# Patient Record
Sex: MALE | Race: WHITE | NOT HISPANIC OR LATINO | Employment: OTHER | URBAN - METROPOLITAN AREA
[De-identification: names, ages, dates, MRNs, and addresses within clinical notes are randomized per-mention and may not be internally consistent; named-entity substitution may affect disease eponyms.]

---

## 2017-01-03 ENCOUNTER — OFFICE VISIT (OUTPATIENT)
Dept: MEDICAL GROUP | Age: 72
End: 2017-01-03
Payer: MEDICARE

## 2017-01-03 VITALS
DIASTOLIC BLOOD PRESSURE: 78 MMHG | HEART RATE: 72 BPM | HEIGHT: 74 IN | SYSTOLIC BLOOD PRESSURE: 130 MMHG | BODY MASS INDEX: 34.52 KG/M2 | WEIGHT: 269 LBS | OXYGEN SATURATION: 98 % | RESPIRATION RATE: 16 BRPM | TEMPERATURE: 98.2 F

## 2017-01-03 DIAGNOSIS — R18.8 CIRRHOSIS OF LIVER WITH ASCITES, UNSPECIFIED HEPATIC CIRRHOSIS TYPE (HCC): ICD-10-CM

## 2017-01-03 DIAGNOSIS — I86.4 GASTRIC VARICES: ICD-10-CM

## 2017-01-03 DIAGNOSIS — E78.2 MIXED HYPERLIPIDEMIA: ICD-10-CM

## 2017-01-03 DIAGNOSIS — I10 ESSENTIAL HYPERTENSION: ICD-10-CM

## 2017-01-03 DIAGNOSIS — K74.60 CIRRHOSIS OF LIVER WITH ASCITES, UNSPECIFIED HEPATIC CIRRHOSIS TYPE (HCC): ICD-10-CM

## 2017-01-03 DIAGNOSIS — R17 ELEVATED BILIRUBIN: ICD-10-CM

## 2017-01-03 DIAGNOSIS — D50.0 IRON DEFICIENCY ANEMIA DUE TO CHRONIC BLOOD LOSS: ICD-10-CM

## 2017-01-03 PROCEDURE — 99215 OFFICE O/P EST HI 40 MIN: CPT | Performed by: INTERNAL MEDICINE

## 2017-01-03 RX ORDER — FOLIC ACID 1 MG/1
1 TABLET ORAL DAILY
Qty: 90 TAB | Refills: 4 | Status: ON HOLD | OUTPATIENT
Start: 2017-01-03 | End: 2017-09-07

## 2017-01-03 RX ORDER — LOSARTAN POTASSIUM 25 MG/1
25 TABLET ORAL DAILY
Qty: 30 TAB | Refills: 6 | Status: SHIPPED | OUTPATIENT
Start: 2017-01-03 | End: 2017-01-03 | Stop reason: SDUPTHER

## 2017-01-03 RX ORDER — LOSARTAN POTASSIUM 25 MG/1
25 TABLET ORAL DAILY
Qty: 30 TAB | Refills: 6 | Status: SHIPPED | OUTPATIENT
Start: 2017-01-03 | End: 2017-06-26

## 2017-01-03 ASSESSMENT — ENCOUNTER SYMPTOMS
NEUROLOGICAL NEGATIVE: 1
GASTROINTESTINAL NEGATIVE: 1
CONSTITUTIONAL NEGATIVE: 1
EYES NEGATIVE: 1
MUSCULOSKELETAL NEGATIVE: 1
RESPIRATORY NEGATIVE: 1
PSYCHIATRIC NEGATIVE: 1
CARDIOVASCULAR NEGATIVE: 1

## 2017-01-03 NOTE — MR AVS SNAPSHOT
"        Torey Lima   1/3/2017 11:20 AM   Office Visit   MRN: [id number]    Department:  [address] [place] [address]   Dept Phone:  [phone]    Description:  Male : 1945   Provider:  Fabián Urena M.D.           Reason for Visit     Cough f/v visit from 15 for dry cough      Allergies as of 1/3/2017     Allergen Noted Reactions    Nkda [No Known Drug Allergy] 08/10/2010         You were diagnosed with     Essential hypertension   [8827976]       Mixed hyperlipidemia   [272.2.ICD-9-CM]       Iron deficiency anemia due to chronic blood loss   [100415]       Cirrhosis of liver with ascites, unspecified hepatic cirrhosis type (HCC)   [7076250]       Elevated bilirubin   [111322]         Vital Signs     Blood Pressure Pulse Temperature Respirations Height Weight    130/78 mmHg 72 36.8 °C (98.2 °F) 16 1.88 m (6' 2.02\") 122.018 kg (269 lb)    Body Mass Index Oxygen Saturation Smoking Status             34.52 kg/m2 98% Former Smoker         Basic Information     Date Of Birth Sex Race Ethnicity Preferred Language    1945 Male White Non- English      Your appointments     2017  8:00 AM   PREVIOUS PATIENT with Andi Pearl M.D.   [facility] (--)    [address] [place] [address] [address]  [facility] [id number] [phone]            2017  8:00 AM   Established Patient with Fabián Urena M.D.   [address] [address] [phone]           You will be receiving a confirmation call a few days before your appointment from our automated call confirmation system.              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 D69.59   4/25/2012 - Present    ASCVD (arteriosclerotic cardiovascular disease)subtotalled small distal LCx and 40% LAD med rx by cath 12/13/12 I25.10 High  12/14/2012 - Present    BMI 34.0-34.9,adult Z68.34 High  2/15/2013 - Present    Mixed hyperlipidemia E78.2   6/20/2013 - Present    Essential hypertension I10   6/20/2013 - Present    Acute blood loss anemia D62 High  4/3/2016 - Present    GIB (gastrointestinal bleeding)- recurrent from varices K92.2 Medium  4/3/2016 - Present    Esophageal varices- BANDED 4/2016- repeat egd tbd 10//29/16- gic I85.00 High  4/4/2016 - Present    Gastric varices- s/p BRTO procedure at [facility] I86.4 Medium  4/4/2016 - Present    Portal vein thrombosis- on CT [facility] 2/2015 I81   4/14/2016 - Present    Cirrhosis of liver with ascites (HCC)- ? DUE TO OLD HEP B, CHEMO RX,  OR THERMO RAD OF MAGNANT LESION K74.60   4/14/2016 - Present    Iron deficiency anemia due to chronic blood loss D50.0   10/6/2016 - Present    Microcytic hypochromic anemia D50.9   10/24/2016 - Present    History of colon cancer Z85.038   10/24/2016 - Present    History of esophageal varices Z87.19   10/24/2016 - Present    SVT (supraventricular tachycardia) (HCC) I47.1   10/27/2016 - Present    Dry cough- ? due to lisinopril- d/c'd 11/2016 R05   11/18/2016 - Present    Elevated bilirubin R17   1/3/2017 - Present      Health Maintenance        Date Due Completion Dates    COLONOSCOPY 8/19/2017 8/19/2014, 7/15/2011    IMM DTaP/Tdap/Td Vaccine (2 - Td) 4/25/2022 4/25/2012            Current Immunizations     13-VALENT PCV PREVNAR 10/9/2015  8:10 AM    Influenza TIV (IM) 10/26/2011, 9/18/2010    Influenza Vaccine Adult HD 10/10/2016, 10/9/2015  8:00 AM, 11/3/2012    Influenza Vaccine Quad Inj (Pf) 10/20/2014    Influenza Vaccine Quad Inj (Preserved) 10/10/2013    Pneumococcal polysaccharide vaccine (PPSV-23) 10/26/2011    SHINGLES VACCINE 11/3/2012    Tdap  Vaccine 4/25/2012      Below and/or attached are the medications your provider expects you to take. Review all of your home medications and newly ordered medications with your provider and/or pharmacist. Follow medication instructions as directed by your provider and/or pharmacist. Please keep your medication list with you and share with your provider. Update the information when medications are discontinued, doses are changed, or new medications (including over-the-counter products) are added; and carry medication information at all times in the event of emergency situations     Allergies:  NKDA - (reactions not documented)               Medications  Valid as of: January 03, 2017 - 12:11 PM    Generic Name Brand Name Tablet Size Instructions for use    Albuterol Sulfate (Aero Soln) albuterol 108 (90 BASE) MCG/ACT Inhale 2 Puffs by mouth every 6 hours as needed for Shortness of Breath.        Amoxicillin-Pot Clavulanate (Tab) AUGMENTIN 875-125 MG Take 1 Tab by mouth 2 times a day.        Aspirin (Tablet Delayed Response) ECOTRIN 81 MG Take 81 mg by mouth every day.        Atorvastatin Calcium (Tab) LIPITOR 40 MG Take 1 Tab by mouth every day. TAKE 1 TABLET BY MOUTH DAILY        Carvedilol (Tab) COREG 3.125 MG Take 3.125 mg by mouth every day. Indications: take 1/2 tab twice a day        Cholecalciferol (Tab) Vitamin D-3 5000 UNITS Take 5,000 Units by mouth every day.        Diclofenac Sodium (Gel) Diclofenac Sodium 1 %         Folic Acid (Tab) FOLVITE 1 MG Take 1 Tab by mouth every day.        Hydrocortisone (Cream) hydrocortisone 1 % Apply 1 Each to affected area(s) as needed. Indications: Psoriasis        Losartan Potassium (Tab) COZAAR 25 MG Take 1 Tab by mouth every day.        Nitroglycerin (SL Tab) NITROSTAT 0.4 MG Place 0.4 mg under tongue every 5 minutes as needed for Chest Pain.        Pantoprazole Sodium (Tablet Delayed Response) PROTONIX 40 MG Take 40 mg by mouth 2 times a day.        Ursodiol (Cap)  ACTIGALL 300 MG Take 300 mg by mouth 3 times a day.        .                 Medicines prescribed today were sent to:     [facility] [place], [place] - [address]    [address] [place] [address]    Phone: [phone] Fax: [phone]    Open 24 Hours?: No      Medication refill instructions:       If your prescription bottle indicates you have medication refills left, it is not necessary to call your provider’s office. Please contact your pharmacy and they will refill your medication.    If your prescription bottle indicates you do not have any refills left, you may request refills at any time through one of the following ways: The online [email or web address] system (except Urgent Care), by calling your provider’s office, or by asking your pharmacy to contact your provider’s office with a refill request. Medication refills are processed only during regular business hours and may not be available until the next business day. Your provider may request additional information or to have a follow-up visit with you prior to refilling your medication.   *Please Note: Medication refills are assigned a new Rx number when refilled electronically. Your pharmacy may indicate that no refills were authorized even though a new prescription for the same medication is available at the pharmacy. Please request the medicine by name with the pharmacy before contacting your provider for a refill.           [email or web address] Access Code: Activation code not generated  Current [email or web address] Status: Active

## 2017-01-04 NOTE — PROGRESS NOTES
Subjective:      Torey Lima is a 71 y.o. male who presents with Cough  The patient is here for followup of chronic medical problems listed below. The patient is compliant with medications and having no side effects from them. Denies chest pain, abdominal pain, dyspnea, myalgias, or cough.   Patient Active Problem List    Diagnosis Date Noted   • Esophageal varices- BANDED 4/2016- repeat egd tbd 10//29/16- gic 04/04/2016     Priority: High   • Acute blood loss anemia 04/03/2016     Priority: High   • BMI 34.0-34.9,adult 02/15/2013     Priority: High   • ASCVD (arteriosclerotic cardiovascular disease)subtotalled small distal LCx and 40% LAD med rx by cath 12/13/12 12/14/2012     Priority: High   • Gastric varices- s/p BRTO procedure at [facility] 04/04/2016     Priority: Medium   • GIB (gastrointestinal bleeding)- recurrent from varices 04/03/2016     Priority: Medium   • Elevated bilirubin 01/03/2017   • Dry cough- ? due to lisinopril- d/c'd 11/2016 11/18/2016   • SVT (supraventricular tachycardia) (HCC) 10/27/2016   • Microcytic hypochromic anemia 10/24/2016   • History of colon cancer 10/24/2016   • History of esophageal varices 10/24/2016   • Iron deficiency anemia due to chronic blood loss 10/06/2016   • Portal vein thrombosis- on CT [facility] 2/2015 04/14/2016   • Cirrhosis of liver with ascites (HCC)- ? DUE TO OLD HEP B, CHEMO RX,  OR THERMO RAD OF MAGNANT LESION 04/14/2016   • Mixed hyperlipidemia 06/20/2013   • Essential hypertension 06/20/2013   • Thrombocytopenia due to drugs 04/25/2012   • Colon cancer-2010 left hemicolectomy; no colostomy 12/06/2010   • Liver lesion- ablation of malignant met from colon ca 2011- f/u [facility] q 6 mos 12/06/2010   • Peripheral neuropathy, secondary to drugs or chemicals 12/06/2010     Allergies   Allergen Reactions   • Nkda [No Known Drug Allergy]      Outpatient Prescriptions Prior to Visit   Medication Sig Dispense Refill   • carvedilol (COREG) 3.125 MG Tab Take 3.125 mg by  "mouth every day. Indications: take 1/2 tab twice a day     • pantoprazole (PROTONIX) 40 MG Tablet Delayed Response Take 40 mg by mouth 2 times a day.     • ursodiol (ACTIGALL) 300 MG Cap Take 300 mg by mouth 3 times a day.     • aspirin EC (ECOTRIN) 81 MG Tablet Delayed Response Take 81 mg by mouth every day.     • atorvastatin (LIPITOR) 40 MG Tab Take 1 Tab by mouth every day. TAKE 1 TABLET BY MOUTH DAILY 90 Tab 4   • Cholecalciferol (VITAMIN D-3) 5000 UNITS Tab Take 5,000 Units by mouth every day. 90 Tab 3   • Diclofenac Sodium 1 % Gel      • benzonatate (TESSALON) 100 MG Cap Take 1 Cap by mouth 3 times a day as needed for Cough. (Patient not taking: Reported on 1/3/2017) 60 Cap 0   • albuterol 108 (90 BASE) MCG/ACT Aero Soln inhalation aerosol Inhale 2 Puffs by mouth every 6 hours as needed for Shortness of Breath. (Patient not taking: Reported on 1/3/2017) 8.5 g 3   • amoxicillin-clavulanate (AUGMENTIN) 875-125 MG Tab Take 1 Tab by mouth 2 times a day. (Patient not taking: Reported on 1/3/2017) 20 Tab 0   • nitroglycerin (NITROSTAT) 0.4 MG SL Tab Place 0.4 mg under tongue every 5 minutes as needed for Chest Pain.     • folic acid (FOLVITE) 1 MG Tab Take 1 mg by mouth every day.     • hydrocortisone 1 % Cream Apply 1 Each to affected area(s) as needed. Indications: Psoriasis       No facility-administered medications prior to visit.     \         HPI    Review of Systems   Constitutional: Negative.    HENT: Negative.    Eyes: Negative.    Respiratory: Negative.    Cardiovascular: Negative.    Gastrointestinal: Negative.    Genitourinary: Negative.    Musculoskeletal: Negative.    Skin: Negative.    Neurological: Negative.    Endo/Heme/Allergies: Negative.    Psychiatric/Behavioral: Negative.           Objective:     /78 mmHg  Pulse 72  Temp(Src) 36.8 °C (98.2 °F)  Resp 16  Ht 1.88 m (6' 2.02\")  Wt 122.018 kg (269 lb)  BMI 34.52 kg/m2  SpO2 98%     Physical Exam   Constitutional: He is oriented to " person, place, and time. He appears well-developed and well-nourished. No distress.   HENT:   Head: Normocephalic and atraumatic.   Right Ear: External ear normal.   Left Ear: External ear normal.   Mouth/Throat: Oropharynx is clear and moist. No oropharyngeal exudate.   Eyes: Conjunctivae and EOM are normal. Pupils are equal, round, and reactive to light. Right eye exhibits no discharge.   Neck: Normal range of motion. Neck supple. No JVD present. No tracheal deviation present. No thyromegaly present.   Cardiovascular: Normal rate, regular rhythm, normal heart sounds and intact distal pulses.  Exam reveals no gallop.    Pulmonary/Chest: Effort normal and breath sounds normal. No respiratory distress. He has no wheezes. He has no rales. He exhibits no tenderness.   Abdominal: Soft. Bowel sounds are normal. He exhibits no distension and no mass. There is no tenderness. There is no rebound and no guarding. No hernia.   Genitourinary: Guaiac negative stool. No penile tenderness.   Musculoskeletal: He exhibits no edema or tenderness.   Lymphadenopathy:     He has no cervical adenopathy.   Neurological: He is alert and oriented to person, place, and time. He has normal reflexes. He displays normal reflexes. No cranial nerve deficit. He exhibits normal muscle tone. Coordination normal.   Skin: Skin is warm and dry. No rash noted. He is not diaphoretic. No erythema. No pallor.   Psychiatric: He has a normal mood and affect. His behavior is normal. Judgment and thought content normal.   Nursing note and vitals reviewed.    Hospital Outpatient Visit on 12/27/2016   Component Date Value   • WBC 12/27/2016 4.8    • RBC 12/27/2016 3.85*   • Hemoglobin 12/27/2016 9.8*   • Hematocrit 12/27/2016 31.3*   • MCV 12/27/2016 81.3*   • MCH 12/27/2016 25.5*   • MCHC 12/27/2016 31.3*   • RDW 12/27/2016 46.3    • Platelet Count 12/27/2016 85*   • MPV 12/27/2016 9.6    • Neutrophils-Polys 12/27/2016 62.70    • Lymphocytes 12/27/2016 15.90*    • Monocytes 12/27/2016 12.80    • Eosinophils 12/27/2016 7.80*   • Basophils 12/27/2016 0.60    • Immature Granulocytes 12/27/2016 0.20    • Nucleated RBC 12/27/2016 0.00    • Neutrophils (Absolute) 12/27/2016 2.99    • Lymphs (Absolute) 12/27/2016 0.76*   • Monos (Absolute) 12/27/2016 0.61    • Eos (Absolute) 12/27/2016 0.37    • Baso (Absolute) 12/27/2016 0.03    • Immature Granulocytes (a* 12/27/2016 0.01    • NRBC (Absolute) 12/27/2016 0.00    • Sodium 12/27/2016 137    • Potassium 12/27/2016 3.6    • Chloride 12/27/2016 103    • Co2 12/27/2016 24    • Anion Gap 12/27/2016 10.0    • Glucose 12/27/2016 88    • Bun 12/27/2016 13    • Creatinine 12/27/2016 0.96    • Calcium 12/27/2016 8.3*   • AST(SGOT) 12/27/2016 25    • ALT(SGPT) 12/27/2016 20    • Alkaline Phosphatase 12/27/2016 90    • Total Bilirubin 12/27/2016 2.2*   • Albumin 12/27/2016 3.2    • Total Protein 12/27/2016 5.6*   • Globulin 12/27/2016 2.4    • A-G Ratio 12/27/2016 1.3    • GFR If  12/27/2016 >60    • GFR If Non  Ameri* 12/27/2016 >60    Hospital Outpatient Visit on 12/15/2016   Component Date Value   • Fluid Type 12/15/2016 Ascites    • Albumin 12/15/2016 <1.0    • Total Protein Fluid 12/15/2016 <3.0    • Gram Stain Result 12/15/2016                      Value:Few WBCs.  No organisms seen.     • Significant Indicator 12/15/2016 NEG    • Source 12/15/2016 BF    • Site 12/15/2016 Ascites Fluid    • Culture Result Bdf 12/15/2016 No growth at 72 hours.    • Fluid Type 12/15/2016 Ascites    • Color-Body Fluid 12/15/2016 Yellow    • Character-Body Fluid 12/15/2016 Clear    • Total RBC Count 12/15/2016 <2000    • Total WBC 12/15/2016 1    • Polys 12/15/2016 5    • Lymphs 12/15/2016 24    • Mononuclear Cells - Fluid 12/15/2016 70    • Mesothelial Cells - CSF 12/15/2016 1    • Comments 12/15/2016 see below    • Cytology Reg 12/15/2016 See Path Report    • Significant Indicator 12/15/2016 .    • Source 12/15/2016 BF    • Site  12/15/2016 Ascites Fluid    • Gram Stain Result 12/15/2016                      Value:Few WBCs.  No organisms seen.     Hospital Outpatient Visit on 12/09/2016   Component Date Value   • WBC 12/09/2016 4.4*   • RBC 12/09/2016 3.85*   • Hemoglobin 12/09/2016 10.2*   • Hematocrit 12/09/2016 33.1*   • MCV 12/09/2016 86.0    • MCH 12/09/2016 26.5*   • MCHC 12/09/2016 30.8*   • RDW 12/09/2016 53.4*   • Platelet Count 12/09/2016 114*   • MPV 12/09/2016 11.2    • Neutrophils-Polys 12/09/2016 64.90    • Lymphocytes 12/09/2016 14.90*   • Monocytes 12/09/2016 11.30    • Eosinophils 12/09/2016 8.00*   • Basophils 12/09/2016 0.70    • Immature Granulocytes 12/09/2016 0.20    • Nucleated RBC 12/09/2016 0.00    • Neutrophils (Absolute) 12/09/2016 2.82    • Lymphs (Absolute) 12/09/2016 0.65*   • Monos (Absolute) 12/09/2016 0.49    • Eos (Absolute) 12/09/2016 0.35    • Baso (Absolute) 12/09/2016 0.03    • Immature Granulocytes (a* 12/09/2016 0.01    • NRBC (Absolute) 12/09/2016 0.00    • Sodium 12/09/2016 139    • Potassium 12/09/2016 3.9    • Chloride 12/09/2016 107    • Co2 12/09/2016 27    • Anion Gap 12/09/2016 5.0    • Glucose 12/09/2016 96    • Bun 12/09/2016 15    • Creatinine 12/09/2016 0.78    • Calcium 12/09/2016 8.8    • AST(SGOT) 12/09/2016 24    • ALT(SGPT) 12/09/2016 14    • Alkaline Phosphatase 12/09/2016 172*   • Total Bilirubin 12/09/2016 2.8*   • Albumin 12/09/2016 3.6    • Total Protein 12/09/2016 6.0    • Globulin 12/09/2016 2.4    • A-G Ratio 12/09/2016 1.5    • PT 12/09/2016 15.5*   • INR 12/09/2016 1.19*   • GFR If  12/09/2016 >60    • GFR If Non  Ameri* 12/09/2016 >60       Lab Results   Component Value Date/Time    GLYCOHEMOGLOBIN 5.2 10/25/2016 12:06 AM    GLYCOHEMOGLOBIN 5.3 07/08/2016 07:02 AM     Lab Results   Component Value Date/Time    SODIUM 137 12/27/2016 05:08 PM    POTASSIUM 3.6 12/27/2016 05:08 PM    CHLORIDE 103 12/27/2016 05:08 PM    CO2 24 12/27/2016 05:08 PM    GLUCOSE  88 12/27/2016 05:08 PM    BUN 13 12/27/2016 05:08 PM    CREATININE 0.96 12/27/2016 05:08 PM    BUN-CREATININE RATIO 15 01/23/2010 12:00 AM    GLOM FILT RATE, EST >59 01/23/2010 12:00 AM    ALKALINE PHOSPHATASE 90 12/27/2016 05:08 PM    AST(SGOT) 25 12/27/2016 05:08 PM    ALT(SGPT) 20 12/27/2016 05:08 PM    TOTAL BILIRUBIN 2.2* 12/27/2016 05:08 PM     Lab Results   Component Value Date/Time    INR 1.19* 12/09/2016 10:00 AM    INR 1.33* 10/28/2016 12:08 PM    INR 1.35* 10/27/2016 10:18 AM     Lab Results   Component Value Date/Time    CHOLESTEROL,TOT 92* 10/08/2016 07:06 AM    LDL 32 10/08/2016 07:06 AM    HDL 48 10/08/2016 07:06 AM    TRIGLYCERIDES 61 10/08/2016 07:06 AM       Lab Results   Component Value Date/Time    TESTOSTERONE,TOTAL 333 04/02/2015 06:47 AM     No results found for: TSH  Lab Results   Component Value Date/Time    FREE T-4 0.85 04/02/2015 06:47 AM    FREE T-4 0.85 04/12/2014 07:26 AM     No results found for: URICACID  No components found for: VITB12  Lab Results   Component Value Date/Time    25-HYDROXY   VITAMIN D 25 38 04/02/2015 06:47 AM    25-HYDROXY   VITAMIN D 25 57 10/09/2014 07:07 AM                  Assessment/Plan:     1. Essential hypertension      Under good control. Continue same regimen.  - losartan (COZAAR) 25 MG Tab; Take 1 Tab by mouth every day.  Dispense: 30 Tab; Refill: 6    2. Mixed hyperlipidemia     Under good control. Continue same regimen.    3. Iron deficiency anemia due to chronic blood loss       Under good control. Continue same regimen.  4. Cirrhosis of liver with ascites, unspecified hepatic cirrhosis type (HCC)- s/p recent  BRTO procedure at [facility]   Gastric varices- s/p BRTO procedure at [facility]  - folic acid (FOLVITE) 1 MG Tab; Take 1 Tab by mouth every day.  Dispense: 90 Tab; Refill: 4    5. Elevated bilirubin          Under good control. Continue same regimen.    40 minute face-to-face encounter took place today.  More than half of this time was spent in the  coordination of care of the above problems, as well as counseling.

## 2017-02-01 ENCOUNTER — OFFICE VISIT (OUTPATIENT)
Dept: CARDIOLOGY | Facility: MEDICAL CENTER | Age: 72
End: 2017-02-01
Payer: MEDICARE

## 2017-02-01 VITALS
BODY MASS INDEX: 32.83 KG/M2 | HEART RATE: 70 BPM | WEIGHT: 264 LBS | OXYGEN SATURATION: 95 % | HEIGHT: 75 IN | SYSTOLIC BLOOD PRESSURE: 120 MMHG | DIASTOLIC BLOOD PRESSURE: 70 MMHG

## 2017-02-01 DIAGNOSIS — R40.0 DAYTIME SOMNOLENCE: ICD-10-CM

## 2017-02-01 DIAGNOSIS — I10 ESSENTIAL HYPERTENSION: ICD-10-CM

## 2017-02-01 DIAGNOSIS — E78.2 MIXED HYPERLIPIDEMIA: ICD-10-CM

## 2017-02-01 DIAGNOSIS — I25.10 ASCVD (ARTERIOSCLEROTIC CARDIOVASCULAR DISEASE): ICD-10-CM

## 2017-02-01 PROCEDURE — G8482 FLU IMMUNIZE ORDER/ADMIN: HCPCS | Performed by: INTERNAL MEDICINE

## 2017-02-01 PROCEDURE — G8432 DEP SCR NOT DOC, RNG: HCPCS | Performed by: INTERNAL MEDICINE

## 2017-02-01 PROCEDURE — 1101F PT FALLS ASSESS-DOCD LE1/YR: CPT | Performed by: INTERNAL MEDICINE

## 2017-02-01 PROCEDURE — 3017F COLORECTAL CA SCREEN DOC REV: CPT | Performed by: INTERNAL MEDICINE

## 2017-02-01 PROCEDURE — G8598 ASA/ANTIPLAT THER USED: HCPCS | Performed by: INTERNAL MEDICINE

## 2017-02-01 PROCEDURE — G8419 CALC BMI OUT NRM PARAM NOF/U: HCPCS | Performed by: INTERNAL MEDICINE

## 2017-02-01 PROCEDURE — 4040F PNEUMOC VAC/ADMIN/RCVD: CPT | Performed by: INTERNAL MEDICINE

## 2017-02-01 PROCEDURE — 1036F TOBACCO NON-USER: CPT | Performed by: INTERNAL MEDICINE

## 2017-02-01 PROCEDURE — 99214 OFFICE O/P EST MOD 30 MIN: CPT | Performed by: INTERNAL MEDICINE

## 2017-02-01 RX ORDER — MULTIVIT-MIN/FA/LYCOPEN/LUTEIN .4-300-25
1 TABLET ORAL DAILY
Status: ON HOLD | COMMUNITY
End: 2017-09-07

## 2017-02-01 NOTE — Clinical Note
[facility]   [address] [place] [address] 400  [place] [address]  Phone: [phone]  Fax: [phone]              Torey Lima  1945    Encounter Date: 2/1/2017    Andi Pearl M.D.          PROGRESS NOTE:  Subjective:   Torey Lima is a 71 y.o. male who presents today for follow-up of his coronary disease, hypertension and dyslipidemia. He also has cirrhosis of the liver. In October, he was undergoing a interventional radiology procedure when he developed atrial fibrillation. Apparently, a wire in a vertically when into the right ventricle. In any event, he underwent emergency cardioversion at that time. He was subsequently referred to [facility] for another procedure to improve his portal hypertension.    After discharge, he developed a cough. His lisinopril was changed to losartan and his cough resolved.    He has had no chest discomfort, dyspnea, edema, palpitations or lightheadedness.    He is having significant difficulty with fatigue. He does have some daytime somnolence but no nocturnal dyspnea.    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/6/2010   • Thrombocytopenia due to drugs 4/25/2012   • Impaired fasting glucose 8/2/2012   • Vitamin d deficiency 8/2/2012   • BMI 33.0-33.9,adult 2/15/2013   • HLD (hyperlipidemia) 6/20/2013   • HTN (hypertension) 6/20/2013   • Hypertension    • Liver cirrhosis (CMS-HCC)      Past Surgical History   Procedure Laterality Date   • Low anterior resection robotic  8/10/2010     Performed by RUBY RUIZ at SURGERY [facility]   • Cath placement  9/20/2010     Performed by RUBY RUIZ at SURGERY [facility]   • Hip arthroplasty mis total        rt   • Other       Cholecystectomy   • Other (comments)       liver surgery     • Cath removal  7/20/2011     Performed by RUBY RUIZ at SURGERY [facility] ORS   • Full thickness block resection  4/11/2012     Performed by LANI BOWMAN at SURGERY [facility] ORS   • Gastroscopy with banding  4/3/2016     Procedure: GASTROSCOPY WITH BANDING;  Surgeon: Cayetano Stovall M.D.;  Location: ENDOSCOPY [facility] ORS;  Service:    • Gastroscopy with banding  10/25/2016     Procedure: GASTROSCOPY WITH BANDING;  Surgeon: Pierre Waggoner M.D.;  Location: ENDOSCOPY [facility] ORS;  Service:      Family History   Problem Relation Age of Onset   • Heart Disease Mother    • Cancer Mother      History   Smoking status   • Former Smoker -- 0.50 packs/day for 1 years   • Types: Cigarettes, Cigars   • Quit date: 01/01/1964   Smokeless tobacco   • Never Used     Comment: 1 cigar per week.     Allergies   Allergen Reactions   • Nkda [No Known Drug Allergy]      Outpatient Encounter Prescriptions as of 2/1/2017   Medication Sig Dispense Refill   • Multiple Vitamins-Minerals (CENTRUM SILVER ADULT 50+) Tab Take  by mouth.     • Polyethyl Glycol-Propyl Glycol (SYSTANE OP) by Ophthalmic route.     • folic acid (FOLVITE) 1 MG Tab Take 1 Tab by mouth every day. 90 Tab 4   • losartan (COZAAR) 25 MG Tab Take 1 Tab by mouth every day. 30 Tab 6   • carvedilol (COREG) 3.125 MG Tab Take 3.125 mg by mouth every day. Indications: take 1/2 tab twice a day     • pantoprazole (PROTONIX) 40 MG Tablet Delayed Response Take 40 mg by mouth 2 times a day.     • ursodiol (ACTIGALL) 300 MG Cap Take 300 mg by mouth 3 times a day.     • aspirin EC (ECOTRIN) 81 MG Tablet Delayed Response Take 81 mg by mouth every day.     • nitroglycerin (NITROSTAT) 0.4 MG SL Tab Place 0.4 mg under tongue every 5 minutes as needed for Chest Pain.     • atorvastatin (LIPITOR) 40 MG Tab Take 1 Tab by mouth every day. TAKE 1 TABLET BY MOUTH DAILY 90 Tab 4  "  • hydrocortisone 1 % Cream Apply 1 Each to affected area(s) as needed. Indications: Psoriasis     • Cholecalciferol (VITAMIN D-3) 5000 UNITS Tab Take 5,000 Units by mouth every day. 90 Tab 3   • Diclofenac Sodium 1 % Gel        No facility-administered encounter medications on file as of 2/1/2017.     ROS     Objective:   /70 mmHg  Pulse 70  Ht 1.905 m (6' 3\")  Wt 119.75 kg (264 lb)  BMI 33.00 kg/m2  SpO2 95%    Physical Exam   Neck: No JVD present.   Cardiovascular: Normal rate and regular rhythm.  Exam reveals no gallop.    No murmur heard.  Pulmonary/Chest: Effort normal. He has no rales.   Abdominal: Soft. There is no tenderness.   Musculoskeletal: He exhibits edema (1-2+ pretibial).     Lab Results   Component Value Date/Time    CHOLESTEROL,TOT 92* 10/08/2016 07:06 AM    LDL 32 10/08/2016 07:06 AM    HDL 48 10/08/2016 07:06 AM    TRIGLYCERIDES 61 10/08/2016 07:06 AM       Lab Results   Component Value Date/Time    SODIUM 137 12/27/2016 05:08 PM    POTASSIUM 3.6 12/27/2016 05:08 PM    CHLORIDE 103 12/27/2016 05:08 PM    CO2 24 12/27/2016 05:08 PM    GLUCOSE 88 12/27/2016 05:08 PM    BUN 13 12/27/2016 05:08 PM    CREATININE 0.96 12/27/2016 05:08 PM    BUN-CREATININE RATIO 15 01/23/2010 12:00 AM    GLOM FILT RATE, EST >59 01/23/2010 12:00 AM     Lab Results   Component Value Date/Time    ALKALINE PHOSPHATASE 90 12/27/2016 05:08 PM    AST(SGOT) 25 12/27/2016 05:08 PM    ALT(SGPT) 20 12/27/2016 05:08 PM    TOTAL BILIRUBIN 2.2* 12/27/2016 05:08 PM      Lab Results   Component Value Date/Time    B NATRIURETIC PEPTIDE 20 12/11/2012 07:45 PM          Assessment:     1. ASCVD (arteriosclerotic cardiovascular disease)subtotalled small distal LCx and 40% LAD med rx by cath 12/13/12     2. Mixed hyperlipidemia     3. Essential hypertension         Medical Decision Making:  Today's Assessment / Status / Plan:     Mr. Lima is clinically stable. His lipid status and his blood pressure are under excellent control. " He does have difficulty with some daytime somnolence. We will obtain an overnight pulse oximetry. He will follow-up in a couple of months. If he is stable at that time, we can probably see him on a as needed basis.      Fabián Urena M.D.  [address] [place] [address]   [address]  [place] [address]  VIA In Basket

## 2017-02-01 NOTE — PROGRESS NOTES
Subjective:   Torey Lima is a 71 y.o. male who presents today for follow-up of his coronary disease, hypertension and dyslipidemia. He also has cirrhosis of the liver. In October, he was undergoing a interventional radiology procedure when he developed atrial fibrillation. Apparently, a wire in a vertically when into the right ventricle. In any event, he underwent emergency cardioversion at that time. He was subsequently referred to [email or web address] for another procedure to improve his portal hypertension.    After discharge, he developed a cough. His lisinopril was changed to losartan and his cough resolved.    He has had no chest discomfort, dyspnea, edema, palpitations or lightheadedness.    He is having significant difficulty with fatigue. He does have some daytime somnolence but no nocturnal dyspnea.    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/6/2010   • Thrombocytopenia due to drugs 4/25/2012   • Impaired fasting glucose 8/2/2012   • Vitamin d deficiency 8/2/2012   • BMI 33.0-33.9,adult 2/15/2013   • HLD (hyperlipidemia) 6/20/2013   • HTN (hypertension) 6/20/2013   • Hypertension    • Liver cirrhosis (CMS-HCC)      Past Surgical History   Procedure Laterality Date   • Low anterior resection robotic  8/10/2010     Performed by RUBY RUIZ at SURGERY [facility] ORS   • Cath placement  9/20/2010     Performed by RUBY RUIZ at SURGERY [facility] ORS   • Hip arthroplasty mis total       rt   • Other       Cholecystectomy   • Other (comments)       liver surgery     • Cath removal  7/20/2011     Performed by RUBY RUIZ at SURGERY [facility] ORS   • Full thickness block resection  4/11/2012     Performed by LANI BOWMAN at [facility]ERY  [facility]   • Gastroscopy with banding  4/3/2016     Procedure: GASTROSCOPY WITH BANDING;  Surgeon: Cayetano Stovall M.D.;  Location: ENDOSCOPY [facility];  Service:    • Gastroscopy with banding  10/25/2016     Procedure: GASTROSCOPY WITH BANDING;  Surgeon: Pierre Waggoner M.D.;  Location: ENDOSCOPY [facility];  Service:      Family History   Problem Relation Age of Onset   • Heart Disease Mother    • Cancer Mother      History   Smoking status   • Former Smoker -- 0.50 packs/day for 1 years   • Types: Cigarettes, Cigars   • Quit date: 01/01/1964   Smokeless tobacco   • Never Used     Comment: 1 cigar per week.     Allergies   Allergen Reactions   • Nkda [No Known Drug Allergy]      Outpatient Encounter Prescriptions as of 2/1/2017   Medication Sig Dispense Refill   • Multiple Vitamins-Minerals (CENTRUM SILVER ADULT 50+) Tab Take  by mouth.     • Polyethyl Glycol-Propyl Glycol (SYSTANE OP) by Ophthalmic route.     • folic acid (FOLVITE) 1 MG Tab Take 1 Tab by mouth every day. 90 Tab 4   • losartan (COZAAR) 25 MG Tab Take 1 Tab by mouth every day. 30 Tab 6   • carvedilol (COREG) 3.125 MG Tab Take 3.125 mg by mouth every day. Indications: take 1/2 tab twice a day     • pantoprazole (PROTONIX) 40 MG Tablet Delayed Response Take 40 mg by mouth 2 times a day.     • ursodiol (ACTIGALL) 300 MG Cap Take 300 mg by mouth 3 times a day.     • aspirin EC (ECOTRIN) 81 MG Tablet Delayed Response Take 81 mg by mouth every day.     • nitroglycerin (NITROSTAT) 0.4 MG SL Tab Place 0.4 mg under tongue every 5 minutes as needed for Chest Pain.     • atorvastatin (LIPITOR) 40 MG Tab Take 1 Tab by mouth every day. TAKE 1 TABLET BY MOUTH DAILY 90 Tab 4   • hydrocortisone 1 % Cream Apply 1 Each to affected area(s) as needed. Indications: Psoriasis     • Cholecalciferol (VITAMIN D-3) 5000 UNITS Tab Take 5,000 Units by mouth every day. 90 Tab 3   • Diclofenac Sodium 1 % Gel        No facility-administered  "encounter medications on file as of 2/1/2017.     ROS     Objective:   /70 mmHg  Pulse 70  Ht 1.905 m (6' 3\")  Wt 119.75 kg (264 lb)  BMI 33.00 kg/m2  SpO2 95%    Physical Exam   Neck: No JVD present.   Cardiovascular: Normal rate and regular rhythm.  Exam reveals no gallop.    No murmur heard.  Pulmonary/Chest: Effort normal. He has no rales.   Abdominal: Soft. There is no tenderness.   Musculoskeletal: He exhibits edema (1-2+ pretibial).     Lab Results   Component Value Date/Time    CHOLESTEROL,TOT 92* 10/08/2016 07:06 AM    LDL 32 10/08/2016 07:06 AM    HDL 48 10/08/2016 07:06 AM    TRIGLYCERIDES 61 10/08/2016 07:06 AM       Lab Results   Component Value Date/Time    SODIUM 137 12/27/2016 05:08 PM    POTASSIUM 3.6 12/27/2016 05:08 PM    CHLORIDE 103 12/27/2016 05:08 PM    CO2 24 12/27/2016 05:08 PM    GLUCOSE 88 12/27/2016 05:08 PM    BUN 13 12/27/2016 05:08 PM    CREATININE 0.96 12/27/2016 05:08 PM    BUN-CREATININE RATIO 15 01/23/2010 12:00 AM    GLOM FILT RATE, EST >59 01/23/2010 12:00 AM     Lab Results   Component Value Date/Time    ALKALINE PHOSPHATASE 90 12/27/2016 05:08 PM    AST(SGOT) 25 12/27/2016 05:08 PM    ALT(SGPT) 20 12/27/2016 05:08 PM    TOTAL BILIRUBIN 2.2* 12/27/2016 05:08 PM      Lab Results   Component Value Date/Time    B NATRIURETIC PEPTIDE 20 12/11/2012 07:45 PM          Assessment:     1. ASCVD (arteriosclerotic cardiovascular disease)subtotalled small distal LCx and 40% LAD med rx by cath 12/13/12     2. Mixed hyperlipidemia     3. Essential hypertension         Medical Decision Making:  Today's Assessment / Status / Plan:     Mr. Lima is clinically stable. His lipid status and his blood pressure are under excellent control. He does have difficulty with some daytime somnolence. We will obtain an overnight pulse oximetry. He will follow-up in a couple of months. If he is stable at that time, we can probably see him on a as needed basis.  "

## 2017-02-01 NOTE — MR AVS SNAPSHOT
"        Torey Lima   2017 8:00 AM   Office Visit   MRN: [id number]    Department:  [facility]   Dept Phone:  [phone]    Description:  Male : 1945   Provider:  Andi Pearl M.D.           Allergies as of 2017     Allergen Noted Reactions    Nkda [No Known Drug Allergy] 08/10/2010         You were diagnosed with     ASCVD (arteriosclerotic cardiovascular disease)   [855163]       Mixed hyperlipidemia   [272.2.ICD-9-CM]       Essential hypertension   [1866812]       Daytime somnolence   [048521]         Vital Signs     Blood Pressure Pulse Height Weight Body Mass Index Oxygen Saturation    120/70 mmHg 70 1.905 m (6' 3\") 119.75 kg (264 lb) 33.00 kg/m2 95%    Smoking Status                   Former Smoker           Basic Information     Date Of Birth Sex Race Ethnicity Preferred Language    1945 Male White Non- English      Your appointments     Feb 10, 2017 10:30 AM   New Total Body Iron with RN 4   Infusion Services ([facility])    [address] [place] [address] [address]  [place] [address]   [phone]            Mar 30, 2017  8:15 AM   FOLLOW UP with Andi Pearl M.D.   [facility] (--)    [address] [place] [address] [address]  [place] [address]   [phone]            2017  7:15 AM   Adult Draw/Collection with [facility]   [facility] (--)    [address]  [place] [address]   [phone]            2017  8:00 AM   Established Patient with Fabián Urena M.D.   [facility])    [address]  [place] [id number] [phone]           You will be receiving a confirmation call a few days before your appointment from our automated call confirmation system.              Problem List              ICD-10-CM Priority Class Noted - Resolved    Colon cancer- left hemicolectomy; no colostomy C18.9   2010 - Present    Liver lesion- ablation of malignant met from colon ca - f/u [facility] q 6 mos " K76.9   12/6/2010 - Present    Peripheral neuropathy, secondary to drugs or chemicals USQ9996   12/6/2010 - Present    Thrombocytopenia due to drugs D69.59   4/25/2012 - Present    ASCVD (arteriosclerotic cardiovascular disease)subtotalled small distal LCx and 40% LAD med rx by cath 12/13/12 I25.10 High  12/14/2012 - Present    BMI 34.0-34.9,adult Z68.34 High  2/15/2013 - Present    Mixed hyperlipidemia E78.2   6/20/2013 - Present    Essential hypertension I10   6/20/2013 - Present    Acute blood loss anemia D62 High  4/3/2016 - Present    GIB (gastrointestinal bleeding)- recurrent from varices K92.2 Medium  4/3/2016 - Present    Esophageal varices- BANDED 4/2016- repeat egd tbd 10//29/16- gic I85.00 High  4/4/2016 - Present    Gastric varices- s/p BRTO procedure at [facility] I86.4 Medium  4/4/2016 - Present    Portal vein thrombosis- on CT [facility] 2/2015 I81   4/14/2016 - Present    Cirrhosis of liver with ascites (HCC)- ? DUE TO OLD HEP B, CHEMO RX,  OR THERMO RAD OF MAGNANT LESION K74.60   4/14/2016 - Present    Iron deficiency anemia due to chronic blood loss D50.0   10/6/2016 - Present    Microcytic hypochromic anemia D50.9   10/24/2016 - Present    History of colon cancer Z85.038   10/24/2016 - Present    History of esophageal varices Z87.19   10/24/2016 - Present    SVT (supraventricular tachycardia) (CMS-HCC) I47.1   10/27/2016 - Present    Dry cough- ? due to lisinopril- d/c'd 11/2016 R05   11/18/2016 - Present    Elevated bilirubin R17   1/3/2017 - Present      Health Maintenance        Date Due Completion Dates    COLONOSCOPY 8/19/2017 8/19/2014, 7/15/2011    IMM DTaP/Tdap/Td Vaccine (2 - Td) 4/25/2022 4/25/2012            Current Immunizations     13-VALENT PCV PREVNAR 10/9/2015  8:10 AM    Influenza TIV (IM) 10/26/2011, 9/18/2010    Influenza Vaccine Adult HD 10/10/2016, 10/9/2015  8:00 AM, 11/3/2012    Influenza Vaccine Quad Inj (Pf) 10/20/2014    Influenza Vaccine Quad Inj (Preserved) 10/10/2013     Pneumococcal polysaccharide vaccine (PPSV-23) 10/26/2011    SHINGLES VACCINE 11/3/2012    Tdap Vaccine 4/25/2012      Below and/or attached are the medications your provider expects you to take. Review all of your home medications and newly ordered medications with your provider and/or pharmacist. Follow medication instructions as directed by your provider and/or pharmacist. Please keep your medication list with you and share with your provider. Update the information when medications are discontinued, doses are changed, or new medications (including over-the-counter products) are added; and carry medication information at all times in the event of emergency situations     Allergies:  NKDA - (reactions not documented)               Medications  Valid as of: February 01, 2017 -  8:38 AM    Generic Name Brand Name Tablet Size Instructions for use    Aspirin (Tablet Delayed Response) ECOTRIN 81 MG Take 81 mg by mouth every day.        Atorvastatin Calcium (Tab) LIPITOR 40 MG Take 1 Tab by mouth every day. TAKE 1 TABLET BY MOUTH DAILY        Carvedilol (Tab) COREG 3.125 MG Take 3.125 mg by mouth every day. Indications: take 1/2 tab twice a day        Cholecalciferol (Tab) Vitamin D-3 5000 UNITS Take 5,000 Units by mouth every day.        Diclofenac Sodium (Gel) Diclofenac Sodium 1 %         Folic Acid (Tab) FOLVITE 1 MG Take 1 Tab by mouth every day.        Hydrocortisone (Cream) hydrocortisone 1 % Apply 1 Each to affected area(s) as needed. Indications: Psoriasis        Losartan Potassium (Tab) COZAAR 25 MG Take 1 Tab by mouth every day.        Multiple Vitamins-Minerals (Tab) CENTRUM SILVER ADULT 50+  Take  by mouth.        Nitroglycerin (SL Tab) NITROSTAT 0.4 MG Place 0.4 mg under tongue every 5 minutes as needed for Chest Pain.        Pantoprazole Sodium (Tablet Delayed Response) PROTONIX 40 MG Take 40 mg by mouth 2 times a day.        Polyethyl Glycol-Propyl Glycol   by Ophthalmic route.        Ursodiol (Cap)  ACTIGALL 300 MG Take 300 mg by mouth 3 times a day.        .                 Medicines prescribed today were sent to:     [facility], [place] - [address]    [address] [place] [address]    Phone: [phone] Fax: [phone]    Open 24 Hours?: No      Medication refill instructions:       If your prescription bottle indicates you have medication refills left, it is not necessary to call your provider’s office. Please contact your pharmacy and they will refill your medication.    If your prescription bottle indicates you do not have any refills left, you may request refills at any time through one of the following ways: The online [employer] system (except Urgent Care), by calling your provider’s office, or by asking your pharmacy to contact your provider’s office with a refill request. Medication refills are processed only during regular business hours and may not be available until the next business day. Your provider may request additional information or to have a follow-up visit with you prior to refilling your medication.   *Please Note: Medication refills are assigned a new Rx number when refilled electronically. Your pharmacy may indicate that no refills were authorized even though a new prescription for the same medication is available at the pharmacy. Please request the medicine by name with the pharmacy before contacting your provider for a refill.           [employer]reet Access Code: Activation code not generated  Current [employer] Status: Active

## 2017-02-02 ENCOUNTER — TELEPHONE (OUTPATIENT)
Dept: CARDIOLOGY | Facility: MEDICAL CENTER | Age: 72
End: 2017-02-02

## 2017-02-09 ENCOUNTER — TELEPHONE (OUTPATIENT)
Dept: CARDIOLOGY | Facility: MEDICAL CENTER | Age: 72
End: 2017-02-09

## 2017-02-09 DIAGNOSIS — R40.0 DAYTIME SOMNOLENCE: ICD-10-CM

## 2017-02-09 DIAGNOSIS — G47.34 NOCTURNAL HYPOXEMIA: ICD-10-CM

## 2017-02-09 DIAGNOSIS — R53.83 OTHER FATIGUE: ICD-10-CM

## 2017-02-09 NOTE — TELEPHONE ENCOUNTER
OPO from 2/5/2017 reviewed by Dr. Pearl. Per Dr. Pearl, patient needs to be referred to pulmonology.    Called patient. Advised him of abnormal OPO result and Dr. Pearl's recommendation. Patient verbalized understanding and agrees with plan.    Referral to pulmonology initiated.    DANDY PALMOO

## 2017-02-10 ENCOUNTER — OUTPATIENT INFUSION SERVICES (OUTPATIENT)
Dept: ONCOLOGY | Facility: MEDICAL CENTER | Age: 72
End: 2017-02-10
Attending: INTERNAL MEDICINE
Payer: MEDICARE

## 2017-02-10 VITALS
BODY MASS INDEX: 34.18 KG/M2 | DIASTOLIC BLOOD PRESSURE: 57 MMHG | SYSTOLIC BLOOD PRESSURE: 112 MMHG | WEIGHT: 266.32 LBS | HEART RATE: 57 BPM | RESPIRATION RATE: 18 BRPM | TEMPERATURE: 98.8 F | HEIGHT: 74 IN | OXYGEN SATURATION: 97 %

## 2017-02-10 DIAGNOSIS — I86.4 GASTRIC VARICES: ICD-10-CM

## 2017-02-10 DIAGNOSIS — D62 ACUTE BLOOD LOSS ANEMIA: ICD-10-CM

## 2017-02-10 LAB
BASOPHILS # BLD AUTO: 0.5 % (ref 0–1.8)
BASOPHILS # BLD: 0.02 K/UL (ref 0–0.12)
EOSINOPHIL # BLD AUTO: 0.21 K/UL (ref 0–0.51)
EOSINOPHIL NFR BLD: 5.2 % (ref 0–6.9)
ERYTHROCYTE [DISTWIDTH] IN BLOOD BY AUTOMATED COUNT: 44.4 FL (ref 35.9–50)
FERRITIN SERPL-MCNC: 16.4 NG/ML (ref 22–322)
HCT VFR BLD AUTO: 31.1 % (ref 42–52)
HGB BLD-MCNC: 9.6 G/DL (ref 14–18)
HGB RETIC QN AUTO: 24.6 PG/CELL (ref 29–35)
IMM GRANULOCYTES # BLD AUTO: 0.01 K/UL (ref 0–0.11)
IMM GRANULOCYTES NFR BLD AUTO: 0.2 % (ref 0–0.9)
IMM RETICS NFR: 27.8 % (ref 9.3–17.4)
IRON SATN MFR SERPL: 8 % (ref 15–55)
IRON SERPL-MCNC: 39 UG/DL (ref 50–180)
LYMPHOCYTES # BLD AUTO: 0.68 K/UL (ref 1–4.8)
LYMPHOCYTES NFR BLD: 16.8 % (ref 22–41)
MCH RBC QN AUTO: 23.3 PG (ref 27–33)
MCHC RBC AUTO-ENTMCNC: 30.9 G/DL (ref 33.7–35.3)
MCV RBC AUTO: 75.5 FL (ref 81.4–97.8)
MONOCYTES # BLD AUTO: 0.51 K/UL (ref 0–0.85)
MONOCYTES NFR BLD AUTO: 12.6 % (ref 0–13.4)
NEUTROPHILS # BLD AUTO: 2.61 K/UL (ref 1.82–7.42)
NEUTROPHILS NFR BLD: 64.7 % (ref 44–72)
NRBC # BLD AUTO: 0 K/UL
NRBC BLD AUTO-RTO: 0 /100 WBC
PLATELET # BLD AUTO: 77 K/UL (ref 164–446)
PMV BLD AUTO: 10.1 FL (ref 9–12.9)
RBC # BLD AUTO: 4.12 M/UL (ref 4.7–6.1)
RETICS # AUTO: 0.06 M/UL (ref 0.04–0.06)
RETICS/RBC NFR: 1.5 % (ref 0.8–2.1)
TIBC SERPL-MCNC: 504 UG/DL (ref 250–450)
WBC # BLD AUTO: 4 K/UL (ref 4.8–10.8)

## 2017-02-10 PROCEDURE — 82728 ASSAY OF FERRITIN: CPT

## 2017-02-10 PROCEDURE — A9270 NON-COVERED ITEM OR SERVICE: HCPCS | Performed by: INTERNAL MEDICINE

## 2017-02-10 PROCEDURE — 700105 HCHG RX REV CODE 258: Performed by: INTERNAL MEDICINE

## 2017-02-10 PROCEDURE — 700111 HCHG RX REV CODE 636 W/ 250 OVERRIDE (IP): Performed by: INTERNAL MEDICINE

## 2017-02-10 PROCEDURE — 83550 IRON BINDING TEST: CPT

## 2017-02-10 PROCEDURE — 96366 THER/PROPH/DIAG IV INF ADDON: CPT

## 2017-02-10 PROCEDURE — 700102 HCHG RX REV CODE 250 W/ 637 OVERRIDE(OP): Performed by: INTERNAL MEDICINE

## 2017-02-10 PROCEDURE — 36415 COLL VENOUS BLD VENIPUNCTURE: CPT

## 2017-02-10 PROCEDURE — 85025 COMPLETE CBC W/AUTO DIFF WBC: CPT

## 2017-02-10 PROCEDURE — 83540 ASSAY OF IRON: CPT

## 2017-02-10 PROCEDURE — 96367 TX/PROPH/DG ADDL SEQ IV INF: CPT

## 2017-02-10 PROCEDURE — 85046 RETICYTE/HGB CONCENTRATE: CPT

## 2017-02-10 PROCEDURE — 306780 HCHG STAT FOR TRANSFUSION PER CASE

## 2017-02-10 PROCEDURE — 96365 THER/PROPH/DIAG IV INF INIT: CPT

## 2017-02-10 RX ORDER — BENZONATATE 100 MG/1
CAPSULE ORAL
COMMUNITY
Start: 2016-11-18 | End: 2016-12-01

## 2017-02-10 RX ORDER — BENZOCAINE/MENTHOL 6 MG-10 MG
LOZENGE MUCOUS MEMBRANE
COMMUNITY
Start: 2017-02-10 | End: 2017-04-01

## 2017-02-10 RX ORDER — AMOXICILLIN AND CLAVULANATE POTASSIUM 875; 125 MG/1; MG/1
TABLET, FILM COATED ORAL
COMMUNITY
Start: 2016-11-18 | End: 2016-12-01

## 2017-02-10 RX ORDER — ALBUTEROL SULFATE 90 UG/1
AEROSOL, METERED RESPIRATORY (INHALATION)
COMMUNITY
Start: 2016-11-18 | End: 2017-03-01

## 2017-02-10 RX ORDER — DIPHENHYDRAMINE HCL 25 MG
25 TABLET ORAL ONCE
Status: COMPLETED | OUTPATIENT
Start: 2017-02-10 | End: 2017-02-10

## 2017-02-10 RX ORDER — ACETAMINOPHEN 325 MG/1
650 TABLET ORAL ONCE
Status: COMPLETED | OUTPATIENT
Start: 2017-02-10 | End: 2017-02-10

## 2017-02-10 RX ORDER — LANOLIN ALCOHOL/MO/W.PET/CERES
CREAM (GRAM) TOPICAL
COMMUNITY
Start: 2016-01-01 | End: 2016-12-01

## 2017-02-10 RX ADMIN — IRON DEXTRAN 2050 MG: 50 INJECTION INTRAMUSCULAR; INTRAVENOUS at 13:16

## 2017-02-10 RX ADMIN — DIPHENHYDRAMINE HCL 25 MG: 25 TABLET ORAL at 11:05

## 2017-02-10 RX ADMIN — ACETAMINOPHEN 650 MG: 325 TABLET, FILM COATED ORAL at 11:05

## 2017-02-10 RX ADMIN — IRON DEXTRAN 25 MG: 50 INJECTION INTRAMUSCULAR; INTRAVENOUS at 11:24

## 2017-02-10 ASSESSMENT — PAIN SCALES - GENERAL: PAINLEVEL: NO PAIN

## 2017-02-10 NOTE — PROGRESS NOTES
IV Iron Per Pharmacy Note    Patient Lean Body Weight:  82 kg  Reason for Iron Replacement: Iron Deficiency Anemia    Lab Results   Component Value Date/Time    WBC 4.0* 02/10/2017 10:43 AM    RBC 4.12* 02/10/2017 10:43 AM    HEMOGLOBIN 9.6* 02/10/2017 10:43 AM    HEMATOCRIT 31.1* 02/10/2017 10:43 AM    MCV 75.5* 02/10/2017 10:43 AM    MCH 23.3* 02/10/2017 10:43 AM    MCHC 30.9* 02/10/2017 10:43 AM    MPV 10.1 02/10/2017 10:43 AM        2/10/2017 10:43   Reticulocyte Count 1.5   Iron  39   TIBC 504   % Sat 8     Ferritin  16.4     Assessment/Plan:  1. IV Iron Indicated.   2. Give Iron Dextran 25 mg IV test dose following diphenhydramine/acetaminophen premeds over 30 minutes per protocol.  3. If no reaction (Anaphylaxis, Hypotension/Hypertension, N/V/D, Chest pain/Back Pain, Urticaria/Pruritis) in the next hour, proceed to full dose.     4. Full dose: Iron Dextran 2050 mg IV over 4 hours. Continue to monitor for delayed ADR including: Arthralgia/myalgia, Headache/backache, chills/dizziness/malaise, moderate to high fever and n/v.      Adria Tabor, PharmD

## 2017-02-11 NOTE — PROGRESS NOTES
Pt arrived ambulatory with wife for his first Iron Dextran infusion. Pt reported having fatigue since April, declined having any acute symptoms at this time. PIV started, labs drawn per protocol; CBC and iron studies. Lab results reviewed by RN pharmacist. Pt given premeds and iron test dose. Pt tolerated well and was then observed for one hour after infusion. No signs or symptoms of adverse reaction observed or expressed. Complete iron dose then infused per MD order. Pt tolerated well and without incident. Pt discharged home in good  condition with family. Pt declined needing to make future appointments at this time.

## 2017-02-13 ENCOUNTER — TELEPHONE (OUTPATIENT)
Dept: CARDIOLOGY | Facility: MEDICAL CENTER | Age: 72
End: 2017-02-13

## 2017-02-13 NOTE — TELEPHONE ENCOUNTER
REFERRAL TO PULMONOLOGY         Lissy Galdamez       Sent: Theva February 09, 2017 12:41 PM       Message        Patient has been sent to Central Scheduling for Pulmonology         If patient is not contacted in a timely manner, please contact [phone]              Thank You      [facility].    DANDY PALOMO

## 2017-03-23 ENCOUNTER — TELEPHONE (OUTPATIENT)
Dept: MEDICAL GROUP | Age: 72
End: 2017-03-23

## 2017-03-23 DIAGNOSIS — Z01.818 PREOP TESTING: ICD-10-CM

## 2017-03-23 DIAGNOSIS — E78.2 MIXED HYPERLIPIDEMIA: ICD-10-CM

## 2017-03-23 NOTE — TELEPHONE ENCOUNTER
----- Message from Natasha Coronel sent at 3/23/2017  2:42 PM PDT -----  Regarding: MRN [id number]  Hi,       We have scheduled Torey Lima for an MRI abdomen w/ and w/out contrast w/ IV Sedation.  is scheduled for 04/05/2017 at [address] at 8:45 am check in time of 8:00 am. He needs to have an EKG date within 6 months of scheduled exam, also a CMP/CBC within 28 days of his appointment. Please inform the patient.     Thank you.

## 2017-03-23 NOTE — TELEPHONE ENCOUNTER
[place] ONLY      Phone Number Called: [phone] (home)    Message: Pt notified that blood work and EKG needs to be done and is ordered.    Left Message for patient to call back: no

## 2017-03-25 ENCOUNTER — HOSPITAL ENCOUNTER (OUTPATIENT)
Dept: LAB | Facility: MEDICAL CENTER | Age: 72
End: 2017-03-25
Attending: INTERNAL MEDICINE
Payer: MEDICARE

## 2017-03-25 DIAGNOSIS — E78.2 MIXED HYPERLIPIDEMIA: ICD-10-CM

## 2017-03-25 DIAGNOSIS — Z01.818 PREOP TESTING: ICD-10-CM

## 2017-03-25 LAB
ALBUMIN SERPL BCP-MCNC: 3.7 G/DL (ref 3.2–4.9)
ALBUMIN/GLOB SERPL: 1.8 G/DL
ALP SERPL-CCNC: 106 U/L (ref 30–99)
ALT SERPL-CCNC: 17 U/L (ref 2–50)
ANION GAP SERPL CALC-SCNC: 5 MMOL/L (ref 0–11.9)
ANISOCYTOSIS BLD QL SMEAR: ABNORMAL
AST SERPL-CCNC: 18 U/L (ref 12–45)
BASOPHILS # BLD AUTO: 0.02 K/UL (ref 0–0.12)
BASOPHILS NFR BLD AUTO: 0.5 % (ref 0–1.8)
BILIRUB SERPL-MCNC: 0.8 MG/DL (ref 0.1–1.5)
BUN SERPL-MCNC: 26 MG/DL (ref 8–22)
CALCIUM SERPL-MCNC: 8.9 MG/DL (ref 8.5–10.5)
CHLORIDE SERPL-SCNC: 109 MMOL/L (ref 96–112)
CHOLEST SERPL-MCNC: 95 MG/DL (ref 100–199)
CO2 SERPL-SCNC: 26 MMOL/L (ref 20–33)
COMMENT 1642: NORMAL
CREAT SERPL-MCNC: 0.85 MG/DL (ref 0.5–1.4)
EOSINOPHIL # BLD: 0.28 K/UL (ref 0–0.51)
EOSINOPHIL NFR BLD AUTO: 7 % (ref 0–6.9)
ERYTHROCYTE [DISTWIDTH] IN BLOOD BY AUTOMATED COUNT: 69 FL (ref 35.9–50)
GLOBULIN SER CALC-MCNC: 2.1 G/DL (ref 1.9–3.5)
GLUCOSE SERPL-MCNC: 89 MG/DL (ref 65–99)
HCT VFR BLD AUTO: 40.5 % (ref 42–52)
HDLC SERPL-MCNC: 49 MG/DL
HGB BLD-MCNC: 12.1 G/DL (ref 14–18)
IMM GRANULOCYTES # BLD AUTO: 0 K/UL (ref 0–0.11)
IMM GRANULOCYTES NFR BLD AUTO: 0 % (ref 0–0.9)
LDLC SERPL CALC-MCNC: 36 MG/DL
LG PLATELETS BLD QL SMEAR: NORMAL
LYMPHOCYTES # BLD: 0.57 K/UL (ref 1–4.8)
LYMPHOCYTES NFR BLD AUTO: 14.2 % (ref 22–41)
MCH RBC QN AUTO: 24.8 PG (ref 27–33)
MCHC RBC AUTO-ENTMCNC: 29.9 G/DL (ref 33.7–35.3)
MCV RBC AUTO: 83.2 FL (ref 81.4–97.8)
MICROCYTES BLD QL SMEAR: ABNORMAL
MONOCYTES # BLD: 0.43 K/UL (ref 0–0.85)
MONOCYTES NFR BLD AUTO: 10.7 % (ref 0–13.4)
MORPHOLOGY BLD-IMP: NORMAL
NEUTROPHILS # BLD: 2.72 K/UL (ref 1.82–7.42)
NEUTROPHILS NFR BLD AUTO: 67.6 % (ref 44–72)
NRBC # BLD AUTO: 0 K/UL
NRBC BLD-RTO: 0 /100 WBC
OVALOCYTES BLD QL SMEAR: NORMAL
PLATELET # BLD AUTO: 67 K/UL (ref 164–446)
PLATELET BLD QL SMEAR: NORMAL
PMV BLD AUTO: 11.5 FL (ref 9–12.9)
POIKILOCYTOSIS BLD QL SMEAR: NORMAL
POTASSIUM SERPL-SCNC: 4.2 MMOL/L (ref 3.6–5.5)
PROT SERPL-MCNC: 5.8 G/DL (ref 6–8.2)
RBC # BLD AUTO: 4.87 M/UL (ref 4.7–6.1)
RBC BLD AUTO: PRESENT
SODIUM SERPL-SCNC: 140 MMOL/L (ref 135–145)
TRIGL SERPL-MCNC: 49 MG/DL (ref 0–149)
WBC # BLD AUTO: 4 K/UL (ref 4.8–10.8)

## 2017-03-25 PROCEDURE — 36415 COLL VENOUS BLD VENIPUNCTURE: CPT

## 2017-03-25 PROCEDURE — 80061 LIPID PANEL: CPT

## 2017-03-25 PROCEDURE — 80053 COMPREHEN METABOLIC PANEL: CPT

## 2017-03-25 PROCEDURE — 85025 COMPLETE CBC W/AUTO DIFF WBC: CPT

## 2017-03-27 ENCOUNTER — OFFICE VISIT (OUTPATIENT)
Dept: MEDICAL GROUP | Age: 72
End: 2017-03-27
Payer: MEDICARE

## 2017-03-27 VITALS
DIASTOLIC BLOOD PRESSURE: 76 MMHG | HEIGHT: 75 IN | HEART RATE: 66 BPM | RESPIRATION RATE: 18 BRPM | OXYGEN SATURATION: 97 % | WEIGHT: 262.8 LBS | BODY MASS INDEX: 32.67 KG/M2 | TEMPERATURE: 98.2 F | SYSTOLIC BLOOD PRESSURE: 126 MMHG

## 2017-03-27 DIAGNOSIS — Z01.818 PREOP TESTING: ICD-10-CM

## 2017-03-27 PROCEDURE — 93000 ELECTROCARDIOGRAM COMPLETE: CPT | Performed by: INTERNAL MEDICINE

## 2017-03-27 PROCEDURE — 99212 OFFICE O/P EST SF 10 MIN: CPT | Mod: 25 | Performed by: INTERNAL MEDICINE

## 2017-03-27 RX ORDER — LOSARTAN POTASSIUM 25 MG/1
TABLET ORAL
COMMUNITY
Start: 2017-03-26 | End: 2017-04-01

## 2017-03-27 ASSESSMENT — PAIN SCALES - GENERAL: PAINLEVEL: NO PAIN

## 2017-03-27 NOTE — PROGRESS NOTES
Patient came in for preop EKG prior to his MRI with anesthesia which requires an EKG because of the anesthesia. His EKG was reviewed by me and was totally normal. I did see the patient briefly and examined him and questioned him and he is doing well with no complaints and his heart exam along exam were normal.

## 2017-03-27 NOTE — MR AVS SNAPSHOT
"        Torey Lima   3/27/2017 9:40 AM   Office Visit   MRN: [id number]    Department:  [address] [place] [address]   Dept Phone:  [phone]    Description:  Male : 1945   Provider:  Fabián Urena M.D.           Reason for Visit     Follow-Up EKG      Allergies as of 3/27/2017     Allergen Noted Reactions    Nkda [No Known Drug Allergy] 08/10/2010         You were diagnosed with     Preop testing   [553857]         Vital Signs     Blood Pressure Pulse Temperature Respirations Height Weight    126/76 mmHg 66 36.8 °C (98.2 °F) 18 1.905 m (6' 3\") 119.205 kg (262 lb 12.8 oz)    Body Mass Index Oxygen Saturation Smoking Status             32.85 kg/m2 97% Former Smoker         Basic Information     Date Of Birth Sex Race Ethnicity Preferred Language    1945 Male White Non- English      Your appointments     Mar 30, 2017  8:15 AM   FOLLOW UP with Andi Pearl M.D.   [facility] (--)    [address] [place] [address] [address]  [place] [address]   [phone]            2017  6:30 AM   Adult Draw/Collection with [facility]   [facility] (--)    [address] [place] [address]   [phone]            2017  8:45 AM   MR LOPEZ WITH/WO with [address]   [facility] - MRI - [address]    [address]  [place] [address] [phone]            2017 10:20 AM   New Patient with C Rotation   [facility] (--)    [address]  [place] [address] [phone]           Please bring enclosed paperwork completed along with your insurance card and photo ID.            2017  8:00 AM   Established Patient with Fabián Urena M.D.   [address])    [address] [place] [id number] [phone]           You will be receiving a confirmation call a few days before your appointment from our automated call confirmation system.              "   Problem List              ICD-10-CM Priority Class Noted - Resolved    Colon cancer-2010 left hemicolectomy; no colostomy C18.9   12/6/2010 - Present    Liver lesion- ablation of malignant met from colon ca 2011- f/u [facility] q 6 mos K76.9   12/6/2010 - Present    Peripheral neuropathy, secondary to drugs or chemicals [id number]   12/6/2010 - Present    Thrombocytopenia due to drugs D69.59   4/25/2012 - Present    ASCVD (arteriosclerotic cardiovascular disease)subtotalled small distal LCx and 40% LAD med rx by cath 12/13/12 I25.10 High  12/14/2012 - Present    BMI 34.0-34.9,adult Z68.34 High  2/15/2013 - Present    Mixed hyperlipidemia E78.2   6/20/2013 - Present    Essential hypertension I10   6/20/2013 - Present    Acute blood loss anemia D62 High  4/3/2016 - Present    GIB (gastrointestinal bleeding)- recurrent from varices K92.2 Medium  4/3/2016 - Present    Esophageal varices- BANDED 4/2016- repeat egd tbd 10//29/16- gic I85.00 High  4/4/2016 - Present    Gastric varices- s/p BRTO procedure at [facility] I86.4 Medium  4/4/2016 - Present    Portal vein thrombosis- on CT [facility] 2/2015 I81   4/14/2016 - Present    Cirrhosis of liver with ascites (HCC)- ? DUE TO OLD HEP B, CHEMO RX,  OR THERMO RAD OF MAGNANT LESION K74.60   4/14/2016 - Present    Iron deficiency anemia due to chronic blood loss D50.0   10/6/2016 - Present    Microcytic hypochromic anemia D50.9   10/24/2016 - Present    History of colon cancer Z85.038   10/24/2016 - Present    History of esophageal varices Z87.19   10/24/2016 - Present    SVT (supraventricular tachycardia) (CMS-HCC) I47.1   10/27/2016 - Present    Dry cough- ? due to lisinopril- d/c'd 11/2016 R05   11/18/2016 - Present    Elevated bilirubin R17   1/3/2017 - Present      Health Maintenance        Date Due Completion Dates    COLONOSCOPY 8/19/2017 8/19/2014, 7/15/2011    IMM DTaP/Tdap/Td Vaccine (2 - Td) 4/25/2022 4/25/2012            Current Immunizations     13-VALENT PCV PREVNAR 10/9/2015  8:10  AM    Influenza TIV (IM) 10/26/2011, 9/18/2010    Influenza Vaccine Adult HD 10/10/2016, 10/9/2015  8:00 AM, 11/3/2012    Influenza Vaccine Quad Inj (Pf) 10/20/2014    Influenza Vaccine Quad Inj (Preserved) 10/10/2013    Pneumococcal polysaccharide vaccine (PPSV-23) 10/26/2011    SHINGLES VACCINE 11/3/2012    Tdap Vaccine 4/25/2012      Below and/or attached are the medications your provider expects you to take. Review all of your home medications and newly ordered medications with your provider and/or pharmacist. Follow medication instructions as directed by your provider and/or pharmacist. Please keep your medication list with you and share with your provider. Update the information when medications are discontinued, doses are changed, or new medications (including over-the-counter products) are added; and carry medication information at all times in the event of emergency situations     Allergies:  NKDA - (reactions not documented)               Medications  Valid as of: March 27, 2017 - 10:50 AM    Generic Name Brand Name Tablet Size Instructions for use    Albuterol Sulfate (Aero Soln) VENTOLIN  (90 BASE) MCG/ACT         Amoxicillin-Pot Clavulanate (Tab) AUGMENTIN 875-125 MG         Aspirin (Tablet Delayed Response) ECOTRIN 81 MG Take 81 mg by mouth every day.        Atorvastatin Calcium (Tab) LIPITOR 40 MG Take 1 Tab by mouth every day. TAKE 1 TABLET BY MOUTH DAILY        Benzonatate (Cap) TESSALON 100 MG         Carvedilol (Tab) COREG 3.125 MG Take 3.125 mg by mouth every day. Indications: take 1/2 tab twice a day        Cholecalciferol (Tab) Vitamin D-3 5000 UNITS Take 5,000 Units by mouth every day.        Diclofenac Sodium (Gel) Diclofenac Sodium 1 %         Folic Acid (Tab) FOLVITE 1 MG Take 1 Tab by mouth every day.        Glucosamine-Chondroitin (Tab) glucosamine-chondroitin 500-400 MG Take  by mouth.        Hydrocortisone (Cream) hydrocortisone 1 % Apply 1 Each to affected area(s) as needed.  Indications: Psoriasis        Hydrocortisone (Cream) hydrocortisone 1 % Apply  to affected area(s).        Losartan Potassium (Tab) COZAAR 25 MG Take 1 Tab by mouth every day.        Losartan Potassium (Tab) COZAAR 25 MG         Multiple Vitamins-Minerals (Tab) CENTRUM SILVER ADULT 50+  Take  by mouth.        Nitroglycerin (SL Tab) NITROSTAT 0.4 MG Place 0.4 mg under tongue every 5 minutes as needed for Chest Pain.        Pantoprazole Sodium (Tablet Delayed Response) PROTONIX 40 MG Take 40 mg by mouth 2 times a day.        Polyethyl Glycol-Propyl Glycol   by Ophthalmic route.        Ursodiol (Cap) ACTIGALL 300 MG Take 300 mg by mouth 3 times a day.        .                 Medicines prescribed today were sent to:     [facility] [address] [place] [address], [place] - [address]    [address] [place] [address]    Phone: [phone] Fax: [phone]    Open 24 Hours?: No      Medication refill instructions:       If your prescription bottle indicates you have medication refills left, it is not necessary to call your provider’s office. Please contact your pharmacy and they will refill your medication.    If your prescription bottle indicates you do not have any refills left, you may request refills at any time through one of the following ways: The online [employer] system (except Urgent Care), by calling your provider’s office, or by asking your pharmacy to contact your provider’s office with a refill request. Medication refills are processed only during regular business hours and may not be available until the next business day. Your provider may request additional information or to have a follow-up visit with you prior to refilling your medication.   *Please Note: Medication refills are assigned a new Rx number when refilled electronically. Your pharmacy may indicate that no refills were authorized even though a new prescription for the same medication is available at the pharmacy. Please request the medicine by  name with the pharmacy before contacting your provider for a refill.           MyChart Access Code: Activation code not generated  Current MyChart Status: Active

## 2017-03-30 ENCOUNTER — OFFICE VISIT (OUTPATIENT)
Dept: CARDIOLOGY | Facility: MEDICAL CENTER | Age: 72
End: 2017-03-30
Payer: MEDICARE

## 2017-03-30 VITALS
HEART RATE: 74 BPM | BODY MASS INDEX: 32.7 KG/M2 | WEIGHT: 263 LBS | HEIGHT: 75 IN | DIASTOLIC BLOOD PRESSURE: 74 MMHG | SYSTOLIC BLOOD PRESSURE: 120 MMHG | OXYGEN SATURATION: 94 %

## 2017-03-30 DIAGNOSIS — E78.2 MIXED HYPERLIPIDEMIA: ICD-10-CM

## 2017-03-30 DIAGNOSIS — K74.60 CIRRHOSIS OF LIVER WITH ASCITES, UNSPECIFIED HEPATIC CIRRHOSIS TYPE (HCC): ICD-10-CM

## 2017-03-30 DIAGNOSIS — I10 ESSENTIAL HYPERTENSION: ICD-10-CM

## 2017-03-30 DIAGNOSIS — I25.10 ASCVD (ARTERIOSCLEROTIC CARDIOVASCULAR DISEASE): ICD-10-CM

## 2017-03-30 DIAGNOSIS — R18.8 CIRRHOSIS OF LIVER WITH ASCITES, UNSPECIFIED HEPATIC CIRRHOSIS TYPE (HCC): ICD-10-CM

## 2017-03-30 PROCEDURE — G8419 CALC BMI OUT NRM PARAM NOF/U: HCPCS | Performed by: INTERNAL MEDICINE

## 2017-03-30 PROCEDURE — 4040F PNEUMOC VAC/ADMIN/RCVD: CPT | Performed by: INTERNAL MEDICINE

## 2017-03-30 PROCEDURE — G8482 FLU IMMUNIZE ORDER/ADMIN: HCPCS | Performed by: INTERNAL MEDICINE

## 2017-03-30 PROCEDURE — 1101F PT FALLS ASSESS-DOCD LE1/YR: CPT | Performed by: INTERNAL MEDICINE

## 2017-03-30 PROCEDURE — 99214 OFFICE O/P EST MOD 30 MIN: CPT | Performed by: INTERNAL MEDICINE

## 2017-03-30 PROCEDURE — G8598 ASA/ANTIPLAT THER USED: HCPCS | Performed by: INTERNAL MEDICINE

## 2017-03-30 PROCEDURE — 1036F TOBACCO NON-USER: CPT | Performed by: INTERNAL MEDICINE

## 2017-03-30 PROCEDURE — G8432 DEP SCR NOT DOC, RNG: HCPCS | Performed by: INTERNAL MEDICINE

## 2017-03-30 NOTE — Clinical Note
[facility]   [address] [place] [address] [address]  [place] [address]  Phone: [phone]  Fax: [phone]              Torey Lima  1945    Encounter Date: 3/30/2017    Andi Pearl M.D.          PROGRESS NOTE:  Subjective:   Torey Lima is a 71 y.o. male who presents today for follow-up of his coronary disease, hypertension and dyslipidemia. He also has cirrhosis of the liver. In October, he was undergoing a interventional radiology procedure when he developed atrial fibrillation. Apparently, a wire in a vertically when into the right ventricle. In any event, he underwent emergency cardioversion at that time. He was subsequently referred to [facility] for another procedure to improve his portal hypertension.    He did have an overnight pulse oximetry and has significant nocturnal hypoxemia. He has an appointment with pulmonary for a sleep study next month.    He has had no chest discomfort, dyspnea, edema, palpitations or lightheadedness.        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/6/2010   • Thrombocytopenia due to drugs 4/25/2012   • Impaired fasting glucose 8/2/2012   • Vitamin d deficiency 8/2/2012   • BMI 33.0-33.9,adult 2/15/2013   • HLD (hyperlipidemia) 6/20/2013   • HTN (hypertension) 6/20/2013   • Hypertension    • Liver cirrhosis (CMS-HCC)      Past Surgical History   Procedure Laterality Date   • Low anterior resection robotic  8/10/2010     Performed by RUBY RUIZ at SURGERY [facility] ORS   • Cath placement  9/20/2010     Performed by RUBY RUIZ at SURGERY [facility] ORS   • Hip arthroplasty mis total       rt   • Other       Cholecystectomy   • Other (comments)         liver surgery     • Cath removal  7/20/2011     Performed by RUBY RUIZ at SURGERY [facility] ORS   • Full thickness block resection  4/11/2012     Performed by LANI BOWMAN at SURGERY SURGICAL [facility] ORS   • Gastroscopy with banding  4/3/2016     Procedure: GASTROSCOPY WITH BANDING;  Surgeon: Cayetano Stovall M.D.;  Location: ENDOSCOPY [facility] ORS;  Service:    • Gastroscopy with banding  10/25/2016     Procedure: GASTROSCOPY WITH BANDING;  Surgeon: Pierre Waggoner M.D.;  Location: ENDOSCOPY [facility] ORS;  Service:      Family History   Problem Relation Age of Onset   • Heart Disease Mother    • Cancer Mother      History   Smoking status   • Former Smoker -- 0.50 packs/day for 1 years   • Types: Cigarettes, Cigars   • Quit date: 01/01/1964   Smokeless tobacco   • Never Used     Comment: 1 cigar per week.     Allergies   Allergen Reactions   • Nkda [No Known Drug Allergy]      Outpatient Encounter Prescriptions as of 3/30/2017   Medication Sig Dispense Refill   • Multiple Vitamins-Minerals (CENTRUM SILVER ADULT 50+) Tab Take  by mouth.     • Polyethyl Glycol-Propyl Glycol (SYSTANE OP) by Ophthalmic route.     • losartan (COZAAR) 25 MG Tab Take 1 Tab by mouth every day. 30 Tab 6   • carvedilol (COREG) 3.125 MG Tab Take 3.125 mg by mouth every day. Indications: take 1/2 tab twice a day     • aspirin EC (ECOTRIN) 81 MG Tablet Delayed Response Take 81 mg by mouth every day.     • atorvastatin (LIPITOR) 40 MG Tab Take 1 Tab by mouth every day. TAKE 1 TABLET BY MOUTH DAILY 90 Tab 4   • Cholecalciferol (VITAMIN D-3) 5000 UNITS Tab Take 5,000 Units by mouth every day. 90 Tab 3   • losartan (COZAAR) 25 MG Tab      • glucosamine-chondroitin (GLUCOSAMINE-CHONDROITIN DS) 500-400 MG tablet Take  by mouth.     • hydrocortisone 1 % Cream Apply  to affected area(s).     • VENTOLIN  (90 BASE) MCG/ACT Aero Soln inhalation aerosol      • amoxicillin-clavulanate (AUGMENTIN) 875-125 MG Tab      •  "benzonatate (TESSALON) 100 MG Cap      • folic acid (FOLVITE) 1 MG Tab Take 1 Tab by mouth every day. (Patient not taking: Reported on 3/27/2017) 90 Tab 4   • Diclofenac Sodium 1 % Gel      • pantoprazole (PROTONIX) 40 MG Tablet Delayed Response Take 40 mg by mouth 2 times a day.     • ursodiol (ACTIGALL) 300 MG Cap Take 300 mg by mouth 3 times a day.     • nitroglycerin (NITROSTAT) 0.4 MG SL Tab Place 0.4 mg under tongue every 5 minutes as needed for Chest Pain.     • hydrocortisone 1 % Cream Apply 1 Each to affected area(s) as needed. Indications: Psoriasis       No facility-administered encounter medications on file as of 3/30/2017.     ROS       Objective:   /74 mmHg  Pulse 74  Ht 1.905 m (6' 3\")  Wt 119.296 kg (263 lb)  BMI 32.87 kg/m2  SpO2 94%    Physical Exam   Neck: No JVD present.   Cardiovascular: Normal rate and regular rhythm.  Exam reveals no gallop.    No murmur heard.  Pulmonary/Chest: Effort normal. He has no rales.   Abdominal: Soft. There is no tenderness.   Musculoskeletal: He exhibits no edema.     Lab Results   Component Value Date/Time    CHOLESTEROL,TOT 95* 03/25/2017 07:23 AM    LDL 36 03/25/2017 07:23 AM    HDL 49 03/25/2017 07:23 AM    TRIGLYCERIDES 49 03/25/2017 07:23 AM       Lab Results   Component Value Date/Time    SODIUM 140 03/25/2017 07:23 AM    POTASSIUM 4.2 03/25/2017 07:23 AM    CHLORIDE 109 03/25/2017 07:23 AM    CO2 26 03/25/2017 07:23 AM    GLUCOSE 89 03/25/2017 07:23 AM    BUN 26* 03/25/2017 07:23 AM    CREATININE 0.85 03/25/2017 07:23 AM    BUN-CREATININE RATIO 15 01/23/2010 12:00 AM    GLOM FILT RATE, EST >59 01/23/2010 12:00 AM     Lab Results   Component Value Date/Time    ALKALINE PHOSPHATASE 106* 03/25/2017 07:23 AM    AST(SGOT) 18 03/25/2017 07:23 AM    ALT(SGPT) 17 03/25/2017 07:23 AM    TOTAL BILIRUBIN 0.8 03/25/2017 07:23 AM      Lab Results   Component Value Date/Time    B NATRIURETIC PEPTIDE 20 12/11/2012 07:45 PM          Assessment:     1. ASCVD " (arteriosclerotic cardiovascular disease)subtotalled small distal LCx and 40% LAD med rx by cath 12/13/12     2. Mixed hyperlipidemia     3. Essential hypertension     4. Cirrhosis of liver with ascites, unspecified hepatic cirrhosis type (CMS-HCC)         Medical Decision Making:  Today's Assessment / Status / Plan:     Mr. Lima is clinically stable. His lipid status and his blood pressure are under excellent control. He does have nocturnal hypoxemia and is going to be evaluated by pulmonary. From a cardiovascular standpoint is quite stable. He will follow-up on an as-needed basis.      Fabián Urena M.D.  [address] [facility]   [address]  [place] [address]  VIA In Basket

## 2017-03-30 NOTE — MR AVS SNAPSHOT
"        Torey Queendy   3/30/2017 8:15 AM   Office Visit   MRN: [id number]    Department:  [facility]   Dept Phone:  [phone]    Description:  Male : 1945   Provider:  Andi Pearl M.D.           Reason for Visit     Follow-Up           Allergies as of 3/30/2017     Allergen Noted Reactions    Nkda [No Known Drug Allergy] 08/10/2010         You were diagnosed with     ASCVD (arteriosclerotic cardiovascular disease)   [713606]       Mixed hyperlipidemia   [272.2.ICD-9-CM]       Essential hypertension   [8351868]       Cirrhosis of liver with ascites, unspecified hepatic cirrhosis type (CMS-HCC)   [3469076]         Vital Signs     Blood Pressure Pulse Height Weight Body Mass Index Oxygen Saturation    120/74 mmHg 74 1.905 m (6' 3\") 119.296 kg (263 lb) 32.87 kg/m2 94%    Smoking Status                   Former Smoker           Basic Information     Date Of Birth Sex Race Ethnicity Preferred Language    1945 Male White Non- English      Your appointments     2017  8:45 AM   MR LOPEZ WITH/WO with [address]   [facility] - MRI - [address]    [address]  [place] [address] [phone]            2017 10:20 AM   New Patient with C Shana   [facility] (--)    [address] [facility]  [place] [address] [phone]           Please bring enclosed paperwork completed along with your insurance card and photo ID.            2017  8:00 AM   Established Patient with Fabián Urena M.D.   [facility] [address] [place] [address] ([facility])    [address] [place] [address]  [place] [id number] [phone]           You will be receiving a confirmation call a few days before your appointment from our automated call confirmation system.              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 D69.59   4/25/2012 - Present    ASCVD (arteriosclerotic cardiovascular disease)subtotalled small distal LCx and 40% LAD med rx by cath 12/13/12 I25.10 High  12/14/2012 - Present    BMI 34.0-34.9,adult Z68.34 High  2/15/2013 - Present    Mixed hyperlipidemia E78.2   6/20/2013 - Present    Essential hypertension I10   6/20/2013 - Present    Acute blood loss anemia D62 High  4/3/2016 - Present    GIB (gastrointestinal bleeding)- recurrent from varices K92.2 Medium  4/3/2016 - Present    Esophageal varices- BANDED 4/2016- repeat egd tbd 10//29/16- gic I85.00 High  4/4/2016 - Present    Gastric varices- s/p BRTO procedure at [facility] I86.4 Medium  4/4/2016 - Present    Portal vein thrombosis- on CT [facility] 2/2015 I81   4/14/2016 - Present    Cirrhosis of liver with ascites (HCC)- ? DUE TO OLD HEP B, CHEMO RX,  OR THERMO RAD OF MAGNANT LESION K74.60   4/14/2016 - Present    Iron deficiency anemia due to chronic blood loss D50.0   10/6/2016 - Present    Microcytic hypochromic anemia D50.9   10/24/2016 - Present    History of colon cancer Z85.038   10/24/2016 - Present    History of esophageal varices Z87.19   10/24/2016 - Present    SVT (supraventricular tachycardia) (CMS-HCC) I47.1   10/27/2016 - Present    Dry cough- ? due to lisinopril- d/c'd 11/2016 R05   11/18/2016 - Present    Elevated bilirubin R17   1/3/2017 - Present      Health Maintenance        Date Due Completion Dates    COLONOSCOPY 8/19/2017 8/19/2014, 7/15/2011    IMM DTaP/Tdap/Td Vaccine (2 - Td) 4/25/2022 4/25/2012            Current Immunizations     13-VALENT PCV PREVNAR 10/9/2015  8:10 AM    Influenza TIV (IM) 10/26/2011, 9/18/2010    Influenza Vaccine Adult HD 10/10/2016, 10/9/2015  8:00 AM, 11/3/2012    Influenza Vaccine Quad Inj (Pf) 10/20/2014     Influenza Vaccine Quad Inj (Preserved) 10/10/2013    Pneumococcal polysaccharide vaccine (PPSV-23) 10/26/2011    SHINGLES VACCINE 11/3/2012    Tdap Vaccine 4/25/2012      Below and/or attached are the medications your provider expects you to take. Review all of your home medications and newly ordered medications with your provider and/or pharmacist. Follow medication instructions as directed by your provider and/or pharmacist. Please keep your medication list with you and share with your provider. Update the information when medications are discontinued, doses are changed, or new medications (including over-the-counter products) are added; and carry medication information at all times in the event of emergency situations     Allergies:  NKDA - (reactions not documented)               Medications  Valid as of: March 30, 2017 -  8:45 AM    Generic Name Brand Name Tablet Size Instructions for use    Albuterol Sulfate (Aero Soln) VENTOLIN  (90 BASE) MCG/ACT         Amoxicillin-Pot Clavulanate (Tab) AUGMENTIN 875-125 MG         Aspirin (Tablet Delayed Response) ECOTRIN 81 MG Take 81 mg by mouth every day.        Atorvastatin Calcium (Tab) LIPITOR 40 MG Take 1 Tab by mouth every day. TAKE 1 TABLET BY MOUTH DAILY        Benzonatate (Cap) TESSALON 100 MG         Carvedilol (Tab) COREG 3.125 MG Take 3.125 mg by mouth every day. Indications: take 1/2 tab twice a day        Cholecalciferol (Tab) Vitamin D-3 5000 UNITS Take 5,000 Units by mouth every day.        Diclofenac Sodium (Gel) Diclofenac Sodium 1 %         Folic Acid (Tab) FOLVITE 1 MG Take 1 Tab by mouth every day.        Glucosamine-Chondroitin (Tab) glucosamine-chondroitin 500-400 MG Take  by mouth.        Hydrocortisone (Cream) hydrocortisone 1 % Apply 1 Each to affected area(s) as needed. Indications: Psoriasis        Hydrocortisone (Cream) hydrocortisone 1 % Apply  to affected area(s).        Losartan Potassium (Tab) COZAAR 25 MG Take 1 Tab by mouth every  day.        Losartan Potassium (Tab) COZAAR 25 MG         Multiple Vitamins-Minerals (Tab) CENTRUM SILVER ADULT 50+  Take  by mouth.        Nitroglycerin (SL Tab) NITROSTAT 0.4 MG Place 0.4 mg under tongue every 5 minutes as needed for Chest Pain.        Pantoprazole Sodium (Tablet Delayed Response) PROTONIX 40 MG Take 40 mg by mouth 2 times a day.        Polyethyl Glycol-Propyl Glycol   by Ophthalmic route.        Ursodiol (Cap) ACTIGALL 300 MG Take 300 mg by mouth 3 times a day.        .                 Medicines prescribed today were sent to:     [facility] [address] [place] [address], [place] - [address]    [address] [place] [address]    Phone: [phone] Fax: [phone]    Open 24 Hours?: No      Medication refill instructions:       If your prescription bottle indicates you have medication refills left, it is not necessary to call your provider’s office. Please contact your pharmacy and they will refill your medication.    If your prescription bottle indicates you do not have any refills left, you may request refills at any time through one of the following ways: The online [employer] system (except Urgent Care), by calling your provider’s office, or by asking your pharmacy to contact your provider’s office with a refill request. Medication refills are processed only during regular business hours and may not be available until the next business day. Your provider may request additional information or to have a follow-up visit with you prior to refilling your medication.   *Please Note: Medication refills are assigned a new Rx number when refilled electronically. Your pharmacy may indicate that no refills were authorized even though a new prescription for the same medication is available at the pharmacy. Please request the medicine by name with the pharmacy before contacting your provider for a refill.           Ps[employer] Access Code: Activation code not generated  Current Ps[employer] Status: Active

## 2017-03-30 NOTE — PROGRESS NOTES
Subjective:   Torey Lima is a 71 y.o. male who presents today for follow-up of his coronary disease, hypertension and dyslipidemia. He also has cirrhosis of the liver. In October, he was undergoing a interventional radiology procedure when he developed atrial fibrillation. Apparently, a wire in a vertically when into the right ventricle. In any event, he underwent emergency cardioversion at that time. He was subsequently referred to [facility] for another procedure to improve his portal hypertension.    He did have an overnight pulse oximetry and has significant nocturnal hypoxemia. He has an appointment with pulmonary for a sleep study next month.    He has had no chest discomfort, dyspnea, edema, palpitations or lightheadedness.        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 ORS   • Cath placement  9/20/2010     Performed by RUBY RUIZ at SURGERY [facility] ORS   • Hip arthroplasty mis total       rt   • Other       Cholecystectomy   • Other (comments)       liver surgery     • Cath removal  7/20/2011     Performed by RUBY RUIZ at SURGERY [facility] ORS   • Full thickness block resection  4/11/2012     Performed by LANI BOWMAN at [facility]   • Gastroscopy with banding  4/3/2016      Procedure: GASTROSCOPY WITH BANDING;  Surgeon: Cayetano Stovall M.D.;  Location: ENDOSCOPY [facility];  Service:    • Gastroscopy with banding  10/25/2016     Procedure: GASTROSCOPY WITH BANDING;  Surgeon: Pierre Waggoner M.D.;  Location: ENDOSCOPY [facility];  Service:      Family History   Problem Relation Age of Onset   • Heart Disease Mother    • Cancer Mother      History   Smoking status   • Former Smoker -- 0.50 packs/day for 1 years   • Types: Cigarettes, Cigars   • Quit date: 01/01/1964   Smokeless tobacco   • Never Used     Comment: 1 cigar per week.     Allergies   Allergen Reactions   • Nkda [No Known Drug Allergy]      Outpatient Encounter Prescriptions as of 3/30/2017   Medication Sig Dispense Refill   • Multiple Vitamins-Minerals (CENTRUM SILVER ADULT 50+) Tab Take  by mouth.     • Polyethyl Glycol-Propyl Glycol (SYSTANE OP) by Ophthalmic route.     • losartan (COZAAR) 25 MG Tab Take 1 Tab by mouth every day. 30 Tab 6   • carvedilol (COREG) 3.125 MG Tab Take 3.125 mg by mouth every day. Indications: take 1/2 tab twice a day     • aspirin EC (ECOTRIN) 81 MG Tablet Delayed Response Take 81 mg by mouth every day.     • atorvastatin (LIPITOR) 40 MG Tab Take 1 Tab by mouth every day. TAKE 1 TABLET BY MOUTH DAILY 90 Tab 4   • Cholecalciferol (VITAMIN D-3) 5000 UNITS Tab Take 5,000 Units by mouth every day. 90 Tab 3   • losartan (COZAAR) 25 MG Tab      • glucosamine-chondroitin (GLUCOSAMINE-CHONDROITIN DS) 500-400 MG tablet Take  by mouth.     • hydrocortisone 1 % Cream Apply  to affected area(s).     • VENTOLIN  (90 BASE) MCG/ACT Aero Soln inhalation aerosol      • amoxicillin-clavulanate (AUGMENTIN) 875-125 MG Tab      • benzonatate (TESSALON) 100 MG Cap      • folic acid (FOLVITE) 1 MG Tab Take 1 Tab by mouth every day. (Patient not taking: Reported on 3/27/2017) 90 Tab 4   • Diclofenac Sodium 1 % Gel      • pantoprazole (PROTONIX) 40 MG Tablet Delayed Response Take 40 mg by  "mouth 2 times a day.     • ursodiol (ACTIGALL) 300 MG Cap Take 300 mg by mouth 3 times a day.     • nitroglycerin (NITROSTAT) 0.4 MG SL Tab Place 0.4 mg under tongue every 5 minutes as needed for Chest Pain.     • hydrocortisone 1 % Cream Apply 1 Each to affected area(s) as needed. Indications: Psoriasis       No facility-administered encounter medications on file as of 3/30/2017.     ROS       Objective:   /74 mmHg  Pulse 74  Ht 1.905 m (6' 3\")  Wt 119.296 kg (263 lb)  BMI 32.87 kg/m2  SpO2 94%    Physical Exam   Neck: No JVD present.   Cardiovascular: Normal rate and regular rhythm.  Exam reveals no gallop.    No murmur heard.  Pulmonary/Chest: Effort normal. He has no rales.   Abdominal: Soft. There is no tenderness.   Musculoskeletal: He exhibits no edema.     Lab Results   Component Value Date/Time    CHOLESTEROL,TOT 95* 03/25/2017 07:23 AM    LDL 36 03/25/2017 07:23 AM    HDL 49 03/25/2017 07:23 AM    TRIGLYCERIDES 49 03/25/2017 07:23 AM       Lab Results   Component Value Date/Time    SODIUM 140 03/25/2017 07:23 AM    POTASSIUM 4.2 03/25/2017 07:23 AM    CHLORIDE 109 03/25/2017 07:23 AM    CO2 26 03/25/2017 07:23 AM    GLUCOSE 89 03/25/2017 07:23 AM    BUN 26* 03/25/2017 07:23 AM    CREATININE 0.85 03/25/2017 07:23 AM    BUN-CREATININE RATIO 15 01/23/2010 12:00 AM    GLOM FILT RATE, EST >59 01/23/2010 12:00 AM     Lab Results   Component Value Date/Time    ALKALINE PHOSPHATASE 106* 03/25/2017 07:23 AM    AST(SGOT) 18 03/25/2017 07:23 AM    ALT(SGPT) 17 03/25/2017 07:23 AM    TOTAL BILIRUBIN 0.8 03/25/2017 07:23 AM      Lab Results   Component Value Date/Time    B NATRIURETIC PEPTIDE 20 12/11/2012 07:45 PM          Assessment:     1. ASCVD (arteriosclerotic cardiovascular disease)subtotalled small distal LCx and 40% LAD med rx by cath 12/13/12     2. Mixed hyperlipidemia     3. Essential hypertension     4. Cirrhosis of liver with ascites, unspecified hepatic cirrhosis type (CMS-HCC)         Medical " Decision Making:  Today's Assessment / Status / Plan:     Mr. Lima is clinically stable. His lipid status and his blood pressure are under excellent control. He does have nocturnal hypoxemia and is going to be evaluated by pulmonary. From a cardiovascular standpoint is quite stable. He will follow-up on an as-needed basis.

## 2017-04-05 ENCOUNTER — HOSPITAL ENCOUNTER (OUTPATIENT)
Dept: RADIOLOGY | Facility: MEDICAL CENTER | Age: 72
End: 2017-04-05
Attending: INTERNAL MEDICINE
Payer: MEDICARE

## 2017-04-05 VITALS
SYSTOLIC BLOOD PRESSURE: 119 MMHG | BODY MASS INDEX: 32.33 KG/M2 | RESPIRATION RATE: 16 BRPM | OXYGEN SATURATION: 96 % | TEMPERATURE: 97.1 F | HEART RATE: 56 BPM | DIASTOLIC BLOOD PRESSURE: 64 MMHG | WEIGHT: 260 LBS | HEIGHT: 75 IN

## 2017-04-05 DIAGNOSIS — Z85.038 PERSONAL HISTORY OF COLON CANCER: ICD-10-CM

## 2017-04-05 DIAGNOSIS — I86.4 GASTRIC VARICES: ICD-10-CM

## 2017-04-05 DIAGNOSIS — I85.00 ESOPHAGEAL VARICES WITHOUT MENTION OF BLEEDING: ICD-10-CM

## 2017-04-05 DIAGNOSIS — K76.6 PORTAL HYPERTENSION (HCC): ICD-10-CM

## 2017-04-05 PROCEDURE — 74183 MRI ABD W/O CNTR FLWD CNTR: CPT

## 2017-04-05 PROCEDURE — 01922 ANES N-INVAS IMG/RADJ THER: CPT

## 2017-04-05 PROCEDURE — 700111 HCHG RX REV CODE 636 W/ 250 OVERRIDE (IP)

## 2017-04-05 PROCEDURE — A9579 GAD-BASE MR CONTRAST NOS,1ML: HCPCS | Performed by: INTERNAL MEDICINE

## 2017-04-05 PROCEDURE — 700117 HCHG RX CONTRAST REV CODE 255: Performed by: INTERNAL MEDICINE

## 2017-04-05 PROCEDURE — 700101 HCHG RX REV CODE 250

## 2017-04-05 RX ADMIN — GADODIAMIDE 20 ML: 287 INJECTION INTRAVENOUS at 10:54

## 2017-04-05 NOTE — IP AVS SNAPSHOT
" <p align=\"LEFT\"><IMG SRC=\"[email or web address]\" alt=\"Image\" WIDTH=\"50%\" HEIGHT=\"200\" BORDER=\"\"></p>      `           Torey Lima   MRN: [id number]    Department:  [facility] MRI [address]   Date of Visit:              `  Discharge Instructions       MRI ADULT DISCHARGE INSTRUCTIONS    You have been medicated today for your scan. Please follow the instructions below to ensure your safe recovery. If you have any questions or problems, feel free to call us at [phone] or [phone].     1.   Have someone stay with you to assist you as needed.    2.   Do not drive or operate any mechanical devices.    3.   Do not perform any activity that requires concentration. Make no major decisions over the next 24 hours.     4.   Be careful changing positions from laying down to sitting or standing, as you may become dizzy.     5.   Do not drink alcohol for 48 hours.    6.   There are no restrictions for eating your normal meals. Drink fluids.    7.   You may continue your usual medications for pain, tranquilizers, muscle relaxants or sedatives when awake.     8.   Tomorrow, you may continue your normal daily activities.     9.   Pressure dressing on 10 - 15 minutes. If swelling or bleeding occurs when removed, continue placing direct pressure on injection site for another 5 minutes, or until bleeding stops.     I have been informed of and understand the above discharge instructions.      `       Diet / Nutrition:    Follow any diet instructions given to you by your doctor or the dietician, including how much salt (sodium) you are allowed each day.    If you are overweight, talk to your doctor about a weight reduction plan.    Activity:    Remain physically active following your doctor's instructions about exercise and activity.    Rest often.     Any time you become even a little tired or short of breath, SIT DOWN and rest.    Worsening Symptoms:    Report any of the following signs and symptoms to the " doctor's office immediately:    *Pain of jaw, arm, or neck  *Chest pain not relieved by medication                               *Dizziness or loss of consciousness  *Difficulty breathing even when at rest   *More tired than usual                                       *Bleeding drainage or swelling of surgical site  *Swelling of feet, ankles, legs or stomach                 *Fever (>100ºF)  *Pink or blood tinged sputum  *Weight gain (3lbs/day or 5lbs /week)           *Shock from internal defibrillator (if applicable)  *Palpitations or irregular heartbeats                *Cool and/or numb extremities    Stroke Awareness    Common Risk Factors for Stroke include:    Age  Atrial Fibrillation  Carotid Artery Stenosis  Diabetes Mellitus  Excessive alcohol consumption  High blood pressure  Overweight   Physical inactivity  Smoking    Warning signs and symptoms of a stroke include:    *Sudden numbness or weakness of the face, arm or leg (especially on one side of the body).  *Sudden confusion, trouble speaking or understanding.  *Sudden trouble seeing in one or both eyes.  *Sudden trouble walking, dizziness, loss of balance or coordination.Sudden severe headache with no known cause.    It is very important to get treatment quickly when a stroke occurs. If you experience any of the above warning signs, call 911 immediately.                    `     Quit Smoking / Tobacco Use:    I understand the use of any tobacco products increases my chance of suffering from future heart disease or stroke and could cause other illnesses which may shorten my life. Quitting the use of tobacco products is the single most important thing I can do to improve my health. For further information on smoking / tobacco cessation call a Toll Free Quit Line at 1-271.127.7255 (*[employer] [place]) or 1-935.140.4091 (American [employer] Association) or you can access the web based program at www.lungusa.org.    [place] Tobacco Users Help Line:  (182)  876-1320       Toll Free: 9-638-637-2639  Quit Tobacco Program [facility] (380)933-8818    Crisis Hotline:    [place] Crisis Hotline:  0-522-XCYPMYP or 1-847.165.2412    [place] Crisis Hotline:    1-919.261.2036 or 000-011-9629    Discharge Survey:   Thank you for choosing [facility]. We hope we did everything we could to make your hospital stay a pleasant one. You may be receiving a phone survey and we would appreciate your time and participation in answering the questions. Your input is very valuable to us in our efforts to improve our service to our patients and their families.        My signature on this form indicates that:    1. I have reviewed and understand the above information.  2. My questions regarding this information have been answered to my satisfaction.  3. I have formulated a plan with my discharge nurse to obtain my prescribed medications for home.                   `           Patient or Caregiver Signature:  ____________________________________________________________    Date:  ____________________________________________________________       `

## 2017-04-05 NOTE — DISCHARGE INSTRUCTIONS
MRI ADULT DISCHARGE INSTRUCTIONS    You have been medicated today for your scan. Please follow the instructions below to ensure your safe recovery. If you have any questions or problems, feel free to call us at [phone] or [phone].     1.   Have someone stay with you to assist you as needed.    2.   Do not drive or operate any mechanical devices.    3.   Do not perform any activity that requires concentration. Make no major decisions over the next 24 hours.     4.   Be careful changing positions from laying down to sitting or standing, as you may become dizzy.     5.   Do not drink alcohol for 48 hours.    6.   There are no restrictions for eating your normal meals. Drink fluids.    7.   You may continue your usual medications for pain, tranquilizers, muscle relaxants or sedatives when awake.     8.   Tomorrow, you may continue your normal daily activities.     9.   Pressure dressing on 10 - 15 minutes. If swelling or bleeding occurs when removed, continue placing direct pressure on injection site for another 5 minutes, or until bleeding stops.     I have been informed of and understand the above discharge instructions.

## 2017-04-07 ENCOUNTER — TELEPHONE (OUTPATIENT)
Dept: MEDICAL GROUP | Age: 72
End: 2017-04-07

## 2017-04-07 NOTE — TELEPHONE ENCOUNTER
Patient called and was given the results of his MRI of the liver which shows a new liver lesion consistent with metastasis. The patient says he has not been contacted yet by his ordering GI doctor here in [place] or his hepatologist at [facility]. I told the patient I would release results for him to review, so he can prepare any questions he might have for his physicians.

## 2017-04-11 ENCOUNTER — SLEEP CENTER VISIT (OUTPATIENT)
Dept: SLEEP MEDICINE | Facility: MEDICAL CENTER | Age: 72
End: 2017-04-11
Payer: MEDICARE

## 2017-04-11 VITALS
HEIGHT: 75 IN | WEIGHT: 262 LBS | OXYGEN SATURATION: 96 % | BODY MASS INDEX: 32.58 KG/M2 | DIASTOLIC BLOOD PRESSURE: 76 MMHG | RESPIRATION RATE: 18 BRPM | SYSTOLIC BLOOD PRESSURE: 114 MMHG | HEART RATE: 60 BPM | TEMPERATURE: 99 F

## 2017-04-11 DIAGNOSIS — G47.34 NOCTURNAL HYPOXEMIA: ICD-10-CM

## 2017-04-11 DIAGNOSIS — G47.30 SLEEP APNEA, UNSPECIFIED TYPE: ICD-10-CM

## 2017-04-11 DIAGNOSIS — R06.83 SNORING: ICD-10-CM

## 2017-04-11 PROCEDURE — 99204 OFFICE O/P NEW MOD 45 MIN: CPT | Performed by: INTERNAL MEDICINE

## 2017-04-11 RX ORDER — AMOXICILLIN 500 MG/1
CAPSULE ORAL PRN
COMMUNITY
Start: 2017-04-04 | End: 2017-05-20

## 2017-04-11 RX ORDER — ZOLPIDEM TARTRATE 5 MG/1
TABLET ORAL
Qty: 3 TAB | Refills: 0 | Status: SHIPPED | OUTPATIENT
Start: 2017-04-11 | End: 2017-05-20

## 2017-04-11 NOTE — PROGRESS NOTES
CC:  Snoring, excessive daytime somnolence and documented nocturnal hypoxemia, suspected sleep apnea hypopnea syndrome.    HPI:   Mr. Lima is a 71-year-old man referred by Dr. Andi Pearl and Dr. Fabián Urena to assist in the evaluation and management suspected sleep-disordered breathing.    He has a complicated medical history with colon carcinoma identified in 2010. He underwent a partial colectomy but was then found to have metastatic disease to the liver. He underwent chemotherapy through the [facility] and responded well to that treatment. He was left with residual portal hypertension and partial portal vein thrombosis which was attributed to his chemotherapy and did not require anticoagulation. He underwent a TIPS procedure in October 2016, complicated by transient atrial fibrillation. He was evaluated by Dr. Pearl of the cardiology service for that problem and his history of systemic arterial hypertension. In the course of his evaluation, symptoms suggesting sleep-disordered breathing were identified.    He has a regular sleep schedule with bedtime between 10 and 11 PM. He falls asleep quickly and does not typically awaken at night. He awakens spontaneously between 6 and 7:15 in the morning without fatigue, headaches or morning grogginess. His wife indicates that he does snore lightly but she's not noticed cyclical breathing or overt apnea. Both the patient and his wife feel that his snoring was much worse before he lost at least 25 pounds in the course of his cancer treatment several years ago. He denies much daytime somnolence. He does not nap or fall asleep inappropriately. He did have problems with fatigue after his chemotherapy treatment but felt much better after parenteral iron infusion. He drinks coffee moderately and does not use substances to induce sleep or maintaining wakefulness. He does not have symptoms suggesting narcolepsy, parasomnia or restless leg  syndrome. He has not previously been evaluated for sleep issues.    Continuous nocturnal oximetry on February 5, 2017 demonstrated cyclical drops in saturation to a minimum of 74% on room air. He spent 120 minutes of the night with a saturation below 89%.        Patient Active Problem List    Diagnosis Date Noted   • Esophageal varices- BANDED 4/2016- repeat egd tbd 10//29/16- gic 04/04/2016     Priority: High   • Acute blood loss anemia 04/03/2016     Priority: High   • BMI 34.0-34.9,adult 02/15/2013     Priority: High   • ASCVD (arteriosclerotic cardiovascular disease)subtotalled small distal LCx and 40% LAD med rx by cath 12/13/12 12/14/2012     Priority: High   • Gastric varices- s/p BRTO procedure at [facility] 04/04/2016     Priority: Medium   • GIB (gastrointestinal bleeding)- recurrent from varices 04/03/2016     Priority: Medium   • Elevated bilirubin 01/03/2017   • Dry cough- ? due to lisinopril- d/c'd 11/2016 11/18/2016   • SVT (supraventricular tachycardia) (CMS-HCC) 10/27/2016   • Microcytic hypochromic anemia 10/24/2016   • History of colon cancer 10/24/2016   • History of esophageal varices 10/24/2016   • Iron deficiency anemia due to chronic blood loss 10/06/2016   • Portal vein thrombosis- on CT [facility] 2/2015 04/14/2016   • Cirrhosis of liver with ascites (HCC)- ? DUE TO OLD HEP B, CHEMO RX,  OR THERMO RAD OF MAGNANT LESION 04/14/2016   • Mixed hyperlipidemia 06/20/2013   • Essential hypertension 06/20/2013   • Thrombocytopenia due to drugs 04/25/2012   • Colon cancer-2010 left hemicolectomy; no colostomy 12/06/2010   • Liver lesion- ablation of malignant met from colon ca 2011- f/u [facility] q 6 mos 12/06/2010   • Peripheral neuropathy, secondary to drugs or chemicals 12/06/2010       Past Medical History   Diagnosis Date   • Cancer (CMS-HCC) 10/10-6/11     colon, liver cance   • ASCVD (arteriosclerotic cardiovascular disease) 12/14/2012   • Colon cancer (CMS-HCC) 12/6/2010   • Elevated CEA 12/6/2010   • Liver  lesion 12/6/2010   • Peripheral neuropathy, secondary to drugs or chemicals 12/6/2010   • Thrombocytopenia due to drugs 4/25/2012   • Impaired fasting glucose 8/2/2012   • Vitamin d deficiency 8/2/2012   • BMI 33.0-33.9,adult 2/15/2013   • HLD (hyperlipidemia) 6/20/2013   • HTN (hypertension) 6/20/2013   • Hypertension    • Liver cirrhosis (CMS-HCC)    • GI bleed    • Lao measles    • Chickenpox        Past Surgical History   Procedure Laterality Date   • Low anterior resection robotic  8/10/2010     Performed by RUBY RUIZ at SURGERY [facility] ORS   • Cath placement  9/20/2010     Performed by RUBY RUIZ at SURGERY [facility] ORS   • Hip arthroplasty mis total       rt   • Other       Cholecystectomy   • Other (comments)       liver surgery     • Cath removal  7/20/2011     Performed by RUBY RUIZ at SURGERY [facility] ORS   • Full thickness block resection  4/11/2012     Performed by LANI BOWMAN at [facility]   • Gastroscopy with banding  4/3/2016     Procedure: GASTROSCOPY WITH BANDING;  Surgeon: Cayetano Stovall M.D.;  Location: ENDOSCOPY [facility] ORS;  Service:    • Gastroscopy with banding  10/25/2016     Procedure: GASTROSCOPY WITH BANDING;  Surgeon: Pierre Waggoner M.D.;  Location: ENDOSCOPY [facility];  Service:    • Colon resection     • Hip replacement, total         Family History   Problem Relation Age of Onset   • Heart Disease Mother    • Cancer Mother    • Sleep Apnea Neg Hx        Social History     Social History   • Marital Status:      Spouse Name: N/A   • Number of Children: N/A   • Years of Education: N/A     Occupational History   • Not on file.     Social History Main Topics   • Smoking status: Former Smoker -- 0.50 packs/day for 1 years     Types: Cigarettes, Cigars     Quit date: 01/01/1964   • Smokeless tobacco: Never Used      Comment: 1 cigar per week.   • Alcohol Use: No      Comment: minimal, Pt stop drinking since  "the beginning of April 2016   • Drug Use: No   • Sexual Activity:     Partners: Female     Other Topics Concern   • Not on file     Social History Narrative    ** Merged History Encounter **            Current Outpatient Prescriptions   Medication Sig Dispense Refill   • amoxicillin (AMOXIL) 500 MG Cap as needed.     • zolpidem (AMBIEN) 5 MG Tab Take 1-2 tablets by mouth every evening as needed for insomnia. Bring to sleep study. 3 Tab 0   • Multiple Vitamins-Minerals (CENTRUM SILVER ADULT 50+) Tab Take  by mouth.     • Polyethyl Glycol-Propyl Glycol (SYSTANE OP) by Ophthalmic route.     • folic acid (FOLVITE) 1 MG Tab Take 1 Tab by mouth every day. 90 Tab 4   • losartan (COZAAR) 25 MG Tab Take 1 Tab by mouth every day. 30 Tab 6   • carvedilol (COREG) 3.125 MG Tab Take 3.125 mg by mouth every day. Indications: take 1/2 tab twice a day     • ursodiol (ACTIGALL) 300 MG Cap Take 300 mg by mouth 3 times a day.     • aspirin EC (ECOTRIN) 81 MG Tablet Delayed Response Take 81 mg by mouth every day.     • nitroglycerin (NITROSTAT) 0.4 MG SL Tab Place 0.4 mg under tongue every 5 minutes as needed for Chest Pain.     • atorvastatin (LIPITOR) 40 MG Tab Take 1 Tab by mouth every day. TAKE 1 TABLET BY MOUTH DAILY 90 Tab 4   • Cholecalciferol (VITAMIN D-3) 5000 UNITS Tab Take 5,000 Units by mouth every day. 90 Tab 3   • hydrocortisone 1 % Cream Apply 1 Each to affected area(s) as needed. Indications: Psoriasis       No current facility-administered medications for this visit.    \"CURRENT RX\"      Allergies: Nkda      ROS  Positive for the sleep, cardiac, hepatic/GI and hematologic issues reviewed above. He wears corrective lenses but has no diplopia. He's had no unusual cough or dyspnea and no chest pain or palpitations. All other aspects of the CPT review of systems process is negative.      Physical Exam:   /76 mmHg  Pulse 60  Temp(Src) 37.2 °C (99 °F)  Resp 18  Ht 1.905 m (6' 3\")  Wt 118.842 kg (262 lb)  BMI 32.75 " kg/m2  SpO2 96%   Head and neck examination demonstrates no mucosal lesion, purulent drainage or evident polyps. The pharynx is crowded but benign with a Mallampati III presentation. The neck is supple without thyromegaly. On chest examination there are symmetrical bilateral breath sounds without rales, wheezing or consolidation. On cardiac examination, the apical impulse and heart sounds are normal and the rhythm is regular. There is no murmur, gallop or rub and no jugular venous distention. The abdomen is soft with active bowel sounds and no palpable hepatosplenomegaly, mass, guarding or rebound. The extremities show no clubbing, cyanosis or edema and no signs of deep venous thrombosis. There is no warmth, redness, tenderness or palpable venous cord in the calves. The skin is clear, warm and dry. There is no unusual peripheral lymphadenopathy. Peripheral pulses are palpable in all 4 extremities. On neurologic examination, cranial nerve function is intact, motor tone is symmetrical, and the patient is alert, oriented and responsive.       Problems:  1. Sleep apnea, unspecified type  He presents with snoring and documented cyclical nocturnal hypoxemia. He has a crowded posterior pharyngeal airway, neck circumference was 17 inches, and body mass index of 32.8.  The clinical probability of sleep apnea hypopnea syndrome is significant. Accurate diagnosis and effective treatment are required not only to improve levels of daytime alertness but also to reduce the cardiac and neurologic risks associated with untreated sleep apnea. We have discussed diagnostic options including in-laboratory, attended polysomnography and home sleep testing. We have also discussed treatment options including airway pressurization, reconstructive otolaryngologic surgery, dental appliances and weight management.    2. Snoring      3. Nocturnal hypoxemia      Plan:   Polysomnography, possible split night study.    Return visit here afterwards  to discuss test results and treatment options.    We appreciate the opportunity to assist in his care.

## 2017-04-11 NOTE — MR AVS SNAPSHOT
"        Torey Lima   2017 10:20 AM   Sleep Center Visit   MRN: [id number]    Department:  [facility]   Dept Phone:  [phone]    Description:  Male : 1945   Provider:  Fabián BAILEY M.D.           Reason for Visit     New Patient Evaluate JAXON      Allergies as of 2017     Allergen Noted Reactions    Nkda [No Known Drug Allergy] 08/10/2010         You were diagnosed with     Sleep apnea, unspecified type   [8922557]       Snoring   [074363]       Nocturnal hypoxemia   [607950]         Vital Signs     Blood Pressure Pulse Temperature Respirations Height Weight    114/76 mmHg 60 37.2 °C (99 °F) 18 1.905 m (6' 3\") 118.842 kg (262 lb)    Body Mass Index Oxygen Saturation Smoking Status             32.75 kg/m2 96% Former Smoker         Basic Information     Date Of Birth Sex Race Ethnicity Preferred Language    1945 Male White Non- English      Your appointments     2017  8:00 AM   Established Patient with Fabián Urena M.D.   [address] [id number] [phone]           You will be receiving a confirmation call a few days before your appointment from our automated call confirmation system.              Problem List              ICD-10-CM Priority Class Noted - Resolved    Colon cancer- left hemicolectomy; no colostomy C18.9   2010 - Present    Liver lesion- ablation of malignant met from colon ca - f/u [facility] q 6 mos K76.9   2010 - Present    Peripheral neuropathy, secondary to drugs or chemicals DCX9676   2010 - Present    Thrombocytopenia due to drugs D69.59   2012 - Present    ASCVD (arteriosclerotic cardiovascular disease)subtotalled small distal LCx and 40% LAD med rx by cath 12 I25.10 High  2012 - Present    BMI 34.0-34.9,adult Z68.34 High  2/15/2013 - Present    Mixed hyperlipidemia E78.2   2013 - Present    Essential hypertension I10   " 6/20/2013 - Present    Acute blood loss anemia D62 High  4/3/2016 - Present    GIB (gastrointestinal bleeding)- recurrent from varices K92.2 Medium  4/3/2016 - Present    Esophageal varices- BANDED 4/2016- repeat egd tbd 10//29/16- gic I85.00 High  4/4/2016 - Present    Gastric varices- s/p BRTO procedure at [facility] I86.4 Medium  4/4/2016 - Present    Portal vein thrombosis- on CT [facility] 2/2015 I81   4/14/2016 - Present    Cirrhosis of liver with ascites (HCC)- ? DUE TO OLD HEP B, CHEMO RX,  OR THERMO RAD OF MAGNANT LESION K74.60   4/14/2016 - Present    Iron deficiency anemia due to chronic blood loss D50.0   10/6/2016 - Present    Microcytic hypochromic anemia D50.9   10/24/2016 - Present    History of colon cancer Z85.038   10/24/2016 - Present    History of esophageal varices Z87.19   10/24/2016 - Present    SVT (supraventricular tachycardia) (CMS-HCC) I47.1   10/27/2016 - Present    Dry cough- ? due to lisinopril- d/c'd 11/2016 R05   11/18/2016 - Present    Elevated bilirubin R17   1/3/2017 - Present      Health Maintenance        Date Due Completion Dates    COLONOSCOPY 8/19/2017 8/19/2014, 7/15/2011    IMM DTaP/Tdap/Td Vaccine (2 - Td) 4/25/2022 4/25/2012            Current Immunizations     13-VALENT PCV PREVNAR 10/9/2015  8:10 AM    Influenza TIV (IM) 10/26/2011, 9/18/2010    Influenza Vaccine Adult HD 10/10/2016, 10/9/2015  8:00 AM, 11/3/2012    Influenza Vaccine Quad Inj (Pf) 10/20/2014    Influenza Vaccine Quad Inj (Preserved) 10/10/2013    Pneumococcal polysaccharide vaccine (PPSV-23) 10/26/2011    SHINGLES VACCINE 11/3/2012    Tdap Vaccine 4/25/2012      Below and/or attached are the medications your provider expects you to take. Review all of your home medications and newly ordered medications with your provider and/or pharmacist. Follow medication instructions as directed by your provider and/or pharmacist. Please keep your medication list with you and share with your provider. Update the information when  medications are discontinued, doses are changed, or new medications (including over-the-counter products) are added; and carry medication information at all times in the event of emergency situations     Allergies:  NKDA - (reactions not documented)               Medications  Valid as of: April 11, 2017 - 11:25 AM    Generic Name Brand Name Tablet Size Instructions for use    Amoxicillin (Cap) AMOXIL 500 MG as needed.        Aspirin (Tablet Delayed Response) ECOTRIN 81 MG Take 81 mg by mouth every day.        Atorvastatin Calcium (Tab) LIPITOR 40 MG Take 1 Tab by mouth every day. TAKE 1 TABLET BY MOUTH DAILY        Carvedilol (Tab) COREG 3.125 MG Take 3.125 mg by mouth every day. Indications: take 1/2 tab twice a day        Cholecalciferol (Tab) Vitamin D-3 5000 UNITS Take 5,000 Units by mouth every day.        Folic Acid (Tab) FOLVITE 1 MG Take 1 Tab by mouth every day.        Hydrocortisone (Cream) hydrocortisone 1 % Apply 1 Each to affected area(s) as needed. Indications: Psoriasis        Losartan Potassium (Tab) COZAAR 25 MG Take 1 Tab by mouth every day.        Multiple Vitamins-Minerals (Tab) CENTRUM SILVER ADULT 50+  Take  by mouth.        Nitroglycerin (SL Tab) NITROSTAT 0.4 MG Place 0.4 mg under tongue every 5 minutes as needed for Chest Pain.        Polyethyl Glycol-Propyl Glycol   by Ophthalmic route.        Ursodiol (Cap) ACTIGALL 300 MG Take 300 mg by mouth 3 times a day.        Zolpidem Tartrate (Tab) AMBIEN 5 MG Take 1-2 tablets by mouth every evening as needed for insomnia. Bring to sleep study.        .                 Medicines prescribed today were sent to:     [facility] [place], [place] - [address]    [address] [facility] [address]    Phone: [phone] Fax: [phone]    Open 24 Hours?: No      Medication refill instructions:       If your prescription bottle indicates you have medication refills left, it is not necessary to call your provider’s office. Please  contact your pharmacy and they will refill your medication.    If your prescription bottle indicates you do not have any refills left, you may request refills at any time through one of the following ways: The online [employer] system (except Urgent Care), by calling your provider’s office, or by asking your pharmacy to contact your provider’s office with a refill request. Medication refills are processed only during regular business hours and may not be available until the next business day. Your provider may request additional information or to have a follow-up visit with you prior to refilling your medication.   *Please Note: Medication refills are assigned a new Rx number when refilled electronically. Your pharmacy may indicate that no refills were authorized even though a new prescription for the same medication is available at the pharmacy. Please request the medicine by name with the pharmacy before contacting your provider for a refill.        Your To Do List     Future Labs/Procedures Complete By Expires    POLYSOMNOGRAPHY, 4 OR MORE  As directed 4/11/2018         [employer] Access Code: Activation code not generated  Current [employer] Status: Active

## 2017-04-12 ENCOUNTER — OFFICE VISIT (OUTPATIENT)
Dept: MEDICAL GROUP | Age: 72
End: 2017-04-12
Payer: MEDICARE

## 2017-04-12 VITALS
SYSTOLIC BLOOD PRESSURE: 115 MMHG | OXYGEN SATURATION: 97 % | WEIGHT: 261 LBS | DIASTOLIC BLOOD PRESSURE: 72 MMHG | HEART RATE: 79 BPM | HEIGHT: 75 IN | TEMPERATURE: 97.7 F | BODY MASS INDEX: 32.45 KG/M2

## 2017-04-12 DIAGNOSIS — E66.9 OBESITY (BMI 30-39.9): ICD-10-CM

## 2017-04-12 DIAGNOSIS — C18.9 MALIGNANT NEOPLASM OF COLON, UNSPECIFIED PART OF COLON (HCC): ICD-10-CM

## 2017-04-12 DIAGNOSIS — E78.2 MIXED HYPERLIPIDEMIA: ICD-10-CM

## 2017-04-12 DIAGNOSIS — I10 ESSENTIAL HYPERTENSION: ICD-10-CM

## 2017-04-12 DIAGNOSIS — K74.60 CIRRHOSIS OF LIVER WITH ASCITES, UNSPECIFIED HEPATIC CIRRHOSIS TYPE (HCC): ICD-10-CM

## 2017-04-12 DIAGNOSIS — I25.10 ASCVD (ARTERIOSCLEROTIC CARDIOVASCULAR DISEASE): ICD-10-CM

## 2017-04-12 DIAGNOSIS — R18.8 CIRRHOSIS OF LIVER WITH ASCITES, UNSPECIFIED HEPATIC CIRRHOSIS TYPE (HCC): ICD-10-CM

## 2017-04-12 DIAGNOSIS — D50.0 IRON DEFICIENCY ANEMIA DUE TO CHRONIC BLOOD LOSS: ICD-10-CM

## 2017-04-12 PROCEDURE — G8432 DEP SCR NOT DOC, RNG: HCPCS | Performed by: INTERNAL MEDICINE

## 2017-04-12 PROCEDURE — 4040F PNEUMOC VAC/ADMIN/RCVD: CPT | Performed by: INTERNAL MEDICINE

## 2017-04-12 PROCEDURE — 1101F PT FALLS ASSESS-DOCD LE1/YR: CPT | Performed by: INTERNAL MEDICINE

## 2017-04-12 PROCEDURE — 1036F TOBACCO NON-USER: CPT | Performed by: INTERNAL MEDICINE

## 2017-04-12 PROCEDURE — 99214 OFFICE O/P EST MOD 30 MIN: CPT | Performed by: INTERNAL MEDICINE

## 2017-04-12 PROCEDURE — G8419 CALC BMI OUT NRM PARAM NOF/U: HCPCS | Performed by: INTERNAL MEDICINE

## 2017-04-12 PROCEDURE — G8598 ASA/ANTIPLAT THER USED: HCPCS | Performed by: INTERNAL MEDICINE

## 2017-04-12 ASSESSMENT — ENCOUNTER SYMPTOMS
GASTROINTESTINAL NEGATIVE: 1
CARDIOVASCULAR NEGATIVE: 1
PSYCHIATRIC NEGATIVE: 1
CONSTITUTIONAL NEGATIVE: 1
MUSCULOSKELETAL NEGATIVE: 1
EYES NEGATIVE: 1
RESPIRATORY NEGATIVE: 1
NEUROLOGICAL NEGATIVE: 1

## 2017-04-12 NOTE — MR AVS SNAPSHOT
"        Torey Lima   2017 8:00 AM   Office Visit   MRN: [id number]    Department:  [address] [place] [address]   Dept Phone:  [phone]    Description:  Male : 1945   Provider:  Fabián Urena M.D.           Reason for Visit     Results Lab Review      Allergies as of 2017     Allergen Noted Reactions    Nkda [No Known Drug Allergy] 08/10/2010         You were diagnosed with     Malignant neoplasm of colon, unspecified part of colon (CMS-HCC)   [6165021]       Cirrhosis of liver with ascites, unspecified hepatic cirrhosis type (CMS-HCC)   [1459860]       Mixed hyperlipidemia   [272.2.ICD-9-CM]       Essential hypertension   [3768519]       Obesity (BMI 30-39.9)   [321793]       ASCVD (arteriosclerotic cardiovascular disease)   [685534]       Iron deficiency anemia due to chronic blood loss   [334700]         Vital Signs     Blood Pressure Pulse Temperature Height Weight Body Mass Index    115/72 mmHg 79 36.5 °C (97.7 °F) 1.892 m (6' 2.5\") 118.389 kg (261 lb) 33.07 kg/m2    Oxygen Saturation Smoking Status                97% Former Smoker          Basic Information     Date Of Birth Sex Race Ethnicity Preferred Language    1945 Male White Non- English      Your appointments     May 26, 2017  7:05 PM   Sleep Study Diagnostic with SLEEP TECH   [facility] (--)    [address]  [facility] [address]  [place] [address]   [phone]            2017 12:00 PM   Follow UP with [facility] (--)    [address]  [facility] [address]  [place] [address]   [phone]              Problem List              ICD-10-CM Priority Class Noted - Resolved    Colon cancer- left hemicolectomy; no colostomy C18.9   2010 - Present    Liver lesion- ablation of malignant met from colon ca - f/u [facility] q 6 mos K76.9   2010 - Present    Peripheral neuropathy, secondary to drugs or chemicals NJJ4253   2010 - Present   " Thrombocytopenia due to drugs D69.59   4/25/2012 - Present    ASCVD (arteriosclerotic cardiovascular disease)subtotalled small distal LCx and 40% LAD med rx by cath 12/13/12 I25.10 High  12/14/2012 - Present    BMI 34.0-34.9,adult Z68.34 High  2/15/2013 - Present    Mixed hyperlipidemia E78.2   6/20/2013 - Present    Essential hypertension I10   6/20/2013 - Present    GIB (gastrointestinal bleeding)- recurrent from varices K92.2 Medium  4/3/2016 - Present    Esophageal varices- BANDED 4/2016- repeat egd tbd 10//29/16- gic I85.00 High  4/4/2016 - Present    Gastric varices- s/p BRTO procedure at [facility] I86.4 Medium  4/4/2016 - Present    Portal vein thrombosis- on CT [facility] 2/2015 I81   4/14/2016 - Present    Cirrhosis of liver with ascites (HCC)- ? DUE TO OLD HEP B, CHEMO RX,  OR THERMO RAD OF MAGNANT LESION K74.60   4/14/2016 - Present    Iron deficiency anemia due to chronic blood loss D50.0   10/6/2016 - Present    Microcytic hypochromic anemia D50.9   10/24/2016 - Present    History of colon cancer Z85.038   10/24/2016 - Present    History of esophageal varices Z87.19   10/24/2016 - Present    SVT (supraventricular tachycardia) (CMS-HCC) I47.1   10/27/2016 - Present    Dry cough- ? due to lisinopril- d/c'd 11/2016 R05   11/18/2016 - Present    Elevated bilirubin R17   1/3/2017 - Present    Obesity (BMI 30-39.9) E66.9   4/12/2017 - Present      Health Maintenance        Date Due Completion Dates    COLONOSCOPY 8/19/2017 8/19/2014, 7/15/2011    IMM DTaP/Tdap/Td Vaccine (2 - Td) 4/25/2022 4/25/2012            Current Immunizations     13-VALENT PCV PREVNAR 10/9/2015  8:10 AM    Influenza TIV (IM) 10/26/2011, 9/18/2010    Influenza Vaccine Adult HD 10/10/2016, 10/9/2015  8:00 AM, 11/3/2012    Influenza Vaccine Quad Inj (Pf) 10/20/2014    Influenza Vaccine Quad Inj (Preserved) 10/10/2013    Pneumococcal polysaccharide vaccine (PPSV-23) 10/26/2011    SHINGLES VACCINE 11/3/2012    Tdap Vaccine 4/25/2012      Below and/or  attached are the medications your provider expects you to take. Review all of your home medications and newly ordered medications with your provider and/or pharmacist. Follow medication instructions as directed by your provider and/or pharmacist. Please keep your medication list with you and share with your provider. Update the information when medications are discontinued, doses are changed, or new medications (including over-the-counter products) are added; and carry medication information at all times in the event of emergency situations     Allergies:  NKDA - (reactions not documented)               Medications  Valid as of: April 12, 2017 -  8:53 AM    Generic Name Brand Name Tablet Size Instructions for use    Amoxicillin (Cap) AMOXIL 500 MG as needed.        Aspirin (Tablet Delayed Response) ECOTRIN 81 MG Take 81 mg by mouth every day.        Atorvastatin Calcium (Tab) LIPITOR 40 MG Take 1 Tab by mouth every day. TAKE 1 TABLET BY MOUTH DAILY        Carvedilol (Tab) COREG 3.125 MG Take 3.125 mg by mouth every day. Indications: take 1/2 tab twice a day        Cholecalciferol (Tab) Vitamin D-3 5000 UNITS Take 5,000 Units by mouth every day.        Folic Acid (Tab) FOLVITE 1 MG Take 1 Tab by mouth every day.        Hydrocortisone (Cream) hydrocortisone 1 % Apply 1 Each to affected area(s) as needed. Indications: Psoriasis        Losartan Potassium (Tab) COZAAR 25 MG Take 1 Tab by mouth every day.        Multiple Vitamins-Minerals (Tab) CENTRUM SILVER ADULT 50+  Take  by mouth.        Nitroglycerin (SL Tab) NITROSTAT 0.4 MG Place 0.4 mg under tongue every 5 minutes as needed for Chest Pain.        Polyethyl Glycol-Propyl Glycol   by Ophthalmic route.        Ursodiol (Cap) ACTIGALL 300 MG Take 300 mg by mouth 3 times a day.        Zolpidem Tartrate (Tab) AMBIEN 5 MG Take 1-2 tablets by mouth every evening as needed for insomnia. Bring to sleep study.        .                 Medicines prescribed today were sent to:      [facility] [address] [facility], [place] - [address]    [address] [place] [address]    Phone: [phone] Fax: [phone]    Open 24 Hours?: No      Medication refill instructions:       If your prescription bottle indicates you have medication refills left, it is not necessary to call your provider’s office. Please contact your pharmacy and they will refill your medication.    If your prescription bottle indicates you do not have any refills left, you may request refills at any time through one of the following ways: The online [email or web address] system (except Urgent Care), by calling your provider’s office, or by asking your pharmacy to contact your provider’s office with a refill request. Medication refills are processed only during regular business hours and may not be available until the next business day. Your provider may request additional information or to have a follow-up visit with you prior to refilling your medication.   *Please Note: Medication refills are assigned a new Rx number when refilled electronically. Your pharmacy may indicate that no refills were authorized even though a new prescription for the same medication is available at the pharmacy. Please request the medicine by name with the pharmacy before contacting your provider for a refill.        Your To Do List     Future Labs/Procedures Complete By Expires    CEA  As directed 4/12/2018         [email or web address] Access Code: Activation code not generated  Current [email or web address] Status: Active

## 2017-04-12 NOTE — PROGRESS NOTES
Subjective:      Torey Lima is a 71 y.o. male who presents with Results  The patient is here for followup of chronic medical problems listed below. The patient is compliant with medications and having no side effects from them. Denies chest pain, abdominal pain, dyspnea, myalgias, or cough.   Patient Active Problem List    Diagnosis Date Noted   • Esophageal varices- BANDED 4/2016- repeat egd tbd 10//29/16- gic 04/04/2016     Priority: High   • BMI 34.0-34.9,adult 02/15/2013     Priority: High   • ASCVD (arteriosclerotic cardiovascular disease)subtotalled small distal LCx and 40% LAD med rx by cath 12/13/12 12/14/2012     Priority: High   • Gastric varices- s/p BRTO procedure at [facility] 04/04/2016     Priority: Medium   • GIB (gastrointestinal bleeding)- recurrent from varices 04/03/2016     Priority: Medium   • Obesity (BMI 30-39.9) 04/12/2017   • Elevated bilirubin 01/03/2017   • Dry cough- ? due to lisinopril- d/c'd 11/2016 11/18/2016   • SVT (supraventricular tachycardia) (CMS-HCC) 10/27/2016   • Microcytic hypochromic anemia 10/24/2016   • History of colon cancer 10/24/2016   • History of esophageal varices 10/24/2016   • Iron deficiency anemia due to chronic blood loss 10/06/2016   • Portal vein thrombosis- on [facility] 2/2015 04/14/2016   • Cirrhosis of liver with ascites (HCC)- ? DUE TO OLD HEP B, CHEMO RX,  OR THERMO RAD OF MAGNANT LESION 04/14/2016   • Mixed hyperlipidemia 06/20/2013   • Essential hypertension 06/20/2013   • Thrombocytopenia due to drugs 04/25/2012   • Colon cancer-2010 left hemicolectomy; no colostomy 12/06/2010   • Liver lesion- ablation of malignant met from colon ca 2011- f/u [facility] q 6 mos 12/06/2010   • Peripheral neuropathy, secondary to drugs or chemicals 12/06/2010     Allergies   Allergen Reactions   • Nkda [No Known Drug Allergy]      Outpatient Prescriptions Prior to Visit   Medication Sig Dispense Refill   • Multiple Vitamins-Minerals (CENTRUM SILVER ADULT 50+) Tab Take  by  "mouth.     • folic acid (FOLVITE) 1 MG Tab Take 1 Tab by mouth every day. 90 Tab 4   • losartan (COZAAR) 25 MG Tab Take 1 Tab by mouth every day. 30 Tab 6   • carvedilol (COREG) 3.125 MG Tab Take 3.125 mg by mouth every day. Indications: take 1/2 tab twice a day     • aspirin EC (ECOTRIN) 81 MG Tablet Delayed Response Take 81 mg by mouth every day.     • Cholecalciferol (VITAMIN D-3) 5000 UNITS Tab Take 5,000 Units by mouth every day. 90 Tab 3   • amoxicillin (AMOXIL) 500 MG Cap as needed.     • zolpidem (AMBIEN) 5 MG Tab Take 1-2 tablets by mouth every evening as needed for insomnia. Bring to sleep study. 3 Tab 0   • Polyethyl Glycol-Propyl Glycol (SYSTANE OP) by Ophthalmic route.     • ursodiol (ACTIGALL) 300 MG Cap Take 300 mg by mouth 3 times a day.     • nitroglycerin (NITROSTAT) 0.4 MG SL Tab Place 0.4 mg under tongue every 5 minutes as needed for Chest Pain.     • atorvastatin (LIPITOR) 40 MG Tab Take 1 Tab by mouth every day. TAKE 1 TABLET BY MOUTH DAILY 90 Tab 4   • hydrocortisone 1 % Cream Apply 1 Each to affected area(s) as needed. Indications: Psoriasis       No facility-administered medications prior to visit.               HPI    Review of Systems   Constitutional: Negative.    HENT: Negative.    Eyes: Negative.    Respiratory: Negative.    Cardiovascular: Negative.    Gastrointestinal: Negative.    Genitourinary: Negative.    Musculoskeletal: Negative.    Skin: Negative.    Neurological: Negative.    Endo/Heme/Allergies: Negative.    Psychiatric/Behavioral: Negative.           Objective:     /72 mmHg  Pulse 79  Temp(Src) 36.5 °C (97.7 °F)  Ht 1.892 m (6' 2.5\")  Wt 118.389 kg (261 lb)  BMI 33.07 kg/m2  SpO2 97%     Physical Exam   Constitutional: He is oriented to person, place, and time. He appears well-developed and well-nourished. No distress.   HENT:   Head: Normocephalic and atraumatic.   Right Ear: External ear normal.   Left Ear: External ear normal.   Mouth/Throat: Oropharynx is " clear and moist. No oropharyngeal exudate.   Eyes: Conjunctivae and EOM are normal. Pupils are equal, round, and reactive to light. Right eye exhibits no discharge.   Neck: Normal range of motion. Neck supple. No JVD present. No tracheal deviation present. No thyromegaly present.   Cardiovascular: Normal rate, regular rhythm, normal heart sounds and intact distal pulses.  Exam reveals no gallop.    Pulmonary/Chest: Effort normal and breath sounds normal. No respiratory distress. He has no wheezes. He has no rales. He exhibits no tenderness.   Abdominal: Soft. Bowel sounds are normal. He exhibits no distension and no mass. There is no tenderness. There is no rebound and no guarding. No hernia.   Genitourinary: Guaiac negative stool. No penile tenderness.   Musculoskeletal: He exhibits no edema or tenderness.   Lymphadenopathy:     He has no cervical adenopathy.   Neurological: He is alert and oriented to person, place, and time. He has normal reflexes. He displays normal reflexes. No cranial nerve deficit. He exhibits normal muscle tone. Coordination normal.   Skin: Skin is warm and dry. No rash noted. He is not diaphoretic. No erythema. No pallor.   Psychiatric: He has a normal mood and affect. His behavior is normal. Judgment and thought content normal.   Nursing note and vitals reviewed.    Hospital Outpatient Visit on 03/25/2017   Component Date Value   • WBC 03/25/2017 4.0*   • RBC 03/25/2017 4.87    • Hemoglobin 03/25/2017 12.1*   • Hematocrit 03/25/2017 40.5*   • MCV 03/25/2017 83.2    • MCH 03/25/2017 24.8*   • MCHC 03/25/2017 29.9*   • RDW 03/25/2017 69.0*   • Platelet Count 03/25/2017 67*   • MPV 03/25/2017 11.5    • Neutrophils-Polys 03/25/2017 67.60    • Lymphocytes 03/25/2017 14.20*   • Monocytes 03/25/2017 10.70    • Eosinophils 03/25/2017 7.00*   • Basophils 03/25/2017 0.50    • Immature Granulocytes 03/25/2017 0.00    • Nucleated RBC 03/25/2017 0.00    • Neutrophils (Absolute) 03/25/2017 2.72    •  Lymphs (Absolute) 03/25/2017 0.57*   • Monos (Absolute) 03/25/2017 0.43    • Eos (Absolute) 03/25/2017 0.28    • Baso (Absolute) 03/25/2017 0.02    • Immature Granulocytes (a* 03/25/2017 0.00    • NRBC (Absolute) 03/25/2017 0.00    • Anisocytosis 03/25/2017 2+    • Microcytosis 03/25/2017 2+    • Cholesterol,Tot 03/25/2017 95*   • Triglycerides 03/25/2017 49    • HDL 03/25/2017 49    • LDL 03/25/2017 36    • Sodium 03/25/2017 140    • Potassium 03/25/2017 4.2    • Chloride 03/25/2017 109    • Co2 03/25/2017 26    • Anion Gap 03/25/2017 5.0    • Glucose 03/25/2017 89    • Bun 03/25/2017 26*   • Creatinine 03/25/2017 0.85    • Calcium 03/25/2017 8.9    • AST(SGOT) 03/25/2017 18    • ALT(SGPT) 03/25/2017 17    • Alkaline Phosphatase 03/25/2017 106*   • Total Bilirubin 03/25/2017 0.8    • Albumin 03/25/2017 3.7    • Total Protein 03/25/2017 5.8*   • Globulin 03/25/2017 2.1    • A-G Ratio 03/25/2017 1.8    • GFR If  03/25/2017 >60    • GFR If Non  Ameri* 03/25/2017 >60    • Peripheral Smear Review 03/25/2017 see below    • Plt Estimation 03/25/2017 Decreased    • RBC Morphology 03/25/2017 Present    • Large Platelets 03/25/2017 1+    • Poikilocytosis 03/25/2017 2+    • Ovalocytes 03/25/2017 2+    • Comments-Diff 03/25/2017 see below       Lab Results   Component Value Date/Time    GLYCOHEMOGLOBIN 5.2 10/25/2016 12:06 AM    GLYCOHEMOGLOBIN 5.3 07/08/2016 07:02 AM     Lab Results   Component Value Date/Time    SODIUM 140 03/25/2017 07:23 AM    POTASSIUM 4.2 03/25/2017 07:23 AM    CHLORIDE 109 03/25/2017 07:23 AM    CO2 26 03/25/2017 07:23 AM    GLUCOSE 89 03/25/2017 07:23 AM    BUN 26* 03/25/2017 07:23 AM    CREATININE 0.85 03/25/2017 07:23 AM    BUN-CREATININE RATIO 15 01/23/2010 12:00 AM    GLOM FILT RATE, EST >59 01/23/2010 12:00 AM    ALKALINE PHOSPHATASE 106* 03/25/2017 07:23 AM    AST(SGOT) 18 03/25/2017 07:23 AM    ALT(SGPT) 17 03/25/2017 07:23 AM    TOTAL BILIRUBIN 0.8 03/25/2017 07:23 AM      Lab Results   Component Value Date/Time    INR 1.19* 12/09/2016 10:00 AM    INR 1.33* 10/28/2016 12:08 PM    INR 1.35* 10/27/2016 10:18 AM     Lab Results   Component Value Date/Time    CHOLESTEROL,TOT 95* 03/25/2017 07:23 AM    LDL 36 03/25/2017 07:23 AM    HDL 49 03/25/2017 07:23 AM    TRIGLYCERIDES 49 03/25/2017 07:23 AM       Lab Results   Component Value Date/Time    TESTOSTERONE,TOTAL 333 04/02/2015 06:47 AM     No results found for: TSH  Lab Results   Component Value Date/Time    FREE T-4 0.85 04/02/2015 06:47 AM    FREE T-4 0.85 04/12/2014 07:26 AM     No results found for: URICACID  No components found for: VITB12  Lab Results   Component Value Date/Time    25-HYDROXY   VITAMIN D 25 38 04/02/2015 06:47 AM    25-HYDROXY   VITAMIN D 25 57 10/09/2014 07:07 AM                    Assessment/Plan:     1. Malignant neoplasm of colon, unspecified part of colon (CMS-HCC) follow up with [facility] and local GI      - CEA; Future    2. Cirrhosis of liver with ascites, unspecified hepatic cirrhosis type (CMS-HCC)-follow-up at [facility] for abnormal MRI of the liver recently found. Possible new metastasis    3. Mixed hyperlipidemia  Under good control. Continue same regimen.       4. Essential hypertension   Under good control. Continue same regimen.    5. Obesity (BMI 30-39.9)   diet/exercise/lose 15 lbs.; patient counseled         6. ASCVD (arteriosclerotic cardiovascular disease)subtotalled small distal LCx and 40% LAD med rx by cath 12/13/12Under good control. Continue same regimen.       7. Iron deficiency anemia due to chronic blood lossUnder good control. Continue same regimen.     30 minute face-to-face encounter took place today.  More than half of this time was spent in the coordination of care of the above problems, as well as counseling.

## 2017-04-13 ENCOUNTER — HOSPITAL ENCOUNTER (OUTPATIENT)
Dept: LAB | Facility: MEDICAL CENTER | Age: 72
End: 2017-04-13
Attending: INTERNAL MEDICINE
Payer: MEDICARE

## 2017-04-13 DIAGNOSIS — C18.9 MALIGNANT NEOPLASM OF COLON, UNSPECIFIED PART OF COLON (HCC): ICD-10-CM

## 2017-04-13 LAB — CEA SERPL-MCNC: 1.3 NG/ML (ref 0–3)

## 2017-04-13 PROCEDURE — 82378 CARCINOEMBRYONIC ANTIGEN: CPT

## 2017-04-13 PROCEDURE — 36415 COLL VENOUS BLD VENIPUNCTURE: CPT

## 2017-04-17 ENCOUNTER — TELEPHONE (OUTPATIENT)
Dept: MEDICAL GROUP | Age: 72
End: 2017-04-17

## 2017-04-17 NOTE — TELEPHONE ENCOUNTER
----- Message from Your Healthcare Team sent at 4/14/2017  5:01 PM PDT -----  Regarding: Test Result Question  Contact: [phone]  Dr. Urena: Just received telephone call from Dr. Culp, [facility].  He said there is nothing in the MRI to worry about.  Specifically, the lesion, is unchanged from CT scans going back to 2014.  At his request I had the CEA test done yesterday and this morning sent copy of result: 1.3.  He said this further confirmed the cancer has not returned.  Thank you for your assistance in the matter and have a good Easter; I can assure you I will.    Torey

## 2017-04-21 ENCOUNTER — TELEPHONE (OUTPATIENT)
Dept: MEDICAL GROUP | Age: 72
End: 2017-04-21

## 2017-04-21 NOTE — TELEPHONE ENCOUNTER
----- Message from Fabián Urena M.D. sent at 4/14/2017  8:15 AM PDT -----  Call pt with normal results.

## 2017-05-10 ENCOUNTER — HOSPITAL ENCOUNTER (OUTPATIENT)
Dept: RADIOLOGY | Facility: MEDICAL CENTER | Age: 72
End: 2017-05-10
Attending: SPECIALIST
Payer: MEDICARE

## 2017-05-10 DIAGNOSIS — M54.2 CERVICALGIA: ICD-10-CM

## 2017-05-10 DIAGNOSIS — M54.6 PAIN IN THORACIC SPINE: ICD-10-CM

## 2017-05-10 PROCEDURE — 72070 X-RAY EXAM THORAC SPINE 2VWS: CPT

## 2017-05-10 PROCEDURE — 72040 X-RAY EXAM NECK SPINE 2-3 VW: CPT

## 2017-05-18 ENCOUNTER — HOSPITAL ENCOUNTER (OUTPATIENT)
Dept: LAB | Facility: MEDICAL CENTER | Age: 72
End: 2017-05-18
Attending: SPECIALIST
Payer: MEDICARE

## 2017-05-18 ENCOUNTER — HOSPITAL ENCOUNTER (OUTPATIENT)
Dept: CARDIOLOGY | Facility: MEDICAL CENTER | Age: 72
End: 2017-05-18
Attending: SPECIALIST
Payer: MEDICARE

## 2017-05-18 LAB
ALBUMIN SERPL BCP-MCNC: 3.7 G/DL (ref 3.2–4.9)
ALBUMIN/GLOB SERPL: 1.6 G/DL
ALP SERPL-CCNC: 99 U/L (ref 30–99)
ALT SERPL-CCNC: 41 U/L (ref 2–50)
ANION GAP SERPL CALC-SCNC: 2 MMOL/L (ref 0–11.9)
AST SERPL-CCNC: 57 U/L (ref 12–45)
BASOPHILS # BLD AUTO: 0.4 % (ref 0–1.8)
BASOPHILS # BLD: 0.02 K/UL (ref 0–0.12)
BILIRUB SERPL-MCNC: 1.6 MG/DL (ref 0.1–1.5)
BUN SERPL-MCNC: 22 MG/DL (ref 8–22)
CALCIUM SERPL-MCNC: 9 MG/DL (ref 8.5–10.5)
CHLORIDE SERPL-SCNC: 104 MMOL/L (ref 96–112)
CO2 SERPL-SCNC: 30 MMOL/L (ref 20–33)
CREAT SERPL-MCNC: 0.83 MG/DL (ref 0.5–1.4)
EKG IMPRESSION: NORMAL
EOSINOPHIL # BLD AUTO: 0.29 K/UL (ref 0–0.51)
EOSINOPHIL NFR BLD: 5.6 % (ref 0–6.9)
ERYTHROCYTE [DISTWIDTH] IN BLOOD BY AUTOMATED COUNT: 52.6 FL (ref 35.9–50)
GFR SERPL CREATININE-BSD FRML MDRD: >60 ML/MIN/1.73 M 2
GLOBULIN SER CALC-MCNC: 2.3 G/DL (ref 1.9–3.5)
GLUCOSE SERPL-MCNC: 96 MG/DL (ref 65–99)
HCT VFR BLD AUTO: 41.7 % (ref 42–52)
HGB BLD-MCNC: 13.5 G/DL (ref 14–18)
IMM GRANULOCYTES # BLD AUTO: 0.01 K/UL (ref 0–0.11)
IMM GRANULOCYTES NFR BLD AUTO: 0.2 % (ref 0–0.9)
LYMPHOCYTES # BLD AUTO: 0.59 K/UL (ref 1–4.8)
LYMPHOCYTES NFR BLD: 11.5 % (ref 22–41)
MCH RBC QN AUTO: 27.6 PG (ref 27–33)
MCHC RBC AUTO-ENTMCNC: 32.4 G/DL (ref 33.7–35.3)
MCV RBC AUTO: 85.1 FL (ref 81.4–97.8)
MONOCYTES # BLD AUTO: 0.64 K/UL (ref 0–0.85)
MONOCYTES NFR BLD AUTO: 12.5 % (ref 0–13.4)
NEUTROPHILS # BLD AUTO: 3.59 K/UL (ref 1.82–7.42)
NEUTROPHILS NFR BLD: 69.8 % (ref 44–72)
NRBC # BLD AUTO: 0 K/UL
NRBC BLD AUTO-RTO: 0 /100 WBC
PLATELET # BLD AUTO: 61 K/UL (ref 164–446)
PMV BLD AUTO: 11.5 FL (ref 9–12.9)
POTASSIUM SERPL-SCNC: 4.4 MMOL/L (ref 3.6–5.5)
PROT SERPL-MCNC: 6 G/DL (ref 6–8.2)
RBC # BLD AUTO: 4.9 M/UL (ref 4.7–6.1)
SODIUM SERPL-SCNC: 136 MMOL/L (ref 135–145)
WBC # BLD AUTO: 5.1 K/UL (ref 4.8–10.8)

## 2017-05-18 PROCEDURE — 93005 ELECTROCARDIOGRAM TRACING: CPT | Performed by: SPECIALIST

## 2017-05-18 PROCEDURE — 36415 COLL VENOUS BLD VENIPUNCTURE: CPT

## 2017-05-18 PROCEDURE — 80053 COMPREHEN METABOLIC PANEL: CPT

## 2017-05-18 PROCEDURE — 93010 ELECTROCARDIOGRAM REPORT: CPT | Performed by: INTERNAL MEDICINE

## 2017-05-18 PROCEDURE — 85025 COMPLETE CBC W/AUTO DIFF WBC: CPT

## 2017-05-20 ENCOUNTER — HOSPITAL ENCOUNTER (INPATIENT)
Facility: MEDICAL CENTER | Age: 72
LOS: 10 days | DRG: 040 | End: 2017-06-03
Attending: EMERGENCY MEDICINE | Admitting: INTERNAL MEDICINE
Payer: MEDICARE

## 2017-05-20 ENCOUNTER — RESOLUTE PROFESSIONAL BILLING HOSPITAL PROF FEE (OUTPATIENT)
Dept: HOSPITALIST | Facility: MEDICAL CENTER | Age: 72
End: 2017-05-20
Payer: MEDICARE

## 2017-05-20 DIAGNOSIS — R53.1 GENERALIZED WEAKNESS: ICD-10-CM

## 2017-05-20 DIAGNOSIS — K74.60 CIRRHOSIS OF LIVER WITH ASCITES, UNSPECIFIED HEPATIC CIRRHOSIS TYPE (HCC): ICD-10-CM

## 2017-05-20 DIAGNOSIS — Z85.038 H/O COLON CANCER, STAGE IV: ICD-10-CM

## 2017-05-20 DIAGNOSIS — I85.00 IDIOPATHIC ESOPHAGEAL VARICES WITHOUT BLEEDING (HCC): ICD-10-CM

## 2017-05-20 DIAGNOSIS — I86.4 GASTRIC VARICES: ICD-10-CM

## 2017-05-20 DIAGNOSIS — D50.0 IRON DEFICIENCY ANEMIA DUE TO CHRONIC BLOOD LOSS: ICD-10-CM

## 2017-05-20 DIAGNOSIS — G61.0 AIDP (ACUTE INFLAMMATORY DEMYELINATING POLYNEUROPATHY) (HCC): ICD-10-CM

## 2017-05-20 DIAGNOSIS — R18.8 CIRRHOSIS OF LIVER WITH ASCITES, UNSPECIFIED HEPATIC CIRRHOSIS TYPE (HCC): ICD-10-CM

## 2017-05-20 DIAGNOSIS — I81 PORTAL VEIN THROMBOSIS: ICD-10-CM

## 2017-05-20 DIAGNOSIS — R53.1 WEAKNESS: ICD-10-CM

## 2017-05-20 DIAGNOSIS — Z87.19 HISTORY OF ESOPHAGEAL VARICES: ICD-10-CM

## 2017-05-20 LAB
ALBUMIN SERPL BCP-MCNC: 3.5 G/DL (ref 3.2–4.9)
ALBUMIN/GLOB SERPL: 1.5 G/DL
ALP SERPL-CCNC: 119 U/L (ref 30–99)
ALT SERPL-CCNC: 38 U/L (ref 2–50)
ANION GAP SERPL CALC-SCNC: 9 MMOL/L (ref 0–11.9)
APPEARANCE UR: CLEAR
AST SERPL-CCNC: 39 U/L (ref 12–45)
BASOPHILS # BLD AUTO: 0.4 % (ref 0–1.8)
BASOPHILS # BLD: 0.02 K/UL (ref 0–0.12)
BILIRUB SERPL-MCNC: 1.7 MG/DL (ref 0.1–1.5)
BILIRUB UR QL STRIP.AUTO: NEGATIVE
BUN SERPL-MCNC: 21 MG/DL (ref 8–22)
CALCIUM SERPL-MCNC: 8.8 MG/DL (ref 8.5–10.5)
CHLORIDE SERPL-SCNC: 105 MMOL/L (ref 96–112)
CO2 SERPL-SCNC: 24 MMOL/L (ref 20–33)
COLOR UR: YELLOW
CREAT SERPL-MCNC: 0.77 MG/DL (ref 0.5–1.4)
CRP SERPL HS-MCNC: 1.26 MG/DL (ref 0–0.75)
CULTURE IF INDICATED INDCX: NO UA CULTURE
EOSINOPHIL # BLD AUTO: 0.24 K/UL (ref 0–0.51)
EOSINOPHIL NFR BLD: 4.6 % (ref 0–6.9)
ERYTHROCYTE [DISTWIDTH] IN BLOOD BY AUTOMATED COUNT: 51.2 FL (ref 35.9–50)
GFR SERPL CREATININE-BSD FRML MDRD: >60 ML/MIN/1.73 M 2
GLOBULIN SER CALC-MCNC: 2.3 G/DL (ref 1.9–3.5)
GLUCOSE SERPL-MCNC: 108 MG/DL (ref 65–99)
GLUCOSE UR STRIP.AUTO-MCNC: NEGATIVE MG/DL
HCT VFR BLD AUTO: 38.1 % (ref 42–52)
HGB BLD-MCNC: 12.5 G/DL (ref 14–18)
IMM GRANULOCYTES # BLD AUTO: 0.01 K/UL (ref 0–0.11)
IMM GRANULOCYTES NFR BLD AUTO: 0.2 % (ref 0–0.9)
KETONES UR STRIP.AUTO-MCNC: NEGATIVE MG/DL
LEUKOCYTE ESTERASE UR QL STRIP.AUTO: NEGATIVE
LYMPHOCYTES # BLD AUTO: 0.67 K/UL (ref 1–4.8)
LYMPHOCYTES NFR BLD: 13 % (ref 22–41)
MCH RBC QN AUTO: 27.1 PG (ref 27–33)
MCHC RBC AUTO-ENTMCNC: 32.8 G/DL (ref 33.7–35.3)
MCV RBC AUTO: 82.6 FL (ref 81.4–97.8)
MICRO URNS: NORMAL
MONOCYTES # BLD AUTO: 0.62 K/UL (ref 0–0.85)
MONOCYTES NFR BLD AUTO: 12 % (ref 0–13.4)
NEUTROPHILS # BLD AUTO: 3.61 K/UL (ref 1.82–7.42)
NEUTROPHILS NFR BLD: 69.8 % (ref 44–72)
NITRITE UR QL STRIP.AUTO: NEGATIVE
NRBC # BLD AUTO: 0 K/UL
NRBC BLD AUTO-RTO: 0 /100 WBC
PH UR STRIP.AUTO: 6.5 [PH]
PLATELET # BLD AUTO: 58 K/UL (ref 164–446)
PMV BLD AUTO: 10.1 FL (ref 9–12.9)
POTASSIUM SERPL-SCNC: 3.8 MMOL/L (ref 3.6–5.5)
PROT SERPL-MCNC: 5.8 G/DL (ref 6–8.2)
PROT UR QL STRIP: NEGATIVE MG/DL
RBC # BLD AUTO: 4.61 M/UL (ref 4.7–6.1)
RBC UR QL AUTO: NEGATIVE
SODIUM SERPL-SCNC: 138 MMOL/L (ref 135–145)
SP GR UR STRIP.AUTO: 1.02
WBC # BLD AUTO: 5.2 K/UL (ref 4.8–10.8)

## 2017-05-20 PROCEDURE — 86140 C-REACTIVE PROTEIN: CPT

## 2017-05-20 PROCEDURE — 82270 OCCULT BLOOD FECES: CPT

## 2017-05-20 PROCEDURE — 85025 COMPLETE CBC W/AUTO DIFF WBC: CPT

## 2017-05-20 PROCEDURE — 81003 URINALYSIS AUTO W/O SCOPE: CPT

## 2017-05-20 PROCEDURE — 99285 EMERGENCY DEPT VISIT HI MDM: CPT

## 2017-05-20 PROCEDURE — G0378 HOSPITAL OBSERVATION PER HR: HCPCS

## 2017-05-20 PROCEDURE — 96374 THER/PROPH/DIAG INJ IV PUSH: CPT

## 2017-05-20 PROCEDURE — 700111 HCHG RX REV CODE 636 W/ 250 OVERRIDE (IP): Performed by: EMERGENCY MEDICINE

## 2017-05-20 PROCEDURE — 700105 HCHG RX REV CODE 258: Performed by: EMERGENCY MEDICINE

## 2017-05-20 PROCEDURE — 99220 PR INITIAL OBSERVATION CARE,LEVL III: CPT | Performed by: INTERNAL MEDICINE

## 2017-05-20 PROCEDURE — 96361 HYDRATE IV INFUSION ADD-ON: CPT

## 2017-05-20 PROCEDURE — 84443 ASSAY THYROID STIM HORMONE: CPT

## 2017-05-20 PROCEDURE — 80053 COMPREHEN METABOLIC PANEL: CPT

## 2017-05-20 RX ORDER — MORPHINE SULFATE 4 MG/ML
4 INJECTION, SOLUTION INTRAMUSCULAR; INTRAVENOUS ONCE
Status: COMPLETED | OUTPATIENT
Start: 2017-05-20 | End: 2017-05-20

## 2017-05-20 RX ORDER — URSODIOL 300 MG/1
300 CAPSULE ORAL 3 TIMES DAILY
Status: ON HOLD | COMMUNITY
End: 2017-09-07

## 2017-05-20 RX ORDER — GABAPENTIN 100 MG/1
100-300 CAPSULE ORAL
Status: ON HOLD | COMMUNITY
End: 2017-06-03

## 2017-05-20 RX ORDER — BACLOFEN 10 MG/1
10 TABLET ORAL
Status: ON HOLD | COMMUNITY
End: 2017-06-03

## 2017-05-20 RX ORDER — SODIUM CHLORIDE 9 MG/ML
500 INJECTION, SOLUTION INTRAVENOUS ONCE
Status: COMPLETED | OUTPATIENT
Start: 2017-05-20 | End: 2017-05-20

## 2017-05-20 RX ORDER — CARVEDILOL 3.12 MG/1
3.12 TABLET ORAL 2 TIMES DAILY WITH MEALS
Status: ON HOLD | COMMUNITY
End: 2017-09-07

## 2017-05-20 RX ORDER — TRAMADOL HYDROCHLORIDE 50 MG/1
50-100 TABLET ORAL 2 TIMES DAILY PRN
COMMUNITY
End: 2017-06-26

## 2017-05-20 RX ADMIN — SODIUM CHLORIDE 500 ML: 9 INJECTION, SOLUTION INTRAVENOUS at 20:42

## 2017-05-20 RX ADMIN — MORPHINE SULFATE 4 MG: 4 INJECTION INTRAVENOUS at 22:54

## 2017-05-20 ASSESSMENT — PAIN SCALES - GENERAL: PAINLEVEL_OUTOF10: 0

## 2017-05-20 NOTE — IP AVS SNAPSHOT
" Home Care Instructions                                                                                                                  Name:Torey Lima  Medical Record Number:[id number]  CSN: [id number]    YOB: 1945   Age: 71 y.o.  Sex: male  HT:1.905 m (6' 3\") WT: 118.2 kg (260 lb 9.3 oz)          Admit Date: 5/20/2017     Discharge Date:   Today's Date: 6/3/2017  Attending Doctor:  Bandar Magallanes M.D.                  Allergies:  Nkda            Discharge Instructions       Discharge Instructions    Discharged to other by medical transportation with escort. Discharged via wheelchair, hospital escort: Yes.  Special equipment needed: Not Applicable    Be sure to schedule a follow-up appointment with your primary care doctor or any specialists as instructed.     Discharge Plan:   Pneumococcal Vaccine Given - only chart on this line when given:  (refusing at this time )  Influenza Vaccine Indication: Not indicated: Previously immunized this influenza season and > 8 years of age    I understand that a diet low in cholesterol, fat, and sodium is recommended for good health. Unless I have been given specific instructions below for another diet, I accept this instruction as my diet prescription.   Other diet: NA    Special Instructions: None    · Is patient discharged on Warfarin / Coumadin?   No     · Is patient Post Blood Transfusion?  No    Depression / Suicide Risk    As you are discharged from this [facility] facility, it is important to learn how to keep safe from harming yourself.    Recognize the warning signs:  · Abrupt changes in personality, positive or negative- including increase in energy   · Giving away possessions  · Change in eating patterns- significant weight changes-  positive or negative  · Change in sleeping patterns- unable to sleep or sleeping all the time   · Unwillingness or inability to communicate  · Depression  · Unusual sadness, discouragement and loneliness  · Talk of " wanting to die  · Neglect of personal appearance   · Rebelliousness- reckless behavior  · Withdrawal from people/activities they love  · Confusion- inability to concentrate     If you or a loved one observes any of these behaviors or has concerns about self-harm, here's what you can do:  · Talk about it- your feelings and reasons for harming yourself  · Remove any means that you might use to hurt yourself (examples: pills, rope, extension cords, firearm)  · Get professional help from the community (Mental Health, Substance Abuse, psychological counseling)  · Do not be alone:Call your Safe Contact- someone whom you trust who will be there for you.  · Call your local CRISIS HOTLINE 739-6669 or 814-950-0598  · Call your local Children's Mobile Crisis Response Team [place] (084) 193-3367 or www.[employer]  · Call the toll free National Suicide Prevention Hotlines   · National Suicide Prevention Lifeline 085-781-UXEY (4240)  · [place] Hope Line Network 800-SUICIDE (353-3188)        Your appointments     Jul 14, 2017  9:40 AM   Established Patient with Fabián Urena M.D.   [facility] [address] [place] [address] ([facility])    [address] [place] [id number] [phone]           You will be receiving a confirmation call a few days before your appointment from our automated call confirmation system.                 Discharge Medication Instructions:    Below are the medications your physician expects you to take upon discharge:    Review all your home medications and newly ordered medications with your doctor and/or pharmacist. Follow medication instructions as directed by your doctor and/or pharmacist.    Please keep your medication list with you and share with your physician.               Medication List      START taking these medications        Instructions    Morning Afternoon Evening Bedtime    cyclobenzaprine 10 MG Tabs   Last time this was given:  10 mg on 6/2/2017  2:06 AM   Commonly known as:   FLEXERIL        Take 1 Tab by mouth every 8 hours as needed for Muscle Spasms.   Dose:  10 mg                        oxycodone immediate release 10 MG immediate release tablet   Last time this was given:  10 mg on 6/3/2017  5:30 AM   Commonly known as:  ROXICODONE        Take 1 Tab by mouth every four hours as needed for Moderate Pain ((4-6)).   Dose:  10 mg                        senna-docusate 8.6-50 MG Tabs   Last time this was given:  1 Tab on 6/2/2017  7:49 PM   Commonly known as:  PERICOLACE or SENOKOT S        Take 1 Tab by mouth 1 time daily as needed for Constipation.   Dose:  1 Tab                          CONTINUE taking these medications        Instructions    Morning Afternoon Evening Bedtime    atorvastatin 40 MG Tabs   Last time this was given:  40 mg on 6/2/2017  7:49 PM   Commonly known as:  LIPITOR        Take 1 Tab by mouth every day. TAKE 1 TABLET BY MOUTH DAILY   Dose:  40 mg                        carvedilol 3.125 MG Tabs   Last time this was given:  3.125 mg on 6/3/2017  8:00 AM   Commonly known as:  COREG        Take 3.125 mg by mouth 2 times a day, with meals.   Dose:  3.125 mg                        CENTRUM SILVER ADULT 50+ Tabs        Take 1 Tab by mouth every day.   Dose:  1 Tab                        folic acid 1 MG Tabs   Last time this was given:  1 mg on 6/3/2017  8:00 AM   Commonly known as:  FOLVITE        Take 1 Tab by mouth every day.   Dose:  1 mg                        losartan 25 MG Tabs   Last time this was given:  25 mg on 6/3/2017  8:00 AM   Commonly known as:  COZAAR        Doctor's comments:  Replaces lisinopril due to cough   Take 1 Tab by mouth every day.   Dose:  25 mg                        tramadol 50 MG Tabs   Last time this was given:  50 mg on 6/2/2017  2:06 AM   Commonly known as:  ULTRAM        Take  mg by mouth 2 times a day as needed for Mild Pain.   Dose:   mg                        ursodiol 300 MG Caps   Commonly known as:  ACTIGALL        Take  300 mg by mouth 3 times a day.   Dose:  300 mg                        vitamin D3 5000 UNITS Caps        Take 1 Cap by mouth every day.   Dose:  1 Cap                             Where to Get Your Medications      These medications were sent to [facility] [place] - [address]  [address] [place] [address]     Phone:  [phone]    - cyclobenzaprine 10 MG Tabs  - senna-docusate 8.6-50 MG Tabs      Information about where to get these medications is not yet available     ! Ask your nurse or doctor about these medications    - oxycodone immediate release 10 MG immediate release tablet            Orders for after discharge     REFERRAL TO PHYSIATRY (PMR)    Complete by:  As directed        REFERRAL TO SKILLED NURSING FACILITY    Complete by:  As directed        REFERRAL TO SKILLED NURSING FACILITY    Complete by:  As directed              Instructions           Diet / Nutrition:    Follow any diet instructions given to you by your doctor or the dietician, including how much salt (sodium) you are allowed each day.    If you are overweight, talk to your doctor about a weight reduction plan.    Activity:    Remain physically active following your doctor's instructions about exercise and activity.    Rest often.     Any time you become even a little tired or short of breath, SIT DOWN and rest.    Worsening Symptoms:    Report any of the following signs and symptoms to the doctor's office immediately:    *Pain of jaw, arm, or neck  *Chest pain not relieved by medication                               *Dizziness or loss of consciousness  *Difficulty breathing even when at rest   *More tired than usual                                       *Bleeding drainage or swelling of surgical site  *Swelling of feet, ankles, legs or stomach                 *Fever (>100ºF)  *Pink or blood tinged sputum  *Weight gain (3lbs/day or 5lbs /week)           *Shock from internal defibrillator (if  applicable)  *Palpitations or irregular heartbeats                *Cool and/or numb extremities    Stroke Awareness    Common Risk Factors for Stroke include:    Age  Atrial Fibrillation  Carotid Artery Stenosis  Diabetes Mellitus  Excessive alcohol consumption  High blood pressure  Overweight   Physical inactivity  Smoking    Warning signs and symptoms of a stroke include:    *Sudden numbness or weakness of the face, arm or leg (especially on one side of the body).  *Sudden confusion, trouble speaking or understanding.  *Sudden trouble seeing in one or both eyes.  *Sudden trouble walking, dizziness, loss of balance or coordination.Sudden severe headache with no known cause.    It is very important to get treatment quickly when a stroke occurs. If you experience any of the above warning signs, call 911 immediately.                   Disclaimer         Quit Smoking / Tobacco Use:    I understand the use of any tobacco products increases my chance of suffering from future heart disease or stroke and could cause other illnesses which may shorten my life. Quitting the use of tobacco products is the single most important thing I can do to improve my health. For further information on smoking / tobacco cessation call a Toll Free Quit Line at 1-438.814.3019 (*National Cancer [place]) or 1-547.427.4185 (American Lung Association) or you can access the web based program at www.lungusa.org.    [place] Tobacco Users Help Line:  (176) 525-4313       Toll Free: 1-821.344.3271  Quit Tobacco Program [facility] Management Services (114)290-3412    Crisis Hotline:    [place] Crisis Hotline:  9-343-YZJVERL or 1-772.400.3058    [place] Crisis Hotline:    1-985.594.3748 or 740-076-9937    Discharge Survey:   Thank you for choosing [facility]. We hope we did everything we could to make your hospital stay a pleasant one. You may be receiving a phone survey and we would appreciate your time and participation in answering the  questions. Your input is very valuable to us in our efforts to improve our service to our patients and their families.        My signature on this form indicates that:    1. I have reviewed and understand the above information.  2. My questions regarding this information have been answered to my satisfaction.  3. I have formulated a plan with my discharge nurse to obtain my prescribed medications for home.                  Disclaimer         __________________________________                     __________       ________                       Patient Signature                                                 Date                    Time

## 2017-05-20 NOTE — IP AVS SNAPSHOT
6/3/2017    Torey Lima  [address] [address] [facility] [place] [address]    Dear Torey:    [facility] wants to ensure your discharge home is safe and you or your loved ones have had all of your questions answered regarding your care after you leave the hospital.    Below is a list of resources and contact information should you have any questions regarding your hospital stay, follow-up instructions, or active medical symptoms.    Questions or Concerns Regarding… Contact   Medical Questions Related to Your Discharge  (7 days a week, 8am-5pm) Contact a Nurse Care Coordinator   [phone]   Medical Questions Not Related to Your Discharge  (24 hours a day / 7 days a week)  Contact the Nurse Health Line   [phone]    Medications or Discharge Instructions Refer to your discharge packet   or contact your [facility] Primary Care Provider   [phone]   Follow-up Appointment(s) Schedule your appointment via [email or web address]   or contact Scheduling [phone]   Billing Review your statement via [email or web address]  or contact Billing [phone]   Medical Records Review your records via [email or web address]   or contact Medical Records [phone]     You may receive a telephone call within two days of discharge. This call is to make certain you understand your discharge instructions and have the opportunity to have any questions answered. You can also easily access your medical information, test results and upcoming appointments via the [email or web address] free online health management tool. You can learn more and sign up at [email or web address]. For assistance setting up your [email or web address] account, please call [phone].    Once again, we want to ensure your discharge home is safe and that you have a clear understanding of any next steps in your care. If you have any questions or concerns, please do not hesitate to contact us, we are here for you. Thank you for choosing [facility] for your healthcare needs.    Sincerely,    Your [facility] Healthcare Team

## 2017-05-20 NOTE — IP AVS SNAPSHOT
" <p align=\"LEFT\"><IMG SRC=\"//[email or web address]$/[email or web address]\" alt=\"Image\" WIDTH=\"50%\" HEIGHT=\"200\" BORDER=\"\"></p>                   Name:Torey Lima  Medical Record Number:[id number]  CSN: [id number]    YOB: 1945   Age: 71 y.o.  Sex: male  HT:1.905 m (6' 3\") WT: 118.2 kg (260 lb 9.3 oz)          Admit Date: 5/20/2017     Discharge Date:   Today's Date: 6/3/2017  Attending Doctor:  Bandar Magallanes M.D.                  Allergies:  Nkda          Your appointments     Jul 14, 2017  9:40 AM   Established Patient with Fabián Urena M.D.   [address] [address] [phone]           You will be receiving a confirmation call a few days before your appointment from our automated call confirmation system.                 Medication List      Take these Medications        Instructions    atorvastatin 40 MG Tabs   Commonly known as:  LIPITOR    Take 1 Tab by mouth every day. TAKE 1 TABLET BY MOUTH DAILY   Dose:  40 mg       carvedilol 3.125 MG Tabs   Commonly known as:  COREG    Take 3.125 mg by mouth 2 times a day, with meals.   Dose:  3.125 mg       CENTRUM SILVER ADULT 50+ Tabs    Take 1 Tab by mouth every day.   Dose:  1 Tab       cyclobenzaprine 10 MG Tabs   Commonly known as:  FLEXERIL    Take 1 Tab by mouth every 8 hours as needed for Muscle Spasms.   Dose:  10 mg       folic acid 1 MG Tabs   Commonly known as:  FOLVITE    Take 1 Tab by mouth every day.   Dose:  1 mg       losartan 25 MG Tabs   Commonly known as:  COZAAR    Doctor's comments:  Replaces lisinopril due to cough   Take 1 Tab by mouth every day.   Dose:  25 mg       oxycodone immediate release 10 MG immediate release tablet   Commonly known as:  ROXICODONE    Take 1 Tab by mouth every four hours as needed for Moderate Pain ((4-6)).   Dose:  10 mg       senna-docusate 8.6-50 MG Tabs   Commonly known as:  PERICOLACE or SENOKOT S    Take 1 Tab by mouth 1 time daily as needed " for Constipation.   Dose:  1 Tab       tramadol 50 MG Tabs   Commonly known as:  ULTRAM    Take  mg by mouth 2 times a day as needed for Mild Pain.   Dose:   mg       ursodiol 300 MG Caps   Commonly known as:  ACTIGALL    Take 300 mg by mouth 3 times a day.   Dose:  300 mg       vitamin D3 5000 UNITS Caps    Take 1 Cap by mouth every day.   Dose:  1 Cap

## 2017-05-20 NOTE — IP AVS SNAPSHOT
Tirendo Access Code: Activation code not generated  Current Tirendo Status: Active    [employer]  A secure, online tool to manage your health information     [employer]’s Tirendo® is a secure, online tool that connects you to your personalized health information from the privacy of your home -- day or night - making it very easy for you to manage your healthcare. Once the activation process is completed, you can even access your medical information using the Tirendo cj, which is available for free in the Apple Cj store or Google Play store.     Tirendo provides the following levels of access (as shown below):   My Chart Features   [facility] Primary Care Doctor [facility]  Specialists [facility]  Urgent  Care Non-[facility]  Primary Care  Doctor   Email your healthcare team securely and privately 24/7 X X X X   Manage appointments: schedule your next appointment; view details of past/upcoming appointments X      Request prescription refills. X      View recent personal medical records, including lab and immunizations X X X X   View health record, including health history, allergies, medications X X X X   Read reports about your outpatient visits, procedures, consult and ER notes X X X X   See your discharge summary, which is a recap of your hospital and/or ER visit that includes your diagnosis, lab results, and care plan. X X       How to register for Tirendo:  1. Go to  [email or web address].  2. Click on the Sign Up Now box, which takes you to the New Member Sign Up page. You will need to provide the following information:  a. Enter your Tirendo Access Code exactly as it appears at the top of this page. (You will not need to use this code after you’ve completed the sign-up process. If you do not sign up before the expiration date, you must request a new code.)   b. Enter your date of birth.   c. Enter your home email address.   d. Click Submit, and follow the next screen’s instructions.  3. Create a Tirendo ID. This will  be your Hakia login ID and cannot be changed, so think of one that is secure and easy to remember.  4. Create a Hakia password. You can change your password at any time.  5. Enter your Password Reset Question and Answer. This can be used at a later time if you forget your password.   6. Enter your e-mail address. This allows you to receive e-mail notifications when new information is available in Hakia.  7. Click Sign Up. You can now view your health information.    For assistance activating your Hakia account, call [phone]

## 2017-05-21 PROBLEM — R41.89 UNRESPONSIVE STATE: Status: ACTIVE | Noted: 2017-05-21

## 2017-05-21 PROBLEM — R19.5 OCCULT BLOOD POSITIVE STOOL: Status: ACTIVE | Noted: 2017-05-21

## 2017-05-21 LAB — TSH SERPL DL<=0.005 MIU/L-ACNC: 2.64 UIU/ML (ref 0.3–3.7)

## 2017-05-21 PROCEDURE — A9270 NON-COVERED ITEM OR SERVICE: HCPCS | Performed by: INTERNAL MEDICINE

## 2017-05-21 PROCEDURE — 99226 PR SUBSEQUENT OBSERVATION CARE,LEVEL III: CPT | Performed by: HOSPITALIST

## 2017-05-21 PROCEDURE — 700102 HCHG RX REV CODE 250 W/ 637 OVERRIDE(OP): Performed by: NURSE PRACTITIONER

## 2017-05-21 PROCEDURE — 700105 HCHG RX REV CODE 258: Performed by: INTERNAL MEDICINE

## 2017-05-21 PROCEDURE — A9270 NON-COVERED ITEM OR SERVICE: HCPCS | Performed by: NURSE PRACTITIONER

## 2017-05-21 PROCEDURE — G0378 HOSPITAL OBSERVATION PER HR: HCPCS

## 2017-05-21 PROCEDURE — 700102 HCHG RX REV CODE 250 W/ 637 OVERRIDE(OP): Performed by: INTERNAL MEDICINE

## 2017-05-21 RX ORDER — AMOXICILLIN 250 MG
2 CAPSULE ORAL 2 TIMES DAILY
Status: DISCONTINUED | OUTPATIENT
Start: 2017-05-21 | End: 2017-05-25

## 2017-05-21 RX ORDER — POLYETHYLENE GLYCOL 3350 17 G/17G
1 POWDER, FOR SOLUTION ORAL
Status: DISCONTINUED | OUTPATIENT
Start: 2017-05-21 | End: 2017-05-25

## 2017-05-21 RX ORDER — TRAMADOL HYDROCHLORIDE 50 MG/1
50 TABLET ORAL EVERY 12 HOURS PRN
Status: DISCONTINUED | OUTPATIENT
Start: 2017-05-21 | End: 2017-06-03 | Stop reason: HOSPADM

## 2017-05-21 RX ORDER — ONDANSETRON 2 MG/ML
4 INJECTION INTRAMUSCULAR; INTRAVENOUS EVERY 4 HOURS PRN
Status: DISCONTINUED | OUTPATIENT
Start: 2017-05-21 | End: 2017-06-03 | Stop reason: HOSPADM

## 2017-05-21 RX ORDER — LABETALOL HYDROCHLORIDE 5 MG/ML
10 INJECTION, SOLUTION INTRAVENOUS EVERY 4 HOURS PRN
Status: DISCONTINUED | OUTPATIENT
Start: 2017-05-21 | End: 2017-06-03 | Stop reason: HOSPADM

## 2017-05-21 RX ORDER — LOSARTAN POTASSIUM 50 MG/1
25 TABLET ORAL DAILY
Status: DISCONTINUED | OUTPATIENT
Start: 2017-05-21 | End: 2017-06-03 | Stop reason: HOSPADM

## 2017-05-21 RX ORDER — CARVEDILOL 6.25 MG/1
3.12 TABLET ORAL 2 TIMES DAILY WITH MEALS
Status: DISCONTINUED | OUTPATIENT
Start: 2017-05-21 | End: 2017-06-03 | Stop reason: HOSPADM

## 2017-05-21 RX ORDER — FOLIC ACID 1 MG/1
1 TABLET ORAL DAILY
Status: DISCONTINUED | OUTPATIENT
Start: 2017-05-21 | End: 2017-06-03 | Stop reason: HOSPADM

## 2017-05-21 RX ORDER — BISACODYL 10 MG
10 SUPPOSITORY, RECTAL RECTAL
Status: DISCONTINUED | OUTPATIENT
Start: 2017-05-21 | End: 2017-05-25

## 2017-05-21 RX ORDER — URSODIOL 300 MG/1
300 CAPSULE ORAL 3 TIMES DAILY
Status: DISCONTINUED | OUTPATIENT
Start: 2017-05-21 | End: 2017-05-21

## 2017-05-21 RX ORDER — ONDANSETRON 4 MG/1
4 TABLET, ORALLY DISINTEGRATING ORAL EVERY 4 HOURS PRN
Status: DISCONTINUED | OUTPATIENT
Start: 2017-05-21 | End: 2017-06-03 | Stop reason: HOSPADM

## 2017-05-21 RX ORDER — OXYCODONE HYDROCHLORIDE 10 MG/1
5 TABLET ORAL EVERY 4 HOURS PRN
Status: DISCONTINUED | OUTPATIENT
Start: 2017-05-21 | End: 2017-06-03 | Stop reason: HOSPADM

## 2017-05-21 RX ORDER — ATORVASTATIN CALCIUM 40 MG/1
40 TABLET, FILM COATED ORAL DAILY
Status: DISCONTINUED | OUTPATIENT
Start: 2017-05-21 | End: 2017-06-03 | Stop reason: HOSPADM

## 2017-05-21 RX ORDER — SODIUM CHLORIDE 9 MG/ML
INJECTION, SOLUTION INTRAVENOUS CONTINUOUS
Status: DISCONTINUED | OUTPATIENT
Start: 2017-05-21 | End: 2017-05-23

## 2017-05-21 RX ADMIN — OXYCODONE HYDROCHLORIDE 5 MG: 5 TABLET ORAL at 21:49

## 2017-05-21 RX ADMIN — STANDARDIZED SENNA CONCENTRATE AND DOCUSATE SODIUM 2 TABLET: 8.6; 5 TABLET, FILM COATED ORAL at 01:55

## 2017-05-21 RX ADMIN — CARVEDILOL 3.12 MG: 6.25 TABLET, FILM COATED ORAL at 17:54

## 2017-05-21 RX ADMIN — LOSARTAN POTASSIUM 25 MG: 50 TABLET, FILM COATED ORAL at 08:44

## 2017-05-21 RX ADMIN — FOLIC ACID 1 MG: 1 TABLET ORAL at 08:44

## 2017-05-21 RX ADMIN — OXYCODONE HYDROCHLORIDE 5 MG: 5 TABLET ORAL at 08:44

## 2017-05-21 RX ADMIN — OXYCODONE HYDROCHLORIDE 5 MG: 5 TABLET ORAL at 17:54

## 2017-05-21 RX ADMIN — ASPIRIN 81 MG: 81 TABLET ORAL at 08:44

## 2017-05-21 RX ADMIN — SODIUM CHLORIDE: 9 INJECTION, SOLUTION INTRAVENOUS at 01:56

## 2017-05-21 RX ADMIN — ATORVASTATIN CALCIUM 40 MG: 40 TABLET, FILM COATED ORAL at 20:34

## 2017-05-21 RX ADMIN — OXYCODONE HYDROCHLORIDE 5 MG: 5 TABLET ORAL at 04:02

## 2017-05-21 RX ADMIN — OXYCODONE HYDROCHLORIDE 5 MG: 5 TABLET ORAL at 12:44

## 2017-05-21 RX ADMIN — CARVEDILOL 3.12 MG: 6.25 TABLET, FILM COATED ORAL at 08:44

## 2017-05-21 RX ADMIN — TRAMADOL HYDROCHLORIDE 50 MG: 50 TABLET, COATED ORAL at 01:55

## 2017-05-21 ASSESSMENT — ENCOUNTER SYMPTOMS
CHILLS: 0
BACK PAIN: 1
FEVER: 0
SHORTNESS OF BREATH: 0
WEAKNESS: 1
COUGH: 0
VOMITING: 0
NAUSEA: 0
DIARRHEA: 0
CONSTIPATION: 0
WHEEZING: 0

## 2017-05-21 ASSESSMENT — LIFESTYLE VARIABLES
DO YOU DRINK ALCOHOL: NO
ALCOHOL_USE: NO
EVER_SMOKED: YES
EVER_SMOKED: YES

## 2017-05-21 ASSESSMENT — PAIN SCALES - WONG BAKER: WONGBAKER_NUMERICALRESPONSE: HURTS JUST A LITTLE BIT

## 2017-05-21 ASSESSMENT — PAIN SCALES - GENERAL
PAINLEVEL_OUTOF10: 8
PAINLEVEL_OUTOF10: 9
PAINLEVEL_OUTOF10: 9
PAINLEVEL_OUTOF10: 8
PAINLEVEL_OUTOF10: 4
PAINLEVEL_OUTOF10: 4
PAINLEVEL_OUTOF10: 8
PAINLEVEL_OUTOF10: 7

## 2017-05-21 ASSESSMENT — COPD QUESTIONNAIRES
HAVE YOU SMOKED AT LEAST 100 CIGARETTES IN YOUR ENTIRE LIFE: YES
COPD SCREENING SCORE: 6
DO YOU EVER COUGH UP ANY MUCUS OR PHLEGM?: YES, A FEW DAYS A WEEK OR MONTH
DO YOU EVER COUGH UP ANY MUCUS OR PHLEGM?: YES, A FEW DAYS A WEEK OR MONTH
DURING THE PAST 4 WEEKS HOW MUCH DID YOU FEEL SHORT OF BREATH: SOME OF THE TIME
HAVE YOU SMOKED AT LEAST 100 CIGARETTES IN YOUR ENTIRE LIFE: YES
DURING THE PAST 4 WEEKS HOW MUCH DID YOU FEEL SHORT OF BREATH: SOME OF THE TIME
COPD SCREENING SCORE: 6

## 2017-05-21 NOTE — ED NOTES
"Pt JAKE GUIDRY from home, pt presents with weakness that started approx 4-5 days ago, \"started with my hands, I couldn't open a bottle of water, its gotten worse since Friday, I couldn't get off the toilet earlier, and then I had a fall, even with using an old walker, then tonight I tried standing up from the couch and I went to the floor. \" Pt placed on monitor, changed into gown, given call light, and blanket. Assessment as charted, chart up for ERP.   "

## 2017-05-21 NOTE — ED NOTES
Orthostatic vital signs completed, see V/S flowsheet. Complains of significant weakness while standing and required 2 person assist. Denies dizziness/lightheadedness.

## 2017-05-21 NOTE — PROGRESS NOTES
Hospital Medicine Progress Note, Adult, Complex               Author: David LLOYD Mejia Date & Time created: 2017  11:43 AM     Interval History:  CC: generalized weakness  : Holding baclofen and cheri. Also holding ursodiol.Reordered MRI. Discussed w/ pt and wife. FOBT positive.     Review of Systems:  Review of Systems   Constitutional: Negative for fever and chills.   Respiratory: Negative for cough, shortness of breath and wheezing.    Cardiovascular: Negative for chest pain.   Gastrointestinal: Negative for nausea, vomiting, diarrhea and constipation.   Musculoskeletal: Positive for back pain.   Neurological: Positive for weakness.       Physical Exam:  Physical Exam   Constitutional: He appears well-developed.   HENT:   Head: Normocephalic.   Cardiovascular: Normal rate.  Exam reveals no gallop.    Pulmonary/Chest: No respiratory distress. He has no wheezes.   Abdominal: He exhibits no distension. There is no tenderness.   Neurological: He is alert. He exhibits abnormal muscle tone (weak).       Labs:        Invalid input(s): [id number]      Recent Labs      17   SODIUM  138   POTASSIUM  3.8   CHLORIDE  105   CO2  24   BUN  21   CREATININE  0.77   CALCIUM  8.8     Recent Labs      17   ALTSGPT  38   ASTSGOT  39   ALKPHOSPHAT  119*   TBILIRUBIN  1.7*   GLUCOSE  108*     Recent Labs      17   RBC  4.61*   HEMOGLOBIN  12.5*   HEMATOCRIT  38.1*   PLATELETCT  58*     Recent Labs      17   WBC  5.2   NEUTSPOLYS  69.80   LYMPHOCYTES  13.00*   MONOCYTES  12.00   EOSINOPHILS  4.60   BASOPHILS  0.40   ASTSGOT  39   ALTSGPT  38   ALKPHOSPHAT  119*   TBILIRUBIN  1.7*           Hemodynamics:  Temp (24hrs), Av.2 °C (99 °F), Min:37.1 °C (98.8 °F), Max:37.5 °C (99.5 °F)  Temperature: 37.1 °C (98.8 °F)  Pulse  Av.2  Min: 70  Max: 91Heart Rate (Monitored): 73  Blood Pressure : (!) 161/85 mmHg, NIBP: 154/89 mmHg     Respiratory:    Respiration: 18, Pulse  Oximetry: 93 %        RUL Breath Sounds: Clear, RML Breath Sounds: Clear, RLL Breath Sounds: Diminished, JOSE Breath Sounds: Clear, LLL Breath Sounds: Diminished  Fluids:    Intake/Output Summary (Last 24 hours) at 05/21/17 1143  Last data filed at 05/21/17 0145   Gross per 24 hour   Intake    120 ml   Output      0 ml   Net    120 ml     Weight: 116.574 kg (257 lb)  GI/Nutrition:  Orders Placed This Encounter   Procedures   • Diet Order     Standing Status: Standing      Number of Occurrences: 1      Standing Expiration Date:      Order Specific Question:  Diet:     Answer:  Cardiac [6]     Order Specific Question:  Diet:     Answer:  Hepatic [9]     Medical Decision Making, by Problem:  Active Hospital Problems    Diagnosis   • *Generalized weakness [R53.1]  - PT/OT  - possibly from polypharmacy  - Holding baclofen and cheri.   - Also holding ursodiol. (pt worried about side effects)   • Esophageal varices- BANDED 4/2016- repeat egd tbd 10//29/16- gic [I85.00]   • Occult blood positive stool [R19.5]  - seen on testing this visit   • Cirrhosis (CMS-HCC) [K74.60]  - holding ursodiol as above   • H/O colon cancer, stage IV [Z85.038]   • Cirrhosis of liver with ascites (HCC)- ? DUE TO OLD HEP B, CHEMO RX,  OR THERMO RAD OF MAGNANT LESION [K74.60]  - sched for outpatient para   Back pain   - sched of for outpatient MRI 5/23  - ordering for inpt   - needs anesth for roberto    Labs reviewed and Medications reviewed  Vivas catheter: No Vivas        DVT prophylaxis - mechanical: SCDs

## 2017-05-21 NOTE — PROGRESS NOTES
Received patient report from RN. Patient is AAOX4, and LDAs assessed. Oriented patient to unit and unit procedures including hourly rounding and daily POC. Call light within reach all questions and concerns addressed.

## 2017-05-21 NOTE — PROGRESS NOTES
Received bedside report from ER RN. Pt arrived to floor at 0130 via gurney w/ transport. Labs and orders noted. Assessment performed. Patient is alert and oriented x4 with no signs of labored breathing or distress. Patient updated on plan of care; verbalizes understanding. Oriented to floor. Safety precautions in place including patient call light within reach, bed alarm refused, personal possessions nearby, bed in low position and locked, hourly rounding in practice, and non-skid socks in place. Pt medicated per MAR for pain.

## 2017-05-21 NOTE — ED PROVIDER NOTES
ED Provider Note    Scribed for Luiza England M.D. by Darshan Mc. 5/20/2017, 8:04 PM.    Primary care provider: Fabián Urena M.D.  Means of arrival: EMS  History obtained from: Patient  History limited by: None    CHIEF COMPLAINT  Chief Complaint   Patient presents with   • Weakness       HPI  Torey Lima is a 71 y.o. Male with a past history of metastatic colon cancer who presents to the Emergency Department complaining of constant generalized weakness that onset 5/17 and has been gradually increasing in severity. He describes the quality of his weakness as his legs turning to rubber. Patient reports that at onset his weakness is so severe he is unable properly  a bottle of water in order to open it. Since then he was been using a walker because his legs are too weak for unassisted ambulation. By 5/19, he could barely walk with the walker and was having associated shortness of breath. Today, he took two or three steps and then his legs collapsed and his wife had to help him stand up. Later in the evening he had a similar episode and today he fell again and couldn't stand up prompting a visit here. The patient has associated back pain secondary to sciatica, abdominal distention secondary to his ascites, abdominal discomfort, and very yellow urine. He denies any malaise, dizziness, abdominal pain, hematemesis, melena, or lightheadedness.  His sciatica has been acting up recently and he has been taking Tramadol and Medrol for pain management [facility]. His imaging revealed slight degeneration. Patient his schedule to have an lumbar and cervical MRI on 5/23. was prescribed Tramadol and Medrol two weeks ago and he reports that he has been feeling more pain towards the end of his pain medication prescriptions. Patient is taking Actigall to manage cholelithiasis. He no longer has liver cancer or colon cancer. He may have liver damage from his  chemotherapy drugs. Patient formerly smoked a pack of cigarettes a day as well as cigars.    REVIEW OF SYSTEMS  Pertinent positives include generalized weakness, shortness of breath, difficulty ambulating, back pain secondary to sciatica, abdominal distention secondary to his ascites, abdominal discomfort, and very yellow urine. Pertinent negatives include no malaise, dizziness, abdominal pain, hematemesis, melena, or lightheadedness.  All other systems reviewed and negative.  C    PAST MEDICAL HISTORY   has a past medical history of Cancer (CMS-HCC) (10/10-6/11); ASCVD (arteriosclerotic cardiovascular disease) (12/14/2012); Colon cancer (CMS-HCC) (12/6/2010); Elevated CEA (12/6/2010); Liver lesion (12/6/2010); Peripheral neuropathy, secondary to drugs or chemicals (12/6/2010); Thrombocytopenia due to drugs (4/25/2012); Impaired fasting glucose (8/2/2012); Vitamin d deficiency (8/2/2012); BMI 33.0-33.9,adult (2/15/2013); HLD (hyperlipidemia) (6/20/2013); HTN (hypertension) (6/20/2013); Hypertension; Liver cirrhosis (CMS-HCC); GI bleed; [place] measles; and Chickenpox.    SURGICAL HISTORY   has past surgical history that includes low anterior resection robotic (8/10/2010); cath placement (9/20/2010); hip arthroplasty mis total; other; other (comments); cath removal (7/20/2011); full thickness block resection (4/11/2012); gastroscopy with banding (4/3/2016); gastroscopy with banding (10/25/2016); colon resection; and hip replacement, total.    SOCIAL HISTORY  Social History   Substance Use Topics   • Smoking status: Former Smoker -- 0.50 packs/day for 1 years     Types: Cigarettes, Cigars     Quit date: 01/01/1964   • Smokeless tobacco: Never Used      Comment: 1 cigar per week.   • Alcohol Use: No      Comment: minimal, Pt stop drinking since the beginning of April 2016      History   Drug Use No     FAMILY HISTORY  Family History   Problem Relation Age of Onset   • Heart Disease Mother    • Cancer Mother    • Sleep  "Apnea Neg Hx      CURRENT MEDICATIONS  No current facility-administered medications on file prior to encounter.     Current Outpatient Prescriptions on File Prior to Encounter   Medication Sig Dispense Refill   • Multiple Vitamins-Minerals (CENTRUM SILVER ADULT 50+) Tab Take 1 Tab by mouth every day.     • folic acid (FOLVITE) 1 MG Tab Take 1 Tab by mouth every day. 90 Tab 4   • losartan (COZAAR) 25 MG Tab Take 1 Tab by mouth every day. 30 Tab 6   • aspirin EC (ECOTRIN) 81 MG Tablet Delayed Response Take 81 mg by mouth every day.     • atorvastatin (LIPITOR) 40 MG Tab Take 1 Tab by mouth every day. TAKE 1 TABLET BY MOUTH DAILY 90 Tab 4     ALLERGIES  Allergies   Allergen Reactions   • Nkda [No Known Drug Allergy]      PHYSICAL EXAM  VITAL SIGNS: /80 mmHg  Pulse 86  Temp(Src) 37.5 °C (99.5 °F)  Resp 15  Ht 1.905 m (6' 3\")  Wt 116.574 kg (257 lb)  BMI 32.12 kg/m2  SpO2 95%  Constitutional: Alert in no apparent distress. Well-appearing  HENT: No signs of trauma, Bilateral external ears normal, Nose normal.   Eyes: Pupils are equal and reactive, Conjunctiva normal, Non-icteric.   Neck: Normal range of motion, No tenderness, Supple, No stridor.   Cardiovascular: Regular rate and rhythm, no murmurs. Note: patient's heart jump ten beats per minute upon sitting up.  Thorax & Lungs: Normal breath sounds, No respiratory distress, No wheezing, No chest tenderness.   Abdomen: Bowel sounds normal, Soft, Distension with pos fluid wave but non tender, No masses, No peritoneal signs.  Skin: Warm, Dry, No erythema, No rash.   Musculoskeletal:  No major deformities noted. No lower extremity edema.  Neurologic: Alert, moving all extremities without difficulty, no focal deficits.    LABS  Results for orders placed or performed during the hospital encounter of 05/20/17   CBC WITH DIFFERENTIAL   Result Value Ref Range    WBC 5.2 4.8 - 10.8 K/uL    RBC 4.61 (L) 4.70 - 6.10 M/uL    Hemoglobin 12.5 (L) 14.0 - 18.0 g/dL    " Hematocrit 38.1 (L) 42.0 - 52.0 %    MCV 82.6 81.4 - 97.8 fL    MCH 27.1 27.0 - 33.0 pg    MCHC 32.8 (L) 33.7 - 35.3 g/dL    RDW 51.2 (H) 35.9 - 50.0 fL    Platelet Count 58 (L) 164 - 446 K/uL    MPV 10.1 9.0 - 12.9 fL    Neutrophils-Polys 69.80 44.00 - 72.00 %    Lymphocytes 13.00 (L) 22.00 - 41.00 %    Monocytes 12.00 0.00 - 13.40 %    Eosinophils 4.60 0.00 - 6.90 %    Basophils 0.40 0.00 - 1.80 %    Immature Granulocytes 0.20 0.00 - 0.90 %    Nucleated RBC 0.00 /100 WBC    Neutrophils (Absolute) 3.61 1.82 - 7.42 K/uL    Lymphs (Absolute) 0.67 (L) 1.00 - 4.80 K/uL    Monos (Absolute) 0.62 0.00 - 0.85 K/uL    Eos (Absolute) 0.24 0.00 - 0.51 K/uL    Baso (Absolute) 0.02 0.00 - 0.12 K/uL    Immature Granulocytes (abs) 0.01 0.00 - 0.11 K/uL    NRBC (Absolute) 0.00 K/uL   COMP METABOLIC PANEL   Result Value Ref Range    Sodium 138 135 - 145 mmol/L    Potassium 3.8 3.6 - 5.5 mmol/L    Chloride 105 96 - 112 mmol/L    Co2 24 20 - 33 mmol/L    Anion Gap 9.0 0.0 - 11.9    Glucose 108 (H) 65 - 99 mg/dL    Bun 21 8 - 22 mg/dL    Creatinine 0.77 0.50 - 1.40 mg/dL    Calcium 8.8 8.5 - 10.5 mg/dL    AST(SGOT) 39 12 - 45 U/L    ALT(SGPT) 38 2 - 50 U/L    Alkaline Phosphatase 119 (H) 30 - 99 U/L    Total Bilirubin 1.7 (H) 0.1 - 1.5 mg/dL    Albumin 3.5 3.2 - 4.9 g/dL    Total Protein 5.8 (L) 6.0 - 8.2 g/dL    Globulin 2.3 1.9 - 3.5 g/dL    A-G Ratio 1.5 g/dL   URINALYSIS CULTURE, IF INDICATED   Result Value Ref Range    Color Yellow     Character Clear     Specific Gravity 1.018 <1.035    Ph 6.5 5.0-8.0    Glucose Negative Negative mg/dL    Ketones Negative Negative mg/dL    Protein Negative Negative mg/dL    Bilirubin Negative Negative    Nitrite Negative Negative    Leukocyte Esterase Negative Negative    Occult Blood Negative Negative    Micro Urine Req see below     Culture Indicated No UA Culture   ESTIMATED GFR   Result Value Ref Range    GFR If African American >60 >60 mL/min/1.73 m 2    GFR If Non  >60 >60  mL/min/1.73 m 2   CRP QUANTITIVE (NON-CARDIAC)   Result Value Ref Range    Stat C-Reactive Protein 1.26 (H) 0.00 - 0.75 mg/dL   TSH (Thyroid Stimulating Hormone)   Result Value Ref Range    TSH 2.640 0.300 - 3.700 uIU/mL     All labs reviewed by me.    COURSE & MEDICAL DECISION MAKING  Pertinent Labs & Imaging studies reviewed. (See chart for details)    Differential diagnoses include but are not limited to: dehydration vs infection vs anemia    Review of past medical records revealed metastatic colon cancer followed at [facility]. He also has cirrhosis of the liver with ascites possibly due to metastatic disease. He has not been admitted since 10/2016 for melena and has a history of esophageal gastric paresis.    8:04 PM - Patient seen and examined at bedside. Patient will be treated with NS infusion 1L for dehydration. Ordered CBC with differential, CRP quant, CMP, and U/A culture if indicated to evaluate his symptoms.     9:40 PM Nursing informed me that that patient was not able to get out of bed indicating criteria for admittance.    9:49 PM Paged Hospitalist.    11:20 PM I discussed the patient's case and the above findings with Dr. Lockett (Hospitalist) who agrees to admit the patient. He will transfer care of the patient at this time.      Decision Making:  This is a 71 y.o. year old male who presents with generalized weakness. He is still continent and has no saddle anesthesia. His labs are essentially unremarkable. He is not anemic. CMP shows some slightly elevated LFTs however he doesn't history of cirrhosis secondary to his prior cancer treatment. Urinalysis is negative for infection. He is slightly orthostatic with an increase of his heart rate with sitting up. He is unable to stand safely. It is unclear what is causing this generalized weakness. However given that he cannot stand ambulate on his own I do think he is unsafe for discharge. He will be admitted for further workup and investigation of his  generalized weakness.    DISPOSITION:  Patient will be admitted to Dr. Lockett, Hospitalist, in guarded condition.    FINAL IMPRESSION  1. Weakness         This dictation has been created using voice recognition software and/or scribes. The accuracy of the dictation is limited by the abilities of the software and the expertise of the scribes. I expect there may be some errors of grammar and possibly content. I made every attempt to manually correct the errors within my dictation. However, errors related to voice recognition software and/or scribes may still exist and should be interpreted within the appropriate context.     Darshan WATSON (Scribe), am scribing for, and in the presence of, Luiza England M.D..    Electronically signed by: Darshan Mc (Scribe), 5/20/2017    Luiza WATSON M.D. personally performed the services described in this documentation, as scribed by Darshan Mc in my presence, and it is both accurate and complete.    The note accurately reflects work and decisions made by me.  Luiza England  5/21/2017  2:51 AM

## 2017-05-21 NOTE — PROGRESS NOTES
Paged Dr. Dominique at 0320 in regards to pt's uncontrolled back pain and requesting new pain med since Tramadol has not been effective last several days. Awaiting phone call.     Second page called at 0336.     Third page at 0348.    Call received at 0350. Orders for Oxycodone 5mg q4hrs PRN received.

## 2017-05-21 NOTE — H&P
DATE OF ADMISSION:  05/20/2017    PRIMARY CARE PROVIDER:   Fabián Urena MD    ONCOLOGIST:  Mars Anguiano MD    Also, follows up with [facility] oncology.    CHIEF COMPLAINT ON ADMISSION:  Generalized weakness with fall today.    HISTORY OF PRESENT ILLNESS:  The patient is a 71-year-old male with known   history of colon cancer in remission x5 years with follows up with [facility] for   monitoring of his cirrhosis.  The patient also has a known history of   esophageal varices, iron deficiency anemia, obesity, presents with complaints   of increasing low back pain.  Patient was to have an MRI of his cervical as   well as lumbar spine on May 23rd.  Patient was recently started on baclofen as   well as tramadol for his low back pain.  Patient reports difficulty with hand   coordination with increasing weakness, unable to open up a bottle.  Patient   has been on tramadol every 4 hours for pain as well as baclofen.  Patient   reports increasing weakness and a fall today in which patient was on the floor   and unable to move himself.  In the emergency room, patient reports   improvement of pain with morphine.  The patient denies any chest pain,   shortness of breath, abdominal pain, diarrhea or dysuria.  Patient has been   maintaining his usual diet as well as fluid intake.  Patient denies any   melena, any rectal bleeding, any hematemesis.    REVIEW OF SYSTEMS:  As per HPI.  All other systems reviewed negative.    PAST MEDICAL HISTORY:  Includes  1.  History of colon cancer in remission x5 years; acute onset of low back   pain.  2.  History of esophageal varices.  3.  Iron deficiency anemia.  4.  History of liver mets.  5.  History of coronary artery disease, peripheral neuropathy, chronic   thrombocytopenia, vitamin deficiency, hypertension, obesity.    PAST SURGICAL HISTORY:  Low anterior resection robotically, right hip   arthroplasty, history of chemotherapy, port placement and removal, endoscopy   in April 2016 with  esophageal varices noted.    FAMILY HISTORY:  Significant for liver cirrhosis in his father as well as   mother with coronary artery disease and myocardial infarction in her 60s.    SOCIAL HISTORY:  Patient denies any current tobacco, alcohol, or drug use.  He   was a cigar smoker.  He lives in [place] with his wife, is currently using a   walker for ambulation.    CODE STATUS:  Full code.    ALLERGIES:  No known drug allergies.    CURRENT HOME MEDICATIONS:  Include  1.  Aspirin 81 mg daily.  2.  Lipitor 40 mg daily.  3.  Baclofen 10 mg at night.  4.  Coreg 3.125 mg p.o. b.i.d.  5.  Vitamin D daily.  6.  Folic acid 1 mg daily.  7.  Neurontin 100-300 mg at bedtime.  8.  Cozaar 25 mg daily.  9.  Multivitamin 1 tab daily.  10.  Tramadol  mg p.o. q. 4 hours p.r.n. pain.  11.  Actigall 300 mg 3 times a day.    PHYSICAL EXAMINATION:  VITAL SIGNS:  On admission, temperature is 37.5, pulse 73, respiration 18,   satting 94% on room air, blood pressure is 139/80.  GENERAL:  Patient is alert and oriented x4 in no acute distress.  HEENT:  Normocephalic, atraumatic.  Mucous membranes are moist.  Extraocular   muscles intact.  NECK:  No JVD.  No thyromegaly.  CARDIOVASCULAR:  S1, S2, is regular.  Positive systolic ejection murmur, left   sternal border +4/6 radiates to the carotids.  CHEST WALL:  Nontender to palpation.  BACK:  No flank tenderness to palpation.  No pinpoint tenderness to palpation.  ABDOMEN:  Bowel sounds are positive, soft.  Positive distention.  No masses   noted.  EXTREMITIES:  No lower extremity edema.  Distal pulses palpable bilaterally.    No calf tenderness to palpation.  SKIN:  No ecchymosis or purpura.  PSYCHIATRIC:  Does not appear anxious or depressed.  NEUROLOGIC:  Cranial nerves are grossly intact.  Moving all extremities   spontaneously.  Muscle strength was 5/5 bilateral upper extremity and lower   extremity.    LABORATORY DATA:  Labs on admission, white count of 5.2, hemoglobin 12.5, MCV   82%,  platelet count of 58, and segs of 69%.  Sodium 138, potassium 3.8, CO2 of   24, anion gap 9, glucose 108, BUN 21, creatinine 0.77.  LFTs are within   normal limits.  T bili of 1.7, alkaline phosphatase of 119.  Urinalysis is   negative for leukocyte esterase or nitrites.  CRP of 1.26.  Cervical and   thoracic spine x-ray done on May 10th revealed degenerative changes with   spurring.  EKG, sinus rhythm.    ASSESSMENT AND PLAN:  Patient is a 71-year-old male with multiple   comorbidities presents with generalized weakness and low back pain.  1.  Generalized weakness may be secondary to his polypharmacy.  The patient is   on several new sedative prescriptions including baclofen, tramadol, and   Neurontin.  This combination with his underlying cirrhosis may contribute to   his generalized weakness with mild dehydration.  There is no clear etiology   for his current presentation.  At this point, we will hold off on continuing   sedative prescriptions.  We will discontinue baclofen as well as Neurontin.    We will continue tramadol p.r.n.  We have offered nonmedicinal remedies for   his low back pain including PT, OT evaluation.  The patient is scheduled to   have an outpatient MRI on May 23rd to evaluate for his acute onset of low back   pain.  At this time, we will start him on some gentle hydration.  The patient   does not have any clear signs of infection or metastatic disease.  3.  Low back pain, likely from degenerative joint disease.  Patient is to have   an outpatient MRI on May 23rd.  We will continue with PT, OT evaluation as   well as heating pad as needed, tramadol p.r.n.  4.  History of metastatic colon cancer to the liver is being followed as an   outpatient by Dr. Anguiano as well as [facility].  He was seen recently by [facility] x2   months ago with review of scans, which did not appear to be consistent with no   change to confirm onset of new metastatic disease.  5.  Obesity.  The patient is working with his  outpatient physician to work on   a pound of weight loss per week.  6.  Thrombocytopenia secondary to above, stable.  7.  History of esophageal varices, is not showing any signs of acute bleed.    We will continue to monitor CBC in the a.m.  We will check an occult blood.    Patient does follow up with GI, Dr. Stovall with recent endoscopy.  We will   hold off on aggressive NSAID use.  8.  Coronary artery disease.  On a baby aspirin.  We will continue for now.  9.  History of cholelithiasis with midfoot minimally elevated total bilirubin.    We will continue to monitor.  10.  Ascites.  The patient is planned to have an outpatient paracentesis to be   scheduled.  11.  Code status:  Full code.  12.  Gastrointestinal and deep venous thrombosis prophylaxis.  Patient is   tolerating oral diet and will be placed on sequential compression devices.    Patient will be admitted to the medical observation unit.  We anticipate less   than 2-midnight stay.    Total admission time is 55 minutes.       ____________________________________     DESMOND ANDERSON DO    EN / NTS    DD:  05/21/2017 00:18:14  DT:  05/21/2017 06:03:19    D#:  [id number]  Job#:  [id number]

## 2017-05-21 NOTE — ED NOTES
Med rec updated and complete  Allergies reviewed.  Met with pt and family at bedside and dicussed  Current medications.  Pt recently started on    BACLOFEN   TRAMADOL  GABAPENTIN  For his ongoing back pain

## 2017-05-22 ENCOUNTER — APPOINTMENT (OUTPATIENT)
Dept: RADIOLOGY | Facility: MEDICAL CENTER | Age: 72
DRG: 040 | End: 2017-05-22
Attending: HOSPITALIST
Payer: MEDICARE

## 2017-05-22 LAB
ALBUMIN SERPL BCP-MCNC: 3.3 G/DL (ref 3.2–4.9)
ALBUMIN/GLOB SERPL: 1.5 G/DL
ALP SERPL-CCNC: 135 U/L (ref 30–99)
ALT SERPL-CCNC: 54 U/L (ref 2–50)
ANION GAP SERPL CALC-SCNC: 6 MMOL/L (ref 0–11.9)
APTT PPP: 28.1 SEC (ref 24.7–36)
AST SERPL-CCNC: 52 U/L (ref 12–45)
BASOPHILS # BLD AUTO: 0.4 % (ref 0–1.8)
BASOPHILS # BLD: 0.02 K/UL (ref 0–0.12)
BILIRUB SERPL-MCNC: 1.6 MG/DL (ref 0.1–1.5)
BUN SERPL-MCNC: 14 MG/DL (ref 8–22)
CALCIUM SERPL-MCNC: 8.4 MG/DL (ref 8.5–10.5)
CHLORIDE SERPL-SCNC: 104 MMOL/L (ref 96–112)
CHOLEST SERPL-MCNC: 89 MG/DL (ref 100–199)
CO2 SERPL-SCNC: 25 MMOL/L (ref 20–33)
CREAT SERPL-MCNC: 0.71 MG/DL (ref 0.5–1.4)
EOSINOPHIL # BLD AUTO: 0.21 K/UL (ref 0–0.51)
EOSINOPHIL NFR BLD: 4.2 % (ref 0–6.9)
ERYTHROCYTE [DISTWIDTH] IN BLOOD BY AUTOMATED COUNT: 52.3 FL (ref 35.9–50)
GFR SERPL CREATININE-BSD FRML MDRD: >60 ML/MIN/1.73 M 2
GLOBULIN SER CALC-MCNC: 2.2 G/DL (ref 1.9–3.5)
GLUCOSE SERPL-MCNC: 108 MG/DL (ref 65–99)
HCT VFR BLD AUTO: 36.9 % (ref 42–52)
HDLC SERPL-MCNC: 50 MG/DL
HEMOCCULT SP1 STL QL: POSITIVE
HEMOCCULT SP2 STL QL: POSITIVE
HGB BLD-MCNC: 12.1 G/DL (ref 14–18)
IMM GRANULOCYTES # BLD AUTO: 0.02 K/UL (ref 0–0.11)
IMM GRANULOCYTES NFR BLD AUTO: 0.4 % (ref 0–0.9)
INR PPP: 1.29 (ref 0.87–1.13)
LDLC SERPL CALC-MCNC: 28 MG/DL
LYMPHOCYTES # BLD AUTO: 0.55 K/UL (ref 1–4.8)
LYMPHOCYTES NFR BLD: 11 % (ref 22–41)
MCH RBC QN AUTO: 27.4 PG (ref 27–33)
MCHC RBC AUTO-ENTMCNC: 32.8 G/DL (ref 33.7–35.3)
MCV RBC AUTO: 83.5 FL (ref 81.4–97.8)
MONOCYTES # BLD AUTO: 0.68 K/UL (ref 0–0.85)
MONOCYTES NFR BLD AUTO: 13.6 % (ref 0–13.4)
NEUTROPHILS # BLD AUTO: 3.51 K/UL (ref 1.82–7.42)
NEUTROPHILS NFR BLD: 70.4 % (ref 44–72)
NRBC # BLD AUTO: 0 K/UL
NRBC BLD AUTO-RTO: 0 /100 WBC
PLATELET # BLD AUTO: 55 K/UL (ref 164–446)
PMV BLD AUTO: 10.1 FL (ref 9–12.9)
POTASSIUM SERPL-SCNC: 3.8 MMOL/L (ref 3.6–5.5)
PROT SERPL-MCNC: 5.5 G/DL (ref 6–8.2)
PROTHROMBIN TIME: 16.5 SEC (ref 12–14.6)
RBC # BLD AUTO: 4.42 M/UL (ref 4.7–6.1)
SODIUM SERPL-SCNC: 135 MMOL/L (ref 135–145)
TRIGL SERPL-MCNC: 54 MG/DL (ref 0–149)
WBC # BLD AUTO: 5 K/UL (ref 4.8–10.8)

## 2017-05-22 PROCEDURE — G8988 SELF CARE GOAL STATUS: HCPCS | Mod: CJ

## 2017-05-22 PROCEDURE — 80053 COMPREHEN METABOLIC PANEL: CPT

## 2017-05-22 PROCEDURE — G0378 HOSPITAL OBSERVATION PER HR: HCPCS

## 2017-05-22 PROCEDURE — G8978 MOBILITY CURRENT STATUS: HCPCS | Mod: CL

## 2017-05-22 PROCEDURE — 700102 HCHG RX REV CODE 250 W/ 637 OVERRIDE(OP): Performed by: NURSE PRACTITIONER

## 2017-05-22 PROCEDURE — G8979 MOBILITY GOAL STATUS: HCPCS | Mod: CJ

## 2017-05-22 PROCEDURE — 36415 COLL VENOUS BLD VENIPUNCTURE: CPT

## 2017-05-22 PROCEDURE — 85730 THROMBOPLASTIN TIME PARTIAL: CPT

## 2017-05-22 PROCEDURE — 85610 PROTHROMBIN TIME: CPT

## 2017-05-22 PROCEDURE — 97162 PT EVAL MOD COMPLEX 30 MIN: CPT

## 2017-05-22 PROCEDURE — 700102 HCHG RX REV CODE 250 W/ 637 OVERRIDE(OP): Performed by: INTERNAL MEDICINE

## 2017-05-22 PROCEDURE — A9270 NON-COVERED ITEM OR SERVICE: HCPCS | Performed by: NURSE PRACTITIONER

## 2017-05-22 PROCEDURE — 302131 K PAD MOTOR: Performed by: HOSPITALIST

## 2017-05-22 PROCEDURE — 85025 COMPLETE CBC W/AUTO DIFF WBC: CPT

## 2017-05-22 PROCEDURE — 80061 LIPID PANEL: CPT

## 2017-05-22 PROCEDURE — G8987 SELF CARE CURRENT STATUS: HCPCS | Mod: CL

## 2017-05-22 PROCEDURE — A9270 NON-COVERED ITEM OR SERVICE: HCPCS | Performed by: INTERNAL MEDICINE

## 2017-05-22 PROCEDURE — 302155 K THERMIA BLANKET 24X30: Performed by: HOSPITALIST

## 2017-05-22 PROCEDURE — 700105 HCHG RX REV CODE 258: Performed by: INTERNAL MEDICINE

## 2017-05-22 PROCEDURE — 99226 PR SUBSEQUENT OBSERVATION CARE,LEVEL III: CPT | Performed by: HOSPITALIST

## 2017-05-22 PROCEDURE — 97166 OT EVAL MOD COMPLEX 45 MIN: CPT

## 2017-05-22 RX ADMIN — CARVEDILOL 3.12 MG: 6.25 TABLET, FILM COATED ORAL at 08:19

## 2017-05-22 RX ADMIN — OXYCODONE HYDROCHLORIDE 5 MG: 5 TABLET ORAL at 20:47

## 2017-05-22 RX ADMIN — SODIUM CHLORIDE: 9 INJECTION, SOLUTION INTRAVENOUS at 02:59

## 2017-05-22 RX ADMIN — TRAMADOL HYDROCHLORIDE 50 MG: 50 TABLET, COATED ORAL at 20:47

## 2017-05-22 RX ADMIN — OXYCODONE HYDROCHLORIDE 5 MG: 5 TABLET ORAL at 08:19

## 2017-05-22 RX ADMIN — CARVEDILOL 3.12 MG: 6.25 TABLET, FILM COATED ORAL at 17:32

## 2017-05-22 RX ADMIN — ATORVASTATIN CALCIUM 40 MG: 40 TABLET, FILM COATED ORAL at 20:47

## 2017-05-22 RX ADMIN — OXYCODONE HYDROCHLORIDE 5 MG: 5 TABLET ORAL at 01:56

## 2017-05-22 RX ADMIN — TRAMADOL HYDROCHLORIDE 50 MG: 50 TABLET, COATED ORAL at 05:03

## 2017-05-22 RX ADMIN — FOLIC ACID 1 MG: 1 TABLET ORAL at 08:19

## 2017-05-22 RX ADMIN — LOSARTAN POTASSIUM 25 MG: 50 TABLET, FILM COATED ORAL at 08:19

## 2017-05-22 RX ADMIN — ASPIRIN 81 MG: 81 TABLET ORAL at 08:19

## 2017-05-22 ASSESSMENT — PAIN SCALES - GENERAL
PAINLEVEL_OUTOF10: 8
PAINLEVEL_OUTOF10: 4
PAINLEVEL_OUTOF10: 10
PAINLEVEL_OUTOF10: 8
PAINLEVEL_OUTOF10: 7
PAINLEVEL_OUTOF10: 3

## 2017-05-22 ASSESSMENT — COGNITIVE AND FUNCTIONAL STATUS - GENERAL
STANDING UP FROM CHAIR USING ARMS: A LOT
MOVING FROM LYING ON BACK TO SITTING ON SIDE OF FLAT BED: UNABLE
SUGGESTED CMS G CODE MODIFIER MOBILITY: CM
MOVING TO AND FROM BED TO CHAIR: UNABLE
MOBILITY SCORE: 7
TURNING FROM BACK TO SIDE WHILE IN FLAT BAD: UNABLE
CLIMB 3 TO 5 STEPS WITH RAILING: TOTAL
WALKING IN HOSPITAL ROOM: TOTAL

## 2017-05-22 ASSESSMENT — ENCOUNTER SYMPTOMS
BACK PAIN: 1
VOMITING: 0
COUGH: 0
DIARRHEA: 0
NAUSEA: 0
WEAKNESS: 1
CONSTIPATION: 0
SHORTNESS OF BREATH: 0
FEVER: 0
CHILLS: 0

## 2017-05-22 ASSESSMENT — ACTIVITIES OF DAILY LIVING (ADL): TOILETING: INDEPENDENT

## 2017-05-22 ASSESSMENT — PAIN SCALES - WONG BAKER: WONGBAKER_NUMERICALRESPONSE: HURTS JUST A LITTLE BIT

## 2017-05-22 ASSESSMENT — GAIT ASSESSMENTS: GAIT LEVEL OF ASSIST: UNABLE TO PARTICIPATE

## 2017-05-22 NOTE — PROGRESS NOTES
Hospital Medicine Progress Note, Adult, Complex               Author: David LLOYD Mejia Date & Time created: 5/22/2017  1:27 PM     Interval History:  CC: generalized weakness  5/21: Holding baclofen and cheri. Also holding ursodiol.Reordered MRI. Discussed w/ pt and wife. FOBT positive.   5/22: Ordered IR paracentesis. Arranging MRI w/ sedation. Placed SNF referral given pt's weakness and OT recs.     Review of Systems:  Review of Systems   Constitutional: Positive for malaise/fatigue. Negative for fever and chills.   Respiratory: Negative for cough and shortness of breath.    Cardiovascular: Negative for chest pain.   Gastrointestinal: Negative for nausea, vomiting, diarrhea and constipation.   Musculoskeletal: Positive for back pain.   Neurological: Positive for weakness.       Physical Exam:  Physical Exam   Constitutional: He appears well-developed.   HENT:   Head: Normocephalic.   Eyes: Conjunctivae are normal.   Cardiovascular: Normal rate.  Exam reveals no gallop.    Pulmonary/Chest: No respiratory distress. He has no wheezes.   Abdominal: He exhibits no distension. There is no tenderness.   Neurological: He is alert. He exhibits abnormal muscle tone (weak).   Skin: Skin is warm. No erythema.       Labs:        Invalid input(s): [id number]      Recent Labs      05/20/17 2023 05/22/17   0250   SODIUM  138  135   POTASSIUM  3.8  3.8   CHLORIDE  105  104   CO2  24  25   BUN  21  14   CREATININE  0.77  0.71   CALCIUM  8.8  8.4*     Recent Labs      05/20/17 2023 05/22/17   0250   ALTSGPT  38  54*   ASTSGOT  39  52*   ALKPHOSPHAT  119*  135*   TBILIRUBIN  1.7*  1.6*   GLUCOSE  108*  108*     Recent Labs      05/20/17 2023 05/22/17   0249   RBC  4.61*  4.42*   HEMOGLOBIN  12.5*  12.1*   HEMATOCRIT  38.1*  36.9*   PLATELETCT  58*  55*     Recent Labs      05/20/17 2023 05/22/17 0249 05/22/17   0250   WBC  5.2  5.0   --    NEUTSPOLYS  69.80  70.40   --    LYMPHOCYTES  13.00*  11.00*   --    MONOCYTES   12.00  13.60*   --    EOSINOPHILS  4.60  4.20   --    BASOPHILS  0.40  0.40   --    ASTSGOT  39   --   52*   ALTSGPT  38   --   54*   ALKPHOSPHAT  119*   --   135*   TBILIRUBIN  1.7*   --   1.6*           Hemodynamics:  Temp (24hrs), Av.4 °C (99.4 °F), Min:37 °C (98.6 °F), Max:37.8 °C (100 °F)  Temperature: 37 °C (98.6 °F)  Pulse  Av.1  Min: 68  Max: 91   Blood Pressure : 158/79 mmHg     Respiratory:    Respiration: 18, Pulse Oximetry: 97 %        RUL Breath Sounds: Clear, RML Breath Sounds: Clear, RLL Breath Sounds: Diminished, JOSE Breath Sounds: Clear, LLL Breath Sounds: Diminished  Fluids:    Intake/Output Summary (Last 24 hours) at 17 1327  Last data filed at 17 0900   Gross per 24 hour   Intake    360 ml   Output   1000 ml   Net   -640 ml     Weight: 118.2 kg (260 lb 9.3 oz)  GI/Nutrition:  Orders Placed This Encounter   Procedures   • Diet Order     Standing Status: Standing      Number of Occurrences: 1      Standing Expiration Date:      Order Specific Question:  Diet:     Answer:  Cardiac [6]     Order Specific Question:  Diet:     Answer:  Hepatic [9]     Medical Decision Making, by Problem:  Active Hospital Problems    Diagnosis   • *Generalized weakness [R53.1]  - PT/OT  - possibly from polypharmacy  - Holding baclofen and cheri.   - Also holding ursodiol. (pt worried about side effects and the small possibility it is making him weak)  - MRI C and L spine pending, otherwise needs SNF for therapy   • Esophageal varices- BANDED 2016- repeat egd tbd 10//29/16- gic [I85.00]   • Occult blood positive stool [R19.5]  - seen on testing this visit  - hemodynamically stable   - monitoring Hgb   • Cirrhosis (CMS-HCC) [K74.60]  - holding ursodiol as above   • H/O colon cancer, stage IV [Z85.038]   • Cirrhosis of liver with ascites (HCC)- ? DUE TO OLD HEP B, CHEMO RX,  OR THERMO RAD OF MAGNANT LESION [K74.60]  - sched for outpatient para, ordered for inpatient (therapeutic)  - hold ursodiol for  now   Back pain   - sched of for outpatient MRI 5/23  - ordered for inpt   - needs anesth for roberto    Dispo: weak, referred to SNF    Labs reviewed and Medications reviewed  Vivas catheter: No Vivas        DVT prophylaxis - mechanical: SCDs

## 2017-05-22 NOTE — THERAPY
"Occupational Therapy Evaluation completed.   Functional Status:  Pt seen for OT eval due to generalized weakness and GLFs. CGA-Min A for bed mobility. Mod A LB dressing. Hand strength impacting independence in ADLs as pt unable to thread socks or open light packages. Pt able to stand for 15 seconds before needing to rest. Previously independent in ADLs/IADLs.   Plan of Care: Will benefit from Occupational Therapy 3 times per week  Discharge Recommendations:  Equipment: Will Continue to Assess for Equipment Needs. Post-acute therapy Discharge to a transitional care facility for continued skilled therapy services.    See \"Rehab Therapy-Acute\" Patient Summary Report for complete documentation.    "

## 2017-05-22 NOTE — CARE PLAN
Problem: Communication  Goal: The ability to communicate needs accurately and effectively will improve  Intervention: [place] patient and significant other/support system to call light to alert staff of needs  Pt will use call light when in need of help

## 2017-05-22 NOTE — PROGRESS NOTES
Received patient report from RN. Patient is AAOX4, and LDAs assessed. Oriented patient to unit and unit procedures including hourly rounding and daily POC. Call light within reach all questions and concerns addressed. Spouse present at bedside

## 2017-05-22 NOTE — CARE PLAN
Problem: Safety  Goal: Will remain free from injury  Outcome: PROGRESSING AS EXPECTED  Demonstrates use of call light    Problem: Mobility  Goal: Risk for activity intolerance will decrease  Outcome: PROGRESSING AS EXPECTED  OT PT eval in place

## 2017-05-22 NOTE — CARE PLAN
Problem: Safety  Goal: Will remain free from injury  Outcome: PROGRESSING AS EXPECTED  Pt will remain free of falls

## 2017-05-22 NOTE — THERAPY
"Physical Therapy Evaluation completed.   Bed Mobility:  Supine to Sit: Minimal Assist  Transfers: Sit to Stand: Moderate Assist (for initiation)  Gait: Level Of Assist: Unable to Participate ; see below  Plan of Care: Will benefit from Physical Therapy 3 times per week  Discharge Recommendations: Equipment: Will Continue to Assess for Equipment Needs. See below    Pt presents to PT with impaired functional strength, ROM, muscle performance, balance and general locomotion 2' to medical co-morbidities and deconditioning. Currently he is awaiting further workup for etiology of symptoms. He was able to demonstrate bed mobility with min A and sit<>stands with mod A. However, he was unable to maintain standing >~10secs 2' to decreased BLE functional endurance/strength and/or impaired motor control. He will benefit from continued skilled acute PT while here. Currently would recommend continued skilled PT prior to medical d/c to home given current objective findings, age, I PLOF, environmental barriers, and limited ability of social supports to assist with current level needed. Will continue to visit.     See \"Rehab Therapy-Acute\" Patient Summary Report for complete documentation.     "

## 2017-05-22 NOTE — PROGRESS NOTES
Pt is alert and oriented x 4, no s/s of distress, no complaints of pain at this time, skin is intact, lungs are clear and diminished,bowl sounds are intact, ambulates with standby assistance, acuities, no edema,  able to make needs know, Iv is intact,clean and dry. Will continue to monitor

## 2017-05-22 NOTE — DIETARY
Nutrition Services:  Consult received for education.  Poor PO intake noted on admit screen.  Pating stated he is eating well; declined between meal snacks. He also denied any questions for eating at home.  No indication for education noted.  Per report, patient working with physician on weight loss program.  Nutrition Representative will continue to see him for ongoing meal and snack preferences.  RD following per department policy.

## 2017-05-23 ENCOUNTER — APPOINTMENT (OUTPATIENT)
Dept: RADIOLOGY | Facility: MEDICAL CENTER | Age: 72
End: 2017-05-23
Attending: INTERNAL MEDICINE
Payer: MEDICARE

## 2017-05-23 ENCOUNTER — APPOINTMENT (OUTPATIENT)
Dept: RADIOLOGY | Facility: MEDICAL CENTER | Age: 72
End: 2017-05-23
Attending: SPECIALIST
Payer: MEDICARE

## 2017-05-23 ENCOUNTER — APPOINTMENT (OUTPATIENT)
Dept: RADIOLOGY | Facility: MEDICAL CENTER | Age: 72
DRG: 040 | End: 2017-05-23
Attending: HOSPITALIST
Payer: MEDICARE

## 2017-05-23 LAB
ANION GAP SERPL CALC-SCNC: 7 MMOL/L (ref 0–11.9)
BUN SERPL-MCNC: 14 MG/DL (ref 8–22)
CALCIUM SERPL-MCNC: 8.7 MG/DL (ref 8.5–10.5)
CHLORIDE SERPL-SCNC: 108 MMOL/L (ref 96–112)
CO2 SERPL-SCNC: 24 MMOL/L (ref 20–33)
CREAT SERPL-MCNC: 0.66 MG/DL (ref 0.5–1.4)
ERYTHROCYTE [DISTWIDTH] IN BLOOD BY AUTOMATED COUNT: 52.5 FL (ref 35.9–50)
GFR SERPL CREATININE-BSD FRML MDRD: >60 ML/MIN/1.73 M 2
GLUCOSE SERPL-MCNC: 109 MG/DL (ref 65–99)
HCT VFR BLD AUTO: 36.9 % (ref 42–52)
HGB BLD-MCNC: 12.2 G/DL (ref 14–18)
MCH RBC QN AUTO: 27.4 PG (ref 27–33)
MCHC RBC AUTO-ENTMCNC: 33.1 G/DL (ref 33.7–35.3)
MCV RBC AUTO: 82.7 FL (ref 81.4–97.8)
PLATELET # BLD AUTO: 59 K/UL (ref 164–446)
PMV BLD AUTO: 9.9 FL (ref 9–12.9)
POTASSIUM SERPL-SCNC: 3.8 MMOL/L (ref 3.6–5.5)
RBC # BLD AUTO: 4.46 M/UL (ref 4.7–6.1)
SODIUM SERPL-SCNC: 139 MMOL/L (ref 135–145)
WBC # BLD AUTO: 5.1 K/UL (ref 4.8–10.8)

## 2017-05-23 PROCEDURE — 72142 MRI NECK SPINE W/DYE: CPT

## 2017-05-23 PROCEDURE — 700111 HCHG RX REV CODE 636 W/ 250 OVERRIDE (IP)

## 2017-05-23 PROCEDURE — 72149 MRI LUMBAR SPINE W/DYE: CPT

## 2017-05-23 PROCEDURE — A9579 GAD-BASE MR CONTRAST NOS,1ML: HCPCS | Performed by: HOSPITALIST

## 2017-05-23 PROCEDURE — G0378 HOSPITAL OBSERVATION PER HR: HCPCS

## 2017-05-23 PROCEDURE — A9270 NON-COVERED ITEM OR SERVICE: HCPCS | Performed by: NURSE PRACTITIONER

## 2017-05-23 PROCEDURE — A9270 NON-COVERED ITEM OR SERVICE: HCPCS | Performed by: INTERNAL MEDICINE

## 2017-05-23 PROCEDURE — 80048 BASIC METABOLIC PNL TOTAL CA: CPT

## 2017-05-23 PROCEDURE — 700117 HCHG RX CONTRAST REV CODE 255: Performed by: HOSPITALIST

## 2017-05-23 PROCEDURE — 700102 HCHG RX REV CODE 250 W/ 637 OVERRIDE(OP): Performed by: NURSE PRACTITIONER

## 2017-05-23 PROCEDURE — 36415 COLL VENOUS BLD VENIPUNCTURE: CPT

## 2017-05-23 PROCEDURE — 85027 COMPLETE CBC AUTOMATED: CPT

## 2017-05-23 PROCEDURE — 302152 K-PAD 12X17: Performed by: HOSPITALIST

## 2017-05-23 PROCEDURE — 0W9G3ZZ DRAINAGE OF PERITONEAL CAVITY, PERCUTANEOUS APPROACH: ICD-10-PCS | Performed by: RADIOLOGY

## 2017-05-23 PROCEDURE — 700102 HCHG RX REV CODE 250 W/ 637 OVERRIDE(OP): Performed by: INTERNAL MEDICINE

## 2017-05-23 PROCEDURE — 49083 ABD PARACENTESIS W/IMAGING: CPT

## 2017-05-23 PROCEDURE — 99225 PR SUBSEQUENT OBSERVATION CARE,LEVEL II: CPT | Performed by: INTERNAL MEDICINE

## 2017-05-23 PROCEDURE — 700101 HCHG RX REV CODE 250

## 2017-05-23 RX ORDER — OXYCODONE HYDROCHLORIDE 10 MG/1
10 TABLET ORAL EVERY 4 HOURS PRN
Status: DISCONTINUED | OUTPATIENT
Start: 2017-05-23 | End: 2017-06-03 | Stop reason: HOSPADM

## 2017-05-23 RX ADMIN — FOLIC ACID 1 MG: 1 TABLET ORAL at 09:24

## 2017-05-23 RX ADMIN — OXYCODONE HYDROCHLORIDE 10 MG: 10 TABLET ORAL at 00:42

## 2017-05-23 RX ADMIN — ASPIRIN 81 MG: 81 TABLET ORAL at 09:23

## 2017-05-23 RX ADMIN — OXYCODONE HYDROCHLORIDE 10 MG: 10 TABLET ORAL at 12:37

## 2017-05-23 RX ADMIN — OXYCODONE HYDROCHLORIDE 10 MG: 10 TABLET ORAL at 19:59

## 2017-05-23 RX ADMIN — CARVEDILOL 3.12 MG: 6.25 TABLET, FILM COATED ORAL at 09:23

## 2017-05-23 RX ADMIN — ATORVASTATIN CALCIUM 40 MG: 40 TABLET, FILM COATED ORAL at 19:59

## 2017-05-23 RX ADMIN — GADODIAMIDE 20 ML: 287 INJECTION INTRAVENOUS at 17:15

## 2017-05-23 RX ADMIN — LOSARTAN POTASSIUM 25 MG: 50 TABLET, FILM COATED ORAL at 09:23

## 2017-05-23 RX ADMIN — OXYCODONE HYDROCHLORIDE 10 MG: 10 TABLET ORAL at 06:07

## 2017-05-23 ASSESSMENT — PAIN SCALES - GENERAL
PAINLEVEL_OUTOF10: 6
PAINLEVEL_OUTOF10: 6
PAINLEVEL_OUTOF10: 4
PAINLEVEL_OUTOF10: 4
PAINLEVEL_OUTOF10: 6
PAINLEVEL_OUTOF10: 9
PAINLEVEL_OUTOF10: 4
PAINLEVEL_OUTOF10: 6
PAINLEVEL_OUTOF10: 5

## 2017-05-23 ASSESSMENT — ENCOUNTER SYMPTOMS
FEVER: 0
COUGH: 0
HEMOPTYSIS: 0
CHILLS: 0
DIAPHORESIS: 0
SHORTNESS OF BREATH: 0
NAUSEA: 0
BACK PAIN: 1
VOMITING: 0
PALPITATIONS: 0
HEADACHES: 0

## 2017-05-23 NOTE — PROGRESS NOTES
Assumed patient care at 1900. POC discussed w/ day nurse and pt, pt in agreement w/ goals. Pt AOx4. Pt states pain in lower and mid back, given oxy and tramadol as needed. Pt denies nausea. Pt up one assist, bedside commode, sitting at edge of bed. Educated on plan for MRI and paracentesis tomorrow. Abdominal distention noted. Patient educated on use of call light, hourly rounding, and pain scale. Personal possession and call light within reach. Bed alarm refused. Patient calls appropriately.

## 2017-05-23 NOTE — PROGRESS NOTES
Patient was brought down to ultrasound.  Scanned abdomen and found a pocket of fluid.  Called Dr Cobian and he came and performed the paracentesis.  Monitored patients vitals during exam.  Removed 1800 ml of fluid and sent 0 ml to lab.  Patient tolerated procedure well and left in stable condition.

## 2017-05-23 NOTE — CARE PLAN
Problem: Knowledge Deficit  Goal: Knowledge of disease process/condition, treatment plan, diagnostic tests, and medications will improve  Intervention: Explain information regarding disease process/condition, treatment plan, diagnostic tests, and medications and document in education  Patient educated about NPO status.      Problem: Pain Management  Goal: Pain level will decrease to patient’s comfort goal  Intervention: Educate and implement non-pharmacologic comfort measures. Examples: relaxation, distration, play therapy, activity therapy, massage, etc.  Patient educated about non-pharmacologic methods to manage pain.

## 2017-05-23 NOTE — CARE PLAN
Problem: Knowledge Deficit  Goal: Knowledge of disease process/condition, treatment plan, diagnostic tests, and medications will improve  Patient aware of plan for MRI and paracentesis tomorrow. Pt made NPO for sedation w/ MRI, pt aware and cooperative.     Problem: Mobility  Goal: Risk for activity intolerance will decrease  PT/OT working with patient. Patient able to sit at edge of bed and sit up at 90 degrees in bed.

## 2017-05-23 NOTE — PROGRESS NOTES
Received report from night RN, assumed care at 0700. Pt A&OX4, able to specify needs. Pt's wife at bedside. Pt up 2 assist to bedside commode, generalized weakness. Pt with small bandage to LLQ puncture site from paracentesis this morning, dressing CDI. PIV patent, running NS at 75 ml/hr. Pt states all needs are met at this time. POC discussed, communication board updated. Call light in reach, hourly rounding in place.

## 2017-05-23 NOTE — PROGRESS NOTES
Pain not being controlled with oxy 5, tramadol, and heat packs per patient. Hospitalist paged and received order for oxy 10. Continuous heating pad also ordered.

## 2017-05-23 NOTE — PROGRESS NOTES
Hospital Medicine Progress Note, Adult, Complex               Author: Cece Newell Date & Time created: 5/23/2017  12:58 PM     Interval History:  The patient has h/o colon cancer with metastases to the liver and liver cirrhosis and presented with worsening back pain    Today he is scheduled for back MRI at 2:30 pm  He continues to have back pain but is able to stand and transfer from the bed    Review of Systems:  Review of Systems   Constitutional: Negative for fever, chills and diaphoresis.   Respiratory: Negative for cough, hemoptysis and shortness of breath.    Cardiovascular: Negative for chest pain and palpitations.   Gastrointestinal: Negative for nausea and vomiting.   Genitourinary: Negative for dysuria.   Musculoskeletal: Positive for back pain.   Skin: Negative for rash.   Neurological: Negative for headaches.       Physical Exam:  Physical Exam   Constitutional: No distress.   obese   Eyes: No scleral icterus.   Cardiovascular: Normal rate, regular rhythm and intact distal pulses.    Pulmonary/Chest: Effort normal and breath sounds normal.   Abdominal: Soft. Bowel sounds are normal.   Musculoskeletal: He exhibits no edema.   Neurological: He is alert.   Skin: Skin is warm and dry. He is not diaphoretic.   Psychiatric: His behavior is normal.   Nursing note and vitals reviewed.      Labs:        Invalid input(s): [id number]      Recent Labs      05/20/17 2023 05/22/17   0250  05/23/17   0151   SODIUM  138  135  139   POTASSIUM  3.8  3.8  3.8   CHLORIDE  105  104  108   CO2  24  25  24   BUN  21  14  14   CREATININE  0.77  0.71  0.66   CALCIUM  8.8  8.4*  8.7     Recent Labs      05/20/17 2023 05/22/17   0250  05/23/17   0151   ALTSGPT  38  54*   --    ASTSGOT  39  52*   --    ALKPHOSPHAT  119*  135*   --    TBILIRUBIN  1.7*  1.6*   --    GLUCOSE  108*  108*  109*     Recent Labs      05/20/17 2023 05/22/17   0249  05/22/17   1619  05/23/17   0151   RBC  4.61*  4.42*   --   4.46*    HEMOGLOBIN  12.5*  12.1*   --   12.2*   HEMATOCRIT  38.1*  36.9*   --   36.9*   PLATELETCT  58*  55*   --   59*   PROTHROMBTM   --    --   16.5*   --    APTT   --    --   28.1   --    INR   --    --   1.29*   --      Recent Labs      17   0249  17   0250  17   0151   WBC  5.2  5.0   --   5.1   NEUTSPOLYS  69.80  70.40   --    --    LYMPHOCYTES  13.00*  11.00*   --    --    MONOCYTES  12.00  13.60*   --    --    EOSINOPHILS  4.60  4.20   --    --    BASOPHILS  0.40  0.40   --    --    ASTSGOT  39   --   52*   --    ALTSGPT  38   --   54*   --    ALKPHOSPHAT  119*   --   135*   --    TBILIRUBIN  1.7*   --   1.6*   --            Hemodynamics:  Temp (24hrs), Av.1 °C (98.7 °F), Min:36.7 °C (98.1 °F), Max:37.3 °C (99.1 °F)  Temperature: 37.1 °C (98.8 °F)  Pulse  Av.3  Min: 62  Max: 91   Blood Pressure : 142/83 mmHg     Respiratory:    Respiration: 18, Pulse Oximetry: 96 %        RUL Breath Sounds: Clear, RML Breath Sounds: Clear, RLL Breath Sounds: Diminished, JOSE Breath Sounds: Clear, LLL Breath Sounds: Diminished  Fluids:    Intake/Output Summary (Last 24 hours) at 17 1258  Last data filed at 17 0600   Gross per 24 hour   Intake   1140 ml   Output   1310 ml   Net   -170 ml        GI/Nutrition:  Orders Placed This Encounter   Procedures   • DIET NPO     Standing Status: Standing      Number of Occurrences: 1      Standing Expiration Date:      Order Specific Question:  Restrict to:     Answer:  Sips with Medications [3]     Medical Decision Making, by Problem:  Active Hospital Problems    Diagnosis   • *Generalized weakness [R53.1] physical therapy, snf referral ordered, MRI of spine today with sedation   • Esophageal varices- BANDED 2016- repeat egd tbd 10//29/16- gic [I85.00] GI follow up with Dr. Stovall   • Occult blood positive stool [R19.5] hemoglobin stable, no anticoagulation   • Cirrhosis (CMS-HCC) [K74.60] paracentesis done today   • H/O colon  cancer, stage IV [Z85.038] follow up with Dr. Anguiano   • Cirrhosis of liver with ascites (HCC)- ? DUE TO OLD HEP B, CHEMO RX,  OR THERMO RAD OF MAGNANT LESION [K74.60]   Plan discussed with wife at the bedside    Labs reviewed and Medications reviewed  Vivas catheter: No Vivas      DVT Prophylaxis: Contraindicated - High bleeding risk    Ulcer prophylaxis: Not indicated    Assessed for rehab: Patient was assess for and/or received rehabilitation services during this hospitalization

## 2017-05-24 PROBLEM — M48.061 LUMBAR CANAL STENOSIS: Status: ACTIVE | Noted: 2017-05-24

## 2017-05-24 PROCEDURE — 700102 HCHG RX REV CODE 250 W/ 637 OVERRIDE(OP): Performed by: NURSE PRACTITIONER

## 2017-05-24 PROCEDURE — 770006 HCHG ROOM/CARE - MED/SURG/GYN SEMI*

## 2017-05-24 PROCEDURE — A9270 NON-COVERED ITEM OR SERVICE: HCPCS | Performed by: NURSE PRACTITIONER

## 2017-05-24 PROCEDURE — 99233 SBSQ HOSP IP/OBS HIGH 50: CPT | Performed by: INTERNAL MEDICINE

## 2017-05-24 PROCEDURE — G0378 HOSPITAL OBSERVATION PER HR: HCPCS

## 2017-05-24 PROCEDURE — 700102 HCHG RX REV CODE 250 W/ 637 OVERRIDE(OP): Performed by: INTERNAL MEDICINE

## 2017-05-24 PROCEDURE — A9270 NON-COVERED ITEM OR SERVICE: HCPCS | Performed by: INTERNAL MEDICINE

## 2017-05-24 RX ADMIN — OXYCODONE HYDROCHLORIDE 10 MG: 10 TABLET ORAL at 09:35

## 2017-05-24 RX ADMIN — ASPIRIN 81 MG: 81 TABLET ORAL at 08:29

## 2017-05-24 RX ADMIN — CARVEDILOL 3.12 MG: 6.25 TABLET, FILM COATED ORAL at 08:29

## 2017-05-24 RX ADMIN — ATORVASTATIN CALCIUM 40 MG: 40 TABLET, FILM COATED ORAL at 20:35

## 2017-05-24 RX ADMIN — TRAMADOL HYDROCHLORIDE 50 MG: 50 TABLET, COATED ORAL at 22:40

## 2017-05-24 RX ADMIN — FOLIC ACID 1 MG: 1 TABLET ORAL at 08:29

## 2017-05-24 RX ADMIN — OXYCODONE HYDROCHLORIDE 10 MG: 10 TABLET ORAL at 05:16

## 2017-05-24 RX ADMIN — OXYCODONE HYDROCHLORIDE 10 MG: 10 TABLET ORAL at 20:35

## 2017-05-24 RX ADMIN — CARVEDILOL 3.12 MG: 6.25 TABLET, FILM COATED ORAL at 18:41

## 2017-05-24 RX ADMIN — TRAMADOL HYDROCHLORIDE 50 MG: 50 TABLET, COATED ORAL at 05:16

## 2017-05-24 RX ADMIN — OXYCODONE HYDROCHLORIDE 10 MG: 10 TABLET ORAL at 13:47

## 2017-05-24 RX ADMIN — OXYCODONE HYDROCHLORIDE 10 MG: 10 TABLET ORAL at 00:04

## 2017-05-24 RX ADMIN — LOSARTAN POTASSIUM 25 MG: 50 TABLET, FILM COATED ORAL at 08:28

## 2017-05-24 ASSESSMENT — ENCOUNTER SYMPTOMS
BACK PAIN: 1
DIAPHORESIS: 0
SHORTNESS OF BREATH: 0
VOMITING: 0
PALPITATIONS: 0
HEADACHES: 0
FEVER: 0
NAUSEA: 0
COUGH: 0

## 2017-05-24 ASSESSMENT — PAIN SCALES - GENERAL
PAINLEVEL_OUTOF10: 7
PAINLEVEL_OUTOF10: 8
PAINLEVEL_OUTOF10: 7
PAINLEVEL_OUTOF10: 5
PAINLEVEL_OUTOF10: 8
PAINLEVEL_OUTOF10: 9
PAINLEVEL_OUTOF10: 10
PAINLEVEL_OUTOF10: 8

## 2017-05-24 ASSESSMENT — LIFESTYLE VARIABLES: DO YOU DRINK ALCOHOL: NO

## 2017-05-24 NOTE — PROGRESS NOTES
Hospital Medicine Progress Note, Adult, Complex               Author: Cece Newell Date & Time created: 5/24/2017  10:56 AM     Interval History:  The patient has h/o colon cancer with metastases to the liver and liver cirrhosis and presented with worsening back pain    5/23 MRI is done  Today he continues to have back pain and complains of peripheral numbness in finger tips    Review of Systems:  Review of Systems   Constitutional: Negative for fever and diaphoresis.   Respiratory: Negative for cough and shortness of breath.    Cardiovascular: Negative for chest pain and palpitations.   Gastrointestinal: Negative for nausea and vomiting.   Genitourinary: Negative for dysuria.   Musculoskeletal: Positive for back pain.   Skin: Negative for rash.   Neurological: Negative for headaches.        Legs are weak and he can only stand for 5 seconds before sitting due to legs giving out       Physical Exam:  Physical Exam   Constitutional: No distress.   obese   HENT:   Mouth/Throat: Oropharynx is clear and moist.   Neck: Neck supple.   Cardiovascular: Normal rate, regular rhythm and intact distal pulses.    Pulmonary/Chest: Effort normal and breath sounds normal.   Abdominal: Soft. Bowel sounds are normal. He exhibits no distension. There is no tenderness.   Musculoskeletal: He exhibits no edema.   Neurological: He is alert. Coordination normal.   Arms strength 4+/5 bilaterally, patient moving legs equally   Skin: No rash noted. No erythema.   Psychiatric: His behavior is normal.   Nursing note and vitals reviewed.      Labs:        Invalid input(s): [id number]      Recent Labs      05/22/17   0250  05/23/17   0151   SODIUM  135  139   POTASSIUM  3.8  3.8   CHLORIDE  104  108   CO2  25  24   BUN  14  14   CREATININE  0.71  0.66   CALCIUM  8.4*  8.7     Recent Labs      05/22/17   0250  05/23/17   0151   ALTSGPT  54*   --    ASTSGOT  52*   --    ALKPHOSPHAT  135*   --    TBILIRUBIN  1.6*   --    GLUCOSE  108*  109*      Recent Labs      17   0249  17   1619  17   0151   RBC  4.42*   --   4.46*   HEMOGLOBIN  12.1*   --   12.2*   HEMATOCRIT  36.9*   --   36.9*   PLATELETCT  55*   --   59*   PROTHROMBTM   --   16.5*   --    APTT   --   28.1   --    INR   --   1.29*   --      Recent Labs      17   0249  17   0250  17   0151   WBC  5.0   --   5.1   NEUTSPOLYS  70.40   --    --    LYMPHOCYTES  11.00*   --    --    MONOCYTES  13.60*   --    --    EOSINOPHILS  4.20   --    --    BASOPHILS  0.40   --    --    ASTSGOT   --   52*   --    ALTSGPT   --   54*   --    ALKPHOSPHAT   --   135*   --    TBILIRUBIN   --   1.6*   --            Hemodynamics:  Temp (24hrs), Av.7 °C (98 °F), Min:36.2 °C (97.2 °F), Max:37.2 °C (99 °F)  Temperature: 36.6 °C (97.8 °F)  Pulse  Av.2  Min: 62  Max: 91   Blood Pressure : 148/78 mmHg     Respiratory:    Respiration: 16, Pulse Oximetry: 94 %        RUL Breath Sounds: Clear, RML Breath Sounds: Clear, RLL Breath Sounds: Diminished, JOSE Breath Sounds: Clear, LLL Breath Sounds: Diminished  Fluids:    Intake/Output Summary (Last 24 hours) at 17 1056  Last data filed at 17 0800   Gross per 24 hour   Intake    240 ml   Output    300 ml   Net    -60 ml        GI/Nutrition:  Orders Placed This Encounter   Procedures   • DIET ORDER     Standing Status: Standing      Number of Occurrences: 1      Standing Expiration Date:      Order Specific Question:  Diet:     Answer:  2 Gram Sodium [7]     Medical Decision Making, by Problem:  Active Hospital Problems    Diagnosis   • *Generalized weakness [R53.1] physical therapy, snf referral ordered, MRI of spine shows lumbar stenosis, I discussed results and reviewed images with neurosurgery Dr. Escobar who will see the patient, will stop aspirin in preparation for possible surgery   • Esophageal varices- BANDED 2016- repeat egd tbd 10//29/16- gic [I85.00] GI follow up with Dr. Stovall   • Occult blood positive stool  [R19.5] hemoglobin stable, no anticoagulation   • Cirrhosis (CMS-HCC) [K74.60] paracentesis done today   • H/O colon cancer, stage IV [Z85.038] follow up with Dr. Anguiano   • Cirrhosis of liver with ascites (HCC)- ? DUE TO OLD HEP B, CHEMO RX,  OR THERMO RAD OF MAGNANT LESION [K74.60]   Plan discussed with wife at the bedside    The patient will need greater than 2 midnight stay for treatment and therapy for his severe lumbar spine stenosis    Labs reviewed and Medications reviewed  Vivas catheter: No Vivas      DVT Prophylaxis: Contraindicated - High bleeding risk    Ulcer prophylaxis: Not indicated    Assessed for rehab: Patient was assess for and/or received rehabilitation services during this hospitalization

## 2017-05-24 NOTE — PROGRESS NOTES
"Assumed care of patient at 0700. Received report from RN. Patient is AOX 4 . Assessment complete. Labs reviewed.Patient and RN discussed plan of care. Patient questions answered. Patient needs are met at this time. Bed in lowest and locked position. Upper bed rails up.  Call light is within reach. Hourly rounding in place.  /78 mmHg  Pulse 72  Temp(Src) 36.6 °C (97.8 °F)  Resp 16  Ht 1.905 m (6' 3\")  Wt 118.2 kg (260 lb 9.3 oz)  BMI 32.57 kg/m2  SpO2 94%  Patient is one assist to the commode.   "

## 2017-05-24 NOTE — OR NURSING
1800 patient arrived from IR s/p c-spine, l-spine pt lethargic able to awaken on request. Vss,   1830 report given to RN [id number] criteria met patient transported to room with all his personal belongings.

## 2017-05-24 NOTE — PROGRESS NOTES
Assumed patient care at 1900, patient back from MRI. POC discussed w/ day nurse and pt, pt in agreement w/ goals. Wife at bedside. Pt AOx4. Pt states 6/10 pain in back, given oxy 10, continuous heat pad at bedside. Pt denies nausea. Pt up one assist to bedside commode. Voiding without difficulty. PIV patent, SL. Patient educated on use of call light, hourly rounding, and pain scale. Personal possession and call light within reach. Bed alarm refused. Patient calls appropriately.

## 2017-05-24 NOTE — DISCHARGE PLANNING
SW obtained choice from pt for SNF.  Pt chose [facility] and [facility].  SW faxed choice to [facility].

## 2017-05-24 NOTE — PROGRESS NOTES
IR Progress Note:    MRI of the C-spine and L-spine with and without contrast. General anesthesia by Dr. Roberson.  All medication administrations and vital signs monitoring by anesthesia services. Procedure start time: 1635. Procedure stop time 1750. Pt transported to PPU with Dr. Roberson. Report to DEWEY Jordan.

## 2017-05-25 ENCOUNTER — APPOINTMENT (OUTPATIENT)
Dept: RADIOLOGY | Facility: MEDICAL CENTER | Age: 72
DRG: 040 | End: 2017-05-25
Attending: NEUROLOGICAL SURGERY
Payer: MEDICARE

## 2017-05-25 ENCOUNTER — APPOINTMENT (OUTPATIENT)
Dept: RADIOLOGY | Facility: MEDICAL CENTER | Age: 72
DRG: 040 | End: 2017-05-25
Attending: INTERNAL MEDICINE
Payer: MEDICARE

## 2017-05-25 PROBLEM — M48.00 CENTRAL STENOSIS OF SPINAL CANAL: Status: ACTIVE | Noted: 2017-05-25

## 2017-05-25 LAB
ABO GROUP BLD: NORMAL
BARCODED ABORH UBTYP: 5100
BARCODED PRD CODE UBPRD: NORMAL
BARCODED UNIT NUM UBUNT: NORMAL
BASOPHILS # BLD AUTO: 0.5 % (ref 0–1.8)
BASOPHILS # BLD: 0.03 K/UL (ref 0–0.12)
BLD GP AB INVEST PLASRBC-IMP: NORMAL
BLD GP AB INVEST PLASRBC-IMP: NORMAL
BLD GP AB SCN SERPL QL: NORMAL
COMPONENT P 8504P: NORMAL
EOSINOPHIL # BLD AUTO: 0.32 K/UL (ref 0–0.51)
EOSINOPHIL NFR BLD: 5.9 % (ref 0–6.9)
ERYTHROCYTE [DISTWIDTH] IN BLOOD BY AUTOMATED COUNT: 51.4 FL (ref 35.9–50)
HCT VFR BLD AUTO: 40.6 % (ref 42–52)
HGB BLD-MCNC: 13.4 G/DL (ref 14–18)
IMM GRANULOCYTES # BLD AUTO: 0.01 K/UL (ref 0–0.11)
IMM GRANULOCYTES NFR BLD AUTO: 0.2 % (ref 0–0.9)
LYMPHOCYTES # BLD AUTO: 0.76 K/UL (ref 1–4.8)
LYMPHOCYTES NFR BLD: 13.9 % (ref 22–41)
MCH RBC QN AUTO: 27.7 PG (ref 27–33)
MCHC RBC AUTO-ENTMCNC: 33 G/DL (ref 33.7–35.3)
MCV RBC AUTO: 84.1 FL (ref 81.4–97.8)
MONOCYTES # BLD AUTO: 0.7 K/UL (ref 0–0.85)
MONOCYTES NFR BLD AUTO: 12.8 % (ref 0–13.4)
NEUTROPHILS # BLD AUTO: 3.64 K/UL (ref 1.82–7.42)
NEUTROPHILS NFR BLD: 66.7 % (ref 44–72)
NRBC # BLD AUTO: 0 K/UL
NRBC BLD AUTO-RTO: 0 /100 WBC
PLATELET # BLD AUTO: 76 K/UL (ref 164–446)
PMV BLD AUTO: 9.8 FL (ref 9–12.9)
PRODUCT TYPE UPROD: NORMAL
RBC # BLD AUTO: 4.83 M/UL (ref 4.7–6.1)
RH BLD: NORMAL
UNIT STATUS USTAT: NORMAL
WBC # BLD AUTO: 5.5 K/UL (ref 4.8–10.8)

## 2017-05-25 PROCEDURE — A9270 NON-COVERED ITEM OR SERVICE: HCPCS | Performed by: NURSE PRACTITIONER

## 2017-05-25 PROCEDURE — 86900 BLOOD TYPING SEROLOGIC ABO: CPT

## 2017-05-25 PROCEDURE — 502626 HCHG SURGIFLO HEMOSTATIC MATRIX 6ML: Performed by: NEUROLOGICAL SURGERY

## 2017-05-25 PROCEDURE — 501838 HCHG SUTURE GENERAL: Performed by: NEUROLOGICAL SURGERY

## 2017-05-25 PROCEDURE — 500885 HCHG PACK, JACKSON TABLE: Performed by: NEUROLOGICAL SURGERY

## 2017-05-25 PROCEDURE — 99232 SBSQ HOSP IP/OBS MODERATE 35: CPT | Performed by: INTERNAL MEDICINE

## 2017-05-25 PROCEDURE — 86901 BLOOD TYPING SEROLOGIC RH(D): CPT

## 2017-05-25 PROCEDURE — 160036 HCHG PACU - EA ADDL 30 MINS PHASE I: Performed by: NEUROLOGICAL SURGERY

## 2017-05-25 PROCEDURE — A9270 NON-COVERED ITEM OR SERVICE: HCPCS | Performed by: PHYSICIAN ASSISTANT

## 2017-05-25 PROCEDURE — 700102 HCHG RX REV CODE 250 W/ 637 OVERRIDE(OP)

## 2017-05-25 PROCEDURE — A9270 NON-COVERED ITEM OR SERVICE: HCPCS

## 2017-05-25 PROCEDURE — 700101 HCHG RX REV CODE 250

## 2017-05-25 PROCEDURE — 72020 X-RAY EXAM OF SPINE 1 VIEW: CPT

## 2017-05-25 PROCEDURE — 700111 HCHG RX REV CODE 636 W/ 250 OVERRIDE (IP)

## 2017-05-25 PROCEDURE — 160048 HCHG OR STATISTICAL LEVEL 1-5: Performed by: NEUROLOGICAL SURGERY

## 2017-05-25 PROCEDURE — 01NB0ZZ RELEASE LUMBAR NERVE, OPEN APPROACH: ICD-10-PCS | Performed by: NEUROLOGICAL SURGERY

## 2017-05-25 PROCEDURE — 36430 TRANSFUSION BLD/BLD COMPNT: CPT

## 2017-05-25 PROCEDURE — 85025 COMPLETE CBC W/AUTO DIFF WBC: CPT

## 2017-05-25 PROCEDURE — 30233R1 TRANSFUSION OF NONAUTOLOGOUS PLATELETS INTO PERIPHERAL VEIN, PERCUTANEOUS APPROACH: ICD-10-PCS | Performed by: STUDENT IN AN ORGANIZED HEALTH CARE EDUCATION/TRAINING PROGRAM

## 2017-05-25 PROCEDURE — 160041 HCHG SURGERY MINUTES - EA ADDL 1 MIN LEVEL 4: Performed by: NEUROLOGICAL SURGERY

## 2017-05-25 PROCEDURE — 700102 HCHG RX REV CODE 250 W/ 637 OVERRIDE(OP): Performed by: NURSE PRACTITIONER

## 2017-05-25 PROCEDURE — 36415 COLL VENOUS BLD VENIPUNCTURE: CPT

## 2017-05-25 PROCEDURE — P9034 PLATELETS, PHERESIS: HCPCS

## 2017-05-25 PROCEDURE — 97535 SELF CARE MNGMENT TRAINING: CPT

## 2017-05-25 PROCEDURE — 700102 HCHG RX REV CODE 250 W/ 637 OVERRIDE(OP): Performed by: INTERNAL MEDICINE

## 2017-05-25 PROCEDURE — A9270 NON-COVERED ITEM OR SERVICE: HCPCS | Performed by: INTERNAL MEDICINE

## 2017-05-25 PROCEDURE — 86850 RBC ANTIBODY SCREEN: CPT

## 2017-05-25 PROCEDURE — 770001 HCHG ROOM/CARE - MED/SURG/GYN PRIV*

## 2017-05-25 PROCEDURE — 86870 RBC ANTIBODY IDENTIFICATION: CPT

## 2017-05-25 PROCEDURE — 700111 HCHG RX REV CODE 636 W/ 250 OVERRIDE (IP): Performed by: PHYSICIAN ASSISTANT

## 2017-05-25 PROCEDURE — 160029 HCHG SURGERY MINUTES - 1ST 30 MINS LEVEL 4: Performed by: NEUROLOGICAL SURGERY

## 2017-05-25 PROCEDURE — 501411 HCHG SPONGE, BABY LAP W/O RINGS: Performed by: NEUROLOGICAL SURGERY

## 2017-05-25 PROCEDURE — 160035 HCHG PACU - 1ST 60 MINS PHASE I: Performed by: NEUROLOGICAL SURGERY

## 2017-05-25 PROCEDURE — 700102 HCHG RX REV CODE 250 W/ 637 OVERRIDE(OP): Performed by: PHYSICIAN ASSISTANT

## 2017-05-25 PROCEDURE — 500374 HCHG DRAIN, J-P FLAT 7MM FULL: Performed by: NEUROLOGICAL SURGERY

## 2017-05-25 PROCEDURE — 500389 HCHG DRAIN, RESERVOIR SUCT JP 100CC: Performed by: NEUROLOGICAL SURGERY

## 2017-05-25 PROCEDURE — 160009 HCHG ANES TIME/MIN: Performed by: NEUROLOGICAL SURGERY

## 2017-05-25 PROCEDURE — 160002 HCHG RECOVERY MINUTES (STAT): Performed by: NEUROLOGICAL SURGERY

## 2017-05-25 PROCEDURE — 502240 HCHG MISC OR SUPPLY RC 0272: Performed by: NEUROLOGICAL SURGERY

## 2017-05-25 RX ORDER — CALCIUM CARBONATE 500 MG/1
500 TABLET, CHEWABLE ORAL 2 TIMES DAILY
Status: DISCONTINUED | OUTPATIENT
Start: 2017-05-25 | End: 2017-06-03 | Stop reason: HOSPADM

## 2017-05-25 RX ORDER — OXYCODONE HCL 5 MG/5 ML
SOLUTION, ORAL ORAL
Status: DISPENSED
Start: 2017-05-25 | End: 2017-05-26

## 2017-05-25 RX ORDER — ENEMA 19; 7 G/133ML; G/133ML
1 ENEMA RECTAL
Status: DISCONTINUED | OUTPATIENT
Start: 2017-05-25 | End: 2017-06-03 | Stop reason: HOSPADM

## 2017-05-25 RX ORDER — ACETAMINOPHEN 500 MG
1000 TABLET ORAL EVERY 6 HOURS
Status: DISPENSED | OUTPATIENT
Start: 2017-05-25 | End: 2017-05-30

## 2017-05-25 RX ORDER — CEFAZOLIN SODIUM 2 G/100ML
2 INJECTION, SOLUTION INTRAVENOUS EVERY 8 HOURS
Status: COMPLETED | OUTPATIENT
Start: 2017-05-25 | End: 2017-05-26

## 2017-05-25 RX ORDER — BUPIVACAINE HYDROCHLORIDE AND EPINEPHRINE 5; 5 MG/ML; UG/ML
INJECTION, SOLUTION EPIDURAL; INTRACAUDAL; PERINEURAL
Status: DISCONTINUED | OUTPATIENT
Start: 2017-05-25 | End: 2017-05-25 | Stop reason: HOSPADM

## 2017-05-25 RX ORDER — MORPHINE SULFATE 4 MG/ML
4 INJECTION, SOLUTION INTRAMUSCULAR; INTRAVENOUS
Status: DISCONTINUED | OUTPATIENT
Start: 2017-05-25 | End: 2017-06-03 | Stop reason: HOSPADM

## 2017-05-25 RX ORDER — SODIUM CHLORIDE, SODIUM LACTATE, POTASSIUM CHLORIDE, AND CALCIUM CHLORIDE .6; .31; .03; .02 G/100ML; G/100ML; G/100ML; G/100ML
IRRIGANT IRRIGATION
Status: DISCONTINUED | OUTPATIENT
Start: 2017-05-25 | End: 2017-05-25 | Stop reason: HOSPADM

## 2017-05-25 RX ORDER — BUPIVACAINE HYDROCHLORIDE 5 MG/ML
INJECTION, SOLUTION EPIDURAL; INTRACAUDAL
Status: DISCONTINUED | OUTPATIENT
Start: 2017-05-25 | End: 2017-05-25 | Stop reason: HOSPADM

## 2017-05-25 RX ORDER — VANCOMYCIN HYDROCHLORIDE 500 MG/10ML
INJECTION, POWDER, LYOPHILIZED, FOR SOLUTION INTRAVENOUS
Status: DISCONTINUED | OUTPATIENT
Start: 2017-05-25 | End: 2017-05-25 | Stop reason: HOSPADM

## 2017-05-25 RX ORDER — CYCLOBENZAPRINE HCL 10 MG
10 TABLET ORAL EVERY 8 HOURS PRN
Status: DISCONTINUED | OUTPATIENT
Start: 2017-05-25 | End: 2017-06-03 | Stop reason: HOSPADM

## 2017-05-25 RX ADMIN — ACETAMINOPHEN 1000 MG: 500 TABLET ORAL at 22:03

## 2017-05-25 RX ADMIN — STANDARDIZED SENNA CONCENTRATE AND DOCUSATE SODIUM 2 TABLET: 8.6; 5 TABLET, FILM COATED ORAL at 08:15

## 2017-05-25 RX ADMIN — OXYCODONE HYDROCHLORIDE 10 MG: 10 TABLET ORAL at 04:36

## 2017-05-25 RX ADMIN — OXYCODONE HYDROCHLORIDE 10 MG: 10 TABLET ORAL at 00:33

## 2017-05-25 RX ADMIN — OXYCODONE HYDROCHLORIDE 10 MG: 10 TABLET ORAL at 11:42

## 2017-05-25 RX ADMIN — CARVEDILOL 3.12 MG: 6.25 TABLET, FILM COATED ORAL at 08:15

## 2017-05-25 RX ADMIN — ANTACID TABLETS 500 MG: 500 TABLET, CHEWABLE ORAL at 22:03

## 2017-05-25 RX ADMIN — FOLIC ACID 1 MG: 1 TABLET ORAL at 08:15

## 2017-05-25 RX ADMIN — LOSARTAN POTASSIUM 25 MG: 50 TABLET, FILM COATED ORAL at 08:15

## 2017-05-25 RX ADMIN — ATORVASTATIN CALCIUM 40 MG: 40 TABLET, FILM COATED ORAL at 21:00

## 2017-05-25 RX ADMIN — CEFAZOLIN SODIUM 2 G: 2 INJECTION, SOLUTION INTRAVENOUS at 22:46

## 2017-05-25 ASSESSMENT — PAIN SCALES - GENERAL
PAINLEVEL_OUTOF10: 0
PAINLEVEL_OUTOF10: 6
PAINLEVEL_OUTOF10: 0
PAINLEVEL_OUTOF10: 4
PAINLEVEL_OUTOF10: 0
PAINLEVEL_OUTOF10: 7
PAINLEVEL_OUTOF10: ASSUMED PAIN PRESENT
PAINLEVEL_OUTOF10: 0
PAINLEVEL_OUTOF10: 0
PAINLEVEL_OUTOF10: 2
PAINLEVEL_OUTOF10: 8

## 2017-05-25 ASSESSMENT — ENCOUNTER SYMPTOMS
NAUSEA: 0
FOCAL WEAKNESS: 1
VOMITING: 0
COUGH: 0
CHILLS: 0
BACK PAIN: 1
TINGLING: 1
HEADACHES: 0
SHORTNESS OF BREATH: 0
SENSORY CHANGE: 1
FEVER: 0
MYALGIAS: 0

## 2017-05-25 NOTE — PROGRESS NOTES
Progress Note               Author: Travon S Indinaa Date & Time created: 2017  8:03 AM     Interval History:  No acute events overnight.  Reports stable paresthesia to upper and lower extremities with back pain.  Is amenable to pursuing lumbar surgery.      Review of Systems:  Review of Systems   Musculoskeletal: Positive for back pain.   Neurological: Positive for tingling.       Physical Exam:  Physical Exam   Musculoskeletal:   Motor 3/5 to left EHL, 4/5 right EHL, 2+/5 to right hip flexor, remainder is 5/5.        Labs:        Invalid input(s): [id number]      Recent Labs      17   0151   SODIUM  139   POTASSIUM  3.8   CHLORIDE  108   CO2  24   BUN  14   CREATININE  0.66   CALCIUM  8.7     Recent Labs      17   015   GLUCOSE  109*     Recent Labs      17   1619  17   0151   RBC   --   4.46*   HEMOGLOBIN   --   12.2*   HEMATOCRIT   --   36.9*   PLATELETCT   --   59*   PROTHROMBTM  16.5*   --    APTT  28.1   --    INR  1.29*   --      Recent Labs      17   015   WBC  5.1     Hemodynamics:  Temp (24hrs), Av.9 °C (98.5 °F), Min:36.8 °C (98.2 °F), Max:37.2 °C (99 °F)  Temperature: 37.2 °C (99 °F)  Pulse  Av.1  Min: 62  Max: 91   Blood Pressure : 134/74 mmHg     Respiratory:    Respiration: 17, Pulse Oximetry: 92 %        RUL Breath Sounds: Clear, RML Breath Sounds: Clear, RLL Breath Sounds: Diminished, JOSE Breath Sounds: Clear, LLL Breath Sounds: Diminished  Fluids:    Intake/Output Summary (Last 24 hours) at 17 0803  Last data filed at 17 1800   Gross per 24 hour   Intake    576 ml   Output      0 ml   Net    576 ml        GI/Nutrition:  Orders Placed This Encounter   Procedures   • DIET NPO     Standing Status: Standing      Number of Occurrences: 1      Standing Expiration Date:      Order Specific Question:  Restrict to:     Answer:  Sips with Medications [3]     Medical Decision Making, by Problem:  Active Hospital Problems    Diagnosis   •  *Generalized weakness [R53.1]   • Esophageal varices- BANDED 4/2016- repeat egd tbd 10//29/16- gic [I85.00]   • Lumbar canal stenosis [M48.06]   • Occult blood positive stool [R19.5]   • Cirrhosis (CMS-HCC) [K74.60]   • H/O colon cancer, stage IV [Z85.038]   • Cirrhosis of liver with ascites (HCC)- ? DUE TO OLD HEP B, CHEMO RX,  OR THERMO RAD OF MAGNANT LESION [K74.60]       Plan:  NPO now, ok for sips with meds.  Planning for open L4-S1 laminectomies late afternoon/evening.  Needs consent for anesthesia and open L4-S1 laminectomies.  Will get brain MRI after back surgery as he will need general anesthesia for MRI.  Continue medical care.    Radiology images reviewed and Labs reviewed

## 2017-05-25 NOTE — PROGRESS NOTES
Received report from night RN, assumed care at 0700. Pt A&OX4, able to specify needs. Pt's wife at bedside. Pt NPO for surgery this evening. Pt up 2 assist to bedside commode. Pt denies N&V, SOB. Pt states all needs are met at this time. POC discussed, communication board updated. Call light in reach, hourly rounding in place.

## 2017-05-25 NOTE — CONSULTS
DATE OF SERVICE:  05/24/2017    DATE OF SERVICE:  05/24/2017    CONSULTING PHYSICIAN:  Felton Escobar MD    REQUESTING PHYSICIAN:  Cece Nweell MD    REASON FOR CONSULTATION:  Lumbar stenosis, immobility.    HISTORY OF PRESENT ILLNESS:  This is a 71-year-old male with a history of   colon cancer, who follows up at [facility], has been complaining of severe back pain   with lower extremity weakness.  Over the last several days, he has been   non-ambulatory due to leg weakness and back pain.  He is also complaining of   bilateral upper extremity numbness in all 5 digits of both of his hands.  He   states that this has been rapidly progressive over the last 1-2 weeks.    REVIEW OF SYSTEMS:  As stated above, otherwise a 14-point review of systems is   negative.    PAST MEDICAL HISTORY:  1.  Colon cancer.  2.  Esophageal varices.  3.  Iron deficiency anemia.  4.  Liver metastases.  5.  Coronary artery disease.  6.  Thrombocytopenia.    PAST SURGICAL HISTORY:  1.  Right hip arthroplasty.  2.  Chemotherapy port.  3.  Upper GI.    FAMILY HISTORY:  1.  Liver cirrhosis.  2.  Coronary artery disease.    SOCIAL HISTORY:  Denies current alcohol, tobacco or illicit drug use.    ALLERGIES:  No known drug allergies.    HOME MEDICATIONS:  1.  Aspirin.  2.  Lipitor.  3.  Baclofen.  4.  Coreg.  5.  Vitamin D.  6.  Folic acid.  7.  Neurontin.  8.  Cozaar.  9.  Multivitamin.  10.  Tramadol.  11.  Actigall.    PHYSICAL EXAMINATION:  VITAL SIGNS:  Afebrile.  Vital signs stable.  GENERAL:  No acute distress.  HEENT:  Normocephalic, atraumatic.  EXTREMITIES:  There is mild upper extremity hand intrinsic wasting, left   greater than right.  2+ peripheral pulses.  No clubbing, cyanosis or edema.  NEUROLOGIC:  Cranial nerves II-XII intact.  Bilateral lower extremity shows   weakness in the left lower extremity greater than the right.  Left EHL 3/5,   left knee extension 4/5, left plantar flexion 4/5.    LABORATORY DATA:  All labs have been  reviewed and are notable for   thrombocytopenia.  INR of 1.3.    IMAGING:  MRI of the lumbar spine shows severe central stenosis due to   bilateral facet hypertrophy at L4-5.  Cervical spine MRI shows no central or   foraminal stenosis.  There is mild multilevel degenerative disease.    ASSESSMENT:  A 71-year-old male.  1.  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  2.  Immobility.    PLAN:  1.  Patient does need an L4-S1 laminectomy, which I would like to do for him   possibly as early as tomorrow evening, 05/25/2017.  2.  Thrombocytopenia.  I am concerned about this, particularly with his   concomitant daily aspirin.  While I do not want to delay surgery for an   extensive period of time due to his immobility and the comorbidities _____, he   will need a platelet transfusion during surgery to decrease his surgical   risks.  3.  There is nothing in his cervical spine MRI that accounts for his severe   bilateral upper extremity numbness.  While he does require general anesthesia   for MRIs, I believe that we should get an MRI with and without contrast at   this point just to rule out any intracranial disease causing his severe upper   extremity numbness and muscle wasting.  If that MRI is negative, I can always   order him an EMG as an outpatient.  However, due to the need for general   anesthesia, it would be much easier to get the MRI of his brain done while he   is in the hospital.  This can come over the next couple of days, it is not   urgent.       ____________________________________     Felton Escobar MD SA / LEEANN    DD:  05/24/2017 20:36:00  DT:  05/24/2017 22:41:12    D#:  [id number]  Job#:  [id number]

## 2017-05-25 NOTE — PROGRESS NOTES
Hospital Medicine Progress Note, Adult, Complex               Author: Cece Newell Date & Time created: 5/25/2017  7:38 AM     Interval History:  The patient has h/o colon cancer with metastases to the liver and liver cirrhosis and presented with worsening back pain    5/23 MRI is done  5/24 neurosurgery Dr. Escobar consulted for lumbar spine stenosis  Hand numbness and weakness persists today but no finding on MRI of cervical spine to explain this    Review of Systems:  Review of Systems   Constitutional: Negative for fever and chills.   Respiratory: Negative for cough and shortness of breath.    Cardiovascular: Negative for chest pain.   Gastrointestinal: Negative for nausea and vomiting.   Genitourinary: Negative for dysuria, urgency and frequency.   Musculoskeletal: Positive for back pain. Negative for myalgias.   Neurological: Positive for sensory change and focal weakness. Negative for headaches.        Legs are weak and he can only stand for 5 seconds before sitting due to legs giving out       Physical Exam:  Physical Exam   Constitutional: He is oriented to person, place, and time. No distress.   obese   Eyes: Conjunctivae are normal.   Neck: Neck supple.   Cardiovascular: Normal rate, regular rhythm and intact distal pulses.    Pulmonary/Chest: Effort normal. No respiratory distress. He has no wheezes. He has no rales.   Abdominal: Soft. Bowel sounds are normal. He exhibits no distension. There is no tenderness.   Musculoskeletal: He exhibits no edema.   Neurological: He is alert and oriented to person, place, and time. Coordination normal.   Arms strength 4+/5 bilaterally, patient moving legs equally   Skin: Skin is warm and dry. No rash noted. He is not diaphoretic.   Psychiatric: His behavior is normal.   Nursing note and vitals reviewed.      Labs:        Invalid input(s): [id number]      Recent Labs      05/23/17   0151   SODIUM  139   POTASSIUM  3.8   CHLORIDE  108   CO2  24   BUN  14    CREATININE  0.66   CALCIUM  8.7     Recent Labs      17   0151   GLUCOSE  109*     Recent Labs      17   1619  17   0151   RBC   --   4.46*   HEMOGLOBIN   --   12.2*   HEMATOCRIT   --   36.9*   PLATELETCT   --   59*   PROTHROMBTM  16.5*   --    APTT  28.1   --    INR  1.29*   --      Recent Labs      17   0151   WBC  5.1           Hemodynamics:  Temp (24hrs), Av.9 °C (98.5 °F), Min:36.8 °C (98.2 °F), Max:37.2 °C (99 °F)  Temperature: 37.2 °C (99 °F)  Pulse  Av.1  Min: 62  Max: 91   Blood Pressure : 134/74 mmHg     Respiratory:    Respiration: 17, Pulse Oximetry: 92 %        RUL Breath Sounds: Clear, RML Breath Sounds: Clear, RLL Breath Sounds: Diminished, JOSE Breath Sounds: Clear, LLL Breath Sounds: Diminished  Fluids:    Intake/Output Summary (Last 24 hours) at 17 0738  Last data filed at 17 1800   Gross per 24 hour   Intake    816 ml   Output      0 ml   Net    816 ml        GI/Nutrition:  Orders Placed This Encounter   Procedures   • DIET ORDER     Standing Status: Standing      Number of Occurrences: 1      Standing Expiration Date:      Order Specific Question:  Diet:     Answer:  2 Gram Sodium [7]     Medical Decision Making, by Problem:  Active Hospital Problems    Diagnosis   • *Generalized weakness [R53.1] physical therapy to treat, will get MRI of brain after discussion with neurosurgery Dr. Escobar  Lumbar spine stenosis, surgery later today   • Esophageal varices- BANDED 2016- repeat egd tbd 10//29/16- gic [I85.00] GI follow up with Dr. Stovall, guaiac positive, will monitor labs   • Occult blood positive stool [R19.5] hemoglobin stable, no anticoagulation   • Cirrhosis (CMS-HCC) [K74.60] paracentesis done   • H/O colon cancer, stage IV [Z85.038] follow up with Dr. Anguiano   • Cirrhosis of liver with ascites (HCC)- ? DUE TO OLD HEP B, CHEMO RX,  OR THERMO RAD OF MAGNANT LESION [K74.60]   Plan discussed with patient and wife at the bedside    Labs  reviewed and Medications reviewed  Vivas catheter: No Vivas      DVT Prophylaxis: Contraindicated - High bleeding risk    Ulcer prophylaxis: Not indicated    Assessed for rehab: Patient was assess for and/or received rehabilitation services during this hospitalization

## 2017-05-25 NOTE — PROGRESS NOTES
Assumed patient care at 1900. POC discussed w/ day nurse and pt, pt in agreement w/ goals. Wife at bedside. Pt AOx4. Pt states back pain that is relieved by sitting at EOB and tripoding on table, oxy 10 given as needed. Pt denies nausea. Pt up one assist, EOB and commode. Neuro consulted, possible surgery tomorrow on back. No orders yet for NPO status. Patient educated on use of call light, hourly rounding, and pain scale. Personal possession and call light within reach. Bed alarm refused. Patient calls appropriately.

## 2017-05-25 NOTE — DISCHARGE PLANNING
Received call from Janette at [facility], referral has been accepted. [facility] notified.

## 2017-05-25 NOTE — CARE PLAN
Problem: Safety  Goal: Will remain free from falls  Intervention: Implement fall precautions  Pt up 2 to bedside commode. Bed alarm in use, bed in lowest and locked position, non-skid socks in place.      Problem: Knowledge Deficit  Goal: Knowledge of disease process/condition, treatment plan, diagnostic tests, and medications will improve  Intervention: Explain information regarding disease process/condition, treatment plan, diagnostic tests, and medications and document in education  Patient educated about medications and potential side effects.

## 2017-05-25 NOTE — DISCHARGE PLANNING
Care Transition Team Assessment    Information Source  Orientation : Oriented x 4  Information Given By: Patient  Informant's Name: Torey  Who is responsible for making decisions for patient? : Patient         Elopement Risk  Legal Hold: No  Ambulatory or Self Mobile in Wheelchair: No-Not an Elopement Risk  Elopement Risk: Not at Risk for Elopement    Interdisciplinary Discharge Planning  Does Admitting Nurse Feel This Could be a Complex Discharge?: Yes  Primary Care Physician: Dr. Urena  Lives with - Patient's Self Care Capacity: Spouse  Patient or legal guardian wants to designate a caregiver (see row info): No  Support Systems: Friends / Neighbors, Spouse / Significant Other  Housing / Facility: [address]  Name of Care Facility: n/a  Do You Take your Prescribed Medications Regularly: Yes  Able to Return to Previous ADL's: Future Time w/Therapy  Mobility Issues: Yes  Prior Services: None  Patient Expects to be Discharged to:: home  Assistance Needed: No  Durable Medical Equipment: Walker  DME Provider / Phone: n/a    Discharge Preparedness  What is your plan after discharge?: Uncertain - pending medical team collaboration (Patient mentioned inpt therapy. He is unsure on this.)  What are your discharge supports?: Spouse  Prior Functional Level: Ambulatory  Difficulity with ADLs: None, Other (not until his recent back pain)  Difficulity with IADLs: None    Functional Assesment  Prior Functional Level: Ambulatory    Finances  Financial Barriers to Discharge: No  Prescription Coverage: Yes    Vision / Hearing Impairment  Vision Impairment : No  Hearing Impairment : No    Values / Beliefs / Concerns  Values / Beliefs Concerns : No  Special Hospitalization Concerns: n/a    Advance Directive  Advance Directive?: Living Will    Domestic Abuse  Have you ever been the victim of abuse or violence?: No  Physical Abuse or Sexual Abuse: No  Verbal Abuse or Emotional Abuse: No  Possible Abuse Reported to:: Not  Applicable    Psychological Assessment  History of Substance Abuse: None  History of Psychiatric Problems: No  Non-compliant with Treatment: No  Newly Diagnosed Illness: No    Discharge Risks or Barriers  Discharge risks or barriers?: No    Anticipated Discharge Information  Anticipated discharge disposition: Discharge needs currently unknown

## 2017-05-26 ENCOUNTER — APPOINTMENT (OUTPATIENT)
Dept: SLEEP MEDICINE | Facility: MEDICAL CENTER | Age: 72
End: 2017-05-26
Attending: INTERNAL MEDICINE
Payer: MEDICARE

## 2017-05-26 ENCOUNTER — APPOINTMENT (OUTPATIENT)
Dept: RADIOLOGY | Facility: MEDICAL CENTER | Age: 72
DRG: 040 | End: 2017-05-26
Attending: INTERNAL MEDICINE
Payer: MEDICARE

## 2017-05-26 PROCEDURE — A9270 NON-COVERED ITEM OR SERVICE: HCPCS | Performed by: INTERNAL MEDICINE

## 2017-05-26 PROCEDURE — 770001 HCHG ROOM/CARE - MED/SURG/GYN PRIV*

## 2017-05-26 PROCEDURE — G8979 MOBILITY GOAL STATUS: HCPCS | Mod: CJ

## 2017-05-26 PROCEDURE — 700102 HCHG RX REV CODE 250 W/ 637 OVERRIDE(OP): Performed by: NURSE PRACTITIONER

## 2017-05-26 PROCEDURE — G8978 MOBILITY CURRENT STATUS: HCPCS | Mod: CM

## 2017-05-26 PROCEDURE — G8988 SELF CARE GOAL STATUS: HCPCS | Mod: CJ

## 2017-05-26 PROCEDURE — G8987 SELF CARE CURRENT STATUS: HCPCS | Mod: CL

## 2017-05-26 PROCEDURE — 97168 OT RE-EVAL EST PLAN CARE: CPT

## 2017-05-26 PROCEDURE — A9270 NON-COVERED ITEM OR SERVICE: HCPCS | Performed by: NURSE PRACTITIONER

## 2017-05-26 PROCEDURE — 700111 HCHG RX REV CODE 636 W/ 250 OVERRIDE (IP): Performed by: PHYSICIAN ASSISTANT

## 2017-05-26 PROCEDURE — 97164 PT RE-EVAL EST PLAN CARE: CPT

## 2017-05-26 PROCEDURE — 51798 US URINE CAPACITY MEASURE: CPT

## 2017-05-26 PROCEDURE — A9270 NON-COVERED ITEM OR SERVICE: HCPCS | Performed by: PHYSICIAN ASSISTANT

## 2017-05-26 PROCEDURE — 700102 HCHG RX REV CODE 250 W/ 637 OVERRIDE(OP): Performed by: INTERNAL MEDICINE

## 2017-05-26 PROCEDURE — 99233 SBSQ HOSP IP/OBS HIGH 50: CPT | Performed by: INTERNAL MEDICINE

## 2017-05-26 PROCEDURE — 700102 HCHG RX REV CODE 250 W/ 637 OVERRIDE(OP): Performed by: PHYSICIAN ASSISTANT

## 2017-05-26 RX ADMIN — ACETAMINOPHEN 1000 MG: 500 TABLET ORAL at 20:20

## 2017-05-26 RX ADMIN — OXYCODONE HYDROCHLORIDE 10 MG: 10 TABLET ORAL at 23:00

## 2017-05-26 RX ADMIN — CEFAZOLIN SODIUM 2 G: 2 INJECTION, SOLUTION INTRAVENOUS at 05:31

## 2017-05-26 RX ADMIN — ANTACID TABLETS 500 MG: 500 TABLET, CHEWABLE ORAL at 09:46

## 2017-05-26 RX ADMIN — ATORVASTATIN CALCIUM 40 MG: 40 TABLET, FILM COATED ORAL at 20:20

## 2017-05-26 RX ADMIN — CARVEDILOL 3.12 MG: 6.25 TABLET, FILM COATED ORAL at 18:05

## 2017-05-26 RX ADMIN — FOLIC ACID 1 MG: 1 TABLET ORAL at 09:44

## 2017-05-26 RX ADMIN — LOSARTAN POTASSIUM 25 MG: 50 TABLET, FILM COATED ORAL at 09:46

## 2017-05-26 RX ADMIN — ACETAMINOPHEN 1000 MG: 500 TABLET ORAL at 05:33

## 2017-05-26 RX ADMIN — ACETAMINOPHEN 1000 MG: 500 TABLET ORAL at 23:56

## 2017-05-26 RX ADMIN — ANTACID TABLETS 500 MG: 500 TABLET, CHEWABLE ORAL at 20:20

## 2017-05-26 RX ADMIN — TRAMADOL HYDROCHLORIDE 50 MG: 50 TABLET, COATED ORAL at 23:56

## 2017-05-26 RX ADMIN — MAGNESIUM HYDROXIDE 30 ML: 400 SUSPENSION ORAL at 18:13

## 2017-05-26 RX ADMIN — ACETAMINOPHEN 1000 MG: 500 TABLET ORAL at 13:46

## 2017-05-26 RX ADMIN — CARVEDILOL 3.12 MG: 6.25 TABLET, FILM COATED ORAL at 09:45

## 2017-05-26 ASSESSMENT — ENCOUNTER SYMPTOMS
NERVOUS/ANXIOUS: 0
VOMITING: 0
ABDOMINAL PAIN: 0
CLAUDICATION: 0
SPEECH CHANGE: 0
DEPRESSION: 0
FEVER: 0
SENSORY CHANGE: 1
BACK PAIN: 1
HEADACHES: 0
CHILLS: 0
CONSTIPATION: 0
DIZZINESS: 0
SHORTNESS OF BREATH: 0
DIARRHEA: 0
COUGH: 0
PHOTOPHOBIA: 0
MYALGIAS: 0
BLURRED VISION: 0
HEARTBURN: 0
WEAKNESS: 0
INSOMNIA: 0
FOCAL WEAKNESS: 1

## 2017-05-26 ASSESSMENT — COGNITIVE AND FUNCTIONAL STATUS - GENERAL
PERSONAL GROOMING: A LOT
HELP NEEDED FOR BATHING: A LOT
DRESSING REGULAR UPPER BODY CLOTHING: A LOT
DRESSING REGULAR LOWER BODY CLOTHING: A LOT
EATING MEALS: A LITTLE
SUGGESTED CMS G CODE MODIFIER DAILY ACTIVITY: CL
DAILY ACTIVITIY SCORE: 13
TOILETING: A LOT

## 2017-05-26 ASSESSMENT — LIFESTYLE VARIABLES: DO YOU DRINK ALCOHOL: NO

## 2017-05-26 ASSESSMENT — PAIN SCALES - GENERAL
PAINLEVEL_OUTOF10: ASSUMED PAIN PRESENT
PAINLEVEL_OUTOF10: 2
PAINLEVEL_OUTOF10: 5
PAINLEVEL_OUTOF10: 2
PAINLEVEL_OUTOF10: 7
PAINLEVEL_OUTOF10: 3
PAINLEVEL_OUTOF10: 7
PAINLEVEL_OUTOF10: 2

## 2017-05-26 ASSESSMENT — GAIT ASSESSMENTS: GAIT LEVEL OF ASSIST: UNABLE TO PARTICIPATE

## 2017-05-26 NOTE — PROGRESS NOTES
Bed in locked in position. Bed alarm NOT on, patient educated regarding safety risk and continues to refuse. Call light and personal belongings within reach. Hourly rounding in place.

## 2017-05-26 NOTE — DISCHARGE PLANNING
Contacted Luz(LYNDSEY) to verify if pts medically cleared for transfer as both [facility] and [facility] have accepted patient, however, no answer. Voicemail left.

## 2017-05-26 NOTE — DISCHARGE PLANNING
Received notification via fax from [facility], referral has been accepted.[facility] notified.

## 2017-05-26 NOTE — THERAPY
"Occupational Therapy  Re-valuation completed.   Functional Status:  Max A x2 supine>sit EOB, min A w/sitting balance increasing lateral lean to left significantly weaker than previous assessment, unable to bed knees up in bed, poor fine motor control w/self feeding and grooming, unable to hold BUE against gravity, sat EOB w/increasing forward posture attempted sit>stand w/total assist x2, c/o increasing fatigue, max x2 sit>supine BTB   Plan of Care: Will benefit from Occupational Therapy 5 times per week  Discharge Recommendations:  Equipment: Will Continue to Assess for Equipment Needs. Post-acute therapy Discharge to a transitional care facility for continued skilled therapy services.    Pt seen for OT tx s/p laminectomy pt continues to demonstrate progressive weakness and decreasing mobility and ADL's pt was unable to stand this session, reports ongoing sensitivity issues w/BUE, impaired dexterity and fine motor coordination, decreased postural control and fatigue, pt appeared more flat and depressed during session d/t lack of strength, wife is present and supportive pt will need post acute therapy prior to d/c home     See \"Rehab Therapy-Acute\" Patient Summary Report for complete documentation.    "

## 2017-05-26 NOTE — PROGRESS NOTES
Pt's MRI with anesthesia scheduled for 11:45am on Tuesday May 30. Pt will need to be NPO for at least 8 hrs prior to procedure. Deanne Maurice R.N.

## 2017-05-26 NOTE — THERAPY
"Physical Therapy reevaluation completed.   Bed Mobility:  Supine to Sit: Maximal Assist  Transfers: Sit to Stand: Unable to Participate (attempted from edge of raised height bed 2 person)  Gait: Level Of Assist: Unable to Participate with No Equipment Needed       Plan of Care: Will benefit from Physical Therapy 5 times per week  Discharge Recommendations: Equipment: Will Continue to Assess for Equipment Needs. Post-acute therapy Discharge to a transitional care facility for continued skilled therapy services.    See \"Rehab Therapy-Acute\" Patient Summary Report for complete documentation.     "

## 2017-05-26 NOTE — CARE PLAN
Problem: Communication  Goal: The ability to communicate needs accurately and effectively will improve  Patient communicates needs appropriately to nursing staff. Patient calls appropriately.    Problem: Safety  Goal: Will remain free from falls  Bed control on and bed in locked in position. Bed alarm NOT on, pt educated regarding safety risk and continues to refuse. Pt agreed to call prior to getting out of bed. Call light and personal belongings within reach. Hourly rounding in place.

## 2017-05-26 NOTE — OR SURGEON
Immediate Post-Operative Note      PreOp Diagnosis: lumbar stenosis    PostOp Diagnosis: lumbar stenosis    Procedure(s):  LUMBAR LAMINECTOMY LUMBAR  4 TO SACRAL 1 - Wound Class: Clean with Drain    Surgeon(s):  Felton Escobar M.D.    Anesthesiologist/Type of Anesthesia:  Anesthesiologist: Hugo Mcghee M.D./General    Surgical Staff:  Assistant: Matthew Reynoso PA-C  Circulator: Stormy Rivas R.N.  Scrub Person: Rivka Tompkins Jr.  Radiology Technologist: Dixie Interiano    Specimen: none    Estimated Blood Loss: 100cc    Findings: see operative note    Complications: none        5/25/2017 8:13 PM Felton Escobar

## 2017-05-26 NOTE — CARE PLAN
Problem: Bowel/Gastric:  Goal: Normal bowel function is maintained or improved  Outcome: PROGRESSING AS EXPECTED  Pt passing gas, has yet to have a BM    Problem: Mobility  Goal: Risk for activity intolerance will decrease  Outcome: PROGRESSING SLOWER THAN EXPECTED  Pt worked with PT; was unable to ambulate

## 2017-05-26 NOTE — PROGRESS NOTES
Regarding Mri- attemped to schedule anesthesia 5/26 1205pm-pt ate lunch. Rescheduled for 5-30 1145 am/anesthesia. Nurse notified. PPU scheduled for recovery

## 2017-05-26 NOTE — OR NURSING
Thigh high teds applied and sequentials replaced over top.  Pt ready to void now.  Pt still denies pain.

## 2017-05-26 NOTE — DISCHARGE PLANNING
Referral: SNF    Intervention: TC from [facility].  [facility] requested clarification on medical clearance for transfer to SNF.  SW spoke to DEWEY Urena.  Per Ayanna, pt not medically clear as he is only Post Op Day 1 and has an MRI scheduled for Tuesday May 31st for New Onset of Weakness, [facility] informed.    Plan: As Above.  SNF, pending medical clearance.

## 2017-05-26 NOTE — PROGRESS NOTES
Patient arrived floor via hospital bed. Alert and oriented x4. Family at bedside. No c/o pain. Denies nausea/vomitig/SOB. Pt on 2L oxygen via NC, currently sating at 95%. Dressing to incision site C/D/I. Hemovac in place with small sanguinous drainage. Pt voids into urinal. SCDs and tico hose in place. Bed low and locked position. Call light within reach. Hourly rounding in place.

## 2017-05-26 NOTE — PROGRESS NOTES
Progress Note               Author: Jimmy Leyva Date & Time created: 2017  8:31 AM     Interval History:  POD #1 open L4/5 lami  Pt stable  Per wife pt likely has had chronic and progressing foot drop  Also has arm weakness  Not great exam by patient today  Working with PT  To get brain MRI today  Drain working     Review of Systems:  Review of Systems   Musculoskeletal: Positive for back pain.       Physical Exam:  Physical Exam   Neurological:   Pt is generally weak  Left > right sided leg weakness; mainly to AT and EHL  Wound intact        Labs:        Invalid input(s): [id number]      No results for input(s): SODIUM, POTASSIUM, CHLORIDE, CO2, BUN, CREATININE, MAGNESIUM, PHOSPHORUS, CALCIUM in the last 72 hours.  No results for input(s): ALTSGPT, ASTSGOT, ALKPHOSPHAT, TBILIRUBIN, DBILIRUBIN, GAMMAGT, AMYLASE, LIPASE, ALB, PREALBUMIN, GLUCOSE in the last 72 hours.  Recent Labs      17   1613   RBC  4.83   HEMOGLOBIN  13.4*   HEMATOCRIT  40.6*   PLATELETCT  76*     Recent Labs      17   1613   WBC  5.5   NEUTSPOLYS  66.70   LYMPHOCYTES  13.90*   MONOCYTES  12.80   EOSINOPHILS  5.90   BASOPHILS  0.50     Hemodynamics:  Temp (24hrs), Av °C (98.6 °F), Min:36.2 °C (97.2 °F), Max:37.6 °C (99.7 °F)  Temperature: 37 °C (98.6 °F)  Pulse  Av  Min: 60  Max: 91Heart Rate (Monitored): 61  Blood Pressure : 122/68 mmHg, NIBP: 143/70 mmHg     Respiratory:    Respiration: 18, Pulse Oximetry: 95 %        RUL Breath Sounds: Clear, RML Breath Sounds: Clear, RLL Breath Sounds: Diminished, JOSE Breath Sounds: Clear, LLL Breath Sounds: Diminished  Fluids:    Intake/Output Summary (Last 24 hours) at 17 0831  Last data filed at 17 0600   Gross per 24 hour   Intake   2700 ml   Output   2020 ml   Net    680 ml        GI/Nutrition:  Orders Placed This Encounter   Procedures   • DIET ORDER     Standing Status: Standing      Number of Occurrences: 1      Standing Expiration Date:      Order  Specific Question:  Diet:     Answer:  2 Gram Sodium [7]     Medical Decision Making, by Problem:  Active Hospital Problems    Diagnosis   • *Generalized weakness [R53.1]   • Esophageal varices- BANDED 4/2016- repeat egd tbd 10//29/16- gic [I85.00]   • Central stenosis of spinal canal [M48.00]   • Lumbar canal stenosis [M48.06]   • Occult blood positive stool [R19.5]   • Cirrhosis (CMS-HCC) [K74.60]   • H/O colon cancer, stage IV [Z85.038]   • Cirrhosis of liver with ascites (HCC)- ? DUE TO OLD HEP B, CHEMO RX,  OR THERMO RAD OF MAGNANT LESION [K74.60]       Plan:  PT/OT  Brain MRI  Pt may need rehab   Will follow     Labs reviewed, Radiology images reviewed and Medications reviewed

## 2017-05-26 NOTE — PROGRESS NOTES
Paged Dr. Duff regarding on diet order. 2g sodium diet ordered instead of regular diet. No IV fluid ordered at this time.

## 2017-05-26 NOTE — PROGRESS NOTES
MRI on hold per nurse. Pt in surgery/ per nurse - pt needs to wait for additional anesthesia for MRI post surgery. Nurse awaiting physician phone call.

## 2017-05-27 PROBLEM — G61.0 AIDP (ACUTE INFLAMMATORY DEMYELINATING POLYNEUROPATHY) (HCC): Status: ACTIVE | Noted: 2017-05-27

## 2017-05-27 LAB — ERYTHROCYTE [SEDIMENTATION RATE] IN BLOOD BY WESTERGREN METHOD: 9 MM/HOUR (ref 0–20)

## 2017-05-27 PROCEDURE — 82552 ASSAY OF CPK IN BLOOD: CPT

## 2017-05-27 PROCEDURE — 95908 NRV CNDJ TST 3-4 STUDIES: CPT | Mod: 26 | Performed by: SPECIALIST

## 2017-05-27 PROCEDURE — 770001 HCHG ROOM/CARE - MED/SURG/GYN PRIV*

## 2017-05-27 PROCEDURE — A9270 NON-COVERED ITEM OR SERVICE: HCPCS | Performed by: NURSE PRACTITIONER

## 2017-05-27 PROCEDURE — 700102 HCHG RX REV CODE 250 W/ 637 OVERRIDE(OP): Performed by: PHYSICIAN ASSISTANT

## 2017-05-27 PROCEDURE — 700102 HCHG RX REV CODE 250 W/ 637 OVERRIDE(OP): Performed by: INTERNAL MEDICINE

## 2017-05-27 PROCEDURE — 36415 COLL VENOUS BLD VENIPUNCTURE: CPT

## 2017-05-27 PROCEDURE — 700111 HCHG RX REV CODE 636 W/ 250 OVERRIDE (IP): Performed by: SPECIALIST

## 2017-05-27 PROCEDURE — 700102 HCHG RX REV CODE 250 W/ 637 OVERRIDE(OP): Performed by: NURSE PRACTITIONER

## 2017-05-27 PROCEDURE — 95908 NRV CNDJ TST 3-4 STUDIES: CPT

## 2017-05-27 PROCEDURE — 84160 ASSAY OF PROTEIN ANY SOURCE: CPT

## 2017-05-27 PROCEDURE — 84165 PROTEIN E-PHORESIS SERUM: CPT

## 2017-05-27 PROCEDURE — A9270 NON-COVERED ITEM OR SERVICE: HCPCS | Performed by: INTERNAL MEDICINE

## 2017-05-27 PROCEDURE — 82550 ASSAY OF CK (CPK): CPT

## 2017-05-27 PROCEDURE — 85652 RBC SED RATE AUTOMATED: CPT

## 2017-05-27 PROCEDURE — 86235 NUCLEAR ANTIGEN ANTIBODY: CPT | Mod: 91

## 2017-05-27 PROCEDURE — 700111 HCHG RX REV CODE 636 W/ 250 OVERRIDE (IP): Performed by: PHYSICIAN ASSISTANT

## 2017-05-27 PROCEDURE — 30243S1 TRANSFUSION OF NONAUTOLOGOUS GLOBULIN INTO CENTRAL VEIN, PERCUTANEOUS APPROACH: ICD-10-PCS | Performed by: SPECIALIST

## 2017-05-27 PROCEDURE — 99233 SBSQ HOSP IP/OBS HIGH 50: CPT | Performed by: INTERNAL MEDICINE

## 2017-05-27 PROCEDURE — 86038 ANTINUCLEAR ANTIBODIES: CPT

## 2017-05-27 PROCEDURE — A9270 NON-COVERED ITEM OR SERVICE: HCPCS | Performed by: PHYSICIAN ASSISTANT

## 2017-05-27 RX ORDER — IMMUNE GLOBULIN 50 MG/ML
10 SOLUTION INTRAVENOUS DAILY
Status: COMPLETED | OUTPATIENT
Start: 2017-05-27 | End: 2017-05-31

## 2017-05-27 RX ORDER — IMMUNE GLOBULIN 50 MG/ML
5 SOLUTION INTRAVENOUS DAILY
Status: COMPLETED | OUTPATIENT
Start: 2017-05-27 | End: 2017-05-31

## 2017-05-27 RX ADMIN — ACETAMINOPHEN 1000 MG: 500 TABLET ORAL at 23:56

## 2017-05-27 RX ADMIN — ACETAMINOPHEN 1000 MG: 500 TABLET ORAL at 13:15

## 2017-05-27 RX ADMIN — LOSARTAN POTASSIUM 25 MG: 50 TABLET, FILM COATED ORAL at 08:15

## 2017-05-27 RX ADMIN — ATORVASTATIN CALCIUM 40 MG: 40 TABLET, FILM COATED ORAL at 20:07

## 2017-05-27 RX ADMIN — OXYCODONE HYDROCHLORIDE 10 MG: 10 TABLET ORAL at 20:07

## 2017-05-27 RX ADMIN — CARVEDILOL 3.12 MG: 6.25 TABLET, FILM COATED ORAL at 18:02

## 2017-05-27 RX ADMIN — ANTACID TABLETS 500 MG: 500 TABLET, CHEWABLE ORAL at 08:15

## 2017-05-27 RX ADMIN — IMMUNE GLOBULIN 5 G: 50 SOLUTION INTRAVENOUS at 14:39

## 2017-05-27 RX ADMIN — ACETAMINOPHEN 1000 MG: 500 TABLET ORAL at 18:02

## 2017-05-27 RX ADMIN — MAGNESIUM HYDROXIDE 30 ML: 400 SUSPENSION ORAL at 20:10

## 2017-05-27 RX ADMIN — MORPHINE SULFATE 4 MG: 4 INJECTION INTRAVENOUS at 02:11

## 2017-05-27 RX ADMIN — ANTACID TABLETS 500 MG: 500 TABLET, CHEWABLE ORAL at 20:07

## 2017-05-27 RX ADMIN — FOLIC ACID 1 MG: 1 TABLET ORAL at 08:15

## 2017-05-27 RX ADMIN — CARVEDILOL 3.12 MG: 6.25 TABLET, FILM COATED ORAL at 08:15

## 2017-05-27 RX ADMIN — ACETAMINOPHEN 1000 MG: 500 TABLET ORAL at 06:00

## 2017-05-27 RX ADMIN — IMMUNE GLOBULIN 10 G: 50 SOLUTION INTRAVENOUS at 17:39

## 2017-05-27 RX ADMIN — OXYCODONE HYDROCHLORIDE 10 MG: 10 TABLET ORAL at 04:38

## 2017-05-27 RX ADMIN — IMMUNE GLOBULIN 10 G: 50 SOLUTION INTRAVENOUS at 16:14

## 2017-05-27 RX ADMIN — OXYCODONE HYDROCHLORIDE 10 MG: 10 TABLET ORAL at 23:56

## 2017-05-27 RX ADMIN — IMMUNE GLOBULIN 10 G: 50 SOLUTION INTRAVENOUS at 18:25

## 2017-05-27 ASSESSMENT — PAIN SCALES - GENERAL
PAINLEVEL_OUTOF10: 7
PAINLEVEL_OUTOF10: 6
PAINLEVEL_OUTOF10: 6
PAINLEVEL_OUTOF10: 9
PAINLEVEL_OUTOF10: 2
PAINLEVEL_OUTOF10: 3
PAINLEVEL_OUTOF10: 4
PAINLEVEL_OUTOF10: 6

## 2017-05-27 ASSESSMENT — ENCOUNTER SYMPTOMS
SPEECH CHANGE: 0
BLURRED VISION: 0
MYALGIAS: 0
FEVER: 0
CHILLS: 0
COUGH: 0
BACK PAIN: 1
WEAKNESS: 0
HEARTBURN: 0
SHORTNESS OF BREATH: 0
HEADACHES: 0
NERVOUS/ANXIOUS: 0
CONSTIPATION: 0
VOMITING: 0
ABDOMINAL PAIN: 0
DIZZINESS: 0
PHOTOPHOBIA: 0
INSOMNIA: 0
FOCAL WEAKNESS: 1
DIARRHEA: 0
DEPRESSION: 0
SENSORY CHANGE: 1
CLAUDICATION: 0

## 2017-05-27 NOTE — PROGRESS NOTES
Bed control on and bed in locked in position. Bed alarm NOT on, patient educated regarding safety risk and continues to refuse. No skid socks on. Call light and personal belongings within reach. Hourly rounding in place.

## 2017-05-27 NOTE — ASSESSMENT & PLAN NOTE
S/p decompression with Abdunnur 5/25, (NSG signed off)  PT/OT   Still total assist  Not a candidate for acute rehab at this time per Physiatry eval

## 2017-05-27 NOTE — ASSESSMENT & PLAN NOTE
Upper and lower extremity weakness  Neurology suspecting AIDP; improving s/p IVIG   Pain improved by lumbar decompression with Dr Escobar 5/25, continue therapies  C spine without reasoning for UE Weakness, MRI brain 5/30 results noted  Not a candidate for acute rehab per Physiatry eval; may consider SNF

## 2017-05-27 NOTE — ASSESSMENT & PLAN NOTE
Complications of cancer/chemo  Watch for coagulopathy  H/o portal vein thrombosis  Hepatologist is Dr Culp

## 2017-05-27 NOTE — PROGRESS NOTES
Hospital Medicine Interval Note  Date of Service:  5/27/2017    Chief Complaint  71 y.o.-year-old male admitted 5/20/2017 with progressive B upper and lower extremity weakness.  Significant lumbar stenosis post decompression 5/25.    Interval Problem Update  5/26 Patient tolerated surgery well with Dr Escobar, ROBERT drain in place post operative, cause of UE weakness still not readily identifiable and MRI brain pending.  Discussed with patient need for anesthesia for procedure - is necessary and though he is willing to try to do with anxiolytics, his claustrophobia is very strong and will proceed with anesthesia procedure as planned on Tuesday.  He will need continued therapies and eventual SNF to recover once cause of weakness found.    5/27 Patient feeling weaker today and NSG consulted Dr Rosales for neurology consultation.  High suspicion of AIDP/ Gu[place]ne [place].  IVIG ordered for admin soon.  PICC ordered but infusion can start as soon as prepared.  He has a blood type with lesser type C and D according to the hepatology card he carries with him.      Consultants/Specialty  NSG - Luz/Liv    Disposition   SNF when appropriate - after Tuesday's MRI     Review of Systems   Constitutional: Negative for fever and chills.   HENT: Negative for congestion.    Eyes: Negative for blurred vision and photophobia.   Respiratory: Negative for cough and shortness of breath.    Cardiovascular: Negative for chest pain, claudication and leg swelling.   Gastrointestinal: Negative for heartburn, vomiting, abdominal pain, diarrhea and constipation.   Genitourinary: Negative for dysuria and hematuria.   Musculoskeletal: Negative for myalgias and joint pain.   Skin: Negative for itching and rash.   Neurological: Positive for sensory change (leg sensation better post-op) and focal weakness (legs slightly better, arms still feeling weak ). Negative for dizziness, speech change, weakness and headaches.   Psychiatric/Behavioral:  Negative for depression. The patient is not nervous/anxious and does not have insomnia.       Physical Exam Laboratory/Imaging   Filed Vitals:    05/27/17 0400 05/27/17 0721 05/27/17 1440 05/27/17 1500   BP: 122/60 114/62 117/55 117/53   Pulse: 61 67 70    Temp: 37.2 °C (98.9 °F) 37 °C (98.6 °F)  36.9 °C (98.4 °F)   Resp: 16 18     Height:       Weight:       SpO2: 94% 95%       Physical Exam   Constitutional: He is oriented to person, place, and time. He appears well-developed and well-nourished.   HENT:   Head: Normocephalic and atraumatic.   Eyes: Conjunctivae are normal. No scleral icterus.   Neck: Neck supple. No JVD present.   Cardiovascular: Normal rate, regular rhythm, normal heart sounds and intact distal pulses.  Exam reveals no gallop and no friction rub.    No murmur heard.  Pulmonary/Chest: Effort normal and breath sounds normal. No respiratory distress. He exhibits no tenderness.   Abdominal: Soft. Bowel sounds are normal. He exhibits no distension and no mass. There is no tenderness.   Musculoskeletal: He exhibits no edema or tenderness.   Neurological: He is alert and oriented to person, place, and time. No cranial nerve deficit.   Decreased strength BLE, BUE     Skin: Skin is warm and dry. No rash noted. He is not diaphoretic.   Red area lateral right forearm, consistent with pressure injury     Psychiatric: He has a normal mood and affect. His behavior is normal.   Nursing note and vitals reviewed.   Lab Results   Component Value Date/Time    WBC 5.5 05/25/2017 04:13 PM    HEMOGLOBIN 13.4* 05/25/2017 04:13 PM    HEMATOCRIT 40.6* 05/25/2017 04:13 PM    PLATELET COUNT 76* 05/25/2017 04:13 PM     Lab Results   Component Value Date/Time    SODIUM 139 05/23/2017 01:51 AM    POTASSIUM 3.8 05/23/2017 01:51 AM    CHLORIDE 108 05/23/2017 01:51 AM    CO2 24 05/23/2017 01:51 AM    GLUCOSE 109* 05/23/2017 01:51 AM    BUN 14 05/23/2017 01:51 AM    CREATININE 0.66 05/23/2017 01:51 AM    GLOM FILT RATE, EST >59  01/23/2010 12:00 AM      Assessment/Plan    * Generalized weakness (present on admission)  Assessment & Plan  Upper and lower extremity weakness  Neurology suspecting AIDP, IVIG ordered  Pain improved by lumbar decompression with Dr Escobar 5/25, continue therapies  C spine without reasoning for UE Weakness, MRI brain Tuesday 5/30 with anesthesia      Esophageal varices- BANDED 4/2016- repeat egd tbd 10//29/16- gic (present on admission)  Assessment & Plan  No incidence of acute bleeding    Essential hypertension (present on admission)  Assessment & Plan  Coreg, cozaar  Labetalol available if needed      Cirrhosis of liver with ascites (HCC)- ? DUE TO OLD HEP B, CHEMO RX,  OR THERMO RAD OF MAGNANT LESION (present on admission)  Assessment & Plan  Complications of cancer/chemo  Watch for coagulopathy  H/o portal vein thrombosis  Hepatologist is Dr Culp      H/O colon cancer, stage IV (present on admission)  Assessment & Plan  Patient reported to be in remission, had seen Dr Anguiano in the past, has completed chemo 5 years prior  [facility] care team with hepatic team: Ramses Light Yao    Occult blood positive stool (present on admission)  Assessment & Plan  Trend h/h and no AC      Lumbar canal stenosis  Assessment & Plan  S/p decompression with Luz 5/25, ROBERT drain in place  PT/OT       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

## 2017-05-27 NOTE — CONSULTS
DATE OF SERVICE:  05/27/2017    CHIEF COMPLAINT:  Weakness.    HISTORY OF PRESENT ILLNESS:  I am asked by Dr. Pierre Peck to see the patient,   71-year-old gentleman, who was admitted on the 21st.  The patient has a   history of colon cancer and underwent a colectomy in 2010.  He received   chemotherapy after that and developed cirrhosis secondary to that.  He also   had a portal vein thrombosis.  He states that 2 weeks ago, he began to note   weakness in his hands.  He had difficulty opening jars and using a stapler.    This has progressed.  He also has a history of back pain and began to note   more severe low back pain.  On the day of admission, he had difficulty   standing.  He presented to the hospital and was noted on MRI of the lumbar   spine to have severe stenosis at L4-L5.  A cervical MRI revealed mild cervical   spondylitic changes at C5-C6 and C6-C7, but no significant canal or foraminal   narrowing.  As a result, the patient underwent a lumbar decompression on the   night of the 25th.  His pain is better, but he feels his weakness has   progressed.  He still has paresthesias in his hands.  He has no numbness in   his feet.  He has no difficulty swallowing and no diplopia.  He denies any   antecedent infection.  He has never had weakness such as this in the past.    Labs are remarkable for platelets 76,000, they have been as low as 55,000 on   this admission.  His white count is 5500, hematocrit is 40.6.  His bilirubin   is slightly elevated at 1.6, SGOT is 52, and SGPT is 54.    PAST MEDICAL HISTORY:  1.  Colon cancer, status post resection and chemotherapy.  2.  Occult blood positive, he has esophageal varices from cirrhosis.  3.  Status post right hip replacement.  4.  Status post lumbar laminectomy.    MEDICATIONS:  Include, acetaminophen, Lipitor, Cepacol, Tums, Coreg, folvite,   Cozaar, p.r.n. Zofran.    ALLERGIES:  NKDA.    SOCIAL HISTORY:  Does not smoke.    FAMILY HISTORY:   Noncontributory.    REVIEW OF SYSTEMS:  As above.    PHYSICAL EXAMINATION:  GENERAL:  The patient is a cooperative gentleman who is alert.  His speech is   fluent.  Mental status is grossly intact.  HEENT:  Normocephalic.  Pupils equal, round, reactive.  EOMs are full.  Visual   fields are full.  His tongue is midline without fasciculations.  NEUROLOGIC:  He has bilateral upper extremity drift.  He has no dysmetria.  He   has relatively symmetric quadriparesis, interossei are 3.  Finger extensors   are 2,  is 4, wrist extensors are 3-4, triceps is 4, biceps and deltoid   are intact.  Hip flexors are 1-2, quadriceps are 3, tibialis anterior is 4.    He has no numbness to pin.  He is areflexive throughout with no toe signs.    Cranial nerves are unremarkable.    IMPRESSION:  The patient is a 71-year-old gentleman who presented with a two   week history of weakness and paresthesias.  He has quadriparesis, which   appears lower motor neuron and he is areflexive.  He likely has AIDP   clinically.  He will have other laboratory studies.  Consideration will be   given for nerve conduction studies, but I believe he meets criteria for AIDP   and should be a candidate for IVIG.    Thank you for this consultation.       ____________________________________     G MD MARIA E BROWNE / LEEANN    DD:  05/27/2017 12:53:42  DT:  05/27/2017 16:26:42    D#:  [id number]  Job#:  [id number]

## 2017-05-27 NOTE — ASSESSMENT & PLAN NOTE
Patient reported to be in remission, had seen Dr Anguiano in the past, has completed chemo 5 years prior  [facility] care team with hepatic team: Ramses Light, Robbi

## 2017-05-27 NOTE — PROCEDURES
DATE OF SERVICE:  2017    DATE OF STUDY:  2017    ORDERING PHYSICIAN:  Pierre Peck MD    Please refer to waveforms stored in the computer for absolute latencies and   waveforms.    SUMMARY:  Nerve conduction studies of the right upper extremity revealed the   followin.  Right ulnar motor distal latency, amplitude, and conduction velocity are   within normal limits.  2.  Right ulnar F wave exhibits a normal distal latency.    Nerve conduction studies of the right lower extremity revealed the followin.  Right common peroneal motor response is very low amplitude and would not   be triggered by the computer.  2.  Right tibial motor distal latency is prolonged.  Amplitude is within   normal limits and conduction velocity is low normal.    IMPRESSION:  Nerve conduction studies showing a prolonged right tibial motor   distal latency, which could be a reflection of a demyelinating process.  There   is a very low amplitude of right common peroneal motor response, which is   likely axonal, ulnar responses were within normal limits.  Clinical   correlation is suggested.       ____________________________________     G MD MARIA E BROWNE / LEEANN    DD:  2017 14:23:52  DT:  2017 16:29:50    D#:  [id number]  Job#:  [id number]

## 2017-05-27 NOTE — PROGRESS NOTES
Pt currently resting in bed no visible or stated distress. Pt's wife at bedside. Pt rates pain 2/10, given scheduled Tylenol. Pt reports tingling on both fingertips less now. Moves all extremities but weak. Pt voids into urinal. Bed control on and bed in locked in position. Call light within reach. Pt calls appropriately. SCDs and tico hose in place. Hourly rounding in place.

## 2017-05-27 NOTE — PROGRESS NOTES
Hospital Medicine Interval Note  Date of Service:  5/26/2017    Chief Complaint  71 y.o.-year-old male admitted 5/20/2017 with progressive B upper and lower extremity weakness.  Significant lumbar stenosis post decompression 5/25.    Interval Problem Update  Patient tolerated surgery well with Dr Escobar, ROBERT drain in place post operative, cause of UE weakness still not readily identifiable and MRI brain pending.  Discussed with patient need for anesthesia for procedure - is necessary and though he is willing to try to do with anxiolytics, his claustrophobia is very strong and will proceed with anesthesia procedure as planned on Tuesday.  He will need continued therapies and eventual SNF to recover once cause of weakness found.    Consultants/Specialty  NSG - Luz    Disposition   SNF when appropriate - after Tuesday's MRI     Review of Systems   Constitutional: Negative for fever and chills.   HENT: Negative for congestion.    Eyes: Negative for blurred vision and photophobia.   Respiratory: Negative for cough and shortness of breath.    Cardiovascular: Negative for chest pain, claudication and leg swelling.   Gastrointestinal: Negative for heartburn, vomiting, abdominal pain, diarrhea and constipation.   Genitourinary: Negative for dysuria and hematuria.   Musculoskeletal: Negative for myalgias and joint pain.   Skin: Negative for itching and rash.   Neurological: Positive for sensory change (leg sensation better post-op) and focal weakness (legs slightly better, arms still feeling weak ). Negative for dizziness, speech change, weakness and headaches.   Psychiatric/Behavioral: Negative for depression. The patient is not nervous/anxious and does not have insomnia.       Physical Exam Laboratory/Imaging   Filed Vitals:    05/26/17 1200 05/26/17 1410 05/26/17 1600 05/26/17 2000   BP: 129/66  123/67 122/65   Pulse: 68  73 77   Temp: 37.3 °C (99.2 °F)  37.4 °C (99.3 °F) 37.5 °C (99.5 °F)   Resp: 18  18 16   Height:        Weight:       SpO2: 95% 95% 94% 95%     Physical Exam   Constitutional: He is oriented to person, place, and time. He appears well-developed and well-nourished.   HENT:   Head: Normocephalic and atraumatic.   Eyes: Conjunctivae are normal. No scleral icterus.   Neck: Neck supple. No JVD present.   Cardiovascular: Normal rate, regular rhythm, normal heart sounds and intact distal pulses.  Exam reveals no gallop and no friction rub.    No murmur heard.  Pulmonary/Chest: Effort normal and breath sounds normal. No respiratory distress. He exhibits no tenderness.   Abdominal: Soft. Bowel sounds are normal. He exhibits no distension and no mass. There is no tenderness.   Musculoskeletal: He exhibits no edema or tenderness.   Neurological: He is alert and oriented to person, place, and time. No cranial nerve deficit.   Decreased strength BLE, BUE     Skin: Skin is warm and dry. No rash noted. He is not diaphoretic.   Red area lateral right forearm, consistent with pressure injury     Psychiatric: He has a normal mood and affect. His behavior is normal.   Nursing note and vitals reviewed.   Lab Results   Component Value Date/Time    WBC 5.5 05/25/2017 04:13 PM    HEMOGLOBIN 13.4* 05/25/2017 04:13 PM    HEMATOCRIT 40.6* 05/25/2017 04:13 PM    PLATELET COUNT 76* 05/25/2017 04:13 PM     Lab Results   Component Value Date/Time    SODIUM 139 05/23/2017 01:51 AM    POTASSIUM 3.8 05/23/2017 01:51 AM    CHLORIDE 108 05/23/2017 01:51 AM    CO2 24 05/23/2017 01:51 AM    GLUCOSE 109* 05/23/2017 01:51 AM    BUN 14 05/23/2017 01:51 AM    CREATININE 0.66 05/23/2017 01:51 AM    GLOM FILT RATE, EST >59 01/23/2010 12:00 AM      Assessment/Plan    * Generalized weakness (present on admission)  Assessment & Plan  Upper and lower extremity weakness  Pain improved by lumbar decompression with Dr Gray 5/25, continue therapies  C spine without reasoning for UE Weakness, MRI brain Tuesday 5/30 with anesthesia      Esophageal varices-  BANDED 4/2016- repeat egd tbd 10//29/16- gic (present on admission)  Assessment & Plan  No incidence of acute bleeding    Essential hypertension (present on admission)  Assessment & Plan  Coreg, cozaar  Labetalol available if needed      Cirrhosis of liver with ascites (HCC)- ? DUE TO OLD HEP B, CHEMO RX,  OR THERMO RAD OF MAGNANT LESION (present on admission)  Assessment & Plan  Complications of cancer/chemo  Watch for coagulopathy  H/o portal vein thrombosis  Hepatologist is Dr Culp      H/O colon cancer, stage IV (present on admission)  Assessment & Plan  Patient reported to be in remission, had seen Dr Anguiano in the past, has completed chemo 5 years prior  [facility] care team with hepatic team: Ramses Light, Robbi    Occult blood positive stool (present on admission)  Assessment & Plan  Trend h/h and no AC      Lumbar canal stenosis  Assessment & Plan  S/p decompression with Abdunnur 5/25, ROBERT drain in place  PT/OT        Labs reviewed, Medications reviewed and Radiology images reviewed  Vivas catheter: No Vivas      DVT Prophylaxis: Contraindicated - High bleeding risk  DVT prophylaxis - mechanical: SCDs      Assessed for rehab: Patient was assess for and/or received rehabilitation services during this hospitalization

## 2017-05-27 NOTE — CARE PLAN
Problem: Mobility  Goal: Risk for activity intolerance will decrease  Outcome: PROGRESSING SLOWER THAN EXPECTED  Patient able to turn in bed and sit at EOB. Unable to stand. Working with therapy. Will continue to promote activity and safety.     Problem: Pain Management  Goal: Pain level will decrease to patient’s comfort goal  Outcome: PROGRESSING AS EXPECTED  Patient states that pain is minimal in the day but increases at night. He denies pain at the moment. Will continue to monitor and promote pain management.

## 2017-05-28 LAB
ALBUMIN SERPL BCP-MCNC: 2.9 G/DL (ref 3.2–4.9)
ALBUMIN/GLOB SERPL: 1.1 G/DL
ALP SERPL-CCNC: 102 U/L (ref 30–99)
ALT SERPL-CCNC: 22 U/L (ref 2–50)
ANION GAP SERPL CALC-SCNC: 3 MMOL/L (ref 0–11.9)
AST SERPL-CCNC: 21 U/L (ref 12–45)
BASOPHILS # BLD AUTO: 0.7 % (ref 0–1.8)
BASOPHILS # BLD: 0.03 K/UL (ref 0–0.12)
BILIRUB SERPL-MCNC: 1 MG/DL (ref 0.1–1.5)
BUN SERPL-MCNC: 21 MG/DL (ref 8–22)
CALCIUM SERPL-MCNC: 8.2 MG/DL (ref 8.5–10.5)
CHLORIDE SERPL-SCNC: 104 MMOL/L (ref 96–112)
CO2 SERPL-SCNC: 28 MMOL/L (ref 20–33)
CREAT SERPL-MCNC: 0.68 MG/DL (ref 0.5–1.4)
EOSINOPHIL # BLD AUTO: 0.27 K/UL (ref 0–0.51)
EOSINOPHIL NFR BLD: 5.9 % (ref 0–6.9)
ERYTHROCYTE [DISTWIDTH] IN BLOOD BY AUTOMATED COUNT: 51.3 FL (ref 35.9–50)
GFR SERPL CREATININE-BSD FRML MDRD: >60 ML/MIN/1.73 M 2
GLOBULIN SER CALC-MCNC: 2.6 G/DL (ref 1.9–3.5)
GLUCOSE SERPL-MCNC: 110 MG/DL (ref 65–99)
HCT VFR BLD AUTO: 32.9 % (ref 42–52)
HGB BLD-MCNC: 10.7 G/DL (ref 14–18)
IMM GRANULOCYTES # BLD AUTO: 0.01 K/UL (ref 0–0.11)
IMM GRANULOCYTES NFR BLD AUTO: 0.2 % (ref 0–0.9)
LYMPHOCYTES # BLD AUTO: 0.64 K/UL (ref 1–4.8)
LYMPHOCYTES NFR BLD: 14.1 % (ref 22–41)
MCH RBC QN AUTO: 27.4 PG (ref 27–33)
MCHC RBC AUTO-ENTMCNC: 32.5 G/DL (ref 33.7–35.3)
MCV RBC AUTO: 84.4 FL (ref 81.4–97.8)
MONOCYTES # BLD AUTO: 0.64 K/UL (ref 0–0.85)
MONOCYTES NFR BLD AUTO: 14.1 % (ref 0–13.4)
NEUTROPHILS # BLD AUTO: 2.95 K/UL (ref 1.82–7.42)
NEUTROPHILS NFR BLD: 65 % (ref 44–72)
NRBC # BLD AUTO: 0 K/UL
NRBC BLD AUTO-RTO: 0 /100 WBC
PLATELET # BLD AUTO: 57 K/UL (ref 164–446)
PMV BLD AUTO: 9.5 FL (ref 9–12.9)
POTASSIUM SERPL-SCNC: 3.9 MMOL/L (ref 3.6–5.5)
PROT SERPL-MCNC: 5.5 G/DL (ref 6–8.2)
RBC # BLD AUTO: 3.9 M/UL (ref 4.7–6.1)
SODIUM SERPL-SCNC: 135 MMOL/L (ref 135–145)
WBC # BLD AUTO: 4.5 K/UL (ref 4.8–10.8)

## 2017-05-28 PROCEDURE — 700102 HCHG RX REV CODE 250 W/ 637 OVERRIDE(OP): Performed by: NURSE PRACTITIONER

## 2017-05-28 PROCEDURE — A9270 NON-COVERED ITEM OR SERVICE: HCPCS | Performed by: PHYSICIAN ASSISTANT

## 2017-05-28 PROCEDURE — 770001 HCHG ROOM/CARE - MED/SURG/GYN PRIV*

## 2017-05-28 PROCEDURE — A9270 NON-COVERED ITEM OR SERVICE: HCPCS | Performed by: NURSE PRACTITIONER

## 2017-05-28 PROCEDURE — 99233 SBSQ HOSP IP/OBS HIGH 50: CPT | Performed by: INTERNAL MEDICINE

## 2017-05-28 PROCEDURE — 97530 THERAPEUTIC ACTIVITIES: CPT

## 2017-05-28 PROCEDURE — 80053 COMPREHEN METABOLIC PANEL: CPT

## 2017-05-28 PROCEDURE — A9270 NON-COVERED ITEM OR SERVICE: HCPCS | Performed by: INTERNAL MEDICINE

## 2017-05-28 PROCEDURE — 700102 HCHG RX REV CODE 250 W/ 637 OVERRIDE(OP): Performed by: PHYSICIAN ASSISTANT

## 2017-05-28 PROCEDURE — 700102 HCHG RX REV CODE 250 W/ 637 OVERRIDE(OP): Performed by: INTERNAL MEDICINE

## 2017-05-28 PROCEDURE — 85025 COMPLETE CBC W/AUTO DIFF WBC: CPT

## 2017-05-28 PROCEDURE — 700111 HCHG RX REV CODE 636 W/ 250 OVERRIDE (IP): Performed by: SPECIALIST

## 2017-05-28 PROCEDURE — 36415 COLL VENOUS BLD VENIPUNCTURE: CPT

## 2017-05-28 RX ADMIN — IMMUNE GLOBULIN 5 G: 50 SOLUTION INTRAVENOUS at 10:26

## 2017-05-28 RX ADMIN — FOLIC ACID 1 MG: 1 TABLET ORAL at 08:03

## 2017-05-28 RX ADMIN — OXYCODONE HYDROCHLORIDE 10 MG: 10 TABLET ORAL at 23:41

## 2017-05-28 RX ADMIN — ACETAMINOPHEN 1000 MG: 500 TABLET ORAL at 23:41

## 2017-05-28 RX ADMIN — CARVEDILOL 3.12 MG: 6.25 TABLET, FILM COATED ORAL at 08:03

## 2017-05-28 RX ADMIN — IMMUNE GLOBULIN 10 G: 50 SOLUTION INTRAVENOUS at 14:21

## 2017-05-28 RX ADMIN — CARVEDILOL 3.12 MG: 6.25 TABLET, FILM COATED ORAL at 17:39

## 2017-05-28 RX ADMIN — LOSARTAN POTASSIUM 25 MG: 50 TABLET, FILM COATED ORAL at 08:03

## 2017-05-28 RX ADMIN — IMMUNE GLOBULIN 10 G: 50 SOLUTION INTRAVENOUS at 11:49

## 2017-05-28 RX ADMIN — ANTACID TABLETS 500 MG: 500 TABLET, CHEWABLE ORAL at 19:52

## 2017-05-28 RX ADMIN — ATORVASTATIN CALCIUM 40 MG: 40 TABLET, FILM COATED ORAL at 19:52

## 2017-05-28 RX ADMIN — IMMUNE GLOBULIN 10 G: 50 SOLUTION INTRAVENOUS at 12:48

## 2017-05-28 RX ADMIN — ANTACID TABLETS 500 MG: 500 TABLET, CHEWABLE ORAL at 08:03

## 2017-05-28 RX ADMIN — OXYCODONE HYDROCHLORIDE 10 MG: 10 TABLET ORAL at 05:26

## 2017-05-28 RX ADMIN — OXYCODONE HYDROCHLORIDE 10 MG: 10 TABLET ORAL at 19:52

## 2017-05-28 RX ADMIN — ACETAMINOPHEN 1000 MG: 500 TABLET ORAL at 05:26

## 2017-05-28 RX ADMIN — ACETAMINOPHEN 1000 MG: 500 TABLET ORAL at 17:39

## 2017-05-28 RX ADMIN — ACETAMINOPHEN 1000 MG: 500 TABLET ORAL at 12:48

## 2017-05-28 ASSESSMENT — ENCOUNTER SYMPTOMS
SPEECH CHANGE: 0
VOMITING: 0
COUGH: 0
NERVOUS/ANXIOUS: 0
INSOMNIA: 0
DIARRHEA: 0
FEVER: 0
ABDOMINAL PAIN: 0
WEAKNESS: 0
SHORTNESS OF BREATH: 0
DEPRESSION: 0
BACK PAIN: 1
DIZZINESS: 0
HEADACHES: 0
CLAUDICATION: 0
CONSTIPATION: 0
CHILLS: 0
BLURRED VISION: 0
MYALGIAS: 0
HEARTBURN: 0
FOCAL WEAKNESS: 1
PHOTOPHOBIA: 0
SENSORY CHANGE: 1

## 2017-05-28 ASSESSMENT — PAIN SCALES - GENERAL
PAINLEVEL_OUTOF10: 3
PAINLEVEL_OUTOF10: 4
PAINLEVEL_OUTOF10: 3
PAINLEVEL_OUTOF10: 0
PAINLEVEL_OUTOF10: 4
PAINLEVEL_OUTOF10: 7
PAINLEVEL_OUTOF10: 6
PAINLEVEL_OUTOF10: 4

## 2017-05-28 ASSESSMENT — GAIT ASSESSMENTS: GAIT LEVEL OF ASSIST: UNABLE TO PARTICIPATE

## 2017-05-28 NOTE — PROGRESS NOTES
Progress Note               Author: Yamilex Toro Date & Time created: 2017  10:13 AM     Interval History:  POD #3 open L4/5 lami  Pt seen and examined by Dr. Rosales yesterday and has probable diagnosis of AIDP  Responding well to IVIG with improved strength UE/LE  Notes significant improvement in leg pain as compared to pre-op  Drain-15/8 hours    Review of Systems:  Review of Systems   Musculoskeletal: Positive for back pain.   Neurological: Positive for focal weakness.       Physical Exam:  Physical Exam   Neurological:   Pt is generally weak but dramatically improved compared to yesterday  Left > right sided leg weakness mainly proximally in IP/Q  AT/EHL now 4+/5  Dressing intact   drain in place      Labs:        Invalid input(s): [id number]      Recent Labs      17   0148   SODIUM  135   POTASSIUM  3.9   CHLORIDE  104   CO2  28   BUN  21   CREATININE  0.68   CALCIUM  8.2*     Recent Labs      17   0148   ALTSGPT  22   ASTSGOT  21   ALKPHOSPHAT  102*   TBILIRUBIN  1.0   GLUCOSE  110*     Recent Labs      17   1613  17   0148   RBC  4.83  3.90*   HEMOGLOBIN  13.4*  10.7*   HEMATOCRIT  40.6*  32.9*   PLATELETCT  76*  57*     Recent Labs      17   1613  17   0148   WBC  5.5  4.5*   NEUTSPOLYS  66.70  65.00   LYMPHOCYTES  13.90*  14.10*   MONOCYTES  12.80  14.10*   EOSINOPHILS  5.90  5.90   BASOPHILS  0.50  0.70   ASTSGOT   --   21   ALTSGPT   --   22   ALKPHOSPHAT   --   102*   TBILIRUBIN   --   1.0     Hemodynamics:  Temp (24hrs), Av °C (98.6 °F), Min:36.9 °C (98.4 °F), Max:37.2 °C (98.9 °F)  Temperature: 37 °C (98.6 °F)  Pulse  Av.1  Min: 60  Max: 91   Blood Pressure : 132/71 mmHg     Respiratory:    Respiration: 18, Pulse Oximetry: 95 %        RUL Breath Sounds: Clear, RML Breath Sounds: Clear, RLL Breath Sounds: Diminished, JOSE Breath Sounds: Clear, LLL Breath Sounds: Diminished  Fluids:    Intake/Output Summary (Last 24 hours) at 17 0831  Last  data filed at 05/26/17 0600   Gross per 24 hour   Intake   2700 ml   Output   2020 ml   Net    680 ml        GI/Nutrition:  Orders Placed This Encounter   Procedures   • DIET ORDER     Standing Status: Standing      Number of Occurrences: 1      Standing Expiration Date:      Order Specific Question:  Diet:     Answer:  2 Gram Sodium [7]     Medical Decision Making, by Problem:  Active Hospital Problems    Diagnosis   • *Generalized weakness [R53.1]   • Esophageal varices- BANDED 4/2016- repeat egd tbd 10//29/16- gic [I85.00]   • Central stenosis of spinal canal [M48.00]   • Lumbar canal stenosis [M48.06]   • Occult blood positive stool [R19.5]   • Cirrhosis (CMS-HCC) [K74.60]   • H/O colon cancer, stage IV [Z85.038]   • Cirrhosis of liver with ascites (HCC)- ? DUE TO OLD HEP B, CHEMO RX,  OR THERMO RAD OF MAGNANT LESION [K74.60]       Plan:  PT/OT  Brain MRI Tuesday  Appreciate Dr. Stephens assistance  Dc drain   Will likely need rehab    Labs reviewed, Radiology images reviewed and Medications reviewed

## 2017-05-28 NOTE — PROGRESS NOTES
Hospital Medicine Interval Note  Date of Service:  5/28/2017    Chief Complaint  71 y.o.-year-old male admitted 5/20/2017 with progressive B upper and lower extremity weakness.  Significant lumbar stenosis post decompression 5/25.    Interval Problem Update  5/26 Patient tolerated surgery well with Dr Escobar, ROBERT drain in place post operative, cause of UE weakness still not readily identifiable and MRI brain pending.  Discussed with patient need for anesthesia for procedure - is necessary and though he is willing to try to do with anxiolytics, his claustrophobia is very strong and will proceed with anesthesia procedure as planned on Tuesday.  He will need continued therapies and eventual SNF to recover once cause of weakness found.    5/27 Patient feeling weaker today and NSG consulted Dr Rosales for neurology consultation.  High suspicion of AIDP/ [facility].  IVIG ordered for admin soon.  PICC ordered but infusion can start as soon as prepared.  He has a blood type with lesser type C and D according to the hepatology card he carries with him.      5/28 Patient able to move legs today after fist doses of IVIG, very good sign.  Continue with PT/OT and IVIG.  PICC line pending.    Consultants/Specialty  NSG - Luz/Peck  Neuro - Bobby    Disposition   SNF when appropriate - after Tuesday's MRI     Review of Systems   Constitutional: Negative for fever and chills.   HENT: Negative for congestion.    Eyes: Negative for blurred vision and photophobia.   Respiratory: Negative for cough and shortness of breath.    Cardiovascular: Negative for chest pain, claudication and leg swelling.   Gastrointestinal: Negative for heartburn, vomiting, abdominal pain, diarrhea and constipation.   Genitourinary: Negative for dysuria and hematuria.   Musculoskeletal: Negative for myalgias and joint pain.   Skin: Negative for itching and rash.   Neurological: Positive for sensory change (leg sensation better post-op) and focal  weakness (legs significantly better sensation and movement, unable to support weight ). Negative for dizziness, speech change, weakness and headaches.   Psychiatric/Behavioral: Negative for depression. The patient is not nervous/anxious and does not have insomnia.       Physical Exam Laboratory/Imaging   Filed Vitals:    05/27/17 1500 05/27/17 2000 05/28/17 0400 05/28/17 0700   BP: 117/53 118/58 123/56 132/71   Pulse:  69 62 64   Temp: 36.9 °C (98.4 °F) 37 °C (98.6 °F) 37.2 °C (98.9 °F) 37 °C (98.6 °F)   Resp:  16 16 18   Height:       Weight:       SpO2:  94% 96% 95%     Physical Exam   Constitutional: He is oriented to person, place, and time. He appears well-developed and well-nourished.   HENT:   Head: Normocephalic and atraumatic.   Eyes: Conjunctivae are normal. No scleral icterus.   Neck: Neck supple. No JVD present.   Cardiovascular: Normal rate, regular rhythm, normal heart sounds and intact distal pulses.  Exam reveals no gallop and no friction rub.    No murmur heard.  Pulmonary/Chest: Effort normal and breath sounds normal. No respiratory distress. He exhibits no tenderness.   Abdominal: Soft. Bowel sounds are normal. He exhibits no distension and no mass. There is no tenderness.   Musculoskeletal: He exhibits no edema or tenderness.   Neurological: He is alert and oriented to person, place, and time. No cranial nerve deficit.   Decreased strength BLE, BUE - improved post IVIG     Skin: Skin is warm and dry. No rash noted. He is not diaphoretic.   Red area lateral right forearm, consistent with pressure injury     Psychiatric: He has a normal mood and affect. His behavior is normal.   Nursing note and vitals reviewed.   Lab Results   Component Value Date/Time    WBC 4.5* 05/28/2017 01:48 AM    HEMOGLOBIN 10.7* 05/28/2017 01:48 AM    HEMATOCRIT 32.9* 05/28/2017 01:48 AM    PLATELET COUNT 57* 05/28/2017 01:48 AM     Lab Results   Component Value Date/Time    SODIUM 135 05/28/2017 01:48 AM    POTASSIUM 3.9  05/28/2017 01:48 AM    CHLORIDE 104 05/28/2017 01:48 AM    CO2 28 05/28/2017 01:48 AM    GLUCOSE 110* 05/28/2017 01:48 AM    BUN 21 05/28/2017 01:48 AM    CREATININE 0.68 05/28/2017 01:48 AM    GLOM FILT RATE, EST >59 01/23/2010 12:00 AM      Assessment/Plan    * Generalized weakness (present on admission)  Assessment & Plan  Upper and lower extremity weakness  Neurology suspecting AIDP, IVIG ordered  Pain improved by lumbar decompression with Dr Escobar 5/25, continue therapies  C spine without reasoning for UE Weakness, MRI brain Tuesday 5/30 with anesthesia      Esophageal varices- BANDED 4/2016- repeat egd tbd 10//29/16- gic (present on admission)  Assessment & Plan  No incidence of acute bleeding    Essential hypertension (present on admission)  Assessment & Plan  Coreg, cozaar  Labetalol available if needed      Cirrhosis of liver with ascites (HCC)- ? DUE TO OLD HEP B, CHEMO RX,  OR THERMO RAD OF MAGNANT LESION (present on admission)  Assessment & Plan  Complications of cancer/chemo  Watch for coagulopathy  H/o portal vein thrombosis  Hepatologist is Dr Culp      H/O colon cancer, stage IV (present on admission)  Assessment & Plan  Patient reported to be in remission, had seen Dr Anguiano in the past, has completed chemo 5 years prior  [facility] care team with hepatic team: [facility]    Occult blood positive stool (present on admission)  Assessment & Plan  Trend h/h and no AC      Lumbar canal stenosis  Assessment & Plan  S/p decompression with Luz 5/25, ROBERT drain in place  PT/OT       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  EMG done at bedside 5/27         Labs reviewed, Medications reviewed and Radiology images reviewed  Vivas catheter: No Vivas      DVT Prophylaxis: Contraindicated - High bleeding risk  DVT prophylaxis - mechanical: SCDs      Assessed for rehab: Patient was assess for and/or  received rehabilitation services during this hospitalization

## 2017-05-28 NOTE — PROGRESS NOTES
Neurology Progress Note               Author: BEN Rosales Date & Time created: 2017  8:41 AM     Interval History:  Feels better after first IVIG infusion, can now lift legs off of the bed.  No new sx, tolerated IVIG without difficulty.  Sed rate is 9.    Review of Systems:  ROS    Physical Exam:  Physical Exam   Neurological:   Alert, conversant.  PERRL, facial strength ok.  Interossei still 3/5, can now lift LE's off of bed easily.  Remains areflexive.       Labs:        Invalid input(s): [id number]      Recent Labs      17   0148   SODIUM  135   POTASSIUM  3.9   CHLORIDE  104   CO2  28   BUN  21   CREATININE  0.68   CALCIUM  8.2*     Recent Labs      17   0148   ALTSGPT  22   ASTSGOT  21   ALKPHOSPHAT  102*   TBILIRUBIN  1.0   GLUCOSE  110*     Recent Labs      17   1613  17   0148   RBC  4.83  3.90*   HEMOGLOBIN  13.4*  10.7*   HEMATOCRIT  40.6*  32.9*   PLATELETCT  76*  57*     Recent Labs      17   1613  17   0148   WBC  5.5  4.5*   NEUTSPOLYS  66.70  65.00   LYMPHOCYTES  13.90*  14.10*   MONOCYTES  12.80  14.10*   EOSINOPHILS  5.90  5.90   BASOPHILS  0.50  0.70   ASTSGOT   --   21   ALTSGPT   --   22   ALKPHOSPHAT   --   102*   TBILIRUBIN   --   1.0     Recent Labs      17   0148   SODIUM  135   POTASSIUM  3.9   CHLORIDE  104   CO2  28   GLUCOSE  110*   BUN  21   CREATININE  0.68   CALCIUM  8.2*     Hemodynamics:  Temp (24hrs), Av °C (98.6 °F), Min:36.9 °C (98.4 °F), Max:37.2 °C (98.9 °F)  Temperature: 37 °C (98.6 °F)  Pulse  Av.1  Min: 60  Max: 91   Blood Pressure : 132/71 mmHg     Respiratory:    Respiration: 18, Pulse Oximetry: 95 %        RUL Breath Sounds: Clear, RML Breath Sounds: Clear, RLL Breath Sounds: Diminished, JOSE Breath Sounds: Clear, LLL Breath Sounds: Diminished  Fluids:    Intake/Output Summary (Last 24 hours) at 17 0841  Last data filed at 17 0600   Gross per 24 hour   Intake      0 ml   Output    730 ml   Net   -730  ml        GI/Nutrition:  Orders Placed This Encounter   Procedures   • DIET ORDER     Standing Status: Standing      Number of Occurrences: 1      Standing Expiration Date:      Order Specific Question:  Diet:     Answer:  2 Gram Sodium [7]     Medical Decision Making, by Problem:  Active Hospital Problems    Diagnosis   • *Generalized weakness [R53.1]   • Esophageal varices- BANDED 4/2016- repeat egd tbd 10//29/16- gic [I85.00]   • 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 DUE TO OLD HEP B, CHEMO RX,  OR THERMO RAD OF MAGNANT LESION [K74.60]   • Essential hypertension [I10]       Plan:  Probable AIDP, improved after first dose IVIG.  Continue infusions, therapy.    Core Measures

## 2017-05-28 NOTE — WOUND TEAM
"[facility]  Inpatient Services  Initial Wound and Skin Care Evaluation    Admission Date:   05/20/2017  HPI, PMH, SH: Reviewed  Unit where seen by Wound Team: Neurosurgery.    WOUND CONSULT RELATED TO: Buttocks.    SUBJECTIVE:  \"I can't turn very well. And this bed makes me sweat.\"    Self Report / Pain Level: None at this area.    OBJECTIVE: Agreeable.    WOUND TYPE, LOCATION, CHARACTERISTICS (Pressure ulcers: location, stage, POA or date identified)    Location and type of wound: Bilateral medial buttocks: Moisture Associated Dermatitis (MAD).        Periwound:     Calloused.  Drainage:     None.  Tissue Type and %:    100% red/pink.  Wound Edges:    Attached.  Odor:      None.  Exposed structure(s):  None.  S&S of Infection:   None      Measurements:   05/27/2017  Length:      2 cm x2  Width:      3 cm x2  Depth:    0 cm      INTERVENTIONS BY WOUND TEAM: Patient turned with assistance from Lorelei Hennessy RN. Sacrum and buttocks assessed. Patient states that \"the plastic on the bed makes me sweat.\" Waffle overlay on bed, barrier cream in use.       Interdisciplinary consultation: Lorelei Hennessy RN, patient, patient's wife.     EVALUATION: Waffle overlay helps to manage excessive moisture, and barrier cream leaves a protective layer against moisture.      Factors affecting wound healing: limited mobility, moisture, age.   Goals: MAD will decrease by 1% per week.     NURSING PLAN OF CARE ORDERS (X):    Dressing changes: See Dressing Maintenance orders:   Skin care: See Skin Care orders: X  Rectal tube care: See Rectal Tube Care orders:   Other orders:    RSKIN: CURRENT (X) ORDERED (O)  Q shift Huan:  X  Q shift pressure point assessments:  X  Pressure redistribution mattress  X with waffle overlay.      AJAY      Bariatric AJAY      Bariatric foam        Heel float boots       Heels floated on pillows    X  Barrier wipes      Barrier Cream    X  Barrier paste      Sacral silicone dressing      Silicone O2 " tubing    X  Anchorfast      Trach with Optifoam split foam       Waffle cushion      Rectal tube or BMS      Antifungal tx    Turn q 2 hours   X  Up to chair     Ambulate   PT/OT     Dietician      PO  X   TF   TPN     PVN    NPO   # days   Other       WOUND TEAM PLAN OF CARE (X):   NPWT change 3 x week:        Dressing changes by wound team:       Follow up as needed:  X     Other (explain):    Anticipated discharge plans (X):  SNF:    X      Home Care:           Outpatient Wound Center:            Self Care:            Other:

## 2017-05-28 NOTE — PROGRESS NOTES
Patient alert and oriented. Family at bedside. Pain management discussed with patient and plan agreed upon with patient. BM intervention discussed. Plan of care discussed with patient. Bed locked and in low position. Call light within reach. Patient encouraged to call with any needs/concerns.

## 2017-05-28 NOTE — THERAPY
"Physical Therapy Treatment completed.   Bed Mobility:  Supine to Sit: Moderate Assist  Transfers: Sit to Stand: Unable to Participate  Gait: Level Of Assist: Unable to Participate with Front-Wheel Walker       Plan of Care: Will benefit from Physical Therapy 5 times per week  Discharge Recommendations: Equipment: Will Continue to Assess for Equipment Needs. Post-acute therapy Discharge to a transitional care facility for continued skilled therapy services.     See \"Rehab Therapy-Acute\" Patient Summary Report for complete documentation.       "

## 2017-05-28 NOTE — PROGRESS NOTES
Pt was A&Ox4,stated he has numbness and tingling in all of his fingers, stated that pain is 0/10.    Pt refused bed alarm despite education.    Call light within reach, pt educated on importance of using call light when he needs assistance, Pt verbalized understanding.

## 2017-05-28 NOTE — PROGRESS NOTES
Bed alarm refused after education. Skid socks on. Call light within reach Hourly rounding in place.

## 2017-05-29 ENCOUNTER — APPOINTMENT (OUTPATIENT)
Dept: RADIOLOGY | Facility: MEDICAL CENTER | Age: 72
DRG: 040 | End: 2017-05-29
Attending: INTERNAL MEDICINE
Payer: MEDICARE

## 2017-05-29 LAB
ALBUMIN SERPL-MCNC: 2.97 G/DL (ref 3.75–5.01)
ALPHA1 GLOB SERPL ELPH-MCNC: 0.36 G/DL (ref 0.19–0.46)
ALPHA2 GLOB SERPL ELPH-MCNC: 0.62 G/DL (ref 0.48–1.05)
B-GLOBULIN SERPL ELPH-MCNC: 0.61 G/DL (ref 0.48–1.1)
GAMMA GLOB SERPL ELPH-MCNC: 0.63 G/DL (ref 0.62–1.51)
INTERPRETATION SERPL IFE-IMP: ABNORMAL
MONOCLON BAND OBS SERPL: ABNORMAL
NUCLEAR IGG SER QL IA: NORMAL
PATHOLOGY STUDY: ABNORMAL
PROT SERPL-MCNC: 5.2 G/DL (ref 6–8.3)

## 2017-05-29 PROCEDURE — 99233 SBSQ HOSP IP/OBS HIGH 50: CPT | Performed by: INTERNAL MEDICINE

## 2017-05-29 PROCEDURE — 700102 HCHG RX REV CODE 250 W/ 637 OVERRIDE(OP): Performed by: PHYSICIAN ASSISTANT

## 2017-05-29 PROCEDURE — A9270 NON-COVERED ITEM OR SERVICE: HCPCS | Performed by: PHYSICIAN ASSISTANT

## 2017-05-29 PROCEDURE — 770001 HCHG ROOM/CARE - MED/SURG/GYN PRIV*

## 2017-05-29 PROCEDURE — A9270 NON-COVERED ITEM OR SERVICE: HCPCS | Performed by: NURSE PRACTITIONER

## 2017-05-29 PROCEDURE — 700102 HCHG RX REV CODE 250 W/ 637 OVERRIDE(OP): Performed by: INTERNAL MEDICINE

## 2017-05-29 PROCEDURE — A9270 NON-COVERED ITEM OR SERVICE: HCPCS | Performed by: INTERNAL MEDICINE

## 2017-05-29 PROCEDURE — 36569 INSJ PICC 5 YR+ W/O IMAGING: CPT

## 2017-05-29 PROCEDURE — 700111 HCHG RX REV CODE 636 W/ 250 OVERRIDE (IP): Performed by: SPECIALIST

## 2017-05-29 PROCEDURE — 76937 US GUIDE VASCULAR ACCESS: CPT

## 2017-05-29 PROCEDURE — 700102 HCHG RX REV CODE 250 W/ 637 OVERRIDE(OP): Performed by: NURSE PRACTITIONER

## 2017-05-29 RX ADMIN — ANTACID TABLETS 500 MG: 500 TABLET, CHEWABLE ORAL at 07:55

## 2017-05-29 RX ADMIN — CARVEDILOL 3.12 MG: 6.25 TABLET, FILM COATED ORAL at 07:56

## 2017-05-29 RX ADMIN — LOSARTAN POTASSIUM 25 MG: 50 TABLET, FILM COATED ORAL at 07:56

## 2017-05-29 RX ADMIN — ACETAMINOPHEN 1000 MG: 500 TABLET ORAL at 05:30

## 2017-05-29 RX ADMIN — OXYCODONE HYDROCHLORIDE 10 MG: 10 TABLET ORAL at 19:50

## 2017-05-29 RX ADMIN — IMMUNE GLOBULIN 10 G: 50 SOLUTION INTRAVENOUS at 10:09

## 2017-05-29 RX ADMIN — ANTACID TABLETS 500 MG: 500 TABLET, CHEWABLE ORAL at 19:49

## 2017-05-29 RX ADMIN — IMMUNE GLOBULIN 5 G: 50 SOLUTION INTRAVENOUS at 07:56

## 2017-05-29 RX ADMIN — CARVEDILOL 3.12 MG: 6.25 TABLET, FILM COATED ORAL at 18:21

## 2017-05-29 RX ADMIN — IMMUNE GLOBULIN 10 G: 50 SOLUTION INTRAVENOUS at 09:13

## 2017-05-29 RX ADMIN — ACETAMINOPHEN 1000 MG: 500 TABLET ORAL at 18:21

## 2017-05-29 RX ADMIN — ACETAMINOPHEN 1000 MG: 500 TABLET ORAL at 12:44

## 2017-05-29 RX ADMIN — CYCLOBENZAPRINE 10 MG: 10 TABLET, FILM COATED ORAL at 02:51

## 2017-05-29 RX ADMIN — ATORVASTATIN CALCIUM 40 MG: 40 TABLET, FILM COATED ORAL at 19:49

## 2017-05-29 RX ADMIN — FOLIC ACID 1 MG: 1 TABLET ORAL at 07:55

## 2017-05-29 RX ADMIN — OXYCODONE HYDROCHLORIDE 10 MG: 10 TABLET ORAL at 04:08

## 2017-05-29 RX ADMIN — MAGNESIUM HYDROXIDE 30 ML: 400 SUSPENSION ORAL at 07:56

## 2017-05-29 RX ADMIN — IMMUNE GLOBULIN 10 G: 50 SOLUTION INTRAVENOUS at 11:03

## 2017-05-29 ASSESSMENT — ENCOUNTER SYMPTOMS
PHOTOPHOBIA: 0
WEAKNESS: 0
FEVER: 0
INSOMNIA: 0
FOCAL WEAKNESS: 1
MYALGIAS: 0
BLURRED VISION: 0
BACK PAIN: 1
CLAUDICATION: 0
DIZZINESS: 0
CONSTIPATION: 0
VOMITING: 0
DIARRHEA: 0
NERVOUS/ANXIOUS: 0
HEADACHES: 0
COUGH: 0
SPEECH CHANGE: 0
CHILLS: 0
DEPRESSION: 0
SENSORY CHANGE: 1
SHORTNESS OF BREATH: 0
ABDOMINAL PAIN: 0
HEARTBURN: 0

## 2017-05-29 ASSESSMENT — PAIN SCALES - GENERAL
PAINLEVEL_OUTOF10: 4
PAINLEVEL_OUTOF10: 7
PAINLEVEL_OUTOF10: 4
PAINLEVEL_OUTOF10: 6
PAINLEVEL_OUTOF10: 4
PAINLEVEL_OUTOF10: 4
PAINLEVEL_OUTOF10: 9

## 2017-05-29 NOTE — CARE PLAN
Problem: Pain Management  Goal: Pain level will decrease to patient’s comfort goal  Routine pain assessment performed and appropriate pain management interventions performed.    Problem: Skin Integrity  Goal: Risk for impaired skin integrity will decrease  2 RN skin assessment performed, Q2 turns performed, waffle cushion used, extra pillows placed under pt's elbows and feet.

## 2017-05-29 NOTE — PROGRESS NOTES
Neurology Progress Note               Author: BEN Rosales Date & Time created: 2017  9:06 AM     Interval History:  No new sx overnight.  Tolerates IVIG without difficulty.    Review of Systems:  ROS    Physical Exam:  Physical Exam   Neurological:   Interossei are stronger, still 3/5.  Proximal UE strength is ok.  Hip flexors 3/5, tibialis anterior are intact.  Remains areflexive.       Labs:        Invalid input(s): [id number]      Recent Labs      17   014   SODIUM  135   POTASSIUM  3.9   CHLORIDE  104   CO2  28   BUN  21   CREATININE  0.68   CALCIUM  8.2*     Recent Labs      17   014   ALTSGPT  22   ASTSGOT  21   ALKPHOSPHAT  102*   TBILIRUBIN  1.0   GLUCOSE  110*     Recent Labs      17   RBC  3.90*   HEMOGLOBIN  10.7*   HEMATOCRIT  32.9*   PLATELETCT  57*     Recent Labs      17   WBC  4.5*   NEUTSPOLYS  65.00   LYMPHOCYTES  14.10*   MONOCYTES  14.10*   EOSINOPHILS  5.90   BASOPHILS  0.70   ASTSGOT  21   ALTSGPT  22   ALKPHOSPHAT  102*   TBILIRUBIN  1.0     Recent Labs      17   0148   SODIUM  135   POTASSIUM  3.9   CHLORIDE  104   CO2  28   GLUCOSE  110*   BUN  21   CREATININE  0.68   CALCIUM  8.2*     Hemodynamics:  Temp (24hrs), Av.3 °C (99.1 °F), Min:37.1 °C (98.7 °F), Max:37.6 °C (99.7 °F)  Temperature: 37.2 °C (99 °F)  Pulse  Av.1  Min: 60  Max: 91   Blood Pressure : 129/78 mmHg     Respiratory:    Respiration: 18, Pulse Oximetry: 96 %        RUL Breath Sounds: Clear, RML Breath Sounds: Clear, RLL Breath Sounds: Clear, JOSE Breath Sounds: Clear, LLL Breath Sounds: Diminished  Fluids:    Intake/Output Summary (Last 24 hours) at 17 0906  Last data filed at 17 1500   Gross per 24 hour   Intake    700 ml   Output      0 ml   Net    700 ml        GI/Nutrition:  Orders Placed This Encounter   Procedures   • DIET ORDER     Standing Status: Standing      Number of Occurrences: 1      Standing Expiration Date:      Order Specific  Question:  Diet:     Answer:  2 Gram Sodium [7]     Medical Decision Making, by Problem:  Active Hospital Problems    Diagnosis   • *Generalized weakness [R53.1]   • Esophageal varices- BANDED 4/2016- repeat egd tbd 10//29/16- gic [I85.00]   • 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 DUE TO OLD HEP B, CHEMO RX,  OR THERMO RAD OF MAGNANT LESION [K74.60]   • Essential hypertension [I10]       Plan:  Day 3 of IVIG, appears improved.  Continue therapy.    Core Measures

## 2017-05-29 NOTE — PROGRESS NOTES
Pt was A&Ox4, stated he has numbness and tingling in all of his fingers, stated that pain was 4/10 but pt refused pain medications.    Pt refused bed alarm despite education.    Call light within reach, pt educated on importance of using call light when he needs assistance. Pt verbalized understanding.

## 2017-05-29 NOTE — PROGRESS NOTES
Progress Note               Author: Yamilex Toro Date & Time created: 2017  12:57 PM     Interval History:  POD #4 open L4/5 lami  Pt seen and examined by Dr. Rosales yesterday and has probable diagnosis of AIDP  Responding well to IVIG with improved strength UE/LE  Notes significant improvement in leg pain as compared to pre-op  Drain out    Review of Systems:  Review of Systems   Musculoskeletal: Positive for back pain.   Neurological: Positive for focal weakness.       Physical Exam:  Physical Exam   Neurological:   Pt is generally weak but dramatically improved compared to yesterday  Left > right sided leg weakness mainly proximally in IP/Q  AT/EHL now 4+/5  Dressing intact         Labs:        Invalid input(s): [id number]      Recent Labs      17   SODIUM  135   POTASSIUM  3.9   CHLORIDE  104   CO2  28   BUN  21   CREATININE  0.68   CALCIUM  8.2*     Recent Labs      17   ALTSGPT  22   ASTSGOT  21   ALKPHOSPHAT  102*   TBILIRUBIN  1.0   GLUCOSE  110*     Recent Labs      17   RBC  3.90*   HEMOGLOBIN  10.7*   HEMATOCRIT  32.9*   PLATELETCT  57*     Recent Labs      17   WBC  4.5*   NEUTSPOLYS  65.00   LYMPHOCYTES  14.10*   MONOCYTES  14.10*   EOSINOPHILS  5.90   BASOPHILS  0.70   ASTSGOT  21   ALTSGPT  22   ALKPHOSPHAT  102*   TBILIRUBIN  1.0     Hemodynamics:  Temp (24hrs), Av.3 °C (99.1 °F), Min:37.1 °C (98.7 °F), Max:37.6 °C (99.7 °F)  Temperature: 37.2 °C (99 °F)  Pulse  Av.1  Min: 60  Max: 91   Blood Pressure : 129/78 mmHg     Respiratory:    Respiration: 18, Pulse Oximetry: 96 %        RUL Breath Sounds: Clear, RML Breath Sounds: Clear, RLL Breath Sounds: Clear, JOSE Breath Sounds: Clear, LLL Breath Sounds: Diminished  Fluids:    Intake/Output Summary (Last 24 hours) at 17 0831  Last data filed at 17 0600   Gross per 24 hour   Intake   2700 ml   Output   2020 ml   Net    680 ml        GI/Nutrition:  Orders Placed This  Encounter   Procedures   • DIET ORDER     Standing Status: Standing      Number of Occurrences: 1      Standing Expiration Date:      Order Specific Question:  Diet:     Answer:  2 Gram Sodium [7]     Medical Decision Making, by Problem:  Active Hospital Problems    Diagnosis   • *Generalized weakness [R53.1]   • Esophageal varices- BANDED 4/2016- repeat egd tbd 10//29/16- gic [I85.00]   • Central stenosis of spinal canal [M48.00]   • Lumbar canal stenosis [M48.06]   • Occult blood positive stool [R19.5]   • Cirrhosis (CMS-HCC) [K74.60]   • H/O colon cancer, stage IV [Z85.038]   • Cirrhosis of liver with ascites (HCC)- ? DUE TO OLD HEP B, CHEMO RX,  OR THERMO RAD OF MAGNANT LESION [K74.60]       Plan:  PT/OT  Brain MRI Tuesday  Appreciate Dr. Stephens assistance, continue IVIG per Dr. Rosales  Will likely need inpatient rehab    Labs reviewed, Radiology images reviewed and Medications reviewed

## 2017-05-29 NOTE — PROGRESS NOTES
PICC Insertion Procedure Note    Consents confirmed, vessel patency confirmed with ultrasound. Risks and benefits of procedure explained to patient/family and education regarding central line associated bloodstream infections provided. Questions answered.     PICC placed in right upper arm per MD order with ultrasound guidance. 4 Fr, single lumen PICC placed in basilic vein after one attempt(s). 2 cc's of 1% lidocaine injected intradermally, 21 gauge microintroducer needle and modified Seldinger technique used. 45 cm total catheter length, 45 cm external measurement inserted with good blood return. Each lumen flushed without resistance with 10 mL 0.9% normal saline. PICC line secured with Biopatch and Tegaderm.    PICC tip location in the SVC confirmed by chest xray. Pt tolerated procedure well.  Patient condition relayed to unit RN or ordering physician via this post procedure note in the EMR.     [facility] Power PICC ref # [id number], Lot #  [id number]

## 2017-05-29 NOTE — PROGRESS NOTES
Patient alert and oriented. Tingling to bilateral fingertips. Left side weaker than right. Pain management discussed with patient and plan agreed upon. IV saline locked. Dressing clean dry and intact. Bed alarm refused after education. Call light within reach. Wife at bedside. Bed locked and in low position. Patient encouraged to call with any needs/concerns.

## 2017-05-30 ENCOUNTER — APPOINTMENT (OUTPATIENT)
Dept: RADIOLOGY | Facility: MEDICAL CENTER | Age: 72
DRG: 040 | End: 2017-05-30
Attending: INTERNAL MEDICINE
Payer: MEDICARE

## 2017-05-30 ENCOUNTER — HOSPITAL ENCOUNTER (OUTPATIENT)
Dept: PAIN MANAGEMENT | Facility: REHABILITATION | Age: 72
End: 2017-05-30
Attending: SPECIALIST
Payer: MEDICARE

## 2017-05-30 LAB
ALBUMIN SERPL BCP-MCNC: 2.5 G/DL (ref 3.2–4.9)
ALBUMIN/GLOB SERPL: 0.8 G/DL
ALP SERPL-CCNC: 84 U/L (ref 30–99)
ALT SERPL-CCNC: 18 U/L (ref 2–50)
ANION GAP SERPL CALC-SCNC: 6 MMOL/L (ref 0–11.9)
AST SERPL-CCNC: 21 U/L (ref 12–45)
BASOPHILS # BLD AUTO: 0.6 % (ref 0–1.8)
BASOPHILS # BLD: 0.02 K/UL (ref 0–0.12)
BILIRUB SERPL-MCNC: 1 MG/DL (ref 0.1–1.5)
BUN SERPL-MCNC: 16 MG/DL (ref 8–22)
CALCIUM SERPL-MCNC: 8 MG/DL (ref 8.5–10.5)
CHLORIDE SERPL-SCNC: 107 MMOL/L (ref 96–112)
CK BB CFR SERPL ELPH: 0 % (ref 0–0)
CK MACRO1 CFR SERPL: 0 % (ref 0–0)
CK MACRO2 CFR SERPL: 6 % (ref 0–0)
CK MB CFR SERPL ELPH: 0 % (ref 0–4)
CK MM CFR SERPL ELPH: 94 % (ref 96–100)
CK SERPL-CCNC: 103 U/L (ref 20–200)
CO2 SERPL-SCNC: 22 MMOL/L (ref 20–33)
CREAT SERPL-MCNC: 0.62 MG/DL (ref 0.5–1.4)
ENA SS-B IGG SER IA-ACNC: 0 AU/ML (ref 0–40)
EOSINOPHIL # BLD AUTO: 0.31 K/UL (ref 0–0.51)
EOSINOPHIL NFR BLD: 10.1 % (ref 0–6.9)
ERYTHROCYTE [DISTWIDTH] IN BLOOD BY AUTOMATED COUNT: 50.2 FL (ref 35.9–50)
GFR SERPL CREATININE-BSD FRML MDRD: >60 ML/MIN/1.73 M 2
GLOBULIN SER CALC-MCNC: 3.2 G/DL (ref 1.9–3.5)
GLUCOSE SERPL-MCNC: 101 MG/DL (ref 65–99)
HCT VFR BLD AUTO: 34 % (ref 42–52)
HGB BLD-MCNC: 10.8 G/DL (ref 14–18)
IMM GRANULOCYTES # BLD AUTO: 0 K/UL (ref 0–0.11)
IMM GRANULOCYTES NFR BLD AUTO: 0 % (ref 0–0.9)
LYMPHOCYTES # BLD AUTO: 0.43 K/UL (ref 1–4.8)
LYMPHOCYTES NFR BLD: 14 % (ref 22–41)
MCH RBC QN AUTO: 27.6 PG (ref 27–33)
MCHC RBC AUTO-ENTMCNC: 31.8 G/DL (ref 33.7–35.3)
MCV RBC AUTO: 86.7 FL (ref 81.4–97.8)
MONOCYTES # BLD AUTO: 0.63 K/UL (ref 0–0.85)
MONOCYTES NFR BLD AUTO: 20.5 % (ref 0–13.4)
NEUTROPHILS # BLD AUTO: 1.69 K/UL (ref 1.82–7.42)
NEUTROPHILS NFR BLD: 54.8 % (ref 44–72)
NRBC # BLD AUTO: 0 K/UL
NRBC BLD AUTO-RTO: 0 /100 WBC
PLATELET # BLD AUTO: 63 K/UL (ref 164–446)
PMV BLD AUTO: 10.5 FL (ref 9–12.9)
POTASSIUM SERPL-SCNC: 3.9 MMOL/L (ref 3.6–5.5)
PROT SERPL-MCNC: 5.7 G/DL (ref 6–8.2)
RBC # BLD AUTO: 3.92 M/UL (ref 4.7–6.1)
SODIUM SERPL-SCNC: 135 MMOL/L (ref 135–145)
SSA52 R0ENA AB IGG Q0420: 4 AU/ML (ref 0–40)
SSA60 R0ENA AB IGG Q0419: 5 AU/ML (ref 0–40)
WBC # BLD AUTO: 3.1 K/UL (ref 4.8–10.8)

## 2017-05-30 PROCEDURE — 770001 HCHG ROOM/CARE - MED/SURG/GYN PRIV*

## 2017-05-30 PROCEDURE — 99233 SBSQ HOSP IP/OBS HIGH 50: CPT | Performed by: INTERNAL MEDICINE

## 2017-05-30 PROCEDURE — 700111 HCHG RX REV CODE 636 W/ 250 OVERRIDE (IP)

## 2017-05-30 PROCEDURE — 700102 HCHG RX REV CODE 250 W/ 637 OVERRIDE(OP): Performed by: NURSE PRACTITIONER

## 2017-05-30 PROCEDURE — 700111 HCHG RX REV CODE 636 W/ 250 OVERRIDE (IP): Performed by: SPECIALIST

## 2017-05-30 PROCEDURE — A9270 NON-COVERED ITEM OR SERVICE: HCPCS | Performed by: PHYSICIAN ASSISTANT

## 2017-05-30 PROCEDURE — A9579 GAD-BASE MR CONTRAST NOS,1ML: HCPCS | Performed by: NEUROLOGICAL SURGERY

## 2017-05-30 PROCEDURE — 70553 MRI BRAIN STEM W/O & W/DYE: CPT

## 2017-05-30 PROCEDURE — A9270 NON-COVERED ITEM OR SERVICE: HCPCS | Performed by: INTERNAL MEDICINE

## 2017-05-30 PROCEDURE — 700102 HCHG RX REV CODE 250 W/ 637 OVERRIDE(OP): Performed by: INTERNAL MEDICINE

## 2017-05-30 PROCEDURE — 97112 NEUROMUSCULAR REEDUCATION: CPT

## 2017-05-30 PROCEDURE — 700102 HCHG RX REV CODE 250 W/ 637 OVERRIDE(OP): Performed by: PHYSICIAN ASSISTANT

## 2017-05-30 PROCEDURE — 97530 THERAPEUTIC ACTIVITIES: CPT

## 2017-05-30 PROCEDURE — 700117 HCHG RX CONTRAST REV CODE 255: Performed by: NEUROLOGICAL SURGERY

## 2017-05-30 PROCEDURE — A9270 NON-COVERED ITEM OR SERVICE: HCPCS | Performed by: NURSE PRACTITIONER

## 2017-05-30 PROCEDURE — 80053 COMPREHEN METABOLIC PANEL: CPT

## 2017-05-30 PROCEDURE — 85025 COMPLETE CBC W/AUTO DIFF WBC: CPT

## 2017-05-30 RX ORDER — MIDAZOLAM HYDROCHLORIDE 1 MG/ML
INJECTION INTRAMUSCULAR; INTRAVENOUS
Status: DISPENSED
Start: 2017-05-30 | End: 2017-05-31

## 2017-05-30 RX ORDER — SODIUM CHLORIDE 9 MG/ML
INJECTION, SOLUTION INTRAVENOUS
Status: ACTIVE
Start: 2017-05-30 | End: 2017-05-31

## 2017-05-30 RX ADMIN — IMMUNE GLOBULIN 10 G: 50 SOLUTION INTRAVENOUS at 18:10

## 2017-05-30 RX ADMIN — IMMUNE GLOBULIN 10 G: 50 SOLUTION INTRAVENOUS at 19:09

## 2017-05-30 RX ADMIN — ANTACID TABLETS 500 MG: 500 TABLET, CHEWABLE ORAL at 20:05

## 2017-05-30 RX ADMIN — CARVEDILOL 3.12 MG: 6.25 TABLET, FILM COATED ORAL at 08:51

## 2017-05-30 RX ADMIN — LOSARTAN POTASSIUM 25 MG: 50 TABLET, FILM COATED ORAL at 08:52

## 2017-05-30 RX ADMIN — OXYCODONE HYDROCHLORIDE 10 MG: 10 TABLET ORAL at 02:07

## 2017-05-30 RX ADMIN — ATORVASTATIN CALCIUM 40 MG: 40 TABLET, FILM COATED ORAL at 20:05

## 2017-05-30 RX ADMIN — GADODIAMIDE 20 ML: 287 INJECTION INTRAVENOUS at 13:40

## 2017-05-30 RX ADMIN — ANTACID TABLETS 500 MG: 500 TABLET, CHEWABLE ORAL at 08:51

## 2017-05-30 RX ADMIN — IMMUNE GLOBULIN 5 G: 50 SOLUTION INTRAVENOUS at 16:55

## 2017-05-30 RX ADMIN — ACETAMINOPHEN 1000 MG: 500 TABLET ORAL at 05:12

## 2017-05-30 RX ADMIN — IMMUNE GLOBULIN 10 G: 50 SOLUTION INTRAVENOUS at 20:02

## 2017-05-30 RX ADMIN — CARVEDILOL 3.12 MG: 6.25 TABLET, FILM COATED ORAL at 17:43

## 2017-05-30 RX ADMIN — FOLIC ACID 1 MG: 1 TABLET ORAL at 08:51

## 2017-05-30 RX ADMIN — OXYCODONE HYDROCHLORIDE 10 MG: 10 TABLET ORAL at 20:05

## 2017-05-30 ASSESSMENT — ENCOUNTER SYMPTOMS
HEADACHES: 0
INSOMNIA: 0
DIZZINESS: 0
VOMITING: 0
SPEECH CHANGE: 0
PHOTOPHOBIA: 0
FOCAL WEAKNESS: 1
ABDOMINAL PAIN: 0
FEVER: 0
CONSTIPATION: 0
SENSORY CHANGE: 1
NERVOUS/ANXIOUS: 0
COUGH: 0
DEPRESSION: 0
CLAUDICATION: 0
MYALGIAS: 0
WEAKNESS: 0
SHORTNESS OF BREATH: 0
CHILLS: 0
DIARRHEA: 0
BACK PAIN: 1
BLURRED VISION: 0
HEARTBURN: 0

## 2017-05-30 ASSESSMENT — PAIN SCALES - GENERAL
PAINLEVEL_OUTOF10: 0

## 2017-05-30 ASSESSMENT — GAIT ASSESSMENTS: GAIT LEVEL OF ASSIST: UNABLE TO PARTICIPATE

## 2017-05-30 ASSESSMENT — COGNITIVE AND FUNCTIONAL STATUS - GENERAL
STANDING UP FROM CHAIR USING ARMS: TOTAL
MOVING FROM LYING ON BACK TO SITTING ON SIDE OF FLAT BED: UNABLE
WALKING IN HOSPITAL ROOM: TOTAL
SUGGESTED CMS G CODE MODIFIER MOBILITY: CN
MOVING TO AND FROM BED TO CHAIR: UNABLE
CLIMB 3 TO 5 STEPS WITH RAILING: TOTAL
TURNING FROM BACK TO SIDE WHILE IN FLAT BAD: UNABLE
MOBILITY SCORE: 6

## 2017-05-30 NOTE — THERAPY
Attempted to see pt for OT tx. Pt currently down getting MRI w/ anesthesia. Will try again later as able.

## 2017-05-30 NOTE — PROGRESS NOTES
Hospital Medicine Interval Note  Date of Service:  5/30/2017    Chief Complaint  71 y.o.-year-old male admitted 5/20/2017 with progressive B upper and lower extremity weakness.  Significant lumbar stenosis post decompression 5/25.    Interval Problem Update  5/26 Patient tolerated surgery well with Dr Escobar, ROBERT drain in place post operative, cause of UE weakness still not readily identifiable and MRI brain pending.  Discussed with patient need for anesthesia for procedure - is necessary and though he is willing to try to do with anxiolytics, his claustrophobia is very strong and will proceed with anesthesia procedure as planned on Tuesday.  He will need continued therapies and eventual SNF to recover once cause of weakness found.    5/27 Patient feeling weaker today and NSG consulted Dr Rosales for neurology consultation.  High suspicion of AIDP/ Guianne [place].  IVIG ordered for admin soon.  PICC ordered but infusion can start as soon as prepared.  He has a blood type with lesser type C and D according to the hepatology card he carries with him.      5/28 Patient able to move legs today after fist doses of IVIG, very good sign.  Continue with PT/OT and IVIG.  PICC line pending.    5/29 Patient feeling stronger BLE today, BUE also slightly improved.  Had RN call PICC RN for placement d/t continued need of IVIG and poor infusion d/t high viscosity having difficulty infusion via PIV.  Rehab discussed with patient, will be appropriate as his strength improves, will have my partner place consult later in the week as he will be stronger with continued IVIG.  Difficulty with pain control overnight - has appropriate medications and frequencies verified.  Difficulty with positioning self in bed, will order overhead trapeze.  MRI with sedation tomorrow, NPO at [place].    5/30 MRI still pending.  Patient continues to report improvement of weakness in bilateral upper and lower extremities.  Tolerating PT  treatments.    Consultants/Specialty  CRISPIN Escobar/Liv  Neuro - [facility]    Disposition   Rehab consult when appropriate - after Tuesday's MRI     Review of Systems   Constitutional: Negative for fever and chills.   HENT: Negative for congestion.    Eyes: Negative for blurred vision and photophobia.   Respiratory: Negative for cough and shortness of breath.    Cardiovascular: Negative for chest pain, claudication and leg swelling.   Gastrointestinal: Negative for heartburn, vomiting, abdominal pain, diarrhea and constipation.   Genitourinary: Negative for dysuria and hematuria.   Musculoskeletal: Negative for myalgias and joint pain.   Skin: Negative for itching and rash.   Neurological: Positive for sensory change (leg sensation better post-op) and focal weakness (legs significantly better sensation and strength). Negative for dizziness, speech change, weakness and headaches.   Psychiatric/Behavioral: Negative for depression. The patient is not nervous/anxious and does not have insomnia.       Physical Exam Laboratory/Imaging   Filed Vitals:    05/29/17 1600 05/29/17 1906 05/30/17 0400 05/30/17 0828   BP: 116/63 129/64 126/69 135/71   Pulse: 74 74 69 68   Temp: 37.3 °C (99.1 °F) 37.2 °C (98.9 °F) 37.1 °C (98.8 °F) 36.6 °C (97.8 °F)   Resp: 18 18 18 18   Height:       Weight:       SpO2: 92% 93% 94% 90%     Physical Exam   Constitutional: He is oriented to person, place, and time. He appears well-developed and well-nourished.   HENT:   Head: Normocephalic and atraumatic.   Eyes: Conjunctivae are normal. No scleral icterus.   Neck: Neck supple. No JVD present.   Cardiovascular: Normal rate, regular rhythm, normal heart sounds and intact distal pulses.  Exam reveals no gallop and no friction rub.    No murmur heard.  Pulmonary/Chest: Effort normal and breath sounds normal. No respiratory distress. He exhibits no tenderness.   Abdominal: Soft. Bowel sounds are normal. He exhibits no distension and no mass. There is  no tenderness.   Musculoskeletal: He exhibits no edema or tenderness.   Neurological: He is alert and oriented to person, place, and time. No cranial nerve deficit.   Decreased strength BLE, BUE - improved post IVIG, improved from yesterday     Skin: Skin is warm and dry. No rash noted. He is not diaphoretic.   Red area lateral right forearm nearly resolved       Psychiatric: He has a normal mood and affect. His behavior is normal.   Nursing note and vitals reviewed.   Lab Results   Component Value Date/Time    WBC 3.1* 05/30/2017 03:30 AM    HEMOGLOBIN 10.8* 05/30/2017 03:30 AM    HEMATOCRIT 34.0* 05/30/2017 03:30 AM    PLATELET COUNT 63* 05/30/2017 03:30 AM     Lab Results   Component Value Date/Time    SODIUM 135 05/30/2017 03:30 AM    POTASSIUM 3.9 05/30/2017 03:30 AM    CHLORIDE 107 05/30/2017 03:30 AM    CO2 22 05/30/2017 03:30 AM    GLUCOSE 101* 05/30/2017 03:30 AM    BUN 16 05/30/2017 03:30 AM    CREATININE 0.62 05/30/2017 03:30 AM    GLOM FILT RATE, EST >59 01/23/2010 12:00 AM      Assessment/Plan    * Generalized weakness (present on admission)  Assessment & Plan  Upper and lower extremity weakness  Neurology suspecting AIDP; improving with IVIG   Pain improved by lumbar decompression with Dr Escobar 5/25, continue therapies  C spine without reasoning for UE Weakness, MRI brain Tuesday 5/30 with anesthesia      Esophageal varices- BANDED 4/2016- repeat egd tbd 10//29/16- gic (present on admission)  Assessment & Plan  No incidence of acute bleeding    Essential hypertension (present on admission)  Assessment & Plan  Coreg, cozaar  Labetalol available if needed      Cirrhosis of liver with ascites (HCC)- ? DUE TO OLD HEP B, CHEMO RX,  OR THERMO RAD OF MAGNANT LESION (present on admission)  Assessment & Plan  Complications of cancer/chemo  Watch for coagulopathy  H/o portal vein thrombosis  Hepatologist is Dr Culp      H/O colon cancer, stage IV (present on admission)  Assessment & Plan  Patient reported to  be in remission, had seen Dr Anguiano in the past, has completed chemo 5 years prior  [facility] care team with hepatic team: Ramses Light, Robbi    Occult blood positive stool (present on admission)  Assessment & Plan  - Hgb stable; no evidence of acute bleeding  - monitoring      Lumbar canal stenosis  Assessment & Plan  S/p decompression with Abdunnur 5/25, ROBERT drain in place  PT/OT       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)  Assessment & Plan  Dr Rosales consulted  IVIG - symptoms improving.  EMG done at bedside 5/27         Labs reviewed, Medications reviewed and Radiology images reviewed  Vivas catheter: No Vivas      DVT Prophylaxis: Contraindicated - High bleeding risk  DVT prophylaxis - mechanical: SCDs      Assessed for rehab: Patient was assess for and/or received rehabilitation services during this hospitalization

## 2017-05-30 NOTE — THERAPY
"Physical Therapy Treatment completed.   Bed Mobility:  Supine to Sit: Maximal Assist  Transfers: Sit to Stand: Total Assist (EOB->partial stand to scoot along EOB)      Plan of Care: Will benefit from Physical Therapy 5 times per week  Discharge Recommendations: Equipment: Will Continue to Assess for Equipment Needs. Post-acute therapy Discharge to a transitional care facility for continued skilled therapy services.     See \"Rehab Therapy-Acute\" Patient Summary Report for complete documentation.       "

## 2017-05-30 NOTE — PROGRESS NOTES
Neurology Progress Note               Author: BEN Rosales Date & Time created: 2017  8:56 AM     Interval History:  No new events.  Feels slightly stronger.  Tolerating IVIG.    Review of Systems:  ROS    Physical Exam:  Physical Exam   Neurological:   Strength in interossei are slightly improved.  LE strength unchanged.  Remains areflexive.       Labs:        Invalid input(s): [id number]      Recent Labs      178  17   0330   SODIUM  135  135   POTASSIUM  3.9  3.9   CHLORIDE  104  107   CO2  28  22   BUN  21  16   CREATININE  0.68  0.62   CALCIUM  8.2*  8.0*     Recent Labs      17   0330   ALTSGPT  22  18   ASTSGOT  21  21   ALKPHOSPHAT  102*  84   TBILIRUBIN  1.0  1.0   GLUCOSE  110*  101*     Recent Labs      178  17   0330   RBC  3.90*  3.92*   HEMOGLOBIN  10.7*  10.8*   HEMATOCRIT  32.9*  34.0*   PLATELETCT  57*  63*     Recent Labs      17   0330   WBC  4.5*  3.1*   NEUTSPOLYS  65.00  54.80   LYMPHOCYTES  14.10*  14.00*   MONOCYTES  14.10*  20.50*   EOSINOPHILS  5.90  10.10*   BASOPHILS  0.70  0.60   ASTSGOT  21  21   ALTSGPT  22  18   ALKPHOSPHAT  102*  84   TBILIRUBIN  1.0  1.0     Recent Labs      178  17   0330   SODIUM  135  135   POTASSIUM  3.9  3.9   CHLORIDE  104  107   CO2  28  22   GLUCOSE  110*  101*   BUN  21  16   CREATININE  0.68  0.62   CALCIUM  8.2*  8.0*     Hemodynamics:  Temp (24hrs), Av.1 °C (98.7 °F), Min:36.6 °C (97.8 °F), Max:37.3 °C (99.1 °F)  Temperature: 36.6 °C (97.8 °F)  Pulse  Av  Min: 60  Max: 91   Blood Pressure : 135/71 mmHg     Respiratory:    Respiration: 18, Pulse Oximetry: 90 %        RUL Breath Sounds: Clear, RML Breath Sounds: Clear, RLL Breath Sounds: Clear, JOSE Breath Sounds: Clear, LLL Breath Sounds: Clear  Fluids:    Intake/Output Summary (Last 24 hours) at 17 0856  Last data filed at 17 1300   Gross per 24 hour   Intake    700 ml   Output       0 ml   Net    700 ml        GI/Nutrition:  Orders Placed This Encounter   Procedures   • DIET NPO     Standing Status: Standing      Number of Occurrences: 8      Standing Expiration Date:      Order Specific Question:  Restrict to:     Answer:  Sips with Medications [3]     Medical Decision Making, by Problem:  Active Hospital Problems    Diagnosis   • *Generalized weakness [R53.1]   • Esophageal varices- BANDED 4/2016- repeat egd tbd 10//29/16- gic [I85.00]   • 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 DUE TO OLD HEP B, CHEMO RX,  OR THERMO RAD OF MAGNANT LESION [K74.60]   • Essential hypertension [I10]       Plan:  For day 4 of IVIG.  Continue therapy.  Will likely need rehab.    Core Measures

## 2017-05-30 NOTE — PROGRESS NOTES
IR Progress Note:    MRI of the brain with and without contrast - general anesthesia by Dr. Wright. All vital signs monitoring and medication administration by anesthesia services. Report called to DEWEY Tuttle. Pt transported to PPU with Dr. Wright.

## 2017-05-30 NOTE — PROGRESS NOTES
D OHF MB set up for pt. Pt educated on use.   Please call traction at [phone] if adjustment is needed.

## 2017-05-30 NOTE — PROGRESS NOTES
Hospital Medicine Interval Note  Date of Service:  5/29/2017    Chief Complaint  71 y.o.-year-old male admitted 5/20/2017 with progressive B upper and lower extremity weakness.  Significant lumbar stenosis post decompression 5/25.    Interval Problem Update  5/26 Patient tolerated surgery well with Dr Escobar, ROBERT drain in place post operative, cause of UE weakness still not readily identifiable and MRI brain pending.  Discussed with patient need for anesthesia for procedure - is necessary and though he is willing to try to do with anxiolytics, his claustrophobia is very strong and will proceed with anesthesia procedure as planned on Tuesday.  He will need continued therapies and eventual SNF to recover once cause of weakness found.    5/27 Patient feeling weaker today and NSG consulted Dr Rosales for neurology consultation.  High suspicion of AIDP/ Guianne [place].  IVIG ordered for admin soon.  PICC ordered but infusion can start as soon as prepared.  He has a blood type with lesser type C and D according to the hepatology card he carries with him.      5/28 Patient able to move legs today after fist doses of IVIG, very good sign.  Continue with PT/OT and IVIG.  PICC line pending.    5/29 Patient feeling stronger BLE today, BUE also slightly improved.  Had RN call PICC RN for placement d/t continued need of IVIG and poor infusion d/t high viscosity having difficulty infusion via PIV.  Rehab discussed with patient, will be appropriate as his strength improves, will have my partner place consult later in the week as he will be stronger with continued IVIG.  Difficulty with pain control overnight - has appropriate medications and frequencies verified.  Difficulty with positioning self in bed, will order overhead trapeze.  MRI with sedation tomorrow, NPO at [place].    Consultants/Specialty  CRISPIN - Luz/Liv Rosales    Disposition   Rehab consult when appropriate - after Tuesday's MRI     Review of Systems    Constitutional: Negative for fever and chills.   HENT: Negative for congestion.    Eyes: Negative for blurred vision and photophobia.   Respiratory: Negative for cough and shortness of breath.    Cardiovascular: Negative for chest pain, claudication and leg swelling.   Gastrointestinal: Negative for heartburn, vomiting, abdominal pain, diarrhea and constipation.   Genitourinary: Negative for dysuria and hematuria.   Musculoskeletal: Negative for myalgias and joint pain.   Skin: Negative for itching and rash.   Neurological: Positive for sensory change (leg sensation better post-op) and focal weakness (legs significantly better sensation and strength). Negative for dizziness, speech change, weakness and headaches.   Psychiatric/Behavioral: Negative for depression. The patient is not nervous/anxious and does not have insomnia.       Physical Exam Laboratory/Imaging   Filed Vitals:    05/28/17 1943 05/29/17 0345 05/29/17 0744 05/29/17 1600   BP: 125/66 121/62 129/78 116/63   Pulse: 73 66 77 74   Temp: 37.3 °C (99.1 °F) 37.1 °C (98.7 °F) 37.2 °C (99 °F) 37.3 °C (99.1 °F)   Resp: 18 18 18 18   Height:       Weight:       SpO2: 96% 98% 96% 92%     Physical Exam   Constitutional: He is oriented to person, place, and time. He appears well-developed and well-nourished.   HENT:   Head: Normocephalic and atraumatic.   Eyes: Conjunctivae are normal. No scleral icterus.   Neck: Neck supple. No JVD present.   Cardiovascular: Normal rate, regular rhythm, normal heart sounds and intact distal pulses.  Exam reveals no gallop and no friction rub.    No murmur heard.  Pulmonary/Chest: Effort normal and breath sounds normal. No respiratory distress. He exhibits no tenderness.   Abdominal: Soft. Bowel sounds are normal. He exhibits no distension and no mass. There is no tenderness.   Musculoskeletal: He exhibits no edema or tenderness.   Neurological: He is alert and oriented to person, place, and time. No cranial nerve deficit.    Decreased strength BLE, BUE - improved post IVIG, improved from yesterday     Skin: Skin is warm and dry. No rash noted. He is not diaphoretic.   Red area lateral right forearm nearly resolved       Psychiatric: He has a normal mood and affect. His behavior is normal.   Nursing note and vitals reviewed.   Lab Results   Component Value Date/Time    WBC 4.5* 05/28/2017 01:48 AM    HEMOGLOBIN 10.7* 05/28/2017 01:48 AM    HEMATOCRIT 32.9* 05/28/2017 01:48 AM    PLATELET COUNT 57* 05/28/2017 01:48 AM     Lab Results   Component Value Date/Time    SODIUM 135 05/28/2017 01:48 AM    POTASSIUM 3.9 05/28/2017 01:48 AM    CHLORIDE 104 05/28/2017 01:48 AM    CO2 28 05/28/2017 01:48 AM    GLUCOSE 110* 05/28/2017 01:48 AM    BUN 21 05/28/2017 01:48 AM    CREATININE 0.68 05/28/2017 01:48 AM    GLOM FILT RATE, EST >59 01/23/2010 12:00 AM      Assessment/Plan    * Generalized weakness (present on admission)  Assessment & Plan  Upper and lower extremity weakness  Neurology suspecting AIDP, IVIG ordered  Pain improved by lumbar decompression with Dr Escobar 5/25, continue therapies  C spine without reasoning for UE Weakness, MRI brain Tuesday 5/30 with anesthesia      Esophageal varices- BANDED 4/2016- repeat egd tbd 10//29/16- gic (present on admission)  Assessment & Plan  No incidence of acute bleeding    Essential hypertension (present on admission)  Assessment & Plan  Coreg, cozaar  Labetalol available if needed      Cirrhosis of liver with ascites (HCC)- ? DUE TO OLD HEP B, CHEMO RX,  OR THERMO RAD OF MAGNANT LESION (present on admission)  Assessment & Plan  Complications of cancer/chemo  Watch for coagulopathy  H/o portal vein thrombosis  Hepatologist is Dr Culp      H/O colon cancer, stage IV (present on admission)  Assessment & Plan  Patient reported to be in remission, had seen Dr Anguiano in the past, has completed chemo 5 years prior  [facility] care team with hepatic team: Ramses Light, Robbi    Occult blood positive stool  (present on admission)  Assessment & Plan  Trend h/h and no AC      Lumbar canal stenosis  Assessment & Plan  S/p decompression with Abdunnur 5/25, ROBERT drain in place  PT/OT       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  EMG done at bedside 5/27         Labs reviewed, Medications reviewed and Radiology images reviewed  Vivas catheter: No Vivas      DVT Prophylaxis: Contraindicated - High bleeding risk  DVT prophylaxis - mechanical: SCDs      Assessed for rehab: Patient was assess for and/or received rehabilitation services during this hospitalization

## 2017-05-30 NOTE — PROGRESS NOTES
Discussed set up of trapeze to pt's bed with pt. At this time pt commented frame is in user friendly order.

## 2017-05-30 NOTE — WOUND TEAM
"[facility]  Inpatient Services  Wound and Skin Care Progress Note    HPI, PMH, SH: Reviewed  Unit where seen by Wound Team: S140/00    WOUND TEAM FOLLOW UP: buttocks    SUBJECTIVE:  \"I just need to sit up on side of bed.\"    Self Report / Pain Level: c/o pain from being in bed  OBJECTIVE: on pressure redistribution mattress with waffle overlay  WOUND TYPE, LOCATION, CHARACTERISTICS:  Wound Skin Tear Buttock Left  Periwound Skin: Red  Drainage : Scant, Serosanguinous     Tissue Type and %:    100% red  Wound Edges:    open    Odor:     None   Exposed structure(s):   No   Signs and Symptoms of Infection: none    Measurements : taken 5/30/17  Length (cm): 5  Width (cm): 8  Depth (cm): 0.2      Tracts/undermining:  None       INTERVENTIONS BY WOUND TEAM: pt turned to L side, viewed posterior skin, cleaned tear with NS, dried. Applied calcium alginate with adhesive foam. Pad changed. Positioned R side lying.   Dressing Options: Calcium Alginate, Foam    Interdisciplinary consultation:  RN, pt, CNA    EVALUATION AND PROGRESS OF WOUND(S): pt has partial thickness skin tear to L buttock. Calcium alginate for scant bleeding, foam to absorb. Concern R buttock is blanching,  but raised 4 x 3 x intact.     Factors affecting wound healing:  Cirrhosis, anemia, was smoker, Hx of metastatic colon cancer, decreased mobility, CAD     Goals: decreased wound size 1% each week    NURSING PLAN OF CARE:    Dressing changes: Continue previous Dressing Maintenance orders:        See new Dressing Maintenance orders:  x     Skin care: See Skin Care orders:   x     Rectal tube care: See Rectal Tube Care orders:      Other orders:           WOUND TEAM PLAN OF CARE (X):   NPWT change 3 x week:        Dressing changes:       Follow up as needed: x      Other:              "

## 2017-05-30 NOTE — OR NURSING
1310   REPORT RECEIVED FROM MRI.    1320  PATIENT ARRIVES TO PPU 7,  SLEEPY BUT AROUSABLE,  ANSWERED QUESTIONS APPROPRIATELY.  PER ANESTHESIOLOGIST, PATIENT CAN BE TRANSFERRED BACK TO FLOOR.    1345   REPORT CALLED TO NEURO SURGERY.  PATIENT TRANSPORTED BACK TO S140 VIA GURNEY,  ON 3L NC OXYGEN,  ACCOMPANIED BY TRANSPORT.

## 2017-05-30 NOTE — CARE PLAN
Problem: Pain Management  Goal: Pain level will decrease to patient’s comfort goal  Routine pain assessment performed and appropriate pain management intervention implemented.    Problem: Skin Integrity  Goal: Risk for impaired skin integrity will decrease  2 RN skin assessment performed, Q2 turns performed, Wound care consult ordered, extra pillows used to float heels and place under pt's elbows.

## 2017-05-30 NOTE — OR NURSING
1310   REPORT RECEIVED FROM MRI,  S/P MRI OF BRAIN WITH AND WITHOUT CONTRAST.    1320  PATIENT ARRIVES TO PPU 7,  SLEEPY BUT AROUSABLE,  ANSWERED QUESTIONS APPROPRIATELY.  PER ANESTHESIOLOGIST, PATIENT CAN BE TRANSFERRED BACK TO FLOOR.    1345   REPORT CALLED TO NEURO SURGERY.  PATIENT TRANSPORTED BACK TO S140 VIA GURNEY,  ON 3L NC OXYGEN,  ACCOMPANIED BY TRANSPORT.

## 2017-05-30 NOTE — PROGRESS NOTES
Progress Note               Author: Jimmy Leyva Date & Time created: 2017  8:31 AM     Interval History:  POD #5 open L4/5 lami  Pt seen and examined by Dr. Rosales yesterday and has probable diagnosis of AIDP  Pt improving  Leg pain resolved  Getting stronger   Arms getting better    Review of Systems:  Review of Systems   Musculoskeletal: Positive for back pain.       Physical Exam:  Physical Exam   Constitutional: He is oriented to person, place, and time.   Neurological: He is alert and oriented to person, place, and time.   Left EHL 4+5/; remainder of legs 5/5        Labs:        Invalid input(s): [id number]      Recent Labs      178  17   0330   SODIUM  135  135   POTASSIUM  3.9  3.9   CHLORIDE  104  107   CO2  28  22   BUN  21  16   CREATININE  0.68  0.62   CALCIUM  8.2*  8.0*     Recent Labs      17   0330   ALTSGPT  22  18   ASTSGOT  21  21   ALKPHOSPHAT  102*  84   TBILIRUBIN  1.0  1.0   GLUCOSE  110*  101*     Recent Labs      178  17   0330   RBC  3.90*  3.92*   HEMOGLOBIN  10.7*  10.8*   HEMATOCRIT  32.9*  34.0*   PLATELETCT  57*  63*     Recent Labs      17   0330   WBC  4.5*  3.1*   NEUTSPOLYS  65.00  54.80   LYMPHOCYTES  14.10*  14.00*   MONOCYTES  14.10*  20.50*   EOSINOPHILS  5.90  10.10*   BASOPHILS  0.70  0.60   ASTSGOT  21  21   ALTSGPT  22  18   ALKPHOSPHAT  102*  84   TBILIRUBIN  1.0  1.0     Hemodynamics:  Temp (24hrs), Av.1 °C (98.7 °F), Min:36.6 °C (97.8 °F), Max:37.3 °C (99.1 °F)  Temperature: 36.6 °C (97.8 °F)  Pulse  Av  Min: 60  Max: 91   Blood Pressure : 135/71 mmHg     Respiratory:    Respiration: 18, Pulse Oximetry: 90 %        RUL Breath Sounds: Clear, RML Breath Sounds: Clear, RLL Breath Sounds: Clear, JOSE Breath Sounds: Clear, LLL Breath Sounds: Diminished  Fluids:    Intake/Output Summary (Last 24 hours) at 17 0831  Last data filed at 17 1300   Gross per 24 hour    Intake    700 ml   Output      0 ml   Net    700 ml        GI/Nutrition:  Orders Placed This Encounter   Procedures   • DIET NPO     Standing Status: Standing      Number of Occurrences: 8      Standing Expiration Date:      Order Specific Question:  Restrict to:     Answer:  Sips with Medications [3]     Medical Decision Making, by Problem:  Active Hospital Problems    Diagnosis   • *Generalized weakness [R53.1]   • Esophageal varices- BANDED 4/2016- repeat egd tbd 10//29/16- gic [I85.00]   • 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 DUE TO OLD HEP B, CHEMO RX,  OR THERMO RAD OF MAGNANT LESION [K74.60]   • Essential hypertension [I10]       Plan:  Continue IM / neurology care  Per Dr. Escobar we will sign off  Pt getting brain MRI, will be reviewed  Okay for rehab  Call with questions or concerns  Pt will f/u in office     Labs reviewed, Radiology images reviewed and Medications reviewed

## 2017-05-31 LAB
ALBUMIN SERPL BCP-MCNC: 2.8 G/DL (ref 3.2–4.9)
ALBUMIN/GLOB SERPL: 0.7 G/DL
ALP SERPL-CCNC: 90 U/L (ref 30–99)
ALT SERPL-CCNC: 24 U/L (ref 2–50)
ANION GAP SERPL CALC-SCNC: 5 MMOL/L (ref 0–11.9)
AST SERPL-CCNC: 29 U/L (ref 12–45)
BASOPHILS # BLD AUTO: 0.7 % (ref 0–1.8)
BASOPHILS # BLD: 0.03 K/UL (ref 0–0.12)
BILIRUB SERPL-MCNC: 1.3 MG/DL (ref 0.1–1.5)
BUN SERPL-MCNC: 18 MG/DL (ref 8–22)
CALCIUM SERPL-MCNC: 8.8 MG/DL (ref 8.5–10.5)
CHLORIDE SERPL-SCNC: 102 MMOL/L (ref 96–112)
CO2 SERPL-SCNC: 24 MMOL/L (ref 20–33)
CREAT SERPL-MCNC: 0.65 MG/DL (ref 0.5–1.4)
EOSINOPHIL # BLD AUTO: 0.14 K/UL (ref 0–0.51)
EOSINOPHIL NFR BLD: 3.1 % (ref 0–6.9)
ERYTHROCYTE [DISTWIDTH] IN BLOOD BY AUTOMATED COUNT: 48.3 FL (ref 35.9–50)
GFR SERPL CREATININE-BSD FRML MDRD: >60 ML/MIN/1.73 M 2
GLOBULIN SER CALC-MCNC: 3.9 G/DL (ref 1.9–3.5)
GLUCOSE SERPL-MCNC: 113 MG/DL (ref 65–99)
HCT VFR BLD AUTO: 34.4 % (ref 42–52)
HGB BLD-MCNC: 11.4 G/DL (ref 14–18)
IMM GRANULOCYTES # BLD AUTO: 0.01 K/UL (ref 0–0.11)
IMM GRANULOCYTES NFR BLD AUTO: 0.2 % (ref 0–0.9)
LYMPHOCYTES # BLD AUTO: 0.48 K/UL (ref 1–4.8)
LYMPHOCYTES NFR BLD: 10.5 % (ref 22–41)
MCH RBC QN AUTO: 27.3 PG (ref 27–33)
MCHC RBC AUTO-ENTMCNC: 33.1 G/DL (ref 33.7–35.3)
MCV RBC AUTO: 82.5 FL (ref 81.4–97.8)
MONOCYTES # BLD AUTO: 0.72 K/UL (ref 0–0.85)
MONOCYTES NFR BLD AUTO: 15.8 % (ref 0–13.4)
NEUTROPHILS # BLD AUTO: 3.17 K/UL (ref 1.82–7.42)
NEUTROPHILS NFR BLD: 69.7 % (ref 44–72)
NRBC # BLD AUTO: 0 K/UL
NRBC BLD AUTO-RTO: 0 /100 WBC
PLATELET # BLD AUTO: 76 K/UL (ref 164–446)
PMV BLD AUTO: 9.8 FL (ref 9–12.9)
POTASSIUM SERPL-SCNC: 3.9 MMOL/L (ref 3.6–5.5)
PROT SERPL-MCNC: 6.7 G/DL (ref 6–8.2)
RBC # BLD AUTO: 4.17 M/UL (ref 4.7–6.1)
SODIUM SERPL-SCNC: 131 MMOL/L (ref 135–145)
WBC # BLD AUTO: 4.6 K/UL (ref 4.8–10.8)

## 2017-05-31 PROCEDURE — 770001 HCHG ROOM/CARE - MED/SURG/GYN PRIV*

## 2017-05-31 PROCEDURE — A9270 NON-COVERED ITEM OR SERVICE: HCPCS | Performed by: NURSE PRACTITIONER

## 2017-05-31 PROCEDURE — 700102 HCHG RX REV CODE 250 W/ 637 OVERRIDE(OP): Performed by: INTERNAL MEDICINE

## 2017-05-31 PROCEDURE — 85025 COMPLETE CBC W/AUTO DIFF WBC: CPT

## 2017-05-31 PROCEDURE — A9270 NON-COVERED ITEM OR SERVICE: HCPCS | Performed by: INTERNAL MEDICINE

## 2017-05-31 PROCEDURE — 700102 HCHG RX REV CODE 250 W/ 637 OVERRIDE(OP): Performed by: NURSE PRACTITIONER

## 2017-05-31 PROCEDURE — 700111 HCHG RX REV CODE 636 W/ 250 OVERRIDE (IP): Performed by: SPECIALIST

## 2017-05-31 PROCEDURE — 99233 SBSQ HOSP IP/OBS HIGH 50: CPT | Performed by: INTERNAL MEDICINE

## 2017-05-31 PROCEDURE — A9270 NON-COVERED ITEM OR SERVICE: HCPCS | Performed by: PHYSICIAN ASSISTANT

## 2017-05-31 PROCEDURE — 80053 COMPREHEN METABOLIC PANEL: CPT

## 2017-05-31 PROCEDURE — 700102 HCHG RX REV CODE 250 W/ 637 OVERRIDE(OP): Performed by: PHYSICIAN ASSISTANT

## 2017-05-31 RX ADMIN — ATORVASTATIN CALCIUM 40 MG: 40 TABLET, FILM COATED ORAL at 20:17

## 2017-05-31 RX ADMIN — IMMUNE GLOBULIN 10 G: 50 SOLUTION INTRAVENOUS at 12:14

## 2017-05-31 RX ADMIN — IMMUNE GLOBULIN 10 G: 50 SOLUTION INTRAVENOUS at 10:56

## 2017-05-31 RX ADMIN — LOSARTAN POTASSIUM 25 MG: 50 TABLET, FILM COATED ORAL at 08:38

## 2017-05-31 RX ADMIN — TRAMADOL HYDROCHLORIDE 50 MG: 50 TABLET, COATED ORAL at 08:38

## 2017-05-31 RX ADMIN — IMMUNE GLOBULIN 10 G: 50 SOLUTION INTRAVENOUS at 13:28

## 2017-05-31 RX ADMIN — ANTACID TABLETS 500 MG: 500 TABLET, CHEWABLE ORAL at 20:17

## 2017-05-31 RX ADMIN — IMMUNE GLOBULIN 5 G: 50 SOLUTION INTRAVENOUS at 09:17

## 2017-05-31 RX ADMIN — TRAMADOL HYDROCHLORIDE 50 MG: 50 TABLET, COATED ORAL at 20:17

## 2017-05-31 RX ADMIN — ANTACID TABLETS 500 MG: 500 TABLET, CHEWABLE ORAL at 08:37

## 2017-05-31 RX ADMIN — OXYCODONE HYDROCHLORIDE 10 MG: 10 TABLET ORAL at 23:36

## 2017-05-31 RX ADMIN — CYCLOBENZAPRINE 10 MG: 10 TABLET, FILM COATED ORAL at 17:31

## 2017-05-31 RX ADMIN — CARVEDILOL 3.12 MG: 6.25 TABLET, FILM COATED ORAL at 17:31

## 2017-05-31 RX ADMIN — FOLIC ACID 1 MG: 1 TABLET ORAL at 08:38

## 2017-05-31 RX ADMIN — CARVEDILOL 3.12 MG: 6.25 TABLET, FILM COATED ORAL at 08:38

## 2017-05-31 ASSESSMENT — ENCOUNTER SYMPTOMS
FOCAL WEAKNESS: 1
PHOTOPHOBIA: 0
HEADACHES: 0
SHORTNESS OF BREATH: 0
DIZZINESS: 0
VOMITING: 0
CONSTIPATION: 0
COUGH: 0
DIARRHEA: 0
FEVER: 0
MYALGIAS: 0
ABDOMINAL PAIN: 0
BLURRED VISION: 0
CHILLS: 0
SENSORY CHANGE: 1
INSOMNIA: 0
SPEECH CHANGE: 0
NERVOUS/ANXIOUS: 0
DEPRESSION: 0
HEARTBURN: 0
WEAKNESS: 0
CLAUDICATION: 0

## 2017-05-31 ASSESSMENT — PAIN SCALES - GENERAL
PAINLEVEL_OUTOF10: 4
PAINLEVEL_OUTOF10: 8
PAINLEVEL_OUTOF10: 4
PAINLEVEL_OUTOF10: 4

## 2017-05-31 NOTE — THERAPY
"Attempted to see pt for OT tx. Pt politely declined after not sleeping well previous night and just \"got cleaned up.\" Pt verbalizes motivation to increase strength and endurance for ADLs and ADL transfers. Will try again later as able.   "

## 2017-05-31 NOTE — PROGRESS NOTES
Received report from Tarah PALOMO. Pt is A&Ox4. Denies any n/v. States n/t in bilateral fingers. States back pain at the incision site of 4/10. Medicated appropriately per MAR w/positive results. Pt updated on POC. All needs met at this time. Pt is instructed to call when in need of assistance. Bed in lowest and locked position. Call light within reach. Hourly rounding in place.

## 2017-05-31 NOTE — CARE PLAN
Problem: Communication  Goal: The ability to communicate needs accurately and effectively will improve  Outcome: PROGRESSING AS EXPECTED  Patient is able to communicate needs accurately and uses the call light appropriately.     Problem: Safety  Goal: Will remain free from injury  Outcome: PROGRESSING AS EXPECTED  Patient remains free from injury. Patient uses call light when necessary. Nursing staff assists with ambulation. Patient educated on injury prevention.   Goal: Will remain free from falls  Fall precautions implemented. Treaded socks. Patient remains free from falls.

## 2017-05-31 NOTE — PROGRESS NOTES
Pt educated on the risks and benefits on the use of the bed alarm. Despite education, the pt refuses the use of the bed alarm. Pt is instructed to call when in need of assistance. Bed in lowest and locked position. Call light within reach. Treaded socks and hourly rounding in place.

## 2017-05-31 NOTE — PROGRESS NOTES
Alert, conversant.  Strength again slowly improving, areflexive.  Day 5 of IVIG.  Ok for rehab as of tomorrow.

## 2017-05-31 NOTE — PROGRESS NOTES
Hospital Medicine Interval Note  Date of Service:  5/31/2017    Chief Complaint  71 y.o.-year-old male admitted 5/20/2017 with progressive B upper and lower extremity weakness.  Significant lumbar stenosis post decompression 5/25.    Interval Problem Update  5/26 Patient tolerated surgery well with Dr Escobar, ROBERT drain in place post operative, cause of UE weakness still not readily identifiable and MRI brain pending.  Discussed with patient need for anesthesia for procedure - is necessary and though he is willing to try to do with anxiolytics, his claustrophobia is very strong and will proceed with anesthesia procedure as planned on Tuesday.  He will need continued therapies and eventual SNF to recover once cause of weakness found.    5/27 Patient feeling weaker today and NSG consulted Dr Rosales for neurology consultation.  High suspicion of AIDP/ Guianne [place].  IVIG ordered for admin soon.  PICC ordered but infusion can start as soon as prepared.  He has a blood type with lesser type C and D according to the hepatology card he carries with him.      5/28 Patient able to move legs today after fist doses of IVIG, very good sign.  Continue with PT/OT and IVIG.  PICC line pending.    5/29 Patient feeling stronger BLE today, BUE also slightly improved.  Had RN call PICC RN for placement d/t continued need of IVIG and poor infusion d/t high viscosity having difficulty infusion via PIV.  Rehab discussed with patient, will be appropriate as his strength improves, will have my partner place consult later in the week as he will be stronger with continued IVIG.  Difficulty with pain control overnight - has appropriate medications and frequencies verified.  Difficulty with positioning self in bed, will order overhead trapeze.  MRI with sedation tomorrow, NPO at [place]n.    5/30 MRI still pending.  Patient continues to report improvement of weakness in bilateral upper and lower extremities.  Tolerating PT treatments.    5/31 MRI  results noted.  Patient reports improvement in upper extremity weakness bilaterally.  No other acute issues or complaints overnight.     Consultants/Specialty  CRISPIN - Luz/Liv  Neuro - [facility]    Disposition   Rehab consult when appropriate - after Tuesday's MRI     Review of Systems   Constitutional: Negative for fever and chills.   HENT: Negative for congestion.    Eyes: Negative for blurred vision and photophobia.   Respiratory: Negative for cough and shortness of breath.    Cardiovascular: Negative for chest pain, claudication and leg swelling.   Gastrointestinal: Negative for heartburn, vomiting, abdominal pain, diarrhea and constipation.   Genitourinary: Negative for dysuria and hematuria.   Musculoskeletal: Negative for myalgias and joint pain.   Skin: Negative for itching and rash.   Neurological: Positive for sensory change (leg sensation better post-op) and focal weakness (legs significantly better sensation and strength). Negative for dizziness, speech change, weakness and headaches.   Psychiatric/Behavioral: Negative for depression. The patient is not nervous/anxious and does not have insomnia.       Physical Exam Laboratory/Imaging   Filed Vitals:    05/30/17 1530 05/30/17 1649 05/30/17 1942 05/31/17 0323   BP: 126/71 136/67 122/64 143/75   Pulse: 69 81 82 64   Temp: 37.1 °C (98.8 °F) 37.5 °C (99.5 °F) 37.5 °C (99.5 °F) 37.3 °C (99.2 °F)   Resp: 18 18 18 18   Height:       Weight:       SpO2: 95% 95% 93% 91%     Physical Exam   Constitutional: He is oriented to person, place, and time. He appears well-developed and well-nourished.   HENT:   Head: Normocephalic and atraumatic.   Eyes: Conjunctivae are normal. No scleral icterus.   Neck: Neck supple. No JVD present.   Cardiovascular: Normal rate, regular rhythm, normal heart sounds and intact distal pulses.  Exam reveals no gallop and no friction rub.    No murmur heard.  Pulmonary/Chest: Effort normal and breath sounds normal. No respiratory  distress. He exhibits no tenderness.   Abdominal: Soft. Bowel sounds are normal. He exhibits no distension and no mass. There is no tenderness.   Musculoskeletal: He exhibits no edema or tenderness.   Neurological: He is alert and oriented to person, place, and time. No cranial nerve deficit.   Decreased strength BLE, BUE - improved post IVIG, improved from yesterday     Skin: Skin is warm and dry. No rash noted. He is not diaphoretic.   Red area lateral right forearm nearly resolved       Psychiatric: He has a normal mood and affect. His behavior is normal.   Nursing note and vitals reviewed.   Lab Results   Component Value Date/Time    WBC 4.6* 05/31/2017 05:20 AM    HEMOGLOBIN 11.4* 05/31/2017 05:20 AM    HEMATOCRIT 34.4* 05/31/2017 05:20 AM    PLATELET COUNT 76* 05/31/2017 05:20 AM     Lab Results   Component Value Date/Time    SODIUM 131* 05/31/2017 05:20 AM    POTASSIUM 3.9 05/31/2017 05:20 AM    CHLORIDE 102 05/31/2017 05:20 AM    CO2 24 05/31/2017 05:20 AM    GLUCOSE 113* 05/31/2017 05:20 AM    BUN 18 05/31/2017 05:20 AM    CREATININE 0.65 05/31/2017 05:20 AM    GLOM FILT RATE, EST >59 01/23/2010 12:00 AM      Assessment/Plan    * Generalized weakness (present on admission)  Assessment & Plan  Upper and lower extremity weakness  Neurology suspecting AIDP; improving with IVIG   Pain improved by lumbar decompression with Dr Escobar 5/25, continue therapies  C spine without reasoning for UE Weakness, MRI brain 5/30 results noted      Esophageal varices- BANDED 4/2016- repeat egd tbd 10//29/16- gic (present on admission)  Assessment & Plan  No incidence of acute bleeding    Essential hypertension (present on admission)  Assessment & Plan  Coreg, cozaar  Labetalol available if needed      Cirrhosis of liver with ascites (HCC)- ? DUE TO OLD HEP B, CHEMO RX,  OR THERMO RAD OF MAGNANT LESION (present on admission)  Assessment & Plan  Complications of cancer/chemo  Watch for coagulopathy  H/o portal vein  thrombosis  Hepatologist is Dr Culp      H/O colon cancer, stage IV (present on admission)  Assessment & Plan  Patient reported to be in remission, had seen Dr Anguiano in the past, has completed chemo 5 years prior  [facility] care team with hepatic team: Ramses Light, Robbi    Occult blood positive stool (present on admission)  Assessment & Plan  - Hgb stable; no evidence of acute bleeding  - monitoring      Lumbar canal stenosis  Assessment & Plan  S/p decompression with Abdunnur 5/25, (NSG signed off)  PT/OT   Still total assist    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  EMG done at bedside 5/27         Labs reviewed, Medications reviewed and Radiology images reviewed  Vivas catheter: No Vivas      DVT Prophylaxis: Contraindicated - High bleeding risk  DVT prophylaxis - mechanical: SCDs      Assessed for rehab: Patient was assess for and/or received rehabilitation services during this hospitalization

## 2017-05-31 NOTE — DISCHARGE PLANNING
PMR order received from Dr. Magallanes.  Current documentation does not support IRF level of [place].  This referral will not be forwarded to Physiatry per protocol.  I appreciate the referral.     Ashley Haas P.T.A. Physical Therapy Assistant Signed  Therapy 5/30/2017  3:04 PM      Expand All Collapse All    Physical Therapy Treatment completed.    Bed Mobility:  Supine to Sit: Maximal Assist  Transfers: Sit to Stand: Total Assist (EOB->partial stand to scoot along EOB)      Plan of Care: Will benefit from Physical Therapy 5 times per week  Discharge Recommendations: Equipment: Will Continue to Assess for Equipment Needs. Post-acute therapy Discharge to a transitional care facility for continued skilled therapy services.         Yamilex Crane O.T. Occupational Therapist Signed  Therapy 5/26/2017 12:13 PM      Expand All Collapse All    Occupational Therapy  Re-valuation completed.   Functional Status:  Max A x2 supine>sit EOB, min A w/sitting balance increasing lateral lean to left significantly weaker than previous assessment, unable to bed knees up in bed, poor fine motor control w/self feeding and grooming, unable to hold BUE against gravity, sat EOB w/increasing forward posture attempted sit>stand w/total assist x2, c/o increasing fatigue, max x2 sit>supine BTB   Plan of Care: Will benefit from Occupational Therapy 5 times per week  Discharge Recommendations:  Equipment: Will Continue to Assess for Equipment Needs. Post-acute therapy Discharge to a transitional care facility for continued skilled therapy services.

## 2017-06-01 LAB
ANION GAP SERPL CALC-SCNC: 3 MMOL/L (ref 0–11.9)
BASOPHILS # BLD AUTO: 0.2 % (ref 0–1.8)
BASOPHILS # BLD: 0.01 K/UL (ref 0–0.12)
BUN SERPL-MCNC: 16 MG/DL (ref 8–22)
CALCIUM SERPL-MCNC: 8.1 MG/DL (ref 8.5–10.5)
CHLORIDE SERPL-SCNC: 104 MMOL/L (ref 96–112)
CO2 SERPL-SCNC: 25 MMOL/L (ref 20–33)
CREAT SERPL-MCNC: 0.62 MG/DL (ref 0.5–1.4)
EOSINOPHIL # BLD AUTO: 0.24 K/UL (ref 0–0.51)
EOSINOPHIL NFR BLD: 6 % (ref 0–6.9)
ERYTHROCYTE [DISTWIDTH] IN BLOOD BY AUTOMATED COUNT: 47.3 FL (ref 35.9–50)
GFR SERPL CREATININE-BSD FRML MDRD: >60 ML/MIN/1.73 M 2
GLUCOSE SERPL-MCNC: 106 MG/DL (ref 65–99)
HCT VFR BLD AUTO: 32 % (ref 42–52)
HGB BLD-MCNC: 10.3 G/DL (ref 14–18)
IMM GRANULOCYTES # BLD AUTO: 0.02 K/UL (ref 0–0.11)
IMM GRANULOCYTES NFR BLD AUTO: 0.5 % (ref 0–0.9)
LYMPHOCYTES # BLD AUTO: 0.53 K/UL (ref 1–4.8)
LYMPHOCYTES NFR BLD: 13.2 % (ref 22–41)
MCH RBC QN AUTO: 26.9 PG (ref 27–33)
MCHC RBC AUTO-ENTMCNC: 32.2 G/DL (ref 33.7–35.3)
MCV RBC AUTO: 83.6 FL (ref 81.4–97.8)
MONOCYTES # BLD AUTO: 0.72 K/UL (ref 0–0.85)
MONOCYTES NFR BLD AUTO: 17.9 % (ref 0–13.4)
NEUTROPHILS # BLD AUTO: 2.5 K/UL (ref 1.82–7.42)
NEUTROPHILS NFR BLD: 62.2 % (ref 44–72)
NRBC # BLD AUTO: 0 K/UL
NRBC BLD AUTO-RTO: 0 /100 WBC
PLATELET # BLD AUTO: 53 K/UL (ref 164–446)
PMV BLD AUTO: 9.4 FL (ref 9–12.9)
POTASSIUM SERPL-SCNC: 3.8 MMOL/L (ref 3.6–5.5)
RBC # BLD AUTO: 3.83 M/UL (ref 4.7–6.1)
SODIUM SERPL-SCNC: 132 MMOL/L (ref 135–145)
WBC # BLD AUTO: 4 K/UL (ref 4.8–10.8)

## 2017-06-01 PROCEDURE — 700112 HCHG RX REV CODE 229: Performed by: INTERNAL MEDICINE

## 2017-06-01 PROCEDURE — 80048 BASIC METABOLIC PNL TOTAL CA: CPT

## 2017-06-01 PROCEDURE — A9270 NON-COVERED ITEM OR SERVICE: HCPCS | Performed by: INTERNAL MEDICINE

## 2017-06-01 PROCEDURE — 700102 HCHG RX REV CODE 250 W/ 637 OVERRIDE(OP): Performed by: PHYSICIAN ASSISTANT

## 2017-06-01 PROCEDURE — 85025 COMPLETE CBC W/AUTO DIFF WBC: CPT

## 2017-06-01 PROCEDURE — 97112 NEUROMUSCULAR REEDUCATION: CPT

## 2017-06-01 PROCEDURE — 99233 SBSQ HOSP IP/OBS HIGH 50: CPT | Performed by: INTERNAL MEDICINE

## 2017-06-01 PROCEDURE — A9270 NON-COVERED ITEM OR SERVICE: HCPCS | Performed by: PHYSICIAN ASSISTANT

## 2017-06-01 PROCEDURE — 700102 HCHG RX REV CODE 250 W/ 637 OVERRIDE(OP): Performed by: INTERNAL MEDICINE

## 2017-06-01 PROCEDURE — 97110 THERAPEUTIC EXERCISES: CPT

## 2017-06-01 PROCEDURE — 97530 THERAPEUTIC ACTIVITIES: CPT

## 2017-06-01 PROCEDURE — 770001 HCHG ROOM/CARE - MED/SURG/GYN PRIV*

## 2017-06-01 RX ORDER — AMOXICILLIN 250 MG
1 CAPSULE ORAL DAILY
Status: DISCONTINUED | OUTPATIENT
Start: 2017-06-01 | End: 2017-06-02

## 2017-06-01 RX ORDER — DOCUSATE SODIUM 100 MG/1
100 CAPSULE, LIQUID FILLED ORAL 2 TIMES DAILY
Status: DISCONTINUED | OUTPATIENT
Start: 2017-06-01 | End: 2017-06-02

## 2017-06-01 RX ORDER — BISACODYL 10 MG
10 SUPPOSITORY, RECTAL RECTAL
Status: DISCONTINUED | OUTPATIENT
Start: 2017-06-01 | End: 2017-06-03 | Stop reason: HOSPADM

## 2017-06-01 RX ADMIN — ATORVASTATIN CALCIUM 40 MG: 40 TABLET, FILM COATED ORAL at 21:39

## 2017-06-01 RX ADMIN — LOSARTAN POTASSIUM 25 MG: 50 TABLET, FILM COATED ORAL at 09:12

## 2017-06-01 RX ADMIN — CARVEDILOL 3.12 MG: 6.25 TABLET, FILM COATED ORAL at 09:12

## 2017-06-01 RX ADMIN — ANTACID TABLETS 500 MG: 500 TABLET, CHEWABLE ORAL at 09:12

## 2017-06-01 RX ADMIN — CARVEDILOL 3.12 MG: 6.25 TABLET, FILM COATED ORAL at 17:37

## 2017-06-01 RX ADMIN — DOCUSATE SODIUM 100 MG: 100 CAPSULE ORAL at 09:12

## 2017-06-01 RX ADMIN — DOCUSATE SODIUM 100 MG: 100 CAPSULE ORAL at 21:39

## 2017-06-01 RX ADMIN — FOLIC ACID 1 MG: 1 TABLET ORAL at 09:12

## 2017-06-01 ASSESSMENT — ENCOUNTER SYMPTOMS
FEVER: 0
DEPRESSION: 0
HEARTBURN: 0
PHOTOPHOBIA: 0
SPEECH CHANGE: 0
NERVOUS/ANXIOUS: 0
INSOMNIA: 0
HEADACHES: 0
CLAUDICATION: 0
FOCAL WEAKNESS: 1
DIZZINESS: 0
MYALGIAS: 0
DIARRHEA: 0
SENSORY CHANGE: 1
VOMITING: 0
COUGH: 0
CONSTIPATION: 0
ABDOMINAL PAIN: 0
WEAKNESS: 0
CHILLS: 0
BLURRED VISION: 0
SHORTNESS OF BREATH: 0

## 2017-06-01 ASSESSMENT — PAIN SCALES - GENERAL
PAINLEVEL_OUTOF10: 1
PAINLEVEL_OUTOF10: 4
PAINLEVEL_OUTOF10: 1

## 2017-06-01 ASSESSMENT — GAIT ASSESSMENTS: GAIT LEVEL OF ASSIST: UNABLE TO PARTICIPATE

## 2017-06-01 NOTE — PROGRESS NOTES
Patient alert and oriented. Tingling to left fingers. Wife at bedside. Back pain 4/10, medicated per MAR. Plan of care discussed with patient. Bed alarm refused after education. Call light within reach. Bed locked and in low position. Patient encouraged to call with any needs/concerns.

## 2017-06-01 NOTE — DISCHARGE PLANNING
Referral: Follow Up    Intervention: TC from Lydia, pt is not a candidate for inpatient rehab.  TC to Celestina, pt's wife.  SW left a message for Celestina requesting a call back to confirm her SNF Choice.    Plan: As Above.

## 2017-06-01 NOTE — PROGRESS NOTES
Bed alarm refused after education given. Call light within reach. Bed locked and in low position. Patient encouraged to call with any needs. Hourly rounding in place.

## 2017-06-01 NOTE — CONSULTS
Medical chart review completed. Patient is a 71 y.o. year-old male admitted with back pain s/p Lumbar laminectomy by Dr. Escobar on 5/25/2017.    Patient is currently is requiring maximum assistance for mobility and ADLs.    For this reason he is not an appropriate acute inpatient rehabilitation candidate, as we would not be able to discharge him home in 10-12 days (the usual time allowed for a back patient).    He seems more appropriate for a longer 3-4 week course of rehabilitation at a skilled nursing facility with less intensive of daily therapy.    Thank you for this consult.    Tanisha Cheema M.D.  Physical Medicine and Rehabilitation.

## 2017-06-01 NOTE — DISCHARGE PLANNING
Referral: Follow Up    Intervention: Per MD, pt is medically clear.  TC to Celestina, pt's wife.  SW explained, [facility] and [facility] are able to accept.  According to Celestina, this morning MD stated [facility], Lydia arvizu.    Plan: As Above.  Await Rehab Consult.

## 2017-06-01 NOTE — PROGRESS NOTES
"Received report from Valarie PALOMO. Pt is A&Ox4. Denies any n/v. States n/t in bilateral fingers. States back pain at the incision site of 1/10. Has declined any pain medication throughout the shift stating, \"I'm feeling pretty good.\" Wound dressings on buttocks area and incision site changed. Pt updated on POC. All needs met at this time. Pt is instructed to call when in need of assistance. Bed in lowest and locked position. Call light within reach. Hourly rounding in place.       "

## 2017-06-01 NOTE — PROGRESS NOTES
Hospital Medicine Interval Note  Date of Service:  6/1/2017    Chief Complaint  71 y.o.-year-old male admitted 5/20/2017 with progressive B upper and lower extremity weakness.  Significant lumbar stenosis post decompression 5/25.    Interval Problem Update  5/26 Patient tolerated surgery well with Dr Escobar, ROBERT drain in place post operative, cause of UE weakness still not readily identifiable and MRI brain pending.  Discussed with patient need for anesthesia for procedure - is necessary and though he is willing to try to do with anxiolytics, his claustrophobia is very strong and will proceed with anesthesia procedure as planned on Tuesday.  He will need continued therapies and eventual SNF to recover once cause of weakness found.    5/27 Patient feeling weaker today and NSG consulted Dr Rosales for neurology consultation.  High suspicion of AIDP/ Guianne [place].  IVIG ordered for admin soon.  PICC ordered but infusion can start as soon as prepared.  He has a blood type with lesser type C and D according to the hepatology card he carries with him.      5/28 Patient able to move legs today after fist doses of IVIG, very good sign.  Continue with PT/OT and IVIG.  PICC line pending.    5/29 Patient feeling stronger BLE today, BUE also slightly improved.  Had RN call PICC RN for placement d/t continued need of IVIG and poor infusion d/t high viscosity having difficulty infusion via PIV.  Rehab discussed with patient, will be appropriate as his strength improves, will have my partner place consult later in the week as he will be stronger with continued IVIG.  Difficulty with pain control overnight - has appropriate medications and frequencies verified.  Difficulty with positioning self in bed, will order overhead trapeze.  MRI with sedation tomorrow, NPO at [place].    5/30 MRI still pending.  Patient continues to report improvement of weakness in bilateral upper and lower extremities.  Tolerating PT treatments.    5/31 MRI  results noted.  Patient reports improvement in upper extremity weakness bilaterally.  No other acute issues or complaints overnight.     6/1 No acute issues or complaints.  Had significant BMs overnight with bowel regimen.  Weakness in extremities continuing to improve.    Consultants/Specialty  CRISPIN - Luz/Liv  Neuro - [facility]    Disposition   Rehab consult when appropriate - after Tuesday's MRI     Review of Systems   Constitutional: Negative for fever and chills.   HENT: Negative for congestion.    Eyes: Negative for blurred vision and photophobia.   Respiratory: Negative for cough and shortness of breath.    Cardiovascular: Negative for chest pain, claudication and leg swelling.   Gastrointestinal: Negative for heartburn, vomiting, abdominal pain, diarrhea and constipation.   Genitourinary: Negative for dysuria and hematuria.   Musculoskeletal: Negative for myalgias and joint pain.   Skin: Negative for itching and rash.   Neurological: Positive for sensory change (leg sensation better post-op) and focal weakness (legs significantly better sensation and strength). Negative for dizziness, speech change, weakness and headaches.   Psychiatric/Behavioral: Negative for depression. The patient is not nervous/anxious and does not have insomnia.       Physical Exam Laboratory/Imaging   Filed Vitals:    05/31/17 0804 05/31/17 1600 05/31/17 1900 06/01/17 0401   BP: 140/77 138/77 119/61 137/75   Pulse: 81 72 70 71   Temp: 37.7 °C (99.9 °F) 37.3 °C (99.2 °F) 37.3 °C (99.2 °F) 37.3 °C (99.2 °F)   Resp: 18 18 18 18   Height:       Weight:       SpO2: 93% 95% 95% 96%     Physical Exam   Constitutional: He is oriented to person, place, and time. He appears well-developed and well-nourished.   HENT:   Head: Normocephalic and atraumatic.   Eyes: Conjunctivae are normal. No scleral icterus.   Neck: Neck supple. No JVD present.   Cardiovascular: Normal rate, regular rhythm, normal heart sounds and intact distal pulses.  Exam  reveals no gallop and no friction rub.    No murmur heard.  Pulmonary/Chest: Effort normal and breath sounds normal. No respiratory distress. He exhibits no tenderness.   Abdominal: Soft. Bowel sounds are normal. He exhibits no distension and no mass. There is no tenderness.   Musculoskeletal: He exhibits no edema or tenderness.   Neurological: He is alert and oriented to person, place, and time. No cranial nerve deficit.   Decreased strength BLE, BUE - improved post IVIG     Skin: Skin is warm and dry. No rash noted. He is not diaphoretic.   Red area lateral right forearm resolved       Psychiatric: He has a normal mood and affect. His behavior is normal.   Nursing note and vitals reviewed.   Lab Results   Component Value Date/Time    WBC 4.0* 06/01/2017 03:07 AM    HEMOGLOBIN 10.3* 06/01/2017 03:07 AM    HEMATOCRIT 32.0* 06/01/2017 03:07 AM    PLATELET COUNT 53* 06/01/2017 03:07 AM     Lab Results   Component Value Date/Time    SODIUM 132* 06/01/2017 03:07 AM    POTASSIUM 3.8 06/01/2017 03:07 AM    CHLORIDE 104 06/01/2017 03:07 AM    CO2 25 06/01/2017 03:07 AM    GLUCOSE 106* 06/01/2017 03:07 AM    BUN 16 06/01/2017 03:07 AM    CREATININE 0.62 06/01/2017 03:07 AM    GLOM FILT RATE, EST >59 01/23/2010 12:00 AM      Assessment/Plan    * Generalized weakness (present on admission)  Assessment & Plan  Upper and lower extremity weakness  Neurology suspecting AIDP; improving s/p IVIG   Pain improved by lumbar decompression with Dr Escobar 5/25, continue therapies  C spine without reasoning for UE Weakness, MRI brain 5/30 results noted  Rehab referral sent    Esophageal varices- BANDED 4/2016- repeat egd tbd 10//29/16- gic (present on admission)  Assessment & Plan  No incidence of acute bleeding    Essential hypertension (present on admission)  Assessment & Plan  Coreg, cozaar  Labetalol available if needed      Cirrhosis of liver with ascites (HCC)- ? DUE TO OLD HEP B, CHEMO RX,  OR THERMO RAD OF MAGNANT LESION  (present on admission)  Assessment & Plan  Complications of cancer/chemo  Watch for coagulopathy  H/o portal vein thrombosis  Hepatologist is Dr Culp      H/O colon cancer, stage IV (present on admission)  Assessment & Plan  Patient reported to be in remission, had seen Dr Anguiano in the past, has completed chemo 5 years prior  [facility] care team with hepatic team: Ramses Light, Robbi    Occult blood positive stool (present on admission)  Assessment & Plan  - Hgb stable; no evidence of acute bleeding  - monitoring      Lumbar canal stenosis  Assessment & Plan  S/p decompression with Abdunnur 5/25, (NSG signed off)  PT/OT   Still total assist    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  EMG done at bedside 5/27         Labs reviewed, Medications reviewed and Radiology images reviewed  Vivas catheter: No Vivas      DVT Prophylaxis: Contraindicated - High bleeding risk  DVT prophylaxis - mechanical: SCDs      Assessed for rehab: Patient was assess for and/or received rehabilitation services during this hospitalization

## 2017-06-01 NOTE — THERAPY
"Physical Therapy Treatment completed.   Bed Mobility:  Supine to Sit: Maximal Assist  Transfers: Sit to Stand: Maximal Assist (from raised bed->standing. )  Gait: Level Of Assist: Unable to Participate       Plan of Care: Will benefit from Physical Therapy 5 times per week  Discharge Recommendations: Equipment: Will Continue to Assess for Equipment Needs. Post-acute therapy Discharge to a transitional care facility for continued skilled therapy services.     See \"Rehab Therapy-Acute\" Patient Summary Report for complete documentation.       "

## 2017-06-01 NOTE — PROGRESS NOTES
Pt re-educated on the risks and benefits on the use of the bed alarm. Despite education, the pt continues to refuse the use of the bed alarm. Pt is instructed to call when in need of assistance. Bed in lowest and locked position. Call light within reach. Treaded socks and hourly rounding in place.

## 2017-06-01 NOTE — CARE PLAN
Problem: Communication  Goal: The ability to communicate needs accurately and effectively will improve  Outcome: PROGRESSING AS EXPECTED  Pt & spouse communicate needs effectively.   Call light w/in reach. Hourly rounding in place.     Problem: Bowel/Gastric:  Goal: Normal bowel function is maintained or improved  Outcome: PROGRESSING AS EXPECTED  Pt has had numerous BMs since the administration of an enema on 5/30. Pt has no complaints of constipation.   Will continue to monitor and offer laxatives and stool softeners when appropriate.

## 2017-06-01 NOTE — PROGRESS NOTES
Alert, conversant.  Still improving slowly, still areflexiveReady for d/c.  Would prefer rehab if possible.

## 2017-06-02 ENCOUNTER — PATIENT OUTREACH (OUTPATIENT)
Dept: HEALTH INFORMATION MANAGEMENT | Facility: OTHER | Age: 72
End: 2017-06-02

## 2017-06-02 PROCEDURE — A9270 NON-COVERED ITEM OR SERVICE: HCPCS | Performed by: PHYSICIAN ASSISTANT

## 2017-06-02 PROCEDURE — 700102 HCHG RX REV CODE 250 W/ 637 OVERRIDE(OP): Performed by: INTERNAL MEDICINE

## 2017-06-02 PROCEDURE — A9270 NON-COVERED ITEM OR SERVICE: HCPCS | Performed by: NURSE PRACTITIONER

## 2017-06-02 PROCEDURE — A9270 NON-COVERED ITEM OR SERVICE: HCPCS | Performed by: INTERNAL MEDICINE

## 2017-06-02 PROCEDURE — 99233 SBSQ HOSP IP/OBS HIGH 50: CPT | Performed by: INTERNAL MEDICINE

## 2017-06-02 PROCEDURE — 97112 NEUROMUSCULAR REEDUCATION: CPT

## 2017-06-02 PROCEDURE — 700112 HCHG RX REV CODE 229: Performed by: INTERNAL MEDICINE

## 2017-06-02 PROCEDURE — 700102 HCHG RX REV CODE 250 W/ 637 OVERRIDE(OP): Performed by: NURSE PRACTITIONER

## 2017-06-02 PROCEDURE — 700102 HCHG RX REV CODE 250 W/ 637 OVERRIDE(OP): Performed by: PHYSICIAN ASSISTANT

## 2017-06-02 PROCEDURE — 97530 THERAPEUTIC ACTIVITIES: CPT

## 2017-06-02 PROCEDURE — 770001 HCHG ROOM/CARE - MED/SURG/GYN PRIV*

## 2017-06-02 RX ORDER — AMOXICILLIN 250 MG
1 CAPSULE ORAL
Status: DISCONTINUED | OUTPATIENT
Start: 2017-06-02 | End: 2017-06-03 | Stop reason: HOSPADM

## 2017-06-02 RX ADMIN — CARVEDILOL 3.12 MG: 6.25 TABLET, FILM COATED ORAL at 08:19

## 2017-06-02 RX ADMIN — FOLIC ACID 1 MG: 1 TABLET ORAL at 08:18

## 2017-06-02 RX ADMIN — DOCUSATE SODIUM 100 MG: 100 CAPSULE ORAL at 08:18

## 2017-06-02 RX ADMIN — OXYCODONE HYDROCHLORIDE 10 MG: 10 TABLET ORAL at 05:11

## 2017-06-02 RX ADMIN — TRAMADOL HYDROCHLORIDE 50 MG: 50 TABLET, COATED ORAL at 02:06

## 2017-06-02 RX ADMIN — ANTACID TABLETS 500 MG: 500 TABLET, CHEWABLE ORAL at 08:18

## 2017-06-02 RX ADMIN — STANDARDIZED SENNA CONCENTRATE AND DOCUSATE SODIUM 1 TABLET: 8.6; 5 TABLET, FILM COATED ORAL at 19:49

## 2017-06-02 RX ADMIN — CARVEDILOL 3.12 MG: 6.25 TABLET, FILM COATED ORAL at 17:53

## 2017-06-02 RX ADMIN — LOSARTAN POTASSIUM 25 MG: 50 TABLET, FILM COATED ORAL at 08:18

## 2017-06-02 RX ADMIN — ANTACID TABLETS 500 MG: 500 TABLET, CHEWABLE ORAL at 19:49

## 2017-06-02 RX ADMIN — STANDARDIZED SENNA CONCENTRATE AND DOCUSATE SODIUM 1 TABLET: 8.6; 5 TABLET, FILM COATED ORAL at 08:18

## 2017-06-02 RX ADMIN — ATORVASTATIN CALCIUM 40 MG: 40 TABLET, FILM COATED ORAL at 19:49

## 2017-06-02 RX ADMIN — CYCLOBENZAPRINE 10 MG: 10 TABLET, FILM COATED ORAL at 02:06

## 2017-06-02 ASSESSMENT — ENCOUNTER SYMPTOMS
CHILLS: 0
DEPRESSION: 0
CONSTIPATION: 0
COUGH: 0
FOCAL WEAKNESS: 1
WEAKNESS: 0
FEVER: 0
MYALGIAS: 0
BLURRED VISION: 0
VOMITING: 0
DIARRHEA: 0
HEARTBURN: 0
DIZZINESS: 0
HEADACHES: 0
PHOTOPHOBIA: 0
CLAUDICATION: 0
SENSORY CHANGE: 1
NERVOUS/ANXIOUS: 0
ABDOMINAL PAIN: 0
SHORTNESS OF BREATH: 0
INSOMNIA: 0
SPEECH CHANGE: 0

## 2017-06-02 ASSESSMENT — PAIN SCALES - GENERAL
PAINLEVEL_OUTOF10: 0
PAINLEVEL_OUTOF10: 9
PAINLEVEL_OUTOF10: 7
PAINLEVEL_OUTOF10: 0

## 2017-06-02 ASSESSMENT — GAIT ASSESSMENTS: GAIT LEVEL OF ASSIST: UNABLE TO PARTICIPATE

## 2017-06-02 NOTE — THERAPY
"Physical Therapy Treatment completed.   Bed Mobility:  Supine to Sit: Maximal Assist  Transfers: Sit to Stand: Maximal Assist      Plan of Care: Will benefit from Physical Therapy 5 times per week  Discharge Recommendations: Equipment: Will Continue to Assess for Equipment Needs. Post-acute therapy Discharge to a transitional care facility for continued skilled therapy services.     See \"Rehab Therapy-Acute\" Patient Summary Report for complete documentation.       "

## 2017-06-02 NOTE — CARE PLAN
Problem: Communication  Goal: The ability to communicate needs accurately and effectively will improve  Outcome: PROGRESSING AS EXPECTED  Patient able to communicate needs effectively and uses call light appropriately. RN hourly rounding in place.     Problem: Safety  Goal: Will remain free from injury  Outcome: PROGRESSING AS EXPECTED  Patient remains free from injury. Patient uses call light when necessary. Nursing staff assists with ambulation. Patient educated on injury prevention.   Goal: Will remain free from falls  Outcome: PROGRESSING AS EXPECTED  Fall precautions implemented. Treaded socks in place. Patient refuses bed alarm but calls when wants to mobilize. Patient remains free from falls.

## 2017-06-02 NOTE — PROGRESS NOTES
Pt A&Ox4, drowsy but easily aroused. Declines pain at this time.   LBM 6/1.   PICC in place, flushing.   Dressing in place on buttocks, CDI.   POC discussed, no further needs at this time, call light within reach, wife at bedside and spending the night.

## 2017-06-02 NOTE — CARE PLAN
Problem: Safety  Goal: Will remain free from falls  Outcome: PROGRESSING AS EXPECTED  Bed alarm refused, wife at bedside and spending the night, assists pt as needed, uses call light appropriately.     Problem: Skin Integrity  Goal: Risk for impaired skin integrity will decrease  Outcome: PROGRESSING AS EXPECTED  q2hr turns in place, barrier cream and wipes used prn.

## 2017-06-02 NOTE — PROGRESS NOTES
"Alert, conversant.  Strength is about the same, was able to stand with assistance yesterday.  Remains areflexive.  Denied rehab but for a \"back patient\", the reason he needs rehab is AIDP.  Await disposition.  "

## 2017-06-02 NOTE — DISCHARGE PLANNING
Transitional Care Update:  Met with pt and wife to discuss d/c plan for rehab needs. Pt is currentlty max assist with ADLs and mobility. Explained pt would benefit from a short stay at a SNF where he can potentially transition to acute rehab as recommended by [facility]. Pt is adamant about being able to tolerate 3 hours of therapy and states he is highly motivated.   [facility] notified. Physiatrist to see pt today.

## 2017-06-02 NOTE — PROGRESS NOTES
PMR follow-up    Met with patient and wife regarding rehab disposition determination.    They understand the reason for waiting several more weeks for continued recovery of his AIDP, so that we will be able to discharge him directly home after 2-3 weeks of acute inpatient rehabilitation. The recovery from AIDP is so variable, we want to make sure we bring him to rehab at the appropriate time.    We will follow along the patient's progress at [facility]. When he is able to stand and take a few steps with less assistance, he would be appropriate.    Tanisha Cheema M.D.  Physical Medicine and Rehabilitation

## 2017-06-02 NOTE — PROGRESS NOTES
PMR Chart Review      Patient has been referred for inpatient rehabilitation and was originally refused by our nurse liaison and was reviewed by Dr. Cheema.    The patient's history is quite complicated as he is a 71-year-old male with a known history of colon cancer 5 years of remission who has known history of esophageal varices, iron deficiency anemia, known liver metastases, coronary artery disease peripheral neuropathy chronic thrombocytopenia vitamin deficiency hypertension and obesity. He was admitted to [facility] on May 20. His multiple procedures performed while here twisting an ultrasound-guided paracentesis for ascites secondary to cirrhosis and May 23, 2017 and on May 25, 2017 the patient underwent    1. Bilateral L5 transpedicular approach for decompression of bilateral L5    nerve roots.  2.  Complete L4, L5, and S1 laminectomy.  3.  Foraminotomy bilateral L4, L5, and S1 nerve roots.    For management of severe lumbar stenosis L4-L5    The patient was noted to have a quadriparesis and paresthesias that was considered to be lower motor neuron when seen by Dr. Rosales made a presumptive diagnosis of AIDP. The patient was initially turned our for acute inpatient rehabilitation because the time allotted for inpatient rehabilitation for conditions conditions like AIDP  and Guillain-Barré is often too short to get him home and they are often referred to skilled facilities. My preference/recommendations for patient's like this is a short course of subacute rehabilitation approximately 2 weeks and then bringing to acute rehabilitation at that time with a much better chance of getting the patient home after acute inpatient rehabilitation. I will recheck to Dr. Rosales to discuss this matter with him.      Moreno Adorno M.D.

## 2017-06-02 NOTE — PROGRESS NOTES
Pt. Rounding [facility] looks to be intact pt has no complaints at this time..If you have any questions or if equipment adjustments are needed please call the traction department at ext [phone].

## 2017-06-02 NOTE — DISCHARGE PLANNING
Transitional Care [facility]:    Dr Cheema at bedside with pt and wife. [facility]. Choice form completed and faxed to [facility].

## 2017-06-02 NOTE — DISCHARGE PLANNING
Received choice form from Tia([facility]), however, referral has already been sent to [facility]. Patient has been accepted. Luz(LYNDSEY) notified.

## 2017-06-02 NOTE — PROGRESS NOTES
Hospital Medicine Interval Note  Date of Service:  6/2/2017    Chief Complaint  71 y.o.-year-old male admitted 5/20/2017 with progressive B upper and lower extremity weakness.  Significant lumbar stenosis post decompression 5/25.    Interval Problem Update  5/26 Patient tolerated surgery well with Dr Escobar, ROBERT drain in place post operative, cause of UE weakness still not readily identifiable and MRI brain pending.  Discussed with patient need for anesthesia for procedure - is necessary and though he is willing to try to do with anxiolytics, his claustrophobia is very strong and will proceed with anesthesia procedure as planned on Tuesday.  He will need continued therapies and eventual SNF to recover once cause of weakness found.    5/27 Patient feeling weaker today and NSG consulted Dr Rosales for neurology consultation.  High suspicion of AIDP/ [place].  IVIG ordered for admin soon.  PICC ordered but infusion can start as soon as prepared.  He has a blood type with lesser type C and D according to the hepatology card he carries with him.      5/28 Patient able to move legs today after fist doses of IVIG, very good sign.  Continue with PT/OT and IVIG.  PICC line pending.    5/29 Patient feeling stronger BLE today, BUE also slightly improved.  Had RN call PICC RN for placement d/t continued need of IVIG and poor infusion d/t high viscosity having difficulty infusion via PIV.  Rehab discussed with patient, will be appropriate as his strength improves, will have my partner place consult later in the week as he will be stronger with continued IVIG.  Difficulty with pain control overnight - has appropriate medications and frequencies verified.  Difficulty with positioning self in bed, will order overhead trapeze.  MRI with sedation tomorrow, NPO at mn.    5/30 MRI still pending.  Patient continues to report improvement of weakness in bilateral upper and lower extremities.  Tolerating PT treatments.    5/31 MRI  results noted.  Patient reports improvement in upper extremity weakness bilaterally.  No other acute issues or complaints overnight.     6/1 No acute issues or complaints.  Had significant BMs overnight with bowel regimen.  Weakness in extremities continuing to improve.    6/2 Strength gradually improving.  Pt was able to stand with assistance.      Consultants/Specialty  CRISPIN Escobar/Liv  Neuro - [facility]    Disposition  Rehab vs SNF     Review of Systems   Constitutional: Negative for fever and chills.   HENT: Negative for congestion.    Eyes: Negative for blurred vision and photophobia.   Respiratory: Negative for cough and shortness of breath.    Cardiovascular: Negative for chest pain, claudication and leg swelling.   Gastrointestinal: Negative for heartburn, vomiting, abdominal pain, diarrhea and constipation.   Genitourinary: Negative for dysuria and hematuria.   Musculoskeletal: Negative for myalgias and joint pain.   Skin: Negative for itching and rash.   Neurological: Positive for sensory change (leg sensation better post-op) and focal weakness (legs significantly better sensation and strength). Negative for dizziness, speech change, weakness and headaches.   Psychiatric/Behavioral: Negative for depression. The patient is not nervous/anxious and does not have insomnia.       Physical Exam Laboratory/Imaging   Filed Vitals:    06/01/17 1537 06/01/17 2000 06/02/17 0400 06/02/17 0725   BP: 130/70 123/63 134/74 135/58   Pulse: 75 73 68 74   Temp: 37.8 °C (100.1 °F) 37.3 °C (99.2 °F) 37.2 °C (99 °F) 36.9 °C (98.5 °F)   Resp: 16 16 16 18   Height:       Weight:       SpO2: 95% 95% 96% 95%     Physical Exam   Constitutional: He is oriented to person, place, and time. He appears well-developed and well-nourished.   HENT:   Head: Normocephalic and atraumatic.   Eyes: Conjunctivae are normal. No scleral icterus.   Neck: Neck supple. No JVD present.   Cardiovascular: Normal rate, regular rhythm, normal heart sounds  and intact distal pulses.  Exam reveals no gallop and no friction rub.    No murmur heard.  Pulmonary/Chest: Effort normal and breath sounds normal. No respiratory distress. He exhibits no tenderness.   Abdominal: Soft. Bowel sounds are normal. He exhibits no distension and no mass. There is no tenderness.   Musculoskeletal: He exhibits no edema or tenderness.   Neurological: He is alert and oriented to person, place, and time. No cranial nerve deficit.   Decreased strength BLE, BUE - improved post IVIG     Skin: Skin is warm and dry. No rash noted. He is not diaphoretic.   Red area lateral right forearm resolved       Psychiatric: He has a normal mood and affect. His behavior is normal.   Nursing note and vitals reviewed.   Lab Results   Component Value Date/Time    WBC 4.0* 06/01/2017 03:07 AM    HEMOGLOBIN 10.3* 06/01/2017 03:07 AM    HEMATOCRIT 32.0* 06/01/2017 03:07 AM    PLATELET COUNT 53* 06/01/2017 03:07 AM     Lab Results   Component Value Date/Time    SODIUM 132* 06/01/2017 03:07 AM    POTASSIUM 3.8 06/01/2017 03:07 AM    CHLORIDE 104 06/01/2017 03:07 AM    CO2 25 06/01/2017 03:07 AM    GLUCOSE 106* 06/01/2017 03:07 AM    BUN 16 06/01/2017 03:07 AM    CREATININE 0.62 06/01/2017 03:07 AM    GLOM FILT RATE, EST >59 01/23/2010 12:00 AM      Assessment/Plan    * Generalized weakness (present on admission)  Assessment & Plan  Upper and lower extremity weakness  Neurology suspecting AIDP; improving s/p IVIG   Pain improved by lumbar decompression with Dr Escobar 5/25, continue therapies  C spine without reasoning for UE Weakness, MRI brain 5/30 results noted  Not a candidate for acute rehab per Physiatry eval; may consider SNF    Esophageal varices- BANDED 4/2016- repeat egd tbd 10//29/16- gic (present on admission)  Assessment & Plan  No incidence of acute bleeding    Essential hypertension (present on admission)  Assessment & Plan  Coreg, cozaar  Labetalol available if needed      Cirrhosis of liver with  ascites (HCC)- ? DUE TO OLD HEP B, CHEMO RX,  OR THERMO RAD OF MAGNANT LESION (present on admission)  Assessment & Plan  Complications of cancer/chemo  Watch for coagulopathy  H/o portal vein thrombosis  Hepatologist is Dr Culp      H/O colon cancer, stage IV (present on admission)  Assessment & Plan  Patient reported to be in remission, had seen Dr Anguiano in the past, has completed chemo 5 years prior  [facility] care team with hepatic team: Ramses Light, Robbi    Occult blood positive stool (present on admission)  Assessment & Plan  - Hgb stable; no evidence of acute bleeding  - monitoring      Lumbar canal stenosis  Assessment & Plan  S/p decompression with Abdunnur 5/25, (NSG signed off)  PT/OT   Still total assist  Not a candidate for acute rehab at this time per Physiatry eval    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  EMG done at bedside 5/27         Labs reviewed, Medications reviewed and Radiology images reviewed  Vivas catheter: No Vivas      DVT Prophylaxis: Contraindicated - High bleeding risk  DVT prophylaxis - mechanical: SCDs      Assessed for rehab: Patient was assess for and/or received rehabilitation services during this hospitalization

## 2017-06-02 NOTE — DISCHARGE PLANNING
Referral: Follow Up    Intervention: SW met with pt and Celestina, pt's wife.  SW explained, [facility] has declined referral, SNF is recommended.  SW requested choice for SNF ([facility] or [facility] [place]).       Pt is not willing to authorize transfer to SNF until he speaks directly to a representative from [facility] for an explanation on the denial, Lydia aware.     Per Lydia, Dr. Cheema will meet today at 2:00pm with pt and pt's wife, pt aware.    Plan: As Above.  Possible SNF, pending pt's authorization.

## 2017-06-02 NOTE — PROGRESS NOTES
NIHSS    1a. Level of Consciousness  0 Alert. Keenly Responsive  1 Not Alert but arousable by minor stimulation  2 Not Alert. Requires repeated stimulation  3 Responds only w reflex motor/autonomic effects or totally unresponsive  Score:0  1b. Level of Consciousness: ask month and age  0 Answers both questions correctly  1 Answers one question correctly  2 Answers neither question correctly  Score:0  1c. Level of Consciousness: ask to open and close eyes/make a fist with non-paretic hand  0 Performs both tasks correctly  1 Performs one task correctly  2 Performs neither task correctly  Score:0  2. Best gaze: Horizontal eye movement   0 Normal   1 Partial gaze palsy. Forced deviation or total palsy not present   2 Forced deviation. Not overcome by oculo-cephalic maneuver   Score:0  3. Visual Fields   0 No loss   1 Partial hemianopia   2 Complete hemianopia   3 Bilateral hemianopia. Blindness   Score:0  4. Facial Palsy   0 Normal symmetrical movements   1 Minor paralysis   2 Partial paralysis   3 Complete paralysis   Score:0  5. Motor Arm Right and Left   0 No drift   1 Drift but does not hit bed   2 Some effort against gravity. Hits bed   3 No effort against gravity   4 No movement   UN Amputation/joint fusion/explain   Score R:0   Score L:0  6. Motor Leg: Right and Left   0 No drift   1 Drift but does not hit bed   2 Some effort against gravity. Hits bed   3 No effort against gravity   4 No movement   UN Amputation/joint fusion/explain   Score R :0   Score L :0  7. Limb Ataxia. Finger to nose/ shin-heel   0 Absent   1  Present in one limb   2 Present in two limbs   UN Amputation/joint fusion explain   Score:0  8. Sensory. Pinprick   0 Normal. No sensory loss   1 Mild to moderate sensory loss   2 Severe or total sensory loss   Score:  9. Best language. Show the kitchen/cookie picture   0 No aphasia   1 Mild to moderate aphasia   2 Severe aphasia   3 Mute. Global aphasia   Score:0  10. Dysarthria.  Show the reading  list   0 Normal   1 Mild to moderate dysarthria   2 Severe dysarthria   3 Intubated or other physical barrier   Score:0  11. Extinction and Inattention   0 No abnormality   1 Extinction to simultaneous stimulation   2 Profound Jeffrey-inattention or extinction   Score:0    TOTAL: 0    Presumed Mechanism by TOAST:    ___Large Artery    ___Small Vessel (Lacunar)    ___Cardioembolic    ___ Other (Sickle Cell Disease, Vasculitis, Hypercoagulable State, etc)  x  ___Unknown

## 2017-06-02 NOTE — PROGRESS NOTES
I discussed the rehab determination with Dr. Cheema and agree with the assessment.  Explained to patient and wife who state they agree as well.

## 2017-06-03 VITALS
RESPIRATION RATE: 18 BRPM | DIASTOLIC BLOOD PRESSURE: 63 MMHG | WEIGHT: 260.58 LBS | TEMPERATURE: 98.9 F | HEIGHT: 75 IN | OXYGEN SATURATION: 94 % | HEART RATE: 72 BPM | SYSTOLIC BLOOD PRESSURE: 121 MMHG | BODY MASS INDEX: 32.4 KG/M2

## 2017-06-03 PROBLEM — M48.061 LUMBAR CANAL STENOSIS: Status: RESOLVED | Noted: 2017-05-24 | Resolved: 2017-06-03

## 2017-06-03 PROBLEM — R19.5 OCCULT BLOOD POSITIVE STOOL: Status: RESOLVED | Noted: 2017-05-21 | Resolved: 2017-06-03

## 2017-06-03 PROBLEM — M48.00 CENTRAL STENOSIS OF SPINAL CANAL: Status: RESOLVED | Noted: 2017-05-25 | Resolved: 2017-06-03

## 2017-06-03 PROCEDURE — A9270 NON-COVERED ITEM OR SERVICE: HCPCS | Performed by: INTERNAL MEDICINE

## 2017-06-03 PROCEDURE — 700102 HCHG RX REV CODE 250 W/ 637 OVERRIDE(OP): Performed by: INTERNAL MEDICINE

## 2017-06-03 PROCEDURE — A9270 NON-COVERED ITEM OR SERVICE: HCPCS | Performed by: PHYSICIAN ASSISTANT

## 2017-06-03 PROCEDURE — 700102 HCHG RX REV CODE 250 W/ 637 OVERRIDE(OP): Performed by: NURSE PRACTITIONER

## 2017-06-03 PROCEDURE — 99239 HOSP IP/OBS DSCHRG MGMT >30: CPT | Performed by: INTERNAL MEDICINE

## 2017-06-03 PROCEDURE — 700102 HCHG RX REV CODE 250 W/ 637 OVERRIDE(OP): Performed by: PHYSICIAN ASSISTANT

## 2017-06-03 PROCEDURE — A9270 NON-COVERED ITEM OR SERVICE: HCPCS | Performed by: NURSE PRACTITIONER

## 2017-06-03 RX ORDER — AMOXICILLIN 250 MG
1 CAPSULE ORAL
Qty: 30 TAB | Refills: 0 | Status: SHIPPED | OUTPATIENT
Start: 2017-06-03 | End: 2017-06-26

## 2017-06-03 RX ORDER — CYCLOBENZAPRINE HCL 10 MG
10 TABLET ORAL EVERY 8 HOURS PRN
Qty: 30 TAB | Refills: 0 | Status: SHIPPED | OUTPATIENT
Start: 2017-06-03 | End: 2017-06-26

## 2017-06-03 RX ORDER — OXYCODONE HYDROCHLORIDE 10 MG/1
10 TABLET ORAL EVERY 4 HOURS PRN
Qty: 28 TAB | Refills: 0 | Status: SHIPPED | OUTPATIENT
Start: 2017-06-03 | End: 2017-06-26

## 2017-06-03 RX ADMIN — FOLIC ACID 1 MG: 1 TABLET ORAL at 08:00

## 2017-06-03 RX ADMIN — OXYCODONE HYDROCHLORIDE 10 MG: 10 TABLET ORAL at 05:30

## 2017-06-03 RX ADMIN — LOSARTAN POTASSIUM 25 MG: 50 TABLET, FILM COATED ORAL at 08:00

## 2017-06-03 RX ADMIN — OXYCODONE HYDROCHLORIDE 10 MG: 10 TABLET ORAL at 00:11

## 2017-06-03 RX ADMIN — CARVEDILOL 3.12 MG: 6.25 TABLET, FILM COATED ORAL at 08:00

## 2017-06-03 RX ADMIN — ANTACID TABLETS 500 MG: 500 TABLET, CHEWABLE ORAL at 08:00

## 2017-06-03 ASSESSMENT — PAIN SCALES - GENERAL
PAINLEVEL_OUTOF10: 0
PAINLEVEL_OUTOF10: 6
PAINLEVEL_OUTOF10: 7

## 2017-06-03 NOTE — CARE PLAN
Problem: Communication  Goal: The ability to communicate needs accurately and effectively will improve  Outcome: PROGRESSING AS EXPECTED  Patient able to communicate needs effectively and uses call light appropriately. RN hourly rounding in place.     Problem: Knowledge Deficit  Goal: Knowledge of the prescribed therapeutic regimen will improve  Outcome: PROGRESSING AS EXPECTED  Discussed with patient information regarding plan of care. Patient verbalizes that they understand. Patient to be discharged today.

## 2017-06-03 NOTE — CARE PLAN
Problem: Safety  Goal: Will remain free from falls  Outcome: PROGRESSING AS EXPECTED  Bed alarm refused, family at bedside 24/7, uses call light appropriately, room close to RN station, no skid socks on.     Problem: Pain Management  Goal: Pain level will decrease to patient’s comfort goal  Outcome: PROGRESSING AS EXPECTED  Back pain mainly at night, oxy 10 prn manages well.

## 2017-06-03 NOTE — PROGRESS NOTES
Pt. Rounding [facility] looks to be intact pt has no complaints at this time..If you have any questions or if equipment adjustments are needed please call the traction department at ext [phone].

## 2017-06-03 NOTE — DISCHARGE INSTRUCTIONS
Discharge Instructions    Discharged to other by medical transportation with escort. Discharged via wheelchair, hospital escort: Yes.  Special equipment needed: Not Applicable    Be sure to schedule a follow-up appointment with your primary care doctor or any specialists as instructed.     Discharge Plan:   Pneumococcal Vaccine Given - only chart on this line when given:  (refusing at this time )  Influenza Vaccine Indication: Not indicated: Previously immunized this influenza season and > 8 years of age    I understand that a diet low in cholesterol, fat, and sodium is recommended for good health. Unless I have been given specific instructions below for another diet, I accept this instruction as my diet prescription.   Other diet: NA    Special Instructions: None    · Is patient discharged on Warfarin / Coumadin?   No     · Is patient Post Blood Transfusion?  No    Depression / Suicide Risk    As you are discharged from this [facility] facility, it is important to learn how to keep safe from harming yourself.    Recognize the warning signs:  · Abrupt changes in personality, positive or negative- including increase in energy   · Giving away possessions  · Change in eating patterns- significant weight changes-  positive or negative  · Change in sleeping patterns- unable to sleep or sleeping all the time   · Unwillingness or inability to communicate  · Depression  · Unusual sadness, discouragement and loneliness  · Talk of wanting to die  · Neglect of personal appearance   · Rebelliousness- reckless behavior  · Withdrawal from people/activities they love  · Confusion- inability to concentrate     If you or a loved one observes any of these behaviors or has concerns about self-harm, here's what you can do:  · Talk about it- your feelings and reasons for harming yourself  · Remove any means that you might use to hurt yourself (examples: pills, rope, extension cords, firearm)  · Get professional help from the  community (Mental Health, Substance Abuse, psychological counseling)  · Do not be alone:Call your Safe Contact- someone whom you trust who will be there for you.  · Call your local CRISIS HOTLINE 278-9915 or 151-352-6829  · Call your local Children's Mobile Crisis Response Team [place] (619) 100-8248 or www.Capt'nSocial  · Call the toll free National Suicide Prevention Hotlines   · National Suicide Prevention Lifeline 124-722-BLQL (9953)  · National Hope Line Network 800-SUICIDE (614-8374)

## 2017-06-03 NOTE — DISCHARGE PLANNING
Transport arranged with Marco. Patient will be leaving today @ 1400 via  van going to [facility]. LYNDSEY Moseley notified.

## 2017-06-03 NOTE — PROGRESS NOTES
Pt A&Ox4, declines pain at this time, most back pain occurs in the night, will continue to monitor.   + BS, + flatus, LBM 6/2. Abdomen semi firm and rounded.   N/t in fingertips and tips of toes, toes very minimal.   Strength improved from last night, 4/5 in BUE and 5/5 BLE. Minimal drift noted in BUE.   R single lumen PICC flushing well with drawback.   POC discussed, no further needs at this time, call light within reach, room close to RN station, no skid socks in place.

## 2017-06-03 NOTE — DISCHARGE SUMMARY
DISCHARGE SUMMARY     ADMIT DATE:  5/20/2017         DISCHARGE DATE:  6/3/2017    PATIENT ID:  Name: Torey Lima   YOB: 1945  Age: 71 y.o. male   MRN: [id number]  Address: [address] [place] [address]  [place] [address]  Phone: [phone] (home)    DISCHARGE DIAGNOSIS:  Lumbar Canal Stenosis  Acute Inflammatory Demyelinating Polyneuropathy  Generalized Weakness  Esophageal Varices  Hepatic Cirrhosis  Abdominal Ascites  Hypertension  Hx of Colon Cancer Stg IV    CONSULTANTS:  NeuroSx: Luz  Neurology: Bobby  Physiatry: Anette    CONDITION: Fair    DISPOSITION: SNF for rehab    DIET: Regular    ACTIVITY: As tolerated     HPI/HOSPITAL COURSE:  Please see H&P for details regarding initial hospital presentation.  In summary, 72yo M with PMHx of chemo-induced hepatic cirrhosis, esophageal varices s/p banding, and colon cancer stg IV in remission was admitted for progressive upper an lower extremity weakness.  All medications which could be contributing to weakness including gabapentin and ursodiol were held during hospital course.  Abdominal ultrasound revealed ascites and 1.8L of clear yellow fluid was removed with no complications.  Patient was also complaining of lower back pain.  He had a scheduled outpatient MRI which was performed as an inpatient.  MRI revealed significant lumbar stenosis and neurosurgery was consulted.  Neurosurgery agreed to perform lumbar laminectomy for decompression of stenosis (5/25).  In addition to lumbar stenosis, neurology was also consulted due to acute generalized weakness with paresthesias.  Neurology diagnosed the patient with AIDP and was started on IVIG.  With IVIG, generalized weakness and paresthesias gradually improved.  Patient completed course of IVIG with no complications.  PT evaluated patient and recommended acute rehab.  Rehab referral was placed and Physiatry evaluated the patient.  At this current physical state, it was recommended for patient to be discharged  to SNF to receive rehab there, then be transferred to acute rehab when ready.  Patient and wife were informed of these recommendations and were in agreement with the plan.  Therefore patient will continue rehab at SNF prior to discharge to acute rehab.      Physical exam:  Filed Vitals:    06/02/17 1753 06/02/17 2000 06/03/17 0400 06/03/17 0722   BP: 120/69 112/58 121/66 121/63   Pulse:  74 73 72   Temp:  37.3 °C (99.2 °F) 37.2 °C (98.9 °F) 37.2 °C (98.9 °F)   Resp:  16 16 18   Height:       Weight:       SpO2:  95% 94% 94%     Weight/BMI: Body mass index is 32.57 kg/(m^2).  Pulse Oximetry: 94 %, O2 (LPM): 0, O2 Delivery: None (Room Air)    Constitutional: He is oriented to person, place, and time. He appears well-developed and well-nourished.   HENT:    Head: Normocephalic and atraumatic.   Eyes: Conjunctivae are normal. No scleral icterus.   Neck: Neck supple. No JVD present.   Cardiovascular: Normal rate, regular rhythm, normal heart sounds and intact distal pulses.  Exam reveals no gallop and no friction rub.     No murmur heard.  Pulmonary/Chest: Effort normal and breath sounds normal. No respiratory distress. He exhibits no tenderness.   Abdominal: Soft. Bowel sounds are normal. He exhibits no distension and no mass. There is no tenderness.   Musculoskeletal: He exhibits no edema or tenderness.   Neurological: He is alert and oriented to person, place, and time. No cranial nerve deficit.   Decreased strength BLE, BUE - improved post IVIG  Skin: Skin is warm and dry. No rash noted. He is not diaphoretic.   Red area lateral right forearm resolved    PROCEDURES:  Paracentesis (5/23)  Bilateral L5 Transpedicular approach for decompression of bilateral L5 nerve roots (5/25)  Complete L4/L5/S1 Laminectomy (5/25)  Foraminotomy bilateral L4/L5/S1 nerve roots (5/25)    RADIOLOGY:  MR-BRAIN-WITH & W/O   Final Result      1.  Mild partially empty sella.   2.  Mild cerebral atrophy. Age-appropriate.   3.  Mild  supratentorial white matter disease most consistent with microvascular ischemic change.   4.  No evidence of brain metastasis, acute infarction, or hemorrhage.      IR-PICC LINE PLACEMENT > AGE 5   Final Result                  Ultrasound-guided PICC placement performed by qualified nursing staff as    above.          DX-CHEST-FOR PICC LINE Perform procedure in:: Patient's Room   Final Result      Peripherally inserted catheter has been placed and the tip projects appropriately over the superior vena cava.      DX-PORTABLE FLUORO > 1 HOUR   Final Result         Portable fluoroscopy utilized for 2 seconds.      DX-SPINE-ANY ONE VIEW   Final Result      L5 identified posteriorly      MR-LUMBAR SPINE-WITH   Final Result         1.  Severe central canal stenosis at the L4-5 level secondary to facet arthropathy.      2.  Mild lumbar spondylotic changes at the L4-5 level with minimal lumbar spondylotic changes at the L5-S1 level.      3.  6 mm multiloculated synovial cyst arising from the medial aspect of the right-sided facet joint at the L4-5 level indenting the adjacent thecal sac.      4.  Mild bilateral neural foraminal narrowing at the L4-5 level with disc bulging into the inferior aspects of the neural foramina.      MR-CERVICAL SPINE-WITH   Final Result         1.  Minimal cervical spondylotic change at C5-6 and C6-7 levels.      2.  Mild to moderate multilevel neural foraminal narrowing.      US-PARACENTESIS, ABD WITH IMAGING   Final Result      1. Ultrasound-guided therapeutic paracentesis of the left lower quadrant of the abdominal wall.      2. 1800 mL of fluid withdrawn.          DISCHARGE LABS:    Recent Labs      06/01/17   0307   WBC  4.0*   RBC  3.83*   HEMOGLOBIN  10.3*   HEMATOCRIT  32.0*   MCV  83.6   MCH  26.9*   RDW  47.3   PLATELETCT  53*   MPV  9.4   NEUTSPOLYS  62.20   LYMPHOCYTES  13.20*   MONOCYTES  17.90*   EOSINOPHILS  6.00   BASOPHILS  0.20     Lab Results   Component Value Date    AAPWEFEV10  633 10/25/2016    UEQQFVVT98 243 07/25/2012    XZYSXSRO74 331 10/02/2010    FOLATE >23.8 10/25/2016    FOLATE 13.5 07/25/2012    FOLATE 14.87 10/02/2010    FERRITIN 16.4* 02/10/2017    FERRITIN 21.5* 10/25/2016    FERRITIN 22.1 10/08/2016    IRON 39* 02/10/2017    IRON 45* 10/25/2016    IRON 44* 10/08/2016    TOTIRONBC 504* 02/10/2017    TOTIRONBC 433 10/25/2016    TOTIRONBC 493* 10/08/2016       Estimated GFR/CRCL = Estimated Creatinine Clearance: 149.3 mL/min (by C-G formula based on Cr of 0.62).  Recent Labs      06/01/17   0307   SODIUM  132*   POTASSIUM  3.8   CHLORIDE  104   CO2  25   BUN  16   CREATININE  0.62   CALCIUM  8.1*       No results for input(s): ALTSGPT, ASTSGOT, ALKPHOSPHAT, TBILIRUBIN, DBILIRUBIN, GAMMAGT, LIPASE, ALBUMIN, GLOBULIN, PREALBUMIN, INR, MACROCYTOSIS in the last 72 hours.    [facility](GLUCOSE:3,POCGLUCOSE:3)  )  Lab Results   Component Value Date    HBA1C 5.2 10/25/2016    HBA1C 5.3 07/08/2016    HBA1C 5.1 12/12/2012    FREET4 0.85 04/02/2015    FREET4 0.85 04/12/2014    FREET4 0.80 11/22/2013       DISCHARGE MEDICATIONS:  (X)  Medication Reconciliation Completed   Torey Lima   Home Medication Instructions EMMA:[id number]    Printed on:06/03/17 1006   Medication Information                      atorvastatin (LIPITOR) 40 MG Tab  Take 1 Tab by mouth every day. TAKE 1 TABLET BY MOUTH DAILY             carvedilol (COREG) 3.125 MG Tab  Take 3.125 mg by mouth 2 times a day, with meals.             Cholecalciferol (VITAMIN D3) 5000 UNITS Cap  Take 1 Cap by mouth every day.             cyclobenzaprine (FLEXERIL) 10 MG Tab  Take 1 Tab by mouth every 8 hours as needed for Muscle Spasms.             folic acid (FOLVITE) 1 MG Tab  Take 1 Tab by mouth every day.             losartan (COZAAR) 25 MG Tab  Take 1 Tab by mouth every day.             Multiple Vitamins-Minerals (CENTRUM SILVER ADULT 50+) Tab  Take 1 Tab by mouth every day.             oxycodone immediate release (ROXICODONE) 10 MG  immediate release tablet  Take 1 Tab by mouth every four hours as needed for Moderate Pain ((4-6)).             senna-docusate (PERICOLACE OR SENOKOT S) 8.6-50 MG Tab  Take 1 Tab by mouth 1 time daily as needed for Constipation.             tramadol (ULTRAM) 50 MG Tab  Take  mg by mouth 2 times a day as needed for Mild Pain.             ursodiol (ACTIGALL) 300 MG Cap  Take 300 mg by mouth 3 times a day.                 INSTRUCTIONS:   The patient was instructed to return to the ER in the event of worsening symptoms. I have counseled the patient on the importance of compliance and the patient has agreed to proceed with all medical recommendations and follow up plan indicated above.   The patient understands that all medications come with benefits and risks. Risks may include permanent injury or death and these risks can be minimized with close reassessment and monitoring.        Follow up appointment details :     F/U with PCP in one week  F/U with SNF attending  F/U with Neurology (Dr. Rosales) upon discharge from acute rehab  F/U with Neurosurgery (Dr. Escobar) upon discharge from acute rehab    PENDING STUDIES:  NONE    Primary Care Provider: Fabián Urena MD      Time spent on discharge day patient visit, preparing discharge paperwork and arranging for patient follow up 47 mins.

## 2017-06-03 NOTE — DISCHARGE PLANNING
Pt has been accepted to [facility] and is medically cleared to transfer.  Transport arranged for 1400 today via [facility].  SW met with pt and pt's spouse at bedside.  Pt and spouse agreeable to transfer and signed cobra.  Cobra and transfer packet placed on pt's chart and bedside RN updated.     PLAN: Pt to transfer to [facility] today at 1400 via [facility].

## 2017-06-03 NOTE — PROGRESS NOTES
Discharge instructions given to pt. Pt left in wheelchair with [facility] escort. All belongings taken. Attempted to call report x2, placed on hold indefinitely.

## 2017-06-03 NOTE — PROGRESS NOTES
Patient resting quietly in bed, no S/S of distress, AA&Ox4. Denies SOB, chest pain, dizziness. Call light within reach,  pt calls appropriately and does not get out of bed. Bed in lowest position, bed locked, RN and CNA numbers provided, no further needs at this time. No changes from EPIC. Hourly rounding in place.

## 2017-06-20 NOTE — PROGRESS NOTES
Attempt #:INCOMING    WebIZ Checked & Epic Updated: yes  HealthConnect Verified: no  Verify PCP: yes    Communication Preference Obtained: yes     Review Care Team: yes    Annual Wellness Visit Scheduling  1. Scheduling Status:Not Scheduled. Patient states they are under Doctor's care     PT CURRENTLY IN [facility]   Care Gap Scheduling (Attempt to Schedule EACH Overdue Care Gap!)     There are no preventive care reminders to display for this patient.      [employer] Activation: already active  [employer] Cj: no  Virtual Visits: no  Opt In to Text Messages: no

## 2017-06-26 ENCOUNTER — HOSPITAL ENCOUNTER (INPATIENT)
Facility: MEDICAL CENTER | Age: 72
LOS: 3 days | DRG: 253 | End: 2017-06-29
Attending: EMERGENCY MEDICINE | Admitting: INTERNAL MEDICINE
Payer: MEDICARE

## 2017-06-26 ENCOUNTER — RESOLUTE PROFESSIONAL BILLING HOSPITAL PROF FEE (OUTPATIENT)
Dept: HOSPITALIST | Facility: MEDICAL CENTER | Age: 72
End: 2017-06-26
Payer: MEDICARE

## 2017-06-26 ENCOUNTER — APPOINTMENT (OUTPATIENT)
Dept: RADIOLOGY | Facility: MEDICAL CENTER | Age: 72
DRG: 253 | End: 2017-06-26
Attending: EMERGENCY MEDICINE
Payer: MEDICARE

## 2017-06-26 DIAGNOSIS — I82.4Z2 ACUTE DEEP VEIN THROMBOSIS (DVT) OF DISTAL VEIN OF LEFT LOWER EXTREMITY (HCC): Primary | ICD-10-CM

## 2017-06-26 PROBLEM — I82.402 LEFT LEG DVT (HCC): Status: ACTIVE | Noted: 2017-06-26

## 2017-06-26 LAB
ALBUMIN SERPL BCP-MCNC: 2.5 G/DL (ref 3.2–4.9)
ALBUMIN/GLOB SERPL: 0.9 G/DL
ALP SERPL-CCNC: 81 U/L (ref 30–99)
ALT SERPL-CCNC: 15 U/L (ref 2–50)
ANION GAP SERPL CALC-SCNC: 6 MMOL/L (ref 0–11.9)
APPEARANCE UR: CLEAR
APTT PPP: 31.9 SEC (ref 24.7–36)
AST SERPL-CCNC: 21 U/L (ref 12–45)
BASOPHILS # BLD AUTO: 0.4 % (ref 0–1.8)
BASOPHILS # BLD: 0.04 K/UL (ref 0–0.12)
BILIRUB SERPL-MCNC: 1.5 MG/DL (ref 0.1–1.5)
BILIRUB UR QL STRIP.AUTO: NEGATIVE
BUN SERPL-MCNC: 16 MG/DL (ref 8–22)
CALCIUM SERPL-MCNC: 8.1 MG/DL (ref 8.5–10.5)
CHLORIDE SERPL-SCNC: 98 MMOL/L (ref 96–112)
CO2 SERPL-SCNC: 26 MMOL/L (ref 20–33)
COLOR UR: YELLOW
CREAT SERPL-MCNC: 0.64 MG/DL (ref 0.5–1.4)
CULTURE IF INDICATED INDCX: NO UA CULTURE
EOSINOPHIL # BLD AUTO: 0.64 K/UL (ref 0–0.51)
EOSINOPHIL NFR BLD: 6.4 % (ref 0–6.9)
ERYTHROCYTE [DISTWIDTH] IN BLOOD BY AUTOMATED COUNT: 48 FL (ref 35.9–50)
GFR SERPL CREATININE-BSD FRML MDRD: >60 ML/MIN/1.73 M 2
GLOBULIN SER CALC-MCNC: 2.8 G/DL (ref 1.9–3.5)
GLUCOSE SERPL-MCNC: 108 MG/DL (ref 65–99)
GLUCOSE UR STRIP.AUTO-MCNC: NEGATIVE MG/DL
HCT VFR BLD AUTO: 35.3 % (ref 42–52)
HGB BLD-MCNC: 11.3 G/DL (ref 14–18)
IMM GRANULOCYTES # BLD AUTO: 0.04 K/UL (ref 0–0.11)
IMM GRANULOCYTES NFR BLD AUTO: 0.4 % (ref 0–0.9)
INR PPP: 1.38 (ref 0.87–1.13)
KETONES UR STRIP.AUTO-MCNC: NEGATIVE MG/DL
LACTATE BLD-SCNC: 1.2 MMOL/L (ref 0.5–2)
LEUKOCYTE ESTERASE UR QL STRIP.AUTO: NEGATIVE
LIPASE SERPL-CCNC: 4 U/L (ref 11–82)
LYMPHOCYTES # BLD AUTO: 0.83 K/UL (ref 1–4.8)
LYMPHOCYTES NFR BLD: 8.3 % (ref 22–41)
MCH RBC QN AUTO: 27 PG (ref 27–33)
MCHC RBC AUTO-ENTMCNC: 32 G/DL (ref 33.7–35.3)
MCV RBC AUTO: 84.4 FL (ref 81.4–97.8)
MICRO URNS: NORMAL
MONOCYTES # BLD AUTO: 1.38 K/UL (ref 0–0.85)
MONOCYTES NFR BLD AUTO: 13.8 % (ref 0–13.4)
NEUTROPHILS # BLD AUTO: 7.09 K/UL (ref 1.82–7.42)
NEUTROPHILS NFR BLD: 70.7 % (ref 44–72)
NITRITE UR QL STRIP.AUTO: NEGATIVE
NRBC # BLD AUTO: 0 K/UL
NRBC BLD AUTO-RTO: 0 /100 WBC
PH UR STRIP.AUTO: 6.5 [PH]
PLATELET # BLD AUTO: 108 K/UL (ref 164–446)
PMV BLD AUTO: 10.5 FL (ref 9–12.9)
POTASSIUM SERPL-SCNC: 4.6 MMOL/L (ref 3.6–5.5)
PROT SERPL-MCNC: 5.3 G/DL (ref 6–8.2)
PROT UR QL STRIP: NEGATIVE MG/DL
PROTHROMBIN TIME: 17.4 SEC (ref 12–14.6)
RBC # BLD AUTO: 4.18 M/UL (ref 4.7–6.1)
RBC UR QL AUTO: NEGATIVE
SODIUM SERPL-SCNC: 130 MMOL/L (ref 135–145)
SP GR UR STRIP.AUTO: 1.02
WBC # BLD AUTO: 10 K/UL (ref 4.8–10.8)

## 2017-06-26 PROCEDURE — 83690 ASSAY OF LIPASE: CPT

## 2017-06-26 PROCEDURE — 770006 HCHG ROOM/CARE - MED/SURG/GYN SEMI*

## 2017-06-26 PROCEDURE — 700101 HCHG RX REV CODE 250: Performed by: EMERGENCY MEDICINE

## 2017-06-26 PROCEDURE — 85610 PROTHROMBIN TIME: CPT

## 2017-06-26 PROCEDURE — 71010 DX-CHEST-PORTABLE (1 VIEW): CPT

## 2017-06-26 PROCEDURE — 93971 EXTREMITY STUDY: CPT

## 2017-06-26 PROCEDURE — 85025 COMPLETE CBC W/AUTO DIFF WBC: CPT

## 2017-06-26 PROCEDURE — 93005 ELECTROCARDIOGRAM TRACING: CPT | Performed by: EMERGENCY MEDICINE

## 2017-06-26 PROCEDURE — 96361 HYDRATE IV INFUSION ADD-ON: CPT

## 2017-06-26 PROCEDURE — 99223 1ST HOSP IP/OBS HIGH 75: CPT | Mod: AI | Performed by: INTERNAL MEDICINE

## 2017-06-26 PROCEDURE — 700105 HCHG RX REV CODE 258: Performed by: EMERGENCY MEDICINE

## 2017-06-26 PROCEDURE — 96365 THER/PROPH/DIAG IV INF INIT: CPT

## 2017-06-26 PROCEDURE — 99285 EMERGENCY DEPT VISIT HI MDM: CPT

## 2017-06-26 PROCEDURE — 81003 URINALYSIS AUTO W/O SCOPE: CPT

## 2017-06-26 PROCEDURE — 96375 TX/PRO/DX INJ NEW DRUG ADDON: CPT

## 2017-06-26 PROCEDURE — 84145 PROCALCITONIN (PCT): CPT

## 2017-06-26 PROCEDURE — 36415 COLL VENOUS BLD VENIPUNCTURE: CPT

## 2017-06-26 PROCEDURE — 700111 HCHG RX REV CODE 636 W/ 250 OVERRIDE (IP): Performed by: EMERGENCY MEDICINE

## 2017-06-26 PROCEDURE — 80053 COMPREHEN METABOLIC PANEL: CPT

## 2017-06-26 PROCEDURE — 87077 CULTURE AEROBIC IDENTIFY: CPT

## 2017-06-26 PROCEDURE — 96367 TX/PROPH/DG ADDL SEQ IV INF: CPT

## 2017-06-26 PROCEDURE — 87040 BLOOD CULTURE FOR BACTERIA: CPT

## 2017-06-26 PROCEDURE — 85730 THROMBOPLASTIN TIME PARTIAL: CPT

## 2017-06-26 PROCEDURE — 83605 ASSAY OF LACTIC ACID: CPT

## 2017-06-26 RX ORDER — FOLIC ACID 1 MG/1
1 TABLET ORAL DAILY
Status: DISCONTINUED | OUTPATIENT
Start: 2017-06-27 | End: 2017-06-29 | Stop reason: HOSPADM

## 2017-06-26 RX ORDER — M-VIT,TX,IRON,MINS/CALC/FOLIC 27MG-0.4MG
1 TABLET ORAL DAILY
Status: DISCONTINUED | OUTPATIENT
Start: 2017-06-27 | End: 2017-06-29 | Stop reason: HOSPADM

## 2017-06-26 RX ORDER — ACETAMINOPHEN 325 MG/1
650 TABLET ORAL EVERY 6 HOURS PRN
Status: DISCONTINUED | OUTPATIENT
Start: 2017-06-26 | End: 2017-06-29 | Stop reason: HOSPADM

## 2017-06-26 RX ORDER — MORPHINE SULFATE 4 MG/ML
4 INJECTION, SOLUTION INTRAMUSCULAR; INTRAVENOUS ONCE
Status: COMPLETED | OUTPATIENT
Start: 2017-06-26 | End: 2017-06-26

## 2017-06-26 RX ORDER — SIMETHICONE 80 MG
80 TABLET,CHEWABLE ORAL 4 TIMES DAILY
Status: ON HOLD | COMMUNITY
End: 2017-09-07

## 2017-06-26 RX ORDER — BISACODYL 10 MG
10 SUPPOSITORY, RECTAL RECTAL
Status: DISCONTINUED | OUTPATIENT
Start: 2017-06-26 | End: 2017-06-29 | Stop reason: HOSPADM

## 2017-06-26 RX ORDER — ATORVASTATIN CALCIUM 40 MG/1
40 TABLET, FILM COATED ORAL
Status: DISCONTINUED | OUTPATIENT
Start: 2017-06-26 | End: 2017-06-29 | Stop reason: HOSPADM

## 2017-06-26 RX ORDER — POLYETHYLENE GLYCOL 3350 17 G/17G
1 POWDER, FOR SOLUTION ORAL
Status: DISCONTINUED | OUTPATIENT
Start: 2017-06-26 | End: 2017-06-29 | Stop reason: HOSPADM

## 2017-06-26 RX ORDER — OXYCODONE HYDROCHLORIDE 10 MG/1
10 TABLET ORAL 3 TIMES DAILY
Status: ON HOLD | COMMUNITY
End: 2017-06-29

## 2017-06-26 RX ORDER — MORPHINE SULFATE 4 MG/ML
4 INJECTION, SOLUTION INTRAMUSCULAR; INTRAVENOUS
Status: DISCONTINUED | OUTPATIENT
Start: 2017-06-26 | End: 2017-06-29 | Stop reason: HOSPADM

## 2017-06-26 RX ORDER — URSODIOL 300 MG/1
300 CAPSULE ORAL 3 TIMES DAILY
Status: DISCONTINUED | OUTPATIENT
Start: 2017-06-27 | End: 2017-06-29 | Stop reason: HOSPADM

## 2017-06-26 RX ORDER — AMOXICILLIN 250 MG
2 CAPSULE ORAL 2 TIMES DAILY
Status: DISCONTINUED | OUTPATIENT
Start: 2017-06-27 | End: 2017-06-29 | Stop reason: HOSPADM

## 2017-06-26 RX ORDER — OXYCODONE HYDROCHLORIDE 10 MG/1
10 TABLET ORAL
Status: DISCONTINUED | OUTPATIENT
Start: 2017-06-26 | End: 2017-06-29 | Stop reason: HOSPADM

## 2017-06-26 RX ORDER — OXYCODONE HYDROCHLORIDE 10 MG/1
10 TABLET ORAL EVERY 4 HOURS PRN
Status: ON HOLD | COMMUNITY
End: 2017-09-07

## 2017-06-26 RX ORDER — ONDANSETRON 2 MG/ML
4 INJECTION INTRAMUSCULAR; INTRAVENOUS EVERY 4 HOURS PRN
Status: DISCONTINUED | OUTPATIENT
Start: 2017-06-26 | End: 2017-06-29 | Stop reason: HOSPADM

## 2017-06-26 RX ORDER — LOSARTAN POTASSIUM 25 MG/1
12.5 TABLET ORAL DAILY
Status: DISCONTINUED | OUTPATIENT
Start: 2017-06-27 | End: 2017-06-29 | Stop reason: HOSPADM

## 2017-06-26 RX ORDER — CARVEDILOL 3.12 MG/1
3.12 TABLET ORAL 2 TIMES DAILY WITH MEALS
Status: DISCONTINUED | OUTPATIENT
Start: 2017-06-27 | End: 2017-06-29 | Stop reason: HOSPADM

## 2017-06-26 RX ORDER — OXYCODONE HYDROCHLORIDE 10 MG/1
10 TABLET ORAL 3 TIMES DAILY
Status: DISCONTINUED | OUTPATIENT
Start: 2017-06-27 | End: 2017-06-26

## 2017-06-26 RX ORDER — LABETALOL HYDROCHLORIDE 5 MG/ML
10 INJECTION, SOLUTION INTRAVENOUS EVERY 4 HOURS PRN
Status: DISCONTINUED | OUTPATIENT
Start: 2017-06-26 | End: 2017-06-29 | Stop reason: HOSPADM

## 2017-06-26 RX ORDER — SODIUM CHLORIDE 9 MG/ML
30 INJECTION, SOLUTION INTRAVENOUS ONCE
Status: COMPLETED | OUTPATIENT
Start: 2017-06-26 | End: 2017-06-26

## 2017-06-26 RX ORDER — ONDANSETRON 2 MG/ML
4 INJECTION INTRAMUSCULAR; INTRAVENOUS ONCE
Status: COMPLETED | OUTPATIENT
Start: 2017-06-26 | End: 2017-06-26

## 2017-06-26 RX ORDER — LOSARTAN POTASSIUM 25 MG/1
12.5 TABLET ORAL DAILY
Status: ON HOLD | COMMUNITY
End: 2017-09-07

## 2017-06-26 RX ORDER — SODIUM CHLORIDE 9 MG/ML
1000 INJECTION, SOLUTION INTRAVENOUS ONCE
Status: COMPLETED | OUTPATIENT
Start: 2017-06-26 | End: 2017-06-27

## 2017-06-26 RX ORDER — SIMETHICONE 80 MG
80 TABLET,CHEWABLE ORAL 4 TIMES DAILY
Status: DISCONTINUED | OUTPATIENT
Start: 2017-06-27 | End: 2017-06-29 | Stop reason: HOSPADM

## 2017-06-26 RX ORDER — ONDANSETRON 4 MG/1
4 TABLET, ORALLY DISINTEGRATING ORAL EVERY 4 HOURS PRN
Status: DISCONTINUED | OUTPATIENT
Start: 2017-06-26 | End: 2017-06-29 | Stop reason: HOSPADM

## 2017-06-26 RX ORDER — OXYCODONE HYDROCHLORIDE 5 MG/1
5 TABLET ORAL
Status: DISCONTINUED | OUTPATIENT
Start: 2017-06-26 | End: 2017-06-29 | Stop reason: HOSPADM

## 2017-06-26 RX ORDER — CEFTRIAXONE 2 G/1
2 INJECTION, POWDER, FOR SOLUTION INTRAMUSCULAR; INTRAVENOUS ONCE
Status: COMPLETED | OUTPATIENT
Start: 2017-06-26 | End: 2017-06-26

## 2017-06-26 RX ADMIN — CEFTRIAXONE SODIUM 2 G: 2 INJECTION, POWDER, FOR SOLUTION INTRAMUSCULAR; INTRAVENOUS at 21:37

## 2017-06-26 RX ADMIN — MORPHINE SULFATE 4 MG: 4 INJECTION INTRAVENOUS at 23:11

## 2017-06-26 RX ADMIN — DOXYCYCLINE 100 MG: 100 INJECTION, POWDER, LYOPHILIZED, FOR SOLUTION INTRAVENOUS at 23:11

## 2017-06-26 RX ADMIN — ONDANSETRON 4 MG: 2 INJECTION INTRAMUSCULAR; INTRAVENOUS at 23:11

## 2017-06-26 RX ADMIN — SODIUM CHLORIDE 3267 ML: 9 INJECTION, SOLUTION INTRAVENOUS at 20:30

## 2017-06-26 ASSESSMENT — PAIN SCALES - GENERAL: PAINLEVEL_OUTOF10: 0

## 2017-06-27 ENCOUNTER — APPOINTMENT (OUTPATIENT)
Dept: RADIOLOGY | Facility: MEDICAL CENTER | Age: 72
DRG: 253 | End: 2017-06-27
Attending: INTERNAL MEDICINE
Payer: MEDICARE

## 2017-06-27 LAB
ANION GAP SERPL CALC-SCNC: 5 MMOL/L (ref 0–11.9)
BASOPHILS # BLD AUTO: 0.4 % (ref 0–1.8)
BASOPHILS # BLD: 0.04 K/UL (ref 0–0.12)
BUN SERPL-MCNC: 14 MG/DL (ref 8–22)
CALCIUM SERPL-MCNC: 7.7 MG/DL (ref 8.5–10.5)
CHLORIDE SERPL-SCNC: 102 MMOL/L (ref 96–112)
CO2 SERPL-SCNC: 24 MMOL/L (ref 20–33)
CREAT SERPL-MCNC: 0.55 MG/DL (ref 0.5–1.4)
EKG IMPRESSION: NORMAL
EOSINOPHIL # BLD AUTO: 0.6 K/UL (ref 0–0.51)
EOSINOPHIL NFR BLD: 6.5 % (ref 0–6.9)
ERYTHROCYTE [DISTWIDTH] IN BLOOD BY AUTOMATED COUNT: 48.1 FL (ref 35.9–50)
GFR SERPL CREATININE-BSD FRML MDRD: >60 ML/MIN/1.73 M 2
GLUCOSE SERPL-MCNC: 103 MG/DL (ref 65–99)
HCT VFR BLD AUTO: 32.5 % (ref 42–52)
HGB BLD-MCNC: 10.5 G/DL (ref 14–18)
IMM GRANULOCYTES # BLD AUTO: 0.03 K/UL (ref 0–0.11)
IMM GRANULOCYTES NFR BLD AUTO: 0.3 % (ref 0–0.9)
LYMPHOCYTES # BLD AUTO: 0.66 K/UL (ref 1–4.8)
LYMPHOCYTES NFR BLD: 7.1 % (ref 22–41)
MCH RBC QN AUTO: 27.3 PG (ref 27–33)
MCHC RBC AUTO-ENTMCNC: 32.3 G/DL (ref 33.7–35.3)
MCV RBC AUTO: 84.6 FL (ref 81.4–97.8)
MONOCYTES # BLD AUTO: 1.23 K/UL (ref 0–0.85)
MONOCYTES NFR BLD AUTO: 13.3 % (ref 0–13.4)
NEUTROPHILS # BLD AUTO: 6.7 K/UL (ref 1.82–7.42)
NEUTROPHILS NFR BLD: 72.4 % (ref 44–72)
NRBC # BLD AUTO: 0 K/UL
NRBC BLD AUTO-RTO: 0 /100 WBC
PLATELET # BLD AUTO: 94 K/UL (ref 164–446)
PMV BLD AUTO: 9.9 FL (ref 9–12.9)
POTASSIUM SERPL-SCNC: 4.1 MMOL/L (ref 3.6–5.5)
PROCALCITONIN SERPL-MCNC: 0.1 NG/ML
RBC # BLD AUTO: 3.84 M/UL (ref 4.7–6.1)
SODIUM SERPL-SCNC: 131 MMOL/L (ref 135–145)
WBC # BLD AUTO: 9.3 K/UL (ref 4.8–10.8)

## 2017-06-27 PROCEDURE — 302146: Performed by: INTERNAL MEDICINE

## 2017-06-27 PROCEDURE — 36415 COLL VENOUS BLD VENIPUNCTURE: CPT

## 2017-06-27 PROCEDURE — G8991 OTHER PT/OT GOAL STATUS: HCPCS | Mod: CH

## 2017-06-27 PROCEDURE — 99233 SBSQ HOSP IP/OBS HIGH 50: CPT | Performed by: INTERNAL MEDICINE

## 2017-06-27 PROCEDURE — 37191 INS ENDOVAS VENA CAVA FILTR: CPT

## 2017-06-27 PROCEDURE — 700111 HCHG RX REV CODE 636 W/ 250 OVERRIDE (IP): Performed by: HOSPITALIST

## 2017-06-27 PROCEDURE — 99153 MOD SED SAME PHYS/QHP EA: CPT

## 2017-06-27 PROCEDURE — 700105 HCHG RX REV CODE 258: Performed by: INTERNAL MEDICINE

## 2017-06-27 PROCEDURE — B51G1ZA FLUOROSCOPY OF LEFT PELVIC (ILIAC) VEINS USING LOW OSMOLAR CONTRAST, GUIDANCE: ICD-10-PCS | Performed by: RADIOLOGY

## 2017-06-27 PROCEDURE — 700111 HCHG RX REV CODE 636 W/ 250 OVERRIDE (IP): Performed by: RADIOLOGY

## 2017-06-27 PROCEDURE — 700111 HCHG RX REV CODE 636 W/ 250 OVERRIDE (IP): Performed by: INTERNAL MEDICINE

## 2017-06-27 PROCEDURE — 700105 HCHG RX REV CODE 258: Performed by: EMERGENCY MEDICINE

## 2017-06-27 PROCEDURE — 700102 HCHG RX REV CODE 250 W/ 637 OVERRIDE(OP): Performed by: INTERNAL MEDICINE

## 2017-06-27 PROCEDURE — 06H03DZ INSERTION OF INTRALUMINAL DEVICE INTO INFERIOR VENA CAVA, PERCUTANEOUS APPROACH: ICD-10-PCS | Performed by: RADIOLOGY

## 2017-06-27 PROCEDURE — 97161 PT EVAL LOW COMPLEX 20 MIN: CPT

## 2017-06-27 PROCEDURE — E0191 PROTECTOR HEEL OR ELBOW: HCPCS | Performed by: INTERNAL MEDICINE

## 2017-06-27 PROCEDURE — 94760 N-INVAS EAR/PLS OXIMETRY 1: CPT

## 2017-06-27 PROCEDURE — 75820 VEIN X-RAY ARM/LEG: CPT

## 2017-06-27 PROCEDURE — A6213 FOAM DRG >16<=48 SQ IN W/BDR: HCPCS | Performed by: INTERNAL MEDICINE

## 2017-06-27 PROCEDURE — 770006 HCHG ROOM/CARE - MED/SURG/GYN SEMI*

## 2017-06-27 PROCEDURE — 700117 HCHG RX CONTRAST REV CODE 255: Performed by: RADIOLOGY

## 2017-06-27 PROCEDURE — G8990 OTHER PT/OT CURRENT STATUS: HCPCS | Mod: CH

## 2017-06-27 PROCEDURE — A9270 NON-COVERED ITEM OR SERVICE: HCPCS | Performed by: INTERNAL MEDICINE

## 2017-06-27 PROCEDURE — 700105 HCHG RX REV CODE 258: Performed by: HOSPITALIST

## 2017-06-27 PROCEDURE — 700111 HCHG RX REV CODE 636 W/ 250 OVERRIDE (IP)

## 2017-06-27 PROCEDURE — 700101 HCHG RX REV CODE 250: Performed by: INTERNAL MEDICINE

## 2017-06-27 PROCEDURE — 36010 PLACE CATHETER IN VEIN: CPT

## 2017-06-27 PROCEDURE — 85025 COMPLETE CBC W/AUTO DIFF WBC: CPT

## 2017-06-27 PROCEDURE — 80048 BASIC METABOLIC PNL TOTAL CA: CPT

## 2017-06-27 RX ORDER — SODIUM CHLORIDE 9 MG/ML
500 INJECTION, SOLUTION INTRAVENOUS PRN
Status: DISCONTINUED | OUTPATIENT
Start: 2017-06-27 | End: 2017-06-29 | Stop reason: HOSPADM

## 2017-06-27 RX ORDER — MIDAZOLAM HYDROCHLORIDE 1 MG/ML
.5-2 INJECTION INTRAMUSCULAR; INTRAVENOUS PRN
Status: ACTIVE | OUTPATIENT
Start: 2017-06-27 | End: 2017-06-27

## 2017-06-27 RX ORDER — CEFAZOLIN SODIUM 2 G/100ML
2 INJECTION, SOLUTION INTRAVENOUS ONCE
Status: COMPLETED | OUTPATIENT
Start: 2017-06-27 | End: 2017-06-27

## 2017-06-27 RX ORDER — ONDANSETRON 2 MG/ML
4 INJECTION INTRAMUSCULAR; INTRAVENOUS PRN
Status: ACTIVE | OUTPATIENT
Start: 2017-06-27 | End: 2017-06-27

## 2017-06-27 RX ORDER — MIDAZOLAM HYDROCHLORIDE 1 MG/ML
INJECTION INTRAMUSCULAR; INTRAVENOUS
Status: COMPLETED
Start: 2017-06-27 | End: 2017-06-27

## 2017-06-27 RX ADMIN — FENTANYL CITRATE 25 MCG: 50 INJECTION, SOLUTION INTRAMUSCULAR; INTRAVENOUS at 10:36

## 2017-06-27 RX ADMIN — FENTANYL CITRATE 25 MCG: 50 INJECTION, SOLUTION INTRAMUSCULAR; INTRAVENOUS at 10:31

## 2017-06-27 RX ADMIN — URSODIOL 300 MG: 300 CAPSULE ORAL at 20:11

## 2017-06-27 RX ADMIN — MIDAZOLAM 1 MG: 1 INJECTION INTRAMUSCULAR; INTRAVENOUS at 10:31

## 2017-06-27 RX ADMIN — MIDAZOLAM HYDROCHLORIDE 1 MG: 1 INJECTION, SOLUTION INTRAMUSCULAR; INTRAVENOUS at 10:31

## 2017-06-27 RX ADMIN — IOHEXOL 50 ML: 300 INJECTION, SOLUTION INTRAVENOUS at 11:01

## 2017-06-27 RX ADMIN — DOXYCYCLINE 100 MG: 100 INJECTION, POWDER, LYOPHILIZED, FOR SOLUTION INTRAVENOUS at 21:29

## 2017-06-27 RX ADMIN — CARVEDILOL 3.12 MG: 3.12 TABLET, FILM COATED ORAL at 17:33

## 2017-06-27 RX ADMIN — OXYCODONE HYDROCHLORIDE 5 MG: 5 TABLET ORAL at 00:42

## 2017-06-27 RX ADMIN — OXYCODONE HYDROCHLORIDE 5 MG: 5 TABLET ORAL at 21:29

## 2017-06-27 RX ADMIN — ATORVASTATIN CALCIUM 40 MG: 40 TABLET, FILM COATED ORAL at 20:11

## 2017-06-27 RX ADMIN — SIMETHICONE CHEW TAB 80 MG 80 MG: 80 TABLET ORAL at 13:44

## 2017-06-27 RX ADMIN — SIMETHICONE CHEW TAB 80 MG 80 MG: 80 TABLET ORAL at 17:33

## 2017-06-27 RX ADMIN — URSODIOL 300 MG: 300 CAPSULE ORAL at 01:41

## 2017-06-27 RX ADMIN — ATORVASTATIN CALCIUM 40 MG: 40 TABLET, FILM COATED ORAL at 01:41

## 2017-06-27 RX ADMIN — MIDAZOLAM: 1 INJECTION INTRAMUSCULAR; INTRAVENOUS at 10:30

## 2017-06-27 RX ADMIN — CEFAZOLIN SODIUM 2 G: 2 INJECTION, SOLUTION INTRAVENOUS at 13:44

## 2017-06-27 RX ADMIN — SODIUM CHLORIDE 1000 ML: 9 INJECTION, SOLUTION INTRAVENOUS at 01:37

## 2017-06-27 RX ADMIN — CEFTRIAXONE SODIUM 2 G: 2 INJECTION, POWDER, FOR SOLUTION INTRAMUSCULAR; INTRAVENOUS at 20:11

## 2017-06-27 RX ADMIN — STANDARDIZED SENNA CONCENTRATE AND DOCUSATE SODIUM 2 TABLET: 8.6; 5 TABLET, FILM COATED ORAL at 20:12

## 2017-06-27 RX ADMIN — SIMETHICONE CHEW TAB 80 MG 80 MG: 80 TABLET ORAL at 20:11

## 2017-06-27 RX ADMIN — VANCOMYCIN HYDROCHLORIDE 2900 MG: 100 INJECTION, POWDER, LYOPHILIZED, FOR SOLUTION INTRAVENOUS at 23:16

## 2017-06-27 RX ADMIN — URSODIOL 300 MG: 300 CAPSULE ORAL at 15:02

## 2017-06-27 ASSESSMENT — ENCOUNTER SYMPTOMS
MEMORY LOSS: 0
FOCAL WEAKNESS: 0
PALPITATIONS: 0
MYALGIAS: 1
NERVOUS/ANXIOUS: 0
VOMITING: 0
FLANK PAIN: 0
HEADACHES: 0
FEVER: 0
DIZZINESS: 0
SENSORY CHANGE: 0
NAUSEA: 0
DOUBLE VISION: 0
SHORTNESS OF BREATH: 0
COUGH: 0
WEAKNESS: 1
DEPRESSION: 0
ABDOMINAL PAIN: 0
CHILLS: 0
BLURRED VISION: 0
DIAPHORESIS: 0
BACK PAIN: 1
SPEECH CHANGE: 0

## 2017-06-27 ASSESSMENT — PAIN SCALES - GENERAL
PAINLEVEL_OUTOF10: 2
PAINLEVEL_OUTOF10: 5
PAINLEVEL_OUTOF10: 2
PAINLEVEL_OUTOF10: 3
PAINLEVEL_OUTOF10: 4
PAINLEVEL_OUTOF10: 2
PAINLEVEL_OUTOF10: 0
PAINLEVEL_OUTOF10: 2

## 2017-06-27 ASSESSMENT — LIFESTYLE VARIABLES
EVER_SMOKED: NEVER
ALCOHOL_USE: NO

## 2017-06-27 NOTE — PROGRESS NOTES
IR Procedure Note:    Dr. Browne placed a IVC Filter via right IJ.  The pt tolerated the procedure well, vital signs stable.  Gauze and tegaderm applied to right neck, CDI and soft.  Report given to Nicole PALOMO.  Pt and wife transported back to floor.      [employer]  REF:  [id number]  LOT:  [id number]

## 2017-06-27 NOTE — ED NOTES
Chief Complaint   Patient presents with   • Leg Swelling     Left leg   73yo male bib [facility] from [facility]. 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 Per report staff at [facility] noticed swelling to pt's left leg yesterday, US showed DVT. Pt denies SOB/chest pain. Per report pt was not able to receive anticoagulants at [facility] due to liver damage from chemo. Pt arrives awake, A/O x4. Attached to monitor, chart up for ERP.

## 2017-06-27 NOTE — ED NOTES
Med rec updated and complete.  Allergies reviewed.  Per MARS from transferring facility.  No antibiotics noted on mars.

## 2017-06-27 NOTE — CARE PLAN
Problem: Knowledge Deficit  Goal: Knowledge of disease process/condition, treatment plan, diagnostic tests, and medications will improve  Patient updated on plan of care for the day, patient verbalized understanding. All questions and concerns addressed at this time.    Problem: Skin Integrity  Goal: Risk for impaired skin integrity will decrease  Skin assessed at beginning of shift. Q2turn,  Low airloss bed ordered. patient kept clean and dry

## 2017-06-27 NOTE — PROGRESS NOTES
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 Wounds noted to sacral area and redness to bilat heels; 2 rn skin check completed with jake. [place] to room , call light within reach and visual checks through the night    NPO for procedure in the AM

## 2017-06-27 NOTE — H&P
CHIEF COMPLAINT:  Left leg swelling.    HISTORY OF PRESENT ILLNESS:  This is a 72-year-old male with history of colon   cancer in remission, and developed esophageal varices due to chemo induced   Cirrhosis s/p banding, along with coronary artery disease, peripheral neuropathy,   hypertension, obesity, and possible obstructive sleep apnea, pending sleep   study.  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    He was discharged to [facility] for continuing therapies due to debility   from his surgery and AIDP.    He was doing well until last Saturday when his wife noted that his left leg   was swollen, which was new since the previous day.  He also started to have   pain on the left leg describes as soreness and being uncomfortable.  This   morning, the left leg felt hot and more swollen, and after evaluation by the   SNF physician, he was sent to the ED for further evaluation and management.    Notably, he denied any other symptoms such as chest pain, shortness of breath,   nausea, vomiting, cough or sputum production.  He is still weak on the arms,   but this is getting better with PT and OT at the skilled nursing facility.    EMERGENCY DEPARTMENT COURSE:  The patient was initially evaluated in the   emergency department, was maintaining good hemodynamics, saturating well on   room air, but oxygenation dips to 88% when sleeping.  He was febrile at   38.2 degrees Celsius.  Initial blood workup showed unimpressive CBC with BMP   remarkable for sodium of 130, which is stable from his recent labs.  His   lactate was 1.2 with INR of 1.38.  Chest x-ray (my read) showed a small right   upper lobe airspace process.  A duplex ultrasound of the left lower extremity   showed noncompressibility of the left common femoral vein with DVT within the    pain.  He was deemed not to be a candidate for anticoagulation given his   esophageal varices, and IR was contacted with plan for catheter-directed   thrombolysis and IVC filter placement in the morning.  Due to the x-ray   findings, and fever he was felt to have pneumonia and was given dose of   ceftriaxone and doxycycline.  He was subsequently admitted to the hospitalist   service for further evaluation and management.    REVIEW OF SYSTEMS  A complete review of system was done. All other systems were negative.    PMH/PSH/FMH: I personally reviewed all ancillary histories as noted.    PAST MEDICAL HISTORY:  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/6/2010   • Thrombocytopenia due to drugs 4/25/2012   • Impaired fasting glucose 8/2/2012   • Vitamin d deficiency 8/2/2012   • BMI 33.0-33.9,adult 2/15/2013   • HLD (hyperlipidemia) 6/20/2013   • HTN (hypertension) 6/20/2013   • Hypertension    • Liver cirrhosis (CMS-HCC)    • GI bleed    • Chinese measles    • Chickenpox        PAST SURGICAL HISTORY:  Past Surgical History   Procedure Laterality Date   • Low anterior resection robotic  8/10/2010     Performed by RUBY RUIZ at SURGERY [facility] ORS   • Cath placement  9/20/2010     Performed by RUBY RUIZ at SURGERY [facility] ORS   • Hip arthroplasty mis total       rt   • Other       Cholecystectomy   • Other (comments)       liver surgery     • Cath removal  7/20/2011     Performed by RUBY RUIZ at SURGERY [facility] ORS   • Full thickness block resection  4/11/2012     Performed by LANI BOWMAN at SURGERY [facility]   • Gastroscopy with banding  4/3/2016     Procedure: GASTROSCOPY WITH BANDING;  Surgeon: Cayetano DOAN  MEGAN Stovall;  Location: ENDOSCOPY [facility];  Service:    • Gastroscopy with banding  10/25/2016     Procedure: GASTROSCOPY WITH BANDING;  Surgeon: Pierre Waggoner M.D.;  Location: ENDOSCOPY [facility];  Service:    • Colon resection     • Hip replacement, total     • Lumbar laminectomy diskectomy  5/25/2017     Procedure:  LAMINECTOMY LUMBAR  4 TO SACRAL 1;  Surgeon: Felton Escobar M.D.;  Location: SURGERY [facility];  Service:        PERSONAL/SOCIAL HISTORY:  Social History   Substance Use Topics   • Smoking status: Former Smoker -- 0.50 packs/day for 1 years     Types: Cigarettes, Cigars     Quit date: 01/01/1964   • Smokeless tobacco: Never Used      Comment: 1 cigar per week.   • Alcohol Use: No      Comment: minimal, Pt stop drinking since the beginning of April 2016       FAMILY MEDICAL HISTORY:  Family History   Problem Relation Age of Onset   • Heart Disease Mother    • Cancer Mother    • Sleep Apnea Neg Hx        ALLERGIES:  Nkda    HOME MEDICATIONS:  Medication Sig   Probiotic Product (PROBIOTIC DAILY PO) Take 1 Capsule by mouth 2 Times a Day.   simethicone (MYLICON) 80 MG Chew Tab Take 80 mg by mouth 4 times a day.   losartan (COZAAR) 25 MG Tab Take 12.5 mg by mouth every day.   vitamin D (CHOLECALCIFEROL) 1000 UNIT Tab Take 5,000 Units by mouth every day.   oxycodone immediate release (ROXICODONE) 10 MG immediate release tablet Take 10 mg by mouth 3 times a day. Scheduled  0100  0500  \2100   oxycodone immediate release (ROXICODONE) 10 MG immediate release tablet Take 10 mg by mouth every four hours as needed for Severe Pain.   carvedilol (COREG) 3.125 MG Tab Take 3.125 mg by mouth 2 times a day, with meals.   Cholecalciferol (VITAMIN D3) 5000 UNITS Cap Take 1 Cap by mouth every day.   ursodiol (ACTIGALL) 300 MG Cap Take 300 mg by mouth 3 times a day.   Multiple Vitamins-Minerals (CENTRUM SILVER ADULT 50+) Tab Take 1 Tab by mouth every day.   folic acid (FOLVITE) 1 MG Tab Take  1 Tab by mouth every day.   atorvastatin (LIPITOR) 40 MG Tab Take 1 Tab by mouth every day. TAKE 1 TABLET BY MOUTH DAILY           PHYSICAL EXAMINATION:  VITAL SIGNS:  Blood pressure 106/58, heart rate 79, respiratory rate 16,   oxygen saturation 94% upon 2 L, temperature 38.2 degrees Celsius.  CONSTITUTIONAL:  Obese, not in cardiorespiratory distress.  HENT: Normocephalic, atraumatic, (-) tonsillopharyngal congestion or exudate.  EYES: PERRLA, pink conjuctivae, (-) icteric sclerae  NECK: (-) cervical lymphadenopathy, (-) neck rigidity  CARDIOVASCULAR: Distinct heart sounds, RRR, (-) murmurs, rubs, gallops, (-) LE edema  RESPIRATORY: Equal chest expansion, clear breath sounds, (-) crackles/wheezes/rales/rhonchi  GASTROINTESTINAL: normoactive bowel sounds, soft, (-) tenderness, (-) masses, (-) guarding/rebound  MUSCULOSKELETAL:  Swollen left leg which feels warm without any cellulitic   changes.  No joint swelling.  Moves all 4 extremities.  SKIN:  Stage II pressure sore on the sacrum.  No other open wounds or ulcers.  PSYCHIATRIC: mood, affect, and thought content WNL, behavior age appropriate  NEUROLOGIC: Non-focal, moves all 4 extremities, sensory grossly intact        PERTINENT DIAGNOSTIC RESULTS:  Reviewed, and as mentioned above. Please refer to ED course.      ASSESSMENT:  1.  Left leg deep venous thrombosis, not a candidate for anticoagulation.  2.  Fever, likely from venous thromboembolism.  3.  Questionable pneumonia on chest x-ray, no symptoms.  4.  Stage II sacral decubitus ulcer.  5.  Chronic hyponatremia.  6.  Recent lumbar laminectomy for lumbar stenosis decompression.  7.  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  8.  History of colon cancer in remission.  9.  Esophageal varices due to chemo-induced cirrhosis.  10.  Coronary artery disease.  11.  Hypertension.  12.  Obesity.  13.  Obstructive sleep apnea.    PLAN:  ---  I will admit him to  the medical unit.  I anticipate that he will need at   least 2 midnights hospital stay to provide medically necessary services.  ---  He is not a candidate for anticoagulation due to his esophageal varices.    IR has already been contacted, with plan for IVC filter placement and   catheter-directed thrombolysis in the morning.  I will keep him n.p.o. after   midnight.  I will continue him on p.r.n. pain medications with oxycodone and   IV morphine.  ---  I highly doubt pneumonia as he does not have any symptoms, although his   chest x-ray shows possible infiltrates.  I will continue him on ceftriaxone   and doxycycline for now, and check a procalcitonin and trend this after 48   hours.  If procalcitonin remains low, consider discontinuing the antibiotics,   otherwise he will just need short 3-5 days course.  We will continue to   monitor him for fevers.  I will send for blood cultures x2 sets.  ---  I will continue him on bronchodilators per RT protocol, along with oxygen   support, especially at night, given his obstructive sleep apnea.  He will need   a formal polysomnogram as an outpatient.  ---  Continue PT and OT while in house post-IR procedure.  He will need to   return to the skilled nursing facility on discharge for continued rehabilitation.  ---  I will get wound care to see him for his decubitus pressure sore decubitus   ulcer.  Continue pressure offloading.  ---  Resume home dose, losartan, Coreg, and Lipitor.  Continue p.r.n. IV   labetalol for significant hypertension.    Deep venous thrombosis prophylaxis -- mechanical infarct pharmacologic   contraindicated due to esophageal varices, mechanical contraindicated due to   DVT.  Gastrointestinal prophylaxis -- not indicated.  Code status -- full code.       ____________________________________     Armani Lawrence M.D.    KELSEY / LEEANN    DD:  06/27/2017 00:33:12  DT:  06/27/2017 00:55:22    D#:  [id number]  Job#:  [id number]

## 2017-06-27 NOTE — OR SURGEON
Immediate Post- Operative Note        PostOp Diagnosis: DVT, contraindication to anticoagulation      Procedure(s): venography, IVC filter placement, NO LYSIS    MD paged      Estimated Blood Loss: Less than 5 ml        Complications: None            6/27/2017     10:59 AM     Dony Browne

## 2017-06-27 NOTE — WOUND TEAM
"[facility]  Inpatient Services  Initial Wound & Skin Care Evaluation    Admission Date:    6/26/17       [facility], [facility], SH: Reviewed  Unit where seen by Wound Team:  S [id number]    WOUND CONSULT RELATED TO: sacral pressure injury    SUBJECTIVE:  \"My butt has been sore for a while.\"     Self Report / Pain Level: 7/10 with contact to sacral region/cleansing     OBJECTIVE:  Pt supine in bed, mepilex over sacrum, incontinent of formed stool    WOUND TYPE, LOCATION, CHARACTERISTICS (Pressure ulcers: location, stage, POA or date identified)    Wound Type/Location: SDTI, bilateral buttocks and sacrum, POA    Periwound: red, excoriated      Drainage:  Scant serosanguinous      Tissue Type and %:   40% deep red/purple, 60% red   Wound Edges:  Ragged, some open areas     Odor:  none     Exposed structure(s):  none   S&S of Infection:  none    Measurements: taken 6/27/17   Length:    12.0cm   Width:     10.0cm   Depth:    ua   Tracts/undermining:   None       INTERVENTIONS BY WOUND TEAM:  With assistance from 2 CNA's, Pt turned to side to assess backside.  Mepilex removed as Pt incontinent of formed stool.  CNA cleansed Pt's backside, measurements taken a this time.  Unable to re-apply new dressing as no mepilex on floor currently and Pt going down for procedure.  NRSG instructed to apply mepilex once Pt back on floor.  AJAY bed, heel float boots and dressing maintenance orders placed.        Dressing types: sacral mepilex     Interdisciplinary Collaboration:   RN, CNA, Pt, Pt's spouse     EVALUATION:  Pt presents with SDTI pressure injuries to his sacrum as well as bilateral medial buttocks.  Pt is incontinent of stool but it is formed.  NRSG to continue using mepilex sacral dressing.  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  Heels with mild redness but both blanching at this time.      Factors affecting wound healing:  Decreased mobility, " overweight, incontinent     Goals:  Wounds to decrease in size by 1%/week     NURSING PLAN OF CARE: (X)  Dressing changes: See Dressing Maintenance orders: X  Skin care: See Skin Care orders:   Rectal tube care: See Rectal Tube Care orders:   Other orders:    RSKIN: CURRENT (X) ORDERED (O)  Q shift Huan:  X  Q shift pressure point assessments:  X  Atmosair  X      AJAY  O    Bariatric AJAY      Bariatric foam        Heel float boots  O     Heels floated on pillows      Barrier wipes   X   Barrier Cream      Barrier paste      Sacral silicone dressing X     Silicone O2 tubing   X   Anchorfast      Trach with Optifoam split foam       Waffle cushion      Rectal tube or BMS      Antifungal tx    Turn q 2 hours X  Up to chair  X  Ambulate   PT/OT  X   Dietician   X   PO     TF   TPN     PVN    NPO X  # days   Other       WOUND TEAM PLAN OF CARE (X):   NPWT change 3 x week:        Dressing changes by wound team:       Follow up as needed:   X    Other (explain):    Anticipated discharge plans (X):  SNF: X - Pt was admitted from [facility]          Home Care:           Outpatient Wound Center:            Self Care:            Other:

## 2017-06-27 NOTE — DISCHARGE PLANNING
IHD attempted to meet with patient but he was taken down to surgery just before visit. Will revisit.

## 2017-06-27 NOTE — ED PROVIDER NOTES
CHIEF COMPLAINT  Chief Complaint   Patient presents with   • Leg Swelling     Left leg       HPI  Torey Lima is a 72 y.o. male who presented from [facility] with left lower extremity swelling since 2 days ago. 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 Also with a recent surgery for his lower extremity weakness. It was only after the surgery when they definitively diagnose down break. Since then, he has been asked in her care for rehabilitation. Has been doing well.     Ultrasound was performed at [facility]. Results are not available to us here. Noted a DVT left lower extremity. Sent here for IVC filter placement.  Incidentally, the patient has a history of liver disease and varices. He reports that this is secondary to metastatic cancer to his liver that was treated successfully read he is currently in remission. Primary source was colon cancer. He reports that he is unable to take any blood thinners.    The patient denies any chest pain or shortness of breath. No nausea, vomiting. No dysuria or hematuria. Reports feeling in his usual state of health otherwise. Reports that he has been doing well with physical therapy.      REVIEW OF SYSTEMS  See HPI for further details. All other systems are negative.     PAST MEDICAL HISTORY   has a past medical history of Cancer (CMS-HCC) (10/10-6/11); ASCVD (arteriosclerotic cardiovascular disease) (12/14/2012); Colon cancer (CMS-HCC) (12/6/2010); Elevated CEA (12/6/2010); Liver lesion (12/6/2010); Peripheral neuropathy, secondary to drugs or chemicals (12/6/2010); Thrombocytopenia due to drugs (4/25/2012); Impaired fasting glucose (8/2/2012); Vitamin d deficiency (8/2/2012); BMI 33.0-33.9,adult (2/15/2013); HLD (hyperlipidemia) (6/20/2013); HTN (hypertension) (6/20/2013); Hypertension; Liver cirrhosis (CMS-HCC); GI bleed; [place] measles; and Chickenpox.    SOCIAL HISTORY  Social History     Social History Main Topics   • Smoking status:  "Former Smoker -- 0.50 packs/day for 1 years     Types: Cigarettes, Cigars     Quit date: 01/01/1964   • Smokeless tobacco: Never Used      Comment: 1 cigar per week.   • Alcohol Use: No      Comment: minimal, Pt stop drinking since the beginning of April 2016   • Drug Use: No   • Sexual Activity:     Partners: Female       SURGICAL HISTORY   has past surgical history that includes low anterior resection robotic (8/10/2010); cath placement (9/20/2010); hip arthroplasty mis total; other; other (comments); cath removal (7/20/2011); full thickness block resection (4/11/2012); gastroscopy with banding (4/3/2016); gastroscopy with banding (10/25/2016); colon resection; hip replacement, total; and lumbar laminectomy diskectomy (5/25/2017).    CURRENT MEDICATIONS  Home Medications     Reviewed by Sara Lopez (Pharmacy Tech) on 06/26/17 at 1956  Med List Status: Complete    Medication Last Dose Status    atorvastatin (LIPITOR) 40 MG Tab 6/25/2017 Active    carvedilol (COREG) 3.125 MG Tab 6/26/2017 Active    Cholecalciferol (VITAMIN D3) 5000 UNITS Cap 6/26/2017 Active    folic acid (FOLVITE) 1 MG Tab 6/26/2017 Active    losartan (COZAAR) 25 MG Tab 6/26/2017 Active    Multiple Vitamins-Minerals (CENTRUM SILVER ADULT 50+) Tab 6/26/2017 Active    oxycodone immediate release (ROXICODONE) 10 MG immediate release tablet 6/26/2017 Active    oxycodone immediate release (ROXICODONE) 10 MG immediate release tablet 6/23/2017 Active    Probiotic Product (PROBIOTIC DAILY PO) 6/26/2017 Active    simethicone (MYLICON) 80 MG Chew Tab 6/26/2017 Active    ursodiol (ACTIGALL) 300 MG Cap 6/26/2017 Active    vitamin D (CHOLECALCIFEROL) 1000 UNIT Tab 6/26/2017 Active                ALLERGIES  Allergies   Allergen Reactions   • Nkda [No Known Drug Allergy]        PHYSICAL EXAM  VITAL SIGNS: /53 mmHg  Pulse 79  Temp(Src) 38.2 °C (100.8 °F)  Resp 16  Ht 1.905 m (6' 3\")  Wt 108.863 kg (240 lb)  BMI 30.00 kg/m2  SpO2 95%  Pulse ox " interpretation: I interpret this pulse ox as normal.  Constitutional: Alert in no apparent distress.  HENT: No signs of trauma, Bilateral external ears normal, Nose normal.   Eyes: Pupils are equal and reactive, Conjunctiva normal, Non-icteric.   Neck: Normal range of motion, No tenderness, Supple, No stridor.   Cardiovascular: Regular rate and rhythm, no murmurs.   Thorax & Lungs: Normal breath sounds, No respiratory distress, No wheezing, No chest tenderness.   Abdomen: Bowel sounds normal, Soft, No tenderness, No masses, No pulsatile masses. No peritoneal signs.  Skin: Warm, Dry, No erythema, No rash.   Back: No bony tenderness, No CVA tenderness.   Extremities: Intact distal pulses, left lower extremity swelling and faint erythema. 2+ pitting edema.  Musculoskeletal: Good range of motion in all major joints. No tenderness to palpation or major deformities noted.   Neurologic: Alert, Normal motor function and gait, Normal sensory function, No focal deficits noted.   Psychiatric: Affect normal, Judgment normal, Mood normal.       DIAGNOSTIC STUDIES / PROCEDURES    EKG  6/26/2017 at 7:21 PM  Normal sinus rhythm at 77  P, QRS, QTC within normal limits  Normal axis  No see elevations or depressions  No signs of acute ischemia  No arrhythmia    LABS  Labs Reviewed   CBC WITH DIFFERENTIAL - Abnormal; Notable for the following:     RBC 4.18 (*)     Hemoglobin 11.3 (*)     Hematocrit 35.3 (*)     MCHC 32.0 (*)     Platelet Count 108 (*)     Lymphocytes 8.30 (*)     Monocytes 13.80 (*)     Lymphs (Absolute) 0.83 (*)     Monos (Absolute) 1.38 (*)     Eos (Absolute) 0.64 (*)     All other components within normal limits    Narrative:     Indicate which anticoagulants the patient is on:->NONE   COMP METABOLIC PANEL - Abnormal; Notable for the following:     Sodium 130 (*)     Glucose 108 (*)     Calcium 8.1 (*)     Albumin 2.5 (*)     Total Protein 5.3 (*)     All other components within normal limits    Narrative:      Indicate which anticoagulants the patient is on:->NONE   LIPASE - Abnormal; Notable for the following:     Lipase 4 (*)     All other components within normal limits    Narrative:     Indicate which anticoagulants the patient is on:->NONE   PROTHROMBIN TIME - Abnormal; Notable for the following:     PT 17.4 (*)     INR 1.38 (*)     All other components within normal limits    Narrative:     Indicate which anticoagulants the patient is on:->NONE   APTT    Narrative:     Indicate which anticoagulants the patient is on:->NONE   URINALYSIS,CULTURE IF INDICATED   ESTIMATED GFR    Narrative:     Indicate which anticoagulants the patient is on:->NONE       RADIOLOGY  LE VENOUS DUPLEX   Preliminary Result      DX-CHEST-PORTABLE (1 VIEW)   Final Result      Small right upper lobe airspace process that could be pneumonia          COURSE & MEDICAL DECISION MAKING  Pertinent Labs & Imaging studies reviewed. (See chart for details)  72 y.o. male presenting with left lower extremity swelling over the past few days. Markedly swollen compared to the contralateral side. The patient is bedridden due to a history of Guillain-Addis for which she is at [facility] for rehabilitation. The patient also received recent back surgery prior to his transfer to rehabilitation. Only after the back surgery did not improve his lower extremity weakness was the patient diagnosed with Guillain-Addis.      Has multiple risk factors for DVT. Unfortunately, the patient does not appear to be a candidate for anticoagulation due to history of varices.    Contacted interventional radiology regarding the patient's significant clot burden in the left lower extremity and his contraindications for anticoagulation. The patient will be taken to IR tomorrow for directed thrombolysis and possible IVC filter. No evidence of active pulmonary embolism at this time. Patient is without tachycardia or hypoxia or chest pain or shortness of breath. He did present with a  fever however and x-ray showed small right upper lobe airspace process that could be pneumonia. Patient was started on IV antibiotics for this.    To be admitted to the hospitalist service for further management. Will await IR intervention and to monitor his respiratory symptoms.    FINAL IMPRESSION  Left lower leg DVT    Right upper lobe pneumonia  Contraindication to anticoagulation given varices  History of Guillain [place]    Electronically signed by: Yaya Henry, 6/26/2017 6:42 PM

## 2017-06-27 NOTE — ED NOTES
Pt repositioned onto his right side with pillows at this time.  POC updated at this time.  Continue to monitor.

## 2017-06-28 LAB
ANION GAP SERPL CALC-SCNC: 5 MMOL/L (ref 0–11.9)
BACTERIA BLD CULT: ABNORMAL
BACTERIA BLD CULT: ABNORMAL
BASOPHILS # BLD AUTO: 0.4 % (ref 0–1.8)
BASOPHILS # BLD: 0.04 K/UL (ref 0–0.12)
BUN SERPL-MCNC: 16 MG/DL (ref 8–22)
CALCIUM SERPL-MCNC: 7.9 MG/DL (ref 8.5–10.5)
CHLORIDE SERPL-SCNC: 103 MMOL/L (ref 96–112)
CO2 SERPL-SCNC: 26 MMOL/L (ref 20–33)
CREAT SERPL-MCNC: 0.54 MG/DL (ref 0.5–1.4)
EOSINOPHIL # BLD AUTO: 0.73 K/UL (ref 0–0.51)
EOSINOPHIL NFR BLD: 7.6 % (ref 0–6.9)
ERYTHROCYTE [DISTWIDTH] IN BLOOD BY AUTOMATED COUNT: 47.8 FL (ref 35.9–50)
GFR SERPL CREATININE-BSD FRML MDRD: >60 ML/MIN/1.73 M 2
GLUCOSE SERPL-MCNC: 92 MG/DL (ref 65–99)
HCT VFR BLD AUTO: 32.6 % (ref 42–52)
HGB BLD-MCNC: 10.5 G/DL (ref 14–18)
IMM GRANULOCYTES # BLD AUTO: 0.02 K/UL (ref 0–0.11)
IMM GRANULOCYTES NFR BLD AUTO: 0.2 % (ref 0–0.9)
LYMPHOCYTES # BLD AUTO: 0.63 K/UL (ref 1–4.8)
LYMPHOCYTES NFR BLD: 6.5 % (ref 22–41)
MCH RBC QN AUTO: 27.3 PG (ref 27–33)
MCHC RBC AUTO-ENTMCNC: 32.2 G/DL (ref 33.7–35.3)
MCV RBC AUTO: 84.7 FL (ref 81.4–97.8)
MONOCYTES # BLD AUTO: 1.08 K/UL (ref 0–0.85)
MONOCYTES NFR BLD AUTO: 11.2 % (ref 0–13.4)
NEUTROPHILS # BLD AUTO: 7.14 K/UL (ref 1.82–7.42)
NEUTROPHILS NFR BLD: 74.1 % (ref 44–72)
NRBC # BLD AUTO: 0 K/UL
NRBC BLD AUTO-RTO: 0 /100 WBC
PLATELET # BLD AUTO: 112 K/UL (ref 164–446)
PMV BLD AUTO: 10.2 FL (ref 9–12.9)
POTASSIUM SERPL-SCNC: 3.9 MMOL/L (ref 3.6–5.5)
PROCALCITONIN SERPL-MCNC: 0.12 NG/ML
RBC # BLD AUTO: 3.85 M/UL (ref 4.7–6.1)
SIGNIFICANT IND 70042: ABNORMAL
SITE SITE: ABNORMAL
SODIUM SERPL-SCNC: 134 MMOL/L (ref 135–145)
SOURCE SOURCE: ABNORMAL
VANCOMYCIN SERPL-MCNC: 15.6 UG/ML
WBC # BLD AUTO: 9.6 K/UL (ref 4.8–10.8)

## 2017-06-28 PROCEDURE — 80048 BASIC METABOLIC PNL TOTAL CA: CPT

## 2017-06-28 PROCEDURE — 84145 PROCALCITONIN (PCT): CPT

## 2017-06-28 PROCEDURE — 99233 SBSQ HOSP IP/OBS HIGH 50: CPT | Performed by: INTERNAL MEDICINE

## 2017-06-28 PROCEDURE — 770006 HCHG ROOM/CARE - MED/SURG/GYN SEMI*

## 2017-06-28 PROCEDURE — 97162 PT EVAL MOD COMPLEX 30 MIN: CPT

## 2017-06-28 PROCEDURE — 700111 HCHG RX REV CODE 636 W/ 250 OVERRIDE (IP): Performed by: INTERNAL MEDICINE

## 2017-06-28 PROCEDURE — 700105 HCHG RX REV CODE 258

## 2017-06-28 PROCEDURE — 700101 HCHG RX REV CODE 250: Performed by: INTERNAL MEDICINE

## 2017-06-28 PROCEDURE — A9270 NON-COVERED ITEM OR SERVICE: HCPCS | Performed by: INTERNAL MEDICINE

## 2017-06-28 PROCEDURE — G8978 MOBILITY CURRENT STATUS: HCPCS | Mod: CM

## 2017-06-28 PROCEDURE — 700105 HCHG RX REV CODE 258: Performed by: INTERNAL MEDICINE

## 2017-06-28 PROCEDURE — 80202 ASSAY OF VANCOMYCIN: CPT

## 2017-06-28 PROCEDURE — G8979 MOBILITY GOAL STATUS: HCPCS | Mod: CK

## 2017-06-28 PROCEDURE — 87040 BLOOD CULTURE FOR BACTERIA: CPT

## 2017-06-28 PROCEDURE — 700111 HCHG RX REV CODE 636 W/ 250 OVERRIDE (IP)

## 2017-06-28 PROCEDURE — 36415 COLL VENOUS BLD VENIPUNCTURE: CPT

## 2017-06-28 PROCEDURE — 85025 COMPLETE CBC W/AUTO DIFF WBC: CPT

## 2017-06-28 PROCEDURE — 700102 HCHG RX REV CODE 250 W/ 637 OVERRIDE(OP): Performed by: INTERNAL MEDICINE

## 2017-06-28 RX ADMIN — ATORVASTATIN CALCIUM 40 MG: 40 TABLET, FILM COATED ORAL at 21:09

## 2017-06-28 RX ADMIN — DOXYCYCLINE 100 MG: 100 INJECTION, POWDER, LYOPHILIZED, FOR SOLUTION INTRAVENOUS at 22:09

## 2017-06-28 RX ADMIN — LOSARTAN POTASSIUM 12.5 MG: 25 TABLET, FILM COATED ORAL at 07:53

## 2017-06-28 RX ADMIN — VITAMIN D, TAB 1000IU (100/BT) 5000 UNITS: 25 TAB at 07:54

## 2017-06-28 RX ADMIN — URSODIOL 300 MG: 300 CAPSULE ORAL at 21:08

## 2017-06-28 RX ADMIN — FOLIC ACID 1 MG: 1 TABLET ORAL at 07:53

## 2017-06-28 RX ADMIN — OXYCODONE HYDROCHLORIDE 10 MG: 10 TABLET ORAL at 21:08

## 2017-06-28 RX ADMIN — URSODIOL 300 MG: 300 CAPSULE ORAL at 07:53

## 2017-06-28 RX ADMIN — SIMETHICONE CHEW TAB 80 MG 80 MG: 80 TABLET ORAL at 17:11

## 2017-06-28 RX ADMIN — SIMETHICONE CHEW TAB 80 MG 80 MG: 80 TABLET ORAL at 21:09

## 2017-06-28 RX ADMIN — CARVEDILOL 3.12 MG: 3.12 TABLET, FILM COATED ORAL at 07:53

## 2017-06-28 RX ADMIN — SIMETHICONE CHEW TAB 80 MG 80 MG: 80 TABLET ORAL at 07:53

## 2017-06-28 RX ADMIN — URSODIOL 300 MG: 300 CAPSULE ORAL at 15:29

## 2017-06-28 RX ADMIN — SIMETHICONE CHEW TAB 80 MG 80 MG: 80 TABLET ORAL at 13:00

## 2017-06-28 RX ADMIN — CARVEDILOL 3.12 MG: 3.12 TABLET, FILM COATED ORAL at 17:11

## 2017-06-28 RX ADMIN — MULTIPLE VITAMINS W/ MINERALS TAB 1 TABLET: TAB at 07:54

## 2017-06-28 RX ADMIN — DOXYCYCLINE 100 MG: 100 INJECTION, POWDER, LYOPHILIZED, FOR SOLUTION INTRAVENOUS at 08:10

## 2017-06-28 RX ADMIN — OXYCODONE HYDROCHLORIDE 5 MG: 5 TABLET ORAL at 02:34

## 2017-06-28 RX ADMIN — CEFTRIAXONE SODIUM 2 G: 2 INJECTION, POWDER, FOR SOLUTION INTRAMUSCULAR; INTRAVENOUS at 21:05

## 2017-06-28 RX ADMIN — VANCOMYCIN HYDROCHLORIDE 2000 MG: 100 INJECTION, POWDER, LYOPHILIZED, FOR SOLUTION INTRAVENOUS at 15:30

## 2017-06-28 ASSESSMENT — COGNITIVE AND FUNCTIONAL STATUS - GENERAL
WALKING IN HOSPITAL ROOM: TOTAL
SUGGESTED CMS G CODE MODIFIER MOBILITY: CN
STANDING UP FROM CHAIR USING ARMS: TOTAL
MOVING TO AND FROM BED TO CHAIR: UNABLE
MOVING FROM LYING ON BACK TO SITTING ON SIDE OF FLAT BED: UNABLE
CLIMB 3 TO 5 STEPS WITH RAILING: TOTAL
TURNING FROM BACK TO SIDE WHILE IN FLAT BAD: UNABLE
MOBILITY SCORE: 6

## 2017-06-28 ASSESSMENT — ENCOUNTER SYMPTOMS
DOUBLE VISION: 0
SHORTNESS OF BREATH: 0
WEAKNESS: 1
BACK PAIN: 1
NERVOUS/ANXIOUS: 0
MYALGIAS: 1
FLANK PAIN: 0
DIZZINESS: 0
FOCAL WEAKNESS: 0
ABDOMINAL PAIN: 0
NAUSEA: 0
FEVER: 0
PALPITATIONS: 0

## 2017-06-28 ASSESSMENT — PAIN SCALES - GENERAL
PAINLEVEL_OUTOF10: 3
PAINLEVEL_OUTOF10: 0
PAINLEVEL_OUTOF10: 7
PAINLEVEL_OUTOF10: 7
PAINLEVEL_OUTOF10: 2

## 2017-06-28 ASSESSMENT — GAIT ASSESSMENTS: GAIT LEVEL OF ASSIST: UNABLE TO PARTICIPATE

## 2017-06-28 ASSESSMENT — LIFESTYLE VARIABLES: DO YOU DRINK ALCOHOL: NO

## 2017-06-28 NOTE — CARE PLAN
Problem: Safety  Goal: Will remain free from injury  Outcome: PROGRESSING AS EXPECTED  Pt is q2h turn, on low air loss mattress, 4 rails bed up, assisted as prn    Problem: Infection  Goal: Will remain free from infection  Outcome: PROGRESSING AS EXPECTED  MD notified about Pt's lab which is positive blood culture of gram positive cocci. IV antibiotic given per MAR        Problem: Pain Management  Goal: Pain level will decrease to patient’s comfort goal  Outcome: PROGRESSING AS EXPECTED  Pain assessed, prn pain med given per MAR, extra blanket and pillows provided

## 2017-06-28 NOTE — ASSESSMENT & PLAN NOTE
S/p IVCF, IR Dr. Browne 6/27, ordered  No thrombolytics per IR  - not a candidate for AC d/t h/o recurrent GI bleed, per pt's heme/onc, and GI

## 2017-06-28 NOTE — THERAPY
"Physical Therapy Evaluation completed.   Bed Mobility:  Supine to Sit: Maximal Assist  Transfers: Sit to Stand: Refused  Gait: Level Of Assist: Unable to Participate    Plan of Care: Will benefit from Physical Therapy 3 times per week and Plan to complete next treatment by Friday 6/30  Discharge Recommendations: Equipment: Will Continue to Assess for Equipment Needs. Post-acute therapy Discharge to a transitional care facility for continued skilled therapy services.    See \"Rehab Therapy-Acute\" Patient Summary Report for complete documentation.     "

## 2017-06-28 NOTE — PROGRESS NOTES
"Pharmacy Kinetics 72 y.o. male on vancomycin day # 2 2017    Currently on Vancomycin 2900 mg iv x 1 loading dose given  at 2300    Indication for Treatment: positive culture    Pertinent history per medical record: Admitted on 2017 for DVT and pneumonia.  Pneumonia was treated empirically with ceftriaxone/doxycycline.  Blood cultures were drawn and came back positive x 1 for gram positive cocci.  Vancomycin has been started for this.    Other antibiotics: ceftriaxone 2 gm iv q24h, doxycycline 100 mg iv q12h    Allergies: Nkda     List concerns for renal function: age, BMI > 30, BUN/SCr ratio >20:1, low albumin    Pertinent cultures to date:   17 PBC positive x 1 for coag negative staph - likely contaminant    Recent Labs      17   0456   WBC  10.0  9.3  9.6   NEUTSPOLYS  70.70  72.40*  74.10*     Recent Labs      17   0456   BUN  16  14  16   CREATININE  0.64  0.55  0.54   ALBUMIN  2.5*   --    --      Recent Labs      17   1117   VANCORANDOM  15.6     Intake/Output Summary (Last 24 hours) at 17 1355  Last data filed at 17 1257   Gross per 24 hour   Intake    700 ml   Output    800 ml   Net   -100 ml      Blood pressure 108/57, pulse 74, temperature 37.7 °C (99.8 °F), resp. rate 20, height 1.905 m (6' 3\"), weight 117.2 kg (258 lb 6.1 oz), SpO2 97 %. Temp (24hrs), Av.6 °C (99.6 °F), Min:37.2 °C (98.9 °F), Max:38 °C (100.4 °F)      A/P   1. Vancomycin dose change: start 2000 mg IV q24h  2. Next vancomycin level: ~3 days  3. Goal trough: 12-16 mcg/mL  4. Comments: Vancomycin started last night for gram positive cocci in PBC, shown now to be likely contaminant (coag neg staph). Repeat blood cx ordered today on rounds, continue vancomycin for now. Will CTM for de-escalation.    Noa Cintron, RocíoD         "

## 2017-06-28 NOTE — PROGRESS NOTES
[facility] Hospitalist Progress Note    Date of Service: 2017    Chief Complaint  72 y.o. male admitted 2017 with h/o colon cancer and cirrhosis with h/o recurrent esophageal bleed presents with LLE DVT    Interval Problem Update  Improved LE edema, min thigh discomfort  Neg sob    Consultants/Specialty  MARLON/Hollie    Disposition  To snf/, sw assisting        Review of Systems   Constitutional: Negative for fever and malaise/fatigue.   HENT: Negative for congestion and hearing loss.    Eyes: Negative for double vision.   Respiratory: Negative for shortness of breath.    Cardiovascular: Negative for palpitations and leg swelling.   Gastrointestinal: Negative for nausea and abdominal pain.   Genitourinary: Negative for dysuria and flank pain.   Musculoskeletal: Positive for myalgias and back pain.   Neurological: Positive for weakness. Negative for dizziness and focal weakness.   Psychiatric/Behavioral: The patient is not nervous/anxious.       Physical Exam  Laboratory/Imaging   Hemodynamics  Temp (24hrs), Av.6 °C (99.6 °F), Min:37.2 °C (98.9 °F), Max:38 °C (100.4 °F)   Temperature: 37.7 °C (99.8 °F)  Pulse  Av.7  Min: 73  Max: 88    Blood Pressure : 108/57 mmHg      Respiratory      Respiration: 20, Pulse Oximetry: 97 %        RUL Breath Sounds: Clear, RML Breath Sounds: Clear, RLL Breath Sounds: Clear, JOSE Breath Sounds: Clear, LLL Breath Sounds: Clear    Fluids    Intake/Output Summary (Last 24 hours) at 17 1437  Last data filed at 17 1257   Gross per 24 hour   Intake    700 ml   Output    800 ml   Net   -100 ml       Nutrition  Orders Placed This Encounter   Procedures   • DIET ORDER     Standing Status: Standing      Number of Occurrences: 1      Standing Expiration Date:      Order Specific Question:  Diet:     Answer:  Cardiac [6]     Order Specific Question:  Diet:     Answer:  2 Gram Sodium [7]     Physical Exam   Constitutional: He is oriented to person, place, and time. He  appears well-nourished. No distress.   HENT:   Head: Normocephalic and atraumatic.   Nose: Nose normal.   Eyes: EOM are normal. Pupils are equal, round, and reactive to light.   Neck: Neck supple. No JVD present. No thyromegaly present.   Cardiovascular: Normal rate and intact distal pulses.    No murmur heard.  Pulmonary/Chest: Effort normal and breath sounds normal. No respiratory distress. He has no wheezes.   Abdominal: Soft. Bowel sounds are normal. He exhibits no distension. There is no tenderness.   Musculoskeletal: He exhibits edema and tenderness.   Neurological: He is alert and oriented to person, place, and time. No cranial nerve deficit. Coordination normal.   Skin: Skin is warm and dry.   pallor   Psychiatric: He has a normal mood and affect. His behavior is normal. Judgment and thought content normal.   Nursing note and vitals reviewed.      Recent Labs      06/26/17 1855 06/27/17 0229 06/28/17 0456   WBC  10.0  9.3  9.6   RBC  4.18*  3.84*  3.85*   HEMOGLOBIN  11.3*  10.5*  10.5*   HEMATOCRIT  35.3*  32.5*  32.6*   MCV  84.4  84.6  84.7   MCH  27.0  27.3  27.3   MCHC  32.0*  32.3*  32.2*   RDW  48.0  48.1  47.8   PLATELETCT  108*  94*  112*   MPV  10.5  9.9  10.2     Recent Labs      06/26/17 1855 06/27/17 0229 06/28/17 0456   SODIUM  130*  131*  134*   POTASSIUM  4.6  4.1  3.9   CHLORIDE  98  102  103   CO2  26  24  26   GLUCOSE  108*  103*  92   BUN  16  14  16   CREATININE  0.64  0.55  0.54   CALCIUM  8.1*  7.7*  7.9*     Recent Labs      06/26/17 1855   APTT  31.9   INR  1.38*                  Assessment/Plan     * Left leg DVT (CMS-HCC)  Assessment & Plan  S/p IVCF, IR Dr. Browne 6/27, ordered  No thrombolytics per IR  - not a candidate for AC d/t h/o recurrent GI bleed, per pt's heme/onc, and GI    BMI 34.0-34.9,adult (present on admission)  Assessment & Plan  Advise diet and exercise  Pt/ot  From snf- [facility], to return SW assisting      Esophageal varices- BANDED 4/2016-  repeat egd tbd 10//29/16- gic (present on admission)  Assessment & Plan  H/o, not candidate for AC d/t recurrent bleed  Seen by GI Dr. 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

## 2017-06-28 NOTE — PROGRESS NOTES
"Pharmacy Kinetics 72 y.o. male on vancomycin day # 1 2017    Currently on Vancomycin 2900 mg iv loading dose - not dosed out yet    Indication for Treatment: positive blood culture for gram positive cocci    Pertinent history per medical record: Admitted on 2017 for DVT and pneumonia.  Pneumonia was treated empirically with ceftriaxone/doxycycline.  Blood cultures were drawn and came back positive for gram positive cocci.  Vancomycin has been started for this    Other antibiotics: ceftriaxone 2 gm iv q24h, doxycycline 100 mg iv q12h    Allergies: Nkda     List concerns for renal function : BMI > 30    Pertinent cultures to date:        Recent Labs      175  17   0229   WBC  10.0  9.3   NEUTSPOLYS  70.70  72.40*     Recent Labs      17   BUN  16  14   CREATININE  0.64  0.55   ALBUMIN  2.5*   --      No results for input(s): VANCOTROUGH, VANCOPEAK, VANCORANDOM in the last 72 hours.No intake or output data in the 24 hours ending 17 2144   Blood pressure 124/64, pulse 77, temperature 37.4 °C (99.3 °F), resp. rate 18, height 1.905 m (6' 3\"), weight 117.2 kg (258 lb 6.1 oz), SpO2 94 %. Temp (24hrs), Av.5 °C (99.5 °F), Min:36.9 °C (98.4 °F), Max:38 °C (100.4 °F)      A/P   1. Vancomycin dose change: new start  2. Next vancomycin level: will draw a vancomycin 12 hour random level  3. Goal trough: 12-16  4. Comments: will not dose out yet due to BMI > 30, further vancomycin orders to follow when random level is obtained         "

## 2017-06-28 NOTE — PROGRESS NOTES
Corbin from Lab called with critical result of positive blood culture at 2050. Critical lab result read back to Corbin.   Dr. Coronado notified of critical lab result at 2057.  Critical lab result read back by Dr. Clemens.

## 2017-06-28 NOTE — PROGRESS NOTES
Assumed care of pt at shift change, discussed POC. Pt A&Ox4. Denies pain, numbness or tingling. IV in place. Pt pleasant and cooperative. Bed alarm on, 1-2 assist, treaded socks on, call light within reach, bed in low position.

## 2017-06-28 NOTE — DISCHARGE PLANNING
Transitional Care Navigator:    Received TC from pt's wife, Lorie, regarding d/c plan. Per wife, she is agreeable for pt to return to [facility] and requested pt returns to same private room at [facility].  Choice form completed and faxed to [facility].

## 2017-06-28 NOTE — PROGRESS NOTES
[facility] Hospitalist Progress Note    Date of Service: 2017    Chief Complaint  72 y.o. male admitted 2017 with h/o colon cancer and cirrhosis with h/o recurrent esophageal bleed presents with LLE DVT    Interval Problem Update  S/p IVCF placement, min leg discomfort  Denies sob    Consultants/Specialty  MARLON/Hollie    Disposition  To snf/, sw assisting        Review of Systems   Constitutional: Negative for fever, chills, malaise/fatigue and diaphoresis.   HENT: Negative for congestion and hearing loss.    Eyes: Negative for blurred vision and double vision.   Respiratory: Negative for cough and shortness of breath.    Cardiovascular: Negative for chest pain, palpitations and leg swelling.   Gastrointestinal: Negative for nausea, vomiting and abdominal pain.   Genitourinary: Negative for dysuria and flank pain.   Musculoskeletal: Positive for myalgias and back pain. Negative for joint pain.   Neurological: Positive for weakness. Negative for dizziness, sensory change, speech change, focal weakness and headaches.   Psychiatric/Behavioral: Negative for depression and memory loss. The patient is not nervous/anxious.       Physical Exam  Laboratory/Imaging   Hemodynamics  Temp (24hrs), Av.5 °C (99.5 °F), Min:36.9 °C (98.4 °F), Max:38 °C (100.4 °F)   Temperature: 38 °C (100.4 °F)  Pulse  Av.3  Min: 73  Max: 88 Heart Rate (Monitored): 82  Blood Pressure : 118/64 mmHg, NIBP: 107/56 mmHg      Respiratory      Respiration: 18, Pulse Oximetry: 95 %, O2 Daily Delivery Respiratory : Room Air with O2 Available     Work Of Breathing / Effort: Intercostal Retraction;Mild  RUL Breath Sounds: Clear, RML Breath Sounds: Clear, RLL Breath Sounds: Diminished, JOSE Breath Sounds: Clear, LLL Breath Sounds: Diminished    Fluids  No intake or output data in the 24 hours ending 17 1846    Nutrition  Orders Placed This Encounter   Procedures   • DIET ORDER     Standing Status: Standing      Number of Occurrences: 1       Standing Expiration Date:      Order Specific Question:  Diet:     Answer:  Cardiac [6]     Order Specific Question:  Diet:     Answer:  2 Gram Sodium [7]     Physical Exam   Constitutional: He is oriented to person, place, and time. He appears well-nourished. No distress.   HENT:   Head: Normocephalic and atraumatic.   Nose: Nose normal.   Eyes: EOM are normal. Pupils are equal, round, and reactive to light.   Neck: Neck supple. No JVD present. No thyromegaly present.   Cardiovascular: Normal rate and intact distal pulses.    No murmur heard.  Pulmonary/Chest: Breath sounds normal. No respiratory distress. He has no wheezes.   Abdominal: Soft. Bowel sounds are normal. He exhibits no distension. There is no tenderness.   Musculoskeletal: He exhibits edema and tenderness.   Neurological: He is alert and oriented to person, place, and time. No cranial nerve deficit. Coordination normal.   Skin: Skin is warm and dry. No rash noted. He is not diaphoretic. No erythema.   Psychiatric: He has a normal mood and affect. His behavior is normal. Judgment and thought content normal.   Nursing note and vitals reviewed.      Recent Labs      06/26/17 1855 06/27/17 0229   WBC  10.0  9.3   RBC  4.18*  3.84*   HEMOGLOBIN  11.3*  10.5*   HEMATOCRIT  35.3*  32.5*   MCV  84.4  84.6   MCH  27.0  27.3   MCHC  32.0*  32.3*   RDW  48.0  48.1   PLATELETCT  108*  94*   MPV  10.5  9.9     Recent Labs      06/26/17 1855 06/27/17 0229   SODIUM  130*  131*   POTASSIUM  4.6  4.1   CHLORIDE  98  102   CO2  26  24   GLUCOSE  108*  103*   BUN  16  14   CREATININE  0.64  0.55   CALCIUM  8.1*  7.7*     Recent Labs      06/26/17 1855   APTT  31.9   INR  1.38*                  Assessment/Plan     * Left leg DVT (CMS-HCC)  Assessment & Plan  S/p IVCF, IR Dr. Browne 6/27, ordered  No thrombolytics per IR    BMI 34.0-34.9,adult (present on admission)  Assessment & Plan  Advise diet and exercise  Pt/ot  From [facility] care, to return SW  assisting      Esophageal varices- BANDED 4/2016- repeat egd tbd 10//29/16- gic (present on admission)  Assessment & Plan  H/o, not candidate for AC d/t recurrent bleed  Seen by GI Dr. 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

## 2017-06-28 NOTE — DISCHARGE PLANNING
Care Transition Team Assessment    IHD met with patient and spouse bedside. Patient was sleeping so his wife answered the screening questions. She stated up until about 6 weeks ago, patient was totally independent and full time employed so a lot of these issues have come on recently. He admitted from [facility] and the patient is comfortable returning there. They are not sure of the discharge plan at this time.     Information Source  Orientation : Oriented x 4  Information Given By: Spouse  Informant's Name: Lorelei  Who is responsible for making decisions for patient? : Patient         Elopement Risk  Legal Hold: No  Ambulatory or Self Mobile in Wheelchair: Yes  Disoriented: No  Psychiatric Symptoms: None  History of Wandering: No  Elopement this Admit: No  Vocalizing Wanting to Leave: No  Displays Behaviors, Body Language Wanting to Leave: No-Not at Risk for Elopement  Elopement Risk: Not at Risk for Elopement    Interdisciplinary Discharge Planning  Does Admitting Nurse Feel This Could be a Complex Discharge?: No  Primary Care Physician: Dr. Fabián Urena  Lives with - Patient's Self Care Capacity: Attendant / Paid Care Giver  Support Systems: Spouse / Significant Other, Home Care Staff, Family Member(s)  Housing / Facility: Skilled Nursing Facility  [facility] of Care Facility: [facility]  Do You Take your Prescribed Medications Regularly: Yes  Able to Return to Previous ADL's: Future Time w/Therapy  Mobility Issues: Yes  Prior Services: Continuous (24 Hour) Care Giving Per Service  Patient Expects to be Discharged to:: SNF  Assistance Needed: No  Durable Medical Equipment:  (uses at [facility])    Discharge Preparedness  What is your plan after discharge?: Uncertain - pending medical team collaboration, Skilled nursing facility  What are your discharge supports?: Spouse  Prior Functional Level: Ambulatory, Independent with Activities of Daily Living, Independent with Medication Management    Functional  Assesment  Prior Functional Level: Ambulatory, Independent with Activities of Daily Living, Independent with Medication Management    Finances  Financial Barriers to Discharge: No  Prescription Coverage: Yes ([employer] on [address])    Vision / Hearing Impairment  Vision Impairment : Yes  Right Eye Vision: Wears Glasses  Left Eye Vision: Wears Glasses  Hearing Impairment : No    Values / Beliefs / Concerns  Values / Beliefs Concerns : No         Domestic Abuse  Have you ever been the victim of abuse or violence?: No    Psychological Assessment  History of Substance Abuse: None  History of Psychiatric Problems: No  Non-compliant with Treatment: No  Newly Diagnosed Illness: No    Discharge Risks or Barriers  Discharge risks or barriers?: No  Patient risk factors: Readmission    Anticipated Discharge Information  Anticipated discharge disposition: Discharge needs currently unknown  Discharge Address: [facility] [address] [address]  Discharge Contact Phone Number: [phone]

## 2017-06-29 VITALS
HEIGHT: 75 IN | TEMPERATURE: 98.2 F | SYSTOLIC BLOOD PRESSURE: 102 MMHG | BODY MASS INDEX: 33.03 KG/M2 | DIASTOLIC BLOOD PRESSURE: 54 MMHG | HEART RATE: 84 BPM | WEIGHT: 265.65 LBS | RESPIRATION RATE: 18 BRPM | OXYGEN SATURATION: 96 %

## 2017-06-29 PROCEDURE — 700105 HCHG RX REV CODE 258: Performed by: INTERNAL MEDICINE

## 2017-06-29 PROCEDURE — 99239 HOSP IP/OBS DSCHRG MGMT >30: CPT | Performed by: INTERNAL MEDICINE

## 2017-06-29 PROCEDURE — A9270 NON-COVERED ITEM OR SERVICE: HCPCS | Performed by: INTERNAL MEDICINE

## 2017-06-29 PROCEDURE — 700101 HCHG RX REV CODE 250: Performed by: INTERNAL MEDICINE

## 2017-06-29 PROCEDURE — G8988 SELF CARE GOAL STATUS: HCPCS | Mod: CJ

## 2017-06-29 PROCEDURE — G8987 SELF CARE CURRENT STATUS: HCPCS | Mod: CL

## 2017-06-29 PROCEDURE — 97110 THERAPEUTIC EXERCISES: CPT

## 2017-06-29 PROCEDURE — 700102 HCHG RX REV CODE 250 W/ 637 OVERRIDE(OP): Performed by: INTERNAL MEDICINE

## 2017-06-29 PROCEDURE — 97166 OT EVAL MOD COMPLEX 45 MIN: CPT

## 2017-06-29 RX ORDER — DOXYCYCLINE 100 MG/1
100 TABLET ORAL EVERY 12 HOURS
Status: DISCONTINUED | OUTPATIENT
Start: 2017-06-29 | End: 2017-06-29 | Stop reason: HOSPADM

## 2017-06-29 RX ORDER — AMOXICILLIN 250 MG
2 CAPSULE ORAL 2 TIMES DAILY
Qty: 30 TAB | Refills: 0 | Status: ON HOLD
Start: 2017-06-29 | End: 2017-09-07

## 2017-06-29 RX ADMIN — VITAMIN D, TAB 1000IU (100/BT) 5000 UNITS: 25 TAB at 08:44

## 2017-06-29 RX ADMIN — STANDARDIZED SENNA CONCENTRATE AND DOCUSATE SODIUM 2 TABLET: 8.6; 5 TABLET, FILM COATED ORAL at 08:43

## 2017-06-29 RX ADMIN — URSODIOL 300 MG: 300 CAPSULE ORAL at 08:42

## 2017-06-29 RX ADMIN — MULTIPLE VITAMINS W/ MINERALS TAB 1 TABLET: TAB at 08:46

## 2017-06-29 RX ADMIN — LOSARTAN POTASSIUM 12.5 MG: 25 TABLET, FILM COATED ORAL at 08:42

## 2017-06-29 RX ADMIN — OXYCODONE HYDROCHLORIDE 10 MG: 10 TABLET ORAL at 01:52

## 2017-06-29 RX ADMIN — DOXYCYCLINE 100 MG: 100 INJECTION, POWDER, LYOPHILIZED, FOR SOLUTION INTRAVENOUS at 08:46

## 2017-06-29 RX ADMIN — CARVEDILOL 3.12 MG: 3.12 TABLET, FILM COATED ORAL at 08:45

## 2017-06-29 RX ADMIN — SIMETHICONE CHEW TAB 80 MG 80 MG: 80 TABLET ORAL at 13:24

## 2017-06-29 RX ADMIN — SIMETHICONE CHEW TAB 80 MG 80 MG: 80 TABLET ORAL at 08:45

## 2017-06-29 RX ADMIN — URSODIOL 300 MG: 300 CAPSULE ORAL at 14:50

## 2017-06-29 RX ADMIN — FOLIC ACID 1 MG: 1 TABLET ORAL at 08:45

## 2017-06-29 ASSESSMENT — COGNITIVE AND FUNCTIONAL STATUS - GENERAL
MOVING FROM LYING ON BACK TO SITTING ON SIDE OF FLAT BED: UNABLE
TURNING FROM BACK TO SIDE WHILE IN FLAT BAD: UNABLE
DRESSING REGULAR LOWER BODY CLOTHING: TOTAL
DRESSING REGULAR UPPER BODY CLOTHING: A LOT
WALKING IN HOSPITAL ROOM: TOTAL
MOVING TO AND FROM BED TO CHAIR: UNABLE
PERSONAL GROOMING: A LOT
MOBILITY SCORE: 6
STANDING UP FROM CHAIR USING ARMS: TOTAL
TOILETING: A LOT
SUGGESTED CMS G CODE MODIFIER DAILY ACTIVITY: CL
DAILY ACTIVITIY SCORE: 12
EATING MEALS: A LITTLE
SUGGESTED CMS G CODE MODIFIER MOBILITY: CN
CLIMB 3 TO 5 STEPS WITH RAILING: TOTAL
HELP NEEDED FOR BATHING: A LOT

## 2017-06-29 ASSESSMENT — PAIN SCALES - GENERAL
PAINLEVEL_OUTOF10: 4
PAINLEVEL_OUTOF10: 7

## 2017-06-29 ASSESSMENT — GAIT ASSESSMENTS: GAIT LEVEL OF ASSIST: UNABLE TO PARTICIPATE

## 2017-06-29 ASSESSMENT — LIFESTYLE VARIABLES: DO YOU DRINK ALCOHOL: NO

## 2017-06-29 ASSESSMENT — ACTIVITIES OF DAILY LIVING (ADL): TOILETING: REQUIRES ASSIST

## 2017-06-29 NOTE — PROGRESS NOTES
Pt A&O x4, c/o pain to lower back only wants pain medication at night, given w/ decent relief. CMS positive to abilio LE, movement limited, swelling present.     Q 2 turns, pt's sacral wound documented for weekly pictures, slight drainage present, mepilex applied. 1 med-large incontinent BM, SS held. Skin precautions/interventions in place: on low air loss mattress, heel float boots elevated up w/ two pillows, dietary supplements part of pt's POC.    Attempted to ween pt off of oxygen, as pt does not use 02 at home. When awake pt can be on RA, talking w/ staff saturation drops to around 90%, however, sleeping pt will desaturate to 85% on RA, but returns to 94% on 2L via NC. Could be a good candidate for home/night 02 test.    Call light w/in reach, pt calls appropriately, treaded socks on, appropriate signs at doorway, hourly rounding continued.

## 2017-06-29 NOTE — THERAPY
"Physical Therapy Treatment completed.   Bed Mobility:  Supine to Sit: Maximal Assist (x2)  Transfers: Sit to Stand: Unable to Participate (partial stand x 3 with max x 2; (able to clear hips 2/3x )  Gait: Level Of Assist: Unable to Participate with Front-Wheel Walker       Plan of Care: Will benefit from Physical Therapy 3 times per week. May benefit from 4x/week if not demonstrating excessive fatigue with prior treatments. Consider increasing frequency if extended stay.   Discharge Recommendations: Equipment: Will Continue to Assess for Equipment Needs. Post-acute therapy Discharge to a transitional care facility for continued skilled therapy services.     See \"Rehab Therapy-Acute\" Patient Summary Report for complete documentation.     Pt was able to participate in partial sit<>stand transfers today but required max assist x2 for all bed mobility. He verbalized understanding of all education during treatment today. 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 He is motivated to participate in therapy to continue with prior gains while at [facility].  "

## 2017-06-29 NOTE — DIETARY
"Nutrition note:  Pt with wt loss PTA and pressure ulcer. Per wound RN, pt has SDTI to bilat buttocks/sacrum.  BMI 32.3.  Labs and meds reviewed.  Pt is on a daily MVi with minerals. Pt is on a cardiac, 2 gm Na+ diet. Pt states that he used to weigh 251# about one month ago. Admit wt was 240#. This represents a ~4% loss. Pt states that his appetite decreased \"with everything I have going on\".  Trying to eat at least 50% of meals, but not there yet.  Pt requested Boost supplement; was drinking Ensure at [facility]. Obtained MD order for supplements and added Boost Plus to all meals. Also set up a protein shake at bedtime. RD following.   "

## 2017-06-29 NOTE — DISCHARGE SUMMARY
"CHIEF COMPLAINT ON ADMISSION  Chief Complaint   Patient presents with   • Leg Swelling     Left leg       CODE STATUS  Full Code    HPI & HOSPITAL COURSE  This is a 72 y.o. year old male with h/o colon cancer and cirrhosis and h/o recurrent esophageal bleed presents with LLE DVT.  Pt not candidate for chemical anticoagulation d/t high risk of bleed.  Pt with recent lumbar laminectomy s/p lumbar stenosis decompression and Gullian [place] with debility undergoing rehab at [facility].  IVCF has been placed by IR Dr. Browne, as per . Hollie is not recommending thromobysis at this time.  Pt tolerated procedure well.  To be transferred back to [facility] for further pt/ot.    Therefore, he is discharged in fair and stable condition for further post-acute management.     SPECIFIC OUTPATIENT FOLLOW-UP  pcp in 2 days      DISCHARGE PROBLEM LIST  Principal Problem:    Left leg DVT (CMS-HCC) POA: Unknown  Active Problems:    BMI 34.0-34.9,adult POA: Yes      Overview: [facility]-CONNIE. \"Need to include - controlled with current medications or give       update on current work up/treatment\"            Last bmi 10/2014=35    Esophageal varices- BANDED 4/2016- repeat egd tbd 10//29/16- gic POA: Yes    GIB (gastrointestinal bleeding)- recurrent from varices POA: Yes    Portal vein thrombosis- on CT [facility] 2/2015 POA: Yes    Cirrhosis (CMS-HCC) POA: Yes    Generalized weakness POA: Yes    H/O colon cancer, stage IV POA: Yes    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) POA: Yes  Resolved Problems:    * No resolved hospital problems. *      FOLLOW UP  Future Appointments  Date Time Provider Department Center   7/14/2017 9:40 AM Fabián Urena M.D. [facility]     No follow-up provider specified.    MEDICATIONS ON DISCHARGE   Torey Lima   Home Medication Instructions EMMA:[id number]    Printed on:06/28/17 7181   Medication Information                      atorvastatin (LIPITOR) 40 MG Tab  Take 1 Tab by mouth every " day. TAKE 1 TABLET BY MOUTH DAILY             carvedilol (COREG) 3.125 MG Tab  Take 3.125 mg by mouth 2 times a day, with meals.             Cholecalciferol (VITAMIN D3) 5000 UNITS Cap  Take 1 Cap by mouth every day.             folic acid (FOLVITE) 1 MG Tab  Take 1 Tab by mouth every day.             losartan (COZAAR) 25 MG Tab  Take 12.5 mg by mouth every day.             Multiple Vitamins-Minerals (CENTRUM SILVER ADULT 50+) Tab  Take 1 Tab by mouth every day.             oxycodone immediate release (ROXICODONE) 10 MG immediate release tablet  Take 10 mg by mouth 3 times a day. Scheduled  0100  0500  \2100             oxycodone immediate release (ROXICODONE) 10 MG immediate release tablet  Take 10 mg by mouth every four hours as needed for Severe Pain.             Probiotic Product (PROBIOTIC DAILY PO)  Take 1 Capsule by mouth 2 Times a Day.             simethicone (MYLICON) 80 MG Chew Tab  Take 80 mg by mouth 4 times a day.             ursodiol (ACTIGALL) 300 MG Cap  Take 300 mg by mouth 3 times a day.             vitamin D (CHOLECALCIFEROL) 1000 UNIT Tab  Take 5,000 Units by mouth every day.                 DIET  Orders Placed This Encounter   Procedures   • DIET ORDER     Standing Status: Standing      Number of Occurrences: 1      Standing Expiration Date:      Order Specific Question:  Diet:     Answer:  Cardiac [6]     Order Specific Question:  Diet:     Answer:  2 Gram Sodium [7]       ACTIVITY  As tolerated and directed by skilled nursing.  Class 3 - comfortable at rest but have symptoms with less-than-ordinary effort.    LINES, DRAINS, AND WOUNDS  This is an automated list. Peripheral IVs will be removed prior to discharge.  PIV Group Right Forearm 20g Saline Lock (Active)   Line Secured Transparent 6/28/2017  3:00 PM   Site Condition / Description Assessed;Patent;Clean;Intact;Dry 6/28/2017  3:00 PM   Dressing Type / Description Clean;Dry;Intact;Transparent 6/28/2017  3:00 PM   Dressing Status Initial  Dressing 6/28/2017  3:00 PM   Saline Locked Yes 6/28/2017  3:00 PM   Infiltration Grading [facility] by [facility] 0 6/28/2017  3:00 PM   Phlebitis Scale (Used by [facility]) 0 6/28/2017  3:00 PM   Date Primary Tubing Changed 06/27/17 6/28/2017  3:00 PM   Date Secondary Tubing Changed 06/28/17 6/28/2017  3:00 PM   NEXT Primary Tubing Change  07/01/17 6/28/2017  3:00 PM   NEXT Secondary Tubing Change  06/29/17 6/28/2017  3:00 PM       Surgical Incision  Incision Back (Active)       Pressure Ulcer  Deep Tissue Injury;Present on Admission POA Buttock;Sacrum Right;Left Medial (Active)   Wound Bed Purple;Red 6/28/2017 10:00 AM   Drainage  Scant;Serosanguinous 6/28/2017 10:00 AM   Periwound Skin Red;Excoriated 6/28/2017 10:00 AM   Dressing Options Mepilex 6/28/2017 10:00 AM   Dressing Status / Change Clean;Dry;Intact;Observed 6/28/2017 10:00 AM   Dressing Cleansing/Solutions Normal Saline 6/28/2017 10:00 AM   Periwound Protectant Skin Protectant wipes to Periwound 6/28/2017 10:00 AM   Length (cm) 12 6/27/2017  9:13 AM   Width (cm) 10 6/27/2017  9:13 AM   Weekly Photo (Inpatient Only) 06/26/17 6/27/2017  9:13 AM   Dressing Change Frequency Every 72 hrs 6/28/2017 10:00 AM   Next Dressing Change  06/30/17 6/28/2017 10:00 AM   Time Spent with Patient (mins) 60 6/27/2017  9:13 AM       Wound Rash Buttock  Medial (Active)       Wound Skin Tear Buttock Right (Active)                  MENTAL STATUS ON TRANSFER  Level of Consciousness: Alert  Orientation : Oriented x 4  Speech: Speech Clear    CONSULTATIONS  IR/ Dr. Browne    PROCEDURES  IVCF placement    LABORATORY  Lab Results   Component Value Date/Time    SODIUM 134* 06/28/2017 04:56 AM    POTASSIUM 3.9 06/28/2017 04:56 AM    CHLORIDE 103 06/28/2017 04:56 AM    CO2 26 06/28/2017 04:56 AM    GLUCOSE 92 06/28/2017 04:56 AM    BUN 16 06/28/2017 04:56 AM    CREATININE 0.54 06/28/2017 04:56 AM    GLOM FILT RATE, EST >59 01/23/2010 12:00 AM        Lab Results   Component Value Date/Time     WBC 9.6 06/28/2017 04:56 AM    HEMOGLOBIN 10.5* 06/28/2017 04:56 AM    HEMATOCRIT 32.6* 06/28/2017 04:56 AM    PLATELET COUNT 112* 06/28/2017 04:56 AM        Total time of the discharge process exceeds 45 minutes.    Addendum- decreasing LLE edema and pain.  Encourage activity.  Ok for transfer to [facility]

## 2017-06-29 NOTE — PROGRESS NOTES
Patient discharged to [facility] via [facility].  All belongings transported with wife.   COBRA packet sent with [facility].

## 2017-06-29 NOTE — THERAPY
"Occupational Therapy Evaluation completed.   Functional Status: Pt is max x 2 supine to sit and sit to supine, max x 2 partial stand, max a to don socks, min a seated grooming at EOB. Pt limited by B weakness.    Plan of Care: Will benefit from Occupational Therapy 5 times per week  Discharge Recommendations:  Equipment: Will Continue to Assess for Equipment Needs. Post-acute therapy Discharge to a transitional care facility for continued skilled therapy services.    See \"Rehab Therapy-Acute\" Patient Summary Report for complete documentation.  Pt admitted from SNF where he was receiving rehab.  Pt continues to need daily therapy to increase independence in ADLs due to Guillain-[place].  Pt will need to be mostly independent to return home due to working full time and spouse with limited ability to lift patient.   "

## 2017-06-29 NOTE — DISCHARGE PLANNING
Received [facility] form from Sam(LYNDSEY). Arranged patient's transport to [facility] at 1500 via [facility]. Sam(LYNDSEY) notified of transport time via voicemail. Aimee([facility]) notified via phone.    [facility] Auth# [id number]

## 2017-06-29 NOTE — PROGRESS NOTES
Pt refusing bed alarm. Educated on fall risk in hospital, pt still refuses. Fall precautions in place, call light within reach, hourly rounding in place.

## 2017-06-29 NOTE — CARE PLAN
Problem: Nutritional:  Goal: Achieve adequate nutritional intake  Patient will consume at least 50% of meals/snacks/supplements.   Outcome: PROGRESSING AS EXPECTED

## 2017-06-29 NOTE — PROGRESS NOTES
"Pharmacy Kinetics 72 y.o. male on vancomycin day # 3  2017    Currently on Vancomycin 2000 mg iv q24hr (1530)    Indication for Treatment: bacteremia    Pertinent history per medical record: Admitted on 2017 for DVT and pneumonia.  Pneumonia was treated empirically with ceftriaxone/doxycycline.  Blood cultures were drawn and came back positive x 1 for gram positive cocci.  Vancomycin initiated for this.    Other antibiotics: ceftraixone 2 g IV q24h, doxycycline 100 mg IV q12h    Allergies: Nkda     List concerns for renal function: low albumin, advanced age    Pertinent cultures to date:   17: blood (peripheral) POS - coagulase negative staphylococcus - possible contaminant in 1/2  17: blood (peripheral) NGTD x2    Recent Labs      17   0456   WBC  10.0  9.3  9.6   NEUTSPOLYS  70.70  72.40*  74.10*     Recent Labs      17   0456   BUN  16  14  16   CREATININE  0.64  0.55  0.54   ALBUMIN  2.5*   --    --      Recent Labs      17   1117   VANCORANDOM  15.6     Intake/Output Summary (Last 24 hours) at 17 0924  Last data filed at 17 0700   Gross per 24 hour   Intake    420 ml   Output    400 ml   Net     20 ml      Blood pressure 112/66, pulse 75, temperature 36.8 °C (98.2 °F), resp. rate 18, height 1.905 m (6' 3\"), weight 117.2 kg (258 lb 6.1 oz), SpO2 96 %. Temp (24hrs), Av.1 °C (98.8 °F), Min:36.8 °C (98.2 °F), Max:37.4 °C (99.4 °F)      A/P   1. Vancomycin dose change: not indicated  2. Next vancomycin level:  @ 1500 (not ordered)  3. Goal trough: 16-20 mcg/ml  4. Comments: minimal concern for renal function.  Will continue current dosing and plan for a level tomorrow prior to 4th total dose if therapy still ongoing.  Will discuss cultures on rounds in the morning to determine if vanco can be discontinued.      Celia Lemus, PharmD    ADDENDUM: therapy discontinued by MD.      "

## 2017-06-29 NOTE — PROGRESS NOTES
Pt a/o x4. Complains of lower back pain. Ordered Q2 turns for sacral wound, mepilex was changed today. Heel float boots on. Call light within reach and calls if he needs assistance.

## 2017-06-29 NOTE — DISCHARGE PLANNING
Medical Social Work  Patient is m/c  Faxed transport communication and [employer] form to [facility] for a tentative transport time of 3:00 to [facility]

## 2017-07-01 LAB
BACTERIA BLD CULT: NORMAL
SIGNIFICANT IND 70042: NORMAL
SITE SITE: NORMAL
SOURCE SOURCE: NORMAL

## 2017-07-03 LAB
BACTERIA BLD CULT: NORMAL
BACTERIA BLD CULT: NORMAL
SIGNIFICANT IND 70042: NORMAL
SIGNIFICANT IND 70042: NORMAL
SITE SITE: NORMAL
SITE SITE: NORMAL
SOURCE SOURCE: NORMAL
SOURCE SOURCE: NORMAL

## 2017-07-13 ENCOUNTER — HOSPITAL ENCOUNTER (OUTPATIENT)
Dept: RADIOLOGY | Facility: MEDICAL CENTER | Age: 72
End: 2017-07-13
Attending: INTERNAL MEDICINE
Payer: MEDICARE

## 2017-07-13 DIAGNOSIS — M25.551 RIGHT HIP PAIN: ICD-10-CM

## 2017-07-13 PROCEDURE — 73700 CT LOWER EXTREMITY W/O DYE: CPT | Mod: RT

## 2017-07-31 ENCOUNTER — HOSPITAL ENCOUNTER (OUTPATIENT)
Dept: RADIOLOGY | Facility: MEDICAL CENTER | Age: 72
End: 2017-07-31
Attending: INTERNAL MEDICINE
Payer: MEDICARE

## 2017-07-31 VITALS — HEART RATE: 72 BPM | SYSTOLIC BLOOD PRESSURE: 115 MMHG | OXYGEN SATURATION: 95 % | DIASTOLIC BLOOD PRESSURE: 67 MMHG

## 2017-07-31 DIAGNOSIS — R18.8 OTHER ASCITES: ICD-10-CM

## 2017-07-31 PROCEDURE — 49083 ABD PARACENTESIS W/IMAGING: CPT

## 2017-07-31 NOTE — PROGRESS NOTES
Brought patient back and scanned abdomen.  Found a pocket of fluid and called Dr Price.  She came and performed the paracentesis.  Vitals were monitored during exam.  Removed 3400 ml and sent 0 ml to lab.  Patient tolerated procedure well and left in stable condition.

## 2017-07-31 NOTE — OR SURGEON
Immediate Post- Operative Note        PostOp Diagnosis: ascites      Procedure(s): paracentesis      Estimated Blood Loss: Less than 5 ml        Complications: None            7/31/2017     3:50 PM     Martha Price

## 2017-08-10 ENCOUNTER — HOSPITAL ENCOUNTER (INPATIENT)
Facility: REHABILITATION | Age: 72
LOS: 28 days | DRG: 094 | End: 2017-09-07
Attending: PHYSICAL MEDICINE & REHABILITATION | Admitting: PHYSICAL MEDICINE & REHABILITATION
Payer: MEDICARE

## 2017-08-10 ENCOUNTER — RESOLUTE PROFESSIONAL BILLING HOSPITAL PROF FEE (OUTPATIENT)
Dept: PHYSICAL MEDICINE AND REHAB | Facility: REHABILITATION | Age: 72
End: 2017-08-10
Payer: MEDICARE

## 2017-08-10 PROBLEM — M48.061 LUMBAR STENOSIS: Status: ACTIVE | Noted: 2017-08-10

## 2017-08-10 PROCEDURE — A9270 NON-COVERED ITEM OR SERVICE: HCPCS | Performed by: PHYSICAL MEDICINE & REHABILITATION

## 2017-08-10 PROCEDURE — 770010 HCHG ROOM/CARE - REHAB SEMI PRIVAT*

## 2017-08-10 PROCEDURE — 700102 HCHG RX REV CODE 250 W/ 637 OVERRIDE(OP): Performed by: PHYSICAL MEDICINE & REHABILITATION

## 2017-08-10 PROCEDURE — 99223 1ST HOSP IP/OBS HIGH 75: CPT | Performed by: PHYSICAL MEDICINE & REHABILITATION

## 2017-08-10 PROCEDURE — 94760 N-INVAS EAR/PLS OXIMETRY 1: CPT

## 2017-08-10 RX ORDER — L. ACIDOPHILUS/L.BULGARICUS 100MM CELL
1 GRANULES IN PACKET (EA) ORAL
Status: DISCONTINUED | OUTPATIENT
Start: 2017-08-10 | End: 2017-09-07 | Stop reason: HOSPADM

## 2017-08-10 RX ORDER — HYDRALAZINE HYDROCHLORIDE 25 MG/1
25 TABLET, FILM COATED ORAL EVERY 8 HOURS PRN
Status: DISCONTINUED | OUTPATIENT
Start: 2017-08-10 | End: 2017-09-07 | Stop reason: HOSPADM

## 2017-08-10 RX ORDER — AMOXICILLIN 250 MG
1 CAPSULE ORAL EVERY EVENING
Status: DISCONTINUED | OUTPATIENT
Start: 2017-08-10 | End: 2017-08-14

## 2017-08-10 RX ORDER — NYSTATIN 100000 [USP'U]/G
POWDER TOPICAL 2 TIMES DAILY
Status: DISCONTINUED | OUTPATIENT
Start: 2017-08-10 | End: 2017-08-30

## 2017-08-10 RX ORDER — CARVEDILOL 3.12 MG/1
3.12 TABLET ORAL 2 TIMES DAILY WITH MEALS
Status: DISCONTINUED | OUTPATIENT
Start: 2017-08-10 | End: 2017-08-15

## 2017-08-10 RX ORDER — ONDANSETRON 4 MG/1
4 TABLET, ORALLY DISINTEGRATING ORAL 4 TIMES DAILY PRN
Status: DISCONTINUED | OUTPATIENT
Start: 2017-08-10 | End: 2017-09-07 | Stop reason: HOSPADM

## 2017-08-10 RX ORDER — SPIRONOLACTONE 25 MG/1
25 TABLET ORAL
Status: DISCONTINUED | OUTPATIENT
Start: 2017-08-11 | End: 2017-08-21

## 2017-08-10 RX ORDER — ECHINACEA PURPUREA EXTRACT 125 MG
2 TABLET ORAL PRN
Status: DISCONTINUED | OUTPATIENT
Start: 2017-08-10 | End: 2017-09-07 | Stop reason: HOSPADM

## 2017-08-10 RX ORDER — LACTULOSE 20 G/30ML
30 SOLUTION ORAL
Status: DISCONTINUED | OUTPATIENT
Start: 2017-08-10 | End: 2017-08-14

## 2017-08-10 RX ORDER — URSODIOL 300 MG/1
300 CAPSULE ORAL DAILY
Status: DISCONTINUED | OUTPATIENT
Start: 2017-08-11 | End: 2017-09-07 | Stop reason: HOSPADM

## 2017-08-10 RX ORDER — FOLIC ACID 1 MG/1
1 TABLET ORAL DAILY
Status: DISCONTINUED | OUTPATIENT
Start: 2017-08-11 | End: 2017-09-07 | Stop reason: HOSPADM

## 2017-08-10 RX ORDER — ALUMINA, MAGNESIA, AND SIMETHICONE 2400; 2400; 240 MG/30ML; MG/30ML; MG/30ML
20 SUSPENSION ORAL
Status: DISCONTINUED | OUTPATIENT
Start: 2017-08-10 | End: 2017-09-07 | Stop reason: HOSPADM

## 2017-08-10 RX ORDER — TRAZODONE HYDROCHLORIDE 50 MG/1
50 TABLET ORAL
Status: DISCONTINUED | OUTPATIENT
Start: 2017-08-10 | End: 2017-09-07 | Stop reason: HOSPADM

## 2017-08-10 RX ORDER — OXYCODONE HYDROCHLORIDE 10 MG/1
10 TABLET ORAL EVERY 4 HOURS PRN
Status: DISCONTINUED | OUTPATIENT
Start: 2017-08-10 | End: 2017-09-07 | Stop reason: HOSPADM

## 2017-08-10 RX ORDER — ATORVASTATIN CALCIUM 40 MG/1
40 TABLET, FILM COATED ORAL
Status: DISCONTINUED | OUTPATIENT
Start: 2017-08-10 | End: 2017-09-07 | Stop reason: HOSPADM

## 2017-08-10 RX ORDER — ONDANSETRON 2 MG/ML
4 INJECTION INTRAMUSCULAR; INTRAVENOUS 4 TIMES DAILY PRN
Status: DISCONTINUED | OUTPATIENT
Start: 2017-08-10 | End: 2017-09-07 | Stop reason: HOSPADM

## 2017-08-10 RX ORDER — BISACODYL 10 MG
10 SUPPOSITORY, RECTAL RECTAL
Status: DISCONTINUED | OUTPATIENT
Start: 2017-08-10 | End: 2017-08-14

## 2017-08-10 RX ORDER — DOCUSATE SODIUM 100 MG/1
100 CAPSULE, LIQUID FILLED ORAL DAILY
Status: DISCONTINUED | OUTPATIENT
Start: 2017-08-11 | End: 2017-08-14

## 2017-08-10 RX ADMIN — CARVEDILOL 3.12 MG: 3.12 TABLET, FILM COATED ORAL at 17:22

## 2017-08-10 RX ADMIN — ATORVASTATIN CALCIUM 40 MG: 40 TABLET, FILM COATED ORAL at 20:12

## 2017-08-10 RX ADMIN — DOCUSATE SODIUM AND SENNOSIDES 1 TABLET: 8.6; 5 TABLET, FILM COATED ORAL at 20:12

## 2017-08-10 RX ADMIN — NYSTATIN 1500000 UNITS: 100000 POWDER TOPICAL at 20:12

## 2017-08-10 RX ADMIN — LACTOBACILLUS ACIDOPHILUS / LACTOBACILLUS BULGARICUS 1 PACKET: 100 MILLION CFU STRENGTH GRANULES at 17:27

## 2017-08-10 RX ADMIN — OXYCODONE HYDROCHLORIDE 10 MG: 10 TABLET ORAL at 20:11

## 2017-08-10 ASSESSMENT — COPD QUESTIONNAIRES
DURING THE PAST 4 WEEKS HOW MUCH DID YOU FEEL SHORT OF BREATH: NONE/LITTLE OF THE TIME
DO YOU EVER COUGH UP ANY MUCUS OR PHLEGM?: NO/ONLY WITH OCCASIONAL COLDS OR INFECTIONS
COPD SCREENING SCORE: 4
HAVE YOU SMOKED AT LEAST 100 CIGARETTES IN YOUR ENTIRE LIFE: YES

## 2017-08-10 ASSESSMENT — PATIENT HEALTH QUESTIONNAIRE - PHQ9
1. LITTLE INTEREST OR PLEASURE IN DOING THINGS: NOT AT ALL
SUM OF ALL RESPONSES TO PHQ QUESTIONS 1-9: 0
SUM OF ALL RESPONSES TO PHQ9 QUESTIONS 1 AND 2: 0
2. FEELING DOWN, DEPRESSED, IRRITABLE, OR HOPELESS: NOT AT ALL

## 2017-08-10 ASSESSMENT — LIFESTYLE VARIABLES
EVER_SMOKED: YES
EVER_SMOKED: YES
ALCOHOL_USE: NO

## 2017-08-10 ASSESSMENT — PAIN SCALES - GENERAL
PAINLEVEL_OUTOF10: 9
PAINLEVEL_OUTOF10: 2

## 2017-08-10 NOTE — FLOWSHEET NOTE
08/10/17 1615   Type of Assessment   Assessment Yes   Patient History   Pulmonary Diagnosis no   Surgical Procedures no Lumbar Stenosis   Home O2 No   Home Treatments/Frequency No   COPD Risk Screening   Do you have a history of COPD? No   COPD Population Screener   During the past 4 weeks, how much did you feel short of breath? 0   Do you ever cough up any mucus or phlegm? 0   In the past 12 months, you do less than you used to because of your breathing problems 0   Have you smoked at least 100 cigarettes in your entire life? 2   How old are you? 1   COPD Screening Score 4   Smoking History   Have you Ever Smoked Yes   Have you Smoked in the Last 12 Mos No   Confirm Quit Date 08/10/97   Level Of Consciousness   Level of Consciousness Alert   Respiratory WDL   Respiratory (WDL) X   Chest Exam   Work Of Breathing / Effort Mild   Respiration 18   Pulse 68   Breath Sounds   RUL Breath Sounds Clear   RML Breath Sounds Clear   RLL Breath Sounds Clear   JOSE Breath Sounds Clear   LLL Breath Sounds Clear   Secretions   Cough Non Productive   Oximetry   #Pulse Oximetry (Single Determination) Yes   Oxygen   Home O2 Use Prior To Admission? No   Pulse Oximetry 94 %   O2 (LPM) 0   O2 (FiO2) 21   Protocol Pathways   Protocol Pathways Yes   O2 Protocol   O2 Protocol Indications Room Air SpO2 Less Than 90%   O2 Protocol Goals/Outcome Normoxemia on Oxygen, SpO2 greater than 90%

## 2017-08-10 NOTE — PROGRESS NOTES
Patient admitted to facility at 1425 via w/c; Patient assisted to room and positioned in bed for comfort and safety; call light within reach. Patient assisted with stowing belongings and oriented to room and facility. Educational binder reviewed with patient. Admission assessment performed and documented in computer. Admission paperwork completed; signed copies placed in chart. Will continue to monitor.

## 2017-08-11 PROBLEM — T45.1X5A PERIPHERAL NEUROPATHY DUE TO CHEMOTHERAPY (HCC): Status: ACTIVE | Noted: 2017-08-11

## 2017-08-11 PROBLEM — Z74.09 IMPAIRED MOBILITY AND ADLS: Status: ACTIVE | Noted: 2017-08-11

## 2017-08-11 PROBLEM — Z78.9 IMPAIRED MOBILITY AND ADLS: Status: ACTIVE | Noted: 2017-08-11

## 2017-08-11 PROBLEM — N40.0 BPH (BENIGN PROSTATIC HYPERPLASIA): Status: ACTIVE | Noted: 2017-08-11

## 2017-08-11 PROBLEM — G62.0 PERIPHERAL NEUROPATHY DUE TO CHEMOTHERAPY (HCC): Status: ACTIVE | Noted: 2017-08-11

## 2017-08-11 LAB
25(OH)D3 SERPL-MCNC: 32 NG/ML (ref 30–100)
ALBUMIN SERPL BCP-MCNC: 2.5 G/DL (ref 3.2–4.9)
ALBUMIN/GLOB SERPL: 1.1 G/DL
ALP SERPL-CCNC: 66 U/L (ref 30–99)
ALT SERPL-CCNC: 13 U/L (ref 2–50)
ANION GAP SERPL CALC-SCNC: 4 MMOL/L (ref 0–11.9)
AST SERPL-CCNC: 17 U/L (ref 12–45)
BASOPHILS # BLD AUTO: 1 % (ref 0–1.8)
BASOPHILS # BLD: 0.04 K/UL (ref 0–0.12)
BILIRUB SERPL-MCNC: 1.2 MG/DL (ref 0.1–1.5)
BUN SERPL-MCNC: 12 MG/DL (ref 8–22)
CALCIUM SERPL-MCNC: 8.5 MG/DL (ref 8.5–10.5)
CHLORIDE SERPL-SCNC: 106 MMOL/L (ref 96–112)
CO2 SERPL-SCNC: 27 MMOL/L (ref 20–33)
CREAT SERPL-MCNC: 0.5 MG/DL (ref 0.5–1.4)
EOSINOPHIL # BLD AUTO: 0.21 K/UL (ref 0–0.51)
EOSINOPHIL NFR BLD: 5.5 % (ref 0–6.9)
ERYTHROCYTE [DISTWIDTH] IN BLOOD BY AUTOMATED COUNT: 54.3 FL (ref 35.9–50)
GFR SERPL CREATININE-BSD FRML MDRD: >60 ML/MIN/1.73 M 2
GLOBULIN SER CALC-MCNC: 2.2 G/DL (ref 1.9–3.5)
GLUCOSE SERPL-MCNC: 86 MG/DL (ref 65–99)
HCT VFR BLD AUTO: 32.2 % (ref 42–52)
HGB BLD-MCNC: 10 G/DL (ref 14–18)
IMM GRANULOCYTES # BLD AUTO: 0.01 K/UL (ref 0–0.11)
IMM GRANULOCYTES NFR BLD AUTO: 0.3 % (ref 0–0.9)
LYMPHOCYTES # BLD AUTO: 0.71 K/UL (ref 1–4.8)
LYMPHOCYTES NFR BLD: 18.5 % (ref 22–41)
MCH RBC QN AUTO: 26.8 PG (ref 27–33)
MCHC RBC AUTO-ENTMCNC: 31.1 G/DL (ref 33.7–35.3)
MCV RBC AUTO: 86.3 FL (ref 81.4–97.8)
MONOCYTES # BLD AUTO: 0.52 K/UL (ref 0–0.85)
MONOCYTES NFR BLD AUTO: 13.5 % (ref 0–13.4)
NEUTROPHILS # BLD AUTO: 2.35 K/UL (ref 1.82–7.42)
NEUTROPHILS NFR BLD: 61.2 % (ref 44–72)
NRBC # BLD AUTO: 0.02 K/UL
NRBC BLD AUTO-RTO: 0.5 /100 WBC
PLATELET # BLD AUTO: 73 K/UL (ref 164–446)
PMV BLD AUTO: 10.6 FL (ref 9–12.9)
POTASSIUM SERPL-SCNC: 3.9 MMOL/L (ref 3.6–5.5)
PREALB SERPL-MCNC: 6 MG/DL (ref 18–38)
PROT SERPL-MCNC: 4.7 G/DL (ref 6–8.2)
RBC # BLD AUTO: 3.73 M/UL (ref 4.7–6.1)
SODIUM SERPL-SCNC: 137 MMOL/L (ref 135–145)
WBC # BLD AUTO: 3.8 K/UL (ref 4.8–10.8)

## 2017-08-11 PROCEDURE — A9270 NON-COVERED ITEM OR SERVICE: HCPCS | Performed by: PHYSICAL MEDICINE & REHABILITATION

## 2017-08-11 PROCEDURE — 700102 HCHG RX REV CODE 250 W/ 637 OVERRIDE(OP): Performed by: PHYSICAL MEDICINE & REHABILITATION

## 2017-08-11 PROCEDURE — 84134 ASSAY OF PREALBUMIN: CPT

## 2017-08-11 PROCEDURE — 700112 HCHG RX REV CODE 229: Performed by: PHYSICAL MEDICINE & REHABILITATION

## 2017-08-11 PROCEDURE — 97530 THERAPEUTIC ACTIVITIES: CPT

## 2017-08-11 PROCEDURE — 97166 OT EVAL MOD COMPLEX 45 MIN: CPT

## 2017-08-11 PROCEDURE — 97535 SELF CARE MNGMENT TRAINING: CPT

## 2017-08-11 PROCEDURE — 80053 COMPREHEN METABOLIC PANEL: CPT

## 2017-08-11 PROCEDURE — 770010 HCHG ROOM/CARE - REHAB SEMI PRIVAT*

## 2017-08-11 PROCEDURE — 99232 SBSQ HOSP IP/OBS MODERATE 35: CPT | Performed by: PHYSICAL MEDICINE & REHABILITATION

## 2017-08-11 PROCEDURE — 85025 COMPLETE CBC W/AUTO DIFF WBC: CPT

## 2017-08-11 PROCEDURE — 82306 VITAMIN D 25 HYDROXY: CPT

## 2017-08-11 PROCEDURE — 36415 COLL VENOUS BLD VENIPUNCTURE: CPT

## 2017-08-11 PROCEDURE — 99222 1ST HOSP IP/OBS MODERATE 55: CPT | Performed by: HOSPITALIST

## 2017-08-11 PROCEDURE — 97162 PT EVAL MOD COMPLEX 30 MIN: CPT

## 2017-08-11 RX ORDER — TAMSULOSIN HYDROCHLORIDE 0.4 MG/1
0.4 CAPSULE ORAL
Status: DISCONTINUED | OUTPATIENT
Start: 2017-08-11 | End: 2017-09-07 | Stop reason: HOSPADM

## 2017-08-11 RX ADMIN — NYSTATIN 1500000 UNITS: 100000 POWDER TOPICAL at 08:32

## 2017-08-11 RX ADMIN — OXYCODONE HYDROCHLORIDE 10 MG: 10 TABLET ORAL at 21:07

## 2017-08-11 RX ADMIN — CARVEDILOL 3.12 MG: 3.12 TABLET, FILM COATED ORAL at 17:43

## 2017-08-11 RX ADMIN — URSODIOL 300 MG: 300 CAPSULE ORAL at 08:31

## 2017-08-11 RX ADMIN — LACTOBACILLUS ACIDOPHILUS / LACTOBACILLUS BULGARICUS 1 PACKET: 100 MILLION CFU STRENGTH GRANULES at 13:13

## 2017-08-11 RX ADMIN — NYSTATIN 1500000 UNITS: 100000 POWDER TOPICAL at 21:03

## 2017-08-11 RX ADMIN — VITAMIN D, TAB 1000IU (100/BT) 1000 UNITS: 25 TAB at 08:30

## 2017-08-11 RX ADMIN — DOCUSATE SODIUM AND SENNOSIDES 1 TABLET: 8.6; 5 TABLET, FILM COATED ORAL at 21:02

## 2017-08-11 RX ADMIN — TAMSULOSIN HYDROCHLORIDE 0.4 MG: 0.4 CAPSULE ORAL at 08:30

## 2017-08-11 RX ADMIN — SPIRONOLACTONE 25 MG: 25 TABLET, FILM COATED ORAL at 05:13

## 2017-08-11 RX ADMIN — FOLIC ACID 1 MG: 1 TABLET ORAL at 08:31

## 2017-08-11 RX ADMIN — CARVEDILOL 3.12 MG: 3.12 TABLET, FILM COATED ORAL at 08:32

## 2017-08-11 RX ADMIN — ATORVASTATIN CALCIUM 40 MG: 40 TABLET, FILM COATED ORAL at 21:03

## 2017-08-11 RX ADMIN — LACTOBACILLUS ACIDOPHILUS / LACTOBACILLUS BULGARICUS 1 PACKET: 100 MILLION CFU STRENGTH GRANULES at 17:43

## 2017-08-11 RX ADMIN — DOCUSATE SODIUM 100 MG: 100 CAPSULE, LIQUID FILLED ORAL at 08:30

## 2017-08-11 RX ADMIN — LACTOBACILLUS ACIDOPHILUS / LACTOBACILLUS BULGARICUS 1 PACKET: 100 MILLION CFU STRENGTH GRANULES at 08:32

## 2017-08-11 ASSESSMENT — BRIEF INTERVIEW FOR MENTAL STATUS (BIMS)
ASKED TO RECALL SOCK: YES, NO CUE REQUIRED
WHAT YEAR IS IT: CORRECT
WHAT DAY OF THE WEEK IS IT: CORRECT
INITIAL REPETITION OF BED BLUE SOCK - FIRST ATTEMPT: 3
WHAT MONTH IS IT: ACCURATE WITHIN 5 DAYS
ASKED TO RECALL BED: YES, AFTER CUEING (A PIECE OF FURNITURE")"
ASKED TO RECALL BLUE: YES, NO CUE REQUIRED
BIMS SUMMARY SCORE: 14

## 2017-08-11 ASSESSMENT — PAIN SCALES - GENERAL: PAINLEVEL_OUTOF10: 4

## 2017-08-11 ASSESSMENT — ACTIVITIES OF DAILY LIVING (ADL): TOILETING: INDEPENDENT

## 2017-08-11 NOTE — WOUND TEAM
"[facility]  Inpatient Services  Initial Wound and Skin Care Evaluation    Admission Date: 8/10/17   HPI, PMH, SH: Reviewed  Unit where seen by Wound Team:  06-2    WOUND CONSULT RELATED TO: evaluation of pressure ulcer coccyx    SUBJECTIVE:  \"This bed is uncomfortable.  They are getting a new one for me\"    Self Report / Pain Level: denies related to wound    OBJECTIVE:    Stage 3 pressure ulcer that was previously documented as DTI and now evolved to stage 3    WOUND TYPE, LOCATION, CHARACTERISTICS (Pressure ulcers: location, stage, POA or date identified)    Wound Type/Location:  Stage 3 pressure ulcer coccyx POA   Periwound:  Pink, flaky    Drainage:  Scant serosanguinous     Tissue Type and %:   Red/yellow 70/30%   Wound Edges:  attached    Odor:  none     Exposed structure(s):  none    S&S of Infection:  none    Measurements: (taken 8/11/17   Length:  1.8cm   Width:   1cm   Depth: <0.5cm   Tracts/undermining:  none    INTERVENTIONS BY WOUND TEAM:  Patient lying on side in bed.  Removed old dressing and cleansed wound with NS gauze.  Measurements taken.  Covered wound bed with honey colloid and secured with adhesive foam.  Will consider changing dressing to silver hydrofiber should wound become too wet.    Interdisciplinary consultation: staff RN, patient and wife    EVALUATION:   Evolved pressure ulcer that appears healing     Factors affecting wound healing:  Pressure, moisture  Goals:  Wound to decrease size by 2% weekly    NURSING PLAN OF CARE ORDERS (X):    Dressing changes: See Dressing Maintenance orders:   X  Skin care: See Skin Care orders:   Rectal tube care: See Rectal Tube Care orders:    Other orders:    RSKIN: CURRENT (X) ORDERED (O)  Q shift Huan:  X  Q shift pressure point assessments:  X  Atmosair    X     AJAY       Bariatric AJAY       Bariatric foam         Heel float boots        Heels floated on pillows   X    Barrier wipes       Barrier Cream       Barrier paste    Sacral " silicone dressing      Padded O2 tubing       Anchorfast       Trach with Optifoam split foam        Waffle cushion       Rectal tube or BMS       Antifungal tx    Turn q 2 hours  X  Up to chair      Ambulate    PT/OT    X  Dietician      PO X    TF   TPN     PVN    NPO   # days    Other       WOUND TEAM PLAN OF CARE (X):    NPWT change 3 x week:         Dressing changes by wound team:       Follow up as needed:    X    Other (explain):    Anticipated discharge plans (X):  SNF:           Home Care:           Outpatient Wound Center:            Self Care:            Other:    TBD

## 2017-08-11 NOTE — CARE PLAN
Problem: Other Problem (see comments)  Goal: Other Goal    Monitor/Evaluation: Monitor PO intake, weight, labs, medication adjustments, skin integrity, GI function, vitals, I/Os, and overall hydration status. Adjust nutritional POC pending clinical outcomes.   RD following PRN. PO is adequate. Nutrition interventions in place.   Goal: Maintain adequate oral nutrient/fluid intake to promote nutrition optimization/wound healing.   Outcome: MET Date Met:  08/11/17

## 2017-08-11 NOTE — THERAPY
Physical Therapy Initial Plan of Care Note    1) Assessment: Patient is 72 y.o. male with a diagnosis of Left leg DVT, obesity (BMI=32), Esophageal varices- BANDED 4/2016- repeat egd tbd 10/29/16, Gastrointestinal bleeding- recurrent from varices, Portal vein thrombosis, Cirrhosis, Generalized weakness, Hx of colon cancer- stage IV, AIDP (acute inflammatory demyelinating polyneuropathy), and Stage 3 pressure ulcer on coccyx/sacrum.  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  Additional factors influencing patient status / progress (ie: cognitive factors, co-morbidities, social support, etc): LLE edema, Anxiety, Verbose, Limited standing tolerance, Impaired balance, BLE and generalized weakness, Good cognition, Urinary retention, Fall risk, Turning schedule due to pressure ulcer and skin integrity.  Long term and short term goals have been discussed with patient and spouse and they are in agreement.  2) Plan: Recommend Physical Therapy  minutes per day 5-7 days per week for 3  weeks for the following treatments:  PT Group Therapy, PT Gait Training, PT Therapeutic Exercises, PT Neuro Re-Ed/Balance, PT Therapeutic Activity and PT Manual Therapy.  3) Goals:  Please refer to care plan for goals.

## 2017-08-11 NOTE — CARE PLAN
Problem: Safety  Goal: Will remain free from injury  Outcome: PROGRESSING AS EXPECTED  Reviewed fall and safety precautions with patient at start of shift. Pt uses call light consistently and appropriately and has not attempted self transfer this shift. Able to verbalize needs. Call light and belongings within reach, bed alarm in use, bed in lowest position, treaded socks in use, and hourly rounding in place.        Problem: Pain Management  Goal: Pain level will decrease to patient’s comfort goal  Outcome: PROGRESSING AS EXPECTED  2012 Patient reported 9/10 burning pain in his right and left buttocks. Medicated with PRN oxycodone and applied moisture barrier cream to buttocks. Patient reported that barrier cream helps to soothe the burning sensation.  2200 Patient reported that his pain was tolerable at 2/10.  Will continue to monitor for pain.    Problem: Urinary Elimination:  Goal: Ability to reestablish a normal urinary elimination pattern will improve  Intervention: Assess and monitor for signs and symptoms of urinary retention  CNA reported patient's bladder scan showed 814 mL after patient voided 500 mL. This RN explained the intermittent catheter protocol to patient. He stated to the CNA and this RN that he believes his bladder scan is showing high due to his ascites and would decline catheterization; he stated that his Dr. at [facility] who ordered his paracentesis told him that his bladder scans were high due to the ascites. He stated that he has not had any trouble voiding and has not required catheterization in the past. He denies abdominal pain or feelings of bladder fullness. Charge RN notified.    0645 Patient voided 500 mL of yellow urine  0648 CNA reported bladder scan results of 999 mL  0700 Dr. Adorno notified of patient's bladder scans and patient stating that he does not want to be catheterized because he believes scans are high due to ascites. Orders received for straight cath.   This RN  reviewed risks associated with retaining urine and Dr. Adorno spoke to patient as well.  Patient then agreed to catheter.  0715 Catheter resulted in 500 mL of clear, yellow urine. Patient tolerated procedure well.  Dr. Adorno notified of catheter results. Orders received for 0.4 mg of tamsulosin daily after breakfast.

## 2017-08-11 NOTE — REHAB-DIETARY IDT TEAM NOTE
Dietary  Nutrition       Dietary Problems           Problem: Other Problem (see comments)     Description: Diagnosis: Increased nutrient needs (protein) r/t high demand for protein in the setting of wound healing/ maintaining LBM as evidenced by patient admitted with DTI to coccyx, visible evidence of muscle wasting.           Goal: Other Goal (Resolved)      Monitor/Evaluation: Monitor PO intake, weight, labs, medication adjustments, skin integrity, GI function, vitals, I/Os, and overall hydration status.  Adjust nutritional POC pending clinical outcomes.      RD following PRN.  PO is adequate.  Nutrition interventions in place.     Goal: Maintain adequate oral nutrient/fluid intake to promote nutrition optimization/wound healing.                 Nutrition Care/ Consult For Supplements/ Wound     Assessment:    Admitting Diagnosis: Colon Cancer/ Lumbar Stenosis s/p lower anterior resection    Pertinent PMH: DVT, Hyperlipidemia, HTN, Cirrhosis, Peripheral Neuropathy , Gastroscopy with Banding    (No H&P dictated yet)  Additional Information: Patient seen in room with his wife, Lorie. They are an extremely pleasant couple.    Patient reports good appetite, eating > 50% of his trays.  No self feeding issues.  Evident ascites visible during assessment.  Other than that, no GI issues.  No difficulty chewing/ swallowing food.  Good dentition.     Patient reports no food allergies.  Nutrition rep visited and reviewed food prefs/ how to fill out menus. Patient happy with food.     Patient reports he was consuming Ensure TID at [facility] d/t loss of muscle mass.  Patient has visible depletion of this mass to his upper and lower extremities.     Reports used to weigh 251 lbs back in May.  Weight is 257 upon admission- likely ascites related.     DTI noted to buttock/ sacrum. No wound care consult yet.      Patient eating well.  I discussed with patient I would rather him consume protein sources via actual food versus  consume supplement instead.  Will provide Ensure BID between meals to promote adequate wound healing/ maintenance of muscle mass as patient has been lying in bed for quite some time.  Discussed protein sources with patient and wife.     Note: albumin and PAB are low d/t liver failure as albumin/ PAB are synthesized in the liver.  Thus, if liver is failing, albumin/PAB cannot be synthesized resulting in low value. PAB/Albumin are and continue to be poor indicators of nutritional status.     Appetite: Good   Diet: Regular    Average PO intake x 3 days: % of meals since admission     Labs: WBC 3.8, Platelets 73 (likely s/t liver failure), Albumin 2.5, T.P. 4.7, PAB 6  Medications: Lipitor, Coreg, Bowel Regimen, Folvite, Lactinex, Nystatin, Aldactone, Flomax, Actigall, Vitamin D,    PRN Medications: noted  IVF: n/a     Height: 190.5cm    Weight: 116.801kg    IBW: 89.1kg    BMI: 32.19 (obese  Class 1)     Skin: DTI PU to right sacrum    GI: BM 8/11    Vitals: /64, RA    I/Os: -1815mL since admission- Good UOP     Nutrient Needs:  Kcal: 3233-4359 kcal/day ( 25-27 kcal/kg IBW)         BEE= 2001 kcal x 1.1-1.2 g/kg    Protein: 89-107g/day ( 1-1.2g/kg IBW)- consider checking NH3 level    Fluid: 1800mL - 2000mL/day in the setting of ascites/ fluid retention/ liver failure       Diagnosis: Increased nutrient needs (protein) r/t high demand for protein in the setting of wound healing/ maintaining LBM as evidenced by patient admitted with DTI to coccyx, visible evidence of muscle wasting.     Intervention/ Recommendations/POC:  1. Continue current diet.  Avoid excessive salt intake. Ensure BID between meals.    2. Continue adequate PO/fluid intake.  3. Nutrition rep to see regarding food prefs/ honor within dietary restrictions (if indicated)  4. Consider wound team consult. Vitamins if stage 2 or greater.        Monitor/Evaluation: Monitor PO intake, weight, labs, medication adjustments, skin integrity, GI function,  vitals, I/Os, and overall hydration status.  Adjust nutritional POC pending clinical outcomes.     RD following PRN.  PO is adequate.  Nutrition interventions in place.    Goal: Maintain adequate oral nutrient/fluid intake to promote nutrition optimization/wound healing.   Section completed by:  Clarisa Paulino RD

## 2017-08-11 NOTE — CONSULTS
DATE OF SERVICE:  2017    REQUESTING PHYSICIAN:  Moreno Adorno MD.    CHIEF COMPLAINT / REASON FOR CONSULTATION:  Hypertension and thrombocytopenia.    HISTORY OF PRESENT ILLNESS:  The patient is a 72 y.o. male with a past medical history of stage IV colon cancer with metastatic disease to the liver now with a five-year remission, cirrhosis and portal vein thrombosis with esophageal varices status post ablation history of GI bleed, hypertension, hyperlipidemia, peripheral neuropathy secondary to chemotherapy and lumbar stenosis; now admitted for acute inpatient rehabilitation with severe functional debility secondary to aAIDP were lumbar stenosis On admission the patient and medical record report that the patient had significant lumbar stenosis and was scheduled for surgery in the end of May 2017. Approximately 2 weeks prior to that event the patient starting having issues with tetraparesis having difficulty opening jars as well as having issues with increased difficulty with ambulation. It got to the point where it 1st was holding onto furniture then started using his  mother's front wheeled walker. He was also having issues with falls. The patient and his wife were attributing the difficulties primarily to the lumbar stenosis. He therefore was admitted and underwent successful surgery to address his lumbar stenosis in May 2017, because of his other symptoms neurology was consulted and a diagnosis with the assistance of EMG of AIDP was made. Patient had one 5 day course of IVIG.    Because of the patient's weakness and debility, Rehab was consulted, evaluated the patient, and he was deemed a good Rehab candidate.  The patient was transferred over to the Rehab facility on 8/10/2017.      The patient denies fever, chills, nausea, vomiting, headaches, blurry vision, or chest pain.      REVIEW OF SYSTEMS: All review of systems are negative pre AMA and CMS criteria   except for that stated in the  HPI.    PAST MEDICAL HISTORY:  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/6/2010   • Thrombocytopenia due to drugs 4/25/2012   • Impaired fasting glucose 8/2/2012   • Vitamin d deficiency 8/2/2012   • BMI 33.0-33.9,adult 2/15/2013   • HLD (hyperlipidemia) 6/20/2013   • HTN (hypertension) 6/20/2013   • Hypertension    • Liver cirrhosis (CMS-HCC)    • GI bleed    • Polish measles    • Chickenpox        PAST SURGICAL HISTORY:  Past Surgical History   Procedure Laterality Date   • Low anterior resection robotic  8/10/2010     Performed by RUYB RUIZ at SURGERY [facility] ORS   • Cath placement  9/20/2010     Performed by RUBY RUIZ at SURGERY [facility] ORS   • Hip arthroplasty mis total       rt   • Other       Cholecystectomy   • Other (comments)       liver surgery     • Cath removal  7/20/2011     Performed by RUBY RUIZ at SURGERY [facility] ORS   • Full thickness block resection  4/11/2012     Performed by LANI BOWMAN at SURGERY [facility]S   • Gastroscopy with banding  4/3/2016     Procedure: GASTROSCOPY WITH BANDING;  Surgeon: Cayetano Stovall M.D.;  Location: [facility];  Service:    • Gastroscopy with banding  10/25/2016     Procedure: GASTROSCOPY WITH BANDING;  Surgeon: Pierre Waggoner M.D.;  Location: ENDOSCOPY [facility];  Service:    • Colon resection     • Hip replacement, total     • Lumbar laminectomy diskectomy  5/25/2017     Procedure:  LAMINECTOMY LUMBAR  4 TO SACRAL 1;  Surgeon: Felton Escobar M.D.;  Location: SURGERY [facility];  Service:        Allergies   Allergen Reactions   • Nkda [No Known Drug Allergy]        CURRENT MEDICATIONS:    Current facility-administered  medications:   •  tamsulosin  •  Respiratory Care per Protocol  •  Pharmacy Consult Request  •  nystatin  •  lactobacillus granules  •  spironolactone  •  ursodiol  •  vitamin D  •  folic acid  •  carvedilol  •  atorvastatin  •  docusate sodium **AND** senna-docusate **AND** lactulose **AND** bisacodyl  •  benzocaine-menthol  •  hydrALAZINE  •  mag hydrox-al hydrox-simeth  •  ondansetron **OR** ondansetron  •  trazodone  •  sodium chloride  •  oxycodone immediate-release    Social History     Social History   • Marital Status:      Spouse Name: N/A   • Number of Children: N/A   • Years of Education: N/A     Social History Main Topics   • Smoking status: Former Smoker -- 0.50 packs/day for 1 years     Types: Cigarettes, Cigars     Quit date: 01/01/1964   • Smokeless tobacco: Never Used      Comment: 1 cigar per week.   • Alcohol Use: No      Comment: minimal, Pt stop drinking since the beginning of April 2016   • Drug Use: No   • Sexual Activity:     Partners: Female     Other Topics Concern   • Not on file     Social History Narrative    ** Merged History Encounter **            FAMILY HISTORY:  was reviewed and is not pertinent to this consultation.    PHYSICAL EXAMINATION:  VITAL SIGNS:  Temp is 98.5, blood pressure is 124/64, heart rate is 70, respiratory rate is 18.  GENERAL:  Patient was lying in bed in no distress.  HEENT:  Pupils were equal, round and reactive to light and accomodation.  Oral mucosa was pink and moist.  NECK:  Soft.  Supple.  No JVD.  HEART:  Regular rate and rhythm.  Normal S1 and S2.  No murmurs were appreciated.  LUNGS:  Are clear to auscultation bilaterally.  ABDOMEN:  Soft, non tender, non distendded.  Bowels sound were positive in all four quadrants.  EXTREMITIES:  No clubbing, cyanosis.  There was minimal lower extremity swelling and pedal edema (L>R).  NEUROLOGIC:  Cranial nerves two through twelve were grossly intact.    LABS:  Lab Results   Component Value Date/Time    SODIUM  137 08/11/2017 04:50 AM    POTASSIUM 3.9 08/11/2017 04:50 AM    CHLORIDE 106 08/11/2017 04:50 AM    CO2 27 08/11/2017 04:50 AM    GLUCOSE 86 08/11/2017 04:50 AM    BUN 12 08/11/2017 04:50 AM    CREATININE 0.50 08/11/2017 04:50 AM    BUN-CREATININE RATIO 15 01/23/2010 12:00 AM    GLOM FILT RATE, EST >59 01/23/2010 12:00 AM      Lab Results   Component Value Date/Time    WBC 3.8* 08/11/2017 04:50 AM    RBC 3.73* 08/11/2017 04:50 AM    HEMOGLOBIN 10.0* 08/11/2017 04:50 AM    HEMATOCRIT 32.2* 08/11/2017 04:50 AM    MCV 86.3 08/11/2017 04:50 AM    MCH 26.8* 08/11/2017 04:50 AM    MCHC 31.1* 08/11/2017 04:50 AM    MPV 10.6 08/11/2017 04:50 AM    NEUTROPHILS-POLYS 61.20 08/11/2017 04:50 AM    LYMPHOCYTES 18.50* 08/11/2017 04:50 AM    MONOCYTES 13.50* 08/11/2017 04:50 AM    EOSINOPHILS 5.50 08/11/2017 04:50 AM    BASOPHILS 1.00 08/11/2017 04:50 AM    HYPOCHROMIA 1+ 05/23/2016 06:57 AM    ANISOCYTOSIS 2+ 03/25/2017 07:23 AM      Lab Results   Component Value Date/Time    PT 17.4* 06/26/2017 06:55 PM    INR 1.38* 06/26/2017 06:55 PM        ASSESSMENT:  1.  Hypertension  2.  Thrombocytopenia  3.  Mild lower extremity swelling  3.  For the other assessment, please see the past medical history above.    PLAN:  For the patient's hypertension, his blood pressure is currently controlled.  He is on Coreg 3.125 mg bid.  It was noted that his home meds recently were Coreg 3.125 mg 1/2 tab bid and Losartan 25 mg bid.  The patient's blood pressure is currently controlled and for now I will continue the current hypertensive medications, monitor the blood pressure while in the hospital, and adjust his medications accordingly.    For the patient's lower extremity swelling, this is mild in nature.  The patient is on Aldactone, his home med, and will continue this for now.  If the patient develops more swelling, will consider increasing the does or adding on a low dose Lasix.    For the patient's thrombocytopenia, he has a history and is  likely 2nd to his prior chemo as well as his cirrhosis.  The platelet count is stable and will monitor it while in the hospital.    This case has been discussed with the attending Physiatrist.    Thank you for the consultation.  Will follow the patient with you.

## 2017-08-11 NOTE — DIETARY
Nutrition Care/ Consult For Supplements/ Wound     Assessment:    Admitting Diagnosis: Colon Cancer/ Lumbar Stenosis s/p lower anterior resection   Pertinent PMH: DVT, Hyperlipidemia, HTN, Cirrhosis, Peripheral Neuropathy , Gastroscopy with Banding   (No H&P dictated yet)  Additional Information: Patient seen in room with his wife, Lorie. They are an extremely pleasant couple.    Patient reports good appetite, eating > 50% of his trays.  No self feeding issues.  Evident ascites visible during assessment.  Other than that, no GI issues.  No difficulty chewing/ swallowing food.  Good dentition.     Patient reports no food allergies.  Nutrition rep visited and reviewed food prefs/ how to fill out menus. Patient happy with food.     Patient reports he was consuming Ensure TID at [facility] d/t loss of muscle mass.  Patient has visible depletion of this mass to his upper and lower extremities.     Reports used to weigh 251 lbs back in May.  Weight is 257 upon admission- likely ascites related.     DTI noted to buttock/ sacrum. No wound care consult yet.      Patient eating well.  I discussed with patient I would rather him consume protein sources via actual food versus consume supplement instead.  Will provide Ensure BID between meals to promote adequate wound healing/ maintenance of muscle mass as patient has been lying in bed for quite some time.  Discussed protein sources with patient and wife.     Note: albumin and PAB are low d/t liver failure as albumin/ PAB are synthesized in the liver.  Thus, if liver is failing, albumin/PAB cannot be synthesized resulting in low value. PAB/Albumin are and continue to be poor indicators of nutritional status.     Appetite: Good   Diet: Regular   Average PO intake x 3 days: % of meals since admission     Labs: WBC 3.8, Platelets 73 (likely s/t liver failure), Albumin 2.5, T.P. 4.7, PAB 6  Medications: Lipitor, Coreg, Bowel Regimen, Folvite, Lactinex, Nystatin, Aldactone,  Flomax, Actigall, Vitamin D,   PRN Medications: noted  IVF: n/a     Height: 190.5cm   Weight: 116.801kg   IBW: 89.1kg   BMI: 32.19 (obese  Class 1)     Skin: DTI PU to right sacrum   GI: BM 8/11   Vitals: /64, RA   I/Os: -1815mL since admission- Good UOP     Nutrient Needs:  Kcal: 7364-2936 kcal/day ( 25-27 kcal/kg IBW) BEE= 2001 kcal x 1.1-1.2 g/kg   Protein: 89-107g/day ( 1-1.2g/kg IBW)- consider checking NH3 level   Fluid: 1800mL - 2000mL/day in the setting of ascites/ fluid retention/ liver failure       Diagnosis: Increased nutrient needs (protein) r/t high demand for protein in the setting of wound healing/ maintaining LBM as evidenced by patient admitted with DTI to coccyx, visible evidence of muscle wasting.     Intervention/ Recommendations/POC:  1. Continue current diet.  Avoid excessive salt intake. Ensure BID between meals.   2. Continue adequate PO/fluid intake.  3. Nutrition rep to see regarding food prefs/ honor within dietary restrictions (if indicated)  4. Consider wound team consult. Vitamins if stage 2 or greater.      Monitor/Evaluation: Monitor PO intake, weight, labs, medication adjustments, skin integrity, GI function, vitals, I/Os, and overall hydration status.  Adjust nutritional POC pending clinical outcomes.    RD following PRN.  PO is adequate.  Nutrition interventions in place.   Goal: Maintain adequate oral nutrient/fluid intake to promote nutrition optimization/wound healing.

## 2017-08-11 NOTE — H&P
REHABILITATION HISTORY AND PHYSICAL/POST ADMISSION EVALUATION    8/10/2017  7:24 PM  Torey Lima  [id number]    Admission 8/10/2017  2:24 PM    Reason for admission: Lumbar stenosis AIDP    HPI:  The patient is a 72 y.o. male with a past medical history of stage IV colon cancer with metastatic disease to the liver now with a five-year remission, cirrhosis and portal vein thrombosis with esophageal varices status post ablation history of GI bleed, hypertension, hyperlipidemia, peripheral neuropathy secondary to chemotherapy and lumbar stenosis; now admitted for acute inpatient rehabilitation with severe functional debility secondary to aAIDP were lumbar stenosis On admission the patient and medical record report that the patient had significant lumbar stenosis and was scheduled for surgery in the end of May 2017. Approximately 2 weeks prior to that event the patient starting having issues with tetraparesis having difficulty opening jars as well as having issues with increased difficulty with ambulation. It got to the point where it 1st was holding onto furniture then started using his  mother's front wheeled walker. He was also having issues with falls. The patient and his wife were attributing the difficulties primarily to the lumbar stenosis. He therefore was admitted and underwent successful surgery to address his lumbar stenosis in May 2017, because of his other symptoms neurology was consulted and a diagnosis with the assistance of EMG of AIDP was made. Patient had one 5 day course of IVIG    Patient was evaluated by Rehab Medicine physician and Physical Therapy and Occupational Therapy and determined to be appropriate for acute inpatient rehab and was transferred to [facility] on 8/10/2017      Pre-mobidly, the patient lived in a single level home in town with spouse.  With this acute therapeutic intervention, this patient hopes to improve his functional status, and return to  independent living with the supportive care of spouse and community resources.        REVIEW OF SYSTEMS:     A 12 point review of systems was performed and was negative in detail with the exception of items mentioned elsewhere in this document      PMH:  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/6/2010   • Thrombocytopenia due to drugs 4/25/2012   • Impaired fasting glucose 8/2/2012   • Vitamin d deficiency 8/2/2012   • BMI 33.0-33.9,adult 2/15/2013   • HLD (hyperlipidemia) 6/20/2013   • HTN (hypertension) 6/20/2013   • Hypertension    • Liver cirrhosis (CMS-HCC)    • GI bleed    • [place] measles    • Chickenpox        PSH:  Past Surgical History   Procedure Laterality Date   • Low anterior resection robotic  8/10/2010     Performed by RUBY RUIZ at SURGERY [facility] ORS   • Cath placement  9/20/2010     Performed by RUBY RUIZ at SURGERY [facility] ORS   • Hip arthroplasty mis total       rt   • Other       Cholecystectomy   • Other (comments)       liver surgery     • Cath removal  7/20/2011     Performed by RUBY RUIZ at SURGERY [facility] ORS   • Full thickness block resection  4/11/2012     Performed by LANI BOWMAN at SURGERY [facility]S   • Gastroscopy with banding  4/3/2016     Procedure: GASTROSCOPY WITH BANDING;  Surgeon: Cayetano Stovall M.D.;  Location: ENDOSCOPY [facility];  Service:    • Gastroscopy with banding  10/25/2016     Procedure: GASTROSCOPY WITH BANDING;  Surgeon: Pierre Waggoner M.D.;  Location: ENDOSCOPY [facility];  Service:    • Colon resection     • Hip replacement, total     • Lumbar laminectomy diskectomy  5/25/2017     Procedure:  LAMINECTOMY LUMBAR  4 TO SACRAL 1;   Surgeon: Felton Escobar M.D.;  Location: SURGERY [facility];  Service:        FAMILY HISTORY:  Noncontributory    MEDICATIONS:  Current Facility-Administered Medications   Medication Dose   • tamsulosin (FLOMAX) capsule 0.4 mg  0.4 mg   • Respiratory Care per Protocol     • Pharmacy Consult Request ...Pain Management Review 1 Each  1 Each   • nystatin (MYCOSTATIN) powder     • lactobacillus granules (LACTINEX/FLORANEX) packet 1 Packet  1 Packet   • spironolactone (ALDACTONE) tablet 25 mg  25 mg   • ursodiol (ACTIGALL) capsule 300 mg  300 mg   • vitamin D (cholecalciferol) tablet 1,000 Units  1,000 Units   • folic acid (FOLVITE) tablet 1 mg  1 mg   • carvedilol (COREG) tablet 3.125 mg  3.125 mg   • atorvastatin (LIPITOR) tablet 40 mg  40 mg   • docusate sodium (COLACE) capsule 100 mg  100 mg    And   • senna-docusate (PERICOLACE or SENOKOT S) 8.6-50 MG per tablet 1 Tab  1 Tab    And   • lactulose 20 GM/30ML solution 30 mL  30 mL    And   • bisacodyl (DULCOLAX) suppository 10 mg  10 mg   • benzocaine-menthol (CEPACOL) lozenge 1 Lozenge  1 Lozenge   • hydrALAZINE (APRESOLINE) tablet 25 mg  25 mg   • mag hydrox-al hydrox-simeth (MAALOX PLUS ES or MYLANTA DS) suspension 20 mL  20 mL   • ondansetron (ZOFRAN ODT) dispertab 4 mg  4 mg    Or   • ondansetron (ZOFRAN) syringe/vial injection 4 mg  4 mg   • trazodone (DESYREL) tablet 50 mg  50 mg   • sodium chloride (OCEAN) 0.65 % nasal spray 2 Spray  2 Spray   • oxycodone immediate release (ROXICODONE) tablet 10 mg  10 mg       ALLERGIES:  Nkda    PSYCHOSOCIAL HISTORY:  Living Site:  Home  Living With:  spouse  Caregiver's availability:  wife available  Number of stairs:  2 to enter home  Substance use history: Denies    LEVEL OF FUNCTION PRIOR TO DISABILTY:  Independent    LEVEL OF FUNCTION PRIOR TO ADMISSION to [facility]:  Min to mod assistance for ADLs and mobility    CURRENT LEVEL OF FUNCTION:   Same as level of function prior to admission to  "[facility]    PHYSICAL EXAM:     VITAL SIGNS:   height is 1.905 m (6' 3\") and weight is 116.801 kg (257 lb 8 oz). His temperature is 36.7 °C (98 °F). His blood pressure is 110/62 and his pulse is 68. His respiration is 16 and oxygen saturation is 93%.     GENERAL: No apparent distress  HEENT: Normocephalic/atraumatic, EOMI, PERRL and No nystagmus  CARDIAC: Regular rate and rhythm, normal S1, S2   LUNGS: Clear to auscultation   ABDOMINAL: bowel sounds present, soft, nontender and ascites present with fluid wave noted  EXTREMITIES: {Without clubbing or cyanosis 2+ edema noted in the left lower extremity  SKIN:   Small stage III noted on sacrum  NEURO:    Mental status:  A&Ox4 (person, place, date, situation) answers questions appropriately follows commands  Speech: fluent, no aphasia or dysarthria    CRANIAL NERVES:  2,3: visual acuity grossly intact, PERRL  3,4,6: EOMI bilaterally, no nystagmus or diplopia  5: intact in all branches  7: no facial asymmetry  8: hearing grossly intact  9,10: symmetric palate elevation  11: SCM/Trapezius strength 5/5 bilaterally  12: tongue protrudes midline    Motor:      Shoulder flexors:  Right -  4/5, Left -  3/5  Elbow flexors:  Bilateral -  4/5  Wrist extensors:  Right -  4/5, Left -  4-/5  Wrist flexors:  Bilateral -  5/5  Elbow extensors:  Right -  4/5, Left -  3/5  Finger flexors:  Bilateral -  4/5  Finger abductors:  Bilateral -  5/5  Hip flexors:  Right -  3/5, Left -  2/5  Knee ext:  Right -  4/5, Left -  4-/5  Dorsiflexors:  Bilateral - 4-1/5 on right 3- on left  EHL:  Right -  4/5, Left -  2/5  Plantar flexors:  Bilateral -  4/5  Sensory: intact to light touch through out    DTRs: 2+ in bilateral biceps and patellar tendons  Negative babinski b/l  Negative Sanabria b/l     Tone: no spasticity noted    Proprioception:  Coordination: finger to nose, fine motor intact with fingers to thumb    RADIOLOGY:  No recent films to review    LABS:  Recent Labs      " 08/11/17   0450   SODIUM  137   POTASSIUM  3.9   CHLORIDE  106   CO2  27   GLUCOSE  86   BUN  12   CREATININE  0.50   CALCIUM  8.5     Recent Labs      08/11/17   0450   WBC  3.8*   RBC  3.73*   HEMOGLOBIN  10.0*   HEMATOCRIT  32.2*   MCV  86.3   MCH  26.8*   MCHC  31.1*   RDW  54.3*   PLATELETCT  73*   MPV  10.6             PRIMARY REHAB DIAGNOSIS:    This patient is a 72 y.o. male admitted for acute inpatient rehabilitation with AIDP    IMPAIRMENTS:   ADLs/IADLs  Mobility    SECONDARY DIAGNOSIS/MEDICAL CO-MORBIDITIES AFFECTING FUNCTION:  CAD  History of metastatic colon cancer   Liver metastatic disease  Cirrhosis of the liver  Portal hypertension  Esophageal varices  Ascites  BPH  Hyperlipidemia  Hypertension  Decubitus ulcer sacral  Pain  Debility      RELEVANT CHANGES SINCE PREADMISSION EVALUATION:    Status unchanged    The patient's rehabilitation potential is Very Good  The patient's medical prognosis is good    PLAN:   Discussion and Recommendations:   1. The patient requires an acute inpatient rehabilitation program with a coordinated program of care at an intensity and frequency not available at a lower level of care. This recommendation is substantiated by the patient's medical physicians who recommend that the patient's intervention and assessment of medical issues needs to be done at an acute level of care for patient's safety and maximum outcome.   2. A coordinated program of care will be supplied by an interdisciplinary team of physical therapy, occupational therapy, rehab physician, rehab nursing, and, if needed, speech therapy and rehab psychology. Rehab team presents a patient-specific rehabilitation and education program concentrating on prevention of future problems related to accessibility, mobility, skin, bowel, bladder, sexuality, and psychosocial and medical/surgical problems.   3. Need for Rehabilitation Physician: The rehab physician will be evaluating the patient on a multi-weekly basis  to help coordinate the program of care. The rehab physician communicates between medical physicians, therapists, and nurses to maximize the patient's potential outcome. Specific areas in which the rehab physician will be providing daily assessment include the following:   A. Assessing the patient's heart rate and blood pressure response (vitals monitoring) to activity and making adjustments in medications or conservative measures as needed.   B. The rehab physician will be assessing the frequency at which the program can be increased to allow the patient to reach optimal functional outcome.   C. The rehab physician will also provide assessments in daily skin care, especially in light of patient's impairments in mobility.   D. The rehab physician will provide special expertise in understanding how to work with functional impairment and recommend appropriate interventions, compensatory techniques, and education that will facilitate the patient's outcome.   4. Rehab R.N.   The rehab RN will be working with patient to carry over in room mobility and activities of daily living when the patient is not in 3 hours of skilled therapy. Rehab nursing will be working in conjunction with rehab physician to address all the medical issues above and continue to assess laboratory work and discuss abnormalities with the treating physicians, assess vitals, and response to activity, and discuss and report abnormalities with the rehab physician. Rehab RN will also continue daily skin care, supervise bladder/bowel program, instruct in medication administration, and ensure patient safety.       REHABILITATION ISSUES/ADVERSE POTENTIAL:  1. AIDP Patient demonstrates functional deficits in strength, balance, coordination, and ADL's. Patient is admitted to [facility] for comprehensive rehabilitation therapy as described below.   Rehabilitation nursing monitors bowel and bladder control, educates on medication  administration, co-morbidities and monitors patient safety.      Therapies to treat at intensity and frequency of (may change after completion of evaluation by all therapeutic disciplines):       PT:  Physical therapy to address mobility, transfer, gait training and evaluation for adaptive equipment needs 1.5 hours/day at least 5 days/week for the duration of the ELOS (see below)       OT:  Occupational therapy to address ADLs, self-care, home management training, functional mobility/transfers and assistive device evaluation, and community re-integration 1.5 hours/day at least 5 days/week for the duration of the ELOS (see below).            2.  DVT prophylaxis:  Patient is far enough out of his original presentation  I feel there is not need for pharmacological for anticoagulationEncourage OOB. Monitor daily for signs and symptoms of DVT including but not limited to swelling and pain to prevent the development of DVT that may interfere with therapies.    3.  Pain: No issues with pain currently / Controlled with tylenol and prn oxycodone    5.  Nutrition/Dysphagia: Dietician monitors nutrient intake, recommend supplements prn and provide nutrition education to pt/family to promote optimal nutrition for wound healing/recovery.     6.  Bladder/bowel:  Start bowel and bladder program as described below, to prevent constipation, urinary retention (which may lead to UTI), and urinary incontinence (which will impact upon pt's functional independence).   - TV Q3h while awake with post void bladder scans, I&O cath for PVRs >400  - up to commode after meal     7.  Skin/dermal ulcer prophylaxis: Monitor for new skin conditions with q.2 h. turns as required to prevent the development of skin breakdown.     8.  Cognition/Behavior:  Psychologist Dr. Pinto provides adjustment counseling to illness and psychosocial barriers that may be potential barriers to rehabilitation.     9. Respiratory therapy: RT performs O2 management  prn, breathing retraining, pulmonary hygiene and bronchospasm management prn to optimize participation in therapies.     MEDICAL CO-MORBIDITIES/ADVERSE POTENTIAL AFFECTING FUNCTION:  CAD- cardiac precautions continue metoprolol  Hyperlipidemia-continue a Avastin  History of metastatic colon cancer will monitor  Liver metastatic disease monitor  Cirrhosis of the liver monitor the ascites   Portal hypertension none condition this was the ascites that may result  Esophageal varices were monitor H&H currently evidence of GI bleed   Ascites will monitor overall significant increases  BPH we'll check post void residuals consider medications if needed   Hypertension continue hydralazine Coreg and spironolactone  Decubitus ulcer sacral wound care consult   Pain narcotics as needed for pain control  Debility addressed in therapy     Pt was seen today for> 75 min, and entire time spent in face-to-face contact was >50% in counseling and coordination of care as detailed in A/P above.        GOALS/EXPECTED LEVEL OF FUNCTION BASED ON CURRENT MEDICAL AND FUNCTIONAL STATUS (may change based on patient's medical status and rate of impairment recovery):  Transfers:   Modified Independent  Mobility/Gait:   Modified Independent  ADL's:   Modified Independent    DISPOSITION: Discharge to pre-morbid independent living setting with the supportive care of patient's spouse and community resources.      ELOS: 21-28 days

## 2017-08-11 NOTE — PROGRESS NOTES
Pt is A&Ox4. Full code. CGA/MIn assist, able to move himself from w/c to bed and back. Pt states he is continent of B&B. Last BM was 10/9. Uses urinal.   No 02. Bladder scans. Teds. Reg diet, thin liquids, meds whole.  PU on coccyx. Pitting edema, left leg to waist.  Ascities

## 2017-08-11 NOTE — THERAPY
Occupational Therapy Initial Plan of Care Note    1) Assessment:  The patient is a 72 y.o. male with a past medical history of stage IV colon cancer with metastatic disease to the liver now with a five-year remission, cirrhosis and portal vein thrombosis with esophageal varices status post ablation history of GI bleed, hypertension, hyperlipidemia, peripheral neuropathy secondary to chemotherapy and lumbar stenosis; now admitted for acute inpatient rehabilitation with severe functional debility secondary to aAIDP were lumbar stenosis. 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 Pleasant and cooperative, pt. mod a with ADL transfers <> manual wc; total a toileting secondary to pericare/clothing management; min a with UB dressing/max a LB dressing.   Long term and short term goals have been discussed with patient and they are in agreement.  2) Plan:  Recommend Occupational Therapy  minutes per day 5-7 days per week for 2  weeks for the following treatments:  OT Self Care/ADL, OT Neuro Re-Ed/Balance, OT Therapeutic Activity, OT Evaluation and OT Therapeutic Exercise.  3) Goals:  Please refer to care plan for goals.

## 2017-08-12 LAB
APPEARANCE UR: ABNORMAL
BACTERIA #/AREA URNS HPF: ABNORMAL /HPF
BILIRUB UR QL STRIP.AUTO: NEGATIVE
COLOR UR: YELLOW
EPI CELLS #/AREA URNS HPF: NEGATIVE /HPF
GLUCOSE UR STRIP.AUTO-MCNC: NEGATIVE MG/DL
HYALINE CASTS #/AREA URNS LPF: ABNORMAL /LPF
KETONES UR STRIP.AUTO-MCNC: NEGATIVE MG/DL
LEUKOCYTE ESTERASE UR QL STRIP.AUTO: ABNORMAL
MICRO URNS: ABNORMAL
NITRITE UR QL STRIP.AUTO: POSITIVE
PH UR STRIP.AUTO: 8 [PH]
PROT UR QL STRIP: NEGATIVE MG/DL
RBC # URNS HPF: ABNORMAL /HPF
RBC UR QL AUTO: NEGATIVE
SP GR UR STRIP.AUTO: 1.01
UROBILINOGEN UR STRIP.AUTO-MCNC: 1 MG/DL
WBC #/AREA URNS HPF: ABNORMAL /HPF

## 2017-08-12 PROCEDURE — 87186 SC STD MICRODIL/AGAR DIL: CPT

## 2017-08-12 PROCEDURE — 700102 HCHG RX REV CODE 250 W/ 637 OVERRIDE(OP): Performed by: PHYSICAL MEDICINE & REHABILITATION

## 2017-08-12 PROCEDURE — 87077 CULTURE AEROBIC IDENTIFY: CPT

## 2017-08-12 PROCEDURE — 770010 HCHG ROOM/CARE - REHAB SEMI PRIVAT*

## 2017-08-12 PROCEDURE — 87086 URINE CULTURE/COLONY COUNT: CPT

## 2017-08-12 PROCEDURE — 99232 SBSQ HOSP IP/OBS MODERATE 35: CPT | Performed by: HOSPITALIST

## 2017-08-12 PROCEDURE — 81001 URINALYSIS AUTO W/SCOPE: CPT

## 2017-08-12 PROCEDURE — A9270 NON-COVERED ITEM OR SERVICE: HCPCS | Performed by: PHYSICAL MEDICINE & REHABILITATION

## 2017-08-12 RX ADMIN — URSODIOL 300 MG: 300 CAPSULE ORAL at 08:35

## 2017-08-12 RX ADMIN — NYSTATIN 1500000 UNITS: 100000 POWDER TOPICAL at 21:59

## 2017-08-12 RX ADMIN — CARVEDILOL 3.12 MG: 3.12 TABLET, FILM COATED ORAL at 17:33

## 2017-08-12 RX ADMIN — SPIRONOLACTONE 25 MG: 25 TABLET, FILM COATED ORAL at 05:44

## 2017-08-12 RX ADMIN — CARVEDILOL 3.12 MG: 3.12 TABLET, FILM COATED ORAL at 08:35

## 2017-08-12 RX ADMIN — TAMSULOSIN HYDROCHLORIDE 0.4 MG: 0.4 CAPSULE ORAL at 08:35

## 2017-08-12 RX ADMIN — LACTOBACILLUS ACIDOPHILUS / LACTOBACILLUS BULGARICUS 1 PACKET: 100 MILLION CFU STRENGTH GRANULES at 17:33

## 2017-08-12 RX ADMIN — NYSTATIN 1500000 UNITS: 100000 POWDER TOPICAL at 08:38

## 2017-08-12 RX ADMIN — VITAMIN D, TAB 1000IU (100/BT) 1000 UNITS: 25 TAB at 08:35

## 2017-08-12 RX ADMIN — FOLIC ACID 1 MG: 1 TABLET ORAL at 08:35

## 2017-08-12 RX ADMIN — LACTOBACILLUS ACIDOPHILUS / LACTOBACILLUS BULGARICUS 1 PACKET: 100 MILLION CFU STRENGTH GRANULES at 08:35

## 2017-08-12 RX ADMIN — LACTOBACILLUS ACIDOPHILUS / LACTOBACILLUS BULGARICUS 1 PACKET: 100 MILLION CFU STRENGTH GRANULES at 11:20

## 2017-08-12 RX ADMIN — ATORVASTATIN CALCIUM 40 MG: 40 TABLET, FILM COATED ORAL at 21:58

## 2017-08-12 ASSESSMENT — ENCOUNTER SYMPTOMS
FEVER: 0
HALLUCINATIONS: 0
SHORTNESS OF BREATH: 0
BLURRED VISION: 0
HEADACHES: 0
PALPITATIONS: 0
DIZZINESS: 0
VOMITING: 0
NAUSEA: 0

## 2017-08-12 ASSESSMENT — PAIN SCALES - GENERAL
PAINLEVEL_OUTOF10: 2
PAINLEVEL_OUTOF10: 0

## 2017-08-12 NOTE — CARE PLAN
Problem: Safety  Goal: Will remain free from falls  Outcome: PROGRESSING AS EXPECTED  CLIP.  Patient oriented to call light system and is calling appropriately.  Patient oriented to bathroom location, TV, bed controls, clock.  POC discussed with patient and patient oriented to white board.  Schedule for therapy delivered to patient.  Patient verbalized understanding.  Patients fall risk assessed.  Patient moved into a comfortable position.  Patient medicated for pain as needed and interventions in place.  Patient helped to the restroom.  Patient's personal belongings within reach.  Bed in low position.  Call light in place.  Patient reoriented to the call light.  Patient wearing non-skid footwear.  Bed rails up times three.  Ambulation assistive devices out of reach.  Bed and chair alarms in place.        Problem: Pain Management  Goal: Pain level will decrease to patient’s comfort goal  Outcome: PROGRESSING AS EXPECTED  Patient encouraged to breath deeply through the nose and to exhale out the mouth.  Patient watching television which has intervals of music playing increasing distraction.  Lights in the room switched off and noise in the hallways at a minimum.  Patient was asked questions about life outside the hospital as a way to provide guided imagery.  Pain assessed and medications administered per MAR as needed.

## 2017-08-12 NOTE — CARE PLAN
Problem: Skin Integrity  Goal: Risk for impaired skin integrity will decrease  Intervention: Assess and monitor skin integrity, appearance and/or temperature  Sacral decub has less yellow slough today. Surrounding area remains reddened. Old dressing removed as loose. Area cleansed with wound cleanser and cream applied to surrounding area. Wound bed cleansed with NS and honey alginate applied to bed and site covered with Mepilex. No c/o discomfort with care. Instructions to wife and pt re wound status and healing. Pt was placed on a Francis matress today and pt states is very comfortable. He is being repositioned every 1-2 hours.

## 2017-08-12 NOTE — CARE PLAN
"Problem: Urinary Elimination:  Goal: Ability to reestablish a normal urinary elimination pattern will improve  2057 CNA reported that patient's bladder scan was 957 mL after patient voided 600 mL.  2115 straight catheter resulted in 150 mL of yellow urine with white sediment. Patient stated that having the catheter inserted is very uncomfortable and \"smarts\" and that he was \"done with catheters for tonight.\"    0230 CNA reported bladder scan result of 705 mL after patient voided 500 mL.  0245 patient declined straight catheter stating \"I don't want to be uncooperative, but it is a very unpleasant experience for me, and hardly anything came out with the last one.\"        Problem: Skin Integrity  Goal: Risk for impaired skin integrity will decrease  Intervention: Implement precautions to protect skin integrity in collaboration with the interdisciplinary team  Patient's dressing on coccyx remains in place, clean, dry, and intact so far this shift. Reviewed importance of repositioning through the night to avoid pressure on the wound and promote healing. Patient verbalized understanding and is compliant with repositioning. He requested to be turned every 3 hours instead of every 2 this shift so that he could have a better night's sleep. Pillows in place for positioning and to relieve pressure points. Patient is able to turn himself side to side in bed to assist with repositioning.          "

## 2017-08-12 NOTE — CARE PLAN
Problem: Skin Integrity  Goal: Risk for impaired skin integrity will decrease  Outcome: PROGRESSING SLOWER THAN EXPECTED  Patient education provided about pressure ulcer healing.  Patient instructed to turn every two hours from left side to supine to right side and vise versa.  Patient instructed to offload for two minutes to one side every fifteen minutes while seated in wheelchair.  Patient instructed to eat food high in protein, vitamin c, and zinc.  Patient instructed to clean jessica area with soap and water after each episode of incontinence.  Patient instructed over plan of care for wound care.  Patient instructed to wash hands before and after eating/using the restroom.  Patient verbalized understanding.

## 2017-08-12 NOTE — PROGRESS NOTES
Hospital Medicine Progress Note, Adult, Complex               Author: Yaya Martinez Date & Time created: 8/12/2017  10:32 AM     Interval History:  No significant events or changes since last visit  Patient denies SOB, cough, or diarrhea  Patient slept ok last night    Chief Complaint:  Hypertension  Edema      Review of Systems:  Review of Systems   Constitutional: Negative for fever.   Eyes: Negative for blurred vision.   Respiratory: Negative for shortness of breath.    Cardiovascular: Negative for palpitations.   Gastrointestinal: Negative for nausea and vomiting.   Neurological: Negative for dizziness and headaches.   Psychiatric/Behavioral: Negative for hallucinations.       Physical Exam:  Physical Exam   Constitutional: He is oriented to person, place, and time.   HENT:   Mouth/Throat: Oropharynx is clear and moist.   Eyes: No scleral icterus.   Cardiovascular: Normal rate, regular rhythm, S1 normal and S2 normal.    No murmur heard.  Pulmonary/Chest: Effort normal. No stridor. He has no wheezes. He has no rhonchi. He has no rales.   Abdominal: Soft. Bowel sounds are normal. Hernia confirmed negative in the right inguinal area and confirmed negative in the left inguinal area.   Musculoskeletal: He exhibits edema.   Neurological: He is alert and oriented to person, place, and time. No sensory deficit.   Skin: Skin is warm and dry. No rash noted. He is not diaphoretic. No cyanosis.   Psychiatric: He has a normal mood and affect. His behavior is normal.   Nursing note and vitals reviewed.      Labs:        Invalid input(s): [id number]      Recent Labs      08/11/17   0450   SODIUM  137   POTASSIUM  3.9   CHLORIDE  106   CO2  27   BUN  12   CREATININE  0.50   CALCIUM  8.5     Recent Labs      08/11/17   0450   ALTSGPT  13   ASTSGOT  17   ALKPHOSPHAT  66   TBILIRUBIN  1.2   PREALBUMIN  6.0*   GLUCOSE  86     Recent Labs      08/11/17   0450   RBC  3.73*   HEMOGLOBIN  10.0*   HEMATOCRIT  32.2*   PLATELETCT  73*      Recent Labs      17   0450   WBC  3.8*   NEUTSPOLYS  61.20   LYMPHOCYTES  18.50*   MONOCYTES  13.50*   EOSINOPHILS  5.50   BASOPHILS  1.00   ASTSGOT  17   ALTSGPT  13   ALKPHOSPHAT  66   TBILIRUBIN  1.2           Hemodynamics:  Temp (24hrs), Av.9 °C (98.5 °F), Min:36.8 °C (98.2 °F), Max:37.1 °C (98.7 °F)  Temperature: 37.1 °C (98.7 °F)  Pulse  Av.7  Min: 64  Max: 74   Blood Pressure : 138/78 mmHg     Respiratory:    Respiration: 18, Pulse Oximetry: 93 %     Work Of Breathing / Effort: Mild  RUL Breath Sounds: Clear, RML Breath Sounds: Clear, RLL Breath Sounds: Clear, JOSE Breath Sounds: Clear, LLL Breath Sounds: Clear  Fluids:    Intake/Output Summary (Last 24 hours) at 17 1032  Last data filed at 17 0900   Gross per 24 hour   Intake   1120 ml   Output   1250 ml   Net   -130 ml        GI/Nutrition:  Orders Placed This Encounter   Procedures   • Diet Order     Standing Status: Standing      Number of Occurrences: 1      Standing Expiration Date:      Order Specific Question:  Diet:     Answer:  Regular [1]     Order Specific Question:  Consistency/Fluid modifications:     Answer:  Thin Liquids [3]     Medical Decision Making, by Problem:  Active Hospital Problems    Diagnosis   • *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]   • Left leg DVT (CMS-HCC) [I82.402]   • Esophageal varices- BANDED 2016- repeat egd tbd 10//29/16- gic [I85.00]   • ASCVD (arteriosclerotic cardiovascular disease)subtotalled small distal LCx and 40% LAD med rx by cath 12 [I25.10]   • GIB (gastrointestinal bleeding)- recurrent from varices [K92.2]   • BPH (benign prostatic hyperplasia) [N40.0]   • 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) [G62.0, T45.1X5A]   • Impaired mobility and ADLs [Z74.09]   • Lumbar stenosis [M48.06]   • Cirrhosis (CMS-HCC) [K74.60]   • H/O colon cancer, stage IV [Z85.038]   • Iron deficiency anemia due to chronic blood loss [D50.0]   • Cirrhosis  of liver with ascites (HCC)- ? 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

## 2017-08-12 NOTE — PROGRESS NOTES
Received report from RN, assumed patient care.  Patient resting comfortably in bed.  Discussed plan of care with patient.  Call light within reach, bed in low position.  VSS.  Pain reported 0/10; interventions in place; medication administered as needed per MAR.

## 2017-08-13 PROCEDURE — 700102 HCHG RX REV CODE 250 W/ 637 OVERRIDE(OP): Performed by: PHYSICAL MEDICINE & REHABILITATION

## 2017-08-13 PROCEDURE — 700102 HCHG RX REV CODE 250 W/ 637 OVERRIDE(OP): Performed by: HOSPITALIST

## 2017-08-13 PROCEDURE — 99232 SBSQ HOSP IP/OBS MODERATE 35: CPT | Performed by: HOSPITALIST

## 2017-08-13 PROCEDURE — 97530 THERAPEUTIC ACTIVITIES: CPT

## 2017-08-13 PROCEDURE — 97116 GAIT TRAINING THERAPY: CPT

## 2017-08-13 PROCEDURE — A9270 NON-COVERED ITEM OR SERVICE: HCPCS | Performed by: PHYSICAL MEDICINE & REHABILITATION

## 2017-08-13 PROCEDURE — 97110 THERAPEUTIC EXERCISES: CPT

## 2017-08-13 PROCEDURE — A9270 NON-COVERED ITEM OR SERVICE: HCPCS | Performed by: HOSPITALIST

## 2017-08-13 PROCEDURE — 770010 HCHG ROOM/CARE - REHAB SEMI PRIVAT*

## 2017-08-13 RX ORDER — CIPROFLOXACIN 500 MG/1
500 TABLET, FILM COATED ORAL EVERY 12 HOURS
Status: COMPLETED | OUTPATIENT
Start: 2017-08-13 | End: 2017-08-18

## 2017-08-13 RX ORDER — HYDROCORTISONE ACETATE 25 MG/1
25 SUPPOSITORY RECTAL EVERY 12 HOURS PRN
Status: DISCONTINUED | OUTPATIENT
Start: 2017-08-13 | End: 2017-09-07 | Stop reason: HOSPADM

## 2017-08-13 RX ADMIN — CARVEDILOL 3.12 MG: 3.12 TABLET, FILM COATED ORAL at 17:35

## 2017-08-13 RX ADMIN — LACTOBACILLUS ACIDOPHILUS / LACTOBACILLUS BULGARICUS 1 PACKET: 100 MILLION CFU STRENGTH GRANULES at 17:35

## 2017-08-13 RX ADMIN — FOLIC ACID 1 MG: 1 TABLET ORAL at 08:10

## 2017-08-13 RX ADMIN — TRAZODONE HYDROCHLORIDE 50 MG: 50 TABLET ORAL at 00:31

## 2017-08-13 RX ADMIN — HYDROCORTISONE ACETATE 25 MG: 25 SUPPOSITORY RECTAL at 16:13

## 2017-08-13 RX ADMIN — NYSTATIN 1500000 UNITS: 100000 POWDER TOPICAL at 08:11

## 2017-08-13 RX ADMIN — TAMSULOSIN HYDROCHLORIDE 0.4 MG: 0.4 CAPSULE ORAL at 08:09

## 2017-08-13 RX ADMIN — CARVEDILOL 3.12 MG: 3.12 TABLET, FILM COATED ORAL at 08:10

## 2017-08-13 RX ADMIN — VITAMIN D, TAB 1000IU (100/BT) 1000 UNITS: 25 TAB at 08:09

## 2017-08-13 RX ADMIN — LACTOBACILLUS ACIDOPHILUS / LACTOBACILLUS BULGARICUS 1 PACKET: 100 MILLION CFU STRENGTH GRANULES at 11:46

## 2017-08-13 RX ADMIN — ATORVASTATIN CALCIUM 40 MG: 40 TABLET, FILM COATED ORAL at 20:40

## 2017-08-13 RX ADMIN — URSODIOL 300 MG: 300 CAPSULE ORAL at 08:10

## 2017-08-13 RX ADMIN — SPIRONOLACTONE 25 MG: 25 TABLET, FILM COATED ORAL at 06:51

## 2017-08-13 RX ADMIN — CIPROFLOXACIN HYDROCHLORIDE 500 MG: 500 TABLET, FILM COATED ORAL at 15:33

## 2017-08-13 RX ADMIN — NYSTATIN 1500000 UNITS: 100000 POWDER TOPICAL at 20:41

## 2017-08-13 RX ADMIN — LACTOBACILLUS ACIDOPHILUS / LACTOBACILLUS BULGARICUS 1 PACKET: 100 MILLION CFU STRENGTH GRANULES at 08:10

## 2017-08-13 ASSESSMENT — GAIT ASSESSMENTS
DEVIATION: BRADYKINETIC;DECREASED HEEL STRIKE;DECREASED TOE OFF;DECREASED BASE OF SUPPORT
ASSISTIVE DEVICE: PARALLEL BARS
DISTANCE (FEET): 10
GAIT LEVEL OF ASSIST: CONTACT GUARD ASSIST

## 2017-08-13 NOTE — PROGRESS NOTES
"Rehab Progress Note     Interval Events (Subjective)  Patient seen in his room the dining room today.Notes of The therapist the hospitalist appreciated    Patient is in very good spirits no new problems at this time    Labs reviewed Objective:  VITAL SIGNS: /80 mmHg  Pulse 77  Temp(Src) 36.7 °C (98 °F)  Resp 16  Ht 1.905 m (6' 3\")  Wt 116.801 kg (257 lb 8 oz)  BMI 32.19 kg/m2  SpO2 94%      GENERAL: No apparent distress  HEENT: Normocephalic/atraumatic, EOMI, PERRL and No nystagmus  CARDIAC: Regular rate and rhythm, normal S1, S2   LUNGS: Clear to auscultation   ABDOMINAL: bowel sounds present, soft, nontender and ascites present with fluid wave noted  EXTREMITIES: {Without clubbing or cyanosis 2+ edema noted in the left lower extremity  SKIN:   Small stage III noted on sacrum  NEURO: Unchanged    Recent Results (from the past 72 hour(s))   CBC with Differential    Collection Time: 08/11/17  4:50 AM   Result Value Ref Range    WBC 3.8 (L) 4.8 - 10.8 K/uL    RBC 3.73 (L) 4.70 - 6.10 M/uL    Hemoglobin 10.0 (L) 14.0 - 18.0 g/dL    Hematocrit 32.2 (L) 42.0 - 52.0 %    MCV 86.3 81.4 - 97.8 fL    MCH 26.8 (L) 27.0 - 33.0 pg    MCHC 31.1 (L) 33.7 - 35.3 g/dL    RDW 54.3 (H) 35.9 - 50.0 fL    Platelet Count 73 (L) 164 - 446 K/uL    MPV 10.6 9.0 - 12.9 fL    Neutrophils-Polys 61.20 44.00 - 72.00 %    Lymphocytes 18.50 (L) 22.00 - 41.00 %    Monocytes 13.50 (H) 0.00 - 13.40 %    Eosinophils 5.50 0.00 - 6.90 %    Basophils 1.00 0.00 - 1.80 %    Immature Granulocytes 0.30 0.00 - 0.90 %    Nucleated RBC 0.50 /100 WBC    Neutrophils (Absolute) 2.35 1.82 - 7.42 K/uL    Lymphs (Absolute) 0.71 (L) 1.00 - 4.80 K/uL    Monos (Absolute) 0.52 0.00 - 0.85 K/uL    Eos (Absolute) 0.21 0.00 - 0.51 K/uL    Baso (Absolute) 0.04 0.00 - 0.12 K/uL    Immature Granulocytes (abs) 0.01 0.00 - 0.11 K/uL    NRBC (Absolute) 0.02 K/uL   Comp Metabolic Panel (CMP)    Collection Time: 08/11/17  4:50 AM   Result Value Ref Range    Sodium 137 " 135 - 145 mmol/L    Potassium 3.9 3.6 - 5.5 mmol/L    Chloride 106 96 - 112 mmol/L    Co2 27 20 - 33 mmol/L    Anion Gap 4.0 0.0 - 11.9    Glucose 86 65 - 99 mg/dL    Bun 12 8 - 22 mg/dL    Creatinine 0.50 0.50 - 1.40 mg/dL    Calcium 8.5 8.5 - 10.5 mg/dL    AST(SGOT) 17 12 - 45 U/L    ALT(SGPT) 13 2 - 50 U/L    Alkaline Phosphatase 66 30 - 99 U/L    Total Bilirubin 1.2 0.1 - 1.5 mg/dL    Albumin 2.5 (L) 3.2 - 4.9 g/dL    Total Protein 4.7 (L) 6.0 - 8.2 g/dL    Globulin 2.2 1.9 - 3.5 g/dL    A-G Ratio 1.1 g/dL   Prealbumin    Collection Time: 08/11/17  4:50 AM   Result Value Ref Range    Pre-Albumin 6.0 (L) 18.0 - 38.0 mg/dL   Vitamin D, 25-hydroxy (blood)    Collection Time: 08/11/17  4:50 AM   Result Value Ref Range    25-Hydroxy   Vitamin D 25 32 30 - 100 ng/mL   ESTIMATED GFR    Collection Time: 08/11/17  4:50 AM   Result Value Ref Range    GFR If African American >60 >60 mL/min/1.73 m 2    GFR If Non African American >60 >60 mL/min/1.73 m 2   URINALYSIS    Collection Time: 08/12/17 10:45 AM   Result Value Ref Range    Color Yellow     Character Cloudy (A)     Specific Gravity 1.012 <1.035    Ph 8.0 5.0-8.0    Glucose Negative Negative mg/dL    Ketones Negative Negative mg/dL    Protein Negative Negative mg/dL    Bilirubin Negative Negative    Urobilinogen, Urine 1.0 Negative    Nitrite Positive (A) Negative    Leukocyte Esterase Trace (A) Negative    Occult Blood Negative Negative    Micro Urine Req Microscopic    URINE MICROSCOPIC (W/UA)    Collection Time: 08/12/17 10:45 AM   Result Value Ref Range    WBC 2-5 (A) /hpf    RBC 0-2 (A) /hpf    Bacteria Many (A) None /hpf    Epithelial Cells Negative /hpf    Hyaline Cast 0-2 /lpf       Current Facility-Administered Medications   Medication Frequency   • tamsulosin (FLOMAX) capsule 0.4 mg AFTER BREAKFAST   • Respiratory Care per Protocol Continuous RT   • Pharmacy Consult Request ...Pain Management Review 1 Each PRN   • nystatin (MYCOSTATIN) powder BID   •  lactobacillus granules (LACTINEX/FLORANEX) packet 1 Packet TID WITH MEALS   • spironolactone (ALDACTONE) tablet 25 mg Q DAY   • ursodiol (ACTIGALL) capsule 300 mg DAILY   • vitamin D (cholecalciferol) tablet 1,000 Units DAILY   • folic acid (FOLVITE) tablet 1 mg DAILY   • carvedilol (COREG) tablet 3.125 mg BID WITH MEALS   • atorvastatin (LIPITOR) tablet 40 mg QHS   • docusate sodium (COLACE) capsule 100 mg DAILY    And   • senna-docusate (PERICOLACE or SENOKOT S) 8.6-50 MG per tablet 1 Tab Q EVENING    And   • lactulose 20 GM/30ML solution 30 mL Q24HRS PRN    And   • bisacodyl (DULCOLAX) suppository 10 mg QDAY PRN   • benzocaine-menthol (CEPACOL) lozenge 1 Lozenge Q2HRS PRN   • hydrALAZINE (APRESOLINE) tablet 25 mg Q8HRS PRN   • mag hydrox-al hydrox-simeth (MAALOX PLUS ES or MYLANTA DS) suspension 20 mL Q2HRS PRN   • ondansetron (ZOFRAN ODT) dispertab 4 mg 4X/DAY PRN    Or   • ondansetron (ZOFRAN) syringe/vial injection 4 mg 4X/DAY PRN   • trazodone (DESYREL) tablet 50 mg QHS PRN   • sodium chloride (OCEAN) 0.65 % nasal spray 2 Spray PRN   • oxycodone immediate release (ROXICODONE) tablet 10 mg Q4HRS PRN       Orders Placed This Encounter   Procedures   • Diet Order     Standing Status: Standing      Number of Occurrences: 1      Standing Expiration Date:      Order Specific Question:  Diet:     Answer:  Regular [1]     Order Specific Question:  Consistency/Fluid modifications:     Answer:  Thin Liquids [3]       Assessment:  Active Hospital Problems    Diagnosis   • *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 DUE TO OLD HEP B, CHEMO RX,  OR THERMO RAD OF MAGNANT LESION   • Essential hypertension   • Colon cancer-2010 left hemicolectomy; no colostomy       Medical Decision Making and Plan:  The Therapist completing their initial evaluations  Patient is in good spirits overall  Wound care has seen the patient's wound is recommending a different type of bed for the patient  We'll continue PT and OT    Initial team conference will be next Wednesday, 8/16/2017    Labs reviewed      Cirrhosis/ascites    Stable at this time but will monitor    Hypertension       under control with Aldactone and Corgard    Stage III pressure ulcer, on the Coccyx    Appreciate the input of the wound care nurse honey colloid applied    Vitamin D deficiency    Continue replacement    Lower extremity edema left leg    We will continue to monitor    Total time:  > 25 minutes.  I spent greater than 50% of the time for patient care and coordination on this date, including unit/floor time, and face-to-face time with the patient as per assessment and plan above.    Moreno Adorno M.D.

## 2017-08-13 NOTE — PROGRESS NOTES
Bladder scan preformed and a value of 999 received.  Straight catheter inserted using sterile technique and patient tolerated well.  300 mL cloudy urine returned that was turbid and full of sentiment.  Sediment had clogged the catheter.  Charge nurse notified.

## 2017-08-13 NOTE — CARE PLAN
Problem: Safety  Goal: Will remain free from falls  Intervention: Implement fall precautions  Patient unsteady, assisted with transfers to prevent falls.      Problem: Urinary Elimination:  Goal: Ability to reestablish a normal urinary elimination pattern will improve  Patient's PVR monitored with high results but appears to be inaccurate due to ascitis, patient stated previous PVR's were high with low ICP's which is inconsistent, refused to be catheterized today and stated he doesn't have discomfort, was started on Cipro today as ordered, will continue to monitor.

## 2017-08-13 NOTE — PROGRESS NOTES
Hospital Medicine Progress Note, Adult, Complex               Author: Yaya Martinez Date & Time created: 8/13/2017  8:48 AM     Interval History:  No significant events or changes since last visit  Patient denies SOB, cough, or diarrhea  Patient slept ok last night    Chief Complaint:  Hypertension  Edema      Review of Systems:  Review of Systems   Constitutional: Negative for fever and chills.   Respiratory: Negative for shortness of breath.    Cardiovascular: Negative for chest pain.   Gastrointestinal: Negative for nausea, vomiting, abdominal pain and diarrhea.   Psychiatric/Behavioral: The patient is not nervous/anxious.        Physical Exam:  Physical Exam   Constitutional: He is oriented to person, place, and time. He appears well-nourished.   HENT:   Head: Atraumatic.   Eyes: Conjunctivae are normal. Pupils are equal, round, and reactive to light.   Neck: Normal range of motion. Neck supple. No tracheal deviation present.   Cardiovascular: Normal rate, regular rhythm, S1 normal and S2 normal.    No murmur heard.  Pulmonary/Chest: Effort normal. He has no wheezes. He has no rhonchi. He has no rales.   Abdominal: Soft. He exhibits no distension. There is no tenderness. Hernia confirmed negative in the right inguinal area and confirmed negative in the left inguinal area.   Musculoskeletal: He exhibits edema.   Neurological: He is alert and oriented to person, place, and time. No sensory deficit.   Skin: Skin is warm and dry. No rash noted. No cyanosis.   Psychiatric: He has a normal mood and affect. His behavior is normal.   Nursing note and vitals reviewed.      Labs:        Invalid input(s): [id number]      Recent Labs      08/11/17   0450   SODIUM  137   POTASSIUM  3.9   CHLORIDE  106   CO2  27   BUN  12   CREATININE  0.50   CALCIUM  8.5     Recent Labs      08/11/17   0450   ALTSGPT  13   ASTSGOT  17   ALKPHOSPHAT  66   TBILIRUBIN  1.2   PREALBUMIN  6.0*   GLUCOSE  86     Recent Labs      08/11/17   0450    RBC  3.73*   HEMOGLOBIN  10.0*   HEMATOCRIT  32.2*   PLATELETCT  73*     Recent Labs      17   0450   WBC  3.8*   NEUTSPOLYS  61.20   LYMPHOCYTES  18.50*   MONOCYTES  13.50*   EOSINOPHILS  5.50   BASOPHILS  1.00   ASTSGOT  17   ALTSGPT  13   ALKPHOSPHAT  66   TBILIRUBIN  1.2           Hemodynamics:  Temp (24hrs), Av.7 °C (98.1 °F), Min:36.7 °C (98 °F), Max:36.8 °C (98.3 °F)  Temperature: 36.7 °C (98 °F)  Pulse  Av.9  Min: 64  Max: 77   Blood Pressure : 136/80 mmHg     Respiratory:    Respiration: 16, Pulse Oximetry: 94 %     Work Of Breathing / Effort: Mild  RUL Breath Sounds: Clear, RML Breath Sounds: Clear, RLL Breath Sounds: Clear, JOSE Breath Sounds: Clear, LLL Breath Sounds: Clear  Fluids:    Intake/Output Summary (Last 24 hours) at 17 0848  Last data filed at 17 0530   Gross per 24 hour   Intake    830 ml   Output   3175 ml   Net  -2345 ml        GI/Nutrition:  Orders Placed This Encounter   Procedures   • Diet Order     Standing Status: Standing      Number of Occurrences: 1      Standing Expiration Date:      Order Specific Question:  Diet:     Answer:  Regular [1]     Order Specific Question:  Consistency/Fluid modifications:     Answer:  Thin Liquids [3]     Medical Decision Making, by Problem:  Active Hospital Problems    Diagnosis   • *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]   • Left leg DVT (CMS-HCC) [I82.402]   • Esophageal varices- BANDED 2016- repeat egd tbd 10//29/16- gic [I85.00]   • ASCVD (arteriosclerotic cardiovascular disease)subtotalled small distal LCx and 40% LAD med rx by cath 12 [I25.10]   • GIB (gastrointestinal bleeding)- recurrent from varices [K92.2]   • BPH (benign prostatic hyperplasia) [N40.0]   • 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) [G62.0, T45.1X5A]   • Impaired mobility and ADLs [Z74.09]   • Lumbar stenosis [M48.06]   • Cirrhosis (CMS-HCC) [K74.60]   • H/O colon cancer, stage IV [Z85.038]   • Iron  deficiency anemia due to chronic blood loss [D50.0]   • Cirrhosis of liver with ascites (HCC)- ? DUE TO OLD HEP B, CHEMO RX,  OR THERMO RAD OF MAGNANT LESION [K74.60]   • Essential hypertension [I10]   • Colon cancer-2010 left hemicolectomy; no colostomy [C18.9]     *  S/P Recent Lumbar Surgery    *  Hypertension  BP recently ok  On Coreg: 3.125 mg bid  Note: home meds were Coreg 3.125 mg 1/2 tab bid and Losartan: 25 mg daily  Monitor    *  U/A (+)  Has had some difficulty urinating recently with some burning  Cx --> GNR  F/U C&S   On Cipro empirically unitl C&S come back    *  LE Edema  Mild  On Aldactone: 25 mg daily    *  Thrombocytopenia  Platelets stable (8/11)  2nd to prior chemo and cirrhosis    *  Hx Colon Cancer: stage IV; in 5 year remission  *  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

## 2017-08-13 NOTE — PROGRESS NOTES
Bladder scan performed and a reading of 999 was received.  Straight catheter inserted using sterile technique and patient tolerated well.  800 mL cloudy urine with sediment returned.  UA sample collected.

## 2017-08-13 NOTE — CARE PLAN
Problem: Bowel/Gastric:  Goal: Normal bowel function is maintained or improved  Outcome: PROGRESSING SLOWER THAN EXPECTED  Patient has had multiple incontinent bowel episodes of soft stool this shift. Scheduled bowel medications were held.   0245 patient and CNA reported that patient had two incontinent bowel episodes back to back.   This RN encouraged patient to get up to the toilet to promote more complete emptying of bowels. Patient stated that by the time he pushed his call light for assistance, the stool was already coming out. He also stated that he did not want to get up to the toilet because the toilet seat was too hard and uncomfortable for him to sit on.   0300 Soft commode seat shown to patient and placed over toilet. Patient stated that he thinks that seat will be more comfortable and he will try to get up to the bathroom next time.  Wound dressing to coccyx clean and intact at this time. He denies abdominal pain or discomfort.    Problem: Pain Management  Goal: Pain level will decrease to patient’s comfort goal  Outcome: PROGRESSING AS EXPECTED  Torey has reported that he has had had little pain this shift and has denied the need for pain interventions. Will continue to monitor for pain.     Problem: Urinary Elimination:  Goal: Ability to reestablish a normal urinary elimination pattern will improve  2221 patient voided 350 mL. Post void bladder scan showed 674 mL. Patient denies pain or difficulty with voiding.  2240 intermittent catheter resulted in 100 mL of cloudy, yellow urine. Patient tolerated procedure well but states that having the catheter inserted is uncomfortable.

## 2017-08-13 NOTE — CARE PLAN
Problem: Urinary Elimination:  Goal: Ability to reestablish a normal urinary elimination pattern will improve  Intervention: Assess and monitor for signs and symptoms of urinary retention  C/o burning with urination. Ua result demonstrate positive UA. I notified Dr Martinez and order received for Cipro 500 mg bid x 5 days.

## 2017-08-14 LAB
BACTERIA UR CULT: ABNORMAL
BACTERIA UR CULT: ABNORMAL
C DIFF DNA SPEC QL NAA+PROBE: NEGATIVE
C DIFF TOX GENS STL QL NAA+PROBE: NEGATIVE
SIGNIFICANT IND 70042: ABNORMAL
SITE SITE: ABNORMAL
SOURCE SOURCE: ABNORMAL

## 2017-08-14 PROCEDURE — 700102 HCHG RX REV CODE 250 W/ 637 OVERRIDE(OP): Performed by: PHYSICAL MEDICINE & REHABILITATION

## 2017-08-14 PROCEDURE — 97116 GAIT TRAINING THERAPY: CPT

## 2017-08-14 PROCEDURE — 770010 HCHG ROOM/CARE - REHAB SEMI PRIVAT*

## 2017-08-14 PROCEDURE — 97535 SELF CARE MNGMENT TRAINING: CPT

## 2017-08-14 PROCEDURE — 97530 THERAPEUTIC ACTIVITIES: CPT

## 2017-08-14 PROCEDURE — A9270 NON-COVERED ITEM OR SERVICE: HCPCS | Performed by: PHYSICAL MEDICINE & REHABILITATION

## 2017-08-14 PROCEDURE — 87493 C DIFF AMPLIFIED PROBE: CPT

## 2017-08-14 PROCEDURE — 99232 SBSQ HOSP IP/OBS MODERATE 35: CPT | Performed by: PHYSICAL MEDICINE & REHABILITATION

## 2017-08-14 PROCEDURE — 99232 SBSQ HOSP IP/OBS MODERATE 35: CPT | Performed by: HOSPITALIST

## 2017-08-14 PROCEDURE — A9270 NON-COVERED ITEM OR SERVICE: HCPCS | Performed by: HOSPITALIST

## 2017-08-14 PROCEDURE — 700102 HCHG RX REV CODE 250 W/ 637 OVERRIDE(OP): Performed by: HOSPITALIST

## 2017-08-14 RX ORDER — LACTULOSE 20 G/30ML
30 SOLUTION ORAL
Status: DISCONTINUED | OUTPATIENT
Start: 2017-08-14 | End: 2017-09-07 | Stop reason: HOSPADM

## 2017-08-14 RX ORDER — AMOXICILLIN 250 MG
1 CAPSULE ORAL
Status: DISCONTINUED | OUTPATIENT
Start: 2017-08-14 | End: 2017-09-07 | Stop reason: HOSPADM

## 2017-08-14 RX ORDER — CHOLESTYRAMINE LIGHT 4 G/5.7G
1 POWDER, FOR SUSPENSION ORAL
Status: DISCONTINUED | OUTPATIENT
Start: 2017-08-14 | End: 2017-08-17

## 2017-08-14 RX ORDER — BISACODYL 10 MG
10 SUPPOSITORY, RECTAL RECTAL
Status: DISCONTINUED | OUTPATIENT
Start: 2017-08-14 | End: 2017-09-07 | Stop reason: HOSPADM

## 2017-08-14 RX ORDER — DOCUSATE SODIUM 100 MG/1
100 CAPSULE, LIQUID FILLED ORAL 2 TIMES DAILY PRN
Status: DISCONTINUED | OUTPATIENT
Start: 2017-08-14 | End: 2017-09-07 | Stop reason: HOSPADM

## 2017-08-14 RX ADMIN — CARVEDILOL 3.12 MG: 3.12 TABLET, FILM COATED ORAL at 18:11

## 2017-08-14 RX ADMIN — ATORVASTATIN CALCIUM 40 MG: 40 TABLET, FILM COATED ORAL at 21:27

## 2017-08-14 RX ADMIN — URSODIOL 300 MG: 300 CAPSULE ORAL at 09:01

## 2017-08-14 RX ADMIN — CHOLESTYRAMINE 4 G: 4 POWDER, FOR SUSPENSION ORAL at 18:10

## 2017-08-14 RX ADMIN — LACTOBACILLUS ACIDOPHILUS / LACTOBACILLUS BULGARICUS 1 PACKET: 100 MILLION CFU STRENGTH GRANULES at 09:00

## 2017-08-14 RX ADMIN — CIPROFLOXACIN HYDROCHLORIDE 500 MG: 500 TABLET, FILM COATED ORAL at 08:55

## 2017-08-14 RX ADMIN — NYSTATIN 1500000 UNITS: 100000 POWDER TOPICAL at 21:33

## 2017-08-14 RX ADMIN — VITAMIN D, TAB 1000IU (100/BT) 1000 UNITS: 25 TAB at 09:00

## 2017-08-14 RX ADMIN — TAMSULOSIN HYDROCHLORIDE 0.4 MG: 0.4 CAPSULE ORAL at 09:00

## 2017-08-14 RX ADMIN — CIPROFLOXACIN HYDROCHLORIDE 500 MG: 500 TABLET, FILM COATED ORAL at 21:27

## 2017-08-14 RX ADMIN — LACTOBACILLUS ACIDOPHILUS / LACTOBACILLUS BULGARICUS 1 PACKET: 100 MILLION CFU STRENGTH GRANULES at 11:50

## 2017-08-14 RX ADMIN — NYSTATIN 1500000 UNITS: 100000 POWDER TOPICAL at 09:03

## 2017-08-14 RX ADMIN — HYDROCORTISONE ACETATE 25 MG: 25 SUPPOSITORY RECTAL at 18:11

## 2017-08-14 RX ADMIN — SPIRONOLACTONE 25 MG: 25 TABLET, FILM COATED ORAL at 05:37

## 2017-08-14 RX ADMIN — LACTOBACILLUS ACIDOPHILUS / LACTOBACILLUS BULGARICUS 1 PACKET: 100 MILLION CFU STRENGTH GRANULES at 18:10

## 2017-08-14 RX ADMIN — FOLIC ACID 1 MG: 1 TABLET ORAL at 09:01

## 2017-08-14 RX ADMIN — CARVEDILOL 3.12 MG: 3.12 TABLET, FILM COATED ORAL at 09:01

## 2017-08-14 ASSESSMENT — ENCOUNTER SYMPTOMS
DIARRHEA: 0
VOMITING: 0
FEVER: 0
SHORTNESS OF BREATH: 0
NAUSEA: 0
NERVOUS/ANXIOUS: 0
CHILLS: 0
ABDOMINAL PAIN: 0

## 2017-08-14 ASSESSMENT — GAIT ASSESSMENTS
GAIT LEVEL OF ASSIST: MODERATE ASSIST
DISTANCE (FEET): 8
ASSISTIVE DEVICE: FRONT WHEEL WALKER
DEVIATION: DECREASED BASE OF SUPPORT;BRADYKINETIC;DECREASED HEEL STRIKE;DECREASED TOE OFF;STEP TO

## 2017-08-14 ASSESSMENT — PAIN SCALES - GENERAL
PAINLEVEL_OUTOF10: 0
PAINLEVEL_OUTOF10: 0

## 2017-08-14 NOTE — CARE PLAN
Problem: Other Problem (see comments)  Intervention: Other Intervention  Intervention/ Recommendations/POC:  1. Continue current diet.  Avoid excessive salt intake. Increase supplement to TID.   2. Continue adequate PO/fluid intake.  3. Nutrition rep to see regarding food prefs/ honor within dietary restrictions (if indicated)  4. Add MVI + minerals, 500mg Vitamin C BID x 14 days to optimize wound healing     Goal: Other Goal    Monitor/Evaluation: Monitor PO intake, weight, labs, medication adjustments, skin integrity, GI function, vitals, I/Os, and overall hydration status. Adjust nutritional POC pending clinical outcomes.   RD following PRN. PO is adequate. Nutrition interventions in place.   Goal: Maintain adequate oral nutrient/fluid intake to promote nutrition optimization/wound healing.       Outcome: PROGRESSING AS EXPECTED

## 2017-08-14 NOTE — CARE PLAN
Problem: Safety  Goal: Will remain free from injury  Outcome: PROGRESSING AS EXPECTED  Reviewed fall and safety precautions with patient. Pt uses call light consistently and appropriately and has not attempted self transfer this shift. Able to verbalize needs. Call light and belongings within reach, bed alarm in use, bed in lowest position, treaded socks in use, and hourly rounding in place.        Problem: Pain Management  Goal: Pain level will decrease to patient’s comfort goal  2040 Patient reported a burning sensation at his rectum and buttocks and requested PRN hydrocortisone suppository. Explained to patient that suppository is ordered once every 12 hours PRN and previous administration was at 1613. Patient verbalized understanding. Applied moisture barrier cream to buttocks and perineum. Patient reported adequate relief of burning with barrier cream.    Problem: Urinary Elimination:  Goal: Ability to reestablish a normal urinary elimination pattern will improve  Patient is refusing all bladder scans and intermittent catheterization this shift due to inconsistency between bladder scans and catheter results. He states that he wants to talk to the doctor before continuing with scans and catheterization. Reviewed risks associated with retaining urine and symptoms of retaining including bladder distension and discomfort. Patient verbalized understanding. He stated that he was not having any discomfort except for burning with urination and agreed to report any new symptoms.

## 2017-08-14 NOTE — REHAB-DIETARY IDT TEAM NOTE
Dietary  Nutrition       Dietary Problems           Problem: Other Problem (see comments)     Description: Diagnosis: Increased nutrient needs (protein) r/t high demand for protein in the setting of wound healing/ maintaining LBM as evidenced by patient admitted with DTI to coccyx, visible evidence of muscle wasting.           Goal: Other Goal      Monitor/Evaluation: Monitor PO intake, weight, labs, medication adjustments, skin integrity, GI function, vitals, I/Os, and overall hydration status.  Adjust nutritional POC pending clinical outcomes.      RD following PRN.  PO is adequate.  Nutrition interventions in place.     Goal: Maintain adequate oral nutrient/fluid intake to promote nutrition optimization/wound healing.                  Nutrition Care Follow Up Note:    ASSESSMENT:  [place] today and she noted patient not doing too well d/t bladder retention/ worsening ascites which is making him feel full.  Walked with patient's wife back to patient's room where he was working on some documents.  He tells me he was just unable to eat anymore at breakfast today without feeling like he was going to vomit.      Patient reports feeling discomfort s/t new diagnosis of UTI s/t catheter.  Abx initiated.    Ascites appears to have worsened.    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       It appears as though patient is on 1.5L FR although this is not in the diet order.  Discussed need to avoid foods high in Na+ in excess to avoid further fluid retention.     Patient denies any chewing/swallowing issues.  GI issues noted, patient is having routine BMs.       Appetite: Good    Diet: Regular  (RN charted 1.5L FR) + Ensure BID    Average PO intake x 3 days: 58% of meals since admission + % of supplement = adequate PO intake     Labs:  No recent labs since 8/11aside from +    Pertinent Medications: Lipitor, Coreg, Cipro, Bowel Regimen, Folvite, Lactinex, Nystatin, Aldactone, Flomax,  Actigall, Vitamin D     PRN medications: noted    Weight: 110.9kg- taken via stand up scale (likely more accurate weight than admission weight)- monitor trends  Height: 190.5cm  BMI: 30.56 (obese class 1)    Nutrient Needs:  Kcal: 4288-4807 kcal/day ( 25-27 kcal/kg IBW)         BEE= 2001 kcal x 1.1-1.2 g/kg    Protein: 89-107g/day ( 1-1.2g/kg IBW)- consider checking NH3 level    Fluid: 1800mL - 2000mL/day in the setting of ascites/ fluid retention/ liver failure     Diagnosis: Increased nutrient needs (protein) r/t high demand for protein in the setting of wound healing/ maintaining LBM as evidenced by patient admitted with DTI to [facility], visible evidence of muscle wasting. (ongoing)     Intervention/ Recommendations/POC:  1. Continue current diet.  Avoid excessive salt intake. Increase supplement to TID.    2. Continue adequate PO/fluid intake.  3. Nutrition rep to see regarding food prefs/ honor within dietary restrictions (if indicated)  4. Add MVI + minerals, 500mg Vitamin C BID x 14 days to optimize wound healing        Monitor/Evaluation: Monitor PO intake, weight, labs, medication adjustments, skin integrity, GI function, vitals, I/Os, and overall hydration status.  Adjust nutritional POC pending clinical outcomes.     RD following weekly.  Nutrition interventions in place.    Goal: Maintain adequate oral nutrient/fluid intake to promote nutrition optimization/wound healing.                           Section completed by:  Clarisa Paulino RD

## 2017-08-14 NOTE — CARE PLAN
Problem: Other Problem (see comments)  Goal: Other Goal    Monitor/Evaluation: Monitor PO intake, weight, labs, medication adjustments, skin integrity, GI function, vitals, I/Os, and overall hydration status. Adjust nutritional POC pending clinical outcomes.   RD following PRN. PO is adequate. Nutrition interventions in place.   Goal: Maintain adequate oral nutrient/fluid intake to promote nutrition optimization/wound healing.       Outcome: PROGRESSING AS EXPECTED

## 2017-08-14 NOTE — DISCHARGE PLANNING
CASE MANAGEMENT INITIAL ASSESSMENT    Admit Date:  8/10/2017     I met with patient's wife, Celestina to discuss role of case management / discharge planning / team conference.  Pt is currently in isolation due to possible c-diff. Spouse provided me with information for assessment.     Patient is a  72 y.o. male transferred from [facility] where he was hospitalized there from  6/29/2017 through 8/10/2017.      Accepting MD to [facility] is Dr. Moreno Adorno.      Diagnosis: Colon Cancer  Lumbar stenosis    Co-morbidities:   Patient Active Problem List    Diagnosis Date Noted   • Left leg DVT (CMS-HCC) 06/26/2017     Priority: High   • Esophageal varices- BANDED 4/2016- repeat egd tbd 10//29/16- gic 04/04/2016     Priority: High   • BMI 34.0-34.9,adult 02/15/2013     Priority: High   • ASCVD (arteriosclerotic cardiovascular disease)subtotalled small distal LCx and 40% LAD med rx by cath 12/13/12 12/14/2012     Priority: High   • Gastric varices- s/p BRTO procedure at [facility] 04/04/2016     Priority: Medium   • GIB (gastrointestinal bleeding)- recurrent from varices 04/03/2016     Priority: Medium   • BPH (benign prostatic hyperplasia) 08/11/2017   • Peripheral neuropathy due to chemotherapy (CMS-[facility]) 08/11/2017   • Impaired mobility and ADLs 08/11/2017   • Lumbar stenosis 08/10/2017   • AIDP (acute inflammatory demyelinating polyneuropathy) (CMS-[facility]) 05/27/2017   • Cirrhosis (CMS-[facility]) 05/20/2017   • Generalized weakness 05/20/2017   • H/O colon cancer, stage IV 05/20/2017   • Obesity (BMI 30-39.9) 04/12/2017   • Elevated bilirubin 01/03/2017   • Dry cough- ? due to lisinopril- d/c'd 11/2016 11/18/2016   • SVT (supraventricular tachycardia) 10/27/2016   • Microcytic hypochromic anemia 10/24/2016   • History of colon cancer 10/24/2016   • History of esophageal varices 10/24/2016   • Iron deficiency anemia due to chronic blood loss 10/06/2016   • Portal vein thrombosis- on [facility] 2/2015 04/14/2016   • Cirrhosis of liver with  "ascites (HCC)- ? DUE TO OLD HEP B, CHEMO RX,  OR THERMO RAD OF MAGNANT LESION 04/14/2016   • Mixed hyperlipidemia 06/20/2013   • Essential hypertension 06/20/2013   • Thrombocytopenia due to drugs 04/25/2012   • Colon cancer-2010 left hemicolectomy; no colostomy 12/06/2010   • Liver lesion- ablation of malignant met from colon ca 2011- f/u [facility] q 6 mos 12/06/2010   • Peripheral neuropathy, secondary to drugs or chemicals 12/06/2010     Prior Living Situation:  Housing / Facility: 1 Story House in [place] with 2 steps to enter.  Two bathrooms both w/ tub/shower combos.    Lives with - Patient's Self Care Capacity: Spouse-Celestina Lima    Prior Level of Function:  Prior to May 2017, pt independent with the following:   Medication Management: indep  Finances: Independent  Home Management: Independent   Shopping: Independent   Prior Level Of Mobility: Independent Without Device in Community  Driving / Transportation: Driving Independent    Support Systems:  Primary : Lorelei \"Celestina\" Janie  home and  cell  Other support systems: Samantha Christine Dtr  home and  cell  Advance Directives: No  Power of  (Name & Phone): none    Previous Services Utilized:   Equipment Owned: \"old tub transfer bench w/o back and an old fww from his mother\".   Prior Services: Pt was admitted to Rehab from [facility].    Other Information:  Occupation (Pre-Hospital Vocational): Retired Due To Age  Pharmacy:  ([employer] @ [facility])  Primary Payor Source: [facility]  Primary Care Practitioner : Dr. Fabián Urena   Other MDs: Dr. Lamb Neurosurgeon    Additional Case Management Questions:    Have you ever received case management services for yourself or a family member?  Yes    Do you feel you have an an understanding of of what services  provide?  Yes    Do you have any additional questions regarding case management?    No    Patient / Family Goal:  Patient / " Family Goal: Increase strength/enduance/return home w/ spouse.  She reports she is hoping pt will be able to go to out pt therapy.  She is able to provide transportation.  Their daughter lives in [place]-she has a 7month old baby and unable to assist, yet supportive.       Plan:  1. Continue to follow patient through hospitalization and provide discharge planning in collaboration with patient, family, physicians and ancillary services.     2. Utilize community resources to ensure a safe discharge.

## 2017-08-14 NOTE — CARE PLAN
Problem: Bowel/Gastric:  Goal: Normal bowel function is maintained or improved  Outcome: PROGRESSING SLOWER THAN EXPECTED  Pt having loose, watery stools. Order put in for C diff. Precautions started.

## 2017-08-14 NOTE — PROGRESS NOTES
Pt has had multiple stools and is c/o burning at rectum. Dr Cervantes notified and order received for Hydrocortisone suppository. This was administered with instruction to pt and wife re action and effect with verbalized understanding. Dressing loose at sacrum; cleansed with ns and med honey applied with Mepilex over. Susanna care done.

## 2017-08-14 NOTE — DIETARY
Nutrition Care Follow Up Note:    ASSESSMENT:  [place] today and she noted patient not doing too well d/t bladder retention/ worsening ascites which is making him feel full.  Walked with patient's wife back to patient's room where he was working on some documents.  He tells me he was just unable to eat anymore at breakfast today without feeling like he was going to vomit.      Patient reports feeling discomfort s/t new diagnosis of UTI s/t catheter.  Abx initiated.    Ascites appears to have worsened.    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       It appears as though patient is on 1.5L FR although this is not in the diet order.  Discussed need to avoid foods high in Na+ in excess to avoid further fluid retention.     Patient denies any chewing/swallowing issues.  GI issues noted, patient is having routine BMs.       Appetite: Good   Diet: Regular  (RN charted 1.5L FR) + Ensure BID   Average PO intake x 3 days: 58% of meals since admission + % of supplement = adequate PO intake     Labs:  No recent labs since 8/11aside from +   Pertinent Medications: Lipitor, Coreg, Cipro, Bowel Regimen, Folvite, Lactinex, Nystatin, Aldactone, Flomax, Actigall, Vitamin D    PRN medications: noted    Weight: 110.9kg- taken via stand up scale (likely more accurate weight than admission weight)- monitor trends  Height: 190.5cm  BMI: 30.56 (obese class 1)    Nutrient Needs:  Kcal: 8806-5514 kcal/day ( 25-27 kcal/kg IBW)         BEE= 2001 kcal x 1.1-1.2 g/kg    Protein: 89-107g/day ( 1-1.2g/kg IBW)- consider checking NH3 level    Fluid: 1800mL - 2000mL/day in the setting of ascites/ fluid retention/ liver failure     Diagnosis: Increased nutrient needs (protein) r/t high demand for protein in the setting of wound healing/ maintaining LBM as evidenced by patient admitted with DTI to coccyx, visible evidence of muscle wasting. (ongoing)     Intervention/ Recommendations/POC:  1. Continue current diet.   Avoid excessive salt intake. Increase supplement to TID.     2. Continue adequate PO/fluid intake.  3. Nutrition rep to see regarding food prefs/ honor within dietary restrictions (if indicated)  4. Add MVI + minerals, 500mg Vitamin C BID x 14 days to optimize wound healing        Monitor/Evaluation: Monitor PO intake, weight, labs, medication adjustments, skin integrity, GI function, vitals, I/Os, and overall hydration status.  Adjust nutritional POC pending clinical outcomes.     RD following weekly.  Nutrition interventions in place.    Goal: Maintain adequate oral nutrient/fluid intake to promote nutrition optimization/wound healing.

## 2017-08-15 ENCOUNTER — APPOINTMENT (OUTPATIENT)
Dept: RADIOLOGY | Facility: REHABILITATION | Age: 72
DRG: 094 | End: 2017-08-15
Attending: HOSPITALIST
Payer: MEDICARE

## 2017-08-15 PROCEDURE — 700102 HCHG RX REV CODE 250 W/ 637 OVERRIDE(OP): Performed by: HOSPITALIST

## 2017-08-15 PROCEDURE — 99232 SBSQ HOSP IP/OBS MODERATE 35: CPT | Performed by: PHYSICAL MEDICINE & REHABILITATION

## 2017-08-15 PROCEDURE — A9270 NON-COVERED ITEM OR SERVICE: HCPCS | Performed by: PHYSICAL MEDICINE & REHABILITATION

## 2017-08-15 PROCEDURE — 71020 DX-CHEST-2 VIEWS: CPT

## 2017-08-15 PROCEDURE — 97530 THERAPEUTIC ACTIVITIES: CPT

## 2017-08-15 PROCEDURE — 97116 GAIT TRAINING THERAPY: CPT

## 2017-08-15 PROCEDURE — 770010 HCHG ROOM/CARE - REHAB SEMI PRIVAT*

## 2017-08-15 PROCEDURE — 97112 NEUROMUSCULAR REEDUCATION: CPT

## 2017-08-15 PROCEDURE — 99233 SBSQ HOSP IP/OBS HIGH 50: CPT | Performed by: HOSPITALIST

## 2017-08-15 PROCEDURE — 700102 HCHG RX REV CODE 250 W/ 637 OVERRIDE(OP): Performed by: PHYSICAL MEDICINE & REHABILITATION

## 2017-08-15 PROCEDURE — 97110 THERAPEUTIC EXERCISES: CPT

## 2017-08-15 PROCEDURE — 97535 SELF CARE MNGMENT TRAINING: CPT

## 2017-08-15 PROCEDURE — A9270 NON-COVERED ITEM OR SERVICE: HCPCS | Performed by: HOSPITALIST

## 2017-08-15 RX ORDER — PROPRANOLOL HYDROCHLORIDE 10 MG/1
10 TABLET ORAL TWICE DAILY
Status: DISCONTINUED | OUTPATIENT
Start: 2017-08-15 | End: 2017-08-15

## 2017-08-15 RX ADMIN — TAMSULOSIN HYDROCHLORIDE 0.4 MG: 0.4 CAPSULE ORAL at 09:59

## 2017-08-15 RX ADMIN — LACTOBACILLUS ACIDOPHILUS / LACTOBACILLUS BULGARICUS 1 PACKET: 100 MILLION CFU STRENGTH GRANULES at 09:59

## 2017-08-15 RX ADMIN — SPIRONOLACTONE 25 MG: 25 TABLET, FILM COATED ORAL at 05:53

## 2017-08-15 RX ADMIN — CIPROFLOXACIN HYDROCHLORIDE 500 MG: 500 TABLET, FILM COATED ORAL at 20:00

## 2017-08-15 RX ADMIN — CARVEDILOL 3.12 MG: 3.12 TABLET, FILM COATED ORAL at 09:59

## 2017-08-15 RX ADMIN — NYSTATIN 1500000 UNITS: 100000 POWDER TOPICAL at 10:02

## 2017-08-15 RX ADMIN — URSODIOL 300 MG: 300 CAPSULE ORAL at 10:20

## 2017-08-15 RX ADMIN — VITAMIN D, TAB 1000IU (100/BT) 1000 UNITS: 25 TAB at 10:00

## 2017-08-15 RX ADMIN — LACTOBACILLUS ACIDOPHILUS / LACTOBACILLUS BULGARICUS 1 PACKET: 100 MILLION CFU STRENGTH GRANULES at 17:37

## 2017-08-15 RX ADMIN — ATORVASTATIN CALCIUM 40 MG: 40 TABLET, FILM COATED ORAL at 20:00

## 2017-08-15 RX ADMIN — NYSTATIN 1500000 UNITS: 100000 POWDER TOPICAL at 20:02

## 2017-08-15 RX ADMIN — CIPROFLOXACIN HYDROCHLORIDE 500 MG: 500 TABLET, FILM COATED ORAL at 09:59

## 2017-08-15 RX ADMIN — LACTOBACILLUS ACIDOPHILUS / LACTOBACILLUS BULGARICUS 1 PACKET: 100 MILLION CFU STRENGTH GRANULES at 12:07

## 2017-08-15 RX ADMIN — FOLIC ACID 1 MG: 1 TABLET ORAL at 10:00

## 2017-08-15 ASSESSMENT — ENCOUNTER SYMPTOMS
FEVER: 0
VOMITING: 0
SHORTNESS OF BREATH: 0
CHILLS: 0
NERVOUS/ANXIOUS: 0
ABDOMINAL PAIN: 0
NAUSEA: 0
DIARRHEA: 0

## 2017-08-15 ASSESSMENT — GAIT ASSESSMENTS
DEVIATION: BRADYKINETIC;DECREASED BASE OF SUPPORT;DECREASED HEEL STRIKE;DECREASED TOE OFF
GAIT LEVEL OF ASSIST: MODERATE ASSIST
ASSISTIVE DEVICE: FRONT WHEEL WALKER
DISTANCE (FEET): 5

## 2017-08-15 ASSESSMENT — PAIN SCALES - GENERAL: PAINLEVEL_OUTOF10: 0

## 2017-08-15 NOTE — CARE PLAN
Problem: Infection  Goal: Will remain free from infection  Outcome: PROGRESSING AS EXPECTED  Patient remains free from s/s infection; afebrile. Patient still experiencing loose stools, however C-Diff results came back negative, patient off isolation protocols. Will continue to monitor.    Problem: Skin Integrity  Goal: Risk for impaired skin integrity will decrease  Outcome: PROGRESSING AS EXPECTED  Patient's skin remains intact and free from new or accidental injury this shift. Coccyx dressing changed due to contamination of an incontinence of bowel episode, jessica care done, honey med applied to wound bed and new mepilex applied, no drainage observed. Nystatin powder applied to groin. Will continue to monitor.

## 2017-08-15 NOTE — PROGRESS NOTES
Hospital Medicine Progress Note, Adult, Complex               Author: SCOTT BULLOCK MD Date & Time created: 8/15/2017  9:27 AM     Interval History:  8/15/17--  HISTORY REVIEWED.  REMOTE METASTATIC COLON CANCER DISCOVERED IN 2010 S/P RESECTION BY DR ANA RUIZ AND FINDING OF 10+ NODES  WHICH SPREAD TO THE LIVER.  HAD AN ABLATION AT [facility].  HEAVY DUTY CHEMO PER DR REBOLLAR.   THE LIVER ABLATION HAS UNFORTUNATELY RESULTED IN PORTAL HYPERTENSION.  HE ALSO HAS PRETTY MUCH ALL THE STIGMATA OF PORTAL HYPERTENSION.   THERE REMAINING LIVER PARENCHYMA IS NORMAL SO HE HAS PRESERVED LIVER SYNTHETIC FUNCTION.  AS OF 2010 THERE HAS BEEN NO NEW RECURRANCE THOUGH WE ARE DEALING WITH GASTRIC VARICES,  ESOPHAGEAL VARICIES,  CAPUT MEDUSA,  LEFT LOWER EXTREMITY DVT,  INABILITY TO TOLERATE ANTICOAGULATION WITH AN IVC FILTER IN PLACE,   AND CURRENTLY MODERATE ASCITES.   WE WILL PLAN ON THERAPEUTIC TAP ON Friday.   THE OTHER ISSUE THAT REALLY BROUGHT TO PATIENT TO THE HOSPITAL WAS PROGRESSIVE ASCENDING WEAKNESS.   HE DID HAVE A TIGHT L4-L5 CENTRAL STENOSIS.  THE OTHER LEVELS LOOK FINE.   HE STARTED NOTICING THE WEAKNESS FOR ABOUT A WEEK PRIOR IN MAY AND WENT TO ER.   DR HOUSE SAW THE STRUCTURAL ISSUE,  NAMELY THE ABOVE STENOSIS AND DECOMPRESSED THIS 0N 5/25.  SYMPTOMS DID NOT MALLY.  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   IT IS CLEAR THE PATIENT NEEDED SOME SURGERY ON THE LOWER BACK.   LOOKING AT THE FILMS.  MY THOUGHT IS THE SYMPTOMS THEMSELVES WERE CAUSED BY THE GBS.   HE RECEIVED 5 DAYS OF IMMUNOGLOBULIN AND DID FAIRLY WELL AND HAS BEE TRANSFERRED TO THE REHAB HOSPITAL ON 8/10/17  PLAN:  CONTINUE PT/OT,   PROBLEMS THAT ALSO HAVE OCCURRED INCLUDE PORTAL VEIN THROMBOSIS.   I WILL ARRANGE A THERAPEUTIC THORACENTESIS ON Friday.  RX ENTEROBACTER UTI.  HYPERSPLENISM AND LOW WBC COUNTS AND PLATELETS TO BE EXPECTED.   D/C COREG.  INDERAL BETTER FOR  LOWERING PORTAL PRESSURES.           Review of Systems:  Review of Systems   Constitutional: Negative for fever and chills.   Respiratory: Negative for shortness of breath.    Cardiovascular: Negative for chest pain.   Gastrointestinal: Negative for nausea, vomiting, abdominal pain and diarrhea.   Psychiatric/Behavioral: The patient is not nervous/anxious.        Physical Exam:  Physical Exam   Constitutional: He is oriented to person, place, and time. He appears well-nourished.   HENT:   Head: Atraumatic.   Eyes: Conjunctivae are normal. Pupils are equal, round, and reactive to light.   Neck: Normal range of motion. Neck supple. No tracheal deviation present.   Cardiovascular: Normal rate, regular rhythm, S1 normal and S2 normal.    No murmur heard.  Pulmonary/Chest: Effort normal. He has no wheezes. He has no rhonchi. He has no rales.   Abdominal: Soft. He exhibits no distension. There is no tenderness. Hernia confirmed negative in the right inguinal area and confirmed negative in the left inguinal area.   Musculoskeletal: He exhibits edema.   Neurological: He is alert and oriented to person, place, and time. No sensory deficit.   Skin: Skin is warm and dry. No rash noted. No cyanosis.   Psychiatric: He has a normal mood and affect. His behavior is normal.   Nursing note and vitals reviewed.      Labs:        Invalid input(s): [id number]      No results for input(s): SODIUM, POTASSIUM, CHLORIDE, CO2, BUN, CREATININE, MAGNESIUM, PHOSPHORUS, CALCIUM in the last 72 hours.  No results for input(s): ALTSGPT, ASTSGOT, ALKPHOSPHAT, TBILIRUBIN, DBILIRUBIN, GAMMAGT, AMYLASE, LIPASE, ALB, PREALBUMIN, GLUCOSE in the last 72 hours.  No results for input(s): RBC, HEMOGLOBIN, HEMATOCRIT, PLATELETCT, PROTHROMBTM, APTT, INR, IRON, FERRITIN, TOTIRONBC in the last 72 hours.            Hemodynamics:  Temp (24hrs), Av.1 °C (98.8 °F), Min:36.8 °C (98.2 °F), Max:37.5 °C (99.5 °F)  Temperature: 37 °C (98.6 °F)  Pulse  Av.6   Min: 64  Max: 82   Blood Pressure : 117/69 mmHg     Respiratory:    Respiration: 12, Pulse Oximetry: 94 %     Work Of Breathing / Effort: Mild  RUL Breath Sounds: Clear, RML Breath Sounds: Clear, RLL Breath Sounds: Clear;Diminished, JOSE Breath Sounds: Clear, LLL Breath Sounds: Clear;Diminished  Fluids:    Intake/Output Summary (Last 24 hours) at 08/15/17 0927  Last data filed at 08/15/17 0030   Gross per 24 hour   Intake    800 ml   Output   1050 ml   Net   -250 ml        GI/Nutrition:  Orders Placed This Encounter   Procedures   • Diet Order     Standing Status: Standing      Number of Occurrences: 1      Standing Expiration Date:      Order Specific Question:  Diet:     Answer:  Regular [1]     Order Specific Question:  Consistency/Fluid modifications:     Answer:  Thin Liquids [3]     Medical Decision Making, by Problem:  Active Hospital Problems    Diagnosis   • *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]   • Left leg DVT (CMS-HCC) [I82.402]   • Esophageal varices- BANDED 4/2016- repeat egd tbd 10//29/16- gic [I85.00]   • ASCVD (arteriosclerotic cardiovascular disease)subtotalled small distal LCx and 40% LAD med rx by cath 12/13/12 [I25.10]   • GIB (gastrointestinal bleeding)- recurrent from varices [K92.2]   • BPH (benign prostatic hyperplasia) [N40.0]   • 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 DUE TO OLD HEP B, CHEMO RX,  OR THERMO RAD OF MAGNANT LESION [K74.60]   • Essential hypertension [I10]   • Colon cancer-2010 left hemicolectomy; no colostomy [C18.9]     *  S/P Recent Lumbar Surgery    *  Hypertension  BP recently ok  On Coreg: 3.125 mg bid  Note: home meds were Coreg 3.125 mg 1/2 tab bid and  Losartan: 25 mg daily  Monitor    *  U/A (+)  Has had some difficulty urinating recently with some burning  Cx --> GNR  F/U C&S   On Cipro empirically unitl C&S come back    *  LE Edema  Mild  On Aldactone: 25 mg daily    *  Thrombocytopenia  Platelets stable (8/11)  2nd to prior chemo and PORTAL HYPERTENSION    *  Hx Colon Cancer: stage IV; in 5 year remission  *  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  *  Hyperlipidemia  *  AIDP: s/p IVIG x 5 days      Medications reviewed and Labs reviewed

## 2017-08-15 NOTE — PROGRESS NOTES
"Rehab Progress Note     Interval Events (Subjective)  Patient seen in his room and with physical therapy today along with his wife. He had a tough day with some significant diarrhea prop his results and ciprofloxacin. He had frequent diarrhea and we had to check him for C. diff. I have ordered cholestyramine 4 g daily in an effort to address this the patient has cirrhosis of the liver with ascites and therefore his bladder scans may not be accurate since most of them resulted in a a negative straight cath will discontinue the bladder scans       Objective:  VITAL SIGNS: /66 mmHg  Pulse 74  Temp(Src) 37.5 °C (99.5 °F)  Resp 16  Ht 1.905 m (6' 3\")  Wt 110.9 kg (244 lb 7.8 oz)  BMI 30.56 kg/m2  SpO2 96%      GENERAL: No apparent distress  HEENT: Normocephalic/atraumatic, EOMI, PERRL and No nystagmus  CARDIAC: Regular rate and rhythm, normal S1, S2   LUNGS: Clear to auscultation   ABDOMINAL: bowel sounds present, soft, nontender and ascites present with fluid wave noted  EXTREMITIES: {Without clubbing or cyanosis 2+ edema noted in the left lower extremity  SKIN:   Small stage III noted on sacrum  NEURO: Unchanged    Recent Results (from the past 72 hour(s))   URINALYSIS    Collection Time: 08/12/17 10:45 AM   Result Value Ref Range    Color Yellow     Character Cloudy (A)     Specific Gravity 1.012 <1.035    Ph 8.0 5.0-8.0    Glucose Negative Negative mg/dL    Ketones Negative Negative mg/dL    Protein Negative Negative mg/dL    Bilirubin Negative Negative    Urobilinogen, Urine 1.0 Negative    Nitrite Positive (A) Negative    Leukocyte Esterase Trace (A) Negative    Occult Blood Negative Negative    Micro Urine Req Microscopic    URINE MICROSCOPIC (W/UA)    Collection Time: 08/12/17 10:45 AM   Result Value Ref Range    WBC 2-5 (A) /hpf    RBC 0-2 (A) /hpf    Bacteria Many (A) None /hpf    Epithelial Cells Negative /hpf    Hyaline Cast 0-2 /lpf   URINE CULTURE-EXISTING-LESS THAN 48 HOURS    Collection Time: " 08/12/17 10:45 AM   Result Value Ref Range    Significant Indicator POS (POS)     Source UR     Site URINE, CLEAN CATCH     Urine Culture (A)     Urine Culture Enterobacter aerogenes  >100,000 cfu/mL   (A)        Susceptibility    Enterobacter aerogenes -  (no method available)     Ceftriaxone <=8 Sensitive mcg/mL     Ceftazidime <=1 Sensitive mcg/mL     Cephalothin >16 Resistant mcg/mL     Cefotaxime <=2 Sensitive mcg/mL     Ciprofloxacin <=1 Sensitive mcg/mL     Cefepime <=8 Sensitive mcg/mL     Cefuroxime 8 Sensitive mcg/mL     Ampicillin 16 Intermediate mcg/mL     Cefotetan <=16 Sensitive mcg/mL     Tobramycin <=4 Sensitive mcg/mL     Nitrofurantoin <=32 Sensitive mcg/mL     Gentamicin <=4 Sensitive mcg/mL     Levofloxacin <=2 Sensitive mcg/mL     Pip/Tazobactam <=16 Sensitive mcg/mL     Piperacillin <=16 Sensitive mcg/mL     Trimeth/Sulfa <=2/38 Sensitive mcg/mL     Tigecycline <=2 Sensitive mcg/mL       Current Facility-Administered Medications   Medication Frequency   • docusate sodium (COLACE) capsule 100 mg BID PRN    And   • senna-docusate (PERICOLACE or SENOKOT S) 8.6-50 MG per tablet 1 Tab QDAY PRN    And   • lactulose 20 GM/30ML solution 30 mL Q24HRS PRN    And   • bisacodyl (DULCOLAX) suppository 10 mg QDAY PRN   • cholestyramine (QUESTRAN,PREVALITE) 4 GM powder 4 g QDAY PRN   • ciprofloxacin (CIPRO) tablet 500 mg Q12HRS   • hydrocortisone (ANUSOL-HC) suppository 25 mg Q12HRS PRN   • tamsulosin (FLOMAX) capsule 0.4 mg AFTER BREAKFAST   • Respiratory Care per Protocol Continuous RT   • Pharmacy Consult Request ...Pain Management Review 1 Each PRN   • nystatin (MYCOSTATIN) powder BID   • lactobacillus granules (LACTINEX/FLORANEX) packet 1 Packet TID WITH MEALS   • spironolactone (ALDACTONE) tablet 25 mg Q DAY   • ursodiol (ACTIGALL) capsule 300 mg DAILY   • vitamin D (cholecalciferol) tablet 1,000 Units DAILY   • folic acid (FOLVITE) tablet 1 mg DAILY   • carvedilol (COREG) tablet 3.125 mg BID WITH MEALS    • atorvastatin (LIPITOR) tablet 40 mg QHS   • benzocaine-menthol (CEPACOL) lozenge 1 Lozenge Q2HRS PRN   • hydrALAZINE (APRESOLINE) tablet 25 mg Q8HRS PRN   • mag hydrox-al hydrox-simeth (MAALOX PLUS ES or MYLANTA DS) suspension 20 mL Q2HRS PRN   • ondansetron (ZOFRAN ODT) dispertab 4 mg 4X/DAY PRN    Or   • ondansetron (ZOFRAN) syringe/vial injection 4 mg 4X/DAY PRN   • trazodone (DESYREL) tablet 50 mg QHS PRN   • sodium chloride (OCEAN) 0.65 % nasal spray 2 Spray PRN   • oxycodone immediate release (ROXICODONE) tablet 10 mg Q4HRS PRN       Orders Placed This Encounter   Procedures   • Diet Order     Standing Status: Standing      Number of Occurrences: 1      Standing Expiration Date:      Order Specific Question:  Diet:     Answer:  Regular [1]     Order Specific Question:  Consistency/Fluid modifications:     Answer:  Thin Liquids [3]       Assessment:  Active Hospital Problems    Diagnosis   • *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 DUE TO OLD HEP B, CHEMO RX,  OR THERMO RAD OF MAGNANT LESION   • Essential hypertension   • Colon cancer-2010 left hemicolectomy; no colostomy       Medical Decision Making and Plan:  The Therapist completing their initial evaluations  Patient is in good spirits overall  Wound care has seen the patient's wound is recommending a different type of bed for the patient  We'll continue PT and  OT    Initial team conference will be next Wednesday, 8/16/2017    Labs reviewed      Cirrhosis/ascites    Stable at this time but will monitor    Hypertension       under control with Aldactone and Corgard    Stage III pressure ulcer, on the Coccyx    Appreciate the input of the wound care nurse honey colloid applied    Vitamin D deficiency    Continue replacement    Lower extremity edema left leg    We will continue to monitor      Diarrhea  we are checking for C. diff toxin and also ordered cholestyramine       UTI  currently on ciprofloxacin prophylactically pending final cultures    Continue PT and OT that was held today with the diarrhea  Initial team conference. Wednesday, 8/16/2017    Total time:  > 25 minutes.  I spent greater than 50% of the time for patient care and coordination on this date, including unit/floor time, and face-to-face time with the patient as per assessment and plan above.    Moreno Adorno M.D.

## 2017-08-15 NOTE — PROGRESS NOTES
Called to room by patient's wife, patient was transferring to wheelchair from bedside with x ray technician when they both state that his socks began sliding and he made a slow assisted decent to the floor. Patient was lifted off floor with help from RN's CNA's and x ray technician. Patient states he did not strike his head and does not report injury.

## 2017-08-15 NOTE — CARE PLAN
"Problem: Mobility  Goal: STG-Within one week, patient will ambulate household distance  1) Individualized goal: 30 feet with FWW, gt belt, and Mod A  2) Interventions: PT Group Therapy, PT Gait Training, PT Therapeutic Exercises, PT Neuro Re-Ed/Balance, PT Therapeutic Activity and PT Manual Therapy.   Outcome: NOT MET  5-10 ft with SoloStep harness min/mod A, wc follow (2nd person), unsteady, impaired standing posture    Problem: Mobility Transfers  Goal: STG-Within one week, patient will perform bed mobility  1) Individualized goal: SBA with cueing, elevated HOB, setup, bed rail  2) Interventions: PT Group Therapy, PT Gait Training, PT Therapeutic Exercises, PT Neuro Re-Ed/Balance, PT Therapeutic Activity and PT Manual Therapy.  Outcome: PROGRESSING AS EXPECTED  Inconsistent, SBA after practice with use of leg   Goal: STG-Within one week, patient will sit to stand  1) Individualized goal: with FWW and Mod A  2) Interventions: PT Group Therapy, PT Gait Training, PT Therapeutic Exercises, PT Neuro Re-Ed/Balance, PT Therapeutic Activity and PT Manual Therapy.   Outcome: PROGRESSING AS EXPECTED  MIn A-CGA from elevated chair height (patient is 6'3\")   Goal: STG-Within one week, patient will transfer bed to chair  1) Individualized goal: with FWW and Min A  2) Interventions: PT Group Therapy, PT Gait Training, PT Therapeutic Exercises, PT Neuro Re-Ed/Balance, PT Therapeutic Activity and PT Manual Therapy.  Outcome: PROGRESSING AS EXPECTED  Just introduced, to progress patient from reach pivot, further practice required to demonstrate consistency        "

## 2017-08-15 NOTE — REHAB-CM IDT TEAM NOTE
Case Management   DC Planning  DC destination/dispostion:  Return home with spouse. 2 steps to enter; bath/tub shower combo.      Referrals:  None at this time.    DC Needs: Spouse prefers out pt therapy instead of home health; follow up with PCP Dr. Fabián Urena/Dr. Rosales.  Pharmacy [facility] @ [facility].     Barriers to discharge: Functional status; prolonger hospitalization       Strengths:  Motivated to return home, supportive spouse    Section completed by:  MARI Santos, CCM

## 2017-08-15 NOTE — REHAB-PT IDT TEAM NOTE
Physical Therapy  Mobility  Bed mobility:  CGA-SBA with use of leg    Bed /Chair/Wheelchair Transfer Initial:  4 - Minimal Assistance  Bed /Chair/Wheelchair Transfer Current:  4 - Minimal Assistance   Bed/Chair/Wheelchair Transfer Description:  Adaptive equipment, Increased time, Supervision for safety, Verbal cueing, Set-up of equipment, Initial preparation for task (SPT to/from EOB sitting and manual wc)  Walk Initial:  0 - Not tested,unsafe activity  Walk Current:  1 - Total Assistance   Walk Description:  Walker, Assist device/equipment, Requires 2 people to assist (Amb with FWW indoors 5-10 ft x 3 in SoloStep harness Min-mod A)  Wheelchair Initial:  0 - Not tested,medical condition  Wheelchair Current:  2 - Max Assistance   Wheelchair Description:  Adaptive equipment, Extra time, Supervision for safety (x125 feet with BUE and SBA)  Stairs Initial:  0 - Not tested,medical condition  Stairs Current: 0 - Not tested,unsafe activity   Stairs Description:    Patient/Family Training/Education: Education on safety with transfers, safety with pregait, importance of posture, POC  DME/DC Recommendations:  TBD, manual wc, FWW? Anticipate home health  Strengths:  Making steady progress towards goals, Motivated for self care and independence, Pleasant and cooperative, Supportive family and Willingly participates in therapeutic activities  Barriers:   Decreased endurance, Generalized weakness, Impulsive, Limited mobility, Poor activity tolerance and Poor balance (many comorbidities), 2 stairs to enter home, no railings  # of short term goals set= 4  # of short term goals met= 0 (partially met 3, see below- need to demonstrate consistency)       Physical Therapy Problems           Problem: Mobility     Dates: Start: 08/11/17       Goal: STG-Within one week, patient will ambulate household distance     Dates: Start: 08/11/17      Description: 1) Individualized goal:  30 feet with FWW, gt belt, and Mod A    2)  "Interventions:  PT Group Therapy, PT Gait Training, PT Therapeutic Exercises, PT Neuro Re-Ed/Balance, PT Therapeutic Activity and PT Manual Therapy.    Note: Goal Note filed on 08/15/17 1654 by Lalita Cordon DPT    Goal: STG-Within one week, patient will ambulate household distance  Outcome: NOT MET  5-10 ft with SoloStep harness min/mod A, wc follow (2nd person), unsteady,   impaired standing posture            Problem: Mobility Transfers     Dates: Start: 08/11/17       Goal: STG-Within one week, patient will perform bed mobility     Dates: Start: 08/11/17      Description: 1) Individualized goal:  SBA with cueing, elevated HOB, setup, bed rail    2) Interventions:  PT Group Therapy, PT Gait Training, PT Therapeutic Exercises, PT Neuro Re-Ed/Balance, PT Therapeutic Activity and PT Manual Therapy.        Note: Goal Note filed on 08/15/17 1654 by Lalita Cordon DPT    Goal: STG-Within one week, patient will perform bed mobility  Outcome: PROGRESSING AS EXPECTED  Inconsistent, SBA after practice with use of leg           Goal: STG-Within one week, patient will sit to stand     Dates: Start: 08/11/17      Description: 1) Individualized goal:  with FWW and Mod A    2) Interventions:  PT Group Therapy, PT Gait Training, PT Therapeutic Exercises, PT Neuro Re-Ed/Balance, PT Therapeutic Activity and PT Manual Therapy.    Note: Goal Note filed on 08/15/17 1654 by Lalita Cordon DPT    Goal: STG-Within one week, patient will sit to stand  Outcome: PROGRESSING AS EXPECTED  MIn A-CGA from elevated chair height (patient is 6'3\")           Goal: STG-Within one week, patient will transfer bed to chair     Dates: Start: 08/11/17      Description: 1) Individualized goal:  with FWW and Min A    2) Interventions:  PT Group Therapy, PT Gait Training, PT Therapeutic Exercises, PT Neuro Re-Ed/Balance, PT Therapeutic Activity and PT Manual Therapy.        Note: Goal Note filed on 08/15/17 1654 by Lalita Codron DPT    Goal: " STG-Within one week, patient will transfer bed to chair  Outcome: PROGRESSING AS EXPECTED  Just introduced, to progress patient from reach pivot, further practice   required to demonstrate consistency            Problem: PT-Long Term Goals     Dates: Start: 08/11/17       Goal: LTG-By discharge, patient will ambulate     Dates: Start: 08/11/17      Description: 1) Individualized goal:  200 feet with FWW and SPV    2) Interventions:  PT Group Therapy, PT Gait Training, PT Therapeutic Exercises, PT Neuro Re-Ed/Balance, PT Therapeutic Activity and PT Manual Therapy.        Goal: LTG-By discharge, patient will transfer one surface to another     Dates: Start: 08/11/17      Description: 1) Individualized goal:  with FWW and SPV    2) Interventions:  PT Group Therapy, PT Gait Training, PT Therapeutic Exercises, PT Neuro Re-Ed/Balance, PT Therapeutic Activity and PT Manual Therapy.                Section completed by:  Lalita Cordon PT, DPT

## 2017-08-15 NOTE — REHAB-PHARMACY IDT TEAM NOTE
Pharmacy  Pharmacy  Antibiotics/Antifungals/Antivirals:  Medication:      Active Orders     Ordered     Ordering Provider       Sun Aug 13, 2017  2:49 PM    08/13/17 1449  ciprofloxacin (CIPRO) tablet 500 mg   EVERY 12 HOURS      Yaya Martinez M.D.        Route:         po  Stop Date:  8/18/17  Reason: UTI  Antihypertensives/Cardiac:  Medication:    Orders (72h ago through future)    Start     Ordered    08/15/17 1100  propranolol (INDERAL) tablet 10 mg   TWICE DAILY,   Status:  Discontinued      08/15/17 1032    08/14/17 1441  cholestyramine (QUESTRAN,PREVALITE) 4 GM powder 4 g   1 TIME DAILY PRN      08/14/17 1443    08/11/17 0600  spironolactone (ALDACTONE) tablet 25 mg   Q DAY      08/10/17 1457    08/10/17 2100  atorvastatin (LIPITOR) tablet 40 mg   EVERY BEDTIME      08/10/17 1457    08/10/17 1730  carvedilol (COREG) tablet 3.125 mg   2 TIMES DAILY WITH MEALS,   Status:  Discontinued      08/10/17 1457    08/10/17 1456  hydrALAZINE (APRESOLINE) tablet 25 mg   EVERY 8 HOURS PRN      08/10/17 1457        Patient Vitals for the past 24 hrs:   BP Pulse   08/15/17 1520 114/70 mmHg 88   08/15/17 1450 113/66 mmHg 79   08/15/17 1420 125/84 mmHg 82   08/15/17 1405 118/74 mmHg 69   08/15/17 1350 126/63 mmHg 78   08/15/17 1335 129/64 mmHg 80   08/15/17 1320 114/67 mmHg 74   08/15/17 0700 117/69 mmHg 72   08/14/17 1900 121/63 mmHg 74   08/14/17 1811 115/61 mmHg 74       Anticoagulation:  Medication:  n/a  INR:      Other key medications: Tamsulosin, Trazadone, Ursodiol   A review of the medication list reveals no issues at this time. Patient is currently on an antihypertensive. Recommend home blood pressure monitoring/recording if antihypertensive regimen continues.  Section completed by:  Gertrudis Thompson [facility]

## 2017-08-16 LAB
ALBUMIN SERPL BCP-MCNC: 2.8 G/DL (ref 3.2–4.9)
ALBUMIN/GLOB SERPL: 1.2 G/DL
ALP SERPL-CCNC: 66 U/L (ref 30–99)
ALT SERPL-CCNC: 12 U/L (ref 2–50)
ANION GAP SERPL CALC-SCNC: 7 MMOL/L (ref 0–11.9)
AST SERPL-CCNC: 15 U/L (ref 12–45)
BASOPHILS # BLD AUTO: 0.4 % (ref 0–1.8)
BASOPHILS # BLD: 0.02 K/UL (ref 0–0.12)
BILIRUB SERPL-MCNC: 1.2 MG/DL (ref 0.1–1.5)
BUN SERPL-MCNC: 10 MG/DL (ref 8–22)
CALCIUM SERPL-MCNC: 8.4 MG/DL (ref 8.5–10.5)
CHLORIDE SERPL-SCNC: 106 MMOL/L (ref 96–112)
CO2 SERPL-SCNC: 24 MMOL/L (ref 20–33)
CREAT SERPL-MCNC: 0.61 MG/DL (ref 0.5–1.4)
EOSINOPHIL # BLD AUTO: 0.19 K/UL (ref 0–0.51)
EOSINOPHIL NFR BLD: 4.2 % (ref 0–6.9)
ERYTHROCYTE [DISTWIDTH] IN BLOOD BY AUTOMATED COUNT: 53.9 FL (ref 35.9–50)
GFR SERPL CREATININE-BSD FRML MDRD: >60 ML/MIN/1.73 M 2
GLOBULIN SER CALC-MCNC: 2.4 G/DL (ref 1.9–3.5)
GLUCOSE SERPL-MCNC: 84 MG/DL (ref 65–99)
HCT VFR BLD AUTO: 33.1 % (ref 42–52)
HGB BLD-MCNC: 10.6 G/DL (ref 14–18)
IMM GRANULOCYTES # BLD AUTO: 0.01 K/UL (ref 0–0.11)
IMM GRANULOCYTES NFR BLD AUTO: 0.2 % (ref 0–0.9)
INR PPP: 1.37 (ref 0.87–1.13)
LYMPHOCYTES # BLD AUTO: 0.68 K/UL (ref 1–4.8)
LYMPHOCYTES NFR BLD: 14.9 % (ref 22–41)
MCH RBC QN AUTO: 27.7 PG (ref 27–33)
MCHC RBC AUTO-ENTMCNC: 32 G/DL (ref 33.7–35.3)
MCV RBC AUTO: 86.4 FL (ref 81.4–97.8)
MONOCYTES # BLD AUTO: 0.55 K/UL (ref 0–0.85)
MONOCYTES NFR BLD AUTO: 12.1 % (ref 0–13.4)
NEUTROPHILS # BLD AUTO: 3.11 K/UL (ref 1.82–7.42)
NEUTROPHILS NFR BLD: 68.2 % (ref 44–72)
NRBC # BLD AUTO: 0 K/UL
NRBC BLD AUTO-RTO: 0 /100 WBC
PLATELET # BLD AUTO: 67 K/UL (ref 164–446)
PMV BLD AUTO: 10.5 FL (ref 9–12.9)
POTASSIUM SERPL-SCNC: 3.7 MMOL/L (ref 3.6–5.5)
PROT SERPL-MCNC: 5.2 G/DL (ref 6–8.2)
PROTHROMBIN TIME: 17.3 SEC (ref 12–14.6)
RBC # BLD AUTO: 3.83 M/UL (ref 4.7–6.1)
SODIUM SERPL-SCNC: 137 MMOL/L (ref 135–145)
WBC # BLD AUTO: 4.6 K/UL (ref 4.8–10.8)

## 2017-08-16 PROCEDURE — 85610 PROTHROMBIN TIME: CPT

## 2017-08-16 PROCEDURE — 99233 SBSQ HOSP IP/OBS HIGH 50: CPT | Performed by: HOSPITALIST

## 2017-08-16 PROCEDURE — 99233 SBSQ HOSP IP/OBS HIGH 50: CPT | Performed by: PHYSICAL MEDICINE & REHABILITATION

## 2017-08-16 PROCEDURE — 700102 HCHG RX REV CODE 250 W/ 637 OVERRIDE(OP): Performed by: PHYSICAL MEDICINE & REHABILITATION

## 2017-08-16 PROCEDURE — 700102 HCHG RX REV CODE 250 W/ 637 OVERRIDE(OP): Performed by: HOSPITALIST

## 2017-08-16 PROCEDURE — 97112 NEUROMUSCULAR REEDUCATION: CPT

## 2017-08-16 PROCEDURE — 36415 COLL VENOUS BLD VENIPUNCTURE: CPT

## 2017-08-16 PROCEDURE — 97110 THERAPEUTIC EXERCISES: CPT

## 2017-08-16 PROCEDURE — 97530 THERAPEUTIC ACTIVITIES: CPT

## 2017-08-16 PROCEDURE — 770010 HCHG ROOM/CARE - REHAB SEMI PRIVAT*

## 2017-08-16 PROCEDURE — A9270 NON-COVERED ITEM OR SERVICE: HCPCS | Performed by: HOSPITALIST

## 2017-08-16 PROCEDURE — 85025 COMPLETE CBC W/AUTO DIFF WBC: CPT

## 2017-08-16 PROCEDURE — 97116 GAIT TRAINING THERAPY: CPT

## 2017-08-16 PROCEDURE — A9270 NON-COVERED ITEM OR SERVICE: HCPCS | Performed by: PHYSICAL MEDICINE & REHABILITATION

## 2017-08-16 PROCEDURE — 80053 COMPREHEN METABOLIC PANEL: CPT

## 2017-08-16 RX ORDER — POTASSIUM CHLORIDE 20 MEQ/1
10 TABLET, EXTENDED RELEASE ORAL DAILY
Status: DISCONTINUED | OUTPATIENT
Start: 2017-08-17 | End: 2017-08-29

## 2017-08-16 RX ORDER — TORSEMIDE 10 MG/1
20 TABLET ORAL
Status: DISCONTINUED | OUTPATIENT
Start: 2017-08-17 | End: 2017-08-29

## 2017-08-16 RX ORDER — METOLAZONE 2.5 MG/1
2.5 TABLET ORAL
Status: DISCONTINUED | OUTPATIENT
Start: 2017-08-16 | End: 2017-08-29

## 2017-08-16 RX ORDER — PHYTONADIONE 5 MG/1
5 TABLET ORAL DAILY
Status: COMPLETED | OUTPATIENT
Start: 2017-08-17 | End: 2017-08-21

## 2017-08-16 RX ADMIN — TAMSULOSIN HYDROCHLORIDE 0.4 MG: 0.4 CAPSULE ORAL at 08:31

## 2017-08-16 RX ADMIN — NYSTATIN 1500000 UNITS: 100000 POWDER TOPICAL at 20:48

## 2017-08-16 RX ADMIN — FOLIC ACID 1 MG: 1 TABLET ORAL at 08:31

## 2017-08-16 RX ADMIN — NYSTATIN 1500000 UNITS: 100000 POWDER TOPICAL at 08:31

## 2017-08-16 RX ADMIN — VITAMIN D, TAB 1000IU (100/BT) 1000 UNITS: 25 TAB at 08:31

## 2017-08-16 RX ADMIN — LACTOBACILLUS ACIDOPHILUS / LACTOBACILLUS BULGARICUS 1 PACKET: 100 MILLION CFU STRENGTH GRANULES at 11:22

## 2017-08-16 RX ADMIN — CIPROFLOXACIN HYDROCHLORIDE 500 MG: 500 TABLET, FILM COATED ORAL at 08:31

## 2017-08-16 RX ADMIN — LACTOBACILLUS ACIDOPHILUS / LACTOBACILLUS BULGARICUS 1 PACKET: 100 MILLION CFU STRENGTH GRANULES at 17:12

## 2017-08-16 RX ADMIN — LACTOBACILLUS ACIDOPHILUS / LACTOBACILLUS BULGARICUS 1 PACKET: 100 MILLION CFU STRENGTH GRANULES at 08:31

## 2017-08-16 RX ADMIN — CIPROFLOXACIN HYDROCHLORIDE 500 MG: 500 TABLET, FILM COATED ORAL at 20:46

## 2017-08-16 RX ADMIN — URSODIOL 300 MG: 300 CAPSULE ORAL at 08:31

## 2017-08-16 RX ADMIN — SPIRONOLACTONE 25 MG: 25 TABLET, FILM COATED ORAL at 05:41

## 2017-08-16 RX ADMIN — CHOLESTYRAMINE 4 G: 4 POWDER, FOR SUSPENSION ORAL at 09:54

## 2017-08-16 RX ADMIN — ATORVASTATIN CALCIUM 40 MG: 40 TABLET, FILM COATED ORAL at 20:47

## 2017-08-16 ASSESSMENT — GAIT ASSESSMENTS
DISTANCE (FEET): 15
ASSISTIVE DEVICE: FRONT WHEEL WALKER
DEVIATION: BRADYKINETIC;DECREASED BASE OF SUPPORT;DECREASED HEEL STRIKE

## 2017-08-16 ASSESSMENT — ENCOUNTER SYMPTOMS
ABDOMINAL PAIN: 0
FEVER: 0
DIARRHEA: 0
NERVOUS/ANXIOUS: 0
CHILLS: 0
SHORTNESS OF BREATH: 0
NAUSEA: 0
VOMITING: 0

## 2017-08-16 ASSESSMENT — PAIN SCALES - GENERAL: PAINLEVEL_OUTOF10: 0

## 2017-08-16 NOTE — DISCHARGE PLANNING
Attempted to meet w/ pt and spouse mia today, but not in room.  Will continue to contact them to update on IDT.

## 2017-08-16 NOTE — CARE PLAN
Problem: Safety  Goal: Will remain free from injury  Outcome: PROGRESSING AS EXPECTED  Pt uses call light consistently and appropriately. Waits for assistance does not attempt self transfer this shift. Pt is able to verbalize needs.Turning patient every two hours, repositioned for pt comfort, call light and personal belongings are within place, bed in lowest position, and non skid socks available for when transferring.     Problem: Skin Integrity  Goal: Risk for impaired skin integrity will decrease  Outcome: PROGRESSING AS EXPECTED  Patient's skin remains intact and free from new or accidental injury this shift. Turning pt every 2 hours to prevent existing pressure ulcer getting worse. Continuous monitoring existing pressure ulcer and skin integriy put in place.  Will continue to monitor.

## 2017-08-16 NOTE — CARE PLAN
Problem: Safety  Goal: Will remain free from falls  Outcome: PROGRESSING SLOWER THAN EXPECTED  CLIP.  Patient oriented to call light system and is calling appropriately.  Patient oriented to bathroom location, TV, bed controls, clock.  Patient asked to use call light when ever they need to transfer out of bed/chair or to ambulate and patient verbalized understanding.  POC discussed with patient and patient oriented to white board.  Schedule for therapy delivered to patient.  Patient verbalized understanding.  Patients fall risk assessed.  Patient moved into a comfortable position.  Patient medicated for pain as needed and interventions in place.  Patient offered/helped to the restroom.  Patient's personal belongings within reach.  Bed in low position.  Patient wearing non-skid footwear.  Bed rails up times three.  Bed and chair alarms in place.  Wheels locked on bed and wheelchairs.  Hourly rounding in place.        Problem: Bowel/Gastric:  Goal: Normal bowel function is maintained or improved  Outcome: PROGRESSING AS EXPECTED  Patient had BM first thing after getting into wheelchair.  Patient had no BM accident over night.  Stool soft but formed.

## 2017-08-16 NOTE — REHAB-COLLABORATIVE ONGOING IDT TEAM NOTE
Weekly Interdisciplinary Team Conference Note    Weekly Interdisciplinary Team Conference # 1  Date:  8/16/2017    Clinicians present and reporting at team conference include the following:   MD: Moreno Adorno MD   RN:  Pedrito Colmenares RN   PT:   Lalita Cordon, PT, DPT  OT:  Connor Ferraro OT   ST:  Not Applicable  CM:  Malu Madden, LSW, CCM  REC:  Not Applicable  RT:  Not Applicable  RPh:  Gertrudis Thompson, [facility]  Other:   None  All reporting clinicians have a working knowledge of this patient's plan of care.    Targeted DC Date:  9/16/2017?      Medical    Patient Active Problem List    Diagnosis Date Noted   • Left leg DVT (CMS-[facility]) 06/26/2017     Priority: High   • Esophageal varices- BANDED 4/2016- repeat egd tbd 10//29/16- gic 04/04/2016     Priority: High   • BMI 34.0-34.9,adult 02/15/2013     Priority: High   • ASCVD (arteriosclerotic cardiovascular disease)subtotalled small distal LCx and 40% LAD med rx by cath 12/13/12 12/14/2012     Priority: High   • Gastric varices- s/p BRTO procedure at [facility] 04/04/2016     Priority: Medium   • GIB (gastrointestinal bleeding)- recurrent from varices 04/03/2016     Priority: Medium   • BPH (benign prostatic hyperplasia) 08/11/2017   • Peripheral neuropathy due to chemotherapy (CMS-[facility]) 08/11/2017   • Impaired mobility and ADLs 08/11/2017   • Lumbar stenosis 08/10/2017   • 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 due to lisinopril- d/c'd 11/2016 11/18/2016   • SVT (supraventricular tachycardia) 10/27/2016   • Microcytic hypochromic anemia 10/24/2016   • History of colon cancer 10/24/2016   • History of esophageal varices 10/24/2016   • Iron deficiency anemia due to chronic blood loss 10/06/2016   • Portal vein thrombosis- on CT [facility] 2/2015  04/14/2016   • Cirrhosis of liver with ascites (HCC)- ? DUE TO OLD HEP B, CHEMO RX,  OR THERMO RAD OF MAGNANT LESION 04/14/2016   • Mixed hyperlipidemia 06/20/2013   • Essential hypertension 06/20/2013   • Thrombocytopenia due to drugs 04/25/2012   • Colon cancer-2010 left hemicolectomy; no colostomy 12/06/2010   • Liver lesion- ablation of malignant met from colon ca [facility] 12/06/2010   • Peripheral neuropathy, secondary to drugs or chemicals 12/06/2010     Results     Procedure Component Value Ref Range Date/Time    ESTIMATED GFR [[id number]] Collected:  08/16/17 0523    Order Status:  Completed Lab Status:  Final result Updated:  08/16/17 0759     GFR If African American >60 >60 mL/min/1.73 m 2      GFR If Non African American >60 >60 mL/min/1.73 m 2     COMP METABOLIC PANEL [[id number]]  (Abnormal) Collected:  08/16/17 0523    Order Status:  Completed Lab Status:  Final result Updated:  08/16/17 0759    Specimen Information:  Blood      Sodium 137 135 - 145 mmol/L      Potassium 3.7 3.6 - 5.5 mmol/L      Chloride 106 96 - 112 mmol/L      Co2 24 20 - 33 mmol/L      Anion Gap 7.0 0.0 - 11.9       Glucose 84 65 - 99 mg/dL      Bun 10 8 - 22 mg/dL      Creatinine 0.61 0.50 - 1.40 mg/dL      Calcium 8.4 (L) 8.5 - 10.5 mg/dL      AST(SGOT) 15 12 - 45 U/L      ALT(SGPT) 12 2 - 50 U/L      Alkaline Phosphatase 66 30 - 99 U/L      Total Bilirubin 1.2 0.1 - 1.5 mg/dL      Albumin 2.8 (L) 3.2 - 4.9 g/dL      Total Protein 5.2 (L) 6.0 - 8.2 g/dL      Globulin 2.4 1.9 - 3.5 g/dL      A-G Ratio 1.2 g/dL     PROTHROMBIN TIME [[id number]]  (Abnormal) Collected:  08/16/17 0725    Order Status:  Completed Lab Status:  Final result Updated:  08/16/17 0749    Specimen Information:  Blood      PT 17.3 (H) 12.0 - 14.6 sec      INR 1.37 (H) 0.87 - 1.13       Comment: INR - Non-therapeutic Reference Range: 0.87-1.13  INR - Therapeutic Reference Range: 2.0-4.0         Narrative:      Indicate which anticoagulants the  patient is on:->NONE    CBC WITH DIFFERENTIAL [[id number]]  (Abnormal) Collected:  08/16/17 0523    Order Status:  Completed Lab Status:  Final result Updated:  08/16/17 0744    Specimen Information:  Blood      WBC 4.6 (L) 4.8 - 10.8 K/uL      RBC 3.83 (L) 4.70 - 6.10 M/uL      Hemoglobin 10.6 (L) 14.0 - 18.0 g/dL      Hematocrit 33.1 (L) 42.0 - 52.0 %      MCV 86.4 81.4 - 97.8 fL      MCH 27.7 27.0 - 33.0 pg      MCHC 32.0 (L) 33.7 - 35.3 g/dL      RDW 53.9 (H) 35.9 - 50.0 fL      Platelet Count 67 (L) 164 - 446 K/uL      MPV 10.5 9.0 - 12.9 fL      Neutrophils-Polys 68.20 44.00 - 72.00 %      Lymphocytes 14.90 (L) 22.00 - 41.00 %      Monocytes 12.10 0.00 - 13.40 %      Eosinophils 4.20 0.00 - 6.90 %      Basophils 0.40 0.00 - 1.80 %      Immature Granulocytes 0.20 0.00 - 0.90 %      Nucleated RBC 0.00 /100 WBC      Neutrophils (Absolute) 3.11 1.82 - 7.42 K/uL      Comment: Includes immature neutrophils, if present.        Lymphs (Absolute) 0.68 (L) 1.00 - 4.80 K/uL      Monos (Absolute) 0.55 0.00 - 0.85 K/uL      Eos (Absolute) 0.19 0.00 - 0.51 K/uL      Baso (Absolute) 0.02 0.00 - 0.12 K/uL      Immature Granulocytes (abs) 0.01 0.00 - 0.11 K/uL      NRBC (Absolute) 0.00 K/uL         Nursing  Diet/Nutrition:  Regular and Thin Liquids  Medication Administration:  Whole with Liquid Wash  % consumed at meals in last 24 hours:  Consumed % of meals per documentation.  Breakfast:  100%   Lunch:  100%  Dinner:  95%   Snack schedule:  None  Supplement:  Boost Plus  Appetite:  Good  Fluid Intake/Output in past 24 hours: In: 800 [P.O.:800]  Out: 600   Admit Weight:  Weight: 116.801 kg (257 lb 8 oz)  Weight Last 7 Days   [110.9 kg (244 lb 7.8 oz)-116.801 kg (257 lb 8 oz)] 110.9 kg (244 lb 7.8 oz) (08/13 1445)    Pain Issues:    Location:        Severity:  Mild   Scheduled pain medications:  None     PRN pain medications used in last 24 hours:  None   Non Pharmacologic Interventions:  warm blanket, emotional support,  relaxation, repositioned and rest  Effectiveness of pain management plan:  good=patient states acceptable comfort after interventions    Bowel:    Bowel Assist Initial Score:  3 - Moderate Assistance  Bowel Assist Current Score:  1 - Total Assistance  Bowl Accidents in last 7 days:  2  Last bowel movement: 08/15/17  Stool: Medium, Soft     Usual bowel pattern:  More than once a day  Scheduled bowel medications:  None  PRN bowel medications used in last 24 hours:  None  Nursing Interventions:  Emptying of device, Brief, Set-up  Effectiveness of bowel program:   fair=occasional unpredictable, incontinent emptying of bowel (less than 1 time day)  Bladder:    Bladder Assist Initial Score:  5 - Standby Prompting/Supervision or Set-up  Bladder Assist Current Score:  1 - Total Assistance  Bladder Accidents in last 7 days:  1  Medications affecting bladder:  Flomax    Time void schedule/voiding pattern:  Voiding every 2-4 hours  Interventions:  Medication, Supervision, Verbal cueing, Emptying of device, Urinal, Brief  Effectiveness of bladder training:  Fair=occasional unpredictable, incontinent emptying of bladder (< 1 time/day)    Wound:      Patient Lines/Drains/Airways Status    Active Current Wounds     Name: Placement date: Placement time: Site: Days:    Pressure Ulcer  Stage 3 POA Coccyx Midline 08/10/17     6                   Interventions:  Dressing changes and monitoring   Effectiveness of intervention:  wound is improving     Sleep/wake cycle:   Average hours slept:  Sleeps less than 2 hours without waking due to every 2 hours turn schedule  Sleep medication usage:  None    Patient/Family Training/Education:  General Self Care, Medication Management, Safe Transfers, Safety and Skin Care    Strengths: Alert and oriented, Willingly participates in therapeutic activities, Pleasant and cooperative and Motivated for self care and independence   Barriers:   Generalized weakness, Limited mobility and Pressure ulcer             Nursing Problems           Problem: Bowel/Gastric:     Goal: Normal bowel function is maintained or improved         Goal: Will not experience complications related to bowel motility           Problem: Communication     Goal: The ability to communicate needs accurately and effectively will improve           Problem: Discharge Barriers/Planning     Goal: Patient's continuum of care needs will be met           Problem: Infection     Goal: Will remain free from infection           Problem: Knowledge Deficit     Goal: Knowledge of disease process/condition, treatment plan, diagnostic tests, and medications will improve         Goal: Knowledge of the prescribed therapeutic regimen will improve           Problem: Mobility     Goal: Risk for activity intolerance will decrease           Problem: Pain Management     Goal: Pain level will decrease to patient's comfort goal           Problem: Safety     Goal: Will remain free from injury         Goal: Will remain free from falls           Problem: Skin Integrity     Goal: Risk for impaired skin integrity will decrease           Problem: Urinary Elimination:     Goal: Ability to reestablish a normal urinary elimination pattern will improve           Problem: Venous Thromboembolism (VTW)/Deep Vein Thrombosis (DVT) Prevention:     Goal: Patient will participate in Venous Thrombosis (VTE)/Deep Vein Thrombosis (DVT)Prevention Measures                Long Term Goals:   At discharge patient will be able to function safely at home and in the community with support.    Section completed by:  Sherlyn Hercules R.N.           Mobility  Bed mobility:  CGA-SBA with use of leg    Bed /Chair/Wheelchair Transfer Initial:  4 - Minimal Assistance  Bed /Chair/Wheelchair Transfer Current:  4 - Minimal Assistance   Bed/Chair/Wheelchair Transfer Description:  Adaptive equipment, Increased time, Supervision for safety, Verbal cueing, Set-up of equipment, Initial preparation for task  (SPT to/from EOB sitting and manual wc)  Walk Initial:  0 - Not tested,unsafe activity  Walk Current:  1 - Total Assistance   Walk Description:  Walker, Assist device/equipment, Requires 2 people to assist (Amb with FWW indoors 5-10 ft x 3 in SoloStep harness Min-mod A)  Wheelchair Initial:  0 - Not tested,medical condition  Wheelchair Current:  2 - Max Assistance   Wheelchair Description:  Adaptive equipment, Extra time, Supervision for safety (x125 feet with BUE and SBA)  Stairs Initial:  0 - Not tested,medical condition  Stairs Current: 0 - Not tested,unsafe activity   Stairs Description:    Patient/Family Training/Education: Education on safety with transfers, safety with pregait, importance of posture, POC  DME/DC Recommendations:  TBD, manual wc, FWW? Anticipate home health  Strengths:  Making steady progress towards goals, Motivated for self care and independence, Pleasant and cooperative, Supportive family and Willingly participates in therapeutic activities  Barriers:   Decreased endurance, Generalized weakness, Impulsive, Limited mobility, Poor activity tolerance and Poor balance (many comorbidities), 2 stairs to enter home, no railings  # of short term goals set= 4  # of short term goals met= 0 (partially met 3, see below- need to demonstrate consistency)       Physical Therapy Problems           Problem: Mobility     Dates: Start: 08/11/17       Goal: STG-Within one week, patient will ambulate household distance     Dates: Start: 08/11/17      Description: 1) Individualized goal:  30 feet with FWW, gt belt, and Mod A    2) Interventions:  PT Group Therapy, PT Gait Training, PT Therapeutic Exercises, PT Neuro Re-Ed/Balance, PT Therapeutic Activity and PT Manual Therapy.    Note: Goal Note filed on 08/15/17 8246 by Lalita Cordon DPT    Goal: STG-Within one week, patient will ambulate household distance  Outcome: NOT MET  5-10 ft with SoloStep harness min/mod A, wc follow (2nd person), unsteady,   impaired  "standing posture            Problem: Mobility Transfers     Dates: Start: 08/11/17       Goal: STG-Within one week, patient will perform bed mobility     Dates: Start: 08/11/17      Description: 1) Individualized goal:  SBA with cueing, elevated HOB, setup, bed rail    2) Interventions:  PT Group Therapy, PT Gait Training, PT Therapeutic Exercises, PT Neuro Re-Ed/Balance, PT Therapeutic Activity and PT Manual Therapy.        Note: Goal Note filed on 08/15/17 1654 by Lalita Cordon DPT    Goal: STG-Within one week, patient will perform bed mobility  Outcome: PROGRESSING AS EXPECTED  Inconsistent, SBA after practice with use of leg           Goal: STG-Within one week, patient will sit to stand     Dates: Start: 08/11/17      Description: 1) Individualized goal:  with FWW and Mod A    2) Interventions:  PT Group Therapy, PT Gait Training, PT Therapeutic Exercises, PT Neuro Re-Ed/Balance, PT Therapeutic Activity and PT Manual Therapy.    Note: Goal Note filed on 08/15/17 1654 by Lalita Cordon DPT    Goal: STG-Within one week, patient will sit to stand  Outcome: PROGRESSING AS EXPECTED  MIn A-CGA from elevated chair height (patient is 6'3\")           Goal: STG-Within one week, patient will transfer bed to chair     Dates: Start: 08/11/17      Description: 1) Individualized goal:  with FWW and Min A    2) Interventions:  PT Group Therapy, PT Gait Training, PT Therapeutic Exercises, PT Neuro Re-Ed/Balance, PT Therapeutic Activity and PT Manual Therapy.        Note: Goal Note filed on 08/15/17 1654 by Lalita Cordon DPT    Goal: STG-Within one week, patient will transfer bed to chair  Outcome: PROGRESSING AS EXPECTED  Just introduced, to progress patient from reach pivot, further practice   required to demonstrate consistency            Problem: PT-Long Term Goals     Dates: Start: 08/11/17       Goal: LTG-By discharge, patient will ambulate     Dates: Start: 08/11/17      Description: 1) Individualized goal:  200 " feet with FWW and SPV    2) Interventions:  PT Group Therapy, PT Gait Training, PT Therapeutic Exercises, PT Neuro Re-Ed/Balance, PT Therapeutic Activity and PT Manual Therapy.        Goal: LTG-By discharge, patient will transfer one surface to another     Dates: Start: 08/11/17      Description: 1) Individualized goal:  with FWW and SPV    2) Interventions:  PT Group Therapy, PT Gait Training, PT Therapeutic Exercises, PT Neuro Re-Ed/Balance, PT Therapeutic Activity and PT Manual Therapy.                Section completed by:  Lalita Cordon PT, DPT    Activities of Daily Living  Eating Initial:  5 - Standby Prompting/Supervision or Set-up  Eating Current:  5 - Standby Prompting/Supervision or Set-up   Eating Description:     Grooming Initial:  6 - Modified Independent  Grooming Current:  6 - Modified Independent   Grooming Description:  Increased time (Seated for groom/hygiene routine at sinkside)  Bathing Initial:  3 - Moderate Assistance  Bathing Current:  4 - Minimal Assistance   Bathing Description:  Adaptive equipment, Grab bar, Tub bench, Long handled bath tool, Hand held shower, Increased time, Supervision for safety, Verbal cueing, Set-up of equipment, Initial preparation for task  Upper Body Dressing Initial:  4 - Minimal Assistance  Upper Body Dressing Current:  6 - Modified Independent   Upper Body Dressing Description:  Increased time (Don/doff pull over shirt)  Lower Body Dressing Initial:  2 - Max Assistance  Lower Body Dressing Current:  4 - Minimal Assistance   Lower Body Dressing Description:  4 - Minimal Assistance  Toileting Initial:  1 - Total Assistance  Toileting Current:  1 - Total Assistance   Toileting Description:  Grab bar, Increased time, Supervision for safety, Verbal cueing, Set-up of equipment, Initial preparation for task  Toilet Transfer Initial:  4 - Minimal Assistance  Toilet Transfer Current:  4 - Minimal Assistance   Toilet Transfer Description:  4 - Minimal Assistance  Tub /  Shower Transfer Initial:  4 - Minimal Assistance  Tub / Shower Transfer Current:  5 - Standby Prompting/Supervision or Set-up   Tub / Shower Transfer Description:  Adaptive equipment, Grab bar, Increased time, Supervision for safety, Verbal cueing, Set-up of equipment, Initial preparation for task  IADL:  TBD   Family Training/Education:  TBD   DME/DC Recommendations:  TBD     Strengths:  Able to follow instructions, Alert and oriented, Pleasant and cooperative, Supportive family and Willingly participates in therapeutic activities  Barriers:  Generalized weakness, Limited mobility and Poor balance     # of short term goals set= 3    # of short term goals met= 2          Occupational Therapy Goals           Problem: OT Long Term Goals     Dates: Start: 08/11/17       Goal: LTG-By discharge, patient will complete basic self care tasks     Dates: Start: 08/11/17   Expected End: 08/25/17      Description: 1) Individualized Goal:  By discharge, patient will complete basic self care tasks at a level of Mod. I.     2) Interventions:  OT Self Care/ADL, OT Neuro Re-Ed/Balance, OT Therapeutic Activity and OT Therapeutic Exercise    Co-signature:  Connor Ferraro OTR/KAITLIN             Goal: LTG-By discharge, patient will perform bathroom transfers     Dates: Start: 08/11/17   Expected End: 08/25/17      Description: 1) Individualized Goal:  By discharge, patient will perform bathroom transfers at a level of Mod. I.     2) Interventions:  OT Self Care/ADL, OT Neuro Re-Ed/Balance, OT Therapeutic Activity and OT Therapeutic Exercise    Co-signature:  Connor Ferraro OTR/L             Goal: LTG-By discharge, patient will complete basic home management     Dates: Start: 08/11/17   Expected End: 08/25/17      Description: 1) Individualized Goal:  By discharge, patient will prepare a simple meal at a level of mod. I.     2) Interventions:  OT Self Care/ADL, OT Neuro Re-Ed/Balance, OT Therapeutic Activity and OT Therapeutic Exercise     Co-signature:  ISAIAS Gomez/KAITLIN               Problem: Toileting     Dates: Start: 08/11/17       Goal: STG-Within one week, patient will complete toileting tasks     Dates: Start: 08/11/17   Expected End: 08/18/17      Description: 1) Individualized Goal:  Within one week, patient will complete toileting tasks at a level of Max A.     2) Interventions:  OT Self Care/ADL, OT Neuro Re-Ed/Balance, OT Therapeutic Activity and OT Therapeutic Exercise    Co-signature:  ISAIAS Gomez/KAITLIN         Note: Goal Note filed on 08/16/17 0715 by Connor Ferraro MS, OTR/L    Goal: STG-Within one week, patient will complete toileting tasks  Outcome: NOT MET  Pt at Total assist                Section completed by:  Connor Ferraro MS, OTR/L          Nutrition       Dietary Problems           Problem: Other Problem (see comments)     Description: Diagnosis: Increased nutrient needs (protein) r/t high demand for protein in the setting of wound healing/ maintaining LBM as evidenced by patient admitted with DTI to coccyx, visible evidence of muscle wasting.           Goal: Other Goal      Monitor/Evaluation: Monitor PO intake, weight, labs, medication adjustments, skin integrity, GI function, vitals, I/Os, and overall hydration status.  Adjust nutritional POC pending clinical outcomes.      RD following PRN.  PO is adequate.  Nutrition interventions in place.     Goal: Maintain adequate oral nutrient/fluid intake to promote nutrition optimization/wound healing.                  Nutrition Care Follow Up Note:    ASSESSMENT:  [place] in [place] today and she noted patient not doing too well d/t bladder retention/ worsening ascites which is making him feel full.  Walked with patient's wife back to patient's room where he was working on some documents.  He tells me he was just unable to eat anymore at breakfast today without feeling like he was going to vomit.      Patient reports feeling discomfort s/t new diagnosis of UTI s/t catheter.   Abx initiated.    Ascites appears to have worsened.    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       It appears as though patient is on 1.5L FR although this is not in the diet order.  Discussed need to avoid foods high in Na+ in excess to avoid further fluid retention.     Patient denies any chewing/swallowing issues.  GI issues noted, patient is having routine BMs.       Appetite: Good    Diet: Regular  (RN charted 1.5L FR) + Ensure BID    Average PO intake x 3 days: 58% of meals since admission + % of supplement = adequate PO intake     Labs:  No recent labs since 8/11aside from +UA    Pertinent Medications: Lipitor, Coreg, Cipro, Bowel Regimen, Folvite, Lactinex, Nystatin, Aldactone, Flomax, Actigall, Vitamin D     PRN medications: noted    Weight: 110.9kg- taken via stand up scale (likely more accurate weight than admission weight)- monitor trends  Height: 190.5cm  BMI: 30.56 (obese class 1)    Nutrient Needs:  Kcal: 4601-4432 kcal/day ( 25-27 kcal/kg IBW)         BEE= 2001 kcal x 1.1-1.2 g/kg    Protein: 89-107g/day ( 1-1.2g/kg IBW)- consider checking NH3 level    Fluid: 1800mL - 2000mL/day in the setting of ascites/ fluid retention/ liver failure     Diagnosis: Increased nutrient needs (protein) r/t high demand for protein in the setting of wound healing/ maintaining LBM as evidenced by patient admitted with DTI to coccyx, visible evidence of muscle wasting. (ongoing)     Intervention/ Recommendations/POC:  1. Continue current diet.  Avoid excessive salt intake. Increase supplement to TID.    2. Continue adequate PO/fluid intake.  3. Nutrition rep to see regarding food prefs/ honor within dietary restrictions (if indicated)  4. Add MVI + minerals, 500mg Vitamin C BID x 14 days to optimize wound healing        Monitor/Evaluation: Monitor PO intake, weight, labs, medication adjustments, skin integrity, GI function, vitals, I/Os, and overall hydration status.  Adjust nutritional POC pending  clinical outcomes.     RD following weekly.  Nutrition interventions in place.    Goal: Maintain adequate oral nutrient/fluid intake to promote nutrition optimization/wound healing.                           Section completed by:  Clarisa Paulino RD    REHAB-Pharmacy IDT Team Note by Gertrudis Thompson RPH at 8/15/2017  4:00 PM  Version 1 of 1    Author:  Gertrudis Thompson RPH Service:  (none) Author Type:  Pharmacist    Filed:  8/15/2017  4:07 PM Note Time:  8/15/2017  4:00 PM Status:  Signed    :  Gertrudis Thompson RPH (Pharmacist)           Pharmacy  Pharmacy  Antibiotics/Antifungals/Antivirals:  Medication:      Active Orders     Ordered     Ordering Provider       Sun Aug 13, 2017  2:49 PM    08/13/17 1449  ciprofloxacin (CIPRO) tablet 500 mg   EVERY 12 HOURS      Yaya Martinez M.D.        Route:         po  Stop Date:  8/18/17  Reason: UTI  Antihypertensives/Cardiac:  Medication:    Orders (72h ago through future)    Start     Ordered    08/15/17 1100  propranolol (INDERAL) tablet 10 mg   TWICE DAILY,   Status:  Discontinued      08/15/17 1032    08/14/17 1441  cholestyramine (QUESTRAN,PREVALITE) 4 GM powder 4 g   1 TIME DAILY PRN      08/14/17 1443    08/11/17 0600  spironolactone (ALDACTONE) tablet 25 mg   Q DAY      08/10/17 1457    08/10/17 2100  atorvastatin (LIPITOR) tablet 40 mg   EVERY BEDTIME      08/10/17 1457    08/10/17 1730  carvedilol (COREG) tablet 3.125 mg   2 TIMES DAILY WITH MEALS,   Status:  Discontinued      08/10/17 1457    08/10/17 1456  hydrALAZINE (APRESOLINE) tablet 25 mg   EVERY 8 HOURS PRN      08/10/17 1457        Patient Vitals for the past 24 hrs:   BP Pulse   08/15/17 1520 114/70 mmHg 88   08/15/17 1450 113/66 mmHg 79   08/15/17 1420 125/84 mmHg 82   08/15/17 1405 118/74 mmHg 69   08/15/17 1350 126/63 mmHg 78   08/15/17 1335 129/64 mmHg 80   08/15/17 1320 114/67 mmHg 74   08/15/17 0700 117/69 mmHg 72   08/14/17 1900 121/63 mmHg 74   08/14/17 1811 115/61 mmHg 74        Anticoagulation:  Medication:  n/a  INR:      Other key medications: Tamsulosin, Trazadone, Ursodiol   A review of the medication list reveals no issues at this time. Patient is currently on an antihypertensive. Recommend home blood pressure monitoring/recording if antihypertensive regimen continues.  Section completed by:  Gertrudis Thompson [facility]           DC Planning  DC destination/dispostion:  Return home with spouse. 2 steps to enter; bath/tub shower combo.      Referrals:  None at this time.    DC Needs: Spouse prefers out pt therapy instead of home health; follow up with PCP Dr. Fabián Urena/Dr. Rosales.  Pharmacy [facility] @ [facility].     Barriers to discharge: Functional status; prolonger hospitalization       Strengths:  Motivated to return home, supportive spouse    Section completed by:  ELIZABETH SantosW, [facility]     Summary: Projected length of stay is 9/16/2017.    + diarrhea, negative c-diff.  On fluid restrictions/no added salt diet, bladder scans dc'd, stage 3 on coccyx-arrived w/ it ; pt fells yesterday in xray-no injuries reported, gait 2 person isaura w/ fww, + [place] filter for +dvt lle, bathing min assist, upper body dressing min assist, lower ext min assist, toileting total assist, transfers min assist, sba w/ shower transfers.  Anticipate fww/ wheelchair for dme.        Physician Summary  Moreno Adorno MD participated and led team conference discussion.

## 2017-08-16 NOTE — PROGRESS NOTES
"Rehab Progress Note     Interval Events (Subjective)  Patient seen in his room and  alongwith his wife.   IThe patient's diarrhea is substantially improved. C-Diff toxin titer is negative at this time.  His ascites in increased and her has requested to be tapped but I think their is not immediate need . Notes of Dr. Hopper and the therapists appreciated. Dr. hopper has adding diuretics and effort to lessen the patient's ascites       Objective:  VITAL SIGNS: /74 mmHg  Pulse 71  Temp(Src) 36.8 °C (98.3 °F)  Resp 20  Ht 1.905 m (6' 3\")  Wt 110.9 kg (244 lb 7.8 oz)  BMI 30.56 kg/m2  SpO2 95%      GENERAL: No apparent distress  HEENT: Normocephalic/atraumatic, EOMI, PERRL and No nystagmus  CARDIAC: Regular rate and rhythm, normal S1, S2   LUNGS: Clear to auscultation   ABDOMINAL: bowel sounds present, soft, nontender and ascites present with fluid wave noted. Ascites is increased  EXTREMITIES: Without clubbing or cyanosis 2+ edema noted in the left lower extremity but decreased  SKIN:   Small stage III noted on sacrum  NEURO: Unchanged    Recent Results (from the past 72 hour(s))   CDIFF BY PCR WITH TOXIN    Collection Time: 08/14/17  6:10 PM   Result Value Ref Range    C Diff by PCR Negative Negative    [id number] Presumptive Negative Negative   COMP METABOLIC PANEL    Collection Time: 08/16/17  5:23 AM   Result Value Ref Range    Sodium 137 135 - 145 mmol/L    Potassium 3.7 3.6 - 5.5 mmol/L    Chloride 106 96 - 112 mmol/L    Co2 24 20 - 33 mmol/L    Anion Gap 7.0 0.0 - 11.9    Glucose 84 65 - 99 mg/dL    Bun 10 8 - 22 mg/dL    Creatinine 0.61 0.50 - 1.40 mg/dL    Calcium 8.4 (L) 8.5 - 10.5 mg/dL    AST(SGOT) 15 12 - 45 U/L    ALT(SGPT) 12 2 - 50 U/L    Alkaline Phosphatase 66 30 - 99 U/L    Total Bilirubin 1.2 0.1 - 1.5 mg/dL    Albumin 2.8 (L) 3.2 - 4.9 g/dL    Total Protein 5.2 (L) 6.0 - 8.2 g/dL    Globulin 2.4 1.9 - 3.5 g/dL    A-G Ratio 1.2 g/dL   CBC WITH DIFFERENTIAL    Collection Time: 08/16/17  5:23 " AM   Result Value Ref Range    WBC 4.6 (L) 4.8 - 10.8 K/uL    RBC 3.83 (L) 4.70 - 6.10 M/uL    Hemoglobin 10.6 (L) 14.0 - 18.0 g/dL    Hematocrit 33.1 (L) 42.0 - 52.0 %    MCV 86.4 81.4 - 97.8 fL    MCH 27.7 27.0 - 33.0 pg    MCHC 32.0 (L) 33.7 - 35.3 g/dL    RDW 53.9 (H) 35.9 - 50.0 fL    Platelet Count 67 (L) 164 - 446 K/uL    MPV 10.5 9.0 - 12.9 fL    Neutrophils-Polys 68.20 44.00 - 72.00 %    Lymphocytes 14.90 (L) 22.00 - 41.00 %    Monocytes 12.10 0.00 - 13.40 %    Eosinophils 4.20 0.00 - 6.90 %    Basophils 0.40 0.00 - 1.80 %    Immature Granulocytes 0.20 0.00 - 0.90 %    Nucleated RBC 0.00 /100 WBC    Neutrophils (Absolute) 3.11 1.82 - 7.42 K/uL    Lymphs (Absolute) 0.68 (L) 1.00 - 4.80 K/uL    Monos (Absolute) 0.55 0.00 - 0.85 K/uL    Eos (Absolute) 0.19 0.00 - 0.51 K/uL    Baso (Absolute) 0.02 0.00 - 0.12 K/uL    Immature Granulocytes (abs) 0.01 0.00 - 0.11 K/uL    NRBC (Absolute) 0.00 K/uL   ESTIMATED GFR    Collection Time: 08/16/17  5:23 AM   Result Value Ref Range    GFR If African American >60 >60 mL/min/1.73 m 2    GFR If Non African American >60 >60 mL/min/1.73 m 2   PROTHROMBIN TIME    Collection Time: 08/16/17  7:25 AM   Result Value Ref Range    PT 17.3 (H) 12.0 - 14.6 sec    INR 1.37 (H) 0.87 - 1.13       Current Facility-Administered Medications   Medication Frequency   • docusate sodium (COLACE) capsule 100 mg BID PRN    And   • senna-docusate (PERICOLACE or SENOKOT S) 8.6-50 MG per tablet 1 Tab QDAY PRN    And   • lactulose 20 GM/30ML solution 30 mL Q24HRS PRN    And   • bisacodyl (DULCOLAX) suppository 10 mg QDAY PRN   • cholestyramine (QUESTRAN,PREVALITE) 4 GM powder 4 g QDAY PRN   • ciprofloxacin (CIPRO) tablet 500 mg Q12HRS   • hydrocortisone (ANUSOL-HC) suppository 25 mg Q12HRS PRN   • tamsulosin (FLOMAX) capsule 0.4 mg AFTER BREAKFAST   • Respiratory Care per Protocol Continuous RT   • Pharmacy Consult Request ...Pain Management Review 1 Each PRN   • nystatin (MYCOSTATIN) powder BID   •  lactobacillus granules (LACTINEX/FLORANEX) packet 1 Packet TID WITH MEALS   • spironolactone (ALDACTONE) tablet 25 mg Q DAY   • ursodiol (ACTIGALL) capsule 300 mg DAILY   • vitamin D (cholecalciferol) tablet 1,000 Units DAILY   • folic acid (FOLVITE) tablet 1 mg DAILY   • atorvastatin (LIPITOR) tablet 40 mg QHS   • benzocaine-menthol (CEPACOL) lozenge 1 Lozenge Q2HRS PRN   • hydrALAZINE (APRESOLINE) tablet 25 mg Q8HRS PRN   • mag hydrox-al hydrox-simeth (MAALOX PLUS ES or MYLANTA DS) suspension 20 mL Q2HRS PRN   • ondansetron (ZOFRAN ODT) dispertab 4 mg 4X/DAY PRN    Or   • ondansetron (ZOFRAN) syringe/vial injection 4 mg 4X/DAY PRN   • trazodone (DESYREL) tablet 50 mg QHS PRN   • sodium chloride (OCEAN) 0.65 % nasal spray 2 Spray PRN   • oxycodone immediate release (ROXICODONE) tablet 10 mg Q4HRS PRN       Orders Placed This Encounter   Procedures   • Diet Order     Standing Status: Standing      Number of Occurrences: 1      Standing Expiration Date:      Order Specific Question:  Diet:     Answer:  Regular [1]     Order Specific Question:  Consistency/Fluid modifications:     Answer:  Thin Liquids [3]       Assessment:  Active Hospital Problems    Diagnosis   • *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 DUE TO OLD HEP B, CHEMO RX,  OR THERMO RAD OF MAGNANT LESION   • Essential hypertension   • Colon cancer-2010 left hemicolectomy;  no colostomy       Medical Decision Making and Plan:  Patient is doing well at this time  Dr. Hopper has changed the antihypertensive to inderal which is better for portal hypertension  He is continuing his workup for ascites and out of the diuretic  We'll continue PT and OT        Labs reviewed      Cirrhosis/ascites    Stable at this time but will monitor. Dr. Hopper is arranging thoracentesis for Friday    Hypertension       under control with Aldactone and now Inderal    Stage III pressure ulcer, on the Coccyx    Appreciate the input of the wound care nurse honey colloid applied    Vitamin D deficiency    Continue replacement    Lower extremity edema left leg    We will continue to monitor improved      Diarrhea   C. diff toxin neg  Cholestyramine has improved       UTI  currently on ciprofloxacin prophylactically pending final cultures    Continue PT and OT  A tentative discharge date was suggested to be September 16 2017  We'll re-conference next week 9/23/2017    Team Conference        Nursing  Diet/Nutrition:  Regular and Thin Liquids  Medication Administration:  Whole with Liquid Wash  % consumed at meals in last 24 hours:  Consumed % of meals per documentation.  Breakfast:  100%    Lunch:  100%  Dinner:  95%    Snack schedule:  None  Supplement:  Boost Plus  Appetite:  Good  Fluid Intake/Output in past 24 hours: In: 800 [P.O.:800]  Out: 600   Admit Weight:  Weight: 116.801 kg (257 lb 8 oz)  Weight Last 7 Days   [110.9 kg (244 lb 7.8 oz)-116.801 kg (257 lb 8 oz)] 110.9 kg (244 lb 7.8 oz) (08/13 1445)    Pain Issues:     Location:        Severity:  Mild    Scheduled pain medications:  None     PRN pain medications used in last 24 hours:  None   Non Pharmacologic Interventions:  warm blanket, emotional support, relaxation, repositioned and rest  Effectiveness of pain management plan:  good=patient states acceptable comfort after interventions    Bowel:    Bowel Assist Initial Score:  3 - Moderate  Assistance  Bowel Assist Current Score:  1 - Total Assistance  Bowl Accidents in last 7 days:  2  Last bowel movement: 08/15/17  Stool: Medium, Soft      Usual bowel pattern:  More than once a day  Scheduled bowel medications:  None  PRN bowel medications used in last 24 hours:  None  Nursing Interventions:  Emptying of device, Brief, Set-up  Effectiveness of bowel program:   fair=occasional unpredictable, incontinent emptying of bowel (less than 1 time day)  Bladder:    Bladder Assist Initial Score:  5 - Standby Prompting/Supervision or Set-up  Bladder Assist Current Score:  1 - Total Assistance  Bladder Accidents in last 7 days:  1  Medications affecting bladder:  Flomax    Time void schedule/voiding pattern:  Voiding every 2-4 hours  Interventions:  Medication, Supervision, Verbal cueing, Emptying of device, Urinal, Brief  Effectiveness of bladder training:  Fair=occasional unpredictable, incontinent emptying of bladder (< 1 time/day)    Wound:      Patient Lines/Drains/Airways Status     Active Current Wounds       Name:  Placement date:  Placement time:  Site:  Days:      Pressure Ulcer  Stage 3 POA Coccyx Midline  08/10/17        6                        Interventions:  Dressing changes and monitoring   Effectiveness of intervention:  wound is improving     Sleep/wake cycle:    Average hours slept:  Sleeps less than 2 hours without waking due to every 2 hours turn schedule  Sleep medication usage:  None    Patient/Family Training/Education:  General Self Care, Medication Management, Safe Transfers, Safety and Skin Care    Strengths: Alert and oriented, Willingly participates in therapeutic activities, Pleasant and cooperative and Motivated for self care and independence   Barriers:   Generalized weakness, Limited mobility and Pressure ulcer               Nursing Problems              Problem: Bowel/Gastric:       Goal: Normal bowel function is maintained or improved              Goal: Will not experience  complications related to bowel motility                Problem: Communication       Goal: The ability to communicate needs accurately and effectively will improve                Problem: Discharge Barriers/Planning       Goal: Patient's continuum of care needs will be met                Problem: Infection       Goal: Will remain free from infection                Problem: Knowledge Deficit       Goal: Knowledge of disease process/condition, treatment plan, diagnostic tests, and medications will improve              Goal: Knowledge of the prescribed therapeutic regimen will improve                Problem: Mobility       Goal: Risk for activity intolerance will decrease                Problem: Pain Management       Goal: Pain level will decrease to patient's comfort goal                Problem: Safety       Goal: Will remain free from injury              Goal: Will remain free from falls                Problem: Skin Integrity       Goal: Risk for impaired skin integrity will decrease                Problem: Urinary Elimination:       Goal: Ability to reestablish a normal urinary elimination pattern will improve                Problem: Venous Thromboembolism (VTW)/Deep Vein Thrombosis (DVT) Prevention:       Goal: Patient will participate in Venous Thrombosis (VTE)/Deep Vein Thrombosis (DVT)Prevention Measures                     Long Term Goals:   At discharge patient will be able to function safely at home and in the community with support.    Section completed by:  Sherlyn Hercules R.N.           Mobility  Bed mobility:  CGA-SBA with use of leg    Bed /Chair/Wheelchair Transfer Initial:  4 - Minimal Assistance  Bed /Chair/Wheelchair Transfer Current:  4 - Minimal Assistance              Bed/Chair/Wheelchair Transfer Description:  Adaptive equipment, Increased time, Supervision for safety, Verbal cueing, Set-up of equipment, Initial preparation for task (SPT to/from EOB sitting and manual wc)  Walk Initial:  0  - Not tested,unsafe activity  Walk Current:  1 - Total Assistance              Walk Description:  Walker, Assist device/equipment, Requires 2 people to assist (Amb with FWW indoors 5-10 ft x 3 in SoloStep harness Min-mod A)  Wheelchair Initial:  0 - Not tested,medical condition  Wheelchair Current:  2 - Max Assistance              Wheelchair Description:  Adaptive equipment, Extra time, Supervision for safety (x125 feet with BUE and SBA)  Stairs Initial:  0 - Not tested,medical condition  Stairs Current: 0 - Not tested,unsafe activity              Stairs Description:    Patient/Family Training/Education: Education on safety with transfers, safety with pregait, importance of posture, POC  DME/DC Recommendations:  TBD, manual wc, FWW? Anticipate home health  Strengths:  Making steady progress towards goals, Motivated for self care and independence, Pleasant and cooperative, Supportive family and Willingly participates in therapeutic activities  Barriers:   Decreased endurance, Generalized weakness, Impulsive, Limited mobility, Poor activity tolerance and Poor balance (many comorbidities), 2 stairs to enter home, no railings  # of short term goals set= 4  # of short term goals met= 0 (partially met 3, see below- need to demonstrate consistency)        Physical Therapy Problems              Problem: Mobility       Dates:  Start: 08/11/17         Goal: STG-Within one week, patient will ambulate household distance        Dates:  Start: 08/11/17       Description:  1) Individualized goal:  30 feet with FWW, gt belt, and Mod A      2) Interventions:  PT Group Therapy, PT Gait Training, PT Therapeutic Exercises, PT Neuro Re-Ed/Balance, PT Therapeutic Activity and PT Manual Therapy.     Note:  Goal Note filed on 08/15/17 [id number] by Lalita Cordon DPT      Goal: STG-Within one week, patient will ambulate household distance  Outcome: NOT MET  5-10 ft with SoloStep harness min/mod A, wc follow (2nd person), unsteady,    impaired  "standing posture               Problem: Mobility Transfers       Dates:  Start: 08/11/17         Goal: STG-Within one week, patient will perform bed mobility        Dates:  Start: 08/11/17       Description:  1) Individualized goal:  SBA with cueing, elevated HOB, setup, bed rail      2) Interventions:  PT Group Therapy, PT Gait Training, PT Therapeutic Exercises, PT Neuro Re-Ed/Balance, PT Therapeutic Activity and PT Manual Therapy.           Note:  Goal Note filed on 08/15/17 1654 by Lalita Cordon DPT      Goal: STG-Within one week, patient will perform bed mobility  Outcome: PROGRESSING AS EXPECTED  Inconsistent, SBA after practice with use of leg              Goal: STG-Within one week, patient will sit to stand        Dates:  Start: 08/11/17       Description:  1) Individualized goal:  with FWW and Mod A      2) Interventions:  PT Group Therapy, PT Gait Training, PT Therapeutic Exercises, PT Neuro Re-Ed/Balance, PT Therapeutic Activity and PT Manual Therapy.     Note:  Goal Note filed on 08/15/17 1654 by Lalita Cordon DPT      Goal: STG-Within one week, patient will sit to stand  Outcome: PROGRESSING AS EXPECTED  MIn A-CGA from elevated chair height (patient is 6'3\")               Goal: STG-Within one week, patient will transfer bed to chair        Dates:  Start: 08/11/17       Description:  1) Individualized goal:  with FWW and Min A      2) Interventions:  PT Group Therapy, PT Gait Training, PT Therapeutic Exercises, PT Neuro Re-Ed/Balance, PT Therapeutic Activity and PT Manual Therapy.           Note:  Goal Note filed on 08/15/17 1654 by Lalita Cordon DPT      Goal: STG-Within one week, patient will transfer bed to chair  Outcome: PROGRESSING AS EXPECTED  Just introduced, to progress patient from reach pivot, further practice    required to demonstrate consistency               Problem: PT-Long Term Goals       Dates:  Start: 08/11/17         Goal: LTG-By discharge, patient will ambulate        " Dates:  Start: 08/11/17       Description:  1) Individualized goal:  200 feet with FWW and SPV      2) Interventions:  PT Group Therapy, PT Gait Training, PT Therapeutic Exercises, PT Neuro Re-Ed/Balance, PT Therapeutic Activity and PT Manual Therapy.           Goal: LTG-By discharge, patient will transfer one surface to another        Dates:  Start: 08/11/17       Description:  1) Individualized goal:  with FWW and SPV      2) Interventions:  PT Group Therapy, PT Gait Training, PT Therapeutic Exercises, PT Neuro Re-Ed/Balance, PT Therapeutic Activity and PT Manual Therapy.                   Section completed by:  Lalita Cordon PT, DPT    Activities of Daily Living  Eating Initial:  5 - Standby Prompting/Supervision or Set-up  Eating Current:  5 - Standby Prompting/Supervision or Set-up              Eating Description:     Grooming Initial:  6 - Modified Independent  Grooming Current:  6 - Modified Independent              Grooming Description:  Increased time (Seated for groom/hygiene routine at sinkside)  Bathing Initial:  3 - Moderate Assistance  Bathing Current:  4 - Minimal Assistance              Bathing Description:  Adaptive equipment, Grab bar, Tub bench, Long handled bath tool, Hand held shower, Increased time, Supervision for safety, Verbal cueing, Set-up of equipment, Initial preparation for task  Upper Body Dressing Initial:  4 - Minimal Assistance  Upper Body Dressing Current:  6 - Modified Independent              Upper Body Dressing Description:  Increased time (Don/doff pull over shirt)  Lower Body Dressing Initial:  2 - Max Assistance  Lower Body Dressing Current:  4 - Minimal Assistance              Lower Body Dressing Description:  4 - Minimal Assistance  Toileting Initial:  1 - Total Assistance  Toileting Current:  1 - Total Assistance              Toileting Description:  Grab bar, Increased time, Supervision for safety, Verbal cueing, Set-up of equipment, Initial preparation for task  Toilet  Transfer Initial:  4 - Minimal Assistance  Toilet Transfer Current:  4 - Minimal Assistance              Toilet Transfer Description:  4 - Minimal Assistance  Tub / Shower Transfer Initial:  4 - Minimal Assistance  Tub / Shower Transfer Current:  5 - Standby Prompting/Supervision or Set-up              Tub / Shower Transfer Description:  Adaptive equipment, Grab bar, Increased time, Supervision for safety, Verbal cueing, Set-up of equipment, Initial preparation for task  IADL:  TBD   Family Training/Education:  TBD   DME/DC Recommendations:  TBD     Strengths:  Able to follow instructions, Alert and oriented, Pleasant and cooperative, Supportive family and Willingly participates in therapeutic activities  Barriers:  Generalized weakness, Limited mobility and Poor balance     # of short term goals set= 3    # of short term goals met= 2           Occupational Therapy Goals              Problem: OT Long Term Goals       Dates:  Start: 08/11/17         Goal: LTG-By discharge, patient will complete basic self care tasks        Dates:  Start: 08/11/17   Expected End: 08/25/17       Description:  1) Individualized Goal:  By discharge, patient will complete basic self care tasks at a level of Mod. I.       2) Interventions:  OT Self Care/ADL, OT Neuro Re-Ed/Balance, OT Therapeutic Activity and OT Therapeutic Exercise      Co-signature:  Connor Ferraro OTR/L                  Goal: LTG-By discharge, patient will perform bathroom transfers        Dates:  Start: 08/11/17   Expected End: 08/25/17       Description:  1) Individualized Goal:  By discharge, patient will perform bathroom transfers at a level of Mod. I.       2) Interventions:  OT Self Care/ADL, OT Neuro Re-Ed/Balance, OT Therapeutic Activity and OT Therapeutic Exercise      Co-signature:  Connor Ferraro OTR/L                  Goal: LTG-By discharge, patient will complete basic home management        Dates:  Start: 08/11/17   Expected End: 08/25/17       Description:  1)  Individualized Goal:  By discharge, patient will prepare a simple meal at a level of mod. I.       2) Interventions:  OT Self Care/ADL, OT Neuro Re-Ed/Balance, OT Therapeutic Activity and OT Therapeutic Exercise      Co-signature:  ISAIAS Gomez/KAITLIN                    Problem: Toileting       Dates:  Start: 08/11/17         Goal: STG-Within one week, patient will complete toileting tasks        Dates:  Start: 08/11/17   Expected End: 08/18/17       Description:  1) Individualized Goal:  Within one week, patient will complete toileting tasks at a level of Max A.       2) Interventions:  OT Self Care/ADL, OT Neuro Re-Ed/Balance, OT Therapeutic Activity and OT Therapeutic Exercise      Co-signature:  ISAIAS Gomez/KAITLIN            Note:  Goal Note filed on 08/16/17 0715 by Connor Ferraro MS,FRANR/L      Goal: STG-Within one week, patient will complete toileting tasks  Outcome: NOT MET  Pt at Total assist                   Section completed by:  Connor Ferraro MS, OTR/L          Nutrition        Dietary Problems              Problem: Other Problem (see comments)       Description:  Diagnosis: Increased nutrient needs (protein) r/t high demand for protein in the setting of wound healing/ maintaining LBM as evidenced by patient admitted with DTI to coccyx, visible evidence of muscle wasting.                Goal: Other Goal          Monitor/Evaluation: Monitor PO intake, weight, labs, medication adjustments, skin integrity, GI function, vitals, I/Os, and overall hydration status.  Adjust nutritional POC pending clinical outcomes.        RD following PRN.  PO is adequate.  Nutrition interventions in place.       Goal: Maintain adequate oral nutrient/fluid intake to promote nutrition optimization/wound healing.                         Nutrition Care Follow Up Note:    ASSESSMENT:  [place] in [place] today and she noted patient not doing too well d/t bladder retention/ worsening ascites which is making him feel full.  Walked  with patient's wife back to patient's room where he was working on some documents.  He tells me he was just unable to eat anymore at breakfast today without feeling like he was going to vomit.      Patient reports feeling discomfort s/t new diagnosis of UTI s/t catheter.  Abx initiated.    Ascites appears to have worsened.    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       It appears as though patient is on 1.5L FR although this is not in the diet order.  Discussed need to avoid foods high in Na+ in excess to avoid further fluid retention.     Patient denies any chewing/swallowing issues.  GI issues noted, patient is having routine BMs.       Appetite: Good    Diet: Regular  (RN charted 1.5L FR) + Ensure BID    Average PO intake x 3 days: 58% of meals since admission + % of supplement = adequate PO intake     Labs:  No recent labs since 8/11aside from +UA    Pertinent Medications: Lipitor, Coreg, Cipro, Bowel Regimen, Folvite, Lactinex, Nystatin, Aldactone, Flomax, Actigall, Vitamin D     PRN medications: noted    Weight: 110.9kg- taken via stand up scale (likely more accurate weight than admission weight)- monitor trends  Height: 190.5cm  BMI: 30.56 (obese class 1)    Nutrient Needs:  Kcal: 4367-0583 kcal/day ( 25-27 kcal/kg IBW)         BEE= 2001 kcal x 1.1-1.2 g/kg    Protein: 89-107g/day ( 1-1.2g/kg IBW)- consider checking NH3 level    Fluid: 1800mL - 2000mL/day in the setting of ascites/ fluid retention/ liver failure     Diagnosis: Increased nutrient needs (protein) r/t high demand for protein in the setting of wound healing/ maintaining LBM as evidenced by patient admitted with DTI to coccyx, visible evidence of muscle wasting. (ongoing)     Intervention/ Recommendations/POC:  1. Continue current diet.  Avoid excessive salt intake. Increase supplement to TID.    2. Continue adequate PO/fluid intake.  3. Nutrition rep to see regarding food prefs/ honor within dietary restrictions (if  indicated)  4. Add MVI + minerals, 500mg Vitamin C BID x 14 days to optimize wound healing        Monitor/Evaluation: Monitor PO intake, weight, labs, medication adjustments, skin integrity, GI function, vitals, I/Os, and overall hydration status.  Adjust nutritional POC pending clinical outcomes.     RD following weekly.  Nutrition interventions in place.    Goal: Maintain adequate oral nutrient/fluid intake to promote nutrition optimization/wound healing.                                 Section completed by:  Clarisa Paulino RD    REHAB-Pharmacy IDT Team Note by Gertrudis Thompson RPH at 8/15/2017  4:00 PM   Version 1 of 1      Author:  Gertrudis Thompson RPH  Service:  (none)  Author Type:  Pharmacist      Filed:  8/15/2017  4:07 PM  Note Time:  8/15/2017  4:00 PM  Status:  Signed      :  Gertrudis Thompson RPH (Pharmacist)                Pharmacy  Pharmacy  Antibiotics/Antifungals/Antivirals:  Medication:      Active Orders       Ordered        Ordering Provider          Sun Aug 13, 2017  2:49 PM      08/13/17 1449    ciprofloxacin (CIPRO) tablet 500 mg   EVERY 12 HOURS                Route:         po  Stop Date:  8/18/17  Reason: UTI  Antihypertensives/Cardiac:  Medication:    Orders (72h ago through future)      Start        Ordered      08/15/17 1100    propranolol (INDERAL) tablet 10 mg   TWICE DAILY,   Status:  Discontinued        08/15/17 1032      08/14/17 1441    cholestyramine (QUESTRAN,PREVALITE) 4 GM powder 4 g   1 TIME DAILY PRN        08/14/17 1443      08/11/17 0600    spironolactone (ALDACTONE) tablet 25 mg   Q DAY        08/10/17 1457      08/10/17 2100    atorvastatin (LIPITOR) tablet 40 mg   EVERY BEDTIME        08/10/17 1457      08/10/17 1730    carvedilol (COREG) tablet 3.125 mg   2 TIMES DAILY WITH MEALS,   Status:  Discontinued        08/10/17 1457      08/10/17 1456    hydrALAZINE (APRESOLINE) tablet 25 mg   EVERY 8 HOURS PRN        08/10/17 1457          Patient Vitals for the past  24 hrs:    BP  Pulse    08/15/17 1520  114/70 mmHg  88    08/15/17 1450  113/66 mmHg  79    08/15/17 1420  125/84 mmHg  82    08/15/17 1405  118/74 mmHg  69    08/15/17 1350  126/63 mmHg  78    08/15/17 1335  129/64 mmHg  80    08/15/17 1320  114/67 mmHg  74    08/15/17 0700  117/69 mmHg  72    08/14/17 1900  121/63 mmHg  74    08/14/17 1811  115/61 mmHg  74        Anticoagulation:  Medication:  n/a  INR:      Other key medications: Tamsulosin, Trazadone, Ursodiol   A review of the medication list reveals no issues at this time. Patient is currently on an antihypertensive. Recommend home blood pressure monitoring/recording if antihypertensive regimen continues.  Section completed by:  Gertrudis Thompson, [facility]              DC Planning  DC destination/dispostion:  Return home with spouse. 2 steps to enter; bath/tub shower combo.       Referrals:  None at this time.    DC Needs: Spouse prefers out pt therapy instead of home health; follow up with PCP Dr. Fabián Urena/Dr. Rosales.  Pharmacy [facility].     Barriers to discharge: Functional status; prolonger hospitalization       Strengths:  Motivated to return home, supportive spouse    Section completed by:  MARI Santos, [facility]     Summary: Projected length of stay is 9/16/2017.    + diarrhea, negative c-diff.  On fluid restrictions/no added salt diet, bladder scans dc'd, stage 3 on coccyx-arrived w/ it ; pt fells yesterday in xray-no injuries reported, gait 2 person isaura w/ fww, + [place] filter for +dvt lle, bathing min assist, upper body dressing min assist, lower ext min assist, toileting total assist, transfers min assist, sba w/ shower transfers.  Anticipate fww/ wheelchair for dme.        Physician Summary  I participated and led team conference discussion.            Total time:  > 35 minutes.  I spent greater than 50% of the time for patient care and coordination on this date, including unit/floor time, and face-to-face time with the patient as  per assessment and plan above.    Moreno Adorno M.D.

## 2017-08-16 NOTE — REHAB-NURSING IDT TEAM NOTE
Nursing  Nursing  Diet/Nutrition:  Regular and Thin Liquids  Medication Administration:  Whole with Liquid Wash  % consumed at meals in last 24 hours:  Consumed % of meals per documentation.  Breakfast:  100%   Lunch:  100%  Dinner:  95%   Snack schedule:  None  Supplement:  Boost Plus  Appetite:  Good  Fluid Intake/Output in past 24 hours: In: 800 [P.O.:800]  Out: 600   Admit Weight:  Weight: 116.801 kg (257 lb 8 oz)  Weight Last 7 Days   [110.9 kg (244 lb 7.8 oz)-116.801 kg (257 lb 8 oz)] 110.9 kg (244 lb 7.8 oz) (08/13 1445)    Pain Issues:    Location:        Severity:  Mild   Scheduled pain medications:  None     PRN pain medications used in last 24 hours:  None   Non Pharmacologic Interventions:  warm blanket, emotional support, relaxation, repositioned and rest  Effectiveness of pain management plan:  good=patient states acceptable comfort after interventions    Bowel:    Bowel Assist Initial Score:  3 - Moderate Assistance  Bowel Assist Current Score:  1 - Total Assistance  Bowl Accidents in last 7 days:  2  Last bowel movement: 08/15/17  Stool: Medium, Soft     Usual bowel pattern:  More than once a day  Scheduled bowel medications:  None  PRN bowel medications used in last 24 hours:  None  Nursing Interventions:  Emptying of device, Brief, Set-up  Effectiveness of bowel program:   fair=occasional unpredictable, incontinent emptying of bowel (less than 1 time day)  Bladder:    Bladder Assist Initial Score:  5 - Standby Prompting/Supervision or Set-up  Bladder Assist Current Score:  1 - Total Assistance  Bladder Accidents in last 7 days:  1  Medications affecting bladder:  Flomax    Time void schedule/voiding pattern:  Voiding every 2-4 hours  Interventions:  Medication, Supervision, Verbal cueing, Emptying of device, Urinal, Brief  Effectiveness of bladder training:  Fair=occasional unpredictable, incontinent emptying of bladder (< 1 time/day)    Wound:      Patient Lines/Drains/Airways Status     Active Current Wounds     Name: Placement date: Placement time: Site: Days:    Pressure Ulcer  Stage 3 POA Coccyx Midline 08/10/17     6                   Interventions:  Dressing changes and monitoring   Effectiveness of intervention:  wound is improving     Sleep/wake cycle:   Average hours slept:  Sleeps less than 2 hours without waking due to every 2 hours turn schedule  Sleep medication usage:  None    Patient/Family Training/Education:  General Self Care, Medication Management, Safe Transfers, Safety and Skin Care    Strengths: Alert and oriented, Willingly participates in therapeutic activities, Pleasant and cooperative and Motivated for self care and independence   Barriers:   Generalized weakness, Limited mobility and Pressure ulcer            Nursing Problems           Problem: Bowel/Gastric:     Goal: Normal bowel function is maintained or improved         Goal: Will not experience complications related to bowel motility           Problem: Communication     Goal: The ability to communicate needs accurately and effectively will improve           Problem: Discharge Barriers/Planning     Goal: Patient's continuum of care needs will be met           Problem: Infection     Goal: Will remain free from infection           Problem: Knowledge Deficit     Goal: Knowledge of disease process/condition, treatment plan, diagnostic tests, and medications will improve         Goal: Knowledge of the prescribed therapeutic regimen will improve           Problem: Mobility     Goal: Risk for activity intolerance will decrease           Problem: Pain Management     Goal: Pain level will decrease to patient's comfort goal           Problem: Safety     Goal: Will remain free from injury         Goal: Will remain free from falls           Problem: Skin Integrity     Goal: Risk for impaired skin integrity will decrease           Problem: Urinary Elimination:     Goal: Ability to reestablish a normal urinary elimination pattern  will improve           Problem: Venous Thromboembolism (VTW)/Deep Vein Thrombosis (DVT) Prevention:     Goal: Patient will participate in Venous Thrombosis (VTE)/Deep Vein Thrombosis (DVT)Prevention Measures                Long Term Goals:   At discharge patient will be able to function safely at home and in the community with support.    Section completed by:  Sherlyn Hercules R.N.

## 2017-08-16 NOTE — REHAB-OT IDT TEAM NOTE
Occupational Therapy  Activities of Daily Living  Eating Initial:  5 - Standby Prompting/Supervision or Set-up  Eating Current:  5 - Standby Prompting/Supervision or Set-up   Eating Description:     Grooming Initial:  6 - Modified Independent  Grooming Current:  6 - Modified Independent   Grooming Description:  Increased time (Seated for groom/hygiene routine at sinkside)  Bathing Initial:  3 - Moderate Assistance  Bathing Current:  4 - Minimal Assistance   Bathing Description:  Adaptive equipment, Grab bar, Tub bench, Long handled bath tool, Hand held shower, Increased time, Supervision for safety, Verbal cueing, Set-up of equipment, Initial preparation for task  Upper Body Dressing Initial:  4 - Minimal Assistance  Upper Body Dressing Current:  6 - Modified Independent   Upper Body Dressing Description:  Increased time (Don/doff pull over shirt)  Lower Body Dressing Initial:  2 - Max Assistance  Lower Body Dressing Current:  4 - Minimal Assistance   Lower Body Dressing Description:  4 - Minimal Assistance  Toileting Initial:  1 - Total Assistance  Toileting Current:  1 - Total Assistance   Toileting Description:  Grab bar, Increased time, Supervision for safety, Verbal cueing, Set-up of equipment, Initial preparation for task  Toilet Transfer Initial:  4 - Minimal Assistance  Toilet Transfer Current:  4 - Minimal Assistance   Toilet Transfer Description:  4 - Minimal Assistance  Tub / Shower Transfer Initial:  4 - Minimal Assistance  Tub / Shower Transfer Current:  5 - Standby Prompting/Supervision or Set-up   Tub / Shower Transfer Description:  Adaptive equipment, Grab bar, Increased time, Supervision for safety, Verbal cueing, Set-up of equipment, Initial preparation for task  IADL:  TBD   Family Training/Education:  TBD   DME/DC Recommendations:  TBD     Strengths:  Able to follow instructions, Alert and oriented, Pleasant and cooperative, Supportive family and Willingly participates in therapeutic  activities  Barriers:  Generalized weakness, Limited mobility and Poor balance     # of short term goals set= 3    # of short term goals met= 2          Occupational Therapy Goals           Problem: OT Long Term Goals     Dates: Start: 08/11/17       Goal: LTG-By discharge, patient will complete basic self care tasks     Dates: Start: 08/11/17   Expected End: 08/25/17      Description: 1) Individualized Goal:  By discharge, patient will complete basic self care tasks at a level of Mod. I.     2) Interventions:  OT Self Care/ADL, OT Neuro Re-Ed/Balance, OT Therapeutic Activity and OT Therapeutic Exercise    Co-signature:  Connro Ferraro OTR/KAITLIN             Goal: LTG-By discharge, patient will perform bathroom transfers     Dates: Start: 08/11/17   Expected End: 08/25/17      Description: 1) Individualized Goal:  By discharge, patient will perform bathroom transfers at a level of Mod. I.     2) Interventions:  OT Self Care/ADL, OT Neuro Re-Ed/Balance, OT Therapeutic Activity and OT Therapeutic Exercise    Co-signature:  Connor Ferraro OTR/KAITLIN             Goal: LTG-By discharge, patient will complete basic home management     Dates: Start: 08/11/17   Expected End: 08/25/17      Description: 1) Individualized Goal:  By discharge, patient will prepare a simple meal at a level of mod. I.     2) Interventions:  OT Self Care/ADL, OT Neuro Re-Ed/Balance, OT Therapeutic Activity and OT Therapeutic Exercise    Co-signature:  Connor Ferraro OTR/KAITLIN               Problem: Toileting     Dates: Start: 08/11/17       Goal: STG-Within one week, patient will complete toileting tasks     Dates: Start: 08/11/17   Expected End: 08/18/17      Description: 1) Individualized Goal:  Within one week, patient will complete toileting tasks at a level of Max A.     2) Interventions:  OT Self Care/ADL, OT Neuro Re-Ed/Balance, OT Therapeutic Activity and OT Therapeutic Exercise    Co-signature:  Connor Ferraro OTR/KAITLIN         Note: Goal Note filed on 08/16/17  0715 by Connor Ferraro MS,OTR/L    Goal: STG-Within one week, patient will complete toileting tasks  Outcome: NOT MET  Pt at Total assist                Section completed by:  Connor Ferraro MS,OTR/L

## 2017-08-16 NOTE — CARE PLAN
Problem: Safety  Goal: Will remain free from injury  Outcome: PROGRESSING AS EXPECTED  Pt continues to deny pain despite assisted fall today with x-ray tech. Calls appropriately this shift and waits for assistance before attempting to transfer.     Problem: Bowel/Gastric:  Goal: Normal bowel function is maintained or improved  Outcome: PROGRESSING AS EXPECTED  Pt had no loose stools this shift. C diff negative. Isolation precautions discontinued, room cleaned.

## 2017-08-16 NOTE — CARE PLAN
Problem: Dressing  Goal: STG-Within one week, patient will dress UB  1) Individualized Goal: Within one week, patient will dress UB at a level of Mod I.  2) Interventions: OT Self Care/ADL, OT Neuro Re-Ed/Balance, OT Therapeutic Activity and OT Therapeutic Exercise    Co-signature: ISAIAS Gomez/KAITLIN  Outcome: MET Date Met:  08/16/17  Pt at Mod Indep  Goal: STG-Within one week, patient will dress LB  1) Individualized Goal: Within one week, patient will dress LB at a level of Mod A.   2) Interventions: OT Self Care/ADL, OT Manual Ther Technique, OT Neuro Re-Ed/Balance, OT Therapeutic Activity and OT Therapeutic Exercise    Co-signature: ISAIAS Gomez/KAITLIN  Outcome: MET Date Met:  08/16/17  Pt at Min assist    Problem: Toileting  Goal: STG-Within one week, patient will complete toileting tasks  1) Individualized Goal: Within one week, patient will complete toileting tasks at a level of Max A.   2) Interventions: OT Self Care/ADL, OT Neuro Re-Ed/Balance, OT Therapeutic Activity and OT Therapeutic Exercise    Co-signature: ISAIAS Gomez/KAITLIN  Outcome: NOT MET  Pt at Total assist

## 2017-08-16 NOTE — PROGRESS NOTES
Received shift report and assumed care of patient.  Patient awake, calm and stable, currently positioned in bed for comfort and safety visiting wife; call light within reach.  Denies pain or discomfort at this time.  Will continue to monitor.

## 2017-08-16 NOTE — DISCHARGE PLANNING
CASE MANAGEMENT/ I met with pt and spouse providing an update from team conference, plan of care, and projected dc date of 9/16.  Spouse indicates he wants clarification from therapy re: his goals.  I encouraged him to ask them in the am, as he reports he responds best when he knows the expectations---he reports he wants to be able to dc on a fww.  He confirms if home modifications are needed, he has contractors who are able to complete.  He anticipates he will need a fww/wheelchair @ time of dc.        Plan:  Continue to follow and assist w/ dc planning.

## 2017-08-16 NOTE — PROGRESS NOTES
"Rehab Progress Note     Interval Events (Subjective)  Patient seen in his room and withwith his wife.   I saw the patient along with Dr. Hopper. The patient's diarrhea is substantially improved. C-Diff toxin titer is negative at this time.  His ascites in increased and her has requested to be tapped but I think their is not immediate need . Notes of Dr. Hopper and the therapists appreciated     Objective:  VITAL SIGNS: /71 mmHg  Pulse 77  Temp(Src) 36.5 °C (97.7 °F)  Resp 18  Ht 1.905 m (6' 3\")  Wt 110.9 kg (244 lb 7.8 oz)  BMI 30.56 kg/m2  SpO2 95%      GENERAL: No apparent distress  HEENT: Normocephalic/atraumatic, EOMI, PERRL and No nystagmus  CARDIAC: Regular rate and rhythm, normal S1, S2   LUNGS: Clear to auscultation   ABDOMINAL: bowel sounds present, soft, nontender and ascites present with fluid wave noted. Ascites is increased  EXTREMITIES: Without clubbing or cyanosis 2+ edema noted in the left lower extremity but decreased  SKIN:   Small stage III noted on sacrum  NEURO: Unchanged    Recent Results (from the past 72 hour(s))   CDIFF BY PCR WITH TOXIN    Collection Time: 08/14/17  6:10 PM   Result Value Ref Range    C Diff by PCR Negative Negative    [id number] Presumptive Negative Negative       Current Facility-Administered Medications   Medication Frequency   • docusate sodium (COLACE) capsule 100 mg BID PRN    And   • senna-docusate (PERICOLACE or SENOKOT S) 8.6-50 MG per tablet 1 Tab QDAY PRN    And   • lactulose 20 GM/30ML solution 30 mL Q24HRS PRN    And   • bisacodyl (DULCOLAX) suppository 10 mg QDAY PRN   • cholestyramine (QUESTRAN,PREVALITE) 4 GM powder 4 g QDAY PRN   • ciprofloxacin (CIPRO) tablet 500 mg Q12HRS   • hydrocortisone (ANUSOL-HC) suppository 25 mg Q12HRS PRN   • tamsulosin (FLOMAX) capsule 0.4 mg AFTER BREAKFAST   • Respiratory Care per Protocol Continuous RT   • Pharmacy Consult Request ...Pain Management Review 1 Each PRN   • nystatin (MYCOSTATIN) powder BID   • " lactobacillus granules (LACTINEX/FLORANEX) packet 1 Packet TID WITH MEALS   • spironolactone (ALDACTONE) tablet 25 mg Q DAY   • ursodiol (ACTIGALL) capsule 300 mg DAILY   • vitamin D (cholecalciferol) tablet 1,000 Units DAILY   • folic acid (FOLVITE) tablet 1 mg DAILY   • atorvastatin (LIPITOR) tablet 40 mg QHS   • benzocaine-menthol (CEPACOL) lozenge 1 Lozenge Q2HRS PRN   • hydrALAZINE (APRESOLINE) tablet 25 mg Q8HRS PRN   • mag hydrox-al hydrox-simeth (MAALOX PLUS ES or MYLANTA DS) suspension 20 mL Q2HRS PRN   • ondansetron (ZOFRAN ODT) dispertab 4 mg 4X/DAY PRN    Or   • ondansetron (ZOFRAN) syringe/vial injection 4 mg 4X/DAY PRN   • trazodone (DESYREL) tablet 50 mg QHS PRN   • sodium chloride (OCEAN) 0.65 % nasal spray 2 Spray PRN   • oxycodone immediate release (ROXICODONE) tablet 10 mg Q4HRS PRN       Orders Placed This Encounter   Procedures   • Diet Order     Standing Status: Standing      Number of Occurrences: 1      Standing Expiration Date:      Order Specific Question:  Diet:     Answer:  Regular [1]     Order Specific Question:  Consistency/Fluid modifications:     Answer:  Thin Liquids [3]       Assessment:  Active Hospital Problems    Diagnosis   • *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 DUE TO OLD HEP B, CHEMO RX,  OR THERMO RAD OF MAGNANT LESION   • Essential hypertension   • Colon cancer-2010 left hemicolectomy;  no colostomy       Medical Decision Making and Plan:  Patient is doing well at this time  Dr. Hopper has changed the antihypertensive to inderal which is better for portal hypertension  We'll continue PT and OT    Initial team conference will be next tomorro8/16/2017    Labs reviewed      Cirrhosis/ascites    Stable at this time but will monitor. Dr. Hopper is arranging thoracentesis for Friday    Hypertension       under control with Aldactone and now Inderal    Stage III pressure ulcer, on the Coccyx    Appreciate the input of the wound care nurse honey colloid applied    Vitamin D deficiency    Continue replacement    Lower extremity edema left leg    We will continue to monitor improved      Diarrhea   C. diff toxin neg  Cholestyramine has improved       UTI  currently on ciprofloxacin prophylactically pending final cultures    Continue PT and OT that was held today with the diarrhea  Initial team conference. Tomorrow, 8/16/2017    Total time:  > 25 minutes.  I spent greater than 50% of the time for patient care and coordination on this date, including unit/floor time, and face-to-face time with the patient as per assessment and plan above.    Moreno Adorno M.D.

## 2017-08-17 ENCOUNTER — APPOINTMENT (OUTPATIENT)
Dept: RADIOLOGY | Facility: MEDICAL CENTER | Age: 72
DRG: 094 | End: 2017-08-17
Attending: HOSPITALIST
Payer: MEDICARE

## 2017-08-17 LAB
ALBUMIN SERPL BCP-MCNC: 2.8 G/DL (ref 3.2–4.9)
ALBUMIN/GLOB SERPL: 1.1 G/DL
ALP SERPL-CCNC: 64 U/L (ref 30–99)
ALT SERPL-CCNC: 11 U/L (ref 2–50)
AMMONIA PLAS-SCNC: 24 UMOL/L (ref 11–45)
ANION GAP SERPL CALC-SCNC: 6 MMOL/L (ref 0–11.9)
AST SERPL-CCNC: 17 U/L (ref 12–45)
BASOPHILS # BLD AUTO: 0.5 % (ref 0–1.8)
BASOPHILS # BLD: 0.02 K/UL (ref 0–0.12)
BILIRUB SERPL-MCNC: 1.3 MG/DL (ref 0.1–1.5)
BUN SERPL-MCNC: 9 MG/DL (ref 8–22)
CALCIUM SERPL-MCNC: 8.4 MG/DL (ref 8.5–10.5)
CHLORIDE SERPL-SCNC: 106 MMOL/L (ref 96–112)
CO2 SERPL-SCNC: 24 MMOL/L (ref 20–33)
CREAT SERPL-MCNC: 0.54 MG/DL (ref 0.5–1.4)
EOSINOPHIL # BLD AUTO: 0.23 K/UL (ref 0–0.51)
EOSINOPHIL NFR BLD: 5.3 % (ref 0–6.9)
ERYTHROCYTE [DISTWIDTH] IN BLOOD BY AUTOMATED COUNT: 53.2 FL (ref 35.9–50)
FERRITIN SERPL-MCNC: 194.1 NG/ML (ref 22–322)
GFR SERPL CREATININE-BSD FRML MDRD: >60 ML/MIN/1.73 M 2
GLOBULIN SER CALC-MCNC: 2.5 G/DL (ref 1.9–3.5)
GLUCOSE SERPL-MCNC: 90 MG/DL (ref 65–99)
HCT VFR BLD AUTO: 33.7 % (ref 42–52)
HGB BLD-MCNC: 10.8 G/DL (ref 14–18)
HGB RETIC QN AUTO: 31.3 PG/CELL (ref 29–35)
HIV 1+2 AB+HIV1 P24 AG SERPL QL IA: NON REACTIVE
IMM GRANULOCYTES # BLD AUTO: 0.02 K/UL (ref 0–0.11)
IMM GRANULOCYTES NFR BLD AUTO: 0.5 % (ref 0–0.9)
IMM RETICS NFR: 27.8 % (ref 9.3–17.4)
INR PPP: 1.35 (ref 0.87–1.13)
IRON SATN MFR SERPL: 11 % (ref 15–55)
IRON SERPL-MCNC: 28 UG/DL (ref 50–180)
LYMPHOCYTES # BLD AUTO: 0.64 K/UL (ref 1–4.8)
LYMPHOCYTES NFR BLD: 14.7 % (ref 22–41)
MCH RBC QN AUTO: 27.6 PG (ref 27–33)
MCHC RBC AUTO-ENTMCNC: 32 G/DL (ref 33.7–35.3)
MCV RBC AUTO: 86 FL (ref 81.4–97.8)
MONOCYTES # BLD AUTO: 0.44 K/UL (ref 0–0.85)
MONOCYTES NFR BLD AUTO: 10.1 % (ref 0–13.4)
NEUTROPHILS # BLD AUTO: 2.99 K/UL (ref 1.82–7.42)
NEUTROPHILS NFR BLD: 68.9 % (ref 44–72)
NRBC # BLD AUTO: 0 K/UL
NRBC BLD AUTO-RTO: 0 /100 WBC
PLATELET # BLD AUTO: 66 K/UL (ref 164–446)
PMV BLD AUTO: 9.8 FL (ref 9–12.9)
POTASSIUM SERPL-SCNC: 3.4 MMOL/L (ref 3.6–5.5)
PROT SERPL-MCNC: 5.3 G/DL (ref 6–8.2)
PROTHROMBIN TIME: 17.1 SEC (ref 12–14.6)
RBC # BLD AUTO: 3.92 M/UL (ref 4.7–6.1)
RETICS # AUTO: 0.09 M/UL (ref 0.04–0.06)
RETICS/RBC NFR: 2.2 % (ref 0.8–2.1)
SODIUM SERPL-SCNC: 136 MMOL/L (ref 135–145)
TIBC SERPL-MCNC: 251 UG/DL (ref 250–450)
WBC # BLD AUTO: 4.3 K/UL (ref 4.8–10.8)

## 2017-08-17 PROCEDURE — A9270 NON-COVERED ITEM OR SERVICE: HCPCS | Performed by: HOSPITALIST

## 2017-08-17 PROCEDURE — 99232 SBSQ HOSP IP/OBS MODERATE 35: CPT | Performed by: PHYSICAL MEDICINE & REHABILITATION

## 2017-08-17 PROCEDURE — 83540 ASSAY OF IRON: CPT

## 2017-08-17 PROCEDURE — 700102 HCHG RX REV CODE 250 W/ 637 OVERRIDE(OP): Performed by: PHYSICAL MEDICINE & REHABILITATION

## 2017-08-17 PROCEDURE — 700102 HCHG RX REV CODE 250 W/ 637 OVERRIDE(OP)

## 2017-08-17 PROCEDURE — 83550 IRON BINDING TEST: CPT

## 2017-08-17 PROCEDURE — 82140 ASSAY OF AMMONIA: CPT

## 2017-08-17 PROCEDURE — 700102 HCHG RX REV CODE 250 W/ 637 OVERRIDE(OP): Performed by: HOSPITALIST

## 2017-08-17 PROCEDURE — A9270 NON-COVERED ITEM OR SERVICE: HCPCS | Performed by: PHYSICAL MEDICINE & REHABILITATION

## 2017-08-17 PROCEDURE — 85025 COMPLETE CBC W/AUTO DIFF WBC: CPT

## 2017-08-17 PROCEDURE — 770010 HCHG ROOM/CARE - REHAB SEMI PRIVAT*

## 2017-08-17 PROCEDURE — 97530 THERAPEUTIC ACTIVITIES: CPT

## 2017-08-17 PROCEDURE — 97535 SELF CARE MNGMENT TRAINING: CPT

## 2017-08-17 PROCEDURE — 85046 RETICYTE/HGB CONCENTRATE: CPT

## 2017-08-17 PROCEDURE — 99233 SBSQ HOSP IP/OBS HIGH 50: CPT | Performed by: HOSPITALIST

## 2017-08-17 PROCEDURE — 97116 GAIT TRAINING THERAPY: CPT

## 2017-08-17 PROCEDURE — 76700 US EXAM ABDOM COMPLETE: CPT

## 2017-08-17 PROCEDURE — A9270 NON-COVERED ITEM OR SERVICE: HCPCS

## 2017-08-17 PROCEDURE — 87389 HIV-1 AG W/HIV-1&-2 AB AG IA: CPT

## 2017-08-17 PROCEDURE — 80053 COMPREHEN METABOLIC PANEL: CPT

## 2017-08-17 PROCEDURE — 85610 PROTHROMBIN TIME: CPT

## 2017-08-17 PROCEDURE — 36415 COLL VENOUS BLD VENIPUNCTURE: CPT

## 2017-08-17 PROCEDURE — 82728 ASSAY OF FERRITIN: CPT

## 2017-08-17 PROCEDURE — 97110 THERAPEUTIC EXERCISES: CPT

## 2017-08-17 RX ORDER — METOLAZONE 2.5 MG/1
TABLET ORAL
Status: COMPLETED
Start: 2017-08-17 | End: 2017-08-17

## 2017-08-17 RX ORDER — TORSEMIDE 10 MG/1
TABLET ORAL
Status: ACTIVE
Start: 2017-08-17 | End: 2017-08-17

## 2017-08-17 RX ORDER — CHOLESTYRAMINE LIGHT 4 G/5.7G
1 POWDER, FOR SUSPENSION ORAL
Status: DISCONTINUED | OUTPATIENT
Start: 2017-08-20 | End: 2017-09-07 | Stop reason: HOSPADM

## 2017-08-17 RX ORDER — CHOLESTYRAMINE LIGHT 4 G/5.7G
1 POWDER, FOR SUSPENSION ORAL DAILY
Status: COMPLETED | OUTPATIENT
Start: 2017-08-17 | End: 2017-08-19

## 2017-08-17 RX ADMIN — URSODIOL 300 MG: 300 CAPSULE ORAL at 11:00

## 2017-08-17 RX ADMIN — CIPROFLOXACIN HYDROCHLORIDE 500 MG: 500 TABLET, FILM COATED ORAL at 11:02

## 2017-08-17 RX ADMIN — NYSTATIN 1500000 UNITS: 100000 POWDER TOPICAL at 11:10

## 2017-08-17 RX ADMIN — PHYTONADIONE 5 MG: 5 TABLET ORAL at 11:01

## 2017-08-17 RX ADMIN — POTASSIUM CHLORIDE 10 MEQ: 1500 TABLET, EXTENDED RELEASE ORAL at 11:02

## 2017-08-17 RX ADMIN — CIPROFLOXACIN HYDROCHLORIDE 500 MG: 500 TABLET, FILM COATED ORAL at 21:20

## 2017-08-17 RX ADMIN — LACTOBACILLUS ACIDOPHILUS / LACTOBACILLUS BULGARICUS 1 PACKET: 100 MILLION CFU STRENGTH GRANULES at 17:42

## 2017-08-17 RX ADMIN — NYSTATIN 1500000 UNITS: 100000 POWDER TOPICAL at 21:21

## 2017-08-17 RX ADMIN — ATORVASTATIN CALCIUM 40 MG: 40 TABLET, FILM COATED ORAL at 21:20

## 2017-08-17 RX ADMIN — VITAMIN D, TAB 1000IU (100/BT) 1000 UNITS: 25 TAB at 11:00

## 2017-08-17 RX ADMIN — TAMSULOSIN HYDROCHLORIDE 0.4 MG: 0.4 CAPSULE ORAL at 11:01

## 2017-08-17 RX ADMIN — LACTOBACILLUS ACIDOPHILUS / LACTOBACILLUS BULGARICUS 1 PACKET: 100 MILLION CFU STRENGTH GRANULES at 11:00

## 2017-08-17 RX ADMIN — FOLIC ACID 1 MG: 1 TABLET ORAL at 11:00

## 2017-08-17 RX ADMIN — METOLAZONE 2.5 MG: 2.5 TABLET ORAL at 00:06

## 2017-08-17 RX ADMIN — CHOLESTYRAMINE 4 G: 4 POWDER, FOR SUSPENSION ORAL at 10:58

## 2017-08-17 RX ADMIN — LACTOBACILLUS ACIDOPHILUS / LACTOBACILLUS BULGARICUS 1 PACKET: 100 MILLION CFU STRENGTH GRANULES at 10:59

## 2017-08-17 ASSESSMENT — GAIT ASSESSMENTS
ASSISTIVE DEVICE: FRONT WHEEL WALKER
DEVIATION: DECREASED BASE OF SUPPORT;DECREASED HEEL STRIKE;BRADYKINETIC
DISTANCE (FEET): 25
GAIT LEVEL OF ASSIST: MINIMAL ASSIST

## 2017-08-17 NOTE — PROGRESS NOTES
Hospital Medicine Progress Note, Adult, Complex               Author: SCOTT BULLOCK MD Date & Time created: 8/16/2017  9:33 PM     Interval History:  8/15/17--  HISTORY REVIEWED.  REMOTE METASTATIC COLON CANCER DISCOVERED IN 2010 S/P RESECTION BY DR ANA RUIZ AND FINDING OF 10+ NODES  WHICH SPREAD TO THE LIVER.  HAD AN ABLATION AT [facility].  HEAVY DUTY CHEMO PER DR REBOLLAR.   THE LIVER ABLATION HAS UNFORTUNATELY RESULTED IN PORTAL HYPERTENSION.  HE ALSO HAS PRETTY MUCH ALL THE STIGMATA OF PORTAL HYPERTENSION.   THERE REMAINING LIVER PARENCHYMA IS NORMAL SO HE HAS PRESERVED LIVER SYNTHETIC FUNCTION.  AS OF 2010 THERE HAS BEEN NO NEW RECURRANCE THOUGH WE ARE DEALING WITH GASTRIC VARICES,  ESOPHAGEAL VARICIES,  CAPUT MEDUSA,  LEFT LOWER EXTREMITY DVT,  INABILITY TO TOLERATE ANTICOAGULATION WITH AN IVC FILTER IN PLACE,   AND CURRENTLY MODERATE ASCITES.   WE WILL PLAN ON THERAPEUTIC TAP ON Friday.   THE OTHER ISSUE THAT REALLY BROUGHT TO PATIENT TO THE HOSPITAL WAS PROGRESSIVE ASCENDING WEAKNESS.   HE DID HAVE A TIGHT L4-L5 CENTRAL STENOSIS.  THE OTHER LEVELS LOOK FINE.   HE STARTED NOTICING THE WEAKNESS FOR ABOUT A WEEK PRIOR IN MAY AND WENT TO ER.   DR HOUSE SAW THE STRUCTURAL ISSUE,  NAMELY THE ABOVE STENOSIS AND DECOMPRESSED THIS 0N 5/25.  SYMPTOMS DID NOT MALLY.  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   IT IS CLEAR THE PATIENT NEEDED SOME SURGERY ON THE LOWER BACK.   LOOKING AT THE FILMS.  MY THOUGHT IS THE SYMPTOMS THEMSELVES WERE CAUSED BY THE GBS.   HE RECEIVED 5 DAYS OF IMMUNOGLOBULIN AND DID FAIRLY WELL AND HAS [facility]EE TRANSFERRED [facility] ON 8/10/17  PLAN:  CONTINUE PT/OT,   PROBLEMS THAT ALSO HAVE OCCURRED INCLUDE PORTAL VEIN THROMBOSIS.   I WILL ARRANGE A THERAPEUTIC THORACENTESIS ON Friday.  RX ENTEROBACTER UTI.  HYPERSPLENISM AND LOW WBC COUNTS AND PLATELETS TO BE EXPECTED.   D/C COREG.  INDERAL BETTER FOR  LOWERING PORTAL PRESSURES.      8/16/17--PATIENT AWAKE AND ALERT.   DOING WELL WITH PT/OT.   GOOD EFFORT PER PATIENT.   I DONT THINK THE ASCITES IS SIGNIFICANTLY WORSE.  I AM GOING TO MAKE DAILY ASSESMENTS AND DETERMINE WHEN PARACENTESIS IS NECESSARY.   GBS IS CERTAINLY NO WORSE.   THE PATIENT WAS SPECIFICALLY TOLD TO INFORM ME ABOUT ANY PHYSICAL CHANGES IE: WORSENING WEAKNESS OR ANY CRANIAL NERVE ABNORMALITIES.   CHEST FILM WAS ESSENTIALLY NEGATIVE FOR EFFUSION.  THE DECREASED TACTILE FREMITUS THAT I NOTED WAS A HIGH RIDING DIAPHRAGM.   PROBABLY DUE TO ASCITES.    I NEED DAILY WEIGHTS TO HELP ME WITH MY DECISION WHEN TO TAP ABDOMEN.  NORMALLY PATIENT GETS IT TAPPED ABOUT EVERY 4 MONTHS BY GI.    I  WILL RE--ASSES PORTAL BLOOD FLOW AND SEE IF THIS PORTAL VEIN THROMBOSIS IS RECANULATED OR WORSE.  LAST YEAR THERE WAS SOME RECANULIZATION AND FLOW.   HEPATOPEDAL FLOW IS GOOD, HEPATOFUGAL FLOW BAD.  I HAVE NEVER SEEN A LONG TERM SURVIVOR OF METASTATIC COLON CANCER WITH 4 CM RECTO-SIGMOID TUMOR AND 5 POSITIVE NODES + LIVER MET.   RX FOR UTI WITH ENTEROBACTER ON CULTURE.  UNIMPRESSIVE WBC COUNT OF 2-5 WBC/HIGH POWERED FIELD.   I PROBABLY WOULD HAVE LET THAT GO UNLESS THERE WERE SYMPTOMS.     PLAN:   PATIENT DOING WELL.  DAILY WEIGHTS.  DAILY ASSESMENTS OF ASCITES.  CXR IS CLEAR.  CHECK ABDOMINAL ULTRASOUND TO ASSESS PORTAL BLOOD FLOW AND ASCITES.   I THINK QOD DIURETICS WOULD HELP PREVENT A TAP AND DECREASE THE EDEMA, (KNOWN  RLE DVT PRIMARILY DUE TO MALIGNANCY BEING A PRO-THROMBOTIC STATE).  GIVE 5 DAYS OF PO VITAMIN K GIVEN THE CIPRO KILLING GUT PA WHICH HELPS GENERATE VITAMIN K FOR A HUMAN.  CHECK SOME IRON STUDIES.   OVERALL INCHING FORWARD.   HARD TO SAY WHAT THE COURSE OF GBS WILL BE.  THE BACK SURGERY WAS NECESSARY BUT MY FEELING IS HIS SYMPTOMS WERE MOSTLY DUE TO GBS AND NOT SPINAL STENOSIS.   HEPATITIS STATUS CHECKED AND FOUND TO BE NEGATIVE.   HIV ANTIBODIES  FOR COMPLETENESS.            Review of  Systems:  Review of Systems   Constitutional: Negative for fever and chills.   Respiratory: Negative for shortness of breath.    Cardiovascular: Negative for chest pain.   Gastrointestinal: Negative for nausea, vomiting, abdominal pain and diarrhea.   Psychiatric/Behavioral: The patient is not nervous/anxious.        Physical Exam:  Physical Exam   Constitutional: He is oriented to person, place, and time. He appears well-nourished.   HENT:   Head: Atraumatic.   Eyes: Conjunctivae are normal. Pupils are equal, round, and reactive to light.   Neck: Normal range of motion. Neck supple. No tracheal deviation present.   Cardiovascular: Normal rate, regular rhythm, S1 normal and S2 normal.    No murmur heard.  Pulmonary/Chest: Effort normal. He has no wheezes. He has no rhonchi. He has no rales.   Abdominal: Soft. He exhibits no distension. There is no tenderness. Hernia confirmed negative in the right inguinal area and confirmed negative in the left inguinal area.   Musculoskeletal: He exhibits edema.   Neurological: He is alert and oriented to person, place, and time. No sensory deficit.   Skin: Skin is warm and dry. No rash noted. No cyanosis.   Psychiatric: He has a normal mood and affect. His behavior is normal.   Nursing note and vitals reviewed.      Labs:        Invalid input(s): [id number]      Recent Labs      08/16/17   0523   SODIUM  137   POTASSIUM  3.7   CHLORIDE  106   CO2  24   BUN  10   CREATININE  0.61   CALCIUM  8.4*     Recent Labs      08/16/17   0523   ALTSGPT  12   ASTSGOT  15   ALKPHOSPHAT  66   TBILIRUBIN  1.2   GLUCOSE  84     Recent Labs      08/16/17   0523  08/16/17   0725   RBC  3.83*   --    HEMOGLOBIN  10.6*   --    HEMATOCRIT  33.1*   --    PLATELETCT  67*   --    PROTHROMBTM   --   17.3*   INR   --   1.37*     Recent Labs      08/16/17   0523   WBC  4.6*   NEUTSPOLYS  68.20   LYMPHOCYTES  14.90*   MONOCYTES  12.10   EOSINOPHILS  4.20   BASOPHILS  0.40   ASTSGOT  15   ALTSGPT  12    ALKPHOSPHAT  66   TBILIRUBIN  1.2           Hemodynamics:  Temp (24hrs), Av.8 °C (98.3 °F), Min:36.7 °C (98.1 °F), Max:36.9 °C (98.4 °F)  Temperature: 36.9 °C (98.4 °F)  Pulse  Av.4  Min: 64  Max: 88   Blood Pressure : 104/52 mmHg     Respiratory:    Respiration: 18, Pulse Oximetry: 94 %     Work Of Breathing / Effort: Mild  RUL Breath Sounds: Clear, RML Breath Sounds: Clear, RLL Breath Sounds: Clear, JOSE Breath Sounds: Clear, LLL Breath Sounds: Clear  Fluids:    Intake/Output Summary (Last 24 hours) at 17  Last data filed at 17   Gross per 24 hour   Intake   1464 ml   Output   1050 ml   Net    414 ml        GI/Nutrition:  Orders Placed This Encounter   Procedures   • Diet Order     Standing Status: Standing      Number of Occurrences: 1      Standing Expiration Date:      Order Specific Question:  Diet:     Answer:  Regular [1]     Order Specific Question:  Consistency/Fluid modifications:     Answer:  Thin Liquids [3]     Medical Decision Making, by Problem:  Active Hospital Problems    Diagnosis   • *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]   • Left leg DVT (CMS-HCC) [I82.402]   • Esophageal varices- BANDED 2016- repeat egd tbd 10//29/16- gic [I85.00]   • ASCVD (arteriosclerotic cardiovascular disease)subtotalled small distal LCx and 40% LAD med rx by cath 12 [I25.10]   • GIB (gastrointestinal bleeding)- recurrent from varices [K92.2]   • BPH (benign prostatic hyperplasia) [N40.0]   • 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 DUE TO OLD HEP B, CHEMO RX,  OR THERMO RAD OF MAGNANT LESION [K74.60]   • Essential hypertension [I10]   • Colon cancer-2010 left hemicolectomy;  no colostomy [C18.9]     *  S/P Recent Lumbar Surgery    *  Hypertension  BP recently ok  On Coreg: 3.125 mg bid  Note: home meds were Coreg 3.125 mg 1/2 tab bid and Losartan: 25 mg daily  Monitor    *  U/A (+)  Has had some difficulty urinating recently with some burning  Cx --> GNR  F/U C&S   On Cipro empirically unitl C&S come back    *  LE Edema  Mild  On Aldactone: 25 mg daily    *  Thrombocytopenia  Platelets stable (8/11)  2nd to prior chemo and PORTAL HYPERTENSION    *  Hx Colon Cancer: stage IV; in 5 year remission  *  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  *  Hyperlipidemia  *  AIDP: s/p IVIG x 5 days      Medications reviewed and Labs reviewed

## 2017-08-17 NOTE — CARE PLAN
Problem: Safety  Goal: Will remain free from falls  Intervention: Implement fall precautions  Pt encouraged to use call light for assistance. Pt has been using call light appropriately.      Problem: Bowel/Gastric:  Goal: Normal bowel function is maintained or improved  Intervention: Educate patient and significant other/support system about signs and symptoms of constipation and interventions to implement  Pt had a BM on 08/16. Educated pt with S/S of constipation. Pt verbalize understanding.       Problem: Pain Management  Goal: Pain level will decrease to patient’s comfort goal  Intervention: Educate and implement non-pharmacologic comfort measures. Examples: relaxation, distration, play therapy, activity therapy, massage, etc.  Pt denies any pain through out the night.

## 2017-08-18 PROCEDURE — 700102 HCHG RX REV CODE 250 W/ 637 OVERRIDE(OP): Performed by: HOSPITALIST

## 2017-08-18 PROCEDURE — 97112 NEUROMUSCULAR REEDUCATION: CPT

## 2017-08-18 PROCEDURE — 99232 SBSQ HOSP IP/OBS MODERATE 35: CPT | Performed by: PHYSICAL MEDICINE & REHABILITATION

## 2017-08-18 PROCEDURE — A9270 NON-COVERED ITEM OR SERVICE: HCPCS | Performed by: PHYSICAL MEDICINE & REHABILITATION

## 2017-08-18 PROCEDURE — 99233 SBSQ HOSP IP/OBS HIGH 50: CPT | Performed by: HOSPITALIST

## 2017-08-18 PROCEDURE — A9270 NON-COVERED ITEM OR SERVICE: HCPCS | Performed by: HOSPITALIST

## 2017-08-18 PROCEDURE — 700102 HCHG RX REV CODE 250 W/ 637 OVERRIDE(OP): Performed by: PHYSICAL MEDICINE & REHABILITATION

## 2017-08-18 PROCEDURE — 97530 THERAPEUTIC ACTIVITIES: CPT

## 2017-08-18 PROCEDURE — 97535 SELF CARE MNGMENT TRAINING: CPT

## 2017-08-18 PROCEDURE — 97110 THERAPEUTIC EXERCISES: CPT

## 2017-08-18 PROCEDURE — 97116 GAIT TRAINING THERAPY: CPT

## 2017-08-18 PROCEDURE — 770010 HCHG ROOM/CARE - REHAB SEMI PRIVAT*

## 2017-08-18 RX ORDER — FERROUS SULFATE 325(65) MG
325 TABLET ORAL
Status: DISCONTINUED | OUTPATIENT
Start: 2017-08-19 | End: 2017-09-07 | Stop reason: HOSPADM

## 2017-08-18 RX ORDER — ASCORBIC ACID 500 MG
500 TABLET ORAL DAILY
Status: DISCONTINUED | OUTPATIENT
Start: 2017-08-18 | End: 2017-09-07 | Stop reason: HOSPADM

## 2017-08-18 RX ADMIN — TRAZODONE HYDROCHLORIDE 50 MG: 50 TABLET ORAL at 01:15

## 2017-08-18 RX ADMIN — PHYTONADIONE 5 MG: 5 TABLET ORAL at 08:01

## 2017-08-18 RX ADMIN — LACTOBACILLUS ACIDOPHILUS / LACTOBACILLUS BULGARICUS 1 PACKET: 100 MILLION CFU STRENGTH GRANULES at 11:45

## 2017-08-18 RX ADMIN — VITAMIN D, TAB 1000IU (100/BT) 1000 UNITS: 25 TAB at 08:01

## 2017-08-18 RX ADMIN — NYSTATIN 1500000 UNITS: 100000 POWDER TOPICAL at 20:29

## 2017-08-18 RX ADMIN — FOLIC ACID 1 MG: 1 TABLET ORAL at 08:01

## 2017-08-18 RX ADMIN — URSODIOL 300 MG: 300 CAPSULE ORAL at 08:00

## 2017-08-18 RX ADMIN — TAMSULOSIN HYDROCHLORIDE 0.4 MG: 0.4 CAPSULE ORAL at 08:02

## 2017-08-18 RX ADMIN — ATORVASTATIN CALCIUM 40 MG: 40 TABLET, FILM COATED ORAL at 20:28

## 2017-08-18 RX ADMIN — LACTOBACILLUS ACIDOPHILUS / LACTOBACILLUS BULGARICUS 1 PACKET: 100 MILLION CFU STRENGTH GRANULES at 08:04

## 2017-08-18 RX ADMIN — NYSTATIN 1500000 UNITS: 100000 POWDER TOPICAL at 08:04

## 2017-08-18 RX ADMIN — CIPROFLOXACIN HYDROCHLORIDE 500 MG: 500 TABLET, FILM COATED ORAL at 08:02

## 2017-08-18 RX ADMIN — LACTOBACILLUS ACIDOPHILUS / LACTOBACILLUS BULGARICUS 1 PACKET: 100 MILLION CFU STRENGTH GRANULES at 17:43

## 2017-08-18 RX ADMIN — SPIRONOLACTONE 25 MG: 25 TABLET, FILM COATED ORAL at 05:46

## 2017-08-18 RX ADMIN — OXYCODONE HYDROCHLORIDE AND ACETAMINOPHEN 500 MG: 500 TABLET ORAL at 11:47

## 2017-08-18 RX ADMIN — CHOLESTYRAMINE 4 G: 4 POWDER, FOR SUSPENSION ORAL at 08:00

## 2017-08-18 RX ADMIN — METOLAZONE 2.5 MG: 2.5 TABLET ORAL at 23:10

## 2017-08-18 RX ADMIN — POTASSIUM CHLORIDE 10 MEQ: 1500 TABLET, EXTENDED RELEASE ORAL at 08:00

## 2017-08-18 ASSESSMENT — GAIT ASSESSMENTS
DEVIATION: DECREASED BASE OF SUPPORT;BRADYKINETIC;OTHER (COMMENT)
GAIT LEVEL OF ASSIST: CONTACT GUARD ASSIST
ASSISTIVE DEVICE: FRONT WHEEL WALKER

## 2017-08-18 ASSESSMENT — ENCOUNTER SYMPTOMS
DIARRHEA: 0
NAUSEA: 0
ABDOMINAL PAIN: 0
SHORTNESS OF BREATH: 0
FEVER: 0
VOMITING: 0
CHILLS: 0
NERVOUS/ANXIOUS: 0

## 2017-08-18 ASSESSMENT — PAIN SCALES - GENERAL: PAINLEVEL_OUTOF10: 0

## 2017-08-18 NOTE — CARE PLAN
Problem: Communication  Goal: The ability to communicate needs accurately and effectively will improve  Patient is able to communicate needs with no difficulty. Patient is able to understand with no difficulty. Will continue to monitor.     Problem: Safety  Goal: Will remain free from falls  Pt uses call light when assistance is needed or for transfers.    Problem: Infection  Goal: Will remain free from infection  Pt had soft bowel incontinence this morning. Bandage changed on sacrum and brief changed. No S&S of infection noted.    Problem: Bowel/Gastric:  Goal: Will not experience complications related to bowel motility  Pt had 3 soft bm's this shift. Qusestran changed to daily instead of PRN.

## 2017-08-18 NOTE — PROGRESS NOTES
Hospital Medicine Progress Note, Adult, Complex               Author: SCOTT BULLOCK MD( DATE OF SERVICE 8/17/17) Date & Time created: 8/18/2017  10:11 AM     Interval History:  8/15/17--  HISTORY REVIEWED.  REMOTE METASTATIC COLON CANCER DISCOVERED IN 2010 S/P RESECTION BY DR ANA RUIZ AND FINDING OF 10+ NODES  WHICH SPREAD TO THE LIVER.  HAD AN ABLATION AT [facility].  HEAVY DUTY CHEMO PER DR REBOLLAR.   THE LIVER ABLATION HAS UNFORTUNATELY RESULTED IN PORTAL HYPERTENSION.  HE ALSO HAS PRETTY MUCH ALL THE STIGMATA OF PORTAL HYPERTENSION.   THERE REMAINING LIVER PARENCHYMA IS NORMAL SO HE HAS PRESERVED LIVER SYNTHETIC FUNCTION.  AS OF 2010 THERE HAS BEEN NO NEW RECURRANCE THOUGH WE ARE DEALING WITH GASTRIC VARICES,  ESOPHAGEAL VARICIES,  CAPUT MEDUSA,  LEFT LOWER EXTREMITY DVT,  INABILITY TO TOLERATE ANTICOAGULATION WITH AN IVC FILTER IN PLACE,   AND CURRENTLY MODERATE ASCITES.   WE WILL PLAN ON THERAPEUTIC TAP ON Friday.   THE OTHER ISSUE THAT REALLY BROUGHT TO PATIENT TO THE HOSPITAL WAS PROGRESSIVE ASCENDING WEAKNESS.   HE DID HAVE A TIGHT L4-L5 CENTRAL STENOSIS.  THE OTHER LEVELS LOOK FINE.   HE STARTED NOTICING THE WEAKNESS FOR ABOUT A WEEK PRIOR IN MAY AND WENT TO ER.   DR HOUSE SAW THE STRUCTURAL ISSUE,  NAMELY THE ABOVE STENOSIS AND DECOMPRESSED THIS 0N 5/25.  SYMPTOMS DID NOT MALLY.  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   IT IS CLEAR THE PATIENT NEEDED SOME SURGERY ON THE LOWER BACK.   LOOKING AT THE FILMS.  MY THOUGHT IS THE SYMPTOMS THEMSELVES WERE CAUSED BY THE GBS.   HE RECEIVED 5 DAYS OF IMMUNOGLOBULIN AND DID FAIRLY WELL AND HAS BEE TRANSFERRED TO THE [facility] ON 8/10/17  PLAN:  CONTINUE PT/OT,   PROBLEMS THAT ALSO HAVE OCCURRED INCLUDE PORTAL VEIN THROMBOSIS.   I WILL ARRANGE A THERAPEUTIC THORACENTESIS ON Friday.  RX ENTEROBACTER UTI.  HYPERSPLENISM AND LOW WBC COUNTS AND PLATELETS TO BE EXPECTED.   D/C  COREG.  INDERAL BETTER FOR LOWERING PORTAL PRESSURES.      8/16/17--PATIENT AWAKE AND ALERT.   DOING WELL WITH PT/OT.   GOOD EFFORT PER PATIENT.   I DONT THINK THE ASCITES IS SIGNIFICANTLY WORSE.  I AM GOING TO MAKE DAILY ASSESMENTS AND DETERMINE WHEN PARACENTESIS IS NECESSARY.   GBS IS CERTAINLY NO WORSE.   THE PATIENT WAS SPECIFICALLY TOLD TO INFORM ME ABOUT ANY PHYSICAL CHANGES IE: WORSENING WEAKNESS OR ANY CRANIAL NERVE ABNORMALITIES.   CHEST FILM WAS ESSENTIALLY NEGATIVE FOR EFFUSION.  THE DECREASED TACTILE FREMITUS THAT I NOTED WAS A HIGH RIDING DIAPHRAGM.   PROBABLY DUE TO ASCITES.    I NEED DAILY WEIGHTS TO HELP ME WITH MY DECISION WHEN TO TAP ABDOMEN.  NORMALLY PATIENT GETS IT TAPPED ABOUT EVERY 4 MONTHS BY GI.    I  WILL RE--ASSES PORTAL BLOOD FLOW AND SEE IF THIS PORTAL VEIN THROMBOSIS IS RECANULATED OR WORSE.  LAST YEAR THERE WAS SOME RECANULIZATION AND FLOW.   HEPATOPEDAL FLOW IS GOOD, HEPATOFUGAL FLOW BAD.  I HAVE NEVER SEEN A LONG TERM SURVIVOR OF METASTATIC COLON CANCER WITH 4 CM RECTO-SIGMOID TUMOR AND 5 POSITIVE NODES + LIVER MET.   RX FOR UTI WITH ENTEROBACTER ON CULTURE.  UNIMPRESSIVE WBC COUNT OF 2-5 WBC/HIGH POWERED FIELD.   I PROBABLY WOULD HAVE LET THAT GO UNLESS THERE WERE SYMPTOMS.     PLAN:   PATIENT DOING WELL.  DAILY WEIGHTS.  DAILY ASSESMENTS OF ASCITES.  CXR IS CLEAR.  CHECK ABDOMINAL ULTRASOUND TO ASSESS PORTAL BLOOD FLOW AND ASCITES.   I THINK QOD DIURETICS WOULD HELP PREVENT A TAP AND DECREASE THE EDEMA, (KNOWN  RLE DVT PRIMARILY DUE TO MALIGNANCY BEING A PRO-THROMBOTIC STATE).  GIVE 5 DAYS OF PO VITAMIN K GIVEN THE CIPRO KILLING GUT PA WHICH HELPS GENERATE VITAMIN K FOR A HUMAN.  CHECK SOME IRON STUDIES.   OVERALL INCHING FORWARD.   HARD TO SAY WHAT THE COURSE OF GBS WILL BE.  THE BACK SURGERY WAS NECESSARY BUT MY FEELING IS HIS SYMPTOMS WERE MOSTLY DUE TO GBS AND NOT SPINAL STENOSIS.   HEPATITIS STATUS CHECKED AND FOUND TO BE NEGATIVE.   HIV ANTIBODIES  FOR COMPLETENESS.      8/17/17--PATIENT'S ULTRASOUND DID NOT SHOW ENOUGH ASCITES TO SAFELY TAP.  THAT IS GOOD.  LITTLE CHANGE IN THE MOTOR MOVEMENT NOTED.  BACK FEELS FINE.  INR IS BETTER WITH VITAMIN K.  DIURETICS HOLDING ASCITES AT BAY PERIODICALLY.   WE ARE FORCING DIURESIS WITH DEMEDEX ETC.   NO NEED FOR TAP.    BP OK.  LABS OK.  MODEST IRON DEFICIENCY COMPONENT.   ADD SOME PO IRON AND VITAMIN C IF NOT DONE ALREADY.           Review of Systems:  Review of Systems   Constitutional: Negative for fever and chills.   Respiratory: Negative for shortness of breath.    Cardiovascular: Negative for chest pain.   Gastrointestinal: Negative for nausea, vomiting, abdominal pain and diarrhea.   Psychiatric/Behavioral: The patient is not nervous/anxious.        Physical Exam:  Physical Exam   Constitutional: He is oriented to person, place, and time. He appears well-nourished.   HENT:   Head: Atraumatic.   Eyes: Conjunctivae are normal. Pupils are equal, round, and reactive to light.   Neck: Normal range of motion. Neck supple. No tracheal deviation present.   Cardiovascular: Normal rate, regular rhythm, S1 normal and S2 normal.    No murmur heard.  Pulmonary/Chest: Effort normal. He has no wheezes. He has no rhonchi. He has no rales.   Abdominal: Soft. He exhibits no distension. There is no tenderness. Hernia confirmed negative in the right inguinal area and confirmed negative in the left inguinal area.   Musculoskeletal: He exhibits edema.   Neurological: He is alert and oriented to person, place, and time. No sensory deficit.   Skin: Skin is warm and dry. No rash noted. No cyanosis.   Psychiatric: He has a normal mood and affect. His behavior is normal.   Nursing note and vitals reviewed.      Labs:        Invalid input(s): [id number]      Recent Labs      08/16/17   0523  08/17/17   0608   SODIUM  137  136   POTASSIUM  3.7  3.4*   CHLORIDE  106  106   CO2  24  24   BUN  10  9   CREATININE  0.61  0.54   CALCIUM  8.4*  8.4*     Recent  Labs      17   0523  17   0608   ALTSGPT  12  11   ASTSGOT  15  17   ALKPHOSPHAT  66  64   TBILIRUBIN  1.2  1.3   GLUCOSE  84  90     Recent Labs      17   0523  17   0725  17   0608   RBC  3.83*   --   3.92*   HEMOGLOBIN  10.6*   --   10.8*   HEMATOCRIT  33.1*   --   33.7*   PLATELETCT  67*   --   66*   PROTHROMBTM   --   17.3*  17.1*   INR   --   1.37*  1.35*   IRON   --    --   28*   FERRITIN   --    --   194.1   TOTIRONBC   --    --   251     Recent Labs      17   0523  17   0608   WBC  4.6*  4.3*   NEUTSPOLYS  68.20  68.90   LYMPHOCYTES  14.90*  14.70*   MONOCYTES  12.10  10.10   EOSINOPHILS  4.20  5.30   BASOPHILS  0.40  0.50   ASTSGOT  15  17   ALTSGPT  12  11   ALKPHOSPHAT  66  64   TBILIRUBIN  1.2  1.3           Hemodynamics:  Temp (24hrs), Av.8 °C (98.2 °F), Min:36.7 °C (98 °F), Max:36.9 °C (98.4 °F)  Temperature: 36.9 °C (98.4 °F)  Pulse  Av.5  Min: 64  Max: 99   Blood Pressure : 124/68 mmHg     Respiratory:    Respiration: 18, Pulse Oximetry: 94 %     Work Of Breathing / Effort: Mild  RUL Breath Sounds: Clear, RML Breath Sounds: Clear, RLL Breath Sounds: Clear, JOSE Breath Sounds: Clear, LLL Breath Sounds: Clear  Fluids:    Intake/Output Summary (Last 24 hours) at 17 1011  Last data filed at 17 0900   Gross per 24 hour   Intake    840 ml   Output   2250 ml   Net  -1410 ml     Weight: 107.2 kg (236 lb 5.3 oz)  GI/Nutrition:  Orders Placed This Encounter   Procedures   • Diet Order     Standing Status: Standing      Number of Occurrences: 1      Standing Expiration Date:      Order Specific Question:  Diet:     Answer:  Regular [1]     Order Specific Question:  Consistency/Fluid modifications:     Answer:  Thin Liquids [3]     Medical Decision Making, by Problem:  Active Hospital Problems    Diagnosis   • *AIDP (acute inflammatory demyelinating polyneuropathy) (CMS-[facility]) [G37.8]   • Left leg DVT (CMS-[facility]) [I82.402]   • Esophageal varices- BANDED  4/2016- repeat egd tbd 10//29/16- gic [I85.00]   • ASCVD (arteriosclerotic cardiovascular disease)subtotalled small distal LCx and 40% LAD med rx by cath 12/13/12 [I25.10]   • GIB (gastrointestinal bleeding)- recurrent from varices [K92.2]   • BPH (benign prostatic hyperplasia) [N40.0]   • 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 DUE TO OLD HEP B, CHEMO RX,  OR THERMO RAD OF MAGNANT LESION [K74.60]   • Essential hypertension [I10]   • Colon cancer-2010 left hemicolectomy; no colostomy [C18.9]     *  S/P Recent Lumbar Surgery    *  Hypertension  BP recently ok  On Coreg: 3.125 mg bid  Note: home meds were Coreg 3.125 mg 1/2 tab bid and Losartan: 25 mg daily  Monitor    *  U/A (+)  Has had some difficulty urinating recently with some burning  Cx --> GNR  F/U C&S   On Cipro empirically unitl C&S come back    *  LE Edema  Mild  On Aldactone: 25 mg daily    *  Thrombocytopenia  Platelets stable (8/11)  2nd to prior chemo and PORTAL HYPERTENSION    *  Hx Colon Cancer: stage IV; in 5 year remission  *  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  *  Hyperlipidemia  *  AIDP: s/p IVIG x 5 days      Medications reviewed and Labs reviewed

## 2017-08-18 NOTE — CARE PLAN
Problem: Other Problem (see comments)  Goal: Other Goal  PO intake >50-75% of meals and supplements  Monitor/Evaluation: Monitor PO intake, weight, labs, medication adjustments, skin integrity, GI function, vitals, I/Os, and overall hydration status. Adjust nutritional POC pending clinical outcomes.   RD following PRN. PO is adequate. Nutrition interventions in place.   Goal: Maintain adequate oral nutrient/fluid intake to promote nutrition optimization/wound healing.       Outcome: PROGRESSING AS EXPECTED  Pt intake usually 50-75%.  States he is drinking 3 boost supplements daily.  Vitamin C added on MAR.  RD to monitor PO intake, healing, clinical course.

## 2017-08-18 NOTE — PROGRESS NOTES
Hospital Medicine Progress Note, Adult, Complex               Author: SCOTT BULLOCK MD    Date & Time created: 8/18/2017  3:46 PM     Interval History:  8/15/17--  HISTORY REVIEWED.  REMOTE METASTATIC COLON CANCER DISCOVERED IN 2010 S/P RESECTION BY DR ANA RUIZ AND FINDING OF 10+ NODES  WHICH SPREAD TO THE LIVER.  HAD AN ABLATION AT [facility].  HEAVY DUTY CHEMO PER DR REBOLLAR.   THE LIVER ABLATION HAS UNFORTUNATELY RESULTED IN PORTAL HYPERTENSION.  HE ALSO HAS PRETTY MUCH ALL THE STIGMATA OF PORTAL HYPERTENSION.   THERE REMAINING LIVER PARENCHYMA IS NORMAL SO HE HAS PRESERVED LIVER SYNTHETIC FUNCTION.  AS OF 2010 THERE HAS BEEN NO NEW RECURRANCE THOUGH WE ARE DEALING WITH GASTRIC VARICES,  ESOPHAGEAL VARICIES,  CAPUT MEDUSA,  LEFT LOWER EXTREMITY DVT,  INABILITY TO TOLERATE ANTICOAGULATION WITH AN IVC FILTER IN PLACE,   AND CURRENTLY MODERATE ASCITES.   WE WILL PLAN ON THERAPEUTIC TAP ON Friday.   THE OTHER ISSUE THAT REALLY BROUGHT TO PATIENT TO THE HOSPITAL WAS PROGRESSIVE ASCENDING WEAKNESS.   HE DID HAVE A TIGHT L4-L5 CENTRAL STENOSIS.  THE OTHER LEVELS LOOK FINE.   HE STARTED NOTICING THE WEAKNESS FOR ABOUT A WEEK PRIOR IN MAY AND WENT TO ER.   DR HOUSE SAW THE STRUCTURAL ISSUE,  NAMELY THE ABOVE STENOSIS AND DECOMPRESSED THIS 0N 5/25.  SYMPTOMS DID NOT MALLY.  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   IT IS CLEAR THE PATIENT NEEDED SOME SURGERY ON THE LOWER BACK.   LOOKING AT THE FILMS.  MY THOUGHT IS THE SYMPTOMS THEMSELVES WERE CAUSED BY THE GBS.   HE RECEIVED 5 DAYS OF IMMUNOGLOBULIN AND DID FAIRLY WELL AND [facility]17  PLAN:  CONTINUE PT/OT,   PROBLEMS THAT ALSO HAVE OCCURRED INCLUDE PORTAL VEIN THROMBOSIS.   I WILL ARRANGE A THERAPEUTIC THORACENTESIS ON Friday.  RX ENTEROBACTER UTI.  HYPERSPLENISM AND LOW WBC COUNTS AND PLATELETS TO BE EXPECTED.   D/C COREG.  INDERAL BETTER FOR  LOWERING PORTAL PRESSURES.      8/16/17--PATIENT AWAKE AND ALERT.   DOING WELL WITH PT/OT.   GOOD EFFORT PER PATIENT.   I DONT THINK THE ASCITES IS SIGNIFICANTLY WORSE.  I AM GOING TO MAKE DAILY ASSESMENTS AND DETERMINE WHEN PARACENTESIS IS NECESSARY.   GBS IS CERTAINLY NO WORSE.   THE PATIENT WAS SPECIFICALLY TOLD TO INFORM ME ABOUT ANY PHYSICAL CHANGES IE: WORSENING WEAKNESS OR ANY CRANIAL NERVE ABNORMALITIES.   CHEST FILM WAS ESSENTIALLY NEGATIVE FOR EFFUSION.  THE DECREASED TACTILE FREMITUS THAT I NOTED WAS A HIGH RIDING DIAPHRAGM.   PROBABLY DUE TO ASCITES.    I NEED DAILY WEIGHTS TO HELP ME WITH MY DECISION WHEN TO TAP ABDOMEN.  NORMALLY PATIENT GETS IT TAPPED ABOUT EVERY 4 MONTHS BY GI.    I  WILL RE--ASSES PORTAL BLOOD FLOW AND SEE IF THIS PORTAL VEIN THROMBOSIS IS RECANULATED OR WORSE.  LAST YEAR THERE WAS SOME RECANULIZATION AND FLOW.   HEPATOPEDAL FLOW IS GOOD, HEPATOFUGAL FLOW BAD.  I HAVE NEVER SEEN A LONG TERM SURVIVOR OF METASTATIC COLON CANCER WITH 4 CM RECTO-SIGMOID TUMOR AND 5 POSITIVE NODES + LIVER MET.   RX FOR UTI WITH ENTEROBACTER ON CULTURE.  UNIMPRESSIVE WBC COUNT OF 2-5 WBC/HIGH POWERED FIELD.   I PROBABLY WOULD HAVE LET THAT GO UNLESS THERE WERE SYMPTOMS.     PLAN:   PATIENT DOING WELL.  DAILY WEIGHTS.  DAILY ASSESMENTS OF ASCITES.  CXR IS CLEAR.  CHECK ABDOMINAL ULTRASOUND TO ASSESS PORTAL BLOOD FLOW AND ASCITES.   I THINK QOD DIURETICS WOULD HELP PREVENT A TAP AND DECREASE THE EDEMA, (KNOWN  RLE DVT PRIMARILY DUE TO MALIGNANCY BEING A PRO-THROMBOTIC STATE).  GIVE 5 DAYS OF PO VITAMIN K GIVEN THE CIPRO KILLING GUT PA WHICH HELPS GENERATE VITAMIN K FOR A HUMAN.  CHECK SOME IRON STUDIES.   OVERALL INCHING FORWARD.   HARD TO SAY WHAT THE COURSE OF GBS WILL BE.  THE BACK SURGERY WAS NECESSARY BUT MY FEELING IS HIS SYMPTOMS WERE MOSTLY DUE TO GBS AND NOT SPINAL STENOSIS.   HEPATITIS STATUS CHECKED AND FOUND TO BE NEGATIVE.   HIV ANTIBODIES  FOR COMPLETENESS.     8/17/17--PATIENT'S ULTRASOUND  DID NOT SHOW ENOUGH ASCITES TO SAFELY TAP.  THAT IS GOOD.  LITTLE CHANGE IN THE MOTOR MOVEMENT NOTED.  BACK FEELS FINE.  INR IS BETTER WITH VITAMIN K.  DIURETICS HOLDING ASCITES AT BAY PERIODICALLY.   WE ARE FORCING DIURESIS WITH DEMEDEX ETC.   NO NEED FOR TAP.    BP OK.  LABS OK.  MODEST IRON DEFICIENCY COMPONENT.   ADD SOME PO IRON AND VITAMIN C IF NOT DONE ALREADY.    8/18/17--PATIENT LOOKS THE SAME TO ME.  THE ASCITES IS MINIMAL AND IWILL NOT REQUIIRE ANY INTERVENTION LIKE THORACENTESIS IN THE NEAR FUTURE.  OUR MEDICAL RX IS WORKING JUST FINE.    REALLY NO CHANGE IN PORTAL THROMBOSIS.  THERE IS BLOOD FLOW WHICH IS UNCHANGED.   USUAL COMPLAINTS.  ACHES AND PAINS ETC.    PLAN:  NO TAP OF ABDOMEN REQUIRED.  THE PATIENT MAY BENEFIT FROM SOME IV IRON.   HE IS DEFICIENT.        Review of Systems:  Review of Systems   Constitutional: Negative for fever and chills.   Respiratory: Negative for shortness of breath.    Cardiovascular: Negative for chest pain.   Gastrointestinal: Negative for nausea, vomiting, abdominal pain and diarrhea.   Psychiatric/Behavioral: The patient is not nervous/anxious.        Physical Exam:  Physical Exam   Constitutional: He is oriented to person, place, and time. He appears well-nourished.   HENT:   Head: Atraumatic.   Eyes: Conjunctivae are normal. Pupils are equal, round, and reactive to light.   Neck: Normal range of motion. Neck supple. No tracheal deviation present.   Cardiovascular: Normal rate, regular rhythm, S1 normal and S2 normal.    No murmur heard.  Pulmonary/Chest: Effort normal. He has no wheezes. He has no rhonchi. He has no rales.   Abdominal: Soft. He exhibits no distension. There is no tenderness. Hernia confirmed negative in the right inguinal area and confirmed negative in the left inguinal area.   Musculoskeletal: He exhibits edema.   Neurological: He is alert and oriented to person, place, and time. No sensory deficit.   Skin: Skin is warm and dry. No rash noted. No  cyanosis.   Psychiatric: He has a normal mood and affect. His behavior is normal.   Nursing note and vitals reviewed.      Labs:        Invalid input(s): [id number]      Recent Labs      17   0523  17   0608   SODIUM  137  136   POTASSIUM  3.7  3.4*   CHLORIDE  106  106   CO2  24  24   BUN  10  9   CREATININE  0.61  0.54   CALCIUM  8.4*  8.4*     Recent Labs      17   0523  17   0608   ALTSGPT  12  11   ASTSGOT  15  17   ALKPHOSPHAT  66  64   TBILIRUBIN  1.2  1.3   GLUCOSE  84  90     Recent Labs      17   0523  17   0725  17   0608   RBC  3.83*   --   3.92*   HEMOGLOBIN  10.6*   --   10.8*   HEMATOCRIT  33.1*   --   33.7*   PLATELETCT  67*   --   66*   PROTHROMBTM   --   17.3*  17.1*   INR   --   1.37*  1.35*   IRON   --    --   28*   FERRITIN   --    --   194.1   TOTIRONBC   --    --   251     Recent Labs      17   0523  17   0608   WBC  4.6*  4.3*   NEUTSPOLYS  68.20  68.90   LYMPHOCYTES  14.90*  14.70*   MONOCYTES  12.10  10.10   EOSINOPHILS  4.20  5.30   BASOPHILS  0.40  0.50   ASTSGOT  15  17   ALTSGPT  12  11   ALKPHOSPHAT  66  64   TBILIRUBIN  1.2  1.3           Hemodynamics:  Temp (24hrs), Av.8 °C (98.2 °F), Min:36.7 °C (98 °F), Max:36.9 °C (98.4 °F)  Temperature: 36.7 °C (98.1 °F)  Pulse  Av.6  Min: 64  Max: 99   Blood Pressure : 114/66 mmHg     Respiratory:    Respiration: 18, Pulse Oximetry: 95 %     Work Of Breathing / Effort: Mild  RUL Breath Sounds: Clear, RML Breath Sounds: Clear, RLL Breath Sounds: Clear, JOSE Breath Sounds: Clear, LLL Breath Sounds: Clear  Fluids:    Intake/Output Summary (Last 24 hours) at 17 1546  Last data filed at 17 1458   Gross per 24 hour   Intake    600 ml   Output   2900 ml   Net  -2300 ml     Weight: 107.2 kg (236 lb 5.3 oz)  GI/Nutrition:  Orders Placed This Encounter   Procedures   • Diet Order     Standing Status: Standing      Number of Occurrences: 1      Standing Expiration Date:       Order Specific Question:  Diet:     Answer:  Regular [1]     Order Specific Question:  Consistency/Fluid modifications:     Answer:  Thin Liquids [3]     Medical Decision Making, by Problem:  Active Hospital Problems    Diagnosis   • *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]   • Left leg DVT (CMS-HCC) [I82.402]   • Esophageal varices- BANDED 4/2016- repeat egd tbd 10//29/16- gic [I85.00]   • ASCVD (arteriosclerotic cardiovascular disease)subtotalled small distal LCx and 40% LAD med rx by cath 12/13/12 [I25.10]   • GIB (gastrointestinal bleeding)- recurrent from varices [K92.2]   • BPH (benign prostatic hyperplasia) [N40.0]   • 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 DUE TO OLD HEP B, CHEMO RX,  OR THERMO RAD OF MAGNANT LESION [K74.60]   • Essential hypertension [I10]   • Colon cancer-2010 left hemicolectomy; no colostomy [C18.9]     *  S/P Recent Lumbar Surgery    *  Hypertension  BP recently ok  On Coreg: 3.125 mg bid  Note: home meds were Coreg 3.125 mg 1/2 tab bid and Losartan: 25 mg daily  Monitor    *  U/A (+)  Has had some difficulty urinating recently with some burning  Cx --> GNR  F/U C&S   On Cipro empirically unitl C&S come back    *  LE Edema  Mild  On Aldactone: 25 mg daily    *  Thrombocytopenia  Platelets stable (8/11)  2nd to prior chemo and PORTAL HYPERTENSION    *  Hx Colon Cancer: stage IV; in 5 year remission  *  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  *  Hyperlipidemia  *  AIDP: s/p IVIG x 5 days      Medications reviewed and Labs reviewed

## 2017-08-19 ENCOUNTER — APPOINTMENT (OUTPATIENT)
Dept: RADIOLOGY | Facility: REHABILITATION | Age: 72
DRG: 094 | End: 2017-08-19
Attending: HOSPITALIST
Payer: MEDICARE

## 2017-08-19 PROCEDURE — 770010 HCHG ROOM/CARE - REHAB SEMI PRIVAT*

## 2017-08-19 PROCEDURE — A9270 NON-COVERED ITEM OR SERVICE: HCPCS | Performed by: HOSPITALIST

## 2017-08-19 PROCEDURE — A9270 NON-COVERED ITEM OR SERVICE: HCPCS | Performed by: PHYSICAL MEDICINE & REHABILITATION

## 2017-08-19 PROCEDURE — 700102 HCHG RX REV CODE 250 W/ 637 OVERRIDE(OP): Performed by: PHYSICAL MEDICINE & REHABILITATION

## 2017-08-19 PROCEDURE — 99233 SBSQ HOSP IP/OBS HIGH 50: CPT | Performed by: HOSPITALIST

## 2017-08-19 PROCEDURE — 74000 DX-ABDOMEN-1 VIEW: CPT

## 2017-08-19 PROCEDURE — 700102 HCHG RX REV CODE 250 W/ 637 OVERRIDE(OP): Performed by: HOSPITALIST

## 2017-08-19 RX ORDER — POLYETHYLENE GLYCOL 3350 17 G/17G
1 POWDER, FOR SOLUTION ORAL
Status: DISCONTINUED | OUTPATIENT
Start: 2017-08-19 | End: 2017-09-07 | Stop reason: HOSPADM

## 2017-08-19 RX ADMIN — FERROUS SULFATE TAB 325 MG (65 MG ELEMENTAL FE) 325 MG: 325 (65 FE) TAB at 08:26

## 2017-08-19 RX ADMIN — TAMSULOSIN HYDROCHLORIDE 0.4 MG: 0.4 CAPSULE ORAL at 08:26

## 2017-08-19 RX ADMIN — PHYTONADIONE 5 MG: 5 TABLET ORAL at 08:25

## 2017-08-19 RX ADMIN — ATORVASTATIN CALCIUM 40 MG: 40 TABLET, FILM COATED ORAL at 20:39

## 2017-08-19 RX ADMIN — CHOLESTYRAMINE 4 G: 4 POWDER, FOR SUSPENSION ORAL at 08:24

## 2017-08-19 RX ADMIN — URSODIOL 300 MG: 300 CAPSULE ORAL at 08:28

## 2017-08-19 RX ADMIN — POTASSIUM CHLORIDE 10 MEQ: 1500 TABLET, EXTENDED RELEASE ORAL at 08:25

## 2017-08-19 RX ADMIN — SPIRONOLACTONE 25 MG: 25 TABLET, FILM COATED ORAL at 05:16

## 2017-08-19 RX ADMIN — LACTOBACILLUS ACIDOPHILUS / LACTOBACILLUS BULGARICUS 1 PACKET: 100 MILLION CFU STRENGTH GRANULES at 17:30

## 2017-08-19 RX ADMIN — NYSTATIN 1500000 UNITS: 100000 POWDER TOPICAL at 20:40

## 2017-08-19 RX ADMIN — LACTOBACILLUS ACIDOPHILUS / LACTOBACILLUS BULGARICUS 1 PACKET: 100 MILLION CFU STRENGTH GRANULES at 08:25

## 2017-08-19 RX ADMIN — VITAMIN D, TAB 1000IU (100/BT) 1000 UNITS: 25 TAB at 08:25

## 2017-08-19 RX ADMIN — LACTOBACILLUS ACIDOPHILUS / LACTOBACILLUS BULGARICUS 1 PACKET: 100 MILLION CFU STRENGTH GRANULES at 11:45

## 2017-08-19 RX ADMIN — OXYCODONE HYDROCHLORIDE AND ACETAMINOPHEN 500 MG: 500 TABLET ORAL at 08:26

## 2017-08-19 RX ADMIN — FOLIC ACID 1 MG: 1 TABLET ORAL at 08:25

## 2017-08-19 RX ADMIN — NYSTATIN 1500000 UNITS: 100000 POWDER TOPICAL at 08:27

## 2017-08-19 RX ADMIN — TORSEMIDE 20 MG: 10 TABLET ORAL at 05:16

## 2017-08-19 ASSESSMENT — ENCOUNTER SYMPTOMS
CHILLS: 0
ABDOMINAL PAIN: 0
SHORTNESS OF BREATH: 0
NERVOUS/ANXIOUS: 0
VOMITING: 0
FEVER: 0
NAUSEA: 0
DIARRHEA: 0

## 2017-08-19 ASSESSMENT — PAIN SCALES - GENERAL: PAINLEVEL_OUTOF10: 0

## 2017-08-19 NOTE — CARE PLAN
Problem: Communication  Goal: The ability to communicate needs accurately and effectively will improve  Patient is able to communicate needs with no difficulty. Patient is able to understand with no difficulty. Will continue to monitor.     Problem: Safety  Goal: Will remain free from falls  Pt uses call light when assistance is needed or for transfers.

## 2017-08-19 NOTE — CARE PLAN
Problem: Communication  Goal: The ability to communicate needs accurately and effectively will improve  Patient is able to communicate needs with no difficulty. Patient is able to understand with no difficulty. Will continue to monitor.     Problem: Safety  Goal: Will remain free from falls  Pt uses call light when assistance is needed or for transfers.    Problem: Infection  Goal: Will remain free from infection  Pt had soft bowel incontinence this morning. Bandage changed on sacrum and brief changed. No S&S of infection noted.

## 2017-08-20 LAB
APPEARANCE UR: CLEAR
BACTERIA #/AREA URNS HPF: ABNORMAL /HPF
BASOPHILS # BLD AUTO: 0.7 % (ref 0–1.8)
BASOPHILS # BLD: 0.03 K/UL (ref 0–0.12)
BILIRUB UR QL STRIP.AUTO: NEGATIVE
COLOR UR: YELLOW
EOSINOPHIL # BLD AUTO: 0.27 K/UL (ref 0–0.51)
EOSINOPHIL NFR BLD: 5.9 % (ref 0–6.9)
EPI CELLS #/AREA URNS HPF: NEGATIVE /HPF
ERYTHROCYTE [DISTWIDTH] IN BLOOD BY AUTOMATED COUNT: 52.4 FL (ref 35.9–50)
GLUCOSE UR STRIP.AUTO-MCNC: NEGATIVE MG/DL
HCT VFR BLD AUTO: 32.7 % (ref 42–52)
HGB BLD-MCNC: 10.5 G/DL (ref 14–18)
HYALINE CASTS #/AREA URNS LPF: ABNORMAL /LPF
IMM GRANULOCYTES # BLD AUTO: 0.01 K/UL (ref 0–0.11)
IMM GRANULOCYTES NFR BLD AUTO: 0.2 % (ref 0–0.9)
KETONES UR STRIP.AUTO-MCNC: NEGATIVE MG/DL
LEUKOCYTE ESTERASE UR QL STRIP.AUTO: ABNORMAL
LYMPHOCYTES # BLD AUTO: 0.89 K/UL (ref 1–4.8)
LYMPHOCYTES NFR BLD: 19.6 % (ref 22–41)
MCH RBC QN AUTO: 27 PG (ref 27–33)
MCHC RBC AUTO-ENTMCNC: 32.1 G/DL (ref 33.7–35.3)
MCV RBC AUTO: 84.1 FL (ref 81.4–97.8)
MICRO URNS: ABNORMAL
MONOCYTES # BLD AUTO: 0.56 K/UL (ref 0–0.85)
MONOCYTES NFR BLD AUTO: 12.3 % (ref 0–13.4)
NEUTROPHILS # BLD AUTO: 2.78 K/UL (ref 1.82–7.42)
NEUTROPHILS NFR BLD: 61.3 % (ref 44–72)
NITRITE UR QL STRIP.AUTO: NEGATIVE
NRBC # BLD AUTO: 0 K/UL
NRBC BLD AUTO-RTO: 0 /100 WBC
PH UR STRIP.AUTO: 7 [PH]
PLATELET # BLD AUTO: 74 K/UL (ref 164–446)
PMV BLD AUTO: 10.7 FL (ref 9–12.9)
PROT UR QL STRIP: NEGATIVE MG/DL
RBC # BLD AUTO: 3.89 M/UL (ref 4.7–6.1)
RBC # URNS HPF: ABNORMAL /HPF
RBC UR QL AUTO: NEGATIVE
SP GR UR STRIP.AUTO: 1.01
UROBILINOGEN UR STRIP.AUTO-MCNC: 0.2 MG/DL
WBC # BLD AUTO: 4.5 K/UL (ref 4.8–10.8)
WBC #/AREA URNS HPF: ABNORMAL /HPF

## 2017-08-20 PROCEDURE — 36415 COLL VENOUS BLD VENIPUNCTURE: CPT

## 2017-08-20 PROCEDURE — A9270 NON-COVERED ITEM OR SERVICE: HCPCS | Performed by: PHYSICAL MEDICINE & REHABILITATION

## 2017-08-20 PROCEDURE — 81001 URINALYSIS AUTO W/SCOPE: CPT

## 2017-08-20 PROCEDURE — A9270 NON-COVERED ITEM OR SERVICE: HCPCS | Performed by: HOSPITALIST

## 2017-08-20 PROCEDURE — 770010 HCHG ROOM/CARE - REHAB SEMI PRIVAT*

## 2017-08-20 PROCEDURE — 700102 HCHG RX REV CODE 250 W/ 637 OVERRIDE(OP): Performed by: HOSPITALIST

## 2017-08-20 PROCEDURE — 700102 HCHG RX REV CODE 250 W/ 637 OVERRIDE(OP): Performed by: PHYSICAL MEDICINE & REHABILITATION

## 2017-08-20 PROCEDURE — 85025 COMPLETE CBC W/AUTO DIFF WBC: CPT

## 2017-08-20 PROCEDURE — 99233 SBSQ HOSP IP/OBS HIGH 50: CPT | Performed by: HOSPITALIST

## 2017-08-20 RX ADMIN — URSODIOL 300 MG: 300 CAPSULE ORAL at 08:07

## 2017-08-20 RX ADMIN — LACTOBACILLUS ACIDOPHILUS / LACTOBACILLUS BULGARICUS 1 PACKET: 100 MILLION CFU STRENGTH GRANULES at 08:07

## 2017-08-20 RX ADMIN — POTASSIUM CHLORIDE 10 MEQ: 1500 TABLET, EXTENDED RELEASE ORAL at 08:07

## 2017-08-20 RX ADMIN — POLYETHYLENE GLYCOL 3350 1 PACKET: 17 POWDER, FOR SOLUTION ORAL at 20:47

## 2017-08-20 RX ADMIN — LACTOBACILLUS ACIDOPHILUS / LACTOBACILLUS BULGARICUS 1 PACKET: 100 MILLION CFU STRENGTH GRANULES at 11:30

## 2017-08-20 RX ADMIN — ATORVASTATIN CALCIUM 40 MG: 40 TABLET, FILM COATED ORAL at 20:46

## 2017-08-20 RX ADMIN — NYSTATIN 1500000 UNITS: 100000 POWDER TOPICAL at 09:59

## 2017-08-20 RX ADMIN — LACTOBACILLUS ACIDOPHILUS / LACTOBACILLUS BULGARICUS 1 PACKET: 100 MILLION CFU STRENGTH GRANULES at 16:56

## 2017-08-20 RX ADMIN — VITAMIN D, TAB 1000IU (100/BT) 1000 UNITS: 25 TAB at 08:07

## 2017-08-20 RX ADMIN — MAGNESIUM HYDROXIDE 15 ML: 400 SUSPENSION ORAL at 20:46

## 2017-08-20 RX ADMIN — METOLAZONE 2.5 MG: 2.5 TABLET ORAL at 22:38

## 2017-08-20 RX ADMIN — PHYTONADIONE 5 MG: 5 TABLET ORAL at 08:07

## 2017-08-20 RX ADMIN — SPIRONOLACTONE 25 MG: 25 TABLET, FILM COATED ORAL at 05:53

## 2017-08-20 RX ADMIN — OXYCODONE HYDROCHLORIDE AND ACETAMINOPHEN 500 MG: 500 TABLET ORAL at 08:07

## 2017-08-20 RX ADMIN — TAMSULOSIN HYDROCHLORIDE 0.4 MG: 0.4 CAPSULE ORAL at 08:07

## 2017-08-20 RX ADMIN — FOLIC ACID 1 MG: 1 TABLET ORAL at 08:07

## 2017-08-20 RX ADMIN — FERROUS SULFATE TAB 325 MG (65 MG ELEMENTAL FE) 325 MG: 325 (65 FE) TAB at 08:07

## 2017-08-20 ASSESSMENT — ENCOUNTER SYMPTOMS
ABDOMINAL PAIN: 0
DIARRHEA: 0
VOMITING: 0
NAUSEA: 0
SHORTNESS OF BREATH: 0
CHILLS: 0
NERVOUS/ANXIOUS: 0
FEVER: 0

## 2017-08-20 ASSESSMENT — PAIN SCALES - GENERAL: PAINLEVEL_OUTOF10: 0

## 2017-08-20 NOTE — PROGRESS NOTES
Hospital Medicine Progress Note, Adult, Complex               Author: SCOTT BULLOCK MD    Date & Time created: 8/19/2017  6:37 PM     Interval History:  8/15/17--  HISTORY REVIEWED.  REMOTE METASTATIC COLON CANCER DISCOVERED IN 2010 S/P RESECTION BY DR ANA RUIZ AND FINDING OF 10+ NODES  WHICH SPREAD TO THE LIVER.  HAD AN ABLATION AT [facility].  HEAVY DUTY CHEMO PER DR REBOLLAR.   THE LIVER ABLATION HAS UNFORTUNATELY RESULTED IN PORTAL HYPERTENSION.  HE ALSO HAS PRETTY MUCH ALL THE STIGMATA OF PORTAL HYPERTENSION.   THERE REMAINING LIVER PARENCHYMA IS NORMAL SO HE HAS PRESERVED LIVER SYNTHETIC FUNCTION.  AS OF 2010 THERE HAS BEEN NO NEW RECURRANCE THOUGH WE ARE DEALING WITH GASTRIC VARICES,  ESOPHAGEAL VARICIES,  CAPUT MEDUSA,  LEFT LOWER EXTREMITY DVT,  INABILITY TO TOLERATE ANTICOAGULATION WITH AN IVC FILTER IN PLACE,   AND CURRENTLY MODERATE ASCITES.   WE WILL PLAN ON THERAPEUTIC TAP ON Friday.   THE OTHER ISSUE THAT REALLY BROUGHT TO PATIENT TO THE HOSPITAL WAS PROGRESSIVE ASCENDING WEAKNESS.   HE DID HAVE A TIGHT L4-L5 CENTRAL STENOSIS.  THE OTHER LEVELS LOOK FINE.   HE STARTED NOTICING THE WEAKNESS FOR ABOUT A WEEK PRIOR IN MAY AND WENT TO ER.   DR HOUSE SAW THE STRUCTURAL ISSUE,  NAMELY THE ABOVE STENOSIS AND DECOMPRESSED THIS 0N 5/25.  SYMPTOMS DID NOT MALLY.  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   IT IS CLEAR THE PATIENT NEEDED SOME SURGERY ON THE LOWER BACK.   LOOKING AT THE FILMS.  MY THOUGHT IS THE SYMPTOMS THEMSELVES WERE CAUSED BY THE GBS.   HE RECEIVED 5 DAYS OF IMMUNOGLOBULIN AND DID FAIRLY WELL AND HAS BEE TRANSFERRED TO THE [facility] ON 8/10/17  PLAN:  CONTINUE PT/OT,   PROBLEMS THAT ALSO HAVE OCCURRED INCLUDE PORTAL VEIN THROMBOSIS.   I WILL ARRANGE A THERAPEUTIC THORACENTESIS ON Friday.  RX ENTEROBACTER UTI.  HYPERSPLENISM AND LOW WBC COUNTS AND PLATELETS TO BE EXPECTED.   D/C COREG.  INDERAL BETTER FOR  LOWERING PORTAL PRESSURES.      8/16/17--PATIENT AWAKE AND ALERT.   DOING WELL WITH PT/OT.   GOOD EFFORT PER PATIENT.   I DONT THINK THE ASCITES IS SIGNIFICANTLY WORSE.  I AM GOING TO MAKE DAILY ASSESMENTS AND DETERMINE WHEN PARACENTESIS IS NECESSARY.   GBS IS CERTAINLY NO WORSE.   THE PATIENT WAS SPECIFICALLY TOLD TO INFORM ME ABOUT ANY PHYSICAL CHANGES IE: WORSENING WEAKNESS OR ANY CRANIAL NERVE ABNORMALITIES.   CHEST FILM WAS ESSENTIALLY NEGATIVE FOR EFFUSION.  THE DECREASED TACTILE FREMITUS THAT I NOTED WAS A HIGH RIDING DIAPHRAGM.   PROBABLY DUE TO ASCITES.    I NEED DAILY WEIGHTS TO HELP ME WITH MY DECISION WHEN TO TAP ABDOMEN.  NORMALLY PATIENT GETS IT TAPPED ABOUT EVERY 4 MONTHS BY GI.    I  WILL RE--ASSES PORTAL BLOOD FLOW AND SEE IF THIS PORTAL VEIN THROMBOSIS IS RECANULATED OR WORSE.  LAST YEAR THERE WAS SOME RECANULIZATION AND FLOW.   HEPATOPEDAL FLOW IS GOOD, HEPATOFUGAL FLOW BAD.  I HAVE NEVER SEEN A LONG TERM SURVIVOR OF METASTATIC COLON CANCER WITH 4 CM RECTO-SIGMOID TUMOR AND 5 POSITIVE NODES + LIVER MET.   RX FOR UTI WITH ENTEROBACTER ON CULTURE.  UNIMPRESSIVE WBC COUNT OF 2-5 WBC/HIGH POWERED FIELD.   I PROBABLY WOULD HAVE LET THAT GO UNLESS THERE WERE SYMPTOMS.     PLAN:   PATIENT DOING WELL.  DAILY WEIGHTS.  DAILY ASSESMENTS OF ASCITES.  CXR IS CLEAR.  CHECK ABDOMINAL ULTRASOUND TO ASSESS PORTAL BLOOD FLOW AND ASCITES.   I THINK QOD DIURETICS WOULD HELP PREVENT A TAP AND DECREASE THE EDEMA, (KNOWN  RLE DVT PRIMARILY DUE TO MALIGNANCY BEING A PRO-THROMBOTIC STATE).  GIVE 5 DAYS OF PO VITAMIN K GIVEN THE CIPRO KILLING GUT PA WHICH HELPS GENERATE VITAMIN K FOR A HUMAN.  CHECK SOME IRON STUDIES.   OVERALL INCHING FORWARD.   HARD TO SAY WHAT THE COURSE OF GBS WILL BE.  THE BACK SURGERY WAS NECESSARY BUT MY FEELING IS HIS SYMPTOMS WERE MOSTLY DUE TO GBS AND NOT SPINAL STENOSIS.   HEPATITIS STATUS CHECKED AND FOUND TO BE NEGATIVE.   HIV ANTIBODIES  FOR COMPLETENESS.     8/17/17--PATIENT'S ULTRASOUND  DID NOT SHOW ENOUGH ASCITES TO SAFELY TAP.  THAT IS GOOD.  LITTLE CHANGE IN THE MOTOR MOVEMENT NOTED.  BACK FEELS FINE.  INR IS BETTER WITH VITAMIN K.  DIURETICS HOLDING ASCITES AT BAY PERIODICALLY.   WE ARE FORCING DIURESIS WITH DEMEDEX ETC.   NO NEED FOR TAP.    BP OK.  LABS OK.  MODEST IRON DEFICIENCY COMPONENT.   ADD SOME PO IRON AND VITAMIN C IF NOT DONE ALREADY.    8/18/17--PATIENT LOOKS THE SAME TO ME.  THE ASCITES IS MINIMAL AND IWILL NOT REQUIIRE ANY INTERVENTION LIKE THORACENTESIS IN THE NEAR FUTURE.  OUR MEDICAL RX IS WORKING JUST FINE.    REALLY NO CHANGE IN PORTAL THROMBOSIS.  THERE IS BLOOD FLOW WHICH IS UNCHANGED.   USUAL COMPLAINTS.  ACHES AND PAINS ETC.    PLAN:  NO TAP OF ABDOMEN REQUIRED.  THE PATIENT MAY BENEFIT FROM SOME IV IRON.   HE IS DEFICIENT.     8/19/17--PATIENT AWAKE AND ALERT.  DOING FAIRLY NICELY WITH PT/OT.   HE WAS ABLE TO LIFT BOTH LEGS OFF THE BED.  I DID GET SOME PATELLA REFLEX B/L YESTERDAY.  MUSCLE BULK IS FAIR.  I REVIEWED THE ULTRASOUND.  MINIMAL ASCITES.  MODEST RLL EFFUSION.   THE SPLEEN IS ENLARGED.  HE UNFORTUNATELY WILL ALWAYS HAVE SOME DEGREE OF EARLY SATIETEY DUE TO  A 20 CM SPLEEN.  NORMAL WOULD BE FROM 7-14 CM. THE SPLEEN IS VERY CLOSE TO THE STOMACH AND NOT A RETROPERITONEAL STRUCTURE LIKE THE KIDNEYS.   ALSO ON THE PLAIN FILM WHICH WAS UNDER PENETRATED, THERE LOOKED TO BE A GOOD AMOUNT OF FECAL MATERIAL IN THE DESCENDING COLON, ALSO NEAR THE STOMACH.      PLAN:    I THINK THE PATIENT IS DOING AS WELL AS CAN BE EXPECTED.  THE GBS IS IMPROVING AT A SLOW RATE.   THE SYMPTOMS STARTED AT THE MIDDLE OF MAY.   SO THIS IS A 3 MONTH ORDEAL THUS FAR.  HE COULD BENEFIT FROM SOME MIRALAX.  DOUBT HE WOULD TOLLERATE METAMUCIL.    I WILL TRY  THAT.  MY AGGRESSIVE DIURETIC PROGRAM HAS  ESSENTIALLY MINIMIZED THE ASCITES TO A NEGLIGIBLE AMOUNT AND HE WILL NOT NEED PARACENTESIS.  LOW GRADE TEMP OF 99,6, CHECK UA FOR COMPLETENESS AND FORMAL CXR ON Monday TO RE-ASSES THAT EFFUSION NOTED  ON ABDOMINAL ULTRASOUND.       Review of Systems:  Review of Systems   Constitutional: Negative for fever and chills.   Respiratory: Negative for shortness of breath.    Cardiovascular: Negative for chest pain.   Gastrointestinal: Negative for nausea, vomiting, abdominal pain and diarrhea.   Psychiatric/Behavioral: The patient is not nervous/anxious.        Physical Exam:  Physical Exam   Constitutional: He is oriented to person, place, and time. He appears well-nourished.   HENT:   Head: Atraumatic.   Eyes: Conjunctivae are normal. Pupils are equal, round, and reactive to light.   Neck: Normal range of motion. Neck supple. No tracheal deviation present.   Cardiovascular: Normal rate, regular rhythm, S1 normal and S2 normal.    No murmur heard.  Pulmonary/Chest: Effort normal. He has no wheezes. He has no rhonchi. He has no rales.   Abdominal: Soft. He exhibits no distension and no mass. There is no tenderness. There is no rebound and no guarding. Hernia confirmed negative in the right inguinal area and confirmed negative in the left inguinal area.   Musculoskeletal: He exhibits edema.   Neurological: He is alert and oriented to person, place, and time. No sensory deficit.   Skin: Skin is warm and dry. No rash noted. No cyanosis.   Psychiatric: He has a normal mood and affect. His behavior is normal.   Nursing note and vitals reviewed.      Labs:        Invalid input(s): [id number]      Recent Labs      08/17/17   0608   SODIUM  136   POTASSIUM  3.4*   CHLORIDE  106   CO2  24   BUN  9   CREATININE  0.54   CALCIUM  8.4*     Recent Labs      08/17/17   0608   ALTSGPT  11   ASTSGOT  17   ALKPHOSPHAT  64   TBILIRUBIN  1.3   GLUCOSE  90     Recent Labs      08/17/17   0608   RBC  3.92*   HEMOGLOBIN  10.8*   HEMATOCRIT  33.7*   PLATELETCT  66*   PROTHROMBTM  17.1*   INR  1.35*   IRON  28*   FERRITIN  194.1   TOTIRONBC  251     Recent Labs      08/17/17   0608   WBC  4.3*   NEUTSPOLYS  68.90   LYMPHOCYTES  14.70*    MONOCYTES  10.10   EOSINOPHILS  5.30   BASOPHILS  0.50   ASTSGOT  17   ALTSGPT  11   ALKPHOSPHAT  64   TBILIRUBIN  1.3           Hemodynamics:  Temp (24hrs), Av.1 °C (98.7 °F), Min:36.7 °C (98.1 °F), Max:37.6 °C (99.6 °F)  Temperature: 37.6 °C (99.6 °F)  Pulse  Av.7  Min: 64  Max: 99   Blood Pressure : 112/68 mmHg (manual)     Respiratory:    Respiration: 18, Pulse Oximetry: 92 %     Work Of Breathing / Effort: Mild  RUL Breath Sounds: Clear, RML Breath Sounds: Clear, RLL Breath Sounds: Clear, JOSE Breath Sounds: Clear, LLL Breath Sounds: Clear  Fluids:    Intake/Output Summary (Last 24 hours) at 17 1837  Last data filed at 17 1630   Gross per 24 hour   Intake   1060 ml   Output   5850 ml   Net  -4790 ml     Weight: 105.5 kg (232 lb 9.4 oz)  GI/Nutrition:  Orders Placed This Encounter   Procedures   • Diet Order     Standing Status: Standing      Number of Occurrences: 1      Standing Expiration Date:      Order Specific Question:  Diet:     Answer:  Regular [1]     Order Specific Question:  Consistency/Fluid modifications:     Answer:  Thin Liquids [3]     Medical Decision Making, by Problem:  Active Hospital Problems    Diagnosis   • *AIDP (acute inflammatory demyelinating polyneuropathy) (CMS-[facility]) [G37.8]   • Left leg DVT (CMS-[facility]) [I82.402]   • Esophageal varices- BANDED 2016- repeat egd tbd 10//29/16- gic [I85.00]   • ASCVD (arteriosclerotic cardiovascular disease)subtotalled small distal LCx and 40% LAD med rx by cath 12 [I25.10]   • GIB (gastrointestinal bleeding)- recurrent from varices [K92.2]   • BPH (benign prostatic hyperplasia) [N40.0]   • 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) [G62.0, T45.1X5A]   • Impaired mobility and ADLs [Z74.09]   • Lumbar stenosis [M48.06]   • Cirrhosis (CMS-[facility]) [K74.60]   • H/O colon cancer, stage IV [Z85.038]   • Iron deficiency anemia due to chronic blood loss [D50.0]   • Cirrhosis of liver with ascites (HCC)- ? DUE TO OLD HEP B,  CHEMO RX,  OR THERMO RAD OF MAGNANT LESION [K74.60]   • Essential hypertension [I10]   • Colon cancer-2010 left hemicolectomy; no colostomy [C18.9]     *  S/P Recent Lumbar Surgery    *  Hypertension  BP recently ok  On Coreg: 3.125 mg bid  Note: home meds were Coreg 3.125 mg 1/2 tab bid and Losartan: 25 mg daily  Monitor    *  U/A (+)  Has had some difficulty urinating recently with some burning  Cx --> GNR  F/U C&S   On Cipro empirically unitl C&S come back    *  LE Edema  Mild  On Aldactone: 25 mg daily    *  Thrombocytopenia  Platelets stable (8/11)  2nd to prior chemo and PORTAL HYPERTENSION    *  Hx Colon Cancer: stage IV; in 5 year remission  *  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  *  Hyperlipidemia  *  AIDP: s/p IVIG x 5 days      Medications reviewed and Labs reviewed

## 2017-08-20 NOTE — PROGRESS NOTES
Hospital Medicine Progress Note, Adult, Complex               Author: SCOTT BULLOCK MD    Date & Time created: 8/20/2017  12:47 PM     Interval History:  8/15/17--  HISTORY REVIEWED.  REMOTE METASTATIC COLON CANCER DISCOVERED IN 2010 S/P RESECTION BY DR ANA RUIZ AND FINDING OF 10+ NODES  WHICH SPREAD TO THE LIVER.  HAD AN ABLATION AT [facility].  HEAVY DUTY CHEMO PER DR REBOLLAR.   THE LIVER ABLATION HAS UNFORTUNATELY RESULTED IN PORTAL HYPERTENSION.  HE ALSO HAS PRETTY MUCH ALL THE STIGMATA OF PORTAL HYPERTENSION.   THERE REMAINING LIVER PARENCHYMA IS NORMAL SO HE HAS PRESERVED LIVER SYNTHETIC FUNCTION.  AS OF 2010 THERE HAS BEEN NO NEW RECURRANCE THOUGH WE ARE DEALING WITH GASTRIC VARICES,  ESOPHAGEAL VARICIES,  CAPUT MEDUSA,  LEFT LOWER EXTREMITY DVT,  INABILITY TO TOLERATE ANTICOAGULATION WITH AN IVC FILTER IN PLACE,   AND CURRENTLY MODERATE ASCITES.   WE WILL PLAN ON THERAPEUTIC TAP ON Friday.   THE OTHER ISSUE THAT REALLY BROUGHT TO PATIENT TO THE HOSPITAL WAS PROGRESSIVE ASCENDING WEAKNESS.   HE DID HAVE A TIGHT L4-L5 CENTRAL STENOSIS.  THE OTHER LEVELS LOOK FINE.   HE STARTED NOTICING THE WEAKNESS FOR ABOUT A WEEK PRIOR IN MAY AND WENT TO ER.   DR HOUSE SAW THE STRUCTURAL ISSUE,  NAMELY THE ABOVE STENOSIS AND DECOMPRESSED THIS 0N 5/25.  SYMPTOMS DID NOT MALLY.  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   IT IS CLEAR THE PATIENT NEEDED SOME SURGERY ON THE LOWER BACK.   LOOKING AT THE FILMS.  MY THOUGHT IS THE SYMPTOMS THEMSELVES WERE CAUSED BY THE GBS.   HE RECEIVED 5 DAYS OF IMMUNOGLOBULIN AND DID FAIRLY WELL AND HAS BEE TRANSFERRED [facility] ON 8/10/17  PLAN:  CONTINUE PT/OT,   PROBLEMS THAT ALSO HAVE OCCURRED INCLUDE PORTAL VEIN THROMBOSIS.   I WILL ARRANGE A THERAPEUTIC THORACENTESIS ON Friday.  RX ENTEROBACTER UTI.  HYPERSPLENISM AND LOW WBC COUNTS AND PLATELETS TO BE EXPECTED.   D/C COREG.  INDERAL BETTER FOR  LOWERING PORTAL PRESSURES.      8/16/17--PATIENT AWAKE AND ALERT.   DOING WELL WITH PT/OT.   GOOD EFFORT PER PATIENT.   I DONT THINK THE ASCITES IS SIGNIFICANTLY WORSE.  I AM GOING TO MAKE DAILY ASSESMENTS AND DETERMINE WHEN PARACENTESIS IS NECESSARY.   GBS IS CERTAINLY NO WORSE.   THE PATIENT WAS SPECIFICALLY TOLD TO INFORM ME ABOUT ANY PHYSICAL CHANGES IE: WORSENING WEAKNESS OR ANY CRANIAL NERVE ABNORMALITIES.   CHEST FILM WAS ESSENTIALLY NEGATIVE FOR EFFUSION.  THE DECREASED TACTILE FREMITUS THAT I NOTED WAS A HIGH RIDING DIAPHRAGM.   PROBABLY DUE TO ASCITES.    I NEED DAILY WEIGHTS TO HELP ME WITH MY DECISION WHEN TO TAP ABDOMEN.  NORMALLY PATIENT GETS IT TAPPED ABOUT EVERY 4 MONTHS BY GI.    I  WILL RE--ASSES PORTAL BLOOD FLOW AND SEE IF THIS PORTAL VEIN THROMBOSIS IS RECANULATED OR WORSE.  LAST YEAR THERE WAS SOME RECANULIZATION AND FLOW.   HEPATOPEDAL FLOW IS GOOD, HEPATOFUGAL FLOW BAD.  I HAVE NEVER SEEN A LONG TERM SURVIVOR OF METASTATIC COLON CANCER WITH 4 CM RECTO-SIGMOID TUMOR AND 5 POSITIVE NODES + LIVER MET.   RX FOR UTI WITH ENTEROBACTER ON CULTURE.  UNIMPRESSIVE WBC COUNT OF 2-5 WBC/HIGH POWERED FIELD.   I PROBABLY WOULD HAVE LET THAT GO UNLESS THERE WERE SYMPTOMS.     PLAN:   PATIENT DOING WELL.  DAILY WEIGHTS.  DAILY ASSESMENTS OF ASCITES.  CXR IS CLEAR.  CHECK ABDOMINAL ULTRASOUND TO ASSESS PORTAL BLOOD FLOW AND ASCITES.   I THINK QOD DIURETICS WOULD HELP PREVENT A TAP AND DECREASE THE EDEMA, (KNOWN  RLE DVT PRIMARILY DUE TO MALIGNANCY BEING A PRO-THROMBOTIC STATE).  GIVE 5 DAYS OF PO VITAMIN K GIVEN THE CIPRO KILLING GUT PA WHICH HELPS GENERATE VITAMIN K FOR A HUMAN.  CHECK SOME IRON STUDIES.   OVERALL INCHING FORWARD.   HARD TO SAY WHAT THE COURSE OF GBS WILL BE.  THE BACK SURGERY WAS NECESSARY BUT MY FEELING IS HIS SYMPTOMS WERE MOSTLY DUE TO GBS AND NOT SPINAL STENOSIS.   HEPATITIS STATUS CHECKED AND FOUND TO BE NEGATIVE.   HIV ANTIBODIES  FOR COMPLETENESS.     8/17/17--PATIENT'S ULTRASOUND  DID NOT SHOW ENOUGH ASCITES TO SAFELY TAP.  THAT IS GOOD.  LITTLE CHANGE IN THE MOTOR MOVEMENT NOTED.  BACK FEELS FINE.  INR IS BETTER WITH VITAMIN K.  DIURETICS HOLDING ASCITES AT BAY PERIODICALLY.   WE ARE FORCING DIURESIS WITH DEMEDEX ETC.   NO NEED FOR TAP.    BP OK.  LABS OK.  MODEST IRON DEFICIENCY COMPONENT.   ADD SOME PO IRON AND VITAMIN C IF NOT DONE ALREADY.    8/18/17--PATIENT LOOKS THE SAME TO ME.  THE ASCITES IS MINIMAL AND IWILL NOT REQUIIRE ANY INTERVENTION LIKE THORACENTESIS IN THE NEAR FUTURE.  OUR MEDICAL RX IS WORKING JUST FINE.    REALLY NO CHANGE IN PORTAL THROMBOSIS.  THERE IS BLOOD FLOW WHICH IS UNCHANGED.   USUAL COMPLAINTS.  ACHES AND PAINS ETC.    PLAN:  NO TAP OF ABDOMEN REQUIRED.  THE PATIENT MAY BENEFIT FROM SOME IV IRON.   HE IS DEFICIENT.     8/19/17--PATIENT AWAKE AND ALERT.  DOING FAIRLY NICELY WITH PT/OT.   HE WAS ABLE TO LIFT BOTH LEGS OFF THE BED.  I DID GET SOME PATELLA REFLEX B/L YESTERDAY.  MUSCLE BULK IS FAIR.  I REVIEWED THE ULTRASOUND.  MINIMAL ASCITES.  MODEST RLL EFFUSION.   THE SPLEEN IS ENLARGED.  HE UNFORTUNATELY WILL ALWAYS HAVE SOME DEGREE OF EARLY SATIETEY DUE TO  A 20 CM SPLEEN.  NORMAL WOULD BE FROM 7-14 CM. THE SPLEEN IS VERY CLOSE TO THE STOMACH AND NOT A RETROPERITONEAL STRUCTURE LIKE THE KIDNEYS.   ALSO ON THE PLAIN FILM WHICH WAS UNDER PENETRATED, THERE LOOKED TO BE A GOOD AMOUNT OF FECAL MATERIAL IN THE DESCENDING COLON, ALSO NEAR THE STOMACH.      PLAN:    I THINK THE PATIENT IS DOING AS WELL AS CAN BE EXPECTED.  THE GBS IS IMPROVING AT A SLOW RATE.   THE SYMPTOMS STARTED AT THE MIDDLE OF MAY.   SO THIS IS A 3 MONTH ORDEAL THUS FAR.  HE COULD BENEFIT FROM SOME MIRALAX.  DOUBT HE WOULD TOLLERATE METAMUCIL.    I WILL TRY  THAT.  MY AGGRESSIVE DIURETIC PROGRAM HAS  ESSENTIALLY MINIMIZED THE ASCITES TO A NEGLIGIBLE AMOUNT AND HE WILL NOT NEED PARACENTESIS.  LOW GRADE TEMP OF 99,6, CHECK UA FOR COMPLETENESS AND FORMAL CXR ON Monday TO RE-ASSES THAT EFFUSION NOTED  ON ABDOMINAL ULTRASOUND.    8/20/17--PATIENT FEELING WELL.  MENTATION IS CLEAR.  ASCITES IS UNDER CONTROL.  MINIMAL NOW.   EVERY OTHER DAY DIURETICS SEEMS TO BE HELPING.  CHECKING LYTES IN AM.  MAY NEED TO BACK DOWN TO EVERY THIRD DAY IF THERE IS HYPOTENSION OR CONTRACTION ALKALOSIS.  CXR TO ASSESS RIGHT EFFUSION NOTED ON EXAM. MODEST DECREASED TACTILE FREMITUS RIGHT BASE.    ALWAYS EXPLAIN A REASON TO PATIENT WHY WE ARE DOING A TEST.    UA IS NEGATIVE.    VITALS OK.  THUS FAR LABS OK.   HE IS AN ASTUTE  AND WANTS EVERY RATIONAL EXPLAINED THOROUGHLY.   O/W HE WILL REFUSE.  I WILL TRY TO EXPLAIN IN MY 2 PARAGRAPH DAILY NOTE MY RATIONAL.    PLAN:  CXR IN AM  (CHECKING THE DEGREE OF PLEURAL FLUID AROUND LUNG).  CHECK CBC, CMP, MAG.   PATIENT HAS A MODERATE AMOUNT OF FECAL MATERIAL IN SPLENIC FLEXURE.  STARTING ON GENTLE MIRALAX AND SOME MOM.  CLINICALLY NO EDEMA AND MINIMAL ASCITES.   MENTATION REMAINS CLEAR.  NO NEED FOR LACTULOSE AT THIS TIME.   MOTOR STRENGTH IMPROVING.   UA NEGATIVE.  NO ABDOMINAL PAIN OR FURTHER LOW GRADE FEVER.  COAGS ALSO IN AM.  THE EARLY SATIETY IS DUE TO A 20 CM SPLEEN ABUTTING THE STOMACH.       Review of Systems:  Review of Systems   Constitutional: Negative for fever and chills.   Respiratory: Negative for shortness of breath.    Cardiovascular: Negative for chest pain.   Gastrointestinal: Negative for nausea, vomiting, abdominal pain and diarrhea.   Psychiatric/Behavioral: The patient is not nervous/anxious.        Physical Exam:  Physical Exam   Constitutional: He is oriented to person, place, and time. He appears well-nourished.   HENT:   Head: Atraumatic.   Eyes: Conjunctivae are normal. Pupils are equal, round, and reactive to light.   Neck: Normal range of motion. Neck supple. No tracheal deviation present.   Cardiovascular: Normal rate, regular rhythm, S1 normal and S2 normal.    No murmur heard.  Pulmonary/Chest: Effort normal. He has no wheezes. He has no rhonchi. He has no  rales.   DECREASED TACTILE FREMITUS RIGHT BASE.  SUGGESTING EFFUSION THERE.   Abdominal: Soft. He exhibits no distension and no mass. There is no tenderness. There is no rebound and no guarding. Hernia confirmed negative in the right inguinal area and confirmed negative in the left inguinal area.   Musculoskeletal: He exhibits edema.   Neurological: He is alert and oriented to person, place, and time. No sensory deficit.   Skin: Skin is warm and dry. No rash noted. No cyanosis.   Psychiatric: He has a normal mood and affect. His behavior is normal.   Nursing note and vitals reviewed.      Labs:        Invalid input(s): [id number]      No results for input(s): SODIUM, POTASSIUM, CHLORIDE, CO2, BUN, CREATININE, MAGNESIUM, PHOSPHORUS, CALCIUM in the last 72 hours.  No results for input(s): ALTSGPT, ASTSGOT, ALKPHOSPHAT, TBILIRUBIN, DBILIRUBIN, GAMMAGT, AMYLASE, LIPASE, ALB, PREALBUMIN, GLUCOSE in the last 72 hours.  Recent Labs      17   0452   RBC  3.89*   HEMOGLOBIN  10.5*   HEMATOCRIT  32.7*   PLATELETCT  74*     Recent Labs      17   0452   WBC  4.5*   NEUTSPOLYS  61.30   LYMPHOCYTES  19.60*   MONOCYTES  12.30   EOSINOPHILS  5.90   BASOPHILS  0.70           Hemodynamics:  Temp (24hrs), Av.2 °C (98.9 °F), Min:36.8 °C (98.3 °F), Max:37.6 °C (99.6 °F)  Temperature: 36.8 °C (98.3 °F)  Pulse  Av.8  Min: 64  Max: 99   Blood Pressure : 110/70 mmHg     Respiratory:    Respiration: 14, Pulse Oximetry: 94 %     Work Of Breathing / Effort: Mild  RUL Breath Sounds: Clear, RML Breath Sounds: Clear, RLL Breath Sounds: Clear, JOSE Breath Sounds: Clear, LLL Breath Sounds: Clear  Fluids:    Intake/Output Summary (Last 24 hours) at 17 1247  Last data filed at 17 0900   Gross per 24 hour   Intake    960 ml   Output   1775 ml   Net   -815 ml     Weight: 101 kg (222 lb 10.6 oz)  GI/Nutrition:  Orders Placed This Encounter   Procedures   • Diet Order     Standing Status: Standing      Number of  Occurrences: 1      Standing Expiration Date:      Order Specific Question:  Diet:     Answer:  Regular [1]     Order Specific Question:  Consistency/Fluid modifications:     Answer:  Thin Liquids [3]     Medical Decision Making, by Problem:  Active Hospital Problems    Diagnosis   • *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]   • Left leg DVT (CMS-HCC) [I82.402]   • Esophageal varices- BANDED 4/2016- repeat egd tbd 10//29/16- gic [I85.00]   • ASCVD (arteriosclerotic cardiovascular disease)subtotalled small distal LCx and 40% LAD med rx by cath 12/13/12 [I25.10]   • GIB (gastrointestinal bleeding)- recurrent from varices [K92.2]   • BPH (benign prostatic hyperplasia) [N40.0]   • 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 DUE TO OLD HEP B, CHEMO RX,  OR THERMO RAD OF MAGNANT LESION [K74.60]   • Essential hypertension [I10]   • Colon cancer-2010 left hemicolectomy; no colostomy [C18.9]     *  S/P Recent Lumbar Surgery    *  Hypertension  BP recently ok  On Coreg: 3.125 mg bid  Note: home meds were Coreg 3.125 mg 1/2 tab bid and Losartan: 25 mg daily  Monitor    *  U/A (+)  Has had some difficulty urinating recently with some burning  Cx --> GNR  F/U C&S   On Cipro empirically unitl C&S come back    *  LE Edema  Mild  On Aldactone: 25 mg daily    *  Thrombocytopenia  Platelets stable (8/11)  2nd to prior chemo and PORTAL HYPERTENSION    *  Hx Colon Cancer: stage IV; in 5 year remission  *  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  *  Hyperlipidemia  *  AIDP: s/p IVIG x 5 days      Medications  reviewed and Labs reviewed

## 2017-08-20 NOTE — CARE PLAN
Problem: Safety  Goal: Will remain free from falls  Outcome: PROGRESSING AS EXPECTED  Pt had a recent assisted fall. Reoriented with the use of call light and waiting for assistance when he needs to get OOB. Pt verbalized understanding. Bed alarm in place. Non skid socks on. Bed rails up.     Problem: Bowel/Gastric:  Goal: Normal bowel function is maintained or improved  Outcome: PROGRESSING AS EXPECTED  Pt refused new order of Miralax. Pt denied s/s of constipation and abdominal pain/discomfort. Normoactive bowel sound heard on all four quadrants. Pt also refused new order of MOM for dyspepsia. Pt denied complaining of gastric acidity.     Problem: Skin Integrity  Goal: Risk for impaired skin integrity will decrease  Outcome: PROGRESSING AS EXPECTED  Pt has stage III pressure ulcer on coccyx area, med honey and mepilex in place. Strict q2 turn in place to prevent further skin breakdown. Pt compliant with turns. Nystatin powder applied on groin area.

## 2017-08-20 NOTE — PROGRESS NOTES
Endorsed by RN. Assumed care. Pt back in bed, wife at bedside. VSS. Pt repositioned on L side to prevent further skin breakdown on coccyx area. Denied pain/discomfort at this time. Will continue to monitor.    A/o x4. Able to communicate needs. O2 sat of 94% on RA without s/s of respiratory distress noted. Min-mod assist needed for safe transfers. Continent of B&B. HS Lipitor and Nystatin powder administered. Refused new orders, MOM and Miralax. Pt stated he does not need it and wants to talk to MD, will endorse to next shift. Reminded pt that he is on 1500ml fluid restriction and has been compliant. U/A order received and specimen to be collected on this shift. Safety precautions in place. Call light within reach.    Changed coccyx pressure ulcer dressing. Honey alginate and barrier cream applied. Weekly wound photo taken.

## 2017-08-20 NOTE — CARE PLAN
Problem: Safety  Goal: Will remain free from falls  Outcome: PROGRESSING SLOWER THAN EXPECTED  CLIP.  Patient oriented to call light system and is calling appropriately.  Patient oriented to bathroom location, TV, bed controls, clock.  Patient asked to use call light when ever they need to transfer out of bed/chair or to ambulate and patient verbalized understanding.  POC discussed with patient and patient oriented to white board.  Schedule for therapy delivered to patient.  Patient verbalized understanding.  Patients fall risk assessed.  Patient moved into a comfortable position.  Patient medicated for pain as needed and interventions in place.  Patient offered/helped to the restroom.  Patient's personal belongings within reach.  Bed in low position.  Patient wearing non-skid footwear.  Bed rails up times three.  Bed and chair alarms in place.  Wheels locked on bed and wheelchairs.  Hourly rounding in place.        Problem: Venous Thromboembolism (VTW)/Deep Vein Thrombosis (DVT) Prevention:  Goal: Patient will participate in Venous Thrombosis (VTE)/Deep Vein Thrombosis (DVT)Prevention Measures  Outcome: PROGRESSING SLOWER THAN EXPECTED  Patient advised to flex ankle numerous times per hour to promote blood return to the heart and prevent venous stasis.  Return demonstration provided.  Patient wearing TEDS as mechanical prophylaxis.

## 2017-08-21 ENCOUNTER — APPOINTMENT (OUTPATIENT)
Dept: RADIOLOGY | Facility: REHABILITATION | Age: 72
DRG: 094 | End: 2017-08-21
Attending: HOSPITALIST
Payer: MEDICARE

## 2017-08-21 LAB
ALBUMIN SERPL BCP-MCNC: 3 G/DL (ref 3.2–4.9)
ALBUMIN/GLOB SERPL: 1.3 G/DL
ALP SERPL-CCNC: 69 U/L (ref 30–99)
ALT SERPL-CCNC: 11 U/L (ref 2–50)
ANION GAP SERPL CALC-SCNC: 7 MMOL/L (ref 0–11.9)
AST SERPL-CCNC: 16 U/L (ref 12–45)
BASOPHILS # BLD AUTO: 0.9 % (ref 0–1.8)
BASOPHILS # BLD: 0.04 K/UL (ref 0–0.12)
BILIRUB SERPL-MCNC: 1.1 MG/DL (ref 0.1–1.5)
BUN SERPL-MCNC: 12 MG/DL (ref 8–22)
CALCIUM SERPL-MCNC: 8.8 MG/DL (ref 8.5–10.5)
CHLORIDE SERPL-SCNC: 101 MMOL/L (ref 96–112)
CO2 SERPL-SCNC: 29 MMOL/L (ref 20–33)
CREAT SERPL-MCNC: 0.56 MG/DL (ref 0.5–1.4)
EOSINOPHIL # BLD AUTO: 0.24 K/UL (ref 0–0.51)
EOSINOPHIL NFR BLD: 5.3 % (ref 0–6.9)
ERYTHROCYTE [DISTWIDTH] IN BLOOD BY AUTOMATED COUNT: 53.1 FL (ref 35.9–50)
GFR SERPL CREATININE-BSD FRML MDRD: >60 ML/MIN/1.73 M 2
GLOBULIN SER CALC-MCNC: 2.4 G/DL (ref 1.9–3.5)
GLUCOSE SERPL-MCNC: 90 MG/DL (ref 65–99)
HCT VFR BLD AUTO: 33.7 % (ref 42–52)
HGB BLD-MCNC: 10.8 G/DL (ref 14–18)
IMM GRANULOCYTES # BLD AUTO: 0.01 K/UL (ref 0–0.11)
IMM GRANULOCYTES NFR BLD AUTO: 0.2 % (ref 0–0.9)
INR PPP: 1.34 (ref 0.87–1.13)
LYMPHOCYTES # BLD AUTO: 0.74 K/UL (ref 1–4.8)
LYMPHOCYTES NFR BLD: 16.4 % (ref 22–41)
MAGNESIUM SERPL-MCNC: 1.9 MG/DL (ref 1.5–2.5)
MCH RBC QN AUTO: 27 PG (ref 27–33)
MCHC RBC AUTO-ENTMCNC: 32 G/DL (ref 33.7–35.3)
MCV RBC AUTO: 84.3 FL (ref 81.4–97.8)
MONOCYTES # BLD AUTO: 0.54 K/UL (ref 0–0.85)
MONOCYTES NFR BLD AUTO: 12 % (ref 0–13.4)
NEUTROPHILS # BLD AUTO: 2.93 K/UL (ref 1.82–7.42)
NEUTROPHILS NFR BLD: 65.2 % (ref 44–72)
NRBC # BLD AUTO: 0 K/UL
NRBC BLD AUTO-RTO: 0 /100 WBC
PLATELET # BLD AUTO: 71 K/UL (ref 164–446)
PMV BLD AUTO: 10.8 FL (ref 9–12.9)
POTASSIUM SERPL-SCNC: 3.3 MMOL/L (ref 3.6–5.5)
PROT SERPL-MCNC: 5.4 G/DL (ref 6–8.2)
PROTHROMBIN TIME: 17 SEC (ref 12–14.6)
RBC # BLD AUTO: 4 M/UL (ref 4.7–6.1)
SODIUM SERPL-SCNC: 137 MMOL/L (ref 135–145)
WBC # BLD AUTO: 4.5 K/UL (ref 4.8–10.8)

## 2017-08-21 PROCEDURE — 97110 THERAPEUTIC EXERCISES: CPT

## 2017-08-21 PROCEDURE — 97530 THERAPEUTIC ACTIVITIES: CPT

## 2017-08-21 PROCEDURE — 99233 SBSQ HOSP IP/OBS HIGH 50: CPT | Performed by: HOSPITALIST

## 2017-08-21 PROCEDURE — A9270 NON-COVERED ITEM OR SERVICE: HCPCS | Performed by: PHYSICAL MEDICINE & REHABILITATION

## 2017-08-21 PROCEDURE — 83735 ASSAY OF MAGNESIUM: CPT

## 2017-08-21 PROCEDURE — 36415 COLL VENOUS BLD VENIPUNCTURE: CPT

## 2017-08-21 PROCEDURE — 85610 PROTHROMBIN TIME: CPT

## 2017-08-21 PROCEDURE — 97535 SELF CARE MNGMENT TRAINING: CPT

## 2017-08-21 PROCEDURE — 85025 COMPLETE CBC W/AUTO DIFF WBC: CPT

## 2017-08-21 PROCEDURE — 97116 GAIT TRAINING THERAPY: CPT

## 2017-08-21 PROCEDURE — 80053 COMPREHEN METABOLIC PANEL: CPT

## 2017-08-21 PROCEDURE — 700102 HCHG RX REV CODE 250 W/ 637 OVERRIDE(OP): Performed by: PHYSICAL MEDICINE & REHABILITATION

## 2017-08-21 PROCEDURE — 71020 DX-CHEST-2 VIEWS: CPT

## 2017-08-21 PROCEDURE — 97112 NEUROMUSCULAR REEDUCATION: CPT

## 2017-08-21 PROCEDURE — A9270 NON-COVERED ITEM OR SERVICE: HCPCS | Performed by: HOSPITALIST

## 2017-08-21 PROCEDURE — 770010 HCHG ROOM/CARE - REHAB SEMI PRIVAT*

## 2017-08-21 PROCEDURE — 700102 HCHG RX REV CODE 250 W/ 637 OVERRIDE(OP): Performed by: HOSPITALIST

## 2017-08-21 RX ORDER — SPIRONOLACTONE 25 MG/1
50 TABLET ORAL
Status: DISCONTINUED | OUTPATIENT
Start: 2017-08-22 | End: 2017-08-29

## 2017-08-21 RX ORDER — POTASSIUM CHLORIDE 20 MEQ/1
40 TABLET, EXTENDED RELEASE ORAL ONCE
Status: COMPLETED | OUTPATIENT
Start: 2017-08-21 | End: 2017-08-21

## 2017-08-21 RX ADMIN — NYSTATIN 1500000 UNITS: 100000 POWDER TOPICAL at 20:28

## 2017-08-21 RX ADMIN — POTASSIUM CHLORIDE 10 MEQ: 1500 TABLET, EXTENDED RELEASE ORAL at 08:24

## 2017-08-21 RX ADMIN — LACTOBACILLUS ACIDOPHILUS / LACTOBACILLUS BULGARICUS 1 PACKET: 100 MILLION CFU STRENGTH GRANULES at 08:24

## 2017-08-21 RX ADMIN — FOLIC ACID 1 MG: 1 TABLET ORAL at 08:24

## 2017-08-21 RX ADMIN — NYSTATIN 1500000 UNITS: 100000 POWDER TOPICAL at 08:33

## 2017-08-21 RX ADMIN — LACTOBACILLUS ACIDOPHILUS / LACTOBACILLUS BULGARICUS 1 PACKET: 100 MILLION CFU STRENGTH GRANULES at 17:02

## 2017-08-21 RX ADMIN — TORSEMIDE 20 MG: 10 TABLET ORAL at 05:35

## 2017-08-21 RX ADMIN — TAMSULOSIN HYDROCHLORIDE 0.4 MG: 0.4 CAPSULE ORAL at 08:25

## 2017-08-21 RX ADMIN — POTASSIUM CHLORIDE 40 MEQ: 1500 TABLET, EXTENDED RELEASE ORAL at 20:27

## 2017-08-21 RX ADMIN — FERROUS SULFATE TAB 325 MG (65 MG ELEMENTAL FE) 325 MG: 325 (65 FE) TAB at 08:24

## 2017-08-21 RX ADMIN — VITAMIN D, TAB 1000IU (100/BT) 1000 UNITS: 25 TAB at 08:25

## 2017-08-21 RX ADMIN — URSODIOL 300 MG: 300 CAPSULE ORAL at 08:25

## 2017-08-21 RX ADMIN — ATORVASTATIN CALCIUM 40 MG: 40 TABLET, FILM COATED ORAL at 20:27

## 2017-08-21 RX ADMIN — OXYCODONE HYDROCHLORIDE AND ACETAMINOPHEN 500 MG: 500 TABLET ORAL at 08:24

## 2017-08-21 RX ADMIN — PHYTONADIONE 5 MG: 5 TABLET ORAL at 08:24

## 2017-08-21 RX ADMIN — LACTOBACILLUS ACIDOPHILUS / LACTOBACILLUS BULGARICUS 1 PACKET: 100 MILLION CFU STRENGTH GRANULES at 11:30

## 2017-08-21 RX ADMIN — SPIRONOLACTONE 25 MG: 25 TABLET, FILM COATED ORAL at 05:36

## 2017-08-21 ASSESSMENT — GAIT ASSESSMENTS
DEVIATION: DECREASED BASE OF SUPPORT;BRADYKINETIC
ASSISTIVE DEVICE: FRONT WHEEL WALKER
GAIT LEVEL OF ASSIST: MINIMAL ASSIST
DISTANCE (FEET): 30

## 2017-08-21 ASSESSMENT — ENCOUNTER SYMPTOMS
NAUSEA: 0
VOMITING: 0
ABDOMINAL PAIN: 0
SHORTNESS OF BREATH: 0
DIARRHEA: 0
FEVER: 0
CHILLS: 0
NERVOUS/ANXIOUS: 0

## 2017-08-21 ASSESSMENT — PAIN SCALES - GENERAL
PAINLEVEL_OUTOF10: 0
PAINLEVEL_OUTOF10: 0

## 2017-08-21 NOTE — PROGRESS NOTES
"Rehab Progress Note     Interval Events (Subjective)  Patient seen in his room and  alongwith his wife.   Notes of  and the therapist appreciated. Patient has improved in regards to his ascites he also says he feels a bit stronger as well   Objective:  VITAL SIGNS: /68 mmHg  Pulse 80  Temp(Src) 36.6 °C (97.8 °F)  Resp 18  Ht 1.905 m (6' 3\")  Wt 102.5 kg (225 lb 15.5 oz)  BMI 28.24 kg/m2  SpO2 95%      GENERAL: No apparent distress  HEENT: Normocephalic/atraumatic, EOMI, PERRL and No nystagmus  CARDIAC: Regular rate and rhythm, normal S1, S2   LUNGS: Clear to auscultation   ABDOMINAL: bowel sounds present, soft, nontender and ascites present with fluid wave noted. Ascites is increased  EXTREMITIES: Without clubbing or cyanosis 2+ edema noted in the left lower extremity but decreased  SKIN:   Small stage III noted on sacrum  NEURO: Unchanged with the exception to hip flexor strength is definitely a half grade stronger bilaterally as compared to admission    Current Facility-Administered Medications   Medication Frequency   • magnesium hydroxide (MILK OF MAGNESIA) suspension 15 mL QHS   • polyethylene glycol/lytes (MIRALAX) PACKET 1 Packet QHS   • ferrous sulfate tablet 325 mg QDAY with Breakfast   • ascorbic acid (ascorbic acid) tablet 500 mg DAILY   • cholestyramine (QUESTRAN,PREVALITE) 4 GM powder 4 g QDAY PRN   • torsemide (DEMADEX) TABS 20 mg Q48HRS   • potassium chloride SA (Kdur) tablet 10 mEq DAILY   • metolazone (ZAROXOLYN) tablet 2.5 mg Q48HRS   • phytonadione (MEPHYTON) tablet 5 mg DAILY   • docusate sodium (COLACE) capsule 100 mg BID PRN    And   • senna-docusate (PERICOLACE or SENOKOT S) 8.6-50 MG per tablet 1 Tab QDAY PRN    And   • lactulose 20 GM/30ML solution 30 mL Q24HRS PRN    And   • bisacodyl (DULCOLAX) suppository 10 mg QDAY PRN   • hydrocortisone (ANUSOL-HC) suppository 25 mg Q12HRS PRN   • tamsulosin (FLOMAX) capsule 0.4 mg AFTER BREAKFAST   • Respiratory Care per Protocol " Continuous RT   • Pharmacy Consult Request ...Pain Management Review 1 Each PRN   • nystatin (MYCOSTATIN) powder BID   • lactobacillus granules (LACTINEX/FLORANEX) packet 1 Packet TID WITH MEALS   • spironolactone (ALDACTONE) tablet 25 mg Q DAY   • ursodiol (ACTIGALL) capsule 300 mg DAILY   • vitamin D (cholecalciferol) tablet 1,000 Units DAILY   • folic acid (FOLVITE) tablet 1 mg DAILY   • atorvastatin (LIPITOR) tablet 40 mg QHS   • benzocaine-menthol (CEPACOL) lozenge 1 Lozenge Q2HRS PRN   • hydrALAZINE (APRESOLINE) tablet 25 mg Q8HRS PRN   • mag hydrox-al hydrox-simeth (MAALOX PLUS ES or MYLANTA DS) suspension 20 mL Q2HRS PRN   • ondansetron (ZOFRAN ODT) dispertab 4 mg 4X/DAY PRN    Or   • ondansetron (ZOFRAN) syringe/vial injection 4 mg 4X/DAY PRN   • trazodone (DESYREL) tablet 50 mg QHS PRN   • sodium chloride (OCEAN) 0.65 % nasal spray 2 Spray PRN   • oxycodone immediate release (ROXICODONE) tablet 10 mg Q4HRS PRN       Orders Placed This Encounter   Procedures   • Diet Order     Standing Status: Standing      Number of Occurrences: 1      Standing Expiration Date:      Order Specific Question:  Diet:     Answer:  Regular [1]     Order Specific Question:  Consistency/Fluid modifications:     Answer:  Thin Liquids [3]       Assessment:  Active Hospital Problems    Diagnosis   • *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 DUE TO  OLD HEP B, CHEMO RX,  OR THERMO RAD OF MAGNANT LESION   • Essential hypertension   • Colon cancer-2010 left hemicolectomy; no colostomy       Medical Decision Making and Plan:  Patient is doing well at this time  Dr. Hopper has changed the antihypertensive to inderal which is better for portal hypertension  He is continuing his workup for ascites and out of the diuretic  We'll continue PT and OT        Labs reviewed      Cirrhosis/ascites    Stable at this time but will monitor. It's improved with diuresis prescribed by Dr. hopper    Hypertension       under control with Aldactone and now Inderal    Stage III pressure ulcer, on the Coccyx    Appreciate the input of the wound care nurse honey colloid applied    Vitamin D deficiency    Continue replacement    Lower extremity edema left leg    We will continue to monitor improved      Diarrhea   C. diff toxin neg  Cholestyramine has improved       UTI  currently on ciprofloxacin prophylactically pending final cultures    Continue PT and OT  A tentative discharge date was suggested to be September 16 2017  We'll re-conference next week 9/23/2017          Total time:  > 25 minutes.  I spent greater than 50% of the time for patient care and coordination on this date, including unit/floor time, and face-to-face time with the patient as per assessment and plan above.    Moreno Adorno M.D.

## 2017-08-21 NOTE — CARE PLAN
Problem: Safety  Goal: Will remain free from falls  Intervention: Implement fall precautions  Use of call light encouraged to make needs known.Side rails up, bed in low position, non skid socks/shoes on when out of bed.Call light and things within reach.Will continue to monitor and assess needs and safety.      Problem: Bowel/Gastric:  Goal: Normal bowel function is maintained or improved  Intervention: Educate patient and significant other/support system about signs and symptoms of constipation and interventions to implement  Scheduled bowel medication given.Continent of bowel per report.LBM 08/20.Will continue to monitor and assess for s/s of constipation.      Problem: Pain Management  Goal: Pain level will decrease to patient’s comfort goal  Pt is calm, comfortable and no sign of acute distress noted.Repositioned with pillows for comfort.Will continue to monitor and assess pain level and medicate as needed.

## 2017-08-22 LAB
ALBUMIN SERPL BCP-MCNC: 3 G/DL (ref 3.2–4.9)
ALBUMIN/GLOB SERPL: 1.2 G/DL
ALP SERPL-CCNC: 59 U/L (ref 30–99)
ALT SERPL-CCNC: 13 U/L (ref 2–50)
AMMONIA PLAS-SCNC: 35 UMOL/L (ref 11–45)
ANION GAP SERPL CALC-SCNC: 7 MMOL/L (ref 0–11.9)
AST SERPL-CCNC: 19 U/L (ref 12–45)
BASOPHILS # BLD AUTO: 0.7 % (ref 0–1.8)
BASOPHILS # BLD: 0.03 K/UL (ref 0–0.12)
BILIRUB SERPL-MCNC: 1.3 MG/DL (ref 0.1–1.5)
BUN SERPL-MCNC: 15 MG/DL (ref 8–22)
CALCIUM SERPL-MCNC: 9 MG/DL (ref 8.5–10.5)
CHLORIDE SERPL-SCNC: 102 MMOL/L (ref 96–112)
CO2 SERPL-SCNC: 29 MMOL/L (ref 20–33)
CREAT SERPL-MCNC: 0.6 MG/DL (ref 0.5–1.4)
EOSINOPHIL # BLD AUTO: 0.2 K/UL (ref 0–0.51)
EOSINOPHIL NFR BLD: 4.5 % (ref 0–6.9)
ERYTHROCYTE [DISTWIDTH] IN BLOOD BY AUTOMATED COUNT: 53 FL (ref 35.9–50)
GFR SERPL CREATININE-BSD FRML MDRD: >60 ML/MIN/1.73 M 2
GLOBULIN SER CALC-MCNC: 2.5 G/DL (ref 1.9–3.5)
GLUCOSE SERPL-MCNC: 94 MG/DL (ref 65–99)
HCT VFR BLD AUTO: 34.2 % (ref 42–52)
HGB BLD-MCNC: 11.1 G/DL (ref 14–18)
IMM GRANULOCYTES # BLD AUTO: 0.02 K/UL (ref 0–0.11)
IMM GRANULOCYTES NFR BLD AUTO: 0.4 % (ref 0–0.9)
INR PPP: 1.35 (ref 0.87–1.13)
LYMPHOCYTES # BLD AUTO: 0.79 K/UL (ref 1–4.8)
LYMPHOCYTES NFR BLD: 17.8 % (ref 22–41)
MCH RBC QN AUTO: 27.5 PG (ref 27–33)
MCHC RBC AUTO-ENTMCNC: 32.5 G/DL (ref 33.7–35.3)
MCV RBC AUTO: 84.7 FL (ref 81.4–97.8)
MONOCYTES # BLD AUTO: 0.46 K/UL (ref 0–0.85)
MONOCYTES NFR BLD AUTO: 10.3 % (ref 0–13.4)
NEUTROPHILS # BLD AUTO: 2.95 K/UL (ref 1.82–7.42)
NEUTROPHILS NFR BLD: 66.3 % (ref 44–72)
NRBC # BLD AUTO: 0 K/UL
NRBC BLD AUTO-RTO: 0 /100 WBC
PLATELET # BLD AUTO: 71 K/UL (ref 164–446)
PMV BLD AUTO: 10.4 FL (ref 9–12.9)
POTASSIUM SERPL-SCNC: 3.6 MMOL/L (ref 3.6–5.5)
PROT SERPL-MCNC: 5.5 G/DL (ref 6–8.2)
PROTHROMBIN TIME: 17.1 SEC (ref 12–14.6)
RBC # BLD AUTO: 4.04 M/UL (ref 4.7–6.1)
SODIUM SERPL-SCNC: 138 MMOL/L (ref 135–145)
WBC # BLD AUTO: 4.5 K/UL (ref 4.8–10.8)

## 2017-08-22 PROCEDURE — 97110 THERAPEUTIC EXERCISES: CPT

## 2017-08-22 PROCEDURE — 700102 HCHG RX REV CODE 250 W/ 637 OVERRIDE(OP): Performed by: HOSPITALIST

## 2017-08-22 PROCEDURE — 97530 THERAPEUTIC ACTIVITIES: CPT

## 2017-08-22 PROCEDURE — 700102 HCHG RX REV CODE 250 W/ 637 OVERRIDE(OP): Performed by: PHYSICAL MEDICINE & REHABILITATION

## 2017-08-22 PROCEDURE — 97535 SELF CARE MNGMENT TRAINING: CPT

## 2017-08-22 PROCEDURE — 99232 SBSQ HOSP IP/OBS MODERATE 35: CPT | Performed by: HOSPITALIST

## 2017-08-22 PROCEDURE — 80053 COMPREHEN METABOLIC PANEL: CPT

## 2017-08-22 PROCEDURE — 82140 ASSAY OF AMMONIA: CPT

## 2017-08-22 PROCEDURE — A9270 NON-COVERED ITEM OR SERVICE: HCPCS | Performed by: HOSPITALIST

## 2017-08-22 PROCEDURE — 770010 HCHG ROOM/CARE - REHAB SEMI PRIVAT*

## 2017-08-22 PROCEDURE — 82105 ALPHA-FETOPROTEIN SERUM: CPT

## 2017-08-22 PROCEDURE — A9270 NON-COVERED ITEM OR SERVICE: HCPCS | Performed by: PHYSICAL MEDICINE & REHABILITATION

## 2017-08-22 PROCEDURE — 97116 GAIT TRAINING THERAPY: CPT

## 2017-08-22 PROCEDURE — 85610 PROTHROMBIN TIME: CPT

## 2017-08-22 PROCEDURE — 97112 NEUROMUSCULAR REEDUCATION: CPT

## 2017-08-22 PROCEDURE — 36415 COLL VENOUS BLD VENIPUNCTURE: CPT

## 2017-08-22 PROCEDURE — 85025 COMPLETE CBC W/AUTO DIFF WBC: CPT

## 2017-08-22 RX ADMIN — LACTOBACILLUS ACIDOPHILUS / LACTOBACILLUS BULGARICUS 1 PACKET: 100 MILLION CFU STRENGTH GRANULES at 18:01

## 2017-08-22 RX ADMIN — FOLIC ACID 1 MG: 1 TABLET ORAL at 08:36

## 2017-08-22 RX ADMIN — MAGNESIUM HYDROXIDE 15 ML: 400 SUSPENSION ORAL at 20:14

## 2017-08-22 RX ADMIN — ATORVASTATIN CALCIUM 40 MG: 40 TABLET, FILM COATED ORAL at 20:14

## 2017-08-22 RX ADMIN — VITAMIN D, TAB 1000IU (100/BT) 1000 UNITS: 25 TAB at 08:36

## 2017-08-22 RX ADMIN — OXYCODONE HYDROCHLORIDE AND ACETAMINOPHEN 500 MG: 500 TABLET ORAL at 08:36

## 2017-08-22 RX ADMIN — NYSTATIN 1500000 UNITS: 100000 POWDER TOPICAL at 08:40

## 2017-08-22 RX ADMIN — URSODIOL 300 MG: 300 CAPSULE ORAL at 08:36

## 2017-08-22 RX ADMIN — POLYETHYLENE GLYCOL 3350 1 PACKET: 17 POWDER, FOR SOLUTION ORAL at 20:14

## 2017-08-22 RX ADMIN — POTASSIUM CHLORIDE 10 MEQ: 1500 TABLET, EXTENDED RELEASE ORAL at 08:36

## 2017-08-22 RX ADMIN — LACTOBACILLUS ACIDOPHILUS / LACTOBACILLUS BULGARICUS 1 PACKET: 100 MILLION CFU STRENGTH GRANULES at 08:36

## 2017-08-22 RX ADMIN — SPIRONOLACTONE 50 MG: 25 TABLET, FILM COATED ORAL at 05:28

## 2017-08-22 RX ADMIN — OXYCODONE HYDROCHLORIDE 10 MG: 10 TABLET ORAL at 22:33

## 2017-08-22 RX ADMIN — METOLAZONE 2.5 MG: 2.5 TABLET ORAL at 22:33

## 2017-08-22 RX ADMIN — FERROUS SULFATE TAB 325 MG (65 MG ELEMENTAL FE) 325 MG: 325 (65 FE) TAB at 08:36

## 2017-08-22 RX ADMIN — TAMSULOSIN HYDROCHLORIDE 0.4 MG: 0.4 CAPSULE ORAL at 08:36

## 2017-08-22 RX ADMIN — NYSTATIN 1500000 UNITS: 100000 POWDER TOPICAL at 20:19

## 2017-08-22 RX ADMIN — LACTOBACILLUS ACIDOPHILUS / LACTOBACILLUS BULGARICUS 1 PACKET: 100 MILLION CFU STRENGTH GRANULES at 11:49

## 2017-08-22 ASSESSMENT — PAIN SCALES - GENERAL
PAINLEVEL_OUTOF10: 0
PAINLEVEL_OUTOF10: 7
PAINLEVEL_OUTOF10: 0
PAINLEVEL_OUTOF10: 0

## 2017-08-22 ASSESSMENT — GAIT ASSESSMENTS
ASSISTIVE DEVICE: FRONT WHEEL WALKER;OTHER (COMMENTS)
DEVIATION: DECREASED BASE OF SUPPORT;BRADYKINETIC
GAIT LEVEL OF ASSIST: CONTACT GUARD ASSIST

## 2017-08-22 ASSESSMENT — ENCOUNTER SYMPTOMS
FEVER: 0
DIARRHEA: 0
ABDOMINAL PAIN: 0
NERVOUS/ANXIOUS: 0
CHILLS: 0
SHORTNESS OF BREATH: 0
VOMITING: 0
NAUSEA: 0

## 2017-08-22 NOTE — REHAB-PT IDT TEAM NOTE
"Physical Therapy  Mobility  Bed mobility:  SBA/SPV with leg   Bed /Chair/Wheelchair Transfer Initial:  4 - Minimal Assistance  Bed /Chair/Wheelchair Transfer Current:  4 - Minimal Assistance   Bed/Chair/Wheelchair Transfer Description:  Adaptive equipment (CGA SPT with FWW wc <> bed, SBA bed mobility with leg )  Walk Initial:  0 - Not tested,unsafe activity  Walk Current:  2 - Max Assistance   Walk Description:  Walker, Requires incidental assist (CGA/min A in SoloStep with FWW 60 ft + 35 ft x 2 indoors)  Wheelchair Initial:  0 - Not tested,medical condition  Wheelchair Current:  5 - Standby Prompting/Supervision or Set-up   Wheelchair Description:   (SPV >200 ft indoors with BUE)  Stairs Initial:  0 - Not tested,medical condition  Stairs Current: 1 - Total Assistance   Stairs Description: Walker, Assist device/equipment, Ascends/descends less than 4 steps, Requires incidental assist (Up/down 2\" step 1x with FWW CGA/min A in SoloStep harness)  Patient/Family Training/Education:  Safety with transfers, posture, HEP  DME/DC Recommendations:  TBD, manual wc, FWW, home health  Strengths:  Able to follow instructions, Effective communication skills, Making steady progress towards goals, Motivated for self care and independence, Pleasant and cooperative, Supportive family and Willingly participates in therapeutic activities  Barriers:   Decreased endurance, Limited mobility, Poor activity tolerance, Poor balance and Other: Dynamic standing tolerance <1 min  # of short term goals set= 4  # of short term goals met= 3       Physical Therapy Problems           Problem: Mobility     Dates: Start: 08/11/17       Goal: STG-Within one week, patient will ambulate household distance     Dates: Start: 08/11/17      Description: 1) Individualized goal:  30 feet with FWW, gt belt, and Mod A    2) Interventions:  PT Group Therapy, PT Gait Training, PT Therapeutic Exercises, PT Neuro Re-Ed/Balance, PT Therapeutic Activity " and PT Manual Therapy.    Note: Goal Note filed on 08/22/17 1220 by Lalita Cordon, DPT    Goal: STG-Within one week, patient will ambulate household distance  Outcome: PROGRESSING AS EXPECTED  2 person for wc follow, CGA-min A 30-40 ft x 3-4            Problem: Mobility Transfers     Dates: Start: 08/11/17       Goal: STG-Within one week, patient will transfer bed to chair     Dates: Start: 08/22/17      Description: 1) Individualized goal:  Patient will transfer with FWW SBA wc <> bed/ STS consistently    2) Interventions:  PT E Stim Attended, PT Gait Training, PT Therapeutic Exercises, PT Neuro Re-Ed/Balance, PT Aquatic Therapy, PT Therapeutic Activity and PT Manual Therapy              Problem: PT-Long Term Goals     Dates: Start: 08/11/17       Goal: LTG-By discharge, patient will ambulate     Dates: Start: 08/11/17      Description: 1) Individualized goal:  200 feet with FWW and SPV    2) Interventions:  PT Group Therapy, PT Gait Training, PT Therapeutic Exercises, PT Neuro Re-Ed/Balance, PT Therapeutic Activity and PT Manual Therapy.        Goal: LTG-By discharge, patient will transfer one surface to another     Dates: Start: 08/11/17      Description: 1) Individualized goal:  with FWW and SPV    2) Interventions:  PT Group Therapy, PT Gait Training, PT Therapeutic Exercises, PT Neuro Re-Ed/Balance, PT Therapeutic Activity and PT Manual Therapy.                Section completed by:  Lalita Cordon PT, DPT

## 2017-08-22 NOTE — PROGRESS NOTES
"Rehab Progress Note     Interval Events (Subjective)  Patient seen in his room and  Along with his wife.  Still seeing his room Notes of  and the therapist appreciated. Patient has improved in regards to his ascites he also says he feels a bit stronger as well. The patient is in very good spirits and continues to work hard in therapy     Objective:  VITAL SIGNS: /68 mmHg  Pulse 78  Temp(Src) 37.1 °C (98.8 °F)  Resp 18  Ht 1.905 m (6' 3\")  Wt 101 kg (222 lb 10.6 oz)  BMI 27.83 kg/m2  SpO2 95%      GENERAL: No apparent distress  HEENT: Normocephalic/atraumatic, EOMI, PERRL and No nystagmus  CARDIAC: Regular rate and rhythm, normal S1, S2   LUNGS: Clear to auscultation   ABDOMINAL: bowel sounds present, soft, nontender and ascites present with fluid wave noted. Ascites is increased  EXTREMITIES: Without clubbing or cyanosis 2+ edema noted in the left lower extremity but decreased  SKIN:   Small stage III noted on sacrum  NEURO: Unchanged with the exception to hip flexor strength is definitely a half grade stronger bilaterally as compared to admission    Current Facility-Administered Medications   Medication Frequency   • spironolactone (ALDACTONE) tablet 50 mg Q DAY   • magnesium hydroxide (MILK OF MAGNESIA) suspension 15 mL QHS   • polyethylene glycol/lytes (MIRALAX) PACKET 1 Packet QHS   • ferrous sulfate tablet 325 mg QDAY with Breakfast   • ascorbic acid (ascorbic acid) tablet 500 mg DAILY   • cholestyramine (QUESTRAN,PREVALITE) 4 GM powder 4 g QDAY PRN   • torsemide (DEMADEX) TABS 20 mg Q48HRS   • potassium chloride SA (Kdur) tablet 10 mEq DAILY   • metolazone (ZAROXOLYN) tablet 2.5 mg Q48HRS   • docusate sodium (COLACE) capsule 100 mg BID PRN    And   • senna-docusate (PERICOLACE or SENOKOT S) 8.6-50 MG per tablet 1 Tab QDAY PRN    And   • lactulose 20 GM/30ML solution 30 mL Q24HRS PRN    And   • bisacodyl (DULCOLAX) suppository 10 mg QDAY PRN   • hydrocortisone (ANUSOL-HC) suppository 25 mg " Q12HRS PRN   • tamsulosin (FLOMAX) capsule 0.4 mg AFTER BREAKFAST   • Respiratory Care per Protocol Continuous RT   • Pharmacy Consult Request ...Pain Management Review 1 Each PRN   • nystatin (MYCOSTATIN) powder BID   • lactobacillus granules (LACTINEX/FLORANEX) packet 1 Packet TID WITH MEALS   • ursodiol (ACTIGALL) capsule 300 mg DAILY   • vitamin D (cholecalciferol) tablet 1,000 Units DAILY   • folic acid (FOLVITE) tablet 1 mg DAILY   • atorvastatin (LIPITOR) tablet 40 mg QHS   • benzocaine-menthol (CEPACOL) lozenge 1 Lozenge Q2HRS PRN   • hydrALAZINE (APRESOLINE) tablet 25 mg Q8HRS PRN   • mag hydrox-al hydrox-simeth (MAALOX PLUS ES or MYLANTA DS) suspension 20 mL Q2HRS PRN   • ondansetron (ZOFRAN ODT) dispertab 4 mg 4X/DAY PRN    Or   • ondansetron (ZOFRAN) syringe/vial injection 4 mg 4X/DAY PRN   • trazodone (DESYREL) tablet 50 mg QHS PRN   • sodium chloride (OCEAN) 0.65 % nasal spray 2 Spray PRN   • oxycodone immediate release (ROXICODONE) tablet 10 mg Q4HRS PRN       Orders Placed This Encounter   Procedures   • Diet Order     Standing Status: Standing      Number of Occurrences: 1      Standing Expiration Date:      Order Specific Question:  Diet:     Answer:  Regular [1]     Order Specific Question:  Consistency/Fluid modifications:     Answer:  Thin Liquids [3]       Assessment:  Active Hospital Problems    Diagnosis   • *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 DUE TO OLD HEP B, CHEMO RX,  OR THERMO RAD OF MAGNANT LESION   • Essential hypertension   • Colon cancer-2010 left hemicolectomy; no colostomy       Medical Decision Making and Plan:  Patient is doing well at this time  Dr. Hopper continues to follow medically. He continues to follow liver functions and workup of possible malignancy    Cirrhosis/ascites    Stable at this time but will monitor. It's improved with diuresis prescribed by Dr. hopper    Hypertension       under control with Aldactone and now Inderal    Stage III pressure ulcer, on the Coccyx    Appreciate the input of the wound care nurse honey colloid applied    Vitamin D deficiency    Continue replacement    Lower extremity edema left leg    We will continue to monitor improved      Diarrhea   C. diff toxin neg  No longer an issue      UTI  response to    Continue PT and OT  A tentative discharge date was suggested to be September 16 2017  We'll re-conference next week 8/23/2017          Total time:  > 25 minutes.  I spent greater than 50% of the time for patient care and coordination on this date, including unit/floor time, and face-to-face time with the patient as per assessment and plan above.    Moreno Adorno M.D.

## 2017-08-22 NOTE — PROGRESS NOTES
"Rehab Progress Note     Interval Events (Subjective)  Patient seen in his room and  alongwith his wife.   Notes of  and the therapist appreciated. Patient has improved in regards to his ascites he also says he feels a bit stronger as well   Objective:  VITAL SIGNS: /66 mmHg  Pulse 74  Temp(Src) 36.3 °C (97.4 °F)  Resp 18  Ht 1.905 m (6' 3\")  Wt 101 kg (222 lb 10.6 oz)  BMI 27.83 kg/m2  SpO2 93%      GENERAL: No apparent distress  HEENT: Normocephalic/atraumatic, EOMI, PERRL and No nystagmus  CARDIAC: Regular rate and rhythm, normal S1, S2   LUNGS: Clear to auscultation   ABDOMINAL: bowel sounds present, soft, nontender and ascites present with fluid wave noted. Ascites is increased  EXTREMITIES: Without clubbing or cyanosis 2+ edema noted in the left lower extremity but decreased  SKIN:   Small stage III noted on sacrum  NEURO: Unchanged with the exception to hip flexor strength is definitely a half grade stronger bilaterally as compared to admission    Current Facility-Administered Medications   Medication Frequency   • spironolactone (ALDACTONE) tablet 50 mg Q DAY   • magnesium hydroxide (MILK OF MAGNESIA) suspension 15 mL QHS   • polyethylene glycol/lytes (MIRALAX) PACKET 1 Packet QHS   • ferrous sulfate tablet 325 mg QDAY with Breakfast   • ascorbic acid (ascorbic acid) tablet 500 mg DAILY   • cholestyramine (QUESTRAN,PREVALITE) 4 GM powder 4 g QDAY PRN   • torsemide (DEMADEX) TABS 20 mg Q48HRS   • potassium chloride SA (Kdur) tablet 10 mEq DAILY   • metolazone (ZAROXOLYN) tablet 2.5 mg Q48HRS   • docusate sodium (COLACE) capsule 100 mg BID PRN    And   • senna-docusate (PERICOLACE or SENOKOT S) 8.6-50 MG per tablet 1 Tab QDAY PRN    And   • lactulose 20 GM/30ML solution 30 mL Q24HRS PRN    And   • bisacodyl (DULCOLAX) suppository 10 mg QDAY PRN   • hydrocortisone (ANUSOL-HC) suppository 25 mg Q12HRS PRN   • tamsulosin (FLOMAX) capsule 0.4 mg AFTER BREAKFAST   • Respiratory Care per Protocol " Continuous RT   • Pharmacy Consult Request ...Pain Management Review 1 Each PRN   • nystatin (MYCOSTATIN) powder BID   • lactobacillus granules (LACTINEX/FLORANEX) packet 1 Packet TID WITH MEALS   • ursodiol (ACTIGALL) capsule 300 mg DAILY   • vitamin D (cholecalciferol) tablet 1,000 Units DAILY   • folic acid (FOLVITE) tablet 1 mg DAILY   • atorvastatin (LIPITOR) tablet 40 mg QHS   • benzocaine-menthol (CEPACOL) lozenge 1 Lozenge Q2HRS PRN   • hydrALAZINE (APRESOLINE) tablet 25 mg Q8HRS PRN   • mag hydrox-al hydrox-simeth (MAALOX PLUS ES or MYLANTA DS) suspension 20 mL Q2HRS PRN   • ondansetron (ZOFRAN ODT) dispertab 4 mg 4X/DAY PRN    Or   • ondansetron (ZOFRAN) syringe/vial injection 4 mg 4X/DAY PRN   • trazodone (DESYREL) tablet 50 mg QHS PRN   • sodium chloride (OCEAN) 0.65 % nasal spray 2 Spray PRN   • oxycodone immediate release (ROXICODONE) tablet 10 mg Q4HRS PRN       Orders Placed This Encounter   Procedures   • Diet Order     Standing Status: Standing      Number of Occurrences: 1      Standing Expiration Date:      Order Specific Question:  Diet:     Answer:  Regular [1]     Order Specific Question:  Consistency/Fluid modifications:     Answer:  Thin Liquids [3]       Assessment:  Active Hospital Problems    Diagnosis   • *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 DUE TO OLD HEP B, CHEMO RX,  OR THERMO RAD OF MAGNANT  LESION   • Essential hypertension   • Colon cancer-2010 left hemicolectomy; no colostomy       Medical Decision Making and Plan:  Patient is doing well at this time  Dr. Hopper has changed the antihypertensive to inderal which is better for portal hypertension  He is continuing his workup for ascites and in diuresing We'll continue PT and OT        Labs reviewed      Cirrhosis/ascites    Stable at this time but will monitor. It's improved with diuresis prescribed by Dr. hopper    Hypertension       under control with Aldactone and now Inderal    Stage III pressure ulcer, on the Coccyx    Appreciate the input of the wound care nurse honey colloid applied    Vitamin D deficiency    Continue replacement    Lower extremity edema left leg    We will continue to monitor improved      Diarrhea   C. diff toxin neg  No longer an issue      UTI      Continue PT and OT  A tentative discharge date was suggested to be September 16 2017  We'll re-conference next week 8/23/2017          Total time:  > 25 minutes.  I spent greater than 50% of the time for patient care and coordination on this date, including unit/floor time, and face-to-face time with the patient as per assessment and plan above.    Moreno Adorno M.D.

## 2017-08-22 NOTE — PROGRESS NOTES
Hospital Medicine Progress Note, Adult, Complex               Author: Yaya Martinez Date & Time created: 8/22/2017  9:11 AM       This progress note is for 8/14/2017      Interval History:  No significant events or changes since last visit  Patient denies SOB, cough, or diarrhea  Patient slept ok last night    Chief Complaint:  Hypertension  Edema      Review of Systems:  Review of Systems   Constitutional: Negative for fever and chills.   Respiratory: Negative for shortness of breath.    Cardiovascular: Negative for chest pain.   Gastrointestinal: Negative for nausea, vomiting, abdominal pain and diarrhea.   Psychiatric/Behavioral: The patient is not nervous/anxious.        Physical Exam:  Physical Exam   Constitutional: He is oriented to person, place, and time. He appears well-nourished.   HENT:   Head: Atraumatic.   Eyes: Conjunctivae are normal. Pupils are equal, round, and reactive to light.   Neck: Normal range of motion. Neck supple. No tracheal deviation present.   Cardiovascular: Normal rate, regular rhythm, S1 normal and S2 normal.    No murmur heard.  Pulmonary/Chest: Effort normal. He has no wheezes. He has no rhonchi. He has no rales.   Abdominal: Soft. He exhibits no distension. There is no tenderness. Hernia confirmed negative in the right inguinal area and confirmed negative in the left inguinal area.   Musculoskeletal: He exhibits edema.   Neurological: He is alert and oriented to person, place, and time. No sensory deficit.   Skin: Skin is warm and dry. No rash noted. No cyanosis.   Psychiatric: He has a normal mood and affect. His behavior is normal.   Nursing note and vitals reviewed.      Labs:        Invalid input(s): [id number]      Recent Labs      08/21/17   0454  08/22/17   0614   SODIUM  137  138   POTASSIUM  3.3*  3.6   CHLORIDE  101  102   CO2  29  29   BUN  12  15   CREATININE  0.56  0.60   MAGNESIUM  1.9   --    CALCIUM  8.8  9.0     Recent Labs      08/21/17   0454  08/22/17    0614   ALTSGPT  11  13   ASTSGOT  16  19   ALKPHOSPHAT  69  59   TBILIRUBIN  1.1  1.3   GLUCOSE  90  94     Recent Labs      17   0452  17   0454  17   0614   RBC  3.89*  4.00*  4.04*   HEMOGLOBIN  10.5*  10.8*  11.1*   HEMATOCRIT  32.7*  33.7*  34.2*   PLATELETCT  74*  71*  71*   PROTHROMBTM   --   17.0*  17.1*   INR   --   1.34*  1.35*     Recent Labs      17   0452  17   0454  17   0614   WBC  4.5*  4.5*  4.5*   NEUTSPOLYS  61.30  65.20  66.30   LYMPHOCYTES  19.60*  16.40*  17.80*   MONOCYTES  12.30  12.00  10.30   EOSINOPHILS  5.90  5.30  4.50   BASOPHILS  0.70  0.90  0.70   ASTSGOT   --   16  19   ALTSGPT   --   11  13   ALKPHOSPHAT   --   69  59   TBILIRUBIN   --   1.1  1.3           Hemodynamics:  Temp (24hrs), Av.8 °C (98.2 °F), Min:36.3 °C (97.4 °F), Max:37.1 °C (98.8 °F)  Temperature: 37.1 °C (98.8 °F)  Pulse  Av.3  Min: 64  Max: 99   Blood Pressure : 118/68 mmHg     Respiratory:    Respiration: 18, Pulse Oximetry: 95 %     Work Of Breathing / Effort: Mild  RUL Breath Sounds: Clear, RML Breath Sounds: Clear, RLL Breath Sounds: Diminished, JOSE Breath Sounds: Clear, LLL Breath Sounds: Clear  Fluids:    Intake/Output Summary (Last 24 hours) at 17 0911  Last data filed at 17 0901   Gross per 24 hour   Intake   1080 ml   Output    975 ml   Net    105 ml     Weight: 101 kg (222 lb 10.6 oz)  GI/Nutrition:  Orders Placed This Encounter   Procedures   • Diet Order     Standing Status: Standing      Number of Occurrences: 1      Standing Expiration Date:      Order Specific Question:  Diet:     Answer:  Regular [1]     Order Specific Question:  Consistency/Fluid modifications:     Answer:  Thin Liquids [3]     Medical Decision Making, by Problem:  Active Hospital Problems    Diagnosis   • *AIDP (acute inflammatory demyelinating polyneuropathy) (CMS-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 DUE TO OLD HEP B, CHEMO RX,  OR THERMO RAD OF MAGNANT LESION [K74.60]   • Essential hypertension [I10]   • Colon cancer-2010 left hemicolectomy; no colostomy [C18.9]     *  S/P Recent Lumbar Surgery    *  Hypertension  BP recently ok  On Coreg: 3.125 mg bid  Note: home meds were Coreg 3.125 mg 1/2 tab bid and Losartan: 25 mg daily  Monitor    *  U/A (+)  Has had some difficulty urinating recently with some burning  Cx --> GNR  F/U C&S   On Cipro empirically unitl C&S come back    *  LE Edema  Mild  On Aldactone: 25 mg daily    *  Thrombocytopenia  Platelets stable (8/11)  2nd to prior chemo and cirrhosis    *  Hx Colon Cancer: stage IV; in 5 year remission  *  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

## 2017-08-22 NOTE — REHAB-PHARMACY IDT TEAM NOTE
Pharmacy  Pharmacy  Antibiotics/Antifungals/Antivirals:  Medication:      Active Orders     None        Route:         n/a  Stop Date:  n/a  Reason:   Antihypertensives/Cardiac:  Medication:    Orders (72h ago through future)    Start     Ordered    08/22/17 0600  spironolactone (ALDACTONE) tablet 50 mg   Q DAY      08/21/17 0954    08/20/17 0000  cholestyramine (QUESTRAN,PREVALITE) 4 GM powder 4 g   1 TIME DAILY PRN      08/17/17 1046    08/17/17 0600  torsemide (DEMADEX) TABS 20 mg   EVERY 48 HOURS      08/16/17 2228    08/16/17 2300  metolazone (ZAROXOLYN) tablet 2.5 mg   EVERY 48 HOURS     Comments:  GIVE 40 MINUTES PRIOR TO DEMEDEX EVERY OTHER DAY.    08/16/17 2236    08/11/17 0600  spironolactone (ALDACTONE) tablet 25 mg   Q DAY,   Status:  Discontinued      08/10/17 1457    08/10/17 2100  atorvastatin (LIPITOR) tablet 40 mg   EVERY BEDTIME      08/10/17 1457    08/10/17 1456  hydrALAZINE (APRESOLINE) tablet 25 mg   EVERY 8 HOURS PRN      08/10/17 1457        Patient Vitals for the past 24 hrs:   BP Pulse   08/22/17 0700 118/68 mmHg 78   08/22/17 0527 125/66 mmHg 74   08/21/17 1900 107/51 mmHg 80   08/21/17 1400 106/62 mmHg 85       Anticoagulation:  Medication:  n/a  INR:    Recent Labs      08/21/17   0454  08/22/17   0614   INR  1.34*  1.35*     Other key medications: tamsulosin, torsemide, trazodone, ursodiol.    A review of the medication list reveals no issues at this time. Patient is currently on antihypertensive(s). Recommend home blood pressure monitoring/recording if antihypertensive(s) regimen(s) continue.  Section completed by:  Gertrudis Thompson RP

## 2017-08-22 NOTE — PROGRESS NOTES
Report received from day shift RN, assumed care of patient.  Patient A&Ox4; no complaints of pain at this time.  Plan of care discussed with patient and communication board updated.  Call light in place and personal belongings in reach.  Medicated as necessary per MAR.

## 2017-08-22 NOTE — CARE PLAN
Problem: Mobility  Goal: STG-Within one week, patient will ambulate household distance  1) Individualized goal: 30 feet with FWW, gt belt, and Mod A  2) Interventions: PT Group Therapy, PT Gait Training, PT Therapeutic Exercises, PT Neuro Re-Ed/Balance, PT Therapeutic Activity and PT Manual Therapy.   Outcome: PROGRESSING AS EXPECTED  2 person for wc follow, CGA-min A 30-40 ft x 3-4    Problem: Mobility Transfers  Goal: STG-Within one week, patient will perform bed mobility  1) Individualized goal: SBA with cueing, elevated HOB, setup, bed rail  2) Interventions: PT Group Therapy, PT Gait Training, PT Therapeutic Exercises, PT Neuro Re-Ed/Balance, PT Therapeutic Activity and PT Manual Therapy.  Outcome: MET Date Met:  08/22/17  Leg       Goal: STG-Within one week, patient will sit to stand  1) Individualized goal: with FWW and Mod A  2) Interventions: PT Group Therapy, PT Gait Training, PT Therapeutic Exercises, PT Neuro Re-Ed/Balance, PT Therapeutic Activity and PT Manual Therapy.   Outcome: MET Date Met:  08/22/17  Goal: STG-Within one week, patient will transfer bed to chair  1) Individualized goal: with FWW and Min A  2) Interventions: PT Group Therapy, PT Gait Training, PT Therapeutic Exercises, PT Neuro Re-Ed/Balance, PT Therapeutic Activity and PT Manual Therapy.  Outcome: MET Date Met:  08/22/17

## 2017-08-22 NOTE — REHAB-PT IDT TEAM NOTE
"Physical Therapy  Mobility  Bed mobility:   SBA/SPV with leg   Bed /Chair/Wheelchair Transfer Initial:  4 - Minimal Assistance  Bed /Chair/Wheelchair Transfer Current:  4 - Minimal Assistance   Bed/Chair/Wheelchair Transfer Description:  Adaptive equipment (CGA SPT with FWW wc <> bed, SBA bed mobility with leg )  Walk Initial:  0 - Not tested,unsafe activity  Walk Current:  1 - Total Assistance   Walk Description:  Walker, Requires wheelchair follow, Requires 2 people to assist (SoloStep amb with FWW indoors 30 - 40 ft x 4 with CGA/min A + wc follow for sudden rest breaks)  Wheelchair Initial:  0 - Not tested,medical condition  Wheelchair Current:  5 - Standby Prompting/Supervision or Set-up   Wheelchair Description:   (SPV >200 ft indoors with BUE)  Stairs Initial:  0 - Not tested,medical condition  Stairs Current: 0 - Not tested,unsafe activity   Stairs Description: Walker, Ascends/descends less than 4 steps, Requires incidental assist (Up/down 2\" threshold step with FWW CGA/min A in SoloStep harness  1x, 2nd person to control harness and wc follow)  Patient/Family Training/Education:  Safety with transfers, posture, HEP  DME/DC Recommendations:  TBD, manual wc, FWW, home health  Strengths:  Able to follow instructions, Effective communication skills, Making steady progress towards goals, Motivated for self care and independence, Pleasant and cooperative, Supportive family and Willingly participates in therapeutic activities  Barriers:   Decreased endurance, Limited mobility, Poor activity tolerance, Poor balance and Other: Dynamic standing tolerance <1 min  # of short term goals set= 4  # of short term goals met= 3       Physical Therapy Problems           Problem: Mobility     Dates: Start: 08/11/17       Goal: STG-Within one week, patient will ambulate household distance     Dates: Start: 08/11/17      Description: 1) Individualized goal:  30 feet with FWW, gt belt, and Mod A    2) Interventions:  " PT Group Therapy, PT Gait Training, PT Therapeutic Exercises, PT Neuro Re-Ed/Balance, PT Therapeutic Activity and PT Manual Therapy.    Note: Goal Note filed on 08/22/17 1220 by SAGRARIO SowT    Goal: STG-Within one week, patient will ambulate household distance  Outcome: PROGRESSING AS EXPECTED  2 person for wc follow, CGA-min A 30-40 ft x 3-4            Problem: Mobility Transfers     Dates: Start: 08/11/17       Goal: STG-Within one week, patient will transfer bed to chair     Dates: Start: 08/22/17      Description: 1) Individualized goal:  Patient will transfer with FWW SBA wc <> bed/ STS consistently    2) Interventions:  PT E Stim Attended, PT Gait Training, PT Therapeutic Exercises, PT Neuro Re-Ed/Balance, PT Aquatic Therapy, PT Therapeutic Activity and PT Manual Therapy              Problem: PT-Long Term Goals     Dates: Start: 08/11/17       Goal: LTG-By discharge, patient will ambulate     Dates: Start: 08/11/17      Description: 1) Individualized goal:  200 feet with FWW and SPV    2) Interventions:  PT Group Therapy, PT Gait Training, PT Therapeutic Exercises, PT Neuro Re-Ed/Balance, PT Therapeutic Activity and PT Manual Therapy.        Goal: LTG-By discharge, patient will transfer one surface to another     Dates: Start: 08/11/17      Description: 1) Individualized goal:  with FWW and SPV    2) Interventions:  PT Group Therapy, PT Gait Training, PT Therapeutic Exercises, PT Neuro Re-Ed/Balance, PT Therapeutic Activity and PT Manual Therapy.                Section completed by:  Lalita Cordon PT, DPT

## 2017-08-22 NOTE — REHAB-CM IDT TEAM NOTE
Case Management   DC Planning      DC destination/dispostion:  Return home with spouse. 2 steps to enter; bath/tub shower combo.       Referrals:  None at this time.    DC Needs: Spouse prefers out pt therapy instead of home health; follow up with PCP Dr. Fabián Urena/Dr. Rosales.  Pharmacy [facility] @ [facility].  Anticipate wheelchair at time of dc.       Barriers to discharge:       Strengths:  Motivated to return home, supportive spouse    Section completed by:  MARI Santos, CCM

## 2017-08-22 NOTE — CARE PLAN
Problem: Safety  Goal: Will remain free from falls  Intervention: Implement fall precautions  Patient encouraged to use call light to make needs known.  Bed locked and in low position, side rails x2.  Call light and personal belongings within reach. Hourly rounding performed to ensure patient needs are met and safety of patient.        Problem: Bowel/Gastric:  Goal: Normal bowel function is maintained or improved  Per Patient LBM today 8/21.  Refused scheduled bowel medications.  Continent of bowel per report.  Will continue to monitor for signs/symptoms of constipation.     Problem: Pain Management  Goal: Pain level will decrease to patient’s comfort goal  Intervention: Follow pain managment plan developed in collaboration with patient and Interdisciplinary Team  Patient complains of 0/10 pain this shift.  Patient repositions self for comfort.  Will continue to monitor pain level and medicate as needed.

## 2017-08-23 PROCEDURE — 97110 THERAPEUTIC EXERCISES: CPT

## 2017-08-23 PROCEDURE — 770010 HCHG ROOM/CARE - REHAB SEMI PRIVAT*

## 2017-08-23 PROCEDURE — 97535 SELF CARE MNGMENT TRAINING: CPT

## 2017-08-23 PROCEDURE — 97530 THERAPEUTIC ACTIVITIES: CPT

## 2017-08-23 PROCEDURE — A9270 NON-COVERED ITEM OR SERVICE: HCPCS | Performed by: HOSPITALIST

## 2017-08-23 PROCEDURE — A9270 NON-COVERED ITEM OR SERVICE: HCPCS | Performed by: PHYSICAL MEDICINE & REHABILITATION

## 2017-08-23 PROCEDURE — 97116 GAIT TRAINING THERAPY: CPT

## 2017-08-23 PROCEDURE — 99232 SBSQ HOSP IP/OBS MODERATE 35: CPT | Performed by: HOSPITALIST

## 2017-08-23 PROCEDURE — 700102 HCHG RX REV CODE 250 W/ 637 OVERRIDE(OP): Performed by: PHYSICAL MEDICINE & REHABILITATION

## 2017-08-23 PROCEDURE — 97112 NEUROMUSCULAR REEDUCATION: CPT

## 2017-08-23 PROCEDURE — 700102 HCHG RX REV CODE 250 W/ 637 OVERRIDE(OP): Performed by: HOSPITALIST

## 2017-08-23 RX ADMIN — NYSTATIN 1500000 UNITS: 100000 POWDER TOPICAL at 08:12

## 2017-08-23 RX ADMIN — TORSEMIDE 20 MG: 10 TABLET ORAL at 05:18

## 2017-08-23 RX ADMIN — TRAZODONE HYDROCHLORIDE 50 MG: 50 TABLET ORAL at 21:00

## 2017-08-23 RX ADMIN — NYSTATIN 1500000 UNITS: 100000 POWDER TOPICAL at 21:00

## 2017-08-23 RX ADMIN — ATORVASTATIN CALCIUM 40 MG: 40 TABLET, FILM COATED ORAL at 21:00

## 2017-08-23 RX ADMIN — FERROUS SULFATE TAB 325 MG (65 MG ELEMENTAL FE) 325 MG: 325 (65 FE) TAB at 08:11

## 2017-08-23 RX ADMIN — VITAMIN D, TAB 1000IU (100/BT) 1000 UNITS: 25 TAB at 08:12

## 2017-08-23 RX ADMIN — POTASSIUM CHLORIDE 10 MEQ: 1500 TABLET, EXTENDED RELEASE ORAL at 08:12

## 2017-08-23 RX ADMIN — SPIRONOLACTONE 50 MG: 25 TABLET, FILM COATED ORAL at 05:18

## 2017-08-23 RX ADMIN — MAGNESIUM HYDROXIDE 15 ML: 400 SUSPENSION ORAL at 21:00

## 2017-08-23 RX ADMIN — TAMSULOSIN HYDROCHLORIDE 0.4 MG: 0.4 CAPSULE ORAL at 08:12

## 2017-08-23 RX ADMIN — FOLIC ACID 1 MG: 1 TABLET ORAL at 08:11

## 2017-08-23 RX ADMIN — OXYCODONE HYDROCHLORIDE AND ACETAMINOPHEN 500 MG: 500 TABLET ORAL at 08:11

## 2017-08-23 RX ADMIN — LACTOBACILLUS ACIDOPHILUS / LACTOBACILLUS BULGARICUS 1 PACKET: 100 MILLION CFU STRENGTH GRANULES at 08:11

## 2017-08-23 RX ADMIN — LACTOBACILLUS ACIDOPHILUS / LACTOBACILLUS BULGARICUS 1 PACKET: 100 MILLION CFU STRENGTH GRANULES at 17:23

## 2017-08-23 RX ADMIN — LACTOBACILLUS ACIDOPHILUS / LACTOBACILLUS BULGARICUS 1 PACKET: 100 MILLION CFU STRENGTH GRANULES at 11:09

## 2017-08-23 RX ADMIN — POLYETHYLENE GLYCOL 3350 1 PACKET: 17 POWDER, FOR SOLUTION ORAL at 21:00

## 2017-08-23 RX ADMIN — URSODIOL 300 MG: 300 CAPSULE ORAL at 08:12

## 2017-08-23 ASSESSMENT — GAIT ASSESSMENTS
GAIT LEVEL OF ASSIST: MODERATE ASSIST
DISTANCE (FEET): 15
ASSISTIVE DEVICE: FRONT WHEEL WALKER

## 2017-08-23 ASSESSMENT — ENCOUNTER SYMPTOMS
SHORTNESS OF BREATH: 0
VOMITING: 0
HALLUCINATIONS: 0
DIZZINESS: 0
HEADACHES: 0
BLURRED VISION: 0
FEVER: 0
NAUSEA: 0
PALPITATIONS: 0

## 2017-08-23 ASSESSMENT — PAIN SCALES - GENERAL
PAINLEVEL_OUTOF10: 0
PAINLEVEL_OUTOF10: 0

## 2017-08-23 NOTE — CARE PLAN
Problem: Safety  Goal: Will remain free from injury  Outcome: PROGRESSING AS EXPECTED  Pt free from fall or injury this shift. Pt calls and waits for assistance before attempting to transfer. Able to verbalize needs and is aware of safety precautions.    Problem: Infection  Goal: Will remain free from infection  Outcome: PROGRESSING AS EXPECTED  Pt sacrum showing no s/s infection. Dressing changed today, pt tolerated well.

## 2017-08-23 NOTE — PROGRESS NOTES
"Rehab Progress Note     Interval Events (Subjective)  Patient seen in his room and  Along with his wife.  Still seeing his room Notes of  and the therapist appreciated. Patient has improved in regards to his ascites he also says he feels a bit stronger as well. The patient is in very good spirits and continues to work hard in therapy     Objective:  VITAL SIGNS: /70 mmHg  Pulse 88  Temp(Src) 36.9 °C (98.5 °F)  Resp 18  Ht 1.905 m (6' 3\")  Wt 101 kg (222 lb 10.6 oz)  BMI 27.83 kg/m2  SpO2 95%      GENERAL: No apparent distress  HEENT: Normocephalic/atraumatic, EOMI, PERRL and No nystagmus  CARDIAC: Regular rate and rhythm, normal S1, S2   LUNGS: Clear to auscultation   ABDOMINAL: bowel sounds present, soft, nontender and ascites present with fluid wave noted. Ascites is increased  EXTREMITIES: Without clubbing or cyanosis 2+ edema noted in the left lower extremity but decreased  SKIN:   Small stage III noted on sacrum  NEURO: Unchanged with the exception to hip flexor strength is definitely a half grade stronger bilaterally as compared to admission    Current Facility-Administered Medications   Medication Frequency   • spironolactone (ALDACTONE) tablet 50 mg Q DAY   • magnesium hydroxide (MILK OF MAGNESIA) suspension 15 mL QHS   • polyethylene glycol/lytes (MIRALAX) PACKET 1 Packet QHS   • ferrous sulfate tablet 325 mg QDAY with Breakfast   • ascorbic acid (ascorbic acid) tablet 500 mg DAILY   • cholestyramine (QUESTRAN,PREVALITE) 4 GM powder 4 g QDAY PRN   • torsemide (DEMADEX) TABS 20 mg Q48HRS   • potassium chloride SA (Kdur) tablet 10 mEq DAILY   • metolazone (ZAROXOLYN) tablet 2.5 mg Q48HRS   • docusate sodium (COLACE) capsule 100 mg BID PRN    And   • senna-docusate (PERICOLACE or SENOKOT S) 8.6-50 MG per tablet 1 Tab QDAY PRN    And   • lactulose 20 GM/30ML solution 30 mL Q24HRS PRN    And   • bisacodyl (DULCOLAX) suppository 10 mg QDAY PRN   • hydrocortisone (ANUSOL-HC) suppository 25 mg " Q12HRS PRN   • tamsulosin (FLOMAX) capsule 0.4 mg AFTER BREAKFAST   • Respiratory Care per Protocol Continuous RT   • Pharmacy Consult Request ...Pain Management Review 1 Each PRN   • nystatin (MYCOSTATIN) powder BID   • lactobacillus granules (LACTINEX/FLORANEX) packet 1 Packet TID WITH MEALS   • ursodiol (ACTIGALL) capsule 300 mg DAILY   • vitamin D (cholecalciferol) tablet 1,000 Units DAILY   • folic acid (FOLVITE) tablet 1 mg DAILY   • atorvastatin (LIPITOR) tablet 40 mg QHS   • benzocaine-menthol (CEPACOL) lozenge 1 Lozenge Q2HRS PRN   • hydrALAZINE (APRESOLINE) tablet 25 mg Q8HRS PRN   • mag hydrox-al hydrox-simeth (MAALOX PLUS ES or MYLANTA DS) suspension 20 mL Q2HRS PRN   • ondansetron (ZOFRAN ODT) dispertab 4 mg 4X/DAY PRN    Or   • ondansetron (ZOFRAN) syringe/vial injection 4 mg 4X/DAY PRN   • trazodone (DESYREL) tablet 50 mg QHS PRN   • sodium chloride (OCEAN) 0.65 % nasal spray 2 Spray PRN   • oxycodone immediate release (ROXICODONE) tablet 10 mg Q4HRS PRN       Orders Placed This Encounter   Procedures   • Diet Order     Standing Status: Standing      Number of Occurrences: 1      Standing Expiration Date:      Order Specific Question:  Diet:     Answer:  Regular [1]     Order Specific Question:  Consistency/Fluid modifications:     Answer:  Thin Liquids [3]       Assessment:  Active Hospital Problems    Diagnosis   • *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 DUE TO OLD HEP B, CHEMO RX,  OR THERMO RAD OF MAGNANT LESION   • Essential hypertension   • Colon cancer-2010 left hemicolectomy; no colostomy       Medical Decision Making and Plan:  Patient is doing well at this time  Dr. Hopper continues to follow medically. He continues to follow liver functions and workup of possible malignancy    Cirrhosis/ascites    Stable at this time but will monitor. It's improved with diuresis prescribed by Dr. hopper    Hypertension       under control with Aldactone and now Inderal    Stage III pressure ulcer, on the Coccyx    Appreciate the input of the wound care nurse honey colloid applied    Vitamin D deficiency    Continue replacement    Lower extremity edema left leg    We will continue to monitor improved      Diarrhea   C. diff toxin neg  No longer an issue      UTI  response to    Continue PT and OT  A tentative discharge date was suggested to be September 16 2017  We'll re-conference next week 8/23/2017          Total time:  > 25 minutes.  I spent greater than 50% of the time for patient care and coordination on this date, including unit/floor time, and face-to-face time with the patient as per assessment and plan above.    Moreno Adorno M.D.

## 2017-08-23 NOTE — PROGRESS NOTES
Patient is AOx4 has good safety awareness and uses the call light when assistance is needed. Patient denies complaints of pain. Patient states he feels much better when doing PT due to diuresis. Will continue to monitor and assess throughout the day.

## 2017-08-23 NOTE — REHAB-OT IDT TEAM NOTE
Occupational Therapy  Activities of Daily Living  Eating Initial:  5 - Standby Prompting/Supervision or Set-up  Eating Current:  6 - Modified Independent   Eating Description:  Increased time  Grooming Initial:  6 - Modified Independent  Grooming Current:  6 - Modified Independent   Grooming Description:   (wheelchair level)  Bathing Initial:  3 - Moderate Assistance  Bathing Current:  4 - Minimal Assistance   Bathing Description:  Grab bar, Tub bench, Hand held shower, Set-up of equipment (completed 8/10 body parts)  Upper Body Dressing Initial:  4 - Minimal Assistance  Upper Body Dressing Current:  6 - Modified Independent   Upper Body Dressing Description:  Increased time (Don/doff pull over shirt)  Lower Body Dressing Initial:  2 - Max Assistance  Lower Body Dressing Current:  2 - Max Assistance   Lower Body Dressing Description:  2 - Max Assistance  Toileting Initial:  1 - Total Assistance  Toileting Current:  3 - Moderate Assistance   Toileting Description:  Adaptive equipment, Grab bar, Increased time, Supervision for safety, Verbal cueing  Toilet Transfer Initial:  4 - Minimal Assistance  Toilet Transfer Current:  4 - Minimal Assistance   Toilet Transfer Description:  4 - Minimal Assistance  Tub / Shower Transfer Initial:  4 - Minimal Assistance  Tub / Shower Transfer Current:  4 - Minimal Assistance   Tub / Shower Transfer Description:  Grab bar  IADL:  TBD     Family Training/Education:  TBD   DME/DC Recommendations:  Shower chair     Strengths:  Alert and oriented, Effective communication skills, Independent PLOF, Motivated for self care and independence, Pleasant and cooperative, Supportive family and Willingly participates in therapeutic activities  Barriers:  Decreased endurance, Poor activity tolerance, Poor balance and Pressure ulcer     # of short term goals set= ***    # of short term goals met= ***          Occupational Therapy Goals           Problem: Functional Transfers     Dates: Start:  08/23/17       Goal: STG-Within one week, patient will transfer to toilet     Dates: Start: 08/23/17      Description: 1) Individualized Goal:  Sup/SBA    2) Interventions:  OT Self Care/ADL, OT Neuro Re-Ed/Balance, OT Therapeutic Activity, OT Evaluation and OT Therapeutic Exercise            Goal: STG-Within one week, patient will transfer to step in shower     Dates: Start: 08/23/17      Description: 1) Individualized Goal:  Sup/SBA    2) Interventions:  OT Self Care/ADL, OT Neuro Re-Ed/Balance, OT Therapeutic Activity, OT Evaluation and OT Therapeutic Exercise              Problem: OT Long Term Goals     Dates: Start: 08/11/17       Goal: LTG-By discharge, patient will complete basic self care tasks     Dates: Start: 08/11/17   Expected End: 08/25/17      Description: 1) Individualized Goal:  By discharge, patient will complete basic self care tasks at a level of Mod. I.     2) Interventions:  OT Self Care/ADL, OT Neuro Re-Ed/Balance, OT Therapeutic Activity and OT Therapeutic Exercise    Co-signature:  Connor Ferraro OTR/KAITLIN             Goal: LTG-By discharge, patient will perform bathroom transfers     Dates: Start: 08/11/17   Expected End: 08/25/17      Description: 1) Individualized Goal:  By discharge, patient will perform bathroom transfers at a level of Mod. I.     2) Interventions:  OT Self Care/ADL, OT Neuro Re-Ed/Balance, OT Therapeutic Activity and OT Therapeutic Exercise    Co-signature:  Connor Ferraro OTR/L             Goal: LTG-By discharge, patient will complete basic home management     Dates: Start: 08/11/17   Expected End: 08/25/17      Description: 1) Individualized Goal:  By discharge, patient will prepare a simple meal at a level of mod. I.     2) Interventions:  OT Self Care/ADL, OT Neuro Re-Ed/Balance, OT Therapeutic Activity and OT Therapeutic Exercise    Co-signature:  Connor Ferraro OTR/KAITLIN               Problem: Toileting     Dates: Start: 08/11/17       Goal: STG-Within one week, patient will  complete toileting tasks     Dates: Start: 08/23/17      Description: 1) Individualized Goal:  Min assist    2) Interventions:  OT Self Care/ADL, OT Neuro Re-Ed/Balance, OT Therapeutic Activity, OT Evaluation and OT Therapeutic Exercise                  Section completed by:  Elisha Kessler

## 2017-08-23 NOTE — CARE PLAN
Problem: Other Problem (see comments)  Goal: Other Goal  PO intake >50-75% of meals and supplements  Monitor/Evaluation: Monitor PO intake, weight, labs, medication adjustments, skin integrity, GI function, vitals, I/Os, and overall hydration status. Adjust nutritional POC pending clinical outcomes.   RD following PRN. PO is adequate. Nutrition interventions in place.   Goal: Maintain adequate oral nutrient/fluid intake to promote nutrition optimization/wound healing.       Outcome: MET Date Met:  08/23/17  Patient seen today outside with wife.  Patient reports he is doing much better, feels much better. Dr. Hopper notes early satiety d/t enlarged spleen.  Patient, however, is drinking Boost Plus, eating well.  He looks much better than the last time I saw him.  He is upbeat.  Labs reviewed and noted. Wt trending down on Demadex q 48hours and Aldactone daily.  Other medications reviewed and noted. Consider increasing vitamin C to 1000mg daily, MVI+ minerals to optimize healing as patient with stage 3 to coccyx. No wound team note since 8/11. Patient has no GI complaints. BMs routinely. PO adequate at % of meals + Boost Plus supplementation.  Patient is rather pale upon physical assessment.  Getting iron supplementation. Vitamin D level WNL.  Continue current nutritional POC.  RD following PRN.

## 2017-08-23 NOTE — CARE PLAN
Problem: Safety  Goal: Will remain free from injury  Pt remains free from accidental injury.Appropriately uses call light to make needs known.Bed in position, non skid socks/shoes on when out of bed.Call light and things within reach.Will continue to monitor and assess needs and safety.    Problem: Bowel/Gastric:  Goal: Normal bowel function is maintained or improved  Intervention: Educate patient and significant other/support system about signs and symptoms of constipation and interventions to implement  Scheduled bowel medication given.LB 08/21.Will continue to monitor and assess for s/s of constipation.      Problem: Pain Management  Goal: Pain level will decrease to patient’s comfort goal  Pt is calm, comfortable and no sign of acute distress noted.Repositioned with pillows for comfort.Will continue to monitor and assess needs and safety.

## 2017-08-23 NOTE — CARE PLAN
Problem: Safety  Goal: Will remain free from falls  Intervention: Assess risk factors for falls  Patient is AOx4 has good safety awareness and uses the call light when assistance is needed. Patient has call light close by, bed in low position, non-skid socks on. Alarms are set on bed and wheel chair. Patient does need verbal cues occasionally to slow down and take his time when transferring.       Problem: Other Problem (see comments)  Intervention: Monitor PO Intake, weights and laboratory values  Patient is ordered daily weight and fluid restriction of 1500mL. Patient and his wife are excellent documenting intake and output. Patient has a urinal at bedside and wife and patient have been recording intake and output, writing on white board.

## 2017-08-23 NOTE — PROGRESS NOTES
Hospital Medicine Progress Note, Adult, Complex               Author: Yaya Martinez Date & Time created: 8/23/2017  9:30 AM     Interval History:  No significant events or changes since last visit  Patient denies SOB, cough, or diarrhea  Patient slept ok last night    Chief Complaint:  Hypertension  Fluid overload      Review of Systems:  Review of Systems   Constitutional: Negative for fever.   Eyes: Negative for blurred vision.   Respiratory: Negative for shortness of breath.    Cardiovascular: Negative for palpitations.   Gastrointestinal: Negative for nausea and vomiting.   Neurological: Negative for dizziness and headaches.   Psychiatric/Behavioral: Negative for hallucinations.       Physical Exam:  Physical Exam   Constitutional: He is oriented to person, place, and time.   HENT:   Mouth/Throat: Oropharynx is clear and moist.   Eyes: No scleral icterus.   Cardiovascular: Normal rate, regular rhythm, S1 normal and S2 normal.    No murmur heard.  Pulmonary/Chest: Effort normal and breath sounds normal. No stridor. He has no rhonchi.   Abdominal: Soft. He exhibits no distension. There is no tenderness. Hernia confirmed negative in the right inguinal area and confirmed negative in the left inguinal area.   Musculoskeletal: He exhibits no edema.   Neurological: He is alert and oriented to person, place, and time. No sensory deficit.   Skin: Skin is warm and dry. Rash noted. He is not diaphoretic. No cyanosis.   Has coccyx pressure sore/ulcer.   Psychiatric: He has a normal mood and affect. His behavior is normal.   Nursing note and vitals reviewed.      Labs:        Invalid input(s): [id number]      Recent Labs      08/21/17 0454  08/22/17   0614   SODIUM  137  138   POTASSIUM  3.3*  3.6   CHLORIDE  101  102   CO2  29  29   BUN  12  15   CREATININE  0.56  0.60   MAGNESIUM  1.9   --    CALCIUM  8.8  9.0     Recent Labs      08/21/17 0454  08/22/17   0614   ALTSGPT  11  13   ASTSGOT  16  19   ALKPHOSPHAT  69   59   TBILIRUBIN  1.1  1.3   GLUCOSE  90  94     Recent Labs      17   0454  17   0614   RBC  4.00*  4.04*   HEMOGLOBIN  10.8*  11.1*   HEMATOCRIT  33.7*  34.2*   PLATELETCT  71*  71*   PROTHROMBTM  17.0*  17.1*   INR  1.34*  1.35*     Recent Labs      17   0454  17   0614   WBC  4.5*  4.5*   NEUTSPOLYS  65.20  66.30   LYMPHOCYTES  16.40*  17.80*   MONOCYTES  12.00  10.30   EOSINOPHILS  5.30  4.50   BASOPHILS  0.90  0.70   ASTSGOT  16  19   ALTSGPT  11  13   ALKPHOSPHAT  69  59   TBILIRUBIN  1.1  1.3           Hemodynamics:  Temp (24hrs), Av.8 °C (98.2 °F), Min:36.6 °C (97.8 °F), Max:36.9 °C (98.5 °F)  Temperature: 36.6 °C (97.8 °F)  Pulse  Av.5  Min: 64  Max: 99   Blood Pressure : 112/72 mmHg     Respiratory:    Respiration: 18, Pulse Oximetry: 96 %     Work Of Breathing / Effort: Mild  RUL Breath Sounds: Clear, RML Breath Sounds: Clear, RLL Breath Sounds: Diminished, JOSE Breath Sounds: Clear, LLL Breath Sounds: Clear  Fluids:    Intake/Output Summary (Last 24 hours) at 17 0930  Last data filed at 17 0700   Gross per 24 hour   Intake    360 ml   Output    975 ml   Net   -615 ml     Weight: 99 kg (218 lb 4.1 oz)  GI/Nutrition:  Orders Placed This Encounter   Procedures   • Diet Order     Standing Status: Standing      Number of Occurrences: 1      Standing Expiration Date:      Order Specific Question:  Diet:     Answer:  Regular [1]     Order Specific Question:  Consistency/Fluid modifications:     Answer:  Thin Liquids [3]     Medical Decision Making, by Problem:  Active Hospital Problems    Diagnosis   • *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]   • Left leg DVT (CMS-[facility]) [I82.402]   • Esophageal varices- BANDED 2016- repeat egd tbd 10//29/16- gic [I85.00]   • ASCVD (arteriosclerotic cardiovascular disease)subtotalled small distal LCx and 40% LAD med rx by cath 12 [I25.10]   • GIB (gastrointestinal bleeding)- recurrent from  varices [K92.2]   • BPH (benign prostatic hyperplasia) [N40.0]   • 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 DUE TO OLD HEP B, CHEMO RX,  OR THERMO RAD OF MAGNANT LESION [K74.60]   • Essential hypertension [I10]   • Colon cancer-2010 left hemicolectomy; no colostomy [C18.9]     *  S/P Recent Lumbar Surgery    *  Hypertension  BP ok  Off Coreg  Note: on diuretics  Note: home meds were Coreg 3.125 mg 1/2 tab bid and Losartan: 25 mg daily  Monitor    *  Fluid Overload  2nd to Cirrhosis  S/P LE Edema  With some ascites  On fluid restrictions: 1500 cc/day  On Demadex: 20 mg qod  On Metolazone: 2.5 mg qod  On Aldactone: 50 mg daily  On KCL: 10 meq daily  Monitor K+ levels: 3.3 (8/21) --> 3.6 (8/22)    *  Thrombocytopenia  Platelets stable (8/22)  2nd to prior chemo and cirrhosis    *  Cirrhosis  2nd to cancer  With portal vein thrombosis  Hx eso varicies, s/p albaltion  Hx ascites: with therapeutic taps  NH4: 35 (8/22)    *  Coccyx Pressure Sore/Ulcer  Mostly dusky erythema but has a few spots of more erythema  Has wound dressing applied  Wound care to eval    *  Hx Colon Cancer: stage IV; in 5 year remission  *  Hyperlipidemia  *  AIDP: s/p IVIG x 5 days      Medications reviewed and Labs reviewed

## 2017-08-23 NOTE — REHAB-COLLABORATIVE ONGOING IDT TEAM NOTE
Weekly Interdisciplinary Team Conference Note    Weekly Interdisciplinary Team Conference # 2  Date:  8/23/2017    Clinicians present and reporting at team conference include the following:   MD: Moreno Adorno MD   RN:  Mari Guadalupe RN   PT:   Lalita Cordon, PT, DPT  OT:  Connor Ferraro OT   ST:  Not following  CM:  ELIZABETH LiebermanW, CCM  REC:  Not Applicable  RT:  None  RPh:  Isacc Adams, RPh  Other:   None  All reporting clinicians have a working knowledge of this patient's plan of care.    Targeted DC Date:  Anticipate 9/16/2017, but will continue to monitor.         Medical    Patient Active Problem List    Diagnosis Date Noted   • Left leg DVT (CMS-Formerly [facility]) 06/26/2017     Priority: High   • Esophageal varices- BANDED 4/2016- repeat egd tbd 10//29/16- gic 04/04/2016     Priority: High   • BMI 34.0-34.9,adult 02/15/2013     Priority: High   • ASCVD (arteriosclerotic cardiovascular disease)subtotalled small distal LCx and 40% LAD med rx by cath 12/13/12 12/14/2012     Priority: High   • Gastric varices- s/p BRTO procedure at [facility] 04/04/2016     Priority: Medium   • GIB (gastrointestinal bleeding)- recurrent from varices 04/03/2016     Priority: Medium   • BPH (benign prostatic hyperplasia) 08/11/2017   • Peripheral neuropathy due to chemotherapy (CMS-Formerly [facility]) 08/11/2017   • Impaired mobility and ADLs 08/11/2017   • Lumbar stenosis 08/10/2017   • 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 due to lisinopril- d/c'd 11/2016 11/18/2016   • SVT (supraventricular tachycardia) 10/27/2016   • Microcytic hypochromic anemia 10/24/2016   • History of colon cancer 10/24/2016   • History of esophageal varices 10/24/2016   • Iron deficiency anemia due to chronic blood loss 10/06/2016   • Portal vein  thrombosis- on CT [facility] 2/2015 04/14/2016   • Cirrhosis of liver with ascites (HCC)- ? DUE TO OLD HEP B, CHEMO RX,  OR THERMO RAD OF MAGNANT LESION 04/14/2016   • Mixed hyperlipidemia 06/20/2013   • Essential hypertension 06/20/2013   • Thrombocytopenia due to drugs 04/25/2012   • Colon cancer-2010 left hemicolectomy; no colostomy 12/06/2010   • Liver lesion- ablation of malignant met from colon ca 2011- f/u [facility] q 6 mos 12/06/2010   • Peripheral neuropathy, secondary to drugs or chemicals 12/06/2010     Results     ** No results found for the last 24 hours. **           Nursing  Diet/Nutrition:  Regular and Thin Liquids  Medication Administration:  Whole with Liquid Wash  % consumed at meals in last 24 hours:  Consumed 100% of meals per documentation.  Breakfast:  100%   Lunch:  100%  Dinner:  100%   Snack schedule:  None  Supplement:  Boost Plus  Appetite:  Excellent  Fluid Intake/Output in past 24 hours: In: 1170 [P.O.:1170]  Out: 1300   Admit Weight:  Weight: 116.8 kg (257 lb 8 oz)  Weight Last 7 Days   [93.5 kg (206 lb 2.1 oz)-99.5 kg (219 lb 5.7 oz)] 99.5 kg (219 lb 5.7 oz) (09/05 0500)    Pain Issues:    Location:  Back (09/06 0035)  Mid;Lower (09/06 0035)         Severity:  Moderate   Scheduled pain medications:  None     PRN pain medications used in last 24 hours:  oxycodone immediate release (ROXICODONE)    Non Pharmacologic Interventions:  distraction, emotional support, relaxation and repositioned  Effectiveness of pain management plan:  fair=improved comfort with interventions but does not always meet goal    Bowel:    Bowel Assist Initial Score:  3 - Moderate Assistance  Bowel Assist Current Score:  6 - Modified Independent  Bowl Accidents in last 7 days:  0  Last bowel movement: 09/05/17  Stool: Medium, Soft     Usual bowel pattern:  daily  Scheduled bowel medications:  polyethylene glycol/lytes (MIRALAX)  and magnesium hydroxide (MILK OF MAGNESIA)   PRN bowel medications used in last 24 hours:   None  Nursing Interventions:  Increased time, Scheduled medication, Supervision, Positioning on commode/toilet  Effectiveness of bowel program:   good=regular, predictable, controlled emptying of bowel  Bladder:    Bladder Assist Initial Score:  5 - Standby Prompting/Supervision or Set-up  Bladder Assist Current Score:  5 - Standby Prompting/Supervision or Set-up  Bladder Accidents in last 7 days:  0  PVR range for past 24-48 hours:  [77]  ()  Medications affecting bladder:  Flomax    Time void schedule/voiding pattern:  Pt initiates voiding  Interventions:  Increased time, Medication, Emptying of device, Urinal  Effectiveness of bladder training:  Continent    Wound: Resolving with Barrier Cream        Patient Lines/Drains/Airways Status    Active Current Wounds     Name: Placement date: Placement time: Site: Days:    Pressure Ulcer  Stage 3 POA Coccyx Midline 08/10/17       27                   Interventions:  Monitor, apply barrier cream and repositioning  Effectiveness of intervention:  wound is improving   Sleep/wake cycle:   Average hours slept:  Sleeps 3-4 hours without waking  Sleep medication usage:  None    Patient/Family Training/Education:  Fall Prevention, General Self Care, Medication Management, Pain Management, Safe Transfers, Safety, Skin Care and Wound Care  Strengths: Alert and oriented, Willingly participates in therapeutic activities, Able to follow instructions, Supportive family, Pleasant and cooperative, Effective communication skills and Motivated for self care and independence   Barriers:   Bladder retention, Fatigue and Generalized weakness            Nursing Problems           Problem: Bowel/Gastric:     Goal: Normal bowel function is maintained or improved           Goal: Will not experience complications related to bowel motility             Problem: Communication     Goal: The ability to communicate needs accurately and effectively will improve             Problem: Discharge  Barriers/Planning     Goal: Patient's continuum of care needs will be met             Problem: Fluid Volume:     Goal: Will maintain balanced intake and output             Problem: Infection     Goal: Will remain free from infection             Problem: Knowledge Deficit     Goal: Knowledge of disease process/condition, treatment plan, diagnostic tests, and medications will improve           Goal: Knowledge of the prescribed therapeutic regimen will improve             Problem: Mobility     Goal: Risk for activity intolerance will decrease             Problem: Pain Management     Goal: Pain level will decrease to patient's comfort goal             Problem: Respiratory:     Goal: Respiratory status will improve             Problem: Safety     Goal: Will remain free from injury           Goal: Will remain free from falls             Problem: Skin Integrity     Goal: Risk for impaired skin integrity will decrease             Problem: Urinary Elimination:     Goal: Ability to reestablish a normal urinary elimination pattern will improve             Problem: Venous Thromboembolism (VTW)/Deep Vein Thrombosis (DVT) Prevention:     Goal: Patient will participate in Venous Thrombosis (VTE)/Deep Vein Thrombosis (DVT)Prevention Measures                  Long Term Goals:   At discharge patient will be able to function safely at home and in the community with support.    Section completed by:  William Venegas R.N.              Mobility  Bed mobility:   SPV  Bed /Chair/Wheelchair Transfer Initial:  4 - Minimal Assistance  Bed /Chair/Wheelchair Transfer Current:  5 - Standby Prompting/Supervision or Set-up   Bed/Chair/Wheelchair Transfer Description:  Adaptive equipment, Supervision for safety, Verbal cueing, Increased time  Walk Initial:  0 - Not tested,unsafe activity  Walk Current:  5 - Standby Prompting/Supervision or Set-up   Walk Description:   (SPV, FWW, 215 feet indoors; bradykinetic, mod BUE WB onto FWW,  "Trendelenberg increases with fatigue)  Wheelchair Initial:  0 - Not tested,medical condition  Wheelchair Current:  6 - Modified Independent   Wheelchair Description:   (Mod I indoors for 200 feet to back gym)  Stairs Initial:  0 - Not tested,medical condition  Stairs Current: 1 - Total Assistance   Stairs Description:  (min A with FWW for 6\" curb and 1\" threshold)  Patient/Family Training/Education:  Ongoing with good carryover; family training today is optional  DME/DC Recommendations:  Tomorrow, 9/7/17 as long as stairs grab bars are installed; new FWW, 20\"high manual w/c with standard B legrests; home health PT (but pt prefers outpatient PT); intermittent SPV from SO  Strengths:  Able to follow instructions, Effective communication skills, Good carryover of learning, Good insight into deficits/needs, Independent PLOF, Making steady progress towards goals, Motivated for self care and independence, Pleasant and cooperative, Supportive family and Willingly participates in therapeutic activities  Barriers:   Decreased endurance, Generalized weakness, Limited mobility, Poor balance and Pressure ulcer  # of short term goals set= 4  # of short term goals met= 3       Physical Therapy Problems           Problem: Mobility     Dates: Start: 08/11/17       Goal: STG-Within one week, patient will ascend and descend four to six stairs     Dates: Start: 08/30/17       Description: 1) Individualized goal: 2 steps with a FWW and CGA  2) Interventions: PT Group Therapy, PT Gait Training, PT Therapeutic Exercises, PT Neuro Re-Ed/Balance, PT Therapeutic Activity and PT Manual Therapy.           Note:     Goal Note filed on 09/06/17 1247 by Pedrito Duenas, P.T.    Goal: STG-Within one week, patient will ascend and descend four to six stairs  Outcome: NOT MET  Went to attempt yesterday but pt is changing home setup to include two grab bars at the stairs.                     Problem: PT-Long Term Goals     Dates: Start: " 08/11/17       Goal: LTG-By discharge, patient will ambulate     Dates: Start: 08/11/17       Description: 1) Individualized goal:  200 feet with FWW and SPV  2) Interventions:  PT Group Therapy, PT Gait Training, PT Therapeutic Exercises, PT Neuro Re-Ed/Balance, PT Therapeutic Activity and PT Manual Therapy.           Goal: LTG-By discharge, patient will transfer one surface to another     Dates: Start: 08/11/17       Description: 1) Individualized goal:  with FWW and SPV  2) Interventions:  PT Group Therapy, PT Gait Training, PT Therapeutic Exercises, PT Neuro Re-Ed/Balance, PT Therapeutic Activity and PT Manual Therapy.           Goal: LTG-By discharge, patient will ambulate up/down 4-6 stairs     Dates: Start: 08/30/17       Description: 1) Individualized goal: 2 steps with a FWW and CGA  2) Interventions: PT Group Therapy, PT Gait Training, PT Therapeutic Exercises, PT Neuro Re-Ed/Balance, PT Therapeutic Activity and PT Manual Therapy.                           Section completed by:  Pedrito Duenas PFernyTFerny, DPT       Activities of Daily Living  Eating Initial:  5 - Standby Prompting/Supervision or Set-up  Eating Current:  6 - Modified Independent   Eating Description:  Increased time  Grooming Initial:  6 - Modified Independent  Grooming Current:  6 - Modified Independent   Grooming Description:  Increased time (Seated on manual wc at sinkside)  Bathing Initial:  3 - Moderate Assistance  Bathing Current:  5 - Standby Prompting/Supervision or Set-up   Bathing Description:  Adaptive equipment, Grab bar, Tub bench, Long handled bath tool, Hand held shower, Increased time, Supervision for safety, Verbal cueing, Set-up of equipment  Upper Body Dressing Initial:  4 - Minimal Assistance  Upper Body Dressing Current:  6 - Modified Independent   Upper Body Dressing Description:   (Don/doff pull over shirt)  Lower Body Dressing Initial:  2 - Max Assistance  Lower Body Dressing Current:  5 - Standby  Prompting/Supervsion or Set-up   Lower Body Dressing Description:  5 - Standby Prompting/Supervsion or Set-up  Toileting Initial:  1 - Total Assistance  Toileting Current:  6 - Modified Independent   Toileting Description:  Grab bar, Increased time  Toilet Transfer Initial:  4 - Minimal Assistance  Toilet Transfer Current:  6 - Modified Independent   Toilet Transfer Description:  6 - Modified Independent  Tub / Shower Transfer Initial:  4 - Minimal Assistance  Tub / Shower Transfer Current:  5 - Standby Prompting/Supervision or Set-up   Tub / Shower Transfer Description:  Adaptive equipment, Grab bar, Increased time, Supervision for safety, Set-up of equipment  IADL:  TBD   Family Training/Education:  TBD   DME/DC Recommendations:  Shower chair, bed rail assist, reacher, sock aid, leg      Strengths:  Able to follow instructions, Alert and oriented, Effective communication skills, Good carryover of learning, Independent PLOF, Making steady progress towards goals, Motivated for self care and independence, Pleasant and cooperative, Supportive family and Willingly participates in therapeutic activities  Barriers:  Decreased endurance, Generalized weakness, Impulsive and Poor balance     # of short term goals set= 1    # of short term goals met= 1          Occupational Therapy Goals           Problem: OT Long Term Goals     Dates: Start: 08/11/17       Goal: LTG-By discharge, patient will complete basic self care tasks     Dates: Start: 08/11/17   Expected End: 08/25/17       Description: 1) Individualized Goal:  By discharge, patient will complete basic self care tasks at a level of Mod. I.   2) Interventions:  OT Self Care/ADL, OT Neuro Re-Ed/Balance, OT Therapeutic Activity and OT Therapeutic Exercise    Co-signature:  ISAIAS Gomez/KAITLIN              Goal: LTG-By discharge, patient will perform bathroom transfers     Dates: Start: 08/11/17   Expected End: 08/25/17       Description: 1) Individualized Goal:  By  discharge, patient will perform bathroom transfers at a level of Mod. I.   2) Interventions:  OT Self Care/ADL, OT Neuro Re-Ed/Balance, OT Therapeutic Activity and OT Therapeutic Exercise    Co-signature:  Connor Ferraro OTR/L              Goal: LTG-By discharge, patient will complete basic home management     Dates: Start: 08/11/17   Expected End: 08/25/17       Description: 1) Individualized Goal:  By discharge, patient will prepare a simple meal at a level of mod. I.   2) Interventions:  OT Self Care/ADL, OT Neuro Re-Ed/Balance, OT Therapeutic Activity and OT Therapeutic Exercise    Co-signature:  Connor Ferraro OTR/L                Problem: Toileting     Dates: Start: 08/11/17       Goal: STG-Within one week, patient will complete toileting tasks     Dates: Start: 08/23/17       Description: 1) Individualized Goal:  Min assist  2) Interventions:  OT Self Care/ADL, OT Neuro Re-Ed/Balance, OT Therapeutic Activity, OT Evaluation and OT Therapeutic Exercise                   Section completed by:  Elisha Kessler            Nutrition       Dietary Problems           Problem: Other Problem (see comments)     Description: Diagnosis: Increased nutrient needs (protein) r/t high demand for protein in the setting of wound healing/ maintaining LBM as evidenced by patient admitted with DTI to coccyx, visible evidence of muscle wasting.           Goal: Other Goal (Resolved)     Description: PO intake >50-75% of meals and supplements    Monitor/Evaluation: Monitor PO intake, weight, labs, medication adjustments, skin integrity, GI function, vitals, I/Os, and overall hydration status.  Adjust nutritional POC pending clinical outcomes.      RD following PRN.  PO is adequate.  Nutrition interventions in place.     Goal: Maintain adequate oral nutrient/fluid intake to promote nutrition optimization/wound healing.                   Patient seen today outside with wife.  Patient reports he is doing much better, feels much better.   Ward notes early satiety d/t enlarged spleen.  Patient, however, is drinking Boost Plus, eating well.  He looks much better than the last time I saw him.  He is upbeat.  Labs reviewed and noted. Wt trending down on Demadex q 48hours and Aldactone daily.  Other medications reviewed and noted. Consider increasing vitamin C to 1000mg daily, MVI+ minerals to optimize healing as patient with stage 3 to coccyx. No wound team note since 8/11. Patient has no GI complaints. BMs routinely. PO adequate at % of meals + Boost Plus supplementation.  Patient is rather pale upon physical assessment.  Getting iron supplementation. Vitamin D level WNL.  Continue current nutritional POC.  RD following PRN.    Section completed by:  Clarisa Paulino RD       REHAB-Pharmacy IDT Team Note by Gertrudis Thompson RPH at 8/22/2017  1:25 PM  Version 1 of 1    Author:  Gertrudis Thompson RPH Service:  (none) Author Type:  Pharmacist    Filed:  8/22/2017  1:27 PM Note Time:  8/22/2017  1:25 PM Status:  Signed    :  Gertrudis Thompson RPH (Pharmacist)           Pharmacy  Pharmacy  Antibiotics/Antifungals/Antivirals:  Medication:      Active Orders     None        Route:         n/a  Stop Date:  n/a  Reason:   Antihypertensives/Cardiac:  Medication:    Orders (72h ago through future)    Start     Ordered    08/22/17 0600  spironolactone (ALDACTONE) tablet 50 mg   Q DAY      08/21/17 0954    08/20/17 0000  cholestyramine (QUESTRAN,PREVALITE) 4 GM powder 4 g   1 TIME DAILY PRN      08/17/17 1046    08/17/17 0600  torsemide (DEMADEX) TABS 20 mg   EVERY 48 HOURS      08/16/17 2228    08/16/17 2300  metolazone (ZAROXOLYN) tablet 2.5 mg   EVERY 48 HOURS     Comments:  GIVE 40 MINUTES PRIOR TO DEMEDEX EVERY OTHER DAY.    08/16/17 2236    08/11/17 0600  spironolactone (ALDACTONE) tablet 25 mg   Q DAY,   Status:  Discontinued      08/10/17 1457    08/10/17 2100  atorvastatin (LIPITOR) tablet 40 mg   EVERY BEDTIME      08/10/17 1457    08/10/17  1456  hydrALAZINE (APRESOLINE) tablet 25 mg   EVERY 8 HOURS PRN      08/10/17 1457        Patient Vitals for the past 24 hrs:   BP Pulse   08/22/17 0700 118/68 mmHg 78   08/22/17 0527 125/66 mmHg 74   08/21/17 1900 107/51 mmHg 80   08/21/17 1400 106/62 mmHg 85       Anticoagulation:  Medication:  n/a  INR:    Recent Labs      08/21/17   0454  08/22/17   0614   INR  1.34*  1.35*     Other key medications: tamsulosin, torsemide, trazodone, ursodiol.    A review of the medication list reveals no issues at this time. Patient is currently on antihypertensive(s). Recommend home blood pressure monitoring/recording if antihypertensive(s) regimen(s) continue.  Section completed by:  Gertrudis Thompson [facility] Planning  DC destination/dispostion:  Return home with spouse. 2 steps to enter; bath/tub shower combo.        Referrals:  As requested by pt/spouse, out patient therapy.      DC Needs:Follow up with PCP Dr. Fabián Urena/Dr. Rosales.  Pharmacy [facility] @ [facility].  Wheelchair for community use.  Pt has a fww at home, and spouse ordered grab bars and shower seat.      Barriers to discharge:         Strengths:  Motivated to return home, supportive spouse, making gains w/ therapies, positive attitude,             Section completed by:  Malu Madden W, [facility]     Summary:  Poor standing tolerance, stand pivot transfers w/ fww and cga, gait-solo step/ 60 feet, and then 30 feet with cga to min assist, max assist w/ le dressing, toileting mod assist, bathing,toilet transfers, functional transfers min assist,  Coccyx wound unchanged, impulsivity has decreased, supportive spouse who is very involved and committed to having pt return home.      Physician Summary  Moreno Adorno MD participated and led team conference discussion.

## 2017-08-23 NOTE — CARE PLAN
Problem: Toileting  Goal: STG-Within one week, patient will complete toileting tasks  1) Individualized Goal: Within one week, patient will complete toileting tasks at a level of Max A.   2) Interventions: OT Self Care/ADL, OT Neuro Re-Ed/Balance, OT Therapeutic Activity and OT Therapeutic Exercise    Co-signature: ISAIAS Gomez/KAITLIN  Outcome: MET Date Met:  08/23/17  Pt at Mod assist

## 2017-08-23 NOTE — CARE PLAN
Problem: Infection  Goal: Will remain free from infection  Intervention: Assess signs and symptoms of infection  Patient wound on coccyx increase in redness since last picture on 8-20-17. Patient has dime size area that is new to right upper buttock. Re-consult to wound care done by this RN. Charge Nurse notified and assessed wound. Pic taken and uploaded. Dr Martinez assessed patient wound and agrees with new wound consult plan. Q 2 turns in place. Wound dressed according to order.

## 2017-08-23 NOTE — PROGRESS NOTES
"Rehab Progress Note     Interval Events (Subjective)  Patient seen in his room and  Along with his wife. Notes of  and the therapist appreciated. Patient has improved in regards to his ascites he also says he feels a bit stronger as well. The patient is in very good spirits and continues to work hard in therapy        Regular diet thin liquids  Eating well fluid restriction    No pain last 24 hours  Last BM 21  1 bladder accident in last week  Redness in better with nystatin  Sleeping 4 to 6 hours      PT    Bed mobility supervised for leg  as he is impulsivity but greatly imrpoved  Stand opivot with CG asssit  Reach pivot CG  Ambulating in solstep 60 feet  And then 35    In soloste  Recognize fatigue is a goal  Patient did  2 inchs step    OT  Mod for eatign grooming  Min isaura t for bathing  Needs help  Able to stabilze better  Min asssit fro functional transfers    Mod asssit for toeitling  Max lower body dressing    Want to keep 9/16/2017         Objective:  VITAL SIGNS: /72 mmHg  Pulse 76  Temp(Src) 36.6 °C (97.8 °F)  Resp 18  Ht 1.905 m (6' 3\")  Wt 99 kg (218 lb 4.1 oz)  BMI 27.28 kg/m2  SpO2 96%      GENERAL: No apparent distress  HEENT: Normocephalic/atraumatic, EOMI, PERRL and No nystagmus  CARDIAC: Regular rate and rhythm, normal S1, S2   LUNGS: Clear to auscultation   ABDOMINAL: bowel sounds present, soft, nontender and ascites present with fluid wave noted. Ascites is increased  EXTREMITIES: Without clubbing or cyanosis 2+ edema noted in the left lower extremity but decreased  SKIN:   Small stage III noted on sacrum  NEURO: Unchanged with the exception to hip flexor strength is definitely a half grade stronger bilaterally as compared to admission    Current Facility-Administered Medications   Medication Frequency   • spironolactone (ALDACTONE) tablet 50 mg Q DAY   • magnesium hydroxide (MILK OF MAGNESIA) suspension 15 mL QHS   • polyethylene glycol/lytes (MIRALAX) PACKET 1 " Packet QHS   • ferrous sulfate tablet 325 mg QDAY with Breakfast   • ascorbic acid (ascorbic acid) tablet 500 mg DAILY   • cholestyramine (QUESTRAN,PREVALITE) 4 GM powder 4 g QDAY PRN   • torsemide (DEMADEX) TABS 20 mg Q48HRS   • potassium chloride SA (Kdur) tablet 10 mEq DAILY   • metolazone (ZAROXOLYN) tablet 2.5 mg Q48HRS   • docusate sodium (COLACE) capsule 100 mg BID PRN    And   • senna-docusate (PERICOLACE or SENOKOT S) 8.6-50 MG per tablet 1 Tab QDAY PRN    And   • lactulose 20 GM/30ML solution 30 mL Q24HRS PRN    And   • bisacodyl (DULCOLAX) suppository 10 mg QDAY PRN   • hydrocortisone (ANUSOL-HC) suppository 25 mg Q12HRS PRN   • tamsulosin (FLOMAX) capsule 0.4 mg AFTER BREAKFAST   • Respiratory Care per Protocol Continuous RT   • Pharmacy Consult Request ...Pain Management Review 1 Each PRN   • nystatin (MYCOSTATIN) powder BID   • lactobacillus granules (LACTINEX/FLORANEX) packet 1 Packet TID WITH MEALS   • ursodiol (ACTIGALL) capsule 300 mg DAILY   • vitamin D (cholecalciferol) tablet 1,000 Units DAILY   • folic acid (FOLVITE) tablet 1 mg DAILY   • atorvastatin (LIPITOR) tablet 40 mg QHS   • benzocaine-menthol (CEPACOL) lozenge 1 Lozenge Q2HRS PRN   • hydrALAZINE (APRESOLINE) tablet 25 mg Q8HRS PRN   • mag hydrox-al hydrox-simeth (MAALOX PLUS ES or MYLANTA DS) suspension 20 mL Q2HRS PRN   • ondansetron (ZOFRAN ODT) dispertab 4 mg 4X/DAY PRN    Or   • ondansetron (ZOFRAN) syringe/vial injection 4 mg 4X/DAY PRN   • trazodone (DESYREL) tablet 50 mg QHS PRN   • sodium chloride (OCEAN) 0.65 % nasal spray 2 Spray PRN   • oxycodone immediate release (ROXICODONE) tablet 10 mg Q4HRS PRN       Orders Placed This Encounter   Procedures   • Diet Order     Standing Status: Standing      Number of Occurrences: 1      Standing Expiration Date:      Order Specific Question:  Diet:     Answer:  Regular [1]     Order Specific Question:  Consistency/Fluid modifications:     Answer:  Thin Liquids [3]        Assessment:  Active Hospital Problems    Diagnosis   • *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 DUE TO OLD HEP B, CHEMO RX,  OR THERMO RAD OF MAGNANT LESION   • Essential hypertension   • Colon cancer-2010 left hemicolectomy; no colostomy       Medical Decision Making and Plan:  Patient is doing well at this time  Dr. Hopper continues to follow medically. He continues to follow liver functions and workup of possible malignancy    Cirrhosis/ascites    Stable at this time but will monitor. It's improved with diuresis prescribed by Dr. hopper    Hypertension       under control with Aldactone and now Inderal    Stage III pressure ulcer, on the Coccyx    Appreciate the input of the wound care nurse honey colloid applied    Vitamin D deficiency    Continue replacement    Lower extremity edema left leg    We will continue to monitor improved      Diarrhea   C. diff toxin neg  No longer an issue      UTI  Treatment completed    Continue PT and OT  A tentative discharge date was suggested to be September 16 2017  We'll re-conference next week 8/30/2017    Team Conference       Nursing  Diet/Nutrition:  Regular and Thin Liquids  Medication Administration:  Whole with Liquid Wash  % consumed at meals in last 24 hours:  Consumed 100% of meals per documentation.  Breakfast:  100%   Lunch:  100%  Dinner:  100%   Snack schedule:   None  Supplement:  Boost Plus  Appetite:  Excellent  Fluid Intake/Output in past 24 hours: In: 1170 [P.O.:1170]  Out: 1300   Admit Weight:  Weight: 116.8 kg (257 lb 8 oz)  Weight Last 7 Days   [93.5 kg (206 lb 2.1 oz)-99.5 kg (219 lb 5.7 oz)] 99.5 kg (219 lb 5.7 oz) (09/05 0500)     Pain Issues:    Location:  Back (09/06 0035)  Mid;Lower (09/06 0035)         Severity:  Moderate   Scheduled pain medications:  None     PRN pain medications used in last 24 hours:  oxycodone immediate release (ROXICODONE)    Non Pharmacologic Interventions:  distraction, emotional support, relaxation and repositioned  Effectiveness of pain management plan:  fair=improved comfort with interventions but does not always meet goal     Bowel:    Bowel Assist Initial Score:  3 - Moderate Assistance  Bowel Assist Current Score:  6 - Modified Independent  Bowl Accidents in last 7 days:  0  Last bowel movement: 09/05/17  Stool: Medium, Soft     Usual bowel pattern:  daily  Scheduled bowel medications:  polyethylene glycol/lytes (MIRALAX)  and magnesium hydroxide (MILK OF MAGNESIA)   PRN bowel medications used in last 24 hours:  None  Nursing Interventions:  Increased time, Scheduled medication, Supervision, Positioning on commode/toilet  Effectiveness of bowel program:   good=regular, predictable, controlled emptying of bowel  Bladder:    Bladder Assist Initial Score:  5 - Standby Prompting/Supervision or Set-up  Bladder Assist Current Score:  5 - Standby Prompting/Supervision or Set-up  Bladder Accidents in last 7 days:  0  PVR range for past 24-48 hours:  [77]  ()  Medications affecting bladder:  Flomax    Time void schedule/voiding pattern:  Pt initiates voiding  Interventions:  Increased time, Medication, Emptying of device, Urinal  Effectiveness of bladder training:  Continent     Wound: Resolving with Barrier Cream        Patient Lines/Drains/Airways Status             Active Current Wounds                Name: Placement date: Placement  time: Site: Days:     Pressure Ulcer  Stage 3 POA Coccyx Midline 08/10/17        27                     Interventions:  Monitor, apply barrier cream and repositioning  Effectiveness of intervention:  wound is improving   Sleep/wake cycle:   Average hours slept:  Sleeps 3-4 hours without waking  Sleep medication usage:  None     Patient/Family Training/Education:  Fall Prevention, General Self Care, Medication Management, Pain Management, Safe Transfers, Safety, Skin Care and Wound Care  Strengths: Alert and oriented, Willingly participates in therapeutic activities, Able to follow instructions, Supportive family, Pleasant and cooperative, Effective communication skills and Motivated for self care and independence   Barriers:   Bladder retention, Fatigue and Generalized weakness                Nursing Problems                   Problem: Bowel/Gastric:      Goal: Normal bowel function is maintained or improved              Goal: Will not experience complications related to bowel motility                        Problem: Communication      Goal: The ability to communicate needs accurately and effectively will improve                        Problem: Discharge Barriers/Planning      Goal: Patient's continuum of care needs will be met                        Problem: Fluid Volume:      Goal: Will maintain balanced intake and output                        Problem: Infection      Goal: Will remain free from infection                        Problem: Knowledge Deficit      Goal: Knowledge of disease process/condition, treatment plan, diagnostic tests, and medications will improve              Goal: Knowledge of the prescribed therapeutic regimen will improve                        Problem: Mobility      Goal: Risk for activity intolerance will decrease                        Problem: Pain Management      Goal: Pain level will decrease to patient's comfort goal                        Problem: Respiratory:      Goal: Respiratory  "status will improve                        Problem: Safety      Goal: Will remain free from injury              Goal: Will remain free from falls                        Problem: Skin Integrity      Goal: Risk for impaired skin integrity will decrease                        Problem: Urinary Elimination:      Goal: Ability to reestablish a normal urinary elimination pattern will improve                        Problem: Venous Thromboembolism (VTW)/Deep Vein Thrombosis (DVT) Prevention:      Goal: Patient will participate in Venous Thrombosis (VTE)/Deep Vein Thrombosis (DVT)Prevention Measures                      Long Term Goals:   At discharge patient will be able to function safely at home and in the community with support.     Section completed by:  William Venegas R.N.               Mobility  Bed mobility:   SPV  Bed /Chair/Wheelchair Transfer Initial:  4 - Minimal Assistance  Bed /Chair/Wheelchair Transfer Current:  5 - Standby Prompting/Supervision or Set-up                       Bed/Chair/Wheelchair Transfer Description:  Adaptive equipment, Supervision for safety, Verbal cueing, Increased time  Walk Initial:  0 - Not tested,unsafe activity  Walk Current:  5 - Standby Prompting/Supervision or Set-up                       Walk Description:   (SPV, FWW, 215 feet indoors; bradykinetic, mod BUE WB onto FWW, Trendelenberg increases with fatigue)  Wheelchair Initial:  0 - Not tested,medical condition  Wheelchair Current:  6 - Modified Independent                       Wheelchair Description:   (Mod I indoors for 200 feet to back gym)  Stairs Initial:  0 - Not tested,medical condition  Stairs Current: 1 - Total Assistance                       Stairs Description:  (min A with FWW for 6\" curb and 1\" threshold)  Patient/Family Training/Education:  Ongoing with good carryover; family training today is optional  DME/DC Recommendations:  Tomorrow, 9/7/17 as long as stairs grab bars are installed; new FWW, 20\"high " manual w/c with standard B legrests; home health PT (but pt prefers outpatient PT); intermittent SPV from [facility]  Strengths:  Able to follow instructions, Effective communication skills, Good carryover of learning, Good insight into deficits/needs, Independent PLOF, Making steady progress towards goals, Motivated for self care and independence, Pleasant and cooperative, Supportive family and Willingly participates in therapeutic activities  Barriers:   Decreased endurance, Generalized weakness, Limited mobility, Poor balance and Pressure ulcer  # of short term goals set= 4  # of short term goals met= 3           Physical Therapy Problems                     Problem: Mobility      Dates: Start: 08/11/17                    Goal: STG-Within one week, patient will ascend and descend four to six stairs             Dates: Start: 08/30/17          Description: 1) Individualized goal: 2 steps with a FWW and CGA  2) Interventions: PT Group Therapy, PT Gait Training, PT Therapeutic Exercises, PT Neuro Re-Ed/Balance, PT Therapeutic Activity and PT Manual Therapy.                           Note:              Goal Note filed on 09/06/17 1247 by Pedrito Duenas, P.T.       Goal: STG-Within one week, patient will ascend and descend four to six stairs  Outcome: NOT MET  Went to attempt yesterday but pt is changing home setup to include two grab bars at the stairs.                                     Problem: PT-Long Term Goals      Dates: Start: 08/11/17              Goal: LTG-By discharge, patient will ambulate            Dates: Start: 08/11/17         Description: 1) Individualized goal:  200 feet with FWW and SPV  2) Interventions:  PT Group Therapy, PT Gait Training, PT Therapeutic Exercises, PT Neuro Re-Ed/Balance, PT Therapeutic Activity and PT Manual Therapy.                     Goal: LTG-By discharge, patient will transfer one surface to another            Dates: Start: 08/11/17         Description: 1) Individualized  goal:  with FWW and SPV  2) Interventions:  PT Group Therapy, PT Gait Training, PT Therapeutic Exercises, PT Neuro Re-Ed/Balance, PT Therapeutic Activity and PT Manual Therapy.                     Goal: LTG-By discharge, patient will ambulate up/down 4-6 stairs            Dates: Start: 08/30/17         Description: 1) Individualized goal: 2 steps with a FWW and CGA  2) Interventions: PT Group Therapy, PT Gait Training, PT Therapeutic Exercises, PT Neuro Re-Ed/Balance, PT Therapeutic Activity and PT Manual Therapy.                                 Section completed by:  MIRANDA RojasTFerny, DPT        Activities of Daily Living  Eating Initial:  5 - Standby Prompting/Supervision or Set-up  Eating Current:  6 - Modified Independent                       Eating Description:  Increased time  Grooming Initial:  6 - Modified Independent  Grooming Current:  6 - Modified Independent                       Grooming Description:  Increased time (Seated on manual wc at sinkside)  Bathing Initial:  3 - Moderate Assistance  Bathing Current:  5 - Standby Prompting/Supervision or Set-up                       Bathing Description:  Adaptive equipment, Grab bar, Tub bench, Long handled bath tool, Hand held shower, Increased time, Supervision for safety, Verbal cueing, Set-up of equipment  Upper Body Dressing Initial:  4 - Minimal Assistance  Upper Body Dressing Current:  6 - Modified Independent                       Upper Body Dressing Description:   (Don/doff pull over shirt)  Lower Body Dressing Initial:  2 - Max Assistance  Lower Body Dressing Current:  5 - Standby Prompting/Supervsion or Set-up                       Lower Body Dressing Description:  5 - Standby Prompting/Supervsion or Set-up  Toileting Initial:  1 - Total Assistance  Toileting Current:  6 - Modified Independent                       Toileting Description:  Grab bar, Increased time  Toilet Transfer Initial:  4 - Minimal Assistance  Toilet Transfer Current:   6 - Modified Independent                       Toilet Transfer Description:  6 - Modified Independent  Tub / Shower Transfer Initial:  4 - Minimal Assistance  Tub / Shower Transfer Current:  5 - Standby Prompting/Supervision or Set-up                       Tub / Shower Transfer Description:  Adaptive equipment, Grab bar, Increased time, Supervision for safety, Set-up of equipment  IADL:  TBD   Family Training/Education:  TBD   DME/DC Recommendations:  Shower chair, bed rail assist, reacher, sock aid, leg       Strengths:  Able to follow instructions, Alert and oriented, Effective communication skills, Good carryover of learning, Independent PLOF, Making steady progress towards goals, Motivated for self care and independence, Pleasant and cooperative, Supportive family and Willingly participates in therapeutic activities  Barriers:  Decreased endurance, Generalized weakness, Impulsive and Poor balance     # of short term goals set= 1    # of short term goals met= 1              Occupational Therapy Goals                     Problem: OT Long Term Goals      Dates: Start: 08/11/17                   Goal: LTG-By discharge, patient will complete basic self care tasks            Dates: Start: 08/11/17   Expected End: 08/25/17         Description: 1) Individualized Goal:  By discharge, patient will complete basic self care tasks at a level of Mod. I.   2) Interventions:  OT Self Care/ADL, OT Neuro Re-Ed/Balance, OT Therapeutic Activity and OT Therapeutic Exercise     Co-signature:  LIZ Gomez                             Goal: LTG-By discharge, patient will perform bathroom transfers            Dates: Start: 08/11/17   Expected End: 08/25/17         Description: 1) Individualized Goal:  By discharge, patient will perform bathroom transfers at a level of Mod. I.   2) Interventions:  OT Self Care/ADL, OT Neuro Re-Ed/Balance, OT Therapeutic Activity and OT Therapeutic Exercise     Co-signature:  Connor Ferraro  OTR/L                             Goal: LTG-By discharge, patient will complete basic home management            Dates: Start: 08/11/17   Expected End: 08/25/17         Description: 1) Individualized Goal:  By discharge, patient will prepare a simple meal at a level of mod. I.   2) Interventions:  OT Self Care/ADL, OT Neuro Re-Ed/Balance, OT Therapeutic Activity and OT Therapeutic Exercise     Co-signature:  Connor Ferraro, OTR/L                              Problem: Toileting      Dates: Start: 08/11/17              Goal: STG-Within one week, patient will complete toileting tasks            Dates: Start: 08/23/17         Description: 1) Individualized Goal:  Min assist  2) Interventions:  OT Self Care/ADL, OT Neuro Re-Ed/Balance, OT Therapeutic Activity, OT Evaluation and OT Therapeutic Exercise                         Section completed by:  Elisha Kessler              Nutrition            Dietary Problems           Problem: Other Problem (see comments)      Description: Diagnosis: Increased nutrient needs (protein) r/t high demand for protein in the setting of wound healing/ maintaining LBM as evidenced by patient admitted with DTI to cocc, visible evidence of muscle wasting.              Goal: Other Goal (Resolved)       Description: PO intake >50-75% of meals and supplements     Monitor/Evaluation: Monitor PO intake, weight, labs, medication adjustments, skin integrity, GI function, vitals, I/Os, and overall hydration status.  Adjust nutritional POC pending clinical outcomes.       RD following PRN.  PO is adequate.  Nutrition interventions in place.      Goal: Maintain adequate oral nutrient/fluid intake to promote nutrition optimization/wound healing.                       Patient seen today outside with wife.  Patient reports he is doing much better, feels much better. Dr. Hopper notes early satiety d/t enlarged spleen.  Patient, however, is drinking Boost Plus, eating well.  He looks much better than the last  time I saw him.  He is upbeat.  Labs reviewed and noted. Wt trending down on Demadex q 48hours and Aldactone daily.  Other medications reviewed and noted. Consider increasing vitamin C to 1000mg daily, MVI+ minerals to optimize healing as patient with stage 3 to coccyx. No wound team note since 8/11. Patient has no GI complaints. BMs routinely. PO adequate at % of meals + Boost Plus supplementation.  Patient is rather pale upon physical assessment.  Getting iron supplementation. Vitamin D level WNL.  Continue current nutritional POC.  RD following PRN.    Section completed by:  Clarisa Paulino RD                REHAB-Pharmacy IDT Team Note by Gertrudis Thompson RPH at 8/22/2017  1:25 PM  Version 1 of 1     Author:  Gertrudis Thompson RPH Service:  (none) Author Type:  Pharmacist     Filed:  8/22/2017  1:27 PM Note Time:  8/22/2017  1:25 PM Status:  Signed     :  Gertrudis Thompson RPH (Pharmacist)                Pharmacy  Pharmacy  Antibiotics/Antifungals/Antivirals:  Medication:          Active Orders      None          Route:         n/a  Stop Date:  n/a  Reason:   Antihypertensives/Cardiac:  Medication:    Orders (72h ago through future)     Start     Ordered     08/22/17 0600   spironolactone (ALDACTONE) tablet 50 mg   Q DAY       08/21/17 0954     08/20/17 0000   cholestyramine (QUESTRAN,PREVALITE) 4 GM powder 4 g   1 TIME DAILY PRN       08/17/17 1046     08/17/17 0600   torsemide (DEMADEX) TABS 20 mg   EVERY 48 HOURS       08/16/17 2228     08/16/17 2300   metolazone (ZAROXOLYN) tablet 2.5 mg   EVERY 48 HOURS     Comments:  GIVE 40 MINUTES PRIOR TO DEMEDEX EVERY OTHER DAY.     08/16/17 2236     08/11/17 0600   spironolactone (ALDACTONE) tablet 25 mg   Q DAY,   Status:  Discontinued       08/10/17 1457     08/10/17 2100   atorvastatin (LIPITOR) tablet 40 mg   EVERY BEDTIME       08/10/17 1457     08/10/17 1456   hydrALAZINE (APRESOLINE) tablet 25 mg   EVERY 8 HOURS PRN       08/10/17 1457           Patient Vitals for the past 24 hrs:    BP Pulse   08/22/17 0700 118/68 mmHg 78   08/22/17 0527 125/66 mmHg 74   08/21/17 1900 107/51 mmHg 80   08/21/17 1400 106/62 mmHg 85         Anticoagulation:  Medication:  n/a  INR:         Recent Labs      08/21/17   0454  08/22/17   0614   INR  1.34*  1.35*      Other key medications: tamsulosin, torsemide, trazodone, ursodiol.    A review of the medication list reveals no issues at this time. Patient is currently on antihypertensive(s). Recommend home blood pressure monitoring/recording if antihypertensive(s) regimen(s) continue.  Section completed by:  Gertrudis Thompson [facility]                      DC Planning  DC destination/dispostion:  Return home with spouse. 2 steps to enter; bath/tub shower combo.        Referrals:  As requested by pt/spouse, out patient therapy.      DC Needs:Follow up with PCP Dr. Fabián Urena/Dr. Rosales.  Pharmacy [facility] @ [facility].  Wheelchair for community use.  Pt has a fww at home, and spouse ordered grab bars and shower seat.       Barriers to discharge:         Strengths:  Motivated to return home, supportive spouse, making gains w/ therapies, positive attitude,             Section completed by:  MARI Santos, [facility]      Summary:  Poor standing tolerance, stand pivot transfers w/ fww and cga, gait-solo step/ 60 feet, and then 30 feet with cga to min assist, max assist w/ le dressing, toileting mod assist, bathing,toilet transfers, functional transfers min assist,  Coccyx wound unchanged, impulsivity has decreased, supportive spouse who is very involved and committed to having pt return home.       Physician Summary  I participated and led team conference discussion.      Total time:  > 35 minutes.  I spent greater than 50% of the time for patient care and coordination on this date, including unit/floor time, and face-to-face time with the patient as per assessment and plan above.    Moreno Adorno M.D.

## 2017-08-23 NOTE — REHAB-DIETARY IDT TEAM NOTE
Dietary  Nutrition       Dietary Problems           Problem: Other Problem (see comments)     Description: Diagnosis: Increased nutrient needs (protein) r/t high demand for protein in the setting of wound healing/ maintaining LBM as evidenced by patient admitted with DTI to coccyx, visible evidence of muscle wasting.           Goal: Other Goal (Resolved)     Description: PO intake >50-75% of meals and supplements    Monitor/Evaluation: Monitor PO intake, weight, labs, medication adjustments, skin integrity, GI function, vitals, I/Os, and overall hydration status.  Adjust nutritional POC pending clinical outcomes.      RD following PRN.  PO is adequate.  Nutrition interventions in place.     Goal: Maintain adequate oral nutrient/fluid intake to promote nutrition optimization/wound healing.                   Patient seen today outside with wife.  Patient reports he is doing much better, feels much better. Dr. Hopper notes early satiety d/t enlarged spleen.  Patient, however, is drinking Boost Plus, eating well.  He looks much better than the last time I saw him.  He is upbeat.  Labs reviewed and noted. Wt trending down on Demadex q 48hours and Aldactone daily.  Other medications reviewed and noted. Consider increasing vitamin C to 1000mg daily, MVI+ minerals to optimize healing as patient with stage 3 to coccyx. No wound team note since 8/11. Patient has no GI complaints. BMs routinely. PO adequate at % of meals + Boost Plus supplementation.  Patient is rather pale upon physical assessment.  Getting iron supplementation. Vitamin D level WNL.  Continue current nutritional POC.  RD following PRN.    Section completed by:  Clarisa Paulino RD

## 2017-08-23 NOTE — REHAB-OT IDT TEAM NOTE
Occupational Therapy  Activities of Daily Living  Eating Initial:  5 - Standby Prompting/Supervision or Set-up  Eating Current:  6 - Modified Independent   Eating Description:  Increased time  Grooming Initial:  6 - Modified Independent  Grooming Current:  6 - Modified Independent   Grooming Description:   (wheelchair level)  Bathing Initial:  3 - Moderate Assistance  Bathing Current:  4 - Minimal Assistance   Bathing Description:  Grab bar, Tub bench, Hand held shower, Set-up of equipment (completed 8/10 body parts)  Upper Body Dressing Initial:  4 - Minimal Assistance  Upper Body Dressing Current:  6 - Modified Independent   Upper Body Dressing Description:  Increased time (Don/doff pull over shirt)  Lower Body Dressing Initial:  2 - Max Assistance  Lower Body Dressing Current:  2 - Max Assistance   Lower Body Dressing Description:  2 - Max Assistance  Toileting Initial:  1 - Total Assistance  Toileting Current:  3 - Moderate Assistance   Toileting Description:  Adaptive equipment, Grab bar, Increased time, Supervision for safety, Verbal cueing  Toilet Transfer Initial:  4 - Minimal Assistance  Toilet Transfer Current:  4 - Minimal Assistance   Toilet Transfer Description:  4 - Minimal Assistance  Tub / Shower Transfer Initial:  4 - Minimal Assistance  Tub / Shower Transfer Current:  4 - Minimal Assistance   Tub / Shower Transfer Description:  Grab bar  IADL:  TBD   Family Training/Education:  TBD   DME/DC Recommendations:  TBD     Strengths:  Alert and oriented, Effective communication skills, Independent PLOF, Making steady progress towards goals, Motivated for self care and independence, Pleasant and cooperative and Supportive family  Barriers:  Decreased endurance, Generalized weakness, Impulsive, Limited mobility and Poor balance     # of short term goals set= 2    # of short term goals met= 2         Occupational Therapy Goals           Problem: Functional Transfers     Dates: Start: 08/23/17       Goal:  STG-Within one week, patient will transfer to toilet     Dates: Start: 08/23/17      Description: 1) Individualized Goal:  Sup/SBA    2) Interventions:  OT Self Care/ADL, OT Neuro Re-Ed/Balance, OT Therapeutic Activity, OT Evaluation and OT Therapeutic Exercise            Goal: STG-Within one week, patient will transfer to step in shower     Dates: Start: 08/23/17      Description: 1) Individualized Goal:  Sup/SBA    2) Interventions:  OT Self Care/ADL, OT Neuro Re-Ed/Balance, OT Therapeutic Activity, OT Evaluation and OT Therapeutic Exercise              Problem: OT Long Term Goals     Dates: Start: 08/11/17       Goal: LTG-By discharge, patient will complete basic self care tasks     Dates: Start: 08/11/17   Expected End: 08/25/17      Description: 1) Individualized Goal:  By discharge, patient will complete basic self care tasks at a level of Mod. I.     2) Interventions:  OT Self Care/ADL, OT Neuro Re-Ed/Balance, OT Therapeutic Activity and OT Therapeutic Exercise    Co-signature:  Connor Ferraro OTR/KAITLIN             Goal: LTG-By discharge, patient will perform bathroom transfers     Dates: Start: 08/11/17   Expected End: 08/25/17      Description: 1) Individualized Goal:  By discharge, patient will perform bathroom transfers at a level of Mod. I.     2) Interventions:  OT Self Care/ADL, OT Neuro Re-Ed/Balance, OT Therapeutic Activity and OT Therapeutic Exercise    Co-signature:  Connor Ferraro OTR/KAITLIN             Goal: LTG-By discharge, patient will complete basic home management     Dates: Start: 08/11/17   Expected End: 08/25/17      Description: 1) Individualized Goal:  By discharge, patient will prepare a simple meal at a level of mod. I.     2) Interventions:  OT Self Care/ADL, OT Neuro Re-Ed/Balance, OT Therapeutic Activity and OT Therapeutic Exercise    Co-signature:  Connor Ferraro OTR/KAITLIN               Problem: Toileting     Dates: Start: 08/11/17       Goal: STG-Within one week, patient will complete toileting  tasks     Dates: Start: 08/23/17      Description: 1) Individualized Goal:  Min assist    2) Interventions:  OT Self Care/ADL, OT Neuro Re-Ed/Balance, OT Therapeutic Activity, OT Evaluation and OT Therapeutic Exercise                  Section completed by:  Connor Ferraro MS,OTR/L

## 2017-08-23 NOTE — REHAB-NURSING IDT TEAM NOTE
Nursing  Nursing  Diet/Nutrition:  Regular and Thin Liquids  Medication Administration:  Whole with Liquid Wash  % consumed at meals in last 24 hours:  Consumed 70%-90% of meals per documentation.  Breakfast:  80%   Lunch:  90%  Dinner:  70%   Snack schedule:  None  Supplement:  Boost Plus  Appetite:  Good  Fluid Intake/Output in past 24 hours: In: 960 [P.O.:960]  Out: 575   Admit Weight:  Weight: 116.801 kg (257 lb 8 oz)  Weight Last 7 Days   [101 kg (222 lb 10.6 oz)-110.5 kg (243 lb 9.7 oz)] 101 kg (222 lb 10.6 oz) (08/22 0500)    Pain Issues:           Severity:  Denies   Scheduled pain medications:  None     PRN pain medications used in last 24 hours:  None   Non Pharmacologic Interventions:  emotional support, repositioned and rest  Effectiveness of pain management plan:  good=patient states acceptable comfort after interventions    Bowel:    Bowel Assist Initial Score:  3 - Moderate Assistance  Bowel Assist Current Score:  5 - Standby Prompting/Supervision or Set-up  Bowl Accidents in last 7 days:  2  Last bowel movement: 08/21/17  Stool: Large, Hard, Formed, Brown     Usual bowel pattern:  every other day  Scheduled bowel medications:  polyethylene glycol/lytes (MIRALAX)  and magnesium hydroxide (MILK OF MAGNESIA)   PRN bowel medications used in last 24 hours:  None  Nursing Interventions:  Increased time, Scheduled medication, Supervision, Positioning on commode/toilet, Brief  Effectiveness of bowel program:   fair=sometimes needs prn bowel meds for constipation  Bladder:    Bladder Assist Initial Score:  5 - Standby Prompting/Supervision or Set-up  Bladder Assist Current Score:  6 - Modified Independent  Bladder Accidents in last 7 days:  1  Medications affecting bladder:  Flomax    Interventions:  Increased time, Supervision, Emptying of device, Urinal, Brief    Wound:      Patient Lines/Drains/Airways Status    Active Current Wounds     Name: Placement date: Placement time: Site: Days:    Surgical  Incision  Incision Back 05/25/17 2020    89    Pressure Ulcer  Stage 3 POA Coccyx Midline 08/10/17     12    Wound Rash Buttock  Medial 05/26/17  0845   88    Wound Skin Tear Buttock Right 05/29/17  1425   85                   Interventions: Monitor and assess  Effectiveness of intervention:  wound is unchanged     Sleep/wake cycle:   Average hours slept:  Sleeps 4-6 hours without waking  Sleep medication usage:  None    Patient/Family Training/Education:  Bladder Management/Training, Bowel Management/Training, Diet/Nutrition, Fall Prevention, General Self Care, Medication Management, Pain Management, Respiratory Hygiene, Safety and Wound Care    Strengths: Alert and oriented, Willingly participates in therapeutic activities, Able to follow instructions, Supportive family and Manages pain appropriately   Barriers:   Fatigue, Generalized weakness and Limited mobility            Nursing Problems           Problem: Bowel/Gastric:     Goal: Normal bowel function is maintained or improved         Goal: Will not experience complications related to bowel motility           Problem: Communication     Goal: The ability to communicate needs accurately and effectively will improve           Problem: Discharge Barriers/Planning     Goal: Patient's continuum of care needs will be met           Problem: Infection     Goal: Will remain free from infection           Problem: Knowledge Deficit     Goal: Knowledge of disease process/condition, treatment plan, diagnostic tests, and medications will improve         Goal: Knowledge of the prescribed therapeutic regimen will improve           Problem: Mobility     Goal: Risk for activity intolerance will decrease           Problem: Pain Management     Goal: Pain level will decrease to patient's comfort goal           Problem: Respiratory:     Goal: Respiratory status will improve           Problem: Safety     Goal: Will remain free from injury         Goal: Will remain free from falls            Problem: Skin Integrity     Goal: Risk for impaired skin integrity will decrease           Problem: Urinary Elimination:     Goal: Ability to reestablish a normal urinary elimination pattern will improve           Problem: Venous Thromboembolism (VTW)/Deep Vein Thrombosis (DVT) Prevention:     Goal: Patient will participate in Venous Thrombosis (VTE)/Deep Vein Thrombosis (DVT)Prevention Measures                Long Term Goals:   At discharge patient will be able to function safely at home and in the community with support.    Section completed by:  Jennifer Katz R.N.       Read and reviewed by Mari Guadalupe R.N.

## 2017-08-24 LAB — AFP-TM SERPL-MCNC: 2 NG/ML (ref 0–9)

## 2017-08-24 PROCEDURE — 97110 THERAPEUTIC EXERCISES: CPT

## 2017-08-24 PROCEDURE — 97530 THERAPEUTIC ACTIVITIES: CPT

## 2017-08-24 PROCEDURE — A9270 NON-COVERED ITEM OR SERVICE: HCPCS | Performed by: HOSPITALIST

## 2017-08-24 PROCEDURE — 700102 HCHG RX REV CODE 250 W/ 637 OVERRIDE(OP): Performed by: PHYSICAL MEDICINE & REHABILITATION

## 2017-08-24 PROCEDURE — 700102 HCHG RX REV CODE 250 W/ 637 OVERRIDE(OP): Performed by: HOSPITALIST

## 2017-08-24 PROCEDURE — 99232 SBSQ HOSP IP/OBS MODERATE 35: CPT | Performed by: HOSPITALIST

## 2017-08-24 PROCEDURE — 97116 GAIT TRAINING THERAPY: CPT

## 2017-08-24 PROCEDURE — 97112 NEUROMUSCULAR REEDUCATION: CPT

## 2017-08-24 PROCEDURE — A9270 NON-COVERED ITEM OR SERVICE: HCPCS | Performed by: PHYSICAL MEDICINE & REHABILITATION

## 2017-08-24 PROCEDURE — 770010 HCHG ROOM/CARE - REHAB SEMI PRIVAT*

## 2017-08-24 RX ADMIN — LACTOBACILLUS ACIDOPHILUS / LACTOBACILLUS BULGARICUS 1 PACKET: 100 MILLION CFU STRENGTH GRANULES at 08:46

## 2017-08-24 RX ADMIN — LACTOBACILLUS ACIDOPHILUS / LACTOBACILLUS BULGARICUS 1 PACKET: 100 MILLION CFU STRENGTH GRANULES at 11:54

## 2017-08-24 RX ADMIN — POTASSIUM CHLORIDE 10 MEQ: 1500 TABLET, EXTENDED RELEASE ORAL at 08:45

## 2017-08-24 RX ADMIN — ATORVASTATIN CALCIUM 40 MG: 40 TABLET, FILM COATED ORAL at 20:58

## 2017-08-24 RX ADMIN — FERROUS SULFATE TAB 325 MG (65 MG ELEMENTAL FE) 325 MG: 325 (65 FE) TAB at 08:45

## 2017-08-24 RX ADMIN — NYSTATIN 1500000 UNITS: 100000 POWDER TOPICAL at 20:58

## 2017-08-24 RX ADMIN — TRAZODONE HYDROCHLORIDE 50 MG: 50 TABLET ORAL at 23:19

## 2017-08-24 RX ADMIN — OXYCODONE HYDROCHLORIDE AND ACETAMINOPHEN 500 MG: 500 TABLET ORAL at 08:45

## 2017-08-24 RX ADMIN — POLYETHYLENE GLYCOL 3350 1 PACKET: 17 POWDER, FOR SOLUTION ORAL at 20:58

## 2017-08-24 RX ADMIN — VITAMIN D, TAB 1000IU (100/BT) 1000 UNITS: 25 TAB at 08:46

## 2017-08-24 RX ADMIN — LACTOBACILLUS ACIDOPHILUS / LACTOBACILLUS BULGARICUS 1 PACKET: 100 MILLION CFU STRENGTH GRANULES at 17:46

## 2017-08-24 RX ADMIN — FOLIC ACID 1 MG: 1 TABLET ORAL at 08:46

## 2017-08-24 RX ADMIN — METOLAZONE 2.5 MG: 2.5 TABLET ORAL at 22:00

## 2017-08-24 RX ADMIN — MAGNESIUM HYDROXIDE 15 ML: 400 SUSPENSION ORAL at 20:58

## 2017-08-24 RX ADMIN — SPIRONOLACTONE 50 MG: 25 TABLET, FILM COATED ORAL at 05:07

## 2017-08-24 RX ADMIN — URSODIOL 300 MG: 300 CAPSULE ORAL at 08:46

## 2017-08-24 RX ADMIN — TAMSULOSIN HYDROCHLORIDE 0.4 MG: 0.4 CAPSULE ORAL at 08:46

## 2017-08-24 ASSESSMENT — GAIT ASSESSMENTS
DISTANCE (FEET): 150
ASSISTIVE DEVICE: FRONT WHEEL WALKER
DISTANCE (FEET): 100
GAIT LEVEL OF ASSIST: CONTACT GUARD ASSIST
ASSISTIVE DEVICE: FRONT WHEEL WALKER
GAIT LEVEL OF ASSIST: CONTACT GUARD ASSIST
DEVIATION: DECREASED BASE OF SUPPORT;DECREASED HEEL STRIKE;DECREASED TOE OFF

## 2017-08-24 ASSESSMENT — ENCOUNTER SYMPTOMS
DIZZINESS: 0
FEVER: 0
BLURRED VISION: 0
COUGH: 0
DIARRHEA: 0
NERVOUS/ANXIOUS: 0

## 2017-08-24 ASSESSMENT — PAIN SCALES - GENERAL
PAINLEVEL_OUTOF10: 0
PAINLEVEL_OUTOF10: 0

## 2017-08-24 NOTE — DIETARY
"Received order s/t \"poor healing sacral wound.\"    Should be noted RD has been following this patient SINCE ADMISSION.    Patient is eating excellent, supplements in place d/t loss of muscle mass to which patient is consuming.    Recommend vitamin therapy 500mg Vitamin C BID + MVI ( with minerals) OR MVI + 50mg Zinc daily.     RD following PRN.  PO kcal/protein is adequate.   "

## 2017-08-24 NOTE — CARE PLAN
Problem: Skin Integrity  Goal: Risk for impaired skin integrity will decrease  Outcome: PROGRESSING AS EXPECTED  Patient was receptive to teaching on nursing interventions on how to manage pressure ulcers. Patient was compliant with his turning schedules or repositioning timing.    Patient coccyx area was left open to air as ordered with prescribed cream applied on the affected area.

## 2017-08-24 NOTE — WOUND TEAM
"[facility]  Inpatient Services  Wound and Skin Care Progress Note    Admission Date: 8/10/17   HPI, PMH, SH: Reviewed  Unit where seen by Wound Team:  06-2    WOUND CONSULT RELATED TO: re evaluation of pressure ulcer coccyx and surrounding skin    SUBJECTIVE:  \"I was told once a long time ago I had psoriasis.  I have a rash on my leg that I put hydrocortisone cream on and it goes away\"    Self Report / Pain Level: denies related to wound or skin    OBJECTIVE:    Stage 3 pressure ulcer appears healing and note that a plaque like rash surrounding this especially an area that is upper left of coccyx; other rash appears less red than photo taken this AM.    WOUND TYPE, LOCATION, CHARACTERISTICS (Pressure ulcers: location, stage, POA or date identified)    Wound Type/Location:   Plaque like rash left sacrum   Periwound:  Pink, raised, bumpy    Drainage:  none     Tissue Type and %:   Red 100%  Wound Edges:  attached    Odor:  none     Exposed structure(s):  none    S&S of Infection:  none    Measurements: (taken 8/23/17)   Length:  2cm   Width:   1.8cm   Depth: <0.5cm   Tracts/undermining:  none    INTERVENTIONS BY WOUND TEAM:  Met with patient and his wife who are knowledgeable regarding skin history and coccyx wound.  Patient turned to side and removed old dressing.  Note that pressure ulcer appears resolving.  Measured red plaque area and then applied zinc based cream to buttocks covering all rash, wound and plaque like lesion.  Discussed that we will try this treatment and see how he responds.  I will also leave a VM for Dr. Martinez of above.  Patient and wife pleased with trying this treatment and leaving dressing off and skin BE.  Instructed that turning schedule still applies.     Interdisciplinary consultation: staff RN, patient and wife    EVALUATION:  Pressure ulcer appears resolving and doubt that new treatment will impact progress of this wound; rash on leg doesn't appear like plaque like " lesion left sacrum so unsure if hydrocortisone cream would be helpful.  Will leave to Dr. Martinez's discretion.     Factors affecting wound healing:  Pressure, moisture  Goals:  Wound to decrease size by 2% weekly    NURSING PLAN OF CARE ORDERS (X):    Dressing changes: See Dressing Maintenance orders:   X discontinued  Skin care: See Skin Care orders:  X updated  Rectal tube care: See Rectal Tube Care orders:    Other orders:    WOUND TEAM PLAN OF CARE (X):    NPWT change 3 x week:         Dressing changes by wound team:       Follow up as needed:    X   Other (explain):    Anticipated discharge plans (X):  SNF:           Home Care:           Outpatient Wound Center:            Self Care:            Other:    TBD

## 2017-08-24 NOTE — CARE PLAN
Problem: Safety  Goal: Will remain free from injury  Outcome: PROGRESSING AS EXPECTED  Pt uses call light consistently and appropriately. Waits for assistance does not attempt self transfer this shift. Able to verbalize needs.    Problem: Bowel/Gastric:  Goal: Normal bowel function is maintained or improved  Outcome: PROGRESSING AS EXPECTED  Patient having regular bowel movements; last BM 8/23.  Denies s/s constipation; bowel meds available if needed.  Will continue to monitor.

## 2017-08-24 NOTE — PROGRESS NOTES
Hospital Medicine Progress Note, Adult, Complex               Author: Yaya Martinez Date & Time created: 8/24/2017  9:06 AM     Interval History:  No significant events or changes since last visit  Patient denies SOB, cough, or diarrhea  Patient slept ok last night    Chief Complaint:  Hypertension  Fluid overload      Review of Systems:  Review of Systems   Constitutional: Negative for fever.   Eyes: Negative for blurred vision.   Respiratory: Negative for cough.    Cardiovascular: Negative for chest pain.   Gastrointestinal: Negative for diarrhea.   Musculoskeletal: Negative for joint pain.   Neurological: Negative for dizziness.   Psychiatric/Behavioral: The patient is not nervous/anxious.        Physical Exam:  Physical Exam   Constitutional: He is oriented to person, place, and time. No distress.   HENT:   Mouth/Throat: No oropharyngeal exudate.   Eyes: EOM are normal.   Neck: No JVD present. No tracheal deviation present.   Cardiovascular: Normal rate, regular rhythm, S1 normal and S2 normal.    No murmur heard.  Pulmonary/Chest: Effort normal and breath sounds normal. He has no rhonchi.   Abdominal: Soft. Bowel sounds are normal. Hernia confirmed negative in the right inguinal area and confirmed negative in the left inguinal area.   Musculoskeletal: He exhibits no edema.   Neurological: He is alert and oriented to person, place, and time. No sensory deficit.   Skin: Skin is warm and dry. Rash noted. No cyanosis.   Has coccyx pressure sore/ulcer.   Psychiatric: He has a normal mood and affect. His behavior is normal.   Nursing note and vitals reviewed.      Labs:        Invalid input(s): [id number]      Recent Labs      08/22/17 0614   SODIUM  138   POTASSIUM  3.6   CHLORIDE  102   CO2  29   BUN  15   CREATININE  0.60   CALCIUM  9.0     Recent Labs      08/22/17 0614   ALTSGPT  13   ASTSGOT  19   ALKPHOSPHAT  59   TBILIRUBIN  1.3   GLUCOSE  94     Recent Labs      08/22/17 0614   RBC  4.04*    HEMOGLOBIN  11.1*   HEMATOCRIT  34.2*   PLATELETCT  71*   PROTHROMBTM  17.1*   INR  1.35*     Recent Labs      17   0614   WBC  4.5*   NEUTSPOLYS  66.30   LYMPHOCYTES  17.80*   MONOCYTES  10.30   EOSINOPHILS  4.50   BASOPHILS  0.70   ASTSGOT  19   ALTSGPT  13   ALKPHOSPHAT  59   TBILIRUBIN  1.3           Hemodynamics:  Temp (24hrs), Av.6 °C (97.9 °F), Min:36.4 °C (97.6 °F), Max:36.8 °C (98.2 °F)  Temperature: 36.6 °C (97.8 °F)  Pulse  Av.8  Min: 64  Max: 99   Blood Pressure : 102/56 mmHg     Respiratory:    Respiration: 18, Pulse Oximetry: 92 %     Work Of Breathing / Effort: Mild  RUL Breath Sounds: Clear, RML Breath Sounds: Clear, RLL Breath Sounds: Clear;Diminished, JOSE Breath Sounds: Clear, LLL Breath Sounds: Clear  Fluids:    Intake/Output Summary (Last 24 hours) at 17 0906  Last data filed at 17 0855   Gross per 24 hour   Intake   1800 ml   Output   2050 ml   Net   -250 ml     Weight: 92.5 kg (203 lb 14.8 oz)  GI/Nutrition:  Orders Placed This Encounter   Procedures   • Diet Order     Standing Status: Standing      Number of Occurrences: 1      Standing Expiration Date:      Order Specific Question:  Diet:     Answer:  Regular [1]     Order Specific Question:  Consistency/Fluid modifications:     Answer:  Thin Liquids [3]     Medical Decision Making, by Problem:  Active Hospital Problems    Diagnosis   • *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]   • Left leg DVT (CMS-HCC) [I82.402]   • Esophageal varices- BANDED 2016- repeat egd tbd 10//29/16- gic [I85.00]   • ASCVD (arteriosclerotic cardiovascular disease)subtotalled small distal LCx and 40% LAD med rx by cath 12 [I25.10]   • GIB (gastrointestinal bleeding)- recurrent from varices [K92.2]   • BPH (benign prostatic hyperplasia) [N40.0]   • 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) [G62.0, T45.1X5A]   • Impaired mobility and ADLs [Z74.09]   • Lumbar stenosis [M48.06]   • Cirrhosis  (CMS-HCC) [K74.60]   • H/O colon cancer, stage IV [Z85.038]   • Iron deficiency anemia due to chronic blood loss [D50.0]   • Cirrhosis of liver with ascites (HCC)- ? DUE TO OLD HEP B, CHEMO RX,  OR THERMO RAD OF MAGNANT LESION [K74.60]   • Essential hypertension [I10]   • Colon cancer-2010 left hemicolectomy; no colostomy [C18.9]     *  S/P Recent Lumbar Surgery    *  Hypertension  BP ok (hit SBP 98 x 1)  Off Coreg  Note: on diuretics  Note: home meds were Coreg 3.125 mg 1/2 tab bid and Losartan: 25 mg daily  Monitor    *  Fluid Overload  2nd to Cirrhosis  S/P LE Edema  With some ascites  On fluid restrictions: 1500 cc/day  On Demadex: 20 mg qod  On Metolazone: 2.5 mg qod  On Aldactone: 50 mg daily  On KCL: 10 meq daily  Monitor K+ levels: 3.3 (8/21) --> 3.6 (8/22)    *  Pancytopenia  Platelets stable (8/22)  2nd to prior chemo and cirrhosis    *  Cirrhosis  2nd to cancer  With portal vein thrombosis  Hx eso varicies, s/p albaltion  Hx ascites: with therapeutic taps  NH4: 35 (8/22)    *  Coccyx Pressure Sore/Ulcer  Mostly dusky erythema but has a few spots of more erythema  Has wound dressing applied  Wound care to eval    *  Hx Colon Cancer: stage IV; in 5 year remission  *  Hyperlipidemia  *  AIDP: s/p IVIG x 5 days      Medications reviewed and Labs reviewed

## 2017-08-25 PROCEDURE — 700102 HCHG RX REV CODE 250 W/ 637 OVERRIDE(OP): Performed by: PHYSICAL MEDICINE & REHABILITATION

## 2017-08-25 PROCEDURE — A9270 NON-COVERED ITEM OR SERVICE: HCPCS | Performed by: HOSPITALIST

## 2017-08-25 PROCEDURE — 97530 THERAPEUTIC ACTIVITIES: CPT

## 2017-08-25 PROCEDURE — 770010 HCHG ROOM/CARE - REHAB SEMI PRIVAT*

## 2017-08-25 PROCEDURE — 97110 THERAPEUTIC EXERCISES: CPT

## 2017-08-25 PROCEDURE — A9270 NON-COVERED ITEM OR SERVICE: HCPCS | Performed by: PHYSICAL MEDICINE & REHABILITATION

## 2017-08-25 PROCEDURE — 99232 SBSQ HOSP IP/OBS MODERATE 35: CPT | Performed by: HOSPITALIST

## 2017-08-25 PROCEDURE — 700102 HCHG RX REV CODE 250 W/ 637 OVERRIDE(OP): Performed by: HOSPITALIST

## 2017-08-25 PROCEDURE — 97535 SELF CARE MNGMENT TRAINING: CPT

## 2017-08-25 RX ADMIN — OXYCODONE HYDROCHLORIDE AND ACETAMINOPHEN 500 MG: 500 TABLET ORAL at 08:19

## 2017-08-25 RX ADMIN — LACTOBACILLUS ACIDOPHILUS / LACTOBACILLUS BULGARICUS 1 PACKET: 100 MILLION CFU STRENGTH GRANULES at 17:43

## 2017-08-25 RX ADMIN — TAMSULOSIN HYDROCHLORIDE 0.4 MG: 0.4 CAPSULE ORAL at 08:19

## 2017-08-25 RX ADMIN — LACTOBACILLUS ACIDOPHILUS / LACTOBACILLUS BULGARICUS 1 PACKET: 100 MILLION CFU STRENGTH GRANULES at 11:28

## 2017-08-25 RX ADMIN — OXYCODONE HYDROCHLORIDE 10 MG: 10 TABLET ORAL at 22:11

## 2017-08-25 RX ADMIN — MAGNESIUM HYDROXIDE 15 ML: 400 SUSPENSION ORAL at 20:20

## 2017-08-25 RX ADMIN — OXYCODONE HYDROCHLORIDE 10 MG: 10 TABLET ORAL at 00:00

## 2017-08-25 RX ADMIN — LACTOBACILLUS ACIDOPHILUS / LACTOBACILLUS BULGARICUS 1 PACKET: 100 MILLION CFU STRENGTH GRANULES at 08:19

## 2017-08-25 RX ADMIN — SPIRONOLACTONE 50 MG: 25 TABLET, FILM COATED ORAL at 05:15

## 2017-08-25 RX ADMIN — VITAMIN D, TAB 1000IU (100/BT) 1000 UNITS: 25 TAB at 08:19

## 2017-08-25 RX ADMIN — POTASSIUM CHLORIDE 10 MEQ: 1500 TABLET, EXTENDED RELEASE ORAL at 08:19

## 2017-08-25 RX ADMIN — FERROUS SULFATE TAB 325 MG (65 MG ELEMENTAL FE) 325 MG: 325 (65 FE) TAB at 08:19

## 2017-08-25 RX ADMIN — ATORVASTATIN CALCIUM 40 MG: 40 TABLET, FILM COATED ORAL at 20:20

## 2017-08-25 RX ADMIN — NYSTATIN 1500000 UNITS: 100000 POWDER TOPICAL at 08:19

## 2017-08-25 RX ADMIN — POLYETHYLENE GLYCOL 3350 1 PACKET: 17 POWDER, FOR SOLUTION ORAL at 20:20

## 2017-08-25 RX ADMIN — NYSTATIN 1500000 UNITS: 100000 POWDER TOPICAL at 20:20

## 2017-08-25 RX ADMIN — FOLIC ACID 1 MG: 1 TABLET ORAL at 08:19

## 2017-08-25 RX ADMIN — TORSEMIDE 20 MG: 10 TABLET ORAL at 05:14

## 2017-08-25 RX ADMIN — URSODIOL 300 MG: 300 CAPSULE ORAL at 08:19

## 2017-08-25 ASSESSMENT — PAIN SCALES - GENERAL
PAINLEVEL_OUTOF10: 8
PAINLEVEL_OUTOF10: 7
PAINLEVEL_OUTOF10: 8
PAINLEVEL_OUTOF10: 0

## 2017-08-25 ASSESSMENT — GAIT ASSESSMENTS
ASSISTIVE DEVICE: FRONT WHEEL WALKER
DISTANCE (FEET): 125
GAIT LEVEL OF ASSIST: CONTACT GUARD ASSIST
DEVIATION: DECREASED HEEL STRIKE;DECREASED TOE OFF;DECREASED BASE OF SUPPORT
ASSISTIVE DEVICE: FRONT WHEEL WALKER
DEVIATION: DECREASED BASE OF SUPPORT;BRADYKINETIC
DISTANCE (FEET): 100
GAIT LEVEL OF ASSIST: CONTACT GUARD ASSIST

## 2017-08-25 ASSESSMENT — ENCOUNTER SYMPTOMS
CHILLS: 0
NERVOUS/ANXIOUS: 0
ABDOMINAL PAIN: 0
NAUSEA: 0
FEVER: 0
DIARRHEA: 0
VOMITING: 0
SHORTNESS OF BREATH: 0

## 2017-08-25 NOTE — CARE PLAN
Problem: Pain Management  Goal: Pain level will decrease to patient’s comfort goal  Outcome: PROGRESSING AS EXPECTED  Patient assessed during initial rounds.  Patient alert and coherent and had no complaints of pain/discomfort. Patient's ADL's assisted by staff, call light with in reach and patient monitored for any change in status.         Problem: Skin Integrity  Goal: Risk for impaired skin integrity will decrease  Intervention: Assess risk factors for impaired skin integrity and/or pressure ulcers  Patient turns and repositions him self with ease and is complaint with his turning schedule. Approved barrier paste applied as ordered.

## 2017-08-25 NOTE — CARE PLAN
Problem: Safety  Goal: Will remain free from falls  Intervention: Assess risk factors for falls  Patient is Aox4 has good safety awareness and uses the call light when assistance is needed. Patient has call light close by, bed in low position, shoes on when out of bed.      Problem: Skin Integrity  Goal: Risk for impaired skin integrity will decrease  Intervention: Assess risk factors for impaired skin integrity and/or pressure ulcers  Coccyx wound healing, open to air with barrier paste in place. Patient understand the importance of turning q 2 hours to prevent further skin breakdown.

## 2017-08-25 NOTE — PROGRESS NOTES
Hospital Medicine Progress Note, Adult, Complex               Author: Yaya Martinez Date & Time created: 8/25/2017  8:57 AM     Interval History:  No significant events or changes since last visit  Patient denies SOB, cough, or diarrhea  Patient slept ok last night    Chief Complaint:  Hypertension  Fluid overload      Review of Systems:  Review of Systems   Constitutional: Negative for fever and chills.   Respiratory: Negative for shortness of breath.    Cardiovascular: Negative for chest pain.   Gastrointestinal: Negative for nausea, vomiting, abdominal pain and diarrhea.   Psychiatric/Behavioral: The patient is not nervous/anxious.        Physical Exam:  Physical Exam   Constitutional: He is oriented to person, place, and time. He appears well-nourished.   HENT:   Head: Atraumatic.   Eyes: Conjunctivae are normal. Pupils are equal, round, and reactive to light.   Neck: Normal range of motion. Neck supple.   Cardiovascular: Normal rate, regular rhythm, S1 normal and S2 normal.    Pulmonary/Chest: Effort normal and breath sounds normal. He has no rhonchi.   Abdominal: Soft. Bowel sounds are normal. Hernia confirmed negative in the right inguinal area and confirmed negative in the left inguinal area.   Neurological: He is alert and oriented to person, place, and time. No sensory deficit.   Skin: Skin is warm and dry. Rash noted. No cyanosis.   Has coccyx pressure sore/ulcer.   Psychiatric: He has a normal mood and affect. His behavior is normal.   Nursing note and vitals reviewed.      Labs:        Invalid input(s): [id number]      No results for input(s): SODIUM, POTASSIUM, CHLORIDE, CO2, BUN, CREATININE, MAGNESIUM, PHOSPHORUS, CALCIUM in the last 72 hours.  No results for input(s): ALTSGPT, ASTSGOT, ALKPHOSPHAT, TBILIRUBIN, DBILIRUBIN, GAMMAGT, AMYLASE, LIPASE, ALB, PREALBUMIN, GLUCOSE in the last 72 hours.  No results for input(s): RBC, HEMOGLOBIN, HEMATOCRIT, PLATELETCT, PROTHROMBTM, APTT, INR, IRON,  FERRITIN, TOTIRONBC in the last 72 hours.            Hemodynamics:  Temp (24hrs), Av.8 °C (98.2 °F), Min:36.6 °C (97.9 °F), Max:36.9 °C (98.4 °F)  Temperature: 36.6 °C (97.9 °F)  Pulse  Av  Min: 64  Max: 99   Blood Pressure : 118/62 mmHg     Respiratory:    Respiration: 18, Pulse Oximetry: 96 %     Work Of Breathing / Effort: Mild  RUL Breath Sounds: Clear, RML Breath Sounds: Clear, RLL Breath Sounds: Clear;Diminished, JOSE Breath Sounds: Clear, LLL Breath Sounds: Clear  Fluids:    Intake/Output Summary (Last 24 hours) at 17 0857  Last data filed at 17 0645   Gross per 24 hour   Intake    860 ml   Output   2830 ml   Net  -1970 ml     Weight: 94.2 kg (207 lb 10.8 oz)  GI/Nutrition:  Orders Placed This Encounter   Procedures   • Diet Order     Standing Status: Standing      Number of Occurrences: 1      Standing Expiration Date:      Order Specific Question:  Diet:     Answer:  Regular [1]     Order Specific Question:  Consistency/Fluid modifications:     Answer:  Thin Liquids [3]     Medical Decision Making, by Problem:  Active Hospital Problems    Diagnosis   • *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]   • Left leg DVT (CMS-HCC) [I82.402]   • Esophageal varices- BANDED 2016- repeat egd tbd 10//29/16- gic [I85.00]   • ASCVD (arteriosclerotic cardiovascular disease)subtotalled small distal LCx and 40% LAD med rx by cath 12 [I25.10]   • GIB (gastrointestinal bleeding)- recurrent from varices [K92.2]   • BPH (benign prostatic hyperplasia) [N40.0]   • 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 DUE TO OLD HEP B, CHEMO RX,  OR THERMO RAD OF MAGNANT LESION [K74.60]   • Essential hypertension  [I10]   • Colon cancer-2010 left hemicolectomy; no colostomy [C18.9]     *  S/P Recent Lumbar Surgery    *  Hypertension  BP ok   Off Coreg  Note: on diuretics  Note: home meds were Coreg 3.125 mg 1/2 tab bid and Losartan: 25 mg daily  Monitor    *  Fluid Overload  2nd to Cirrhosis  S/P LE Edema  With some ascites  On fluid restrictions: 1500 cc/day  On Demadex: 20 mg qod  On Metolazone: 2.5 mg qod  On Aldactone: 50 mg daily  On KCL: 10 meq daily  Monitor K+ levels: 3.3 (8/21) --> 3.6 (8/22)    *  Pancytopenia  Platelets stable (8/22)  2nd to prior chemo and cirrhosis    *  Cirrhosis  2nd to cancer  With portal vein thrombosis  Hx eso varicies, s/p albaltion  Hx ascites: with therapeutic taps  NH4: 35 (8/22)    *  Coccyx Pressure Sore/Ulcer  Mostly dusky erythema but has a few spots of more erythema  Wound care to eval    *  Hx Colon Cancer: stage IV; in 5 year remission  *  Hyperlipidemia  *  AIDP: s/p IVIG x 5 days      Medications reviewed and Labs reviewed

## 2017-08-25 NOTE — PROGRESS NOTES
Patient is AOx4 has good safety awareness and uses the call light when assistance is needed. Patient denies complaints of pain and has been up participating in therapies, will continue to monitor and assess throughout the day.

## 2017-08-26 PROCEDURE — 700102 HCHG RX REV CODE 250 W/ 637 OVERRIDE(OP): Performed by: HOSPITALIST

## 2017-08-26 PROCEDURE — 99232 SBSQ HOSP IP/OBS MODERATE 35: CPT | Performed by: HOSPITALIST

## 2017-08-26 PROCEDURE — 770010 HCHG ROOM/CARE - REHAB SEMI PRIVAT*

## 2017-08-26 PROCEDURE — A9270 NON-COVERED ITEM OR SERVICE: HCPCS | Performed by: PHYSICAL MEDICINE & REHABILITATION

## 2017-08-26 PROCEDURE — 700102 HCHG RX REV CODE 250 W/ 637 OVERRIDE(OP): Performed by: PHYSICAL MEDICINE & REHABILITATION

## 2017-08-26 PROCEDURE — A9270 NON-COVERED ITEM OR SERVICE: HCPCS | Performed by: HOSPITALIST

## 2017-08-26 RX ADMIN — POLYETHYLENE GLYCOL 3350 1 PACKET: 17 POWDER, FOR SOLUTION ORAL at 20:49

## 2017-08-26 RX ADMIN — ATORVASTATIN CALCIUM 40 MG: 40 TABLET, FILM COATED ORAL at 20:49

## 2017-08-26 RX ADMIN — NYSTATIN 1500000 UNITS: 100000 POWDER TOPICAL at 09:42

## 2017-08-26 RX ADMIN — FERROUS SULFATE TAB 325 MG (65 MG ELEMENTAL FE) 325 MG: 325 (65 FE) TAB at 09:34

## 2017-08-26 RX ADMIN — TAMSULOSIN HYDROCHLORIDE 0.4 MG: 0.4 CAPSULE ORAL at 09:34

## 2017-08-26 RX ADMIN — LACTOBACILLUS ACIDOPHILUS / LACTOBACILLUS BULGARICUS 1 PACKET: 100 MILLION CFU STRENGTH GRANULES at 17:10

## 2017-08-26 RX ADMIN — LACTOBACILLUS ACIDOPHILUS / LACTOBACILLUS BULGARICUS 1 PACKET: 100 MILLION CFU STRENGTH GRANULES at 12:06

## 2017-08-26 RX ADMIN — METOLAZONE 2.5 MG: 2.5 TABLET ORAL at 22:00

## 2017-08-26 RX ADMIN — URSODIOL 300 MG: 300 CAPSULE ORAL at 09:34

## 2017-08-26 RX ADMIN — SPIRONOLACTONE 50 MG: 25 TABLET, FILM COATED ORAL at 05:26

## 2017-08-26 RX ADMIN — VITAMIN D, TAB 1000IU (100/BT) 1000 UNITS: 25 TAB at 09:34

## 2017-08-26 RX ADMIN — OXYCODONE HYDROCHLORIDE 10 MG: 10 TABLET ORAL at 20:49

## 2017-08-26 RX ADMIN — FOLIC ACID 1 MG: 1 TABLET ORAL at 09:34

## 2017-08-26 RX ADMIN — LACTOBACILLUS ACIDOPHILUS / LACTOBACILLUS BULGARICUS 1 PACKET: 100 MILLION CFU STRENGTH GRANULES at 09:34

## 2017-08-26 RX ADMIN — OXYCODONE HYDROCHLORIDE AND ACETAMINOPHEN 500 MG: 500 TABLET ORAL at 09:34

## 2017-08-26 RX ADMIN — MAGNESIUM HYDROXIDE 15 ML: 400 SUSPENSION ORAL at 20:49

## 2017-08-26 RX ADMIN — POTASSIUM CHLORIDE 10 MEQ: 1500 TABLET, EXTENDED RELEASE ORAL at 09:34

## 2017-08-26 RX ADMIN — NYSTATIN 1500000 UNITS: 100000 POWDER TOPICAL at 20:50

## 2017-08-26 ASSESSMENT — ENCOUNTER SYMPTOMS
HEADACHES: 0
FEVER: 0
NAUSEA: 0
DIZZINESS: 0
BLURRED VISION: 0
VOMITING: 0
SHORTNESS OF BREATH: 0
PALPITATIONS: 0
HALLUCINATIONS: 0

## 2017-08-26 ASSESSMENT — PAIN SCALES - GENERAL
PAINLEVEL_OUTOF10: 0
PAINLEVEL_OUTOF10: 7

## 2017-08-26 NOTE — CARE PLAN
Problem: Pain Management  Goal: Pain level will decrease to patient’s comfort goal  Outcome: PROGRESSING SLOWER THAN EXPECTED  Patient had reports of 8/10 lower back pain. PRN Roxicodone given per patient request.     Problem: Skin Integrity  Goal: Risk for impaired skin integrity will decrease  Outcome: PROGRESSING SLOWER THAN EXPECTED  Weekly wound pictures taken on patient's coccyx area. Wound is red-pink in appearance and is clean and dry. Susanna wound has a rash like appearance and is applied with prescribed cream as indicated.

## 2017-08-26 NOTE — PROGRESS NOTES
Hospital Medicine Progress Note, Adult, Complex               Author: Yaya Martinez Date & Time created: 8/26/2017  9:15 AM     Interval History:  No significant events or changes since last visit  Patient denies SOB, cough, or diarrhea  Patient slept ok last night    Chief Complaint:  Hypertension  Fluid overload      Review of Systems:  Review of Systems   Constitutional: Negative for fever.   Eyes: Negative for blurred vision.   Respiratory: Negative for shortness of breath.    Cardiovascular: Negative for palpitations.   Gastrointestinal: Negative for nausea and vomiting.   Neurological: Negative for dizziness and headaches.   Psychiatric/Behavioral: Negative for hallucinations.       Physical Exam:  Physical Exam   Constitutional: He is oriented to person, place, and time.   HENT:   Mouth/Throat: Oropharynx is clear and moist.   Eyes: No scleral icterus.   Cardiovascular: Normal rate, regular rhythm, S1 normal and S2 normal.    Pulmonary/Chest: Effort normal. No stridor. He has no wheezes. He has no rhonchi. He has no rales.   Abdominal: Soft. Bowel sounds are normal. Hernia confirmed negative in the right inguinal area and confirmed negative in the left inguinal area.   Neurological: He is alert and oriented to person, place, and time. No sensory deficit.   Skin: Skin is warm and dry. Rash noted. He is not diaphoretic. No cyanosis.   Has coccyx pressure sore/ulcer.   Psychiatric: He has a normal mood and affect. His behavior is normal.   Nursing note and vitals reviewed.      Labs:        Invalid input(s): [id number]      No results for input(s): SODIUM, POTASSIUM, CHLORIDE, CO2, BUN, CREATININE, MAGNESIUM, PHOSPHORUS, CALCIUM in the last 72 hours.  No results for input(s): ALTSGPT, ASTSGOT, ALKPHOSPHAT, TBILIRUBIN, DBILIRUBIN, GAMMAGT, AMYLASE, LIPASE, ALB, PREALBUMIN, GLUCOSE in the last 72 hours.  No results for input(s): RBC, HEMOGLOBIN, HEMATOCRIT, PLATELETCT, PROTHROMBTM, APTT, INR, IRON, FERRITIN,  [facility] in the last 72 hours.            Hemodynamics:  Temp (24hrs), Av.7 °C (98 °F), Min:36.4 °C (97.5 °F), Max:37.1 °C (98.8 °F)  Temperature: 36.5 °C (97.7 °F)  Pulse  Av.9  Min: 62  Max: 99   Blood Pressure : 106/60     Respiratory:    Respiration: 18, Pulse Oximetry: 94 %     Work Of Breathing / Effort: Mild  RUL Breath Sounds: Clear, RML Breath Sounds: Clear, RLL Breath Sounds: Clear;Diminished, JOSE Breath Sounds: Clear, LLL Breath Sounds: Clear  Fluids:    Intake/Output Summary (Last 24 hours) at 17 0915  Last data filed at 17 0900   Gross per 24 hour   Intake             1047 ml   Output             1400 ml   Net             -353 ml     Weight: 94.5 kg (208 lb 5.4 oz)  GI/Nutrition:  Orders Placed This Encounter   Procedures   • Diet Order     Standing Status:   Standing     Number of Occurrences:   1     Order Specific Question:   Diet:     Answer:   Regular [1]     Order Specific Question:   Consistency/Fluid modifications:     Answer:   Thin Liquids [3]     Medical Decision Making, by Problem:  Active Hospital Problems    Diagnosis   • *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]   • Left leg DVT (CMS-HCC) [I82.402]   • Esophageal varices- BANDED 2016- repeat egd tbd 10//29/16- gic [I85.00]   • ASCVD (arteriosclerotic cardiovascular disease)subtotalled small distal LCx and 40% LAD med rx by cath 12 [I25.10]   • GIB (gastrointestinal bleeding)- recurrent from varices [K92.2]   • BPH (benign prostatic hyperplasia) [N40.0]   • 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 DUE TO OLD HEP B, CHEMO RX,  OR THERMO RAD OF MAGNANT LESION [K74.60]   • Essential hypertension [I10]   •  Colon cancer-2010 left hemicolectomy; no colostomy [C18.9]     Note (08/26):  Will get am labs for further evaluation    *  S/P Recent Lumbar Surgery    *  Hypertension  BP low normal   Off Coreg  Note: on diuretics  Note: home meds were Coreg 3.125 mg 1/2 tab bid and Losartan: 25 mg daily  Monitor    *  Fluid Overload  2nd to Cirrhosis  S/P LE Edema  With some ascites  On fluid restrictions: 1500 cc/day  On Demadex: 20 mg qod  On Metolazone: 2.5 mg qod  On Aldactone: 50 mg daily  On KCL: 10 meq daily  Monitor K+ levels: 3.3 (8/21) --> 3.6 (8/22)    *  Pancytopenia  Platelets stable (8/22)  2nd to prior chemo and cirrhosis    *  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

## 2017-08-27 LAB
AMMONIA PLAS-SCNC: 32 UMOL/L (ref 11–45)
ANION GAP SERPL CALC-SCNC: 6 MMOL/L (ref 0–11.9)
BUN SERPL-MCNC: 21 MG/DL (ref 8–22)
CALCIUM SERPL-MCNC: 9 MG/DL (ref 8.5–10.5)
CHLORIDE SERPL-SCNC: 101 MMOL/L (ref 96–112)
CO2 SERPL-SCNC: 28 MMOL/L (ref 20–33)
CREAT SERPL-MCNC: 0.7 MG/DL (ref 0.5–1.4)
ERYTHROCYTE [DISTWIDTH] IN BLOOD BY AUTOMATED COUNT: 53.7 FL (ref 35.9–50)
GFR SERPL CREATININE-BSD FRML MDRD: >60 ML/MIN/1.73 M 2
GLUCOSE SERPL-MCNC: 92 MG/DL (ref 65–99)
HCT VFR BLD AUTO: 34.9 % (ref 42–52)
HGB BLD-MCNC: 11.1 G/DL (ref 14–18)
MAGNESIUM SERPL-MCNC: 1.9 MG/DL (ref 1.5–2.5)
MCH RBC QN AUTO: 27.2 PG (ref 27–33)
MCHC RBC AUTO-ENTMCNC: 31.8 G/DL (ref 33.7–35.3)
MCV RBC AUTO: 85.5 FL (ref 81.4–97.8)
PHOSPHATE SERPL-MCNC: 3.2 MG/DL (ref 2.5–4.5)
PLATELET # BLD AUTO: 72 K/UL (ref 164–446)
PMV BLD AUTO: 10.9 FL (ref 9–12.9)
POTASSIUM SERPL-SCNC: 3.8 MMOL/L (ref 3.6–5.5)
RBC # BLD AUTO: 4.08 M/UL (ref 4.7–6.1)
SODIUM SERPL-SCNC: 135 MMOL/L (ref 135–145)
WBC # BLD AUTO: 5.7 K/UL (ref 4.8–10.8)

## 2017-08-27 PROCEDURE — 84100 ASSAY OF PHOSPHORUS: CPT

## 2017-08-27 PROCEDURE — 82140 ASSAY OF AMMONIA: CPT

## 2017-08-27 PROCEDURE — A9270 NON-COVERED ITEM OR SERVICE: HCPCS | Performed by: PHYSICAL MEDICINE & REHABILITATION

## 2017-08-27 PROCEDURE — A9270 NON-COVERED ITEM OR SERVICE: HCPCS | Performed by: HOSPITALIST

## 2017-08-27 PROCEDURE — 36415 COLL VENOUS BLD VENIPUNCTURE: CPT

## 2017-08-27 PROCEDURE — 97110 THERAPEUTIC EXERCISES: CPT

## 2017-08-27 PROCEDURE — 83735 ASSAY OF MAGNESIUM: CPT

## 2017-08-27 PROCEDURE — 700102 HCHG RX REV CODE 250 W/ 637 OVERRIDE(OP): Performed by: HOSPITALIST

## 2017-08-27 PROCEDURE — 770010 HCHG ROOM/CARE - REHAB SEMI PRIVAT*

## 2017-08-27 PROCEDURE — 85027 COMPLETE CBC AUTOMATED: CPT

## 2017-08-27 PROCEDURE — 700102 HCHG RX REV CODE 250 W/ 637 OVERRIDE(OP): Performed by: PHYSICAL MEDICINE & REHABILITATION

## 2017-08-27 PROCEDURE — 99232 SBSQ HOSP IP/OBS MODERATE 35: CPT | Performed by: HOSPITALIST

## 2017-08-27 PROCEDURE — 80048 BASIC METABOLIC PNL TOTAL CA: CPT

## 2017-08-27 PROCEDURE — 97530 THERAPEUTIC ACTIVITIES: CPT

## 2017-08-27 PROCEDURE — 97535 SELF CARE MNGMENT TRAINING: CPT

## 2017-08-27 RX ADMIN — NYSTATIN 1500000 UNITS: 100000 POWDER TOPICAL at 21:56

## 2017-08-27 RX ADMIN — POLYETHYLENE GLYCOL 3350 1 PACKET: 17 POWDER, FOR SOLUTION ORAL at 21:49

## 2017-08-27 RX ADMIN — LACTOBACILLUS ACIDOPHILUS / LACTOBACILLUS BULGARICUS 1 PACKET: 100 MILLION CFU STRENGTH GRANULES at 11:37

## 2017-08-27 RX ADMIN — FERROUS SULFATE TAB 325 MG (65 MG ELEMENTAL FE) 325 MG: 325 (65 FE) TAB at 09:44

## 2017-08-27 RX ADMIN — TAMSULOSIN HYDROCHLORIDE 0.4 MG: 0.4 CAPSULE ORAL at 09:50

## 2017-08-27 RX ADMIN — LACTOBACILLUS ACIDOPHILUS / LACTOBACILLUS BULGARICUS 1 PACKET: 100 MILLION CFU STRENGTH GRANULES at 09:44

## 2017-08-27 RX ADMIN — POTASSIUM CHLORIDE 10 MEQ: 1500 TABLET, EXTENDED RELEASE ORAL at 09:45

## 2017-08-27 RX ADMIN — TORSEMIDE 20 MG: 10 TABLET ORAL at 05:25

## 2017-08-27 RX ADMIN — MAGNESIUM HYDROXIDE 15 ML: 400 SUSPENSION ORAL at 21:50

## 2017-08-27 RX ADMIN — LACTOBACILLUS ACIDOPHILUS / LACTOBACILLUS BULGARICUS 1 PACKET: 100 MILLION CFU STRENGTH GRANULES at 17:48

## 2017-08-27 RX ADMIN — FOLIC ACID 1 MG: 1 TABLET ORAL at 09:44

## 2017-08-27 RX ADMIN — ATORVASTATIN CALCIUM 40 MG: 40 TABLET, FILM COATED ORAL at 21:49

## 2017-08-27 RX ADMIN — URSODIOL 300 MG: 300 CAPSULE ORAL at 09:51

## 2017-08-27 RX ADMIN — NYSTATIN 1500000 UNITS: 100000 POWDER TOPICAL at 09:50

## 2017-08-27 RX ADMIN — SPIRONOLACTONE 50 MG: 25 TABLET, FILM COATED ORAL at 05:25

## 2017-08-27 RX ADMIN — OXYCODONE HYDROCHLORIDE AND ACETAMINOPHEN 500 MG: 500 TABLET ORAL at 09:44

## 2017-08-27 RX ADMIN — VITAMIN D, TAB 1000IU (100/BT) 1000 UNITS: 25 TAB at 09:44

## 2017-08-27 RX ADMIN — OXYCODONE HYDROCHLORIDE 10 MG: 10 TABLET ORAL at 21:49

## 2017-08-27 ASSESSMENT — ENCOUNTER SYMPTOMS
COUGH: 0
NERVOUS/ANXIOUS: 0
DIARRHEA: 0
FEVER: 0
DIZZINESS: 0
BLURRED VISION: 0

## 2017-08-27 ASSESSMENT — PAIN SCALES - GENERAL
PAINLEVEL_OUTOF10: 8
PAINLEVEL_OUTOF10: 0

## 2017-08-27 NOTE — CARE PLAN
Problem: Bowel/Gastric:  Goal: Normal bowel function is maintained or improved  Outcome: PROGRESSING AS EXPECTED  Not on scheduled bowel meds. Continent of lg formed stool today.    Problem: Pain Management  Goal: Pain level will decrease to patient's comfort goal  Outcome: PROGRESSING AS EXPECTED  Denies pain, no analgesics administered.    Problem: Skin Integrity  Goal: Risk for impaired skin integrity will decrease  Outcome: PROGRESSING AS EXPECTED  Nystatin powder to groin per MAR, zinc based barrier paste to sacrum, pt re-educated to turn = or sooner than q 2 hrs while in bed to offload weight. Pt verbalizes and demonstrates understanding. Pt up for meals.

## 2017-08-27 NOTE — PROGRESS NOTES
Hospital Medicine Progress Note, Adult, Complex               Author: Yaya Martinez Date & Time created: 8/27/2017  9:11 AM     Interval History:  No significant events or changes since last visit  Patient denies SOB, cough, or diarrhea  Patient slept ok last night    Chief Complaint:  Hypertension  Fluid overload      Review of Systems:  Review of Systems   Constitutional: Negative for fever.   Eyes: Negative for blurred vision.   Respiratory: Negative for cough.    Cardiovascular: Negative for chest pain.   Gastrointestinal: Negative for diarrhea.   Musculoskeletal: Negative for joint pain.   Neurological: Negative for dizziness.   Psychiatric/Behavioral: The patient is not nervous/anxious.        Physical Exam:  Physical Exam   Constitutional: He is oriented to person, place, and time. No distress.   HENT:   Mouth/Throat: No oropharyngeal exudate.   Eyes: EOM are normal.   Neck: No JVD present. No tracheal deviation present.   Cardiovascular: Normal rate, regular rhythm, S1 normal and S2 normal.    Pulmonary/Chest: Effort normal. He has no wheezes. He has no rhonchi. He has no rales.   Abdominal: Soft. He exhibits no distension. There is no tenderness. Hernia confirmed negative in the right inguinal area and confirmed negative in the left inguinal area.   Neurological: He is alert and oriented to person, place, and time. No sensory deficit.   Skin: Skin is warm and dry. Rash noted. No cyanosis.   Has coccyx pressure sore/ulcer.   Psychiatric: He has a normal mood and affect. His behavior is normal.   Nursing note and vitals reviewed.      Labs:        Invalid input(s): [id number]      Recent Labs      08/27/17   0549   SODIUM  135   POTASSIUM  3.8   CHLORIDE  101   CO2  28   BUN  21   CREATININE  0.70   MAGNESIUM  1.9   PHOSPHORUS  3.2   CALCIUM  9.0     Recent Labs      08/27/17   0549   GLUCOSE  92     Recent Labs      08/27/17   0549   RBC  4.08*   HEMOGLOBIN  11.1*   HEMATOCRIT  34.9*   PLATELETCT  72*      Recent Labs      17   0549   WBC  5.7           Hemodynamics:  Temp (24hrs), Av.8 °C (98.3 °F), Min:36.6 °C (97.8 °F), Max:37 °C (98.6 °F)  Temperature: 36.9 °C (98.4 °F)  Pulse  Av.7  Min: 62  Max: 99   Blood Pressure : 102/59     Respiratory:    Respiration: 17, Pulse Oximetry: 96 %     Work Of Breathing / Effort: Mild  RUL Breath Sounds: Clear, RML Breath Sounds: Clear, RLL Breath Sounds: Clear;Diminished, JOSE Breath Sounds: Clear, LLL Breath Sounds: Clear  Fluids:    Intake/Output Summary (Last 24 hours) at 17 0911  Last data filed at 17 0630   Gross per 24 hour   Intake              560 ml   Output             1800 ml   Net            -1240 ml     Weight: 94 kg (207 lb 3.7 oz)  GI/Nutrition:  Orders Placed This Encounter   Procedures   • Diet Order     Standing Status:   Standing     Number of Occurrences:   1     Order Specific Question:   Diet:     Answer:   Regular [1]     Order Specific Question:   Consistency/Fluid modifications:     Answer:   Thin Liquids [3]     Medical Decision Making, by Problem:  Active Hospital Problems    Diagnosis   • *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]   • Left leg DVT (CMS-HCC) [I82.402]   • Esophageal varices- BANDED 2016- repeat egd tbd 10//29/16- gic [I85.00]   • ASCVD (arteriosclerotic cardiovascular disease)subtotalled small distal LCx and 40% LAD med rx by cath 12 [I25.10]   • GIB (gastrointestinal bleeding)- recurrent from varices [K92.2]   • BPH (benign prostatic hyperplasia) [N40.0]   • 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 DUE TO OLD HEP B, CHEMO RX,  OR THERMO RAD OF MAGNANT LESION [K74.60]   • Essential hypertension  [I10]   • Colon cancer-2010 left hemicolectomy; no colostomy [C18.9]     *  S/P Recent Lumbar Surgery    *  Hypertension  BP low normal   Off Coreg  Note: on diuretics  Note: home meds were Coreg 3.125 mg 1/2 tab bid and Losartan: 25 mg daily  Monitor    *  Fluid Overload  2nd to Cirrhosis  S/P LE Edema  With some ascites  On fluid restrictions: 1500 cc/day  On Demadex: 20 mg qod  On Metolazone: 2.5 mg qod  On Aldactone: 50 mg daily  On KCL: 10 meq daily  Monitor K+ levels: 3.3 (8/21) --> 3.6 (8/22) --> 3.8 (8/27)    *  Pancytopenia  Platelets stable (8/27)  2nd to prior chemo and cirrhosis    *  Cirrhosis  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

## 2017-08-27 NOTE — CARE PLAN
Problem: Pain Management  Goal: Pain level will decrease to patient's comfort goal  Patient denies pain or discomfort at this time.

## 2017-08-27 NOTE — PROGRESS NOTES
Pleasant and cooperative. Wife at bedside during shift and is an active participant in pt care. Pt denies discomfort. Turns self from side to side to avoid pressure to sacrum

## 2017-08-27 NOTE — PROGRESS NOTES
Called to room to change patient's brief.  Brief changed, jessica care done, moisture barrier applied.

## 2017-08-28 PROCEDURE — 97110 THERAPEUTIC EXERCISES: CPT

## 2017-08-28 PROCEDURE — 97535 SELF CARE MNGMENT TRAINING: CPT

## 2017-08-28 PROCEDURE — 97530 THERAPEUTIC ACTIVITIES: CPT

## 2017-08-28 PROCEDURE — 99232 SBSQ HOSP IP/OBS MODERATE 35: CPT | Performed by: HOSPITALIST

## 2017-08-28 PROCEDURE — A9270 NON-COVERED ITEM OR SERVICE: HCPCS | Performed by: HOSPITALIST

## 2017-08-28 PROCEDURE — 700102 HCHG RX REV CODE 250 W/ 637 OVERRIDE(OP): Performed by: HOSPITALIST

## 2017-08-28 PROCEDURE — 770010 HCHG ROOM/CARE - REHAB SEMI PRIVAT*

## 2017-08-28 PROCEDURE — 700102 HCHG RX REV CODE 250 W/ 637 OVERRIDE(OP): Performed by: PHYSICAL MEDICINE & REHABILITATION

## 2017-08-28 PROCEDURE — 97116 GAIT TRAINING THERAPY: CPT

## 2017-08-28 PROCEDURE — A9270 NON-COVERED ITEM OR SERVICE: HCPCS | Performed by: PHYSICAL MEDICINE & REHABILITATION

## 2017-08-28 RX ADMIN — MAGNESIUM HYDROXIDE 15 ML: 400 SUSPENSION ORAL at 15:00

## 2017-08-28 RX ADMIN — OXYCODONE HYDROCHLORIDE 10 MG: 10 TABLET ORAL at 21:56

## 2017-08-28 RX ADMIN — VITAMIN D, TAB 1000IU (100/BT) 1000 UNITS: 25 TAB at 08:40

## 2017-08-28 RX ADMIN — ATORVASTATIN CALCIUM 40 MG: 40 TABLET, FILM COATED ORAL at 21:27

## 2017-08-28 RX ADMIN — LACTOBACILLUS ACIDOPHILUS / LACTOBACILLUS BULGARICUS 1 PACKET: 100 MILLION CFU STRENGTH GRANULES at 12:02

## 2017-08-28 RX ADMIN — LACTOBACILLUS ACIDOPHILUS / LACTOBACILLUS BULGARICUS 1 PACKET: 100 MILLION CFU STRENGTH GRANULES at 17:31

## 2017-08-28 RX ADMIN — POTASSIUM CHLORIDE 10 MEQ: 1500 TABLET, EXTENDED RELEASE ORAL at 08:40

## 2017-08-28 RX ADMIN — TAMSULOSIN HYDROCHLORIDE 0.4 MG: 0.4 CAPSULE ORAL at 08:39

## 2017-08-28 RX ADMIN — URSODIOL 300 MG: 300 CAPSULE ORAL at 08:39

## 2017-08-28 RX ADMIN — FERROUS SULFATE TAB 325 MG (65 MG ELEMENTAL FE) 325 MG: 325 (65 FE) TAB at 08:40

## 2017-08-28 RX ADMIN — LACTOBACILLUS ACIDOPHILUS / LACTOBACILLUS BULGARICUS 1 PACKET: 100 MILLION CFU STRENGTH GRANULES at 08:39

## 2017-08-28 RX ADMIN — METOLAZONE 2.5 MG: 2.5 TABLET ORAL at 21:27

## 2017-08-28 RX ADMIN — NYSTATIN 1500000 UNITS: 100000 POWDER TOPICAL at 08:47

## 2017-08-28 RX ADMIN — POLYETHYLENE GLYCOL 3350 1 PACKET: 17 POWDER, FOR SOLUTION ORAL at 21:26

## 2017-08-28 RX ADMIN — SPIRONOLACTONE 50 MG: 25 TABLET, FILM COATED ORAL at 05:22

## 2017-08-28 RX ADMIN — FOLIC ACID 1 MG: 1 TABLET ORAL at 08:40

## 2017-08-28 RX ADMIN — OXYCODONE HYDROCHLORIDE AND ACETAMINOPHEN 500 MG: 500 TABLET ORAL at 08:39

## 2017-08-28 RX ADMIN — NYSTATIN 1500000 UNITS: 100000 POWDER TOPICAL at 21:27

## 2017-08-28 ASSESSMENT — ENCOUNTER SYMPTOMS
VOMITING: 0
SHORTNESS OF BREATH: 0
ABDOMINAL PAIN: 0
NAUSEA: 0
NERVOUS/ANXIOUS: 0
CHILLS: 0
DIARRHEA: 0
FEVER: 0

## 2017-08-28 ASSESSMENT — PAIN SCALES - GENERAL
PAINLEVEL_OUTOF10: 8
PAINLEVEL_OUTOF10: 0
PAINLEVEL_OUTOF10: 0

## 2017-08-28 NOTE — CARE PLAN
Problem: Safety  Goal: Will remain free from injury  Patient uses call light  appropriately this shift.  Waits for assistance when needed and does not attempt self transfer.  Able to verbalize needs.  Will continue to monitor.    Problem: Pain Management  Goal: Pain level will decrease to patient's comfort goal  Roxicodone 10mg given PRN per MAR for c/o back pain 8/10 with moderate relief. Pt sleeps good. Will continue to monitor.

## 2017-08-28 NOTE — PROGRESS NOTES
Hospital Medicine Progress Note, Adult, Complex               Author: Yaya Martinez Date & Time created: 8/28/2017  9:04 AM     Interval History:  No significant events or changes since last visit  Patient denies SOB, cough, or diarrhea  Patient slept ok last night    Chief Complaint:  Hypertension  Fluid overload      Review of Systems:  Review of Systems   Constitutional: Negative for chills and fever.   Respiratory: Negative for shortness of breath.    Cardiovascular: Negative for chest pain.   Gastrointestinal: Negative for abdominal pain, diarrhea, nausea and vomiting.   Psychiatric/Behavioral: The patient is not nervous/anxious.        Physical Exam:  Physical Exam   Constitutional: He is oriented to person, place, and time. He appears well-nourished.   HENT:   Head: Atraumatic.   Eyes: Conjunctivae are normal. Pupils are equal, round, and reactive to light.   Neck: Normal range of motion. Neck supple.   Cardiovascular: Normal rate, regular rhythm, S1 normal and S2 normal.    No murmur heard.  Pulmonary/Chest: Effort normal. He has no wheezes. He has no rhonchi. He has no rales.   Abdominal: Soft. He exhibits no distension. There is no tenderness. Hernia confirmed negative in the right inguinal area and confirmed negative in the left inguinal area.   Musculoskeletal: He exhibits no edema.   Neurological: He is alert and oriented to person, place, and time. No sensory deficit.   Skin: Skin is warm and dry. Rash noted. No cyanosis.   Has coccyx pressure sore/ulcer.   Psychiatric: He has a normal mood and affect. His behavior is normal.   Nursing note and vitals reviewed.      Labs:        Invalid input(s): [id number]      Recent Labs      08/27/17   0549   SODIUM  135   POTASSIUM  3.8   CHLORIDE  101   CO2  28   BUN  21   CREATININE  0.70   MAGNESIUM  1.9   PHOSPHORUS  3.2   CALCIUM  9.0     Recent Labs      08/27/17   0549   GLUCOSE  92     Recent Labs      08/27/17   0549   RBC  4.08*   HEMOGLOBIN  11.1*    HEMATOCRIT  34.9*   PLATELETCT  72*     Recent Labs      17   0549   WBC  5.7           Hemodynamics:  Temp (24hrs), Av.8 °C (98.2 °F), Min:36.4 °C (97.6 °F), Max:37.3 °C (99.2 °F)  Temperature: 36.6 °C (97.8 °F)  Pulse  Av.7  Min: 62  Max: 99   Blood Pressure : 118/72     Respiratory:    Respiration: 18, Pulse Oximetry: 96 %     Work Of Breathing / Effort: Mild  RUL Breath Sounds: Clear, RML Breath Sounds: Clear, RLL Breath Sounds: Diminished, JOSE Breath Sounds: Clear, LLL Breath Sounds: Diminished  Fluids:    Intake/Output Summary (Last 24 hours) at 17 0904  Last data filed at 17 0700   Gross per 24 hour   Intake             1080 ml   Output             2150 ml   Net            -1070 ml     Weight: 94 kg (207 lb 3.7 oz)  GI/Nutrition:  Orders Placed This Encounter   Procedures   • Diet Order     Standing Status:   Standing     Number of Occurrences:   1     Order Specific Question:   Diet:     Answer:   Regular [1]     Order Specific Question:   Consistency/Fluid modifications:     Answer:   Thin Liquids [3]     Medical Decision Making, by Problem:  Active Hospital Problems    Diagnosis   • *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]   • Left leg DVT (CMS-HCC) [I82.402]   • Esophageal varices- BANDED 2016- repeat egd tbd 10//29/16- gic [I85.00]   • ASCVD (arteriosclerotic cardiovascular disease)subtotalled small distal LCx and 40% LAD med rx by cath 12 [I25.10]   • GIB (gastrointestinal bleeding)- recurrent from varices [K92.2]   • BPH (benign prostatic hyperplasia) [N40.0]   • 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 DUE TO OLD HEP B, CHEMO RX,  OR THERMO RAD OF MAGNANT  LESION [K74.60]   • Essential hypertension [I10]   • Colon cancer-2010 left hemicolectomy; no colostomy [C18.9]     *  S/P Recent Lumbar Surgery    *  Hypertension  BP low normal   Off Coreg  Note: on diuretics  Note: home meds were Coreg 3.125 mg 1/2 tab bid and Losartan: 25 mg daily  Monitor    *  Fluid Overload  2nd to Cirrhosis  S/P LE Edema  With some ascites  On fluid restrictions: 1500 cc/day  On Demadex: 20 mg qod  On Metolazone: 2.5 mg qod  On Aldactone: 50 mg daily  On KCL: 10 meq daily  Monitor K+ levels: 3.3 (8/21) --> 3.6 (8/22) --> 3.8 (8/27)    *  Pancytopenia  Platelets stable (8/27)  2nd to prior chemo and cirrhosis    *  Cirrhosis  2nd to cancer  With portal vein thrombosis  Hx eso varicies, s/p albaltion  Hx ascites: with therapeutic taps  NH4: 35 (8/22) --> 32 (8/27)    *  Coccyx Pressure Sore/Ulcer  Mostly dusky erythema but has a few spots of more erythema  Turning pt in bed often  Wound care eval: zinc based cream applied and leave bandage off    *  Hx Colon Cancer: stage IV; in 5 year remission  *  Hyperlipidemia  *  AIDP: s/p IVIG x 5 days        Reviewed items::  Labs reviewed and Medications reviewed

## 2017-08-28 NOTE — CARE PLAN
Problem: Bowel/Gastric:  Goal: Normal bowel function is maintained or improved    Intervention: Collaborate with Interdisciplinary Team for optimal positioning for bowel evacuation  Pt had a large, soft BM this AM. No signs of constipation.       Problem: Pain Management  Goal: Pain level will decrease to patient's comfort goal    Intervention: Educate and implement non-pharmacologic comfort measures. Examples: relaxation, distration, play therapy, activity therapy, massage, etc.  Pt denies any pain or discomfort. Taught and reinforced relaxation and distraction for non pharmacological interventions. Pt verbalize understanding.

## 2017-08-29 PROCEDURE — A9270 NON-COVERED ITEM OR SERVICE: HCPCS | Performed by: HOSPITALIST

## 2017-08-29 PROCEDURE — 97116 GAIT TRAINING THERAPY: CPT

## 2017-08-29 PROCEDURE — 97110 THERAPEUTIC EXERCISES: CPT

## 2017-08-29 PROCEDURE — 97112 NEUROMUSCULAR REEDUCATION: CPT

## 2017-08-29 PROCEDURE — 97530 THERAPEUTIC ACTIVITIES: CPT

## 2017-08-29 PROCEDURE — A9270 NON-COVERED ITEM OR SERVICE: HCPCS | Performed by: PHYSICAL MEDICINE & REHABILITATION

## 2017-08-29 PROCEDURE — 99233 SBSQ HOSP IP/OBS HIGH 50: CPT | Performed by: HOSPITALIST

## 2017-08-29 PROCEDURE — 770010 HCHG ROOM/CARE - REHAB SEMI PRIVAT*

## 2017-08-29 PROCEDURE — 700102 HCHG RX REV CODE 250 W/ 637 OVERRIDE(OP): Performed by: PHYSICAL MEDICINE & REHABILITATION

## 2017-08-29 PROCEDURE — 700102 HCHG RX REV CODE 250 W/ 637 OVERRIDE(OP): Performed by: HOSPITALIST

## 2017-08-29 RX ORDER — POTASSIUM CHLORIDE 20 MEQ/1
10 TABLET, EXTENDED RELEASE ORAL
Status: DISCONTINUED | OUTPATIENT
Start: 2017-09-01 | End: 2017-09-02

## 2017-08-29 RX ORDER — METOLAZONE 2.5 MG/1
2.5 TABLET ORAL
Status: DISCONTINUED | OUTPATIENT
Start: 2017-08-31 | End: 2017-09-07 | Stop reason: HOSPADM

## 2017-08-29 RX ORDER — SPIRONOLACTONE 25 MG/1
25 TABLET ORAL
Status: DISCONTINUED | OUTPATIENT
Start: 2017-08-30 | End: 2017-09-07 | Stop reason: HOSPADM

## 2017-08-29 RX ORDER — TORSEMIDE 10 MG/1
20 TABLET ORAL
Status: DISCONTINUED | OUTPATIENT
Start: 2017-09-01 | End: 2017-09-07 | Stop reason: HOSPADM

## 2017-08-29 RX ADMIN — SPIRONOLACTONE 50 MG: 25 TABLET, FILM COATED ORAL at 05:27

## 2017-08-29 RX ADMIN — OXYCODONE HYDROCHLORIDE 10 MG: 10 TABLET ORAL at 19:26

## 2017-08-29 RX ADMIN — FERROUS SULFATE TAB 325 MG (65 MG ELEMENTAL FE) 325 MG: 325 (65 FE) TAB at 08:43

## 2017-08-29 RX ADMIN — POTASSIUM CHLORIDE 10 MEQ: 1500 TABLET, EXTENDED RELEASE ORAL at 08:44

## 2017-08-29 RX ADMIN — NYSTATIN 1500000 UNITS: 100000 POWDER TOPICAL at 20:44

## 2017-08-29 RX ADMIN — OXYCODONE HYDROCHLORIDE AND ACETAMINOPHEN 500 MG: 500 TABLET ORAL at 08:43

## 2017-08-29 RX ADMIN — LACTOBACILLUS ACIDOPHILUS / LACTOBACILLUS BULGARICUS 1 PACKET: 100 MILLION CFU STRENGTH GRANULES at 08:47

## 2017-08-29 RX ADMIN — URSODIOL 300 MG: 300 CAPSULE ORAL at 08:44

## 2017-08-29 RX ADMIN — TORSEMIDE 20 MG: 10 TABLET ORAL at 05:27

## 2017-08-29 RX ADMIN — FOLIC ACID 1 MG: 1 TABLET ORAL at 08:44

## 2017-08-29 RX ADMIN — LACTOBACILLUS ACIDOPHILUS / LACTOBACILLUS BULGARICUS 1 PACKET: 100 MILLION CFU STRENGTH GRANULES at 12:00

## 2017-08-29 RX ADMIN — MAGNESIUM HYDROXIDE 15 ML: 400 SUSPENSION ORAL at 20:47

## 2017-08-29 RX ADMIN — POLYETHYLENE GLYCOL 3350 1 PACKET: 17 POWDER, FOR SOLUTION ORAL at 20:44

## 2017-08-29 RX ADMIN — TAMSULOSIN HYDROCHLORIDE 0.4 MG: 0.4 CAPSULE ORAL at 08:43

## 2017-08-29 RX ADMIN — LACTOBACILLUS ACIDOPHILUS / LACTOBACILLUS BULGARICUS 1 PACKET: 100 MILLION CFU STRENGTH GRANULES at 18:09

## 2017-08-29 RX ADMIN — VITAMIN D, TAB 1000IU (100/BT) 1000 UNITS: 25 TAB at 08:43

## 2017-08-29 RX ADMIN — ATORVASTATIN CALCIUM 40 MG: 40 TABLET, FILM COATED ORAL at 20:45

## 2017-08-29 ASSESSMENT — ENCOUNTER SYMPTOMS
FEVER: 0
VOMITING: 0
DIARRHEA: 0
CHILLS: 0
NERVOUS/ANXIOUS: 0
SHORTNESS OF BREATH: 0
ABDOMINAL PAIN: 0
NAUSEA: 0

## 2017-08-29 ASSESSMENT — PAIN SCALES - GENERAL: PAINLEVEL_OUTOF10: 7

## 2017-08-29 ASSESSMENT — GAIT ASSESSMENTS: DEVIATION: DECREASED BASE OF SUPPORT;BRADYKINETIC

## 2017-08-29 NOTE — REHAB-PHARMACY IDT TEAM NOTE
Pharmacy   Pharmacy  Antibiotics/Antifungals/Antivirals:  Medication:      Active Orders     None        Route:         n/a  Stop Date:  n/a  Reason:   Antihypertensives/Cardiac:  Medication:    Orders (72h ago through future)    Start     Ordered    08/22/17 0600  spironolactone (ALDACTONE) tablet 50 mg  Q DAY      08/21/17 0954    08/20/17 0000  cholestyramine (QUESTRAN,PREVALITE) 4 GM powder 4 g  1 TIME DAILY PRN      08/17/17 1046    08/17/17 0600  torsemide (DEMADEX) TABS 20 mg  EVERY 48 HOURS      08/16/17 2228    08/16/17 2300  metolazone (ZAROXOLYN) tablet 2.5 mg  EVERY 48 HOURS     Comments:  GIVE 40 MINUTES PRIOR TO DEMEDEX EVERY OTHER DAY.    08/16/17 2236    08/10/17 2100  atorvastatin (LIPITOR) tablet 40 mg  EVERY BEDTIME      08/10/17 1457    08/10/17 1456  hydrALAZINE (APRESOLINE) tablet 25 mg  EVERY 8 HOURS PRN      08/10/17 1457        Patient Vitals for the past 24 hrs:   BP Pulse   08/29/17 0700 107/65 68   08/28/17 1900 109/61 75   08/28/17 1348 100/64 90       Anticoagulation:  Medication:  n/a  INR:      Other key medications: cholestyramine, tamsulosin, torsemide, trazodone, ursodiol   A review of the medication list reveals no issues at this time. Patient is currently on antihypertensive(s). Recommend home blood pressure monitoring/recording if antihypertensive(s) regimen(s) continue.    Section completed by:  Gertrudis Thompson [facility]

## 2017-08-29 NOTE — REHAB-CM IDT TEAM NOTE
Case Management    DC Planning  DC destination/dispostion:  Return home with spouse. 2 steps to enter; bath/tub shower combo.        Referrals:  None at this time.     DC Needs: Spouse prefers out pt therapy instead of home health; follow up with PCP Dr. Fabián Urena/Dr. Rosales.  Pharmacy [facility] @ [facility].  Will need a wheelchair at time of dc.       Barriers to discharge:       Strengths:  Motivated to return home, supportive spouse              Section completed by:  Malu Madden

## 2017-08-29 NOTE — PROGRESS NOTES
DATE OF SERVICE:  08/20/2017    SUBJECTIVE:  Patient feels good today, no complaints.    OBJECTIVE:  VITAL SIGNS:  Stable.  He is afebrile.  HEENT:  Normocephalic, atraumatic.  Eyes, conjunctivae anicteric.  Nose   patent.  Mouth, moist mucous membranes.  NECK:  Supple, no lymphadenopathy.  LUNGS:  Clear to auscultation, coarse breath sounds and poor inspiratory   effort.  HEART:  Regular rate and rhythm.  ABDOMEN:  Positive bowel sounds, soft, nontender.  GENITOURINARY AND RECTAL:  Deferred.  EXTREMITIES:  No clubbing, cyanosis and no calf tenderness.  SKIN:  No rashes, and incision is clean, dry, and intact.    ASSESSMENT AND PLAN:  1.  Lumbar stenosis with significant neurogenic claudication, status post   decompression.  Continue comprehensive rehabilitative program.  2.  Multiple medical problems with stage IV colon cancer and metastatic   disease.  Continue his comprehensive rehabilitative program, to monitor this   as well as his endurance and function and rehabilitative program.  Pain   control is stable and continue his current medical regimen and incentive   spirometer.       ____________________________________     M MD AYDEN ESTRELLAA / LEEANN    DD:  08/28/2017 18:28:51  DT:  08/28/2017 23:54:09    D#:  [id number]  Job#:  [id number]

## 2017-08-29 NOTE — PROGRESS NOTES
DATE OF SERVICE:  08/19/2017    SUBJECTIVE:  Patient without complaints.    OBJECTIVE:  VITAL SIGNS:  Stable.  He is afebrile.  HEENT:  Normocephalic, atraumatic.  Eyes, conjunctivae anicteric.  Nose   patent.  Mouth, moist mucous membranes.  NECK:  Supple, no lymphadenopathy.  LUNGS:  Coarse breath sounds.  HEART:  Regular.  ABDOMEN:  Positive bowel sounds, soft, nontender.  GENITOURINARY AND RECTAL:  Deferred.  EXTREMITIES:  No clubbing, cyanosis.  SKIN:  No rashes.  Incision is clean, dry, and intact.    ASSESSMENT AND PLAN:  Stage IV colon cancer, metastatic disease liver, 5-year   remission as well as lumbar stenosis.  He underwent decompression of his   lumbar spine for the neurogenic claudication as well as dysfunction and   mobility and activities of daily living deficits.  He is progressing well with   therapy.  We will continue comprehensive rehabilitative therapy program.  As   for his multiple medical issues at this point in time, we are going to   continue his current regimen.  Appears to be stable.  Pain control is stable   and encouraged incentive spirometer as well as nutrition.       ____________________________________     M MD KARO ESTRELLA / LEEANN    DD:  08/28/2017 18:27:50  DT:  08/28/2017 23:51:24    D#:  [id number]  Job#:  [id number]

## 2017-08-29 NOTE — CARE PLAN
Problem: Safety  Goal: Will remain free from injury  Patient is alert and oriented x4. Cooperative with care. Encourage pt to use call light for assistance out of bed, pt verbalized understanding. Bed in lowest position with belongings and call light within reach. Will continue to monitor.     Problem: Pain Management  Goal: Pain level will decrease to patient's comfort goal  Patient c/o back pain 8/10 on numeric pain scale. Administered prn roxicodone 10 mg with good effect. Pt in no distress. Appears comfortable in bed. Will continue to monitor.     Problem: Skin Integrity  Goal: Risk for impaired skin integrity will decrease  Applied nystatin powder to groin area per MAR. No redness or rashes noted. Skin is intact. Will continue to monitor.

## 2017-08-30 LAB
ALBUMIN SERPL BCP-MCNC: 3.3 G/DL (ref 3.2–4.9)
ALBUMIN/GLOB SERPL: 1.3 G/DL
ALP SERPL-CCNC: 68 U/L (ref 30–99)
ALT SERPL-CCNC: 11 U/L (ref 2–50)
ANION GAP SERPL CALC-SCNC: 5 MMOL/L (ref 0–11.9)
AST SERPL-CCNC: 16 U/L (ref 12–45)
BASOPHILS # BLD AUTO: 0.6 % (ref 0–1.8)
BASOPHILS # BLD: 0.03 K/UL (ref 0–0.12)
BILIRUB SERPL-MCNC: 1.3 MG/DL (ref 0.1–1.5)
BUN SERPL-MCNC: 21 MG/DL (ref 8–22)
CALCIUM SERPL-MCNC: 9.3 MG/DL (ref 8.5–10.5)
CHLORIDE SERPL-SCNC: 98 MMOL/L (ref 96–112)
CO2 SERPL-SCNC: 30 MMOL/L (ref 20–33)
CREAT SERPL-MCNC: 0.65 MG/DL (ref 0.5–1.4)
EOSINOPHIL # BLD AUTO: 0.35 K/UL (ref 0–0.51)
EOSINOPHIL NFR BLD: 6.8 % (ref 0–6.9)
ERYTHROCYTE [DISTWIDTH] IN BLOOD BY AUTOMATED COUNT: 54.2 FL (ref 35.9–50)
GFR SERPL CREATININE-BSD FRML MDRD: >60 ML/MIN/1.73 M 2
GLOBULIN SER CALC-MCNC: 2.6 G/DL (ref 1.9–3.5)
GLUCOSE SERPL-MCNC: 92 MG/DL (ref 65–99)
HCT VFR BLD AUTO: 34.3 % (ref 42–52)
HGB BLD-MCNC: 11 G/DL (ref 14–18)
IMM GRANULOCYTES # BLD AUTO: 0.02 K/UL (ref 0–0.11)
IMM GRANULOCYTES NFR BLD AUTO: 0.4 % (ref 0–0.9)
LYMPHOCYTES # BLD AUTO: 0.96 K/UL (ref 1–4.8)
LYMPHOCYTES NFR BLD: 18.6 % (ref 22–41)
MCH RBC QN AUTO: 27.4 PG (ref 27–33)
MCHC RBC AUTO-ENTMCNC: 32.1 G/DL (ref 33.7–35.3)
MCV RBC AUTO: 85.5 FL (ref 81.4–97.8)
MONOCYTES # BLD AUTO: 0.66 K/UL (ref 0–0.85)
MONOCYTES NFR BLD AUTO: 12.8 % (ref 0–13.4)
NEUTROPHILS # BLD AUTO: 3.15 K/UL (ref 1.82–7.42)
NEUTROPHILS NFR BLD: 60.8 % (ref 44–72)
NRBC # BLD AUTO: 0 K/UL
NRBC BLD AUTO-RTO: 0 /100 WBC
PLATELET # BLD AUTO: 76 K/UL (ref 164–446)
PMV BLD AUTO: 11 FL (ref 9–12.9)
POTASSIUM SERPL-SCNC: 3.6 MMOL/L (ref 3.6–5.5)
PROT SERPL-MCNC: 5.9 G/DL (ref 6–8.2)
RBC # BLD AUTO: 4.01 M/UL (ref 4.7–6.1)
SODIUM SERPL-SCNC: 133 MMOL/L (ref 135–145)
WBC # BLD AUTO: 5.2 K/UL (ref 4.8–10.8)

## 2017-08-30 PROCEDURE — 85025 COMPLETE CBC W/AUTO DIFF WBC: CPT

## 2017-08-30 PROCEDURE — A9270 NON-COVERED ITEM OR SERVICE: HCPCS | Performed by: PHYSICAL MEDICINE & REHABILITATION

## 2017-08-30 PROCEDURE — 770010 HCHG ROOM/CARE - REHAB SEMI PRIVAT*

## 2017-08-30 PROCEDURE — 97116 GAIT TRAINING THERAPY: CPT

## 2017-08-30 PROCEDURE — 97110 THERAPEUTIC EXERCISES: CPT

## 2017-08-30 PROCEDURE — A9270 NON-COVERED ITEM OR SERVICE: HCPCS | Performed by: HOSPITALIST

## 2017-08-30 PROCEDURE — 97530 THERAPEUTIC ACTIVITIES: CPT

## 2017-08-30 PROCEDURE — 700102 HCHG RX REV CODE 250 W/ 637 OVERRIDE(OP): Performed by: PHYSICAL MEDICINE & REHABILITATION

## 2017-08-30 PROCEDURE — 97535 SELF CARE MNGMENT TRAINING: CPT

## 2017-08-30 PROCEDURE — 36415 COLL VENOUS BLD VENIPUNCTURE: CPT

## 2017-08-30 PROCEDURE — 700102 HCHG RX REV CODE 250 W/ 637 OVERRIDE(OP): Performed by: HOSPITALIST

## 2017-08-30 PROCEDURE — 80053 COMPREHEN METABOLIC PANEL: CPT

## 2017-08-30 RX ADMIN — VITAMIN D, TAB 1000IU (100/BT) 1000 UNITS: 25 TAB at 08:59

## 2017-08-30 RX ADMIN — OXYCODONE HYDROCHLORIDE AND ACETAMINOPHEN 500 MG: 500 TABLET ORAL at 08:59

## 2017-08-30 RX ADMIN — FOLIC ACID 1 MG: 1 TABLET ORAL at 08:59

## 2017-08-30 RX ADMIN — OXYCODONE HYDROCHLORIDE 10 MG: 10 TABLET ORAL at 00:13

## 2017-08-30 RX ADMIN — FERROUS SULFATE TAB 325 MG (65 MG ELEMENTAL FE) 325 MG: 325 (65 FE) TAB at 08:59

## 2017-08-30 RX ADMIN — ATORVASTATIN CALCIUM 40 MG: 40 TABLET, FILM COATED ORAL at 20:56

## 2017-08-30 RX ADMIN — POLYETHYLENE GLYCOL 3350 1 PACKET: 17 POWDER, FOR SOLUTION ORAL at 20:56

## 2017-08-30 RX ADMIN — LACTOBACILLUS ACIDOPHILUS / LACTOBACILLUS BULGARICUS 1 PACKET: 100 MILLION CFU STRENGTH GRANULES at 08:59

## 2017-08-30 RX ADMIN — URSODIOL 300 MG: 300 CAPSULE ORAL at 08:59

## 2017-08-30 RX ADMIN — OXYCODONE HYDROCHLORIDE 10 MG: 10 TABLET ORAL at 20:56

## 2017-08-30 RX ADMIN — MAGNESIUM HYDROXIDE 15 ML: 400 SUSPENSION ORAL at 20:56

## 2017-08-30 RX ADMIN — TAMSULOSIN HYDROCHLORIDE 0.4 MG: 0.4 CAPSULE ORAL at 08:59

## 2017-08-30 RX ADMIN — SPIRONOLACTONE 25 MG: 25 TABLET, FILM COATED ORAL at 05:18

## 2017-08-30 RX ADMIN — LACTOBACILLUS ACIDOPHILUS / LACTOBACILLUS BULGARICUS 1 PACKET: 100 MILLION CFU STRENGTH GRANULES at 11:52

## 2017-08-30 RX ADMIN — LACTOBACILLUS ACIDOPHILUS / LACTOBACILLUS BULGARICUS 1 PACKET: 100 MILLION CFU STRENGTH GRANULES at 18:34

## 2017-08-30 ASSESSMENT — PAIN SCALES - GENERAL
PAINLEVEL_OUTOF10: 5
PAINLEVEL_OUTOF10: 8

## 2017-08-30 NOTE — CARE PLAN
Problem: Mobility  Goal: STG-Within one week, patient will ambulate household distance  1) Individualized goal:  30 feet with FWW, gt belt, and Mod A  2) Interventions:  PT Group Therapy, PT Gait Training, PT Therapeutic Exercises, PT Neuro Re-Ed/Balance, PT Therapeutic Activity and PT Manual Therapy.   Outcome: MET Date Met: 08/30/17      Problem: Mobility Transfers  Goal: STG-Within one week, patient will transfer bed to chair  1) Individualized goal:  Patient will transfer with FWW SBA wc <> bed/ STS consistently  2) Interventions:  PT E Stim Attended, PT Gait Training, PT Therapeutic Exercises, PT Neuro Re-Ed/Balance, PT Aquatic Therapy, PT Therapeutic Activity and PT Manual Therapy     Outcome: MET Date Met: 08/30/17

## 2017-08-30 NOTE — REHAB-NURSING IDT TEAM NOTE
Nursing   Nursing  Diet/Nutrition:  Regular and Thin Liquids  Medication Administration:  Whole with Liquid Wash  % consumed at meals in last 24 hours:  Consumed % of meals per documentation.  Breakfast:  80%   Lunch:  100%  Dinner:  100%   Snack schedule:  None  Supplement:  Boost Plus  Appetite:  neither Good nor Excellent  Fluid Intake/Output in past 24 hours: In: 540 [P.O.:540]  Out: 1650   Admit Weight:  Weight: 116.8 kg (257 lb 8 oz)  Weight Last 7 Days   [92.5 kg (203 lb 14.8 oz)-99 kg (218 lb 4.1 oz)] 94.5 kg (208 lb 5.4 oz) (08/29 0500)    Pain Issues:    Location:  Back (08/29 1926)  Mid;Lower (08/29 1926)         Severity:  Moderate   Scheduled pain medications:  None     PRN pain medications used in last 24 hours:  oxycodone immediate release (ROXICODONE)    Non Pharmacologic Interventions:  warm blanket, distraction, emotional support, environmental changes, relaxation, repositioned and rest  Effectiveness of pain management plan:  fair=improved comfort with interventions but does not always meet goal    Bowel:    Bowel Assist Initial Score:  3 - Moderate Assistance  Bowel Assist Current Score:  5 - Standby Prompting/Supervision or Set-up  Bowl Accidents in last 7 days:  2  Last bowel movement: 08/29/17  Stool: Large, Soft     Usual bowel pattern:  daily  Scheduled bowel medications:  polyethylene glycol/lytes (MIRALAX)  and magnesium hydroxide (MILK OF MAGNESIA)   PRN bowel medications used in last 24 hours:  None  Nursing Interventions:  Increased time, Scheduled medication, Supervision, Positioning on commode/toilet, Brief  Effectiveness of bowel program:   good=regular, predictable, controlled emptying of bowel  Bladder:    Bladder Assist Initial Score:  5 - Standby Prompting/Supervision or Set-up  Bladder Assist Current Score:  6 - Modified Independent  Bladder Accidents in last 7 days:  0  PVR range for past 24-48 hours: -- ()  Medications affecting bladder:  Flomax    Time void  schedule/voiding pattern:  Voiding every 2-4 hours  Interventions:  Increased time, Supervision, Emptying of device, Urinal, Brief  Effectiveness of bladder training:  Good=regular, predictable, emptying of bladder, patient initiates time voiding      Diabetes:  Blood Sugar Frequency:  None        Wound:         Patient Lines/Drains/Airways Status    Active Current Wounds     Name: Placement date: Placement time: Site: Days:    Pressure Ulcer  Stage 3 POA Coccyx Midline 08/10/17       19                   Interventions:  Approved barrier paste, Repositioning, BE  Effectiveness of intervention:  wound is improving     Sleep/wake cycle:   Average hours slept:  Sleeps 3-4 hours without waking  Sleep medication usage:  None    Patient/Family Training/Education:  Fall Prevention, General Self Care, Medication Management, Pain Management, Safe Transfers, Safety, Skin Care and Wound Care  Discharge Supply Recommendations:  Blood Pressure Monitor and Wound Care Supplies  Strengths: Alert and oriented, Willingly participates in therapeutic activities, Able to follow instructions, Supportive family, Pleasant and cooperative, Effective communication skills, Good carryover of learning and Motivated for self care and independence   Barriers:   Bladder retention, Fatigue, Generalized weakness and Pressure ulcer   (On Flomax)         Nursing Problems           Problem: Bowel/Gastric:     Goal: Normal bowel function is maintained or improved           Goal: Will not experience complications related to bowel motility             Problem: Communication     Goal: The ability to communicate needs accurately and effectively will improve             Problem: Discharge Barriers/Planning     Goal: Patient's continuum of care needs will be met             Problem: Infection     Goal: Will remain free from infection             Problem: Knowledge Deficit     Goal: Knowledge of disease process/condition, treatment plan, diagnostic tests, and  medications will improve           Goal: Knowledge of the prescribed therapeutic regimen will improve             Problem: Mobility     Goal: Risk for activity intolerance will decrease             Problem: Pain Management     Goal: Pain level will decrease to patient's comfort goal             Problem: Respiratory:     Goal: Respiratory status will improve             Problem: Safety     Goal: Will remain free from injury           Goal: Will remain free from falls             Problem: Skin Integrity     Goal: Risk for impaired skin integrity will decrease             Problem: Urinary Elimination:     Goal: Ability to reestablish a normal urinary elimination pattern will improve             Problem: Venous Thromboembolism (VTW)/Deep Vein Thrombosis (DVT) Prevention:     Goal: Patient will participate in Venous Thrombosis (VTE)/Deep Vein Thrombosis (DVT)Prevention Measures                  Long Term Goals:   At discharge patient will be able to function safely at home and in the community with support.    Section completed by:  Martin Huizar R.N.

## 2017-08-30 NOTE — PROGRESS NOTES
"Rehab Progress Note     Interval Events (Subjective)  Chief Complaint:  AIDP        Patient seen in his room and  Along with his wife. Notes of  and the therapist appreciated. Patient has improved in regards to his ascites he also says he feels a bit stronger as well. The patient is in very good spirits and continues to work hard in therapy. The patient is currently progressing with ambulation.   sacral stage III it still looks like he needs further care at this time.      Objective:  VITAL SIGNS: /66   Pulse 70   Temp 36.4 °C (97.5 °F)   Resp 20   Ht 1.905 m (6' 3\")   Wt 93.5 kg (206 lb 2.1 oz)   SpO2 96%   BMI 25.76 kg/m²       GENERAL: No apparent distress  HEENT: Normocephalic/atraumatic, EOMI, PERRL and No nystagmus  CARDIAC: Regular rate and rhythm, normal S1, S2   LUNGS: Clear to auscultation   ABDOMINAL: bowel sounds present, soft, nontender and ascites present with fluid wave noted. Ascites is increased  EXTREMITIES: Without clubbing or cyanosis 2+ edema noted in the left lower extremity but decreased  SKIN:   Small stage III noted on sacrum  NEURO: Unchanged with the exception to hip flexor strength is definitely a half grade stronger bilaterally as compared to admission    Current Facility-Administered Medications   Medication Frequency   • [START ON 8/31/2017] metolazone (ZAROXOLYN) tablet 2.5 mg Q72HRS   • [START ON 9/1/2017] potassium chloride SA (Kdur) tablet 10 mEq Q72HRS   • spironolactone (ALDACTONE) tablet 25 mg Q DAY   • [START ON 9/1/2017] torsemide (DEMADEX) TABS 20 mg Q72HRS   • magnesium hydroxide (MILK OF MAGNESIA) suspension 15 mL QHS   • polyethylene glycol/lytes (MIRALAX) PACKET 1 Packet QHS   • ferrous sulfate tablet 325 mg QDAY with Breakfast   • ascorbic acid (ascorbic acid) tablet 500 mg DAILY   • cholestyramine (QUESTRAN,PREVALITE) 4 GM powder 4 g QDAY PRN   • docusate sodium (COLACE) capsule 100 mg BID PRN    And   • senna-docusate (PERICOLACE or SENOKOT S) " 8.6-50 MG per tablet 1 Tab QDAY PRN    And   • lactulose 20 GM/30ML solution 30 mL Q24HRS PRN    And   • bisacodyl (DULCOLAX) suppository 10 mg QDAY PRN   • hydrocortisone (ANUSOL-HC) suppository 25 mg Q12HRS PRN   • tamsulosin (FLOMAX) capsule 0.4 mg AFTER BREAKFAST   • Respiratory Care per Protocol Continuous RT   • Pharmacy Consult Request ...Pain Management Review 1 Each PRN   • lactobacillus granules (LACTINEX/FLORANEX) packet 1 Packet TID WITH MEALS   • ursodiol (ACTIGALL) capsule 300 mg DAILY   • vitamin D (cholecalciferol) tablet 1,000 Units DAILY   • folic acid (FOLVITE) tablet 1 mg DAILY   • atorvastatin (LIPITOR) tablet 40 mg QHS   • benzocaine-menthol (CEPACOL) lozenge 1 Lozenge Q2HRS PRN   • hydrALAZINE (APRESOLINE) tablet 25 mg Q8HRS PRN   • mag hydrox-al hydrox-simeth (MAALOX PLUS ES or MYLANTA DS) suspension 20 mL Q2HRS PRN   • ondansetron (ZOFRAN ODT) dispertab 4 mg 4X/DAY PRN    Or   • ondansetron (ZOFRAN) syringe/vial injection 4 mg 4X/DAY PRN   • trazodone (DESYREL) tablet 50 mg QHS PRN   • sodium chloride (OCEAN) 0.65 % nasal spray 2 Spray PRN   • oxycodone immediate release (ROXICODONE) tablet 10 mg Q4HRS PRN       Orders Placed This Encounter   Procedures   • Diet Order     Standing Status:   Standing     Number of Occurrences:   1     Order Specific Question:   Diet:     Answer:   Regular [1]     Order Specific Question:   Consistency/Fluid modifications:     Answer:   Thin Liquids [3]       Assessment:  Active Hospital Problems    Diagnosis   • *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 DUE TO OLD HEP B, CHEMO RX,  OR THERMO RAD OF MAGNANT LESION   • Essential hypertension   • Colon cancer-2010 left hemicolectomy; no colostomy       Medical Decision Making and Plan:  Patient is doing well at this time  Dr. Hopper continues to follow medically. He continues to follow liver functions and workup of possible malignancy    Cirrhosis/ascites    Stable at this time but will monitor. It's improved with diuresis prescribed by Dr. hopper    Hypertension       under control with Aldactone and now Inderal    Stage III pressure ulcer, on the Coccyx    Appreciate the input of the wound care nurse honey colloid applied    Vitamin D deficiency    Continue replacement    Lower extremity edema left leg    We will continue to monitor improved      Diarrhea   C. diff toxin neg  No longer an issue      UTI  Resolved    Continue PT and OT  A tentative discharge date was suggested to be September 16 2017  We'll re-conferenceThis Wednesday, 8/30/2017          Total time:  > 25 minutes.  I spent greater than 50% of the time for patient care and coordination on this date, including unit/floor time, and face-to-face time with the patient as per assessment and plan above.    Moreno Adorno M.D.

## 2017-08-30 NOTE — CARE PLAN
Problem: Functional Transfers  Goal: STG-Within one week, patient will transfer to toilet  1) Individualized Goal:  Sup/SBA  2) Interventions:  OT Self Care/ADL, OT Neuro Re-Ed/Balance, OT Therapeutic Activity, OT Evaluation and OT Therapeutic Exercise     Outcome: MET Date Met: 08/30/17    Goal: STG-Within one week, patient will transfer to step in shower  1) Individualized Goal:  Sup/SBA  2) Interventions:  OT Self Care/ADL, OT Neuro Re-Ed/Balance, OT Therapeutic Activity, OT Evaluation and OT Therapeutic Exercise     Outcome: MET Date Met: 08/30/17

## 2017-08-30 NOTE — CARE PLAN
Problem: Infection  Goal: Will remain free from infection    Intervention: Assess for removal of potential routes of infection, such as IV, central line, intra-arterial or urinary catheters  Assessed pt's sacral wound. Wound appear clean. Medicated cream applied as ordered. No redness of warmth noticed.       Problem: Bowel/Gastric:  Goal: Will not experience complications related to bowel motility    Intervention: Assess baseline bowel pattern  Pt had prune juice this afternoon. Pt had a large BM.

## 2017-08-30 NOTE — REHAB-OT IDT TEAM NOTE
Occupational Therapy   Activities of Daily Living  Eating Initial:  5 - Standby Prompting/Supervision or Set-up  Eating Current:  6 - Modified Independent   Eating Description:  Increased time  Grooming Initial:  6 - Modified Independent  Grooming Current:  6 - Modified Independent   Grooming Description:  Increased time  Bathing Initial:  3 - Moderate Assistance  Bathing Current:  5 - Standby Prompting/Supervision or Set-up   Bathing Description:  Grab bar, Tub bench, Long handled bath tool, Hand held shower, Increased time, Supervision for safety, Verbal cueing, Set-up of equipment  Upper Body Dressing Initial:  4 - Minimal Assistance  Upper Body Dressing Current:  6 - Modified Independent   Upper Body Dressing Description:   (Don/doff pull over shirt)  Lower Body Dressing Initial:  2 - Max Assistance  Lower Body Dressing Current:  4 - Minimal Assistance   Lower Body Dressing Description:  4 - Minimal Assistance  Toileting Initial:  1 - Total Assistance  Toileting Current:  3 - Moderate Assistance   Toileting Description:  Grab bar, Increased time, Verbal cueing, Supervision for safety, Set-up of equipment  Toilet Transfer Initial:  4 - Minimal Assistance  Toilet Transfer Current:  5 - Standby Prompting/Supervision or Set-up   Toilet Transfer Description:  5 - Standby Prompting/Supervision or Set-up  Tub / Shower Transfer Initial:  4 - Minimal Assistance  Tub / Shower Transfer Current:  5 - Standby Prompting/Supervision or Set-up   Tub / Shower Transfer Description:  Adaptive equipment, Increased time, Supervision for safety, Grab bar, Verbal cueing, Set-up of equipment, Initial preparation for task  IADL:  TBD     Family Training/Education:  TBD   DME/DC Recommendations:  Shower Chair     Strengths:  Able to follow instructions, Alert and oriented, Effective communication skills, Independent PLOF, Making steady progress towards goals, Motivated for self care and independence, Pleasant and cooperative, Supportive  family and Willingly participates in therapeutic activities  Barriers:  Decreased endurance, Generalized weakness and Poor balance     # of short term goals set= 3    # of short term goals met= 2          Occupational Therapy Goals           Problem: Functional Transfers     Dates: Start: 08/23/17       Goal: STG-Within one week, patient will transfer to toilet     Dates: Start: 08/23/17       Description: 1) Individualized Goal:  Sup/SBA  2) Interventions:  OT Self Care/ADL, OT Neuro Re-Ed/Balance, OT Therapeutic Activity, OT Evaluation and OT Therapeutic Exercise             Goal: STG-Within one week, patient will transfer to step in shower     Dates: Start: 08/23/17       Description: 1) Individualized Goal:  Sup/SBA  2) Interventions:  OT Self Care/ADL, OT Neuro Re-Ed/Balance, OT Therapeutic Activity, OT Evaluation and OT Therapeutic Exercise               Problem: OT Long Term Goals     Dates: Start: 08/11/17       Goal: LTG-By discharge, patient will complete basic self care tasks     Dates: Start: 08/11/17   Expected End: 08/25/17       Description: 1) Individualized Goal:  By discharge, patient will complete basic self care tasks at a level of Mod. I.   2) Interventions:  OT Self Care/ADL, OT Neuro Re-Ed/Balance, OT Therapeutic Activity and OT Therapeutic Exercise    Co-signature:  Connor Ferraro OTR/KAITLIN              Goal: LTG-By discharge, patient will perform bathroom transfers     Dates: Start: 08/11/17   Expected End: 08/25/17       Description: 1) Individualized Goal:  By discharge, patient will perform bathroom transfers at a level of Mod. I.   2) Interventions:  OT Self Care/ADL, OT Neuro Re-Ed/Balance, OT Therapeutic Activity and OT Therapeutic Exercise    Co-signature:  Connor Ferraro OTR/L              Goal: LTG-By discharge, patient will complete basic home management     Dates: Start: 08/11/17   Expected End: 08/25/17       Description: 1) Individualized Goal:  By discharge, patient will prepare a  simple meal at a level of mod. I.   2) Interventions:  OT Self Care/ADL, OT Neuro Re-Ed/Balance, OT Therapeutic Activity and OT Therapeutic Exercise    Co-signature:  Connor Ferraro OTR/L                Problem: Toileting     Dates: Start: 08/11/17       Goal: STG-Within one week, patient will complete toileting tasks     Dates: Start: 08/23/17       Description: 1) Individualized Goal:  Min assist  2) Interventions:  OT Self Care/ADL, OT Neuro Re-Ed/Balance, OT Therapeutic Activity, OT Evaluation and OT Therapeutic Exercise                   Section completed by:  Elisha Kessler

## 2017-08-30 NOTE — PROGRESS NOTES
Hospital Medicine Progress Note, Adult, Complex               Author: SCOTT BULLOCK MD    Date & Time created: 8/29/2017  7:03 PM     Interval History:  8/15/17--  HISTORY REVIEWED.  REMOTE METASTATIC COLON CANCER DISCOVERED IN 2010 S/P RESECTION BY DR ANA RUIZ AND FINDING OF 10+ NODES  WHICH SPREAD TO THE LIVER.  HAD AN ABLATION AT [facility].  HEAVY DUTY CHEMO PER DR REBOLLAR.   THE LIVER ABLATION HAS UNFORTUNATELY RESULTED IN PORTAL HYPERTENSION.  HE ALSO HAS PRETTY MUCH ALL THE STIGMATA OF PORTAL HYPERTENSION.   THERE REMAINING LIVER PARENCHYMA IS NORMAL SO HE HAS PRESERVED LIVER SYNTHETIC FUNCTION.  AS OF 2010 THERE HAS BEEN NO NEW RECURRANCE THOUGH WE ARE DEALING WITH GASTRIC VARICES,  ESOPHAGEAL VARICIES,  CAPUT MEDUSA,  LEFT LOWER EXTREMITY DVT,  INABILITY TO TOLERATE ANTICOAGULATION WITH AN IVC FILTER IN PLACE,   AND CURRENTLY MODERATE ASCITES.   WE WILL PLAN ON THERAPEUTIC TAP ON Friday.   THE OTHER ISSUE THAT REALLY BROUGHT TO PATIENT TO THE HOSPITAL WAS PROGRESSIVE ASCENDING WEAKNESS.   HE DID HAVE A TIGHT L4-L5 CENTRAL STENOSIS.  THE OTHER LEVELS LOOK FINE.   HE STARTED NOTICING THE WEAKNESS FOR ABOUT A WEEK PRIOR IN MAY AND WENT TO ER.   DR HOUSE SAW THE STRUCTURAL ISSUE,  NAMELY THE ABOVE STENOSIS AND DECOMPRESSED THIS 0N 5/25.  SYMPTOMS DID NOT MALLY.  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   IT IS CLEAR THE PATIENT NEEDED SOME SURGERY ON THE LOWER BACK.   LOOKING AT THE FILMS.  MY THOUGHT IS THE SYMPTOMS THEMSELVES WERE CAUSED BY THE GBS.   HE RECEIVED 5 DAYS OF IMMUNOGLOBULIN AND DID FAIRLY WELL AND HAS [facility]ERR[facility] ON 8[facility]10[facility]17  PLAN:  CONTINUE PT/OT,   PROBLEMS THAT ALSO HAVE OCCURRED INCLUDE PORTAL VEIN THROMBOSIS.   I WILL ARRANGE A THERAPEUTIC THORACENTESIS ON Friday.  RX ENTEROBACTER UTI.  HYPERSPLENISM AND LOW WBC COUNTS AND PLATELETS TO BE EXPECTED.   D/C COREG.  INDERAL BETTER FOR  LOWERING PORTAL PRESSURES.      8/16/17--PATIENT AWAKE AND ALERT.   DOING WELL WITH PT/OT.   GOOD EFFORT PER PATIENT.   I DONT THINK THE ASCITES IS SIGNIFICANTLY WORSE.  I AM GOING TO MAKE DAILY ASSESMENTS AND DETERMINE WHEN PARACENTESIS IS NECESSARY.   GBS IS CERTAINLY NO WORSE.   THE PATIENT WAS SPECIFICALLY TOLD TO INFORM ME ABOUT ANY PHYSICAL CHANGES IE: WORSENING WEAKNESS OR ANY CRANIAL NERVE ABNORMALITIES.   CHEST FILM WAS ESSENTIALLY NEGATIVE FOR EFFUSION.  THE DECREASED TACTILE FREMITUS THAT I NOTED WAS A HIGH RIDING DIAPHRAGM.   PROBABLY DUE TO ASCITES.    I NEED DAILY WEIGHTS TO HELP ME WITH MY DECISION WHEN TO TAP ABDOMEN.  NORMALLY PATIENT GETS IT TAPPED ABOUT EVERY 4 MONTHS BY GI.    I  WILL RE--ASSES PORTAL BLOOD FLOW AND SEE IF THIS PORTAL VEIN THROMBOSIS IS RECANULATED OR WORSE.  LAST YEAR THERE WAS SOME RECANULIZATION AND FLOW.   HEPATOPEDAL FLOW IS GOOD, HEPATOFUGAL FLOW BAD.  I HAVE NEVER SEEN A LONG TERM SURVIVOR OF METASTATIC COLON CANCER WITH 4 CM RECTO-SIGMOID TUMOR AND 5 POSITIVE NODES + LIVER MET.   RX FOR UTI WITH ENTEROBACTER ON CULTURE.  UNIMPRESSIVE WBC COUNT OF 2-5 WBC/HIGH POWERED FIELD.   I PROBABLY WOULD HAVE LET THAT GO UNLESS THERE WERE SYMPTOMS.     PLAN:   PATIENT DOING WELL.  DAILY WEIGHTS.  DAILY ASSESMENTS OF ASCITES.  CXR IS CLEAR.  CHECK ABDOMINAL ULTRASOUND TO ASSESS PORTAL BLOOD FLOW AND ASCITES.   I THINK QOD DIURETICS WOULD HELP PREVENT A TAP AND DECREASE THE EDEMA, (KNOWN  RLE DVT PRIMARILY DUE TO MALIGNANCY BEING A PRO-THROMBOTIC STATE).  GIVE 5 DAYS OF PO VITAMIN K GIVEN THE CIPRO KILLING GUT PA WHICH HELPS GENERATE VITAMIN K FOR A HUMAN.  CHECK SOME IRON STUDIES.   OVERALL INCHING FORWARD.   HARD TO SAY WHAT THE COURSE OF GBS WILL BE.  THE BACK SURGERY WAS NECESSARY BUT MY FEELING IS HIS SYMPTOMS WERE MOSTLY DUE TO GBS AND NOT SPINAL STENOSIS.   HEPATITIS STATUS CHECKED AND FOUND TO BE NEGATIVE.   HIV ANTIBODIES  FOR COMPLETENESS.     8/17/17--PATIENT'S ULTRASOUND  DID NOT SHOW ENOUGH ASCITES TO SAFELY TAP.  THAT IS GOOD.  LITTLE CHANGE IN THE MOTOR MOVEMENT NOTED.  BACK FEELS FINE.  INR IS BETTER WITH VITAMIN K.  DIURETICS HOLDING ASCITES AT BAY PERIODICALLY.   WE ARE FORCING DIURESIS WITH DEMEDEX ETC.   NO NEED FOR TAP.    BP OK.  LABS OK.  MODEST IRON DEFICIENCY COMPONENT.   ADD SOME PO IRON AND VITAMIN C IF NOT DONE ALREADY.    8/18/17--PATIENT LOOKS THE SAME TO ME.  THE ASCITES IS MINIMAL AND IWILL NOT REQUIIRE ANY INTERVENTION LIKE THORACENTESIS IN THE NEAR FUTURE.  OUR MEDICAL RX IS WORKING JUST FINE.    REALLY NO CHANGE IN PORTAL THROMBOSIS.  THERE IS BLOOD FLOW WHICH IS UNCHANGED.   USUAL COMPLAINTS.  ACHES AND PAINS ETC.    PLAN:  NO TAP OF ABDOMEN REQUIRED.  THE PATIENT MAY BENEFIT FROM SOME IV IRON.   HE IS DEFICIENT.     8/19/17--PATIENT AWAKE AND ALERT.  DOING FAIRLY NICELY WITH PT/OT.   HE WAS ABLE TO LIFT BOTH LEGS OFF THE BED.  I DID GET SOME PATELLA REFLEX B/L YESTERDAY.  MUSCLE BULK IS FAIR.  I REVIEWED THE ULTRASOUND.  MINIMAL ASCITES.  MODEST RLL EFFUSION.   THE SPLEEN IS ENLARGED.  HE UNFORTUNATELY WILL ALWAYS HAVE SOME DEGREE OF EARLY SATIETEY DUE TO  A 20 CM SPLEEN.  NORMAL WOULD BE FROM 7-14 CM. THE SPLEEN IS VERY CLOSE TO THE STOMACH AND NOT A RETROPERITONEAL STRUCTURE LIKE THE KIDNEYS.   ALSO ON THE PLAIN FILM WHICH WAS UNDER PENETRATED, THERE LOOKED TO BE A GOOD AMOUNT OF FECAL MATERIAL IN THE DESCENDING COLON, ALSO NEAR THE STOMACH.      PLAN:    I THINK THE PATIENT IS DOING AS WELL AS CAN BE EXPECTED.  THE GBS IS IMPROVING AT A SLOW RATE.   THE SYMPTOMS STARTED AT THE MIDDLE OF MAY.   SO THIS IS A 3 MONTH ORDEAL THUS FAR.  HE COULD BENEFIT FROM SOME MIRALAX.  DOUBT HE WOULD TOLLERATE METAMUCIL.    I WILL TRY  THAT.  MY AGGRESSIVE DIURETIC PROGRAM HAS  ESSENTIALLY MINIMIZED THE ASCITES TO A NEGLIGIBLE AMOUNT AND HE WILL NOT NEED PARACENTESIS.  LOW GRADE TEMP OF 99,6, CHECK UA FOR COMPLETENESS AND FORMAL CXR ON Monday TO RE-ASSES THAT EFFUSION NOTED  ON ABDOMINAL ULTRASOUND.    8/20/17--PATIENT FEELING WELL.  MENTATION IS CLEAR.  ASCITES IS UNDER CONTROL.  MINIMAL NOW.   EVERY OTHER DAY DIURETICS SEEMS TO BE HELPING.  CHECKING LYTES IN AM.  MAY NEED TO BACK DOWN TO EVERY THIRD DAY IF THERE IS HYPOTENSION OR CONTRACTION ALKALOSIS.  CXR TO ASSESS RIGHT EFFUSION NOTED ON EXAM. MODEST DECREASED TACTILE FREMITUS RIGHT BASE.    ALWAYS EXPLAIN A REASON TO PATIENT WHY WE ARE DOING A TEST.    UA IS NEGATIVE.    VITALS OK.  THUS FAR LABS OK.   HE IS AN ASTUTE  AND WANTS EVERY RATIONAL EXPLAINED THOROUGHLY.   O/W HE WILL REFUSE.  I WILL TRY TO EXPLAIN IN MY 2 PARAGRAPH DAILY NOTE MY RATIONAL.    PLAN:  CXR IN AM  (CHECKING THE DEGREE OF PLEURAL FLUID AROUND LUNG).  CHECK CBC, CMP, MAG.   PATIENT HAS A MODERATE AMOUNT OF FECAL MATERIAL IN SPLENIC FLEXURE.  STARTING ON GENTLE MIRALAX AND SOME MOM.  CLINICALLY NO EDEMA AND MINIMAL ASCITES.   MENTATION REMAINS CLEAR.  NO NEED FOR LACTULOSE AT THIS TIME.   MOTOR STRENGTH IMPROVING.   UA NEGATIVE.  NO ABDOMINAL PAIN OR FURTHER LOW GRADE FEVER.  COAGS ALSO IN AM.  THE EARLY SATIETY IS DUE TO A 20 CM SPLEEN ABUTTING THE STOMACH.    8/21/17--PATIENT DOING AS WELL AS CAN BE EXPECTED.   HE HAD BACK SURGERY AND GBS JUST PRIOR TO THE PROCEDURE IT APPEARS.  HE IS MOVING ALL 4 EXTREMITIES AGAINST   GRAVITY.  MENTATION IS VERY CLEAR.  COAGS ARE NO BETTER.  RECHECK IN AM.  RECHECK AMMONIA IN AM.  I NEVER GO BY THE NUMBER ITSELF,  JUST THE SYMPTOMS.  SEEMS LIKE EVERYONE IS DIFFERENT.  ONE PATIENT CAN HAVE AN AMMONIA  AND BE FINE AND ANOTHER CAN HAVE ONE OF 60 AND BE ENCEPHALOPATHIC.  CXR LOOKED FINE NO EFFUSION OR EVIDENCE FOR INFILTRATE.  VITALS LOOK OK.   LABS ARE AS EXPECTED FOR A PATIENT WITH CIRRHOSIS AND PORTAL HTN.   HE HAS GASTRIC VARICES,  ESOPHAGEAL VARICES (BANDED PROPHYLACTICLY IN PAST),  DILATED PERIUMBILICAL VEINS AND INTERMITTENT ASCITES.  THE OTHER PORTAL DECOMPRESSION LOCATIONS ARE SPLEEN, (HE DOES HAVE A VERY  LARGE SPLEEN) AND HE DOESN'T HAVE BLEEDING HEMORRHOIDS THAT WE KNOW OF.  DESPITE VITAMIN K HE IS STILL A BIT COAGULOPATHIC.   INTRINSIC LIVER DISEASE.  STAGE 4 COLON CANCER.  NEVER HAVE SEEN A LONGER TERM SURVIVOR.  PLAN:  CHECK LABS IN AM,  AMMONIA,  INR, CBC, CMP, ALPH FETO PROTEIN (TUMOR MARKER FOR HEPATOCELLULAR CARCINOMA WHICH IS A DIFFERENT MALIGNANCY ASSOCIATED WITH CIRRHOSIS UNFORTUNATELY).   GBS SYMPTOMS SLOWLY IMPROVING.  INCHING SOMEWHERE.    8/29/17--PATIENT AWAKE AND ALERT.  MOTOR STRENGTH IS ESSENTIALLY NORMAL.  STILL NO REFLEXES PATELLA.  HE HAS NO SIGNIFICANT EDEMA AT THIS MOMENT.    I AM CHANGING HIS DIURETICS TO EVERY 3D DAY WITH METALAZONE  AS WELL.  THIS IS HIS DRY WEIGHT.  NO LEG EDEMA.   DAUGHTER VISITING AS IS WIFE. GBS SYMPTOMS IMPROVED.   NO INCREASE IN ASCITES.  PATIENT HAS NO ENCEPHALOPATHY.  BACK ISSUES SEEM TO BE DOING WELL. LABS AND VITALS REASONABLE..   ALPHA FETO PROTEIN NORMAL.  PLAN:  CHANGE DIURETICS TO Q3 DAYS.   INCHING FORWARD.       Review of Systems:  Review of Systems   Constitutional: Negative for chills and fever.   Respiratory: Negative for shortness of breath.    Cardiovascular: Negative for chest pain.   Gastrointestinal: Negative for abdominal pain, diarrhea, nausea and vomiting.   Psychiatric/Behavioral: The patient is not nervous/anxious.        Physical Exam:  Physical Exam   Constitutional: He is oriented to person, place, and time. He appears well-nourished.   HENT:   Head: Atraumatic.   Eyes: Conjunctivae are normal. Pupils are equal, round, and reactive to light.   Neck: Normal range of motion. Neck supple. No tracheal deviation present.   Cardiovascular: Normal rate, regular rhythm, S1 normal and S2 normal.    No murmur heard.  Pulmonary/Chest: Effort normal. He has no wheezes. He has no rhonchi. He has no rales.   DECREASED TACTILE FREMITUS RIGHT BASE.  SUGGESTING EFFUSION THERE.   Abdominal: Soft. He exhibits no distension and no mass. There is no tenderness.  There is no rebound and no guarding. Hernia confirmed negative in the right inguinal area and confirmed negative in the left inguinal area.   Musculoskeletal: He exhibits no edema.   Neurological: He is alert and oriented to person, place, and time. No sensory deficit.   Lower extremity motor strength was  5/5.   Able to stand from a sitting position.   Skin: Skin is warm and dry. No rash noted. No cyanosis.   Psychiatric: He has a normal mood and affect. His behavior is normal.   Nursing note and vitals reviewed.      Labs:        Invalid input(s): [id number]      Recent Labs      17   0549   SODIUM  135   POTASSIUM  3.8   CHLORIDE  101   CO2  28   BUN  21   CREATININE  0.70   MAGNESIUM  1.9   PHOSPHORUS  3.2   CALCIUM  9.0     Recent Labs      17   0549   GLUCOSE  92     Recent Labs      17   0549   RBC  4.08*   HEMOGLOBIN  11.1*   HEMATOCRIT  34.9*   PLATELETCT  72*     Recent Labs      17   0549   WBC  5.7           Hemodynamics:  Temp (24hrs), Av.3 °C (97.4 °F), Min:36.3 °C (97.3 °F), Max:36.4 °C (97.5 °F)  Temperature: 36.3 °C (97.3 °F)  Pulse  Av.9  Min: 62  Max: 99   Blood Pressure : 104/65     Respiratory:    Respiration: 18, Pulse Oximetry: 94 %     Work Of Breathing / Effort: Mild  RUL Breath Sounds: Clear, RML Breath Sounds: Clear, RLL Breath Sounds: Clear;Diminished, JOSE Breath Sounds: Clear, LLL Breath Sounds: Clear  Fluids:    Intake/Output Summary (Last 24 hours) at 17 1903  Last data filed at 17 1515   Gross per 24 hour   Intake              540 ml   Output             1650 ml   Net            -1110 ml     Weight: 94.5 kg (208 lb 5.4 oz)  GI/Nutrition:  Orders Placed This Encounter   Procedures   • Diet Order     Standing Status:   Standing     Number of Occurrences:   1     Order Specific Question:   Diet:     Answer:   Regular [1]     Order Specific Question:   Consistency/Fluid modifications:     Answer:   Thin Liquids [3]     Medical Decision  Making, by Problem:  Active Hospital Problems    Diagnosis   • *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]   • Left leg DVT (CMS-HCC) [I82.402]   • Esophageal varices- BANDED 4/2016- repeat egd tbd 10//29/16- gic [I85.00]   • ASCVD (arteriosclerotic cardiovascular disease)subtotalled small distal LCx and 40% LAD med rx by cath 12/13/12 [I25.10]   • GIB (gastrointestinal bleeding)- recurrent from varices [K92.2]   • BPH (benign prostatic hyperplasia) [N40.0]   • 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 DUE TO OLD HEP B, CHEMO RX,  OR THERMO RAD OF MAGNANT LESION [K74.60]   • Essential hypertension [I10]   • Colon cancer-2010 left hemicolectomy; no colostomy [C18.9]     *  S/P Recent Lumbar Surgery    *  Hypertension  BP recently ok  On Coreg: 3.125 mg bid  Note: home meds were Coreg 3.125 mg 1/2 tab bid and Losartan: 25 mg daily  Monitor    *  U/A (+)  Has had some difficulty urinating recently with some burning  Cx --> GNR  F/U C&S   On Cipro empirically unitl C&S come back    *  LE Edema  Mild  On Aldactone: 25 mg daily    *  Thrombocytopenia  Platelets stable (8/11)  2nd to prior chemo and PORTAL HYPERTENSION    *  Hx Colon Cancer: stage IV; in 5 year remission  *  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  *  Hyperlipidemia  *  AIDP: s/p IVIG x 5 days        Reviewed items::  Medications reviewed and Labs reviewed

## 2017-08-30 NOTE — REHAB-COLLABORATIVE ONGOING IDT TEAM NOTE
Weekly Interdisciplinary Team Conference Note    Weekly Interdisciplinary Team Conference # 3  Date:  8/30/2017    Clinicians present and reporting at team conference include the following:   MD: Moreno Adorno MD   RN: Connor Neri RN  PT:   Cesar Duenas PT, DPT  OT:  Connor Ferraro OT   ST:  Not Applicable  CM:  Melodie Thayer RN, [facility]  REC:  None  RT:  None  RPh:  Isacc Adams, RP  Other:   None  All reporting clinicians have a working knowledge of this patient's plan of care.    Targeted DC Date:  9/9/17     Medical    Patient Active Problem List    Diagnosis Date Noted   • Left leg DVT (CMS-HCC) 06/26/2017     Priority: High   • Esophageal varices- BANDED 4/2016- repeat egd tbd 10//29/16- gic 04/04/2016     Priority: High   • BMI 34.0-34.9,adult 02/15/2013     Priority: High   • ASCVD (arteriosclerotic cardiovascular disease)subtotalled small distal LCx and 40% LAD med rx by cath 12/13/12 12/14/2012     Priority: High   • Gastric varices- s/p BRTO procedure at [facility] 04/04/2016     Priority: Medium   • GIB (gastrointestinal bleeding)- recurrent from varices 04/03/2016     Priority: Medium   • BPH (benign prostatic hyperplasia) 08/11/2017   • Peripheral neuropathy due to chemotherapy (CMS-[facility]) 08/11/2017   • Impaired mobility and ADLs 08/11/2017   • Lumbar stenosis 08/10/2017   • AIDP (acute inflammatory demyelinating polyneuropathy) (CMS-[facility]) 05/27/2017   • Cirrhosis (CMS-[facility]) 05/20/2017   • Generalized weakness 05/20/2017   • H/O colon cancer, stage IV 05/20/2017   • Obesity (BMI 30-39.9) 04/12/2017   • Elevated bilirubin 01/03/2017   • Dry cough- ? due to lisinopril- d/c'd 11/2016 11/18/2016   • SVT (supraventricular tachycardia) 10/27/2016   • Microcytic hypochromic anemia 10/24/2016   • History of colon cancer 10/24/2016   • History of esophageal varices 10/24/2016   • Iron deficiency anemia due to chronic blood loss 10/06/2016   • Portal vein thrombosis- on CT [facility] 2/2015 04/14/2016   • Cirrhosis of liver  with ascites (HCC)- ? DUE TO OLD HEP B, CHEMO RX,  OR THERMO RAD OF MAGNANT LESION 04/14/2016   • Mixed hyperlipidemia 06/20/2013   • Essential hypertension 06/20/2013   • Thrombocytopenia due to drugs 04/25/2012   • Colon cancer-2010 left hemicolectomy; no colostomy 12/06/2010   • Liver lesion- ablation of malignant met from colon ca 2011- f/u [facility] q 6 mos 12/06/2010   • Peripheral neuropathy, secondary to drugs or chemicals 12/06/2010     Results     Procedure Component Value Ref Range Date/Time    ESTIMATED GFR [[id number]] Collected:  08/30/17 0505    Order Status:  Completed Lab Status:  Final result Updated:  08/30/17 0655     GFR If African American >60 >60 mL/min/1.73 m 2      GFR If Non African American >60 >60 mL/min/1.73 m 2     COMP METABOLIC PANEL [[id number]]  (Abnormal) Collected:  08/30/17 0505    Order Status:  Completed Lab Status:  Final result Updated:  08/30/17 0654    Specimen:  Blood      Sodium 133 (L) 135 - 145 mmol/L      Potassium 3.6 3.6 - 5.5 mmol/L      Chloride 98 96 - 112 mmol/L      Co2 30 20 - 33 mmol/L      Anion Gap 5.0 0.0 - 11.9      Glucose 92 65 - 99 mg/dL      Bun 21 8 - 22 mg/dL      Creatinine 0.65 0.50 - 1.40 mg/dL      Calcium 9.3 8.5 - 10.5 mg/dL      AST(SGOT) 16 12 - 45 U/L      ALT(SGPT) 11 2 - 50 U/L      Alkaline Phosphatase 68 30 - 99 U/L      Total Bilirubin 1.3 0.1 - 1.5 mg/dL      Albumin 3.3 3.2 - 4.9 g/dL      Total Protein 5.9 (L) 6.0 - 8.2 g/dL      Globulin 2.6 1.9 - 3.5 g/dL      A-G Ratio 1.3 g/dL     CBC WITH DIFFERENTIAL [[id number]]  (Abnormal) Collected:  08/30/17 0505    Order Status:  Completed Lab Status:  Final result Updated:  08/30/17 0631    Specimen:  Blood      WBC 5.2 4.8 - 10.8 K/uL      RBC 4.01 (L) 4.70 - 6.10 M/uL      Hemoglobin 11.0 (L) 14.0 - 18.0 g/dL      Hematocrit 34.3 (L) 42.0 - 52.0 %      MCV 85.5 81.4 - 97.8 fL      MCH 27.4 27.0 - 33.0 pg      MCHC 32.1 (L) 33.7 - 35.3 g/dL      RDW 54.2 (H) 35.9 - 50.0 fL      Platelet Count 76  (L) 164 - 446 K/uL      MPV 11.0 9.0 - 12.9 fL      Neutrophils-Polys 60.80 44.00 - 72.00 %      Lymphocytes 18.60 (L) 22.00 - 41.00 %      Monocytes 12.80 0.00 - 13.40 %      Eosinophils 6.80 0.00 - 6.90 %      Basophils 0.60 0.00 - 1.80 %      Immature Granulocytes 0.40 0.00 - 0.90 %      Nucleated RBC 0.00 /100 WBC      Neutrophils (Absolute) 3.15 1.82 - 7.42 K/uL      Comment: Includes immature neutrophils, if present.        Lymphs (Absolute) 0.96 (L) 1.00 - 4.80 K/uL      Monos (Absolute) 0.66 0.00 - 0.85 K/uL      Eos (Absolute) 0.35 0.00 - 0.51 K/uL      Baso (Absolute) 0.03 0.00 - 0.12 K/uL      Immature Granulocytes (abs) 0.02 0.00 - 0.11 K/uL      NRBC (Absolute) 0.00 K/uL            Nursing  Diet/Nutrition:  Regular and Thin Liquids  Medication Administration:  Whole with Liquid Wash  % consumed at meals in last 24 hours:  Consumed % of meals per documentation.  Breakfast:  80%   Lunch:  100%  Dinner:  100%   Snack schedule:  None  Supplement:  Boost Plus  Appetite:  neither Good nor Excellent  Fluid Intake/Output in past 24 hours: In: 540 [P.O.:540]  Out: 1650   Admit Weight:  Weight: 116.8 kg (257 lb 8 oz)  Weight Last 7 Days   [92.5 kg (203 lb 14.8 oz)-99 kg (218 lb 4.1 oz)] 94.5 kg (208 lb 5.4 oz) (08/29 0500)    Pain Issues:    Location:  Back (08/29 1926)  Mid;Lower (08/29 1926)         Severity:  Moderate   Scheduled pain medications:  None     PRN pain medications used in last 24 hours:  oxycodone immediate release (ROXICODONE)    Non Pharmacologic Interventions:  warm blanket, distraction, emotional support, environmental changes, relaxation, repositioned and rest  Effectiveness of pain management plan:  fair=improved comfort with interventions but does not always meet goal    Bowel:    Bowel Assist Initial Score:  3 - Moderate Assistance  Bowel Assist Current Score:  5 - Standby Prompting/Supervision or Set-up  Bowl Accidents in last 7 days:  2  Last bowel movement: 08/29/17  Stool:  Large, Soft     Usual bowel pattern:  daily  Scheduled bowel medications:  polyethylene glycol/lytes (MIRALAX)  and magnesium hydroxide (MILK OF MAGNESIA)   PRN bowel medications used in last 24 hours:  None  Nursing Interventions:  Increased time, Scheduled medication, Supervision, Positioning on commode/toilet, Brief  Effectiveness of bowel program:   good=regular, predictable, controlled emptying of bowel  Bladder:    Bladder Assist Initial Score:  5 - Standby Prompting/Supervision or Set-up  Bladder Assist Current Score:  6 - Modified Independent  Bladder Accidents in last 7 days:  0  PVR range for past 24-48 hours: -- ()  Medications affecting bladder:  Flomax    Time void schedule/voiding pattern:  Voiding every 2-4 hours  Interventions:  Increased time, Supervision, Emptying of device, Urinal, Brief  Effectiveness of bladder training:  Good=regular, predictable, emptying of bladder, patient initiates time voiding      Diabetes:  Blood Sugar Frequency:  None        Wound:         Patient Lines/Drains/Airways Status    Active Current Wounds     Name: Placement date: Placement time: Site: Days:    Pressure Ulcer  Stage 3 POA Coccyx Midline 08/10/17       19                   Interventions:  Approved barrier paste, Repositioning, BE  Effectiveness of intervention:  wound is improving     Sleep/wake cycle:   Average hours slept:  Sleeps 3-4 hours without waking  Sleep medication usage:  None    Patient/Family Training/Education:  Fall Prevention, General Self Care, Medication Management, Pain Management, Safe Transfers, Safety, Skin Care and Wound Care  Discharge Supply Recommendations:  Blood Pressure Monitor and Wound Care Supplies  Strengths: Alert and oriented, Willingly participates in therapeutic activities, Able to follow instructions, Supportive family, Pleasant and cooperative, Effective communication skills, Good carryover of learning and Motivated for self care and independence   Barriers:   Bladder  retention, Fatigue, Generalized weakness and Pressure ulcer   (On Flomax)         Nursing Problems           Problem: Bowel/Gastric:     Goal: Normal bowel function is maintained or improved           Goal: Will not experience complications related to bowel motility             Problem: Communication     Goal: The ability to communicate needs accurately and effectively will improve             Problem: Discharge Barriers/Planning     Goal: Patient's continuum of care needs will be met             Problem: Infection     Goal: Will remain free from infection             Problem: Knowledge Deficit     Goal: Knowledge of disease process/condition, treatment plan, diagnostic tests, and medications will improve           Goal: Knowledge of the prescribed therapeutic regimen will improve             Problem: Mobility     Goal: Risk for activity intolerance will decrease             Problem: Pain Management     Goal: Pain level will decrease to patient's comfort goal             Problem: Respiratory:     Goal: Respiratory status will improve             Problem: Safety     Goal: Will remain free from injury           Goal: Will remain free from falls             Problem: Skin Integrity     Goal: Risk for impaired skin integrity will decrease             Problem: Urinary Elimination:     Goal: Ability to reestablish a normal urinary elimination pattern will improve             Problem: Venous Thromboembolism (VTW)/Deep Vein Thrombosis (DVT) Prevention:     Goal: Patient will participate in Venous Thrombosis (VTE)/Deep Vein Thrombosis (DVT)Prevention Measures                  Long Term Goals:   At discharge patient will be able to function safely at home and in the community with support.    Section completed by:  Martin Huizar R.N.              Mobility  Bed mobility:   SBA  Bed /Chair/Wheelchair Transfer Initial:  4 - Minimal Assistance  Bed /Chair/Wheelchair Transfer Current:  5 - Standby Prompting/Supervision or  "Set-up   Bed/Chair/Wheelchair Transfer Description:  Increased time, Verbal cueing, Supervision for safety, Set-up of equipment  Walk Initial:  0 - Not tested,unsafe activity  Walk Current:  2 - Max Assistance   Walk Description:   (3 reps of 125-130 feet indoors, FWW, gt belt, CGA, cueing for standard MARILYN three times, mod WB onto FWW, fwd trunk lean/increased B hip flexion, bradykinetic, no LOB noted.)  Wheelchair Initial:  0 - Not tested,medical condition  Wheelchair Current:  1 - Total Assistance   Wheelchair Description:   (225 feet with close SBA to CGA for descending ramps, Mod A with uneven sidewalk, once fatigued- Total A; setup/SPV indoors)  Stairs Initial:  0 - Not tested,medical condition  Stairs Current: 0 - Not tested,unsafe activity   Stairs Description: Safety concerns, Ascends/descends less than 4 steps, Walker (Pt ascend/descend 4\" step with FWW, x2, with CGA.)  Patient/Family Training/Education:  Ongoing patient and SO education  DME/DC Recommendations:  1 week; pt reports having a FWW; intermittent SPV; home health PT initially  Strengths:  Able to follow instructions, Effective communication skills, Independent PLOF, Making steady progress towards goals, Motivated for self care and independence, Pleasant and cooperative, Supportive family and Willingly participates in therapeutic activities  Barriers:   Decreased endurance, Generalized weakness, Poor activity tolerance and Poor balance  # of short term goals set= 2  # of short term goals met=2       Physical Therapy Problems           Problem: Mobility     Dates: Start: 08/11/17       Goal: STG-Within one week, patient will ambulate community distances     Dates: Start: 08/30/17       Description: 1) Individualized goal: 200 feet with FWW and SPV  2) Interventions: PT Group Therapy, PT Gait Training, PT Therapeutic Exercises, PT Neuro Re-Ed/Balance, PT Therapeutic Activity and PT Manual Therapy.              Goal: STG-Within one week, patient " will ascend and descend four to six stairs     Dates: Start: 08/30/17       Description: 1) Individualized goal: 2 steps with a FWW and CGA  2) Interventions: PT Group Therapy, PT Gait Training, PT Therapeutic Exercises, PT Neuro Re-Ed/Balance, PT Therapeutic Activity and PT Manual Therapy.                   Problem: Mobility Transfers     Dates: Start: 08/11/17       Goal: STG-Within one week, patient will sit to stand     Dates: Start: 08/30/17       Description: 1) Individualized goal: with FWW and SPV  2) Interventions: PT Group Therapy, PT Gait Training, PT Therapeutic Exercises, PT Neuro Re-Ed/Balance, PT Therapeutic Activity and PT Manual Therapy.              Goal: STG-Within one week, patient will transfer bed to chair     Dates: Start: 08/30/17       Description: 1) Individualized goal: with FWW and SPV  2) Interventions: PT Group Therapy, PT Gait Training, PT Therapeutic Exercises, PT Neuro Re-Ed/Balance, PT Therapeutic Activity and PT Manual Therapy.                Problem: PT-Long Term Goals     Dates: Start: 08/11/17       Goal: LTG-By discharge, patient will ambulate     Dates: Start: 08/11/17       Description: 1) Individualized goal:  200 feet with FWW and SPV  2) Interventions:  PT Group Therapy, PT Gait Training, PT Therapeutic Exercises, PT Neuro Re-Ed/Balance, PT Therapeutic Activity and PT Manual Therapy.           Goal: LTG-By discharge, patient will transfer one surface to another     Dates: Start: 08/11/17       Description: 1) Individualized goal:  with FWW and SPV  2) Interventions:  PT Group Therapy, PT Gait Training, PT Therapeutic Exercises, PT Neuro Re-Ed/Balance, PT Therapeutic Activity and PT Manual Therapy.           Goal: LTG-By discharge, patient will ambulate up/down 4-6 stairs     Dates: Start: 08/30/17       Description: 1) Individualized goal: 2 steps with a FWW and CGA  2) Interventions: PT Group Therapy, PT Gait Training, PT Therapeutic Exercises, PT Neuro Re-Ed/Balance, PT  Therapeutic Activity and PT Manual Therapy.                           Section completed by:  Pedrito Duenas P.T., DPT       Activities of Daily Living  Eating Initial:  5 - Standby Prompting/Supervision or Set-up  Eating Current:  6 - Modified Independent   Eating Description:  Increased time  Grooming Initial:  6 - Modified Independent  Grooming Current:  6 - Modified Independent   Grooming Description:  Increased time  Bathing Initial:  3 - Moderate Assistance  Bathing Current:  5 - Standby Prompting/Supervision or Set-up   Bathing Description:  Grab bar, Tub bench, Long handled bath tool, Hand held shower, Increased time, Supervision for safety, Verbal cueing, Set-up of equipment  Upper Body Dressing Initial:  4 - Minimal Assistance  Upper Body Dressing Current:  6 - Modified Independent   Upper Body Dressing Description:   (Don/doff pull over shirt)  Lower Body Dressing Initial:  2 - Max Assistance  Lower Body Dressing Current:  4 - Minimal Assistance   Lower Body Dressing Description:  4 - Minimal Assistance  Toileting Initial:  1 - Total Assistance  Toileting Current:  3 - Moderate Assistance   Toileting Description:  Grab bar, Increased time, Verbal cueing, Supervision for safety, Set-up of equipment  Toilet Transfer Initial:  4 - Minimal Assistance  Toilet Transfer Current:  5 - Standby Prompting/Supervision or Set-up   Toilet Transfer Description:  5 - Standby Prompting/Supervision or Set-up  Tub / Shower Transfer Initial:  4 - Minimal Assistance  Tub / Shower Transfer Current:  5 - Standby Prompting/Supervision or Set-up   Tub / Shower Transfer Description:  Adaptive equipment, Increased time, Supervision for safety, Grab bar, Verbal cueing, Set-up of equipment, Initial preparation for task  IADL:  TBD     Family Training/Education:  TBD   DME/DC Recommendations:  Shower Chair     Strengths:  Able to follow instructions, Alert and oriented, Effective communication skills, Independent PLOF, Making  steady progress towards goals, Motivated for self care and independence, Pleasant and cooperative, Supportive family and Willingly participates in therapeutic activities  Barriers:  Decreased endurance, Generalized weakness and Poor balance     # of short term goals set= 3    # of short term goals met= 2          Occupational Therapy Goals           Problem: Functional Transfers     Dates: Start: 08/23/17       Goal: STG-Within one week, patient will transfer to toilet     Dates: Start: 08/23/17       Description: 1) Individualized Goal:  Sup/SBA  2) Interventions:  OT Self Care/ADL, OT Neuro Re-Ed/Balance, OT Therapeutic Activity, OT Evaluation and OT Therapeutic Exercise             Goal: STG-Within one week, patient will transfer to step in shower     Dates: Start: 08/23/17       Description: 1) Individualized Goal:  Sup/SBA  2) Interventions:  OT Self Care/ADL, OT Neuro Re-Ed/Balance, OT Therapeutic Activity, OT Evaluation and OT Therapeutic Exercise               Problem: OT Long Term Goals     Dates: Start: 08/11/17       Goal: LTG-By discharge, patient will complete basic self care tasks     Dates: Start: 08/11/17   Expected End: 08/25/17       Description: 1) Individualized Goal:  By discharge, patient will complete basic self care tasks at a level of Mod. I.   2) Interventions:  OT Self Care/ADL, OT Neuro Re-Ed/Balance, OT Therapeutic Activity and OT Therapeutic Exercise    Co-signature:  Connor Ferraro OTR/KAITLIN              Goal: LTG-By discharge, patient will perform bathroom transfers     Dates: Start: 08/11/17   Expected End: 08/25/17       Description: 1) Individualized Goal:  By discharge, patient will perform bathroom transfers at a level of Mod. I.   2) Interventions:  OT Self Care/ADL, OT Neuro Re-Ed/Balance, OT Therapeutic Activity and OT Therapeutic Exercise    Co-signature:  Connor Ferraro OTR/L              Goal: LTG-By discharge, patient will complete basic home management     Dates: Start: 08/11/17    Expected End: 08/25/17       Description: 1) Individualized Goal:  By discharge, patient will prepare a simple meal at a level of mod. I.   2) Interventions:  OT Self Care/ADL, OT Neuro Re-Ed/Balance, OT Therapeutic Activity and OT Therapeutic Exercise    Co-signature:  Connor Ferraro, OTR/L                Problem: Toileting     Dates: Start: 08/11/17       Goal: STG-Within one week, patient will complete toileting tasks     Dates: Start: 08/23/17       Description: 1) Individualized Goal:  Min assist  2) Interventions:  OT Self Care/ADL, OT Neuro Re-Ed/Balance, OT Therapeutic Activity, OT Evaluation and OT Therapeutic Exercise                   Section completed by:  Elisha Kessler            Nutrition       Dietary Problems           Problem: Other Problem (see comments)     Description: Diagnosis: Increased nutrient needs (protein) r/t high demand for protein in the setting of wound healing/ maintaining LBM as evidenced by patient admitted with DTI to coccyx, visible evidence of muscle wasting.           Goal: Other Goal (Resolved)     Description: PO intake >50-75% of meals and supplements    Monitor/Evaluation: Monitor PO intake, weight, labs, medication adjustments, skin integrity, GI function, vitals, I/Os, and overall hydration status.  Adjust nutritional POC pending clinical outcomes.      RD following PRN.  PO is adequate.  Nutrition interventions in place.     Goal: Maintain adequate oral nutrient/fluid intake to promote nutrition optimization/wound healing.                   Patient seen today outside with wife.  Patient reports he is doing much better, feels much better. Dr. Hopper notes early satiety d/t enlarged spleen.  Patient, however, is drinking Boost Plus, eating well.  He looks much better than the last time I saw him.  He is upbeat.  Labs reviewed and noted. Wt trending down on Demadex q 48hours and Aldactone daily.  Other medications reviewed and noted. Consider increasing vitamin C to 1000mg  daily, MVI+ minerals to optimize healing as patient with stage 3 to coccyx. No wound team note since 8/11. Patient has no GI complaints. BMs routinely. PO adequate at % of meals + Boost Plus supplementation.  Patient is rather pale upon physical assessment.  Getting iron supplementation. Vitamin D level WNL.  Continue current nutritional POC.  RD following PRN.    Section completed by:  Clarisa Paulino RD     REHAB-Pharmacy IDT Team Note by Gertrudis Thompson RPH at 8/29/2017 11:22 AM  Version 1 of 1    Author:  Gertrudis Thompson RPH Service:  (none) Author Type:  Pharmacist    Filed:  8/29/2017 11:23 AM Date of Service:  8/29/2017 11:22 AM Status:  Signed    :  Gertrudis Thompson RPH (Pharmacist)         Pharmacy   Pharmacy  Antibiotics/Antifungals/Antivirals:  Medication:      Active Orders     None        Route:         n/a  Stop Date:  n/a  Reason:   Antihypertensives/Cardiac:  Medication:    Orders (72h ago through future)    Start     Ordered    08/22/17 0600  spironolactone (ALDACTONE) tablet 50 mg  Q DAY      08/21/17 0954    08/20/17 0000  cholestyramine (QUESTRAN,PREVALITE) 4 GM powder 4 g  1 TIME DAILY PRN      08/17/17 1046    08/17/17 0600  torsemide (DEMADEX) TABS 20 mg  EVERY 48 HOURS      08/16/17 2228    08/16/17 2300  metolazone (ZAROXOLYN) tablet 2.5 mg  EVERY 48 HOURS     Comments:  GIVE 40 MINUTES PRIOR TO DEMEDEX EVERY OTHER DAY.    08/16/17 2236    08/10/17 2100  atorvastatin (LIPITOR) tablet 40 mg  EVERY BEDTIME      08/10/17 1457    08/10/17 1456  hydrALAZINE (APRESOLINE) tablet 25 mg  EVERY 8 HOURS PRN      08/10/17 1457        Patient Vitals for the past 24 hrs:   BP Pulse   08/29/17 0700 107/65 68   08/28/17 1900 109/61 75   08/28/17 1348 100/64 90       Anticoagulation:  Medication:  n/a  INR:      Other key medications: cholestyramine, tamsulosin, torsemide, trazodone, ursodiol   A review of the medication list reveals no issues at this time. Patient is currently on  antihypertensive(s). Recommend home blood pressure monitoring/recording if antihypertensive(s) regimen(s) continue.    Section completed by:  Gertrudis Thompson RPH[TK.1]        Attribution Key     TK.1 - Gertrudis Thompson RPH on 8/29/2017 11:22 AM                    DC Planning  DC destination/dispostion:  Return home with spouse. 2 steps to enter; bath/tub shower combo.        Referrals:  None at this time.     DC Needs: Spouse prefers out pt therapy instead of home health; follow up with PCP Dr. Fabián Urena/Dr. Rosales.  Pharmacy [facility] @ [facility].   Will need a wheelchair at time of dc.       Barriers to discharge:       Strengths:  Motivated to return home, supportive spouse              Section completed by:  Malu Madden      Physician Summary  Moreno Adorno MD participated and led team conference discussion.

## 2017-08-30 NOTE — REHAB-PT IDT TEAM NOTE
"Physical Therapy   Mobility  Bed mobility:   SBA  Bed /Chair/Wheelchair Transfer Initial:  4 - Minimal Assistance  Bed /Chair/Wheelchair Transfer Current:  5 - Standby Prompting/Supervision or Set-up   Bed/Chair/Wheelchair Transfer Description:  Increased time, Verbal cueing, Supervision for safety, Set-up of equipment  Walk Initial:  0 - Not tested,unsafe activity  Walk Current:  2 - Max Assistance   Walk Description:   (3 reps of 125-130 feet indoors, FWW, gt belt, CGA, cueing for standard MARILYN three times, mod WB onto FWW, fwd trunk lean/increased B hip flexion, bradykinetic, no LOB noted.)  Wheelchair Initial:  0 - Not tested,medical condition  Wheelchair Current:  1 - Total Assistance   Wheelchair Description:   (225 feet with close SBA to CGA for descending ramps, Mod A with uneven sidewalk, once fatigued- Total A; setup/SPV indoors)  Stairs Initial:  0 - Not tested,medical condition  Stairs Current: 0 - Not tested,unsafe activity   Stairs Description: Safety concerns, Ascends/descends less than 4 steps, Walker (Pt ascend/descend 4\" step with FWW, x2, with CGA.)  Patient/Family Training/Education:  Ongoing patient and SO education  DME/DC Recommendations:  1 week; pt reports having a FWW; intermittent SPV; home health PT initially  Strengths:  Able to follow instructions, Effective communication skills, Independent PLOF, Making steady progress towards goals, Motivated for self care and independence, Pleasant and cooperative, Supportive family and Willingly participates in therapeutic activities  Barriers:   Decreased endurance, Generalized weakness, Poor activity tolerance and Poor balance  # of short term goals set= 2  # of short term goals met=2       Physical Therapy Problems           Problem: Mobility     Dates: Start: 08/11/17       Goal: STG-Within one week, patient will ambulate community distances     Dates: Start: 08/30/17       Description: 1) Individualized goal: 200 feet with FWW and SPV  2) " Interventions: PT Group Therapy, PT Gait Training, PT Therapeutic Exercises, PT Neuro Re-Ed/Balance, PT Therapeutic Activity and PT Manual Therapy.              Goal: STG-Within one week, patient will ascend and descend four to six stairs     Dates: Start: 08/30/17       Description: 1) Individualized goal: 2 steps with a FWW and CGA  2) Interventions: PT Group Therapy, PT Gait Training, PT Therapeutic Exercises, PT Neuro Re-Ed/Balance, PT Therapeutic Activity and PT Manual Therapy.                   Problem: Mobility Transfers     Dates: Start: 08/11/17       Goal: STG-Within one week, patient will sit to stand     Dates: Start: 08/30/17       Description: 1) Individualized goal: with FWW and SPV  2) Interventions: PT Group Therapy, PT Gait Training, PT Therapeutic Exercises, PT Neuro Re-Ed/Balance, PT Therapeutic Activity and PT Manual Therapy.              Goal: STG-Within one week, patient will transfer bed to chair     Dates: Start: 08/30/17       Description: 1) Individualized goal: with FWW and SPV  2) Interventions: PT Group Therapy, PT Gait Training, PT Therapeutic Exercises, PT Neuro Re-Ed/Balance, PT Therapeutic Activity and PT Manual Therapy.                Problem: PT-Long Term Goals     Dates: Start: 08/11/17       Goal: LTG-By discharge, patient will ambulate     Dates: Start: 08/11/17       Description: 1) Individualized goal:  200 feet with FWW and SPV  2) Interventions:  PT Group Therapy, PT Gait Training, PT Therapeutic Exercises, PT Neuro Re-Ed/Balance, PT Therapeutic Activity and PT Manual Therapy.           Goal: LTG-By discharge, patient will transfer one surface to another     Dates: Start: 08/11/17       Description: 1) Individualized goal:  with FWW and SPV  2) Interventions:  PT Group Therapy, PT Gait Training, PT Therapeutic Exercises, PT Neuro Re-Ed/Balance, PT Therapeutic Activity and PT Manual Therapy.           Goal: LTG-By discharge, patient will ambulate up/down 4-6 stairs      Dates: Start: 08/30/17       Description: 1) Individualized goal: 2 steps with a FWW and CGA  2) Interventions: PT Group Therapy, PT Gait Training, PT Therapeutic Exercises, PT Neuro Re-Ed/Balance, PT Therapeutic Activity and PT Manual Therapy.                           Section completed by:  Pedrito Duenas P.T., DPT

## 2017-08-30 NOTE — CARE PLAN
Problem: Pain Management  Goal: Pain level will decrease to patient's comfort goal  Patient able to verbalize pain level and had reports of 7/10 lower back pain. PRN Roxicodone given per patient request. Patient briefly educated on measures to manage pain non- pharmacologically. Will continue to monitor patient and call light with reach of  Patient.

## 2017-08-30 NOTE — PROGRESS NOTES
"Rehab Progress Note     Interval Events (Subjective)  Patient seen in his room and  in the PT gym with his wife. He did have an episode of a ground level fall/slowing to the ground in the shower. Which per the patient hurt his ego and nothing else. He was discussed at team conference the results were reviewed with the patient and his wife    Nursing    NA is drifting downward will continue to monitor   Contient of bowel and bladder  Prune juice is needed  Daily bowel movemwent  Needs help taking shoes off  Scab on coccyx    WIll kill nystain to groin    # reps of ambulation 125 , 130 feet x 2  Needs longer rest break  Balance is signficant issue  Sit to stand on lower seat heights    OT    Mod I eating grooming   Revision SBA for bathing    UBD mod I  LB min asssit    Using a winged briefs will discuss this with nursing    Toielting Mod asssit  For clothing management  Functional transfers sb assist        Will move discharge to 9/9/2017                       Objective:  VITAL SIGNS: /66   Pulse 70   Temp 36.4 °C (97.5 °F)   Resp 20   Ht 1.905 m (6' 3\")   Wt 93.5 kg (206 lb 2.1 oz)   SpO2 96%   BMI 25.76 kg/m²       GENERAL: No apparent distress  HEENT: Normocephalic/atraumatic, EOMI, PERRL and No nystagmus  CARDIAC: Regular rate and rhythm, normal S1, S2   LUNGS: Clear to auscultation   ABDOMINAL: bowel sounds present, soft, nontender and ascites present with fluid wave noted. Ascites is increased  EXTREMITIES: Without clubbing or cyanosis 2+ edema noted in the left lower extremity but decreased  SKIN:   Small stage III noted on sacrum  NEURO: Unchanged with the exception to hip flexor strength is definitely a half grade stronger bilaterally as compared to admission    Current Facility-Administered Medications   Medication Frequency   • [START ON 8/31/2017] metolazone (ZAROXOLYN) tablet 2.5 mg Q72HRS   • [START ON 9/1/2017] potassium chloride SA (Kdur) tablet 10 mEq Q72HRS   • spironolactone " (ALDACTONE) tablet 25 mg Q DAY   • [START ON 9/1/2017] torsemide (DEMADEX) TABS 20 mg Q72HRS   • magnesium hydroxide (MILK OF MAGNESIA) suspension 15 mL QHS   • polyethylene glycol/lytes (MIRALAX) PACKET 1 Packet QHS   • ferrous sulfate tablet 325 mg QDAY with Breakfast   • ascorbic acid (ascorbic acid) tablet 500 mg DAILY   • cholestyramine (QUESTRAN,PREVALITE) 4 GM powder 4 g QDAY PRN   • docusate sodium (COLACE) capsule 100 mg BID PRN    And   • senna-docusate (PERICOLACE or SENOKOT S) 8.6-50 MG per tablet 1 Tab QDAY PRN    And   • lactulose 20 GM/30ML solution 30 mL Q24HRS PRN    And   • bisacodyl (DULCOLAX) suppository 10 mg QDAY PRN   • hydrocortisone (ANUSOL-HC) suppository 25 mg Q12HRS PRN   • tamsulosin (FLOMAX) capsule 0.4 mg AFTER BREAKFAST   • Respiratory Care per Protocol Continuous RT   • Pharmacy Consult Request ...Pain Management Review 1 Each PRN   • nystatin (MYCOSTATIN) powder BID   • lactobacillus granules (LACTINEX/FLORANEX) packet 1 Packet TID WITH MEALS   • ursodiol (ACTIGALL) capsule 300 mg DAILY   • vitamin D (cholecalciferol) tablet 1,000 Units DAILY   • folic acid (FOLVITE) tablet 1 mg DAILY   • atorvastatin (LIPITOR) tablet 40 mg QHS   • benzocaine-menthol (CEPACOL) lozenge 1 Lozenge Q2HRS PRN   • hydrALAZINE (APRESOLINE) tablet 25 mg Q8HRS PRN   • mag hydrox-al hydrox-simeth (MAALOX PLUS ES or MYLANTA DS) suspension 20 mL Q2HRS PRN   • ondansetron (ZOFRAN ODT) dispertab 4 mg 4X/DAY PRN    Or   • ondansetron (ZOFRAN) syringe/vial injection 4 mg 4X/DAY PRN   • trazodone (DESYREL) tablet 50 mg QHS PRN   • sodium chloride (OCEAN) 0.65 % nasal spray 2 Spray PRN   • oxycodone immediate release (ROXICODONE) tablet 10 mg Q4HRS PRN       Orders Placed This Encounter   Procedures   • Diet Order     Standing Status:   Standing     Number of Occurrences:   1     Order Specific Question:   Diet:     Answer:   Regular [1]     Order Specific Question:   Consistency/Fluid modifications:     Answer:    Thin Liquids [3]       Assessment:  Active Hospital Problems    Diagnosis   • *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 DUE TO OLD HEP B, CHEMO RX,  OR THERMO RAD OF MAGNANT LESION   • Essential hypertension   • Colon cancer-2010 left hemicolectomy; no colostomy       Medical Decision Making and Plan:  Patient is doing well at this time  Dr. Hopper continues to follow medically. He continues to follow liver functions and workup of possible malignancy    Cirrhosis/ascites    Stable at this time but will monitor. It's improved with diuresis prescribed by Dr. Hopper. We will have to watch the patient's sodium    Hypertension       under control with Aldactone and now Inderal    Stage III pressure ulcer, on the Coccyx    Appreciate the input of the wound care nurse honey colloid applied it appears to be improving    Vitamin D deficiency    Continue replacement    Lower extremity edema left leg    We will continue to monitor improved      Diarrhea   C. diff toxin neg  No longer an issue      UTI  response to antibiotics    Continue PT and OT  A team conference we decided to move the patient's discharge up          Total time:  > 25 minutes.  I spent greater than 50% of the time for patient care and coordination on this date, including unit/floor time, and face-to-face time with the patient as per assessment and plan above.    Moreno Adorno,  "M.D.                Rehab Progress Note     Interval Events (Subjective)  Patient seen in his room and  Along with his wife.  Still seeing his room Notes of  and the therapist appreciated. Patient has improved in regards to his ascites he also says he feels a bit stronger as well. The patient is in very good spirits and continues to work hard in therapy     Objective:  VITAL SIGNS: /66   Pulse 70   Temp 36.4 °C (97.5 °F)   Resp 20   Ht 1.905 m (6' 3\")   Wt 93.5 kg (206 lb 2.1 oz)   SpO2 96%   BMI 25.76 kg/m²       GENERAL: No apparent distress  HEENT: Normocephalic/atraumatic, EOMI, PERRL and No nystagmus  CARDIAC: Regular rate and rhythm, normal S1, S2   LUNGS: Clear to auscultation   ABDOMINAL: bowel sounds present, soft, nontender and ascites present with fluid wave noted. Ascites is increased  EXTREMITIES: Without clubbing or cyanosis 2+ edema noted in the left lower extremity but decreased  SKIN:   Small stage III noted on sacrum  NEURO: Unchanged with the exception to hip flexor strength is definitely a half grade stronger bilaterally as compared to admission    Current Facility-Administered Medications   Medication Frequency   • [START ON 8/31/2017] metolazone (ZAROXOLYN) tablet 2.5 mg Q72HRS   • [START ON 9/1/2017] potassium chloride SA (Kdur) tablet 10 mEq Q72HRS   • spironolactone (ALDACTONE) tablet 25 mg Q DAY   • [START ON 9/1/2017] torsemide (DEMADEX) TABS 20 mg Q72HRS   • magnesium hydroxide (MILK OF MAGNESIA) suspension 15 mL QHS   • polyethylene glycol/lytes (MIRALAX) PACKET 1 Packet QHS   • ferrous sulfate tablet 325 mg QDAY with Breakfast   • ascorbic acid (ascorbic acid) tablet 500 mg DAILY   • cholestyramine (QUESTRAN,PREVALITE) 4 GM powder 4 g QDAY PRN   • docusate sodium (COLACE) capsule 100 mg BID PRN    And   • senna-docusate (PERICOLACE or SENOKOT S) 8.6-50 MG per tablet 1 Tab QDAY PRN    And   • lactulose 20 GM/30ML solution 30 mL Q24HRS PRN    And   • bisacodyl " (DULCOLAX) suppository 10 mg QDAY PRN   • hydrocortisone (ANUSOL-HC) suppository 25 mg Q12HRS PRN   • tamsulosin (FLOMAX) capsule 0.4 mg AFTER BREAKFAST   • Respiratory Care per Protocol Continuous RT   • Pharmacy Consult Request ...Pain Management Review 1 Each PRN   • lactobacillus granules (LACTINEX/FLORANEX) packet 1 Packet TID WITH MEALS   • ursodiol (ACTIGALL) capsule 300 mg DAILY   • vitamin D (cholecalciferol) tablet 1,000 Units DAILY   • folic acid (FOLVITE) tablet 1 mg DAILY   • atorvastatin (LIPITOR) tablet 40 mg QHS   • benzocaine-menthol (CEPACOL) lozenge 1 Lozenge Q2HRS PRN   • hydrALAZINE (APRESOLINE) tablet 25 mg Q8HRS PRN   • mag hydrox-al hydrox-simeth (MAALOX PLUS ES or MYLANTA DS) suspension 20 mL Q2HRS PRN   • ondansetron (ZOFRAN ODT) dispertab 4 mg 4X/DAY PRN    Or   • ondansetron (ZOFRAN) syringe/vial injection 4 mg 4X/DAY PRN   • trazodone (DESYREL) tablet 50 mg QHS PRN   • sodium chloride (OCEAN) 0.65 % nasal spray 2 Spray PRN   • oxycodone immediate release (ROXICODONE) tablet 10 mg Q4HRS PRN       Orders Placed This Encounter   Procedures   • Diet Order     Standing Status:   Standing     Number of Occurrences:   1     Order Specific Question:   Diet:     Answer:   Regular [1]     Order Specific Question:   Consistency/Fluid modifications:     Answer:   Thin Liquids [3]       Assessment:  Active Hospital Problems    Diagnosis   • *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 DUE TO OLD HEP B, CHEMO RX,  OR THERMO RAD OF MAGNANT LESION   • Essential hypertension   • Colon cancer-2010 left hemicolectomy; no colostomy       Medical Decision Making and Plan:  Patient is doing well at this time  Dr. Hopper continues to follow medically. He continues to follow liver functions and workup of possible malignancy    Cirrhosis/ascites    Stable at this time but will monitor. It's improved with diuresis prescribed by Dr. hopper    Hypertension       under control with Aldactone and now Inderal    Stage III pressure ulcer, on the Coccyx    Appreciate the input of the wound care nurse honey colloid applied    Vitamin D deficiency    Continue replacement    Lower extremity edema left leg    We will continue to monitor improved      Diarrhea   C. diff toxin neg  No longer an issue      UTI  response to    Continue P T and OT  At Team conference we decided to move discharge up to 9/9/2017  We'll be conference next Wednesday, 9/5/2017    Team Conference       Nursing  Diet/Nutrition:  Regular and Thin Liquids  Medication Administration:  Whole with Liquid Wash  % consumed at meals in last 24 hours:  Consumed % of meals per documentation.  Breakfast:  80%   Lunch:  100%  Dinner:  100%   Snack schedule:  None  Supplement:  Boost Plus  Appetite:  neither Good nor Excellent  Fluid Intake/Output in past 24 hours: In: 540 [P.O.:540]  Out: 1650   Admit Weight:  Weight: 116.8 kg (257 lb 8 oz)  Weight Last 7 Days   [92.5 kg (203 lb 14.8 oz)-99 kg (218 lb 4.1 oz)] 94.5 kg (208 lb 5.4 oz) (08/29 0500)     Pain Issues:    Location:  Back (08/29 1926)  Mid;Lower (08/29 1926)         Severity:  Moderate   Scheduled pain medications:  None     PRN pain medications used in last 24 hours:  oxycodone immediate release (ROXICODONE)    Non Pharmacologic Interventions:  warm blanket, distraction, emotional support, environmental changes, relaxation,  repositioned and rest  Effectiveness of pain management plan:  fair=improved comfort with interventions but does not always meet goal     Bowel:    Bowel Assist Initial Score:  3 - Moderate Assistance  Bowel Assist Current Score:  5 - Standby Prompting/Supervision or Set-up  Bowl Accidents in last 7 days:  2  Last bowel movement: 08/29/17  Stool: Large, Soft     Usual bowel pattern:  daily  Scheduled bowel medications:  polyethylene glycol/lytes (MIRALAX)  and magnesium hydroxide (MILK OF MAGNESIA)   PRN bowel medications used in last 24 hours:  None  Nursing Interventions:  Increased time, Scheduled medication, Supervision, Positioning on commode/toilet, Brief  Effectiveness of bowel program:   good=regular, predictable, controlled emptying of bowel  Bladder:    Bladder Assist Initial Score:  5 - Standby Prompting/Supervision or Set-up  Bladder Assist Current Score:  6 - Modified Independent  Bladder Accidents in last 7 days:  0  PVR range for past 24-48 hours: -- ()  Medications affecting bladder:  Flomax    Time void schedule/voiding pattern:  Voiding every 2-4 hours  Interventions:  Increased time, Supervision, Emptying of device, Urinal, Brief  Effectiveness of bladder training:  Good=regular, predictable, emptying of bladder, patient initiates time voiding        Diabetes:  Blood Sugar Frequency:  None          Wound:         Patient Lines/Drains/Airways Status             Active Current Wounds      Name: Placement date: Placement time: Site: Days:     Pressure Ulcer  Stage 3 POA Coccyx Midline 08/10/17        19                     Interventions:  Approved barrier paste, Repositioning, BE  Effectiveness of intervention:  wound is improving      Sleep/wake cycle:   Average hours slept:  Sleeps 3-4 hours without waking  Sleep medication usage:  None     Patient/Family Training/Education:  Fall Prevention, General Self Care, Medication Management, Pain Management, Safe Transfers, Safety, Skin Care and Wound  Care  Discharge Supply Recommendations:  Blood Pressure Monitor and Wound Care Supplies  Strengths: Alert and oriented, Willingly participates in therapeutic activities, Able to follow instructions, Supportive family, Pleasant and cooperative, Effective communication skills, Good carryover of learning and Motivated for self care and independence   Barriers:   Bladder retention, Fatigue, Generalized weakness and Pressure ulcer   (On Flomax)             Nursing Problems                   Problem: Bowel/Gastric:      Goal: Normal bowel function is maintained or improved              Goal: Will not experience complications related to bowel motility                        Problem: Communication      Goal: The ability to communicate needs accurately and effectively will improve                        Problem: Discharge Barriers/Planning      Goal: Patient's continuum of care needs will be met                        Problem: Infection      Goal: Will remain free from infection                        Problem: Knowledge Deficit      Goal: Knowledge of disease process/condition, treatment plan, diagnostic tests, and medications will improve              Goal: Knowledge of the prescribed therapeutic regimen will improve                        Problem: Mobility      Goal: Risk for activity intolerance will decrease                        Problem: Pain Management      Goal: Pain level will decrease to patient's comfort goal                        Problem: Respiratory:      Goal: Respiratory status will improve                        Problem: Safety      Goal: Will remain free from injury              Goal: Will remain free from falls                        Problem: Skin Integrity      Goal: Risk for impaired skin integrity will decrease                        Problem: Urinary Elimination:      Goal: Ability to reestablish a normal urinary elimination pattern will improve                        Problem: Venous Thromboembolism  "(VTW)/Deep Vein Thrombosis (DVT) Prevention:      Goal: Patient will participate in Venous Thrombosis (VTE)/Deep Vein Thrombosis (DVT)Prevention Measures                      Long Term Goals:   At discharge patient will be able to function safely at home and in the community with support.     Section completed by:  Martin Huizar R.N.               Mobility  Bed mobility:   SBA  Bed /Chair/Wheelchair Transfer Initial:  4 - Minimal Assistance  Bed /Chair/Wheelchair Transfer Current:  5 - Standby Prompting/Supervision or Set-up                       Bed/Chair/Wheelchair Transfer Description:  Increased time, Verbal cueing, Supervision for safety, Set-up of equipment  Walk Initial:  0 - Not tested,unsafe activity  Walk Current:  2 - Max Assistance                       Walk Description:   (3 reps of 125-130 feet indoors, FWW, gt belt, CGA, cueing for standard MARILYN three times, mod WB onto FWW, fwd trunk lean/increased B hip flexion, bradykinetic, no LOB noted.)  Wheelchair Initial:  0 - Not tested,medical condition  Wheelchair Current:  1 - Total Assistance                       Wheelchair Description:   (225 feet with close SBA to CGA for descending ramps, Mod A with uneven sidewalk, once fatigued- Total A; setup/SPV indoors)  Stairs Initial:  0 - Not tested,medical condition  Stairs Current: 0 - Not tested,unsafe activity                       Stairs Description: Safety concerns, Ascends/descends less than 4 steps, Walker (Pt ascend/descend 4\" step with FWW, x2, with CGA.)  Patient/Family Training/Education:  Ongoing patient and SO education  DME/DC Recommendations:  1 week; pt reports having a FWW; intermittent SPV; home health PT initially  Strengths:  Able to follow instructions, Effective communication skills, Independent PLOF, Making steady progress towards goals, Motivated for self care and independence, Pleasant and cooperative, Supportive family and Willingly participates in therapeutic " activities  Barriers:   Decreased endurance, Generalized weakness, Poor activity tolerance and Poor balance  # of short term goals set= 2  # of short term goals met=2           Physical Therapy Problems                     Problem: Mobility      Dates: Start: 08/11/17                   Goal: STG-Within one week, patient will ambulate community distances            Dates: Start: 08/30/17         Description: 1) Individualized goal: 200 feet with FWW and SPV  2) Interventions: PT Group Therapy, PT Gait Training, PT Therapeutic Exercises, PT Neuro Re-Ed/Balance, PT Therapeutic Activity and PT Manual Therapy.                             Goal: STG-Within one week, patient will ascend and descend four to six stairs            Dates: Start: 08/30/17         Description: 1) Individualized goal: 2 steps with a FWW and CGA  2) Interventions: PT Group Therapy, PT Gait Training, PT Therapeutic Exercises, PT Neuro Re-Ed/Balance, PT Therapeutic Activity and PT Manual Therapy.                                  Problem: Mobility Transfers      Dates: Start: 08/11/17                   Goal: STG-Within one week, patient will sit to stand            Dates: Start: 08/30/17         Description: 1) Individualized goal: with FWW and SPV  2) Interventions: PT Group Therapy, PT Gait Training, PT Therapeutic Exercises, PT Neuro Re-Ed/Balance, PT Therapeutic Activity and PT Manual Therapy.                             Goal: STG-Within one week, patient will transfer bed to chair            Dates: Start: 08/30/17         Description: 1) Individualized goal: with FWW and SPV  2) Interventions: PT Group Therapy, PT Gait Training, PT Therapeutic Exercises, PT Neuro Re-Ed/Balance, PT Therapeutic Activity and PT Manual Therapy.                              Problem: PT-Long Term Goals      Dates: Start: 08/11/17              Goal: LTG-By discharge, patient will ambulate            Dates: Start: 08/11/17         Description: 1) Individualized goal:   200 feet with FWW and SPV  2) Interventions:  PT Group Therapy, PT Gait Training, PT Therapeutic Exercises, PT Neuro Re-Ed/Balance, PT Therapeutic Activity and PT Manual Therapy.                     Goal: LTG-By discharge, patient will transfer one surface to another            Dates: Start: 08/11/17         Description: 1) Individualized goal:  with FWW and SPV  2) Interventions:  PT Group Therapy, PT Gait Training, PT Therapeutic Exercises, PT Neuro Re-Ed/Balance, PT Therapeutic Activity and PT Manual Therapy.                     Goal: LTG-By discharge, patient will ambulate up/down 4-6 stairs            Dates: Start: 08/30/17         Description: 1) Individualized goal: 2 steps with a FWW and CGA  2) Interventions: PT Group Therapy, PT Gait Training, PT Therapeutic Exercises, PT Neuro Re-Ed/Balance, PT Therapeutic Activity and PT Manual Therapy.                                 Section completed by:  Pedrito Duenas P.TFerny, DPT        Activities of Daily Living  Eating Initial:  5 - Standby Prompting/Supervision or Set-up  Eating Current:  6 - Modified Independent                       Eating Description:  Increased time  Grooming Initial:  6 - Modified Independent  Grooming Current:  6 - Modified Independent                       Grooming Description:  Increased time  Bathing Initial:  3 - Moderate Assistance  Bathing Current:  5 - Standby Prompting/Supervision or Set-up                       Bathing Description:  Grab bar, Tub bench, Long handled bath tool, Hand held shower, Increased time, Supervision for safety, Verbal cueing, Set-up of equipment  Upper Body Dressing Initial:  4 - Minimal Assistance  Upper Body Dressing Current:  6 - Modified Independent                       Upper Body Dressing Description:   (Don/doff pull over shirt)  Lower Body Dressing Initial:  2 - Max Assistance  Lower Body Dressing Current:  4 - Minimal Assistance                       Lower Body Dressing Description:  4 - Minimal  Assistance  Toileting Initial:  1 - Total Assistance  Toileting Current:  3 - Moderate Assistance                       Toileting Description:  Grab bar, Increased time, Verbal cueing, Supervision for safety, Set-up of equipment  Toilet Transfer Initial:  4 - Minimal Assistance  Toilet Transfer Current:  5 - Standby Prompting/Supervision or Set-up                       Toilet Transfer Description:  5 - Standby Prompting/Supervision or Set-up  Tub / Shower Transfer Initial:  4 - Minimal Assistance  Tub / Shower Transfer Current:  5 - Standby Prompting/Supervision or Set-up                       Tub / Shower Transfer Description:  Adaptive equipment, Increased time, Supervision for safety, Grab bar, Verbal cueing, Set-up of equipment, Initial preparation for task  IADL:  TBD                                      Family Training/Education:  TBD   DME/DC Recommendations:  Shower Chair      Strengths:  Able to follow instructions, Alert and oriented, Effective communication skills, Independent PLOF, Making steady progress towards goals, Motivated for self care and independence, Pleasant and cooperative, Supportive family and Willingly participates in therapeutic activities  Barriers:  Decreased endurance, Generalized weakness and Poor balance     # of short term goals set= 3    # of short term goals met= 2              Occupational Therapy Goals                     Problem: Functional Transfers      Dates: Start: 08/23/17                   Goal: STG-Within one week, patient will transfer to toilet            Dates: Start: 08/23/17         Description: 1) Individualized Goal:  Sup/SBA  2) Interventions:  OT Self Care/ADL, OT Neuro Re-Ed/Balance, OT Therapeutic Activity, OT Evaluation and OT Therapeutic Exercise                            Goal: STG-Within one week, patient will transfer to step in shower            Dates: Start: 08/23/17         Description: 1) Individualized Goal:  Sup/SBA  2) Interventions:  OT Self  Care/ADL, OT Neuro Re-Ed/Balance, OT Therapeutic Activity, OT Evaluation and OT Therapeutic Exercise                             Problem: OT Long Term Goals      Dates: Start: 08/11/17                   Goal: LTG-By discharge, patient will complete basic self care tasks            Dates: Start: 08/11/17   Expected End: 08/25/17         Description: 1) Individualized Goal:  By discharge, patient will complete basic self care tasks at a level of Mod. I.   2) Interventions:  OT Self Care/ADL, OT Neuro Re-Ed/Balance, OT Therapeutic Activity and OT Therapeutic Exercise     Co-signature:  Connor Ferraro OTR/L                             Goal: LTG-By discharge, patient will perform bathroom transfers            Dates: Start: 08/11/17   Expected End: 08/25/17         Description: 1) Individualized Goal:  By discharge, patient will perform bathroom transfers at a level of Mod. I.   2) Interventions:  OT Self Care/ADL, OT Neuro Re-Ed/Balance, OT Therapeutic Activity and OT Therapeutic Exercise     Co-signature:  Connor Ferraro OTR/KAITLIN                             Goal: LTG-By discharge, patient will complete basic home management            Dates: Start: 08/11/17   Expected End: 08/25/17         Description: 1) Individualized Goal:  By discharge, patient will prepare a simple meal at a level of mod. I.   2) Interventions:  OT Self Care/ADL, OT Neuro Re-Ed/Balance, OT Therapeutic Activity and OT Therapeutic Exercise     Co-signature:  Connor Ferraro OTR/KAITLIN                              Problem: Toileting      Dates: Start: 08/11/17              Goal: STG-Within one week, patient will complete toileting tasks            Dates: Start: 08/23/17         Description: 1) Individualized Goal:  Min assist  2) Interventions:  OT Self Care/ADL, OT Neuro Re-Ed/Balance, OT Therapeutic Activity, OT Evaluation and OT Therapeutic Exercise                         Section completed by:  Elisha Kessler              Nutrition            Dietary Problems            Problem: Other Problem (see comments)      Description: Diagnosis: Increased nutrient needs (protein) r/t high demand for protein in the setting of wound healing/ maintaining LBM as evidenced by patient admitted with DTI to coccyx, visible evidence of muscle wasting.              Goal: Other Goal (Resolved)       Description: PO intake >50-75% of meals and supplements     Monitor/Evaluation: Monitor PO intake, weight, labs, medication adjustments, skin integrity, GI function, vitals, I/Os, and overall hydration status.  Adjust nutritional POC pending clinical outcomes.       RD following PRN.  PO is adequate.  Nutrition interventions in place.      Goal: Maintain adequate oral nutrient/fluid intake to promote nutrition optimization/wound healing.                       Patient seen today outside with wife.  Patient reports he is doing much better, feels much better. Dr. Hopper notes early satiety d/t enlarged spleen.  Patient, however, is drinking Boost Plus, eating well.  He looks much better than the last time I saw him.  He is upbeat.  Labs reviewed and noted. Wt trending down on Demadex q 48hours and Aldactone daily.  Other medications reviewed and noted. Consider increasing vitamin C to 1000mg daily, MVI+ minerals to optimize healing as patient with stage 3 to coccyx. No wound team note since 8/11. Patient has no GI complaints. BMs routinely. PO adequate at % of meals + Boost Plus supplementation.  Patient is rather pale upon physical assessment.  Getting iron supplementation. Vitamin D level WNL.  Continue current nutritional POC.  RD following PRN.    Section completed by:  Clarisa Paulino RD            [facility] IDT Team Note by Gertrudis Thompson RPH at 8/29/2017 11:22 AM  Version 1 of 1     Author:  Gertrudis Thompson RPH Service:  (none) Author Type:  Pharmacist     Filed:  8/29/2017 11:23 AM Date of Service:  8/29/2017 11:22 AM Status:  Signed     :  Gertrudis Thompson RPH  (Pharmacist)            Pharmacy   Pharmacy  Antibiotics/Antifungals/Antivirals:  Medication:          Active Orders      None          Route:         n/a  Stop Date:  n/a  Reason:   Antihypertensives/Cardiac:  Medication:                Orders (72h ago through future)                  Start     Ordered      08/22/17 0600   spironolactone (ALDACTONE) tablet 50 mg  Q DAY       08/21/17 0954     08/20/17 0000   cholestyramine (QUESTRAN,PREVALITE) 4 GM powder 4 g  1 TIME DAILY PRN       08/17/17 1046     08/17/17 0600   torsemide (DEMADEX) TABS 20 mg  EVERY 48 HOURS       08/16/17 2228     08/16/17 2300   metolazone (ZAROXOLYN) tablet 2.5 mg  EVERY 48 HOURS     Comments:  GIVE 40 MINUTES PRIOR TO DEMEDEX EVERY OTHER DAY.     08/16/17 2236     08/10/17 2100   atorvastatin (LIPITOR) tablet 40 mg  EVERY BEDTIME       08/10/17 1457     08/10/17 1456   hydrALAZINE (APRESOLINE) tablet 25 mg  EVERY 8 HOURS PRN       08/10/17 1457          Patient Vitals for the past 24 hrs:    BP Pulse   08/29/17 0700 107/65 68   08/28/17 1900 109/61 75   08/28/17 1348 100/64 90         Anticoagulation:  Medication:  n/a  INR:      Other key medications: cholestyramine, tamsulosin, torsemide, trazodone, ursodiol   A review of the medication list reveals no issues at this time. Patient is currently on antihypertensive(s). Recommend home blood pressure monitoring/recording if antihypertensive(s) regimen(s) continue.     Section completed by:  Gertrudis Thompson RPH[TK.1]          Attribution Bundy     TK.1 - Gertrudis Thompson RPH on 8/29/2017 11:22 AM                        DC Planning  DC destination/dispostion:  Return home with spouse. 2 steps to enter; bath/tub shower combo.        Referrals:  None at this time.     DC Needs: Spouse prefers out pt therapy instead of home health; follow up with PCP Dr. Fabián Urena/Dr. Rosales.  Pharmacy [facility] @ [facility].   Will need a wheelchair at time of dc.       Barriers to discharge:       Strengths:   Motivated to return home, supportive spouse            Section completed by:  Malu Madden        Physician Summary  I  participated and led team conference discussion.        Total time:  > 35 minutes.  I spent greater than 50% of the time for patient care and coordination on this date, including unit/floor time, and face-to-face time with the patient as per assessment and plan above.    Moreno Adorno M.D.

## 2017-08-31 ENCOUNTER — APPOINTMENT (OUTPATIENT)
Dept: RADIOLOGY | Facility: REHABILITATION | Age: 72
DRG: 094 | End: 2017-08-31
Attending: HOSPITALIST
Payer: MEDICARE

## 2017-08-31 PROCEDURE — 71020 DX-CHEST-2 VIEWS: CPT

## 2017-08-31 PROCEDURE — 700102 HCHG RX REV CODE 250 W/ 637 OVERRIDE(OP): Performed by: PHYSICAL MEDICINE & REHABILITATION

## 2017-08-31 PROCEDURE — 97116 GAIT TRAINING THERAPY: CPT

## 2017-08-31 PROCEDURE — 97530 THERAPEUTIC ACTIVITIES: CPT

## 2017-08-31 PROCEDURE — A9270 NON-COVERED ITEM OR SERVICE: HCPCS | Performed by: HOSPITALIST

## 2017-08-31 PROCEDURE — 700102 HCHG RX REV CODE 250 W/ 637 OVERRIDE(OP): Performed by: HOSPITALIST

## 2017-08-31 PROCEDURE — 97110 THERAPEUTIC EXERCISES: CPT

## 2017-08-31 PROCEDURE — 99232 SBSQ HOSP IP/OBS MODERATE 35: CPT | Performed by: HOSPITALIST

## 2017-08-31 PROCEDURE — 72070 X-RAY EXAM THORAC SPINE 2VWS: CPT

## 2017-08-31 PROCEDURE — 770010 HCHG ROOM/CARE - REHAB SEMI PRIVAT*

## 2017-08-31 PROCEDURE — A9270 NON-COVERED ITEM OR SERVICE: HCPCS | Performed by: PHYSICAL MEDICINE & REHABILITATION

## 2017-08-31 RX ADMIN — ATORVASTATIN CALCIUM 40 MG: 40 TABLET, FILM COATED ORAL at 20:29

## 2017-08-31 RX ADMIN — FOLIC ACID 1 MG: 1 TABLET ORAL at 08:28

## 2017-08-31 RX ADMIN — OXYCODONE HYDROCHLORIDE 10 MG: 10 TABLET ORAL at 03:01

## 2017-08-31 RX ADMIN — METOLAZONE 2.5 MG: 2.5 TABLET ORAL at 20:29

## 2017-08-31 RX ADMIN — FERROUS SULFATE TAB 325 MG (65 MG ELEMENTAL FE) 325 MG: 325 (65 FE) TAB at 08:28

## 2017-08-31 RX ADMIN — LACTOBACILLUS ACIDOPHILUS / LACTOBACILLUS BULGARICUS 1 PACKET: 100 MILLION CFU STRENGTH GRANULES at 11:51

## 2017-08-31 RX ADMIN — TAMSULOSIN HYDROCHLORIDE 0.4 MG: 0.4 CAPSULE ORAL at 08:28

## 2017-08-31 RX ADMIN — URSODIOL 300 MG: 300 CAPSULE ORAL at 08:30

## 2017-08-31 RX ADMIN — OXYCODONE HYDROCHLORIDE 10 MG: 10 TABLET ORAL at 20:29

## 2017-08-31 RX ADMIN — SPIRONOLACTONE 25 MG: 25 TABLET, FILM COATED ORAL at 05:25

## 2017-08-31 RX ADMIN — VITAMIN D, TAB 1000IU (100/BT) 1000 UNITS: 25 TAB at 08:28

## 2017-08-31 RX ADMIN — LACTOBACILLUS ACIDOPHILUS / LACTOBACILLUS BULGARICUS 1 PACKET: 100 MILLION CFU STRENGTH GRANULES at 17:51

## 2017-08-31 RX ADMIN — LACTOBACILLUS ACIDOPHILUS / LACTOBACILLUS BULGARICUS 1 PACKET: 100 MILLION CFU STRENGTH GRANULES at 08:28

## 2017-08-31 RX ADMIN — POLYETHYLENE GLYCOL 3350 1 PACKET: 17 POWDER, FOR SOLUTION ORAL at 20:29

## 2017-08-31 RX ADMIN — MAGNESIUM HYDROXIDE 15 ML: 400 SUSPENSION ORAL at 20:29

## 2017-08-31 RX ADMIN — OXYCODONE HYDROCHLORIDE AND ACETAMINOPHEN 500 MG: 500 TABLET ORAL at 08:28

## 2017-08-31 ASSESSMENT — PAIN SCALES - GENERAL
PAINLEVEL_OUTOF10: 3
PAINLEVEL_OUTOF10: 7
PAINLEVEL_OUTOF10: 2
PAINLEVEL_OUTOF10: 8

## 2017-08-31 ASSESSMENT — ENCOUNTER SYMPTOMS
SHORTNESS OF BREATH: 0
FEVER: 0
CHILLS: 0
ABDOMINAL PAIN: 0
VOMITING: 0
NERVOUS/ANXIOUS: 0
DIARRHEA: 0
NAUSEA: 0

## 2017-08-31 NOTE — CARE PLAN
Problem: Safety  Goal: Will remain free from falls    Intervention: Implement fall precautions  Pt had an assisted fall in the shower with therapist @5379. Pt stated that he was assisted slowly when he started to slip. Wife and MD notified. Post fall assessment initiated. Huddled with staff involved and made intervention and necessary adjustments.  Midas completed by therapist. Will monitor pt.       Problem: Infection  Goal: Will remain free from infection    Intervention: Assess signs and symptoms of infection  Pt sacral ulcer healing (almost healed). Rash to groin area resolved. MD notified. DC nystatin.       Problem: Bowel/Gastric:  Goal: Will not experience complications related to bowel motility    Intervention: Assess baseline bowel pattern  Pt had a bowel movement this AM. No signs of constipation.

## 2017-08-31 NOTE — CARE PLAN
"Problem: Safety  Goal: Will remain free from injury  Outcome: PROGRESSING AS EXPECTED  Patient demonstrates good safety technique this shift.  Asks for assistance when needed and does not attempt self transfer.  Able to verbalize needs. Wife at bedside.    Problem: Pain Management  Goal: Pain level will decrease to patient's comfort goal  Outcome: PROGRESSING AS EXPECTED  Pt does not request PRN pain meds today, and states \"I'm alright.\"       "

## 2017-08-31 NOTE — CARE PLAN
Problem: Safety  Goal: Will remain free from falls    Intervention: Implement fall precautions  Pt had an assisted fall earlier today in the shower with therapist. Reoriented with the use of call light and waiting for assistance before getting OOB. Pt a/o and verbalized understanding.      Problem: Pain Management  Goal: Pain level will decrease to patient's comfort goal  Outcome: PROGRESSING AS EXPECTED  During assessment, pt c/o 8/10 chronic low back pain. Administered Roxicodone 10mg PO as prescribed, no adverse reaction noted and was effective. Pt sleeping comfortably.

## 2017-08-31 NOTE — PROGRESS NOTES
Endorsed by RN. Assumed care. Pt in bed, wife at bedside. A/o x4. Able to communicate needs. O2 sat of 95% on RA without s/s of respiratory distress noted. Compliant with all prescribed HS meds without s/s of adverse reaction noted. Continues to be on 1500ml fluid restriction and is compliant. Strict I/O monitoring in place. Decreased Na and K noted, will endorse to day RN. Safety precautions in place. Call light within reach.

## 2017-09-01 PROCEDURE — A9270 NON-COVERED ITEM OR SERVICE: HCPCS | Performed by: HOSPITALIST

## 2017-09-01 PROCEDURE — 97116 GAIT TRAINING THERAPY: CPT

## 2017-09-01 PROCEDURE — 700102 HCHG RX REV CODE 250 W/ 637 OVERRIDE(OP): Performed by: PHYSICAL MEDICINE & REHABILITATION

## 2017-09-01 PROCEDURE — 97535 SELF CARE MNGMENT TRAINING: CPT

## 2017-09-01 PROCEDURE — 97530 THERAPEUTIC ACTIVITIES: CPT

## 2017-09-01 PROCEDURE — 770010 HCHG ROOM/CARE - REHAB SEMI PRIVAT*

## 2017-09-01 PROCEDURE — A9270 NON-COVERED ITEM OR SERVICE: HCPCS | Performed by: PHYSICAL MEDICINE & REHABILITATION

## 2017-09-01 PROCEDURE — 99232 SBSQ HOSP IP/OBS MODERATE 35: CPT | Performed by: HOSPITALIST

## 2017-09-01 PROCEDURE — 700102 HCHG RX REV CODE 250 W/ 637 OVERRIDE(OP): Performed by: HOSPITALIST

## 2017-09-01 PROCEDURE — 97110 THERAPEUTIC EXERCISES: CPT

## 2017-09-01 RX ADMIN — TAMSULOSIN HYDROCHLORIDE 0.4 MG: 0.4 CAPSULE ORAL at 08:07

## 2017-09-01 RX ADMIN — LACTOBACILLUS ACIDOPHILUS / LACTOBACILLUS BULGARICUS 1 PACKET: 100 MILLION CFU STRENGTH GRANULES at 17:40

## 2017-09-01 RX ADMIN — POLYETHYLENE GLYCOL 3350 1 PACKET: 17 POWDER, FOR SOLUTION ORAL at 20:59

## 2017-09-01 RX ADMIN — VITAMIN D, TAB 1000IU (100/BT) 1000 UNITS: 25 TAB at 08:07

## 2017-09-01 RX ADMIN — FERROUS SULFATE TAB 325 MG (65 MG ELEMENTAL FE) 325 MG: 325 (65 FE) TAB at 08:07

## 2017-09-01 RX ADMIN — URSODIOL 300 MG: 300 CAPSULE ORAL at 08:07

## 2017-09-01 RX ADMIN — OXYCODONE HYDROCHLORIDE 10 MG: 10 TABLET ORAL at 20:59

## 2017-09-01 RX ADMIN — LACTOBACILLUS ACIDOPHILUS / LACTOBACILLUS BULGARICUS 1 PACKET: 100 MILLION CFU STRENGTH GRANULES at 11:46

## 2017-09-01 RX ADMIN — FOLIC ACID 1 MG: 1 TABLET ORAL at 08:07

## 2017-09-01 RX ADMIN — MAGNESIUM HYDROXIDE 15 ML: 400 SUSPENSION ORAL at 20:59

## 2017-09-01 RX ADMIN — OXYCODONE HYDROCHLORIDE AND ACETAMINOPHEN 500 MG: 500 TABLET ORAL at 08:07

## 2017-09-01 RX ADMIN — POTASSIUM CHLORIDE 10 MEQ: 1500 TABLET, EXTENDED RELEASE ORAL at 08:08

## 2017-09-01 RX ADMIN — OXYCODONE HYDROCHLORIDE 10 MG: 10 TABLET ORAL at 00:20

## 2017-09-01 RX ADMIN — LACTOBACILLUS ACIDOPHILUS / LACTOBACILLUS BULGARICUS 1 PACKET: 100 MILLION CFU STRENGTH GRANULES at 08:07

## 2017-09-01 RX ADMIN — TORSEMIDE 20 MG: 10 TABLET ORAL at 05:33

## 2017-09-01 RX ADMIN — SPIRONOLACTONE 25 MG: 25 TABLET, FILM COATED ORAL at 05:33

## 2017-09-01 RX ADMIN — ATORVASTATIN CALCIUM 40 MG: 40 TABLET, FILM COATED ORAL at 20:59

## 2017-09-01 ASSESSMENT — ENCOUNTER SYMPTOMS
CHILLS: 0
SHORTNESS OF BREATH: 0
FEVER: 0
ABDOMINAL PAIN: 0
DIARRHEA: 0
NERVOUS/ANXIOUS: 0
NAUSEA: 0
VOMITING: 0

## 2017-09-01 ASSESSMENT — GAIT ASSESSMENTS
DEVIATION: TRENDELENBERG;DECREASED BASE OF SUPPORT;BRADYKINETIC
ASSISTIVE DEVICE: PARALLEL BARS
GAIT LEVEL OF ASSIST: CONTACT GUARD ASSIST

## 2017-09-01 ASSESSMENT — PAIN SCALES - GENERAL
PAINLEVEL_OUTOF10: 8
PAINLEVEL_OUTOF10: 8
PAINLEVEL_OUTOF10: 2

## 2017-09-01 NOTE — PROGRESS NOTES
Hospital Medicine Progress Note, Adult, Complex               Author: SCOTT BULLOCK MD    Date & Time created: 9/1/2017  3:08 PM     Interval History:  8/15/17--  HISTORY REVIEWED.  REMOTE METASTATIC COLON CANCER DISCOVERED IN 2010 S/P RESECTION BY DR ANA RUIZ AND FINDING OF 10+ NODES  WHICH SPREAD TO THE LIVER.  HAD AN ABLATION AT [facility].  HEAVY DUTY CHEMO PER DR REBOLLAR.   THE LIVER ABLATION HAS UNFORTUNATELY RESULTED IN PORTAL HYPERTENSION.  HE ALSO HAS PRETTY MUCH ALL THE STIGMATA OF PORTAL HYPERTENSION.   THERE REMAINING LIVER PARENCHYMA IS NORMAL SO HE HAS PRESERVED LIVER SYNTHETIC FUNCTION.  AS OF 2010 THERE HAS BEEN NO NEW RECURRANCE THOUGH WE ARE DEALING WITH GASTRIC VARICES,  ESOPHAGEAL VARICIES,  CAPUT MEDUSA,  LEFT LOWER EXTREMITY DVT,  INABILITY TO TOLERATE ANTICOAGULATION WITH AN IVC FILTER IN PLACE,   AND CURRENTLY MODERATE ASCITES.   WE WILL PLAN ON THERAPEUTIC TAP ON Friday.   THE OTHER ISSUE THAT REALLY BROUGHT TO PATIENT TO THE HOSPITAL WAS PROGRESSIVE ASCENDING WEAKNESS.   HE DID HAVE A TIGHT L4-L5 CENTRAL STENOSIS.  THE OTHER LEVELS LOOK FINE.   HE STARTED NOTICING THE WEAKNESS FOR ABOUT A WEEK PRIOR IN MAY AND WENT TO ER.   DR HOUSE SAW THE STRUCTURAL ISSUE,  NAMELY THE ABOVE STENOSIS AND DECOMPRESSED THIS 0N 5/25.  SYMPTOMS DID NOT MALLY.  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   IT IS CLEAR THE PATIENT NEEDED SOME SURGERY ON THE LOWER BACK.   LOOKING AT THE FILMS.  MY THOUGHT IS THE SYMPTOMS THEMSELVES WERE CAUSED BY THE GBS.   HE RECEIVED 5 DAYS OF IMMUNOGLOBULIN AND DID FAIRLY WELL AND HAS BEE TRANSFERRED [facility] ON 8/10/17  PLAN:  CONTINUE PT/OT,   PROBLEMS THAT ALSO HAVE OCCURRED INCLUDE PORTAL VEIN THROMBOSIS.   I WILL ARRANGE A THERAPEUTIC THORACENTESIS ON Friday.  RX ENTEROBACTER UTI.  HYPERSPLENISM AND LOW WBC COUNTS AND PLATELETS TO BE EXPECTED.   D/C COREG.  INDERAL BETTER FOR  LOWERING PORTAL PRESSURES.      8/16/17--PATIENT AWAKE AND ALERT.   DOING WELL WITH PT/OT.   GOOD EFFORT PER PATIENT.   I DONT THINK THE ASCITES IS SIGNIFICANTLY WORSE.  I AM GOING TO MAKE DAILY ASSESMENTS AND DETERMINE WHEN PARACENTESIS IS NECESSARY.   GBS IS CERTAINLY NO WORSE.   THE PATIENT WAS SPECIFICALLY TOLD TO INFORM ME ABOUT ANY PHYSICAL CHANGES IE: WORSENING WEAKNESS OR ANY CRANIAL NERVE ABNORMALITIES.   CHEST FILM WAS ESSENTIALLY NEGATIVE FOR EFFUSION.  THE DECREASED TACTILE FREMITUS THAT I NOTED WAS A HIGH RIDING DIAPHRAGM.   PROBABLY DUE TO ASCITES.    I NEED DAILY WEIGHTS TO HELP ME WITH MY DECISION WHEN TO TAP ABDOMEN.  NORMALLY PATIENT GETS IT TAPPED ABOUT EVERY 4 MONTHS BY GI.    I  WILL RE--ASSES PORTAL BLOOD FLOW AND SEE IF THIS PORTAL VEIN THROMBOSIS IS RECANULATED OR WORSE.  LAST YEAR THERE WAS SOME RECANULIZATION AND FLOW.   HEPATOPEDAL FLOW IS GOOD, HEPATOFUGAL FLOW BAD.  I HAVE NEVER SEEN A LONG TERM SURVIVOR OF METASTATIC COLON CANCER WITH 4 CM RECTO-SIGMOID TUMOR AND 5 POSITIVE NODES + LIVER MET.   RX FOR UTI WITH ENTEROBACTER ON CULTURE.  UNIMPRESSIVE WBC COUNT OF 2-5 WBC/HIGH POWERED FIELD.   I PROBABLY WOULD HAVE LET THAT GO UNLESS THERE WERE SYMPTOMS.     PLAN:   PATIENT DOING WELL.  DAILY WEIGHTS.  DAILY ASSESMENTS OF ASCITES.  CXR IS CLEAR.  CHECK ABDOMINAL ULTRASOUND TO ASSESS PORTAL BLOOD FLOW AND ASCITES.   I THINK QOD DIURETICS WOULD HELP PREVENT A TAP AND DECREASE THE EDEMA, (KNOWN  RLE DVT PRIMARILY DUE TO MALIGNANCY BEING A PRO-THROMBOTIC STATE).  GIVE 5 DAYS OF PO VITAMIN K GIVEN THE CIPRO KILLING GUT PA WHICH HELPS GENERATE VITAMIN K FOR A HUMAN.  CHECK SOME IRON STUDIES.   OVERALL INCHING FORWARD.   HARD TO SAY WHAT THE COURSE OF GBS WILL BE.  THE BACK SURGERY WAS NECESSARY BUT MY FEELING IS HIS SYMPTOMS WERE MOSTLY DUE TO GBS AND NOT SPINAL STENOSIS.   HEPATITIS STATUS CHECKED AND FOUND TO BE NEGATIVE.   HIV ANTIBODIES  FOR COMPLETENESS.     8/17/17--PATIENT'S ULTRASOUND  DID NOT SHOW ENOUGH ASCITES TO SAFELY TAP.  THAT IS GOOD.  LITTLE CHANGE IN THE MOTOR MOVEMENT NOTED.  BACK FEELS FINE.  INR IS BETTER WITH VITAMIN K.  DIURETICS HOLDING ASCITES AT BAY PERIODICALLY.   WE ARE FORCING DIURESIS WITH DEMEDEX ETC.   NO NEED FOR TAP.    BP OK.  LABS OK.  MODEST IRON DEFICIENCY COMPONENT.   ADD SOME PO IRON AND VITAMIN C IF NOT DONE ALREADY.    8/18/17--PATIENT LOOKS THE SAME TO ME.  THE ASCITES IS MINIMAL AND IWILL NOT REQUIIRE ANY INTERVENTION LIKE THORACENTESIS IN THE NEAR FUTURE.  OUR MEDICAL RX IS WORKING JUST FINE.    REALLY NO CHANGE IN PORTAL THROMBOSIS.  THERE IS BLOOD FLOW WHICH IS UNCHANGED.   USUAL COMPLAINTS.  ACHES AND PAINS ETC.    PLAN:  NO TAP OF ABDOMEN REQUIRED.  THE PATIENT MAY BENEFIT FROM SOME IV IRON.   HE IS DEFICIENT.     8/19/17--PATIENT AWAKE AND ALERT.  DOING FAIRLY NICELY WITH PT/OT.   HE WAS ABLE TO LIFT BOTH LEGS OFF THE BED.  I DID GET SOME PATELLA REFLEX B/L YESTERDAY.  MUSCLE BULK IS FAIR.  I REVIEWED THE ULTRASOUND.  MINIMAL ASCITES.  MODEST RLL EFFUSION.   THE SPLEEN IS ENLARGED.  HE UNFORTUNATELY WILL ALWAYS HAVE SOME DEGREE OF EARLY SATIETEY DUE TO  A 20 CM SPLEEN.  NORMAL WOULD BE FROM 7-14 CM. THE SPLEEN IS VERY CLOSE TO THE STOMACH AND NOT A RETROPERITONEAL STRUCTURE LIKE THE KIDNEYS.   ALSO ON THE PLAIN FILM WHICH WAS UNDER PENETRATED, THERE LOOKED TO BE A GOOD AMOUNT OF FECAL MATERIAL IN THE DESCENDING COLON, ALSO NEAR THE STOMACH.      PLAN:    I THINK THE PATIENT IS DOING AS WELL AS CAN BE EXPECTED.  THE GBS IS IMPROVING AT A SLOW RATE.   THE SYMPTOMS STARTED AT THE MIDDLE OF MAY.   SO THIS IS A 3 MONTH ORDEAL THUS FAR.  HE COULD BENEFIT FROM SOME MIRALAX.  DOUBT HE WOULD TOLLERATE METAMUCIL.    I WILL TRY  THAT.  MY AGGRESSIVE DIURETIC PROGRAM HAS  ESSENTIALLY MINIMIZED THE ASCITES TO A NEGLIGIBLE AMOUNT AND HE WILL NOT NEED PARACENTESIS.  LOW GRADE TEMP OF 99,6, CHECK UA FOR COMPLETENESS AND FORMAL CXR ON Monday TO RE-ASSES THAT EFFUSION NOTED  ON ABDOMINAL ULTRASOUND.    8/20/17--PATIENT FEELING WELL.  MENTATION IS CLEAR.  ASCITES IS UNDER CONTROL.  MINIMAL NOW.   EVERY OTHER DAY DIURETICS SEEMS TO BE HELPING.  CHECKING LYTES IN AM.  MAY NEED TO BACK DOWN TO EVERY THIRD DAY IF THERE IS HYPOTENSION OR CONTRACTION ALKALOSIS.  CXR TO ASSESS RIGHT EFFUSION NOTED ON EXAM. MODEST DECREASED TACTILE FREMITUS RIGHT BASE.    ALWAYS EXPLAIN A REASON TO PATIENT WHY WE ARE DOING A TEST.    UA IS NEGATIVE.    VITALS OK.  THUS FAR LABS OK.   HE IS AN ASTUTE  AND WANTS EVERY RATIONAL EXPLAINED THOROUGHLY.   O/W HE WILL REFUSE.  I WILL TRY TO EXPLAIN IN MY 2 PARAGRAPH DAILY NOTE MY RATIONAL.    PLAN:  CXR IN AM  (CHECKING THE DEGREE OF PLEURAL FLUID AROUND LUNG).  CHECK CBC, CMP, MAG.   PATIENT HAS A MODERATE AMOUNT OF FECAL MATERIAL IN SPLENIC FLEXURE.  STARTING ON GENTLE MIRALAX AND SOME MOM.  CLINICALLY NO EDEMA AND MINIMAL ASCITES.   MENTATION REMAINS CLEAR.  NO NEED FOR LACTULOSE AT THIS TIME.   MOTOR STRENGTH IMPROVING.   UA NEGATIVE.  NO ABDOMINAL PAIN OR FURTHER LOW GRADE FEVER.  COAGS ALSO IN AM.  THE EARLY SATIETY IS DUE TO A 20 CM SPLEEN ABUTTING THE STOMACH.    8/21/17--PATIENT DOING AS WELL AS CAN BE EXPECTED.   HE HAD BACK SURGERY AND GBS JUST PRIOR TO THE PROCEDURE IT APPEARS.  HE IS MOVING ALL 4 EXTREMITIES AGAINST   GRAVITY.  MENTATION IS VERY CLEAR.  COAGS ARE NO BETTER.  RECHECK IN AM.  RECHECK AMMONIA IN AM.  I NEVER GO BY THE NUMBER ITSELF,  JUST THE SYMPTOMS.  SEEMS LIKE EVERYONE IS DIFFERENT.  ONE PATIENT CAN HAVE AN AMMONIA  AND BE FINE AND ANOTHER CAN HAVE ONE OF 60 AND BE ENCEPHALOPATHIC.  CXR LOOKED FINE NO EFFUSION OR EVIDENCE FOR INFILTRATE.  VITALS LOOK OK.   LABS ARE AS EXPECTED FOR A PATIENT WITH CIRRHOSIS AND PORTAL HTN.   HE HAS GASTRIC VARICES,  ESOPHAGEAL VARICES (BANDED PROPHYLACTICLY IN PAST),  DILATED PERIUMBILICAL VEINS AND INTERMITTENT ASCITES.  THE OTHER PORTAL DECOMPRESSION LOCATIONS ARE SPLEEN, (HE DOES HAVE A VERY  LARGE SPLEEN) AND HE DOESN'T HAVE BLEEDING HEMORRHOIDS THAT WE KNOW OF.  DESPITE VITAMIN K HE IS STILL A BIT COAGULOPATHIC.   INTRINSIC LIVER DISEASE.  STAGE 4 COLON CANCER.  NEVER HAVE SEEN A LONGER TERM SURVIVOR.  PLAN:  CHECK LABS IN AM,  AMMONIA,  INR, CBC, CMP, ALPH FETO PROTEIN (TUMOR MARKER FOR HEPATOCELLULAR CARCINOMA WHICH IS A DIFFERENT MALIGNANCY ASSOCIATED WITH CIRRHOSIS UNFORTUNATELY).   GBS SYMPTOMS SLOWLY IMPROVING.  INCHING SOMEWHERE.    8/29/17--PATIENT AWAKE AND ALERT.  MOTOR STRENGTH IS ESSENTIALLY NORMAL.  STILL NO REFLEXES PATELLA.  HE HAS NO SIGNIFICANT EDEMA AT THIS MOMENT.    I AM CHANGING HIS DIURETICS TO EVERY 3D DAY WITH METALAZONE  AS WELL.  THIS IS HIS DRY WEIGHT.  NO LEG EDEMA.   DAUGHTER VISITING AS IS WIFE. GBS SYMPTOMS IMPROVED.   NO INCREASE IN ASCITES.  PATIENT HAS NO ENCEPHALOPATHY.  BACK ISSUES SEEM TO BE DOING WELL. LABS AND VITALS REASONABLE..   ALPHA FETO PROTEIN NORMAL.  PLAN:  CHANGE DIURETICS TO Q3 DAYS.   INCHING FORWARD.    8/31/17-PATIENT CONTINUES TO MAKE NICE PROGRESS S/P GBS, AND LOWER BACK SURGERY.  ASCITES  AND EDEMA ARE  VERY MUCH  COMPENSATED AT THIS  POINT.  DESPITE HIS CIRRHOSIS.  THIS IS HIS DRY WEIGHT.  AMBULATORY WITH MINIMAL ASSISTANCE.  HE  DOES HAVE SOME RHONCHI RLL AND SOME MID THORACIC BACK PAIN.    PLAN:  CXR.  CHECK 2 VIEWS AS WELL AS 2 VIEWS OF THORACIC SPINE.  LOOKS LIKE AN OLD COMPRESSION FX TO ME.    9/1/17--PATIENT CONTINUES TO MAKE DECENT PROGRESS WITH HIS GBS AND BACK SURGERY IN PAST.  HE HAS HAD MINIMAL ASCITES AFTER WE   STARTED FAIRLY AGGRESSIVE DIURETICS.   NO BACK PAIN,  SEVERE LOOKING SCALLOPING ON THORACIC SPINE SERIES.  THE CHEST FILMS WERE CLEAR.  PROBABLY JUST ATTELECTASIS HERE.  PLAN:  IMPROVING SLOWLY.   SEE FILMS BELOW.   LABS IN AM.         Review of Systems:  Review of Systems   Constitutional: Negative for chills and fever.   Respiratory: Negative for shortness of breath.    Cardiovascular: Negative for chest pain.    Gastrointestinal: Negative for abdominal pain, diarrhea, nausea and vomiting.   Psychiatric/Behavioral: The patient is not nervous/anxious.        Physical Exam:  Physical Exam   Constitutional: He is oriented to person, place, and time. He appears well-nourished.   HENT:   Head: Atraumatic.   Eyes: Conjunctivae are normal. Pupils are equal, round, and reactive to light.   Neck: Normal range of motion. Neck supple. No tracheal deviation present.   Cardiovascular: Normal rate, regular rhythm, S1 normal and S2 normal.    No murmur heard.  Pulmonary/Chest: Effort normal. He has no wheezes. He has no rhonchi. He has no rales.   DECREASED TACTILE FREMITUS RIGHT BASE.  SUGGESTING EFFUSION THERE.   Abdominal: Soft. He exhibits no distension and no mass. There is no tenderness. There is no rebound and no guarding. Hernia confirmed negative in the right inguinal area and confirmed negative in the left inguinal area.   Musculoskeletal: He exhibits no edema.   Neurological: He is alert and oriented to person, place, and time. No sensory deficit.   Lower extremity motor strength was  5/5.   Able to stand from a sitting position.   Skin: Skin is warm and dry. No rash noted. No cyanosis.   Psychiatric: He has a normal mood and affect. His behavior is normal.   Nursing note and vitals reviewed.      Labs:        Invalid input(s): [id number]      Recent Labs      08/30/17   0505   SODIUM  133*   POTASSIUM  3.6   CHLORIDE  98   CO2  30   BUN  21   CREATININE  0.65   CALCIUM  9.3     Recent Labs      08/30/17   0505   ALTSGPT  11   ASTSGOT  16   ALKPHOSPHAT  68   TBILIRUBIN  1.3   GLUCOSE  92     Recent Labs      08/30/17   0505   RBC  4.01*   HEMOGLOBIN  11.0*   HEMATOCRIT  34.3*   PLATELETCT  76*     Recent Labs      08/30/17   0505   WBC  5.2   NEUTSPOLYS  60.80   LYMPHOCYTES  18.60*   MONOCYTES  12.80   EOSINOPHILS  6.80   BASOPHILS  0.60   ASTSGOT  16   ALTSGPT  11   ALKPHOSPHAT  68   TBILIRUBIN  1.3            Hemodynamics:  Temp (24hrs), Av.6 °C (97.8 °F), Min:36.4 °C (97.5 °F), Max:36.7 °C (98.1 °F)  Temperature: 36.4 °C (97.5 °F)  Pulse  Av.7  Min: 62  Max: 99   Blood Pressure : (!) 94/55     Respiratory:    Respiration: 18, Pulse Oximetry: 97 %        RUL Breath Sounds: Clear, RML Breath Sounds: Diminished, RLL Breath Sounds: Diminished, JOSE Breath Sounds: Clear, LLL Breath Sounds: Clear  Fluids:    Intake/Output Summary (Last 24 hours) at 17 1508  Last data filed at 17 1300   Gross per 24 hour   Intake             1250 ml   Output             1400 ml   Net             -150 ml     Weight: 96 kg (211 lb 10.3 oz)  GI/Nutrition:  Orders Placed This Encounter   Procedures   • Diet Order     Standing Status:   Standing     Number of Occurrences:   1     Order Specific Question:   Diet:     Answer:   Regular [1]     Order Specific Question:   Consistency/Fluid modifications:     Answer:   Thin Liquids [3]     Medical Decision Making, by Problem:  Active Hospital Problems    Diagnosis   • *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]   • Left leg DVT (CMS-HCC) [I82.402]   • Esophageal varices- BANDED 2016- repeat egd tbd 10//29/16- gic [I85.00]   • ASCVD (arteriosclerotic cardiovascular disease)subtotalled small distal LCx and 40% LAD med rx by cath 12 [I25.10]   • GIB (gastrointestinal bleeding)- recurrent from varices [K92.2]   • BPH (benign prostatic hyperplasia) [N40.0]   • 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 DUE TO OLD HEP B, CHEMO RX,  OR THERMO RAD OF MAGNANT LESION [K74.60]   • Essential hypertension [I10]   • Colon cancer-2010 left hemicolectomy; no colostomy [C18.9]     *   S/P Recent Lumbar Surgery    *  Hypertension  BP recently ok  On Coreg: 3.125 mg bid  Note: home meds were Coreg 3.125 mg 1/2 tab bid and Losartan: 25 mg daily  Monitor    *  U/A (+)  Has had some difficulty urinating recently with some burning  Cx --> GNR  F/U C&S   On Cipro empirically unitl C&S come back    *  LE Edema  Mild  On Aldactone: 25 mg daily    *  Thrombocytopenia  Platelets stable (8/11)  2nd to prior chemo and PORTAL HYPERTENSION    *  Hx Colon Cancer: stage IV; in 5 year remission  *  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  *  Hyperlipidemia  *  AIDP: s/p IVIG x 5 days        Reviewed items::  Medications reviewed and Labs reviewed

## 2017-09-01 NOTE — PROGRESS NOTES
Hospital Medicine Progress Note, Adult, Complex               Author: SCOTT BULLOCK MD    Date & Time created: 8/31/2017  9:13 PM     Interval History:  8/15/17--  HISTORY REVIEWED.  REMOTE METASTATIC COLON CANCER DISCOVERED IN 2010 S/P RESECTION BY DR ANA RUIZ AND FINDING OF 10+ NODES  WHICH SPREAD TO THE LIVER.  HAD AN ABLATION AT [facility].  HEAVY DUTY CHEMO PER DR REBOLLAR.   THE LIVER ABLATION HAS UNFORTUNATELY RESULTED IN PORTAL HYPERTENSION.  HE ALSO HAS PRETTY MUCH ALL THE STIGMATA OF PORTAL HYPERTENSION.   THERE REMAINING LIVER PARENCHYMA IS NORMAL SO HE HAS PRESERVED LIVER SYNTHETIC FUNCTION.  AS OF 2010 THERE HAS BEEN NO NEW RECURRANCE THOUGH WE ARE DEALING WITH GASTRIC VARICES,  ESOPHAGEAL VARICIES,  CAPUT MEDUSA,  LEFT LOWER EXTREMITY DVT,  INABILITY TO TOLERATE ANTICOAGULATION WITH AN IVC FILTER IN PLACE,   AND CURRENTLY MODERATE ASCITES.   WE WILL PLAN ON THERAPEUTIC TAP ON Friday.   THE OTHER ISSUE THAT REALLY BROUGHT TO PATIENT TO THE HOSPITAL WAS PROGRESSIVE ASCENDING WEAKNESS.   HE DID HAVE A TIGHT L4-L5 CENTRAL STENOSIS.  THE OTHER LEVELS LOOK FINE.   HE STARTED NOTICING THE WEAKNESS FOR ABOUT A WEEK PRIOR IN MAY AND WENT TO ER.   DR HOUSE SAW THE STRUCTURAL ISSUE,  NAMELY THE ABOVE STENOSIS AND DECOMPRESSED THIS 0N 5/25.  SYMPTOMS DID NOT MALLY.  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   IT IS CLEAR THE PATIENT NEEDED SOME SURGERY ON THE LOWER BACK.   LOOKING AT THE FILMS.  MY THOUGHT IS THE SYMPTOMS THEMSELVES WERE CAUSED BY THE GBS.   HE RECEIVED 5 DAYS OF IMMUNOGLOBULIN AND DID FAIRLY WELL AND HAS BEE TRANSFERR[facility] ON 8/10/17  PLAN:  CONTINUE PT/OT,   PROBLEMS THAT ALSO HAVE OCCURRED INCLUDE PORTAL VEIN THROMBOSIS.   I WILL ARRANGE A THERAPEUTIC THORACENTESIS ON Friday.  RX ENTEROBACTER UTI.  HYPERSPLENISM AND LOW WBC COUNTS AND PLATELETS TO BE EXPECTED.   D/C COREG.  INDERAL BETTER FOR  LOWERING PORTAL PRESSURES.      8/16/17--PATIENT AWAKE AND ALERT.   DOING WELL WITH PT/OT.   GOOD EFFORT PER PATIENT.   I DONT THINK THE ASCITES IS SIGNIFICANTLY WORSE.  I AM GOING TO MAKE DAILY ASSESMENTS AND DETERMINE WHEN PARACENTESIS IS NECESSARY.   GBS IS CERTAINLY NO WORSE.   THE PATIENT WAS SPECIFICALLY TOLD TO INFORM ME ABOUT ANY PHYSICAL CHANGES IE: WORSENING WEAKNESS OR ANY CRANIAL NERVE ABNORMALITIES.   CHEST FILM WAS ESSENTIALLY NEGATIVE FOR EFFUSION.  THE DECREASED TACTILE FREMITUS THAT I NOTED WAS A HIGH RIDING DIAPHRAGM.   PROBABLY DUE TO ASCITES.    I NEED DAILY WEIGHTS TO HELP ME WITH MY DECISION WHEN TO TAP ABDOMEN.  NORMALLY PATIENT GETS IT TAPPED ABOUT EVERY 4 MONTHS BY GI.    I  WILL RE--ASSES PORTAL BLOOD FLOW AND SEE IF THIS PORTAL VEIN THROMBOSIS IS RECANULATED OR WORSE.  LAST YEAR THERE WAS SOME RECANULIZATION AND FLOW.   HEPATOPEDAL FLOW IS GOOD, HEPATOFUGAL FLOW BAD.  I HAVE NEVER SEEN A LONG TERM SURVIVOR OF METASTATIC COLON CANCER WITH 4 CM RECTO-SIGMOID TUMOR AND 5 POSITIVE NODES + LIVER MET.   RX FOR UTI WITH ENTEROBACTER ON CULTURE.  UNIMPRESSIVE WBC COUNT OF 2-5 WBC/HIGH POWERED FIELD.   I PROBABLY WOULD HAVE LET THAT GO UNLESS THERE WERE SYMPTOMS.     PLAN:   PATIENT DOING WELL.  DAILY WEIGHTS.  DAILY ASSESMENTS OF ASCITES.  CXR IS CLEAR.  CHECK ABDOMINAL ULTRASOUND TO ASSESS PORTAL BLOOD FLOW AND ASCITES.   I THINK QOD DIURETICS WOULD HELP PREVENT A TAP AND DECREASE THE EDEMA, (KNOWN  RLE DVT PRIMARILY DUE TO MALIGNANCY BEING A PRO-THROMBOTIC STATE).  GIVE 5 DAYS OF PO VITAMIN K GIVEN THE CIPRO KILLING GUT PA WHICH HELPS GENERATE VITAMIN K FOR A HUMAN.  CHECK SOME IRON STUDIES.   OVERALL INCHING FORWARD.   HARD TO SAY WHAT THE COURSE OF GBS WILL BE.  THE BACK SURGERY WAS NECESSARY BUT MY FEELING IS HIS SYMPTOMS WERE MOSTLY DUE TO GBS AND NOT SPINAL STENOSIS.   HEPATITIS STATUS CHECKED AND FOUND TO BE NEGATIVE.   HIV ANTIBODIES  FOR COMPLETENESS.     8/17/17--PATIENT'S ULTRASOUND  DID NOT SHOW ENOUGH ASCITES TO SAFELY TAP.  THAT IS GOOD.  LITTLE CHANGE IN THE MOTOR MOVEMENT NOTED.  BACK FEELS FINE.  INR IS BETTER WITH VITAMIN K.  DIURETICS HOLDING ASCITES AT BAY PERIODICALLY.   WE ARE FORCING DIURESIS WITH DEMEDEX ETC.   NO NEED FOR TAP.    BP OK.  LABS OK.  MODEST IRON DEFICIENCY COMPONENT.   ADD SOME PO IRON AND VITAMIN C IF NOT DONE ALREADY.    8/18/17--PATIENT LOOKS THE SAME TO ME.  THE ASCITES IS MINIMAL AND IWILL NOT REQUIIRE ANY INTERVENTION LIKE THORACENTESIS IN THE NEAR FUTURE.  OUR MEDICAL RX IS WORKING JUST FINE.    REALLY NO CHANGE IN PORTAL THROMBOSIS.  THERE IS BLOOD FLOW WHICH IS UNCHANGED.   USUAL COMPLAINTS.  ACHES AND PAINS ETC.    PLAN:  NO TAP OF ABDOMEN REQUIRED.  THE PATIENT MAY BENEFIT FROM SOME IV IRON.   HE IS DEFICIENT.     8/19/17--PATIENT AWAKE AND ALERT.  DOING FAIRLY NICELY WITH PT/OT.   HE WAS ABLE TO LIFT BOTH LEGS OFF THE BED.  I DID GET SOME PATELLA REFLEX B/L YESTERDAY.  MUSCLE BULK IS FAIR.  I REVIEWED THE ULTRASOUND.  MINIMAL ASCITES.  MODEST RLL EFFUSION.   THE SPLEEN IS ENLARGED.  HE UNFORTUNATELY WILL ALWAYS HAVE SOME DEGREE OF EARLY SATIETEY DUE TO  A 20 CM SPLEEN.  NORMAL WOULD BE FROM 7-14 CM. THE SPLEEN IS VERY CLOSE TO THE STOMACH AND NOT A RETROPERITONEAL STRUCTURE LIKE THE KIDNEYS.   ALSO ON THE PLAIN FILM WHICH WAS UNDER PENETRATED, THERE LOOKED TO BE A GOOD AMOUNT OF FECAL MATERIAL IN THE DESCENDING COLON, ALSO NEAR THE STOMACH.      PLAN:    I THINK THE PATIENT IS DOING AS WELL AS CAN BE EXPECTED.  THE GBS IS IMPROVING AT A SLOW RATE.   THE SYMPTOMS STARTED AT THE MIDDLE OF MAY.   SO THIS IS A 3 MONTH ORDEAL THUS FAR.  HE COULD BENEFIT FROM SOME MIRALAX.  DOUBT HE WOULD TOLLERATE METAMUCIL.    I WILL TRY  THAT.  MY AGGRESSIVE DIURETIC PROGRAM HAS  ESSENTIALLY MINIMIZED THE ASCITES TO A NEGLIGIBLE AMOUNT AND HE WILL NOT NEED PARACENTESIS.  LOW GRADE TEMP OF 99,6, CHECK UA FOR COMPLETENESS AND FORMAL CXR ON Monday TO RE-ASSES THAT EFFUSION NOTED  ON ABDOMINAL ULTRASOUND.    8/20/17--PATIENT FEELING WELL.  MENTATION IS CLEAR.  ASCITES IS UNDER CONTROL.  MINIMAL NOW.   EVERY OTHER DAY DIURETICS SEEMS TO BE HELPING.  CHECKING LYTES IN AM.  MAY NEED TO BACK DOWN TO EVERY THIRD DAY IF THERE IS HYPOTENSION OR CONTRACTION ALKALOSIS.  CXR TO ASSESS RIGHT EFFUSION NOTED ON EXAM. MODEST DECREASED TACTILE FREMITUS RIGHT BASE.    ALWAYS EXPLAIN A REASON TO PATIENT WHY WE ARE DOING A TEST.    UA IS NEGATIVE.    VITALS OK.  THUS FAR LABS OK.   HE IS AN ASTUTE  AND WANTS EVERY RATIONAL EXPLAINED THOROUGHLY.   O/W HE WILL REFUSE.  I WILL TRY TO EXPLAIN IN MY 2 PARAGRAPH DAILY NOTE MY RATIONAL.    PLAN:  CXR IN AM  (CHECKING THE DEGREE OF PLEURAL FLUID AROUND LUNG).  CHECK CBC, CMP, MAG.   PATIENT HAS A MODERATE AMOUNT OF FECAL MATERIAL IN SPLENIC FLEXURE.  STARTING ON GENTLE MIRALAX AND SOME MOM.  CLINICALLY NO EDEMA AND MINIMAL ASCITES.   MENTATION REMAINS CLEAR.  NO NEED FOR LACTULOSE AT THIS TIME.   MOTOR STRENGTH IMPROVING.   UA NEGATIVE.  NO ABDOMINAL PAIN OR FURTHER LOW GRADE FEVER.  COAGS ALSO IN AM.  THE EARLY SATIETY IS DUE TO A 20 CM SPLEEN ABUTTING THE STOMACH.    8/21/17--PATIENT DOING AS WELL AS CAN BE EXPECTED.   HE HAD BACK SURGERY AND GBS JUST PRIOR TO THE PROCEDURE IT APPEARS.  HE IS MOVING ALL 4 EXTREMITIES AGAINST   GRAVITY.  MENTATION IS VERY CLEAR.  COAGS ARE NO BETTER.  RECHECK IN AM.  RECHECK AMMONIA IN AM.  I NEVER GO BY THE NUMBER ITSELF,  JUST THE SYMPTOMS.  SEEMS LIKE EVERYONE IS DIFFERENT.  ONE PATIENT CAN HAVE AN AMMONIA  AND BE FINE AND ANOTHER CAN HAVE ONE OF 60 AND BE ENCEPHALOPATHIC.  CXR LOOKED FINE NO EFFUSION OR EVIDENCE FOR INFILTRATE.  VITALS LOOK OK.   LABS ARE AS EXPECTED FOR A PATIENT WITH CIRRHOSIS AND PORTAL HTN.   HE HAS GASTRIC VARICES,  ESOPHAGEAL VARICES (BANDED PROPHYLACTICLY IN PAST),  DILATED PERIUMBILICAL VEINS AND INTERMITTENT ASCITES.  THE OTHER PORTAL DECOMPRESSION LOCATIONS ARE SPLEEN, (HE DOES HAVE A VERY  LARGE SPLEEN) AND HE DOESN'T HAVE BLEEDING HEMORRHOIDS THAT WE KNOW OF.  DESPITE VITAMIN K HE IS STILL A BIT COAGULOPATHIC.   INTRINSIC LIVER DISEASE.  STAGE 4 COLON CANCER.  NEVER HAVE SEEN A LONGER TERM SURVIVOR.  PLAN:  CHECK LABS IN AM,  AMMONIA,  INR, CBC, CMP, ALPH FETO PROTEIN (TUMOR MARKER FOR HEPATOCELLULAR CARCINOMA WHICH IS A DIFFERENT MALIGNANCY ASSOCIATED WITH CIRRHOSIS UNFORTUNATELY).   GBS SYMPTOMS SLOWLY IMPROVING.  INCHING SOMEWHERE.    8/29/17--PATIENT AWAKE AND ALERT.  MOTOR STRENGTH IS ESSENTIALLY NORMAL.  STILL NO REFLEXES PATELLA.  HE HAS NO SIGNIFICANT EDEMA AT THIS MOMENT.    I AM CHANGING HIS DIURETICS TO EVERY 3D DAY WITH METALAZONE  AS WELL.  THIS IS HIS DRY WEIGHT.  NO LEG EDEMA.   DAUGHTER VISITING AS IS WIFE. GBS SYMPTOMS IMPROVED.   NO INCREASE IN ASCITES.  PATIENT HAS NO ENCEPHALOPATHY.  BACK ISSUES SEEM TO BE DOING WELL. LABS AND VITALS REASONABLE..   ALPHA FETO PROTEIN NORMAL.  PLAN:  CHANGE DIURETICS TO Q3 DAYS.   INCHING FORWARD.    8/31/17-PATIENT CONTINUES TO MAKE NICE PROGRESS S/P GBS, AND LOWER BACK SURGERY.  ASCITES  AND EDEMA ARE  VERY MUCH  COMPENSATED AT THIS  POINT.  DESPITE HIS CIRRHOSIS.  THIS IS HIS DRY WEIGHT.  AMBULATORY WITH MINIMAL ASSISTANCE.  HE  DOES HAVE SOME RHONCHI RLL AND SOME MID THORACIC BACK PAIN.    PLAN:  CXR.  CHECK 2 VIEWS AS WELL AS 2 VIEWS OF THORACIC SPINE.  LOOKS LIKE AN OLD COMPRESSION FX TO ME.       Review of Systems:  Review of Systems   Constitutional: Negative for chills and fever.   Respiratory: Negative for shortness of breath.    Cardiovascular: Negative for chest pain.   Gastrointestinal: Negative for abdominal pain, diarrhea, nausea and vomiting.   Psychiatric/Behavioral: The patient is not nervous/anxious.        Physical Exam:  Physical Exam   Constitutional: He is oriented to person, place, and time. He appears well-nourished.   HENT:   Head: Atraumatic.   Eyes: Conjunctivae are normal. Pupils are equal, round, and reactive to light.    Neck: Normal range of motion. Neck supple. No tracheal deviation present.   Cardiovascular: Normal rate, regular rhythm, S1 normal and S2 normal.    No murmur heard.  Pulmonary/Chest: Effort normal. He has no wheezes. He has no rhonchi. He has no rales.   DECREASED TACTILE FREMITUS RIGHT BASE.  SUGGESTING EFFUSION THERE.   Abdominal: Soft. He exhibits no distension and no mass. There is no tenderness. There is no rebound and no guarding. Hernia confirmed negative in the right inguinal area and confirmed negative in the left inguinal area.   Musculoskeletal: He exhibits no edema.   Neurological: He is alert and oriented to person, place, and time. No sensory deficit.   Lower extremity motor strength was  5/5.   Able to stand from a sitting position.   Skin: Skin is warm and dry. No rash noted. No cyanosis.   Psychiatric: He has a normal mood and affect. His behavior is normal.   Nursing note and vitals reviewed.      Labs:        Invalid input(s): [id number]      Recent Labs      17   0505   SODIUM  133*   POTASSIUM  3.6   CHLORIDE  98   CO2  30   BUN  21   CREATININE  0.65   CALCIUM  9.3     Recent Labs      17   0505   ALTSGPT  11   ASTSGOT  16   ALKPHOSPHAT  68   TBILIRUBIN  1.3   GLUCOSE  92     Recent Labs      17   0505   RBC  4.01*   HEMOGLOBIN  11.0*   HEMATOCRIT  34.3*   PLATELETCT  76*     Recent Labs      17   0505   WBC  5.2   NEUTSPOLYS  60.80   LYMPHOCYTES  18.60*   MONOCYTES  12.80   EOSINOPHILS  6.80   BASOPHILS  0.60   ASTSGOT  16   ALTSGPT  11   ALKPHOSPHAT  68   TBILIRUBIN  1.3           Hemodynamics:  Temp (24hrs), Av.6 °C (97.8 °F), Min:36.5 °C (97.7 °F), Max:36.7 °C (98.1 °F)  Temperature: 36.7 °C (98.1 °F)  Pulse  Av  Min: 62  Max: 99   Blood Pressure : 103/62     Respiratory:    Respiration: 16, Pulse Oximetry: 95 %        RUL Breath Sounds: Clear, RML Breath Sounds: Diminished, RLL Breath Sounds: Diminished, JOSE Breath Sounds: Clear, LLL Breath Sounds:  Clear  Fluids:    Intake/Output Summary (Last 24 hours) at 08/31/17 2113  Last data filed at 08/31/17 2000   Gross per 24 hour   Intake             1210 ml   Output             1050 ml   Net              160 ml        GI/Nutrition:  Orders Placed This Encounter   Procedures   • Diet Order     Standing Status:   Standing     Number of Occurrences:   1     Order Specific Question:   Diet:     Answer:   Regular [1]     Order Specific Question:   Consistency/Fluid modifications:     Answer:   Thin Liquids [3]     Medical Decision Making, by Problem:  Active Hospital Problems    Diagnosis   • *AIDP (acute inflammatory demyelinating polyneuropathy) (CMS-[facility]) [G37.8]   • Left leg DVT (CMS-HCC) [I82.402]   • Esophageal varices- BANDED 4/2016- repeat egd tbd 10//29/16- gic [I85.00]   • ASCVD (arteriosclerotic cardiovascular disease)subtotalled small distal LCx and 40% LAD med rx by cath 12/13/12 [I25.10]   • GIB (gastrointestinal bleeding)- recurrent from varices [K92.2]   • BPH (benign prostatic hyperplasia) [N40.0]   • 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 DUE TO OLD HEP B, CHEMO RX,  OR THERMO RAD OF MAGNANT LESION [K74.60]   • Essential hypertension [I10]   • Colon cancer-2010 left hemicolectomy; no colostomy [C18.9]     *  S/P Recent Lumbar Surgery    *  Hypertension  BP recently ok  On Coreg: 3.125 mg bid  Note: home meds were Coreg 3.125 mg 1/2 tab bid and Losartan: 25 mg daily  Monitor    *  U/A (+)  Has had some difficulty urinating recently with some burning  Cx --> GNR  F/U C&S   On Cipro empirically unitl C&S come back    *  LE Edema  Mild  On Aldactone: 25 mg daily    *  Thrombocytopenia  Platelets stable (8/11)  2nd to prior chemo and  PORTAL HYPERTENSION    *  Hx Colon Cancer: stage IV; in 5 year remission  *  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  *  Hyperlipidemia  *  AIDP: s/p IVIG x 5 days        Reviewed items::  Medications reviewed and Labs reviewed

## 2017-09-01 NOTE — CARE PLAN
Problem: Safety  Goal: Will remain free from falls    Intervention: Implement fall precautions  Pt has been compliant and appropriately use the call light. Side rails up, bed in lowest position, non skid socks on.      Problem: Pain Management  Goal: Pain level will decrease to patient's comfort goal  Outcome: PROGRESSING SLOWER THAN EXPECTED  Pt c/o 8/10 low back pain, medicated with Roxicodone 10mg PO at 2029. Pt fell asleep after an hour. At 0020, pt asking for another Roxicodone for 8/10 low back pain. Pt currently sleeping.     Problem: Fluid Volume:  Goal: Will maintain balanced intake and output    Intervention: Monitor, educate, and encourage compliance with therapeutic intake of liquids  Strict I/O monitoring in place. Pt and wife aware of 1500ml fluid restriction and urine output monitoring. No apparent ascites and BLE edema noted. Daily weight taken in the morning.

## 2017-09-01 NOTE — PROGRESS NOTES
Endorsed by RN. Assumed care. Pt back in bed, wife at bedside. A/o x4. Able to communicate needs. O2 sat of 95% on RA without s/s of respiratory distress noted. Pt repositions self at night. Low airloss mattress in use for stage III coccyx pressure ulcer. Compliant with all prescribed HS meds and nursing interventions. Very compliant and aware of fluid restriction. C/o severe chronic back pain, medicated as needed with good results. Pt is a high fall risk and just had an assisted fall yesterday, reminded regarding fall precautions. All safety precautions in place. Call light within reach.

## 2017-09-01 NOTE — CARE PLAN
Problem: Bowel/Gastric:  Goal: Normal bowel function is maintained or improved  Outcome: PROGRESSING AS EXPECTED  Patient having regular bowel movements; last BM 8/31/17.  Denies s/s constipation; bowel meds available if needed.  Will continue to monitor.    Problem: Urinary Elimination:  Goal: Ability to reestablish a normal urinary elimination pattern will improve  Outcome: PROGRESSING AS EXPECTED  Patient voiding adequate amounts of clear yellow urine.  Denies flank pain or dysuria; afebrile.  Will continue to monitor.

## 2017-09-02 LAB
ALBUMIN SERPL BCP-MCNC: 3.1 G/DL (ref 3.2–4.9)
ALBUMIN/GLOB SERPL: 1.2 G/DL
ALP SERPL-CCNC: 75 U/L (ref 30–99)
ALT SERPL-CCNC: 12 U/L (ref 2–50)
ANION GAP SERPL CALC-SCNC: 7 MMOL/L (ref 0–11.9)
AST SERPL-CCNC: 20 U/L (ref 12–45)
BASOPHILS # BLD AUTO: 0.4 % (ref 0–1.8)
BASOPHILS # BLD: 0.02 K/UL (ref 0–0.12)
BILIRUB SERPL-MCNC: 1.3 MG/DL (ref 0.1–1.5)
BUN SERPL-MCNC: 27 MG/DL (ref 8–22)
CALCIUM SERPL-MCNC: 8.9 MG/DL (ref 8.5–10.5)
CHLORIDE SERPL-SCNC: 98 MMOL/L (ref 96–112)
CO2 SERPL-SCNC: 29 MMOL/L (ref 20–33)
CREAT SERPL-MCNC: 0.65 MG/DL (ref 0.5–1.4)
EOSINOPHIL # BLD AUTO: 0.3 K/UL (ref 0–0.51)
EOSINOPHIL NFR BLD: 5.8 % (ref 0–6.9)
ERYTHROCYTE [DISTWIDTH] IN BLOOD BY AUTOMATED COUNT: 53.8 FL (ref 35.9–50)
ERYTHROCYTE [SEDIMENTATION RATE] IN BLOOD BY WESTERGREN METHOD: 14 MM/HOUR (ref 0–20)
GFR SERPL CREATININE-BSD FRML MDRD: >60 ML/MIN/1.73 M 2
GLOBULIN SER CALC-MCNC: 2.6 G/DL (ref 1.9–3.5)
GLUCOSE SERPL-MCNC: 86 MG/DL (ref 65–99)
HCT VFR BLD AUTO: 33.6 % (ref 42–52)
HGB BLD-MCNC: 10.9 G/DL (ref 14–18)
IMM GRANULOCYTES # BLD AUTO: 0.03 K/UL (ref 0–0.11)
IMM GRANULOCYTES NFR BLD AUTO: 0.6 % (ref 0–0.9)
LYMPHOCYTES # BLD AUTO: 0.88 K/UL (ref 1–4.8)
LYMPHOCYTES NFR BLD: 16.9 % (ref 22–41)
MCH RBC QN AUTO: 27.8 PG (ref 27–33)
MCHC RBC AUTO-ENTMCNC: 32.4 G/DL (ref 33.7–35.3)
MCV RBC AUTO: 85.7 FL (ref 81.4–97.8)
MONOCYTES # BLD AUTO: 0.55 K/UL (ref 0–0.85)
MONOCYTES NFR BLD AUTO: 10.6 % (ref 0–13.4)
NEUTROPHILS # BLD AUTO: 3.42 K/UL (ref 1.82–7.42)
NEUTROPHILS NFR BLD: 65.7 % (ref 44–72)
NRBC # BLD AUTO: 0 K/UL
NRBC BLD AUTO-RTO: 0 /100 WBC
PLATELET # BLD AUTO: 70 K/UL (ref 164–446)
PMV BLD AUTO: 10.5 FL (ref 9–12.9)
POTASSIUM SERPL-SCNC: 3.3 MMOL/L (ref 3.6–5.5)
PROT SERPL-MCNC: 5.7 G/DL (ref 6–8.2)
RBC # BLD AUTO: 3.92 M/UL (ref 4.7–6.1)
SODIUM SERPL-SCNC: 134 MMOL/L (ref 135–145)
WBC # BLD AUTO: 5.2 K/UL (ref 4.8–10.8)

## 2017-09-02 PROCEDURE — 85652 RBC SED RATE AUTOMATED: CPT

## 2017-09-02 PROCEDURE — 700102 HCHG RX REV CODE 250 W/ 637 OVERRIDE(OP): Performed by: PHYSICAL MEDICINE & REHABILITATION

## 2017-09-02 PROCEDURE — 99232 SBSQ HOSP IP/OBS MODERATE 35: CPT | Performed by: HOSPITALIST

## 2017-09-02 PROCEDURE — A9270 NON-COVERED ITEM OR SERVICE: HCPCS | Performed by: HOSPITALIST

## 2017-09-02 PROCEDURE — 770010 HCHG ROOM/CARE - REHAB SEMI PRIVAT*

## 2017-09-02 PROCEDURE — 85025 COMPLETE CBC W/AUTO DIFF WBC: CPT

## 2017-09-02 PROCEDURE — A9270 NON-COVERED ITEM OR SERVICE: HCPCS | Performed by: PHYSICAL MEDICINE & REHABILITATION

## 2017-09-02 PROCEDURE — 700102 HCHG RX REV CODE 250 W/ 637 OVERRIDE(OP): Performed by: HOSPITALIST

## 2017-09-02 PROCEDURE — 36415 COLL VENOUS BLD VENIPUNCTURE: CPT

## 2017-09-02 PROCEDURE — 80053 COMPREHEN METABOLIC PANEL: CPT

## 2017-09-02 RX ORDER — POTASSIUM CHLORIDE 20 MEQ/1
40 TABLET, EXTENDED RELEASE ORAL ONCE
Status: COMPLETED | OUTPATIENT
Start: 2017-09-02 | End: 2017-09-02

## 2017-09-02 RX ORDER — POTASSIUM CHLORIDE 20 MEQ/1
20 TABLET, EXTENDED RELEASE ORAL
Status: DISCONTINUED | OUTPATIENT
Start: 2017-09-04 | End: 2017-09-07 | Stop reason: HOSPADM

## 2017-09-02 RX ADMIN — SPIRONOLACTONE 25 MG: 25 TABLET, FILM COATED ORAL at 05:19

## 2017-09-02 RX ADMIN — LACTOBACILLUS ACIDOPHILUS / LACTOBACILLUS BULGARICUS 1 PACKET: 100 MILLION CFU STRENGTH GRANULES at 08:02

## 2017-09-02 RX ADMIN — OXYCODONE HYDROCHLORIDE AND ACETAMINOPHEN 500 MG: 500 TABLET ORAL at 08:02

## 2017-09-02 RX ADMIN — LACTOBACILLUS ACIDOPHILUS / LACTOBACILLUS BULGARICUS 1 PACKET: 100 MILLION CFU STRENGTH GRANULES at 17:15

## 2017-09-02 RX ADMIN — LACTOBACILLUS ACIDOPHILUS / LACTOBACILLUS BULGARICUS 1 PACKET: 100 MILLION CFU STRENGTH GRANULES at 12:16

## 2017-09-02 RX ADMIN — OXYCODONE HYDROCHLORIDE 10 MG: 10 TABLET ORAL at 01:21

## 2017-09-02 RX ADMIN — POTASSIUM CHLORIDE 40 MEQ: 1500 TABLET, EXTENDED RELEASE ORAL at 12:16

## 2017-09-02 RX ADMIN — TAMSULOSIN HYDROCHLORIDE 0.4 MG: 0.4 CAPSULE ORAL at 08:02

## 2017-09-02 RX ADMIN — ATORVASTATIN CALCIUM 40 MG: 40 TABLET, FILM COATED ORAL at 21:12

## 2017-09-02 RX ADMIN — URSODIOL 300 MG: 300 CAPSULE ORAL at 08:02

## 2017-09-02 RX ADMIN — VITAMIN D, TAB 1000IU (100/BT) 1000 UNITS: 25 TAB at 08:02

## 2017-09-02 RX ADMIN — OXYCODONE HYDROCHLORIDE 10 MG: 10 TABLET ORAL at 21:12

## 2017-09-02 RX ADMIN — FERROUS SULFATE TAB 325 MG (65 MG ELEMENTAL FE) 325 MG: 325 (65 FE) TAB at 08:02

## 2017-09-02 RX ADMIN — FOLIC ACID 1 MG: 1 TABLET ORAL at 08:02

## 2017-09-02 ASSESSMENT — PAIN SCALES - GENERAL
PAINLEVEL_OUTOF10: 8
PAINLEVEL_OUTOF10: 8
PAINLEVEL_OUTOF10: 0
PAINLEVEL_OUTOF10: 0

## 2017-09-02 NOTE — CARE PLAN
Problem: Pain Management  Goal: Pain level will decrease to patient's comfort goal  Outcome: PROGRESSING SLOWER THAN EXPECTED  Patient had reports of 7/10 lower back pain. PRN Roxicodone given per patient request.    Problem: Skin Integrity  Goal: Risk for impaired skin integrity will decrease    Intervention: Implement precautions to protect skin integrity in collaboration with the interdisciplinary team  Patient is able to turn or reposition himself while In bed with ease. Approved barrier paste applied as ordered.

## 2017-09-02 NOTE — CARE PLAN
Problem: Knowledge Deficit  Goal: Knowledge of disease process/condition, treatment plan, diagnostic tests, and medications will improve  Outcome: PROGRESSING AS EXPECTED  Potassium 40 mg was administered to patient as ordered (K+=3.3). Education was provided regarding need of potassium dose. Pt. and wife verbalized understanding.    Problem: Pain Management  Goal: Pain level will decrease to patient's comfort goal  Patient denies pain. No sign of discomfort noted. Will monitor throughout the shift.

## 2017-09-02 NOTE — PROGRESS NOTES
Hospital Medicine Progress Note, Adult, Complex               Author: SCOTT BULLOCK MD    Date & Time created: 9/2/2017  10:39 AM     Interval History:  8/15/17--  HISTORY REVIEWED.  REMOTE METASTATIC COLON CANCER DISCOVERED IN 2010 S/P RESECTION BY DR ANA RUIZ AND FINDING OF 10+ NODES  WHICH SPREAD TO THE LIVER.  HAD AN ABLATION AT [facility].  HEAVY DUTY CHEMO PER DR REBOLLAR.   THE LIVER ABLATION HAS UNFORTUNATELY RESULTED IN PORTAL HYPERTENSION.  HE ALSO HAS PRETTY MUCH ALL THE STIGMATA OF PORTAL HYPERTENSION.   THERE REMAINING LIVER PARENCHYMA IS NORMAL SO HE HAS PRESERVED LIVER SYNTHETIC FUNCTION.  AS OF 2010 THERE HAS BEEN NO NEW RECURRANCE THOUGH WE ARE DEALING WITH GASTRIC VARICES,  ESOPHAGEAL VARICIES,  CAPUT MEDUSA,  LEFT LOWER EXTREMITY DVT,  INABILITY TO TOLERATE ANTICOAGULATION WITH AN IVC FILTER IN PLACE,   AND CURRENTLY MODERATE ASCITES.   WE WILL PLAN ON THERAPEUTIC TAP ON Friday.   THE OTHER ISSUE THAT REALLY BROUGHT TO PATIENT TO THE HOSPITAL WAS PROGRESSIVE ASCENDING WEAKNESS.   HE DID HAVE A TIGHT L4-L5 CENTRAL STENOSIS.  THE OTHER LEVELS LOOK FINE.   HE STARTED NOTICING THE WEAKNESS FOR ABOUT A WEEK PRIOR IN MAY AND WENT TO ER.   DR HOUSE SAW THE STRUCTURAL ISSUE,  NAMELY THE ABOVE STENOSIS AND DECOMPRESSED THIS 0N 5/25.  SYMPTOMS DID NOT MALLY.  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   IT IS CLEAR THE PATIENT NEEDED SOME SURGERY ON THE LOWER BACK.   LOOKING AT THE FILMS.  MY THOUGHT IS THE SYMPTOMS THEMSELVES WERE CAUSED BY THE GBS.   HE RECEIVED 5 DAYS OF IMMUNOGLOBULIN AND DID FAIRLY WELL AND HAS [facility]ERR[facility] ON 8[facility]10/17  PLAN:  CONTINUE PT/OT,   PROBLEMS THAT ALSO HAVE OCCURRED INCLUDE PORTAL VEIN THROMBOSIS.   I WILL ARRANGE A THERAPEUTIC THORACENTESIS ON Friday.  RX ENTEROBACTER UTI.  HYPERSPLENISM AND LOW WBC COUNTS AND PLATELETS TO BE EXPECTED.   D/C COREG.  INDERAL BETTER FOR  LOWERING PORTAL PRESSURES.      8/16/17--PATIENT AWAKE AND ALERT.   DOING WELL WITH PT/OT.   GOOD EFFORT PER PATIENT.   I DONT THINK THE ASCITES IS SIGNIFICANTLY WORSE.  I AM GOING TO MAKE DAILY ASSESMENTS AND DETERMINE WHEN PARACENTESIS IS NECESSARY.   GBS IS CERTAINLY NO WORSE.   THE PATIENT WAS SPECIFICALLY TOLD TO INFORM ME ABOUT ANY PHYSICAL CHANGES IE: WORSENING WEAKNESS OR ANY CRANIAL NERVE ABNORMALITIES.   CHEST FILM WAS ESSENTIALLY NEGATIVE FOR EFFUSION.  THE DECREASED TACTILE FREMITUS THAT I NOTED WAS A HIGH RIDING DIAPHRAGM.   PROBABLY DUE TO ASCITES.    I NEED DAILY WEIGHTS TO HELP ME WITH MY DECISION WHEN TO TAP ABDOMEN.  NORMALLY PATIENT GETS IT TAPPED ABOUT EVERY 4 MONTHS BY GI.    I  WILL RE--ASSES PORTAL BLOOD FLOW AND SEE IF THIS PORTAL VEIN THROMBOSIS IS RECANULATED OR WORSE.  LAST YEAR THERE WAS SOME RECANULIZATION AND FLOW.   HEPATOPEDAL FLOW IS GOOD, HEPATOFUGAL FLOW BAD.  I HAVE NEVER SEEN A LONG TERM SURVIVOR OF METASTATIC COLON CANCER WITH 4 CM RECTO-SIGMOID TUMOR AND 5 POSITIVE NODES + LIVER MET.   RX FOR UTI WITH ENTEROBACTER ON CULTURE.  UNIMPRESSIVE WBC COUNT OF 2-5 WBC/HIGH POWERED FIELD.   I PROBABLY WOULD HAVE LET THAT GO UNLESS THERE WERE SYMPTOMS.     PLAN:   PATIENT DOING WELL.  DAILY WEIGHTS.  DAILY ASSESMENTS OF ASCITES.  CXR IS CLEAR.  CHECK ABDOMINAL ULTRASOUND TO ASSESS PORTAL BLOOD FLOW AND ASCITES.   I THINK QOD DIURETICS WOULD HELP PREVENT A TAP AND DECREASE THE EDEMA, (KNOWN  RLE DVT PRIMARILY DUE TO MALIGNANCY BEING A PRO-THROMBOTIC STATE).  GIVE 5 DAYS OF PO VITAMIN K GIVEN THE CIPRO KILLING GUT PA WHICH HELPS GENERATE VITAMIN K FOR A HUMAN.  CHECK SOME IRON STUDIES.   OVERALL INCHING FORWARD.   HARD TO SAY WHAT THE COURSE OF GBS WILL BE.  THE BACK SURGERY WAS NECESSARY BUT MY FEELING IS HIS SYMPTOMS WERE MOSTLY DUE TO GBS AND NOT SPINAL STENOSIS.   HEPATITIS STATUS CHECKED AND FOUND TO BE NEGATIVE.   HIV ANTIBODIES  FOR COMPLETENESS.     8/17/17--PATIENT'S ULTRASOUND  DID NOT SHOW ENOUGH ASCITES TO SAFELY TAP.  THAT IS GOOD.  LITTLE CHANGE IN THE MOTOR MOVEMENT NOTED.  BACK FEELS FINE.  INR IS BETTER WITH VITAMIN K.  DIURETICS HOLDING ASCITES AT BAY PERIODICALLY.   WE ARE FORCING DIURESIS WITH DEMEDEX ETC.   NO NEED FOR TAP.    BP OK.  LABS OK.  MODEST IRON DEFICIENCY COMPONENT.   ADD SOME PO IRON AND VITAMIN C IF NOT DONE ALREADY.    8/18/17--PATIENT LOOKS THE SAME TO ME.  THE ASCITES IS MINIMAL AND IWILL NOT REQUIIRE ANY INTERVENTION LIKE THORACENTESIS IN THE NEAR FUTURE.  OUR MEDICAL RX IS WORKING JUST FINE.    REALLY NO CHANGE IN PORTAL THROMBOSIS.  THERE IS BLOOD FLOW WHICH IS UNCHANGED.   USUAL COMPLAINTS.  ACHES AND PAINS ETC.    PLAN:  NO TAP OF ABDOMEN REQUIRED.  THE PATIENT MAY BENEFIT FROM SOME IV IRON.   HE IS DEFICIENT.     8/19/17--PATIENT AWAKE AND ALERT.  DOING FAIRLY NICELY WITH PT/OT.   HE WAS ABLE TO LIFT BOTH LEGS OFF THE BED.  I DID GET SOME PATELLA REFLEX B/L YESTERDAY.  MUSCLE BULK IS FAIR.  I REVIEWED THE ULTRASOUND.  MINIMAL ASCITES.  MODEST RLL EFFUSION.   THE SPLEEN IS ENLARGED.  HE UNFORTUNATELY WILL ALWAYS HAVE SOME DEGREE OF EARLY SATIETEY DUE TO  A 20 CM SPLEEN.  NORMAL WOULD BE FROM 7-14 CM. THE SPLEEN IS VERY CLOSE TO THE STOMACH AND NOT A RETROPERITONEAL STRUCTURE LIKE THE KIDNEYS.   ALSO ON THE PLAIN FILM WHICH WAS UNDER PENETRATED, THERE LOOKED TO BE A GOOD AMOUNT OF FECAL MATERIAL IN THE DESCENDING COLON, ALSO NEAR THE STOMACH.      PLAN:    I THINK THE PATIENT IS DOING AS WELL AS CAN BE EXPECTED.  THE GBS IS IMPROVING AT A SLOW RATE.   THE SYMPTOMS STARTED AT THE MIDDLE OF MAY.   SO THIS IS A 3 MONTH ORDEAL THUS FAR.  HE COULD BENEFIT FROM SOME MIRALAX.  DOUBT HE WOULD TOLLERATE METAMUCIL.    I WILL TRY  THAT.  MY AGGRESSIVE DIURETIC PROGRAM HAS  ESSENTIALLY MINIMIZED THE ASCITES TO A NEGLIGIBLE AMOUNT AND HE WILL NOT NEED PARACENTESIS.  LOW GRADE TEMP OF 99,6, CHECK UA FOR COMPLETENESS AND FORMAL CXR ON Monday TO RE-ASSES THAT EFFUSION NOTED  ON ABDOMINAL ULTRASOUND.    8/20/17--PATIENT FEELING WELL.  MENTATION IS CLEAR.  ASCITES IS UNDER CONTROL.  MINIMAL NOW.   EVERY OTHER DAY DIURETICS SEEMS TO BE HELPING.  CHECKING LYTES IN AM.  MAY NEED TO BACK DOWN TO EVERY THIRD DAY IF THERE IS HYPOTENSION OR CONTRACTION ALKALOSIS.  CXR TO ASSESS RIGHT EFFUSION NOTED ON EXAM. MODEST DECREASED TACTILE FREMITUS RIGHT BASE.    ALWAYS EXPLAIN A REASON TO PATIENT WHY WE ARE DOING A TEST.    UA IS NEGATIVE.    VITALS OK.  THUS FAR LABS OK.   HE IS AN ASTUTE  AND WANTS EVERY RATIONAL EXPLAINED THOROUGHLY.   O/W HE WILL REFUSE.  I WILL TRY TO EXPLAIN IN MY 2 PARAGRAPH DAILY NOTE MY RATIONAL.    PLAN:  CXR IN AM  (CHECKING THE DEGREE OF PLEURAL FLUID AROUND LUNG).  CHECK CBC, CMP, MAG.   PATIENT HAS A MODERATE AMOUNT OF FECAL MATERIAL IN SPLENIC FLEXURE.  STARTING ON GENTLE MIRALAX AND SOME MOM.  CLINICALLY NO EDEMA AND MINIMAL ASCITES.   MENTATION REMAINS CLEAR.  NO NEED FOR LACTULOSE AT THIS TIME.   MOTOR STRENGTH IMPROVING.   UA NEGATIVE.  NO ABDOMINAL PAIN OR FURTHER LOW GRADE FEVER.  COAGS ALSO IN AM.  THE EARLY SATIETY IS DUE TO A 20 CM SPLEEN ABUTTING THE STOMACH.    8/21/17--PATIENT DOING AS WELL AS CAN BE EXPECTED.   HE HAD BACK SURGERY AND GBS JUST PRIOR TO THE PROCEDURE IT APPEARS.  HE IS MOVING ALL 4 EXTREMITIES AGAINST   GRAVITY.  MENTATION IS VERY CLEAR.  COAGS ARE NO BETTER.  RECHECK IN AM.  RECHECK AMMONIA IN AM.  I NEVER GO BY THE NUMBER ITSELF,  JUST THE SYMPTOMS.  SEEMS LIKE EVERYONE IS DIFFERENT.  ONE PATIENT CAN HAVE AN AMMONIA  AND BE FINE AND ANOTHER CAN HAVE ONE OF 60 AND BE ENCEPHALOPATHIC.  CXR LOOKED FINE NO EFFUSION OR EVIDENCE FOR INFILTRATE.  VITALS LOOK OK.   LABS ARE AS EXPECTED FOR A PATIENT WITH CIRRHOSIS AND PORTAL HTN.   HE HAS GASTRIC VARICES,  ESOPHAGEAL VARICES (BANDED PROPHYLACTICLY IN PAST),  DILATED PERIUMBILICAL VEINS AND INTERMITTENT ASCITES.  THE OTHER PORTAL DECOMPRESSION LOCATIONS ARE SPLEEN, (HE DOES HAVE A VERY  LARGE SPLEEN) AND HE DOESN'T HAVE BLEEDING HEMORRHOIDS THAT WE KNOW OF.  DESPITE VITAMIN K HE IS STILL A BIT COAGULOPATHIC.   INTRINSIC LIVER DISEASE.  STAGE 4 COLON CANCER.  NEVER HAVE SEEN A LONGER TERM SURVIVOR.  PLAN:  CHECK LABS IN AM,  AMMONIA,  INR, CBC, CMP, ALPH FETO PROTEIN (TUMOR MARKER FOR HEPATOCELLULAR CARCINOMA WHICH IS A DIFFERENT MALIGNANCY ASSOCIATED WITH CIRRHOSIS UNFORTUNATELY).   GBS SYMPTOMS SLOWLY IMPROVING.  INCHING SOMEWHERE.    8/29/17--PATIENT AWAKE AND ALERT.  MOTOR STRENGTH IS ESSENTIALLY NORMAL.  STILL NO REFLEXES PATELLA.  HE HAS NO SIGNIFICANT EDEMA AT THIS MOMENT.    I AM CHANGING HIS DIURETICS TO EVERY 3D DAY WITH METALAZONE  AS WELL.  THIS IS HIS DRY WEIGHT.  NO LEG EDEMA.   DAUGHTER VISITING AS IS WIFE. GBS SYMPTOMS IMPROVED.   NO INCREASE IN ASCITES.  PATIENT HAS NO ENCEPHALOPATHY.  BACK ISSUES SEEM TO BE DOING WELL. LABS AND VITALS REASONABLE..   ALPHA FETO PROTEIN NORMAL.  PLAN:  CHANGE DIURETICS TO Q3 DAYS.   INCHING FORWARD.    8/31/17-PATIENT CONTINUES TO MAKE NICE PROGRESS S/P GBS, AND LOWER BACK SURGERY.  ASCITES  AND EDEMA ARE  VERY MUCH  COMPENSATED AT THIS  POINT.  DESPITE HIS CIRRHOSIS.  THIS IS HIS DRY WEIGHT.  AMBULATORY WITH MINIMAL ASSISTANCE.  HE  DOES HAVE SOME RHONCHI RLL AND SOME MID THORACIC BACK PAIN.    PLAN:  CXR.  CHECK 2 VIEWS AS WELL AS 2 VIEWS OF THORACIC SPINE.  LOOKS LIKE AN OLD COMPRESSION FX TO ME.    9/1/17--PATIENT CONTINUES TO MAKE DECENT PROGRESS WITH HIS GBS AND BACK SURGERY IN PAST.  HE HAS HAD MINIMAL ASCITES AFTER WE   STARTED FAIRLY AGGRESSIVE DIURETICS.   NO BACK PAIN,  SEVERE LOOKING SCALLOPING ON THORACIC SPINE SERIES.  THE CHEST FILMS WERE CLEAR.  PROBABLY JUST ATTELECTASIS HERE.  PLAN:  IMPROVING SLOWLY.   SEE FILMS BELOW.   LABS IN AM.         9/2/16--PATIENT AWAKE AND ALERT.  WIFE AT BEDSIDE.  IMPROVING MOTOR STRENGTH.  COORDINATION AND CORE MUSCLE ISSUES?  HE IS NOT CLEARED TO AMBULATE INDEPENDENTLY YET.   THE PATIENT'S CHEST FILM  IS CLEAR.  LUNGS CLEAR.  STILL NO PATELLA REFLEXES.   MUSCLE BULK SEEMS GOOD.  NO BACK PAIN OF ANY NOTE.   (s/p lumbar spinal surgery in may).   K+ LEVEL IS LOW AT 3.3.   WE DID DROP THE DEMEDEX AND METALAZONE TO Q 3 DAYS.   SO FAR NEGLIGIBLE ASCITES.   THE LOW K+ IS A SEQUELAE OF THE DIURETIC NO DOUBT.   REPLETE WITH 40 MEQ X 1.  EXPLAIN TO PATIENT RATIONAL PLEASE, (or he will not take med).  IT WILL BE A ONE TIME THING HERE.  UPPING THE Q 3 DAY KDUR TO 20 meq.  SO FAR THE ASCITES HAS NOT RECURRED.    PLAN:  REPLETE K+,  LOW BP IS NORMAL IN CIRRHOSIS PATIENTS.  INCREASE KDUR Q3 DAYS WITH DIURETIC COCKTAIL.       Review of Systems:  Review of Systems   Constitutional: Negative for chills and fever.   Respiratory: Negative for shortness of breath.    Cardiovascular: Negative for chest pain.   Gastrointestinal: Negative for abdominal pain, diarrhea, nausea and vomiting.   Psychiatric/Behavioral: The patient is not nervous/anxious.        Physical Exam:  Physical Exam   Constitutional: He is oriented to person, place, and time. He appears well-nourished.   HENT:   Head: Atraumatic.   Eyes: Conjunctivae are normal. Pupils are equal, round, and reactive to light.   Neck: Normal range of motion. Neck supple. No tracheal deviation present.   Cardiovascular: Normal rate, regular rhythm, S1 normal and S2 normal.    No murmur heard.  Pulmonary/Chest: Effort normal. He has no wheezes. He has no rhonchi. He has no rales.   DECREASED TACTILE FREMITUS RIGHT BASE.  SUGGESTING EFFUSION THERE.   Abdominal: Soft. He exhibits no distension and no mass. There is no tenderness. There is no rebound and no guarding. Hernia confirmed negative in the right inguinal area and confirmed negative in the left inguinal area.   Musculoskeletal: He exhibits no edema.   Neurological: He is alert and oriented to person, place, and time. No sensory deficit.   Lower extremity motor strength was  5/5.   Able to stand from a sitting position.   Skin:  Skin is warm and dry. No rash noted. No cyanosis.   Psychiatric: He has a normal mood and affect. His behavior is normal.   Nursing note and vitals reviewed.      Labs:        Invalid input(s): [id number]      Recent Labs      17   0526   SODIUM  134*   POTASSIUM  3.3*   CHLORIDE  98   CO2  29   BUN  27*   CREATININE  0.65   CALCIUM  8.9     Recent Labs      17   0526   ALTSGPT  12   ASTSGOT  20   ALKPHOSPHAT  75   TBILIRUBIN  1.3   GLUCOSE  86     Recent Labs      17   0526   RBC  3.92*   HEMOGLOBIN  10.9*   HEMATOCRIT  33.6*   PLATELETCT  70*     Recent Labs      17   0526   WBC  5.2   NEUTSPOLYS  65.70   LYMPHOCYTES  16.90*   MONOCYTES  10.60   EOSINOPHILS  5.80   BASOPHILS  0.40   ASTSGOT  20   ALTSGPT  12   ALKPHOSPHAT  75   TBILIRUBIN  1.3           Hemodynamics:  Temp (24hrs), Av.6 °C (97.9 °F), Min:36.6 °C (97.8 °F), Max:36.7 °C (98 °F)  Temperature: 36.6 °C (97.8 °F)  Pulse  Av.6  Min: 62  Max: 99   Blood Pressure : (!) 90/56     Respiratory:    Respiration: 18, Pulse Oximetry: 93 %        RUL Breath Sounds: Clear, RML Breath Sounds: Clear;Diminished, RLL Breath Sounds: Clear;Diminished, JOSE Breath Sounds: Clear, LLL Breath Sounds: Clear  Fluids:    Intake/Output Summary (Last 24 hours) at 17 1039  Last data filed at 17 0900   Gross per 24 hour   Intake             1140 ml   Output             2250 ml   Net            -1110 ml     Weight: 95.7 kg (210 lb 15.7 oz)  GI/Nutrition:  Orders Placed This Encounter   Procedures   • Diet Order     Standing Status:   Standing     Number of Occurrences:   1     Order Specific Question:   Diet:     Answer:   Regular [1]     Order Specific Question:   Consistency/Fluid modifications:     Answer:   Thin Liquids [3]     Medical Decision Making, by Problem:  Active Hospital Problems    Diagnosis   • *AIDP (acute inflammatory demyelinating polyneuropathy) (CMS-[facility]) [G37.8]   • Left leg DVT (CMS-HCC) [I82.402]   • Esophageal  varices- BANDED 4/2016- repeat egd tbd 10//29/16- gic [I85.00]   • ASCVD (arteriosclerotic cardiovascular disease)subtotalled small distal LCx and 40% LAD med rx by cath 12/13/12 [I25.10]   • GIB (gastrointestinal bleeding)- recurrent from varices [K92.2]   • BPH (benign prostatic hyperplasia) [N40.0]   • 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 DUE TO OLD HEP B, CHEMO RX,  OR THERMO RAD OF MAGNANT LESION [K74.60]   • Essential hypertension [I10]   • Colon cancer-2010 left hemicolectomy; no colostomy [C18.9]     *  S/P Recent Lumbar Surgery    *  Hypertension  BP recently ok  On Coreg: 3.125 mg bid  Note: home meds were Coreg 3.125 mg 1/2 tab bid and Losartan: 25 mg daily  Monitor    *  U/A (+)  Has had some difficulty urinating recently with some burning  Cx --> GNR  F/U C&S   On Cipro empirically unitl C&S come back    *  LE Edema  Mild  On Aldactone: 25 mg daily    *  Thrombocytopenia  Platelets stable (8/11)  2nd to prior chemo and PORTAL HYPERTENSION    *  Hx Colon Cancer: stage IV; in 5 year remission  *  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  *  Hyperlipidemia  *  AIDP: s/p IVIG x 5 days        Reviewed items::  Medications reviewed and Labs reviewed

## 2017-09-02 NOTE — PROGRESS NOTES
Weekly wound photo taken on patient's coccyx / buttocks. Wound is shrinking , pink in appearance, is open to air and was applied with prescribed barrier paste.    Susanna wound is some what excoriated / has like a pealing appearance. Applied barrier paste applied and patient encouraged to reposition Q 2 hours.

## 2017-09-03 LAB
ALBUMIN SERPL BCP-MCNC: 3.1 G/DL (ref 3.2–4.9)
ALBUMIN/GLOB SERPL: 1.2 G/DL
ALP SERPL-CCNC: 68 U/L (ref 30–99)
ALT SERPL-CCNC: 12 U/L (ref 2–50)
ANION GAP SERPL CALC-SCNC: 4 MMOL/L (ref 0–11.9)
AST SERPL-CCNC: 18 U/L (ref 12–45)
BASOPHILS # BLD AUTO: 0.8 % (ref 0–1.8)
BASOPHILS # BLD: 0.03 K/UL (ref 0–0.12)
BILIRUB SERPL-MCNC: 1.1 MG/DL (ref 0.1–1.5)
BUN SERPL-MCNC: 23 MG/DL (ref 8–22)
CALCIUM SERPL-MCNC: 8.8 MG/DL (ref 8.5–10.5)
CHLORIDE SERPL-SCNC: 103 MMOL/L (ref 96–112)
CO2 SERPL-SCNC: 30 MMOL/L (ref 20–33)
CREAT SERPL-MCNC: 0.69 MG/DL (ref 0.5–1.4)
EOSINOPHIL # BLD AUTO: 0.27 K/UL (ref 0–0.51)
EOSINOPHIL NFR BLD: 7 % (ref 0–6.9)
ERYTHROCYTE [DISTWIDTH] IN BLOOD BY AUTOMATED COUNT: 53.1 FL (ref 35.9–50)
GFR SERPL CREATININE-BSD FRML MDRD: >60 ML/MIN/1.73 M 2
GLOBULIN SER CALC-MCNC: 2.5 G/DL (ref 1.9–3.5)
GLUCOSE SERPL-MCNC: 89 MG/DL (ref 65–99)
HCT VFR BLD AUTO: 33.1 % (ref 42–52)
HGB BLD-MCNC: 10.5 G/DL (ref 14–18)
IMM GRANULOCYTES # BLD AUTO: 0.02 K/UL (ref 0–0.11)
IMM GRANULOCYTES NFR BLD AUTO: 0.5 % (ref 0–0.9)
LYMPHOCYTES # BLD AUTO: 0.79 K/UL (ref 1–4.8)
LYMPHOCYTES NFR BLD: 20.6 % (ref 22–41)
MAGNESIUM SERPL-MCNC: 2 MG/DL (ref 1.5–2.5)
MCH RBC QN AUTO: 27.3 PG (ref 27–33)
MCHC RBC AUTO-ENTMCNC: 31.7 G/DL (ref 33.7–35.3)
MCV RBC AUTO: 86 FL (ref 81.4–97.8)
MONOCYTES # BLD AUTO: 0.42 K/UL (ref 0–0.85)
MONOCYTES NFR BLD AUTO: 10.9 % (ref 0–13.4)
NEUTROPHILS # BLD AUTO: 2.31 K/UL (ref 1.82–7.42)
NEUTROPHILS NFR BLD: 60.2 % (ref 44–72)
NRBC # BLD AUTO: 0 K/UL
NRBC BLD AUTO-RTO: 0 /100 WBC
PLATELET # BLD AUTO: 74 K/UL (ref 164–446)
PMV BLD AUTO: 11.3 FL (ref 9–12.9)
POTASSIUM SERPL-SCNC: 3.9 MMOL/L (ref 3.6–5.5)
PROT SERPL-MCNC: 5.6 G/DL (ref 6–8.2)
RBC # BLD AUTO: 3.85 M/UL (ref 4.7–6.1)
SODIUM SERPL-SCNC: 137 MMOL/L (ref 135–145)
WBC # BLD AUTO: 3.8 K/UL (ref 4.8–10.8)

## 2017-09-03 PROCEDURE — 700102 HCHG RX REV CODE 250 W/ 637 OVERRIDE(OP): Performed by: HOSPITALIST

## 2017-09-03 PROCEDURE — 80053 COMPREHEN METABOLIC PANEL: CPT

## 2017-09-03 PROCEDURE — A9270 NON-COVERED ITEM OR SERVICE: HCPCS | Performed by: HOSPITALIST

## 2017-09-03 PROCEDURE — 36415 COLL VENOUS BLD VENIPUNCTURE: CPT

## 2017-09-03 PROCEDURE — 770010 HCHG ROOM/CARE - REHAB SEMI PRIVAT*

## 2017-09-03 PROCEDURE — 700102 HCHG RX REV CODE 250 W/ 637 OVERRIDE(OP): Performed by: PHYSICAL MEDICINE & REHABILITATION

## 2017-09-03 PROCEDURE — 85025 COMPLETE CBC W/AUTO DIFF WBC: CPT

## 2017-09-03 PROCEDURE — 99232 SBSQ HOSP IP/OBS MODERATE 35: CPT | Performed by: HOSPITALIST

## 2017-09-03 PROCEDURE — A9270 NON-COVERED ITEM OR SERVICE: HCPCS | Performed by: PHYSICAL MEDICINE & REHABILITATION

## 2017-09-03 PROCEDURE — 83735 ASSAY OF MAGNESIUM: CPT

## 2017-09-03 RX ADMIN — LACTOBACILLUS ACIDOPHILUS / LACTOBACILLUS BULGARICUS 1 PACKET: 100 MILLION CFU STRENGTH GRANULES at 17:11

## 2017-09-03 RX ADMIN — FERROUS SULFATE TAB 325 MG (65 MG ELEMENTAL FE) 325 MG: 325 (65 FE) TAB at 08:27

## 2017-09-03 RX ADMIN — FOLIC ACID 1 MG: 1 TABLET ORAL at 08:27

## 2017-09-03 RX ADMIN — URSODIOL 300 MG: 300 CAPSULE ORAL at 08:27

## 2017-09-03 RX ADMIN — ATORVASTATIN CALCIUM 40 MG: 40 TABLET, FILM COATED ORAL at 20:49

## 2017-09-03 RX ADMIN — VITAMIN D, TAB 1000IU (100/BT) 1000 UNITS: 25 TAB at 08:27

## 2017-09-03 RX ADMIN — LACTOBACILLUS ACIDOPHILUS / LACTOBACILLUS BULGARICUS 1 PACKET: 100 MILLION CFU STRENGTH GRANULES at 08:27

## 2017-09-03 RX ADMIN — POLYETHYLENE GLYCOL 3350 1 PACKET: 17 POWDER, FOR SOLUTION ORAL at 20:49

## 2017-09-03 RX ADMIN — MAGNESIUM HYDROXIDE 15 ML: 400 SUSPENSION ORAL at 20:49

## 2017-09-03 RX ADMIN — LACTOBACILLUS ACIDOPHILUS / LACTOBACILLUS BULGARICUS 1 PACKET: 100 MILLION CFU STRENGTH GRANULES at 11:36

## 2017-09-03 RX ADMIN — SPIRONOLACTONE 25 MG: 25 TABLET, FILM COATED ORAL at 05:11

## 2017-09-03 RX ADMIN — TAMSULOSIN HYDROCHLORIDE 0.4 MG: 0.4 CAPSULE ORAL at 08:27

## 2017-09-03 RX ADMIN — OXYCODONE HYDROCHLORIDE AND ACETAMINOPHEN 500 MG: 500 TABLET ORAL at 08:27

## 2017-09-03 RX ADMIN — OXYCODONE HYDROCHLORIDE 10 MG: 10 TABLET ORAL at 02:39

## 2017-09-03 RX ADMIN — METOLAZONE 2.5 MG: 2.5 TABLET ORAL at 20:49

## 2017-09-03 RX ADMIN — OXYCODONE HYDROCHLORIDE 10 MG: 10 TABLET ORAL at 20:49

## 2017-09-03 ASSESSMENT — PAIN SCALES - GENERAL
PAINLEVEL_OUTOF10: 0
PAINLEVEL_OUTOF10: 8
PAINLEVEL_OUTOF10: 8
PAINLEVEL_OUTOF10: 3

## 2017-09-03 ASSESSMENT — ENCOUNTER SYMPTOMS
SHORTNESS OF BREATH: 0
DIARRHEA: 0
FEVER: 0
ABDOMINAL PAIN: 0
NERVOUS/ANXIOUS: 0
NAUSEA: 0
VOMITING: 0
CHILLS: 0

## 2017-09-03 NOTE — PROGRESS NOTES
Received bedside shift report from Leatha RN regarding patient and assumed care. Patient is awake, calm and stable, currently positioned in bed for comfort and safety; call light within reach. Denies any pain of discomfort at this time. Will continue to monitor.

## 2017-09-03 NOTE — CARE PLAN
Problem: Safety  Goal: Will remain free from injury  Outcome: PROGRESSING AS EXPECTED  Patient calling appropriately for assistance, does not attempt to transfer self without staff present. Call light within reach. Non skid footwear in place. Bed locked and in lowest position. Wife at the bedside.    Problem: Pain Management  Goal: Pain level will decrease to patient's comfort goal  Patient denies pain. No sign of discomfort noted. Will monitor throughout the shift.

## 2017-09-03 NOTE — PROGRESS NOTES
Bedside report received from night shift RN, POC discussed, pt sleeping, and no s/s distress noted. Call light w/in reach, monitoring in progress.

## 2017-09-03 NOTE — CARE PLAN
Problem: Bowel/Gastric:  Goal: Normal bowel function is maintained or improved  Patient refused scheduled miralax and MOM at HS. Pt stated he made BM 2x today. Active bowel sounds in all 4 quadrants per ausculation.     Problem: Pain Management  Goal: Pain level will decrease to patient's comfort goal  Outcome: PROGRESSING AS EXPECTED  Patient c/o lower back pain 8/10 on numeric pain scale. Administered prn roxicodone 10mg with good effect. (see flowsheet).

## 2017-09-04 LAB
ALBUMIN SERPL BCP-MCNC: 3.2 G/DL (ref 3.2–4.9)
ALBUMIN/GLOB SERPL: 1.2 G/DL
ALP SERPL-CCNC: 82 U/L (ref 30–99)
ALT SERPL-CCNC: 12 U/L (ref 2–50)
ANION GAP SERPL CALC-SCNC: 5 MMOL/L (ref 0–11.9)
AST SERPL-CCNC: 18 U/L (ref 12–45)
BASOPHILS # BLD AUTO: 0.3 % (ref 0–1.8)
BASOPHILS # BLD: 0.01 K/UL (ref 0–0.12)
BILIRUB SERPL-MCNC: 1 MG/DL (ref 0.1–1.5)
BUN SERPL-MCNC: 21 MG/DL (ref 8–22)
CALCIUM SERPL-MCNC: 8.8 MG/DL (ref 8.5–10.5)
CHLORIDE SERPL-SCNC: 101 MMOL/L (ref 96–112)
CO2 SERPL-SCNC: 28 MMOL/L (ref 20–33)
CREAT SERPL-MCNC: 0.61 MG/DL (ref 0.5–1.4)
EOSINOPHIL # BLD AUTO: 0.24 K/UL (ref 0–0.51)
EOSINOPHIL NFR BLD: 6.1 % (ref 0–6.9)
ERYTHROCYTE [DISTWIDTH] IN BLOOD BY AUTOMATED COUNT: 54.2 FL (ref 35.9–50)
GFR SERPL CREATININE-BSD FRML MDRD: >60 ML/MIN/1.73 M 2
GLOBULIN SER CALC-MCNC: 2.6 G/DL (ref 1.9–3.5)
GLUCOSE SERPL-MCNC: 95 MG/DL (ref 65–99)
HCT VFR BLD AUTO: 35 % (ref 42–52)
HGB BLD-MCNC: 11.3 G/DL (ref 14–18)
IMM GRANULOCYTES # BLD AUTO: 0.01 K/UL (ref 0–0.11)
IMM GRANULOCYTES NFR BLD AUTO: 0.3 % (ref 0–0.9)
INR PPP: 1.28 (ref 0.87–1.13)
LYMPHOCYTES # BLD AUTO: 0.71 K/UL (ref 1–4.8)
LYMPHOCYTES NFR BLD: 18 % (ref 22–41)
MCH RBC QN AUTO: 27.8 PG (ref 27–33)
MCHC RBC AUTO-ENTMCNC: 32.3 G/DL (ref 33.7–35.3)
MCV RBC AUTO: 86.2 FL (ref 81.4–97.8)
MONOCYTES # BLD AUTO: 0.46 K/UL (ref 0–0.85)
MONOCYTES NFR BLD AUTO: 11.7 % (ref 0–13.4)
NEUTROPHILS # BLD AUTO: 2.51 K/UL (ref 1.82–7.42)
NEUTROPHILS NFR BLD: 63.6 % (ref 44–72)
NRBC # BLD AUTO: 0 K/UL
NRBC BLD AUTO-RTO: 0 /100 WBC
PLATELET # BLD AUTO: 64 K/UL (ref 164–446)
PMV BLD AUTO: 10.2 FL (ref 9–12.9)
POTASSIUM SERPL-SCNC: 3.8 MMOL/L (ref 3.6–5.5)
PROT SERPL-MCNC: 5.8 G/DL (ref 6–8.2)
PROTHROMBIN TIME: 16.4 SEC (ref 12–14.6)
RBC # BLD AUTO: 4.06 M/UL (ref 4.7–6.1)
SODIUM SERPL-SCNC: 134 MMOL/L (ref 135–145)
WBC # BLD AUTO: 3.9 K/UL (ref 4.8–10.8)

## 2017-09-04 PROCEDURE — 700102 HCHG RX REV CODE 250 W/ 637 OVERRIDE(OP): Performed by: PHYSICAL MEDICINE & REHABILITATION

## 2017-09-04 PROCEDURE — 36415 COLL VENOUS BLD VENIPUNCTURE: CPT

## 2017-09-04 PROCEDURE — A9270 NON-COVERED ITEM OR SERVICE: HCPCS | Performed by: HOSPITALIST

## 2017-09-04 PROCEDURE — 99232 SBSQ HOSP IP/OBS MODERATE 35: CPT | Performed by: HOSPITALIST

## 2017-09-04 PROCEDURE — 80053 COMPREHEN METABOLIC PANEL: CPT

## 2017-09-04 PROCEDURE — A9270 NON-COVERED ITEM OR SERVICE: HCPCS | Performed by: PHYSICAL MEDICINE & REHABILITATION

## 2017-09-04 PROCEDURE — 97530 THERAPEUTIC ACTIVITIES: CPT

## 2017-09-04 PROCEDURE — 85025 COMPLETE CBC W/AUTO DIFF WBC: CPT

## 2017-09-04 PROCEDURE — 97535 SELF CARE MNGMENT TRAINING: CPT

## 2017-09-04 PROCEDURE — 97112 NEUROMUSCULAR REEDUCATION: CPT

## 2017-09-04 PROCEDURE — 97110 THERAPEUTIC EXERCISES: CPT

## 2017-09-04 PROCEDURE — 97116 GAIT TRAINING THERAPY: CPT

## 2017-09-04 PROCEDURE — 85610 PROTHROMBIN TIME: CPT

## 2017-09-04 PROCEDURE — 700102 HCHG RX REV CODE 250 W/ 637 OVERRIDE(OP): Performed by: HOSPITALIST

## 2017-09-04 PROCEDURE — 770010 HCHG ROOM/CARE - REHAB SEMI PRIVAT*

## 2017-09-04 RX ADMIN — POLYETHYLENE GLYCOL 3350 1 PACKET: 17 POWDER, FOR SOLUTION ORAL at 20:05

## 2017-09-04 RX ADMIN — SPIRONOLACTONE 25 MG: 25 TABLET, FILM COATED ORAL at 05:35

## 2017-09-04 RX ADMIN — ATORVASTATIN CALCIUM 40 MG: 40 TABLET, FILM COATED ORAL at 20:06

## 2017-09-04 RX ADMIN — LACTOBACILLUS ACIDOPHILUS / LACTOBACILLUS BULGARICUS 1 PACKET: 100 MILLION CFU STRENGTH GRANULES at 11:39

## 2017-09-04 RX ADMIN — LACTOBACILLUS ACIDOPHILUS / LACTOBACILLUS BULGARICUS 1 PACKET: 100 MILLION CFU STRENGTH GRANULES at 08:08

## 2017-09-04 RX ADMIN — MAGNESIUM HYDROXIDE 15 ML: 400 SUSPENSION ORAL at 20:04

## 2017-09-04 RX ADMIN — URSODIOL 300 MG: 300 CAPSULE ORAL at 08:08

## 2017-09-04 RX ADMIN — VITAMIN D, TAB 1000IU (100/BT) 1000 UNITS: 25 TAB at 08:08

## 2017-09-04 RX ADMIN — OXYCODONE HYDROCHLORIDE 10 MG: 10 TABLET ORAL at 00:45

## 2017-09-04 RX ADMIN — OXYCODONE HYDROCHLORIDE AND ACETAMINOPHEN 500 MG: 500 TABLET ORAL at 08:08

## 2017-09-04 RX ADMIN — POTASSIUM CHLORIDE 20 MEQ: 1500 TABLET, EXTENDED RELEASE ORAL at 08:09

## 2017-09-04 RX ADMIN — FERROUS SULFATE TAB 325 MG (65 MG ELEMENTAL FE) 325 MG: 325 (65 FE) TAB at 08:08

## 2017-09-04 RX ADMIN — OXYCODONE HYDROCHLORIDE 10 MG: 10 TABLET ORAL at 20:06

## 2017-09-04 RX ADMIN — TAMSULOSIN HYDROCHLORIDE 0.4 MG: 0.4 CAPSULE ORAL at 08:08

## 2017-09-04 RX ADMIN — LACTOBACILLUS ACIDOPHILUS / LACTOBACILLUS BULGARICUS 1 PACKET: 100 MILLION CFU STRENGTH GRANULES at 17:28

## 2017-09-04 RX ADMIN — FOLIC ACID 1 MG: 1 TABLET ORAL at 08:08

## 2017-09-04 RX ADMIN — TORSEMIDE 20 MG: 10 TABLET ORAL at 05:35

## 2017-09-04 ASSESSMENT — PAIN SCALES - GENERAL
PAINLEVEL_OUTOF10: 8
PAINLEVEL_OUTOF10: 0
PAINLEVEL_OUTOF10: 8

## 2017-09-04 ASSESSMENT — GAIT ASSESSMENTS
GAIT LEVEL OF ASSIST: CONTACT GUARD ASSIST
ASSISTIVE DEVICE: FRONT WHEEL WALKER
DISTANCE (FEET): 50

## 2017-09-04 ASSESSMENT — ENCOUNTER SYMPTOMS
VOMITING: 0
NAUSEA: 0
SHORTNESS OF BREATH: 0
DIARRHEA: 0
ABDOMINAL PAIN: 0
FEVER: 0
NERVOUS/ANXIOUS: 0
CHILLS: 0

## 2017-09-04 NOTE — PROGRESS NOTES
Hospital Medicine Progress Note, Adult, Complex               Author: SCOTT BULLOCK MD    Date & Time created: 9/4/2017  11:23 AM     Interval History:  8/15/17--  HISTORY REVIEWED.  REMOTE METASTATIC COLON CANCER DISCOVERED IN 2010 S/P RESECTION BY DR ANA RUIZ AND FINDING OF 10+ NODES  WHICH SPREAD TO THE LIVER.  HAD AN ABLATION AT [facility].  HEAVY DUTY CHEMO PER DR REBOLLAR.   THE LIVER ABLATION HAS UNFORTUNATELY RESULTED IN PORTAL HYPERTENSION.  HE ALSO HAS PRETTY MUCH ALL THE STIGMATA OF PORTAL HYPERTENSION.   THERE REMAINING LIVER PARENCHYMA IS NORMAL SO HE HAS PRESERVED LIVER SYNTHETIC FUNCTION.  AS OF 2010 THERE HAS BEEN NO NEW RECURRANCE THOUGH WE ARE DEALING WITH GASTRIC VARICES,  ESOPHAGEAL VARICIES,  CAPUT MEDUSA,  LEFT LOWER EXTREMITY DVT,  INABILITY TO TOLERATE ANTICOAGULATION WITH AN IVC FILTER IN PLACE,   AND CURRENTLY MODERATE ASCITES.   WE WILL PLAN ON THERAPEUTIC TAP ON Friday.   THE OTHER ISSUE THAT REALLY BROUGHT TO PATIENT TO THE HOSPITAL WAS PROGRESSIVE ASCENDING WEAKNESS.   HE DID HAVE A TIGHT L4-L5 CENTRAL STENOSIS.  THE OTHER LEVELS LOOK FINE.   HE STARTED NOTICING THE WEAKNESS FOR ABOUT A WEEK PRIOR IN MAY AND WENT TO ER.   DR HOUSE SAW THE STRUCTURAL ISSUE,  NAMELY THE ABOVE STENOSIS AND DECOMPRESSED THIS 0N 5/25.  SYMPTOMS DID NOT MALLY.  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   IT IS CLEAR THE PATIENT NEEDED SOME SURGERY ON THE LOWER BACK.   LOOKING AT THE FILMS.  MY THOUGHT IS THE SYMPTOMS THEMSELVES WERE CAUSED BY THE GBS.   HE RECEIVED 5 DAYS OF IMMUNOGLOBULIN AND DID FAIRLY WELL AND HAS BEE TRANSFERR[facility] ON 8[facility]10/17  PLAN:  CONTINUE PT/OT,   PROBLEMS THAT ALSO HAVE OCCURRED INCLUDE PORTAL VEIN THROMBOSIS.   I WILL ARRANGE A THERAPEUTIC THORACENTESIS ON Friday.  RX ENTEROBACTER UTI.  HYPERSPLENISM AND LOW WBC COUNTS AND PLATELETS TO BE EXPECTED.   D/C COREG.  INDERAL BETTER FOR  LOWERING PORTAL PRESSURES.      8/16/17--PATIENT AWAKE AND ALERT.   DOING WELL WITH PT/OT.   GOOD EFFORT PER PATIENT.   I DONT THINK THE ASCITES IS SIGNIFICANTLY WORSE.  I AM GOING TO MAKE DAILY ASSESMENTS AND DETERMINE WHEN PARACENTESIS IS NECESSARY.   GBS IS CERTAINLY NO WORSE.   THE PATIENT WAS SPECIFICALLY TOLD TO INFORM ME ABOUT ANY PHYSICAL CHANGES IE: WORSENING WEAKNESS OR ANY CRANIAL NERVE ABNORMALITIES.   CHEST FILM WAS ESSENTIALLY NEGATIVE FOR EFFUSION.  THE DECREASED TACTILE FREMITUS THAT I NOTED WAS A HIGH RIDING DIAPHRAGM.   PROBABLY DUE TO ASCITES.    I NEED DAILY WEIGHTS TO HELP ME WITH MY DECISION WHEN TO TAP ABDOMEN.  NORMALLY PATIENT GETS IT TAPPED ABOUT EVERY 4 MONTHS BY GI.    I  WILL RE--ASSES PORTAL BLOOD FLOW AND SEE IF THIS PORTAL VEIN THROMBOSIS IS RECANULATED OR WORSE.  LAST YEAR THERE WAS SOME RECANULIZATION AND FLOW.   HEPATOPEDAL FLOW IS GOOD, HEPATOFUGAL FLOW BAD.  I HAVE NEVER SEEN A LONG TERM SURVIVOR OF METASTATIC COLON CANCER WITH 4 CM RECTO-SIGMOID TUMOR AND 5 POSITIVE NODES + LIVER MET.   RX FOR UTI WITH ENTEROBACTER ON CULTURE.  UNIMPRESSIVE WBC COUNT OF 2-5 WBC/HIGH POWERED FIELD.   I PROBABLY WOULD HAVE LET THAT GO UNLESS THERE WERE SYMPTOMS.     PLAN:   PATIENT DOING WELL.  DAILY WEIGHTS.  DAILY ASSESMENTS OF ASCITES.  CXR IS CLEAR.  CHECK ABDOMINAL ULTRASOUND TO ASSESS PORTAL BLOOD FLOW AND ASCITES.   I THINK QOD DIURETICS WOULD HELP PREVENT A TAP AND DECREASE THE EDEMA, (KNOWN  RLE DVT PRIMARILY DUE TO MALIGNANCY BEING A PRO-THROMBOTIC STATE).  GIVE 5 DAYS OF PO VITAMIN K GIVEN THE CIPRO KILLING GUT PA WHICH HELPS GENERATE VITAMIN K FOR A HUMAN.  CHECK SOME IRON STUDIES.   OVERALL INCHING FORWARD.   HARD TO SAY WHAT THE COURSE OF GBS WILL BE.  THE BACK SURGERY WAS NECESSARY BUT MY FEELING IS HIS SYMPTOMS WERE MOSTLY DUE TO GBS AND NOT SPINAL STENOSIS.   HEPATITIS STATUS CHECKED AND FOUND TO BE NEGATIVE.   HIV ANTIBODIES  FOR COMPLETENESS.     8/17/17--PATIENT'S ULTRASOUND  DID NOT SHOW ENOUGH ASCITES TO SAFELY TAP.  THAT IS GOOD.  LITTLE CHANGE IN THE MOTOR MOVEMENT NOTED.  BACK FEELS FINE.  INR IS BETTER WITH VITAMIN K.  DIURETICS HOLDING ASCITES AT BAY PERIODICALLY.   WE ARE FORCING DIURESIS WITH DEMEDEX ETC.   NO NEED FOR TAP.    BP OK.  LABS OK.  MODEST IRON DEFICIENCY COMPONENT.   ADD SOME PO IRON AND VITAMIN C IF NOT DONE ALREADY.    8/18/17--PATIENT LOOKS THE SAME TO ME.  THE ASCITES IS MINIMAL AND IWILL NOT REQUIIRE ANY INTERVENTION LIKE THORACENTESIS IN THE NEAR FUTURE.  OUR MEDICAL RX IS WORKING JUST FINE.    REALLY NO CHANGE IN PORTAL THROMBOSIS.  THERE IS BLOOD FLOW WHICH IS UNCHANGED.   USUAL COMPLAINTS.  ACHES AND PAINS ETC.    PLAN:  NO TAP OF ABDOMEN REQUIRED.  THE PATIENT MAY BENEFIT FROM SOME IV IRON.   HE IS DEFICIENT.     8/19/17--PATIENT AWAKE AND ALERT.  DOING FAIRLY NICELY WITH PT/OT.   HE WAS ABLE TO LIFT BOTH LEGS OFF THE BED.  I DID GET SOME PATELLA REFLEX B/L YESTERDAY.  MUSCLE BULK IS FAIR.  I REVIEWED THE ULTRASOUND.  MINIMAL ASCITES.  MODEST RLL EFFUSION.   THE SPLEEN IS ENLARGED.  HE UNFORTUNATELY WILL ALWAYS HAVE SOME DEGREE OF EARLY SATIETEY DUE TO  A 20 CM SPLEEN.  NORMAL WOULD BE FROM 7-14 CM. THE SPLEEN IS VERY CLOSE TO THE STOMACH AND NOT A RETROPERITONEAL STRUCTURE LIKE THE KIDNEYS.   ALSO ON THE PLAIN FILM WHICH WAS UNDER PENETRATED, THERE LOOKED TO BE A GOOD AMOUNT OF FECAL MATERIAL IN THE DESCENDING COLON, ALSO NEAR THE STOMACH.      PLAN:    I THINK THE PATIENT IS DOING AS WELL AS CAN BE EXPECTED.  THE GBS IS IMPROVING AT A SLOW RATE.   THE SYMPTOMS STARTED AT THE MIDDLE OF MAY.   SO THIS IS A 3 MONTH ORDEAL THUS FAR.  HE COULD BENEFIT FROM SOME MIRALAX.  DOUBT HE WOULD TOLLERATE METAMUCIL.    I WILL TRY  THAT.  MY AGGRESSIVE DIURETIC PROGRAM HAS  ESSENTIALLY MINIMIZED THE ASCITES TO A NEGLIGIBLE AMOUNT AND HE WILL NOT NEED PARACENTESIS.  LOW GRADE TEMP OF 99,6, CHECK UA FOR COMPLETENESS AND FORMAL CXR ON Monday TO RE-ASSES THAT EFFUSION NOTED  ON ABDOMINAL ULTRASOUND.    8/20/17--PATIENT FEELING WELL.  MENTATION IS CLEAR.  ASCITES IS UNDER CONTROL.  MINIMAL NOW.   EVERY OTHER DAY DIURETICS SEEMS TO BE HELPING.  CHECKING LYTES IN AM.  MAY NEED TO BACK DOWN TO EVERY THIRD DAY IF THERE IS HYPOTENSION OR CONTRACTION ALKALOSIS.  CXR TO ASSESS RIGHT EFFUSION NOTED ON EXAM. MODEST DECREASED TACTILE FREMITUS RIGHT BASE.    ALWAYS EXPLAIN A REASON TO PATIENT WHY WE ARE DOING A TEST.    UA IS NEGATIVE.    VITALS OK.  THUS FAR LABS OK.   HE IS AN ASTUTE  AND WANTS EVERY RATIONAL EXPLAINED THOROUGHLY.   O/W HE WILL REFUSE.  I WILL TRY TO EXPLAIN IN MY 2 PARAGRAPH DAILY NOTE MY RATIONAL.    PLAN:  CXR IN AM  (CHECKING THE DEGREE OF PLEURAL FLUID AROUND LUNG).  CHECK CBC, CMP, MAG.   PATIENT HAS A MODERATE AMOUNT OF FECAL MATERIAL IN SPLENIC FLEXURE.  STARTING ON GENTLE MIRALAX AND SOME MOM.  CLINICALLY NO EDEMA AND MINIMAL ASCITES.   MENTATION REMAINS CLEAR.  NO NEED FOR LACTULOSE AT THIS TIME.   MOTOR STRENGTH IMPROVING.   UA NEGATIVE.  NO ABDOMINAL PAIN OR FURTHER LOW GRADE FEVER.  COAGS ALSO IN AM.  THE EARLY SATIETY IS DUE TO A 20 CM SPLEEN ABUTTING THE STOMACH.    8/21/17--PATIENT DOING AS WELL AS CAN BE EXPECTED.   HE HAD BACK SURGERY AND GBS JUST PRIOR TO THE PROCEDURE IT APPEARS.  HE IS MOVING ALL 4 EXTREMITIES AGAINST   GRAVITY.  MENTATION IS VERY CLEAR.  COAGS ARE NO BETTER.  RECHECK IN AM.  RECHECK AMMONIA IN AM.  I NEVER GO BY THE NUMBER ITSELF,  JUST THE SYMPTOMS.  SEEMS LIKE EVERYONE IS DIFFERENT.  ONE PATIENT CAN HAVE AN AMMONIA  AND BE FINE AND ANOTHER CAN HAVE ONE OF 60 AND BE ENCEPHALOPATHIC.  CXR LOOKED FINE NO EFFUSION OR EVIDENCE FOR INFILTRATE.  VITALS LOOK OK.   LABS ARE AS EXPECTED FOR A PATIENT WITH CIRRHOSIS AND PORTAL HTN.   HE HAS GASTRIC VARICES,  ESOPHAGEAL VARICES (BANDED PROPHYLACTICLY IN PAST),  DILATED PERIUMBILICAL VEINS AND INTERMITTENT ASCITES.  THE OTHER PORTAL DECOMPRESSION LOCATIONS ARE SPLEEN, (HE DOES HAVE A VERY  LARGE SPLEEN) AND HE DOESN'T HAVE BLEEDING HEMORRHOIDS THAT WE KNOW OF.  DESPITE VITAMIN K HE IS STILL A BIT COAGULOPATHIC.   INTRINSIC LIVER DISEASE.  STAGE 4 COLON CANCER.  NEVER HAVE SEEN A LONGER TERM SURVIVOR.  PLAN:  CHECK LABS IN AM,  AMMONIA,  INR, CBC, CMP, ALPH FETO PROTEIN (TUMOR MARKER FOR HEPATOCELLULAR CARCINOMA WHICH IS A DIFFERENT MALIGNANCY ASSOCIATED WITH CIRRHOSIS UNFORTUNATELY).   GBS SYMPTOMS SLOWLY IMPROVING.  INCHING SOMEWHERE.    8/29/17--PATIENT AWAKE AND ALERT.  MOTOR STRENGTH IS ESSENTIALLY NORMAL.  STILL NO REFLEXES PATELLA.  HE HAS NO SIGNIFICANT EDEMA AT THIS MOMENT.    I AM CHANGING HIS DIURETICS TO EVERY 3D DAY WITH METALAZONE  AS WELL.  THIS IS HIS DRY WEIGHT.  NO LEG EDEMA.   DAUGHTER VISITING AS IS WIFE. GBS SYMPTOMS IMPROVED.   NO INCREASE IN ASCITES.  PATIENT HAS NO ENCEPHALOPATHY.  BACK ISSUES SEEM TO BE DOING WELL. LABS AND VITALS REASONABLE..   ALPHA FETO PROTEIN NORMAL.  PLAN:  CHANGE DIURETICS TO Q3 DAYS.   INCHING FORWARD.    8/31/17-PATIENT CONTINUES TO MAKE NICE PROGRESS S/P GBS, AND LOWER BACK SURGERY.  ASCITES  AND EDEMA ARE  VERY MUCH  COMPENSATED AT THIS  POINT.  DESPITE HIS CIRRHOSIS.  THIS IS HIS DRY WEIGHT.  AMBULATORY WITH MINIMAL ASSISTANCE.  HE  DOES HAVE SOME RHONCHI RLL AND SOME MID THORACIC BACK PAIN.    PLAN:  CXR.  CHECK 2 VIEWS AS WELL AS 2 VIEWS OF THORACIC SPINE.  LOOKS LIKE AN OLD COMPRESSION FX TO ME.    9/1/17--PATIENT CONTINUES TO MAKE DECENT PROGRESS WITH HIS GBS AND BACK SURGERY IN PAST.  HE HAS HAD MINIMAL ASCITES AFTER WE   STARTED FAIRLY AGGRESSIVE DIURETICS.   NO BACK PAIN,  SEVERE LOOKING SCALLOPING ON THORACIC SPINE SERIES.  THE CHEST FILMS WERE CLEAR.  PROBABLY JUST ATTELECTASIS HERE.  PLAN:  IMPROVING SLOWLY.   SEE FILMS BELOW.   LABS IN AM.         9/2/16--PATIENT AWAKE AND ALERT.  WIFE AT BEDSIDE.  IMPROVING MOTOR STRENGTH.  COORDINATION AND CORE MUSCLE ISSUES?  HE IS NOT CLEARED TO AMBULATE INDEPENDENTLY YET.   THE PATIENT'S CHEST FILM  "IS CLEAR.  LUNGS CLEAR.  STILL NO PATELLA REFLEXES.   MUSCLE BULK SEEMS GOOD.  NO BACK PAIN OF ANY NOTE.   (s/p lumbar spinal surgery in may).   K+ LEVEL IS LOW AT 3.3.   WE DID DROP THE DEMEDEX AND METALAZONE TO Q 3 DAYS.   SO FAR NEGLIGIBLE ASCITES.   THE LOW K+ IS A SEQUELAE OF THE DIURETIC NO DOUBT.   REPLETE WITH 40 MEQ X 1.  EXPLAIN TO PATIENT RATIONAL PLEASE, (or he will not take med).  IT WILL BE A ONE TIME THING HERE.  UPPING THE Q 3 DAY KDUR TO 20 meq.  SO FAR THE ASCITES HAS NOT RECURRED.    PLAN:  REPLETE K+,  LOW BP IS NORMAL IN CIRRHOSIS PATIENTS.  INCREASE KDUR Q3 DAYS WITH DIURETIC COCKTAIL.     9/3/17--PATIENT AWAKE NO NEW ISSUES  VITALS LOOK GOOD.  NO PT/OT TODAY.  WEIGHT STAYING STABLE.  RECHECK SOME LABS IN AM.  NOTHING SIGNIFICANT ABOUT ABOVE FILMS.  LOOKS LIKE Q72 HOUR DIURETICS WILL HALT THE INCREASE IN ASCITES.  THE ONLY DIRURETIC THAT SEEMS TO  BE EFFECTIVE WITH THESE ESLD PATIENT'S IS DEMEDEX.  PROBABLY THE STRENGTH OF THE MED AND ABSORBTION DESPITE BOWEL WALL EDEMA IS THE GARBER.      9/4/17---PATIENT SEEMS TO BE MAKING VERY GOOD HEADWAY WITH REGARD TO STRENGTHENING AND OVERALL CONDITION.   THE ASCITES IS MINIMAL.  I DO HAVE DATA FROM A STUDY IN 1993 SHOWING SUPERIOR EFFECTS OF TORESOMIDE SPECIFICALLY IN THIS SUBSET OF PATIENT'S WITH CIRRHOSIS AND ASCITES.  IT   WAS A DOUBLE BLIND STUDY COMPARING EFFECTS TO  FUROSEMIDE.  I ONLY HAD ACCESS TO ABSTRACT.  \"J Hepatol. 1993 Mar;17(3):353-8.  Advantages of the new loop diuretic torasemide over furosemide in patients with cirrhosis and ascites. A randomized, double blind cross-over trial.  Yojana OTERO1, Mariza U, Alvina MCCOY, Kerri D, Hector CONTRERAS.  Author information  1  Department of Medicine II, [facility].    Abstract  Torasemide is a new loop diuretic with a longer half-life and longer action than furosemide in healthy subjects. In order to evaluate the pharmacodynamic effects, single oral doses " "of furosemide (80 mg) and torasemide (20 mg), which were equipotent in healthy subjects, were given to 14 patients with cirrhosis and ascites. Before the study patients underwent an equilibration period of 4 days without diuretics. The drugs were alternated following a randomized double-blind cross-over design after a wash-out period of at least 2 days. Urine was collected at defined intervals for 24 h after drug administration and blood samples were taken before, 6 h and 24 h after medication. Torasemide induced greater cumulative 24 h diuresis (2863 +/- 343 vs. 2111 +/- 184 ml, p < 0.01) than furosemide. Torasemide did not differ from furosemide for cumulative 0-6 h sodium excretion (96 +/- 17 vs. 92 +/- 23 mmol sodium) but caused a more pronounced cumulative 6-24 h natriuresis (38 +/- 11 vs. 17 +/- 4 mmol, p < 0.05). Five patients exhibited a weak response to furosemide (0-36 mmol sodium/24 h, median 24 mmol; 690-1460 ml urinary volume/24 h, median 1325 ml). These patients showed significantly higher natriuresis and diuresis following torasemide ( mmol sodium/24 h, median 78 mmol, p < 0.05; 2946-4060 ml urinary volume/24 h, median 2200 ml, p < 0.05). Twenty-four hours after administration of both drugs there were no significant changes in hemodynamic, renal or hormonal parameters. No adverse effects were noted with either treatment.(ABSTRACT TRUNCATED  WORDS)\"        FROM HERE ON INTO THE FUTURE CIRRHOTIC PATIENT'S ESPECIALLY WITH ASCITES SHOULD BE ON DEMEDEX.   I DO NOT KNOW THE MECHANISM, SPECIFICALLY BUT I RECALL FROM [employer] THAT IT IS BETTER ABSORBTION IN PATIENTS WITH BOWEL WALL EDEMA AS WELL.    VITALS AND LABS LOOK OK.  NO OTHER NEW CHANGES.   ASCITES SEEMS COMPENSATED WITH Q3 DAY DEMEDEX, METALAZONE , ALDACTONE, MAG OXIDED, KDUR .  I WOULD DEFINITELY SEND THIS PATIENT HOME ON THIS REGIMEN.  DEMEDEX BRAND NAME.  NO SUBSTITUTIONS.  I THINK IT IS AN ORPHAN DRUG ANYWAY.           Review of " Systems:  Review of Systems   Constitutional: Negative for chills and fever.   Respiratory: Negative for shortness of breath.    Cardiovascular: Negative for chest pain.   Gastrointestinal: Negative for abdominal pain, diarrhea, nausea and vomiting.   Psychiatric/Behavioral: The patient is not nervous/anxious.        Physical Exam:  Physical Exam   Constitutional: He is oriented to person, place, and time. He appears well-nourished.   HENT:   Head: Atraumatic.   Eyes: Conjunctivae are normal. Pupils are equal, round, and reactive to light.   Neck: Normal range of motion. Neck supple. No tracheal deviation present.   Cardiovascular: Normal rate, regular rhythm, S1 normal and S2 normal.    No murmur heard.  Pulmonary/Chest: Effort normal. He has no wheezes. He has no rhonchi. He has no rales.   DECREASED TACTILE FREMITUS RIGHT BASE.  SUGGESTING EFFUSION THERE.   Abdominal: Soft. He exhibits no distension and no mass. There is no tenderness. There is no rebound and no guarding. Hernia confirmed negative in the right inguinal area and confirmed negative in the left inguinal area.   Musculoskeletal: He exhibits no edema.   Neurological: He is alert and oriented to person, place, and time. No sensory deficit.   Lower extremity motor strength was  5/5.   Able to stand from a sitting position.   Skin: Skin is warm and dry. No rash noted. No cyanosis.   Psychiatric: He has a normal mood and affect. His behavior is normal.   Nursing note and vitals reviewed.      Labs:        Invalid input(s): [id number]      Recent Labs      09/02/17 0526 09/03/17 0512 09/04/17   0532   SODIUM  134*  137  134*   POTASSIUM  3.3*  3.9  3.8   CHLORIDE  98  103  101   CO2  29  30  28   BUN  27*  23*  21   CREATININE  0.65  0.69  0.61   MAGNESIUM   --   2.0   --    CALCIUM  8.9  8.8  8.8     Recent Labs      09/02/17 0526 09/03/17   0512  09/04/17   0532   ALTSGPT  12  12  12   ASTSGOT  20  18  18   ALKPHOSPHAT  75  68  82    TBILIRUBIN  1.3  1.1  1.0   GLUCOSE  86  89  95     Recent Labs      17   0526  17   0512  17   0532   RBC  3.92*  3.85*  4.06*   HEMOGLOBIN  10.9*  10.5*  11.3*   HEMATOCRIT  33.6*  33.1*  35.0*   PLATELETCT  70*  74*  64*   PROTHROMBTM   --    --   16.4*   INR   --    --   1.28*     Recent Labs      17   0526  17   0512  17   0532   WBC  5.2  3.8*  3.9*   NEUTSPOLYS  65.70  60.20  63.60   LYMPHOCYTES  16.90*  20.60*  18.00*   MONOCYTES  10.60  10.90  11.70   EOSINOPHILS  5.80  7.00*  6.10   BASOPHILS  0.40  0.80  0.30   ASTSGOT  20  18  18   ALTSGPT  12  12  12   ALKPHOSPHAT  75  68  82   TBILIRUBIN  1.3  1.1  1.0           Hemodynamics:  Temp (24hrs), Av.3 °C (97.4 °F), Min:36.2 °C (97.1 °F), Max:36.5 °C (97.7 °F)  Temperature: 36.3 °C (97.4 °F)  Pulse  Av.3  Min: 60  Max: 99   Blood Pressure : 112/66     Respiratory:    Respiration: 18, Pulse Oximetry: 94 %     Work Of Breathing / Effort: Mild  RUL Breath Sounds: Clear, RML Breath Sounds: Clear;Diminished, RLL Breath Sounds: Clear;Diminished, JOSE Breath Sounds: Clear, LLL Breath Sounds: Clear  Fluids:    Intake/Output Summary (Last 24 hours) at 17 1123  Last data filed at 17 0903   Gross per 24 hour   Intake             1540 ml   Output             1350 ml   Net              190 ml     Weight: 99.5 kg (219 lb 5.7 oz)  GI/Nutrition:  Orders Placed This Encounter   Procedures   • Diet Order     Standing Status:   Standing     Number of Occurrences:   1     Order Specific Question:   Diet:     Answer:   Regular [1]     Order Specific Question:   Consistency/Fluid modifications:     Answer:   Thin Liquids [3]     Medical Decision Making, by Problem:  Active Hospital Problems    Diagnosis   • *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]   • Left leg DVT (CMS-HCC) [I82.402]   • Esophageal varices- BANDED 2016- repeat egd tbd 10//29/16- gic [I85.00]   • ASCVD (arteriosclerotic  cardiovascular disease)subtotalled small distal LCx and 40% LAD med rx by cath 12/13/12 [I25.10]   • GIB (gastrointestinal bleeding)- recurrent from varices [K92.2]   • BPH (benign prostatic hyperplasia) [N40.0]   • 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 DUE TO OLD HEP B, CHEMO RX,  OR THERMO RAD OF MAGNANT LESION [K74.60]   • Essential hypertension [I10]   • Colon cancer-2010 left hemicolectomy; no colostomy [C18.9]     *  S/P Recent Lumbar Surgery    *  Hypertension  BP recently ok  On Coreg: 3.125 mg bid  Note: home meds were Coreg 3.125 mg 1/2 tab bid and Losartan: 25 mg daily  Monitor    *  U/A (+)  Has had some difficulty urinating recently with some burning  Cx --> GNR  F/U C&S   On Cipro empirically unitl C&S come back    *  LE Edema  Mild  On Aldactone: 25 mg daily    *  Thrombocytopenia  Platelets stable (8/11)  2nd to prior chemo and PORTAL HYPERTENSION    *  Hx Colon Cancer: stage IV; in 5 year remission  *  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  *  Hyperlipidemia  *  AIDP: s/p IVIG x 5 days        Reviewed items::  Medications reviewed and Labs reviewed

## 2017-09-04 NOTE — CARE PLAN
Problem: Pain Management  Goal: Pain level will decrease to patient's comfort goal  Pt denies pain this shift    Problem: Skin Integrity  Goal: Risk for impaired skin integrity will decrease  Intervention: Patient is able to turn or reposition himself while In bed with ease. Approved barrier paste applied as ordered.

## 2017-09-04 NOTE — PROGRESS NOTES
Hospital Medicine Progress Note, Adult, Complex               Author: SCOTT BULLOCK MD    Date & Time created: 9/3/2017  9:35 PM     Interval History:  8/15/17--  HISTORY REVIEWED.  REMOTE METASTATIC COLON CANCER DISCOVERED IN 2010 S/P RESECTION BY DR ANA RUIZ AND FINDING OF 10+ NODES  WHICH SPREAD TO THE LIVER.  HAD AN ABLATION AT [facility].  HEAVY DUTY CHEMO PER DR REBOLLAR.   THE LIVER ABLATION HAS UNFORTUNATELY RESULTED IN PORTAL HYPERTENSION.  HE ALSO HAS PRETTY MUCH ALL THE STIGMATA OF PORTAL HYPERTENSION.   THERE REMAINING LIVER PARENCHYMA IS NORMAL SO HE HAS PRESERVED LIVER SYNTHETIC FUNCTION.  AS OF 2010 THERE HAS BEEN NO NEW RECURRANCE THOUGH WE ARE DEALING WITH GASTRIC VARICES,  ESOPHAGEAL VARICIES,  CAPUT MEDUSA,  LEFT LOWER EXTREMITY DVT,  INABILITY TO TOLERATE ANTICOAGULATION WITH AN IVC FILTER IN PLACE,   AND CURRENTLY MODERATE ASCITES.   WE WILL PLAN ON THERAPEUTIC TAP ON Friday.   THE OTHER ISSUE THAT REALLY BROUGHT TO PATIENT TO THE HOSPITAL WAS PROGRESSIVE ASCENDING WEAKNESS.   HE DID HAVE A TIGHT L4-L5 CENTRAL STENOSIS.  THE OTHER LEVELS LOOK FINE.   HE STARTED NOTICING THE WEAKNESS FOR ABOUT A WEEK PRIOR IN MAY AND WENT TO ER.   DR HOUSE SAW THE STRUCTURAL ISSUE,  NAMELY THE ABOVE STENOSIS AND DECOMPRESSED THIS 0N 5/25.  SYMPTOMS DID NOT MALLY.  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   IT IS CLEAR THE PATIENT NEEDED SOME SURGERY ON THE LOWER BACK.   LOOKING AT THE FILMS.  MY THOUGHT IS THE SYMPTOMS THEMSELVES WERE CAUSED BY THE GBS.   HE RECEIVED 5 DAYS OF IMMUNOGLOBULIN AND DID FAIRLY WELL AND HAS BEE TRANSFERRED [facility] ON 8/10/17  PLAN:  CONTINUE PT/OT,   PROBLEMS THAT ALSO HAVE OCCURRED INCLUDE PORTAL VEIN THROMBOSIS.   I WILL ARRANGE A THERAPEUTIC THORACENTESIS ON Friday.  RX ENTEROBACTER UTI.  HYPERSPLENISM AND LOW WBC COUNTS AND PLATELETS TO BE EXPECTED.   D/C COREG.  INDERAL BETTER FOR  LOWERING PORTAL PRESSURES.      8/16/17--PATIENT AWAKE AND ALERT.   DOING WELL WITH PT/OT.   GOOD EFFORT PER PATIENT.   I DONT THINK THE ASCITES IS SIGNIFICANTLY WORSE.  I AM GOING TO MAKE DAILY ASSESMENTS AND DETERMINE WHEN PARACENTESIS IS NECESSARY.   GBS IS CERTAINLY NO WORSE.   THE PATIENT WAS SPECIFICALLY TOLD TO INFORM ME ABOUT ANY PHYSICAL CHANGES IE: WORSENING WEAKNESS OR ANY CRANIAL NERVE ABNORMALITIES.   CHEST FILM WAS ESSENTIALLY NEGATIVE FOR EFFUSION.  THE DECREASED TACTILE FREMITUS THAT I NOTED WAS A HIGH RIDING DIAPHRAGM.   PROBABLY DUE TO ASCITES.    I NEED DAILY WEIGHTS TO HELP ME WITH MY DECISION WHEN TO TAP ABDOMEN.  NORMALLY PATIENT GETS IT TAPPED ABOUT EVERY 4 MONTHS BY GI.    I  WILL RE--ASSES PORTAL BLOOD FLOW AND SEE IF THIS PORTAL VEIN THROMBOSIS IS RECANULATED OR WORSE.  LAST YEAR THERE WAS SOME RECANULIZATION AND FLOW.   HEPATOPEDAL FLOW IS GOOD, HEPATOFUGAL FLOW BAD.  I HAVE NEVER SEEN A LONG TERM SURVIVOR OF METASTATIC COLON CANCER WITH 4 CM RECTO-SIGMOID TUMOR AND 5 POSITIVE NODES + LIVER MET.   RX FOR UTI WITH ENTEROBACTER ON CULTURE.  UNIMPRESSIVE WBC COUNT OF 2-5 WBC/HIGH POWERED FIELD.   I PROBABLY WOULD HAVE LET THAT GO UNLESS THERE WERE SYMPTOMS.     PLAN:   PATIENT DOING WELL.  DAILY WEIGHTS.  DAILY ASSESMENTS OF ASCITES.  CXR IS CLEAR.  CHECK ABDOMINAL ULTRASOUND TO ASSESS PORTAL BLOOD FLOW AND ASCITES.   I THINK QOD DIURETICS WOULD HELP PREVENT A TAP AND DECREASE THE EDEMA, (KNOWN  RLE DVT PRIMARILY DUE TO MALIGNANCY BEING A PRO-THROMBOTIC STATE).  GIVE 5 DAYS OF PO VITAMIN K GIVEN THE CIPRO KILLING GUT PA WHICH HELPS GENERATE VITAMIN K FOR A HUMAN.  CHECK SOME IRON STUDIES.   OVERALL INCHING FORWARD.   HARD TO SAY WHAT THE COURSE OF GBS WILL BE.  THE BACK SURGERY WAS NECESSARY BUT MY FEELING IS HIS SYMPTOMS WERE MOSTLY DUE TO GBS AND NOT SPINAL STENOSIS.   HEPATITIS STATUS CHECKED AND FOUND TO BE NEGATIVE.   HIV ANTIBODIES  FOR COMPLETENESS.     8/17/17--PATIENT'S ULTRASOUND  DID NOT SHOW ENOUGH ASCITES TO SAFELY TAP.  THAT IS GOOD.  LITTLE CHANGE IN THE MOTOR MOVEMENT NOTED.  BACK FEELS FINE.  INR IS BETTER WITH VITAMIN K.  DIURETICS HOLDING ASCITES AT BAY PERIODICALLY.   WE ARE FORCING DIURESIS WITH DEMEDEX ETC.   NO NEED FOR TAP.    BP OK.  LABS OK.  MODEST IRON DEFICIENCY COMPONENT.   ADD SOME PO IRON AND VITAMIN C IF NOT DONE ALREADY.    8/18/17--PATIENT LOOKS THE SAME TO ME.  THE ASCITES IS MINIMAL AND IWILL NOT REQUIIRE ANY INTERVENTION LIKE THORACENTESIS IN THE NEAR FUTURE.  OUR MEDICAL RX IS WORKING JUST FINE.    REALLY NO CHANGE IN PORTAL THROMBOSIS.  THERE IS BLOOD FLOW WHICH IS UNCHANGED.   USUAL COMPLAINTS.  ACHES AND PAINS ETC.    PLAN:  NO TAP OF ABDOMEN REQUIRED.  THE PATIENT MAY BENEFIT FROM SOME IV IRON.   HE IS DEFICIENT.     8/19/17--PATIENT AWAKE AND ALERT.  DOING FAIRLY NICELY WITH PT/OT.   HE WAS ABLE TO LIFT BOTH LEGS OFF THE BED.  I DID GET SOME PATELLA REFLEX B/L YESTERDAY.  MUSCLE BULK IS FAIR.  I REVIEWED THE ULTRASOUND.  MINIMAL ASCITES.  MODEST RLL EFFUSION.   THE SPLEEN IS ENLARGED.  HE UNFORTUNATELY WILL ALWAYS HAVE SOME DEGREE OF EARLY SATIETEY DUE TO  A 20 CM SPLEEN.  NORMAL WOULD BE FROM 7-14 CM. THE SPLEEN IS VERY CLOSE TO THE STOMACH AND NOT A RETROPERITONEAL STRUCTURE LIKE THE KIDNEYS.   ALSO ON THE PLAIN FILM WHICH WAS UNDER PENETRATED, THERE LOOKED TO BE A GOOD AMOUNT OF FECAL MATERIAL IN THE DESCENDING COLON, ALSO NEAR THE STOMACH.      PLAN:    I THINK THE PATIENT IS DOING AS WELL AS CAN BE EXPECTED.  THE GBS IS IMPROVING AT A SLOW RATE.   THE SYMPTOMS STARTED AT THE MIDDLE OF MAY.   SO THIS IS A 3 MONTH ORDEAL THUS FAR.  HE COULD BENEFIT FROM SOME MIRALAX.  DOUBT HE WOULD TOLLERATE METAMUCIL.    I WILL TRY  THAT.  MY AGGRESSIVE DIURETIC PROGRAM HAS  ESSENTIALLY MINIMIZED THE ASCITES TO A NEGLIGIBLE AMOUNT AND HE WILL NOT NEED PARACENTESIS.  LOW GRADE TEMP OF 99,6, CHECK UA FOR COMPLETENESS AND FORMAL CXR ON Monday TO RE-ASSES THAT EFFUSION NOTED  ON ABDOMINAL ULTRASOUND.    8/20/17--PATIENT FEELING WELL.  MENTATION IS CLEAR.  ASCITES IS UNDER CONTROL.  MINIMAL NOW.   EVERY OTHER DAY DIURETICS SEEMS TO BE HELPING.  CHECKING LYTES IN AM.  MAY NEED TO BACK DOWN TO EVERY THIRD DAY IF THERE IS HYPOTENSION OR CONTRACTION ALKALOSIS.  CXR TO ASSESS RIGHT EFFUSION NOTED ON EXAM. MODEST DECREASED TACTILE FREMITUS RIGHT BASE.    ALWAYS EXPLAIN A REASON TO PATIENT WHY WE ARE DOING A TEST.    UA IS NEGATIVE.    VITALS OK.  THUS FAR LABS OK.   HE IS AN ASTUTE  AND WANTS EVERY RATIONAL EXPLAINED THOROUGHLY.   O/W HE WILL REFUSE.  I WILL TRY TO EXPLAIN IN MY 2 PARAGRAPH DAILY NOTE MY RATIONAL.    PLAN:  CXR IN AM  (CHECKING THE DEGREE OF PLEURAL FLUID AROUND LUNG).  CHECK CBC, CMP, MAG.   PATIENT HAS A MODERATE AMOUNT OF FECAL MATERIAL IN SPLENIC FLEXURE.  STARTING ON GENTLE MIRALAX AND SOME MOM.  CLINICALLY NO EDEMA AND MINIMAL ASCITES.   MENTATION REMAINS CLEAR.  NO NEED FOR LACTULOSE AT THIS TIME.   MOTOR STRENGTH IMPROVING.   UA NEGATIVE.  NO ABDOMINAL PAIN OR FURTHER LOW GRADE FEVER.  COAGS ALSO IN AM.  THE EARLY SATIETY IS DUE TO A 20 CM SPLEEN ABUTTING THE STOMACH.    8/21/17--PATIENT DOING AS WELL AS CAN BE EXPECTED.   HE HAD BACK SURGERY AND GBS JUST PRIOR TO THE PROCEDURE IT APPEARS.  HE IS MOVING ALL 4 EXTREMITIES AGAINST   GRAVITY.  MENTATION IS VERY CLEAR.  COAGS ARE NO BETTER.  RECHECK IN AM.  RECHECK AMMONIA IN AM.  I NEVER GO BY THE NUMBER ITSELF,  JUST THE SYMPTOMS.  SEEMS LIKE EVERYONE IS DIFFERENT.  ONE PATIENT CAN HAVE AN AMMONIA  AND BE FINE AND ANOTHER CAN HAVE ONE OF 60 AND BE ENCEPHALOPATHIC.  CXR LOOKED FINE NO EFFUSION OR EVIDENCE FOR INFILTRATE.  VITALS LOOK OK.   LABS ARE AS EXPECTED FOR A PATIENT WITH CIRRHOSIS AND PORTAL HTN.   HE HAS GASTRIC VARICES,  ESOPHAGEAL VARICES (BANDED PROPHYLACTICLY IN PAST),  DILATED PERIUMBILICAL VEINS AND INTERMITTENT ASCITES.  THE OTHER PORTAL DECOMPRESSION LOCATIONS ARE SPLEEN, (HE DOES HAVE A VERY  LARGE SPLEEN) AND HE DOESN'T HAVE BLEEDING HEMORRHOIDS THAT WE KNOW OF.  DESPITE VITAMIN K HE IS STILL A BIT COAGULOPATHIC.   INTRINSIC LIVER DISEASE.  STAGE 4 COLON CANCER.  NEVER HAVE SEEN A LONGER TERM SURVIVOR.  PLAN:  CHECK LABS IN AM,  AMMONIA,  INR, CBC, CMP, ALPH FETO PROTEIN (TUMOR MARKER FOR HEPATOCELLULAR CARCINOMA WHICH IS A DIFFERENT MALIGNANCY ASSOCIATED WITH CIRRHOSIS UNFORTUNATELY).   GBS SYMPTOMS SLOWLY IMPROVING.  INCHING SOMEWHERE.    8/29/17--PATIENT AWAKE AND ALERT.  MOTOR STRENGTH IS ESSENTIALLY NORMAL.  STILL NO REFLEXES PATELLA.  HE HAS NO SIGNIFICANT EDEMA AT THIS MOMENT.    I AM CHANGING HIS DIURETICS TO EVERY 3D DAY WITH METALAZONE  AS WELL.  THIS IS HIS DRY WEIGHT.  NO LEG EDEMA.   DAUGHTER VISITING AS IS WIFE. GBS SYMPTOMS IMPROVED.   NO INCREASE IN ASCITES.  PATIENT HAS NO ENCEPHALOPATHY.  BACK ISSUES SEEM TO BE DOING WELL. LABS AND VITALS REASONABLE..   ALPHA FETO PROTEIN NORMAL.  PLAN:  CHANGE DIURETICS TO Q3 DAYS.   INCHING FORWARD.    8/31/17-PATIENT CONTINUES TO MAKE NICE PROGRESS S/P GBS, AND LOWER BACK SURGERY.  ASCITES  AND EDEMA ARE  VERY MUCH  COMPENSATED AT THIS  POINT.  DESPITE HIS CIRRHOSIS.  THIS IS HIS DRY WEIGHT.  AMBULATORY WITH MINIMAL ASSISTANCE.  HE  DOES HAVE SOME RHONCHI RLL AND SOME MID THORACIC BACK PAIN.    PLAN:  CXR.  CHECK 2 VIEWS AS WELL AS 2 VIEWS OF THORACIC SPINE.  LOOKS LIKE AN OLD COMPRESSION FX TO ME.    9/1/17--PATIENT CONTINUES TO MAKE DECENT PROGRESS WITH HIS GBS AND BACK SURGERY IN PAST.  HE HAS HAD MINIMAL ASCITES AFTER WE   STARTED FAIRLY AGGRESSIVE DIURETICS.   NO BACK PAIN,  SEVERE LOOKING SCALLOPING ON THORACIC SPINE SERIES.  THE CHEST FILMS WERE CLEAR.  PROBABLY JUST ATTELECTASIS HERE.  PLAN:  IMPROVING SLOWLY.   SEE FILMS BELOW.   LABS IN AM.         9/2/16--PATIENT AWAKE AND ALERT.  WIFE AT BEDSIDE.  IMPROVING MOTOR STRENGTH.  COORDINATION AND CORE MUSCLE ISSUES?  HE IS NOT CLEARED TO AMBULATE INDEPENDENTLY YET.   THE PATIENT'S CHEST FILM  IS CLEAR.  LUNGS CLEAR.  STILL NO PATELLA REFLEXES.   MUSCLE BULK SEEMS GOOD.  NO BACK PAIN OF ANY NOTE.   (s/p lumbar spinal surgery in may).   K+ LEVEL IS LOW AT 3.3.   WE DID DROP THE DEMEDEX AND METALAZONE TO Q 3 DAYS.   SO FAR NEGLIGIBLE ASCITES.   THE LOW K+ IS A SEQUELAE OF THE DIURETIC NO DOUBT.   REPLETE WITH 40 MEQ X 1.  EXPLAIN TO PATIENT RATIONAL PLEASE, (or he will not take med).  IT WILL BE A ONE TIME THING HERE.  UPPING THE Q 3 DAY KDUR TO 20 meq.  SO FAR THE ASCITES HAS NOT RECURRED.    PLAN:  REPLETE K+,  LOW BP IS NORMAL IN CIRRHOSIS PATIENTS.  INCREASE KDUR Q3 DAYS WITH DIURETIC COCKTAIL.     9/3/17--PATIENT AWAKE NO NEW ISSUES  VITALS LOOK GOOD.  NO PT/OT TODAY.  WEIGHT STAYING STABLE.  RECHECK SOME LABS IN AM.  NOTHING SIGNIFICANT ABOUT ABOVE FILMS.  LOOKS LIKE Q72 HOUR DIURETICS WILL HALT THE INCREASE IN ASCITES.  THE ONLY DIRURETIC THAT SEEMS TO  BE EFFECTIVE WITH THESE ESLD PATIENT'S IS DEMEDEX.  PROBABLY THE STRENGTH OF THE MED AND ABSORBTION DESPITE BOWEL WALL EDEMA IS THE GARBER.          Review of Systems:  Review of Systems   Constitutional: Negative for chills and fever.   Respiratory: Negative for shortness of breath.    Cardiovascular: Negative for chest pain.   Gastrointestinal: Negative for abdominal pain, diarrhea, nausea and vomiting.   Psychiatric/Behavioral: The patient is not nervous/anxious.        Physical Exam:  Physical Exam   Constitutional: He is oriented to person, place, and time. He appears well-nourished.   HENT:   Head: Atraumatic.   Eyes: Conjunctivae are normal. Pupils are equal, round, and reactive to light.   Neck: Normal range of motion. Neck supple. No tracheal deviation present.   Cardiovascular: Normal rate, regular rhythm, S1 normal and S2 normal.    No murmur heard.  Pulmonary/Chest: Effort normal. He has no wheezes. He has no rhonchi. He has no rales.   DECREASED TACTILE FREMITUS RIGHT BASE.  SUGGESTING EFFUSION THERE.   Abdominal: Soft. He exhibits no  distension and no mass. There is no tenderness. There is no rebound and no guarding. Hernia confirmed negative in the right inguinal area and confirmed negative in the left inguinal area.   Musculoskeletal: He exhibits no edema.   Neurological: He is alert and oriented to person, place, and time. No sensory deficit.   Lower extremity motor strength was  5/5.   Able to stand from a sitting position.   Skin: Skin is warm and dry. No rash noted. No cyanosis.   Psychiatric: He has a normal mood and affect. His behavior is normal.   Nursing note and vitals reviewed.      Labs:        Invalid input(s): [id number]      Recent Labs      17   SODIUM  134*  137   POTASSIUM  3.3*  3.9   CHLORIDE  98  103   CO2  29  30   BUN  27*  23*   CREATININE  0.65  0.69   MAGNESIUM   --   2.0   CALCIUM  8.9  8.8     Recent Labs      17   ALTSGPT  12  12   ASTSGOT  20  18   ALKPHOSPHAT  75  68   TBILIRUBIN  1.3  1.1   GLUCOSE  86  89     Recent Labs      17   RBC  3.92*  3.85*   HEMOGLOBIN  10.9*  10.5*   HEMATOCRIT  33.6*  33.1*   PLATELETCT  70*  74*     Recent Labs      17   WBC  5.2  3.8*   NEUTSPOLYS  65.70  60.20   LYMPHOCYTES  16.90*  20.60*   MONOCYTES  10.60  10.90   EOSINOPHILS  5.80  7.00*   BASOPHILS  0.40  0.80   ASTSGOT  20  18   ALTSGPT  12  12   ALKPHOSPHAT  75  68   TBILIRUBIN  1.3  1.1           Hemodynamics:  Temp (24hrs), Av.4 °C (97.6 °F), Min:36.2 °C (97.1 °F), Max:36.6 °C (97.9 °F)  Temperature: 36.2 °C (97.1 °F)  Pulse  Av.3  Min: 60  Max: 99   Blood Pressure : 100/60     Respiratory:    Respiration: 16, Pulse Oximetry: 97 %     Work Of Breathing / Effort: Mild  RUL Breath Sounds: Clear, RML Breath Sounds: Clear;Diminished, RLL Breath Sounds: Clear;Diminished, JOSE Breath Sounds: Clear, LLL Breath Sounds: Clear  Fluids:    Intake/Output Summary (Last 24 hours) at 17 6683  Last  data filed at 09/03/17 2052   Gross per 24 hour   Intake             1720 ml   Output             1100 ml   Net              620 ml     Weight: 98.5 kg (217 lb 2.5 oz)  GI/Nutrition:  Orders Placed This Encounter   Procedures   • Diet Order     Standing Status:   Standing     Number of Occurrences:   1     Order Specific Question:   Diet:     Answer:   Regular [1]     Order Specific Question:   Consistency/Fluid modifications:     Answer:   Thin Liquids [3]     Medical Decision Making, by Problem:  Active Hospital Problems    Diagnosis   • *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]   • Left leg DVT (CMS-HCC) [I82.402]   • Esophageal varices- BANDED 4/2016- repeat egd tbd 10//29/16- gic [I85.00]   • ASCVD (arteriosclerotic cardiovascular disease)subtotalled small distal LCx and 40% LAD med rx by cath 12/13/12 [I25.10]   • GIB (gastrointestinal bleeding)- recurrent from varices [K92.2]   • BPH (benign prostatic hyperplasia) [N40.0]   • 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 DUE TO OLD HEP B, CHEMO RX,  OR THERMO RAD OF MAGNANT LESION [K74.60]   • Essential hypertension [I10]   • Colon cancer-2010 left hemicolectomy; no colostomy [C18.9]     *  S/P Recent Lumbar Surgery    *  Hypertension  BP recently ok  On Coreg: 3.125 mg bid  Note: home meds were Coreg 3.125 mg 1/2 tab bid and Losartan: 25 mg daily  Monitor    *  U/A (+)  Has had some difficulty urinating recently with some burning  Cx --> GNR  F/U C&S   On Cipro empirically unitl C&S come back    *  LE Edema  Mild  On Aldactone: 25 mg daily    *  Thrombocytopenia  Platelets stable (8/11)  2nd to prior chemo and PORTAL HYPERTENSION    *  Hx Colon Cancer: stage  IV; in 5 year remission  *  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  *  Hyperlipidemia  *  AIDP: s/p IVIG x 5 days        Reviewed items::  Medications reviewed and Labs reviewed

## 2017-09-04 NOTE — CARE PLAN
Problem: Safety  Goal: Will remain free from injury  Patient uses call light  appropriately this shift.  Waits for assistance when needed and does not attempt self transfer.  Able to verbalize needs.  Will continue to monitor.    Problem: Pain Management  Goal: Pain level will decrease to patient's comfort goal  Roxicodone 10mg given PRN per MAR for c/o back pain 8/10 with good relief. Pt sleeps well. Will continue to monitor.    Problem: Urinary Elimination:  Goal: Ability to reestablish a normal urinary elimination pattern will improve  Patient continent of bladder. On bladder meds. Voiding adequately using urinal. PVR 204ml. He denies dysuria.

## 2017-09-05 PROCEDURE — 97110 THERAPEUTIC EXERCISES: CPT

## 2017-09-05 PROCEDURE — 700102 HCHG RX REV CODE 250 W/ 637 OVERRIDE(OP): Performed by: HOSPITALIST

## 2017-09-05 PROCEDURE — 97530 THERAPEUTIC ACTIVITIES: CPT

## 2017-09-05 PROCEDURE — 97535 SELF CARE MNGMENT TRAINING: CPT

## 2017-09-05 PROCEDURE — 97116 GAIT TRAINING THERAPY: CPT

## 2017-09-05 PROCEDURE — 700102 HCHG RX REV CODE 250 W/ 637 OVERRIDE(OP): Performed by: PHYSICAL MEDICINE & REHABILITATION

## 2017-09-05 PROCEDURE — A9270 NON-COVERED ITEM OR SERVICE: HCPCS | Performed by: HOSPITALIST

## 2017-09-05 PROCEDURE — 770010 HCHG ROOM/CARE - REHAB SEMI PRIVAT*

## 2017-09-05 PROCEDURE — A9270 NON-COVERED ITEM OR SERVICE: HCPCS | Performed by: PHYSICAL MEDICINE & REHABILITATION

## 2017-09-05 PROCEDURE — 99232 SBSQ HOSP IP/OBS MODERATE 35: CPT | Performed by: HOSPITALIST

## 2017-09-05 RX ADMIN — FOLIC ACID 1 MG: 1 TABLET ORAL at 07:39

## 2017-09-05 RX ADMIN — LACTOBACILLUS ACIDOPHILUS / LACTOBACILLUS BULGARICUS 1 PACKET: 100 MILLION CFU STRENGTH GRANULES at 07:39

## 2017-09-05 RX ADMIN — LACTOBACILLUS ACIDOPHILUS / LACTOBACILLUS BULGARICUS 1 PACKET: 100 MILLION CFU STRENGTH GRANULES at 12:00

## 2017-09-05 RX ADMIN — URSODIOL 300 MG: 300 CAPSULE ORAL at 07:39

## 2017-09-05 RX ADMIN — ATORVASTATIN CALCIUM 40 MG: 40 TABLET, FILM COATED ORAL at 20:47

## 2017-09-05 RX ADMIN — POLYETHYLENE GLYCOL 3350 1 PACKET: 17 POWDER, FOR SOLUTION ORAL at 20:49

## 2017-09-05 RX ADMIN — MAGNESIUM HYDROXIDE 15 ML: 400 SUSPENSION ORAL at 20:48

## 2017-09-05 RX ADMIN — VITAMIN D, TAB 1000IU (100/BT) 1000 UNITS: 25 TAB at 07:39

## 2017-09-05 RX ADMIN — FERROUS SULFATE TAB 325 MG (65 MG ELEMENTAL FE) 325 MG: 325 (65 FE) TAB at 07:39

## 2017-09-05 RX ADMIN — OXYCODONE HYDROCHLORIDE AND ACETAMINOPHEN 500 MG: 500 TABLET ORAL at 07:39

## 2017-09-05 RX ADMIN — TAMSULOSIN HYDROCHLORIDE 0.4 MG: 0.4 CAPSULE ORAL at 07:39

## 2017-09-05 RX ADMIN — OXYCODONE HYDROCHLORIDE 10 MG: 10 TABLET ORAL at 20:47

## 2017-09-05 RX ADMIN — SPIRONOLACTONE 25 MG: 25 TABLET, FILM COATED ORAL at 05:33

## 2017-09-05 RX ADMIN — LACTOBACILLUS ACIDOPHILUS / LACTOBACILLUS BULGARICUS 1 PACKET: 100 MILLION CFU STRENGTH GRANULES at 17:41

## 2017-09-05 ASSESSMENT — ENCOUNTER SYMPTOMS
ABDOMINAL PAIN: 0
VOMITING: 0
CHILLS: 0
NERVOUS/ANXIOUS: 0
FEVER: 0
NAUSEA: 0
SHORTNESS OF BREATH: 0
DIARRHEA: 0

## 2017-09-05 ASSESSMENT — PAIN SCALES - GENERAL
PAINLEVEL_OUTOF10: 0
PAINLEVEL_OUTOF10: 7

## 2017-09-05 ASSESSMENT — GAIT ASSESSMENTS
ASSISTIVE DEVICE: FRONT WHEEL WALKER
GAIT LEVEL OF ASSIST: SUPERVISED
DISTANCE (FEET): 215
DEVIATION: TRENDELENBERG;DECREASED BASE OF SUPPORT;BRADYKINETIC

## 2017-09-05 NOTE — DISCHARGE PLANNING
Per discussion w/ MD, pt prefers to be discharged on Thursday 9/7 instead of dc on 9/9.   New dc date is 9/7.  Plan:  Will arrange out pt therapy and order wheelchair for community use.

## 2017-09-05 NOTE — CARE PLAN
Problem: Toileting  Goal: STG-Within one week, patient will complete toileting tasks  1) Individualized Goal:  Min assist  2) Interventions:  OT Self Care/ADL, OT Neuro Re-Ed/Balance, OT Therapeutic Activity, OT Evaluation and OT Therapeutic Exercise     Outcome: MET Date Met: 09/05/17  Mod I

## 2017-09-05 NOTE — PROGRESS NOTES
Hospital Medicine Progress Note, Adult, Complex               Author: Yaya Martinez Date & Time created: 9/5/2017  8:28 AM     Interval History:  No significant events or changes since last visit  Patient denies SOB, cough, or diarrhea  Patient slept ok last night    Chief Complaint:  Hypertension  Fluid overload      Review of Systems:  Review of Systems   Constitutional: Negative for chills and fever.   Respiratory: Negative for shortness of breath.    Cardiovascular: Negative for chest pain.   Gastrointestinal: Negative for abdominal pain, diarrhea, nausea and vomiting.   Psychiatric/Behavioral: The patient is not nervous/anxious.        Physical Exam:  Physical Exam   Constitutional: He is oriented to person, place, and time. He appears well-nourished.   HENT:   Head: Atraumatic.   Eyes: Conjunctivae are normal. Pupils are equal, round, and reactive to light.   Neck: Normal range of motion. Neck supple.   Cardiovascular: Normal rate, regular rhythm, S1 normal and S2 normal.    No murmur heard.  Pulmonary/Chest: Effort normal. He has no wheezes. He has no rhonchi. He has no rales.   Abdominal: Soft. He exhibits no distension. There is no tenderness. Hernia confirmed negative in the right inguinal area and confirmed negative in the left inguinal area.   Musculoskeletal: He exhibits no edema.   Neurological: He is alert and oriented to person, place, and time. No sensory deficit.   Skin: Skin is warm and dry. Rash noted. No cyanosis.   Has coccyx pressure sore/ulcer.   Psychiatric: He has a normal mood and affect. His behavior is normal.   Nursing note and vitals reviewed.      Labs:        Invalid input(s): [id number]      Recent Labs      09/03/17   0512  09/04/17   0532   SODIUM  137  134*   POTASSIUM  3.9  3.8   CHLORIDE  103  101   CO2  30  28   BUN  23*  21   CREATININE  0.69  0.61   MAGNESIUM  2.0   --    CALCIUM  8.8  8.8     Recent Labs      09/03/17   0512  09/04/17   0532   ALTSGPT  12  12   ASTSGOT   18  18   ALKPHOSPHAT  68  82   TBILIRUBIN  1.1  1.0   GLUCOSE  89  95     Recent Labs      17   0512  17   0532   RBC  3.85*  4.06*   HEMOGLOBIN  10.5*  11.3*   HEMATOCRIT  33.1*  35.0*   PLATELETCT  74*  64*   PROTHROMBTM   --   16.4*   INR   --   1.28*     Recent Labs      17   0512  17   0532   WBC  3.8*  3.9*   NEUTSPOLYS  60.20  63.60   LYMPHOCYTES  20.60*  18.00*   MONOCYTES  10.90  11.70   EOSINOPHILS  7.00*  6.10   BASOPHILS  0.80  0.30   ASTSGOT  18  18   ALTSGPT  12  12   ALKPHOSPHAT  68  82   TBILIRUBIN  1.1  1.0           Hemodynamics:  Temp (24hrs), Av.4 °C (97.5 °F), Min:36.3 °C (97.3 °F), Max:36.5 °C (97.7 °F)  Temperature: 36.3 °C (97.4 °F)  Pulse  Av.3  Min: 60  Max: 99   Blood Pressure : 111/65     Respiratory:    Respiration: 20, Pulse Oximetry: 97 %     Work Of Breathing / Effort: Mild  RUL Breath Sounds: Clear, RML Breath Sounds: Diminished, RLL Breath Sounds: Diminished, JOSE Breath Sounds: Clear, LLL Breath Sounds: Clear  Fluids:    Intake/Output Summary (Last 24 hours) at 17 0828  Last data filed at 17 0542   Gross per 24 hour   Intake             1380 ml   Output             1800 ml   Net             -420 ml     Weight: 99.5 kg (219 lb 5.7 oz)  GI/Nutrition:  Orders Placed This Encounter   Procedures   • Diet Order     Standing Status:   Standing     Number of Occurrences:   1     Order Specific Question:   Diet:     Answer:   Regular [1]     Order Specific Question:   Consistency/Fluid modifications:     Answer:   Thin Liquids [3]     Medical Decision Making, by Problem:  Active Hospital Problems    Diagnosis   • *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]   • Left leg DVT (CMS-[facility]) [I82.402]   • Esophageal varices- BANDED 2016- repeat egd tbd 10//29/16- gic [I85.00]   • ASCVD (arteriosclerotic cardiovascular disease)subtotalled small distal LCx and 40% LAD med rx by cath 12 [I25.10]   • GIB (gastrointestinal  bleeding)- recurrent from varices [K92.2]   • BPH (benign prostatic hyperplasia) [N40.0]   • 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 portal vein thrombosis  Hx eso varicies, s/p albaltion  Hx ascites: with therapeutic taps  S/P LE edema  NH4: 35 (8/22) --> 32 (8/27)  On Demadex: 20 mg every 3 days  On Metolazone: 2.5 mg every 3 days  On Aldactone: 25 mg daily  On KCL: 20 meq every 3 days  Monitor K+ levels: 3.8 (9/4)    *  Pancytopenia  Platelets stable (9/4)  2nd to prior chemo and cirrhosis    *  Coccyx Pressure Sore/Ulcer  Mostly dusky erythema but has a few spots of more erythema  Turning pt in bed often  Wound care eval: zinc based cream applied and leave bandage off    *  Hx Colon Cancer: stage IV; in 5 year remission  *  Hyperlipidemia  *  GBS: s/p IVIG x 5 days        Reviewed items::  Labs reviewed and Medications reviewed

## 2017-09-05 NOTE — CARE PLAN
Problem: Safety  Goal: Will remain free from injury  Patient uses call light consistently and appropriately this shift.  Waits for assistance when needed and does not attempt self transfer.  Able to verbalize needs.free of accidental injury,  Will continue to monitor.    Problem: Pain Management  Goal: Pain level will decrease to patient's comfort goal  Medicated per mar with Roxicodone 10 mg with relief, non pharmacological measures such as repositioning and relaxation encouraged.    Problem: Skin Integrity  Goal: Risk for impaired skin integrity will decrease  Pt encouraged to reposition in bed for pressure relief, pt able to reposition self , encouraged to ask for assist as needed, sacral are pink, healing , moisture  barrier applied prn.

## 2017-09-05 NOTE — REHAB-PHARMACY IDT TEAM NOTE
Pharmacy   Pharmacy  Antibiotics/Antifungals/Antivirals:  Medication:      Active Orders     None        Route:         n/a  Stop Date:  n/a  Reason:   Antihypertensives/Cardiac:  Medication:    Orders (72h ago through future)    Start     Ordered    09/01/17 0527  torsemide (DEMADEX) TABS 20 mg  EVERY 72 HOURS      08/29/17 1941 08/31/17 2127  metolazone (ZAROXOLYN) tablet 2.5 mg  EVERY 72 HOURS     Comments:  GIVE 40 MINUTES PRIOR TO DEMEDEX EVERY OTHER DAY.    08/29/17 1939 08/30/17 0600  spironolactone (ALDACTONE) tablet 25 mg  Q DAY      08/29/17 1939 08/20/17 0000  cholestyramine (QUESTRAN,PREVALITE) 4 GM powder 4 g  1 TIME DAILY PRN      08/17/17 1046    08/10/17 2100  atorvastatin (LIPITOR) tablet 40 mg  EVERY BEDTIME      08/10/17 1457    08/10/17 1456  hydrALAZINE (APRESOLINE) tablet 25 mg  EVERY 8 HOURS PRN      08/10/17 1457        Patient Vitals for the past 24 hrs:   BP Pulse   09/05/17 0700 111/65 66   09/05/17 0532 111/61 -   09/04/17 1900 103/59 68   09/04/17 1310 102/66 90       Anticoagulation:    Medication:  N/a    INR:    Recent Labs      09/04/17   0532   INR  1.28*     Other key medications: tamsulosin, trazodone  A review of the medication list reveals no issues at this time. Patient is currently on antihypertensive(s). Recommend home blood pressure monitoring/recording if antihypertensive(s) regimen(s) continue.  Section completed by:  [facility]

## 2017-09-05 NOTE — PROGRESS NOTES
"      Rehab Progress Note     Interval Events (Subjective)  Patient seen in his room and  Along with his wife in the dining room. Notes of The therapist and the hospitalist appreciated   Patient is thrilled to be leaving earlier with this discharge moved to the 9th from the 16th  He has no new complaints    Objective:  VITAL SIGNS: /65   Pulse 66   Temp 36.3 °C (97.4 °F)   Resp 20   Ht 1.905 m (6' 3\")   Wt 99.5 kg (219 lb 5.7 oz)   SpO2 97%   BMI 27.42 kg/m²       GENERAL: No apparent distress  HEENT: Normocephalic/atraumatic, EOMI, PERRL and No nystagmus  CARDIAC: Regular rate and rhythm, normal S1, S2   LUNGS: Clear to auscultation   ABDOMINAL: bowel sounds present, soft, nontender and ascites present with fluid wave noted. Ascites is increased  EXTREMITIES: Without clubbing or cyanosis 2+ edema noted in the left lower extremity but decreased  SKIN:   Small stage III noted on sacrum  NEURO: Unchanged with the exception to hip flexor strength is definitely a half grade stronger bilaterally as compared to admission    Current Facility-Administered Medications   Medication Frequency   • potassium chloride SA (Kdur) tablet 20 mEq Q72HRS   • metolazone (ZAROXOLYN) tablet 2.5 mg Q72HRS   • spironolactone (ALDACTONE) tablet 25 mg Q DAY   • torsemide (DEMADEX) TABS 20 mg Q72HRS   • magnesium hydroxide (MILK OF MAGNESIA) suspension 15 mL QHS   • polyethylene glycol/lytes (MIRALAX) PACKET 1 Packet QHS   • ferrous sulfate tablet 325 mg QDAY with Breakfast   • ascorbic acid (ascorbic acid) tablet 500 mg DAILY   • cholestyramine (QUESTRAN,PREVALITE) 4 GM powder 4 g QDAY PRN   • docusate sodium (COLACE) capsule 100 mg BID PRN    And   • senna-docusate (PERICOLACE or SENOKOT S) 8.6-50 MG per tablet 1 Tab QDAY PRN    And   • lactulose 20 GM/30ML solution 30 mL Q24HRS PRN    And   • bisacodyl (DULCOLAX) suppository 10 mg QDAY PRN   • hydrocortisone (ANUSOL-HC) suppository 25 mg Q12HRS PRN   • tamsulosin (FLOMAX) " capsule 0.4 mg AFTER BREAKFAST   • Respiratory Care per Protocol Continuous RT   • Pharmacy Consult Request ...Pain Management Review 1 Each PRN   • lactobacillus granules (LACTINEX/FLORANEX) packet 1 Packet TID WITH MEALS   • ursodiol (ACTIGALL) capsule 300 mg DAILY   • vitamin D (cholecalciferol) tablet 1,000 Units DAILY   • folic acid (FOLVITE) tablet 1 mg DAILY   • atorvastatin (LIPITOR) tablet 40 mg QHS   • benzocaine-menthol (CEPACOL) lozenge 1 Lozenge Q2HRS PRN   • hydrALAZINE (APRESOLINE) tablet 25 mg Q8HRS PRN   • mag hydrox-al hydrox-simeth (MAALOX PLUS ES or MYLANTA DS) suspension 20 mL Q2HRS PRN   • ondansetron (ZOFRAN ODT) dispertab 4 mg 4X/DAY PRN    Or   • ondansetron (ZOFRAN) syringe/vial injection 4 mg 4X/DAY PRN   • trazodone (DESYREL) tablet 50 mg QHS PRN   • sodium chloride (OCEAN) 0.65 % nasal spray 2 Spray PRN   • oxycodone immediate release (ROXICODONE) tablet 10 mg Q4HRS PRN       Orders Placed This Encounter   Procedures   • Diet Order     Standing Status:   Standing     Number of Occurrences:   1     Order Specific Question:   Diet:     Answer:   Regular [1]     Order Specific Question:   Consistency/Fluid modifications:     Answer:   Thin Liquids [3]       Assessment:  Active Hospital Problems    Diagnosis   • *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 DUE TO OLD HEP B, CHEMO RX,  OR THERMO RAD OF MAGNANT  LESION   • Essential hypertension   • Colon cancer-2010 left hemicolectomy; no colostomy       Medical Decision Making and Plan:  Patient is doing well at this time  Dr. Hopper continues to follow medically.   I spoke to Dr. 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 It's improved with diuresis prescribed by Dr. hopper    Hypertension       under control with Aldactone and now Inderal    Stage III pressure ulcer, on the Coccyx    Appreciate the input of the wound care nurse honey colloid applied    Vitamin D deficiency    Continue replacement    Lower extremity edema left leg    We will continue to monitor improved      Diarrhea   C. diff toxin neg  No longer an issue      UTI  response to    Continue P T and OT  At Team conference we decided to move discharge up to 9/9/2017  We'll re-conference next Wednesday, 9/5/2017        Total time:  > 25 minutes.  I spent greater than 50% of the time for patient care and coordination on this date, including unit/floor time, and face-to-face time with the patient as per assessment and plan above.    Moreno Adorno M.D.

## 2017-09-05 NOTE — PROGRESS NOTES
"Rehab Progress Note     Interval Events (Subjective)  Chief Complaint:  AIDP        Notes of the therapist appreciated as well as a note to the hospitalist. Patient is doing fairly well has no new complaints. Very pleased with his progress. Looking forward to discharge and is pushing to leave sooner than initial day tonight's total we'll discuss in team conference tomorrow      Objective:  VITAL SIGNS: /65   Pulse 66   Temp 36.3 °C (97.4 °F)   Resp 20   Ht 1.905 m (6' 3\")   Wt 99.5 kg (219 lb 5.7 oz)   SpO2 97%   BMI 27.42 kg/m²       GENERAL: No apparent distress  HEENT: Normocephalic/atraumatic, EOMI, PERRL and No nystagmus  CARDIAC: Regular rate and rhythm, normal S1, S2   LUNGS: Clear to auscultation   ABDOMINAL: bowel sounds present, soft, nontender and ascites present with fluid wave noted. Ascites is increased  EXTREMITIES: Without clubbing or cyanosis 2+ edema noted in the left lower extremity but decreased  SKIN:   Small stage III noted on sacrum  NEURO: Unchanged with the exception to hip flexor strength is definitely a half grade stronger bilaterally as compared to admission    Current Facility-Administered Medications   Medication Frequency   • potassium chloride SA (Kdur) tablet 20 mEq Q72HRS   • metolazone (ZAROXOLYN) tablet 2.5 mg Q72HRS   • spironolactone (ALDACTONE) tablet 25 mg Q DAY   • torsemide (DEMADEX) TABS 20 mg Q72HRS   • magnesium hydroxide (MILK OF MAGNESIA) suspension 15 mL QHS   • polyethylene glycol/lytes (MIRALAX) PACKET 1 Packet QHS   • ferrous sulfate tablet 325 mg QDAY with Breakfast   • ascorbic acid (ascorbic acid) tablet 500 mg DAILY   • cholestyramine (QUESTRAN,PREVALITE) 4 GM powder 4 g QDAY PRN   • docusate sodium (COLACE) capsule 100 mg BID PRN    And   • senna-docusate (PERICOLACE or SENOKOT S) 8.6-50 MG per tablet 1 Tab QDAY PRN    And   • lactulose 20 GM/30ML solution 30 mL Q24HRS PRN    And   • bisacodyl (DULCOLAX) suppository 10 mg QDAY PRN   • " hydrocortisone (ANUSOL-HC) suppository 25 mg Q12HRS PRN   • tamsulosin (FLOMAX) capsule 0.4 mg AFTER BREAKFAST   • Respiratory Care per Protocol Continuous RT   • Pharmacy Consult Request ...Pain Management Review 1 Each PRN   • lactobacillus granules (LACTINEX/FLORANEX) packet 1 Packet TID WITH MEALS   • ursodiol (ACTIGALL) capsule 300 mg DAILY   • vitamin D (cholecalciferol) tablet 1,000 Units DAILY   • folic acid (FOLVITE) tablet 1 mg DAILY   • atorvastatin (LIPITOR) tablet 40 mg QHS   • benzocaine-menthol (CEPACOL) lozenge 1 Lozenge Q2HRS PRN   • hydrALAZINE (APRESOLINE) tablet 25 mg Q8HRS PRN   • mag hydrox-al hydrox-simeth (MAALOX PLUS ES or MYLANTA DS) suspension 20 mL Q2HRS PRN   • ondansetron (ZOFRAN ODT) dispertab 4 mg 4X/DAY PRN    Or   • ondansetron (ZOFRAN) syringe/vial injection 4 mg 4X/DAY PRN   • trazodone (DESYREL) tablet 50 mg QHS PRN   • sodium chloride (OCEAN) 0.65 % nasal spray 2 Spray PRN   • oxycodone immediate release (ROXICODONE) tablet 10 mg Q4HRS PRN       Orders Placed This Encounter   Procedures   • Diet Order     Standing Status:   Standing     Number of Occurrences:   1     Order Specific Question:   Diet:     Answer:   Regular [1]     Order Specific Question:   Consistency/Fluid modifications:     Answer:   Thin Liquids [3]       Assessment:  Active Hospital Problems    Diagnosis   • *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)   • Left leg DVT (CMS-[facility])   • Esophageal varices- BANDED 4/2016- repeat egd tbd 10//29/16- gic   • ASCVD (arteriosclerotic cardiovascular disease)subtotalled small distal LCx and 40% LAD med rx by cath 12/13/12   • GIB (gastrointestinal bleeding)- recurrent from varices   • BPH (benign prostatic hyperplasia)   • 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 DUE TO OLD HEP B, CHEMO RX,  OR THERMO RAD OF MAGNANT LESION   • Essential hypertension   • Colon cancer-2010 left hemicolectomy; no colostomy       Medical Decision Making and Plan:  Patient is doing well at this time  Dr. Hopper continues to follow medically. He continues to follow liver functions and workup of possible malignancy    Cirrhosis/ascites    Stable at this time but will monitor. It's improved with diuresis prescribed by Dr. hopper    Hypertension       under control with Aldactone and now Inderal    Stage III pressure ulcer, on the Coccyx    Appreciate the input of the wound care nurse honey colloid applied    Vitamin D deficiency    Continue replacement    Lower extremity edema left leg    We will continue to monitor improved      Diarrhea   C. diff toxin neg  No longer an issue      UTI  Resolved    Continue PT and OT  A tentative discharge date was suggested to be September 16 2017  We'll re-Conference Tomorrow 8/30/2017          Total time:  > 25 minutes.  I spent greater than 50% of the time for patient care and coordination on this date, including unit/floor time, and face-to-face time with the patient as per assessment and plan above.    Moreno Adorno M.D.

## 2017-09-05 NOTE — REHAB-OT IDT TEAM NOTE
Occupational Therapy   Activities of Daily Living  Eating Initial:  5 - Standby Prompting/Supervision or Set-up  Eating Current:  6 - Modified Independent   Eating Description:  Increased time  Grooming Initial:  6 - Modified Independent  Grooming Current:  6 - Modified Independent   Grooming Description:  Increased time (Seated on manual wc at sinkside)  Bathing Initial:  3 - Moderate Assistance  Bathing Current:  5 - Standby Prompting/Supervision or Set-up   Bathing Description:  Adaptive equipment, Grab bar, Tub bench, Long handled bath tool, Hand held shower, Increased time, Supervision for safety, Verbal cueing, Set-up of equipment  Upper Body Dressing Initial:  4 - Minimal Assistance  Upper Body Dressing Current:  6 - Modified Independent   Upper Body Dressing Description:   (Don/doff pull over shirt)  Lower Body Dressing Initial:  2 - Max Assistance  Lower Body Dressing Current:  5 - Standby Prompting/Supervsion or Set-up   Lower Body Dressing Description:  5 - Standby Prompting/Supervsion or Set-up  Toileting Initial:  1 - Total Assistance  Toileting Current:  6 - Modified Independent   Toileting Description:  Grab bar, Increased time  Toilet Transfer Initial:  4 - Minimal Assistance  Toilet Transfer Current:  6 - Modified Independent   Toilet Transfer Description:  6 - Modified Independent  Tub / Shower Transfer Initial:  4 - Minimal Assistance  Tub / Shower Transfer Current:  5 - Standby Prompting/Supervision or Set-up   Tub / Shower Transfer Description:  Adaptive equipment, Grab bar, Increased time, Supervision for safety, Set-up of equipment  IADL:  TBD   Family Training/Education:  TBD   DME/DC Recommendations:  Shower chair, bed rail assist, reacher, sock aid, leg      Strengths:  Able to follow instructions, Alert and oriented, Effective communication skills, Good carryover of learning, Independent PLOF, Making steady progress towards goals, Motivated for self care and independence, Pleasant  and cooperative, Supportive family and Willingly participates in therapeutic activities  Barriers:  Decreased endurance, Generalized weakness, Impulsive and Poor balance     # of short term goals set= 1    # of short term goals met= 1          Occupational Therapy Goals           Problem: OT Long Term Goals     Dates: Start: 08/11/17       Goal: LTG-By discharge, patient will complete basic self care tasks     Dates: Start: 08/11/17   Expected End: 08/25/17       Description: 1) Individualized Goal:  By discharge, patient will complete basic self care tasks at a level of Mod. I.   2) Interventions:  OT Self Care/ADL, OT Neuro Re-Ed/Balance, OT Therapeutic Activity and OT Therapeutic Exercise    Co-signature:  Connor Ferraro OTR/KAITLIN              Goal: LTG-By discharge, patient will perform bathroom transfers     Dates: Start: 08/11/17   Expected End: 08/25/17       Description: 1) Individualized Goal:  By discharge, patient will perform bathroom transfers at a level of Mod. I.   2) Interventions:  OT Self Care/ADL, OT Neuro Re-Ed/Balance, OT Therapeutic Activity and OT Therapeutic Exercise    Co-signature:  Connor Ferraro OTR/KAITLIN              Goal: LTG-By discharge, patient will complete basic home management     Dates: Start: 08/11/17   Expected End: 08/25/17       Description: 1) Individualized Goal:  By discharge, patient will prepare a simple meal at a level of mod. I.   2) Interventions:  OT Self Care/ADL, OT Neuro Re-Ed/Balance, OT Therapeutic Activity and OT Therapeutic Exercise    Co-signature:  Connor Ferraro OTR/KAITLIN                Problem: Toileting     Dates: Start: 08/11/17       Goal: STG-Within one week, patient will complete toileting tasks     Dates: Start: 08/23/17       Description: 1) Individualized Goal:  Min assist  2) Interventions:  OT Self Care/ADL, OT Neuro Re-Ed/Balance, OT Therapeutic Activity, OT Evaluation and OT Therapeutic Exercise                   Section completed by:  Elisha Kessler

## 2017-09-05 NOTE — REHAB-CM IDT TEAM NOTE
Case Management          DC Planning  DC destination/dispostion:  Return home with spouse. 2 steps to enter; bath/tub shower combo.        Referrals:  As requested by pt/spouse, out patient therapy.      DC Needs:Follow up with PCP Dr. Fabián Urena/Dr. Rosales.  Pharmacy [facility] @ [facility].  Wheelchair for community use.  Pt has a fww at home, and spouse ordered grab bars and shower seat.      Barriers to discharge:         Strengths:  Motivated to return home, supportive spouse, making gains w/ therapies, positive attitude,             Section completed by:  MARI Santos, CCM

## 2017-09-06 PROCEDURE — 97530 THERAPEUTIC ACTIVITIES: CPT

## 2017-09-06 PROCEDURE — A9270 NON-COVERED ITEM OR SERVICE: HCPCS | Performed by: PHYSICAL MEDICINE & REHABILITATION

## 2017-09-06 PROCEDURE — 99232 SBSQ HOSP IP/OBS MODERATE 35: CPT | Performed by: HOSPITALIST

## 2017-09-06 PROCEDURE — A9270 NON-COVERED ITEM OR SERVICE: HCPCS | Performed by: HOSPITALIST

## 2017-09-06 PROCEDURE — 97116 GAIT TRAINING THERAPY: CPT

## 2017-09-06 PROCEDURE — 770010 HCHG ROOM/CARE - REHAB SEMI PRIVAT*

## 2017-09-06 PROCEDURE — 700102 HCHG RX REV CODE 250 W/ 637 OVERRIDE(OP): Performed by: PHYSICAL MEDICINE & REHABILITATION

## 2017-09-06 PROCEDURE — 700102 HCHG RX REV CODE 250 W/ 637 OVERRIDE(OP): Performed by: HOSPITALIST

## 2017-09-06 PROCEDURE — 97535 SELF CARE MNGMENT TRAINING: CPT

## 2017-09-06 RX ADMIN — OXYCODONE HYDROCHLORIDE 10 MG: 10 TABLET ORAL at 19:56

## 2017-09-06 RX ADMIN — TAMSULOSIN HYDROCHLORIDE 0.4 MG: 0.4 CAPSULE ORAL at 08:49

## 2017-09-06 RX ADMIN — FERROUS SULFATE TAB 325 MG (65 MG ELEMENTAL FE) 325 MG: 325 (65 FE) TAB at 08:48

## 2017-09-06 RX ADMIN — LACTOBACILLUS ACIDOPHILUS / LACTOBACILLUS BULGARICUS 1 PACKET: 100 MILLION CFU STRENGTH GRANULES at 18:25

## 2017-09-06 RX ADMIN — OXYCODONE HYDROCHLORIDE 10 MG: 10 TABLET ORAL at 00:35

## 2017-09-06 RX ADMIN — LACTOBACILLUS ACIDOPHILUS / LACTOBACILLUS BULGARICUS 1 PACKET: 100 MILLION CFU STRENGTH GRANULES at 08:48

## 2017-09-06 RX ADMIN — MAGNESIUM HYDROXIDE 15 ML: 400 SUSPENSION ORAL at 19:56

## 2017-09-06 RX ADMIN — ATORVASTATIN CALCIUM 40 MG: 40 TABLET, FILM COATED ORAL at 19:56

## 2017-09-06 RX ADMIN — VITAMIN D, TAB 1000IU (100/BT) 1000 UNITS: 25 TAB at 08:49

## 2017-09-06 RX ADMIN — SPIRONOLACTONE 25 MG: 25 TABLET, FILM COATED ORAL at 05:28

## 2017-09-06 RX ADMIN — FOLIC ACID 1 MG: 1 TABLET ORAL at 08:49

## 2017-09-06 RX ADMIN — METOLAZONE 2.5 MG: 2.5 TABLET ORAL at 19:57

## 2017-09-06 RX ADMIN — OXYCODONE HYDROCHLORIDE AND ACETAMINOPHEN 500 MG: 500 TABLET ORAL at 08:49

## 2017-09-06 RX ADMIN — LACTOBACILLUS ACIDOPHILUS / LACTOBACILLUS BULGARICUS 1 PACKET: 100 MILLION CFU STRENGTH GRANULES at 11:57

## 2017-09-06 RX ADMIN — URSODIOL 300 MG: 300 CAPSULE ORAL at 08:49

## 2017-09-06 RX ADMIN — POLYETHYLENE GLYCOL 3350 1 PACKET: 17 POWDER, FOR SOLUTION ORAL at 19:58

## 2017-09-06 ASSESSMENT — PAIN SCALES - GENERAL
PAINLEVEL_OUTOF10: 8
PAINLEVEL_OUTOF10: 8
PAINLEVEL_OUTOF10: 0

## 2017-09-06 ASSESSMENT — ENCOUNTER SYMPTOMS
DIZZINESS: 0
NAUSEA: 0
SHORTNESS OF BREATH: 0
PALPITATIONS: 0
FEVER: 0
VOMITING: 0
BLURRED VISION: 0
HALLUCINATIONS: 0
HEADACHES: 0

## 2017-09-06 ASSESSMENT — ACTIVITIES OF DAILY LIVING (ADL)
TOILET_TRANSFER_LEVEL_OF_ASSIST: REQUIRES SUPERVISION WITH TOILET TRANSFER
SHOWER_TRANSFER_LEVEL_OF_ASSIST: REQUIRES SUPERVISION WITH SHOWER TRANSFER
TOILETING_LEVEL_OF_ASSIST: ABLE TO COMPLETE TOILETING WITHOUT ASSIST

## 2017-09-06 NOTE — CARE PLAN
Problem: Bowel/Gastric:  Goal: Normal bowel function is maintained or improved    Intervention: Educate patient and significant other/support system about diet, fluid intake, medications and activity to promote bowel function  Pt is on 1500 fluid restriction. Pt had a BM today.       Problem: Pain Management  Goal: Pain level will decrease to patient's comfort goal    Intervention: Follow pain managment plan developed in collaboration with patient and Interdisciplinary Team  Pt denies pain at this time. Pt educated to notify HCP for any assistance or needs in relation to pain.

## 2017-09-06 NOTE — REHAB-COLLABORATIVE ONGOING IDT TEAM NOTE
Weekly Interdisciplinary Team Conference Note    Weekly Interdisciplinary Team Conference # 4  Date:  9/6/2017    Clinicians present and reporting at team conference include the following:   MD: Moreno Adorno MD   RN:  Jaky Bright RN   PT:   Cesar Duenas, PT, DPT  OT:  Connor Ferraro OT   ST:  Not Applicable  CM:  Malu Madden, LSW, CCM  REC:  Not Applicable  RT:  Not Applicable  RPh:  Isacc Adams, [facility]  Other:   None  All reporting clinicians have a working knowledge of this patient's plan of care.    Targeted DC Date:  9/7/2017.      Medical    Patient Active Problem List    Diagnosis Date Noted   • Left leg DVT (CMS-[facility]) 06/26/2017     Priority: High   • Esophageal varices- BANDED 4/2016- repeat egd tbd 10//29/16- gic 04/04/2016     Priority: High   • BMI 34.0-34.9,adult 02/15/2013     Priority: High   • ASCVD (arteriosclerotic cardiovascular disease)subtotalled small distal LCx and 40% LAD med rx by cath 12/13/12 12/14/2012     Priority: High   • Gastric varices- s/p BRTO procedure at [facility] 04/04/2016     Priority: Medium   • GIB (gastrointestinal bleeding)- recurrent from varices 04/03/2016     Priority: Medium   • BPH (benign prostatic hyperplasia) 08/11/2017   • 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 due to lisinopril- d/c'd 11/2016 11/18/2016   • SVT (supraventricular tachycardia) 10/27/2016   • Microcytic hypochromic anemia 10/24/2016   • History of colon cancer 10/24/2016   • History of esophageal varices 10/24/2016   • Iron deficiency anemia due to chronic blood loss 10/06/2016   • Portal vein thrombosis- on CT [facility] 2/2015 04/14/2016    • Cirrhosis of liver with ascites (HCC)- ? DUE TO OLD HEP B, CHEMO RX,  OR THERMO RAD OF MAGNANT LESION 04/14/2016   • Mixed hyperlipidemia 06/20/2013   • Essential hypertension 06/20/2013   • Thrombocytopenia due to drugs 04/25/2012   • Colon cancer-2010 left hemicolectomy; no colostomy 12/06/2010   • Liver lesion- ablation of malignant met from colon ca 2011- f/u [facility] q 6 mos 12/06/2010   • Peripheral neuropathy, secondary to drugs or chemicals 12/06/2010     Results     ** No results found for the last 24 hours. **           Nursing  Diet/Nutrition:  Regular and Thin Liquids  Medication Administration:  Whole with Liquid Wash  % consumed at meals in last 24 hours:  Consumed 100% of meals per documentation.  Breakfast:  100%   Lunch:  100%  Dinner:  100%   Snack schedule:  None  Supplement:  Boost Plus  Appetite:  Excellent  Fluid Intake/Output in past 24 hours: In: 1170 [P.O.:1170]  Out: 1300   Admit Weight:  Weight: 116.8 kg (257 lb 8 oz)  Weight Last 7 Days   [93.5 kg (206 lb 2.1 oz)-99.5 kg (219 lb 5.7 oz)] 99.5 kg (219 lb 5.7 oz) (09/05 0500)    Pain Issues:    Location:  Back (09/06 0035)  Mid;Lower (09/06 0035)         Severity:  Moderate   Scheduled pain medications:  None     PRN pain medications used in last 24 hours:  oxycodone immediate release (ROXICODONE)    Non Pharmacologic Interventions:  distraction, emotional support, relaxation and repositioned  Effectiveness of pain management plan:  fair=improved comfort with interventions but does not always meet goal    Bowel:    Bowel Assist Initial Score:  3 - Moderate Assistance  Bowel Assist Current Score:  6 - Modified Independent  Bowl Accidents in last 7 days:  0  Last bowel movement: 09/05/17  Stool: Medium, Soft     Usual bowel pattern:  daily  Scheduled bowel medications:  polyethylene glycol/lytes (MIRALAX)  and magnesium hydroxide (MILK OF MAGNESIA)   PRN bowel medications used in last 24 hours:  None  Nursing Interventions:  Increased time,  Scheduled medication, Supervision, Positioning on commode/toilet  Effectiveness of bowel program:   good=regular, predictable, controlled emptying of bowel  Bladder:    Bladder Assist Initial Score:  5 - Standby Prompting/Supervision or Set-up  Bladder Assist Current Score:  5 - Standby Prompting/Supervision or Set-up  Bladder Accidents in last 7 days:  0  PVR range for past 24-48 hours:  [77]  ()  Medications affecting bladder:  Flomax    Time void schedule/voiding pattern:  Pt initiates voiding  Interventions:  Increased time, Medication, Emptying of device, Urinal  Effectiveness of bladder training:  continent  Wound:         Patient Lines/Drains/Airways Status    Active Current Wounds     Name: Placement date: Placement time: Site: Days:    Pressure Ulcer  Stage 3 POA Coccyx Midline 08/10/17       27                   Interventions:  Monitor, apply barrier cream and repositioning  Effectiveness of intervention:  wound is improving   Sleep/wake cycle:   Average hours slept:  Sleeps 3-4 hours without waking  Sleep medication usage:  None    Patient/Family Training/Education:  Fall Prevention, General Self Care, Medication Management, Pain Management, Safe Transfers, Safety, Skin Care and Wound Care  Strengths: Alert and oriented, Willingly participates in therapeutic activities, Able to follow instructions, Supportive family, Pleasant and cooperative, Effective communication skills and Motivated for self care and independence   Barriers:   Bladder retention, Fatigue and Generalized weakness            Nursing Problems           Problem: Bowel/Gastric:     Goal: Normal bowel function is maintained or improved           Goal: Will not experience complications related to bowel motility             Problem: Communication     Goal: The ability to communicate needs accurately and effectively will improve             Problem: Discharge Barriers/Planning     Goal: Patient's continuum of care needs will be met              Problem: Fluid Volume:     Goal: Will maintain balanced intake and output             Problem: Infection     Goal: Will remain free from infection             Problem: Knowledge Deficit     Goal: Knowledge of disease process/condition, treatment plan, diagnostic tests, and medications will improve           Goal: Knowledge of the prescribed therapeutic regimen will improve             Problem: Mobility     Goal: Risk for activity intolerance will decrease             Problem: Pain Management     Goal: Pain level will decrease to patient's comfort goal             Problem: Respiratory:     Goal: Respiratory status will improve             Problem: Safety     Goal: Will remain free from injury           Goal: Will remain free from falls             Problem: Skin Integrity     Goal: Risk for impaired skin integrity will decrease             Problem: Urinary Elimination:     Goal: Ability to reestablish a normal urinary elimination pattern will improve             Problem: Venous Thromboembolism (VTW)/Deep Vein Thrombosis (DVT) Prevention:     Goal: Patient will participate in Venous Thrombosis (VTE)/Deep Vein Thrombosis (DVT)Prevention Measures                  Long Term Goals:   At discharge patient will be able to function safely at home and in the community with support.    Section completed by:  William Venegas R.N.              Mobility  Bed mobility:   SPV  Bed /Chair/Wheelchair Transfer Initial:  4 - Minimal Assistance  Bed /Chair/Wheelchair Transfer Current:  5 - Standby Prompting/Supervision or Set-up   Bed/Chair/Wheelchair Transfer Description:  Adaptive equipment, Supervision for safety, Verbal cueing, Increased time  Walk Initial:  0 - Not tested,unsafe activity  Walk Current:  5 - Standby Prompting/Supervision or Set-up   Walk Description:   (SPV, FWW, 215 feet indoors; bradykinetic, mod BUE WB onto FWW, Trendelenberg increases with fatigue)  Wheelchair Initial:  0 - Not tested,medical  "condition  Wheelchair Current:  6 - Modified Independent   Wheelchair Description:   (Mod I indoors for 200 feet to back gym)  Stairs Initial:  0 - Not tested,medical condition  Stairs Current: 1 - Total Assistance   Stairs Description:  (min A with FWW for 6\" curb and 1\" threshold)  Patient/Family Training/Education:  Ongoing with good carryover; family training today is optional  DME/DC Recommendations:  Tomorrow, 9/7/17 as long as stairs grab bars are installed; new FWW, 20\"high manual w/c with standard B legrests; home health PT (but pt prefers outpatient PT); intermittent SPV from SO  Strengths:  Able to follow instructions, Effective communication skills, Good carryover of learning, Good insight into deficits/needs, Independent PLOF, Making steady progress towards goals, Motivated for self care and independence, Pleasant and cooperative, Supportive family and Willingly participates in therapeutic activities  Barriers:   Decreased endurance, Generalized weakness, Limited mobility, Poor balance and Pressure ulcer  # of short term goals set= 4  # of short term goals met= 3       Physical Therapy Problems           Problem: Mobility     Dates: Start: 08/11/17       Goal: STG-Within one week, patient will ascend and descend four to six stairs     Dates: Start: 08/30/17       Description: 1) Individualized goal: 2 steps with a FWW and CGA  2) Interventions: PT Group Therapy, PT Gait Training, PT Therapeutic Exercises, PT Neuro Re-Ed/Balance, PT Therapeutic Activity and PT Manual Therapy.           Note:     Goal Note filed on 09/06/17 1247 by Pedrito Duenas, P.T.    Goal: STG-Within one week, patient will ascend and descend four to six stairs  Outcome: NOT MET  Went to attempt yesterday but pt is changing home setup to include two grab bars at the stairs.                     Problem: PT-Long Term Goals     Dates: Start: 08/11/17       Goal: LTG-By discharge, patient will ambulate     Dates: Start: 08/11/17    "    Description: 1) Individualized goal:  200 feet with FWW and SPV  2) Interventions:  PT Group Therapy, PT Gait Training, PT Therapeutic Exercises, PT Neuro Re-Ed/Balance, PT Therapeutic Activity and PT Manual Therapy.           Goal: LTG-By discharge, patient will transfer one surface to another     Dates: Start: 08/11/17       Description: 1) Individualized goal:  with FWW and SPV  2) Interventions:  PT Group Therapy, PT Gait Training, PT Therapeutic Exercises, PT Neuro Re-Ed/Balance, PT Therapeutic Activity and PT Manual Therapy.           Goal: LTG-By discharge, patient will ambulate up/down 4-6 stairs     Dates: Start: 08/30/17       Description: 1) Individualized goal: 2 steps with a FWW and CGA  2) Interventions: PT Group Therapy, PT Gait Training, PT Therapeutic Exercises, PT Neuro Re-Ed/Balance, PT Therapeutic Activity and PT Manual Therapy.                           Section completed by:  Pedrito Duenas P.TFerny, DPT       Activities of Daily Living  Eating Initial:  5 - Standby Prompting/Supervision or Set-up  Eating Current:  6 - Modified Independent   Eating Description:  Increased time  Grooming Initial:  6 - Modified Independent  Grooming Current:  6 - Modified Independent   Grooming Description:  Increased time (Seated on manual wc at sinkside)  Bathing Initial:  3 - Moderate Assistance  Bathing Current:  5 - Standby Prompting/Supervision or Set-up   Bathing Description:  Adaptive equipment, Grab bar, Tub bench, Long handled bath tool, Hand held shower, Increased time, Supervision for safety, Verbal cueing, Set-up of equipment  Upper Body Dressing Initial:  4 - Minimal Assistance  Upper Body Dressing Current:  6 - Modified Independent   Upper Body Dressing Description:   (Don/doff pull over shirt)  Lower Body Dressing Initial:  2 - Max Assistance  Lower Body Dressing Current:  5 - Standby Prompting/Supervsion or Set-up   Lower Body Dressing Description:  5 - Standby Prompting/Supervsion or  Set-up  Toileting Initial:  1 - Total Assistance  Toileting Current:  6 - Modified Independent   Toileting Description:  Grab bar, Increased time  Toilet Transfer Initial:  4 - Minimal Assistance  Toilet Transfer Current:  6 - Modified Independent   Toilet Transfer Description:  6 - Modified Independent  Tub / Shower Transfer Initial:  4 - Minimal Assistance  Tub / Shower Transfer Current:  5 - Standby Prompting/Supervision or Set-up   Tub / Shower Transfer Description:  Adaptive equipment, Grab bar, Increased time, Supervision for safety, Set-up of equipment  IADL:  TBD   Family Training/Education:  TBD   DME/DC Recommendations:  Shower chair, bed rail assist, reacher, sock aid, leg      Strengths:  Able to follow instructions, Alert and oriented, Effective communication skills, Good carryover of learning, Independent PLOF, Making steady progress towards goals, Motivated for self care and independence, Pleasant and cooperative, Supportive family and Willingly participates in therapeutic activities  Barriers:  Decreased endurance, Generalized weakness, Impulsive and Poor balance     # of short term goals set= 1    # of short term goals met= 1          Occupational Therapy Goals           Problem: OT Long Term Goals     Dates: Start: 08/11/17       Goal: LTG-By discharge, patient will complete basic self care tasks     Dates: Start: 08/11/17   Expected End: 08/25/17       Description: 1) Individualized Goal:  By discharge, patient will complete basic self care tasks at a level of Mod. I.   2) Interventions:  OT Self Care/ADL, OT Neuro Re-Ed/Balance, OT Therapeutic Activity and OT Therapeutic Exercise    Co-signature:  Connor Ferraro OTR/L              Goal: LTG-By discharge, patient will perform bathroom transfers     Dates: Start: 08/11/17   Expected End: 08/25/17       Description: 1) Individualized Goal:  By discharge, patient will perform bathroom transfers at a level of Mod. I.   2) Interventions:  OT Self  Care/ADL, OT Neuro Re-Ed/Balance, OT Therapeutic Activity and OT Therapeutic Exercise    Co-signature:  Connor Ferraro OTR/L              Goal: LTG-By discharge, patient will complete basic home management     Dates: Start: 08/11/17   Expected End: 08/25/17       Description: 1) Individualized Goal:  By discharge, patient will prepare a simple meal at a level of mod. I.   2) Interventions:  OT Self Care/ADL, OT Neuro Re-Ed/Balance, OT Therapeutic Activity and OT Therapeutic Exercise    Co-signature:  Connor Ferraro OTR/L                Problem: Toileting     Dates: Start: 08/11/17       Goal: STG-Within one week, patient will complete toileting tasks     Dates: Start: 08/23/17       Description: 1) Individualized Goal:  Min assist  2) Interventions:  OT Self Care/ADL, OT Neuro Re-Ed/Balance, OT Therapeutic Activity, OT Evaluation and OT Therapeutic Exercise                   Section completed by:  Elisha Kessler            Nutrition       Dietary Problems           Problem: Other Problem (see comments)     Description: Diagnosis: Increased nutrient needs (protein) r/t high demand for protein in the setting of wound healing/ maintaining LBM as evidenced by patient admitted with DTI to coccyx, visible evidence of muscle wasting.           Goal: Other Goal (Resolved)     Description: PO intake >50-75% of meals and supplements    Monitor/Evaluation: Monitor PO intake, weight, labs, medication adjustments, skin integrity, GI function, vitals, I/Os, and overall hydration status.  Adjust nutritional POC pending clinical outcomes.      RD following PRN.  PO is adequate.  Nutrition interventions in place.     Goal: Maintain adequate oral nutrient/fluid intake to promote nutrition optimization/wound healing.                   Patient seen today outside with wife.  Patient reports he is doing much better, feels much better. Dr. Hopper notes early satiety d/t enlarged spleen.  Patient, however, is drinking Boost Plus, eating well.   He looks much better than the last time I saw him.  He is upbeat.  Labs reviewed and noted. Wt trending down on Demadex q 48hours and Aldactone daily.  Other medications reviewed and noted. Consider increasing vitamin C to 1000mg daily, MVI+ minerals to optimize healing as patient with stage 3 to coccyx. No wound team note since 8/11. Patient has no GI complaints. BMs routinely. PO adequate at % of meals + Boost Plus supplementation.  Patient is rather pale upon physical assessment.  Getting iron supplementation. Vitamin D level WNL.  Continue current nutritional POC.  RD following PRN.    Section completed by:  Clarisa Paulino RD     REHAB-Pharmacy IDT Team Note by Gertrudis Thompson RPH at 9/5/2017 11:50 AM  Version 1 of 1    Author:  Gertrudis Thompson RPH Service:  (none) Author Type:  Pharmacist    Filed:  9/5/2017 11:56 AM Date of Service:  9/5/2017 11:50 AM Status:  Signed    :  Gertrudis Thompson RPH (Pharmacist)         Pharmacy   Pharmacy  Antibiotics/Antifungals/Antivirals:  Medication:      Active Orders     None        Route:         n/a  Stop Date:  n/a  Reason:   Antihypertensives/Cardiac:  Medication:    Orders (72h ago through future)    Start     Ordered    09/01/17 0527  torsemide (DEMADEX) TABS 20 mg  EVERY 72 HOURS      08/29/17 1941 08/31/17 2127  metolazone (ZAROXOLYN) tablet 2.5 mg  EVERY 72 HOURS     Comments:  GIVE 40 MINUTES PRIOR TO DEMEDEX EVERY OTHER DAY.    08/29/17 1939 08/30/17 0600  spironolactone (ALDACTONE) tablet 25 mg  Q DAY      08/29/17 1939 08/20/17 0000  cholestyramine (QUESTRAN,PREVALITE) 4 GM powder 4 g  1 TIME DAILY PRN      08/17/17 1046    08/10/17 2100  atorvastatin (LIPITOR) tablet 40 mg  EVERY BEDTIME      08/10/17 1457    08/10/17 1456  hydrALAZINE (APRESOLINE) tablet 25 mg  EVERY 8 HOURS PRN      08/10/17 1457        Patient Vitals for the past 24 hrs:   BP Pulse   09/05/17 0700 111/65 66   09/05/17 0532 111/61 -   09/04/17 1900 103/59 68    09/04/17 1310 102/66 90       Anticoagulation:    Medication:  N/a    INR:    Recent Labs      09/04/17   0532   INR  1.28*     Other key medications: tamsulosin, trazodone  A review of the medication list reveals no issues at this time. Patient is currently on antihypertensive(s). Recommend home blood pressure monitoring/recording if antihypertensive(s) regimen(s) continue.  Section completed by:  Gertrudis Thompson RP[TK.1]        Attribution Key     TK.1 - Gertrudis Thompson [facility] on 9/5/2017 11:50 AM                          DC Planning  DC destination/dispostion:  Return home with spouse. 2 steps to enter; bath/tub shower combo.        Referrals:  As requested by pt/spouse, out patient therapy.      DC Needs:Follow up with PCP Dr. Fabián Urena/Dr. Rosales.  Pharmacy [facility] @ [facility].  Wheelchair for community use.  Pt has a fww at home, and spouse ordered grab bars and shower seat.      Barriers to discharge:         Strengths:  Motivated to return home, supportive spouse, making gains w/ therapies, positive attitude,             Section completed by:  MARI Santos, [facility]   Out patient PT/OT.  Railings installed on both side of entrance into home. Gait 215 feet w / fww. Stairs cga-sba w/ railings, transfers sba-sup w/ fww.  Spouse has been w/ pt during his stay at Rehab-she is very involved.  Training review to be completed w/ therapy.     Physician Summary  Moreno Adorno MD participated and led team conference discussion.

## 2017-09-06 NOTE — PROGRESS NOTES
"      Rehab Progress Note     Interval Events (Subjective)    CC:   Follow-up AIDP     Patient was seen in his room he is thrilled to be going home tomorrow. He is currently optimistic about his course very pleased with his progress so far. Patient was discussed at team conference I discussed the need for having the bars in place before he goes home with his stairs and is sure they'll be taking care of        Eating 100%  Back pain  erwin for pain  Mod I for bowel  Bladder SBA  On flomax  Sleeps well        Ok to leave tomorrow  If he gets bars next to stairs  215 feet with DQQ moderate WB on walker no LOB  Slow steady  Transfers SBA with walker  Can be at supervision level    Wife can help    DME FWW and wheelchair hopefully short term  Disability plates    Doing pretty well supervision to SBA for all transfers  Little variable depending on fatigue  Steady this am    Discharge tomorrow if bars in place berfore stairs    Outpatietn PT and OT          Objective:  VITAL SIGNS: /60   Pulse 62   Temp 36.5 °C (97.7 °F)   Resp 18   Ht 1.905 m (6' 3\")   Wt 101.5 kg (223 lb 12.3 oz)   SpO2 95%   BMI 27.97 kg/m²       GENERAL: No apparent distress  HEENT: Normocephalic/atraumatic, EOMI, PERRL and No nystagmus  CARDIAC: Regular rate and rhythm, normal S1, S2   LUNGS: Clear to auscultation   ABDOMINAL: bowel sounds present, soft, nontender and ascites present with fluid wave noted. Ascites is increased  EXTREMITIES: Without clubbing or cyanosis 2+ edema noted in the left lower extremity but decreased  SKIN:   Small stage III noted on sacrum  NEURO: Unchanged with the exception to hip flexor strength is definitely a half grade stronger bilaterally as compared to admission    Current Facility-Administered Medications   Medication Frequency   • potassium chloride SA (Kdur) tablet 20 mEq Q72HRS   • metolazone (ZAROXOLYN) tablet 2.5 mg Q72HRS   • spironolactone (ALDACTONE) tablet 25 mg Q DAY   • torsemide (DEMADEX) TABS " 20 mg Q72HRS   • magnesium hydroxide (MILK OF MAGNESIA) suspension 15 mL QHS   • polyethylene glycol/lytes (MIRALAX) PACKET 1 Packet QHS   • ferrous sulfate tablet 325 mg QDAY with Breakfast   • ascorbic acid (ascorbic acid) tablet 500 mg DAILY   • cholestyramine (QUESTRAN,PREVALITE) 4 GM powder 4 g QDAY PRN   • docusate sodium (COLACE) capsule 100 mg BID PRN    And   • senna-docusate (PERICOLACE or SENOKOT S) 8.6-50 MG per tablet 1 Tab QDAY PRN    And   • lactulose 20 GM/30ML solution 30 mL Q24HRS PRN    And   • bisacodyl (DULCOLAX) suppository 10 mg QDAY PRN   • hydrocortisone (ANUSOL-HC) suppository 25 mg Q12HRS PRN   • tamsulosin (FLOMAX) capsule 0.4 mg AFTER BREAKFAST   • Respiratory Care per Protocol Continuous RT   • Pharmacy Consult Request ...Pain Management Review 1 Each PRN   • lactobacillus granules (LACTINEX/FLORANEX) packet 1 Packet TID WITH MEALS   • ursodiol (ACTIGALL) capsule 300 mg DAILY   • vitamin D (cholecalciferol) tablet 1,000 Units DAILY   • folic acid (FOLVITE) tablet 1 mg DAILY   • atorvastatin (LIPITOR) tablet 40 mg QHS   • benzocaine-menthol (CEPACOL) lozenge 1 Lozenge Q2HRS PRN   • hydrALAZINE (APRESOLINE) tablet 25 mg Q8HRS PRN   • mag hydrox-al hydrox-simeth (MAALOX PLUS ES or MYLANTA DS) suspension 20 mL Q2HRS PRN   • ondansetron (ZOFRAN ODT) dispertab 4 mg 4X/DAY PRN    Or   • ondansetron (ZOFRAN) syringe/vial injection 4 mg 4X/DAY PRN   • trazodone (DESYREL) tablet 50 mg QHS PRN   • sodium chloride (OCEAN) 0.65 % nasal spray 2 Spray PRN   • oxycodone immediate release (ROXICODONE) tablet 10 mg Q4HRS PRN       Orders Placed This Encounter   Procedures   • Diet Order     Standing Status:   Standing     Number of Occurrences:   1     Order Specific Question:   Diet:     Answer:   Regular [1]     Order Specific Question:   Consistency/Fluid modifications:     Answer:   Thin Liquids [3]       Assessment:  Active Hospital Problems    Diagnosis   • *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 DUE TO OLD HEP B, CHEMO RX,  OR THERMO RAD OF MAGNANT LESION   • Essential hypertension   • Colon cancer-2010 left hemicolectomy; no colostomy       Medical Decision Making and Plan:  Patient is doing well at this time  Hospitalist continues to follow medically.   I spoke to Dr. 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 It's improved with diuresis prescribed by the hospitalist    Hypertension       under control with Aldactone and now Inderal    Stage III pressure ulcer, on the Coccyx    Appreciate the input of the wound care nurse honey colloid applied    Vitamin D deficiency    Continue replacement    Lower extremity edema left leg    We will continue to monitor improved      Diarrhea   C. diff toxin neg  No longer an issue          Continue P T and OT  Patient wishes to be discharged tomorrow and it will  be an effective and safe discharge plan    Team Conference       Nursing  Diet/Nutrition:  Regular and Thin Liquids  Medication Administration:  Whole with Liquid Wash  % consumed at meals in last 24 hours:  Consumed 100% of meals per documentation.  Breakfast:  100%   Lunch:  100%  Dinner:  100%   Snack schedule:   None  Supplement:  Boost Plus  Appetite:  Excellent  Fluid Intake/Output in past 24 hours: In: 1170 [P.O.:1170]  Out: 1300   Admit Weight:  Weight: 116.8 kg (257 lb 8 oz)  Weight Last 7 Days   [93.5 kg (206 lb 2.1 oz)-99.5 kg (219 lb 5.7 oz)] 99.5 kg (219 lb 5.7 oz) (09/05 0500)     Pain Issues:    Location:  Back (09/06 0035)  Mid;Lower (09/06 0035)         Severity:  Moderate   Scheduled pain medications:  None     PRN pain medications used in last 24 hours:  oxycodone immediate release (ROXICODONE)    Non Pharmacologic Interventions:  distraction, emotional support, relaxation and repositioned  Effectiveness of pain management plan:  fair=improved comfort with interventions but does not always meet goal     Bowel:    Bowel Assist Initial Score:  3 - Moderate Assistance  Bowel Assist Current Score:  6 - Modified Independent  Bowl Accidents in last 7 days:  0  Last bowel movement: 09/05/17  Stool: Medium, Soft     Usual bowel pattern:  daily  Scheduled bowel medications:  polyethylene glycol/lytes (MIRALAX)  and magnesium hydroxide (MILK OF MAGNESIA)   PRN bowel medications used in last 24 hours:  None  Nursing Interventions:  Increased time, Scheduled medication, Supervision, Positioning on commode/toilet  Effectiveness of bowel program:   good=regular, predictable, controlled emptying of bowel  Bladder:    Bladder Assist Initial Score:  5 - Standby Prompting/Supervision or Set-up  Bladder Assist Current Score:  5 - Standby Prompting/Supervision or Set-up  Bladder Accidents in last 7 days:  0  PVR range for past 24-48 hours:  [77]  ()  Medications affecting bladder:  Flomax    Time void schedule/voiding pattern:  Pt initiates voiding  Interventions:  Increased time, Medication, Emptying of device, Urinal  Effectiveness of bladder training:  continent  Wound:         Patient Lines/Drains/Airways Status             Active Current Wounds                Name: Placement date: Placement time: Site: Days:     Pressure  Ulcer  Stage 3 POA Coccyx Midline 08/10/17        27                     Interventions:  Monitor, apply barrier cream and repositioning  Effectiveness of intervention:  wound is improving   Sleep/wake cycle:   Average hours slept:  Sleeps 3-4 hours without waking  Sleep medication usage:  None     Patient/Family Training/Education:  Fall Prevention, General Self Care, Medication Management, Pain Management, Safe Transfers, Safety, Skin Care and Wound Care  Strengths: Alert and oriented, Willingly participates in therapeutic activities, Able to follow instructions, Supportive family, Pleasant and cooperative, Effective communication skills and Motivated for self care and independence   Barriers:   Bladder retention, Fatigue and Generalized weakness                Nursing Problems                   Problem: Bowel/Gastric:      Goal: Normal bowel function is maintained or improved              Goal: Will not experience complications related to bowel motility                    Problem: Communication      Goal: The ability to communicate needs accurately and effectively will improve                        Problem: Discharge Barriers/Planning      Goal: Patient's continuum of care needs will be met                        Problem: Fluid Volume:      Goal: Will maintain balanced intake and output                        Problem: Infection      Goal: Will remain free from infection                        Problem: Knowledge Deficit      Goal: Knowledge of disease process/condition, treatment plan, diagnostic tests, and medications will improve              Goal: Knowledge of the prescribed therapeutic regimen will improve                        Problem: Mobility      Goal: Risk for activity intolerance will decrease                        Problem: Pain Management      Goal: Pain level will decrease to patient's comfort goal                        Problem: Respiratory:      Goal: Respiratory status will improve               "      Problem: Safety      Goal: Will remain free from injury              Goal: Will remain free from falls                        Problem: Skin Integrity      Goal: Risk for impaired skin integrity will decrease                        Problem: Urinary Elimination:      Goal: Ability to reestablish a normal urinary elimination pattern will improve                        Problem: Venous Thromboembolism (VTW)/Deep Vein Thrombosis (DVT) Prevention:      Goal: Patient will participate in Venous Thrombosis (VTE)/Deep Vein Thrombosis (DVT)Prevention Measures                      Long Term Goals:   At discharge patient will be able to function safely at home and in the community with support.     Section completed by:  William Venegas R.N.               Mobility  Bed mobility:   SPV  Bed /Chair/Wheelchair Transfer Initial:  4 - Minimal Assistance  Bed /Chair/Wheelchair Transfer Current:  5 - Standby Prompting/Supervision or Set-up                       Bed/Chair/Wheelchair Transfer Description:  Adaptive equipment, Supervision for safety, Verbal cueing, Increased time  Walk Initial:  0 - Not tested,unsafe activity  Walk Current:  5 - Standby Prompting/Supervision or Set-up                       Walk Description:   (SPV, FWW, 215 feet indoors; bradykinetic, mod BUE WB onto FWW, Trendelenberg increases with fatigue)  Wheelchair Initial:  0 - Not tested,medical condition  Wheelchair Current:  6 - Modified Independent                       Wheelchair Description:   (Mod I indoors for 200 feet to back gym)  Stairs Initial:  0 - Not tested,medical condition  Stairs Current: 1 - Total Assistance                       Stairs Description:  (min A with FWW for 6\" curb and 1\" threshold)  Patient/Family Training/Education:  Ongoing with good carryover; family training today is optional  DME/DC Recommendations:  Tomorrow, 9/7/17 as long as stairs grab bars are installed; new FWW, 20\"high manual w/c with standard B legrests; home " health PT (but pt prefers outpatient PT); intermittent SPV from SO  Strengths:  Able to follow instructions, Effective communication skills, Good carryover of learning, Good insight into deficits/needs, Independent PLOF, Making steady progress towards goals, Motivated for self care and independence, Pleasant and cooperative, Supportive family and Willingly participates in therapeutic activities  Barriers:   Decreased endurance, Generalized weakness, Limited mobility, Poor balance and Pressure ulcer  # of short term goals set= 4  # of short term goals met= 3           Physical Therapy Problems                     Problem: Mobility      Dates: Start: 08/11/17                    Goal: STG-Within one week, patient will ascend and descend four to six stairs             Dates: Start: 08/30/17          Description: 1) Individualized goal: 2 steps with a FWW and CGA  2) Interventions: PT Group Therapy, PT Gait Training, PT Therapeutic Exercises, PT Neuro Re-Ed/Balance, PT Therapeutic Activity and PT Manual Therapy.                           Note:              Goal Note filed on 09/06/17 1247 by Pedrito Duenas, P.T.       Goal: STG-Within one week, patient will ascend and descend four to six stairs  Outcome: NOT MET  Went to attempt yesterday but pt is changing home setup to include two grab bars at the stairs.                                     Problem: PT-Long Term Goals      Dates: Start: 08/11/17              Goal: LTG-By discharge, patient will ambulate            Dates: Start: 08/11/17         Description: 1) Individualized goal:  200 feet with FWW and SPV  2) Interventions:  PT Group Therapy, PT Gait Training, PT Therapeutic Exercises, PT Neuro Re-Ed/Balance, PT Therapeutic Activity and PT Manual Therapy.                     Goal: LTG-By discharge, patient will transfer one surface to another            Dates: Start: 08/11/17         Description: 1) Individualized goal:  with FWW and SPV  2) Interventions:  PT  Group Therapy, PT Gait Training, PT Therapeutic Exercises, PT Neuro Re-Ed/Balance, PT Therapeutic Activity and PT Manual Therapy.                     Goal: LTG-By discharge, patient will ambulate up/down 4-6 stairs            Dates: Start: 08/30/17         Description: 1) Individualized goal: 2 steps with a FWW and CGA  2) Interventions: PT Group Therapy, PT Gait Training, PT Therapeutic Exercises, PT Neuro Re-Ed/Balance, PT Therapeutic Activity and PT Manual Therapy.                                 Section completed by:  Pedrito Duenas PFernyTFerny, DPT        Activities of Daily Living  Eating Initial:  5 - Standby Prompting/Supervision or Set-up  Eating Current:  6 - Modified Independent                       Eating Description:  Increased time  Grooming Initial:  6 - Modified Independent  Grooming Current:  6 - Modified Independent                       Grooming Description:  Increased time (Seated on manual wc at sinkside)  Bathing Initial:  3 - Moderate Assistance  Bathing Current:  5 - Standby Prompting/Supervision or Set-up                       Bathing Description:  Adaptive equipment, Grab bar, Tub bench, Long handled bath tool, Hand held shower, Increased time, Supervision for safety, Verbal cueing, Set-up of equipment  Upper Body Dressing Initial:  4 - Minimal Assistance  Upper Body Dressing Current:  6 - Modified Independent                       Upper Body Dressing Description:   (Don/doff pull over shirt)  Lower Body Dressing Initial:  2 - Max Assistance  Lower Body Dressing Current:  5 - Standby Prompting/Supervsion or Set-up                       Lower Body Dressing Description:  5 - Standby Prompting/Supervsion or Set-up  Toileting Initial:  1 - Total Assistance  Toileting Current:  6 - Modified Independent                       Toileting Description:  Grab bar, Increased time  Toilet Transfer Initial:  4 - Minimal Assistance  Toilet Transfer Current:  6 - Modified Independent                        Toilet Transfer Description:  6 - Modified Independent  Tub / Shower Transfer Initial:  4 - Minimal Assistance  Tub / Shower Transfer Current:  5 - Standby Prompting/Supervision or Set-up                       Tub / Shower Transfer Description:  Adaptive equipment, Grab bar, Increased time, Supervision for safety, Set-up of equipment  IADL:  TBD   Family Training/Education:  TBD   DME/DC Recommendations:  Shower chair, bed rail assist, reacher, sock aid, leg       Strengths:  Able to follow instructions, Alert and oriented, Effective communication skills, Good carryover of learning, Independent PLOF, Making steady progress towards goals, Motivated for self care and independence, Pleasant and cooperative, Supportive family and Willingly participates in therapeutic activities  Barriers:  Decreased endurance, Generalized weakness, Impulsive and Poor balance     # of short term goals set= 1    # of short term goals met= 1              Occupational Therapy Goals                     Problem: OT Long Term Goals      Dates: Start: 08/11/17                   Goal: LTG-By discharge, patient will complete basic self care tasks            Dates: Start: 08/11/17   Expected End: 08/25/17         Description: 1) Individualized Goal:  By discharge, patient will complete basic self care tasks at a level of Mod. I.   2) Interventions:  OT Self Care/ADL, OT Neuro Re-Ed/Balance, OT Therapeutic Activity and OT Therapeutic Exercise     Co-signature:  Connor Ferraro OTR/KAITLIN                             Goal: LTG-By discharge, patient will perform bathroom transfers      Dates: Start: 08/11/17   Expected End: 08/25/17         Description: 1) Individualized Goal:  By discharge, patient will perform bathroom transfers at a level of Mod. I.   2) Interventions:  OT Self Care/ADL, OT Neuro Re-Ed/Balance, OT Therapeutic Activity and OT Therapeutic Exercise     Co-signature:  Connor Ferraro OTR/KAITLIN                             Goal: LTG-By discharge,  patient will complete basic home management            Dates: Start: 08/11/17   Expected End: 08/25/17         Description: 1) Individualized Goal:  By discharge, patient will prepare a simple meal at a level of mod. I.   2) Interventions:  OT Self Care/ADL, OT Neuro Re-Ed/Balance, OT Therapeutic Activity and OT Therapeutic Exercise     Co-signature:  Connor Ferraro, OTR/L                         Problem: Toileting      Dates: Start: 08/11/17              Goal: STG-Within one week, patient will complete toileting tasks            Dates: Start: 08/23/17         Description: 1) Individualized Goal:  Min assist  2) Interventions:  OT Self Care/ADL, OT Neuro Re-Ed/Balance, OT Therapeutic Activity, OT Evaluation and OT Therapeutic Exercise                         Section completed by:  Elisha Kessler              Nutrition            Dietary Problems           Problem: Other Problem (see comments)      Description: Diagnosis: Increased nutrient needs (protein) r/t high demand for protein in the setting of wound healing/ maintaining LBM as evidenced by patient admitted with DTI to cocc, visible evidence of muscle wasting.              Goal: Other Goal (Resolved)       Description: PO intake >50-75% of meals and supplements     Monitor/Evaluation: Monitor PO intake, weight, labs, medication adjustments, skin integrity, GI function, vitals, I/Os, and overall hydration status.  Adjust nutritional POC pending clinical outcomes.       RD following PRN.  PO is adequate.  Nutrition interventions in place.      Goal: Maintain adequate oral nutrient/fluid intake to promote nutrition optimization/wound healing.                       Patient seen today outside with wife.  Patient reports he is doing much better, feels much better. Dr. Hopper notes early satiety d/t enlarged spleen.  Patient, however, is drinking Boost Plus, eating well.  He looks much better than the last time I saw him.  He is upbeat.  Labs reviewed and noted. Wt  trending down on Demadex q 48hours and Aldactone daily.  Other medications reviewed and noted. Consider increasing vitamin C to 1000mg daily, MVI+ minerals to optimize healing as patient with stage 3 to coccyx. No wound team note since 8/11. Patient has no GI complaints. BMs routinely. PO adequate at % of meals + Boost Plus supplementation.  Patient is rather pale upon physical assessment.  Getting iron supplementation. Vitamin D level WNL.  Continue current nutritional POC.  RD following PRN.    Section completed by:  Clarisa Paulino RD            [facility]-Pharmacy IDT Team Note by Gertrudis Thompson RPH at 9/5/2017 11:50 AM  Version 1 of 1     Author:  Gertrudis Thompson RPH Service:  (none) Author Type:  Pharmacist     Filed:  9/5/2017 11:56 AM Date of Service:  9/5/2017 11:50 AM Status:  Signed     :  Gertrudis Thompson RPH (Pharmacist)            Pharmacy   Pharmacy  Antibiotics/Antifungals/Antivirals:  Medication:          Active Orders      None          Route:         n/a  Stop Date:  n/a  Reason:   Antihypertensives/Cardiac:  Medication:                Orders (72h ago through future)                  Start     Ordered      09/01/17 0527   torsemide (DEMADEX) TABS 20 mg  EVERY 72 HOURS       08/29/17 1941 08/31/17 2127   metolazone (ZAROXOLYN) tablet 2.5 mg  EVERY 72 HOURS     Comments:  GIVE 40 MINUTES PRIOR TO DEMEDEX EVERY OTHER DAY.     08/29/17 1939 08/30/17 0600   spironolactone (ALDACTONE) tablet 25 mg  Q DAY       08/29/17 1939     08/20/17 0000   cholestyramine (QUESTRAN,PREVALITE) 4 GM powder 4 g  1 TIME DAILY PRN       08/17/17 1046     08/10/17 2100   atorvastatin (LIPITOR) tablet 40 mg  EVERY BEDTIME       08/10/17 1457     08/10/17 1456   hydrALAZINE (APRESOLINE) tablet 25 mg  EVERY 8 HOURS PRN       08/10/17 1457          Patient Vitals for the past 24 hrs:    BP Pulse   09/05/17 0700 111/65 66   09/05/17 0532 111/61 -   09/04/17 1900 103/59 68   09/04/17 1310 102/66 90          Anticoagulation:    Medication:  N/a     INR:        Recent Labs      09/04/17   0532   INR  1.28*      Other key medications: tamsulosin, trazodone  A review of the medication list reveals no issues at this time. Patient is currently on antihypertensive(s). Recommend home blood pressure monitoring/recording if antihypertensive(s) regimen(s) continue.  Section completed by:  Gertrudis Thompson [facility][[email or web address]]          Attribution Bundy     TK.1 - Gertrudis Thompson [facility] on 9/5/2017 11:50 AM                              DC Planning  DC destination/dispostion:  Return home with spouse. 2 steps to enter; bath/tub shower combo.        Referrals:  As requested by pt/spouse, out patient therapy.      DC Needs:Follow up with PCP Dr. Fabián Urena/Dr. Rosales.  Pharmacy [facility] @ [facility].  Wheelchair for community use.  Pt has a fww at home, and spouse ordered grab bars and shower seat.       Barriers to discharge:         Strengths:  Motivated to return home, supportive spouse, making gains w/ therapies, positive attitude,             Section completed by:  MARI Santos, [facility]   Out patient PT/OT.  Railings installed on both side of entrance into home. Gait 215 feet w / fww. Stairs cga-sba w/ railings, transfers sba-sup w/ fww.  Spouse has been w/ pt during his stay at Rehab-she is very involved.  Training review to be completed w/ therapy.      Physician Summary  I participated and led team conference discussion.        Total time:  > 35 minutes.  I spent greater than 50% of the time for patient care and coordination on this date, including unit/floor time, and face-to-face time with the patient as per assessment and plan above.    Moreno Adorno M.D.

## 2017-09-06 NOTE — DISCHARGE PLANNING
CASE MANAGEMENT/   I met with pt and reynaldose providing an update from team conference, plan of care, and confirmed dc date of Thursday 9/7 with out patient PT/OT as requested by PT/OT.    A 20 inch wheelchair and a FWW will be ordered for pt.  Spouse to obtain shower chair/grab bars/railings to be installed @ stair to enter home.   Pt also has a disabled parking permit.  Referral made to [facility] for PT and [address] [place] [address] Location for OT.     Follow up appts made w/ PCP and Dr. Rosales.   Scripts to be sent to [facility] @ [place].   Spouse to transport home.   Dc planned for Thursday 9/7.

## 2017-09-06 NOTE — PROGRESS NOTES
"      Rehab Progress Note     Interval Events (Subjective)    CC:   Follow-up AIDP     slow steady progress continues. Notes of the therapists and the hospitalist appreciated patient is medically fairly stable at this time. He has noted complaints he was observed in therapy and overall is in very good spirits    Objective:  VITAL SIGNS: /65   Pulse 66   Temp 36.3 °C (97.4 °F)   Resp 20   Ht 1.905 m (6' 3\")   Wt 99.5 kg (219 lb 5.7 oz)   SpO2 97%   BMI 27.42 kg/m²       GENERAL: No apparent distress  HEENT: Normocephalic/atraumatic, EOMI, PERRL and No nystagmus  CARDIAC: Regular rate and rhythm, normal S1, S2   LUNGS: Clear to auscultation   ABDOMINAL: bowel sounds present, soft, nontender and ascites present with fluid wave noted. Ascites is increased  EXTREMITIES: Without clubbing or cyanosis 2+ edema noted in the left lower extremity but decreased  SKIN:   Small stage III noted on sacrum  NEURO: Unchanged with the exception to hip flexor strength is definitely a half grade stronger bilaterally as compared to admission    Current Facility-Administered Medications   Medication Frequency   • potassium chloride SA (Kdur) tablet 20 mEq Q72HRS   • metolazone (ZAROXOLYN) tablet 2.5 mg Q72HRS   • spironolactone (ALDACTONE) tablet 25 mg Q DAY   • torsemide (DEMADEX) TABS 20 mg Q72HRS   • magnesium hydroxide (MILK OF MAGNESIA) suspension 15 mL QHS   • polyethylene glycol/lytes (MIRALAX) PACKET 1 Packet QHS   • ferrous sulfate tablet 325 mg QDAY with Breakfast   • ascorbic acid (ascorbic acid) tablet 500 mg DAILY   • cholestyramine (QUESTRAN,PREVALITE) 4 GM powder 4 g QDAY PRN   • docusate sodium (COLACE) capsule 100 mg BID PRN    And   • senna-docusate (PERICOLACE or SENOKOT S) 8.6-50 MG per tablet 1 Tab QDAY PRN    And   • lactulose 20 GM/30ML solution 30 mL Q24HRS PRN    And   • bisacodyl (DULCOLAX) suppository 10 mg QDAY PRN   • hydrocortisone (ANUSOL-HC) suppository 25 mg Q12HRS PRN   • tamsulosin (FLOMAX) " capsule 0.4 mg AFTER BREAKFAST   • Respiratory Care per Protocol Continuous RT   • Pharmacy Consult Request ...Pain Management Review 1 Each PRN   • lactobacillus granules (LACTINEX/FLORANEX) packet 1 Packet TID WITH MEALS   • ursodiol (ACTIGALL) capsule 300 mg DAILY   • vitamin D (cholecalciferol) tablet 1,000 Units DAILY   • folic acid (FOLVITE) tablet 1 mg DAILY   • atorvastatin (LIPITOR) tablet 40 mg QHS   • benzocaine-menthol (CEPACOL) lozenge 1 Lozenge Q2HRS PRN   • hydrALAZINE (APRESOLINE) tablet 25 mg Q8HRS PRN   • mag hydrox-al hydrox-simeth (MAALOX PLUS ES or MYLANTA DS) suspension 20 mL Q2HRS PRN   • ondansetron (ZOFRAN ODT) dispertab 4 mg 4X/DAY PRN    Or   • ondansetron (ZOFRAN) syringe/vial injection 4 mg 4X/DAY PRN   • trazodone (DESYREL) tablet 50 mg QHS PRN   • sodium chloride (OCEAN) 0.65 % nasal spray 2 Spray PRN   • oxycodone immediate release (ROXICODONE) tablet 10 mg Q4HRS PRN       Orders Placed This Encounter   Procedures   • Diet Order     Standing Status:   Standing     Number of Occurrences:   1     Order Specific Question:   Diet:     Answer:   Regular [1]     Order Specific Question:   Consistency/Fluid modifications:     Answer:   Thin Liquids [3]       Assessment:  Active Hospital Problems    Diagnosis   • *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 DUE TO OLD HEP B, CHEMO RX,  OR THERMO RAD OF MAGNANT  LESION   • Essential hypertension   • Colon cancer-2010 left hemicolectomy; no colostomy       Medical Decision Making and Plan:  Patient is doing well at this time  Dr. Hopper continues to follow medically.   I spoke to Dr. 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 It's improved with diuresis prescribed by Dr. hopper    Hypertension       under control with Aldactone and now Inderal    Stage III pressure ulcer, on the Coccyx    Appreciate the input of the wound care nurse honey colloid applied    Vitamin D deficiency    Continue replacement    Lower extremity edema left leg    We will continue to monitor improved      Diarrhea   C. diff toxin neg  No longer an issue      UTI  response to    Continue P T and OT  At Team conference we decided to move discharge up to 9/9/2017  We'll re-conference next Wednesday, 9/5/2017        Total time:  > 25 minutes.  I spent greater than 50% of the time for patient care and coordination on this date, including unit/floor time, and face-to-face time with the patient as per assessment and plan above.    Moreno Adorno M.D.

## 2017-09-06 NOTE — DISCHARGE PLANNING
Case Management Discharge Instructions for Torey Lima  Discharge Date:  Thursday  - Completed by Case Mgmt   Discharge Location: Home with spouse with outpatient services.      Out Patient Therapy: [facility] Out Patient Therapies:   For Physical Therapy: [facility] Location    [address] [place].  Please call to schedule follow up appointment.    For Occupational Therapy: [address] Location   [address] [place], [place] [address] [place].  Please call to scheduled follow up appointment.      Durable Medical Equipment:   A front wheeled walker and a wheelchair with cushion [facility] . They will deliver items to you at [facility] prior to discharge.      Prescriptions Electronically sent to:     Other:    [facility] @ [facility].     Patient has a Disabled Parking Permit.              Follow-up With Time/Date Contact Info   Fabián Urena M.D.  Primary Care 9/19/2017 Tuesday @ 2:40pm.. [address] [place] [address]  [phone]     BEN Rosales M.D.  Neurologist 10/3/2017 Tuesday @ 8:45am   [address] [place] [address]  [phone]

## 2017-09-06 NOTE — REHAB-PT IDT TEAM NOTE
"Physical Therapy   Mobility  Bed mobility:   SPV  Bed /Chair/Wheelchair Transfer Initial:  4 - Minimal Assistance  Bed /Chair/Wheelchair Transfer Current:  5 - Standby Prompting/Supervision or Set-up   Bed/Chair/Wheelchair Transfer Description:  Adaptive equipment, Supervision for safety, Verbal cueing, Increased time  Walk Initial:  0 - Not tested,unsafe activity  Walk Current:  5 - Standby Prompting/Supervision or Set-up   Walk Description:   (SPV, FWW, 215 feet indoors; bradykinetic, mod BUE WB onto FWW, Trendelenberg increases with fatigue)  Wheelchair Initial:  0 - Not tested,medical condition  Wheelchair Current:  6 - Modified Independent   Wheelchair Description:   (Mod I indoors for 200 feet to back gym)  Stairs Initial:  0 - Not tested,medical condition  Stairs Current: 1 - Total Assistance   Stairs Description:  (min A with FWW for 6\" curb and 1\" threshold)  Patient/Family Training/Education:  Ongoing with good carryover; family training today is optional  DME/DC Recommendations:  Tomorrow, 9/7/17 as long as stairs grab bars are installed; new FWW, 20\"high manual w/c with standard B legrests; home health PT (but pt prefers outpatient PT); intermittent SPV from SO  Strengths:  Able to follow instructions, Effective communication skills, Good carryover of learning, Good insight into deficits/needs, Independent PLOF, Making steady progress towards goals, Motivated for self care and independence, Pleasant and cooperative, Supportive family and Willingly participates in therapeutic activities  Barriers:   Decreased endurance, Generalized weakness, Limited mobility, Poor balance and Pressure ulcer  # of short term goals set= 4  # of short term goals met= 3       Physical Therapy Problems           Problem: Mobility     Dates: Start: 08/11/17       Goal: STG-Within one week, patient will ascend and descend four to six stairs     Dates: Start: 08/30/17       Description: 1) Individualized goal: 2 steps with a FWW " and CGA  2) Interventions: PT Group Therapy, PT Gait Training, PT Therapeutic Exercises, PT Neuro Re-Ed/Balance, PT Therapeutic Activity and PT Manual Therapy.           Note:     Goal Note filed on 09/06/17 1247 by Pedrito Duenas P.T.    Goal: STG-Within one week, patient will ascend and descend four to six stairs  Outcome: NOT MET  Went to attempt yesterday but pt is changing home setup to include two grab bars at the stairs.                     Problem: PT-Long Term Goals     Dates: Start: 08/11/17       Goal: LTG-By discharge, patient will ambulate     Dates: Start: 08/11/17       Description: 1) Individualized goal:  200 feet with FWW and SPV  2) Interventions:  PT Group Therapy, PT Gait Training, PT Therapeutic Exercises, PT Neuro Re-Ed/Balance, PT Therapeutic Activity and PT Manual Therapy.           Goal: LTG-By discharge, patient will transfer one surface to another     Dates: Start: 08/11/17       Description: 1) Individualized goal:  with FWW and SPV  2) Interventions:  PT Group Therapy, PT Gait Training, PT Therapeutic Exercises, PT Neuro Re-Ed/Balance, PT Therapeutic Activity and PT Manual Therapy.           Goal: LTG-By discharge, patient will ambulate up/down 4-6 stairs     Dates: Start: 08/30/17       Description: 1) Individualized goal: 2 steps with a FWW and CGA  2) Interventions: PT Group Therapy, PT Gait Training, PT Therapeutic Exercises, PT Neuro Re-Ed/Balance, PT Therapeutic Activity and PT Manual Therapy.                           Section completed by:  Pedrito Duenas P.T., DPT

## 2017-09-06 NOTE — DISCHARGE PLANNING
DME order sent to [facility] per choice form. Order for outpatient PT sent to [facility] and order for outpatient OT sent to [facility] on [address] per choice form.  Awaiting responses.

## 2017-09-06 NOTE — CARE PLAN
Problem: Mobility  Goal: STG-Within one week, patient will ambulate community distances  1) Individualized goal: 200 feet with FWW and SPV  2) Interventions: PT Group Therapy, PT Gait Training, PT Therapeutic Exercises, PT Neuro Re-Ed/Balance, PT Therapeutic Activity and PT Manual Therapy.      Outcome: MET Date Met: 09/06/17    Goal: STG-Within one week, patient will ascend and descend four to six stairs  1) Individualized goal: 2 steps with a FWW and CGA  2) Interventions: PT Group Therapy, PT Gait Training, PT Therapeutic Exercises, PT Neuro Re-Ed/Balance, PT Therapeutic Activity and PT Manual Therapy.         Outcome: NOT MET  Went to attempt yesterday but pt is changing home setup to include two grab bars at the stairs.    Problem: Mobility Transfers  Goal: STG-Within one week, patient will sit to stand  1) Individualized goal: with FWW and SPV  2) Interventions: PT Group Therapy, PT Gait Training, PT Therapeutic Exercises, PT Neuro Re-Ed/Balance, PT Therapeutic Activity and PT Manual Therapy.      Outcome: MET Date Met: 09/06/17    Goal: STG-Within one week, patient will transfer bed to chair  1) Individualized goal: with FWW and SPV  2) Interventions: PT Group Therapy, PT Gait Training, PT Therapeutic Exercises, PT Neuro Re-Ed/Balance, PT Therapeutic Activity and PT Manual Therapy.      Outcome: MET Date Met: 09/06/17

## 2017-09-06 NOTE — CARE PLAN
Problem: Safety  Goal: Will remain free from injury  Patient uses call light consistently and appropriately this shift.  Waits for assistance when needed and does not attempt self transfer.  Able to verbalize needs.  Will continue to monitor.    Problem: Pain Management  Goal: Pain level will decrease to patient's comfort goal  Medicated per mar with relief, non pharmacological measures such as relaxation, repositioning and distraction encouraged.

## 2017-09-06 NOTE — CARE PLAN
Problem: Communication  Goal: The ability to communicate needs accurately and effectively will improve  Patient alert and oriented x 4,    Problem: Pain Management  Goal: Pain level will decrease to patient's comfort goal  Patient denies pain at this time.

## 2017-09-06 NOTE — PROGRESS NOTES
Hospital Medicine Progress Note, Adult, Complex               Author: Yaya Martinez Date & Time created: 9/6/2017  9:07 AM     Interval History:  No significant events or changes since last visit  Patient denies SOB, cough, or diarrhea  Patient slept ok last night    Chief Complaint:  Hypertension  Fluid overload      Review of Systems:  Review of Systems   Constitutional: Negative for fever.   Eyes: Negative for blurred vision.   Respiratory: Negative for shortness of breath.    Cardiovascular: Negative for palpitations.   Gastrointestinal: Negative for nausea and vomiting.   Neurological: Negative for dizziness and headaches.   Psychiatric/Behavioral: Negative for hallucinations.       Physical Exam:  Physical Exam   Constitutional: He is oriented to person, place, and time.   HENT:   Mouth/Throat: Oropharynx is clear and moist.   Eyes: No scleral icterus.   Cardiovascular: Normal rate, regular rhythm, S1 normal and S2 normal.    No murmur heard.  Pulmonary/Chest: Effort normal and breath sounds normal. No stridor. He has no rhonchi.   Abdominal: Soft. He exhibits no distension. There is no tenderness. Hernia confirmed negative in the right inguinal area and confirmed negative in the left inguinal area.   Musculoskeletal: He exhibits no edema.   Neurological: He is alert and oriented to person, place, and time. No sensory deficit.   Skin: Skin is warm and dry. He is not diaphoretic. No cyanosis.   Psychiatric: He has a normal mood and affect. His behavior is normal.   Nursing note and vitals reviewed.      Labs:        Invalid input(s): [id number]      Recent Labs      09/04/17   0532   SODIUM  134*   POTASSIUM  3.8   CHLORIDE  101   CO2  28   BUN  21   CREATININE  0.61   CALCIUM  8.8     Recent Labs      09/04/17   0532   ALTSGPT  12   ASTSGOT  18   ALKPHOSPHAT  82   TBILIRUBIN  1.0   GLUCOSE  95     Recent Labs      09/04/17   0532   RBC  4.06*   HEMOGLOBIN  11.3*   HEMATOCRIT  35.0*   PLATELETCT  64*    PROTHROMBTM  16.4*   INR  1.28*     Recent Labs      17   0532   WBC  3.9*   NEUTSPOLYS  63.60   LYMPHOCYTES  18.00*   MONOCYTES  11.70   EOSINOPHILS  6.10   BASOPHILS  0.30   ASTSGOT  18   ALTSGPT  12   ALKPHOSPHAT  82   TBILIRUBIN  1.0           Hemodynamics:  Temp (24hrs), Av.6 °C (97.8 °F), Min:36.5 °C (97.7 °F), Max:36.6 °C (97.9 °F)  Temperature: 36.5 °C (97.7 °F)  Pulse  Av.9  Min: 60  Max: 99   Blood Pressure : 104/60     Respiratory:    Respiration: 18, Pulse Oximetry: 95 %     Work Of Breathing / Effort: Mild  RUL Breath Sounds: Clear, RML Breath Sounds: Diminished, RLL Breath Sounds: Diminished, JOSE Breath Sounds: Clear, LLL Breath Sounds: Diminished  Fluids:    Intake/Output Summary (Last 24 hours) at 17 0907  Last data filed at 17 0906   Gross per 24 hour   Intake             1620 ml   Output              500 ml   Net             1120 ml     Weight: 101.5 kg (223 lb 12.3 oz)  GI/Nutrition:  Orders Placed This Encounter   Procedures   • Diet Order     Standing Status:   Standing     Number of Occurrences:   1     Order Specific Question:   Diet:     Answer:   Regular [1]     Order Specific Question:   Consistency/Fluid modifications:     Answer:   Thin Liquids [3]     Medical Decision Making, by Problem:  Active Hospital Problems    Diagnosis   • *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]   • Left leg DVT (CMS-HCC) [I82.402]   • Esophageal varices- BANDED 2016- repeat egd tbd 10//29/16- gic [I85.00]   • ASCVD (arteriosclerotic cardiovascular disease)subtotalled small distal LCx and 40% LAD med rx by cath 12 [I25.10]   • GIB (gastrointestinal bleeding)- recurrent from varices [K92.2]   • BPH (benign prostatic hyperplasia) [N40.0]   • 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 portal vein thrombosis  Hx eso varicies, s/p albaltion  Hx ascites: with therapeutic taps  S/P LE edema  NH4: 35 (8/22) --> 32 (8/27)  On Demadex: 20 mg every 3 days  On Metolazone: 2.5 mg every 3 days  On Aldactone: 25 mg daily  On KCL: 20 meq every 3 days  Monitor K+ levels: 3.8 (9/4)    *  Pancytopenia  Platelets stable (9/4)  2nd to prior chemo and cirrhosis    *  Coccyx Pressure Sore/Ulcer  Healing  Turning pt in bed often  Wound care eval: zinc based cream applied and leave bandage off    *  Hx Colon Cancer: stage IV; in 5 year remission  *  Hyperlipidemia  *  GBS: s/p IVIG x 5 days        Reviewed items::  Labs reviewed and Medications reviewed

## 2017-09-07 VITALS
BODY MASS INDEX: 27.41 KG/M2 | DIASTOLIC BLOOD PRESSURE: 61 MMHG | HEART RATE: 62 BPM | HEIGHT: 75 IN | TEMPERATURE: 97.5 F | OXYGEN SATURATION: 92 % | RESPIRATION RATE: 18 BRPM | WEIGHT: 220.46 LBS | SYSTOLIC BLOOD PRESSURE: 98 MMHG

## 2017-09-07 PROCEDURE — A9270 NON-COVERED ITEM OR SERVICE: HCPCS | Performed by: PHYSICAL MEDICINE & REHABILITATION

## 2017-09-07 PROCEDURE — A9270 NON-COVERED ITEM OR SERVICE: HCPCS | Performed by: HOSPITALIST

## 2017-09-07 PROCEDURE — 700102 HCHG RX REV CODE 250 W/ 637 OVERRIDE(OP): Performed by: HOSPITALIST

## 2017-09-07 PROCEDURE — 99232 SBSQ HOSP IP/OBS MODERATE 35: CPT | Performed by: HOSPITALIST

## 2017-09-07 PROCEDURE — 700102 HCHG RX REV CODE 250 W/ 637 OVERRIDE(OP): Performed by: PHYSICAL MEDICINE & REHABILITATION

## 2017-09-07 RX ORDER — TORSEMIDE 10 MG/1
20 TABLET ORAL
Qty: 30 TAB | Refills: 0 | Status: SHIPPED | OUTPATIENT
Start: 2017-09-07 | End: 2017-10-06 | Stop reason: SDUPTHER

## 2017-09-07 RX ORDER — TAMSULOSIN HYDROCHLORIDE 0.4 MG/1
0.4 CAPSULE ORAL
Qty: 30 CAP | Refills: 0 | Status: SHIPPED | OUTPATIENT
Start: 2017-09-07 | End: 2017-10-06 | Stop reason: SDUPTHER

## 2017-09-07 RX ORDER — POLYETHYLENE GLYCOL 3350 17 G/17G
17 POWDER, FOR SOLUTION ORAL
Refills: 3 | COMMUNITY
Start: 2017-09-07 | End: 2018-08-07

## 2017-09-07 RX ORDER — METOLAZONE 2.5 MG/1
2.5 TABLET ORAL
Qty: 30 TAB | Refills: 0 | Status: SHIPPED | OUTPATIENT
Start: 2017-09-07 | End: 2017-10-06 | Stop reason: SDUPTHER

## 2017-09-07 RX ORDER — AMOXICILLIN 250 MG
1 CAPSULE ORAL
Qty: 30 TAB | Refills: 0 | COMMUNITY
Start: 2017-09-07 | End: 2018-08-07

## 2017-09-07 RX ORDER — SPIRONOLACTONE 25 MG/1
25 TABLET ORAL DAILY
Qty: 30 TAB | Refills: 3 | Status: SHIPPED | OUTPATIENT
Start: 2017-09-07 | End: 2018-01-02 | Stop reason: SDUPTHER

## 2017-09-07 RX ORDER — PSEUDOEPHEDRINE HCL 30 MG
100 TABLET ORAL 2 TIMES DAILY PRN
Qty: 60 CAP | COMMUNITY
Start: 2017-09-07 | End: 2018-08-07

## 2017-09-07 RX ORDER — URSODIOL 300 MG/1
300 CAPSULE ORAL DAILY
Qty: 60 CAP | Refills: 0 | Status: SHIPPED | OUTPATIENT
Start: 2017-09-07 | End: 2017-12-05 | Stop reason: SDUPTHER

## 2017-09-07 RX ORDER — ATORVASTATIN CALCIUM 40 MG/1
40 TABLET, FILM COATED ORAL
Qty: 30 TAB | Refills: 0 | Status: SHIPPED | OUTPATIENT
Start: 2017-09-07 | End: 2017-12-05 | Stop reason: SDUPTHER

## 2017-09-07 RX ORDER — POTASSIUM CHLORIDE 20 MEQ/1
20 TABLET, EXTENDED RELEASE ORAL
Qty: 60 TAB | Refills: 0 | Status: SHIPPED | OUTPATIENT
Start: 2017-09-07 | End: 2018-01-02 | Stop reason: SDUPTHER

## 2017-09-07 RX ORDER — OXYCODONE HYDROCHLORIDE 10 MG/1
10 TABLET ORAL EVERY 4 HOURS PRN
Qty: 28 TAB | Refills: 0 | Status: SHIPPED | OUTPATIENT
Start: 2017-09-07 | End: 2018-08-07

## 2017-09-07 RX ORDER — FERROUS SULFATE 325(65) MG
325 TABLET ORAL
Qty: 30 TAB | COMMUNITY
Start: 2017-09-07 | End: 2023-04-19 | Stop reason: SDUPTHER

## 2017-09-07 RX ORDER — FOLIC ACID 1 MG/1
1 TABLET ORAL DAILY
Qty: 30 TAB | COMMUNITY
Start: 2017-09-07 | End: 2020-11-12

## 2017-09-07 RX ADMIN — VITAMIN D, TAB 1000IU (100/BT) 1000 UNITS: 25 TAB at 09:19

## 2017-09-07 RX ADMIN — LACTOBACILLUS ACIDOPHILUS / LACTOBACILLUS BULGARICUS 1 PACKET: 100 MILLION CFU STRENGTH GRANULES at 09:18

## 2017-09-07 RX ADMIN — POTASSIUM CHLORIDE 20 MEQ: 1500 TABLET, EXTENDED RELEASE ORAL at 09:20

## 2017-09-07 RX ADMIN — TAMSULOSIN HYDROCHLORIDE 0.4 MG: 0.4 CAPSULE ORAL at 09:19

## 2017-09-07 RX ADMIN — TORSEMIDE 20 MG: 10 TABLET ORAL at 05:13

## 2017-09-07 RX ADMIN — SPIRONOLACTONE 25 MG: 25 TABLET, FILM COATED ORAL at 05:13

## 2017-09-07 RX ADMIN — OXYCODONE HYDROCHLORIDE AND ACETAMINOPHEN 500 MG: 500 TABLET ORAL at 09:18

## 2017-09-07 RX ADMIN — URSODIOL 300 MG: 300 CAPSULE ORAL at 09:18

## 2017-09-07 RX ADMIN — FOLIC ACID 1 MG: 1 TABLET ORAL at 09:19

## 2017-09-07 RX ADMIN — FERROUS SULFATE TAB 325 MG (65 MG ELEMENTAL FE) 325 MG: 325 (65 FE) TAB at 09:18

## 2017-09-07 ASSESSMENT — ENCOUNTER SYMPTOMS
DIZZINESS: 0
COUGH: 0
FEVER: 0
BLURRED VISION: 0
DIARRHEA: 0
NERVOUS/ANXIOUS: 0

## 2017-09-07 ASSESSMENT — PAIN SCALES - GENERAL: PAINLEVEL_OUTOF10: 0

## 2017-09-07 NOTE — DISCHARGE INSTRUCTIONS
Occupational Therapy Discharge Instructions for Torey Queendy    9/7/2017    Level of Assist Required for Eating: Able to Complete Eating without Assist  Level of Assist Required for Grooming: Able to Complete Grooming without Assist  Level of Assist Required for Dressing: Able to Complete Dressing without Assist  Equipment for Dressing: Sock Aid, Reacher, Long Handled Shoe Horn  Level of Assist Required for Toileting: Able to Complete Toileting without Assist  Level of Assist Required for Toilet Transfer: Requires Supervision with Toilet Transfer  Equipment for Toilet Transfer: Raised Toilet Seat with Arms  Level of Assist Required for Bathing: Requires Supervision with Bathing  Equipment for Bathing: Grab Bars in Tub / Shower, Tub Transfer Bench, Hand Held Shower Head  Level of Assist Required for Shower Transfer: Requires Supervision with Shower Transfer  Equipment for Shower Transfer: Grab Bars in Tub / Shower, Shower Chair  Level of Assist Required for Home Mgmt: Requires Supervision with Home Management  Level of Assist Required for Meal Prep: Requires Supervision with Meal Preparation  Driving: Please Contact Physician Prior to Driving  Home Exercise Program: None Issued    Dear  And Mrs. Lima,     It was such a pleasure working with and getting to know the both of you.  Mr. Lima, as you return home, please continue to take your time as you transition from seated to standing positions using the front wheeled walker.  When transitioning in and out of the shower and on/off of bed, Mrs. Lima, it may be safest that you are with Mr. Lima as he continues to get stronger and his balance continues to improve.  Best of luck to the both of you!      Minoo Kessler, Occupational Therapy Student  Connor Freraro OTR/KAITLIN       9/7/2017           [facility] NURSING DISCHARGE INSTRUCTIONS    Blood Pressure : 105/62  Weight: 101.5 kg (223 lb 12.3 oz)  Nursing recommendations for Torey Marrero Janie at  time of discharge are as follows:  Client and Family Member verbalized understanding of all discharge instructions and prescriptions.     Review all your home medications and newly ordered medications with your doctor and/or pharmacist. Follow medication instructions as directed by your doctor and/or pharmacist.    Pain Management: Roxicodone for pain.   Discharge Pain Medication Instructions:  Comfort Goal: 0, Comfort at Rest, Sleep Comfortably  Notify your primary care provider if pain is unrelieved with these measures, if the pain is new, or increased in intensity.    Discharge Skin Characteristics:    Discharge Skin Exam:    Pressure Ulcer  Stage 3 POA Coccyx Midline (Active)   Wound Bed Pink 9/6/2017 10:00 PM   Drainage  None 9/6/2017 10:00 PM   Periwound Skin Pink;Red 9/6/2017 10:00 PM   Dressing Options Open to Air 9/6/2017 10:00 PM   Dressing Status / Change Observed 9/6/2017 10:00 PM   Dressing Cleansing/Solutions Not Applicable 9/6/2017 10:00 PM   Periwound Protectant Barrier Paste;Moisture Barrier;Skin Protectant wipes to Periwound 9/6/2017 10:00 PM   Length (cm) 1.6 8/12/2017  4:00 PM   Width (cm) 1 9/2/2017  5:33 AM   Depth (cm) 0.1 9/2/2017  5:33 AM   Weekly Photo (Inpatient Only) 09/02/17 9/2/2017  5:33 AM   Dressing Change Frequency Every 48 hrs 8/23/2017  7:35 PM   Next Dressing Change  08/24/17 8/22/2017  8:16 PM   Time Spent with Patient (mins) 45 8/23/2017  6:45 PM     Skin / Wound Care Instructions: Please contact your primary care physician for any change in skin integrity.     If You Have Surgical Incisions / Wounds:  Monitor surgical site(s) for signs of increased swelling, redness or symptoms of drainage from the site or fever as this could indicate signs and symptoms of infection. If these symptoms are noted, notifiy your primary care provider.      Discharge Safety Instructions: Should Not Be Left Alone In The House     Discharge Safety Concerns: Weakness  The interdisciplinary team has  made recommendation that you should not be left alone  in the house due to weakness  Anti-embolic stockings are required during the day and off at night to increase circulation to the lower extremities.    Discharge Diet: regular     Discharge Liquids: thin  Discharge Bowel Function: Continent  Please contact your primary care physician for any changes in bowel habits.  Discharge Bowel Program:    Discharge Bladder Function: Continent  Discharge Urinary Devices:        Nursing Discharge Plan:   Influenza Vaccine Indication: Not indicated: Previously immunized this influenza season and > 8 years of age    Case Management Discharge Instructions:   Discharge Location: Home with Outpatient Services  Agency Name/Address/Phone: [facility] Out Patient Therapies:  For Physical Therapy:  [place] [id number] [phone]  [address] [place], [place].  For Occupational Therapy:  [address] Location  [address]  Please call to scheduled follow up appointments.   Home Health:    Outpatient Services:    DME Provider/Phone: A front wheeled walker and a  wheelchair with cushion have been ordered through [facility] .  [facility]     Medical Equipment Ordered:    Prescription Faxed to: [facility] @ [facility].       [facility] Medication Instructions:  Below are the medications your physician expects you to take upon discharge:Physical Therapy Discharge Instructions for Torey Lima    9/6/2017    Level of Assist Required for Ambulation: Supervision on Flat Surfaces, Physical Assist on Curbs, Physical Assist on Stairs  Distance Patient May Ambulate: up to 200 feet as safety allows  Device Recommended for Ambulation: Front-Wheeled Walker  Level of Assist Required for Transfers: Supervision  Device Recommended for Transfers: Front-Wheeled Walker  Home Exercise Program: None Issued  Prosthesis / Orthosis Recommendation /  Location: No Prosthesis  or Orthosis Recommended    Torey, it was a pleasure working with you.  Please continue with your therapies.  When standing or walking, always have someone with you and use a walker.  When going up/down curbs and stairs, please have someone assist you physically using a gait belt, and always use railings for increased safety.  You have done a great job and we will truly miss you both!!    --Cesar PT

## 2017-09-07 NOTE — CARE PLAN
Problem: Discharge Barriers/Planning  Goal: Patient's continuum of care needs will be met  Discharge instructions explained to patient and his wife.  Patient acknowledges understanding.

## 2017-09-07 NOTE — CARE PLAN
Problem: OT Long Term Goals  Goal: LTG-By discharge, patient will complete basic self care tasks  1) Individualized Goal:  By discharge, patient will complete basic self care tasks at a level of Mod. I.   2) Interventions:  OT Self Care/ADL, OT Neuro Re-Ed/Balance, OT Therapeutic Activity and OT Therapeutic Exercise    Co-signature:  Connor Ferraro OTR/L      Outcome: NOT MET  Mod Indep to Supervision   Goal: LTG-By discharge, patient will perform bathroom transfers  1) Individualized Goal:  By discharge, patient will perform bathroom transfers at a level of Mod. I.   2) Interventions:  OT Self Care/ADL, OT Neuro Re-Ed/Balance, OT Therapeutic Activity and OT Therapeutic Exercise    Co-signature:  Connor Ferraro OTR/L      Outcome: NOT MET  Supervision/SBA secondary safety  Goal: LTG-By discharge, patient will complete basic home management  1) Individualized Goal:  By discharge, patient will prepare a simple meal at a level of mod. I.   2) Interventions:  OT Self Care/ADL, OT Neuro Re-Ed/Balance, OT Therapeutic Activity and OT Therapeutic Exercise    Co-signature:  Connor Ferraro OTR/L      Outcome: NOT MET  Supervision/SBA secondary safety

## 2017-09-07 NOTE — CARE PLAN
Problem: Mobility  Goal: STG-Within one week, patient will ascend and descend four to six stairs  1) Individualized goal: 2 steps with a FWW and CGA  2) Interventions: PT Group Therapy, PT Gait Training, PT Therapeutic Exercises, PT Neuro Re-Ed/Balance, PT Therapeutic Activity and PT Manual Therapy.         Outcome: DISCHARGED-GOAL NOT MET Date Met: 09/06/17  Min A-CGA with B railings and FWW at base/top of stairs    Problem: PT-Long Term Goals  Goal: LTG-By discharge, patient will ambulate  1) Individualized goal:  200 feet with FWW and SPV  2) Interventions:  PT Group Therapy, PT Gait Training, PT Therapeutic Exercises, PT Neuro Re-Ed/Balance, PT Therapeutic Activity and PT Manual Therapy.   Outcome: MET Date Met: 09/06/17    Goal: LTG-By discharge, patient will transfer one surface to another  1) Individualized goal:  with FWW and SPV  2) Interventions:  PT Group Therapy, PT Gait Training, PT Therapeutic Exercises, PT Neuro Re-Ed/Balance, PT Therapeutic Activity and PT Manual Therapy.   Outcome: MET Date Met: 09/06/17    Goal: LTG-By discharge, patient will ambulate up/down 4-6 stairs  1) Individualized goal: 2 steps with a FWW and CGA  2) Interventions: PT Group Therapy, PT Gait Training, PT Therapeutic Exercises, PT Neuro Re-Ed/Balance, PT Therapeutic Activity and PT Manual Therapy.           Outcome: DISCHARGED-GOAL NOT MET Date Met: 09/06/17  Min A-CGA with B railings and FWW at base/top of stairs

## 2017-09-07 NOTE — PROGRESS NOTES
Hospital Medicine Progress Note, Adult, Complex               Author: Yaya Martinez Date & Time created: 9/7/2017  8:46 AM     Interval History:  No significant events or changes since last visit  Patient denies SOB, cough, or diarrhea  Patient slept ok last night    Chief Complaint:  Hypertension  Fluid overload      Review of Systems:  Review of Systems   Constitutional: Negative for fever.   Eyes: Negative for blurred vision.   Respiratory: Negative for cough.    Cardiovascular: Negative for chest pain.   Gastrointestinal: Negative for diarrhea.   Musculoskeletal: Negative for joint pain.   Neurological: Negative for dizziness.   Psychiatric/Behavioral: The patient is not nervous/anxious.        Physical Exam:  Physical Exam   Constitutional: He is oriented to person, place, and time. No distress.   HENT:   Mouth/Throat: No oropharyngeal exudate.   Eyes: EOM are normal.   Neck: No JVD present. No tracheal deviation present.   Cardiovascular: Normal rate, regular rhythm, S1 normal and S2 normal.    No murmur heard.  Pulmonary/Chest: Effort normal and breath sounds normal. He has no rhonchi.   Abdominal: Soft. Bowel sounds are normal. Hernia confirmed negative in the right inguinal area and confirmed negative in the left inguinal area.   Musculoskeletal: He exhibits no edema.   Neurological: He is alert and oriented to person, place, and time. No sensory deficit.   Skin: Skin is warm and dry. No cyanosis.   Psychiatric: He has a normal mood and affect. His behavior is normal.   Nursing note and vitals reviewed.      Labs:        Invalid input(s): [id number]      No results for input(s): SODIUM, POTASSIUM, CHLORIDE, CO2, BUN, CREATININE, MAGNESIUM, PHOSPHORUS, CALCIUM in the last 72 hours.  No results for input(s): ALTSGPT, ASTSGOT, ALKPHOSPHAT, TBILIRUBIN, DBILIRUBIN, GAMMAGT, AMYLASE, LIPASE, ALB, PREALBUMIN, GLUCOSE in the last 72 hours.  No results for input(s): RBC, HEMOGLOBIN, HEMATOCRIT, PLATELETCT,  PROTHROMBTM, APTT, INR, IRON, FERRITIN, TOTIRONBC in the last 72 hours.            Hemodynamics:  Temp (24hrs), Av.6 °C (97.8 °F), Min:36.4 °C (97.5 °F), Max:36.7 °C (98 °F)  Temperature: 36.4 °C (97.5 °F)  Pulse  Av.8  Min: 60  Max: 99   Blood Pressure : (!) 98/61     Respiratory:    Respiration: 18, Pulse Oximetry: 92 %     Work Of Breathing / Effort: Mild  RUL Breath Sounds: Clear, RML Breath Sounds: Diminished, RLL Breath Sounds: Diminished, JOSE Breath Sounds: Clear, LLL Breath Sounds: Diminished  Fluids:    Intake/Output Summary (Last 24 hours) at 17 0846  Last data filed at 17 0715   Gross per 24 hour   Intake             1330 ml   Output             2600 ml   Net            -1270 ml     Weight: 100 kg (220 lb 7.4 oz)  GI/Nutrition:  Orders Placed This Encounter   Procedures   • Diet Order     Standing Status:   Standing     Number of Occurrences:   1     Order Specific Question:   Diet:     Answer:   Regular [1]     Order Specific Question:   Consistency/Fluid modifications:     Answer:   Thin Liquids [3]     Medical Decision Making, by Problem:  Active Hospital Problems    Diagnosis   • *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]   • Left leg DVT (CMS-HCC) [I82.402]   • Esophageal varices- BANDED 2016- repeat egd tbd 10//29/16- gic [I85.00]   • ASCVD (arteriosclerotic cardiovascular disease)subtotalled small distal LCx and 40% LAD med rx by cath 12 [I25.10]   • GIB (gastrointestinal bleeding)- recurrent from varices [K92.2]   • BPH (benign prostatic hyperplasia) [N40.0]   • 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  With portal vein thrombosis  Hx eso varicies, s/p albaltion  Hx ascites: with therapeutic taps  S/P LE edema  NH4: 35 (8/22) --> 32 (8/27)  On Demadex: 20 mg every 3 days  On Metolazone: 2.5 mg every 3 days  On Aldactone: 25 mg daily  On KCL: 20 meq every 3 days  Monitor K+ levels: 3.8 (9/4)    *  Pancytopenia  Platelets stable (9/4)  2nd to prior chemo and cirrhosis    *  Coccyx Pressure Sore/Ulcer  Healing well  Turning pt in bed often  Wound care eval: zinc based cream applied and leave bandage off    *  Hx Colon Cancer: stage IV; in 5 year remission  *  Hyperlipidemia  *  GBS: s/p IVIG x 5 days        Reviewed items::  Labs reviewed and Medications reviewed

## 2017-09-07 NOTE — DISCHARGE PLANNING
"CASE MANAGEMENT/ I met with pt and spouse reviewing dc instructions.  They are in agreement w/ all dc plans.  They are aware that OT is only available at [address] and PT is available at [facility].  All dme delivered.  I also discussed option of a \"transport chair\" if  wc ordered is too heavy.         "

## 2017-09-07 NOTE — PROGRESS NOTES
All belongings packed and sent with patient.  Belongings sheet signed.  Discharge instructions explained to patient and Prescriptions with instructions explained to patient.  Assisted to private vehicle for discharge to home.

## 2017-09-07 NOTE — DISCHARGE PLANNING
Per Betsy at [facility], DME referral accepted.  Per Rebekah at [facility], referral accepted.  Per Renata at [facility], referral accepted.

## 2017-09-07 NOTE — PROGRESS NOTES
"      Rehab Progress Note     Interval Events (Subjective)    CC:   Follow-up AIDP     slow steady progress continues. Notes of the therapists and the hospitalist appreciated patient is medically fairly stable at this time. He has noted complaints he was observed in therapy and overall is in very good spirits    Objective:  VITAL SIGNS: /62   Pulse 71   Temp 36.7 °C (98 °F)   Resp 20   Ht 1.905 m (6' 3\")   Wt 101.5 kg (223 lb 12.3 oz)   SpO2 96%   BMI 27.97 kg/m²       GENERAL: No apparent distress  HEENT: Normocephalic/atraumatic, EOMI, PERRL and No nystagmus  CARDIAC: Regular rate and rhythm, normal S1, S2   LUNGS: Clear to auscultation   ABDOMINAL: bowel sounds present, soft, nontender and ascites present with fluid wave noted. Ascites is increased  EXTREMITIES: Without clubbing or cyanosis 2+ edema noted in the left lower extremity but decreased  SKIN:   Small stage III noted on sacrum  NEURO: Unchanged with the exception to hip flexor strength is definitely a half grade stronger bilaterally as compared to admission    Current Facility-Administered Medications   Medication Frequency   • potassium chloride SA (Kdur) tablet 20 mEq Q72HRS   • metolazone (ZAROXOLYN) tablet 2.5 mg Q72HRS   • spironolactone (ALDACTONE) tablet 25 mg Q DAY   • torsemide (DEMADEX) TABS 20 mg Q72HRS   • magnesium hydroxide (MILK OF MAGNESIA) suspension 15 mL QHS   • polyethylene glycol/lytes (MIRALAX) PACKET 1 Packet QHS   • ferrous sulfate tablet 325 mg QDAY with Breakfast   • ascorbic acid (ascorbic acid) tablet 500 mg DAILY   • cholestyramine (QUESTRAN,PREVALITE) 4 GM powder 4 g QDAY PRN   • docusate sodium (COLACE) capsule 100 mg BID PRN    And   • senna-docusate (PERICOLACE or SENOKOT S) 8.6-50 MG per tablet 1 Tab QDAY PRN    And   • lactulose 20 GM/30ML solution 30 mL Q24HRS PRN    And   • bisacodyl (DULCOLAX) suppository 10 mg QDAY PRN   • hydrocortisone (ANUSOL-HC) suppository 25 mg Q12HRS PRN   • tamsulosin (FLOMAX) " capsule 0.4 mg AFTER BREAKFAST   • Respiratory Care per Protocol Continuous RT   • Pharmacy Consult Request ...Pain Management Review 1 Each PRN   • lactobacillus granules (LACTINEX/FLORANEX) packet 1 Packet TID WITH MEALS   • ursodiol (ACTIGALL) capsule 300 mg DAILY   • vitamin D (cholecalciferol) tablet 1,000 Units DAILY   • folic acid (FOLVITE) tablet 1 mg DAILY   • atorvastatin (LIPITOR) tablet 40 mg QHS   • benzocaine-menthol (CEPACOL) lozenge 1 Lozenge Q2HRS PRN   • hydrALAZINE (APRESOLINE) tablet 25 mg Q8HRS PRN   • mag hydrox-al hydrox-simeth (MAALOX PLUS ES or MYLANTA DS) suspension 20 mL Q2HRS PRN   • ondansetron (ZOFRAN ODT) dispertab 4 mg 4X/DAY PRN    Or   • ondansetron (ZOFRAN) syringe/vial injection 4 mg 4X/DAY PRN   • trazodone (DESYREL) tablet 50 mg QHS PRN   • sodium chloride (OCEAN) 0.65 % nasal spray 2 Spray PRN   • oxycodone immediate release (ROXICODONE) tablet 10 mg Q4HRS PRN       Orders Placed This Encounter   Procedures   • Diet Order     Standing Status:   Standing     Number of Occurrences:   1     Order Specific Question:   Diet:     Answer:   Regular [1]     Order Specific Question:   Consistency/Fluid modifications:     Answer:   Thin Liquids [3]       Assessment:  Active Hospital Problems    Diagnosis   • *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 DUE TO OLD HEP B, CHEMO RX,  OR THERMO RAD OF MAGNANT  LESION   • Essential hypertension   • Colon cancer-2010 left hemicolectomy; no colostomy       Medical Decision Making and Plan:  Patient is doing well at this time  Dr. Hopper continues to follow medically.   I spoke to Dr. 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 It's improved with diuresis prescribed by Dr. hopper    Hypertension       under control with Aldactone and now Inderal    Stage III pressure ulcer, on the Coccyx    Appreciate the input of the wound care nurse honey colloid applied    Vitamin D deficiency    Continue replacement    Lower extremity edema left leg    We will continue to monitor improved      Diarrhea   C. diff toxin neg  No longer an issue      UTI  response to    Continue P T and OT  At Team conference we decided to move discharge up to 9/9/2017  We'll re-conference next Wednesday, 9/5/2017        Total time:  > 25 minutes.  I spent greater than 50% of the time for patient care and coordination on this date, including unit/floor time, and face-to-face time with the patient as per assessment and plan above.    Moreno Adorno M.D.

## 2017-09-13 ENCOUNTER — PATIENT MESSAGE (OUTPATIENT)
Dept: MEDICAL GROUP | Age: 72
End: 2017-09-13

## 2017-09-13 DIAGNOSIS — G61.0 GUILLAIN BARRÉ SYNDROME (HCC): ICD-10-CM

## 2017-09-13 DIAGNOSIS — E78.2 MIXED HYPERLIPIDEMIA: ICD-10-CM

## 2017-09-13 DIAGNOSIS — D50.0 IRON DEFICIENCY ANEMIA DUE TO CHRONIC BLOOD LOSS: ICD-10-CM

## 2017-09-13 DIAGNOSIS — K74.60 CIRRHOSIS OF LIVER WITH ASCITES, UNSPECIFIED HEPATIC CIRRHOSIS TYPE (HCC): ICD-10-CM

## 2017-09-13 DIAGNOSIS — R18.8 CIRRHOSIS OF LIVER WITH ASCITES, UNSPECIFIED HEPATIC CIRRHOSIS TYPE (HCC): ICD-10-CM

## 2017-09-13 DIAGNOSIS — I10 ESSENTIAL HYPERTENSION: ICD-10-CM

## 2017-09-13 PROBLEM — E66.9 OBESITY (BMI 30-39.9): Status: RESOLVED | Noted: 2017-04-12 | Resolved: 2017-09-13

## 2017-09-13 NOTE — TELEPHONE ENCOUNTER
From: Torey Lima  To: Fabián Urena M.D.  Sent: 9/13/2017 6:03 AM PDT  Subject: Non-Urgent Medical Question    Dr. Urena, by now I am sure that you are aware that I was released from 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 At discharge, the doctors suggested that I have follow-up blood work because of some of the medications (Metolazone, Tamsulosin, Torsemide and Spironolactone) they prescribed. I have an appointment with you on the 19th so would you please advise when I could  the appropriate paperwork for the blood work. Thank you.

## 2017-09-15 ENCOUNTER — HOSPITAL ENCOUNTER (OUTPATIENT)
Dept: PHYSICAL THERAPY | Facility: REHABILITATION | Age: 72
End: 2017-09-15
Attending: PHYSICAL MEDICINE & REHABILITATION
Payer: MEDICARE

## 2017-09-15 PROCEDURE — 97110 THERAPEUTIC EXERCISES: CPT

## 2017-09-15 PROCEDURE — 97161 PT EVAL LOW COMPLEX 20 MIN: CPT

## 2017-09-16 ENCOUNTER — HOSPITAL ENCOUNTER (OUTPATIENT)
Dept: LAB | Facility: MEDICAL CENTER | Age: 72
End: 2017-09-16
Attending: INTERNAL MEDICINE
Payer: MEDICARE

## 2017-09-16 DIAGNOSIS — I10 ESSENTIAL HYPERTENSION: ICD-10-CM

## 2017-09-16 DIAGNOSIS — D50.0 IRON DEFICIENCY ANEMIA DUE TO CHRONIC BLOOD LOSS: ICD-10-CM

## 2017-09-16 DIAGNOSIS — E78.2 MIXED HYPERLIPIDEMIA: ICD-10-CM

## 2017-09-16 DIAGNOSIS — R18.8 CIRRHOSIS OF LIVER WITH ASCITES, UNSPECIFIED HEPATIC CIRRHOSIS TYPE (HCC): ICD-10-CM

## 2017-09-16 DIAGNOSIS — K74.60 CIRRHOSIS OF LIVER WITH ASCITES, UNSPECIFIED HEPATIC CIRRHOSIS TYPE (HCC): ICD-10-CM

## 2017-09-16 LAB
ALBUMIN SERPL BCP-MCNC: 3.6 G/DL (ref 3.2–4.9)
ALBUMIN/GLOB SERPL: 1.4 G/DL
ALP SERPL-CCNC: 78 U/L (ref 30–99)
ALT SERPL-CCNC: 12 U/L (ref 2–50)
ANION GAP SERPL CALC-SCNC: 8 MMOL/L (ref 0–11.9)
AST SERPL-CCNC: 19 U/L (ref 12–45)
BASOPHILS # BLD AUTO: 0.7 % (ref 0–1.8)
BASOPHILS # BLD: 0.03 K/UL (ref 0–0.12)
BILIRUB SERPL-MCNC: 1.4 MG/DL (ref 0.1–1.5)
BUN SERPL-MCNC: 21 MG/DL (ref 8–22)
CALCIUM SERPL-MCNC: 9.2 MG/DL (ref 8.5–10.5)
CHLORIDE SERPL-SCNC: 102 MMOL/L (ref 96–112)
CHOLEST SERPL-MCNC: 120 MG/DL (ref 100–199)
CO2 SERPL-SCNC: 27 MMOL/L (ref 20–33)
CREAT SERPL-MCNC: 0.68 MG/DL (ref 0.5–1.4)
EOSINOPHIL # BLD AUTO: 0.15 K/UL (ref 0–0.51)
EOSINOPHIL NFR BLD: 3.5 % (ref 0–6.9)
ERYTHROCYTE [DISTWIDTH] IN BLOOD BY AUTOMATED COUNT: 52.5 FL (ref 35.9–50)
FERRITIN SERPL-MCNC: 128.9 NG/ML (ref 22–322)
FOLATE SERPL-MCNC: >23.7 NG/ML
GFR SERPL CREATININE-BSD FRML MDRD: >60 ML/MIN/1.73 M 2
GLOBULIN SER CALC-MCNC: 2.6 G/DL (ref 1.9–3.5)
GLUCOSE SERPL-MCNC: 85 MG/DL (ref 65–99)
HCT VFR BLD AUTO: 38.4 % (ref 42–52)
HDLC SERPL-MCNC: 60 MG/DL
HGB BLD-MCNC: 12.5 G/DL (ref 14–18)
IMM GRANULOCYTES # BLD AUTO: 0.02 K/UL (ref 0–0.11)
IMM GRANULOCYTES NFR BLD AUTO: 0.5 % (ref 0–0.9)
INR PPP: 1.18 (ref 0.87–1.13)
IRON SATN MFR SERPL: 17 % (ref 15–55)
IRON SERPL-MCNC: 58 UG/DL (ref 50–180)
LDLC SERPL CALC-MCNC: 45 MG/DL
LYMPHOCYTES # BLD AUTO: 0.72 K/UL (ref 1–4.8)
LYMPHOCYTES NFR BLD: 17 % (ref 22–41)
MCH RBC QN AUTO: 28 PG (ref 27–33)
MCHC RBC AUTO-ENTMCNC: 32.6 G/DL (ref 33.7–35.3)
MCV RBC AUTO: 85.9 FL (ref 81.4–97.8)
MONOCYTES # BLD AUTO: 0.43 K/UL (ref 0–0.85)
MONOCYTES NFR BLD AUTO: 10.2 % (ref 0–13.4)
NEUTROPHILS # BLD AUTO: 2.88 K/UL (ref 1.82–7.42)
NEUTROPHILS NFR BLD: 68.1 % (ref 44–72)
NRBC # BLD AUTO: 0 K/UL
NRBC BLD AUTO-RTO: 0 /100 WBC
PLATELET # BLD AUTO: 68 K/UL (ref 164–446)
PMV BLD AUTO: 11.2 FL (ref 9–12.9)
POTASSIUM SERPL-SCNC: 3.3 MMOL/L (ref 3.6–5.5)
PROT SERPL-MCNC: 6.2 G/DL (ref 6–8.2)
PROTHROMBIN TIME: 15.4 SEC (ref 12–14.6)
RBC # BLD AUTO: 4.47 M/UL (ref 4.7–6.1)
SODIUM SERPL-SCNC: 137 MMOL/L (ref 135–145)
TIBC SERPL-MCNC: 343 UG/DL (ref 250–450)
TRIGL SERPL-MCNC: 76 MG/DL (ref 0–149)
TSH SERPL DL<=0.005 MIU/L-ACNC: 2.22 UIU/ML (ref 0.3–3.7)
VIT B12 SERPL-MCNC: 503 PG/ML (ref 211–911)
WBC # BLD AUTO: 4.2 K/UL (ref 4.8–10.8)

## 2017-09-16 PROCEDURE — 85610 PROTHROMBIN TIME: CPT

## 2017-09-16 PROCEDURE — 84443 ASSAY THYROID STIM HORMONE: CPT

## 2017-09-16 PROCEDURE — 82728 ASSAY OF FERRITIN: CPT

## 2017-09-16 PROCEDURE — 83540 ASSAY OF IRON: CPT

## 2017-09-16 PROCEDURE — 80053 COMPREHEN METABOLIC PANEL: CPT

## 2017-09-16 PROCEDURE — 36415 COLL VENOUS BLD VENIPUNCTURE: CPT

## 2017-09-16 PROCEDURE — 80061 LIPID PANEL: CPT

## 2017-09-16 PROCEDURE — 85025 COMPLETE CBC W/AUTO DIFF WBC: CPT

## 2017-09-16 PROCEDURE — 83550 IRON BINDING TEST: CPT

## 2017-09-16 PROCEDURE — 82607 VITAMIN B-12: CPT

## 2017-09-16 PROCEDURE — 82746 ASSAY OF FOLIC ACID SERUM: CPT

## 2017-09-18 ENCOUNTER — TELEPHONE (OUTPATIENT)
Dept: MEDICAL GROUP | Age: 72
End: 2017-09-18

## 2017-09-18 NOTE — TELEPHONE ENCOUNTER
ESTABLISHED PATIENT PRE-VISIT PLANNING     Note: Patient will not be contacted if there is no indication to call.     1.  Reviewed notes from the last few office visits within the medical group: Yes    2.  If any orders were placed at last visit or intended to be done for this visit (i.e. 6 mos follow-up), do we have Results/Consult Notes?        •  Labs - Labs ordered, completed and results are in chart.       •  Imaging - Imaging was not ordered at last office visit.       •  Referrals - No referrals were ordered at last office visit.    3. Is this appointment scheduled as a Hospital Follow-Up? Yes, visit was at [facility].     4.  Immunizations were updated in [employer] using Web[employer]?: Yes       •  Web Iz Recommendations: FLU    5.  Patient is due for the following Health Maintenance Topics:   Health Maintenance Due   Topic Date Due   • Annual Wellness Visit  06/22/2017   • COLONOSCOPY  08/19/2017   • IMM INFLUENZA (1) 09/01/2017       - Patient has completed PNEUMOVAX (PPSV23), PREVNAR (PCV13) , TDAP and ZOSTAVAX (Shingles) Immunization(s) per WebIZ. Chart has been updated.      6.  Patient was NOT informed to arrive 15 min prior to their scheduled appointment and bring in their medication bottles.

## 2017-09-19 ENCOUNTER — OFFICE VISIT (OUTPATIENT)
Dept: MEDICAL GROUP | Age: 72
End: 2017-09-19
Payer: MEDICARE

## 2017-09-19 ENCOUNTER — HOSPITAL ENCOUNTER (OUTPATIENT)
Dept: PHYSICAL THERAPY | Facility: REHABILITATION | Age: 72
End: 2017-09-19
Attending: PHYSICAL MEDICINE & REHABILITATION
Payer: MEDICARE

## 2017-09-19 VITALS
DIASTOLIC BLOOD PRESSURE: 70 MMHG | HEART RATE: 89 BPM | WEIGHT: 226.8 LBS | OXYGEN SATURATION: 97 % | BODY MASS INDEX: 29.11 KG/M2 | SYSTOLIC BLOOD PRESSURE: 108 MMHG | TEMPERATURE: 98.2 F | HEIGHT: 74 IN

## 2017-09-19 DIAGNOSIS — T50.905A THROMBOCYTOPENIA DUE TO DRUGS: ICD-10-CM

## 2017-09-19 DIAGNOSIS — R18.8 CIRRHOSIS OF LIVER WITH ASCITES, UNSPECIFIED HEPATIC CIRRHOSIS TYPE (HCC): ICD-10-CM

## 2017-09-19 DIAGNOSIS — E78.2 MIXED HYPERLIPIDEMIA: ICD-10-CM

## 2017-09-19 DIAGNOSIS — I10 ESSENTIAL HYPERTENSION: ICD-10-CM

## 2017-09-19 DIAGNOSIS — I82.412 ACUTE DEEP VEIN THROMBOSIS (DVT) OF FEMORAL VEIN OF LEFT LOWER EXTREMITY (HCC): ICD-10-CM

## 2017-09-19 DIAGNOSIS — D69.59 THROMBOCYTOPENIA DUE TO DRUGS: ICD-10-CM

## 2017-09-19 DIAGNOSIS — D50.0 IRON DEFICIENCY ANEMIA DUE TO CHRONIC BLOOD LOSS: ICD-10-CM

## 2017-09-19 DIAGNOSIS — E66.9 OBESITY (BMI 30.0-34.9): ICD-10-CM

## 2017-09-19 DIAGNOSIS — K74.60 CIRRHOSIS OF LIVER WITH ASCITES, UNSPECIFIED HEPATIC CIRRHOSIS TYPE (HCC): ICD-10-CM

## 2017-09-19 DIAGNOSIS — E55.9 VITAMIN D DEFICIENCY: ICD-10-CM

## 2017-09-19 DIAGNOSIS — B02.9 HERPES ZOSTER INFECTION OF THORACIC REGION: ICD-10-CM

## 2017-09-19 DIAGNOSIS — R60.0 EDEMA OF LEFT LOWER EXTREMITY: ICD-10-CM

## 2017-09-19 DIAGNOSIS — I81 PORTAL VEIN THROMBOSIS: ICD-10-CM

## 2017-09-19 PROBLEM — M48.061 LUMBAR STENOSIS: Status: RESOLVED | Noted: 2017-08-10 | Resolved: 2017-09-19

## 2017-09-19 PROBLEM — I82.402 LEFT LEG DVT (HCC): Status: RESOLVED | Noted: 2017-06-26 | Resolved: 2017-09-19

## 2017-09-19 PROBLEM — R53.1 GENERALIZED WEAKNESS: Status: RESOLVED | Noted: 2017-05-20 | Resolved: 2017-09-19

## 2017-09-19 PROCEDURE — 97116 GAIT TRAINING THERAPY: CPT

## 2017-09-19 PROCEDURE — 97112 NEUROMUSCULAR REEDUCATION: CPT

## 2017-09-19 PROCEDURE — 99215 OFFICE O/P EST HI 40 MIN: CPT | Performed by: INTERNAL MEDICINE

## 2017-09-19 RX ORDER — VALACYCLOVIR HYDROCHLORIDE 1 G/1
1000 TABLET, FILM COATED ORAL 2 TIMES DAILY
Qty: 20 TAB | Refills: 0 | Status: SHIPPED | OUTPATIENT
Start: 2017-09-19 | End: 2018-08-07

## 2017-09-19 ASSESSMENT — ENCOUNTER SYMPTOMS
PSYCHIATRIC NEGATIVE: 1
GASTROINTESTINAL NEGATIVE: 1
NEUROLOGICAL NEGATIVE: 1
CONSTITUTIONAL NEGATIVE: 1
RESPIRATORY NEGATIVE: 1
MUSCULOSKELETAL NEGATIVE: 1
EYES NEGATIVE: 1
CARDIOVASCULAR NEGATIVE: 1

## 2017-09-19 ASSESSMENT — PATIENT HEALTH QUESTIONNAIRE - PHQ9: CLINICAL INTERPRETATION OF PHQ2 SCORE: 0

## 2017-09-20 NOTE — PROGRESS NOTES
Subjective:      Torey Lima is a 72 y.o. male who presents with Hospital Follow-up       The patient is here for followup of chronic medical problems listed below. The patient is compliant with medications and having no side effects from them. Denies chest pain, abdominal pain, dyspnea, myalgias, or cough.     Since the patient's last visit he underwent laminectomy of his lumbar spinal stenosis which was successful only to have Guillain-Barré occur postoperatively coincidentally. He was hospitalized for 2 months for this and gradually resolved after some immunotherapy was given to him intravenously. He is now back to walking with a walker although he was confined to wheelchair and bed rest initially.    Another complication was severe DVT secondary to his inactivity which occurred in his left leg with edema all the way up to the abdomen. He could not undergo anticoagulation because of his power anticoagulant state from his cirrhosis and chronically elevated INR. He therefore had an IVC filter placed and was diuresed to get rid of the extra fluid.    He needs close supervision of his diuretic therapy with 3 different classes of diuretics with adjustments made accordingly. He'll get monthly lab work done to monitor this and be seen at least every 3 months if not more frequently.          Patient Active Problem List    Diagnosis Date Noted   • Esophageal varices- BANDED 4/2016- repeat egd tbd 10//29/16- gic 04/04/2016     Priority: High   • ASCVD (arteriosclerotic cardiovascular disease)subtotalled small distal LCx and 40% LAD med rx by cath 12/13/12 12/14/2012     Priority: High   • Gastric varices- s/p BRTO procedure at [facility] 04/04/2016     Priority: Medium   • GIB (gastrointestinal bleeding)- recurrent from varices 04/03/2016     Priority: Medium   • Edema of left lower extremity- due to dvt july 2017 09/19/2017   • Herpes zoster infection of thoracic region- right back T8 09/19/2017   • BPH (benign prostatic  hyperplasia) 08/11/2017   • 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) 08/11/2017   • Impaired mobility and ADLs 08/11/2017   • Acute deep vein thrombosis (DVT) of femoral vein of left lower extremity (CMS-HCC)- IVC placed july 2017 06/26/2017   • Guillain Barré syndrome (CMS-HCC) 05/27/2017   • Cirrhosis (CMS-HCC) 05/20/2017   • H/O colon cancer, stage IV- redo colonoscopy 2017 aug at [facility] 05/20/2017   • Elevated bilirubin 01/03/2017   • Microcytic hypochromic anemia 10/24/2016   • History of colon cancer 10/24/2016   • History of esophageal varices 10/24/2016   • Iron deficiency anemia due to chronic blood loss 10/06/2016   • Portal vein thrombosis- on [facility] 2/2015 04/14/2016   • Cirrhosis of liver with ascites (HCC)- ? DUE TO OLD HEP B, CHEMO RX,  OR THERMO RAD OF MAGNANT LESION 04/14/2016   • Mixed hyperlipidemia 06/20/2013   • Essential hypertension 06/20/2013   • Vitamin D deficiency 08/02/2012   • Thrombocytopenia due to drugs 04/25/2012   • Colon cancer-2010 left hemicolectomy; no colostomy 12/06/2010   • Liver lesion- ablation of malignant met from colon ca 2011- f/u [facility] q 6 mos 12/06/2010   • Peripheral neuropathy, secondary to drugs or chemicals 12/06/2010     Allergies   Allergen Reactions   • Lisinopril Cough   • Nkda [No Known Drug Allergy]      Outpatient Medications Prior to Visit   Medication Sig Dispense Refill   • atorvastatin (LIPITOR) 40 MG Tab Take 1 Tab by mouth every bedtime. 30 Tab 0   • vitamin D (VITAMIND D3) 1000 UNIT Tab Take 1 Tab by mouth every day. 60 Tab 0   • torsemide (DEMADEX) 10 MG tablet Take 2 Tabs by mouth every 72 hours. 30 Tab 0   • tamsulosin (FLOMAX) 0.4 MG capsule Take 1 Cap by mouth ONE-HALF HOUR AFTER BREAKFAST. 30 Cap 0   • spironolactone (ALDACTONE) 25 MG Tab Take 1 Tab by mouth every day. 30 Tab 3   • potassium chloride SA (KDUR) 20 MEQ Tab CR Take 1 Tab by mouth every 72 hours. 60 Tab 0   • metolazone (ZAROXOLYN) 2.5 MG Tab Take 1 Tab by mouth every 72  "hours. 30 Tab 0   • ferrous sulfate 325 (65 Fe) MG tablet Take 1 Tab by mouth every morning with breakfast. 30 Tab    • ursodiol (ACTIGALL) 300 MG Cap Take 1 Cap by mouth every day. 60 Cap 0   • folic acid (FOLVITE) 1 MG Tab Take 1 Tab by mouth every day. 30 Tab    • oxycodone immediate release (ROXICODONE) 10 MG immediate release tablet Take 1 Tab by mouth every four hours as needed for Moderate Pain. 28 Tab 0   • polyethylene glycol/lytes (MIRALAX) Pack Take 1 Packet by mouth every bedtime.  3   • docusate sodium 100 MG Cap Take 100 mg by mouth 2 times a day as needed for Constipation. 60 Cap    • senna-docusate (PERICOLACE OR SENOKOT S) 8.6-50 MG Tab Take 1 Tab by mouth 1 time daily as needed for Constipation. 30 Tab 0     No facility-administered medications prior to visit.      He also complains a rash has backed the last 2 days which he wants evaluated. It's mildly irritating but not severely painful. Reddish blisters and a patch-like formation he says.    HPI    Review of Systems   Constitutional: Negative.    HENT: Negative.    Eyes: Negative.    Respiratory: Negative.    Cardiovascular: Negative.    Gastrointestinal: Negative.    Genitourinary: Negative.    Musculoskeletal: Negative.    Skin: Negative.    Neurological: Negative.    Endo/Heme/Allergies: Negative.    Psychiatric/Behavioral: Negative.           Objective:     /70   Pulse 89   Temp 36.8 °C (98.2 °F)   Ht 1.873 m (6' 1.75\")   Wt 102.9 kg (226 lb 12.8 oz)   SpO2 97%   BMI 29.32 kg/m²      Physical Exam  The patient's lungs and heart are normal. His back exam shows right T8 area over a 4 x 5 similar area reveals blisters on a reddish base and a patch-like distribution. Consistent with shingles. His abdomen is benign and extremities reveal no edema.     Hospital Outpatient Visit on 09/16/2017   Component Date Value   • TSH 09/16/2017 2.220    • Sodium 09/16/2017 137    • Potassium 09/16/2017 3.3*   • Chloride 09/16/2017 102    • Co2 " 09/16/2017 27    • Anion Gap 09/16/2017 8.0    • Glucose 09/16/2017 85    • Bun 09/16/2017 21    • Creatinine 09/16/2017 0.68    • Calcium 09/16/2017 9.2    • AST(SGOT) 09/16/2017 19    • ALT(SGPT) 09/16/2017 12    • Alkaline Phosphatase 09/16/2017 78    • Total Bilirubin 09/16/2017 1.4    • Albumin 09/16/2017 3.6    • Total Protein 09/16/2017 6.2    • Globulin 09/16/2017 2.6    • A-G Ratio 09/16/2017 1.4    • Cholesterol,Tot 09/16/2017 120    • Triglycerides 09/16/2017 76    • HDL 09/16/2017 60    • LDL 09/16/2017 45    • WBC 09/16/2017 4.2*   • RBC 09/16/2017 4.47*   • Hemoglobin 09/16/2017 12.5*   • Hematocrit 09/16/2017 38.4*   • MCV 09/16/2017 85.9    • MCH 09/16/2017 28.0    • MCHC 09/16/2017 32.6*   • RDW 09/16/2017 52.5*   • Platelet Count 09/16/2017 68*   • MPV 09/16/2017 11.2    • Neutrophils-Polys 09/16/2017 68.10    • Lymphocytes 09/16/2017 17.00*   • Monocytes 09/16/2017 10.20    • Eosinophils 09/16/2017 3.50    • Basophils 09/16/2017 0.70    • Immature Granulocytes 09/16/2017 0.50    • Nucleated RBC 09/16/2017 0.00    • Neutrophils (Absolute) 09/16/2017 2.88    • Lymphs (Absolute) 09/16/2017 0.72*   • Monos (Absolute) 09/16/2017 0.43    • Eos (Absolute) 09/16/2017 0.15    • Baso (Absolute) 09/16/2017 0.03    • Immature Granulocytes (a* 09/16/2017 0.02    • NRBC (Absolute) 09/16/2017 0.00    • Iron 09/16/2017 58    • Total Iron Binding 09/16/2017 343    • % Saturation 09/16/2017 17    • Ferritin 09/16/2017 128.9    • Vitamin B12 -True Cobala* 09/16/2017 503    • Folate -Folic Acid 09/16/2017 >23.7    • PT 09/16/2017 15.4*   • INR 09/16/2017 1.18*   • GFR If  09/16/2017 >60    • GFR If Non  [employer]i* 09/16/2017 >60    Admission on 08/10/2017, Discharged on 09/07/2017   No results displayed because visit has over 200 results.         Lab Results   Component Value Date/Time    HBA1C 5.2 10/25/2016 12:06 AM    HBA1C 5.3 07/08/2016 07:02 AM     Lab Results   Component Value Date/Time     SODIUM 137 09/16/2017 07:26 AM    POTASSIUM 3.3 (L) 09/16/2017 07:26 AM    CHLORIDE 102 09/16/2017 07:26 AM    CO2 27 09/16/2017 07:26 AM    GLUCOSE 85 09/16/2017 07:26 AM    BUN 21 09/16/2017 07:26 AM    CREATININE 0.68 09/16/2017 07:26 AM    CREATININE 1.14 01/23/2010 12:00 AM    BUNCREATRAT 15 01/23/2010 12:00 AM    GLOMRATE >59 01/23/2010 12:00 AM    ALKPHOSPHAT 78 09/16/2017 07:26 AM    ASTSGOT 19 09/16/2017 07:26 AM    ALTSGPT 12 09/16/2017 07:26 AM    TBILIRUBIN 1.4 09/16/2017 07:26 AM     Lab Results   Component Value Date/Time    INR 1.18 (H) 09/16/2017 07:26 AM    INR 1.28 (H) 09/04/2017 05:32 AM    INR 1.35 (H) 08/22/2017 06:14 AM     Lab Results   Component Value Date/Time    CHOLSTRLTOT 120 09/16/2017 07:26 AM    LDL 45 09/16/2017 07:26 AM    HDL 60 09/16/2017 07:26 AM    TRIGLYCERIDE 76 09/16/2017 07:26 AM       Lab Results   Component Value Date/Time    TESTOSTERONE 333 04/02/2015 06:47 AM     No results found for: TSH  Lab Results   Component Value Date/Time    FREET4 0.85 04/02/2015 06:47 AM    FREET4 0.85 04/12/2014 07:26 AM     No results found for: URICACID  No components found for: VITB12  Lab Results   Component Value Date/Time    25HYDROXY 32 08/11/2017 04:50 AM    25HYDROXY 38 04/02/2015 06:47 AM          Assessment/Plan:     1. Cirrhosis of  liver with ascites, unspecified hepatic cirrhosis type (CMS-HCC)    Under good control. Continue same regimen.  - COMP METABOLIC PANEL; Future  - CBC WITH DIFFERENTIAL; Future    2. Edema of left lower extremity    Under good control. Continue same regimen.    The patient was placed on 3 separate diuretics for his severe edema in his left leg from his DVT which has nearly resolved now. IVC filter was placed. His ultrasound abdomen 2 months ago showed only mild-to-moderate ascites.    The continuous regimen of triple diuretics with Zaroxolyn, torsemide, and Aldactone. The Zaroxolyn and torsemide or every third day and Aldactone is once a day. He'll be no  change in the dosing and we'll see him every 3 months and check lab work every month to make sure he did not palpate any I disturbance or renal impairment.        3. Obesity (BMI 30.0-34.9)- resolved    This problem has resolved with significant weight loss. Continue with diet and exercise weight reduction diet. High-protein low-carb. Diagnoses removed from problem list    4. Acute deep vein thrombosis (DVT) of femoral vein of left lower extremity (CMS-HCC)- IVC placed july 2017    Under good control. Continue same regimen.  5. Mixed hyperlipid emia   Under good control. Continue same regimen.    6. Essential hypertension     Under good control. Continue same regimen.  7. Iron deficiency anemia due to chronic blood loss    Under good control. Continue same regimen.  8. Vitamin D deficiency    Under good control. Continue same regimen.  9. Portal vein thrombosis- on CT [facility] 2/2015    Under good control. Continue same regimen.  10. Thrombocytopenia due to drugs     Under good control. Continue same regimen.  11. Herpes zoster infection of thoracic region- right back T8    Under good control. Continue same regimen.  - valacyclovir (VALTREX) 1 GM Tab; Take 1 Tab by mouth 2 times a day.  Dispense: 20 Tab; Refill: 0      40 minute face-to-face encounter took place today.  More than half of this time was spent in the coordination of care of the above problems, as well as counseling.

## 2017-09-21 ENCOUNTER — HOSPITAL ENCOUNTER (OUTPATIENT)
Dept: PHYSICAL THERAPY | Facility: REHABILITATION | Age: 72
End: 2017-09-21
Attending: PHYSICAL MEDICINE & REHABILITATION
Payer: MEDICARE

## 2017-09-21 PROCEDURE — 97530 THERAPEUTIC ACTIVITIES: CPT

## 2017-09-21 PROCEDURE — 97110 THERAPEUTIC EXERCISES: CPT

## 2017-09-22 DIAGNOSIS — B02.9 HERPES ZOSTER INFECTION OF THORACIC REGION: ICD-10-CM

## 2017-09-22 RX ORDER — METHYLPREDNISOLONE 4 MG/1
TABLET ORAL
Qty: 1 KIT | Refills: 1 | Status: SHIPPED | OUTPATIENT
Start: 2017-09-22 | End: 2018-08-07

## 2017-09-22 NOTE — PROGRESS NOTES
"My chart message sent to patient as follows: \"Apply 1% hydrocortisone cream (over the counter) every 3 hrs to the rash- should help th itching. You can also take a medrol steroid dose pack orally, and Rx sent to your pharm if the over the counnter cream not effective for the itching. Benadryl (otc) also can be taken orally, along with the medrol dose pack for itching,  But it will cause drowsiness\"    "

## 2017-09-25 ENCOUNTER — TELEPHONE (OUTPATIENT)
Dept: VASCULAR LAB | Facility: MEDICAL CENTER | Age: 72
End: 2017-09-25

## 2017-09-25 NOTE — TELEPHONE ENCOUNTER
Received staff msg back from Dr. Urena-- he recommends leaving the IVC filter in place indefinitely.    Natasha CAIN

## 2017-09-27 ENCOUNTER — APPOINTMENT (OUTPATIENT)
Dept: OCCUPATIONAL THERAPY | Facility: REHABILITATION | Age: 72
End: 2017-09-27
Attending: PHYSICAL MEDICINE & REHABILITATION
Payer: MEDICARE

## 2017-09-27 ENCOUNTER — APPOINTMENT (OUTPATIENT)
Dept: PHYSICAL THERAPY | Facility: REHABILITATION | Age: 72
End: 2017-09-27
Attending: PHYSICAL MEDICINE & REHABILITATION
Payer: MEDICARE

## 2017-09-28 NOTE — DISCHARGE SUMMARY
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   • Iron deficiency anemia due to chronic blood loss   • Cirrhosis of liver with ascites (HCC)- ? 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   • Iron deficiency anemia due to chronic blood loss   • Cirrhosis of liver with ascites (HCC)- ? DUE TO OLD HEP B, CHEMO RX,  OR THERMO RAD OF MAGNANT LESION   • Essential hypertension   • Colon cancer-2010 left hemicolectomy; no colostomy       Hospital Course    The patient is a 72 y.o. male with a past medical history of stage IV colon cancer with metastatic disease to the liver now with a five-year remission, cirrhosis and portal vein thrombosis with esophageal varices status post ablation history of GI bleed, hypertension, hyperlipidemia,  peripheral neuropathy secondary to chemotherapy and lumbar stenosis; now admitted for acute inpatient rehabilitation with severe functional debility secondary to aAIDP were lumbar stenosis On admission the patient and medical record report that the patient had significant lumbar stenosis and was scheduled for surgery in the end of May 2017. Approximately 2 weeks prior to that event the patient starting having issues with tetraparesis having difficulty opening jars as well as having issues with increased difficulty with ambulation. It got to the point where it 1st was holding onto furniture then started using his  mother's front wheeled walker. He was also having issues with falls. The patient and his wife were attributing the difficulties primarily to the lumbar stenosis. He therefore was admitted and underwent successful surgery to address his lumbar stenosis in May 2017, because of his other symptoms neurology was consulted and a diagnosis with the assistance of EMG of AIDP was made. Patient had one 5 day course of IVIG     Patient was evaluated by Rehab Medicine physician and Physical Therapy and Occupational Therapy and determined to be appropriate for acute inpatient rehab and was transferred to [facility] on 8/10/2017        The patient worked very hard in PT and OT in an effort to make his maximal functional recovery.  With the assistance of his wife who was often present throughout his stay. They together worked to get the needed home modifications  Accomplished and needed family training.  The patient made substantial improvement as documented below.    He had multiple medical issues that also needed to be addressed which were managed with the assistance of the hospitalist.      1) History of hypertension  While at our facility patient's BP was low normal    He was on diuretics because of the cirrhosis  And was maintained of the coreg    2) Cirrhosis Patient has a portal vein  thrombosis 2nd to metastatic colon cancer to the liver      He also has required therapeutic taps for fluid collecting in his abdomen     Other related issues include LE edema  And eso varices  S/P ablation    On Demadex: 20 mg every 3 days  On Metolazone: 2.5 mg every 3 days  On Aldactone: 25 mg daily  On KCL: 20 meq every 3 days  Monitor K+ levels: 3.8 ()      3) Liver failure   NH4: 35 () --> 32 ()     4) Pancytopenia  Platelets stable ()  2nd to prior chemo and cirrhosis     5)  Coccyx Pressure Sore/Ulcer  Healing  Turning pt in bed often  Wound care eval: zinc based cream applied and leave bandage off     6) Hyperlipidemia  Stable  Discharged on Atorvastin  40 mg q evening    7) GBS gradually improving will follow up with neurology      Functional Status at Discharge  Eatin - Modified Independent  Eating Description:  Increased time  Groomin - Modified Independent  Grooming Description:  Increased time (In standing and seated for groom/hygiene routine)  Bathin - Standby Prompting/Supervision or Set-up  Bathing Description:  Adaptive equipment, Grab bar, Tub bench, Long handled bath tool, Hand held shower, Increased time, Supervision for safety, Verbal cueing, Set-up of equipment  Upper Body Dressin - Modified Independent  Upper Body Dressing Description:   (Don/doff pull over shirt)  Lower Body Dressin - Standby Prompting/Supervsion or Set-up  Lower Body Dressing Description:  5 - Standby Prompting/Supervsion or Set-up  Discharge Location : Home  Patient Discharging with Assist of: Family   Level of Supervision Required: 24 Hour Supervision  Recommended Equipment for Discharge: Shower Chair;Front-Wheeled Walker;Tub Transfer Bench;3 in 1 Commode;Grab Bars in Tub / Shower;Grab Bars by Toilet;Dressing Stick;Reacher  Recommended Services Upon Discharge: Outpatient Occupational Therapy  Long Term Goals Met: 0  Long Term Goals Not Met: 3  Reason(s) for Goals Not Met:  Supervision/SBA secondary to safety   Criteria for Termination of Services: Maximum Function Achieved for Inpatient Rehabilitation  Walk:  5 - Standby Prompting/Supervision or Set-up  Distance Walked:  Walks a minimum of 150 feet  Walk Description:   (SPV, FWW, safe performance; SO provided SBA as part of family training; indoors only today x160 feet without LOB; min-mod WB onto FWW)  Wheelchair:  6 - Modified Independent  Distance Propelled:  Propels a minimum of 150 feet   Wheelchair Description:   (Mod I for 225 feet indoors; no cueing)  Stairs 2 - Max Assistance  Stairs Description (Min A to CGA at gt belt; B railings; step-to pattern; increased time; had SO provide CGA-Min A for safety and educated her on her own MARILYN and communication b/w them both; 4 stairs)  Discharge Location: Home  Patient Discharging with Assist of: Spouse / Significant Other  Level of Supervision Required Upon Discharge: Intermittent Supervision  Recommended Equipment for Discharge: Front-Wheeled Walker;Manual Wheelchair;Other (See Comments) (railings or grab bars at 2 stairs (family room) to enter silvina)  Recommeded Services Upon Discharge: Home Health Physical Therapy  Long Term Goals Met: 2  Long Term Goals Not Met: 1  Reason(s) for Goals Not Met: Patient is not CGA with FWW for 2 stairs but is rather Min A-CGA with use of handrails (does have FWW at base of stairs)  Criteria for Termination of Services: Maximum Function Achieved for Inpatient Rehabilitation  Comprehension Mode:  Both  Comprehension:  7 - Independent  Comprehension Description:     Expression Mode:  Both  Expression:  7 - Independent  Expression Description:     Social Interaction:  7 - Independent  Social Interaction Description:     Problem Solvin - Independent  Problem Solving Description:  Therapy schedule  Memory:  7 - Independent  Memory Description:  Therapy schedule       Discharge Medication:     Medication List      START taking these medications       Instructions    MG Caps   Take 100 mg by mouth 2 times a day as needed for Constipation.  Dose:  100 mg     ferrous sulfate 325 (65 Fe) MG tablet   Take 1 Tab by mouth every morning with breakfast.  Dose:  325 mg     metolazone 2.5 MG Tabs  Commonly known as:  ZAROXOLYN   Take 1 Tab by mouth every 72 hours.  Dose:  2.5 mg     polyethylene glycol/lytes Pack  Commonly known as:  MIRALAX   Take 1 Packet by mouth every bedtime.  Dose:  17 g     potassium chloride SA 20 MEQ Tbcr  Commonly known as:  Kdur   Take 1 Tab by mouth every 72 hours.  Dose:  20 mEq     spironolactone 25 MG Tabs  Commonly known as:  ALDACTONE   Take 1 Tab by mouth every day.  Dose:  25 mg     tamsulosin 0.4 MG capsule  Commonly known as:  FLOMAX   Take 1 Cap by mouth ONE-HALF HOUR AFTER BREAKFAST.  Dose:  0.4 mg     torsemide 10 MG tablet  Commonly known as:  DEMADEX   Take 2 Tabs by mouth every 72 hours.  Dose:  20 mg        CHANGE how you take these medications      Instructions   atorvastatin 40 MG Tabs  What changed:  · when to take this  · additional instructions  Commonly known as:  LIPITOR   Take 1 Tab by mouth every bedtime.  Dose:  40 mg     Cholecalciferol 1000 UNIT Tabs  What changed:  · how much to take  · Another medication with the same name was removed. Continue taking this medication, and follow the directions you see here.  Commonly known as:  VITAMIND D3   Take 1 Tab by mouth every day.  Dose:  1000 Units     oxycodone immediate release 10 MG immediate release tablet  What changed:  reasons to take this  Commonly known as:  ROXICODONE   Take 1 Tab by mouth every four hours as needed for Moderate Pain.  Dose:  10 mg     senna-docusate 8.6-50 MG Tabs  What changed:  · how much to take  · when to take this  · reasons to take this  Commonly known as:  PERICOLACE or SENOKOT S   Take 1 Tab by mouth 1 time daily as needed for Constipation.  Dose:  1 Tab     ursodiol 300 MG Caps  What changed:  when to take this  Commonly known  "as:  ACTIGALL   Take 1 Cap by mouth every day.  Dose:  300 mg        CONTINUE taking these medications      Instructions   folic acid 1 MG Tabs  Commonly known as:  FOLVITE   Take 1 Tab by mouth every day.  Dose:  1 mg        STOP taking these medications    carvedilol 3.125 MG Tabs  Commonly known as:  COREG     CENTRUM SILVER ADULT 50+ Tabs     losartan 25 MG Tabs  Commonly known as:  COZAAR     PROBIOTIC DAILY PO     simethicone 80 MG Chew  Commonly known as:  MYLICON            Equipment:  A front wheeled walker and a wheelchair with cushion have been ordered through [place] [phone]. They will deliver items to you at [facility] prior to discharge.     Follow-up:  Discharge Location: Home with spouse with outpatient services.    Out Patient Therapy: [facility] Out Patient Therapies:   For Physical Therapy: [facility] Location    [address] [place], [place].  Please call to schedule follow up appointment.     For Occupational Therapy: [address] Location   [address]  Please call to scheduled follow up appointment.    Durable Medical Equipment:   A front wheeled walker and a wheelchair with cushion have been ordered through [place] [facility] [phone]. They will deliver items to you at [facility] prior to discharge.    Prescriptions Electronically sent to:      Other:  [facility]      Patient has a Disabled Parking Permit.             Follow-up With Time/Date Contact Info   Fabián Urena M.D.  Primary Care 9/19/2017 Tuesday @ 2:40pm.. [address] [place] [id number] [phone]   G Elaine Rosales M.D.  Neurologist 10/3/2017 Tuesday @ 8:45am   [address], [facility] [address]  [place] [address]  [phone]                   Condition on Discharge:      Good condition    BP (!) 98/61   Pulse 62   Temp 36.4 °C (97.5 °F)   Resp 18   Ht 1.905 m (6' 3\")   Wt 100 kg (220 lb 7.4 oz)   SpO2 92%   BMI 27.56 kg/m²      GENERAL: No " apparent distress  HEENT: Normocephalic/atraumatic, EOMI, PERRL and No nystagmus  CARDIAC: Regular rate and rhythm, normal S1, S2   LUNGS: Clear to auscultation   ABDOMINAL: bowel sounds present, soft, nontender and ascites present with fluid wave noted. Ascites is increased  EXTREMITIES: Without clubbing or cyanosis 2+ edema noted in the left lower extremity but decreased  SKIN:   Small stage III noted on sacrum  NEURO: Unchanged with the exception to hip flexor strength is definitely a half grade stronger bilaterally as compared to admission    Greater than 40 minutes were spent in total in the preparation of the patient's discharge. This included clinical coordination. reconciliation of medications and preparation of this report      Moreno Adorno M.D.

## 2017-09-29 ENCOUNTER — APPOINTMENT (OUTPATIENT)
Dept: OCCUPATIONAL THERAPY | Facility: REHABILITATION | Age: 72
End: 2017-09-29
Attending: PHYSICAL MEDICINE & REHABILITATION
Payer: MEDICARE

## 2017-09-29 ENCOUNTER — APPOINTMENT (OUTPATIENT)
Dept: PHYSICAL THERAPY | Facility: REHABILITATION | Age: 72
End: 2017-09-29
Attending: PHYSICAL MEDICINE & REHABILITATION
Payer: MEDICARE

## 2017-10-03 ENCOUNTER — HOSPITAL ENCOUNTER (OUTPATIENT)
Dept: OCCUPATIONAL THERAPY | Facility: REHABILITATION | Age: 72
End: 2017-10-03
Attending: PHYSICAL MEDICINE & REHABILITATION
Payer: MEDICARE

## 2017-10-03 ENCOUNTER — HOSPITAL ENCOUNTER (OUTPATIENT)
Dept: PHYSICAL THERAPY | Facility: REHABILITATION | Age: 72
End: 2017-10-03
Attending: PHYSICAL MEDICINE & REHABILITATION
Payer: MEDICARE

## 2017-10-03 PROCEDURE — 97110 THERAPEUTIC EXERCISES: CPT

## 2017-10-03 PROCEDURE — 97530 THERAPEUTIC ACTIVITIES: CPT

## 2017-10-03 PROCEDURE — 97166 OT EVAL MOD COMPLEX 45 MIN: CPT

## 2017-10-03 PROCEDURE — 97116 GAIT TRAINING THERAPY: CPT | Mod: XE

## 2017-10-05 ENCOUNTER — HOSPITAL ENCOUNTER (OUTPATIENT)
Dept: OCCUPATIONAL THERAPY | Facility: REHABILITATION | Age: 72
End: 2017-10-05
Attending: PHYSICAL MEDICINE & REHABILITATION
Payer: MEDICARE

## 2017-10-05 ENCOUNTER — HOSPITAL ENCOUNTER (OUTPATIENT)
Dept: PHYSICAL THERAPY | Facility: REHABILITATION | Age: 72
End: 2017-10-05
Attending: PHYSICAL MEDICINE & REHABILITATION
Payer: MEDICARE

## 2017-10-05 PROCEDURE — 97110 THERAPEUTIC EXERCISES: CPT

## 2017-10-05 PROCEDURE — 97530 THERAPEUTIC ACTIVITIES: CPT

## 2017-10-05 PROCEDURE — 97112 NEUROMUSCULAR REEDUCATION: CPT

## 2017-10-09 ENCOUNTER — TELEPHONE (OUTPATIENT)
Dept: VASCULAR LAB | Facility: MEDICAL CENTER | Age: 72
End: 2017-10-09

## 2017-10-10 ENCOUNTER — OCCUPATIONAL THERAPY (OUTPATIENT)
Dept: OCCUPATIONAL THERAPY | Facility: REHABILITATION | Age: 72
End: 2017-10-10
Attending: PHYSICAL MEDICINE & REHABILITATION
Payer: MEDICARE

## 2017-10-10 ENCOUNTER — PHYSICAL THERAPY (OUTPATIENT)
Dept: PHYSICAL THERAPY | Facility: REHABILITATION | Age: 72
End: 2017-10-10
Attending: PHYSICAL MEDICINE & REHABILITATION
Payer: MEDICARE

## 2017-10-10 DIAGNOSIS — R26.9 ABNORMALITY OF GAIT: ICD-10-CM

## 2017-10-10 DIAGNOSIS — M62.81 MUSCLE WEAKNESS (GENERALIZED): ICD-10-CM

## 2017-10-10 DIAGNOSIS — G89.29 CHRONIC MIDLINE LOW BACK PAIN WITHOUT SCIATICA: ICD-10-CM

## 2017-10-10 DIAGNOSIS — M54.50 CHRONIC MIDLINE LOW BACK PAIN WITHOUT SCIATICA: ICD-10-CM

## 2017-10-10 PROCEDURE — 97112 NEUROMUSCULAR REEDUCATION: CPT

## 2017-10-10 PROCEDURE — 97110 THERAPEUTIC EXERCISES: CPT

## 2017-10-10 PROCEDURE — 97014 ELECTRIC STIMULATION THERAPY: CPT

## 2017-10-10 NOTE — OP THERAPY DAILY TREATMENT
"Outpatient Occupational Therapy  DAILY TREATMENT     [facility] Occupational Therapy [address].  [address]  [place] [address]  Phone:  [phone]  Fax:  [phone]    Date: 10/10/2017    Patient: Torey Lima  YOB: 1945  MRN: [id number]     Time Calculation  Start time: 0100  Stop time: 0200 Time Calculation (min): 60 minutes     Chief Complaint: Extremity Weakness and Hand Weakness    Visit #: 3    Subjective  \"I've been doing the putty exercises\"    Objective:  Exercises/Treatments   Sensory re-education via bucket of macaroni 5 minutes each hand, squeeze and filter through.  Composite fist x 20 reps  Tongue depressor turning/flipping bilateral hands 20 reps each direction  Chair push-ups 10 reps, 10 second hold.  Standing dynamic balance unilateral toe taps forward and diagonal patterns with unilateral UE support on FWW x 20 reps  Static standing balance: Romberg EO with changes in head orientation neutral/left/right, 30 seconds each.  Unable to maintain with EC with post LOB after 3 seconds.  Able to maintain EC feet apart x 30 seconds with CGA x 2 trails.    Assessment/Response/Plan   Pt making good progress today with strength, standing balance, and activity tolerance for daily routine along with a positive response to sensory desensitization of bilateral hands.  HEP updated to include all treatments except balance exercises.  Continue skilled OT 2x week to improve performance components above to maximize functional independence.      "

## 2017-10-10 NOTE — OP THERAPY DAILY TREATMENT
"S:getting out of chair is becoming easier. I am still having back pain at the end of the day and worse with sitting in a couch    There ex: x15 min.    E-stim: 15 min.    O:  1/10 lbp  -ball in/out  -ball bridges 15\",15\",30\"b w/   -core stab w/BFB: bkfo, ball in/out  -sit-stand mechanics w/ curve  Cymro 5/5\" supine w/ mhp to l/s  A: noted more back pain lately with. Sitting, poor hip dissociation, poor l/s strength  P: increase core stab level II  "

## 2017-10-12 ENCOUNTER — APPOINTMENT (OUTPATIENT)
Dept: OCCUPATIONAL THERAPY | Facility: REHABILITATION | Age: 72
End: 2017-10-12
Attending: PHYSICAL MEDICINE & REHABILITATION
Payer: MEDICARE

## 2017-10-12 ENCOUNTER — APPOINTMENT (OUTPATIENT)
Dept: PHYSICAL THERAPY | Facility: REHABILITATION | Age: 72
End: 2017-10-12
Attending: PHYSICAL MEDICINE & REHABILITATION
Payer: MEDICARE

## 2017-10-17 ENCOUNTER — OCCUPATIONAL THERAPY (OUTPATIENT)
Dept: OCCUPATIONAL THERAPY | Facility: REHABILITATION | Age: 72
End: 2017-10-17
Attending: PHYSICAL MEDICINE & REHABILITATION
Payer: MEDICARE

## 2017-10-17 ENCOUNTER — PHYSICAL THERAPY (OUTPATIENT)
Dept: PHYSICAL THERAPY | Facility: REHABILITATION | Age: 72
End: 2017-10-17
Attending: PHYSICAL MEDICINE & REHABILITATION
Payer: MEDICARE

## 2017-10-17 DIAGNOSIS — R26.9 GAIT ABNORMALITY: ICD-10-CM

## 2017-10-17 DIAGNOSIS — M62.81 MUSCLE WEAKNESS (GENERALIZED): ICD-10-CM

## 2017-10-17 DIAGNOSIS — R26.9 ABNORMALITY OF GAIT: ICD-10-CM

## 2017-10-17 PROCEDURE — 97530 THERAPEUTIC ACTIVITIES: CPT

## 2017-10-17 PROCEDURE — 97112 NEUROMUSCULAR REEDUCATION: CPT

## 2017-10-17 PROCEDURE — 97116 GAIT TRAINING THERAPY: CPT | Mod: XE

## 2017-10-17 PROCEDURE — 97110 THERAPEUTIC EXERCISES: CPT

## 2017-10-17 ASSESSMENT — ACTIVITIES OF DAILY LIVING (ADL): POOR_BALANCE: 1

## 2017-10-17 NOTE — OP THERAPY DAILY TREATMENT
"Outpatient Occupational Therapy  DAILY TREATMENT     [facility] Occupational Therapy [address]  [address]  [place] [address]  Phone:  [phone]  Fax:  [phone]    Date: 10/17/2017    Patient: Torey Lima  YOB: 1945  MRN: [id number]     Time Calculation  Start time: 0100  Stop time: 0200 Time Calculation (min): 60 minutes     Chief Complaint: Hand Weakness; Hand Numbness; Extremity Weakness; and Loss Of Balance    Visit #: 4    Subjective \"I tried typing this weekend and it was slow but successful\"    Objective:  Occupational Therapy Exercises and Treatments  Therapeutic Treatments and Modalities:    1. Neuro re-education, 45    2. Functional training, 15    Treatment Summary:  Bilateral hand sensory re-education: electrical wire squeeze and filter 4 minutes each hand, transport of water from 1 basin to another using wash cloth, 20 reps each, composite fist x 10.  Sit to stands with arms cross incorporating head turns L/R with incremental 3 second return to seated position x 10.  Standing feet apart with EC with changes in head orientation 30 sec each position. Standing on foam stationary, then incorporating vertical head movements.   Attempted EC on foam but with anterior LOB after 3 seconds.      Assessment, Response and Plan:   Impairments: abnormal coordination, impaired balance and impaired physical strength    Impairments comment:  Dysesthesia  Assessment details:  Pt continues to make steady gains with bilateral hand sensorimotor function for IADLS.  Pt demonstrating improvements in standing balance as he is tolerating an increase in challenges to his visual, somatosensory, and vestibular systems with changes in head orientation and sensory feedback.    Plan:   Planned therapy interventions:  ADL self/home management, home exercise program, sensory integrative techniques, therapeutic activities, therapeutic exercise, patient education, neuromuscular re-education, " manual therapy and coordination  Frequency:  2x week  Duration in visits:  10  Duration in weeks:  5

## 2017-10-17 NOTE — OP THERAPY DAILY TREATMENT
"Outpatient Physical Therapy  DAILY TREATMENT     [facility] Physical Therapy [address].  [address]  [place] [address]  Phone:  [phone]  Fax:  [phone]    Date: 10/17/2017    Patient: Torey Lima  YOB: 1945  MRN: [id number]     Time Calculation  Start time: 1419  Stop time: 1514 Time Calculation (min): 55 minutes     Chief Complaint: Low Back Pain (limited wih sitting due pain)    Visit #: 6    Subjective   Walking easier without an a/d    Objective  There ex: x15 min.   GAit trg.: 40    Treatments:    Treatment Summary: Core stab level I w/ BFB:   --ball in/out  --ball buttocks lift x 45\" x 2   --bridges w/ hamstrings 4 x 10  --bilateral stance w/ feet touching w/ eyes closed--lob and demonstrated appropriate stepping strategy  --gait trg w/ spc w/ focus on arm swing--cga  --gait trg. W/ bilateral sticks w/ pnf gait trg. And focus on arm swing.cga    --tandem 2x4 gait trg w/ spc forward and backwards w/ normal stepping strategy responses.--cga  --side-step on 2x4 w/ spc --cga        Assessment, Response and Plan:   Assessment details:  A: poor hip dissociation w/ limited volitional control w/ core.  Improving balance strategies and normalizing stepping strategies w/ limited tandem balance. Continued wide MARILYN with ankle  Limited ankle strategy  P: increase core strength,progress dynamic balance and gait trg outside with spc        "

## 2017-10-19 ENCOUNTER — OCCUPATIONAL THERAPY (OUTPATIENT)
Dept: OCCUPATIONAL THERAPY | Facility: REHABILITATION | Age: 72
End: 2017-10-19
Attending: PHYSICAL MEDICINE & REHABILITATION
Payer: MEDICARE

## 2017-10-19 ENCOUNTER — PHYSICAL THERAPY (OUTPATIENT)
Dept: PHYSICAL THERAPY | Facility: REHABILITATION | Age: 72
End: 2017-10-19
Attending: PHYSICAL MEDICINE & REHABILITATION
Payer: MEDICARE

## 2017-10-19 DIAGNOSIS — M62.81 MUSCLE WEAKNESS (GENERALIZED): ICD-10-CM

## 2017-10-19 DIAGNOSIS — R26.2 DIFFICULTY WALKING: ICD-10-CM

## 2017-10-19 DIAGNOSIS — M54.50 ACUTE MIDLINE LOW BACK PAIN WITHOUT SCIATICA: ICD-10-CM

## 2017-10-19 PROCEDURE — 97110 THERAPEUTIC EXERCISES: CPT

## 2017-10-19 PROCEDURE — 97116 GAIT TRAINING THERAPY: CPT | Mod: XE

## 2017-10-19 PROCEDURE — 97530 THERAPEUTIC ACTIVITIES: CPT

## 2017-10-19 PROCEDURE — 97112 NEUROMUSCULAR REEDUCATION: CPT

## 2017-10-19 ASSESSMENT — ACTIVITIES OF DAILY LIVING (ADL): POOR_BALANCE: 1

## 2017-10-19 NOTE — OP THERAPY DAILY TREATMENT
"Outpatient Occupational Therapy  DAILY TREATMENT     [facility] Occupational Therapy [address].  [address]  [place] [address]  Phone:  [phone]  Fax:  [phone]    Date: 10/19/2017    Patient: Torey Lima  YOB: 1945  MRN: [id number]     Time Calculation  Start time: 0100  Stop time: 0200 Time Calculation (min): 60 minutes     Chief Complaint: Hand Weakness and Other (decreased dexterity)    Visit #: 5    Subjective \"I'd like to work on my hands\"    Objective:  Occupational Therapy Exercises and Treatments  Therapeutic Treatments and Modalities:    1. Neuro re-education, 15, BH, BH sensory re-education via bucket of small rocks, grasp and filter, 5 min each hand followed by isolated digit tapping with mild force 5 reps each finger 7 cycles.  able to isolate all digits except right RF    2. Functional training, 15, BH, Grooved pegboard- see Funct Assessment., flipping over and stacking poker chips, 4 stacks of 3    3. Therex, 30, BH, BH strengthening via foam roll x 10 reps, 10 sec hold.  pinch strengthening via red clips 2pt, 3pt x 10 reps 2 sec hold, gross digit extension with red band 10 reps, 3 second hold      Assessment, Response and Plan:   Impairments: abnormal coordination, impaired balance and impaired physical strength    Impairments comment:  Dysesthesia  Assessment details:  Pt continues to make steady gains with bilateral hand strength in  and pinch along with isolated digit control for typing.  Decreased sensation in distal fingers persists but is responding well to sensory re-education.  HEP updated with good understanding.    Plan:   Planned therapy interventions:  ADL self/home management, home exercise program, sensory integrative techniques, therapeutic activities, therapeutic exercise, patient education, neuromuscular re-education, manual therapy and coordination  Frequency:  2x week  Duration in visits:  9  Duration in weeks:  5        "

## 2017-10-19 NOTE — OP THERAPY DAILY TREATMENT
"Outpatient Physical Therapy  DAILY TREATMENT     [facility] Physical Therapy [address].  [address]  [place] [address]  Phone:  [phone]  Fax:  [phone]    Date: 10/19/2017    Patient: Torey Lima  YOB: 1945  MRN: [id number]     Time Calculation             Chief Complaint: No chief complaint on file.    Visit #: 4    Subjective  Noted significantl stomach soreness on Wednesday and only did ex once.  Did not get a cane yet.  There ex: x5  min.  Gait trg. X 23  3/10 --'stomach sore\"  Objective      Treatments:    Treatment Summary: --ball in/out  --ball bridges x 30\"  --gait trg w/ spc w/ focus on arm swing and josef  -forward/backward/side-step on 2x4 with spc--cga     *palpation of abdominal wall revealed significant L  lower quadrant pain with significant TTP along with mod/servere ttp R mid/lower quadrant      Assessment, Response and Plan:   Assessment details:  Improved ambulation but limited activity tolerance due to significant abdominal pain.  Palpation of  Anterior Left/lower quadrant and anterior R mid / upper quadrant were tender.  Pt. Related abdominal soreness to previous treatment's exercise but palpation and activity do not explain type and location of pain and pain does not appear muscular in nature(unable to reproduce pt. complaint w/ any muscle activity.  Pt. Described his pain as \"below\" my superficial palpation.      Plan:   Plan details:  Increase gait trg and balance.   * instructed patient to see m.d. About abdominal pain if it worsens or remains unchanged.         "

## 2017-10-21 ENCOUNTER — HOSPITAL ENCOUNTER (OUTPATIENT)
Dept: LAB | Facility: MEDICAL CENTER | Age: 72
End: 2017-10-21
Attending: INTERNAL MEDICINE
Payer: MEDICARE

## 2017-10-21 DIAGNOSIS — R18.8 CIRRHOSIS OF LIVER WITH ASCITES, UNSPECIFIED HEPATIC CIRRHOSIS TYPE (HCC): ICD-10-CM

## 2017-10-21 DIAGNOSIS — K74.60 CIRRHOSIS OF LIVER WITH ASCITES, UNSPECIFIED HEPATIC CIRRHOSIS TYPE (HCC): ICD-10-CM

## 2017-10-21 LAB
ALBUMIN SERPL BCP-MCNC: 3.7 G/DL (ref 3.2–4.9)
ALBUMIN/GLOB SERPL: 1.5 G/DL
ALP SERPL-CCNC: 75 U/L (ref 30–99)
ALT SERPL-CCNC: 11 U/L (ref 2–50)
ANION GAP SERPL CALC-SCNC: 8 MMOL/L (ref 0–11.9)
AST SERPL-CCNC: 20 U/L (ref 12–45)
BASOPHILS # BLD AUTO: 0.9 % (ref 0–1.8)
BASOPHILS # BLD: 0.04 K/UL (ref 0–0.12)
BILIRUB SERPL-MCNC: 1.2 MG/DL (ref 0.1–1.5)
BUN SERPL-MCNC: 23 MG/DL (ref 8–22)
CALCIUM SERPL-MCNC: 9.2 MG/DL (ref 8.5–10.5)
CHLORIDE SERPL-SCNC: 105 MMOL/L (ref 96–112)
CO2 SERPL-SCNC: 27 MMOL/L (ref 20–33)
CREAT SERPL-MCNC: 0.95 MG/DL (ref 0.5–1.4)
EOSINOPHIL # BLD AUTO: 0.2 K/UL (ref 0–0.51)
EOSINOPHIL NFR BLD: 4.6 % (ref 0–6.9)
ERYTHROCYTE [DISTWIDTH] IN BLOOD BY AUTOMATED COUNT: 51.8 FL (ref 35.9–50)
GFR SERPL CREATININE-BSD FRML MDRD: >60 ML/MIN/1.73 M 2
GLOBULIN SER CALC-MCNC: 2.5 G/DL (ref 1.9–3.5)
GLUCOSE SERPL-MCNC: 96 MG/DL (ref 65–99)
HCT VFR BLD AUTO: 40.3 % (ref 42–52)
HGB BLD-MCNC: 12.9 G/DL (ref 14–18)
IMM GRANULOCYTES # BLD AUTO: 0.01 K/UL (ref 0–0.11)
IMM GRANULOCYTES NFR BLD AUTO: 0.2 % (ref 0–0.9)
LYMPHOCYTES # BLD AUTO: 0.8 K/UL (ref 1–4.8)
LYMPHOCYTES NFR BLD: 18.3 % (ref 22–41)
MCH RBC QN AUTO: 27.7 PG (ref 27–33)
MCHC RBC AUTO-ENTMCNC: 32 G/DL (ref 33.7–35.3)
MCV RBC AUTO: 86.7 FL (ref 81.4–97.8)
MONOCYTES # BLD AUTO: 0.41 K/UL (ref 0–0.85)
MONOCYTES NFR BLD AUTO: 9.4 % (ref 0–13.4)
NEUTROPHILS # BLD AUTO: 2.9 K/UL (ref 1.82–7.42)
NEUTROPHILS NFR BLD: 66.6 % (ref 44–72)
NRBC # BLD AUTO: 0 K/UL
NRBC BLD AUTO-RTO: 0 /100 WBC
PLATELET # BLD AUTO: 73 K/UL (ref 164–446)
PMV BLD AUTO: 10.6 FL (ref 9–12.9)
POTASSIUM SERPL-SCNC: 3.7 MMOL/L (ref 3.6–5.5)
PROT SERPL-MCNC: 6.2 G/DL (ref 6–8.2)
RBC # BLD AUTO: 4.65 M/UL (ref 4.7–6.1)
SODIUM SERPL-SCNC: 140 MMOL/L (ref 135–145)
WBC # BLD AUTO: 4.4 K/UL (ref 4.8–10.8)

## 2017-10-21 PROCEDURE — 36415 COLL VENOUS BLD VENIPUNCTURE: CPT

## 2017-10-21 PROCEDURE — 85025 COMPLETE CBC W/AUTO DIFF WBC: CPT

## 2017-10-21 PROCEDURE — 80053 COMPREHEN METABOLIC PANEL: CPT

## 2017-10-24 ENCOUNTER — PHYSICAL THERAPY (OUTPATIENT)
Dept: PHYSICAL THERAPY | Facility: REHABILITATION | Age: 72
End: 2017-10-24
Attending: PHYSICAL MEDICINE & REHABILITATION
Payer: MEDICARE

## 2017-10-24 ENCOUNTER — OCCUPATIONAL THERAPY (OUTPATIENT)
Dept: OCCUPATIONAL THERAPY | Facility: REHABILITATION | Age: 72
End: 2017-10-24
Attending: PHYSICAL MEDICINE & REHABILITATION
Payer: MEDICARE

## 2017-10-24 DIAGNOSIS — M62.81 MUSCLE WEAKNESS (GENERALIZED): ICD-10-CM

## 2017-10-24 DIAGNOSIS — R26.2 DIFFICULTY IN WALKING: ICD-10-CM

## 2017-10-24 PROCEDURE — 97112 NEUROMUSCULAR REEDUCATION: CPT

## 2017-10-24 PROCEDURE — 97530 THERAPEUTIC ACTIVITIES: CPT

## 2017-10-24 PROCEDURE — 97116 GAIT TRAINING THERAPY: CPT | Mod: XE

## 2017-10-24 PROCEDURE — 97110 THERAPEUTIC EXERCISES: CPT

## 2017-10-24 ASSESSMENT — ACTIVITIES OF DAILY LIVING (ADL): POOR_BALANCE: 1

## 2017-10-24 NOTE — OP THERAPY DAILY TREATMENT
"Outpatient Occupational Therapy  DAILY TREATMENT     [facility] Occupational Therapy [address].  [address]  [place] [address]  Phone:  [phone]  Fax:  [phone]    Date: 10/24/2017    Patient: Torey Lima  YOB: 1945  MRN: [id number]     Time Calculation  Start time: 0100  Stop time: 0145 Time Calculation (min): 45 minutes     Chief Complaint: Hand Numbness and Hand Weakness    Visit #: 6    Subjective \"My handwriting is getting a little better\"    Objective:  Occupational Therapy Exercises and Treatments  Therapeutic Treatments and Modalities:    1. Neuro re-education, 15, BH, BH sensory re-education via bucket of medium-sized rocks, grasp and filter, 5 min each hand followed by composite flex/ext x 20     2. Functional training, 15, BH, contrast baths 3/1,/2/1,/1/1 seated level at table followed by gross flex/ext using paper towel to crumple up and spread out on table    3. Therex, 15, BH, BH strengthening focused on isolated digit extension with 3 second hold, digit abd/add, digit radial/ulnar deviation 20 reps each      Assessment, Response and Plan:   Impairments: abnormal coordination, impaired balance and impaired physical strength    Impairments comment:  Dysesthesia  Assessment details:  Pt continues to make steady gains with bilateral hand sensorimotor function including isolated digit control for typing.  Decreased sensation in distal fingers persists but is responding well to sensory re-education via graded textures and contrast baths.  HEP updated with good understanding.  Prognosis: good      Plan:   Therapy options:  Occupational therapy treatment to continue  Planned therapy interventions:  ADL self/home management, home exercise program, sensory integrative techniques, therapeutic activities, therapeutic exercise, patient education, neuromuscular re-education, manual therapy and coordination  Frequency:  2x week  Duration in visits:  8  Duration in weeks:  " 5

## 2017-10-24 NOTE — OP THERAPY DAILY TREATMENT
"Outpatient Physical Therapy  DAILY TREATMENT     [facility] Physical Therapy [address].  [address]  [place] [address]  Phone:  [phone]  Fax:  [phone]    Date: 10/24/2017    Patient: Torey Lima  YOB: 1945  MRN: [id number]     Time Calculation  Start time: 1420  Stop time: 1450 Time Calculation (min): 30 minutes     Chief Complaint: Loss Of Balance    Visit #: 4    Subjective  \"signficantly less abdominal pain but still there\"  Objective     Gait trg.: x28 min.     Noted cont. ttp   lower L quadrant and midline above umbilicus    Treatments:    Treatment Summary: Tandem stance w/ spc--bilteral feet forward  Balloon volley with forward/backwards/side-step   Gait trg w/ ladder w/ focus on increasing stride length: forward, sidestep and cross-over      Assessment, Response and Plan:   Assessment details:  Limited ankle strategies but improving and limited stride length w/ v/c required for heel-toe pattern        "

## 2017-10-26 ENCOUNTER — PHYSICAL THERAPY (OUTPATIENT)
Dept: PHYSICAL THERAPY | Facility: REHABILITATION | Age: 72
End: 2017-10-26
Attending: PHYSICAL MEDICINE & REHABILITATION
Payer: MEDICARE

## 2017-10-26 ENCOUNTER — APPOINTMENT (OUTPATIENT)
Dept: OCCUPATIONAL THERAPY | Facility: REHABILITATION | Age: 72
End: 2017-10-26
Attending: PHYSICAL MEDICINE & REHABILITATION
Payer: MEDICARE

## 2017-10-26 ENCOUNTER — PATIENT MESSAGE (OUTPATIENT)
Dept: MEDICAL GROUP | Age: 72
End: 2017-10-26

## 2017-10-26 DIAGNOSIS — R26.89 BALANCE PROBLEM: ICD-10-CM

## 2017-10-26 PROCEDURE — 97112 NEUROMUSCULAR REEDUCATION: CPT

## 2017-10-26 PROCEDURE — 97116 GAIT TRAINING THERAPY: CPT

## 2017-10-26 NOTE — TELEPHONE ENCOUNTER
Regarding: Test Result Question  Contact: [phone]  ----- Message from Ilana Mustafa, Med Ass't sent at 10/26/2017 11:43 AM PDT -----       ----- Message from Torey Lima to Fabián Urena M.D. sent at 10/26/2017 10:58 AM -----   Dr. Urena: Please release test results for last Saturday, 10/21, lab work.  I have appointment to see Dr. Waggoner today at 3:30 and he asked that I bring  latest lab tests.    Thank You,    Torey Lima

## 2017-10-26 NOTE — OP THERAPY DAILY TREATMENT
"Outpatient Physical Therapy  DAILY TREATMENT     [facility] Physical Therapy [address].  [address]  [place] [address]  Phone:  [phone]  Fax:  [phone]    Date: 10/26/2017    Patient: Torey Lima  YOB: 1945  MRN: [id number]     Time Calculation             Chief Complaint: Loss Of Balance    Visit #: 5    Subjective    \"signficantly less abdominal pain but still there\"\" some calf soreness from ex. ...been doing more around the house with the cane\"  Objective      balance: x15 min.   Gait 15      Treatments:    Treatment Summary: Gait trg on stairs w/spc----12 steps 1 rail and cane stays with L leg   Tandem stance on 1/2 roller  Gait trg.w/ ladder--focus on stride length and criossover  2x4 forward/backwards/sideways  --instructed a/p wobble board for home to work on ankle srategies      Assessment, Response and Plan:   Assessment details:  Limited ankle strategies but improving and limited stride length w/ v/c required for heel-toe pattern  --struggle most with sideways gait on 2x4--delayed stepping strategy w/ R when reaching to     Plan:   Plan details:  Progress a/p ankle strategies        "

## 2017-10-31 ENCOUNTER — PHYSICAL THERAPY (OUTPATIENT)
Dept: PHYSICAL THERAPY | Facility: REHABILITATION | Age: 72
End: 2017-10-31
Attending: PHYSICAL MEDICINE & REHABILITATION
Payer: MEDICARE

## 2017-10-31 DIAGNOSIS — Z86.69 HISTORY OF GUILLAIN-BARRE SYNDROME: ICD-10-CM

## 2017-10-31 DIAGNOSIS — R26.2 DIFFICULTY IN WALKING: ICD-10-CM

## 2017-10-31 PROCEDURE — 97116 GAIT TRAINING THERAPY: CPT

## 2017-10-31 PROCEDURE — 97110 THERAPEUTIC EXERCISES: CPT

## 2017-10-31 NOTE — OP THERAPY DAILY TREATMENT
"Outpatient Physical Therapy  DAILY TREATMENT     [facility] Physical Therapy [address]  [address]  [place] [address]  Phone:  [phone]  Fax:  [phone]    Date: 10/31/2017    Patient: Torey Lima  YOB: 1945  MRN: [id number]     Time Calculation  Start time: 1317  Stop time: 1404 Time Calculation (min): 47 minutes     Chief Complaint: No chief complaint on file.    Visit #: 6    Subjective    \" walking more in the house without the cane..., R ankle  is a little sore from the balance \"\" still weak with squatting/stair type activity going up steps\"  Objective  there ex : 30   Gait 8      Treatments:    Treatment Summary: Heel lift 1/4\" R --improved gait with decreased asntalgia//pt. To get heel cup to accommodate/minimize leg length discrepancy   -gait trg. W/ focus on heel-toe pattern w/ spc//noted improvement in stride symmetry    Shuttle 3 band bilateral ( 1x 20)and sls ea:L 2 x 10  ( w/ disc)  --ball squat-minis w/ fww  -nu-step #1 x 5 min--rom//\" a little tired, no pain\"  **instructed in importance to work through muscle soreness but is should not be severe or sharp      Assessment, Response and Plan:   Assessment details:  Weak quads with limited ability for transfers from low surface, decreased antalgia with shoe lift    Plan:   Plan details:  Balance, posterior chain, ball squats        "

## 2017-11-02 ENCOUNTER — PHYSICAL THERAPY (OUTPATIENT)
Dept: PHYSICAL THERAPY | Facility: REHABILITATION | Age: 72
End: 2017-11-02
Attending: PHYSICAL MEDICINE & REHABILITATION
Payer: MEDICARE

## 2017-11-02 DIAGNOSIS — R26.89 BALANCE PROBLEM: ICD-10-CM

## 2017-11-02 PROCEDURE — 97110 THERAPEUTIC EXERCISES: CPT

## 2017-11-02 PROCEDURE — 97112 NEUROMUSCULAR REEDUCATION: CPT

## 2017-11-02 NOTE — OP THERAPY DAILY TREATMENT
"Outpatient Physical Therapy  DAILY TREATMENT     [facility] Physical Therapy [address].  [address]  [place] [address]  Phone:  [phone]  Fax:  [phone]    Date: 11/02/2017    Patient: Torey Lima  YOB: 1945  MRN: [id number]     Time Calculation  Start time: 1317  Stop time: 1350 Time Calculation (min): 33 minutes     Chief Complaint: No chief complaint on file.    Visit #: 7    Subjective:   Pain:     Pain Comments::    \" overall more stable and confident with walking..no  significant LOB    Objective  there ex : 22   Gait 10      Treatments:    Treatment Summary: 4 bands w/ Shuttle  W/ wobble board board 4bds x 3 x 10, 5bds--1x10  -bosu ball bilateral stance and tandem stance w/ lateral a/p wt. shift  --ball squat-minis w/ fww  Gait trg. Outside w/ spc w/ sup/sba 8 steps down and 6 step up--1 curb--no lob          Assessment, Response and Plan:   Assessment details:  Increased functional mobility--d/c fww and spc with all surfaces    Plan:   Plan details:  Balance, posterior chain, ball squats        "

## 2017-11-03 ENCOUNTER — OCCUPATIONAL THERAPY (OUTPATIENT)
Dept: OCCUPATIONAL THERAPY | Facility: REHABILITATION | Age: 72
End: 2017-11-03
Attending: PHYSICAL MEDICINE & REHABILITATION
Payer: MEDICARE

## 2017-11-03 DIAGNOSIS — M62.81 MUSCLE WEAKNESS (GENERALIZED): ICD-10-CM

## 2017-11-03 PROCEDURE — 97018 PARAFFIN BATH THERAPY: CPT | Mod: XU

## 2017-11-03 PROCEDURE — 97140 MANUAL THERAPY 1/> REGIONS: CPT

## 2017-11-03 PROCEDURE — 97110 THERAPEUTIC EXERCISES: CPT

## 2017-11-03 ASSESSMENT — ACTIVITIES OF DAILY LIVING (ADL): POOR_BALANCE: 1

## 2017-11-03 NOTE — OP THERAPY DAILY TREATMENT
"Outpatient Occupational Therapy  DAILY TREATMENT     [facility] Occupational Therapy [address].  [address]  [place] [address]  Phone:  [phone]  Fax:  [phone]    Date: 11/03/2017    Patient: Torey Lima  YOB: 1945  MRN: [id number]     Time Calculation  Start time: 0900  Stop time: 1000 Time Calculation (min): 60 minutes     Chief Complaint: Hand Numbness and Hand Problem (incoordination)    Visit #: 7    Subjective \"My dexterity and sensation is still not normal\"    Objective:  Bilateral hands with 1+ edema all digits palmar proximal phalanges and soft tissue at metacarpal heads, only able to form 1/2 composite fist  Occupational Therapy Exercises and Treatments  Therapeutic Treatments and Modalities:    1. Manual therapy, 30, BH, see below    2. Therex, 15, BH, see below    7. Paraffin, 15, BH paraffin x 15 minutes    Treatment Summary:  STM to bilateral hands palmar fascia with gentle joint mobilization and stretching individual and combined joints to improve flexibility.  Passive stretching followed by active tendon gliding exercises.  Able to maintain intrinsic plus bilateral hands but 1/2 range of intrinsic minus.  Composite fist painful and restricted at 1/2 range. Cold pack bilateral hands x 5 minutes post rx.      Assessment, Response and Plan:   Impairments: abnormal coordination, hypersensitivity, impaired balance, impaired physical strength, pain with function and swelling    Impairments comment:  Dysesthesia  Assessment details:  Pt presenting with edema in all digits proximal > distal phalanges with subsequent pain and soft tissue restrictions for full active or passive ROM.   Hypersensitivity increased from previous session 10 days ago.  Instructed to continue with contrast baths and cryotherapy to decrease inflammation since he is unable to take anti-inflammatory medications per wife.  HEP updated to include tendon gliding as able with good " understanding.  Prognosis: good      Plan:   Therapy options:  Occupational therapy treatment to continue  Planned therapy interventions:  ADL self/home management, home exercise program, sensory integrative techniques, therapeutic activities, therapeutic exercise, patient education, neuromuscular re-education, manual therapy and coordination  Frequency:  2x week  Duration in visits:  7  Duration in weeks:  4  Discussed with:  Patient and family

## 2017-11-07 ENCOUNTER — OCCUPATIONAL THERAPY (OUTPATIENT)
Dept: OCCUPATIONAL THERAPY | Facility: REHABILITATION | Age: 72
End: 2017-11-07
Attending: PHYSICAL MEDICINE & REHABILITATION
Payer: MEDICARE

## 2017-11-07 ENCOUNTER — PHYSICAL THERAPY (OUTPATIENT)
Dept: PHYSICAL THERAPY | Facility: REHABILITATION | Age: 72
End: 2017-11-07
Attending: PHYSICAL MEDICINE & REHABILITATION
Payer: MEDICARE

## 2017-11-07 DIAGNOSIS — R26.2 DIFFICULTY IN WALKING: ICD-10-CM

## 2017-11-07 DIAGNOSIS — M62.81 MUSCLE WEAKNESS (GENERALIZED): ICD-10-CM

## 2017-11-07 PROCEDURE — 97110 THERAPEUTIC EXERCISES: CPT

## 2017-11-07 PROCEDURE — 97140 MANUAL THERAPY 1/> REGIONS: CPT

## 2017-11-07 PROCEDURE — 97116 GAIT TRAINING THERAPY: CPT

## 2017-11-07 ASSESSMENT — ACTIVITIES OF DAILY LIVING (ADL): POOR_BALANCE: 1

## 2017-11-07 NOTE — OP THERAPY DAILY TREATMENT
"Outpatient Physical Therapy  DAILY TREATMENT     [facility] Physical [address].  [address]  [place] [address]  Phone:  [phone]  Fax:  [phone]    Date: 11/07/2017    Patient: Torey Lima  YOB: 1945  MRN: [id number]     Time Calculation  Start time: 1017  Stop time: 1047 Time Calculation (min): 30 minutes     Chief Complaint: No chief complaint on file.    Visit #: 8    Subjective:   Treatments:     Treatment Comments:  \" added heel cup to right shoe and seems to help...primarily using the cane for all outside/inside mobility\"  \"at times I find myself walking in my house without anything\"    Objective  there ex : 17  Gait 13      Treatments:    Treatment Summary: GAit trg side-step on 2x4 w/ sp and cga  -side step gait w/ slight bent knee   Focussed on increased josef with tandem gait    Ball bridge x 40\"  Ball bridge w/ alternating leg lift 5x2  -shuttle with 5, 6,7 bands with wobble board  3 x 10          Assessment, Response and Plan:   Assessment details:   \"Continued Increased functional mobility w/ spc outside and occasionally no a/d inside\"  Noted weaker R g-max as conpared to L    Plan:   Plan details:  Balance, posterior chain, ball squats, tandem x-1 gaze and obstacle course        "

## 2017-11-07 NOTE — OP THERAPY DAILY TREATMENT
"Outpatient Occupational Therapy  DAILY TREATMENT     [facility] Occupational Therapy [address].  [address]  [place] [address]  Phone:  [phone]  Fax:  [phone]    Date: 11/07/2017    Patient: Torey Lima  YOB: 1945  MRN: [id number]     Time Calculation  Start time: 0930  Stop time: 1000 Time Calculation (min): 30 minutes     Chief Complaint: Hand Weakness    Visit #: 8    Subjective \"My hands are still so stiff\"    Objective:  RH with 1+ digit extensor tone, LH 1 digit extensor tone on MAS  Occupational Therapy Exercises and Treatments  Therapeutic Treatments and Modalities:    1. Manual therapy, 15, , see below    2. Therex, 15, , see below    Treatment Summary:  STM to bilateral hands palmar fascia with gentle joint mobilization and stretching of individual and combined joints to improve flexibility.  Able to achieve full PROM left hand, 1/2 range RH in composite flexion r/t extensor spasticity and related pain.  Passive stretching followed by tendon gliding exercises.  AROM: composite fist painful and restricted at 1/2 range in RH, 3/4 range LH after passive stretching.  Discussed continuing with contrast baths, warm pack to dorsal right forearm, and passive stretching to prevent loss of range.       Assessment, Response and Plan:   Impairments: abnormal coordination, hypersensitivity, impaired balance, impaired physical strength, pain with function and swelling    Impairments comment:  Dysesthesia  Assessment details:  Pt continues to present with bilateral hand ROM restrictions with extensor tone in all digits, R > L, along with associated pain when digits in RH are stretched beyond 1/2 range.  AROM improved in left hand following STM and PROM.  HEP updated with wife present with good understanding.  Prognosis: good      Plan:   Therapy options:  Occupational therapy treatment to continue  Planned therapy interventions:  ADL self/home management, home exercise " program, sensory integrative techniques, therapeutic activities, therapeutic exercise, patient education, neuromuscular re-education, manual therapy and coordination  Frequency:  2x week  Duration in visits:  6  Duration in weeks:  4  Discussed with:  Patient and family

## 2017-11-09 ENCOUNTER — PHYSICAL THERAPY (OUTPATIENT)
Dept: PHYSICAL THERAPY | Facility: REHABILITATION | Age: 72
End: 2017-11-09
Attending: PHYSICAL MEDICINE & REHABILITATION
Payer: MEDICARE

## 2017-11-09 ENCOUNTER — OCCUPATIONAL THERAPY (OUTPATIENT)
Dept: OCCUPATIONAL THERAPY | Facility: REHABILITATION | Age: 72
End: 2017-11-09
Attending: PHYSICAL MEDICINE & REHABILITATION
Payer: MEDICARE

## 2017-11-09 DIAGNOSIS — R26.89 BALANCE PROBLEM: ICD-10-CM

## 2017-11-09 DIAGNOSIS — M62.81 MUSCLE WEAKNESS (GENERALIZED): ICD-10-CM

## 2017-11-09 PROCEDURE — 97110 THERAPEUTIC EXERCISES: CPT

## 2017-11-09 PROCEDURE — 97140 MANUAL THERAPY 1/> REGIONS: CPT

## 2017-11-09 PROCEDURE — 97112 NEUROMUSCULAR REEDUCATION: CPT

## 2017-11-09 ASSESSMENT — ACTIVITIES OF DAILY LIVING (ADL): POOR_BALANCE: 1

## 2017-11-09 NOTE — OP THERAPY DAILY TREATMENT
"Outpatient Occupational Therapy  DAILY TREATMENT     [facility] Occupational Therapy [address].  [address]  [place] [address]  Phone:  [phone]  Fax:  [phone]    Date: 11/09/2017    Patient: Torey Lima  YOB: 1945  MRN: [id number]     Time Calculation  Start time: 0900  Stop time: 1000 Time Calculation (min): 60 minutes     Chief Complaint: Hand Problem and Hand Numbness    Visit #: 9    Subjective \"My hands are affecting my ability to sleep, the pins and needles are getting more intense\"    Objective:  BH with Grade 1 extensor tone at digits on MAS  Occupational Therapy Exercises and Treatments  Therapeutic Treatments and Modalities:    1. Manual therapy, 30, BH, see below    2. Therex, 30, BH, see below    Treatment Summary:  TFM to bilateral common extensor origins, gentle wrist traction. STM to bilateral volar/palmar forearms with and without Guasha for release of soft tissue adhesions and for inhibition of extensor tone combined with active digit/wrist flex/ext and passive sustained stretching into composite fist .  Able to achieve full PROM bilateral hands.  AROM: bilateral composite fists in a.g, g.e, and combined with wrist flexion 10 reps each for 10 second hold.  Able to achieve >= 3/4 active range BH after manual therapy.  Discussed continuing with contrast baths, warm packs to dorsal forearms, passive stretching to prevent loss of range, previous exercises, and desensitization via towel rubs and bucket of dry pasta.      Assessment, Response and Plan:   Impairments: abnormal coordination, hypersensitivity, impaired balance, impaired physical strength, pain with function and swelling    Impairments comment:  Dysesthesia  Assessment details:  Pt making progress today with bilateral hand A/PROM, motor control, and decreased digit extensor tone in response to manual therapy and ther ex along with decreased pain during composite fist.  HEP updated with with good " understanding.  Prognosis: good      Plan:   Therapy options:  Occupational therapy treatment to continue  Planned therapy interventions:  ADL self/home management, home exercise program, sensory integrative techniques, therapeutic activities, therapeutic exercise, patient education, neuromuscular re-education, manual therapy and coordination  Frequency:  2x week  Duration in visits:  5  Duration in weeks:  4  Discussed with:  Patient and family

## 2017-11-09 NOTE — OP THERAPY DAILY TREATMENT
"Outpatient Physical Therapy  DAILY TREATMENT     [facility] Physical Therapy [address].  [address]  [place] [address]  Phone:  [phone]  Fax:  [phone]    Date: 11/09/2017    Patient: Torey Lima  YOB: 1945  MRN: [id number]     Time Calculation  Start time: 1015  Stop time: 1048 Time Calculation (min): 33 minutes     Chief Complaint: No chief complaint on file.    Visit #: 9    Subjective:   Treatments:     Treatment Comments:  Getting around more...., everywhere.  Improving endurance with walking.  Just ;limited with endurance    Objective  there ex : 15  Balance/neuro: 13      Treatments:    Treatment Summary:   -1/2 roller tandem wt. Shift  1/2 rioller bilateral stance w/ a/p focus  - side-step on 2x4 w/ sp and cga    Focussed on increased josef with tandem gait  Ball bridge x 1'45'\"  Ball in/out  Ball bridge w/ alternating leg lift 5x2  --gait trg w/ arm swing and stand tall          Assessment, Response and Plan:   Assessment details:   \"Continued Increased functional mobility w/ spc outside and occasionally no a/d inside\"  Noted weaker R g-max as conpared to L    Plan:   Plan details:  Balance, posterior chain, ball squats, tandem x-1 gaze and obstacle course        "

## 2017-11-13 DIAGNOSIS — M54.32 LEFT SIDED SCIATICA: ICD-10-CM

## 2017-11-13 DIAGNOSIS — M25.552 LEFT HIP PAIN: ICD-10-CM

## 2017-11-14 ENCOUNTER — OCCUPATIONAL THERAPY (OUTPATIENT)
Dept: OCCUPATIONAL THERAPY | Facility: REHABILITATION | Age: 72
End: 2017-11-14
Attending: PHYSICAL MEDICINE & REHABILITATION
Payer: MEDICARE

## 2017-11-14 ENCOUNTER — PHYSICAL THERAPY (OUTPATIENT)
Dept: PHYSICAL THERAPY | Facility: REHABILITATION | Age: 72
End: 2017-11-14
Attending: PHYSICAL MEDICINE & REHABILITATION
Payer: MEDICARE

## 2017-11-14 DIAGNOSIS — R26.89 BALANCE PROBLEM: ICD-10-CM

## 2017-11-14 DIAGNOSIS — R26.2 DIFFICULTY IN WALKING: ICD-10-CM

## 2017-11-14 DIAGNOSIS — M62.81 MUSCLE WEAKNESS (GENERALIZED): ICD-10-CM

## 2017-11-14 PROCEDURE — 97110 THERAPEUTIC EXERCISES: CPT

## 2017-11-14 PROCEDURE — 97112 NEUROMUSCULAR REEDUCATION: CPT

## 2017-11-14 PROCEDURE — 97140 MANUAL THERAPY 1/> REGIONS: CPT

## 2017-11-14 NOTE — OP THERAPY DAILY TREATMENT
"Outpatient Physical Therapy  DAILY TREATMENT     [facility] Physical Therapy [address].  [address]  [place] [address]  Phone:  [phone]  Fax:  [phone]    Date: 11/14/2017    Patient: Torey Lima  YOB: 1945  MRN: [id number]     Time Calculation  Start time: 1350  Stop time: 1417 Time Calculation (min): 27 minutes     Chief Complaint: No chief complaint on file.    Visit #: 10    Subjective:   Treatments:     Treatment Comments:  Getting around more...., everywhere.  Improving endurance with walking.  Just ;limited with balance with balance activity--denies LOB with ambulation    Objective  there ex : 13   Balance/neuro: 13      Treatments:    Treatment Summary:   -1/2 roller tandem wt. Shift  1/2 rioller bilateral stance w/ a/p focus//improved balance reactions  - side-step on 2x4 w/ sp and cga  -2x4 balance ball toss    Shuttle 5-10 bands w/ wobble board 50+          Assessment, Response and Plan:   Assessment details:   \"Continued Increased functional mobility w/ spc outside and no longer using cane inside\"    significant improvement with ank;le strategy with balance on 1/2 roller    Plan:   Plan details:  Balance, posterior chain, ball squats, tandem x-1 gaze and obstacle course        "

## 2017-11-14 NOTE — OP THERAPY DAILY TREATMENT
"Outpatient Occupational Therapy  DAILY TREATMENT     [facility] Occupational [address].  [address]  [place] [address]  Phone:  [phone]  Fax:  [phone]    Date: 11/14/2017    Patient: Torey Lima  YOB: 1945  MRN: [id number]     Time Calculation  Start time: 0230  Stop time: 0330 Time Calculation (min): 60 minutes     Chief Complaint: Hand Weakness and Hand Numbness    Visit #: 10    Subjective \"My hands feel a little looser\"    Objective:   strength:  Left: 39 lbs  Right 45 lbs  Occupational Therapy Exercises and Treatments  Therapeutic Treatments and Modalities:    Treatment/Modality: Manual therapy       Duration: 30 minutes       Location:        Comments: see below    Treatment/Modality: Therex       Duration: 15 minutes       Location:        Comments: see below    Treatment/Modality: Neuro re-education       Duration: 15 minutes       Location:        Comments: see below    Therapeutic Treatments and Modalities Summary: Bilateral palmar hands desensitization via towel rubs 2 minutes, large vibrator palmar hands including isolated digits and grasp of vibrator head x 10 reps, 5 second squeeze followed by composite fist x 10.  TFM to bilateral common extensor origins, gentle wrist traction. STM to bilateral volar/palmar forearms with and without Guasha for release of soft tissue adhesions and for inhibition of extensor tone combined passive sustained stretching into composite fist . PROM WNL BH.  Pt able to maintain full fist position after placement x 5 minutes each.  AROM >=3/4 bilateral full fist when performed in sequence of MCP to DIP flexion after manual therapy.  Mod tremors in LH during gross flexion. Continue with contrast baths, warm packs to dorsal forearms, passive stretching to prevent loss of range, previous exercises, and desensitization via towel rubs and bucket of dry pasta.        Assessment, Response and Plan:   Impairments: " abnormal coordination, hypersensitivity, impaired balance, impaired physical strength, pain with function and swelling    Impairments comment:  Dysesthesia  Assessment details:  Pt making slow but steady gains today with bilateral hand strength, A/PROM, and motor control for composite fist with a decreased influence of digit extensor tone in response to manual therapy and ther ex along with decreased pain.  Hypersensitivity palmar digits persists but able to tolerate graded desensitization. HEP updated with with good understanding.  Prognosis: good      Plan:   Therapy options:  Occupational therapy treatment to continue  Planned therapy interventions:  ADL self/home management, home exercise program, sensory integrative techniques, therapeutic activities, therapeutic exercise, patient education, neuromuscular re-education, manual therapy and coordination  Frequency:  2x week  Duration in visits:  4  Duration in weeks:  4  Discussed with:  Patient and family

## 2017-11-15 ASSESSMENT — ACTIVITIES OF DAILY LIVING (ADL): POOR_BALANCE: 1

## 2017-11-16 ENCOUNTER — APPOINTMENT (OUTPATIENT)
Dept: PHYSICAL THERAPY | Facility: REHABILITATION | Age: 72
End: 2017-11-16
Attending: PHYSICAL MEDICINE & REHABILITATION
Payer: MEDICARE

## 2017-11-16 ENCOUNTER — APPOINTMENT (OUTPATIENT)
Dept: OCCUPATIONAL THERAPY | Facility: REHABILITATION | Age: 72
End: 2017-11-16
Attending: PHYSICAL MEDICINE & REHABILITATION
Payer: MEDICARE

## 2017-11-18 ENCOUNTER — HOSPITAL ENCOUNTER (OUTPATIENT)
Dept: LAB | Facility: MEDICAL CENTER | Age: 72
End: 2017-11-18
Attending: INTERNAL MEDICINE
Payer: MEDICARE

## 2017-11-18 LAB
ALBUMIN SERPL BCP-MCNC: 3.4 G/DL (ref 3.2–4.9)
ALBUMIN/GLOB SERPL: 1.4 G/DL
ALP SERPL-CCNC: 75 U/L (ref 30–99)
ALT SERPL-CCNC: 14 U/L (ref 2–50)
ANION GAP SERPL CALC-SCNC: 8 MMOL/L (ref 0–11.9)
AST SERPL-CCNC: 19 U/L (ref 12–45)
BASOPHILS # BLD AUTO: 0.9 % (ref 0–1.8)
BASOPHILS # BLD: 0.04 K/UL (ref 0–0.12)
BILIRUB SERPL-MCNC: 1.7 MG/DL (ref 0.1–1.5)
BUN SERPL-MCNC: 19 MG/DL (ref 8–22)
CALCIUM SERPL-MCNC: 8.6 MG/DL (ref 8.5–10.5)
CHLORIDE SERPL-SCNC: 104 MMOL/L (ref 96–112)
CO2 SERPL-SCNC: 26 MMOL/L (ref 20–33)
CREAT SERPL-MCNC: 0.79 MG/DL (ref 0.5–1.4)
EOSINOPHIL # BLD AUTO: 0.2 K/UL (ref 0–0.51)
EOSINOPHIL NFR BLD: 4.3 % (ref 0–6.9)
ERYTHROCYTE [DISTWIDTH] IN BLOOD BY AUTOMATED COUNT: 52 FL (ref 35.9–50)
GFR SERPL CREATININE-BSD FRML MDRD: >60 ML/MIN/1.73 M 2
GLOBULIN SER CALC-MCNC: 2.5 G/DL (ref 1.9–3.5)
GLUCOSE SERPL-MCNC: 84 MG/DL (ref 65–99)
HCT VFR BLD AUTO: 38.6 % (ref 42–52)
HGB BLD-MCNC: 12.4 G/DL (ref 14–18)
IMM GRANULOCYTES # BLD AUTO: 0.01 K/UL (ref 0–0.11)
IMM GRANULOCYTES NFR BLD AUTO: 0.2 % (ref 0–0.9)
LYMPHOCYTES # BLD AUTO: 0.77 K/UL (ref 1–4.8)
LYMPHOCYTES NFR BLD: 16.5 % (ref 22–41)
MCH RBC QN AUTO: 28.1 PG (ref 27–33)
MCHC RBC AUTO-ENTMCNC: 32.1 G/DL (ref 33.7–35.3)
MCV RBC AUTO: 87.5 FL (ref 81.4–97.8)
MONOCYTES # BLD AUTO: 0.52 K/UL (ref 0–0.85)
MONOCYTES NFR BLD AUTO: 11.1 % (ref 0–13.4)
NEUTROPHILS # BLD AUTO: 3.13 K/UL (ref 1.82–7.42)
NEUTROPHILS NFR BLD: 67 % (ref 44–72)
NRBC # BLD AUTO: 0.03 K/UL
NRBC BLD AUTO-RTO: 0.6 /100 WBC
PLATELET # BLD AUTO: 71 K/UL (ref 164–446)
PMV BLD AUTO: 11.3 FL (ref 9–12.9)
POTASSIUM SERPL-SCNC: 3.7 MMOL/L (ref 3.6–5.5)
PROT SERPL-MCNC: 5.9 G/DL (ref 6–8.2)
RBC # BLD AUTO: 4.41 M/UL (ref 4.7–6.1)
SODIUM SERPL-SCNC: 138 MMOL/L (ref 135–145)
WBC # BLD AUTO: 4.7 K/UL (ref 4.8–10.8)

## 2017-11-18 PROCEDURE — 85025 COMPLETE CBC W/AUTO DIFF WBC: CPT

## 2017-11-18 PROCEDURE — 80053 COMPREHEN METABOLIC PANEL: CPT

## 2017-11-18 PROCEDURE — 36415 COLL VENOUS BLD VENIPUNCTURE: CPT

## 2017-11-21 ENCOUNTER — PHYSICAL THERAPY (OUTPATIENT)
Dept: PHYSICAL THERAPY | Facility: REHABILITATION | Age: 72
End: 2017-11-21
Attending: PHYSICAL MEDICINE & REHABILITATION
Payer: MEDICARE

## 2017-11-21 ENCOUNTER — HOSPITAL ENCOUNTER (OUTPATIENT)
Dept: RADIOLOGY | Facility: MEDICAL CENTER | Age: 72
End: 2017-11-21
Attending: INTERNAL MEDICINE
Payer: MEDICARE

## 2017-11-21 ENCOUNTER — OCCUPATIONAL THERAPY (OUTPATIENT)
Dept: OCCUPATIONAL THERAPY | Facility: REHABILITATION | Age: 72
End: 2017-11-21
Attending: PHYSICAL MEDICINE & REHABILITATION
Payer: MEDICARE

## 2017-11-21 DIAGNOSIS — R10.84 ABDOMINAL PAIN, GENERALIZED: ICD-10-CM

## 2017-11-21 DIAGNOSIS — R26.2 DIFFICULTY WALKING: ICD-10-CM

## 2017-11-21 DIAGNOSIS — R18.8 OTHER ASCITES: ICD-10-CM

## 2017-11-21 DIAGNOSIS — M62.81 MUSCLE WEAKNESS (GENERALIZED): ICD-10-CM

## 2017-11-21 DIAGNOSIS — K76.6 PORTAL HYPERTENSION (HCC): ICD-10-CM

## 2017-11-21 PROCEDURE — 97112 NEUROMUSCULAR REEDUCATION: CPT

## 2017-11-21 PROCEDURE — 49083 ABD PARACENTESIS W/IMAGING: CPT

## 2017-11-21 PROCEDURE — 97110 THERAPEUTIC EXERCISES: CPT

## 2017-11-21 PROCEDURE — 97140 MANUAL THERAPY 1/> REGIONS: CPT

## 2017-11-21 NOTE — OP THERAPY DAILY TREATMENT
Outpatient Physical Therapy  DAILY TREATMENT     [facility] Physical Therapy [address].  [address]  [place] [address]  Phone:  [phone]  Fax:  [phone]    Date: 11/21/2017    Patient: Torey Lima  YOB: 1945  MRN: [id number]     Time Calculation  Start time: 1420  Stop time: 1450 Time Calculation (min): 30 minutes     Chief Complaint: No chief complaint on file.    Visit #: 11    Subjective:   Treatments:     Treatment Comments:  Getting around more...., everywhere.  Improving endurance with walking.  Just ;limited with balance with balance activity--denies LOB with ambulation    Objective   there ex : 10   Balance/neuro: 20      Therapeutic Treatments and Modalities:     Therapeutic Treatment and Modalities Summary:   -1/2 roller tandem wt. Shift  1/2 rioller plyo toss  - side-step on 2x4 w/ sp and cga  -2x4 balance ball toss    Shuttle 10 bands w/ wobble board 4x10  sprt cord forward/backwards/side-step          Assessment, Response and Plan:   Assessment details:  Significant increase with balance and strength but noted R l.e. Weakness as compared to left    Plan:   Plan details:  Balance, posterior chain,R sl/l running man

## 2017-11-21 NOTE — PROGRESS NOTES
"Patient given [facility] \"Preventing the Spread of Infection\" Brochure upon being checked in.     US guided therapeutic paracentesis done by Dr. Neal; RLQ access site; 2500mL clear yellow fluid obtained and discarded; procedural RN: Tete;  pt tolerated the procedure well; pt hemodynamically stable pre/intra/post procedure; all questions and concerns answered prior to d/c; patient provided with all appropriate education for procedure; pt d/c home.            "

## 2017-11-21 NOTE — OP THERAPY DAILY TREATMENT
"  Outpatient Occupational Therapy  DAILY TREATMENT     [facility] Occupational Therapy [address].  [address]  [place] [address]  Phone:  [phone]  Fax:  [phone]    Date: 11/21/2017    Patient: Torey Lima  YOB: 1945  MRN: [id number]     Time Calculation  Start time: 0100  Stop time: 0200 Time Calculation (min): 60 minutes     Chief Complaint: Hand Weakness and Hand Numbness    Visit #: 11    SUBJECTIVE:  \"I had a paracentesis today and they took out 2.5 liters\"    OBJECTIVE:  Current objective measures:   Left hand composite fist: full    Right hand composite fist 7/8th full       Therapeutic Treatments and Modalities:    1. Manual therapy, 30 minutes, BH, see below    2. Therex, 30 minutes, , see below    Therapeutic Treatments and Modalities Summary: TFM to bilateral common extensor origins, gentle wrist traction. STM to bilateral volar forearms with and without Guasha for release of soft tissue adhesions and for inhibition of extensor tone combined passive sustained stretching in flexion/extension, gentle digit traction and joint mobilization.  PROM WNL BH into composite fist.   Ther ex: BH composite fist 10 second hold x 10 reps, palm flat with isolated sequential finger taps SF to thumb, DIP joint blocking ex all digits.  AROM BH WNL when performed in sequence of MCP to DIP flexion after manual therapy.  HEP updated, pt to continue with desensitization of palmar hands.        Pain rating before treatment: 0  Pain rating after treatment: 0    ASSESSMENT:   Response to treatment: Pt making steady gains with bilateral hand strength and motor control in response to manual therapy and graded ther ex.  Pt demonstrating full bilateral composite fists at end of session without left hand tremors as per previous session.  Pt requires increased concentration and effort with RH but with good results.  HEP updated with good understanding.    PLAN/RECOMMENDATIONS:   Plan for " treatment: therapy treatment to continue next visit.  Planned interventions for next visit: continue with current treatment, manual therapy (CPT 62026), neuromuscular re-education (CPT 50519), therapeutic activities (CPT 22596) and therapeutic exercise (CPT 82364).

## 2017-12-01 ENCOUNTER — PHYSICAL THERAPY (OUTPATIENT)
Dept: PHYSICAL THERAPY | Facility: REHABILITATION | Age: 72
End: 2017-12-01
Attending: PHYSICAL MEDICINE & REHABILITATION
Payer: MEDICARE

## 2017-12-01 DIAGNOSIS — R26.2 DIFFICULTY WALKING: ICD-10-CM

## 2017-12-01 PROCEDURE — 97110 THERAPEUTIC EXERCISES: CPT

## 2017-12-01 NOTE — OP THERAPY DAILY TREATMENT
Outpatient Physical Therapy  DAILY TREATMENT     [facility] Physical Therapy [address].  [address]  [place] [address]  Phone:  [phone]  Fax:  [phone]    Date: 12/01/2017    Patient: Torey Lima  YOB: 1945  MRN: [id number]     Time Calculation  Start time: 1449  Stop time: 1516 Time Calculation (min): 27 minutes     0/10--no paion  Visit #: 12    Subjective:   Treatments:     Treatment Comments:  Getting around more...., everywhere.  Improving endurance with walking.  Just ;limited with balance with balance activity--denies LOB with ambulation    Objective   there ex : 10   Balance/neuro: 20      Therapeutic Treatments and Modalities:     Therapeutic Treatment and Modalities Summary: 0/10    -shuttle w/ wobble board  X 8 bds x 4 x 10    sprt cord forward/backwards/side-step   S/l running man--hep  3 x 10          Assessment, Response and Plan:   Assessment details:  Significant increase with balance and strength w/ continues right glut weakness    Plan:   Plan details:  Balance, sport cord,R sl/l running man

## 2017-12-02 ENCOUNTER — PATIENT MESSAGE (OUTPATIENT)
Dept: MEDICAL GROUP | Age: 72
End: 2017-12-02

## 2017-12-02 DIAGNOSIS — C18.9 MALIGNANT NEOPLASM OF COLON, UNSPECIFIED PART OF COLON (HCC): ICD-10-CM

## 2017-12-04 NOTE — TELEPHONE ENCOUNTER
From: Torey Lima  To: Fabián Urena M.D.  Sent: 12/2/2017 9:05 AM PST  Subject: Non-Urgent Medical Question    Dr. Urena: Have appointment Dec. 14th; have lab order for Comp Metabolic Panel and CBC with Differential. Please add order for CEA test.    Thank You,  Torey Lima, June 13, 1945

## 2017-12-05 ENCOUNTER — APPOINTMENT (OUTPATIENT)
Dept: PHYSICAL THERAPY | Facility: REHABILITATION | Age: 72
End: 2017-12-05
Attending: PHYSICAL MEDICINE & REHABILITATION
Payer: MEDICARE

## 2017-12-05 ENCOUNTER — PATIENT MESSAGE (OUTPATIENT)
Dept: MEDICAL GROUP | Age: 72
End: 2017-12-05

## 2017-12-05 DIAGNOSIS — R17 ELEVATED BILIRUBIN: ICD-10-CM

## 2017-12-05 DIAGNOSIS — E78.2 MIXED HYPERLIPIDEMIA: ICD-10-CM

## 2017-12-05 RX ORDER — ATORVASTATIN CALCIUM 40 MG/1
40 TABLET, FILM COATED ORAL
Qty: 90 TAB | Refills: 4 | Status: SHIPPED | OUTPATIENT
Start: 2017-12-05 | End: 2019-03-13 | Stop reason: SDUPTHER

## 2017-12-05 RX ORDER — URSODIOL 300 MG/1
300 CAPSULE ORAL DAILY
Qty: 90 CAP | Refills: 4 | Status: SHIPPED | OUTPATIENT
Start: 2017-12-05 | End: 2019-02-28 | Stop reason: SDUPTHER

## 2017-12-05 NOTE — CARE PLAN
Evaluated on 12/5/17  -Back pain with epidural injections in the past  -Continue percocet prn pain   Problem: Knowledge Deficit  Goal: Knowledge of disease process/condition, treatment plan, diagnostic tests, and medications will improve  Neurology consult. Possible back surgery tomorrow, pending doctor orders.     Problem: Mobility  Goal: Risk for activity intolerance will decrease  Patient with generalized weakness. Two assist to bedside commode due to weakness and pain.     Problem: Pain Management  Goal: Pain level will decrease to patient’s comfort goal  Patient given oxy 10 as needed for back pain management. Continuous heat pad in use.

## 2017-12-06 ENCOUNTER — HOSPITAL ENCOUNTER (OUTPATIENT)
Dept: LAB | Facility: MEDICAL CENTER | Age: 72
End: 2017-12-06
Attending: INTERNAL MEDICINE
Payer: MEDICARE

## 2017-12-06 DIAGNOSIS — C18.9 MALIGNANT NEOPLASM OF COLON, UNSPECIFIED PART OF COLON (HCC): ICD-10-CM

## 2017-12-06 LAB
ALBUMIN SERPL BCP-MCNC: 3.3 G/DL (ref 3.2–4.9)
ALBUMIN/GLOB SERPL: 1.4 G/DL
ALP SERPL-CCNC: 78 U/L (ref 30–99)
ALT SERPL-CCNC: 11 U/L (ref 2–50)
ANION GAP SERPL CALC-SCNC: 7 MMOL/L (ref 0–11.9)
AST SERPL-CCNC: 20 U/L (ref 12–45)
BASOPHILS # BLD AUTO: 0.9 % (ref 0–1.8)
BASOPHILS # BLD: 0.04 K/UL (ref 0–0.12)
BILIRUB SERPL-MCNC: 1.3 MG/DL (ref 0.1–1.5)
BUN SERPL-MCNC: 25 MG/DL (ref 8–22)
CALCIUM SERPL-MCNC: 9.1 MG/DL (ref 8.5–10.5)
CEA SERPL-MCNC: 1.1 NG/ML (ref 0–3)
CHLORIDE SERPL-SCNC: 104 MMOL/L (ref 96–112)
CO2 SERPL-SCNC: 27 MMOL/L (ref 20–33)
CREAT SERPL-MCNC: 1.05 MG/DL (ref 0.5–1.4)
EOSINOPHIL # BLD AUTO: 0.19 K/UL (ref 0–0.51)
EOSINOPHIL NFR BLD: 4.1 % (ref 0–6.9)
ERYTHROCYTE [DISTWIDTH] IN BLOOD BY AUTOMATED COUNT: 50.9 FL (ref 35.9–50)
GFR SERPL CREATININE-BSD FRML MDRD: >60 ML/MIN/1.73 M 2
GLOBULIN SER CALC-MCNC: 2.3 G/DL (ref 1.9–3.5)
GLUCOSE SERPL-MCNC: 92 MG/DL (ref 65–99)
HCT VFR BLD AUTO: 38.5 % (ref 42–52)
HGB BLD-MCNC: 12.5 G/DL (ref 14–18)
IMM GRANULOCYTES # BLD AUTO: 0.01 K/UL (ref 0–0.11)
IMM GRANULOCYTES NFR BLD AUTO: 0.2 % (ref 0–0.9)
LYMPHOCYTES # BLD AUTO: 0.65 K/UL (ref 1–4.8)
LYMPHOCYTES NFR BLD: 14.1 % (ref 22–41)
MCH RBC QN AUTO: 28.7 PG (ref 27–33)
MCHC RBC AUTO-ENTMCNC: 32.5 G/DL (ref 33.7–35.3)
MCV RBC AUTO: 88.3 FL (ref 81.4–97.8)
MONOCYTES # BLD AUTO: 0.51 K/UL (ref 0–0.85)
MONOCYTES NFR BLD AUTO: 11.1 % (ref 0–13.4)
NEUTROPHILS # BLD AUTO: 3.2 K/UL (ref 1.82–7.42)
NEUTROPHILS NFR BLD: 69.6 % (ref 44–72)
NRBC # BLD AUTO: 0 K/UL
NRBC BLD AUTO-RTO: 0 /100 WBC
PLATELET # BLD AUTO: 80 K/UL (ref 164–446)
PMV BLD AUTO: 10.9 FL (ref 9–12.9)
POTASSIUM SERPL-SCNC: 4.1 MMOL/L (ref 3.6–5.5)
PROT SERPL-MCNC: 5.6 G/DL (ref 6–8.2)
RBC # BLD AUTO: 4.36 M/UL (ref 4.7–6.1)
SODIUM SERPL-SCNC: 138 MMOL/L (ref 135–145)
WBC # BLD AUTO: 4.6 K/UL (ref 4.8–10.8)

## 2017-12-06 PROCEDURE — 80053 COMPREHEN METABOLIC PANEL: CPT

## 2017-12-06 PROCEDURE — 82378 CARCINOEMBRYONIC ANTIGEN: CPT

## 2017-12-06 PROCEDURE — 36415 COLL VENOUS BLD VENIPUNCTURE: CPT

## 2017-12-06 PROCEDURE — 85025 COMPLETE CBC W/AUTO DIFF WBC: CPT

## 2017-12-07 ENCOUNTER — APPOINTMENT (OUTPATIENT)
Dept: PHYSICAL THERAPY | Facility: REHABILITATION | Age: 72
End: 2017-12-07
Attending: PHYSICAL MEDICINE & REHABILITATION
Payer: MEDICARE

## 2017-12-12 ENCOUNTER — PHYSICAL THERAPY (OUTPATIENT)
Dept: PHYSICAL THERAPY | Facility: REHABILITATION | Age: 72
End: 2017-12-12
Attending: PHYSICAL MEDICINE & REHABILITATION
Payer: MEDICARE

## 2017-12-12 DIAGNOSIS — R26.2 DIFFICULTY WALKING: ICD-10-CM

## 2017-12-12 PROCEDURE — 97112 NEUROMUSCULAR REEDUCATION: CPT

## 2017-12-12 PROCEDURE — 97116 GAIT TRAINING THERAPY: CPT

## 2017-12-12 NOTE — OP THERAPY DAILY TREATMENT
Outpatient Physical Therapy  DAILY TREATMENT     [facility] Physical Therapy [address] [address]  Phone:  [phone]  Fax:  [phone]    Date: 12/12/2017    Patient: Torey Lima  YOB: 1945  MRN: [id number]     Time Calculation  Start time: 1445  Stop time: 1515 Time Calculation (min): 30 minutes     0/10--no paion  Visit #: 13    Subjective: overall, getting stronger and moving around wo spc most of the time    Objective   Gait trg. : 15 Balance: 10'      Therapeutic Treatments and Modalities:     2 x 4 forward/backward/sideways w/ finger drag on wall with eyes opened/closed  -tandem stance w/ x-1--hep  S/l running man--hep  3 x 10--hep          Assessment, Response and Plan:   Assessment details:           Outpatient Physical Therapy  DAILY TREATMENT     [facility] Physical [address] [address]  Phone:  [phone]  Fax:  [phone]    Date: 12/12/2017    Patient: Torey Lima  YOB: 1945  MRN: [id number]     Time Calculation  Start time: 1445  Stop time: 1515 Time Calculation (min): 30 minutes     Chief Complaint: No chief complaint on file.    Visit #: 13    SUBJECTIVE:  Subjective: overall, getting stronger and moving around wo spc most of the time    OBJECTIVE:    Exercises/Treatment    Pain rating before treatment: 0  Pain rating after treatment: 0    ASSESSMENT:   MMT: bilateral 3+/5 l4-s1 w/ slightly weaker R gastroc and L ant tib.  Plan:    hold off one week, DGI, gait trg eli d/c 1-2 visits

## 2017-12-13 ENCOUNTER — TELEPHONE (OUTPATIENT)
Dept: MEDICAL GROUP | Age: 72
End: 2017-12-13

## 2017-12-14 ENCOUNTER — OFFICE VISIT (OUTPATIENT)
Dept: MEDICAL GROUP | Age: 72
End: 2017-12-14
Payer: MEDICARE

## 2017-12-14 ENCOUNTER — APPOINTMENT (OUTPATIENT)
Dept: PHYSICAL THERAPY | Facility: REHABILITATION | Age: 72
End: 2017-12-14
Attending: PHYSICAL MEDICINE & REHABILITATION
Payer: MEDICARE

## 2017-12-14 VITALS
BODY MASS INDEX: 31.18 KG/M2 | WEIGHT: 243 LBS | HEART RATE: 75 BPM | HEIGHT: 74 IN | TEMPERATURE: 99.3 F | DIASTOLIC BLOOD PRESSURE: 60 MMHG | SYSTOLIC BLOOD PRESSURE: 102 MMHG | OXYGEN SATURATION: 98 %

## 2017-12-14 DIAGNOSIS — M62.08 DIASTASIS RECTI: ICD-10-CM

## 2017-12-14 DIAGNOSIS — D69.59 THROMBOCYTOPENIA DUE TO DRUGS: ICD-10-CM

## 2017-12-14 DIAGNOSIS — I10 ESSENTIAL HYPERTENSION: ICD-10-CM

## 2017-12-14 DIAGNOSIS — T50.905A THROMBOCYTOPENIA DUE TO DRUGS: ICD-10-CM

## 2017-12-14 DIAGNOSIS — R60.1 GENERALIZED EDEMA: ICD-10-CM

## 2017-12-14 DIAGNOSIS — E55.9 VITAMIN D DEFICIENCY: ICD-10-CM

## 2017-12-14 DIAGNOSIS — D63.8 ANEMIA, CHRONIC DISEASE: ICD-10-CM

## 2017-12-14 DIAGNOSIS — R18.8 CIRRHOSIS OF LIVER WITH ASCITES, UNSPECIFIED HEPATIC CIRRHOSIS TYPE (HCC): ICD-10-CM

## 2017-12-14 DIAGNOSIS — I82.412 ACUTE DEEP VEIN THROMBOSIS (DVT) OF FEMORAL VEIN OF LEFT LOWER EXTREMITY (HCC): ICD-10-CM

## 2017-12-14 DIAGNOSIS — E78.2 MIXED HYPERLIPIDEMIA: ICD-10-CM

## 2017-12-14 DIAGNOSIS — E66.9 OBESITY (BMI 30-39.9): ICD-10-CM

## 2017-12-14 DIAGNOSIS — D50.0 IRON DEFICIENCY ANEMIA DUE TO CHRONIC BLOOD LOSS: ICD-10-CM

## 2017-12-14 DIAGNOSIS — K74.60 CIRRHOSIS OF LIVER WITH ASCITES, UNSPECIFIED HEPATIC CIRRHOSIS TYPE (HCC): ICD-10-CM

## 2017-12-14 PROCEDURE — 99215 OFFICE O/P EST HI 40 MIN: CPT | Performed by: INTERNAL MEDICINE

## 2017-12-14 RX ORDER — TORSEMIDE 10 MG/1
20 TABLET ORAL DAILY
Qty: 180 TAB | Refills: 1 | Status: SHIPPED | OUTPATIENT
Start: 2017-12-14 | End: 2018-01-17 | Stop reason: SDUPTHER

## 2017-12-14 RX ORDER — METOLAZONE 2.5 MG/1
2.5 TABLET ORAL DAILY
Qty: 90 TAB | Refills: 1 | Status: SHIPPED | OUTPATIENT
Start: 2017-12-14 | End: 2018-01-17 | Stop reason: SDUPTHER

## 2017-12-14 ASSESSMENT — ENCOUNTER SYMPTOMS
EYES NEGATIVE: 1
CONSTITUTIONAL NEGATIVE: 1
RESPIRATORY NEGATIVE: 1
PSYCHIATRIC NEGATIVE: 1
GASTROINTESTINAL NEGATIVE: 1
NEUROLOGICAL NEGATIVE: 1
MUSCULOSKELETAL NEGATIVE: 1

## 2017-12-14 NOTE — TELEPHONE ENCOUNTER
ESTABLISHED PATIENT PRE-VISIT PLANNING     Note: Patient will not be contacted if there is no indication to call.     1.  Reviewed notes from the last few office visits within the medical group: Yes    2.  If any orders were placed at last visit or intended to be done for this visit (i.e. 6 mos follow-up), do we have Results/Consult Notes?        •  Labs - Labs ordered, completed on 12/6 and results are in chart.   Note: If patient appointment is for lab review and patient did not complete labs, check with provider if OK to reschedule patient until labs completed.       •  Imaging - Imaging was not ordered at last office visit.       •  Referrals - No referrals were ordered at last office visit.    3. Is this appointment scheduled as a Hospital Follow-Up? No    4.  Immunizations were updated in Epic using WebIZ?: Epic matches WebIZ       •  Web Iz Recommendations:FLU HD    5.  Patient is due for the following Health Maintenance Topics:   Health Maintenance Due   Topic Date Due   • Annual Wellness Visit  06/22/2017   • IMM INFLUENZA (1) 09/01/2017       - Patient is up-to-date on all Health Maintenance topics. No records have been requested at this time.    6.  Patient was NOT informed to arrive 15 min prior to their scheduled appointment and bring in their medication bottles.

## 2017-12-15 NOTE — PROGRESS NOTES
Subjective:      Torey Lima is a 72 y.o. male who presents with Hypertension (evaluation on blood work results )  and worsening leg edema since reducing diuretic to every 3 days  And   The patient is here for followup of chronic medical problems listed below. The patient is compliant with medications and having no side effects from them. Denies chest pain, abdominal pain, dyspnea, myalgias, or cough.   Patient Active Problem List    Diagnosis Date Noted   • Esophageal varices- BANDED 4/2016- repeat egd tbd 10//29/16- gic 04/04/2016     Priority: High   • ASCVD (arteriosclerotic cardiovascular disease)subtotalled small distal LCx and 40% LAD med rx by cath 12/13/12 12/14/2012     Priority: High   • Gastric varices- s/p BRTO procedure at [facility] 04/04/2016     Priority: Medium   • Gastrointestinal hemorrhage with hematemesis- recurrernt from varices 04/03/2016     Priority: Medium   • Anemia, chronic disease 12/14/2017   • Generalized edema 12/14/2017   • Diastasis recti 12/14/2017   • Obesity (BMI 30-39.9) 12/14/2017   • Edema of left lower extremity- due to dvt july 2017 09/19/2017   • Herpes zoster infection of thoracic region- right back T8 09/19/2017   • BPH (benign prostatic hyperplasia) 08/11/2017   • 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) 08/11/2017   • Impaired mobility and ADLs 08/11/2017   • Acute deep vein thrombosis (DVT) of femoral vein of left lower extremity (CMS-HCC)- IVC placed july 2017 06/26/2017   • Guillain Barré syndrome (CMS-HCC) 05/27/2017   • H/O colon cancer, stage IV- redo colonoscopy 2017 aug at [facility] 05/20/2017   • Elevated bilirubin 01/03/2017   • Microcytic hypochromic anemia 10/24/2016   • History of colon cancer 10/24/2016   • History of esophageal varices 10/24/2016   • Iron deficiency anemia due to chronic blood loss 10/06/2016   • Portal vein thrombosis- on [facility] 2/2015 04/14/2016   • Cirrhosis of liver with ascites (HCC)- ? DUE TO OLD HEP B, CHEMO RX,  OR THERMO  RAD OF MAGNANT LESION 04/14/2016   • Mixed hyperlipidemia 06/20/2013   • Essential hypertension 06/20/2013   • Vitamin D deficiency 08/02/2012   • Thrombocytopenia due to drugs 04/25/2012   • Colon cancer-2010 left hemicolectomy; no colostomy 12/06/2010   • Liver lesion- ablation of malignant met from colon ca 2011- f/u [facility] q 6 mos 12/06/2010   • Peripheral neuropathy, secondary to drugs or chemicals 12/06/2010     ,  Allergies   Allergen Reactions   • Anticoagulant Sodium Citrate [Sodium Citrate]      HIGH BLEEDING RISK   • Lisinopril Cough   • Nkda [No Known Drug Allergy]      Outpatient Medications Prior to Visit   Medication Sig Dispense Refill   • atorvastatin (LIPITOR) 40 MG Tab Take 1 Tab by mouth every bedtime. 90 Tab 4   • ursodiol (ACTIGALL) 300 MG Cap Take 1 Cap by mouth every day. 90 Cap 4   • Diclofenac Sodium 1 % Gel APPLY FOUR GRAMS ON THE LEFT HIP  TWICE DAILY AS NEEDED 100 g 0   • tamsulosin (FLOMAX) 0.4 MG capsule Take 1 Cap by mouth ONE-HALF HOUR AFTER BREAKFAST. 90 Cap 4   • MethylPREDNISolone (MEDROL DOSEPAK) 4 MG Tablet Therapy Pack As directed 1 Kit 1   • valacyclovir (VALTREX) 1 GM Tab Take 1 Tab by mouth 2 times a day. 20 Tab 0   • vitamin D (VITAMIND D3) 1000 UNIT Tab Take 1 Tab by mouth every day. 60 Tab 0   • spironolactone (ALDACTONE) 25 MG Tab Take 1 Tab by mouth every day. 30 Tab 3   • potassium chloride SA (KDUR) 20 MEQ Tab CR Take 1 Tab by mouth every 72 hours. 60 Tab 0   • polyethylene glycol/lytes (MIRALAX) Pack Take 1 Packet by mouth every bedtime.  3   • ferrous sulfate 325 (65 Fe) MG tablet Take 1 Tab by mouth every morning with breakfast. 30 Tab    • docusate sodium 100 MG Cap Take 100 mg by mouth 2 times a day as needed for Constipation. 60 Cap    • senna-docusate (PERICOLACE OR SENOKOT S) 8.6-50 MG Tab Take 1 Tab by mouth 1 time daily as needed for Constipation. 30 Tab 0   • folic acid (FOLVITE) 1 MG Tab Take 1 Tab by mouth every day. 30 Tab    • oxycodone immediate release  "(ROXICODONE) 10 MG immediate release tablet Take 1 Tab by mouth every four hours as needed for Moderate Pain. 28 Tab 0   • metolazone (ZAROXOLYN) 2.5 MG Tab Take 1 Tab by mouth every 72 hours. 30 Tab 4   • torsemide (DEMADEX) 10 MG tablet Take 2 Tabs by mouth every 72 hours. 60 Tab 4     No facility-administered medications prior to visit.                HPI    Review of Systems   Constitutional: Negative.    HENT: Negative.    Eyes: Negative.    Respiratory: Negative.    Cardiovascular: Positive for leg swelling.   Gastrointestinal: Negative.    Genitourinary: Negative.    Musculoskeletal: Negative.    Skin: Negative.    Neurological: Negative.    Endo/Heme/Allergies: Negative.    Psychiatric/Behavioral: Negative.           Objective:     /60   Pulse 75   Temp 37.4 °C (99.3 °F)   Ht 1.873 m (6' 1.74\")   Wt 110.2 kg (243 lb)   SpO2 98%   BMI 31.42 kg/m²      Physical Exam   Constitutional: He is oriented to person, place, and time. He appears well-developed and well-nourished. No distress.   HENT:   Head: Normocephalic and atraumatic.   Right Ear: External ear normal.   Left Ear: External ear normal.   Nose: Nose normal.   Mouth/Throat: Oropharynx is clear and moist. No oropharyngeal exudate.   Eyes: Conjunctivae and EOM are normal. Pupils are equal, round, and reactive to light. Right eye exhibits no discharge. Left eye exhibits no discharge. No scleral icterus.   Neck: Normal range of motion. Neck supple. No JVD present. No tracheal deviation present. No thyromegaly present.   Cardiovascular: Normal rate, regular rhythm, normal heart sounds and intact distal pulses.  Exam reveals no gallop and no friction rub.    No murmur heard.  Pulmonary/Chest: Effort normal and breath sounds normal. No stridor. No respiratory distress. He has no wheezes. He has no rales. He exhibits no tenderness.   Abdominal: Soft. Bowel sounds are normal. He exhibits no distension and no mass. There is no tenderness. There is no " rebound and no guarding.   Musculoskeletal: Normal range of motion. He exhibits edema. He exhibits no tenderness.   There is bilateral 4+ in the lower legs and ankles bilaterally   Lymphadenopathy:     He has no cervical adenopathy.   Neurological: He is alert and oriented to person, place, and time. He has normal reflexes. He displays normal reflexes. He exhibits normal muscle tone. Coordination normal.   Skin: Skin is warm and dry. No rash noted. He is not diaphoretic. No erythema. No pallor.   Psychiatric: He has a normal mood and affect. His behavior is normal. Judgment and thought content normal.     Hospital Outpatient Visit on 12/06/2017   Component Date Value   • Carcinoembryonic Antigen 12/06/2017 1.1    • Sodium 12/06/2017 138    • Potassium 12/06/2017 4.1    • Chloride 12/06/2017 104    • Co2 12/06/2017 27    • Anion Gap 12/06/2017 7.0    • Glucose 12/06/2017 92    • Bun 12/06/2017 25*   • Creatinine 12/06/2017 1.05    • Calcium 12/06/2017 9.1    • AST(SGOT) 12/06/2017 20    • ALT(SGPT) 12/06/2017 11    • Alkaline Phosphatase 12/06/2017 78    • Total Bilirubin 12/06/2017 1.3    • Albumin 12/06/2017 3.3    • Total Protein 12/06/2017 5.6*   • Globulin 12/06/2017 2.3    • A-G Ratio 12/06/2017 1.4    • WBC 12/06/2017 4.6*   • RBC 12/06/2017 4.36*   • Hemoglobin 12/06/2017 12.5*   • Hematocrit 12/06/2017 38.5*   • MCV 12/06/2017 88.3    • MCH 12/06/2017 28.7    • MCHC 12/06/2017 32.5*   • RDW 12/06/2017 50.9*   • Platelet Count 12/06/2017 80*   • MPV 12/06/2017 10.9    • Neutrophils-Polys 12/06/2017 69.60    • Lymphocytes 12/06/2017 14.10*   • Monocytes 12/06/2017 11.10    • Eosinophils 12/06/2017 4.10    • Basophils 12/06/2017 0.90    • Immature Granulocytes 12/06/2017 0.20    • Nucleated RBC 12/06/2017 0.00    • Neutrophils (Absolute) 12/06/2017 3.20    • Lymphs (Absolute) 12/06/2017 0.65*   • Monos (Absolute) 12/06/2017 0.51    • Eos (Absolute) 12/06/2017 0.19    • Baso (Absolute) 12/06/2017 0.04    •  Immature Granulocytes (a* 12/06/2017 0.01    • NRBC (Absolute) 12/06/2017 0.00    • GFR If  12/06/2017 >60    • GFR If Non  [facility]* 12/06/2017 >60    Hospital Outpatient Visit on 11/18/2017   Component Date Value   • WBC 11/18/2017 4.7*   • RBC 11/18/2017 4.41*   • Hemoglobin 11/18/2017 12.4*   • Hematocrit 11/18/2017 38.6*   • MCV 11/18/2017 87.5    • MCH 11/18/2017 28.1    • MCHC 11/18/2017 32.1*   • RDW 11/18/2017 52.0*   • Platelet Count 11/18/2017 71*   • MPV 11/18/2017 11.3    • Neutrophils-Polys 11/18/2017 67.00    • Lymphocytes 11/18/2017 16.50*   • Monocytes 11/18/2017 11.10    • Eosinophils 11/18/2017 4.30    • Basophils 11/18/2017 0.90    • Immature Granulocytes 11/18/2017 0.20    • Nucleated RBC 11/18/2017 0.60    • Neutrophils (Absolute) 11/18/2017 3.13    • Lymphs (Absolute) 11/18/2017 0.77*   • Monos (Absolute) 11/18/2017 0.52    • Eos (Absolute) 11/18/2017 0.20    • Baso (Absolute) 11/18/2017 0.04    • Immature Granulocytes (a* 11/18/2017 0.01    • NRBC (Absolute) 11/18/2017 0.03    • Sodium 11/18/2017 138    • Potassium 11/18/2017 3.7    • Chloride 11/18/2017 104    • Co2 11/18/2017 26    • Anion Gap 11/18/2017 8.0    • Glucose 11/18/2017 84    • Bun 11/18/2017 19    • Creatinine 11/18/2017 0.79    • Calcium 11/18/2017 8.6    • AST(SGOT) 11/18/2017 19    • ALT(SGPT) 11/18/2017 14    • Alkaline Phosphatase 11/18/2017 75    • Total Bilirubin 11/18/2017 1.7*   • Albumin 11/18/2017 3.4    • Total Protein 11/18/2017 5.9*   • Globulin 11/18/2017 2.5    • A-G Ratio 11/18/2017 1.4    • GFR If  11/18/2017 >60    • GFR If Non  [facility]meri* 11/18/2017 >60       Lab Results   Component Value Date/Time    HBA1C 5.2 10/25/2016 12:06 AM    HBA1C 5.3 07/08/2016 07:02 AM     Lab Results   Component Value Date/Time    SODIUM 138 12/06/2017 06:35 AM    POTASSIUM 4.1 12/06/2017 06:35 AM    CHLORIDE 104 12/06/2017 06:35 AM    CO2 27 12/06/2017 06:35 AM    GLUCOSE 92 12/06/2017  06:35 AM    BUN 25 (H) 12/06/2017 06:35 AM    CREATININE 1.05 12/06/2017 06:35 AM    CREATININE 1.14 01/23/2010 12:00 AM    BUNCREATRAT 15 01/23/2010 12:00 AM    GLOMRATE >59 01/23/2010 12:00 AM    ALKPHOSPHAT 78 12/06/2017 06:35 AM    ASTSGOT 20 12/06/2017 06:35 AM    ALTSGPT 11 12/06/2017 06:35 AM    TBILIRUBIN 1.3 12/06/2017 06:35 AM     Lab Results   Component Value Date/Time    INR 1.18 (H) 09/16/2017 07:26 AM    INR 1.28 (H) 09/04/2017 05:32 AM    INR 1.35 (H) 08/22/2017 06:14 AM     Lab Results   Component Value Date/Time    CHOLSTRLTOT 120 09/16/2017 07:26 AM    LDL 45 09/16/2017 07:26 AM    HDL 60 09/16/2017 07:26 AM    TRIGLYCERIDE 76 09/16/2017 07:26 AM       Lab Results   Component Value Date/Time    TESTOSTERONE 333 04/02/2015 06:47 AM     No results found for: TSH  Lab Results   Component Value Date/Time    FREET4 0.85 04/02/2015 06:47 AM    FREET4 0.85 04/12/2014 07:26 AM     No results found for: URICACID  No components found for: VITB12  Lab Results   Component Value Date/Time    25HYDROXY 32 08/11/2017 04:50 AM    25HYDROXY 38 04/02/2015 06:47 AM                 Assessment/Plan:     1. Generalized edema-Resume diuretics. Recheck in a month. Secondary to his cirrhosis of liver nonalcoholic.     - metolazone (ZAROXOLYN) 2.5 MG Tab; Take 1 Tab by mouth every day.  Dispense: 90 Tab; Refill: 1  - torsemide (DEMADEX) 10 MG tablet; Take 2 Tabs by mouth every day.  Dispense: 180 Tab; Refill: 1  - US-ABDOMEN COMPLETE SURVEY; Future    2. Cirrhosis of liver with ascites, unspecified hepatic cirrhosis type (CMS-HCC)-as above  - COMP METABOLIC PANEL; Future  - metolazone (ZAROXOLYN) 2.5 MG Tab; Take 1 Tab by mouth every day.  Dispense: 90 Tab; Refill: 1  - torsemide (DEMADEX) 10 MG tablet; Take 2 Tabs by mouth every day.  Dispense: 180 Tab; Refill: 1  - US-ABDOMEN COMPLETE SURVEY; Future    3. Thrombocytopenia due to drugs-approximately 80,000 and unchanged    Stable no change. Continue surveillance.  - CBC  WITH DIFFERENTIAL; Future    4. Anemia, chronic disease Under good control. Continue same regimen. Hemoglobin equals 12.     - CBC WITH DIFFERENTIAL; Future    5. Diastasis recti    Chronic problem nonsignificant.    6. Obesity (BMI 30-39.9)    diet/exercise/lose 15 lbs.; patient counseled  - Patient identified as having weight management issue.  Appropriate orders and counseling given.    7. Acute deep vein thrombosis (DVT) of femoral vein of left lower extremity (CMS-HCC)- IVC placed july 2017   Stable without recurrence. May need repeat ultrasound reconfirmed stability.    8. Essential hypertension   Under good control. Continue same regimen.  9. Mixed hyperlipidemia    Under good control. Continue same regimen.    10. Vitamin D deficiency     Under good control. Continue same regimen.    11. Iron deficiency anemia due to chronic blood loss      Under good control. Continue same regimen.      40 minute face-to-face encounter took place today.  More than half of this time was spent in the coordination of care of the above problems, as well as counseling.      40 minute face-to-face encounter took place today.  More than half of this time was spent in the coordination of care of the above problems, as well as counseling.

## 2017-12-18 ENCOUNTER — OCCUPATIONAL THERAPY (OUTPATIENT)
Dept: OCCUPATIONAL THERAPY | Facility: REHABILITATION | Age: 72
End: 2017-12-18
Attending: PHYSICAL MEDICINE & REHABILITATION
Payer: MEDICARE

## 2017-12-18 DIAGNOSIS — M62.81 MUSCLE WEAKNESS (GENERALIZED): ICD-10-CM

## 2017-12-18 PROCEDURE — 97110 THERAPEUTIC EXERCISES: CPT

## 2017-12-18 PROCEDURE — 97140 MANUAL THERAPY 1/> REGIONS: CPT

## 2017-12-18 NOTE — OP THERAPY DAILY TREATMENT
"  Outpatient Occupational Therapy  DAILY TREATMENT     [facility] Occupational Therapy [address]  [address]  [place] [address]  Phone:  [phone]  Fax:  [phone]    Date: 12/18/2017    Patient: Torey Lima  YOB: 1945  MRN: [id number]     Time Calculation  Start time: 0130  Stop time: 0230 Time Calculation (min): 60 minutes     Chief Complaint: Hand Numbness and Hand Weakness    Visit #: 12    SUBJECTIVE:  \"Sometimes my hands work and sometimes they don't\", \"My hands still feel very sensitive\"    OBJECTIVE:  Current objective measures:    strength  Left 43lbs  Right: 54lbs       Therapeutic Exercises (CPT 37958):     Total treatment time spent on Therapeutic Exercises: 30 minutes.      1. Bilateral hands, 30    Therapeutic Exercise Summary:  Bilateral palmar hands desensitization via bucket of small cold rocks 5 minutes each hand squeeze and filter.  Bilateral composite fists, opposition, joint blocking for DIP flexion.  Instructed on HEP to focus on DIPs with good understanding.      Therapeutic Treatments and Modalities:    1. Manual therapy, 30 minutes, , see below    Therapeutic Treatments and Modalities Summary: STM to bilateral volar forearms for release of soft tissue adhesions and for inhibition of left hand extensor tone.  Passive sustained stretching into composite fist, isolated digit joint mobilization and traction. PROM WNL BH.  Pt able to maintain full fist position after placement R > L.  AROM >=3/4 bilateral full fist when performed in sequence of MCP to DIP flexion after manual therapy.  Grade 1+ extensor spasticity left digit on MAS.  Mild tremors in LH during gross flexion. Continue with contrast baths, passive stretching to prevent loss of range, previous exercises, and desensitization via towel rubs and bucket of textured objects.        Pain rating before treatment: 0  Pain rating after treatment: 0    ASSESSMENT:   Response to treatment: Pt " making slow but steady progress with bilateral  strength.  Severe weakness persists in FDP in bilateral hands with trace to 2-/5 strength.  Mild left hand tremors and extensor tone persists.  Pt responding well to manual therapy and desensitization.  HEP updated with good understanding.    PLAN/RECOMMENDATIONS:   Plan for treatment: therapy treatment to continue next visit.  Planned interventions for next visit: continue with current treatment, E-stim attended (CPT 89755), manual therapy (CPT 89405), neuromuscular re-education (CPT 12267), therapeutic activities (CPT 51510) and therapeutic exercise (CPT 25696)

## 2017-12-20 ENCOUNTER — TELEPHONE (OUTPATIENT)
Dept: PHYSICAL THERAPY | Facility: REHABILITATION | Age: 72
End: 2017-12-20

## 2017-12-20 ENCOUNTER — PHYSICAL THERAPY (OUTPATIENT)
Dept: PHYSICAL THERAPY | Facility: REHABILITATION | Age: 72
End: 2017-12-20
Attending: PHYSICAL MEDICINE & REHABILITATION
Payer: MEDICARE

## 2017-12-20 ENCOUNTER — OCCUPATIONAL THERAPY (OUTPATIENT)
Dept: OCCUPATIONAL THERAPY | Facility: REHABILITATION | Age: 72
End: 2017-12-20
Attending: PHYSICAL MEDICINE & REHABILITATION
Payer: MEDICARE

## 2017-12-20 DIAGNOSIS — R26.2 DIFFICULTY WALKING: ICD-10-CM

## 2017-12-20 DIAGNOSIS — M62.81 MUSCLE WEAKNESS (GENERALIZED): ICD-10-CM

## 2017-12-20 PROCEDURE — 97110 THERAPEUTIC EXERCISES: CPT

## 2017-12-20 PROCEDURE — 97116 GAIT TRAINING THERAPY: CPT

## 2017-12-20 PROCEDURE — G8980 MOBILITY D/C STATUS: HCPCS | Mod: CI

## 2017-12-20 PROCEDURE — 97112 NEUROMUSCULAR REEDUCATION: CPT

## 2017-12-20 PROCEDURE — 97140 MANUAL THERAPY 1/> REGIONS: CPT

## 2017-12-20 PROCEDURE — G8979 MOBILITY GOAL STATUS: HCPCS | Mod: CJ

## 2017-12-20 PROCEDURE — 97032 APPL MODALITY 1+ESTIM EA 15: CPT

## 2017-12-20 NOTE — OP THERAPY DAILY TREATMENT
Outpatient Physical Therapy  DAILY TREATMENT     [facility] Physical [address].  [address]  [place] [address]  Phone:  [phone]  Fax:  [phone]    Date: 12/20/2017    Patient: Torey Lima  YOB: 1945  MRN: [id number]     Time Calculation  Start time: 0219  Stop time: 0245 Time Calculation (min): 26 minutes     0/10--no paion  Visit #: 14    Patient: Torey Lima  YOB: 1945  MRN: [id number]     Time Calculation  Start time: 0219  Stop time: 0245 Time Calculation (min): 26 minutes     Visit #: 14    SUBJECTIVE:  patient denies functional limitations and reports shopping at [employer] w/o issue  OBJECTIVE:    Exercises/Treatment    Reviewed HEP  [facility] 21/24  Pain rating before treatment: 0  Pain rating after treatment: 0    ASSESSMENT:   Patient has pregressed to independence with all functional activity  LEFS 56/80  Plan:   D/c

## 2017-12-20 NOTE — OP THERAPY DAILY TREATMENT
"  Outpatient Occupational Therapy  DAILY TREATMENT     [facility] Occupational [address].  [address]  [place] [address]  Phone:  [phone]  Fax:  [phone]    Date: 12/20/2017    Patient: Torey Lima  YOB: 1945  MRN: [id number]     Time Calculation  Start time: 0100  Stop time: 0200 Time Calculation (min): 60 minutes     Chief Complaint: Hand Numbness and Hand Weakness    Visit #: 13    SUBJECTIVE: \"I did the exercises for the finger tips but they didn't seem to work\", \"The sensation improves temporarily with the desensitization exercises\"    OBJECTIVE:  Current objective measures:   Bilateral hands: FDP strength trace to 2-/5 depending on digit.       Therapeutic Exercises (CPT 60691):     Total treatment time spent on Therapeutic Exercises: 15 minutes.      1. Bilateral hands, flex/ext    Therapeutic Exercise Summary:  Bilateral palmar hands desensitization via bucket of larger cool temperature rocks 5 minutes each hand squeeze and filter.  Bilateral composite fists multiple reps but only able to achieve long fist position.  Bilateral hand exercises for movement towards intrinsic minus position with slight flexion at approximately 3 digits.  Updated HEP to focus on intrinsic minus, joint blocking of  DIPs, and trial of TENS unit at home with good understanding.      Therapeutic Treatments and Modalities:    1. Manual therapy, 30 minutes, , see below    2. E Stim Attended (CPT 98002), 15 minutes    Therapeutic Treatments and Modalities Summary: TENS: bilateral volar forearms for stim of FDP/FDS at 20 Hz x 5 minutes, NMES 20-25 Hz x 10 minutes. Wrists manually held in place to avoid wrist flexion.  Bilateral hand digits 2-5 DIP joint mobilization and passive sustained flexion.  Able to manually place hands in full composite fist without resistance or tone.       ASSESSMENT:   Response to treatment:  Pt making good progress today in response to a combination of " e-stim to digit flexors, joint mobilization, stretching and active ther ex.  Severe weakness persists in FDP in bilateral hands with trace to 2-/5 strength, however, I was able to passively achieve full composite fists without any extensor tone or resistance following the above treatments.  Pt with slow improvement regarding hypersensitivity but is participating in a graded desensitization program.  HEP updated with good understanding.    PLAN/RECOMMENDATIONS:   Plan for treatment: therapy treatment to continue next visit.  Planned interventions for next visit: continue with current treatment, E-stim attended (CPT 15250), manual therapy (CPT 05341), therapeutic activities (CPT 79638) and therapeutic exercise (CPT 60278)

## 2017-12-20 NOTE — OP THERAPY DISCHARGE SUMMARY
Outpatient Physical Therapy  DISCHARGE SUMMARY NOTE      [facility] Physical Therapy [address]  [address]  [place] [address]  Phone:  [phone]  Fax:  [phone]        Patient Name:  Torey Lima  :  1945  MR#:  [id number]    HICN:       Visits:14         Cancel/No-Show:     Diagnosis/ICD-10: balance deficit    Referring Provider:     Ayanna      Your patient is being discharged from Physical therapy with the following comments:  Goals Met      Comments:    DGI   LEFS      ASSESSMENT:   Patient has progressed to independence with all functional activity--goals met    Plan:   D/c           Geovany Nuñez, PT, DPT, OCS

## 2018-01-02 DIAGNOSIS — E87.6 HYPOKALEMIA: ICD-10-CM

## 2018-01-02 DIAGNOSIS — R60.1 GENERALIZED EDEMA: ICD-10-CM

## 2018-01-02 RX ORDER — SPIRONOLACTONE 25 MG/1
TABLET ORAL
Qty: 90 TAB | Refills: 0 | Status: SHIPPED | OUTPATIENT
Start: 2018-01-02 | End: 2018-01-17 | Stop reason: SDUPTHER

## 2018-01-02 RX ORDER — POTASSIUM CHLORIDE 20 MEQ/1
20 TABLET, EXTENDED RELEASE ORAL DAILY
Qty: 90 TAB | Refills: 0 | Status: SHIPPED | OUTPATIENT
Start: 2018-01-02 | End: 2018-01-17 | Stop reason: SDUPTHER

## 2018-01-03 ENCOUNTER — HOSPITAL ENCOUNTER (OUTPATIENT)
Dept: RADIOLOGY | Facility: MEDICAL CENTER | Age: 73
End: 2018-01-03
Attending: INTERNAL MEDICINE
Payer: MEDICARE

## 2018-01-03 ENCOUNTER — TELEPHONE (OUTPATIENT)
Dept: MEDICAL GROUP | Age: 73
End: 2018-01-03

## 2018-01-03 DIAGNOSIS — R60.1 GENERALIZED EDEMA: ICD-10-CM

## 2018-01-03 DIAGNOSIS — R18.8 CIRRHOSIS OF LIVER WITH ASCITES, UNSPECIFIED HEPATIC CIRRHOSIS TYPE (HCC): ICD-10-CM

## 2018-01-03 DIAGNOSIS — K74.60 CIRRHOSIS OF LIVER WITH ASCITES, UNSPECIFIED HEPATIC CIRRHOSIS TYPE (HCC): ICD-10-CM

## 2018-01-03 PROCEDURE — 76700 US EXAM ABDOM COMPLETE: CPT

## 2018-01-03 RX ORDER — POTASSIUM CHLORIDE 1500 MG/1
TABLET, EXTENDED RELEASE ORAL
Qty: 60 TAB | Refills: 3 | Status: SHIPPED | OUTPATIENT
Start: 2018-01-03 | End: 2018-01-03

## 2018-01-12 ENCOUNTER — HOSPITAL ENCOUNTER (OUTPATIENT)
Dept: LAB | Facility: MEDICAL CENTER | Age: 73
End: 2018-01-12
Attending: INTERNAL MEDICINE
Payer: MEDICARE

## 2018-01-12 DIAGNOSIS — D63.8 ANEMIA, CHRONIC DISEASE: ICD-10-CM

## 2018-01-12 DIAGNOSIS — D69.59 THROMBOCYTOPENIA DUE TO DRUGS: ICD-10-CM

## 2018-01-12 DIAGNOSIS — T50.905A THROMBOCYTOPENIA DUE TO DRUGS: ICD-10-CM

## 2018-01-12 DIAGNOSIS — K74.60 CIRRHOSIS OF LIVER WITH ASCITES, UNSPECIFIED HEPATIC CIRRHOSIS TYPE (HCC): ICD-10-CM

## 2018-01-12 DIAGNOSIS — R18.8 CIRRHOSIS OF LIVER WITH ASCITES, UNSPECIFIED HEPATIC CIRRHOSIS TYPE (HCC): ICD-10-CM

## 2018-01-12 LAB
ALBUMIN SERPL BCP-MCNC: 3.6 G/DL (ref 3.2–4.9)
ALBUMIN/GLOB SERPL: 1.5 G/DL
ALP SERPL-CCNC: 92 U/L (ref 30–99)
ALT SERPL-CCNC: 12 U/L (ref 2–50)
ANION GAP SERPL CALC-SCNC: 4 MMOL/L (ref 0–11.9)
AST SERPL-CCNC: 18 U/L (ref 12–45)
BASOPHILS # BLD AUTO: 0.8 % (ref 0–1.8)
BASOPHILS # BLD: 0.03 K/UL (ref 0–0.12)
BILIRUB SERPL-MCNC: 1.3 MG/DL (ref 0.1–1.5)
BUN SERPL-MCNC: 20 MG/DL (ref 8–22)
CALCIUM SERPL-MCNC: 9 MG/DL (ref 8.5–10.5)
CHLORIDE SERPL-SCNC: 105 MMOL/L (ref 96–112)
CO2 SERPL-SCNC: 30 MMOL/L (ref 20–33)
CREAT SERPL-MCNC: 0.86 MG/DL (ref 0.5–1.4)
EOSINOPHIL # BLD AUTO: 0.18 K/UL (ref 0–0.51)
EOSINOPHIL NFR BLD: 4.9 % (ref 0–6.9)
ERYTHROCYTE [DISTWIDTH] IN BLOOD BY AUTOMATED COUNT: 49.8 FL (ref 35.9–50)
GLOBULIN SER CALC-MCNC: 2.4 G/DL (ref 1.9–3.5)
GLUCOSE SERPL-MCNC: 88 MG/DL (ref 65–99)
HCT VFR BLD AUTO: 39.3 % (ref 42–52)
HGB BLD-MCNC: 12.9 G/DL (ref 14–18)
IMM GRANULOCYTES # BLD AUTO: 0.01 K/UL (ref 0–0.11)
IMM GRANULOCYTES NFR BLD AUTO: 0.3 % (ref 0–0.9)
LYMPHOCYTES # BLD AUTO: 0.64 K/UL (ref 1–4.8)
LYMPHOCYTES NFR BLD: 17.5 % (ref 22–41)
MCH RBC QN AUTO: 28.8 PG (ref 27–33)
MCHC RBC AUTO-ENTMCNC: 32.8 G/DL (ref 33.7–35.3)
MCV RBC AUTO: 87.7 FL (ref 81.4–97.8)
MONOCYTES # BLD AUTO: 0.34 K/UL (ref 0–0.85)
MONOCYTES NFR BLD AUTO: 9.3 % (ref 0–13.4)
NEUTROPHILS # BLD AUTO: 2.46 K/UL (ref 1.82–7.42)
NEUTROPHILS NFR BLD: 67.2 % (ref 44–72)
NRBC # BLD AUTO: 0 K/UL
NRBC BLD-RTO: 0 /100 WBC
PLATELET # BLD AUTO: 63 K/UL (ref 164–446)
PMV BLD AUTO: 11.8 FL (ref 9–12.9)
POTASSIUM SERPL-SCNC: 3.6 MMOL/L (ref 3.6–5.5)
PROT SERPL-MCNC: 6 G/DL (ref 6–8.2)
RBC # BLD AUTO: 4.48 M/UL (ref 4.7–6.1)
SODIUM SERPL-SCNC: 139 MMOL/L (ref 135–145)
WBC # BLD AUTO: 3.7 K/UL (ref 4.8–10.8)

## 2018-01-12 PROCEDURE — 80053 COMPREHEN METABOLIC PANEL: CPT

## 2018-01-12 PROCEDURE — 36415 COLL VENOUS BLD VENIPUNCTURE: CPT

## 2018-01-12 PROCEDURE — 85025 COMPLETE CBC W/AUTO DIFF WBC: CPT

## 2018-01-15 ENCOUNTER — OCCUPATIONAL THERAPY (OUTPATIENT)
Dept: OCCUPATIONAL THERAPY | Facility: REHABILITATION | Age: 73
End: 2018-01-15
Attending: PHYSICAL MEDICINE & REHABILITATION
Payer: MEDICARE

## 2018-01-15 DIAGNOSIS — K74.60 CIRRHOSIS OF LIVER WITH ASCITES, UNSPECIFIED HEPATIC CIRRHOSIS TYPE (HCC): ICD-10-CM

## 2018-01-15 DIAGNOSIS — M62.81 MUSCLE WEAKNESS (GENERALIZED): ICD-10-CM

## 2018-01-15 DIAGNOSIS — R18.8 CIRRHOSIS OF LIVER WITH ASCITES, UNSPECIFIED HEPATIC CIRRHOSIS TYPE (HCC): ICD-10-CM

## 2018-01-15 PROCEDURE — 97032 APPL MODALITY 1+ESTIM EA 15: CPT

## 2018-01-15 PROCEDURE — 97110 THERAPEUTIC EXERCISES: CPT

## 2018-01-15 PROCEDURE — 97140 MANUAL THERAPY 1/> REGIONS: CPT

## 2018-01-16 ENCOUNTER — TELEPHONE (OUTPATIENT)
Dept: MEDICAL GROUP | Age: 73
End: 2018-01-16

## 2018-01-16 RX ORDER — FOLIC ACID 1 MG/1
TABLET ORAL
Qty: 90 TAB | Refills: 0 | Status: SHIPPED | OUTPATIENT
Start: 2018-01-16 | End: 2018-04-25 | Stop reason: SDUPTHER

## 2018-01-16 NOTE — TELEPHONE ENCOUNTER
ESTABLISHED PATIENT PRE-VISIT PLANNING     Note: Patient will not be contacted if there is no indication to call.     1.  Reviewed notes from the last few office visits within the medical group: Yes    2.  If any orders were placed at last visit or intended to be done for this visit (i.e. 6 mos follow-up), do we have Results/Consult Notes?        •  Labs - Labs ordered, completed on 1/12/18 and results are in chart.   Note: If patient appointment is for lab review and patient did not complete labs, check with provider if OK to reschedule patient until labs completed.       •  Imaging - Imaging ordered, completed and results are in chart.       •  Referrals - No referrals were ordered at last office visit.    3. Is this appointment scheduled as a Hospital Follow-Up? No    4.  Immunizations were updated in Epic using WebIZ?: Epic matches WebIZ       •  Web Iz Recommendations: FLU    5.  Patient is due for the following Health Maintenance Topics:   Health Maintenance Due   Topic Date Due   • Annual Wellness Visit  06/22/2017   • IMM INFLUENZA (1) 09/01/2017       - Patient is up-to-date on all Health Maintenance topics. No records have been requested at this time.    6.  Patient was NOT informed to arrive 15 min prior to their scheduled appointment and bring in their medication bottles.

## 2018-01-17 ENCOUNTER — OFFICE VISIT (OUTPATIENT)
Dept: MEDICAL GROUP | Age: 73
End: 2018-01-17
Payer: MEDICARE

## 2018-01-17 VITALS
TEMPERATURE: 98.8 F | OXYGEN SATURATION: 97 % | DIASTOLIC BLOOD PRESSURE: 60 MMHG | HEIGHT: 74 IN | SYSTOLIC BLOOD PRESSURE: 100 MMHG | HEART RATE: 73 BPM | WEIGHT: 239 LBS | BODY MASS INDEX: 30.67 KG/M2

## 2018-01-17 DIAGNOSIS — R18.8 CIRRHOSIS OF LIVER WITH ASCITES, UNSPECIFIED HEPATIC CIRRHOSIS TYPE (HCC): ICD-10-CM

## 2018-01-17 DIAGNOSIS — K74.60 CIRRHOSIS OF LIVER WITH ASCITES, UNSPECIFIED HEPATIC CIRRHOSIS TYPE (HCC): ICD-10-CM

## 2018-01-17 DIAGNOSIS — R60.1 GENERALIZED EDEMA: ICD-10-CM

## 2018-01-17 DIAGNOSIS — E87.6 HYPOKALEMIA: ICD-10-CM

## 2018-01-17 PROCEDURE — 99215 OFFICE O/P EST HI 40 MIN: CPT | Performed by: INTERNAL MEDICINE

## 2018-01-17 RX ORDER — METOLAZONE 2.5 MG/1
2.5 TABLET ORAL
Qty: 45 TAB | Refills: 4 | Status: SHIPPED | OUTPATIENT
Start: 2018-01-17 | End: 2020-11-12

## 2018-01-17 RX ORDER — POTASSIUM CHLORIDE 20 MEQ/1
20 TABLET, EXTENDED RELEASE ORAL DAILY
Qty: 90 TAB | Refills: 4 | Status: SHIPPED | OUTPATIENT
Start: 2018-01-17 | End: 2018-05-08 | Stop reason: SDUPTHER

## 2018-01-17 RX ORDER — METOLAZONE 2.5 MG/1
2.5 TABLET ORAL
Qty: 45 TAB | Refills: 4 | Status: SHIPPED | OUTPATIENT
Start: 2018-01-17 | End: 2018-01-17 | Stop reason: SDUPTHER

## 2018-01-17 RX ORDER — TORSEMIDE 10 MG/1
20 TABLET ORAL
Qty: 90 TAB | Refills: 4 | Status: SHIPPED | OUTPATIENT
Start: 2018-01-17 | End: 2020-11-12

## 2018-01-17 RX ORDER — SPIRONOLACTONE 25 MG/1
25 TABLET ORAL DAILY
Qty: 90 TAB | Refills: 4 | Status: SHIPPED | OUTPATIENT
Start: 2018-01-17 | End: 2019-04-01 | Stop reason: SDUPTHER

## 2018-01-17 RX ORDER — TORSEMIDE 10 MG/1
20 TABLET ORAL
Qty: 90 TAB | Refills: 4 | Status: SHIPPED | OUTPATIENT
Start: 2018-01-17 | End: 2018-01-17 | Stop reason: SDUPTHER

## 2018-01-17 ASSESSMENT — ENCOUNTER SYMPTOMS
PSYCHIATRIC NEGATIVE: 1
CARDIOVASCULAR NEGATIVE: 1
RESPIRATORY NEGATIVE: 1
CONSTITUTIONAL NEGATIVE: 1
GASTROINTESTINAL NEGATIVE: 1
NEUROLOGICAL NEGATIVE: 1
EYES NEGATIVE: 1
MUSCULOSKELETAL NEGATIVE: 1

## 2018-01-18 NOTE — PROGRESS NOTES
Subjective:      Torey Lima is a 72 y.o. male who presents with Hypertension (evaluation )  The patient is here for followup of chronic medical problems listed below. The patient is compliant with medications and having no side effects from them. Denies chest pain, abdominal pain, dyspnea, myalgias, or cough.     Since the patient's last visit he had severe recurrence of his edema and he was started back on daily I radiate dosing of his torsemide and metolazone to complement his daily ran out aldactone. However he developed a rash from this and is now going back to every other day dosing on the torsemide and metolazone.  Thrashes much better with this regimen.        Patient Active Problem List    Diagnosis Date Noted   • Esophageal varices- BANDED 4/2016- repeat egd tbd 10//29/16- gic 04/04/2016     Priority: High   • ASCVD (arteriosclerotic cardiovascular disease)subtotalled small distal LCx and 40% LAD med rx by cath 12/13/12 12/14/2012     Priority: High   • Gastric varices- s/p BRTO procedure at [facility] 04/04/2016     Priority: Medium   • Gastrointestinal hemorrhage with hematemesis- recurrernt from varices 04/03/2016     Priority: Medium   • Anemia, chronic disease 12/14/2017   • Generalized edema 12/14/2017   • Diastasis recti 12/14/2017   • Obesity (BMI 30-39.9) 12/14/2017   • Edema of left lower extremity- due to dvt july 2017 09/19/2017   • Herpes zoster infection of thoracic region- right back T8 09/19/2017   • BPH (benign prostatic hyperplasia) 08/11/2017   • 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) 08/11/2017   • Impaired mobility and ADLs 08/11/2017   • Acute deep vein thrombosis (DVT) of femoral vein of left lower extremity (CMS-HCC)- IVC placed july 2017 06/26/2017   • Guillain Barré syndrome (CMS-HCC) 05/27/2017   • H/O colon cancer, stage IV- redo colonoscopy 2017 aug at [facility] 05/20/2017   • Elevated bilirubin 01/03/2017   • Microcytic hypochromic anemia 10/24/2016   • History of  colon cancer 10/24/2016   • History of esophageal varices 10/24/2016   • Iron deficiency anemia due to chronic blood loss 10/06/2016   • Portal vein thrombosis- on CT [facility] 2/2015 04/14/2016   • Cirrhosis of liver with ascites (HCC)- ? DUE TO OLD HEP B, CHEMO RX,  OR THERMO RAD OF MAGNANT LESION 04/14/2016   • Mixed hyperlipidemia 06/20/2013   • Essential hypertension 06/20/2013   • Vitamin D deficiency 08/02/2012   • Thrombocytopenia due to drugs 04/25/2012   • Colon cancer-2010 left hemicolectomy; no colostomy 12/06/2010   • Liver lesion- ablation of malignant met from colon ca 2011- f/u [facility] q 6 mos 12/06/2010   • Peripheral neuropathy, secondary to drugs or chemicals 12/06/2010     Anticoagulant sodium citrate [sodium citrate]; Lisinopril; and Nkda [no known drug allergy]  Outpatient Medications Prior to Visit   Medication Sig Dispense Refill   • folic acid (FOLVITE) 1 MG Tab TAKE ONE TABLET BY MOUTH ONCE DAILY 90 Tab 0   • atorvastatin (LIPITOR) 40 MG Tab Take 1 Tab by mouth every bedtime. 90 Tab 4   • ursodiol (ACTIGALL) 300 MG Cap Take 1 Cap by mouth every day. 90 Cap 4   • Diclofenac Sodium 1 % Gel APPLY FOUR GRAMS ON THE LEFT HIP  TWICE DAILY AS NEEDED 100 g 0   • tamsulosin (FLOMAX) 0.4 MG capsule Take 1 Cap by mouth ONE-HALF HOUR AFTER BREAKFAST. 90 Cap 4   • MethylPREDNISolone (MEDROL DOSEPAK) 4 MG Tablet Therapy Pack As directed 1 Kit 1   • valacyclovir (VALTREX) 1 GM Tab Take 1 Tab by mouth 2 times a day. 20 Tab 0   • vitamin D (VITAMIND D3) 1000 UNIT Tab Take 1 Tab by mouth every day. 60 Tab 0   • polyethylene glycol/lytes (MIRALAX) Pack Take 1 Packet by mouth every bedtime.  3   • ferrous sulfate 325 (65 Fe) MG tablet Take 1 Tab by mouth every morning with breakfast. 30 Tab    • docusate sodium 100 MG Cap Take 100 mg by mouth 2 times a day as needed for Constipation. 60 Cap    • senna-docusate (PERICOLACE OR SENOKOT S) 8.6-50 MG Tab Take 1 Tab by mouth 1 time daily as needed for Constipation. 30 Tab 0  "  • folic acid (FOLVITE) 1 MG Tab Take 1 Tab by mouth every day. 30 Tab    • oxycodone immediate release (ROXICODONE) 10 MG immediate release tablet Take 1 Tab by mouth every four hours as needed for Moderate Pain. 28 Tab 0   • spironolactone (ALDACTONE) 25 MG Tab TAKE ONE TABLET BY MOUTH ONCE DAILY 90 Tab 0   • potassium chloride SA (KLOR-CON M20) 20 MEQ Tab CR Take 1 Tab by mouth every day. 90 Tab 0   • metolazone (ZAROXOLYN) 2.5 MG Tab Take 1 Tab by mouth every day. 90 Tab 1   • torsemide (DEMADEX) 10 MG tablet Take 2 Tabs by mouth every day. 180 Tab 1     No facility-administered medications prior to visit.                HPI    Review of Systems   Constitutional: Negative.    HENT: Negative.    Eyes: Negative.    Respiratory: Negative.    Cardiovascular: Negative.    Gastrointestinal: Negative.    Genitourinary: Negative.    Musculoskeletal: Negative.    Skin: Negative.    Neurological: Negative.    Endo/Heme/Allergies: Negative.    Psychiatric/Behavioral: Negative.           Objective:     /60   Pulse 73   Temp 37.1 °C (98.8 °F)   Ht 1.873 m (6' 1.74\")   Wt 108.4 kg (239 lb)   SpO2 97%   BMI 30.90 kg/m²      Physical Exam   Constitutional: He is oriented to person, place, and time. He appears well-developed and well-nourished. No distress.   HENT:   Head: Normocephalic and atraumatic.   Right Ear: External ear normal.   Left Ear: External ear normal.   Nose: Nose normal.   Mouth/Throat: Oropharynx is clear and moist. No oropharyngeal exudate.   Eyes: Conjunctivae and EOM are normal. Pupils are equal, round, and reactive to light. Right eye exhibits no discharge. Left eye exhibits no discharge. No scleral icterus.   Neck: Normal range of motion. Neck supple. No JVD present. No tracheal deviation present. No thyromegaly present.   Cardiovascular: Normal rate, regular rhythm, normal heart sounds and intact distal pulses.  Exam reveals no gallop and no friction rub.    No murmur " heard.  Pulmonary/Chest: Effort normal and breath sounds normal. No stridor. No respiratory distress. He has no wheezes. He has no rales. He exhibits no tenderness.   Abdominal: Soft. Bowel sounds are normal. He exhibits no distension and no mass. There is no tenderness. There is no rebound and no guarding.   Musculoskeletal: Normal range of motion. He exhibits no edema or tenderness.   Lymphadenopathy:     He has no cervical adenopathy.   Neurological: He is alert and oriented to person, place, and time. He has normal reflexes. He displays normal reflexes. He exhibits normal muscle tone. Coordination normal.   Skin: Skin is warm and dry. No rash noted. He is not diaphoretic. No erythema. No pallor.   Psychiatric: He has a normal mood and affect. His behavior is normal. Judgment and thought content normal.     Hospital Outpatient Visit on 01/12/2018   Component Date Value   • WBC 01/12/2018 3.7*   • RBC 01/12/2018 4.48*   • Hemoglobin 01/12/2018 12.9*   • Hematocrit 01/12/2018 39.3*   • MCV 01/12/2018 87.7    • MCH 01/12/2018 28.8    • MCHC 01/12/2018 32.8*   • RDW 01/12/2018 49.8    • Platelet Count 01/12/2018 63*   • MPV 01/12/2018 11.8    • Neutrophils-Polys 01/12/2018 67.20    • Lymphocytes 01/12/2018 17.50*   • Monocytes 01/12/2018 9.30    • Eosinophils 01/12/2018 4.90    • Basophils 01/12/2018 0.80    • Immature Granulocytes 01/12/2018 0.30    • Nucleated RBC 01/12/2018 0.00    • Neutrophils (Absolute) 01/12/2018 2.46    • Lymphs (Absolute) 01/12/2018 0.64*   • Monos (Absolute) 01/12/2018 0.34    • Eos (Absolute) 01/12/2018 0.18    • Baso (Absolute) 01/12/2018 0.03    • Immature Granulocytes (a* 01/12/2018 0.01    • NRBC (Absolute) 01/12/2018 0.00    • Sodium 01/12/2018 139    • Potassium 01/12/2018 3.6    • Chloride 01/12/2018 105    • Co2 01/12/2018 30    • Anion Gap 01/12/2018 4.0    • Glucose 01/12/2018 88    • Bun 01/12/2018 20    • Creatinine 01/12/2018 0.86    • Calcium 01/12/2018 9.0    • AST(SGOT)  01/12/2018 18    • ALT(SGPT) 01/12/2018 12    • Alkaline Phosphatase 01/12/2018 92    • Total Bilirubin 01/12/2018 1.3    • Albumin 01/12/2018 3.6    • Total Protein 01/12/2018 6.0    • Globulin 01/12/2018 2.4    • A-G Ratio 01/12/2018 1.5    • GFR If  01/12/2018 >60    • GFR If Non  Ameri* 01/12/2018 >60       Lab Results   Component Value Date/Time    HBA1C 5.2 10/25/2016 12:06 AM    HBA1C 5.3 07/08/2016 07:02 AM     Lab Results   Component Value Date/Time    SODIUM 139 01/12/2018 06:09 AM    POTASSIUM 3.6 01/12/2018 06:09 AM    CHLORIDE 105 01/12/2018 06:09 AM    CO2 30 01/12/2018 06:09 AM    GLUCOSE 88 01/12/2018 06:09 AM    BUN 20 01/12/2018 06:09 AM    CREATININE 0.86 01/12/2018 06:09 AM    CREATININE 1.14 01/23/2010 12:00 AM    BUNCREATRAT 15 01/23/2010 12:00 AM    GLOMRATE >59 01/23/2010 12:00 AM    ALKPHOSPHAT 92 01/12/2018 06:09 AM    ASTSGOT 18 01/12/2018 06:09 AM    ALTSGPT 12 01/12/2018 06:09 AM    TBILIRUBIN 1.3 01/12/2018 06:09 AM     Lab Results   Component Value Date/Time    INR 1.18 (H) 09/16/2017 07:26 AM    INR 1.28 (H) 09/04/2017 05:32 AM    INR 1.35 (H) 08/22/2017 06:14 AM     Lab Results   Component Value Date/Time    CHOLSTRLTOT 120 09/16/2017 07:26 AM    LDL 45 09/16/2017 07:26 AM    HDL 60 09/16/2017 07:26 AM    TRIGLYCERIDE 76 09/16/2017 07:26 AM       Lab Results   Component Value Date/Time    TESTOSTERONE 333 04/02/2015 06:47 AM     No results found for: TSH  Lab Results   Component Value Date/Time    FREET4 0.85 04/02/2015 06:47 AM    FREET4 0.85 04/12/2014 07:26 AM     No results found for: URICACID  No components found for: VITB12  Lab Results   Component Value Date/Time    25HYDROXY 32 08/11/2017 04:50 AM    25HYDROXY 38 04/02/2015 06:47 AM                 Assessment/Plan:     1. Generalized edema        Under good control. Continue same regimen.     - spironolactone (ALDACTONE) 25 MG Tab; Take 1 Tab by mouth every day.  Dispense: 90 Tab; Refill: 4  -  metolazone (ZAROXOLYN) 2.5 MG Tab; Take 1 Tab by mouth every 48 hours.  Dispense: 45 Tab; Refill: 4  - torsemide (DEMADEX) 10 MG tablet; Take 2 Tabs by mouth every 48 hours.  Dispense: 90 Tab; Refill: 4    2. Hypokalemia   Under good control. Continue same regimen.    - potassium chloride SA (KLOR-CON M20) 20 MEQ Tab CR; Take 1 Tab by mouth every day.  Dispense: 90 Tab; Refill: 4  - COMP METABOLIC PANEL; Future  - CBC WITH DIFFERENTIAL; Future    3. Cirrhosis of liver with ascites, unspecified hepatic cirrhosis type (CMS-HCC)   Under good control. Continue same regimen.    - metolazone (ZAROXOLYN) 2.5 MG Tab; Take 1 Tab by mouth every 48 hours.  Dispense: 45 Tab; Refill: 4  - torsemide (DEMADEX) 10 MG tablet; Take 2 Tabs by mouth every 48 hours.  Dispense: 90 Tab; Refill: 4      40 minute face-to-face encounter took place today.  More than half of this time was spent in the coordination of care of the above problems, as well as counseling.

## 2018-01-23 ENCOUNTER — OCCUPATIONAL THERAPY (OUTPATIENT)
Dept: OCCUPATIONAL THERAPY | Facility: REHABILITATION | Age: 73
End: 2018-01-23
Attending: PHYSICAL MEDICINE & REHABILITATION
Payer: MEDICARE

## 2018-01-23 DIAGNOSIS — M62.81 MUSCLE WEAKNESS (GENERALIZED): ICD-10-CM

## 2018-01-23 PROCEDURE — 97140 MANUAL THERAPY 1/> REGIONS: CPT

## 2018-01-23 PROCEDURE — 97110 THERAPEUTIC EXERCISES: CPT

## 2018-01-23 PROCEDURE — 97032 APPL MODALITY 1+ESTIM EA 15: CPT

## 2018-01-24 NOTE — OP THERAPY DAILY TREATMENT
"  Outpatient Occupational Therapy  DAILY TREATMENT     [facility] Occupational [address]  [address]  [place] [address]  Phone:  [phone]  Fax:  [phone]    Date: 01/23/2018    Patient: Torey Lima  YOB: 1945  MRN: [id number]     Time Calculation  Start time: 0400  Stop time: 0500 Time Calculation (min): 60 minutes     Chief Complaint: Hand Weakness and Numbness    Visit #: 15    SUBJECTIVE:   \"I woke up last night and my hands were throbbing\"      OBJECTIVE:  Current objective measures:  Bilateral hands: FDP strength: trace to 2-/5 depending on digits (RH IF/SF 2-/5, LH MF/SF 2-/5).  PROM at DIPs WNL.  No extensor tone present     Therapeutic Exercises (CPT 06033):     Total treatment time spent on Therapeutic Exercises: 15 minutes.      1. Bilateral hands    Therapeutic Exercise Summary:  Composite flexion with minimal DIP flexion.   Ther ex:  RH resistance to gross and isolated flexion while blocking at PIP joints to facilitate DIP flexion, most notable at IF/SF, repeated with LH with DIP flexion most notable at SF/MF.  Followed with attempts at achieving intrinsic minus and \"half claw\" position with slight increase in DIP flexion from initial trial after manual therapy and e-stim.  Updated HEP to include gentle joint mobilization with good understanding.      Therapeutic Treatments and Modalities:    1. E Stim Attended (CPT 45576), 15 minutes    2. Manual Therapy (CPT 49596), 30 minutes    Therapeutic Treatments and Modalities Summary: TENS: bilateral volar forearms for stim of FDP/FDS at 25 Hz x 5 minutes, NMES 22 Hz x 10 minutes. Wrists occasionally manually held in place to minimize wrist flexion combined with active digit flexion.     Bilateral hand digits 2-5 DIP joint mobilization, light traction, and passive sustained stretching into full flexion and intrinsic minus position without resistance or tone.         ASSESSMENT:   Response to treatment:  Pt " continues to make slow progress with activation of FDP bilateral hands in response to NMES, stretching, and joint blocking ther ex when combined with gentle resistance to facilitate an slight increase in overall contractions.  HEP updated to include assisted joint mobilization and traction to prevent contractures with good understanding.       PLAN/RECOMMENDATIONS:   Plan for treatment: therapy treatment to continue next visit.  Planned interventions for next visit: continue with current treatment, E-stim attended (CPT 09292), massage (CPT 01774), neuromuscular re-education (CPT 82571) and therapeutic exercise (CPT 40699)

## 2018-01-25 ENCOUNTER — OCCUPATIONAL THERAPY (OUTPATIENT)
Dept: OCCUPATIONAL THERAPY | Facility: REHABILITATION | Age: 73
End: 2018-01-25
Attending: PHYSICAL MEDICINE & REHABILITATION
Payer: MEDICARE

## 2018-01-25 DIAGNOSIS — M62.81 MUSCLE WEAKNESS (GENERALIZED): ICD-10-CM

## 2018-01-25 PROCEDURE — 97032 APPL MODALITY 1+ESTIM EA 15: CPT

## 2018-01-25 PROCEDURE — 97110 THERAPEUTIC EXERCISES: CPT

## 2018-01-26 NOTE — OP THERAPY DAILY TREATMENT
"  Outpatient Occupational Therapy  DAILY TREATMENT     [facility] Occupational [address]  [address]  [place] [address]  Phone:  [phone]  Fax:  [phone]    Date: 01/25/2018    Patient: Torey Lima  YOB: 1945  MRN: [id number]     Time Calculation  Start time: 0400  Stop time: 0500 Time Calculation (min): 60 minutes     Chief Complaint: Extremity Weakness    Visit #: 16    SUBJECTIVE: \"My hands were sore from last time but it went away\"    OBJECTIVE:  Current objective measures:   Bilateral hands: FDP strength:  (RH IF/MF/SF: 2-/5, right RF trace; LH all digits 2-/5).  PROM at [facility] WNL       Therapeutic Exercises (CPT 46062):     Total treatment time spent on Therapeutic Exercises: 45 minutes.      1. Bilateral hands    Therapeutic Exercise Summary:  Performed wrist stretches x 30 seconds for extension alone followed by extension combined with digits held in full flexion.  Performed active wrist extension from neutral position combined with \"claw\" hand position with focus on flexion of DIPs x 10 reps, 5 second hold.  Recommend pt purchase bilateral wrist braces for sustained stretch of flexors at 3 hours on, 1 hour off and at night if tolerated.  Updated HEP for stretching as above with good understanding.     Therapeutic Treatments and Modalities:    1. E Stim Attended (CPT 43294), 15 minutes    Therapeutic Treatments and Modalities Summary: TENS/NMES to bilateral dorsal forearms for wrist extension at 30/38 Hz for 5/10 minutes respectively.  Digits held in fully flexed position to maximize muscle lengthening of FDP along with attempts at active digit flexion during stimulation.       Pain rating before treatment: 0  Pain rating after treatment: 0    ASSESSMENT:   Response to treatment:  Pt making good progress today with a positive response to NMES of wrist/digit extensor musculature followed by stretching and active ther ex of digit flexors combined with wrist " extension to focus on muscle lengthening of FDP in attempts to increase the degree of tension.  Pt demonstrated an observable improvement in ability to flex DIPs post therapeutic interventions employed above.  HEP updated for stretching and positioning with good understanding.  Pt may benefit from an EMG pending further progress.    PLAN/RECOMMENDATIONS:   Plan for treatment: therapy treatment to continue next visit.  Planned interventions for next visit: continue with current treatment, E-stim attended (CPT 17543), neuromuscular re-education (CPT 25135), orthotic training (CPT 71310), therapeutic activities (CPT 92861) and therapeutic exercise (CPT 53624)

## 2018-01-29 ENCOUNTER — PATIENT OUTREACH (OUTPATIENT)
Dept: HEALTH INFORMATION MANAGEMENT | Facility: OTHER | Age: 73
End: 2018-01-29

## 2018-01-30 ENCOUNTER — APPOINTMENT (OUTPATIENT)
Dept: OCCUPATIONAL THERAPY | Facility: REHABILITATION | Age: 73
End: 2018-01-30
Attending: PHYSICAL MEDICINE & REHABILITATION
Payer: MEDICARE

## 2018-02-01 ENCOUNTER — OCCUPATIONAL THERAPY (OUTPATIENT)
Dept: OCCUPATIONAL THERAPY | Facility: REHABILITATION | Age: 73
End: 2018-02-01
Attending: PHYSICAL MEDICINE & REHABILITATION
Payer: MEDICARE

## 2018-02-01 DIAGNOSIS — M62.81 MUSCLE WEAKNESS (GENERALIZED): ICD-10-CM

## 2018-02-01 PROCEDURE — 97140 MANUAL THERAPY 1/> REGIONS: CPT

## 2018-02-01 PROCEDURE — 97110 THERAPEUTIC EXERCISES: CPT

## 2018-02-01 NOTE — OP THERAPY DAILY TREATMENT
"  Outpatient Occupational Therapy  DAILY TREATMENT     [facility] Occupational [address].  [address]  [place] [address]  Phone:  [phone]  Fax:  [phone]    Date: 02/01/2018    Patient: Torey Lima  YOB: 1945  MRN: [id number]     Time Calculation  Start time: 0300  Stop time: 0400 Time Calculation (min): 60 minutes     Chief Complaint: Hand Weakness    Visit #: 17    SUBJECTIVE:  \"I bought the braces, my hands feel about the same\"    OBJECTIVE:  Current objective measures:    Bilateral hands: FDP strength:  (RH IF/MF/SF: 2-/5, right RF trace; LH all digits 2-/5).  PROM at DIPs WNL    Therapeutic Exercises (CPT 59123):     Total treatment time spent on Therapeutic Exercises: 30 minutes.      1. Bilateral hands    Therapeutic Exercise Summary:  Ther ex:  RH isolated DIP joint blocking AROM ther ex applying mild to moderate resistance to intermediate phalanges to facilitate DIP flexion, most notable at MF/SF > IF/RF, repeated with LH with DIP flexion most notable at SF/IF > MF/RF.   Active movement combined with high speed vibration to volar/ulnar forearm.  Performed  \"half claw\" (intrinsic minus) position with an increase in DIP flexion left > right.  Updated HEP with good understanding.      Therapeutic Treatments and Modalities:    1. Manual Therapy (CPT 81156), 30 minutes    Therapeutic Treatments and Modalities Summary: Bilateral hand digits 2-5 DIP joint mobilization, light traction, and passive sustained stretching into full isolated and gross flexion.  Able to manually place hands in full composite fist and intrinsic minus position without resistance or tone.   Bilateral wrist braces modified to increase the degree of wrist extension to approximately 40 degrees for increased stretch.  Recommend 1 hour on, 2 hours off with close skin inspection      Pain rating before treatment: 0  Pain rating after treatment: 0    ASSESSMENT:   Response to treatment: Pt " making slow but steady progress today with a positive response to manual therapy followed by active ther ex of digit flexors while held in wrist extension combined with high speed vibration to flexor musculature.  Continuing to focus on activation of FDP and muscle lengthening in attempts to increase the degree of tension.  Pt demonstrated an observable improvement in ability to flex DIPs post therapeutic interventions employed above.  HEP updated for stretching and positioning with good understanding.  Pt may benefit from an EMG pending further progress.  Pt to discuss with Dr. Rosales on Monday.       PLAN/RECOMMENDATIONS:   Plan for treatment: therapy treatment to continue next visit.  Planned interventions for next visit: continue with current treatment, E-stim attended (CPT 86141), manual therapy (CPT 01827), neuromuscular re-education (CPT 28250), therapeutic activities (CPT 68396) and therapeutic exercise (CPT 42844)

## 2018-02-14 ENCOUNTER — OCCUPATIONAL THERAPY (OUTPATIENT)
Dept: OCCUPATIONAL THERAPY | Facility: REHABILITATION | Age: 73
End: 2018-02-14
Attending: PHYSICAL MEDICINE & REHABILITATION
Payer: MEDICARE

## 2018-02-14 DIAGNOSIS — M62.81 MUSCLE WEAKNESS (GENERALIZED): ICD-10-CM

## 2018-02-14 PROCEDURE — 97110 THERAPEUTIC EXERCISES: CPT

## 2018-02-14 PROCEDURE — 97032 APPL MODALITY 1+ESTIM EA 15: CPT

## 2018-02-14 PROCEDURE — 97140 MANUAL THERAPY 1/> REGIONS: CPT

## 2018-02-14 NOTE — OP THERAPY DAILY TREATMENT
"  Outpatient Occupational Therapy  DAILY TREATMENT     [facility] [address].  [address]  [place] [address]  Phone:  [phone]  Fax:  [phone]    Date: 02/14/2018    Patient: Torey Lima  YOB: 1945  MRN: [id number]     Time Calculation  Start time: 0100  Stop time: 0200 Time Calculation (min): 60 minutes     Chief Complaint: Hand Weakness    Visit #: 18    SUBJECTIVE:  \"I went to see Dr. Rosales and he said it's going to take more time for my hands to recover, and that an EMG wasn't warranted\".  \"I'm wearing the wrist braces once in a while, I don't have pain in my hands anymore\"    OBJECTIVE:  Current objective measures:   Bilateral hands: FDP strength:  (RH IF/MF/SF: 2-/5, right RF trace; LH all digits 2-/5). PROM at DIPs WNL       Therapeutic Exercises (CPT 02110):     Total treatment time spent on Therapeutic Exercises: 15 minutes.      1. Bilateral hands    Therapeutic Exercise Summary:  Bilateral hands AROM towards intrinsic minus position \"half claw\" combined with wrist extension at 35 degrees 10 reps 10 second hold. Repeated exercise after NMES with an observable increase in active DIP flexion.  Continue with HEP.  Given information on large key keyboards to facilitate ability to type in lieu of current restrictions.    Therapeutic Treatments and Modalities:    1. Manual Therapy (CPT 74827), 30 minutes    2. E Stim Attended (CPT 69131), 15 minutes    Therapeutic Treatments and Modalities Summary: Bilateral hand digits 2-5 DIP joint mobilization, light traction, and passive sustained stretching into full isolated and gross flexion.  Able to manually place hands in full composite fist and intrinsic minus position without resistance or tone.   NMES bilateral volar forearms over muscle bellies of FDS/FDP 19-20 Hz x 15 minutes combined with active movements.      Pain rating before treatment: 0  Pain rating after treatment: 0    ASSESSMENT: "   Response to treatment: Pt continues to make slow gains with strength of contractions for bilateral hand DIP flexion.  Pt demonstrated an observable increase in movement following manual therapy and NMES without any pain.  Pt given information on purchasing large key keyboard to facilitate his ability to type.  Pt will benefit from further OT at a decreased frequency of 1x month for assessment of any further gains and recommended treatment interventions.  Pt in agreement with plan.    PLAN/RECOMMENDATIONS:   Plan for treatment: therapy treatment to continue next visit.  Planned interventions for next visit: continue with current treatment, E-stim attended (CPT 93016), manual therapy (CPT 08825), neuromuscular re-education (CPT 82610), therapeutic activities (CPT 95786) and therapeutic exercise (CPT 50820)

## 2018-02-28 ENCOUNTER — APPOINTMENT (OUTPATIENT)
Dept: OCCUPATIONAL THERAPY | Facility: REHABILITATION | Age: 73
End: 2018-02-28
Attending: PHYSICAL MEDICINE & REHABILITATION
Payer: MEDICARE

## 2018-03-03 ENCOUNTER — PATIENT OUTREACH (OUTPATIENT)
Dept: HEALTH INFORMATION MANAGEMENT | Facility: OTHER | Age: 73
End: 2018-03-03

## 2018-03-05 ENCOUNTER — HOSPITAL ENCOUNTER (OUTPATIENT)
Dept: LAB | Facility: MEDICAL CENTER | Age: 73
End: 2018-03-05
Attending: INTERNAL MEDICINE
Payer: MEDICARE

## 2018-03-05 DIAGNOSIS — E87.6 HYPOKALEMIA: ICD-10-CM

## 2018-03-05 LAB
ALBUMIN SERPL BCP-MCNC: 3.7 G/DL (ref 3.2–4.9)
ALBUMIN/GLOB SERPL: 1.6 G/DL
ALP SERPL-CCNC: 86 U/L (ref 30–99)
ALT SERPL-CCNC: 15 U/L (ref 2–50)
ANION GAP SERPL CALC-SCNC: 8 MMOL/L (ref 0–11.9)
AST SERPL-CCNC: 19 U/L (ref 12–45)
BASOPHILS # BLD AUTO: 0.6 % (ref 0–1.8)
BASOPHILS # BLD: 0.03 K/UL (ref 0–0.12)
BILIRUB SERPL-MCNC: 1.4 MG/DL (ref 0.1–1.5)
BUN SERPL-MCNC: 18 MG/DL (ref 8–22)
CALCIUM SERPL-MCNC: 8.9 MG/DL (ref 8.5–10.5)
CHLORIDE SERPL-SCNC: 104 MMOL/L (ref 96–112)
CO2 SERPL-SCNC: 29 MMOL/L (ref 20–33)
CREAT SERPL-MCNC: 0.88 MG/DL (ref 0.5–1.4)
EOSINOPHIL # BLD AUTO: 0.19 K/UL (ref 0–0.51)
EOSINOPHIL NFR BLD: 4 % (ref 0–6.9)
ERYTHROCYTE [DISTWIDTH] IN BLOOD BY AUTOMATED COUNT: 49.4 FL (ref 35.9–50)
GLOBULIN SER CALC-MCNC: 2.3 G/DL (ref 1.9–3.5)
GLUCOSE SERPL-MCNC: 90 MG/DL (ref 65–99)
HCT VFR BLD AUTO: 40.1 % (ref 42–52)
HGB BLD-MCNC: 13.1 G/DL (ref 14–18)
IMM GRANULOCYTES # BLD AUTO: 0.01 K/UL (ref 0–0.11)
IMM GRANULOCYTES NFR BLD AUTO: 0.2 % (ref 0–0.9)
LYMPHOCYTES # BLD AUTO: 0.76 K/UL (ref 1–4.8)
LYMPHOCYTES NFR BLD: 16 % (ref 22–41)
MCH RBC QN AUTO: 28.9 PG (ref 27–33)
MCHC RBC AUTO-ENTMCNC: 32.7 G/DL (ref 33.7–35.3)
MCV RBC AUTO: 88.5 FL (ref 81.4–97.8)
MONOCYTES # BLD AUTO: 0.48 K/UL (ref 0–0.85)
MONOCYTES NFR BLD AUTO: 10.1 % (ref 0–13.4)
NEUTROPHILS # BLD AUTO: 3.27 K/UL (ref 1.82–7.42)
NEUTROPHILS NFR BLD: 69.1 % (ref 44–72)
NRBC # BLD AUTO: 0 K/UL
NRBC BLD-RTO: 0 /100 WBC
PLATELET # BLD AUTO: 67 K/UL (ref 164–446)
PMV BLD AUTO: 11.3 FL (ref 9–12.9)
POTASSIUM SERPL-SCNC: 4 MMOL/L (ref 3.6–5.5)
PROT SERPL-MCNC: 6 G/DL (ref 6–8.2)
RBC # BLD AUTO: 4.53 M/UL (ref 4.7–6.1)
SODIUM SERPL-SCNC: 141 MMOL/L (ref 135–145)
WBC # BLD AUTO: 4.7 K/UL (ref 4.8–10.8)

## 2018-03-05 PROCEDURE — 80053 COMPREHEN METABOLIC PANEL: CPT

## 2018-03-05 PROCEDURE — 36415 COLL VENOUS BLD VENIPUNCTURE: CPT

## 2018-03-05 PROCEDURE — 85025 COMPLETE CBC W/AUTO DIFF WBC: CPT

## 2018-03-06 ENCOUNTER — TELEPHONE (OUTPATIENT)
Dept: MEDICAL GROUP | Age: 73
End: 2018-03-06

## 2018-03-06 NOTE — TELEPHONE ENCOUNTER
Future Appointments       Provider Department Center    3/7/2018 6:20 PM Fabián Urena M.D. [facility]    3/12/2018 11:30 AM [address] [facility] - MRI - [address]    3/26/2018 8:00 AM Janes Rachel, MS,OTR/L [facility] [address] [facility] [address]        ESTABLISHED PATIENT PRE-VISIT PLANNING     Note: Patient will not be contacted if there is no indication to call.     1.  Reviewed notes from the last few office visits within the medical group: Yes    2.  If any orders were placed at last visit or intended to be done for this visit (i.e. 6 mos follow-up), do we have Results/Consult Notes?        •  Labs - Labs ordered, completed on 3/5/18 and results are in chart.   Note: If patient appointment is for lab review and patient did not complete labs, check with provider if OK to reschedule patient until labs completed.       •  Imaging - Imaging ordered, completed and results are in chart.       •  Referrals - No referrals were ordered at last office visit.    3. Is this appointment scheduled as a Hospital Follow-Up? No    4.  Immunizations were updated in Epic using WebIZ?: Epic matches WebIZ       •  Web Iz Recommendations: FLU    5.  Patient is due for the following Health Maintenance Topics:   Health Maintenance Due   Topic Date Due   • Annual Wellness Visit  06/22/2017   • IMM INFLUENZA (1) 09/01/2017       - Patient is up-to-date on all Health Maintenance topics. No records have been requested at this time.    6.  Patient was NOT informed to arrive 15 min prior to their scheduled appointment and bring in their medication bottles.

## 2018-03-07 ENCOUNTER — APPOINTMENT (OUTPATIENT)
Dept: OCCUPATIONAL THERAPY | Facility: REHABILITATION | Age: 73
End: 2018-03-07
Attending: PHYSICAL MEDICINE & REHABILITATION
Payer: MEDICARE

## 2018-03-07 ENCOUNTER — OFFICE VISIT (OUTPATIENT)
Dept: MEDICAL GROUP | Age: 73
End: 2018-03-07
Payer: MEDICARE

## 2018-03-07 VITALS
OXYGEN SATURATION: 96 % | DIASTOLIC BLOOD PRESSURE: 64 MMHG | SYSTOLIC BLOOD PRESSURE: 102 MMHG | TEMPERATURE: 99 F | HEIGHT: 74 IN | BODY MASS INDEX: 31.06 KG/M2 | HEART RATE: 73 BPM | WEIGHT: 242 LBS

## 2018-03-07 DIAGNOSIS — I81 PORTAL VEIN THROMBOSIS: ICD-10-CM

## 2018-03-07 DIAGNOSIS — I85.00 IDIOPATHIC ESOPHAGEAL VARICES WITHOUT BLEEDING (HCC): ICD-10-CM

## 2018-03-07 DIAGNOSIS — K74.60 CIRRHOSIS OF LIVER WITH ASCITES, UNSPECIFIED HEPATIC CIRRHOSIS TYPE (HCC): ICD-10-CM

## 2018-03-07 DIAGNOSIS — C18.9 MALIGNANT NEOPLASM OF COLON, UNSPECIFIED PART OF COLON (HCC): ICD-10-CM

## 2018-03-07 DIAGNOSIS — I10 ESSENTIAL HYPERTENSION: ICD-10-CM

## 2018-03-07 DIAGNOSIS — G62.2 POLYNEUROPATHY DUE TO OTHER TOXIC AGENTS (HCC): ICD-10-CM

## 2018-03-07 DIAGNOSIS — E78.2 MIXED HYPERLIPIDEMIA: ICD-10-CM

## 2018-03-07 DIAGNOSIS — R18.8 CIRRHOSIS OF LIVER WITH ASCITES, UNSPECIFIED HEPATIC CIRRHOSIS TYPE (HCC): ICD-10-CM

## 2018-03-07 DIAGNOSIS — Z85.038 HISTORY OF COLON CANCER: ICD-10-CM

## 2018-03-07 PROCEDURE — 99214 OFFICE O/P EST MOD 30 MIN: CPT | Performed by: INTERNAL MEDICINE

## 2018-03-07 ASSESSMENT — ENCOUNTER SYMPTOMS
RESPIRATORY NEGATIVE: 1
NEUROLOGICAL NEGATIVE: 1
MUSCULOSKELETAL NEGATIVE: 1
GASTROINTESTINAL NEGATIVE: 1
EYES NEGATIVE: 1
CONSTITUTIONAL NEGATIVE: 1
PSYCHIATRIC NEGATIVE: 1
CARDIOVASCULAR NEGATIVE: 1

## 2018-03-08 NOTE — PROGRESS NOTES
Subjective:      Torey Lima is a 72 y.o. male who presents with Edema and Follow-Up  and  The patient is here for followup of chronic medical problems listed below. The patient is compliant with medications and having no side effects from them. Denies chest pain, abdominal pain, dyspnea, myalgias, or cough.   Patient Active Problem List    Diagnosis Date Noted   • Esophageal varices- BANDED 4/2016- repeat egd tbd 10//29/16- gic 04/04/2016     Priority: High   • ASCVD (arteriosclerotic cardiovascular disease)subtotalled small distal LCx and 40% LAD med rx by cath 12/13/12 12/14/2012     Priority: High   • Gastric varices- s/p BRTO procedure at [facility] 04/04/2016     Priority: Medium   • Gastrointestinal hemorrhage with hematemesis- recurrernt from varices 04/03/2016     Priority: Medium   • Anemia, chronic disease 12/14/2017   • Generalized edema 12/14/2017   • Diastasis recti 12/14/2017   • Obesity (BMI 30-39.9) 12/14/2017   • Edema of left lower extremity- due to dvt july 2017 09/19/2017   • Herpes zoster infection of thoracic region- right back T8 09/19/2017   • BPH (benign prostatic hyperplasia) 08/11/2017   • 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) 08/11/2017   • Impaired mobility and ADLs 08/11/2017   • Acute deep vein thrombosis (DVT) of femoral vein of left lower extremity (CMS-HCC)- IVC placed july 2017 06/26/2017   • 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 DUE TO OLD HEP B, CHEMO RX,  OR THERMO RAD OF MAGNANT LESION 04/14/2016   • Mixed hyperlipidemia 06/20/2013   • Essential hypertension  06/20/2013   • Vitamin D deficiency 08/02/2012   • Thrombocytopenia due to drugs 04/25/2012   • Colon cancer-2010 left hemicolectomy; no colostomy 12/06/2010   • Liver lesion- ablation of malignant met from colon ca 2011- f/u [facility] q 6 mos 12/06/2010   • Peripheral neuropathy, secondary to drugs or chemicals 12/06/2010     I   Allergies   Allergen Reactions   • Influenza Virus Vaccine Live      Guillan [place]    • Anticoagulant Sodium Citrate [Sodium Citrate]      HIGH BLEEDING RISK   • Lisinopril Cough   • Nkda [No Known Drug Allergy]        Outpatient Medications Prior to Visit   Medication Sig Dispense Refill   • spironolactone (ALDACTONE) 25 MG Tab Take 1 Tab by mouth every day. 90 Tab 4   • potassium chloride SA (KLOR-CON M20) 20 MEQ Tab CR Take 1 Tab by mouth every day. 90 Tab 4   • metolazone (ZAROXOLYN) 2.5 MG Tab Take 1 Tab by mouth every 48 hours. 45 Tab 4   • torsemide (DEMADEX) 10 MG tablet Take 2 Tabs by mouth every 48 hours. 90 Tab 4   • folic acid (FOLVITE) 1 MG Tab TAKE ONE TABLET BY MOUTH ONCE DAILY 90 Tab 0   • atorvastatin (LIPITOR) 40 MG Tab Take 1 Tab by mouth every bedtime. 90 Tab 4   • ursodiol (ACTIGALL) 300 MG Cap Take 1 Cap by mouth every day. 90 Cap 4   • vitamin D (VITAMIND D3) 1000 UNIT Tab Take 1 Tab by mouth every day. 60 Tab 0   • ferrous sulfate 325 (65 Fe) MG tablet Take 1 Tab by mouth every morning with breakfast. 30 Tab    • Diclofenac Sodium 1 % Gel APPLY FOUR GRAMS ON THE LEFT HIP  TWICE DAILY AS NEEDED (Patient not taking: Reported on 3/7/2018) 100 g 0   • tamsulosin (FLOMAX) 0.4 MG capsule Take 1 Cap by mouth ONE-HALF HOUR AFTER BREAKFAST. (Patient not taking: Reported on 3/7/2018) 90 Cap 4   • MethylPREDNISolone (MEDROL DOSEPAK) 4 MG Tablet Therapy Pack As directed (Patient not taking: Reported on 3/7/2018) 1 Kit 1   • valacyclovir (VALTREX) 1 GM Tab Take 1 Tab by mouth 2 times a day. (Patient not taking: Reported on 3/7/2018) 20 Tab 0   • polyethylene glycol/lytes (MIRALAX)  "Pack Take 1 Packet by mouth every bedtime.  3   • docusate sodium 100 MG Cap Take 100 mg by mouth 2 times a day as needed for Constipation. 60 Cap    • senna-docusate (PERICOLACE OR SENOKOT S) 8.6-50 MG Tab Take 1 Tab by mouth 1 time daily as needed for Constipation. 30 Tab 0   • folic acid (FOLVITE) 1 MG Tab Take 1 Tab by mouth every day. 30 Tab    • oxycodone immediate release (ROXICODONE) 10 MG immediate release tablet Take 1 Tab by mouth every four hours as needed for Moderate Pain. (Patient not taking: Reported on 3/7/2018) 28 Tab 0     No facility-administered medications prior to visit.                HPI    Review of Systems   Constitutional: Negative.    HENT: Negative.    Eyes: Negative.    Respiratory: Negative.    Cardiovascular: Negative.    Gastrointestinal: Negative.    Genitourinary: Negative.    Musculoskeletal: Negative.    Skin: Negative.    Neurological: Negative.    Endo/Heme/Allergies: Negative.    Psychiatric/Behavioral: Negative.           Objective:     /64   Pulse 73   Temp 37.2 °C (99 °F)   Ht 1.873 m (6' 1.74\")   Wt 109.8 kg (242 lb)   SpO2 96%   BMI 31.29 kg/m²      Physical Exam   Constitutional: He is oriented to person, place, and time. He appears well-developed and well-nourished. No distress.   HENT:   Head: Normocephalic and atraumatic.   Right Ear: External ear normal.   Left Ear: External ear normal.   Mouth/Throat: Oropharynx is clear and moist. No oropharyngeal exudate.   Eyes: Conjunctivae and EOM are normal. Pupils are equal, round, and reactive to light. Right eye exhibits no discharge.   Neck: Normal range of motion. Neck supple. No JVD present. No tracheal deviation present. No thyromegaly present.   Cardiovascular: Normal rate, regular rhythm, normal heart sounds and intact distal pulses.  Exam reveals no gallop.    Pulmonary/Chest: Effort normal and breath sounds normal. No respiratory distress. He has no wheezes. He has no rales. He exhibits no tenderness. "   Abdominal: Soft. Bowel sounds are normal. He exhibits no distension and no mass. There is no tenderness. There is no rebound and no guarding. No hernia.   Genitourinary: Rectal exam shows guaiac negative stool. No penile tenderness.   Musculoskeletal: He exhibits no edema or tenderness.   Lymphadenopathy:     He has no cervical adenopathy.   Neurological: He is alert and oriented to person, place, and time. He has normal reflexes. He displays normal reflexes. No cranial nerve deficit. He exhibits normal muscle tone. Coordination normal.   Skin: Skin is warm and dry. No rash noted. He is not diaphoretic. No erythema. No pallor.   Psychiatric: He has a normal mood and affect. His behavior is normal. Judgment and thought content normal.   Nursing note and vitals reviewed.         Hospital Outpatient Visit on 03/05/2018   Component Date Value   • Sodium 03/05/2018 141    • Potassium 03/05/2018 4.0    • Chloride 03/05/2018 104    • Co2 03/05/2018 29    • Anion Gap 03/05/2018 8.0    • Glucose 03/05/2018 90    • Bun 03/05/2018 18    • Creatinine 03/05/2018 0.88    • Calcium 03/05/2018 8.9    • AST(SGOT) 03/05/2018 19    • ALT(SGPT) 03/05/2018 15    • Alkaline Phosphatase 03/05/2018 86    • Total Bilirubin 03/05/2018 1.4    • Albumin 03/05/2018 3.7    • Total Protein 03/05/2018 6.0    • Globulin 03/05/2018 2.3    • A-G Ratio 03/05/2018 1.6    • WBC 03/05/2018 4.7*   • RBC 03/05/2018 4.53*   • Hemoglobin 03/05/2018 13.1*   • Hematocrit 03/05/2018 40.1*   • MCV 03/05/2018 88.5    • MCH 03/05/2018 28.9    • MCHC 03/05/2018 32.7*   • RDW 03/05/2018 49.4    • Platelet Count 03/05/2018 67*   • MPV 03/05/2018 11.3    • Neutrophils-Polys 03/05/2018 69.10    • Lymphocytes 03/05/2018 16.00*   • Monocytes 03/05/2018 10.10    • Eosinophils 03/05/2018 4.00    • Basophils 03/05/2018 0.60    • Immature Granulocytes 03/05/2018 0.20    • Nucleated RBC 03/05/2018 0.00    • Neutrophils (Absolute) 03/05/2018 3.27    • Lymphs (Absolute)  03/05/2018 0.76*   • Monos (Absolute) 03/05/2018 0.48    • Eos (Absolute) 03/05/2018 0.19    • Baso (Absolute) 03/05/2018 0.03    • Immature Granulocytes (a* 03/05/2018 0.01    • NRBC (Absolute) 03/05/2018 0.00    • GFR If  03/05/2018 >60    • GFR If Non  Ameri* 03/05/2018 >60       Lab Results   Component Value Date/Time    HBA1C 5.2 10/25/2016 12:06 AM    HBA1C 5.3 07/08/2016 07:02 AM     Lab Results   Component Value Date/Time    SODIUM 141 03/05/2018 06:06 AM    POTASSIUM 4.0 03/05/2018 06:06 AM    CHLORIDE 104 03/05/2018 06:06 AM    CO2 29 03/05/2018 06:06 AM    GLUCOSE 90 03/05/2018 06:06 AM    BUN 18 03/05/2018 06:06 AM    CREATININE 0.88 03/05/2018 06:06 AM    CREATININE 1.14 01/23/2010 12:00 AM    BUNCREATRAT 15 01/23/2010 12:00 AM    GLOMRATE >59 01/23/2010 12:00 AM    ALKPHOSPHAT 86 03/05/2018 06:06 AM    ASTSGOT 19 03/05/2018 06:06 AM    ALTSGPT 15 03/05/2018 06:06 AM    TBILIRUBIN 1.4 03/05/2018 06:06 AM     Lab Results   Component Value Date/Time    INR 1.18 (H) 09/16/2017 07:26 AM    INR 1.28 (H) 09/04/2017 05:32 AM    INR 1.35 (H) 08/22/2017 06:14 AM     Lab Results   Component Value Date/Time    CHOLSTRLTOT 120 09/16/2017 07:26 AM    LDL 45 09/16/2017 07:26 AM    HDL 60 09/16/2017 07:26 AM    TRIGLYCERIDE 76 09/16/2017 07:26 AM       Lab Results   Component Value Date/Time    TESTOSTERONE 333 04/02/2015 06:47 AM     No results found for: TSH  Lab Results   Component Value Date/Time    FREET4 0.85 04/02/2015 06:47 AM    FREET4 0.85 04/12/2014 07:26 AM     No results found for: URICACID  No components found for: VITB12  Lab Results   Component Value Date/Time    25HYDROXY 32 08/11/2017 04:50 AM    25HYDROXY 38 04/02/2015 06:47 AM        Assessment/Plan:     1. Mixed hyperlipidemia    Under good control. Continue same regimen.    - COMP METABOLIC PANEL; Future  - LIPID PROFILE; Future  - CBC WITH DIFFERENTIAL; Future  - TSH; Future    2. Essential hypertension   Under good  control. Continue same regimen.      3. Portal vein thrombosis- on CT [facility] 2/2015    Under good control. Continue same regimen.    4. Idiopathic esophageal varices without bleeding (CMS-HCC)    Under good control. Continue same regimen.  5. Cirrhosis of liver with ascites, unspecified hepatic cirrhosis type (CMS-HCC)    Under good control. Continue same regimen.    6. History of colon cancer      - CEA; Future    7. Malignant neoplasm of colon, unspecified part of colon (CMS-HCC)     Under good control. Continue same regimen.  8. 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 Continue same regimen.      40 minute face-to-face encounter took place today.  More than half of this time was spent in the coordination of care of the above problems, as well as counseling.

## 2018-03-09 ENCOUNTER — HOSPITAL ENCOUNTER (OUTPATIENT)
Dept: CARDIOLOGY | Facility: MEDICAL CENTER | Age: 73
End: 2018-03-09
Attending: INTERNAL MEDICINE
Payer: MEDICARE

## 2018-03-09 LAB — EKG IMPRESSION: NORMAL

## 2018-03-09 PROCEDURE — 93010 ELECTROCARDIOGRAM REPORT: CPT | Performed by: INTERNAL MEDICINE

## 2018-03-09 PROCEDURE — 93005 ELECTROCARDIOGRAM TRACING: CPT | Performed by: INTERNAL MEDICINE

## 2018-03-12 ENCOUNTER — HOSPITAL ENCOUNTER (OUTPATIENT)
Dept: RADIOLOGY | Facility: MEDICAL CENTER | Age: 73
End: 2018-03-12
Attending: INTERNAL MEDICINE
Payer: MEDICARE

## 2018-03-12 VITALS
HEART RATE: 64 BPM | SYSTOLIC BLOOD PRESSURE: 109 MMHG | TEMPERATURE: 97.9 F | BODY MASS INDEX: 28.85 KG/M2 | OXYGEN SATURATION: 98 % | HEIGHT: 75 IN | DIASTOLIC BLOOD PRESSURE: 59 MMHG | WEIGHT: 232 LBS | RESPIRATION RATE: 16 BRPM

## 2018-03-12 DIAGNOSIS — C18.9 MALIGNANT NEOPLASM OF COLON, UNSPECIFIED PART OF COLON (HCC): ICD-10-CM

## 2018-03-12 DIAGNOSIS — C78.7 SECONDARY MALIGNANT NEOPLASM OF LIVER (HCC): ICD-10-CM

## 2018-03-12 PROCEDURE — 700101 HCHG RX REV CODE 250

## 2018-03-12 PROCEDURE — 700117 HCHG RX CONTRAST REV CODE 255: Performed by: INTERNAL MEDICINE

## 2018-03-12 PROCEDURE — 01922 ANES N-INVAS IMG/RADJ THER: CPT

## 2018-03-12 PROCEDURE — 700111 HCHG RX REV CODE 636 W/ 250 OVERRIDE (IP)

## 2018-03-12 PROCEDURE — A9579 GAD-BASE MR CONTRAST NOS,1ML: HCPCS | Performed by: INTERNAL MEDICINE

## 2018-03-12 RX ORDER — LIDOCAINE HYDROCHLORIDE 40 MG/ML
SOLUTION TOPICAL
Status: DISPENSED
Start: 2018-03-12 | End: 2018-03-12

## 2018-03-12 RX ADMIN — GADODIAMIDE 20 ML: 287 INJECTION INTRAVENOUS at 12:36

## 2018-03-12 ASSESSMENT — PAIN SCALES - GENERAL
PAINLEVEL_OUTOF10: 0

## 2018-03-12 NOTE — DISCHARGE INSTRUCTIONS
MRI ADULT DISCHARGE INSTRUCTIONS    You have been medicated today for your scan. Please follow the instructions below to ensure your safe recovery. If you have any questions or problems, feel free to call us at [phone] or [phone].     1.   Have someone stay with you to assist you as needed.    2.   Do not drive or operate any mechanical devices.    3.   Do not perform any activity that requires concentration. Make no major decisions over the next 24 hours.     4.   Be careful changing positions from laying down to sitting or standing, as you may become dizzy.     5.   Do not drink alcohol for 48 hours.    6.   There are no restrictions for eating your normal meals. Drink fluids.    7.   You may continue your usual medications for pain, tranquilizers, muscle relaxants or sedatives when awake.     8.   Tomorrow, you may continue your normal daily activities.     9.   Pressure dressing on 10 - 15 minutes. If swelling or bleeding occurs when removed, continue placing direct pressure on injection site for another 5 minutes, or until bleeding stops.     I have been informed of and understand the above discharge instructions.

## 2018-03-26 ENCOUNTER — OCCUPATIONAL THERAPY (OUTPATIENT)
Dept: OCCUPATIONAL THERAPY | Facility: REHABILITATION | Age: 73
End: 2018-03-26
Attending: PHYSICAL MEDICINE & REHABILITATION
Payer: MEDICARE

## 2018-03-26 DIAGNOSIS — M62.81 MUSCLE WEAKNESS (GENERALIZED): ICD-10-CM

## 2018-03-26 PROCEDURE — 97110 THERAPEUTIC EXERCISES: CPT

## 2018-03-26 PROCEDURE — 97140 MANUAL THERAPY 1/> REGIONS: CPT

## 2018-03-26 NOTE — OP THERAPY DAILY TREATMENT
"  Outpatient Occupational Therapy  DAILY TREATMENT     [facility] [address]  [address]  [place] [address]  Phone:  [phone]  Fax:  [phone]    Date: 03/26/2018    Patient: Torey Lima  YOB: 1945  MRN: [id number]     Time Calculation  Start time: 0800  Stop time: 0900 Time Calculation (min): 60 minutes     Chief Complaint: Hand Weakness    Visit #: 19    SUBJECTIVE: \"The neuropathy is better, the aching is less intense, the movement is occasionally better\";  \"I got the large keyboard but it didn't work\"    OBJECTIVE:  Current objective measures:    strength:  Left: 57 lbs,  Right: 46 lbs  Bilateral hands: FDP strength:  (RH IF/MF/SF: 2-/5, right RF trace; LH all digits 2-/5). PROM at DIPs WNL  Sensation:  [place]-Dannielle:  Bilateral palmar hands with absent sensation, unable to discriminate between sharp/dull, absent proprioception.        Therapeutic Exercises (CPT 09517):     1. Bilateral hands    Therapeutic Exercise Summary:  Bilateral hands isolated active digit flexion of DIPs with joint blocking and variable levels of constraining other digits to facilitate an increase in activity.  AROM towards intrinsic minus position \"half claw\" combined with wrist extension at 35 degrees multiple reps of all exercises.     Therapeutic Treatments and Modalities:    1. Manual Therapy (CPT 51189)    Therapeutic Treatments and Modalities Summary: Bilateral hand digits 2-5 DIP joint mobilization, light traction, and passive sustained stretching into full isolated and gross flexion.  Pain with PROM at bilateral RF's but tolerable.    Time-based treatments/modalities:  Therapeutic exercise minutes (CPT 10380): 45 minutes  Manual therapy joint mobilization minutes (CPT 17139): 15 minutes        Pain rating before treatment: 0  Pain rating after treatment: 0    ASSESSMENT:   Response to treatment:  Pt making slow gains with bilateral hand volitional muscle " activity of FDP.  Pt has been able to maintain PROM through daily stretching and continues joint blocking exercises with slight increase noted today from previous session, most notable in bilateral SF, L > R.  Absent sensation bilateral palmar hands persists.  Pt instructed to continue with contrast baths, passive stretching, an active ther ex with good understanding.  Discussed importance of use of vision when performing motor tasks to prevent injury due to sensory loss.    PLAN/RECOMMENDATIONS:   Plan for treatment: therapy treatment to continue next visit.  Planned interventions for next visit: continue with current treatment, E-stim attended (CPT 59435), manual therapy (CPT 75954), neuromuscular re-education (CPT 35278) and therapeutic exercise (CPT 34966)

## 2018-04-23 ENCOUNTER — OCCUPATIONAL THERAPY (OUTPATIENT)
Dept: OCCUPATIONAL THERAPY | Facility: REHABILITATION | Age: 73
End: 2018-04-23
Attending: PHYSICAL MEDICINE & REHABILITATION
Payer: MEDICARE

## 2018-04-23 DIAGNOSIS — M62.81 MUSCLE WEAKNESS (GENERALIZED): ICD-10-CM

## 2018-04-23 PROCEDURE — 97110 THERAPEUTIC EXERCISES: CPT

## 2018-04-23 PROCEDURE — 97530 THERAPEUTIC ACTIVITIES: CPT

## 2018-04-23 NOTE — OP THERAPY DAILY TREATMENT
"  Outpatient Occupational Therapy  DAILY TREATMENT     [facility] Occupational [address].  [address]  [place] [address]  Phone:  [phone]  Fax:  [phone]    Date: 04/23/2018    Patient: Torey Lima  YOB: 1945  MRN: [id number]     Time Calculation  Start time: 0800  Stop time: 0900 Time Calculation (min): 60 minutes     Chief Complaint: Hand Problem and Hand Weakness    Visit #: 20    SUBJECTIVE: \"I saw Dr. Rosales a couple weeks ago and he said it was going to take more time for my hands to improve\"    OBJECTIVE:  FDP strength: RH:SF/IF 2-/5; MF/RF trace  LH FDP: all digits 2-/5  BUE AROM/strength WNL except for above  PROM: DIP joints WFL   Bilateral hands: strength:  LH: 48 lbs  RH: 46 lbs  9-Hole Peg Test:  RH:  40 seconds  LH: 39 seconds  [place]-Dannielle:  Bilateral digits cirumferentially: absent sensation  Bilateral palmar hands/dorsal hands: loss of protective sensation  Sharp/dull: unable to discriminate  Proprioception: absent  Vibration: diminished bilateral palmar hands  Intrinsic plus: WNL  Intrinsic minus: 1/2 \"claw\" position  Current HEP: Joint blocking, contrast baths, stretching of all digits  ADLs: unable to tie shoelaces and manipulate buttons, otherwise independent.        Therapeutic Exercises (CPT 66407):     1. Bilateral hands    Therapeutic Exercise Summary:  Passive sustained stretch to all DIP joints and composite flexion.  AROM demonstrated as able.  Reviewed HEP with good understanding.    Therapeutic Treatments and Modalities:    1. Therapeutic Activities (CPT 25846)    Therapeutic Treatments and Modalities Summary: Re-tested all areas noted above.     Time-based treatments/modalities:  Therapeutic exercise minutes (CPT 69180): 30 minutes  Therapeutic activity minutes (CPT 36321): 30 minutes      ASSESSMENT:   Response to treatment: Pt continues to make slow gains with bilateral hand volitional muscle activity of his FDP.  His soft " tissue and joint mobility remain intact with daily stretching and current exercises.  Sensation remains absent in all digits.  ADL/IADL impairments persist in the areas of handwriting, keyboarding, tying shoelaces, and manipulating small objects. Pt will benefit from further OT services 1-2 x month to facilitate the functional return of hand strength and dexterity for daily routine.    PLAN/RECOMMENDATIONS:   Plan for treatment: therapy treatment to continue next visit.  Planned interventions for next visit: continue with current treatment, E-stim attended (CPT 98965), manual therapy (CPT 66769), neuromuscular re-education (CPT 33166), therapeutic activities (CPT 12247) and therapeutic exercise (CPT 27296)

## 2018-04-24 PROCEDURE — G8985 CARRY GOAL STATUS: HCPCS | Mod: CI

## 2018-04-24 PROCEDURE — G8984 CARRY CURRENT STATUS: HCPCS | Mod: CJ

## 2018-04-24 NOTE — OP THERAPY PROGRESS SUMMARY
"  Outpatient Occupational Therapy  RE-EVALUATION NOTE    [facility] Occupational Therapy [address].  [address]  [facility] [address]  Phone:  [phone]  Fax:  [phone]    Date of Visit: 04/23/2018    Patient: Torey Lima  YOB: 1945  MRN: [id number]     Referring Provider: Moreno Adorno M.D.  [address] [place] [address] [address]  [place] [address]   Referring Diagnosis Unspecified abnormalities of gait and mobility [R26.9];Muscle weakness (generalized) [M62.81];Unspecified abnormalities of gait and mobility [R26.9];Muscle weakness (generalized) [M62.81]     Visit Diagnoses     ICD-10-CM   1. Muscle weakness (generalized) M62.81       Rehab Potential: good    Occupational Therapy Occurrence Codes    Date of onset of impairment:  5/27/17   Date of occupational therapy care plan established or reviewed:  10/3/17   Date of occupational therapy treatment started:  10/3/17          Cert Period: 4/25/18 to 6/3/18  Progress Report Period: 10/3/17- 4/24/18    Functional Limitation G-Codes and Severity Modifiers  OT Functional Assessment Tool Used: 9-Hole Peg Test  OT Functional Assessment Score: RH:  40 seconds  LH: 39 seconds   Current status: Carry: Current (): CJ   Goal status: Carry: Goal  (): CI     OBJECTIVE:  FDP strength: RH:SF/IF 2-/5; MF/RF trace  LH FDP: all digits 2-/5  BUE AROM/strength WNL except for above  PROM: DIP joints WFL   Bilateral hands:  strength:  LH: 48 lbs  RH: 46 lbs  9-Hole Peg Test:  RH:  40 seconds  LH: 39 seconds  [place]-Dannielle:  Bilateral digits cirumferentially: absent sensation  Bilateral palmar hands/dorsal hands: loss of protective sensation  Sharp/dull: unable to discriminate  Proprioception: absent  Vibration: diminished bilateral palmar hands  Intrinsic plus: WNL  Intrinsic minus: 1/2 \"claw\" position  Current HEP: Joint blocking, contrast baths, stretching of all digits  ADLs: unable to tie shoelaces and manipulate buttons, otherwise " independent.    Progress Summary Details   Pt continues to make slow gains with bilateral hand volitional muscle activity of his FDP.  His soft tissue and joint mobility remain intact with daily stretching and current exercises. Sensation remains absent in all digits.  ADL/IADL impairments persist in the areas of handwriting, keyboarding, tying shoelaces, and manipulating small objects. Pt will benefit from further OT services 1-2 x month to facilitate the functional return of hand strength and dexterity for daily routine.    Long Term Goals 8-10 weeks:  Pt will increase the strength of his bilateral FDP >= 1/2 grade for ADLs  Pt will increase his bilateral  strength >= 5 lbs to open jars  Pt will be independent tying shoelaces and buttoning buttons with AE/techniques  Pt will demonstrate legible handwriting with AE PRN  Pt will be independent HEP for stretching and strengthening  Pt will perform 9-Hole Peg Test in <= 30 seconds    Referring provider co-signature:  I have reviewed this plan of care and my co-signature certifies the need for services.    Physician Signature: ________________________________ Date: ______________

## 2018-04-28 ENCOUNTER — HOSPITAL ENCOUNTER (OUTPATIENT)
Dept: LAB | Facility: MEDICAL CENTER | Age: 73
End: 2018-04-28
Attending: INTERNAL MEDICINE
Payer: MEDICARE

## 2018-04-28 DIAGNOSIS — Z85.038 HISTORY OF COLON CANCER: ICD-10-CM

## 2018-04-28 DIAGNOSIS — E78.2 MIXED HYPERLIPIDEMIA: ICD-10-CM

## 2018-04-28 LAB
ALBUMIN SERPL BCP-MCNC: 3.7 G/DL (ref 3.2–4.9)
ALBUMIN SERPL BCP-MCNC: 3.8 G/DL (ref 3.2–4.9)
ALBUMIN/GLOB SERPL: 1.4 G/DL
ALBUMIN/GLOB SERPL: 1.5 G/DL
ALP SERPL-CCNC: 81 U/L (ref 30–99)
ALP SERPL-CCNC: 83 U/L (ref 30–99)
ALT SERPL-CCNC: 15 U/L (ref 2–50)
ALT SERPL-CCNC: 15 U/L (ref 2–50)
ANION GAP SERPL CALC-SCNC: 5 MMOL/L (ref 0–11.9)
ANION GAP SERPL CALC-SCNC: 6 MMOL/L (ref 0–11.9)
AST SERPL-CCNC: 20 U/L (ref 12–45)
AST SERPL-CCNC: 21 U/L (ref 12–45)
BASOPHILS # BLD AUTO: 1.2 % (ref 0–1.8)
BASOPHILS # BLD AUTO: 1.2 % (ref 0–1.8)
BASOPHILS # BLD: 0.05 K/UL (ref 0–0.12)
BASOPHILS # BLD: 0.05 K/UL (ref 0–0.12)
BILIRUB SERPL-MCNC: 1.6 MG/DL (ref 0.1–1.5)
BILIRUB SERPL-MCNC: 1.7 MG/DL (ref 0.1–1.5)
BUN SERPL-MCNC: 21 MG/DL (ref 8–22)
BUN SERPL-MCNC: 21 MG/DL (ref 8–22)
CALCIUM SERPL-MCNC: 8.6 MG/DL (ref 8.5–10.5)
CALCIUM SERPL-MCNC: 8.7 MG/DL (ref 8.5–10.5)
CEA SERPL-MCNC: 1 NG/ML (ref 0–3)
CHLORIDE SERPL-SCNC: 103 MMOL/L (ref 96–112)
CHLORIDE SERPL-SCNC: 103 MMOL/L (ref 96–112)
CHOLEST SERPL-MCNC: 101 MG/DL (ref 100–199)
CO2 SERPL-SCNC: 28 MMOL/L (ref 20–33)
CO2 SERPL-SCNC: 29 MMOL/L (ref 20–33)
CREAT SERPL-MCNC: 0.88 MG/DL (ref 0.5–1.4)
CREAT SERPL-MCNC: 0.88 MG/DL (ref 0.5–1.4)
EOSINOPHIL # BLD AUTO: 0.15 K/UL (ref 0–0.51)
EOSINOPHIL # BLD AUTO: 0.18 K/UL (ref 0–0.51)
EOSINOPHIL NFR BLD: 3.6 % (ref 0–6.9)
EOSINOPHIL NFR BLD: 4.3 % (ref 0–6.9)
ERYTHROCYTE [DISTWIDTH] IN BLOOD BY AUTOMATED COUNT: 48.2 FL (ref 35.9–50)
ERYTHROCYTE [DISTWIDTH] IN BLOOD BY AUTOMATED COUNT: 48.7 FL (ref 35.9–50)
FERRITIN SERPL-MCNC: 75.6 NG/ML (ref 22–322)
GLOBULIN SER CALC-MCNC: 2.5 G/DL (ref 1.9–3.5)
GLOBULIN SER CALC-MCNC: 2.7 G/DL (ref 1.9–3.5)
GLUCOSE SERPL-MCNC: 87 MG/DL (ref 65–99)
GLUCOSE SERPL-MCNC: 88 MG/DL (ref 65–99)
HCT VFR BLD AUTO: 41 % (ref 42–52)
HCT VFR BLD AUTO: 41.6 % (ref 42–52)
HDLC SERPL-MCNC: 56 MG/DL
HGB BLD-MCNC: 13.4 G/DL (ref 14–18)
HGB BLD-MCNC: 13.6 G/DL (ref 14–18)
IMM GRANULOCYTES # BLD AUTO: 0.01 K/UL (ref 0–0.11)
IMM GRANULOCYTES # BLD AUTO: 0.01 K/UL (ref 0–0.11)
IMM GRANULOCYTES NFR BLD AUTO: 0.2 % (ref 0–0.9)
IMM GRANULOCYTES NFR BLD AUTO: 0.2 % (ref 0–0.9)
INR PPP: 1.28 (ref 0.87–1.13)
IRON SATN MFR SERPL: 16 % (ref 15–55)
IRON SERPL-MCNC: 57 UG/DL (ref 50–180)
LDLC SERPL CALC-MCNC: 35 MG/DL
LYMPHOCYTES # BLD AUTO: 0.67 K/UL (ref 1–4.8)
LYMPHOCYTES # BLD AUTO: 0.74 K/UL (ref 1–4.8)
LYMPHOCYTES NFR BLD: 16 % (ref 22–41)
LYMPHOCYTES NFR BLD: 17.8 % (ref 22–41)
MCH RBC QN AUTO: 28.5 PG (ref 27–33)
MCH RBC QN AUTO: 28.5 PG (ref 27–33)
MCHC RBC AUTO-ENTMCNC: 32.7 G/DL (ref 33.7–35.3)
MCHC RBC AUTO-ENTMCNC: 32.7 G/DL (ref 33.7–35.3)
MCV RBC AUTO: 87 FL (ref 81.4–97.8)
MCV RBC AUTO: 87.2 FL (ref 81.4–97.8)
MONOCYTES # BLD AUTO: 0.37 K/UL (ref 0–0.85)
MONOCYTES # BLD AUTO: 0.49 K/UL (ref 0–0.85)
MONOCYTES NFR BLD AUTO: 11.7 % (ref 0–13.4)
MONOCYTES NFR BLD AUTO: 8.9 % (ref 0–13.4)
NEUTROPHILS # BLD AUTO: 2.8 K/UL (ref 1.82–7.42)
NEUTROPHILS # BLD AUTO: 2.84 K/UL (ref 1.82–7.42)
NEUTROPHILS NFR BLD: 66.6 % (ref 44–72)
NEUTROPHILS NFR BLD: 68.3 % (ref 44–72)
NRBC # BLD AUTO: 0 K/UL
NRBC # BLD AUTO: 0 K/UL
NRBC BLD-RTO: 0 /100 WBC
NRBC BLD-RTO: 0 /100 WBC
PLATELET # BLD AUTO: 58 K/UL (ref 164–446)
PLATELET # BLD AUTO: 61 K/UL (ref 164–446)
PMV BLD AUTO: 11.1 FL (ref 9–12.9)
PMV BLD AUTO: 11.5 FL (ref 9–12.9)
POTASSIUM SERPL-SCNC: 3.3 MMOL/L (ref 3.6–5.5)
POTASSIUM SERPL-SCNC: 3.4 MMOL/L (ref 3.6–5.5)
PROT SERPL-MCNC: 6.3 G/DL (ref 6–8.2)
PROT SERPL-MCNC: 6.4 G/DL (ref 6–8.2)
PROTHROMBIN TIME: 15.7 SEC (ref 12–14.6)
RBC # BLD AUTO: 4.71 M/UL (ref 4.7–6.1)
RBC # BLD AUTO: 4.77 M/UL (ref 4.7–6.1)
SODIUM SERPL-SCNC: 137 MMOL/L (ref 135–145)
SODIUM SERPL-SCNC: 137 MMOL/L (ref 135–145)
TIBC SERPL-MCNC: 361 UG/DL (ref 250–450)
TRIGL SERPL-MCNC: 52 MG/DL (ref 0–149)
TSH SERPL DL<=0.005 MIU/L-ACNC: 2.69 UIU/ML (ref 0.38–5.33)
WBC # BLD AUTO: 4.2 K/UL (ref 4.8–10.8)
WBC # BLD AUTO: 4.2 K/UL (ref 4.8–10.8)

## 2018-04-28 PROCEDURE — 82728 ASSAY OF FERRITIN: CPT

## 2018-04-28 PROCEDURE — 85025 COMPLETE CBC W/AUTO DIFF WBC: CPT

## 2018-04-28 PROCEDURE — 85610 PROTHROMBIN TIME: CPT

## 2018-04-28 PROCEDURE — 83540 ASSAY OF IRON: CPT

## 2018-04-28 PROCEDURE — 84443 ASSAY THYROID STIM HORMONE: CPT

## 2018-04-28 PROCEDURE — 82378 CARCINOEMBRYONIC ANTIGEN: CPT

## 2018-04-28 PROCEDURE — 85025 COMPLETE CBC W/AUTO DIFF WBC: CPT | Mod: 91

## 2018-04-28 PROCEDURE — 80061 LIPID PANEL: CPT

## 2018-04-28 PROCEDURE — 36415 COLL VENOUS BLD VENIPUNCTURE: CPT

## 2018-04-28 PROCEDURE — 83550 IRON BINDING TEST: CPT

## 2018-04-28 PROCEDURE — 80053 COMPREHEN METABOLIC PANEL: CPT

## 2018-04-28 PROCEDURE — 80053 COMPREHEN METABOLIC PANEL: CPT | Mod: 91

## 2018-05-01 ENCOUNTER — OCCUPATIONAL THERAPY (OUTPATIENT)
Dept: OCCUPATIONAL THERAPY | Facility: REHABILITATION | Age: 73
End: 2018-05-01
Attending: PHYSICAL MEDICINE & REHABILITATION
Payer: MEDICARE

## 2018-05-01 DIAGNOSIS — M62.81 MUSCLE WEAKNESS (GENERALIZED): ICD-10-CM

## 2018-05-01 PROCEDURE — 97140 MANUAL THERAPY 1/> REGIONS: CPT

## 2018-05-01 PROCEDURE — 97110 THERAPEUTIC EXERCISES: CPT

## 2018-05-01 NOTE — OP THERAPY DAILY TREATMENT
"  Outpatient Occupational Therapy  DAILY TREATMENT     [facility] Occupational Therapy [address].  [address]  [place] [address]  Phone:  [phone]  Fax:  [phone]    Date: 05/01/2018    Patient: Torey Lima  YOB: 1945  MRN: [id number]     Time Calculation  Start time: 0800  Stop time: 0900 Time Calculation (min): 60 minutes     Chief Complaint: Hand Weakness    Visit #: 2    SUBJECTIVE: \"I've been trying to sit with my hands a little bent when I'm resting\"    OBJECTIVE:  Current objective measures:   FDP strength: RH: SF/IF/MF 2-/5; RF trace+  LH FDP: all digits 2-/5 with increased AROM  PROM WNL        Therapeutic Exercises (CPT 71067):     1. Bilateral hands    Therapeutic Exercise Summary:  Bilateral hands isolated active digit flexion of DIPs with joint blocking while constraining other digits to facilitate an increase in activity, 5 second hold 5-8 reps each.  AROM towards intrinsic minus position \"half claw\" combined with wrist extension at 35 degrees multiple reps of all exercises.    Discussed benefit of elastic shoelaces to enable pt to be more independent with dressing.  Given print out to purchase them.    Therapeutic Treatments and Modalities:    1. Manual Therapy (CPT 14577)    Therapeutic Treatments and Modalities Summary: MHP BH x 5 minutes.  Bilateral hand digits 2-5 DIP joint mobilization, light traction, and passive sustained stretching into full isolated and gross flexion.      Time-based treatments/modalities:  Therapeutic exercise minutes (CPT 43209): 30 minutes  Manual therapy joint mobilization minutes (CPT 64643): 30 minutes      ASSESSMENT:    Response to treatment: Pt making progress today with strength of bilateral FDP with increased motion in all digits during flexion at DIP joints, left > right.  Left with significantly improved motion from previous session during joint blocking exercises.  Pt reporting dscomfort but not \"pain\" with PROM at " bilateral RF's.  Continue with HEP.  Pt given information to purchase elastic shoelaces.      PLAN/RECOMMENDATIONS:   Plan for treatment: therapy treatment to continue next visit.  Planned interventions for next visit: continue with current treatment, E-stim attended (CPT 44971), manual therapy (CPT 70060), self care ADL training (CPT 45901), therapeutic activities (CPT 64957) and therapeutic exercise (CPT 86948)

## 2018-05-03 ENCOUNTER — TELEPHONE (OUTPATIENT)
Dept: MEDICAL GROUP | Age: 73
End: 2018-05-03

## 2018-05-03 NOTE — TELEPHONE ENCOUNTER
Future Appointments       Provider Department Center    5/8/2018 8:40 AM Fabián Urena M.D. [facility] [address]        ESTABLISHED PATIENT PRE-VISIT PLANNING     Note: Patient will not be contacted if there is no indication to call.     1.  Reviewed notes from the last few office visits within the medical group: Yes    2.  If any orders were placed at last visit or intended to be done for this visit (i.e. 6 mos follow-up), do we have Results/Consult Notes?        •  Labs - Labs ordered, completed on 4/28/18 and results are in chart.   Note: If patient appointment is for lab review and patient did not complete labs, check with provider if OK to reschedule patient until labs completed.       •  Imaging - Imaging was not ordered at last office visit.       •  Referrals - No referrals were ordered at last office visit.    3. Is this appointment scheduled as a Hospital Follow-Up? No    4.  Immunizations were updated in Epic using WebIZ?: Epic matches WebIZ       •  Web Iz Recommendations: Patient is up to date on all vaccines    5.  Patient is due for the following Health Maintenance Topics:   Health Maintenance Due   Topic Date Due   • Annual Wellness Visit  06/22/2017       - Patient is up-to-date on all Health Maintenance topics. No records have been requested at this time.    6.  MDX printed for Provider? NO  MDX complete    7.  Patient was NOT informed to arrive 15 min prior to their scheduled appointment and bring in their medication bottles.

## 2018-05-08 ENCOUNTER — OFFICE VISIT (OUTPATIENT)
Dept: MEDICAL GROUP | Age: 73
End: 2018-05-08
Payer: MEDICARE

## 2018-05-08 VITALS
DIASTOLIC BLOOD PRESSURE: 62 MMHG | OXYGEN SATURATION: 95 % | WEIGHT: 237 LBS | HEART RATE: 71 BPM | BODY MASS INDEX: 29.47 KG/M2 | SYSTOLIC BLOOD PRESSURE: 102 MMHG | HEIGHT: 75 IN | TEMPERATURE: 98.4 F

## 2018-05-08 DIAGNOSIS — R60.0 EDEMA OF LEFT LOWER EXTREMITY: ICD-10-CM

## 2018-05-08 DIAGNOSIS — K74.60 CIRRHOSIS OF LIVER WITH ASCITES, UNSPECIFIED HEPATIC CIRRHOSIS TYPE (HCC): ICD-10-CM

## 2018-05-08 DIAGNOSIS — E87.6 HYPOKALEMIA: ICD-10-CM

## 2018-05-08 DIAGNOSIS — D50.0 IRON DEFICIENCY ANEMIA DUE TO CHRONIC BLOOD LOSS: ICD-10-CM

## 2018-05-08 DIAGNOSIS — R60.1 GENERALIZED EDEMA: ICD-10-CM

## 2018-05-08 DIAGNOSIS — E66.9 OBESITY (BMI 30-39.9): ICD-10-CM

## 2018-05-08 DIAGNOSIS — R18.8 CIRRHOSIS OF LIVER WITH ASCITES, UNSPECIFIED HEPATIC CIRRHOSIS TYPE (HCC): ICD-10-CM

## 2018-05-08 DIAGNOSIS — D69.59 THROMBOCYTOPENIA DUE TO DRUGS: ICD-10-CM

## 2018-05-08 DIAGNOSIS — I82.412 ACUTE DEEP VEIN THROMBOSIS (DVT) OF FEMORAL VEIN OF LEFT LOWER EXTREMITY (HCC): ICD-10-CM

## 2018-05-08 DIAGNOSIS — E78.2 MIXED HYPERLIPIDEMIA: ICD-10-CM

## 2018-05-08 DIAGNOSIS — I10 ESSENTIAL HYPERTENSION: ICD-10-CM

## 2018-05-08 DIAGNOSIS — T50.905A THROMBOCYTOPENIA DUE TO DRUGS: ICD-10-CM

## 2018-05-08 DIAGNOSIS — K76.9 LIVER LESION: ICD-10-CM

## 2018-05-08 PROBLEM — M62.08 DIASTASIS RECTI: Status: RESOLVED | Noted: 2017-12-14 | Resolved: 2018-05-08

## 2018-05-08 PROBLEM — D63.8 ANEMIA, CHRONIC DISEASE: Status: RESOLVED | Noted: 2017-12-14 | Resolved: 2018-05-08

## 2018-05-08 PROCEDURE — 99214 OFFICE O/P EST MOD 30 MIN: CPT | Performed by: INTERNAL MEDICINE

## 2018-05-08 RX ORDER — POTASSIUM CHLORIDE 20 MEQ/1
20 TABLET, EXTENDED RELEASE ORAL 2 TIMES DAILY
Qty: 180 TAB | Refills: 4 | Status: SHIPPED | OUTPATIENT
Start: 2018-05-08 | End: 2019-06-03 | Stop reason: SDUPTHER

## 2018-05-08 ASSESSMENT — ENCOUNTER SYMPTOMS
MUSCULOSKELETAL NEGATIVE: 1
EYES NEGATIVE: 1
GASTROINTESTINAL NEGATIVE: 1
CONSTITUTIONAL NEGATIVE: 1
CARDIOVASCULAR NEGATIVE: 1
RESPIRATORY NEGATIVE: 1
NEUROLOGICAL NEGATIVE: 1
PSYCHIATRIC NEGATIVE: 1

## 2018-05-08 NOTE — PROGRESS NOTES
Subjective:      Torey Lima is a 72 y.o. male who presents with Follow-Up (doing well)  The patient is here for followup of chronic medical problems listed below. The patient is compliant with medications and having no side effects from them. Denies chest pain, abdominal pain, dyspnea, myalgias, or cough.   Patient Active Problem List    Diagnosis Date Noted   • Esophageal varices- BANDED 4/2016- repeat egd tbd 10//29/16- gic 04/04/2016     Priority: High   • ASCVD (arteriosclerotic cardiovascular disease)subtotalled small distal LCx and 40% LAD med rx by cath 12/13/12 12/14/2012     Priority: High   • Gastric varices- s/p BRTO procedure at [facility] 04/04/2016     Priority: Medium   • Gastrointestinal hemorrhage with hematemesis- recurrernt from varices 04/03/2016     Priority: Medium   • Generalized edema 12/14/2017   • Obesity (BMI 30-39.9) 12/14/2017   • Edema of left lower extremity- due to dvt july 2017 09/19/2017   • Herpes zoster infection of thoracic region- right back T8 09/19/2017   • BPH (benign prostatic hyperplasia) 08/11/2017   • Peripheral neuropathy due to chemotherapy (HCC) 08/11/2017   • Impaired mobility and ADLs 08/11/2017   • Acute deep vein thrombosis (DVT) of femoral vein of left lower extremity (CMS-HCC)- IVC placed july 2017 06/26/2017   • 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 DUE TO OLD HEP B, CHEMO RX,  OR THERMO RAD OF MAGNANT LESION 04/14/2016   • Mixed hyperlipidemia 06/20/2013   • Essential hypertension 06/20/2013   • Vitamin D deficiency 08/02/2012   • Thrombocytopenia due to drugs 04/25/2012   • Overlapping malignant neoplasm of colon (HCC)Overlapping malignant neoplasm of colon  (HCC)--2010 left hemicolectomy; no colostomy; ablation of liver met at [facility] 1/2011- MRI abd 3/2018 no change in per 12/06/2010   • Liver lesion- ablation of malignant met from colon ca 2011- f/u [facility] q 6 mos - stable MRI abd 3/2018- redo annually 12/06/2010   • Peripheral neuropathy, secondary to drugs or chemicals 12/06/2010     Allergies   Allergen Reactions   • Influenza Virus Vaccine Live      Guillan Dalton    • Anticoagulant Sodium Citrate [Sodium Citrate]      HIGH BLEEDING RISK   • Lisinopril Cough   • Nkda [No Known Drug Allergy]      Outpatient Medications Prior to Visit   Medication Sig Dispense Refill   • folic acid (FOLVITE) 1 MG Tab TAKE ONE TABLET BY MOUTH ONCE DAILY 90 Tab 4   • spironolactone (ALDACTONE) 25 MG Tab Take 1 Tab by mouth every day. 90 Tab 4   • metolazone (ZAROXOLYN) 2.5 MG Tab Take 1 Tab by mouth every 48 hours. 45 Tab 4   • torsemide (DEMADEX) 10 MG tablet Take 2 Tabs by mouth every 48 hours. 90 Tab 4   • potassium chloride SA (KLOR-CON M20) 20 MEQ Tab CR Take 1 Tab by mouth every day. 90 Tab 4   • atorvastatin (LIPITOR) 40 MG Tab Take 1 Tab by mouth every bedtime. 90 Tab 4   • ursodiol (ACTIGALL) 300 MG Cap Take 1 Cap by mouth every day. 90 Cap 4   • Diclofenac Sodium 1 % Gel APPLY FOUR GRAMS ON THE LEFT HIP  TWICE DAILY AS NEEDED (Patient not taking: Reported on 3/7/2018) 100 g 0   • tamsulosin (FLOMAX) 0.4 MG capsule Take 1 Cap by mouth ONE-HALF HOUR AFTER BREAKFAST. (Patient not taking: Reported on 3/7/2018) 90 Cap 4   • MethylPREDNISolone (MEDROL DOSEPAK) 4 MG Tablet Therapy Pack As directed (Patient not taking: Reported on 3/7/2018) 1 Kit 1   • valacyclovir (VALTREX) 1 GM Tab Take 1 Tab by mouth 2 times a day. (Patient not taking: Reported on 3/7/2018) 20 Tab 0   • vitamin D (VITAMIND D3) 1000 UNIT Tab Take 1 Tab by mouth every day. 60 Tab 0   • polyethylene glycol/lytes (MIRALAX) Pack Take 1 Packet by mouth every bedtime.  3   • ferrous sulfate 325 (65 Fe) MG tablet Take 1 Tab  "by mouth every morning with breakfast. 30 Tab    • docusate sodium 100 MG Cap Take 100 mg by mouth 2 times a day as needed for Constipation. 60 Cap    • senna-docusate (PERICOLACE OR SENOKOT S) 8.6-50 MG Tab Take 1 Tab by mouth 1 time daily as needed for Constipation. 30 Tab 0   • folic acid (FOLVITE) 1 MG Tab Take 1 Tab by mouth every day. 30 Tab    • oxycodone immediate release (ROXICODONE) 10 MG immediate release tablet Take 1 Tab by mouth every four hours as needed for Moderate Pain. (Patient not taking: Reported on 3/7/2018) 28 Tab 0     No facility-administered medications prior to visit.                HPI    Review of Systems   Constitutional: Negative.    HENT: Negative.    Eyes: Negative.    Respiratory: Negative.    Cardiovascular: Negative.    Gastrointestinal: Negative.    Genitourinary: Negative.    Musculoskeletal: Negative.    Skin: Negative.    Neurological: Negative.    Endo/Heme/Allergies: Negative.    Psychiatric/Behavioral: Negative.           Objective:     /62   Pulse 71   Temp 36.9 °C (98.4 °F)   Ht 1.905 m (6' 3\")   Wt 107.5 kg (237 lb)   SpO2 95%   BMI 29.62 kg/m²      Physical Exam   Constitutional: He is oriented to person, place, and time. He appears well-developed and well-nourished. No distress.   HENT:   Head: Normocephalic and atraumatic.   Right Ear: External ear normal.   Left Ear: External ear normal.   Nose: Nose normal.   Mouth/Throat: Oropharynx is clear and moist. No oropharyngeal exudate.   Eyes: Conjunctivae and EOM are normal. Pupils are equal, round, and reactive to light. Right eye exhibits no discharge. Left eye exhibits no discharge. No scleral icterus.   Neck: Normal range of motion. Neck supple. No JVD present. No tracheal deviation present. No thyromegaly present.   Cardiovascular: Normal rate, regular rhythm, normal heart sounds and intact distal pulses.  Exam reveals no gallop and no friction rub.    No murmur heard.  Pulmonary/Chest: Effort normal and " breath sounds normal. No stridor. No respiratory distress. He has no wheezes. He has no rales. He exhibits no tenderness.   Abdominal: Soft. Bowel sounds are normal. He exhibits no distension and no mass. There is no tenderness. There is no rebound and no guarding.   Musculoskeletal: Normal range of motion. He exhibits no edema or tenderness.   Lymphadenopathy:     He has no cervical adenopathy.   Neurological: He is alert and oriented to person, place, and time. He has normal reflexes. He displays normal reflexes. He exhibits normal muscle tone. Coordination normal.   Skin: Skin is warm and dry. No rash noted. He is not diaphoretic. No erythema. No pallor.   Psychiatric: He has a normal mood and affect. His behavior is normal. Judgment and thought content normal.     Hospital Outpatient Visit on 04/28/2018   Component Date Value   • WBC 04/28/2018 4.2*   • RBC 04/28/2018 4.71    • Hemoglobin 04/28/2018 13.4*   • Hematocrit 04/28/2018 41.0*   • MCV 04/28/2018 87.0    • MCH 04/28/2018 28.5    • MCHC 04/28/2018 32.7*   • RDW 04/28/2018 48.7    • Platelet Count 04/28/2018 61*   • MPV 04/28/2018 11.1    • Neutrophils-Polys 04/28/2018 66.60    • Lymphocytes 04/28/2018 16.00*   • Monocytes 04/28/2018 11.70    • Eosinophils 04/28/2018 4.30    • Basophils 04/28/2018 1.20    • Immature Granulocytes 04/28/2018 0.20    • Nucleated RBC 04/28/2018 0.00    • Neutrophils (Absolute) 04/28/2018 2.80    • Lymphs (Absolute) 04/28/2018 0.67*   • Monos (Absolute) 04/28/2018 0.49    • Eos (Absolute) 04/28/2018 0.18    • Baso (Absolute) 04/28/2018 0.05    • Immature Granulocytes (a* 04/28/2018 0.01    • NRBC (Absolute) 04/28/2018 0.00    • Sodium 04/28/2018 137    • Potassium 04/28/2018 3.3*   • Chloride 04/28/2018 103    • Co2 04/28/2018 28    • Anion Gap 04/28/2018 6.0    • Glucose 04/28/2018 88    • Bun 04/28/2018 21    • Creatinine 04/28/2018 0.88    • Calcium 04/28/2018 8.6    • AST(SGOT) 04/28/2018 20    • ALT(SGPT) 04/28/2018 15     • Alkaline Phosphatase 04/28/2018 83    • Total Bilirubin 04/28/2018 1.6*   • Albumin 04/28/2018 3.8    • Total Protein 04/28/2018 6.3    • Globulin 04/28/2018 2.5    • A-G Ratio 04/28/2018 1.5    • PT 04/28/2018 15.7*   • INR 04/28/2018 1.28*   • Ferritin 04/28/2018 75.6    • Iron 04/28/2018 57    • Total Iron Binding 04/28/2018 361    • % Saturation 04/28/2018 16    • GFR If  04/28/2018 >60    • GFR If Non  Ameri* 04/28/2018 >60    Hospital Outpatient Visit on 04/28/2018   Component Date Value   • Sodium 04/28/2018 137    • Potassium 04/28/2018 3.4*   • Chloride 04/28/2018 103    • Co2 04/28/2018 29    • Anion Gap 04/28/2018 5.0    • Glucose 04/28/2018 87    • Bun 04/28/2018 21    • Creatinine 04/28/2018 0.88    • Calcium 04/28/2018 8.7    • AST(SGOT) 04/28/2018 21    • ALT(SGPT) 04/28/2018 15    • Alkaline Phosphatase 04/28/2018 81    • Total Bilirubin 04/28/2018 1.7*   • Albumin 04/28/2018 3.7    • Total Protein 04/28/2018 6.4    • Globulin 04/28/2018 2.7    • A-G Ratio 04/28/2018 1.4    • Cholesterol,Tot 04/28/2018 101    • Triglycerides 04/28/2018 52    • HDL 04/28/2018 56    • LDL 04/28/2018 35    • WBC 04/28/2018 4.2*   • RBC 04/28/2018 4.77    • Hemoglobin 04/28/2018 13.6*   • Hematocrit 04/28/2018 41.6*   • MCV 04/28/2018 87.2    • MCH 04/28/2018 28.5    • MCHC 04/28/2018 32.7*   • RDW 04/28/2018 48.2    • Platelet Count 04/28/2018 58*   • MPV 04/28/2018 11.5    • Neutrophils-Polys 04/28/2018 68.30    • Lymphocytes 04/28/2018 17.80*   • Monocytes 04/28/2018 8.90    • Eosinophils 04/28/2018 3.60    • Basophils 04/28/2018 1.20    • Immature Granulocytes 04/28/2018 0.20    • Nucleated RBC 04/28/2018 0.00    • Neutrophils (Absolute) 04/28/2018 2.84    • Lymphs (Absolute) 04/28/2018 0.74*   • Monos (Absolute) 04/28/2018 0.37    • Eos (Absolute) 04/28/2018 0.15    • Baso (Absolute) 04/28/2018 0.05    • Immature Granulocytes (a* 04/28/2018 0.01    • NRBC (Absolute) 04/28/2018 0.00    •  TSH 04/28/2018 2.690    • Carcinoembryonic Antigen 04/28/2018 1.0    • GFR If  04/28/2018 >60    • GFR If Non  Ameri* 04/28/2018 >60       Lab Results   Component Value Date/Time    HBA1C 5.2 10/25/2016 12:06 AM    HBA1C 5.3 07/08/2016 07:02 AM     Lab Results   Component Value Date/Time    SODIUM 137 04/28/2018 07:11 AM    POTASSIUM 3.3 (L) 04/28/2018 07:11 AM    CHLORIDE 103 04/28/2018 07:11 AM    CO2 28 04/28/2018 07:11 AM    GLUCOSE 88 04/28/2018 07:11 AM    BUN 21 04/28/2018 07:11 AM    CREATININE 0.88 04/28/2018 07:11 AM    CREATININE 1.14 01/23/2010 12:00 AM    BUNCREATRAT 15 01/23/2010 12:00 AM    GLOMRATE >59 01/23/2010 12:00 AM    ALKPHOSPHAT 83 04/28/2018 07:11 AM    ASTSGOT 20 04/28/2018 07:11 AM    ALTSGPT 15 04/28/2018 07:11 AM    TBILIRUBIN 1.6 (H) 04/28/2018 07:11 AM     Lab Results   Component Value Date/Time    INR 1.28 (H) 04/28/2018 07:11 AM    INR 1.18 (H) 09/16/2017 07:26 AM    INR 1.28 (H) 09/04/2017 05:32 AM     Lab Results   Component Value Date/Time    CHOLSTRLTOT 101 04/28/2018 07:09 AM    LDL 35 04/28/2018 07:09 AM    HDL 56 04/28/2018 07:09 AM    TRIGLYCERIDE 52 04/28/2018 07:09 AM       Lab Results   Component Value Date/Time    TESTOSTERONE 333 04/02/2015 06:47 AM     No results found for: TSH  Lab Results   Component Value Date/Time    FREET4 0.85 04/02/2015 06:47 AM    FREET4 0.85 04/12/2014 07:26 AM     No results found for: URICACID  No components found for: VITB12  Lab Results   Component Value Date/Time    25HYDROXY 32 08/11/2017 04:50 AM    25HYDROXY 38 04/02/2015 06:47 AM               Assessment/Plan:     1. Edema of left lower extremity- due to dvt july 2017       Under good control. Continue same regimen  2. Hypokalemia- not at goal- inc to bid dose     - potassium chloride SA (KLOR-CON M20) 20 MEQ Tab CR; Take 1 Tab by mouth 2 times a day.  Dispense: 180 Tab; Refill: 4    3. Mixed hyperlipidemia      Under good control. Continue same regimen- COMP  METABOLIC PANEL; Future  - LIPID PROFILE; Future  - CBC WITH DIFFERENTIAL; Future    4. Essential hypertension       Under good control. Continue same regimen  5. Iron deficiency anemia due to chronic blood loss     Under good control. Continue same regimen    6. Cirrhosis of liver with ascites, unspecified hepatic cirrhosis type (HCC)     Under good control. Continue same regimen    7. Thrombocytopenia due to drugs     Under good control. Continue same regimen    8. Liver lesion- ablation of malignant met from colon ca 2011- f/[email or web address] q [facility] mos - stable MRI abd 3/2018     Under good control. Continue same regimen    9. Acute deep vein thrombosis (DVT) of femoral vein of left lower extremity (CMS-HCC)- IVC placed july 2017     Under good control. Continue same regimen    10. Obesity (BMI 30-39.9)     diet/exercise/lose 15 lbs.; patient counseled      11. Generalized edema       Under good control. Continue same regimen.        40 minute face-to-face encounter took place today.  More than half of this time was spent in the coordination of care of the above problems, as well as counseling.

## 2018-06-04 ENCOUNTER — OCCUPATIONAL THERAPY (OUTPATIENT)
Dept: OCCUPATIONAL THERAPY | Facility: REHABILITATION | Age: 73
End: 2018-06-04
Attending: PHYSICAL MEDICINE & REHABILITATION
Payer: MEDICARE

## 2018-06-04 DIAGNOSIS — M62.81 MUSCLE WEAKNESS (GENERALIZED): ICD-10-CM

## 2018-06-04 PROCEDURE — 97140 MANUAL THERAPY 1/> REGIONS: CPT

## 2018-06-04 PROCEDURE — 97110 THERAPEUTIC EXERCISES: CPT

## 2018-06-04 NOTE — OP THERAPY DAILY TREATMENT
"  Outpatient Occupational Therapy  DAILY TREATMENT     [facility] Occupational [address]  [address]  [place] [address]  Phone:  [phone]  Fax:  [phone]    Date: 06/04/2018    Patient: Torey Lima  YOB: 1945  MRN: [id number]     Time Calculation  Start time: 1030  Stop time: 1130 Time Calculation (min): 60 minutes     Chief Complaint: Hand Weakness (bilateral)    Visit #: 3    SUBJECTIVE: \"My fingertips feel less sensitive, my handwriting is poor\"    OBJECTIVE:  Current objective measures:   LH flexion AROM WNL in composite flexion and intrinsic minus position   LH FDP strength: all digits 3/5   RH FDP strength: all digits 2-/5        Therapeutic Exercises (CPT 94987):     1. Bilateral hands    Therapeutic Exercise Summary:  Performed right hand digit blocking exercises proximal to DIP joints multiple reps for 5 second hold, active gross digit flexion towards intrinsic minus \"half claw\", AA stretch of composite fist maintained including DIP flexion with thumb flexed over IF/MF for sustained minute stretch, foam roll wrapped in wash cloth positioned proximal to MCP joints to facilitate distal activity.  Issued foam for HEP.    Therapeutic Treatments and Modalities:    1. Manual Therapy (CPT 61727)    Therapeutic Treatments and Modalities Summary: Right medial volar forearm STM for release of soft tissue adhesions, gentle joint mobilization and traction of all DIPs along with passive sustained stretch into full isolated and composite flexion.    Time-based treatments/modalities:  Therapeutic exercise minutes (CPT 08887): 30 minutes  Manual therapy joint mobilization minutes (CPT 95865): 30 minutes      ASSESSMENT:   Response to treatment:  Pt making excellent progress today with full AROM and 3/5 strength of left hand FDP, now able to achieve composite fist and intrinsic minus position.  Right hand continues with slow but steady gains. HEP updated with good " understanding.    PLAN/RECOMMENDATIONS:   Plan for treatment: therapy treatment to continue next visit.  Planned interventions for next visit: continue with current treatment, manual therapy (CPT 93120) and therapeutic exercise (CPT 91932)

## 2018-06-27 ENCOUNTER — OCCUPATIONAL THERAPY (OUTPATIENT)
Dept: OCCUPATIONAL THERAPY | Facility: REHABILITATION | Age: 73
End: 2018-06-27
Attending: PHYSICAL MEDICINE & REHABILITATION
Payer: MEDICARE

## 2018-06-27 DIAGNOSIS — M62.81 MUSCLE WEAKNESS (GENERALIZED): ICD-10-CM

## 2018-06-27 PROCEDURE — 97110 THERAPEUTIC EXERCISES: CPT

## 2018-06-27 PROCEDURE — 97140 MANUAL THERAPY 1/> REGIONS: CPT

## 2018-06-27 NOTE — OP THERAPY DAILY TREATMENT
"  Outpatient Occupational Therapy  DAILY TREATMENT     [facility] Occupational [address].  [address]  [place] [address]  Phone:  [phone]  Fax:  [phone]    Date: 06/27/2018    Patient: Torey Lima  YOB: 1945  MRN: [id number]     Time Calculation  Start time: 1035  Stop time: 1135 Time Calculation (min): 60 minutes     Chief Complaint: Hand Weakness (bilateral, GBS)    Visit #: 4    SUBJECTIVE:  \"My left hand is working normal, my right hand still has some weakness\"    OBJECTIVE:  Current objective measures:    strength:  LH: 66 lbs  RH: 48 lbs  LH FDP strength: all digits 3+/5   RH FDP strength: all digits 2-/5        Therapeutic Exercises (CPT 35429):     1. Right hand    Therapeutic Exercise Summary:  RH gross abduction/adduction x 20 reps.  Performed right hand digit blocking exercises proximal to DIP joints multiple reps for 5 second hold, both isolated with other digits constrained and gross blocking.  Performed active gross digit flexion towards intrinsic minus \"half claw\", active flexion towards composite fist x 10, red foam tube with pen inserted positioned over metacarpal heads for gross digit flexion to facilitate an increase in distal activity.  Issued red foam and hand out for updated HEP.        Therapeutic Treatments and Modalities:    1. Manual Therapy (CPT 63874)    Therapeutic Treatments and Modalities Summary: Right medial volar forearm STM for release of soft tissue adhesions, gentle joint mobilization and traction of all RH DIPs along with passive sustained stretch into full isolated and composite flexion.    Time-based treatments/modalities:  Therapeutic exercise minutes (CPT 77117): 45 minutes  Manual therapy joint mobilization minutes (CPT 50185): 15 minutes      ASSESSMENT:   Response to treatment: Pt continues to make slow but steady progress with strength of bilateral FDP for daily routine.  Pt's LH distal digit flexion remains WNL, " RH with persistent weakness but improvement from previous session.  HEP updated as described above with good understanding.    PLAN/RECOMMENDATIONS:   Plan for treatment: therapy treatment to continue next visit.  Planned interventions for next visit: continue with current treatment, functional training/self care (CPT 19525), manual therapy (CPT 36578), therapeutic activities (CPT 96934) and therapeutic exercise (CPT 23137)

## 2018-07-10 ENCOUNTER — OCCUPATIONAL THERAPY (OUTPATIENT)
Dept: OCCUPATIONAL THERAPY | Facility: REHABILITATION | Age: 73
End: 2018-07-10
Attending: PHYSICAL MEDICINE & REHABILITATION
Payer: MEDICARE

## 2018-07-10 DIAGNOSIS — M62.81 MUSCLE WEAKNESS (GENERALIZED): ICD-10-CM

## 2018-07-10 PROCEDURE — 97140 MANUAL THERAPY 1/> REGIONS: CPT

## 2018-07-10 PROCEDURE — 97110 THERAPEUTIC EXERCISES: CPT

## 2018-07-10 NOTE — OP THERAPY DAILY TREATMENT
"  Outpatient Occupational Therapy  DAILY TREATMENT     [facility] Occupational [address].  [address]  [place] [address]  Phone:  [phone]  Fax:  [phone]    Date: 07/10/2018    Patient: Torey Lima  YOB: 1945  MRN: [id number]     Time Calculation  Start time: 1030  Stop time: 1130 Time Calculation (min): 60 minutes     Chief Complaint: Hand Weakness (bilateral R > L, GBS)    Visit #: 5    SUBJECTIVE:  \"I saw Dr. Rosales yesterday and he said things have progressed, he wants me to continue with therapy\"    OBJECTIVE:  Current objective measures:    strength:  LH: 82 lbs  RH: 50 lbs  LH FDP strength: all digits 4/5   RH FDP strength: all digits 2-/5        Therapeutic Exercises (CPT 62146):     1. Right hand    Therapeutic Exercise Summary:  Right hand isolated active digit flexion of DIPs with joint blocking while constraining other digits to facilitate an increase in activity, followed by gross DIP flexion with blocking at DIP and PIP joints, 10 second hold multiple reps.  AROM towards intrinsic minus position combined with wrist extension at 35 degrees multiple reps with 10 second hold.  Composite fist x 20 reps.   strengthening using hand exerciser 4 bands x 15 reps.    Therapeutic Treatments and Modalities:    1. Manual Therapy (CPT 90612)    Therapeutic Treatments and Modalities Summary: Right medial volar forearm STM for release of soft tissue adhesions with and without Guasha, gentle joint mobilization and traction of all DIPs along with passive sustained stretch into full isolated/composite flexion and intrinsic minus.    Time-based treatments/modalities:  Therapeutic exercise minutes (CPT 12633): 30 minutes  Manual therapy joint mobilization minutes (CPT 13326): 30 minutes      ASSESSMENT:   Response to treatment:  Pt continues to make slow but steady gains with right hand strength in  and FDP with a notable increase in DIP flexion of right " IF/MF.   Pt responding well to manual sustained stretch and STM with no reports of pain in full intrinsic minus position.  Continue with HEP.    PLAN/RECOMMENDATIONS:   Plan for treatment: therapy treatment to continue next visit.  Planned interventions for next visit: continue with current treatment, manual therapy (CPT 64056), therapeutic activities (CPT 61843) and therapeutic exercise (CPT 40794)

## 2018-07-24 ENCOUNTER — OCCUPATIONAL THERAPY (OUTPATIENT)
Dept: OCCUPATIONAL THERAPY | Facility: REHABILITATION | Age: 73
End: 2018-07-24
Attending: PHYSICAL MEDICINE & REHABILITATION
Payer: MEDICARE

## 2018-07-24 DIAGNOSIS — M62.81 MUSCLE WEAKNESS (GENERALIZED): ICD-10-CM

## 2018-07-24 PROCEDURE — 97140 MANUAL THERAPY 1/> REGIONS: CPT

## 2018-07-24 PROCEDURE — 97110 THERAPEUTIC EXERCISES: CPT

## 2018-07-24 NOTE — OP THERAPY DAILY TREATMENT
"  Outpatient Occupational Therapy  DAILY TREATMENT     [facility] Occupational [address].  [address]  [place] [address]  Phone:  [phone]  Fax:  [phone]    Date: 07/24/2018    Patient: Torey Lima  YOB: 1945  MRN: [id number]     Time Calculation  Start time: 1035  Stop time: 1135 Time Calculation (min): 60 minutes     Chief Complaint: Hand Weakness (right)    Visit #: 6    SUBJECTIVE: \"I was able to make a full claw last Sunday\"    OBJECTIVE:  Current objective measures:    strength:  LH: 80 lbs  RH: 66 lbs  LH FDP strength: all digits 4/5   RH FDP strength: all digits 2-/5        Therapeutic Exercises (CPT 05093):     1. Right hand    Therapeutic Exercise Summary:  Composite fist x 20 reps following manual therapy, intrinsic minus with 5 second holds, gross opposition.  Grasp of narrow to regular sized- cones 10 second hold 5 reps each followed by AROM exercises.   Performed RH grasp/gather towel in palmar hand and squeeze sequence x 5 reps.  HEP updated with good understanding.        Therapeutic Treatments and Modalities:    1. Manual Therapy (CPT 43093)    Therapeutic Treatments and Modalities Summary: MHP right volar forearm to digits x 5 minutes.  Right medial volar forearm STM for release of soft tissue adhesions with and without Guasha, gentle joint mobilization and traction of all DIPs along with passive sustained stretch into full isolated/composite flexion and intrinsic minus.    Time-based treatments/modalities:  Therapeutic exercise minutes (CPT 51160): 45 minutes  Manual therapy joint mobilization minutes (CPT 45364): 15 minutes      ASSESSMENT:   Response to treatment: Pt continues to make slow but steady progress with strength and AROM of right hand DIP flexion, most notable in IF/MF during intrinsic minus and composite fist.  Pt responding well to STM and graded activities described above.  HEP updated with good " understanding.    PLAN/RECOMMENDATIONS:   Plan for treatment: therapy treatment to continue next visit.  Planned interventions for next visit: continue with current treatment, manual therapy (CPT 72192), therapeutic activities (CPT 80847) and therapeutic exercise (CPT 47943)

## 2018-08-02 ENCOUNTER — HOSPITAL ENCOUNTER (OUTPATIENT)
Dept: LAB | Facility: MEDICAL CENTER | Age: 73
End: 2018-08-02
Attending: INTERNAL MEDICINE
Payer: MEDICARE

## 2018-08-02 DIAGNOSIS — E78.2 MIXED HYPERLIPIDEMIA: ICD-10-CM

## 2018-08-02 LAB
ALBUMIN SERPL BCP-MCNC: 3.8 G/DL (ref 3.2–4.9)
ALBUMIN SERPL BCP-MCNC: 3.9 G/DL (ref 3.2–4.9)
ALBUMIN/GLOB SERPL: 1.7 G/DL
ALBUMIN/GLOB SERPL: 1.9 G/DL
ALP SERPL-CCNC: 87 U/L (ref 30–99)
ALP SERPL-CCNC: 89 U/L (ref 30–99)
ALT SERPL-CCNC: 15 U/L (ref 2–50)
ALT SERPL-CCNC: 15 U/L (ref 2–50)
ANION GAP SERPL CALC-SCNC: 7 MMOL/L (ref 0–11.9)
ANION GAP SERPL CALC-SCNC: 8 MMOL/L (ref 0–11.9)
AST SERPL-CCNC: 19 U/L (ref 12–45)
AST SERPL-CCNC: 19 U/L (ref 12–45)
BASOPHILS # BLD AUTO: 0.9 % (ref 0–1.8)
BASOPHILS # BLD AUTO: 0.9 % (ref 0–1.8)
BASOPHILS # BLD: 0.04 K/UL (ref 0–0.12)
BASOPHILS # BLD: 0.04 K/UL (ref 0–0.12)
BILIRUB SERPL-MCNC: 1.5 MG/DL (ref 0.1–1.5)
BILIRUB SERPL-MCNC: 1.5 MG/DL (ref 0.1–1.5)
BUN SERPL-MCNC: 22 MG/DL (ref 8–22)
BUN SERPL-MCNC: 22 MG/DL (ref 8–22)
CALCIUM SERPL-MCNC: 8.7 MG/DL (ref 8.5–10.5)
CALCIUM SERPL-MCNC: 8.8 MG/DL (ref 8.5–10.5)
CHLORIDE SERPL-SCNC: 104 MMOL/L (ref 96–112)
CHLORIDE SERPL-SCNC: 105 MMOL/L (ref 96–112)
CHOLEST SERPL-MCNC: 95 MG/DL (ref 100–199)
CO2 SERPL-SCNC: 28 MMOL/L (ref 20–33)
CO2 SERPL-SCNC: 28 MMOL/L (ref 20–33)
CREAT SERPL-MCNC: 0.93 MG/DL (ref 0.5–1.4)
CREAT SERPL-MCNC: 0.95 MG/DL (ref 0.5–1.4)
EOSINOPHIL # BLD AUTO: 0.2 K/UL (ref 0–0.51)
EOSINOPHIL # BLD AUTO: 0.21 K/UL (ref 0–0.51)
EOSINOPHIL NFR BLD: 4.4 % (ref 0–6.9)
EOSINOPHIL NFR BLD: 4.5 % (ref 0–6.9)
ERYTHROCYTE [DISTWIDTH] IN BLOOD BY AUTOMATED COUNT: 47.8 FL (ref 35.9–50)
ERYTHROCYTE [DISTWIDTH] IN BLOOD BY AUTOMATED COUNT: 48.5 FL (ref 35.9–50)
GLOBULIN SER CALC-MCNC: 2.1 G/DL (ref 1.9–3.5)
GLOBULIN SER CALC-MCNC: 2.2 G/DL (ref 1.9–3.5)
GLUCOSE SERPL-MCNC: 88 MG/DL (ref 65–99)
GLUCOSE SERPL-MCNC: 89 MG/DL (ref 65–99)
HCT VFR BLD AUTO: 39.5 % (ref 42–52)
HCT VFR BLD AUTO: 39.6 % (ref 42–52)
HDLC SERPL-MCNC: 48 MG/DL
HGB BLD-MCNC: 13.2 G/DL (ref 14–18)
HGB BLD-MCNC: 13.2 G/DL (ref 14–18)
IMM GRANULOCYTES # BLD AUTO: 0.01 K/UL (ref 0–0.11)
IMM GRANULOCYTES # BLD AUTO: 0.02 K/UL (ref 0–0.11)
IMM GRANULOCYTES NFR BLD AUTO: 0.2 % (ref 0–0.9)
IMM GRANULOCYTES NFR BLD AUTO: 0.4 % (ref 0–0.9)
INR PPP: 1.36 (ref 0.87–1.13)
LDLC SERPL CALC-MCNC: 35 MG/DL
LYMPHOCYTES # BLD AUTO: 0.72 K/UL (ref 1–4.8)
LYMPHOCYTES # BLD AUTO: 0.73 K/UL (ref 1–4.8)
LYMPHOCYTES NFR BLD: 15.6 % (ref 22–41)
LYMPHOCYTES NFR BLD: 15.8 % (ref 22–41)
MCH RBC QN AUTO: 29.3 PG (ref 27–33)
MCH RBC QN AUTO: 29.3 PG (ref 27–33)
MCHC RBC AUTO-ENTMCNC: 33.3 G/DL (ref 33.7–35.3)
MCHC RBC AUTO-ENTMCNC: 33.4 G/DL (ref 33.7–35.3)
MCV RBC AUTO: 87.6 FL (ref 81.4–97.8)
MCV RBC AUTO: 87.8 FL (ref 81.4–97.8)
MONOCYTES # BLD AUTO: 0.51 K/UL (ref 0–0.85)
MONOCYTES # BLD AUTO: 0.56 K/UL (ref 0–0.85)
MONOCYTES NFR BLD AUTO: 10.9 % (ref 0–13.4)
MONOCYTES NFR BLD AUTO: 12.3 % (ref 0–13.4)
NEUTROPHILS # BLD AUTO: 3.02 K/UL (ref 1.82–7.42)
NEUTROPHILS # BLD AUTO: 3.17 K/UL (ref 1.82–7.42)
NEUTROPHILS NFR BLD: 66.2 % (ref 44–72)
NEUTROPHILS NFR BLD: 67.9 % (ref 44–72)
NRBC # BLD AUTO: 0 K/UL
NRBC # BLD AUTO: 0 K/UL
NRBC BLD-RTO: 0 /100 WBC
NRBC BLD-RTO: 0 /100 WBC
PLATELET # BLD AUTO: 56 K/UL (ref 164–446)
PLATELET # BLD AUTO: 59 K/UL (ref 164–446)
PMV BLD AUTO: 10.7 FL (ref 9–12.9)
PMV BLD AUTO: 11.4 FL (ref 9–12.9)
POTASSIUM SERPL-SCNC: 3.7 MMOL/L (ref 3.6–5.5)
POTASSIUM SERPL-SCNC: 3.8 MMOL/L (ref 3.6–5.5)
PROT SERPL-MCNC: 6 G/DL (ref 6–8.2)
PROT SERPL-MCNC: 6 G/DL (ref 6–8.2)
PROTHROMBIN TIME: 16.5 SEC (ref 12–14.6)
RBC # BLD AUTO: 4.51 M/UL (ref 4.7–6.1)
RBC # BLD AUTO: 4.51 M/UL (ref 4.7–6.1)
SODIUM SERPL-SCNC: 139 MMOL/L (ref 135–145)
SODIUM SERPL-SCNC: 141 MMOL/L (ref 135–145)
TRIGL SERPL-MCNC: 60 MG/DL (ref 0–149)
WBC # BLD AUTO: 4.6 K/UL (ref 4.8–10.8)
WBC # BLD AUTO: 4.7 K/UL (ref 4.8–10.8)

## 2018-08-02 PROCEDURE — 80053 COMPREHEN METABOLIC PANEL: CPT

## 2018-08-02 PROCEDURE — 80053 COMPREHEN METABOLIC PANEL: CPT | Mod: 91

## 2018-08-02 PROCEDURE — 80061 LIPID PANEL: CPT

## 2018-08-02 PROCEDURE — 36415 COLL VENOUS BLD VENIPUNCTURE: CPT

## 2018-08-02 PROCEDURE — 85025 COMPLETE CBC W/AUTO DIFF WBC: CPT | Mod: 91

## 2018-08-02 PROCEDURE — 85025 COMPLETE CBC W/AUTO DIFF WBC: CPT

## 2018-08-02 PROCEDURE — 85610 PROTHROMBIN TIME: CPT

## 2018-08-06 ENCOUNTER — TELEPHONE (OUTPATIENT)
Dept: MEDICAL GROUP | Age: 73
End: 2018-08-06

## 2018-08-06 NOTE — TELEPHONE ENCOUNTER
ESTABLISHED PATIENT PRE-VISIT PLANNING     Note: Patient will not be contacted if there is no indication to call.     1.  Reviewed notes from the last few office visits within the medical group: Yes    2.  If any orders were placed at last visit or intended to be done for this visit (i.e. 6 mos follow-up), do we have Results/Consult Notes?        •  Labs - Labs ordered, completed on 8/2/18 and results are in chart.   Note: If patient appointment is for lab review and patient did not complete labs, check with provider if OK to reschedule patient until labs completed.       •  Imaging - Imaging was not ordered at last office visit.       •  Referrals - No referrals were ordered at last office visit.    3. Is this appointment scheduled as a Hospital Follow-Up? No    4.  Immunizations were updated in Epic using WebIZ?: Epic matches WebIZ       •  Web Iz Recommendations: Patient is up to date on all vaccines    5.  Patient is due for the following Health Maintenance Topics:   Health Maintenance Due   Topic Date Due   • IMM ZOSTER VACCINES (2 of 3) 12/29/2012   • Annual Wellness Visit  06/22/2017       - Patient is up-to-date on all Health Maintenance topics. No records have been requested at this time.    6.  MDX printed for Provider? NO           mdx complete    7.  Patient was NOT informed to arrive 15 min prior to their scheduled appointment and bring in their medication bottles.

## 2018-08-07 ENCOUNTER — OFFICE VISIT (OUTPATIENT)
Dept: MEDICAL GROUP | Age: 73
End: 2018-08-07
Payer: MEDICARE

## 2018-08-07 VITALS
DIASTOLIC BLOOD PRESSURE: 68 MMHG | WEIGHT: 243 LBS | HEART RATE: 68 BPM | OXYGEN SATURATION: 97 % | SYSTOLIC BLOOD PRESSURE: 116 MMHG | HEIGHT: 75 IN | BODY MASS INDEX: 30.21 KG/M2 | TEMPERATURE: 98.8 F

## 2018-08-07 DIAGNOSIS — D69.59 THROMBOCYTOPENIA DUE TO DRUGS: ICD-10-CM

## 2018-08-07 DIAGNOSIS — G62.0 PERIPHERAL NEUROPATHY DUE TO CHEMOTHERAPY (HCC): ICD-10-CM

## 2018-08-07 DIAGNOSIS — R18.8 CIRRHOSIS OF LIVER WITH ASCITES, UNSPECIFIED HEPATIC CIRRHOSIS TYPE (HCC): ICD-10-CM

## 2018-08-07 DIAGNOSIS — T45.1X5A PERIPHERAL NEUROPATHY DUE TO CHEMOTHERAPY (HCC): ICD-10-CM

## 2018-08-07 DIAGNOSIS — E66.9 OBESITY (BMI 30-39.9): ICD-10-CM

## 2018-08-07 DIAGNOSIS — Z85.038 H/O COLON CANCER, STAGE IV: ICD-10-CM

## 2018-08-07 DIAGNOSIS — R60.0 EDEMA OF LEFT LOWER EXTREMITY: ICD-10-CM

## 2018-08-07 DIAGNOSIS — T50.905A THROMBOCYTOPENIA DUE TO DRUGS: ICD-10-CM

## 2018-08-07 DIAGNOSIS — K74.60 CIRRHOSIS OF LIVER WITH ASCITES, UNSPECIFIED HEPATIC CIRRHOSIS TYPE (HCC): ICD-10-CM

## 2018-08-07 DIAGNOSIS — I10 ESSENTIAL HYPERTENSION: ICD-10-CM

## 2018-08-07 DIAGNOSIS — I25.10 ASCVD (ARTERIOSCLEROTIC CARDIOVASCULAR DISEASE): ICD-10-CM

## 2018-08-07 DIAGNOSIS — C18.8 OVERLAPPING MALIGNANT NEOPLASM OF COLON (HCC): ICD-10-CM

## 2018-08-07 DIAGNOSIS — G61.0 GUILLAIN BARRÉ SYNDROME (HCC): ICD-10-CM

## 2018-08-07 DIAGNOSIS — E78.2 MIXED HYPERLIPIDEMIA: ICD-10-CM

## 2018-08-07 PROBLEM — Z78.9 IMPAIRED MOBILITY AND ADLS: Status: RESOLVED | Noted: 2017-08-11 | Resolved: 2018-08-07

## 2018-08-07 PROBLEM — Z74.09 IMPAIRED MOBILITY AND ADLS: Status: RESOLVED | Noted: 2017-08-11 | Resolved: 2018-08-07

## 2018-08-07 PROCEDURE — 99214 OFFICE O/P EST MOD 30 MIN: CPT | Performed by: INTERNAL MEDICINE

## 2018-08-07 ASSESSMENT — ENCOUNTER SYMPTOMS
MUSCULOSKELETAL NEGATIVE: 1
CONSTITUTIONAL NEGATIVE: 1
NEUROLOGICAL NEGATIVE: 1
PSYCHIATRIC NEGATIVE: 1
RESPIRATORY NEGATIVE: 1
EYES NEGATIVE: 1
LEG PAIN: 1
GASTROINTESTINAL NEGATIVE: 1
CARDIOVASCULAR NEGATIVE: 1

## 2018-08-07 NOTE — PROGRESS NOTES
Subjective:      Torey Lima is a 73 y.o. male who presents with Follow-Up (lab review) and Leg Pain (cramps x3 weeks)  and  The patient is here for followup of chronic medical problems listed below. The patient is compliant with medications and having no side effects from them. Denies chest pain, abdominal pain, dyspnea, myalgias, or cough.   Patient Active Problem List    Diagnosis Date Noted   • Esophageal varices- BANDED 4/2016- repeat egd tbd 10//29/16- gi 04/04/2016     Priority: High   • ASCVD (arteriosclerotic cardiovascular disease)subtotalled small distal LCx and 40% LAD med rx by cath 12/13/12 12/14/2012     Priority: High   • Gastric varices- s/p BRTO procedure at [facility] 04/04/2016     Priority: Medium   • Gastrointestinal hemorrhage with hematemesis- recurrernt from varices 04/03/2016     Priority: Medium   • Generalized edema 12/14/2017   • Obesity (BMI 30-39.9) 12/14/2017   • Edema of left lower extremity- due to dvt july 2017 09/19/2017   • Herpes zoster infection of thoracic region- right back T8 09/19/2017   • BPH (benign prostatic hyperplasia) 08/11/2017   • Peripheral neuropathy due to chemotherapy (HCC) 08/11/2017   • Acute deep vein thrombosis (DVT) of femoral vein of left lower extremity (CMS-HCC)- IVC placed july 2017 06/26/2017   • Guillain Barré syndrome (HCC) 05/27/2017   • H/O colon cancer, stage IV- redo colonoscopy 2017 aug at [facility] 05/20/2017   • Elevated bilirubin 01/03/2017   • History of colon cancer 10/24/2016   • History of esophageal varices 10/24/2016   • Portal vein thrombosis- on CT [facility] 2/2015 04/14/2016   • Cirrhosis of liver with ascites (HCC)- ? 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)--2010  left hemicolectomy; no colostomy; ablation of liver met at [facility] 1/2011- MRI abd 3/2018 no change in per 12/06/2010   • Liver lesion- ablation of malignant met from colon ca 2011- f/u [facility] q 6 mos - stable MRI abd 3/2018- redo annually 12/06/2010   • Peripheral neuropathy, secondary to drugs or chemicals 12/06/2010     Allergies   Allergen Reactions   • Influenza Virus Vaccine Live      Guillan [place]    • Anticoagulant Sodium Citrate [Sodium Citrate]      HIGH BLEEDING RISK   • Lisinopril Cough   • Nkda [No Known Drug Allergy]      Outpatient Medications Prior to Visit   Medication Sig Dispense Refill   • potassium chloride SA (KLOR-CON M20) 20 MEQ Tab CR Take 1 Tab by mouth 2 times a day. 180 Tab 4   • folic acid (FOLVITE) 1 MG Tab TAKE ONE TABLET BY MOUTH ONCE DAILY 90 Tab 4   • spironolactone (ALDACTONE) 25 MG Tab Take 1 Tab by mouth every day. 90 Tab 4   • metolazone (ZAROXOLYN) 2.5 MG Tab Take 1 Tab by mouth every 48 hours. 45 Tab 4   • torsemide (DEMADEX) 10 MG tablet Take 2 Tabs by mouth every 48 hours. 90 Tab 4   • atorvastatin (LIPITOR) 40 MG Tab Take 1 Tab by mouth every bedtime. 90 Tab 4   • ursodiol (ACTIGALL) 300 MG Cap Take 1 Cap by mouth every day. 90 Cap 4   • ferrous sulfate 325 (65 Fe) MG tablet Take 1 Tab by mouth every morning with breakfast. 30 Tab    • folic acid (FOLVITE) 1 MG Tab Take 1 Tab by mouth every day. 30 Tab    • Diclofenac Sodium 1 % Gel APPLY FOUR GRAMS ON THE LEFT HIP  TWICE DAILY AS NEEDED (Patient not taking: Reported on 3/7/2018) 100 g 0   • tamsulosin (FLOMAX) 0.4 MG capsule Take 1 Cap by mouth ONE-HALF HOUR AFTER BREAKFAST. (Patient not taking: Reported on 3/7/2018) 90 Cap 4   • MethylPREDNISolone (MEDROL DOSEPAK) 4 MG Tablet Therapy Pack As directed (Patient not taking: Reported on 3/7/2018) 1 Kit 1   • valacyclovir (VALTREX) 1 GM Tab Take 1 Tab by mouth 2 times a day. (Patient not taking: Reported on 3/7/2018) 20 Tab 0   • vitamin D (VITAMIND D3) 1000 UNIT Tab Take 1 Tab by  "mouth every day. 60 Tab 0   • polyethylene glycol/lytes (MIRALAX) Pack Take 1 Packet by mouth every bedtime.  3   • docusate sodium 100 MG Cap Take 100 mg by mouth 2 times a day as needed for Constipation. 60 Cap    • senna-docusate (PERICOLACE OR SENOKOT S) 8.6-50 MG Tab Take 1 Tab by mouth 1 time daily as needed for Constipation. 30 Tab 0   • oxycodone immediate release (ROXICODONE) 10 MG immediate release tablet Take 1 Tab by mouth every four hours as needed for Moderate Pain. (Patient not taking: Reported on 3/7/2018) 28 Tab 0     No facility-administered medications prior to visit.                Leg Pain         Review of Systems   Constitutional: Negative.    HENT: Negative.    Eyes: Negative.    Respiratory: Negative.    Cardiovascular: Negative.    Gastrointestinal: Negative.    Genitourinary: Negative.    Musculoskeletal: Negative.    Skin: Negative.    Neurological: Negative.    Endo/Heme/Allergies: Negative.    Psychiatric/Behavioral: Negative.           Objective:     /68   Pulse 68   Temp 37.1 °C (98.8 °F)   Ht 1.905 m (6' 3\")   Wt 110.2 kg (243 lb)   SpO2 97%   BMI 30.37 kg/m²      Physical Exam   Constitutional: He is oriented to person, place, and time. He appears well-developed and well-nourished. No distress.   HENT:   Head: Normocephalic and atraumatic.   Right Ear: External ear normal.   Left Ear: External ear normal.   Nose: Nose normal.   Mouth/Throat: Oropharynx is clear and moist. No oropharyngeal exudate.   Eyes: Pupils are equal, round, and reactive to light. Conjunctivae and EOM are normal. Right eye exhibits no discharge. Left eye exhibits no discharge. No scleral icterus.   Neck: Normal range of motion. Neck supple. No JVD present. No tracheal deviation present. No thyromegaly present.   Cardiovascular: Normal rate, regular rhythm, normal heart sounds and intact distal pulses.  Exam reveals no gallop and no friction rub.    No murmur heard.  Pulmonary/Chest: Effort normal " and breath sounds normal. No stridor. No respiratory distress. He has no wheezes. He has no rales. He exhibits no tenderness.   Abdominal: Soft. Bowel sounds are normal. He exhibits no distension and no mass. There is no tenderness ([id number]). There is no rebound and no guarding.   Musculoskeletal: Normal range of motion. He exhibits no edema or tenderness.   Lymphadenopathy:     He has no cervical adenopathy.   Neurological: He is alert and oriented to person, place, and time. He has normal reflexes. He displays normal reflexes. He exhibits normal muscle tone. Coordination normal.   Skin: Skin is warm and dry. No rash noted. He is not diaphoretic. No erythema. No pallor.   Psychiatric: He has a normal mood and affect. His behavior is normal. Judgment and thought content normal.               Hospital Outpatient Visit on 08/02/2018   Component Date Value   • Sodium 08/02/2018 139    • Potassium 08/02/2018 3.7    • Chloride 08/02/2018 104    • Co2 08/02/2018 28    • Anion Gap 08/02/2018 7.0    • Glucose 08/02/2018 89    • Bun 08/02/2018 22    • Creatinine 08/02/2018 0.95    • Calcium 08/02/2018 8.8    • AST(SGOT) 08/02/2018 19    • ALT(SGPT) 08/02/2018 15    • Alkaline Phosphatase 08/02/2018 87    • Total Bilirubin 08/02/2018 1.5    • Albumin 08/02/2018 3.8    • Total Protein 08/02/2018 6.0    • Globulin 08/02/2018 2.2    • A-G Ratio 08/02/2018 1.7    • Cholesterol,Tot 08/02/2018 95*   • Triglycerides 08/02/2018 60    • HDL 08/02/2018 48    • LDL 08/02/2018 35    • WBC 08/02/2018 4.6*   • RBC 08/02/2018 4.51*   • Hemoglobin 08/02/2018 13.2*   • Hematocrit 08/02/2018 39.6*   • MCV 08/02/2018 87.8    • MCH 08/02/2018 29.3    • MCHC 08/02/2018 33.3*   • RDW 08/02/2018 47.8    • Platelet Count 08/02/2018 59*   • MPV 08/02/2018 11.4    • Neutrophils-Polys 08/02/2018 66.20    • Lymphocytes 08/02/2018 15.80*   • Monocytes 08/02/2018 12.30    • Eosinophils 08/02/2018 4.40    • Basophils 08/02/2018 0.90    • Immature  Granulocytes 08/02/2018 0.40    • Nucleated RBC 08/02/2018 0.00    • Neutrophils (Absolute) 08/02/2018 3.02    • Lymphs (Absolute) 08/02/2018 0.72*   • Monos (Absolute) 08/02/2018 0.56    • Eos (Absolute) 08/02/2018 0.20    • Baso (Absolute) 08/02/2018 0.04    • Immature Granulocytes (a* 08/02/2018 0.02    • NRBC (Absolute) 08/02/2018 0.00    • GFR If  08/02/2018 >60    • GFR If Non  Ameri* 08/02/2018 >60    Hospital Outpatient Visit on 08/02/2018   Component Date Value   • WBC 08/02/2018 4.7*   • RBC 08/02/2018 4.51*   • Hemoglobin 08/02/2018 13.2*   • Hematocrit 08/02/2018 39.5*   • MCV 08/02/2018 87.6    • MCH 08/02/2018 29.3    • MCHC 08/02/2018 33.4*   • RDW 08/02/2018 48.5    • Platelet Count 08/02/2018 56*   • MPV 08/02/2018 10.7    • Neutrophils-Polys 08/02/2018 67.90    • Lymphocytes 08/02/2018 15.60*   • Monocytes 08/02/2018 10.90    • Eosinophils 08/02/2018 4.50    • Basophils 08/02/2018 0.90    • Immature Granulocytes 08/02/2018 0.20    • Nucleated RBC 08/02/2018 0.00    • Neutrophils (Absolute) 08/02/2018 3.17    • Lymphs (Absolute) 08/02/2018 0.73*   • Monos (Absolute) 08/02/2018 0.51    • Eos (Absolute) 08/02/2018 0.21    • Baso (Absolute) 08/02/2018 0.04    • Immature Granulocytes (a* 08/02/2018 0.01    • NRBC (Absolute) 08/02/2018 0.00    • PT 08/02/2018 16.5*   • INR 08/02/2018 1.36*   • GFR If  08/02/2018 >60    • GFR If Non  Ameri* 08/02/2018 >60    • Sodium 08/02/2018 141    • Potassium 08/02/2018 3.8    • Chloride 08/02/2018 105    • Co2 08/02/2018 28    • Anion Gap 08/02/2018 8.0    • Glucose 08/02/2018 88    • Bun 08/02/2018 22    • Creatinine 08/02/2018 0.93    • Calcium 08/02/2018 8.7    • AST(SGOT) 08/02/2018 19    • ALT(SGPT) 08/02/2018 15    • Alkaline Phosphatase 08/02/2018 89    • Total Bilirubin 08/02/2018 1.5    • Albumin 08/02/2018 3.9    • Total Protein 08/02/2018 6.0    • Globulin 08/02/2018 2.1    • A-G Ratio 08/02/2018 1.9       Lab  Results   Component Value Date/Time    HBA1C 5.2 10/25/2016 12:06 AM    HBA1C 5.3 07/08/2016 07:02 AM     Lab Results   Component Value Date/Time    SODIUM 139 08/02/2018 06:18 AM    POTASSIUM 3.7 08/02/2018 06:18 AM    CHLORIDE 104 08/02/2018 06:18 AM    CO2 28 08/02/2018 06:18 AM    GLUCOSE 89 08/02/2018 06:18 AM    BUN 22 08/02/2018 06:18 AM    CREATININE 0.95 08/02/2018 06:18 AM    CREATININE 1.14 01/23/2010 12:00 AM    BUNCREATRAT 15 01/23/2010 12:00 AM    GLOMRATE >59 01/23/2010 12:00 AM    ALKPHOSPHAT 87 08/02/2018 06:18 AM    ASTSGOT 19 08/02/2018 06:18 AM    ALTSGPT 15 08/02/2018 06:18 AM    TBILIRUBIN 1.5 08/02/2018 06:18 AM     Lab Results   Component Value Date/Time    INR 1.36 (H) 08/02/2018 06:08 AM    INR 1.28 (H) 04/28/2018 07:11 AM    INR 1.18 (H) 09/16/2017 07:26 AM     Lab Results   Component Value Date/Time    CHOLSTRLTOT 95 (L) 08/02/2018 06:18 AM    LDL 35 08/02/2018 06:18 AM    HDL 48 08/02/2018 06:18 AM    TRIGLYCERIDE 60 08/02/2018 06:18 AM       Lab Results   Component Value Date/Time    TESTOSTERONE 333 04/02/2015 06:47 AM     No results found for: TSH  Lab Results   Component Value Date/Time    FREET4 0.85 04/02/2015 06:47 AM    FREET4 0.85 04/12/2014 07:26 AM     No results found for: URICACID  No components found for: VITB12  Lab Results   Component Value Date/Time    25HYDROXY 32 08/11/2017 04:50 AM    25HYDROXY 38 04/02/2015 06:47 AM       Assessment/Plan:     1. Overlapping malignant neoplasm of colon (HCC)Overlapping malignant neoplasm of colon (HCC)--2010 left hemicolectomy; no colostomy; ablation of liver met at [facility] 1/2011- MRI abd 3/2018 no change in per   Under good control. Continue same regimen.        - CEA; Future    2. Mixed hyperlipidemia Under good control. Continue same regimen.      - COMP METABOLIC PANEL; Future  - LIPID PROFILE; Future    3. Essential hypertension Under good control. Continue same regimen.       4. Cirrhosis of liver with ascites, unspecified hepatic  cirrhosis type (HCC) Under good control. Continue same regimen.        5. H/O colon cancer, stage IV- redo colonoscopy 2017 aug at [facility] Under good control. Continue same regimen.       6. Peripheral neuropathy due to chemotherapy (HCC Under good control. Continue same regimen.)       7. Obesity (BMI 30-39.9)    diet/exercise/lose 15 lbs.; patient counseled      8. Edema of left lower extremity- due to dvt july 2017   Under good control. Continue same regimen.      9. Guillain Barré syndrome (HCC)     resolved       10. Thrombocytopenia due to drugs     - REFERRAL TO HEMATOLOGY ONCOLOGY Referral to? [facility]  - CBC WITH DIFFERENTIAL; Future    11. ASCVD (arteriosclerotic cardiovascular disease)subtotalled small distal LCx and 40% LAD med rx by cath 12/13/12      Under good control. Continue same regimen.        30 minute face-to-face encounter took place today.  More than half of this time was spent in the coordination of care of the above problems, as well as counseling.

## 2018-08-09 ENCOUNTER — OFFICE VISIT (OUTPATIENT)
Dept: HEMATOLOGY ONCOLOGY | Facility: MEDICAL CENTER | Age: 73
End: 2018-08-09
Payer: MEDICARE

## 2018-08-09 VITALS
TEMPERATURE: 97.9 F | DIASTOLIC BLOOD PRESSURE: 56 MMHG | SYSTOLIC BLOOD PRESSURE: 108 MMHG | BODY MASS INDEX: 30.81 KG/M2 | WEIGHT: 247.8 LBS | HEIGHT: 75 IN | OXYGEN SATURATION: 96 % | HEART RATE: 65 BPM | RESPIRATION RATE: 14 BRPM

## 2018-08-09 DIAGNOSIS — D69.6 THROMBOCYTOPENIA (HCC): ICD-10-CM

## 2018-08-09 PROCEDURE — 99204 OFFICE O/P NEW MOD 45 MIN: CPT | Performed by: INTERNAL MEDICINE

## 2018-08-09 RX ORDER — LANOLIN ALCOHOL/MO/W.PET/CERES
CREAM (GRAM) TOPICAL
COMMUNITY
End: 2020-11-12

## 2018-08-09 RX ORDER — LORAZEPAM 0.5 MG/1
0.5 TABLET ORAL
COMMUNITY
Start: 2018-01-31 | End: 2018-11-13

## 2018-08-09 ASSESSMENT — PAIN SCALES - GENERAL: PAINLEVEL: NO PAIN

## 2018-08-09 NOTE — PROGRESS NOTES
Consult Note: Oncology    Date of consultation: 8/9/2018     Referring provider: The patient is here by the kind referral of Fabián Urena M.D.    Reason for consultation:   CC: Thrombocytopenia      History of presenting illness:   First seen in on 8/9/2018    Torey Lima  is a 73 y.o. male seen in clinic today for evaluation of thrombocytopenia.  Patient has a history of oligo metastatic colon cancer treated low anterior resection in August 2010.  Imaging showed solitary lesion to the liver and underwent 4 cycles of XELOX followed by ablation of the liver lesion on January 27, 2011.  Followed by completion of adjuvant chemotherapy.  The patient has developed end-stage liver disease and is currently followed by a hepatologist at [facility].  The cause of his cirrhosis is not entirely clear however it has resulted in portal hypertension and splenomegaly    Review of Systems:  Constitutional: No fever, chills, weight loss, malaise/fatigue.    All other review of systems are negative except what was mentioned above in the HPI.    Past Medical History:    Past Medical History:   Diagnosis Date   • ASCVD (arteriosclerotic cardiovascular disease) 12/14/2012   • BMI 33.0-33.9,adult 2/15/2013   • Cancer (HCC) 10/10-6/11    colon, liver cance   • Chickenpox    • Colon cancer (HCC) 12/6/2010   • Elevated CEA 12/6/2010   • Bulgarian measles    • GI bleed    • HLD (hyperlipidemia) 6/20/2013   • HTN (hypertension) 6/20/2013   • Hypertension    • Impaired fasting glucose 8/2/2012   • Liver cirrhosis (HCC)    • Liver lesion 12/6/2010   • Peripheral neuropathy, secondary to drugs or chemicals 12/6/2010   • Thrombocytopenia due to drugs 4/25/2012   • Vitamin d deficiency 8/2/2012       Past surgical history:    Past Surgical History:   Procedure Laterality Date   • LUMBAR LAMINECTOMY DISKECTOMY  5/25/2017    Procedure:  LAMINECTOMY LUMBAR  4 TO SACRAL 1;  Surgeon: Felton Escobar M.D.;  Location: SURGERY [facility];   Service:    • GASTROSCOPY WITH BANDING  10/25/2016    Procedure: GASTROSCOPY WITH BANDING;  Surgeon: Pierre Waggoner M.D.;  Location: ENDOSCOPY [facility];  Service:    • GASTROSCOPY WITH BANDING  4/3/2016    Procedure: GASTROSCOPY WITH BANDING;  Surgeon: Cayetano Stovall M.D.;  Location: ENDOSCOPY [facility];  Service:    • FULL THICKNESS BLOCK RESECTION  4/11/2012    Performed by LANI BOWMAN at SURGERY SURG[facility] ORS   • CATH REMOVAL  7/20/2011    Performed by RUBY RUIZ at SURGERY [facility] ORS   • CATH PLACEMENT  9/20/2010    Performed by RUBY RUIZ at SURGERY [facility] ORS   • LOW ANTERIOR RESECTION ROBOTIC  8/10/2010    Performed by RUBY RUIZ at SURGERY [facility] ORS   • COLON RESECTION     • HIP ARTHROPLASTY MIS TOTAL      rt   • HIP REPLACEMENT, TOTAL     • OTHER      Cholecystectomy   • OTHER (COMMENTS)      liver surgery         Allergies:  Influenza virus vaccine live; Anticoagulant sodium citrate [sodium citrate]; Lisinopril; and Nkda [no known drug allergy]    Medications:    Current Outpatient Prescriptions   Medication Sig Dispense Refill   • Cholecalciferol (VITAMIN D3) 1000 units Cap Take 1,000 Units by mouth.     • potassium chloride SA (KLOR-CON M20) 20 MEQ Tab CR Take 1 Tab by mouth 2 times a day. 180 Tab 4   • folic acid (FOLVITE) 1 MG Tab TAKE ONE TABLET BY MOUTH ONCE DAILY 90 Tab 4   • spironolactone (ALDACTONE) 25 MG Tab Take 1 Tab by mouth every day. 90 Tab 4   • metolazone (ZAROXOLYN) 2.5 MG Tab Take 1 Tab by mouth every 48 hours. 45 Tab 4   • torsemide (DEMADEX) 10 MG tablet Take 2 Tabs by mouth every 48 hours. 90 Tab 4   • atorvastatin (LIPITOR) 40 MG Tab Take 1 Tab by mouth every bedtime. 90 Tab 4   • ursodiol (ACTIGALL) 300 MG Cap Take 1 Cap by mouth every day. 90 Cap 4   • ferrous sulfate 325 (65 Fe) MG tablet Take 1 Tab by mouth every morning with breakfast. 30 Tab    • glucosamine-chondroitin 500-400 MG tablet Take  by mouth.    "  • LORazepam (ATIVAN) 0.5 MG Tab Take 0.5 mg by mouth.     • folic acid (FOLVITE) 1 MG Tab Take 1 Tab by mouth every day. 30 Tab      No current facility-administered medications for this visit.        Social History:     Social History     Social History   • Marital status:      Spouse name: N/A   • Number of children: N/A   • Years of education: N/A     Occupational History   • Not on file.     Social History Main Topics   • Smoking status: Former Smoker     Packs/day: 0.50     Years: 1.00     Types: Cigarettes, Cigars     Quit date: 1/1/1964   • Smokeless tobacco: Never Used      Comment: 1 cigar per week.   • Alcohol use No      Comment: minimal, Pt stop drinking since the beginning of April 2016   • Drug use: No   • Sexual activity: Yes     Partners: Female     Other Topics Concern   • Not on file     Social History Narrative    ** Merged History Encounter **            Family History:     Family History   Problem Relation Age of Onset   • Heart Disease Mother    • Cancer Mother    • Sleep Apnea Neg Hx        Physical Exam:  Vitals:   /56   Pulse 65   Temp 36.6 °C (97.9 °F)   Resp 14   Ht 1.905 m (6' 3\")   Wt 112.4 kg (247 lb 12.8 oz)   SpO2 96%   BMI 30.97 kg/m²     General: Not in acute distress,   HEENT: No pallor,No icterus. Oropharynx clear.   Neck: Supple, no palpable masses.  Lymph nodes: No palpable cervical, supraclavicular, axillary or inguinal lymphadenopathy.    CVS: regular rate and rhythm, no rubs or gallops  Chest: Clear to auscultate bilaterally, no wheezing or crackles.   ABD: Soft, non tender, non distended, positive bowel sounds, no palpable organomegaly  EXT: No edema or cyanosis  CNS: Alert and oriented x3, No focal deficits.  Skin- No rash      Labs:   Hospital Outpatient Visit on 08/02/2018   Component Date Value Ref Range Status   • Sodium 08/02/2018 139  135 - 145 mmol/L Final   • Potassium 08/02/2018 3.7  3.6 - 5.5 mmol/L Final   • Chloride 08/02/2018 104  96 - 112 " mmol/L Final   • Co2 08/02/2018 28  20 - 33 mmol/L Final   • Anion Gap 08/02/2018 7.0  0.0 - 11.9 Final   • Glucose 08/02/2018 89  65 - 99 mg/dL Final   • Bun 08/02/2018 22  8 - 22 mg/dL Final   • Creatinine 08/02/2018 0.95  0.50 - 1.40 mg/dL Final   • Calcium 08/02/2018 8.8  8.5 - 10.5 mg/dL Final   • AST(SGOT) 08/02/2018 19  12 - 45 U/L Final   • ALT(SGPT) 08/02/2018 15  2 - 50 U/L Final   • Alkaline Phosphatase 08/02/2018 87  30 - 99 U/L Final   • Total Bilirubin 08/02/2018 1.5  0.1 - 1.5 mg/dL Final   • Albumin 08/02/2018 3.8  3.2 - 4.9 g/dL Final   • Total Protein 08/02/2018 6.0  6.0 - 8.2 g/dL Final   • Globulin 08/02/2018 2.2  1.9 - 3.5 g/dL Final   • A-G Ratio 08/02/2018 1.7  g/dL Final   • Cholesterol,Tot 08/02/2018 95* 100 - 199 mg/dL Final   • Triglycerides 08/02/2018 60  0 - 149 mg/dL Final   • HDL 08/02/2018 48  >=40 mg/dL Final   • LDL 08/02/2018 35  <100 mg/dL Final   • WBC 08/02/2018 4.6* 4.8 - 10.8 K/uL Final   • RBC 08/02/2018 4.51* 4.70 - 6.10 M/uL Final   • Hemoglobin 08/02/2018 13.2* 14.0 - 18.0 g/dL Final   • Hematocrit 08/02/2018 39.6* 42.0 - 52.0 % Final   • MCV 08/02/2018 87.8  81.4 - 97.8 fL Final   • MCH 08/02/2018 29.3  27.0 - 33.0 pg Final   • MCHC 08/02/2018 33.3* 33.7 - 35.3 g/dL Final   • RDW 08/02/2018 47.8  35.9 - 50.0 fL Final   • Platelet Count 08/02/2018 59* 164 - 446 K/uL Final   • MPV 08/02/2018 11.4  9.0 - 12.9 fL Final   • Neutrophils-Polys 08/02/2018 66.20  44.00 - 72.00 % Final   • Lymphocytes 08/02/2018 15.80* 22.00 - 41.00 % Final   • Monocytes 08/02/2018 12.30  0.00 - 13.40 % Final   • Eosinophils 08/02/2018 4.40  0.00 - 6.90 % Final   • Basophils 08/02/2018 0.90  0.00 - 1.80 % Final   • Immature Granulocytes 08/02/2018 0.40  0.00 - 0.90 % Final   • Nucleated RBC 08/02/2018 0.00  /100 WBC Final   • Neutrophils (Absolute) 08/02/2018 3.02  1.82 - 7.42 K/uL Final    Includes immature neutrophils, if present.   • Lymphs (Absolute) 08/02/2018 0.72* 1.00 - 4.80 K/uL Final   •  Monos (Absolute) 08/02/2018 0.56  0.00 - 0.85 K/uL Final   • Eos (Absolute) 08/02/2018 0.20  0.00 - 0.51 K/uL Final   • Baso (Absolute) 08/02/2018 0.04  0.00 - 0.12 K/uL Final   • Immature Granulocytes (abs) 08/02/2018 0.02  0.00 - 0.11 K/uL Final   • NRBC (Absolute) 08/02/2018 0.00  K/uL Final   • GFR If  08/02/2018 >60  >60 mL/min/1.73 m 2 Final   • GFR If Non  08/02/2018 >60  >60 mL/min/1.73 m 2 Final   Hospital Outpatient Visit on 08/02/2018   Component Date Value Ref Range Status   • WBC 08/02/2018 4.7* 4.8 - 10.8 K/uL Final   • RBC 08/02/2018 4.51* 4.70 - 6.10 M/uL Final   • Hemoglobin 08/02/2018 13.2* 14.0 - 18.0 g/dL Final   • Hematocrit 08/02/2018 39.5* 42.0 - 52.0 % Final   • MCV 08/02/2018 87.6  81.4 - 97.8 fL Final   • MCH 08/02/2018 29.3  27.0 - 33.0 pg Final   • MCHC 08/02/2018 33.4* 33.7 - 35.3 g/dL Final   • RDW 08/02/2018 48.5  35.9 - 50.0 fL Final   • Platelet Count 08/02/2018 56* 164 - 446 K/uL Final   • MPV 08/02/2018 10.7  9.0 - 12.9 fL Final   • Neutrophils-Polys 08/02/2018 67.90  44.00 - 72.00 % Final   • Lymphocytes 08/02/2018 15.60* 22.00 - 41.00 % Final   • Monocytes 08/02/2018 10.90  0.00 - 13.40 % Final   • Eosinophils 08/02/2018 4.50  0.00 - 6.90 % Final   • Basophils 08/02/2018 0.90  0.00 - 1.80 % Final   • Immature Granulocytes 08/02/2018 0.20  0.00 - 0.90 % Final   • Nucleated RBC 08/02/2018 0.00  /100 WBC Final   • Neutrophils (Absolute) 08/02/2018 3.17  1.82 - 7.42 K/uL Final    Includes immature neutrophils, if present.   • Lymphs (Absolute) 08/02/2018 0.73* 1.00 - 4.80 K/uL Final   • Monos (Absolute) 08/02/2018 0.51  0.00 - 0.85 K/uL Final   • Eos (Absolute) 08/02/2018 0.21  0.00 - 0.51 K/uL Final   • Baso (Absolute) 08/02/2018 0.04  0.00 - 0.12 K/uL Final   • Immature Granulocytes (abs) 08/02/2018 0.01  0.00 - 0.11 K/uL Final   • NRBC (Absolute) 08/02/2018 0.00  K/uL Final   • PT 08/02/2018 16.5* 12.0 - 14.6 sec Final   • INR 08/02/2018 1.36* 0.87  - 1.13 Final    Comment: INR - Non-therapeutic Reference Range: 0.87-1.13  INR - Therapeutic Reference Range: 2.0-4.0     • GFR If  08/02/2018 >60  >60 mL/min/1.73 m 2 Final   • GFR If Non  08/02/2018 >60  >60 mL/min/1.73 m 2 Final   • Sodium 08/02/2018 141  135 - 145 mmol/L Final   • Potassium 08/02/2018 3.8  3.6 - 5.5 mmol/L Final   • Chloride 08/02/2018 105  96 - 112 mmol/L Final   • Co2 08/02/2018 28  20 - 33 mmol/L Final   • Anion Gap 08/02/2018 8.0  0.0 - 11.9 Final   • Glucose 08/02/2018 88  65 - 99 mg/dL Final   • Bun 08/02/2018 22  8 - 22 mg/dL Final   • Creatinine 08/02/2018 0.93  0.50 - 1.40 mg/dL Final   • Calcium 08/02/2018 8.7  8.5 - 10.5 mg/dL Final   • AST(SGOT) 08/02/2018 19  12 - 45 U/L Final   • ALT(SGPT) 08/02/2018 15  2 - 50 U/L Final   • Alkaline Phosphatase 08/02/2018 89  30 - 99 U/L Final   • Total Bilirubin 08/02/2018 1.5  0.1 - 1.5 mg/dL Final   • Albumin 08/02/2018 3.9  3.2 - 4.9 g/dL Final   • Total Protein 08/02/2018 6.0  6.0 - 8.2 g/dL Final   • Globulin 08/02/2018 2.1  1.9 - 3.5 g/dL Final   • A-G Ratio 08/02/2018 1.9  g/dL Final         Pathology:      Imaging:  March 12, 2018.  MRI of abdomen  1. Redemonstration of irregular lesion in segment 4 with persistent peripheral enhancement, in keeping with residual viable disease. The peripheral enhancement is slightly less than prior.    2. Cirrhotic liver with splenomegaly and portal hypertension.    3. Unchanged similar nonocclusive clot in the main portal vein.    4. Moderate ascites. Small bilateral pleural effusions.    5. Nonspecific mild stranding surrounding the celiac and SMA origin, similar to prior exam and of unclear etiology.    Assessment and Plan:  -All medical records available in [facility] were reviewed and are summarized above.  -All labs and/or imaging studies mentioned in the note above were reviewed with and explained to the patient as they pertain to medical decision  making.  -Thrombocytopenia  -Mild stable  -Multifactorial.  End-stage liver disease with decreased thrombopoietin and splenic sequestration    -Leukopenia  -Mild, asymptomatic  -Lymphopenia  -Secondary to splenic sequestration    -colon cancer  -Oligo metastatic, single lesion of the liver noted on MRI and PET scan.  biopsies were negative  -Patient underwent definitive treatment.low anterior resection in August 2010.  Imaging showed solitary lesion to the liver and underwent 4 cycles of XELOX followed by ablation of the liver lesion on January 27, 2011.  Followed by completion of adjuvant chemotherapy.  -MRI from March 2018 was reviewed, possible persistent disease.  Patient is under the care of Dr. Culp from hepatology and  from Surgical oncology.  Patient states his MRI was evaluated at [facility] and the lesion is old.  He is scheduled for repeat MRI this month.  -Follow up with his oncology team at [facility]    -Follow up with PCP, return to hematology clinic as needed    He agreed and verbalized his agreement and understanding with the current plan.  I answered all questions and concerns he has at this time.    Dear  Fabián Urena M.D., Thank you very much for allowing me to see  Torey Lima today .     Please note that this dictation was created using voice recognition software. I have made every reasonable attempt to correct obvious errors, but I expect that there are errors of grammar and possibly content that I did not discover before finalizing the note.      SIGNATURES:  Jimmy Staples    CC:  MEGAN Arizmendi Michael D, M.D.

## 2018-08-16 ENCOUNTER — OCCUPATIONAL THERAPY (OUTPATIENT)
Dept: OCCUPATIONAL THERAPY | Facility: REHABILITATION | Age: 73
End: 2018-08-16
Attending: PHYSICAL MEDICINE & REHABILITATION
Payer: MEDICARE

## 2018-08-16 DIAGNOSIS — M62.81 MUSCLE WEAKNESS (GENERALIZED): ICD-10-CM

## 2018-08-16 PROCEDURE — 97140 MANUAL THERAPY 1/> REGIONS: CPT

## 2018-08-16 PROCEDURE — 97110 THERAPEUTIC EXERCISES: CPT

## 2018-08-16 NOTE — OP THERAPY DAILY TREATMENT
"  Outpatient Occupational Therapy  DAILY TREATMENT     [facility] Occupational [address]  [address]  [place] [address]  Phone:  [phone]  Fax:  [phone]    Date: 08/16/2018    Patient: Torey Lima  YOB: 1945  MRN: [id number]     Time Calculation  Start time: 0800  Stop time: 0900 Time Calculation (min): 60 minutes     Chief Complaint: Hand Weakness (right, GBS)    Visit #: 7    SUBJECTIVE: \"The cramping in my right hand has become more frequent\"    OBJECTIVE:  Current objective measures:   LH FDP strength: all digits 4/5   RH FDP strength: IF/MF 3/5, RF/SF 2-/5        Therapeutic Exercises (CPT 44616):     1. Right hand    Therapeutic Exercise Summary:  Composite fist x 20 reps following manual therapy, active movement towards intrinsic minus position with 5 second holds, isolated DIP joint blocking exercises each digit x 6 reps with 6 second hold,  Transport of warm water from 1 basin to another using RH and washcloth.  HEP updated with good understanding.        Therapeutic Treatments and Modalities:    1. Manual Therapy (CPT 89091)    Therapeutic Treatments and Modalities Summary: MHP right volar forearm to digits x 5 minutes.  Right medial volar forearm STM for release of soft tissue adhesions with and without Guasha, gentle joint mobilization and traction of RF/SF DIPs along with passive sustained stretch of all digits into full isolated/composite flexion and intrinsic minus.    Time-based treatments/modalities:  Therapeutic exercise minutes (CPT 11281): 45 minutes  Manual therapy joint mobilization minutes (CPT 70517): 15 minutes      ASSESSMENT:   Response to treatment: Pt making slow but steady gains with RH strength of FDP with the most notable increase in IF/MF during composite fist and intrinsic minus position.  Pt able to achieve composite fist minus approximately 10 degrees.  RH DIP joint and soft tissue mobility with substantial improvement. HEP " updated with good understanding.    PLAN/RECOMMENDATIONS:   Plan for treatment: therapy treatment to continue next visit.  Planned interventions for next visit: continue with current treatment, functional training/self care (CPT 22678), manual therapy (CPT 68930), therapeutic activities (CPT 86975) and therapeutic exercise (CPT 57039)

## 2018-08-20 DIAGNOSIS — K74.60 CIRRHOSIS OF LIVER WITH ASCITES, UNSPECIFIED HEPATIC CIRRHOSIS TYPE (HCC): ICD-10-CM

## 2018-08-20 DIAGNOSIS — R18.8 CIRRHOSIS OF LIVER WITH ASCITES, UNSPECIFIED HEPATIC CIRRHOSIS TYPE (HCC): ICD-10-CM

## 2018-08-27 ENCOUNTER — PATIENT MESSAGE (OUTPATIENT)
Dept: MEDICAL GROUP | Age: 73
End: 2018-08-27

## 2018-08-27 NOTE — TELEPHONE ENCOUNTER
From: Torey Lima  To: Fabián Urena M.D.  Sent: 8/27/2018 2:12 PM PDT  Subject: Non-Urgent Medical Question    Dr. Urena: Just notified that 19 month granddaughter diagnosed with hand, mouth and hoof disease. Her day care facility notified her mother Friday when she picked her up that several other children had been diagnosed with the disease and to be on the look-out for it. I and Celestina babysat the granddaughter yesterday and just a little while ago her mother called to say the granddaughter definitely has it. Q. with my compromised immunity system, should I be doing anything special; shot, pills ?    Torey Lima  June 13, 1945

## 2018-08-28 NOTE — TELEPHONE ENCOUNTER
"No. Nothing special you can do , unless you are taking an immunosuppressive drug like prednisone or chemotherapy or \"biologicals\" (e.g. Humira, etc) which should be stopped for 2-3 wks. None of the meds on your med list  are immunosuppressants.  The condition is caused by a virus, with no medical treatment available, but rarely affects adults. Your white cell count and function is normal, so you should be well protected, like most adults. Your low platelet count has no bearing in this situation.   "

## 2018-09-27 ENCOUNTER — OCCUPATIONAL THERAPY (OUTPATIENT)
Dept: OCCUPATIONAL THERAPY | Facility: REHABILITATION | Age: 73
End: 2018-09-27
Attending: PHYSICAL MEDICINE & REHABILITATION
Payer: MEDICARE

## 2018-09-27 DIAGNOSIS — M62.81 MUSCLE WEAKNESS (GENERALIZED): ICD-10-CM

## 2018-09-27 PROCEDURE — 97110 THERAPEUTIC EXERCISES: CPT

## 2018-09-27 PROCEDURE — 97140 MANUAL THERAPY 1/> REGIONS: CPT

## 2018-09-27 NOTE — OP THERAPY DAILY TREATMENT
"  Outpatient Occupational Therapy  DAILY TREATMENT     [facility] Occupational Therapy [address].  [address]  [place] [address]  Phone:  [phone]  Fax:  [phone]    Date: 09/27/2018    Patient: Torey Lima  YOB: 1945  MRN: [id number]     Time Calculation  Start time: 0800  Stop time: 0900 Time Calculation (min): 60 minutes     Chief Complaint: Hand Weakness (right , GBS)    Visit #: 8    SUBJECTIVE: \"My typing is getting better\"    OBJECTIVE:  Current objective measures:   BUE strength: 5/5 other than FDP's  LH FDP strength: all digits 4/5   RH FDP strength: IF/MF 3+/5, RF/SF 3-/5  AROM WNL except right RF flexion at DIP joint: 50 degrees, SF 55 degrees   strength:  Left: 90 lbs;  Right: 95 lbs  ADLs/IADLs/yardwork: Independent  Handwriting: good legibility, Keyboarding: Mod I with extra time.  Whittington-Dannielle: Diminished protective sensation bilateral distal palmar digits  Pain: 0/10  Romberg: EO/EC negative  HEP: tendon gliding, DIP joint blocking,  strengthening, contrast baths, tongue depressor rotations, foam exercises, grasp/release of towel, functional activities such as typing, handwriting, and IADLs.        Therapeutic Exercises (CPT 35903):     1. Right hand    Therapeutic Exercise Summary:  Re-tested all areas above, performed examples of current stretches, active exercises, and functional activities to continue as his HEP with good understanding.        Therapeutic Treatments and Modalities:    1. Manual Therapy (CPT 44279)    Therapeutic Treatments and Modalities Summary: MHP right volar forearm to digits x 5 minutes.  Right medial volar forearm STM for release of soft tissue adhesions, gentle joint mobilization and traction of RF/SF DIPs along with passive sustained stretch of all digits into full isolated/composite flexion and intrinsic minus without c/o pain.    Time-based treatments/modalities:  Therapeutic exercise minutes (CPT 95668): 45 " minutes  Manual therapy joint mobilization minutes (CPT 22509): 15 minutes        ASSESSMENT:   Response to treatment:  Pt has actively participated in skilled OT program and has made good progress to meet LTGs.  Pt has made slow but steady gains with his bilateral hand strength, AROM, and soft tissue mobility to independently perform his ADLs, IADLs, and yardwork.  His bilateral FDP strength and dexterity has significantly improved as noted above with mild decreased AROM, but without pain.  He continues to demonstrate diminished protective sensation bilateral distal digits, but he is able to compensate with use of his visual system.  At this point, pt is independent with his HEP and it is expected that with compliance, he will return normal strength and AROM.   Pt is safe to d/c to [facility].    PLAN/RECOMMENDATIONS:   Plan for treatment: discharge patient due to accomplished goals.  Planned interventions for next visit: D/C to HEP.  D/C OT.

## 2018-09-27 NOTE — OP THERAPY DISCHARGE SUMMARY
Outpatient Occupational Therapy  DISCHARGE SUMMARY NOTE    [facility] Therapy [address].  [address]  [place] [address]  Phone:  [phone]  Fax:  [phone]    Date of Visit: 09/27/2018    Patient: Torey Lima  YOB: 1945  MRN: [id number]     Referring Provider: Moreno Adorno M.D.  [address]  [place] [address]   Referring Diagnosis: Unspecified abnormalities of gait and mobility [R26.9];Muscle weakness (generalized) [M62.81];Unspecified abnormalities of gait and mobility [R26.9];Muscle weakness (generalized) [M62.81]     Occupational Therapy Occurrence Codes         Date of onset of impairment:  8/10/17   Date of occupational therapy care plan established or reviewed: 4/23/18   Date of occupational therapy treatment started:  10/3/17             Your patient is being discharged from Occupational Therapy with the following comments:   · Goals met    Comments:   Pt has actively participated in skilled OT program and has made good progress to meet LTGs.  Pt has made slow but steady gains with his bilateral hand strength, AROM, and soft tissue mobility to independently perform his ADLs, IADLs, and yardwork.  His bilateral FDP strength and dexterity has significantly improved as noted above with mild decreased AROM, but without pain.  He continues to demonstrate diminished protective sensation bilateral distal digits, but he is able to compensate with use of his visual system.  At this point, pt is independent with his HEP and it is expected that with compliance, he will return normal strength and AROM.   Pt is safe to d/c to HEP.    Limitations Remaining:  See daily note from 9/27/18 for details    Recommendations:  D/C to HEP.  D/C OT.    Janes Rachel MS,OTR/L    Date: 9/27/2018

## 2018-11-08 ENCOUNTER — HOSPITAL ENCOUNTER (OUTPATIENT)
Dept: LAB | Facility: MEDICAL CENTER | Age: 73
End: 2018-11-08
Attending: INTERNAL MEDICINE
Payer: MEDICARE

## 2018-11-08 DIAGNOSIS — E78.2 MIXED HYPERLIPIDEMIA: ICD-10-CM

## 2018-11-08 DIAGNOSIS — D69.59 THROMBOCYTOPENIA DUE TO DRUGS: ICD-10-CM

## 2018-11-08 DIAGNOSIS — C18.8 OVERLAPPING MALIGNANT NEOPLASM OF COLON (HCC): ICD-10-CM

## 2018-11-08 DIAGNOSIS — T50.905A THROMBOCYTOPENIA DUE TO DRUGS: ICD-10-CM

## 2018-11-08 LAB
ALBUMIN SERPL BCP-MCNC: 3.7 G/DL (ref 3.2–4.9)
ALBUMIN/GLOB SERPL: 1.5 G/DL
ALP SERPL-CCNC: 93 U/L (ref 30–99)
ALT SERPL-CCNC: 18 U/L (ref 2–50)
ANION GAP SERPL CALC-SCNC: 7 MMOL/L (ref 0–11.9)
AST SERPL-CCNC: 21 U/L (ref 12–45)
BASOPHILS # BLD AUTO: 0.8 % (ref 0–1.8)
BASOPHILS # BLD: 0.04 K/UL (ref 0–0.12)
BILIRUB SERPL-MCNC: 1.3 MG/DL (ref 0.1–1.5)
BUN SERPL-MCNC: 23 MG/DL (ref 8–22)
CALCIUM SERPL-MCNC: 9.1 MG/DL (ref 8.5–10.5)
CEA SERPL-MCNC: 1.1 NG/ML (ref 0–3)
CHLORIDE SERPL-SCNC: 105 MMOL/L (ref 96–112)
CHOLEST SERPL-MCNC: 103 MG/DL (ref 100–199)
CO2 SERPL-SCNC: 28 MMOL/L (ref 20–33)
CREAT SERPL-MCNC: 1.05 MG/DL (ref 0.5–1.4)
EOSINOPHIL # BLD AUTO: 0.21 K/UL (ref 0–0.51)
EOSINOPHIL NFR BLD: 4.4 % (ref 0–6.9)
ERYTHROCYTE [DISTWIDTH] IN BLOOD BY AUTOMATED COUNT: 49.3 FL (ref 35.9–50)
FASTING STATUS PATIENT QL REPORTED: NORMAL
GLOBULIN SER CALC-MCNC: 2.4 G/DL (ref 1.9–3.5)
GLUCOSE SERPL-MCNC: 94 MG/DL (ref 65–99)
HCT VFR BLD AUTO: 41.4 % (ref 42–52)
HDLC SERPL-MCNC: 54 MG/DL
HGB BLD-MCNC: 13.4 G/DL (ref 14–18)
IMM GRANULOCYTES # BLD AUTO: 0.01 K/UL (ref 0–0.11)
IMM GRANULOCYTES NFR BLD AUTO: 0.2 % (ref 0–0.9)
LDLC SERPL CALC-MCNC: 38 MG/DL
LYMPHOCYTES # BLD AUTO: 0.71 K/UL (ref 1–4.8)
LYMPHOCYTES NFR BLD: 14.8 % (ref 22–41)
MCH RBC QN AUTO: 28.9 PG (ref 27–33)
MCHC RBC AUTO-ENTMCNC: 32.4 G/DL (ref 33.7–35.3)
MCV RBC AUTO: 89.4 FL (ref 81.4–97.8)
MONOCYTES # BLD AUTO: 0.44 K/UL (ref 0–0.85)
MONOCYTES NFR BLD AUTO: 9.2 % (ref 0–13.4)
NEUTROPHILS # BLD AUTO: 3.39 K/UL (ref 1.82–7.42)
NEUTROPHILS NFR BLD: 70.6 % (ref 44–72)
NRBC # BLD AUTO: 0 K/UL
NRBC BLD-RTO: 0 /100 WBC
PLATELET # BLD AUTO: 64 K/UL (ref 164–446)
PMV BLD AUTO: 11.8 FL (ref 9–12.9)
POTASSIUM SERPL-SCNC: 3.6 MMOL/L (ref 3.6–5.5)
PROT SERPL-MCNC: 6.1 G/DL (ref 6–8.2)
RBC # BLD AUTO: 4.63 M/UL (ref 4.7–6.1)
SODIUM SERPL-SCNC: 140 MMOL/L (ref 135–145)
TRIGL SERPL-MCNC: 54 MG/DL (ref 0–149)
WBC # BLD AUTO: 4.8 K/UL (ref 4.8–10.8)

## 2018-11-08 PROCEDURE — 85025 COMPLETE CBC W/AUTO DIFF WBC: CPT

## 2018-11-08 PROCEDURE — 80061 LIPID PANEL: CPT

## 2018-11-08 PROCEDURE — 80053 COMPREHEN METABOLIC PANEL: CPT

## 2018-11-08 PROCEDURE — 82378 CARCINOEMBRYONIC ANTIGEN: CPT

## 2018-11-08 PROCEDURE — 36415 COLL VENOUS BLD VENIPUNCTURE: CPT

## 2018-11-12 ENCOUNTER — TELEPHONE (OUTPATIENT)
Dept: MEDICAL GROUP | Age: 73
End: 2018-11-12

## 2018-11-12 NOTE — TELEPHONE ENCOUNTER
ESTABLISHED PATIENT PRE-VISIT PLANNING     Patient was NOT contacted to complete PVP.     Note: Patient will not be contacted if there is no indication to call.     1.  Reviewed notes from the last few office visits within the medical group: Yes    2.  If any orders were placed at last visit or intended to be done for this visit (i.e. 6 mos follow-up), do we have Results/Consult Notes?        •  Labs - Labs ordered, completed on 11/8/18 and results are in chart.   Note: If patient appointment is for lab review and patient did not complete labs, check with provider if OK to reschedule patient until labs completed.       •  Imaging - Imaging was not ordered at last office visit.       •  Referrals - Referral ordered, patient has NOT been seen.    3. Is this appointment scheduled as a Hospital Follow-Up? No    4.  Immunizations were updated in Epic using WebIZ?: Epic matches WebIZ       •  Web Iz Recommendations: ZOSTAVAX (Shingles)    5.  Patient is due for the following Health Maintenance Topics:   Health Maintenance Due   Topic Date Due   • IMM ZOSTER VACCINES (2 of 3) 12/29/2012   • Annual Wellness Visit  06/22/2017       - Patient is up-to-date on all Health Maintenance topics. No records have been requested at this time.    6.  MDX printed for Provider? YES    7.  Patient was NOT informed to arrive 15 min prior to their scheduled appointment and bring in their medication bottles.

## 2018-11-13 ENCOUNTER — OFFICE VISIT (OUTPATIENT)
Dept: MEDICAL GROUP | Age: 73
End: 2018-11-13
Payer: MEDICARE

## 2018-11-13 VITALS
HEIGHT: 75 IN | HEART RATE: 75 BPM | TEMPERATURE: 99 F | OXYGEN SATURATION: 97 % | BODY MASS INDEX: 30.21 KG/M2 | WEIGHT: 243 LBS | DIASTOLIC BLOOD PRESSURE: 66 MMHG | SYSTOLIC BLOOD PRESSURE: 118 MMHG

## 2018-11-13 DIAGNOSIS — T50.905A THROMBOCYTOPENIA DUE TO DRUGS: ICD-10-CM

## 2018-11-13 DIAGNOSIS — E66.9 OBESITY (BMI 30-39.9): ICD-10-CM

## 2018-11-13 DIAGNOSIS — R60.1 GENERALIZED EDEMA: ICD-10-CM

## 2018-11-13 DIAGNOSIS — I85.00 PORTAL HYPERTENSION WITH ESOPHAGEAL VARICES (HCC): ICD-10-CM

## 2018-11-13 DIAGNOSIS — I82.512 CHRONIC DEEP VEIN THROMBOSIS (DVT) OF FEMORAL VEIN OF LEFT LOWER EXTREMITY (HCC): ICD-10-CM

## 2018-11-13 DIAGNOSIS — I81 PORTAL VEIN THROMBOSIS: ICD-10-CM

## 2018-11-13 DIAGNOSIS — B02.9 HERPES ZOSTER INFECTION OF THORACIC REGION: ICD-10-CM

## 2018-11-13 DIAGNOSIS — D63.8 ANEMIA, CHRONIC DISEASE: ICD-10-CM

## 2018-11-13 DIAGNOSIS — C18.8 OVERLAPPING MALIGNANT NEOPLASM OF COLON (HCC): ICD-10-CM

## 2018-11-13 DIAGNOSIS — G62.0 PERIPHERAL NEUROPATHY DUE TO CHEMOTHERAPY (HCC): ICD-10-CM

## 2018-11-13 DIAGNOSIS — D69.59 THROMBOCYTOPENIA DUE TO DRUGS: ICD-10-CM

## 2018-11-13 DIAGNOSIS — T45.1X5A PERIPHERAL NEUROPATHY DUE TO CHEMOTHERAPY (HCC): ICD-10-CM

## 2018-11-13 DIAGNOSIS — R18.8 CIRRHOSIS OF LIVER WITH ASCITES, UNSPECIFIED HEPATIC CIRRHOSIS TYPE (HCC): ICD-10-CM

## 2018-11-13 DIAGNOSIS — R17 ELEVATED BILIRUBIN: ICD-10-CM

## 2018-11-13 DIAGNOSIS — G61.0 GUILLAIN BARRÉ SYNDROME (HCC): ICD-10-CM

## 2018-11-13 DIAGNOSIS — K76.6 PORTAL HYPERTENSION WITH ESOPHAGEAL VARICES (HCC): ICD-10-CM

## 2018-11-13 DIAGNOSIS — I82.412 ACUTE DEEP VEIN THROMBOSIS (DVT) OF FEMORAL VEIN OF LEFT LOWER EXTREMITY (HCC): ICD-10-CM

## 2018-11-13 DIAGNOSIS — I10 ESSENTIAL HYPERTENSION: ICD-10-CM

## 2018-11-13 DIAGNOSIS — E78.2 MIXED HYPERLIPIDEMIA: ICD-10-CM

## 2018-11-13 DIAGNOSIS — K74.60 CIRRHOSIS OF LIVER WITH ASCITES, UNSPECIFIED HEPATIC CIRRHOSIS TYPE (HCC): ICD-10-CM

## 2018-11-13 DIAGNOSIS — R60.0 EDEMA OF LEFT LOWER EXTREMITY: ICD-10-CM

## 2018-11-13 PROCEDURE — 99214 OFFICE O/P EST MOD 30 MIN: CPT | Performed by: INTERNAL MEDICINE

## 2018-11-13 ASSESSMENT — ENCOUNTER SYMPTOMS
NEUROLOGICAL NEGATIVE: 1
RESPIRATORY NEGATIVE: 1
GASTROINTESTINAL NEGATIVE: 1
CARDIOVASCULAR NEGATIVE: 1
CONSTITUTIONAL NEGATIVE: 1
MUSCULOSKELETAL NEGATIVE: 1
EYES NEGATIVE: 1
PSYCHIATRIC NEGATIVE: 1

## 2018-11-13 ASSESSMENT — PATIENT HEALTH QUESTIONNAIRE - PHQ9: CLINICAL INTERPRETATION OF PHQ2 SCORE: 0

## 2018-11-13 NOTE — PROGRESS NOTES
Subjective:      Torey Lima is a 73 y.o. male who presents with Follow-Up (lab review)  The patient is here for followup of chronic medical problems listed below. The patient is compliant with medications and having no side effects from them. Denies chest pain, abdominal pain, dyspnea, myalgias, or cough.   Patient Active Problem List    Diagnosis Date Noted   • Esophageal varices- BANDED 4/2016- repeat egd tbd 10//29/16- gic 04/04/2016     Priority: High   • ASCVD (arteriosclerotic cardiovascular disease)subtotalled small distal LCx and 40% LAD med rx by cath 12/13/12 12/14/2012     Priority: High   • Gastric varices- s/p BRTO procedure at [facility] 04/04/2016     Priority: Medium   • Gastrointestinal hemorrhage with hematemesis- recurrernt from varices 04/03/2016     Priority: Medium   • Generalized edema 12/14/2017   • Obesity (BMI 30-39.9) 12/14/2017   • Edema of left lower extremity- due to dvt july 2017 09/19/2017   • Herpes zoster infection of thoracic region- right back T8 09/19/2017   • BPH (benign prostatic hyperplasia) 08/11/2017   • Peripheral neuropathy due to chemotherapy (HCC) 08/11/2017   • Acute deep vein thrombosis (DVT) of femoral vein of left lower extremity (CMS-HCC)- IVC placed july 2017 06/26/2017   • H/O colon cancer, stage IV- redo colonoscopy 2017 aug at [facility] 05/20/2017   • History of colon cancer 10/24/2016   • History of esophageal varices 10/24/2016   • Portal vein thrombosis- on CT [facility] 2/2015 04/14/2016   • Cirrhosis of liver with ascites (HCC)- ? 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)--2010 left hemicolectomy; no colostomy; ablation of liver met at [facility] 1/2011- MRI abd 3/2018 no change in per 12/06/2010    • Liver lesion- ablation of malignant met from colon ca 2011- f/u [facility] q 6 mos - stable MRI abd 3/2018- redo annually 12/06/2010   • Peripheral neuropathy, secondary to drugs or chemicals 12/06/2010     Allergies   Allergen Reactions   • Influenza Virus Vaccine Live      Guillan [place]    • Anticoagulant Sodium Citrate [Sodium Citrate]      HIGH BLEEDING RISK   • Lisinopril Cough   • Nkda [No Known Drug Allergy]      Outpatient Medications Prior to Visit   Medication Sig Dispense Refill   • Cholecalciferol (VITAMIN D3) 1000 units Cap Take 1,000 Units by mouth.     • potassium chloride SA (KLOR-CON M20) 20 MEQ Tab CR Take 1 Tab by mouth 2 times a day. 180 Tab 4   • folic acid (FOLVITE) 1 MG Tab TAKE ONE TABLET BY MOUTH ONCE DAILY 90 Tab 4   • spironolactone (ALDACTONE) 25 MG Tab Take 1 Tab by mouth every day. 90 Tab 4   • metolazone (ZAROXOLYN) 2.5 MG Tab Take 1 Tab by mouth every 48 hours. 45 Tab 4   • torsemide (DEMADEX) 10 MG tablet Take 2 Tabs by mouth every 48 hours. 90 Tab 4   • atorvastatin (LIPITOR) 40 MG Tab Take 1 Tab by mouth every bedtime. 90 Tab 4   • ursodiol (ACTIGALL) 300 MG Cap Take 1 Cap by mouth every day. 90 Cap 4   • ferrous sulfate 325 (65 Fe) MG tablet Take 1 Tab by mouth every morning with breakfast. 30 Tab    • folic acid (FOLVITE) 1 MG Tab Take 1 Tab by mouth every day. 30 Tab    • glucosamine-chondroitin 500-400 MG tablet Take  by mouth.     • LORazepam (ATIVAN) 0.5 MG Tab Take 0.5 mg by mouth.       No facility-administered medications prior to visit.                HPI    Review of Systems   Constitutional: Negative.    HENT: Negative.    Eyes: Negative.    Respiratory: Negative.    Cardiovascular: Negative.    Gastrointestinal: Negative.    Genitourinary: Negative.    Musculoskeletal: Negative.    Skin: Negative.    Neurological: Negative.    Endo/Heme/Allergies: Negative.    Psychiatric/Behavioral: Negative.           Objective:     /66 (BP Location: Left arm, Patient Position:  "Sitting)   Pulse 75   Temp 37.2 °C (99 °F) (Temporal)   Ht 1.905 m (6' 3\")   Wt 110.2 kg (243 lb)   SpO2 97%   BMI 30.37 kg/m²      Physical Exam   Constitutional: He is oriented to person, place, and time. He appears well-developed and well-nourished. No distress.   HENT:   Head: Normocephalic and atraumatic.   Right Ear: External ear normal.   Left Ear: External ear normal.   Nose: Nose normal.   Mouth/Throat: Oropharynx is clear and moist. No oropharyngeal exudate.   Eyes: Pupils are equal, round, and reactive to light. Conjunctivae and EOM are normal. Right eye exhibits no discharge. Left eye exhibits no discharge. No scleral icterus.   Neck: Normal range of motion. Neck supple. No JVD present. No tracheal deviation present. No thyromegaly present.   Cardiovascular: Normal rate, regular rhythm, normal heart sounds and intact distal pulses.  Exam reveals no gallop and no friction rub.    No murmur heard.  Pulmonary/Chest: Effort normal and breath sounds normal. No stridor. No respiratory distress. He has no wheezes. He has no rales. He exhibits no tenderness.   Abdominal: Soft. Bowel sounds are normal. He exhibits no distension and no mass. There is no tenderness. There is no rebound and no guarding.   Musculoskeletal: Normal range of motion. He exhibits no edema or tenderness.   Lymphadenopathy:     He has no cervical adenopathy.   Neurological: He is alert and oriented to person, place, and time. He has normal reflexes. He displays normal reflexes. He exhibits normal muscle tone. Coordination normal.   Skin: Skin is warm and dry. No rash noted. He is not diaphoretic. No erythema. No pallor.   Psychiatric: He has a normal mood and affect. His behavior is normal. Judgment and thought content normal.     Hospital Outpatient Visit on 11/08/2018   Component Date Value   • Sodium 11/08/2018 140    • Potassium 11/08/2018 3.6    • Chloride 11/08/2018 105    • Co2 11/08/2018 28    • Anion Gap 11/08/2018 7.0    • " Glucose 11/08/2018 94    • Bun 11/08/2018 23*   • Creatinine 11/08/2018 1.05    • Calcium 11/08/2018 9.1    • AST(SGOT) 11/08/2018 21    • ALT(SGPT) 11/08/2018 18    • Alkaline Phosphatase 11/08/2018 93    • Total Bilirubin 11/08/2018 1.3    • Albumin 11/08/2018 3.7    • Total Protein 11/08/2018 6.1    • Globulin 11/08/2018 2.4    • A-G Ratio 11/08/2018 1.5    • Cholesterol,Tot 11/08/2018 103    • Triglycerides 11/08/2018 54    • HDL 11/08/2018 54    • LDL 11/08/2018 38    • WBC 11/08/2018 4.8    • RBC 11/08/2018 4.63*   • Hemoglobin 11/08/2018 13.4*   • Hematocrit 11/08/2018 41.4*   • MCV 11/08/2018 89.4    • MCH 11/08/2018 28.9    • MCHC 11/08/2018 32.4*   • RDW 11/08/2018 49.3    • Platelet Count 11/08/2018 64*   • MPV 11/08/2018 11.8    • Neutrophils-Polys 11/08/2018 70.60    • Lymphocytes 11/08/2018 14.80*   • Monocytes 11/08/2018 9.20    • Eosinophils 11/08/2018 4.40    • Basophils 11/08/2018 0.80    • Immature Granulocytes 11/08/2018 0.20    • Nucleated RBC 11/08/2018 0.00    • Neutrophils (Absolute) 11/08/2018 3.39    • Lymphs (Absolute) 11/08/2018 0.71*   • Monos (Absolute) 11/08/2018 0.44    • Eos (Absolute) 11/08/2018 0.21    • Baso (Absolute) 11/08/2018 0.04    • Immature Granulocytes (a* 11/08/2018 0.01    • NRBC (Absolute) 11/08/2018 0.00    • Carcinoembryonic Antigen 11/08/2018 1.1    • Fasting Status 11/08/2018 Fasting    • GFR If  11/08/2018 >60    • GFR If Non  Ameri* 11/08/2018 >60       Lab Results   Component Value Date/Time    HBA1C 5.2 10/25/2016 12:06 AM    HBA1C 5.3 07/08/2016 07:02 AM     Lab Results   Component Value Date/Time    SODIUM 140 11/08/2018 06:06 AM    POTASSIUM 3.6 11/08/2018 06:06 AM    CHLORIDE 105 11/08/2018 06:06 AM    CO2 28 11/08/2018 06:06 AM    GLUCOSE 94 11/08/2018 06:06 AM    BUN 23 (H) 11/08/2018 06:06 AM    CREATININE 1.05 11/08/2018 06:06 AM    CREATININE 1.14 01/23/2010 12:00 AM    BUNCREATRAT 15 01/23/2010 12:00 AM    GLOMRATE >59  01/23/2010 12:00 AM    ALKPHOSPHAT 93 11/08/2018 06:06 AM    ASTSGOT 21 11/08/2018 06:06 AM    ALTSGPT 18 11/08/2018 06:06 AM    TBILIRUBIN 1.3 11/08/2018 06:06 AM     Lab Results   Component Value Date/Time    INR 1.36 (H) 08/02/2018 06:08 AM    INR 1.28 (H) 04/28/2018 07:11 AM    INR 1.18 (H) 09/16/2017 07:26 AM     Lab Results   Component Value Date/Time    CHOLSTRLTOT 103 11/08/2018 06:06 AM    LDL 38 11/08/2018 06:06 AM    HDL 54 11/08/2018 06:06 AM    TRIGLYCERIDE 54 11/08/2018 06:06 AM       Lab Results   Component Value Date/Time    TESTOSTERONE 333 04/02/2015 06:47 AM     No results found for: TSH  Lab Results   Component Value Date/Time    FREET4 0.85 04/02/2015 06:47 AM    FREET4 0.85 04/12/2014 07:26 AM     No results found for: URICACID  No components found for: VITB12  Lab Results   Component Value Date/Time    25HYDROXY 32 08/11/2017 04:50 AM    25HYDROXY 38 04/02/2015 06:47 AM                 Assessment/Plan:     1. Mixed hyperlipidemia        Under good control. Continue same regimen.  - COMP METABOLIC PANEL; Future  - Lipid Profile; Future  - CBC WITH DIFFERENTIAL; Future    2. Essential hypertension           Under good control. Continue same regimen.  3. Cirrhosis of liver with ascites, unspecified hepatic cirrhosis type (HCC)        Under good control. Continue same regimen.    4. Obesity (BMI 30-39.9)     diet/exercise/lose 15 lbs.; patient counseled      5. Generalized edema        Under good control. Continue same regimen.    6. Acute deep vein thrombosis (DVT) of femoral vein of left lower extremity (CMS-HCC)- IVC placed july 2017        Under good control. Continue same regimen.  7. Peripheral neuropathy due to chemotherapy (HCC)        Under good control. Continue same regimen.  8. Guillain Barré syndrome (HCC)- RESOLVED  No recurrence.    9. 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 Continue same regimen.    10. Elevated bilirubin- RESOLVED   No recurrence    11. Thrombocytopenia due to drugs        Under good control. Continue same regimen.  12. Herpes zoster infection of thoracic region- right back T8- RESOLVED       Under good control. Continue same regimen.    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    Diagnosis D 61.818, others pancytopenia is erroneous.  Patient does not have nor has he ever had pancytopenia.  He does have moderate thrombocytopenia as noted elsewhere as well as anemia chronic disease but no leukopenia.  Diagnosis needs to be removed from MDx problem list.    Diagnosis of a E 43, unspecified severe protein calorie malnutrition has resolved.    Diagnosis of GE 37.3, acute transverse myelitis and demyelinating disease of CNS is erroneous.  It is to be removed from the MDX problem list.  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  That diagnosis should also be removed from problem list and MDX list.    Diagnosis of I 47.1, supraventricular tachycardia has completely resolved.  No further follow-up needed.    Diagnosis of I 47.2, ventricular tachycardia has likewise resolved.  No further follow-up needed.    Diagnosis of I 82.412, acute embolism and thrombosis of left femoral vein has resolved.  It is no longer considered acute.  But it is chronic and requires ongoing surveillance which she is getting.  Added to problem list.  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    Diagnosis of L 89.153, pressure ulcer of sacral region stage III has completely resolved and is to be removed from  problem list and MDX list        40 minute face-to-face encounter took place today.  More than half of this time was spent in the coordination of care of the above problems, as well as counseling.

## 2019-01-07 NOTE — PROGRESS NOTES
Hospital Medicine Progress Note, Adult, Complex               Author: SCOTT BULLOCK MD    Date & Time created: 8/21/2017  5:33 PM     Interval History:  8/15/17--  HISTORY REVIEWED.  REMOTE METASTATIC COLON CANCER DISCOVERED IN 2010 S/P RESECTION BY DR ANA RUIZ AND FINDING OF 10+ NODES  WHICH SPREAD TO THE LIVER.  HAD AN ABLATION AT [facility].  HEAVY DUTY CHEMO PER DR REBOLLAR.   THE LIVER ABLATION HAS UNFORTUNATELY RESULTED IN PORTAL HYPERTENSION.  HE ALSO HAS PRETTY MUCH ALL THE STIGMATA OF PORTAL HYPERTENSION.   THERE REMAINING LIVER PARENCHYMA IS NORMAL SO HE HAS PRESERVED LIVER SYNTHETIC FUNCTION.  AS OF 2010 THERE HAS BEEN NO NEW RECURRANCE THOUGH WE ARE DEALING WITH GASTRIC VARICES,  ESOPHAGEAL VARICIES,  CAPUT MEDUSA,  LEFT LOWER EXTREMITY DVT,  INABILITY TO TOLERATE ANTICOAGULATION WITH AN IVC FILTER IN PLACE,   AND CURRENTLY MODERATE ASCITES.   WE WILL PLAN ON THERAPEUTIC TAP ON Friday.   THE OTHER ISSUE THAT REALLY BROUGHT TO PATIENT TO THE HOSPITAL WAS PROGRESSIVE ASCENDING WEAKNESS.   HE DID HAVE A TIGHT L4-L5 CENTRAL STENOSIS.  THE OTHER LEVELS LOOK FINE.   HE STARTED NOTICING THE WEAKNESS FOR ABOUT A WEEK PRIOR IN MAY AND WENT TO ER.   DR HOUSE SAW THE STRUCTURAL ISSUE,  NAMELY THE ABOVE STENOSIS AND DECOMPRESSED THIS 0N 5/25.  SYMPTOMS DID NOT MALLY.  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   IT IS CLEAR THE PATIENT NEEDED SOME SURGERY ON THE LOWER BACK.   LOOKING AT THE FILMS.  MY THOUGHT IS THE SYMPTOMS THEMSELVES WERE CAUSED BY THE GBS.   HE RECEIVED 5 DAYS OF IMMUNOGLOBULIN AND DID FAIRLY WELL AND HAS BEE TRANSFERR[facility] ON 8/10/17  PLAN:  CONTINUE PT/OT,   PROBLEMS THAT ALSO HAVE OCCURRED INCLUDE PORTAL VEIN THROMBOSIS.   I WILL ARRANGE A THERAPEUTIC THORACENTESIS ON Friday.  RX ENTEROBACTER UTI.  HYPERSPLENISM AND LOW WBC COUNTS AND PLATELETS TO BE EXPECTED.   D/C COREG.  INDERAL BETTER FOR  LOWERING PORTAL PRESSURES.      8/16/17--PATIENT AWAKE AND ALERT.   DOING WELL WITH PT/OT.   GOOD EFFORT PER PATIENT.   I DONT THINK THE ASCITES IS SIGNIFICANTLY WORSE.  I AM GOING TO MAKE DAILY ASSESMENTS AND DETERMINE WHEN PARACENTESIS IS NECESSARY.   GBS IS CERTAINLY NO WORSE.   THE PATIENT WAS SPECIFICALLY TOLD TO INFORM ME ABOUT ANY PHYSICAL CHANGES IE: WORSENING WEAKNESS OR ANY CRANIAL NERVE ABNORMALITIES.   CHEST FILM WAS ESSENTIALLY NEGATIVE FOR EFFUSION.  THE DECREASED TACTILE FREMITUS THAT I NOTED WAS A HIGH RIDING DIAPHRAGM.   PROBABLY DUE TO ASCITES.    I NEED DAILY WEIGHTS TO HELP ME WITH MY DECISION WHEN TO TAP ABDOMEN.  NORMALLY PATIENT GETS IT TAPPED ABOUT EVERY 4 MONTHS BY GI.    I  WILL RE--ASSES PORTAL BLOOD FLOW AND SEE IF THIS PORTAL VEIN THROMBOSIS IS RECANULATED OR WORSE.  LAST YEAR THERE WAS SOME RECANULIZATION AND FLOW.   HEPATOPEDAL FLOW IS GOOD, HEPATOFUGAL FLOW BAD.  I HAVE NEVER SEEN A LONG TERM SURVIVOR OF METASTATIC COLON CANCER WITH 4 CM RECTO-SIGMOID TUMOR AND 5 POSITIVE NODES + LIVER MET.   RX FOR UTI WITH ENTEROBACTER ON CULTURE.  UNIMPRESSIVE WBC COUNT OF 2-5 WBC/HIGH POWERED FIELD.   I PROBABLY WOULD HAVE LET THAT GO UNLESS THERE WERE SYMPTOMS.     PLAN:   PATIENT DOING WELL.  DAILY WEIGHTS.  DAILY ASSESMENTS OF ASCITES.  CXR IS CLEAR.  CHECK ABDOMINAL ULTRASOUND TO ASSESS PORTAL BLOOD FLOW AND ASCITES.   I THINK QOD DIURETICS WOULD HELP PREVENT A TAP AND DECREASE THE EDEMA, (KNOWN  RLE DVT PRIMARILY DUE TO MALIGNANCY BEING A PRO-THROMBOTIC STATE).  GIVE 5 DAYS OF PO VITAMIN K GIVEN THE CIPRO KILLING GUT PA WHICH HELPS GENERATE VITAMIN K FOR A HUMAN.  CHECK SOME IRON STUDIES.   OVERALL INCHING FORWARD.   HARD TO SAY WHAT THE COURSE OF GBS WILL BE.  THE BACK SURGERY WAS NECESSARY BUT MY FEELING IS HIS SYMPTOMS WERE MOSTLY DUE TO GBS AND NOT SPINAL STENOSIS.   HEPATITIS STATUS CHECKED AND FOUND TO BE NEGATIVE.   HIV ANTIBODIES  FOR COMPLETENESS.     8/17/17--PATIENT'S ULTRASOUND  DID NOT SHOW ENOUGH ASCITES TO SAFELY TAP.  THAT IS GOOD.  LITTLE CHANGE IN THE MOTOR MOVEMENT NOTED.  BACK FEELS FINE.  INR IS BETTER WITH VITAMIN K.  DIURETICS HOLDING ASCITES AT BAY PERIODICALLY.   WE ARE FORCING DIURESIS WITH DEMEDEX ETC.   NO NEED FOR TAP.    BP OK.  LABS OK.  MODEST IRON DEFICIENCY COMPONENT.   ADD SOME PO IRON AND VITAMIN C IF NOT DONE ALREADY.    8/18/17--PATIENT LOOKS THE SAME TO ME.  THE ASCITES IS MINIMAL AND IWILL NOT REQUIIRE ANY INTERVENTION LIKE THORACENTESIS IN THE NEAR FUTURE.  OUR MEDICAL RX IS WORKING JUST FINE.    REALLY NO CHANGE IN PORTAL THROMBOSIS.  THERE IS BLOOD FLOW WHICH IS UNCHANGED.   USUAL COMPLAINTS.  ACHES AND PAINS ETC.    PLAN:  NO TAP OF ABDOMEN REQUIRED.  THE PATIENT MAY BENEFIT FROM SOME IV IRON.   HE IS DEFICIENT.     8/19/17--PATIENT AWAKE AND ALERT.  DOING FAIRLY NICELY WITH PT/OT.   HE WAS ABLE TO LIFT BOTH LEGS OFF THE BED.  I DID GET SOME PATELLA REFLEX B/L YESTERDAY.  MUSCLE BULK IS FAIR.  I REVIEWED THE ULTRASOUND.  MINIMAL ASCITES.  MODEST RLL EFFUSION.   THE SPLEEN IS ENLARGED.  HE UNFORTUNATELY WILL ALWAYS HAVE SOME DEGREE OF EARLY SATIETEY DUE TO  A 20 CM SPLEEN.  NORMAL WOULD BE FROM 7-14 CM. THE SPLEEN IS VERY CLOSE TO THE STOMACH AND NOT A RETROPERITONEAL STRUCTURE LIKE THE KIDNEYS.   ALSO ON THE PLAIN FILM WHICH WAS UNDER PENETRATED, THERE LOOKED TO BE A GOOD AMOUNT OF FECAL MATERIAL IN THE DESCENDING COLON, ALSO NEAR THE STOMACH.      PLAN:    I THINK THE PATIENT IS DOING AS WELL AS CAN BE EXPECTED.  THE GBS IS IMPROVING AT A SLOW RATE.   THE SYMPTOMS STARTED AT THE MIDDLE OF MAY.   SO THIS IS A 3 MONTH ORDEAL THUS FAR.  HE COULD BENEFIT FROM SOME MIRALAX.  DOUBT HE WOULD TOLLERATE METAMUCIL.    I WILL TRY  THAT.  MY AGGRESSIVE DIURETIC PROGRAM HAS  ESSENTIALLY MINIMIZED THE ASCITES TO A NEGLIGIBLE AMOUNT AND HE WILL NOT NEED PARACENTESIS.  LOW GRADE TEMP OF 99,6, CHECK UA FOR COMPLETENESS AND FORMAL CXR ON Monday TO RE-ASSES THAT EFFUSION NOTED  ON ABDOMINAL ULTRASOUND.    8/20/17--PATIENT FEELING WELL.  MENTATION IS CLEAR.  ASCITES IS UNDER CONTROL.  MINIMAL NOW.   EVERY OTHER DAY DIURETICS SEEMS TO BE HELPING.  CHECKING LYTES IN AM.  MAY NEED TO BACK DOWN TO EVERY THIRD DAY IF THERE IS HYPOTENSION OR CONTRACTION ALKALOSIS.  CXR TO ASSESS RIGHT EFFUSION NOTED ON EXAM. MODEST DECREASED TACTILE FREMITUS RIGHT BASE.    ALWAYS EXPLAIN A REASON TO PATIENT WHY WE ARE DOING A TEST.    UA IS NEGATIVE.    VITALS OK.  THUS FAR LABS OK.   HE IS AN ASTUTE  AND WANTS EVERY RATIONAL EXPLAINED THOROUGHLY.   O/W HE WILL REFUSE.  I WILL TRY TO EXPLAIN IN MY 2 PARAGRAPH DAILY NOTE MY RATIONAL.    PLAN:  CXR IN AM  (CHECKING THE DEGREE OF PLEURAL FLUID AROUND LUNG).  CHECK CBC, CMP, MAG.   PATIENT HAS A MODERATE AMOUNT OF FECAL MATERIAL IN SPLENIC FLEXURE.  STARTING ON GENTLE MIRALAX AND SOME MOM.  CLINICALLY NO EDEMA AND MINIMAL ASCITES.   MENTATION REMAINS CLEAR.  NO NEED FOR LACTULOSE AT THIS TIME.   MOTOR STRENGTH IMPROVING.   UA NEGATIVE.  NO ABDOMINAL PAIN OR FURTHER LOW GRADE FEVER.  COAGS ALSO IN AM.  THE EARLY SATIETY IS DUE TO A 20 CM SPLEEN ABUTTING THE STOMACH.    8/21/17--PATIENT DOING AS WELL AS CAN BE EXPECTED.   HE HAD BACK SURGERY AND GBS JUST PRIOR TO THE PROCEDURE IT APPEARS.  HE IS MOVING ALL 4 EXTREMITIES AGAINST   GRAVITY.  MENTATION IS VERY CLEAR.  COAGS ARE NO BETTER.  RECHECK IN AM.  RECHECK AMMONIA IN AM.  I NEVER GO BY THE NUMBER ITSELF,  JUST THE SYMPTOMS.  SEEMS LIKE EVERYONE IS DIFFERENT.  ONE PATIENT CAN HAVE AN AMMONIA  AND BE FINE AND ANOTHER CAN HAVE ONE OF 60 AND BE ENCEPHALOPATHIC.  CXR LOOKED FINE NO EFFUSION OR EVIDENCE FOR INFILTRATE.  VITALS LOOK OK.   LABS ARE AS EXPECTED FOR A PATIENT WITH CIRRHOSIS AND PORTAL HTN.   HE HAS GASTRIC VARICES,  ESOPHAGEAL VARICES (BANDED PROPHYLACTICLY IN PAST),  DILATED PERIUMBILICAL VEINS AND INTERMITTENT ASCITES.  THE OTHER PORTAL DECOMPRESSION LOCATIONS ARE SPLEEN, (HE DOES HAVE A VERY  LARGE SPLEEN) AND HE DOESN'T HAVE BLEEDING HEMORRHOIDS THAT WE KNOW OF.  DESPITE VITAMIN K HE IS STILL A BIT COAGULOPATHIC.   INTRINSIC LIVER DISEASE.  STAGE 4 COLON CANCER.  NEVER HAVE SEEN A LONGER TERM SURVIVOR.  PLAN:  CHECK LABS IN AM,  AMMONIA,  INR, CBC, CMP, ALPH FETO PROTEIN (TUMOR MARKER FOR HEPATOCELLULAR CARCINOMA WHICH IS A DIFFERENT MALIGNANCY ASSOCIATED WITH CIRRHOSIS UNFORTUNATELY).   GBS SYMPTOMS SLOWLY IMPROVING.  INCHING SOMEWHERE.       Review of Systems:  Review of Systems   Constitutional: Negative for fever and chills.   Respiratory: Negative for shortness of breath.    Cardiovascular: Negative for chest pain.   Gastrointestinal: Negative for nausea, vomiting, abdominal pain and diarrhea.   Psychiatric/Behavioral: The patient is not nervous/anxious.        Physical Exam:  Physical Exam   Constitutional: He is oriented to person, place, and time. He appears well-nourished.   HENT:   Head: Atraumatic.   Eyes: Conjunctivae are normal. Pupils are equal, round, and reactive to light.   Neck: Normal range of motion. Neck supple. No tracheal deviation present.   Cardiovascular: Normal rate, regular rhythm, S1 normal and S2 normal.    No murmur heard.  Pulmonary/Chest: Effort normal. He has no wheezes. He has no rhonchi. He has no rales.   DECREASED TACTILE FREMITUS RIGHT BASE.  SUGGESTING EFFUSION THERE.   Abdominal: Soft. He exhibits no distension and no mass. There is no tenderness. There is no rebound and no guarding. Hernia confirmed negative in the right inguinal area and confirmed negative in the left inguinal area.   Musculoskeletal: He exhibits edema.   Neurological: He is alert and oriented to person, place, and time. No sensory deficit.   Skin: Skin is warm and dry. No rash noted. No cyanosis.   Psychiatric: He has a normal mood and affect. His behavior is normal.   Nursing note and vitals reviewed.      Labs:        Invalid input(s): [id number]      Recent Labs      08/21/17   9219    SODIUM  137   POTASSIUM  3.3*   CHLORIDE  101   CO2  29   BUN  12   CREATININE  0.56   MAGNESIUM  1.9   CALCIUM  8.8     Recent Labs      17   0454   ALTSGPT  11   ASTSGOT  16   ALKPHOSPHAT  69   TBILIRUBIN  1.1   GLUCOSE  90     Recent Labs      17   0452  17   0454   RBC  3.89*  4.00*   HEMOGLOBIN  10.5*  10.8*   HEMATOCRIT  32.7*  33.7*   PLATELETCT  74*  71*   PROTHROMBTM   --   17.0*   INR   --   1.34*     Recent Labs      17   0452  17   0454   WBC  4.5*  4.5*   NEUTSPOLYS  61.30  65.20   LYMPHOCYTES  19.60*  16.40*   MONOCYTES  12.30  12.00   EOSINOPHILS  5.90  5.30   BASOPHILS  0.70  0.90   ASTSGOT   --   16   ALTSGPT   --   11   ALKPHOSPHAT   --   69   TBILIRUBIN   --   1.1           Hemodynamics:  Temp (24hrs), Av.7 °C (98 °F), Min:36.6 °C (97.8 °F), Max:36.8 °C (98.3 °F)  Temperature: 36.8 °C (98.3 °F)  Pulse  Av.2  Min: 64  Max: 99   Blood Pressure : 106/62 mmHg     Respiratory:    Respiration: 18, Pulse Oximetry: 93 %     Work Of Breathing / Effort: Mild  RUL Breath Sounds: Clear, RML Breath Sounds: Clear, RLL Breath Sounds: Diminished, JOSE Breath Sounds: Clear, LLL Breath Sounds: Clear  Fluids:    Intake/Output Summary (Last 24 hours) at 17 1733  Last data filed at 17 1221   Gross per 24 hour   Intake    960 ml   Output   1100 ml   Net   -140 ml     Weight: 102.5 kg (225 lb 15.5 oz)  GI/Nutrition:  Orders Placed This Encounter   Procedures   • Diet Order     Standing Status: Standing      Number of Occurrences: 1      Standing Expiration Date:      Order Specific Question:  Diet:     Answer:  Regular [1]     Order Specific Question:  Consistency/Fluid modifications:     Answer:  Thin Liquids [3]     Medical Decision Making, by Problem:  Active Hospital Problems    Diagnosis   • *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]   • Left leg DVT (CMS-HCC) [I82.402]   • Esophageal varices- BANDED 2016- repeat egd tbd 10//29/16-  gic [I85.00]   • ASCVD (arteriosclerotic cardiovascular disease)subtotalled small distal LCx and 40% LAD med rx by cath 12/13/12 [I25.10]   • GIB (gastrointestinal bleeding)- recurrent from varices [K92.2]   • BPH (benign prostatic hyperplasia) [N40.0]   • 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 DUE TO OLD HEP B, CHEMO RX,  OR THERMO RAD OF MAGNANT LESION [K74.60]   • Essential hypertension [I10]   • Colon cancer-2010 left hemicolectomy; no colostomy [C18.9]     *  S/P Recent Lumbar Surgery    *  Hypertension  BP recently ok  On Coreg: 3.125 mg bid  Note: home meds were Coreg 3.125 mg 1/2 tab bid and Losartan: 25 mg daily  Monitor    *  U/A (+)  Has had some difficulty urinating recently with some burning  Cx --> GNR  F/U C&S   On Cipro empirically unitl C&S come back    *  LE Edema  Mild  On Aldactone: 25 mg daily    *  Thrombocytopenia  Platelets stable (8/11)  2nd to prior chemo and PORTAL HYPERTENSION    *  Hx Colon Cancer: stage IV; in 5 year remission  *  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  *  Hyperlipidemia  *  AIDP: s/p IVIG x 5 days      Medications reviewed and Labs reviewed                     Current some day smoker

## 2019-02-12 ENCOUNTER — TELEPHONE (OUTPATIENT)
Dept: MEDICAL GROUP | Age: 74
End: 2019-02-12

## 2019-02-12 NOTE — TELEPHONE ENCOUNTER
ESTABLISHED PATIENT PRE-VISIT PLANNING     Patient was contacted to complete PVP.     Note: Patient will not be contacted if there is no indication to call.     1.  Reviewed notes from the last few office visits within the medical group: Yes    2.  If any orders were placed at last visit or intended to be done for this visit (i.e. 6 mos follow-up), do we have Results/Consult Notes?        •  Labs - Labs ordered, NOT completed. Patient advised to complete prior to next appointment.   Note: If patient appointment is for lab review and patient did not complete labs, check with provider if OK to reschedule patient until labs completed.       •  Imaging - Imaging ordered, completed and results are in chart.       •  Referrals - No referrals were ordered at last office visit.    3. Is this appointment scheduled as a Hospital Follow-Up? No    4.  Immunizations were updated in Epic using WebIZ?: Epic matches WebIZ       •  Web Iz Recommendations: SHINGRIX (Shingles)    5.  Patient is due for the following Health Maintenance Topics:   Health Maintenance Due   Topic Date Due   • IMM ZOSTER VACCINES (2 of 3) 12/29/2012   • Annual Wellness Visit  06/22/2017       - Patient is up-to-date on all Health Maintenance topics. No records have been requested at this time.    6. Orders for overdue Health Maintenance topics pended in Pre-Charting? NO    7.  AHA (MDX) form printed for Provider? YES    8.  Patient was informed to arrive 15 min prior to their scheduled appointment and bring in their medication bottles.

## 2019-02-28 DIAGNOSIS — R17 ELEVATED BILIRUBIN: ICD-10-CM

## 2019-02-28 RX ORDER — URSODIOL 300 MG/1
CAPSULE ORAL
Qty: 90 CAP | Refills: 4 | Status: SHIPPED | OUTPATIENT
Start: 2019-02-28 | End: 2020-06-01

## 2019-03-27 ENCOUNTER — HOSPITAL ENCOUNTER (OUTPATIENT)
Dept: LAB | Facility: MEDICAL CENTER | Age: 74
End: 2019-03-27
Attending: INTERNAL MEDICINE
Payer: MEDICARE

## 2019-03-27 DIAGNOSIS — E78.2 MIXED HYPERLIPIDEMIA: ICD-10-CM

## 2019-03-27 LAB
ALBUMIN SERPL BCP-MCNC: 3.7 G/DL (ref 3.2–4.9)
ALBUMIN/GLOB SERPL: 1.3 G/DL
ALP SERPL-CCNC: 82 U/L (ref 30–99)
ALT SERPL-CCNC: 19 U/L (ref 2–50)
ANION GAP SERPL CALC-SCNC: 7 MMOL/L (ref 0–11.9)
AST SERPL-CCNC: 24 U/L (ref 12–45)
BASOPHILS # BLD AUTO: 1.1 % (ref 0–1.8)
BASOPHILS # BLD: 0.05 K/UL (ref 0–0.12)
BILIRUB SERPL-MCNC: 1.5 MG/DL (ref 0.1–1.5)
BUN SERPL-MCNC: 23 MG/DL (ref 8–22)
CALCIUM SERPL-MCNC: 9 MG/DL (ref 8.5–10.5)
CHLORIDE SERPL-SCNC: 103 MMOL/L (ref 96–112)
CHOLEST SERPL-MCNC: 107 MG/DL (ref 100–199)
CO2 SERPL-SCNC: 30 MMOL/L (ref 20–33)
CREAT SERPL-MCNC: 1.07 MG/DL (ref 0.5–1.4)
EOSINOPHIL # BLD AUTO: 0.2 K/UL (ref 0–0.51)
EOSINOPHIL NFR BLD: 4.3 % (ref 0–6.9)
ERYTHROCYTE [DISTWIDTH] IN BLOOD BY AUTOMATED COUNT: 50 FL (ref 35.9–50)
FASTING STATUS PATIENT QL REPORTED: NORMAL
GLOBULIN SER CALC-MCNC: 2.8 G/DL (ref 1.9–3.5)
GLUCOSE SERPL-MCNC: 87 MG/DL (ref 65–99)
HCT VFR BLD AUTO: 42.9 % (ref 42–52)
HDLC SERPL-MCNC: 52 MG/DL
HGB BLD-MCNC: 13.8 G/DL (ref 14–18)
IMM GRANULOCYTES # BLD AUTO: 0.01 K/UL (ref 0–0.11)
IMM GRANULOCYTES NFR BLD AUTO: 0.2 % (ref 0–0.9)
LDLC SERPL CALC-MCNC: 41 MG/DL
LYMPHOCYTES # BLD AUTO: 0.79 K/UL (ref 1–4.8)
LYMPHOCYTES NFR BLD: 17 % (ref 22–41)
MCH RBC QN AUTO: 29.3 PG (ref 27–33)
MCHC RBC AUTO-ENTMCNC: 32.2 G/DL (ref 33.7–35.3)
MCV RBC AUTO: 91.1 FL (ref 81.4–97.8)
MONOCYTES # BLD AUTO: 0.45 K/UL (ref 0–0.85)
MONOCYTES NFR BLD AUTO: 9.7 % (ref 0–13.4)
NEUTROPHILS # BLD AUTO: 3.16 K/UL (ref 1.82–7.42)
NEUTROPHILS NFR BLD: 67.7 % (ref 44–72)
NRBC # BLD AUTO: 0 K/UL
NRBC BLD-RTO: 0 /100 WBC
PLATELET # BLD AUTO: 66 K/UL (ref 164–446)
PMV BLD AUTO: 11.4 FL (ref 9–12.9)
POTASSIUM SERPL-SCNC: 3.9 MMOL/L (ref 3.6–5.5)
PROT SERPL-MCNC: 6.5 G/DL (ref 6–8.2)
RBC # BLD AUTO: 4.71 M/UL (ref 4.7–6.1)
SODIUM SERPL-SCNC: 140 MMOL/L (ref 135–145)
TRIGL SERPL-MCNC: 69 MG/DL (ref 0–149)
WBC # BLD AUTO: 4.7 K/UL (ref 4.8–10.8)

## 2019-03-27 PROCEDURE — 80053 COMPREHEN METABOLIC PANEL: CPT

## 2019-03-27 PROCEDURE — 80061 LIPID PANEL: CPT

## 2019-03-27 PROCEDURE — 36415 COLL VENOUS BLD VENIPUNCTURE: CPT

## 2019-03-27 PROCEDURE — 85025 COMPLETE CBC W/AUTO DIFF WBC: CPT

## 2019-04-01 ENCOUNTER — TELEPHONE (OUTPATIENT)
Dept: MEDICAL GROUP | Age: 74
End: 2019-04-01

## 2019-04-01 DIAGNOSIS — R60.1 GENERALIZED EDEMA: ICD-10-CM

## 2019-04-01 RX ORDER — SPIRONOLACTONE 25 MG/1
TABLET ORAL
Qty: 90 TAB | Refills: 4 | Status: SHIPPED | OUTPATIENT
Start: 2019-04-01 | End: 2020-07-01

## 2019-04-01 NOTE — TELEPHONE ENCOUNTER
ESTABLISHED PATIENT PRE-VISIT PLANNING     Patient was NOT contacted to complete PVP.     Note: Patient will not be contacted if there is no indication to call.     1.  Reviewed notes from the last few office visits within the medical group: Yes    2.  If any orders were placed at last visit or intended to be done for this visit (i.e. 6 mos follow-up), do we have Results/Consult Notes?        •  Labs - Labs ordered, completed on 3/27/19 and results are in chart.   Note: If patient appointment is for lab review and patient did not complete labs, check with provider if OK to reschedule patient until labs completed.       •  Imaging - Imaging was not ordered at last office visit.       •  Referrals - No referrals were ordered at last office visit.    3. Is this appointment scheduled as a Hospital Follow-Up? No    4.  Immunizations were updated in Epic using WebIZ?: Epic matches WebIZ       •  Web Iz Recommendations: SHINGRIX (Shingles)    5.  Patient is due for the following Health Maintenance Topics:   Health Maintenance Due   Topic Date Due   • IMM ZOSTER VACCINES (2 of 3) 12/29/2012   • Annual Wellness Visit  06/22/2017       - Patient is up-to-date on all Health Maintenance topics. No records have been requested at this time.    6. Orders for overdue Health Maintenance topics pended in Pre-Charting? N\A    7.  AHA (MDX) form printed for Provider? YES    8.  Patient was NOT informed to arrive 15 min prior to their scheduled appointment and bring in their medication bottles.

## 2019-04-08 ENCOUNTER — PATIENT OUTREACH (OUTPATIENT)
Dept: HEALTH INFORMATION MANAGEMENT | Facility: OTHER | Age: 74
End: 2019-04-08

## 2019-04-08 ENCOUNTER — APPOINTMENT (OUTPATIENT)
Dept: MEDICAL GROUP | Age: 74
End: 2019-04-08
Payer: MEDICARE

## 2019-04-16 ENCOUNTER — TELEPHONE (OUTPATIENT)
Dept: MEDICAL GROUP | Age: 74
End: 2019-04-16

## 2019-04-23 ENCOUNTER — OFFICE VISIT (OUTPATIENT)
Dept: MEDICAL GROUP | Age: 74
End: 2019-04-23
Payer: MEDICARE

## 2019-04-23 VITALS
DIASTOLIC BLOOD PRESSURE: 62 MMHG | HEIGHT: 75 IN | BODY MASS INDEX: 30.02 KG/M2 | TEMPERATURE: 99 F | SYSTOLIC BLOOD PRESSURE: 108 MMHG | OXYGEN SATURATION: 92 % | WEIGHT: 241.4 LBS | HEART RATE: 65 BPM

## 2019-04-23 DIAGNOSIS — K76.6 PORTAL HYPERTENSION WITH ESOPHAGEAL VARICES (HCC): ICD-10-CM

## 2019-04-23 DIAGNOSIS — C18.8 OVERLAPPING MALIGNANT NEOPLASM OF COLON (HCC): ICD-10-CM

## 2019-04-23 DIAGNOSIS — E66.9 OBESITY (BMI 30-39.9): ICD-10-CM

## 2019-04-23 DIAGNOSIS — C78.7 SECONDARY MALIGNANT NEOPLASM OF LIVER (HCC): ICD-10-CM

## 2019-04-23 DIAGNOSIS — I85.00 PORTAL HYPERTENSION WITH ESOPHAGEAL VARICES (HCC): ICD-10-CM

## 2019-04-23 DIAGNOSIS — Z78.9 RUBELLA IMMUNE STATUS NOT KNOWN: ICD-10-CM

## 2019-04-23 DIAGNOSIS — E78.2 MIXED HYPERLIPIDEMIA: ICD-10-CM

## 2019-04-23 DIAGNOSIS — I82.512 CHRONIC DEEP VEIN THROMBOSIS (DVT) OF FEMORAL VEIN OF LEFT LOWER EXTREMITY (HCC): ICD-10-CM

## 2019-04-23 DIAGNOSIS — I10 ESSENTIAL HYPERTENSION: ICD-10-CM

## 2019-04-23 DIAGNOSIS — G62.0 DRUG-INDUCED POLYNEUROPATHY (HCC): ICD-10-CM

## 2019-04-23 DIAGNOSIS — I85.10 SECONDARY ESOPHAGEAL VARICES WITHOUT BLEEDING (HCC): ICD-10-CM

## 2019-04-23 DIAGNOSIS — D69.6 THROMBOCYTOPENIA, UNSPECIFIED (HCC): ICD-10-CM

## 2019-04-23 PROBLEM — D63.8 ANEMIA, CHRONIC DISEASE: Status: RESOLVED | Noted: 2018-11-13 | Resolved: 2019-04-23

## 2019-04-23 PROBLEM — I82.412 ACUTE DEEP VEIN THROMBOSIS (DVT) OF FEMORAL VEIN OF LEFT LOWER EXTREMITY (HCC): Status: RESOLVED | Noted: 2017-06-26 | Resolved: 2019-04-23

## 2019-04-23 PROCEDURE — 8041 PR SCP AHA: Performed by: INTERNAL MEDICINE

## 2019-04-23 PROCEDURE — 99215 OFFICE O/P EST HI 40 MIN: CPT | Performed by: INTERNAL MEDICINE

## 2019-04-23 RX ORDER — AMOXICILLIN 500 MG/1
500 CAPSULE ORAL 3 TIMES DAILY
COMMUNITY
End: 2020-04-07 | Stop reason: SDUPTHER

## 2019-04-23 ASSESSMENT — ENCOUNTER SYMPTOMS
EYES NEGATIVE: 1
PSYCHIATRIC NEGATIVE: 1
RESPIRATORY NEGATIVE: 1
NEUROLOGICAL NEGATIVE: 1
CARDIOVASCULAR NEGATIVE: 1
CONSTITUTIONAL NEGATIVE: 1
GASTROINTESTINAL NEGATIVE: 1

## 2019-04-23 ASSESSMENT — PATIENT HEALTH QUESTIONNAIRE - PHQ9: CLINICAL INTERPRETATION OF PHQ2 SCORE: 0

## 2019-04-23 NOTE — PROGRESS NOTES
Subjective:      Torey Lima is a 73 y.o. male who presents with Follow-Up and Hand Pain (with both hands, mainly fingers, x once a week since decemeber)        HPI    Muscle cramping  Patient has been experiencing muscle cramping in his fingers bilaterally. He is able to move the fingers normally and the cramping will not resolve until he stretches his hands.    The patient is here for followup of chronic medical problems listed below. The patient is compliant with medications and having no side effects from them. Denies chest pain, abdominal pain, dyspnea, myalgias, or cough.    Mixed hyperlipidemia  Patient takes atorvastatin 40 mg PO for regulation of his chronic hyperlipidemia. Lipid profile on 3/27/19 shows a total cholesterol of 107, triglycerides of 69, HDL of 52, and LDL of 41.     Thrombocytopenia  Labs on 3/27/19 show a platelet count of 66 which is up from 64 in November 2018.     Essential hypertension  Patient has a history of hypertension which is well treated with spironolactone 25 mg PO.    Cirrhosis of liver with ascites  Labs on 3/27/19 show an AST of 24, ALT of 19, and alkaline phosphatase of 82.     Overlapping malignant neoplasm of colon  Patient has a history of colon cancer in 2010. Most recent CEA in November 2018 was 1.1.    Health maintenance  Patient does not wish to receive the shingles vaccination due to his other past medical history. He is also unsure if he requires the MMR vaccination as he had the measles as a child.     Patient Active Problem List   Diagnosis   • Overlapping malignant neoplasm of colon (HCC)Overlapping malignant neoplasm of colon (HCC)--2010 left hemicolectomy; no colostomy; ablation of liver met at [facility] 1/2011- MRI abd 3/2018 no change in per   • Liver lesion- ablation of malignant met from colon ca 2011- f/u [facility] q 6 mos - stable MRI abd 3/2018- redo annually   • Peripheral neuropathy, secondary to drugs or chemicals   • Vitamin D deficiency   • ASCVD  (arteriosclerotic cardiovascular disease)subtotalled small distal LCx and 40% LAD med rx by cath 12/13/12   • Mixed hyperlipidemia   • Essential hypertension   • Gastrointestinal hemorrhage with hematemesis- recurrernt from varices   • Secondary esophageal varices without bleeding (HCC)- BANDED 4/2016- repeat egd tbd 10//29/16- [facility]   • Gastric varices- s/p BRTO procedure at [facility]   • Portal vein thrombosis- on CT [facility] 2/2015   • Cirrhosis of liver with ascites (HCC)- ? DUE TO OLD HEP B, CHEMO RX,  OR THERMO RAD OF MAGNANT LESION   • History of colon cancer   • History of esophageal varices   • H/O colon cancer, stage IV- redo colonoscopy 2017 aug at [facility]   • BPH (benign prostatic hyperplasia)   • Peripheral neuropathy due to chemotherapy (HCC)   • Edema of left lower extremity- due to dvt july 2017   • Herpes zoster infection of thoracic region- right back T8   • Generalized edema   • Obesity (BMI 30-39.9)   • Chronic deep vein thrombosis (DVT) of femoral vein of left lower extremity (HCC)- s/p IVC filter 2016   • Portal hypertension with esophageal varices (HCC)   • Secondary malignant neoplasm of liver (HCC)   • Thrombocytopenia, unspecified (HCC)- from previous chemorx       Outpatient Medications Prior to Visit   Medication Sig Dispense Refill   • spironolactone (ALDACTONE) 25 MG Tab TAKE ONE TABLET BY MOUTH ONCE DAILY 90 Tab 4   • atorvastatin (LIPITOR) 40 MG Tab TAKE ONE TABLET BY MOUTH AT BEDTIME 90 Tab 4   • ursodiol (ACTIGALL) 300 MG Cap TAKE ONE CAPSULE BY MOUTH ONCE DAILY 90 Cap 4   • metOLazone (ZAROXOLYN) 2.5 MG Tab TAKE ONE TABLET BY MOUTH ONCE DAILY 90 Tab 4   • torsemide (DEMADEX) 10 MG tablet TAKE TWO TABLETS BY MOUTH ONCE DAILY 180 Tab 4   • Cholecalciferol (VITAMIN D3) 1000 units Cap Take 1,000 Units by mouth.     • glucosamine-chondroitin 500-400 MG tablet Take  by mouth.     • potassium chloride SA (KLOR-CON M20) 20 MEQ Tab CR Take 1 Tab by mouth 2 times a day. 180 Tab 4   • folic acid (FOLVITE) 1  "MG Tab TAKE ONE TABLET BY MOUTH ONCE DAILY 90 Tab 4   • metolazone (ZAROXOLYN) 2.5 MG Tab Take 1 Tab by mouth every 48 hours. 45 Tab 4   • torsemide (DEMADEX) 10 MG tablet Take 2 Tabs by mouth every 48 hours. 90 Tab 4   • ferrous sulfate 325 (65 Fe) MG tablet Take 1 Tab by mouth every morning with breakfast. 30 Tab    • folic acid (FOLVITE) 1 MG Tab Take 1 Tab by mouth every day. 30 Tab      No facility-administered medications prior to visit.         Allergies   Allergen Reactions   • Influenza Virus Vaccine Live      [place]    • Anticoagulant Sodium Citrate [Sodium Citrate]      HIGH BLEEDING RISK   • Lisinopril Cough   • Nkda [No Known Drug Allergy]        Review of Systems   Constitutional: Negative.    HENT: Negative.    Eyes: Negative.    Respiratory: Negative.    Cardiovascular: Negative.    Gastrointestinal: Negative.    Genitourinary: Negative.    Musculoskeletal:        Positive for muscle cramping (hands).   Skin: Negative.    Neurological: Negative.    Endo/Heme/Allergies: Negative.    Psychiatric/Behavioral: Negative.    All other systems reviewed and are negative.           Objective:     /62 (BP Location: Left arm, Patient Position: Sitting, BP Cuff Size: Adult)   Pulse 65   Temp 37.2 °C (99 °F) (Temporal)   Ht 1.905 m (6' 3\")   Wt 109.5 kg (241 lb 6.4 oz)   SpO2 92%   BMI 30.17 kg/m²     Physical Exam   Constitutional: Oriented to person, place, and time. Appears well-developed and well-nourished. No distress.   Head: Normocephalic and atraumatic.   Right Ear: External ear normal.   Left Ear: External ear normal.   Nose: Nose normal.   Mouth/Throat: Oropharynx is clear and moist. No oropharyngeal exudate.   Eyes: Pupils are equal, round, and reactive to light. Conjunctivae and EOM are normal. Right eye exhibits no discharge. Left eye exhibits no discharge. No scleral icterus.   Neck: Normal range of motion. Neck supple. No JVD present. No tracheal deviation present. No thyromegaly " present.   Cardiovascular: Normal rate, regular rhythm, normal heart sounds and intact distal pulses.  Exam reveals no gallop and no friction rub.    No murmur heard.  Pulmonary/Chest: Effort normal. No stridor. No respiratory distress. No wheezing or rales. No tenderness.   Abdominal: Soft. Bowel sounds are normal. No distension and no mass. There is no tenderness. There is no rebound and no guarding. No hernia.   Musculoskeletal: Normal range of motion No edema or tenderness.   Lymphadenopathy: No cervical adenopathy.   Neurological: Alert and oriented to person, place, and time. Normal reflexes. Normal reflexes. No cranial nerve deficit. Normal muscle tone. Coordination normal.   Skin: Skin is warm and dry. No rash noted. Not diaphoretic. No erythema. No pallor.   Psychiatric: Normal mood and affect. Behavior is normal. Judgment and thought content normal.   Nursing note and vitals reviewed.      Lab Results   Component Value Date/Time    HBA1C 5.2 10/25/2016 12:06 AM    HBA1C 5.3 07/08/2016 07:02 AM     Lab Results   Component Value Date/Time    SODIUM 140 03/27/2019 06:05 AM    POTASSIUM 3.9 03/27/2019 06:05 AM    CHLORIDE 103 03/27/2019 06:05 AM    CO2 30 03/27/2019 06:05 AM    GLUCOSE 87 03/27/2019 06:05 AM    BUN 23 (H) 03/27/2019 06:05 AM    CREATININE 1.07 03/27/2019 06:05 AM    CREATININE 1.14 01/23/2010 12:00 AM    BUNCREATRAT 15 01/23/2010 12:00 AM    GLOMRATE >59 01/23/2010 12:00 AM    ALKPHOSPHAT 82 03/27/2019 06:05 AM    ASTSGOT 24 03/27/2019 06:05 AM    ALTSGPT 19 03/27/2019 06:05 AM    TBILIRUBIN 1.5 03/27/2019 06:05 AM     Lab Results   Component Value Date/Time    INR 1.36 (H) 08/02/2018 06:08 AM    INR 1.28 (H) 04/28/2018 07:11 AM    INR 1.18 (H) 09/16/2017 07:26 AM     Lab Results   Component Value Date/Time    CHOLSTRLTOT 107 03/27/2019 06:05 AM    LDL 41 03/27/2019 06:05 AM    HDL 52 03/27/2019 06:05 AM    TRIGLYCERIDE 69 03/27/2019 06:05 AM       Lab Results   Component Value Date/Time     TESTOSTERONE 333 04/02/2015 06:47 AM     No results found for: TSH  Lab Results   Component Value Date/Time    FREET4 0.85 04/02/2015 06:47 AM    FREET4 0.85 04/12/2014 07:26 AM     No results found for: URICACID  No components found for: VITB12  Lab Results   Component Value Date/Time    25HYDROXY 32 08/11/2017 04:50 AM    25HYDROXY 38 04/02/2015 06:47 AM          Assessment/Plan:     1. Mixed hyperlipidemia  Under good control with atorvastatin. Continue same regimen. Ordered future labs.  - Comp Metabolic Panel; Future  - Lipid Profile; Future  - CBC WITH DIFFERENTIAL; Future    2. Essential hypertension  Under good control with spironolactone. Continue same regimen.     3. Thrombocytopenia, unspecified (HCC)- from previous chemorx  Recent labs show platelet count is improving. Will continue to monitor.      4. Overlapping malignant neoplasm of colon (HCC)Overlapping malignant neoplasm of colon (HCC)--2010 left hemicolectomy; no colostomy; ablation of liver met at [facility] 1/2011- MRI abd 3/2018 no change in per  Under good control. Continue same regimen.   - CEA; Future    5. Rubella immune status not known  Ordered an MMR immunity test per patient request.   - MISCELLANEOUS TEST; Future     6. Secondary malignant neoplasm of liver (HCC)  Under good control. Continue same regimen.     7. Chronic deep vein thrombosis (DVT) of femoral vein of left lower extremity (HCC)- s/p IVC filter 2016  Under good control. Continue same regimen.    8. Secondary esophageal varices without bleeding (HCC)- BANDED 4/2016- repeat egd tbd 10//29/16- [facility]  Under good control. Continue same regimen.    9. Portal hypertension with esophageal varices (HCC)  Under good control. Continue same regimen.  Drug-induced polyneuropathy (HCC)  10. Obesity (BMI 30-39.9)  Advised  diet/exercise/lose 15 lbs.; patient counseled     11. Drug-induced polyneuropathy (HCC)- Under good control. Continue same regimen.       Patient agrees to return for follow  up in 3 months.     40 minute face-to-face encounter took place today.  More than half of this time was spent in the coordination of care of the above problems, as well as counseling.     IReji (Scribe), am scribing for, and in the presence of, Fabián Urena M.D..    Electronically signed by: Reji Sethi (Scribe), 4/23/2019    I, Fabián Urena M.D., personally performed the services described in this documentation, as scribed by Reji Sethi in my presence, and it is both accurate and complete.

## 2019-04-23 NOTE — PROGRESS NOTES
Annual Health Assessment Questions:    1.  Are you currently engaging in any exercise or physical activity? Yes    2.  How would you describe your mood or emotional well-being today? good    3.  Have you had any falls in the last year? No    4.  Have you noticed any problems with your balance or had difficulty walking? No    5.  In the last six months have you experienced any leakage of urine? No    6. DPA/Advanced Directive: Patient has Advanced Directive, but it is not on file. Instructed to bring in a copy to scan into their chart.

## 2019-05-03 ENCOUNTER — HOSPITAL ENCOUNTER (OUTPATIENT)
Dept: LAB | Facility: MEDICAL CENTER | Age: 74
End: 2019-05-03
Attending: INTERNAL MEDICINE
Payer: MEDICARE

## 2019-05-03 PROCEDURE — 86735 MUMPS ANTIBODY: CPT

## 2019-05-03 PROCEDURE — 36415 COLL VENOUS BLD VENIPUNCTURE: CPT

## 2019-05-03 PROCEDURE — 86765 RUBEOLA ANTIBODY: CPT

## 2019-05-03 PROCEDURE — 86762 RUBELLA ANTIBODY: CPT

## 2019-05-04 LAB
MEV IGG SER IA-ACNC: 3.16
MUV IGG SER IA-ACNC: 4.16
RUBV AB SER QL: >500 IU/ML

## 2019-05-29 ENCOUNTER — APPOINTMENT (RX ONLY)
Dept: URBAN - METROPOLITAN AREA CLINIC 35 | Facility: CLINIC | Age: 74
Setting detail: DERMATOLOGY
End: 2019-05-29

## 2019-05-29 DIAGNOSIS — L82.1 OTHER SEBORRHEIC KERATOSIS: ICD-10-CM

## 2019-05-29 DIAGNOSIS — D22 MELANOCYTIC NEVI: ICD-10-CM

## 2019-05-29 DIAGNOSIS — Z71.89 OTHER SPECIFIED COUNSELING: ICD-10-CM

## 2019-05-29 DIAGNOSIS — L57.0 ACTINIC KERATOSIS: ICD-10-CM

## 2019-05-29 DIAGNOSIS — Z85.828 PERSONAL HISTORY OF OTHER MALIGNANT NEOPLASM OF SKIN: ICD-10-CM

## 2019-05-29 DIAGNOSIS — L81.4 OTHER MELANIN HYPERPIGMENTATION: ICD-10-CM

## 2019-05-29 PROBLEM — D22.61 MELANOCYTIC NEVI OF RIGHT UPPER LIMB, INCLUDING SHOULDER: Status: ACTIVE | Noted: 2019-05-29

## 2019-05-29 PROBLEM — C18.9 MALIGNANT NEOPLASM OF COLON, UNSPECIFIED: Status: ACTIVE | Noted: 2019-05-29

## 2019-05-29 PROBLEM — L20.84 INTRINSIC (ALLERGIC) ECZEMA: Status: ACTIVE | Noted: 2019-05-29

## 2019-05-29 PROCEDURE — 17003 DESTRUCT PREMALG LES 2-14: CPT

## 2019-05-29 PROCEDURE — ? COUNSELING

## 2019-05-29 PROCEDURE — ? COSMETIC QUOTE

## 2019-05-29 PROCEDURE — ? BENIGN DESTRUCTION COSMETIC

## 2019-05-29 PROCEDURE — 17000 DESTRUCT PREMALG LESION: CPT

## 2019-05-29 PROCEDURE — ? LIQUID NITROGEN

## 2019-05-29 PROCEDURE — 99213 OFFICE O/P EST LOW 20 MIN: CPT | Mod: 25

## 2019-05-29 ASSESSMENT — LOCATION DETAILED DESCRIPTION DERM
LOCATION DETAILED: RIGHT POSTERIOR SHOULDER
LOCATION DETAILED: LEFT SUPERIOR PARIETAL SCALP
LOCATION DETAILED: RIGHT LATERAL TRAPEZIAL NECK
LOCATION DETAILED: RIGHT SOFT TRIANGLE OF THE NOSE
LOCATION DETAILED: LEFT INFERIOR CENTRAL MALAR CHEEK
LOCATION DETAILED: LEFT SUPERIOR MEDIAL MALAR CHEEK
LOCATION DETAILED: LEFT OCCIPITAL SCALP
LOCATION DETAILED: LEFT MEDIAL MALAR CHEEK

## 2019-05-29 ASSESSMENT — LOCATION ZONE DERM
LOCATION ZONE: NECK
LOCATION ZONE: NOSE
LOCATION ZONE: FACE
LOCATION ZONE: SCALP
LOCATION ZONE: ARM

## 2019-05-29 ASSESSMENT — LOCATION SIMPLE DESCRIPTION DERM
LOCATION SIMPLE: POSTERIOR SCALP
LOCATION SIMPLE: LEFT CHEEK
LOCATION SIMPLE: RIGHT NOSE
LOCATION SIMPLE: SCALP
LOCATION SIMPLE: POSTERIOR NECK
LOCATION SIMPLE: RIGHT SHOULDER

## 2019-05-29 NOTE — PROCEDURE: LIQUID NITROGEN
Render Note In Bullet Format When Appropriate: No
Consent: The patient's consent was obtained including but not limited to risks of crusting, scabbing, blistering, scarring, darker or lighter pigmentary change, recurrence, incomplete removal and infection.
Number Of Freeze-Thaw Cycles: 1 freeze-thaw cycle
Detail Level: Detailed
Duration Of Freeze Thaw-Cycle (Seconds): 10
Post-Care Instructions: I reviewed with the patient in detail post-care instructions. Patient is to wear sunprotection, and avoid picking at any of the treated lesions. Pt may apply Vaseline to crusted or scabbing areas.

## 2019-05-29 NOTE — PROCEDURE: COSMETIC QUOTE
Xeomin Price Per Unit: 15
Discount Percentage: 0
Voluma Price Per Syringe: 500
Detail Level: Generalized
Notice: We have created a more complete Cosmetic Quote plan.  The procedure name is also Cosmetic Quote.  Please review the new plan and hide the Cosmetic Quote plan you do not want to use.

## 2019-06-03 DIAGNOSIS — E87.6 HYPOKALEMIA: ICD-10-CM

## 2019-06-03 RX ORDER — POTASSIUM CHLORIDE 20 MEQ/1
TABLET, EXTENDED RELEASE ORAL
Qty: 180 TAB | Refills: 0 | Status: SHIPPED | OUTPATIENT
Start: 2019-06-03 | End: 2019-08-28 | Stop reason: SDUPTHER

## 2019-07-18 ENCOUNTER — HOSPITAL ENCOUNTER (OUTPATIENT)
Dept: LAB | Facility: MEDICAL CENTER | Age: 74
End: 2019-07-18
Attending: INTERNAL MEDICINE
Payer: MEDICARE

## 2019-07-18 ENCOUNTER — TELEPHONE (OUTPATIENT)
Dept: MEDICAL GROUP | Age: 74
End: 2019-07-18

## 2019-07-18 DIAGNOSIS — C18.8 OVERLAPPING MALIGNANT NEOPLASM OF COLON (HCC): ICD-10-CM

## 2019-07-18 DIAGNOSIS — E78.2 MIXED HYPERLIPIDEMIA: ICD-10-CM

## 2019-07-18 LAB
ALBUMIN SERPL BCP-MCNC: 3.6 G/DL (ref 3.2–4.9)
ALBUMIN/GLOB SERPL: 1.4 G/DL
ALP SERPL-CCNC: 85 U/L (ref 30–99)
ALT SERPL-CCNC: 19 U/L (ref 2–50)
ANION GAP SERPL CALC-SCNC: 7 MMOL/L (ref 0–11.9)
AST SERPL-CCNC: 25 U/L (ref 12–45)
BASOPHILS # BLD AUTO: 0.6 % (ref 0–1.8)
BASOPHILS # BLD: 0.03 K/UL (ref 0–0.12)
BILIRUB SERPL-MCNC: 1.7 MG/DL (ref 0.1–1.5)
BUN SERPL-MCNC: 20 MG/DL (ref 8–22)
CALCIUM SERPL-MCNC: 8.6 MG/DL (ref 8.5–10.5)
CEA SERPL-MCNC: 1.8 NG/ML (ref 0–3)
CHLORIDE SERPL-SCNC: 103 MMOL/L (ref 96–112)
CHOLEST SERPL-MCNC: 106 MG/DL (ref 100–199)
CO2 SERPL-SCNC: 28 MMOL/L (ref 20–33)
CREAT SERPL-MCNC: 1.05 MG/DL (ref 0.5–1.4)
EOSINOPHIL # BLD AUTO: 0.2 K/UL (ref 0–0.51)
EOSINOPHIL NFR BLD: 4.2 % (ref 0–6.9)
ERYTHROCYTE [DISTWIDTH] IN BLOOD BY AUTOMATED COUNT: 50.1 FL (ref 35.9–50)
FASTING STATUS PATIENT QL REPORTED: NORMAL
GLOBULIN SER CALC-MCNC: 2.5 G/DL (ref 1.9–3.5)
GLUCOSE SERPL-MCNC: 88 MG/DL (ref 65–99)
HCT VFR BLD AUTO: 41.2 % (ref 42–52)
HDLC SERPL-MCNC: 54 MG/DL
HGB BLD-MCNC: 13.7 G/DL (ref 14–18)
IMM GRANULOCYTES # BLD AUTO: 0.02 K/UL (ref 0–0.11)
IMM GRANULOCYTES NFR BLD AUTO: 0.4 % (ref 0–0.9)
LDLC SERPL CALC-MCNC: 42 MG/DL
LYMPHOCYTES # BLD AUTO: 0.76 K/UL (ref 1–4.8)
LYMPHOCYTES NFR BLD: 16.1 % (ref 22–41)
MCH RBC QN AUTO: 30.1 PG (ref 27–33)
MCHC RBC AUTO-ENTMCNC: 33.3 G/DL (ref 33.7–35.3)
MCV RBC AUTO: 90.5 FL (ref 81.4–97.8)
MONOCYTES # BLD AUTO: 0.48 K/UL (ref 0–0.85)
MONOCYTES NFR BLD AUTO: 10.2 % (ref 0–13.4)
NEUTROPHILS # BLD AUTO: 3.23 K/UL (ref 1.82–7.42)
NEUTROPHILS NFR BLD: 68.5 % (ref 44–72)
NRBC # BLD AUTO: 0 K/UL
NRBC BLD-RTO: 0 /100 WBC
PLATELET # BLD AUTO: 65 K/UL (ref 164–446)
PMV BLD AUTO: 11.1 FL (ref 9–12.9)
POTASSIUM SERPL-SCNC: 3.7 MMOL/L (ref 3.6–5.5)
PROT SERPL-MCNC: 6.1 G/DL (ref 6–8.2)
RBC # BLD AUTO: 4.55 M/UL (ref 4.7–6.1)
SODIUM SERPL-SCNC: 138 MMOL/L (ref 135–145)
TRIGL SERPL-MCNC: 52 MG/DL (ref 0–149)
WBC # BLD AUTO: 4.7 K/UL (ref 4.8–10.8)

## 2019-07-18 PROCEDURE — 82378 CARCINOEMBRYONIC ANTIGEN: CPT

## 2019-07-18 PROCEDURE — 36415 COLL VENOUS BLD VENIPUNCTURE: CPT

## 2019-07-18 PROCEDURE — 80053 COMPREHEN METABOLIC PANEL: CPT

## 2019-07-18 PROCEDURE — 85025 COMPLETE CBC W/AUTO DIFF WBC: CPT

## 2019-07-18 PROCEDURE — 80061 LIPID PANEL: CPT

## 2019-07-18 NOTE — TELEPHONE ENCOUNTER
ESTABLISHED PATIENT PRE-VISIT PLANNING     Patient was NOT contacted to complete PVP.     Note: Patient will not be contacted if there is no indication to call.     1.  Reviewed notes from the last few office visits within the medical group: Yes    2.  If any orders were placed at last visit or intended to be done for this visit (i.e. 6 mos follow-up), do we have Results/Consult Notes?        •  Labs - Labs ordered, completed on 7/19 and results are in chart.   Note: If patient appointment is for lab review and patient did not complete labs, check with provider if OK to reschedule patient until labs completed.       •  Imaging - Imaging was not ordered at last office visit.       •  Referrals - No referrals were ordered at last office visit.    3. Is this appointment scheduled as a Hospital Follow-Up? No    4.  Immunizations were updated in Epic using WebIZ?: Epic matches WebIZ       •  Web Iz Recommendations: Patient is up to date on all vaccines    5.  Patient is due for the following Health Maintenance Topics:   Health Maintenance Due   Topic Date Due   • Annual Wellness Visit  06/22/2017       - Patient is up-to-date on all Health Maintenance topics. No records have been requested at this time.    6. Orders for overdue Health Maintenance topics pended in Pre-Charting? N\A    7.  AHA (MDX) form printed for Provider? No, already completed    8.  Patient was NOT informed to arrive 15 min prior to their scheduled appointment and bring in their medication bottles.

## 2019-07-22 DIAGNOSIS — K74.60 CIRRHOSIS OF LIVER WITH ASCITES, UNSPECIFIED HEPATIC CIRRHOSIS TYPE (HCC): ICD-10-CM

## 2019-07-22 DIAGNOSIS — R18.8 CIRRHOSIS OF LIVER WITH ASCITES, UNSPECIFIED HEPATIC CIRRHOSIS TYPE (HCC): ICD-10-CM

## 2019-07-22 RX ORDER — FOLIC ACID 1 MG/1
TABLET ORAL
Qty: 100 TAB | Refills: 4 | Status: SHIPPED | OUTPATIENT
Start: 2019-07-22 | End: 2020-10-22

## 2019-08-01 ENCOUNTER — TELEPHONE (OUTPATIENT)
Dept: MEDICAL GROUP | Age: 74
End: 2019-08-01

## 2019-08-01 NOTE — TELEPHONE ENCOUNTER
ESTABLISHED PATIENT PRE-VISIT PLANNING     Patient was contacted to complete PVP.     Note: Patient will not be contacted if there is no indication to call.     1.  Reviewed notes from the last few office visits within the medical group: Yes    2.  If any orders were placed at last visit or intended to be done for this visit (i.e. 6 mos follow-up), do we have Results/Consult Notes?        •  Labs - Labs ordered, completed on 7/18/19 and results are in chart.   Note: If patient appointment is for lab review and patient did not complete labs, check with provider if OK to reschedule patient until labs completed.       •  Imaging - Imaging was not ordered at last office visit.       •  Referrals - No referrals were ordered at last office visit.    3. Is this appointment scheduled as a Hospital Follow-Up? No    4.  Immunizations were updated in Epic using WebIZ?: Epic matches WebIZ       •  Web Iz Recommendations: HEPATITIS B    5.  Patient is due for the following Health Maintenance Topics:   Health Maintenance Due   Topic Date Due   • Annual Wellness Visit  1945   • IMM HEP B VACCINE (1 of 3 - Risk 3-dose series) 06/13/1964           6. Orders for overdue Health Maintenance topics pended in Pre-Charting? N\A    7.  AHA (MDX) form printed for Provider? No, already completed    8.  Patient was NOT informed to arrive 15 min prior to their scheduled appointment and bring in their medication bottles.

## 2019-08-02 ENCOUNTER — OFFICE VISIT (OUTPATIENT)
Dept: MEDICAL GROUP | Age: 74
End: 2019-08-02
Payer: MEDICARE

## 2019-08-02 ENCOUNTER — HOSPITAL ENCOUNTER (OUTPATIENT)
Dept: LAB | Facility: MEDICAL CENTER | Age: 74
End: 2019-08-02
Attending: INTERNAL MEDICINE
Payer: MEDICARE

## 2019-08-02 VITALS
SYSTOLIC BLOOD PRESSURE: 112 MMHG | BODY MASS INDEX: 29.99 KG/M2 | HEIGHT: 75 IN | DIASTOLIC BLOOD PRESSURE: 64 MMHG | OXYGEN SATURATION: 96 % | HEART RATE: 62 BPM | WEIGHT: 241.2 LBS | TEMPERATURE: 98.6 F

## 2019-08-02 DIAGNOSIS — E66.9 OBESITY (BMI 30-39.9): ICD-10-CM

## 2019-08-02 DIAGNOSIS — K74.60 CIRRHOSIS OF LIVER WITH ASCITES, UNSPECIFIED HEPATIC CIRRHOSIS TYPE (HCC): ICD-10-CM

## 2019-08-02 DIAGNOSIS — E55.9 VITAMIN D DEFICIENCY: ICD-10-CM

## 2019-08-02 DIAGNOSIS — C78.7 SECONDARY MALIGNANT NEOPLASM OF LIVER (HCC): ICD-10-CM

## 2019-08-02 DIAGNOSIS — R25.2 LEG CRAMPING: ICD-10-CM

## 2019-08-02 DIAGNOSIS — I85.10 SECONDARY ESOPHAGEAL VARICES WITHOUT BLEEDING (HCC): ICD-10-CM

## 2019-08-02 DIAGNOSIS — I86.4 GASTRIC VARICES: ICD-10-CM

## 2019-08-02 DIAGNOSIS — C18.8 OVERLAPPING MALIGNANT NEOPLASM OF COLON (HCC): ICD-10-CM

## 2019-08-02 DIAGNOSIS — I82.512 CHRONIC DEEP VEIN THROMBOSIS (DVT) OF FEMORAL VEIN OF LEFT LOWER EXTREMITY (HCC): ICD-10-CM

## 2019-08-02 DIAGNOSIS — E78.2 MIXED HYPERLIPIDEMIA: ICD-10-CM

## 2019-08-02 DIAGNOSIS — R60.0 EDEMA OF LEFT LOWER EXTREMITY: ICD-10-CM

## 2019-08-02 DIAGNOSIS — R25.2 HAND OR FOOT SPASMS: ICD-10-CM

## 2019-08-02 DIAGNOSIS — Z00.00 MEDICARE ANNUAL WELLNESS VISIT, SUBSEQUENT: ICD-10-CM

## 2019-08-02 DIAGNOSIS — R18.8 CIRRHOSIS OF LIVER WITH ASCITES, UNSPECIFIED HEPATIC CIRRHOSIS TYPE (HCC): ICD-10-CM

## 2019-08-02 DIAGNOSIS — N40.0 BENIGN PROSTATIC HYPERPLASIA, UNSPECIFIED WHETHER LOWER URINARY TRACT SYMPTOMS PRESENT: ICD-10-CM

## 2019-08-02 DIAGNOSIS — G62.0 DRUG-INDUCED POLYNEUROPATHY (HCC): ICD-10-CM

## 2019-08-02 DIAGNOSIS — K76.9 LIVER LESION: ICD-10-CM

## 2019-08-02 DIAGNOSIS — D69.6 THROMBOCYTOPENIA, UNSPECIFIED (HCC): ICD-10-CM

## 2019-08-02 DIAGNOSIS — I10 ESSENTIAL HYPERTENSION: ICD-10-CM

## 2019-08-02 DIAGNOSIS — I25.10 ASCVD (ARTERIOSCLEROTIC CARDIOVASCULAR DISEASE): ICD-10-CM

## 2019-08-02 DIAGNOSIS — K92.0 GASTROINTESTINAL HEMORRHAGE WITH HEMATEMESIS: ICD-10-CM

## 2019-08-02 PROBLEM — B02.9 HERPES ZOSTER INFECTION OF THORACIC REGION: Status: RESOLVED | Noted: 2017-09-19 | Resolved: 2019-08-02

## 2019-08-02 LAB
ERYTHROCYTE [SEDIMENTATION RATE] IN BLOOD BY WESTERGREN METHOD: 2 MM/HOUR (ref 0–20)
MAGNESIUM SERPL-MCNC: 2 MG/DL (ref 1.5–2.5)
TSH SERPL DL<=0.005 MIU/L-ACNC: 1.67 UIU/ML (ref 0.38–5.33)
VIT B12 SERPL-MCNC: 321 PG/ML (ref 211–911)

## 2019-08-02 PROCEDURE — 83921 ORGANIC ACID SINGLE QUANT: CPT

## 2019-08-02 PROCEDURE — 83735 ASSAY OF MAGNESIUM: CPT

## 2019-08-02 PROCEDURE — 82607 VITAMIN B-12: CPT

## 2019-08-02 PROCEDURE — 36415 COLL VENOUS BLD VENIPUNCTURE: CPT

## 2019-08-02 PROCEDURE — 85652 RBC SED RATE AUTOMATED: CPT

## 2019-08-02 PROCEDURE — G0439 PPPS, SUBSEQ VISIT: HCPCS | Performed by: INTERNAL MEDICINE

## 2019-08-02 PROCEDURE — 84443 ASSAY THYROID STIM HORMONE: CPT

## 2019-08-02 ASSESSMENT — PAIN SCALES - GENERAL: PAINLEVEL: NO PAIN

## 2019-08-02 ASSESSMENT — ENCOUNTER SYMPTOMS
RESPIRATORY NEGATIVE: 1
CARDIOVASCULAR NEGATIVE: 1
GENERAL WELL-BEING: GOOD
GASTROINTESTINAL NEGATIVE: 1
EYES NEGATIVE: 1
NEUROLOGICAL NEGATIVE: 1
PSYCHIATRIC NEGATIVE: 1
CONSTITUTIONAL NEGATIVE: 1

## 2019-08-02 ASSESSMENT — ACTIVITIES OF DAILY LIVING (ADL): BATHING_REQUIRES_ASSISTANCE: 0

## 2019-08-02 ASSESSMENT — PATIENT HEALTH QUESTIONNAIRE - PHQ9: CLINICAL INTERPRETATION OF PHQ2 SCORE: 0

## 2019-08-02 NOTE — PROGRESS NOTES
Chief Complaint   Patient presents with   • Annual Wellness Visit     SCP       HPI:  Torey Lima is a 74 y.o. here for Medicare Annual Wellness Visit.    Overall, the patient is doing well. He complains of acute leg cramping to his bilateral lower extremities for the past three months. Symptoms appear to improve with physical activity and are exacerbated with prolonged periods of sitting. Additionally, he complains of joint pain to his bilateral hands. He will occasionally experience rigidity to his fingers described as difficulty flexing his fingers. He denies any joint swelling, erythema or warmth. Symptoms do not appear to worsen at any point of the day.    Patient has a history of chronic hypertension. He reportedly monitors his blood pressure regularly at home. Blood pressure is stable today at 112/64. He reports following a low sodium diet and deny any related complaints including chronic cough, chest pain, headaches or dizziness.     Patient has a history of vitamin D deficiency and is taking Cholecalciferol 1,000 units daily.     Chronic, stable history of hyperlipidemia. Currently taking Atorvastatin 40 mg once daily as directed. No medication side effects were reported including myalgias or abdominal pain. He is not taking a daily Aspirin. No acute complaints of dizziness, claudication or chest pain. Lipid panel was last completed on 07/18/19. Previously, he was diagnosed with cirrhosis of the liver and liver lesions.    He has a history of thrombocytopenia since completing chemotherapy for colon cancer in 2011. He continues to be followed by physicians in [facility]. CEA was evaluated on his most recent labs dated 07/18/19 and is normal. Bilirubin is slightly elevated.       Patient Active Problem List    Diagnosis Date Noted   • Secondary malignant neoplasm of liver (HCC) 04/23/2019   • Thrombocytopenia, unspecified (HCC)- from previous chemorx 04/23/2019   • Chronic deep vein thrombosis (DVT) of  femoral vein of left lower extremity (HCC)- s/p IVC filter 2016 11/13/2018   • Portal hypertension with esophageal varices (HCC) 11/13/2018   • Generalized edema 12/14/2017   • Obesity (BMI 30-39.9) 12/14/2017   • Edema of left lower extremity- due to dvt july 2017 09/19/2017   • BPH (benign prostatic hyperplasia) 08/11/2017   • Peripheral neuropathy due to chemotherapy (HCC) 08/11/2017   • H/O colon cancer, stage IV- neg colonoscopy 2012/2018 at [facility] 05/20/2017   • History of esophageal varices 10/24/2016   • Portal vein thrombosis- on CT [facility] 2/2015 04/14/2016   • Cirrhosis of liver with ascites (HCC)- ? DUE TO OLD HEP B, CHEMO RX,  OR THERMO RAD OF MAGNANT LESION 04/14/2016   • Secondary esophageal varices without bleeding (HCC)- BANDED 4/2016- repeat egd tbd 10//29/16- [facility] 04/04/2016   • Gastric varices- s/p BRTO procedure at [facility] 04/04/2016   • Gastrointestinal hemorrhage with hematemesis- recurrernt from varices 04/03/2016   • Mixed hyperlipidemia 06/20/2013   • Essential hypertension 06/20/2013   • ASCVD (arteriosclerotic cardiovascular disease)subtotalled small distal LCx and 40% LAD med rx by cath 12/13/12 12/14/2012   • Vitamin D deficiency 08/02/2012   • Overlapping malignant neoplasm of colon (HCC)Overlapping malignant neoplasm of colon (HCC)--2010 left hemicolectomy; no colostomy; ablation of liver met at [facility] 1/2011- MRI abd 3/2018 no change in per 12/06/2010   • Liver lesion- ablation of malignant met from colon ca 2011- f/u [facility] q 6 mos - stable MRI abd 3/2018- redo annually 12/06/2010   • Drug-induced polyneuropathy (HCC) 12/06/2010       Current Outpatient Medications   Medication Sig Dispense Refill   • folic acid (FOLVITE) 1 MG Tab TAKE 1 TABLET BY MOUTH ONCE DAILY 100 Tab 4   • potassium chloride SA (KDUR) 20 MEQ Tab CR TAKE 1 TABLET BY MOUTH TWICE DAILY 180 Tab 0   • amoxicillin (AMOXIL) 500 MG Cap Take 500 mg by mouth 3 times a day.     • spironolactone (ALDACTONE) 25 MG Tab TAKE ONE TABLET BY  MOUTH ONCE DAILY 90 Tab 4   • atorvastatin (LIPITOR) 40 MG Tab TAKE ONE TABLET BY MOUTH AT BEDTIME 90 Tab 4   • ursodiol (ACTIGALL) 300 MG Cap TAKE ONE CAPSULE BY MOUTH ONCE DAILY 90 Cap 4   • metOLazone (ZAROXOLYN) 2.5 MG Tab TAKE ONE TABLET BY MOUTH ONCE DAILY 90 Tab 4   • torsemide (DEMADEX) 10 MG tablet TAKE TWO TABLETS BY MOUTH ONCE DAILY 180 Tab 4   • Cholecalciferol (VITAMIN D3) 1000 units Cap Take 1,000 Units by mouth.     • glucosamine-chondroitin 500-400 MG tablet Take  by mouth.     • metolazone (ZAROXOLYN) 2.5 MG Tab Take 1 Tab by mouth every 48 hours. 45 Tab 4   • torsemide (DEMADEX) 10 MG tablet Take 2 Tabs by mouth every 48 hours. 90 Tab 4   • ferrous sulfate 325 (65 Fe) MG tablet Take 1 Tab by mouth every morning with breakfast. 30 Tab    • folic acid (FOLVITE) 1 MG Tab Take 1 Tab by mouth every day. 30 Tab      No current facility-administered medications for this visit.             Current supplements as per medication list.       Allergies: Influenza virus vaccine live; Anticoagulant sodium citrate [sodium citrate]; Lisinopril; and Nkda [no known drug allergy]    Current social contact/activities: date night with wife goes to a dinner and show, travels to [place].     He  reports that he quit smoking about 55 years ago. His smoking use included cigarettes and cigars. He has a 0.50 pack-year smoking history. He has never used smokeless tobacco. He reports that he does not drink alcohol or use drugs.  Counseling given: Not Answered  Comment: 1 cigar per week.      DPA/Advanced Directive:  Patient has Advanced Directive, but it is not on file. Instructed to bring in a copy to scan into their chart.      Review of Systems   Constitutional: Negative.    HENT: Negative.    Eyes: Negative.    Respiratory: Negative.    Cardiovascular: Negative.    Gastrointestinal: Negative.    Genitourinary: Negative.    Musculoskeletal: Positive for joint pain.        Positive muscle cramping to bilateral lower  extremities. Negative joint swelling, warmth or erythema.   Skin: Negative.    Neurological: Negative.    Endo/Heme/Allergies: Negative.    Psychiatric/Behavioral: Negative.    All other systems reviewed and are negative.     Gait: Uses no assistive device  Ostomy: No  Other tubes: No  Amputations: No  Chronic oxygen use: No  Last eye exam: 4/2019  Wears hearing aids: No   : Denies any urinary leakage during the last 6 months     Screening:    Depression Screening    Little interest or pleasure in doing things?  0 - not at all  Feeling down, depressed , or hopeless? 0 - not at all  Patient Health Questionnaire Score: 0     If depressive symptoms identified deferred to follow up visit unless specifically addressed in assessment and plan.    Interpretation of PHQ-9 Total Score   Score Severity   1-4 No Depression   5-9 Mild Depression   10-14 Moderate Depression   15-19 Moderately Severe Depression   20-27 Severe Depression    Screening for Cognitive Impairment    Three Minute Recall (village, kitchen, baby) 3/3    Humberto clock face with all 12 numbers and set the hands to show 10 past 10.  Yes    Cognitive concerns identified deferred for follow up unless specifically addressed in assessment and plan.    Fall Risk Assessment    Has the patient had two or more falls in the last year or any fall with injury in the last year?  No    Safety Assessment    Throw rugs on floor.  No  Handrails on all stairs.  No  Good lighting in all hallways.  Yes  Difficulty hearing.  No  Patient counseled about all safety risks that were identified.    Functional Assessment ADLs    Are there any barriers preventing you from cooking for yourself or meeting nutritional needs?  No.    Are there any barriers preventing you from driving safely or obtaining transportation?  No.    Are there any barriers preventing you from using a telephone or calling for help?  No.    Are there any barriers preventing you from shopping?  No.    Are there any  barriers preventing you from taking care of your own finances?  No.    Are there any barriers preventing you from managing your medications?  No.    Are there any barriers preventing you from showering, bathing or dressing yourself?  No.    Are you currently engaging in any exercise or physical activity?  Yes.  Walks, and yard work  What is your perception of your health?  Good.      Health Maintenance Summary                Annual Wellness Visit Overdue 1945     IMM HEP B VACCINE Overdue 1963     IMM PNEUMOCOCCAL VACCINE: 65+ Years Next Due 10/9/2020      Done 10/9/2015 Imm Admin: Pneumococcal Conjugate Vaccine (Prevnar/PCV-13)     Patient has more history with this topic...    IMM DTaP/Tdap/Td Vaccine Next Due 2022      Done 2012 Imm Admin: Tdap Vaccine     Patient has more history with this topic...    COLONOSCOPY Next Due 2023      Done 2018 REFERRAL TO GI FOR COLONOSCOPY     Patient has more history with this topic...          Patient Care Team:  Fabián Urena M.D. as PCP - General  Pierre Waggoner M.D. as Consulting Physician (Gastroenterology)  Sal Yao M.D. as Consulting Physician (Oncology)  Herman Corey O.D. as Consulting Physician (Optometry)  Geovany Nuñez, PT, DPT, OCS as Physical Therapy (Physical Therapy)  Payal Singh as          Social History     Tobacco Use   • Smoking status: Former Smoker     Packs/day: 0.50     Years: 1.00     Pack years: 0.50     Types: Cigarettes, Cigars     Last attempt to quit: 1964     Years since quittin.6   • Smokeless tobacco: Never Used   • Tobacco comment: 1 cigar per week.   Substance Use Topics   • Alcohol use: No     Alcohol/week: 0.6 oz     Types: 1 Standard drinks or equivalent per week     Comment: minimal, Pt stop drinking since the beginning of 2016   • Drug use: No     Family History   Problem Relation Age of Onset   • Heart Disease Mother    • Cancer Mother    • Sleep  "Apnea Neg Hx      He  has a past medical history of ASCVD (arteriosclerotic cardiovascular disease) (12/14/2012), BMI 33.0-33.9,adult (2/15/2013), Cancer (HCC) (10/10-6/11), Chickenpox, Colon cancer (HCC) (12/6/2010), Elevated CEA (12/6/2010), [place] measles, GI bleed, HLD (hyperlipidemia) (6/20/2013), HTN (hypertension) (6/20/2013), Hypertension, Impaired fasting glucose (8/2/2012), Liver cirrhosis (HCC), Liver lesion (12/6/2010), Peripheral neuropathy, secondary to drugs or chemicals (12/6/2010), Thrombocytopenia due to drugs (4/25/2012), and Vitamin d deficiency (8/2/2012).   Past Surgical History:   Procedure Laterality Date   • LUMBAR LAMINECTOMY DISKECTOMY  5/25/2017    Procedure:  LAMINECTOMY LUMBAR  4 TO SACRAL 1;  Surgeon: Felton Escobar M.D.;  Location: SURGERY [facility];  Service:    • GASTROSCOPY WITH BANDING  10/25/2016    Procedure: GASTROSCOPY WITH BANDING;  Surgeon: Pierre Waggoner M.D.;  Location: ENDOSCOPY [facility];  Service:    • GASTROSCOPY WITH BANDING  4/3/2016    Procedure: GASTROSCOPY WITH BANDING;  Surgeon: Cayetano Stovall M.D.;  Location: ENDOSCOPY [facility];  Service:    • FULL THICKNESS BLOCK RESECTION  4/11/2012    Performed by LANI BOWMAN at SURGERY [facility]   • CATH REMOVAL  7/20/2011    Performed by RUBY RUIZ at [facility]   • CATH PLACEMENT  9/20/2010    Performed by RUBY RUIZ at [facility]   • LOW ANTERIOR RESECTION ROBOTIC  8/10/2010    Performed by RUBY RUIZ at [facility]   • COLON RESECTION     • HIP ARTHROPLASTY MIS TOTAL      rt   • HIP REPLACEMENT, TOTAL     • OTHER      Cholecystectomy   • OTHER (COMMENTS)      liver surgery         Exam:   /64 (BP Location: Left arm, Patient Position: Sitting, BP Cuff Size: Adult)   Pulse 62   Temp 37 °C (98.6 °F)   Ht 1.905 m (6' 3\")   Wt 109.4 kg (241 lb 3.2 oz)   SpO2 96%  Body mass index is 30.15 kg/m².    Hearing good.  "   Dentition good  Alert, oriented in no acute distress.  Eye contact is good, speech goal directed, affect calm    Constitutional: Oriented to person, place, and time. Appears well-developed and well-nourished. No distress.   Head: Normocephalic and atraumatic.   Right Ear: External ear normal.   Left Ear: External ear normal.   Nose: Nose normal.   Mouth/Throat: Oropharynx is clear and moist. No oropharyngeal exudate.   Eyes: Pupils are equal, round, and reactive to light. Conjunctivae and EOM are normal. Right eye exhibits no discharge. Left eye exhibits no discharge. No scleral icterus.   Neck: Normal range of motion. Neck supple. No JVD present. No tracheal deviation present. No thyromegaly present. No bruit.  Cardiovascular: Normal rate, regular rhythm, normal heart sounds and intact distal pulses.  Exam reveals no gallop and no friction rub. No murmur heard.  Pulmonary/Chest: Effort normal. No stridor. No respiratory distress. No wheezing or rales. No tenderness.   Abdominal: Soft. Bowel sounds are normal. No distension and no mass. There is no tenderness. There is no rebound and no guarding. No hernia. No pulsatile masses.  Musculoskeletal: Normal range of motion No edema or tenderness.   Lymphadenopathy: No cervical adenopathy.   Neurological: Alert and oriented to person, place, and time. Normal muscle tone. Coordination normal.   Skin: Skin is warm and dry. No rash noted. Not diaphoretic. No erythema. No pallor.   Psychiatric: Normal mood and affect. Behavior is normal. Judgment and thought content normal.       Assessment and Plan:  The following treatment and monitoring plan is recommended:      1. Medicare annual wellness visit, subsequent  Overall, the patient is doing well.     2. ASCVD (arteriosclerotic cardiovascular disease)subtotalled small distal LCx and 40% LAD med rx by cath 12/13/12  Under good control. Continue same regimen. No acute complaint of chest pain or shortness of breath.    3.  Benign prostatic hyperplasia, unspecified whether lower urinary tract symptoms present  Under good control. Continue same regimen.     4. Chronic deep vein thrombosis (DVT) of femoral vein of left lower extremity (HCC)- s/p IVC filter 2016  Under good control. Continue same regimen. No acute complaints of extremity swelling.     5. Cirrhosis of liver with ascites, unspecified hepatic cirrhosis type (HCC)  Under good control. Liver enzymes normal on most recent labs dated 07/18/19. Total bilirubin is slightly elevated at 1.7. No acute complaints or jaundice.     6. Drug-induced polyneuropathy (HCC)  Under good control. Continue same regimen.     7. Edema of left lower extremity- due to dvt july 2017  Under good control. Continue same regimen. No acute complaints or concerns for DVT.    8. Essential hypertension  Under good control. Continue same regimen. Blood pressure today was 112/64. Continue to monitor blood pressure at home. Follow a low sodium diet.     9. Gastric varices- s/p BRTO procedure at [facility]  10. Gastrointestinal hemorrhage with hematemesis- recurrernt from varices  Under good control with no acute complaints.    11. Mixed hyperlipidemia  Well-controlled. Continue current regimen of Atorvastatin 40 mg once daily in the evening. Reviewed the risks and benefits of treatment and potential side effects of medication.  - Obtained and reviewed lipid panel dated 07/18/19 which is within normal limits.  - Recommended they follow low fat, low carbohydrate and high fiber diet. Additionally, patient was asked to exercise regularly including frequent cardio.  - Recheck lab 1-2 weeks before next follow up visit.     12. Liver lesion- ablation of malignant met from colon ca 2011- f/u [facility] q 6 mos - stable MRI abd 3/2018- redo annually  Under good control. Continue same regimen. No acute gastrointestinal complaints. Follow up as scheduled with [facility]. CEA is normal at 1.8 on his most recent labs dated 07/18/19.    13.  Obesity (BMI 30-39.9)  Advised patient diet/exercise/lose 15 lbs. Recommended they follow low fat, low carbohydrate and high fiber diet. Additionally, patient was asked to exercise regularly including frequent cardio.    14. Vitamin D deficiency  Prior history of vitamin D deficiency. Continue current treatment of Cholecalciferol 1,000 units of vitamin D.     15. Secondary malignant neoplasm of liver (HCC)  Under good control. Managed by [facility]; follow up as scheduled.     16. Secondary esophageal varices without bleeding (HCC)- BANDED 4/2016- repeat egd tbd 10//29/16- [facility]  Under good control. No acute complaints.     17. Thrombocytopenia, unspecified (HCC)- from previous chemorx  Chronic, stable since chemotherapy. Platelet count is 65 on his most recent labs dated 07/18/19.    18. Overlapping malignant neoplasm of colon (HCC)Overlapping malignant neoplasm of colon (HCC)--2010 left hemicolectomy; no colostomy; ablation of liver met at [facility] 1/2011- MRI abd 3/2018 no change in per  Under good control and followed by [facility]. Continue current regimen.    19. Leg cramping  Acute complaint for past three months. Plan to evaluate with additional labs. He will be referred to Neurology for further evaluation as requested by the patient. He was encouraged to complete gentle stretching and begin walking regularly.  - REFERRAL TO NEUROLOGY  - TSH; Future  - MAGNESIUM; Future  - VITAMIN B12; Future  - WESTERGREN SED RATE; Future  - METHYLMALONIC ACID, SERUM; Future    20. Hand or foot spasms  Acute complaint. Plan to evaluate with additional labs. He was encouraged to complete gentle stretching of his hands. Return for increased joint pain, swelling, erythema or warmth.  - REFERRAL TO NEUROLOGY  - TSH; Future  - MAGNESIUM; Future  - VITAMIN B12; Future  - WESTERGREN SED RATE; Future  - METHYLMALONIC ACID, SERUM; Future        Services suggested: No services needed at this time  Health Care Screening: Age-appropriate preventive  services recommended by USPTF and ACIP covered by Medicare were discussed today. Services ordered if indicated and agreed upon by the patient.  Referrals offered: Community-based lifestyle interventions to reduce health risks and promote self-management and wellness, fall prevention, nutrition, physical activity, tobacco-use cessation, weight loss, and mental health services as per orders if indicated.    Discussion today about general wellness and lifestyle habits:    · Prevent falls and reduce trip hazards; Cautioned about securing or removing rugs.  · Have a working fire alarm and carbon monoxide detector;   · Engage in regular physical activity and social activities       --Follow up in 6mo . Return sooner for any acute complaints or concerns.        40 minute face-to-face encounter took place today.  More than half of this time was spent in the coordination of care of the above problems, as well as counseling.      IStormy (Scribe), am scribing for, and in the presence of, Fabián Urena M.D.    Electronically signed by: Stormy Cummins (Scribe), 8/2/2019    IFabián M.D., personally performed the services described in this documentation, as scribed by Stormy Cummins in my presence, and it is both accurate and complete.

## 2019-08-05 LAB — METHYLMALONATE SERPL-SCNC: 0.28 UMOL/L (ref 0–0.4)

## 2019-08-22 ENCOUNTER — TELEPHONE (OUTPATIENT)
Dept: MEDICAL GROUP | Age: 74
End: 2019-08-22

## 2019-08-22 DIAGNOSIS — Z01.818 PREOP TESTING: ICD-10-CM

## 2019-08-23 ENCOUNTER — HOSPITAL ENCOUNTER (OUTPATIENT)
Dept: LAB | Facility: MEDICAL CENTER | Age: 74
End: 2019-08-23
Attending: INTERNAL MEDICINE
Payer: MEDICARE

## 2019-08-23 DIAGNOSIS — Z01.818 PREOP TESTING: ICD-10-CM

## 2019-08-23 LAB
ALBUMIN SERPL BCP-MCNC: 3.9 G/DL (ref 3.2–4.9)
ALBUMIN/GLOB SERPL: 1.7 G/DL
ALP SERPL-CCNC: 72 U/L (ref 30–99)
ALT SERPL-CCNC: 20 U/L (ref 2–50)
ANION GAP SERPL CALC-SCNC: 6 MMOL/L (ref 0–11.9)
AST SERPL-CCNC: 24 U/L (ref 12–45)
BILIRUB SERPL-MCNC: 2.4 MG/DL (ref 0.1–1.5)
BUN SERPL-MCNC: 25 MG/DL (ref 8–22)
CALCIUM SERPL-MCNC: 9.4 MG/DL (ref 8.5–10.5)
CHLORIDE SERPL-SCNC: 103 MMOL/L (ref 96–112)
CO2 SERPL-SCNC: 28 MMOL/L (ref 20–33)
CREAT SERPL-MCNC: 1.1 MG/DL (ref 0.5–1.4)
FASTING STATUS PATIENT QL REPORTED: NORMAL
GLOBULIN SER CALC-MCNC: 2.3 G/DL (ref 1.9–3.5)
GLUCOSE SERPL-MCNC: 94 MG/DL (ref 65–99)
POTASSIUM SERPL-SCNC: 4 MMOL/L (ref 3.6–5.5)
PROT SERPL-MCNC: 6.2 G/DL (ref 6–8.2)
SODIUM SERPL-SCNC: 137 MMOL/L (ref 135–145)

## 2019-08-23 PROCEDURE — 80053 COMPREHEN METABOLIC PANEL: CPT

## 2019-08-23 PROCEDURE — 36415 COLL VENOUS BLD VENIPUNCTURE: CPT

## 2019-08-23 NOTE — TELEPHONE ENCOUNTER
----- Message from Jeanie Sanders sent at 8/22/2019  1:40 PM PDT -----  Regarding: PATIENT NEEDS A BMP LAB ORDER IN EPIC  PLEASE PUT A BMP LAB ORDER IN [facility] FOR THIS PATIENT. HE IS HAVING AN MRI WITH ANESTHESIA AND NEEDS IT BECAUSE HE IS ON DIURETICS.    THANK YOU

## 2019-08-26 ENCOUNTER — HOSPITAL ENCOUNTER (OUTPATIENT)
Dept: RADIOLOGY | Facility: MEDICAL CENTER | Age: 74
End: 2019-08-26
Attending: SPECIALIST
Payer: MEDICARE

## 2019-08-26 VITALS
RESPIRATION RATE: 18 BRPM | WEIGHT: 240 LBS | TEMPERATURE: 98.1 F | OXYGEN SATURATION: 94 % | HEIGHT: 75 IN | BODY MASS INDEX: 29.84 KG/M2 | HEART RATE: 56 BPM

## 2019-08-26 DIAGNOSIS — G81.00 FLACCID HEMIPLEGIA, UNSPECIFIED ETIOLOGY, UNSPECIFIED LATERALITY (HCC): ICD-10-CM

## 2019-08-26 PROCEDURE — 70551 MRI BRAIN STEM W/O DYE: CPT

## 2019-08-26 PROCEDURE — 700111 HCHG RX REV CODE 636 W/ 250 OVERRIDE (IP)

## 2019-08-26 PROCEDURE — 700101 HCHG RX REV CODE 250

## 2019-08-26 NOTE — DISCHARGE INSTRUCTIONS
MRI ADULT DISCHARGE INSTRUCTIONS    You have been medicated today for your scan. Please follow the instructions below to ensure your safe recovery. If you have any questions or problems, feel free to call us at [phone] or [phone].     1.   Have someone stay with you to assist you as needed.    2.   Do not drive or operate any mechanical devices.    3.   Do not perform any activity that requires concentration. Make no major decisions over the next 24 hours.     4.   Be careful changing positions from laying down to sitting or standing, as you may become dizzy.     5.   Do not drink alcohol for 48 hours.    6.   There are no restrictions for eating your normal meals. Drink fluids.    7.   You may continue your usual medications for pain, tranquilizers, muscle relaxants or sedatives when awake.     8.   Tomorrow, you may continue your normal daily activities.     9.   Pressure dressing on 10 - 15 minutes. If swelling or bleeding occurs when removed, continue placing direct pressure on injection site for another 5 minutes, or until bleeding stops.     I have been informed of and understand the above discharge instructions.

## 2019-08-28 DIAGNOSIS — E87.6 HYPOKALEMIA: ICD-10-CM

## 2019-08-28 RX ORDER — POTASSIUM CHLORIDE 20 MEQ/1
TABLET, EXTENDED RELEASE ORAL
Qty: 200 TAB | Refills: 4 | Status: SHIPPED | OUTPATIENT
Start: 2019-08-28 | End: 2020-11-12 | Stop reason: SDUPTHER

## 2019-11-18 ENCOUNTER — TELEPHONE (OUTPATIENT)
Dept: MEDICAL GROUP | Age: 74
End: 2019-11-18

## 2019-11-18 DIAGNOSIS — E78.2 MIXED HYPERLIPIDEMIA: ICD-10-CM

## 2019-11-18 RX ORDER — ATORVASTATIN CALCIUM 40 MG/1
TABLET, FILM COATED ORAL
Qty: 100 TAB | Refills: 4 | Status: SHIPPED | OUTPATIENT
Start: 2019-11-18 | End: 2020-11-12 | Stop reason: SDUPTHER

## 2019-11-18 NOTE — TELEPHONE ENCOUNTER
Was the patient seen in the last year in this department? Yes    Does patient have an active prescription for medications requested? Yes    Insurance requires that patient to have a 100 day supply.    Received Request Via: Pharmacy

## 2019-11-19 ENCOUNTER — HOSPITAL ENCOUNTER (OUTPATIENT)
Dept: RADIOLOGY | Facility: MEDICAL CENTER | Age: 74
End: 2019-11-19
Attending: INTERNAL MEDICINE
Payer: MEDICARE

## 2019-11-19 DIAGNOSIS — R10.84 ABDOMINAL PAIN, GENERALIZED: ICD-10-CM

## 2019-11-19 DIAGNOSIS — R18.8 OTHER ASCITES: ICD-10-CM

## 2019-11-19 LAB
ALBUMIN FLD-MCNC: <1 G/DL
APPEARANCE FLD: NORMAL
BODY FLD TYPE: NORMAL
BODY FLD TYPE: NORMAL
COLOR FLD: NORMAL
CSF COMMENTS 1658: NORMAL
CYTOLOGY REG CYTOL: NORMAL
HISTIOCYTES NFR FLD: 9 %
LYMPHOCYTES NFR FLD: 46 %
MESOTHL CELL NFR FLD: 2 %
MONONUC CELLS NFR FLD: 34 %
NEUTROPHILS NFR FLD: 8 %
RBC # FLD: <2000 CELLS/UL
WBC # FLD: 183 CELLS/UL
WBC OTHER NFR FLD: 1 %

## 2019-11-19 PROCEDURE — 82042 OTHER SOURCE ALBUMIN QUAN EA: CPT

## 2019-11-19 PROCEDURE — 87015 SPECIMEN INFECT AGNT CONCNTJ: CPT

## 2019-11-19 PROCEDURE — 49083 ABD PARACENTESIS W/IMAGING: CPT

## 2019-11-19 PROCEDURE — 87205 SMEAR GRAM STAIN: CPT

## 2019-11-19 PROCEDURE — 80500 HCHG CLINICAL PATH CONSULT-LIMITED: CPT | Mod: XU

## 2019-11-19 PROCEDURE — 89051 BODY FLUID CELL COUNT: CPT

## 2019-11-19 PROCEDURE — 88305 TISSUE EXAM BY PATHOLOGIST: CPT

## 2019-11-19 PROCEDURE — 87070 CULTURE OTHR SPECIMN AEROBIC: CPT

## 2019-11-19 PROCEDURE — 88112 CYTOPATH CELL ENHANCE TECH: CPT

## 2019-11-19 NOTE — PROGRESS NOTES
OPIR Nursing Note  Procedure RN Justice Melchor and Flaquita    Paracentesis with Albumin Per Protocol done by Dr. Peterson; RLQ Abdomen access site;  1590 mL of serous peritoneal fluid obtained and discarded.    Orders received from Dr. Ny post-procedure for labs by fax.  120 ml of serous peritoneal fluid sent to lab for albumin, cell count, culture, cytology, differential, gram stain.      Pt tolerated the procedure well; pt hemodynamically stable pre/intra/post procedure; all questions and concerns answered prior to d/c; patient provided with all appropriate education for procedure; pt d/c home.

## 2019-11-20 LAB
GRAM STN SPEC: NORMAL
PATH REV: NORMAL
PATH REV: NORMAL
SIGNIFICANT IND 70042: NORMAL
SITE SITE: NORMAL
SOURCE SOURCE: NORMAL

## 2019-11-22 LAB
BACTERIA FLD AEROBE CULT: NORMAL
GRAM STN SPEC: NORMAL
SIGNIFICANT IND 70042: NORMAL
SITE SITE: NORMAL
SOURCE SOURCE: NORMAL

## 2019-12-05 ENCOUNTER — APPOINTMENT (RX ONLY)
Dept: URBAN - METROPOLITAN AREA CLINIC 35 | Facility: CLINIC | Age: 74
Setting detail: DERMATOLOGY
End: 2019-12-05

## 2019-12-05 DIAGNOSIS — L81.4 OTHER MELANIN HYPERPIGMENTATION: ICD-10-CM

## 2019-12-05 DIAGNOSIS — Z71.89 OTHER SPECIFIED COUNSELING: ICD-10-CM

## 2019-12-05 DIAGNOSIS — D485 NEOPLASM OF UNCERTAIN BEHAVIOR OF SKIN: ICD-10-CM

## 2019-12-05 DIAGNOSIS — D22 MELANOCYTIC NEVI: ICD-10-CM

## 2019-12-05 DIAGNOSIS — L57.0 ACTINIC KERATOSIS: ICD-10-CM

## 2019-12-05 DIAGNOSIS — Z85.828 PERSONAL HISTORY OF OTHER MALIGNANT NEOPLASM OF SKIN: ICD-10-CM

## 2019-12-05 DIAGNOSIS — L82.1 OTHER SEBORRHEIC KERATOSIS: ICD-10-CM

## 2019-12-05 PROBLEM — D22.5 MELANOCYTIC NEVI OF TRUNK: Status: ACTIVE | Noted: 2019-12-05

## 2019-12-05 PROBLEM — D48.5 NEOPLASM OF UNCERTAIN BEHAVIOR OF SKIN: Status: ACTIVE | Noted: 2019-12-05

## 2019-12-05 PROCEDURE — 17003 DESTRUCT PREMALG LES 2-14: CPT

## 2019-12-05 PROCEDURE — 11102 TANGNTL BX SKIN SINGLE LES: CPT

## 2019-12-05 PROCEDURE — ? LIQUID NITROGEN

## 2019-12-05 PROCEDURE — ? COUNSELING

## 2019-12-05 PROCEDURE — ? BIOPSY BY SHAVE METHOD

## 2019-12-05 PROCEDURE — 17000 DESTRUCT PREMALG LESION: CPT | Mod: 59

## 2019-12-05 PROCEDURE — 99213 OFFICE O/P EST LOW 20 MIN: CPT | Mod: 25

## 2019-12-05 PROCEDURE — ? ADDITIONAL NOTES

## 2019-12-05 ASSESSMENT — LOCATION SIMPLE DESCRIPTION DERM
LOCATION SIMPLE: RIGHT UPPER BACK
LOCATION SIMPLE: POSTERIOR SCALP
LOCATION SIMPLE: RIGHT NOSE
LOCATION SIMPLE: SCALP
LOCATION SIMPLE: POSTERIOR NECK
LOCATION SIMPLE: RIGHT FOREHEAD
LOCATION SIMPLE: LEFT NOSE

## 2019-12-05 ASSESSMENT — LOCATION DETAILED DESCRIPTION DERM
LOCATION DETAILED: LEFT OCCIPITAL SCALP
LOCATION DETAILED: LEFT NASAL ALA
LOCATION DETAILED: RIGHT SUPERIOR MEDIAL FOREHEAD
LOCATION DETAILED: RIGHT CENTRAL PARIETAL SCALP
LOCATION DETAILED: RIGHT SUPERIOR PARIETAL SCALP
LOCATION DETAILED: RIGHT LATERAL TRAPEZIAL NECK
LOCATION DETAILED: RIGHT SUPERIOR LATERAL UPPER BACK
LOCATION DETAILED: LEFT SUPERIOR PARIETAL SCALP
LOCATION DETAILED: RIGHT SOFT TRIANGLE OF THE NOSE

## 2019-12-05 ASSESSMENT — LOCATION ZONE DERM
LOCATION ZONE: NOSE
LOCATION ZONE: SCALP
LOCATION ZONE: NECK
LOCATION ZONE: FACE
LOCATION ZONE: TRUNK

## 2019-12-05 NOTE — PROCEDURE: ADDITIONAL NOTES
Additional Notes: Discussed blue light treatment for scalp.  Patient signed prior authorization at time of visit.
Detail Level: Detailed

## 2019-12-05 NOTE — PROCEDURE: BIOPSY BY SHAVE METHOD
Hide Additional Anticipated Plan?: No
Notification Instructions: Patient will be notified of biopsy results. However, patient instructed to call the office if not contacted within 2 weeks.
Electrodesiccation Text: The wound bed was treated with electrodesiccation after the biopsy was performed.
Biopsy Type: H and E
Wound Care: Petrolatum
X Size Of Lesion In Cm: 0
Post-Care Instructions: I reviewed with the patient in detail post-care instructions. Keep the biopsy site dry overnight, and then change the dressing once daily and apply a thin layer of petrolatum with a clean q-tip. \\nShowers are OK after 24 hours.  Allow clean water to run over the area.  Do not touch the biopsy site with your hands.  Do not immerse the biopsy site in water such as going into a swimming pool or bathtub until the sutures are removed.  If the biopsy site gets more painful with time, red, or drains, this is concerning for infection.  If you have signs of infection please call the office to come in for a walk in visit Monday through Friday 8:30 am to 12 pm or 1 pm to 4:30 pm.  If we are not in the office, please call through the answering service.
Cryotherapy Text: The wound bed was treated with cryotherapy after the biopsy was performed.
Lab Facility: 
Depth Of Biopsy: dermis
Was A Bandage Applied: Yes
Consent: Written consent was obtained and risks were reviewed including but not limited to scarring, infection, bleeding, scabbing, incomplete removal, nerve damage and allergy to anesthesia.
Anesthesia Volume In Cc: 0.3
Type Of Destruction Used: Curettage
Electrodesiccation And Curettage Text: The wound bed was treated with electrodesiccation and curettage after the biopsy was performed.
Hemostasis: Aluminum Chloride
Silver Nitrate Text: The wound bed was treated with silver nitrate after the biopsy was performed.
Detail Level: Detailed
Dressing: bandage
Biopsy Method: double edge Personna blade
Anesthesia Type: 0.5% lidocaine with 1:200,000 epinephrine and a 1:10 solution of 8.4% sodium bicarbonate
Curettage Text: The wound bed was treated with curettage after the biopsy was performed.
Billing Type: Third-Party Bill
Lab: [id number]

## 2019-12-16 ENCOUNTER — RX ONLY (OUTPATIENT)
Age: 74
Setting detail: RX ONLY
End: 2019-12-16

## 2019-12-16 ENCOUNTER — APPOINTMENT (RX ONLY)
Dept: URBAN - METROPOLITAN AREA CLINIC 4 | Facility: CLINIC | Age: 74
Setting detail: DERMATOLOGY
End: 2019-12-16

## 2019-12-16 PROBLEM — C44.311 BASAL CELL CARCINOMA OF SKIN OF NOSE: Status: ACTIVE | Noted: 2019-12-16

## 2019-12-16 PROCEDURE — ? PATIENT SPECIFIC COUNSELING

## 2019-12-16 PROCEDURE — ? MOHS SURGERY PHONE CONSULTATION

## 2019-12-16 NOTE — PROCEDURE: MOHS SURGERY PHONE CONSULTATION
Office Location Of Mohs Surgery: [place] [id number]
Has The Patient Ever Had A Joint Replaced?: No
Which Antibiotic Do They Take For Surgical Prophylaxis?: Amoxicillin (2 grams)
Detail Level: Detailed
Additional Information?: Right hip has metal from 20 years ago
Date Of Mohs Surgery: 01/10/2020
Does The Patient Take Antibiotics Prior To Dental Procedures?: Yes
Pathology Accession #: [id number]
Time Of Mohs Surgery: 8:45 am
Referring Provider: Dr Sinai Willson
Patient's Insurance: [employer]
Patient Reported Location: Left nasal ala
If Yes- What Is The Patient's Pharmacy Number?: [phone]

## 2020-01-10 ENCOUNTER — APPOINTMENT (RX ONLY)
Dept: URBAN - METROPOLITAN AREA CLINIC 36 | Facility: CLINIC | Age: 75
Setting detail: DERMATOLOGY
End: 2020-01-10

## 2020-01-10 VITALS — SYSTOLIC BLOOD PRESSURE: 120 MMHG | DIASTOLIC BLOOD PRESSURE: 78 MMHG

## 2020-01-10 PROBLEM — C44.311 BASAL CELL CARCINOMA OF SKIN OF NOSE: Status: ACTIVE | Noted: 2020-01-10

## 2020-01-10 PROCEDURE — ? MOHS SURGERY

## 2020-01-10 PROCEDURE — 15275 SKIN SUB GRAFT FACE/NK/HF/G: CPT

## 2020-01-10 PROCEDURE — 17311 MOHS 1 STAGE H/N/HF/G: CPT

## 2020-01-10 PROCEDURE — ? DIAGNOSIS COMMENT

## 2020-01-10 NOTE — PROCEDURE: MOHS SURGERY
Complex Requirements: Exposure Of Vital Structure?: No
Consent Type: Consent 1 (Standard)
Hatchet Flap Text: The defect edges were debeveled with a #15 scalpel blade.  Given the location of the defect, shape of the defect and the proximity to free margins a hatchet flap was deemed most appropriate.  Using a sterile surgical marker, an appropriate hatchet flap was drawn incorporating the defect and placing the expected incisions within the relaxed skin tension lines where possible.    The area thus outlined was incised deep to adipose tissue with a #15 scalpel blade.  The skin margins were undermined to an appropriate distance in all directions utilizing iris scissors.
Consent (Spinal Accessory)/Introductory Paragraph: The rationale for Mohs was explained to the patient and consent was obtained. The risks, benefits and alternatives to therapy were discussed in detail. Specifically, the risks of damage to the spinal accessory nerve, infection, scarring, bleeding, prolonged wound healing, incomplete removal, allergy to anesthesia, and recurrence were addressed. Prior to the procedure, the treatment site was clearly identified and confirmed by the patient. All components of Universal Protocol/PAUSE Rule completed.
Show Assistants Variable: Yes
Oculoplastic Surgeon Procedure Text (A): After obtaining clear surgical margins the patient was sent to oculoplastics for surgical repair.  The patient understands they will receive post-surgical care and follow-up from the referring physician's office.
Stage 9: Additional Anesthesia Type: 1% lidocaine with epinephrine
Paramedian Forehead Flap Text: A decision was made to reconstruct the defect utilizing an interpolation axial flap and a staged reconstruction.  A telfa template was made of the defect.  This telfa template was then used to outline the paramedian forehead pedicle flap.  The donor area for the pedicle flap was then injected with anesthesia.  The flap was excised through the skin and subcutaneous tissue down to the layer of the underlying musculature.  The pedicle flap was carefully excised within this deep plane to maintain its blood supply.  The edges of the donor site were undermined.   The donor site was closed in a primary fashion.  The pedicle was then rotated into position and sutured.  Once the tube was sutured into place, adequate blood supply was confirmed with blanching and refill.  The pedicle was then wrapped with xeroform gauze and dressed appropriately with a telfa and gauze bandage to ensure continued blood supply and protect the attached pedicle.
Anesthesia Volume In Cc: 3
Referred To Mid-Level For Closure Text (Leave Blank If You Do Not Want): After obtaining clear surgical margins the patient was sent to a mid-level provider for surgical repair.  The patient understands they will receive post-surgical care and follow-up from the mid-level provider.
Stage 2: Additional Anesthesia Volume In Cc: 0
Post-Care Instructions: I reviewed with the patient in detail post-care instructions. Patient is not to engage in any heavy lifting, exercise, or swimming for the next 14 days. Should the patient develop any fevers, chills, bleeding, severe pain patient will contact the office immediately.
Double Island Pedicle Flap Text: The defect edges were debeveled with a #15 scalpel blade.  Given the location of the defect, shape of the defect and the proximity to free margins a double island pedicle advancement flap was deemed most appropriate.  Using a sterile surgical marker, an appropriate advancement flap was drawn incorporating the defect, outlining the appropriate donor tissue and placing the expected incisions within the relaxed skin tension lines where possible.    The area thus outlined was incised deep to adipose tissue with a #15 scalpel blade.  The skin margins were undermined to an appropriate distance in all directions around the primary defect and laterally outward around the island pedicle utilizing iris scissors.  There was minimal undermining beneath the pedicle flap.
Provider Procedure Text (D): After obtaining clear surgical margins the defect was repaired by another provider.
Full Thickness Lip Wedge Repair (Flap) Text: Given the location of the defect and the proximity to free margins a full thickness wedge repair was deemed most appropriate.  Using a sterile surgical marker, the appropriate repair was drawn incorporating the defect and placing the expected incisions perpendicular to the vermilion border.  The vermilion border was also meticulously outlined to ensure appropriate reapproximation during the repair.  The area thus outlined was incised through and through with a #15 scalpel blade.  The muscularis and dermis were reaproximated with deep sutures following hemostasis. Care was taken to realign the vermilion border before proceeding with the superficial closure.  Once the vermilion was realigned the superfical and mucosal closure was finished.
Intermediate Repair Preamble Text (Leave Blank If You Do Not Want): Undermining was performed with blunt dissection.
Transposition Flap Text: The defect edges were debeveled with a #15 scalpel blade.  Given the location of the defect and the proximity to free margins a transposition flap was deemed most appropriate.  Using a sterile surgical marker, an appropriate transposition flap was drawn incorporating the defect.    The area thus outlined was incised deep to adipose tissue with a #15 scalpel blade.  The skin margins were undermined to an appropriate distance in all directions utilizing iris scissors.
Closure 2 Information: This tab is for additional flaps and grafts, including complex repair and grafts and complex repair and flaps. You can also specify a different location for the additional defect, if the location is the same you do not need to select a new one. We will insert the automated text for the repair you select below just as we do for solitary flaps and grafts. Please note that at this time if you select a location with a different insurance zone you will need to override the ICD10 and CPT if appropriate.
Retention Suture Bite Size: 3 mm
O-Z Plasty Text: The defect edges were debeveled with a #15 scalpel blade.  Given the location of the defect, shape of the defect and the proximity to free margins an O-Z plasty (double transposition flap) was deemed most appropriate.  Using a sterile surgical marker, the appropriate transposition flaps were drawn incorporating the defect and placing the expected incisions within the relaxed skin tension lines where possible.    The area thus outlined was incised deep to adipose tissue with a #15 scalpel blade.  The skin margins were undermined to an appropriate distance in all directions utilizing iris scissors.  Hemostasis was achieved with electrocautery.  The flaps were then transposed into place, one clockwise and the other counterclockwise, and anchored with interrupted buried subcutaneous sutures.
Primary Defect Width In Cm (Final Defect Size - Required For Flaps/Grafts): 0.5
Burow's Advancement Flap Text: The defect edges were debeveled with a #15 scalpel blade.  Given the location of the defect and the proximity to free margins a Burow's advancement flap was deemed most appropriate.  Using a sterile surgical marker, the appropriate advancement flap was drawn incorporating the defect and placing the expected incisions within the relaxed skin tension lines where possible.    The area thus outlined was incised deep to adipose tissue with a #15 scalpel blade.  The skin margins were undermined to an appropriate distance in all directions utilizing iris scissors.
Suturegard Body: The suture ends were repeatedly re-tightened and re-clamped to achieve the desired tissue expansion.
X Size Of Lesion In Cm (Optional): 0.3
Rhomboid Transposition Flap Text: The defect edges were debeveled with a #15 scalpel blade.  Given the location of the defect and the proximity to free margins a rhomboid transposition flap was deemed most appropriate.  Using a sterile surgical marker, an appropriate rhomboid flap was drawn incorporating the defect.    The area thus outlined was incised deep to adipose tissue with a #15 scalpel blade.  The skin margins were undermined to an appropriate distance in all directions utilizing iris scissors.
Debridement Text: The wound edges were debrided prior to proceeding with the closure to facilitate wound healing.
Composite Graft Text: The defect edges were debeveled with a #15 scalpel blade.  Given the location of the defect, shape of the defect, the proximity to free margins and the fact the defect was full thickness a composite graft was deemed most appropriate.  The defect was outline and then transferred to the donor site.  A full thickness graft was then excised from the donor site. The graft was then placed in the primary defect, oriented appropriately and then sutured into place.  The secondary defect was then repaired using a primary closure.
Double M-Plasty Complex Repair Preamble Text (Leave Blank If You Do Not Want): Extensive wide undermining was performed.
Estimated Blood Loss (Cc): minimal
Bi-Rhombic Flap Text: The defect edges were debeveled with a #15 scalpel blade.  Given the location of the defect and the proximity to free margins a bi-rhombic flap was deemed most appropriate.  Using a sterile surgical marker, an appropriate rhombic flap was drawn incorporating the defect. The area thus outlined was incised deep to adipose tissue with a #15 scalpel blade.  The skin margins were undermined to an appropriate distance in all directions utilizing iris scissors.
Number Of Stages: 1
Dressing (No Sutures): dry sterile dressing
Epidermal Autograft Text: The defect edges were debeveled with a #15 scalpel blade.  Given the location of the defect, shape of the defect and the proximity to free margins an epidermal autograft was deemed most appropriate.  Using a sterile surgical marker, the primary defect shape was transferred to the donor site. The epidermal graft was then harvested.  The skin graft was then placed in the primary defect and oriented appropriately.
Area L Indication Text: Tumors in this location are included in Area L (trunk and extremities).  Mohs surgery is indicated for larger tumors, or tumors with aggressive histologic features, in these anatomic locations.
O-L Flap Text: The defect edges were debeveled with a #15 scalpel blade.  Given the location of the defect, shape of the defect and the proximity to free margins an O-L flap was deemed most appropriate.  Using a sterile surgical marker, an appropriate advancement flap was drawn incorporating the defect and placing the expected incisions within the relaxed skin tension lines where possible.    The area thus outlined was incised deep to adipose tissue with a #15 scalpel blade.  The skin margins were undermined to an appropriate distance in all directions utilizing iris scissors.
W Plasty Text: The lesion was extirpated to the level of the fat with a #15 scalpel blade.  Given the location of the defect, shape of the defect and the proximity to free margins a W-plasty was deemed most appropriate for repair.  Using a sterile surgical marker, the appropriate transposition arms of the W-plasty were drawn incorporating the defect and placing the expected incisions within the relaxed skin tension lines where possible.    The area thus outlined was incised deep to adipose tissue with a #15 scalpel blade.  The skin margins were undermined to an appropriate distance in all directions utilizing iris scissors.  The opposing transposition arms were then transposed into place in opposite direction and anchored with interrupted buried subcutaneous sutures.
Plastic Surgeon Procedure Text (C): After obtaining clear surgical margins the patient was sent to plastics for surgical repair.  The patient understands they will receive post-surgical care and follow-up from the referring physician's office.
Nostril Rim Text: The closure involved the nostril rim.
Xenograft Text: The defect edges were debeveled with a #15 scalpel blade.  Given the location of the defect, shape of the defect and the proximity to free margins a xenograft was deemed most appropriate.  The graft was then trimmed to fit the size of the defect.  The graft was then placed in the primary defect and oriented appropriately.
Non-Graft Cartilage Fenestration Text: The cartilage was fenestrated with a 2mm punch biopsy to help facilitate healing.
Banner Transposition Flap Text: The defect edges were debeveled with a #15 scalpel blade.  Given the location of the defect and the proximity to free margins a Banner transposition flap was deemed most appropriate.  Using a sterile surgical marker, an appropriate flap drawn around the defect. The area thus outlined was incised deep to adipose tissue with a #15 scalpel blade.  The skin margins were undermined to an appropriate distance in all directions utilizing iris scissors.
Wound Check: 7 days
Skin Substitute: PuraPly
Consent 2/Introductory Paragraph: Mohs surgery was explained to the patient and consent was obtained. The risks, benefits and alternatives to therapy were discussed in detail. Specifically, the risks of infection, scarring, bleeding, prolonged wound healing, incomplete removal, allergy to anesthesia, nerve injury and recurrence were addressed. Prior to the procedure, the treatment site was clearly identified and confirmed by the patient. All components of Universal Protocol/PAUSE Rule completed.
Interpolation Flap Text: A decision was made to reconstruct the defect utilizing an interpolation axial flap and a staged reconstruction.  A telfa template was made of the defect.  This telfa template was then used to outline the interpolation flap.  The donor area for the pedicle flap was then injected with anesthesia.  The flap was excised through the skin and subcutaneous tissue down to the layer of the underlying musculature.  The interpolation flap was carefully excised within this deep plane to maintain its blood supply.  The edges of the donor site were undermined.   The donor site was closed in a primary fashion.  The pedicle was then rotated into position and sutured.  Once the tube was sutured into place, adequate blood supply was confirmed with blanching and refill.  The pedicle was then wrapped with xeroform gauze and dressed appropriately with a telfa and gauze bandage to ensure continued blood supply and protect the attached pedicle.
Mohs Method Verbiage: An incision at a 45 degree angle following the standard Mohs approach was done and the specimen was harvested as a microscopic controlled layer.
Advancement-Rotation Flap Text: The defect edges were debeveled with a #15 scalpel blade.  Given the location of the defect, shape of the defect and the proximity to free margins an advancement-rotation flap was deemed most appropriate.  Using a sterile surgical marker, an appropriate flap was drawn incorporating the defect and placing the expected incisions within the relaxed skin tension lines where possible. The area thus outlined was incised deep to adipose tissue with a #15 scalpel blade.  The skin margins were undermined to an appropriate distance in all directions utilizing iris scissors.
Previous Accession (Optional): [id number]
Asc Procedure Text (C): After obtaining clear surgical margins the patient was sent to an ASC for surgical repair.  The patient understands they will receive post-surgical care and follow-up from the ASC physician.
Complex Repair And Flap Additional Text (Will Appearing After The Standard Complex Repair Text): The complex repair was not sufficient to completely close the primary defect. The remaining additional defect was repaired with the flap mentioned below.
Graft Type: Skin Substitute
Unna Boot Text: An Unna boot was placed to help immobilize the limb and facilitate more rapid healing.
Dorsal Nasal Flap Text: The defect edges were debeveled with a #15 scalpel blade.  Given the location of the defect and the proximity to free margins a dorsal nasal flap was deemed most appropriate.  Using a sterile surgical marker, an appropriate dorsal nasal flap was drawn around the defect.    The area thus outlined was incised deep to adipose tissue with a #15 scalpel blade.  The skin margins were undermined to an appropriate distance in all directions utilizing iris scissors.
Consent (Nose)/Introductory Paragraph: The rationale for Mohs was explained to the patient and consent was obtained. The risks, benefits and alternatives to therapy were discussed in detail. Specifically, the risks of nasal deformity, changes in the flow of air through the nose, infection, scarring, bleeding, prolonged wound healing, incomplete removal, allergy to anesthesia, nerve injury and recurrence were addressed. Prior to the procedure, the treatment site was clearly identified and confirmed by the patient. All components of Universal Protocol/PAUSE Rule completed.
Otolaryngologist Procedure Text (D): After obtaining clear surgical margins the patient was sent to otolaryngology for surgical repair.  The patient understands they will receive post-surgical care and follow-up from the referring physician's office.
Partial Purse String (Intermediate) Text: Given the location of the defect and the characteristics of the surrounding skin an intermediate purse string closure was deemed most appropriate.  Undermining was performed circumfirentially around the surgical defect.  A purse string suture was then placed and tightened. Wound tension only allowed a partial closure of the circular defect.
Home Suture Removal Text: Patient was provided instructions on removing sutures and will remove their sutures at home.  If they have any questions or difficulties they will call the office.
Localized Dermabrasion With Wire Brush Text: The patient was draped in routine manner.  Localized dermabrasion using 3 x 17 mm wire brush was performed in routine manner to papillary dermis. This spot dermabrasion is being performed to complete skin cancer reconstruction. It also will eliminate the other sun damaged precancerous cells that are known to be part of the regional effect of a lifetime's worth of sun exposure. This localized dermabrasion is therapeutic and should not be considered cosmetic in any regard.
Island Pedicle Flap Text: The defect edges were debeveled with a #15 scalpel blade.  Given the location of the defect, shape of the defect and the proximity to free margins an island pedicle advancement flap was deemed most appropriate.  Using a sterile surgical marker, an appropriate advancement flap was drawn incorporating the defect, outlining the appropriate donor tissue and placing the expected incisions within the relaxed skin tension lines where possible.    The area thus outlined was incised deep to adipose tissue with a #15 scalpel blade.  The skin margins were undermined to an appropriate distance in all directions around the primary defect and laterally outward around the island pedicle utilizing iris scissors.  There was minimal undermining beneath the pedicle flap.
Bcc Histology Text: There were numerous aggregates of basaloid cells.
Perineural Invasion (For Histology - Be Specific If Possible): absent
Consent (Near Eyelid Margin)/Introductory Paragraph: The rationale for Mohs was explained to the patient and consent was obtained. The risks, benefits and alternatives to therapy were discussed in detail. Specifically, the risks of ectropion or eyelid deformity, infection, scarring, bleeding, prolonged wound healing, incomplete removal, allergy to anesthesia, nerve injury and recurrence were addressed. Prior to the procedure, the treatment site was clearly identified and confirmed by the patient. All components of Universal Protocol/PAUSE Rule completed.
Wound Care: Petrolatum
Anesthesia Type: 1% lidocaine with epinephrine and a 1:10 solution of 8.4% sodium bicarbonate
Cheiloplasty (Less Than 50%) Text: A decision was made to reconstruct the defect with a  cheiloplasty.  The defect was undermined extensively.  Additional obicularis oris muscle was excised with a 15 blade scalpel.  The defect was converted into a full thickness wedge, of less than 50% of the vertical height of the lip, to facilite a better cosmetic result.  Small vessels were then tied off with 5-0 monocyrl. The obicularis oris, superficial fascia, adipose and dermis were then reapproximated.  After the deeper layers were approximated the epidermis was reapproximated with particular care given to realign the vermilion border.
Subsequent Stages Histo Method Verbiage: Using a similar technique to that described above, a thin layer of tissue was removed from all areas where tumor was visible on the previous stage.  The tissue was again oriented, mapped, dyed, and processed as above.
Graft Donor Site Bandage (Optional-Leave Blank If You Don't Want In Note): Steri-strips and a pressure bandage were applied to the donor site.
Rotation Flap Text: The defect edges were debeveled with a #15 scalpel blade.  Given the location of the defect, shape of the defect and the proximity to free margins a rotation flap was deemed most appropriate.  Using a sterile surgical marker, an appropriate rotation flap was drawn incorporating the defect and placing the expected incisions within the relaxed skin tension lines where possible.    The area thus outlined was incised deep to adipose tissue with a #15 scalpel blade.  The skin margins were undermined to an appropriate distance in all directions utilizing iris scissors.
Island Pedicle Flap-Requiring Vessel Identification Text: The defect edges were debeveled with a #15 scalpel blade.  Given the location of the defect, shape of the defect and the proximity to free margins an island pedicle advancement flap was deemed most appropriate.  Using a sterile surgical marker, an appropriate advancement flap was drawn, based on the axial vessel mentioned above, incorporating the defect, outlining the appropriate donor tissue and placing the expected incisions within the relaxed skin tension lines where possible.    The area thus outlined was incised deep to adipose tissue with a #15 scalpel blade.  The skin margins were undermined to an appropriate distance in all directions around the primary defect and laterally outward around the island pedicle utilizing iris scissors.  There was minimal undermining beneath the pedicle flap.
Muscle Hinge Flap Text: The defect edges were debeveled with a #15 scalpel blade.  Given the size, depth and location of the defect and the proximity to free margins a muscle hinge flap was deemed most appropriate.  Using a sterile surgical marker, an appropriate hinge flap was drawn incorporating the defect. The area thus outlined was incised with a #15 scalpel blade.  The skin margins were undermined to an appropriate distance in all directions utilizing iris scissors.
Ftsg Text: The defect edges were debeveled with a #15 scalpel blade.  Given the location of the defect, shape of the defect and the proximity to free margins a full thickness skin graft was deemed most appropriate.  Using a sterile surgical marker, the primary defect shape was transferred to the donor site. The area thus outlined was incised deep to adipose tissue with a #15 scalpel blade.  The harvested graft was then trimmed of adipose tissue until only dermis and epidermis was left.  The skin margins of the secondary defect were undermined to an appropriate distance in all directions utilizing iris scissors.  The secondary defect was closed with interrupted buried subcutaneous sutures.  The skin edges were then re-apposed with running  sutures.  The skin graft was then placed in the primary defect and oriented appropriately.
Chonodrocutaneous Helical Advancement Flap Text: The defect edges were debeveled with a #15 scalpel blade.  Given the location of the defect and the proximity to free margins a chondrocutaneous helical advancement flap was deemed most appropriate.  Using a sterile surgical marker, the appropriate advancement flap was drawn incorporating the defect and placing the expected incisions within the relaxed skin tension lines where possible.    The area thus outlined was incised deep to adipose tissue with a #15 scalpel blade.  The skin margins were undermined to an appropriate distance in all directions utilizing iris scissors.
Donor Site Anesthesia Type: same as repair anesthesia
Double O-Z Plasty Text: The defect edges were debeveled with a #15 scalpel blade.  Given the location of the defect, shape of the defect and the proximity to free margins a Double O-Z plasty (double transposition flap) was deemed most appropriate.  Using a sterile surgical marker, the appropriate transposition flaps were drawn incorporating the defect and placing the expected incisions within the relaxed skin tension lines where possible. The area thus outlined was incised deep to adipose tissue with a #15 scalpel blade.  The skin margins were undermined to an appropriate distance in all directions utilizing iris scissors.  Hemostasis was achieved with electrocautery.  The flaps were then transposed into place, one clockwise and the other counterclockwise, and anchored with interrupted buried subcutaneous sutures.
Closure 3 Information: This tab is for additional flaps and grafts above and beyond our usual structured repairs.  Please note if you enter information here it will not currently bill and you will need to add the billing information manually.
Dermal Autograft Text: The defect edges were debeveled with a #15 scalpel blade.  Given the location of the defect, shape of the defect and the proximity to free margins a dermal autograft was deemed most appropriate.  Using a sterile surgical marker, the primary defect shape was transferred to the donor site. The area thus outlined was incised deep to adipose tissue with a #15 scalpel blade.  The harvested graft was then trimmed of adipose and epidermal tissue until only dermis was left.  The skin graft was then placed in the primary defect and oriented appropriately.
Helical Rim Advancement Flap Text: The defect edges were debeveled with a #15 blade scalpel.  Given the location of the defect and the proximity to free margins (helical rim) a double helical rim advancement flap was deemed most appropriate.  Using a sterile surgical marker, the appropriate advancement flaps were drawn incorporating the defect and placing the expected incisions between the helical rim and antihelix where possible.  The area thus outlined was incised through and through with a #15 scalpel blade.  With a skin hook and iris scissors, the flaps were gently and sharply undermined and freed up.
Same Histology In Subsequent Stages Text: The pattern and morphology of the tumor is as described in the first stage.
Tumor Debulked?: curette
Z Plasty Text: The lesion was extirpated to the level of the fat with a #15 scalpel blade.  Given the location of the defect, shape of the defect and the proximity to free margins a Z-plasty was deemed most appropriate for repair.  Using a sterile surgical marker, the appropriate transposition arms of the Z-plasty were drawn incorporating the defect and placing the expected incisions within the relaxed skin tension lines where possible.    The area thus outlined was incised deep to adipose tissue with a #15 scalpel blade.  The skin margins were undermined to an appropriate distance in all directions utilizing iris scissors.  The opposing transposition arms were then transposed into place in opposite direction and anchored with interrupted buried subcutaneous sutures.
Medical Necessity Statement: Based on my medical judgement, Mohs surgery is the most appropriate treatment for this cancer compared to other treatments.
Pain Refusal Text: I offered to prescribe pain medication but the patient refused to take this medication.
O-Z Flap Text: The defect edges were debeveled with a #15 scalpel blade.  Given the location of the defect, shape of the defect and the proximity to free margins an O-Z flap was deemed most appropriate.  Using a sterile surgical marker, an appropriate transposition flap was drawn incorporating the defect and placing the expected incisions within the relaxed skin tension lines where possible. The area thus outlined was incised deep to adipose tissue with a #15 scalpel blade.  The skin margins were undermined to an appropriate distance in all directions utilizing iris scissors.
Bilobed Flap Text: The defect edges were debeveled with a #15 scalpel blade.  Given the location of the defect and the proximity to free margins a bilobe flap was deemed most appropriate.  Using a sterile surgical marker, an appropriate bilobe flap drawn around the defect.    The area thus outlined was incised deep to adipose tissue with a #15 scalpel blade.  The skin margins were undermined to an appropriate distance in all directions utilizing iris scissors.
Graft Cartilage Fenestration Text: The cartilage was fenestrated with a 2mm punch biopsy to help facilitate graft survival and healing.
Purse String (Simple) Text: Given the location of the defect and the characteristics of the surrounding skin a purse string closure was deemed most appropriate.  Undermining was performed circumfirentially around the surgical defect.  A purse string suture was then placed and tightened.
Surgeon/Pathologist Verbiage (Will Incorporate Name Of Surgeon From Intro If Not Blank): operated in two distinct and integrated capacities as the surgeon and pathologist.
Mercedes Flap Text: The defect edges were debeveled with a #15 scalpel blade.  Given the location of the defect, shape of the defect and the proximity to free margins a Mercedes flap was deemed most appropriate.  Using a sterile surgical marker, an appropriate advancement flap was drawn incorporating the defect and placing the expected incisions within the relaxed skin tension lines where possible. The area thus outlined was incised deep to adipose tissue with a #15 scalpel blade.  The skin margins were undermined to an appropriate distance in all directions utilizing iris scissors.
Consent 3/Introductory Paragraph: I gave the patient a chance to ask questions they had about the procedure.  Following this I explained the Mohs procedure and consent was obtained. The risks, benefits and alternatives to therapy were discussed in detail. Specifically, the risks of infection, scarring, bleeding, prolonged wound healing, incomplete removal, allergy to anesthesia, nerve injury and recurrence were addressed. Prior to the procedure, the treatment site was clearly identified and confirmed by the patient. All components of Universal Protocol/PAUSE Rule completed.
Complex Repair And Graft Additional Text (Will Appearing After The Standard Complex Repair Text): The complex repair was not sufficient to completely close the primary defect. The remaining additional defect was repaired with the graft mentioned below.
Melolabial Interpolation Flap Text: A decision was made to reconstruct the defect utilizing an interpolation axial flap and a staged reconstruction.  A telfa template was made of the defect.  This telfa template was then used to outline the melolabial interpolation flap.  The donor area for the pedicle flap was then injected with anesthesia.  The flap was excised through the skin and subcutaneous tissue down to the layer of the underlying musculature.  The pedicle flap was carefully excised within this deep plane to maintain its blood supply.  The edges of the donor site were undermined.   The donor site was closed in a primary fashion.  The pedicle was then rotated into position and sutured.  Once the tube was sutured into place, adequate blood supply was confirmed with blanching and refill.  The pedicle was then wrapped with xeroform gauze and dressed appropriately with a telfa and gauze bandage to ensure continued blood supply and protect the attached pedicle.
Consent (Lip)/Introductory Paragraph: The rationale for Mohs was explained to the patient and consent was obtained. The risks, benefits and alternatives to therapy were discussed in detail. Specifically, the risks of lip deformity, changes in the oral aperture, infection, scarring, bleeding, prolonged wound healing, incomplete removal, allergy to anesthesia, nerve injury and recurrence were addressed. Prior to the procedure, the treatment site was clearly identified and confirmed by the patient. All components of Universal Protocol/PAUSE Rule completed.
Location Indication Override (Is Already Calculated Based On Selected Body Location): Area H
Island Pedicle Flap With Canthal Suspension Text: The defect edges were debeveled with a #15 scalpel blade.  Given the location of the defect, shape of the defect and the proximity to free margins an island pedicle advancement flap was deemed most appropriate.  Using a sterile surgical marker, an appropriate advancement flap was drawn incorporating the defect, outlining the appropriate donor tissue and placing the expected incisions within the relaxed skin tension lines where possible. The area thus outlined was incised deep to adipose tissue with a #15 scalpel blade.  The skin margins were undermined to an appropriate distance in all directions around the primary defect and laterally outward around the island pedicle utilizing iris scissors.  There was minimal undermining beneath the pedicle flap. A suspension suture was placed in the canthal tendon to prevent tension and prevent ectropion.
Consent (Scalp)/Introductory Paragraph: The rationale for Mohs was explained to the patient and consent was obtained. The risks, benefits and alternatives to therapy were discussed in detail. Specifically, the risks of changes in hair growth pattern secondary to repair, infection, scarring, bleeding, prolonged wound healing, incomplete removal, allergy to anesthesia, nerve injury and recurrence were addressed. Prior to the procedure, the treatment site was clearly identified and confirmed by the patient. All components of Universal Protocol/PAUSE Rule completed.
Bcc Infiltrative Histology Text: There were numerous aggregates of basaloid cells demonstrating an infiltrative pattern.
Tarsorrhaphy Text: A tarsorrhaphy was performed using Frost sutures.
Spiral Flap Text: The defect edges were debeveled with a #15 scalpel blade.  Given the location of the defect, shape of the defect and the proximity to free margins a spiral flap was deemed most appropriate.  Using a sterile surgical marker, an appropriate rotation flap was drawn incorporating the defect and placing the expected incisions within the relaxed skin tension lines where possible. The area thus outlined was incised deep to adipose tissue with a #15 scalpel blade.  The skin margins were undermined to an appropriate distance in all directions utilizing iris scissors.
Cheiloplasty (Complex) Text: A decision was made to reconstruct the defect with a  cheiloplasty.  The defect was undermined extensively.  Additional obicularis oris muscle was excised with a 15 blade scalpel.  The defect was converted into a full thickness wedge to facilite a better cosmetic result.  Small vessels were then tied off with 5-0 monocyrl. The obicularis oris, superficial fascia, adipose and dermis were then reapproximated.  After the deeper layers were approximated the epidermis was reapproximated with particular care given to realign the vermilion border.
Mohs Rapid Report Verbiage: The area of clinically evident tumor was marked with skin marking ink and appropriately hatched.  The initial incision was made following the Mohs approach through the skin.  The specimen was taken to the lab, divided into the necessary number of pieces, chromacoded and processed according to the Mohs protocol.  This was repeated in successive stages until a tumor free defect was achieved.
Suturegard Retention Suture: 2-0 Nylon
Advancement Flap (Single) Text: The defect edges were debeveled with a #15 scalpel blade.  Given the location of the defect and the proximity to free margins a single advancement flap was deemed most appropriate.  Using a sterile surgical marker, an appropriate advancement flap was drawn incorporating the defect and placing the expected incisions within the relaxed skin tension lines where possible.    The area thus outlined was incised deep to adipose tissue with a #15 scalpel blade.  The skin margins were undermined to an appropriate distance in all directions utilizing iris scissors.
Keystone Flap Text: The defect edges were debeveled with a #15 scalpel blade.  Given the location of the defect, shape of the defect a keystone flap was deemed most appropriate.  Using a sterile surgical marker, an appropriate keystone flap was drawn incorporating the defect, outlining the appropriate donor tissue and placing the expected incisions within the relaxed skin tension lines where possible. The area thus outlined was incised deep to adipose tissue with a #15 scalpel blade.  The skin margins were undermined to an appropriate distance in all directions around the primary defect and laterally outward around the flap utilizing iris scissors.
Melolabial Transposition Flap Text: The defect edges were debeveled with a #15 scalpel blade.  Given the location of the defect and the proximity to free margins a melolabial flap was deemed most appropriate.  Using a sterile surgical marker, an appropriate melolabial transposition flap was drawn incorporating the defect.    The area thus outlined was incised deep to adipose tissue with a #15 scalpel blade.  The skin margins were undermined to an appropriate distance in all directions utilizing iris scissors.
Additional Anesthesia Volume In Cc: 6
Surgeon Performing Repair (Optional): Neftali Saleem M.D.
Manual Repair Warning Statement: We plan on removing the manually selected variable below in favor of our much easier automatic structured text blocks found in the previous tab. We decided to do this to help make the flow better and give you the full power of structured data. Manual selection is never going to be ideal in our platform and I would encourage you to avoid using manual selection from this point on, especially since I will be sunsetting this feature. It is important that you do one of two things with the customized text below. First, you can save all of the text in a word file so you can have it for future reference. Second, transfer the text to the appropriate area in the Library tab. Lastly, if there is a flap or graft type which we do not have you need to let us know right away so I can add it in before the variable is hidden. No need to panic, we plan to give you roughly 6 months to make the change.
Split-Thickness Skin Graft Text: The defect edges were debeveled with a #15 scalpel blade.  Given the location of the defect, shape of the defect and the proximity to free margins a split thickness skin graft was deemed most appropriate.  Using a sterile surgical marker, the primary defect shape was transferred to the donor site. The split thickness graft was then harvested.  The skin graft was then placed in the primary defect and oriented appropriately.
Length To Time In Minutes Device Was In Place: 10
Postop Diagnosis: same
Where Do You Want The Question To Include Opioid Counseling Located?: Case Summary Tab
Crescentic Advancement Flap Text: The defect edges were debeveled with a #15 scalpel blade.  Given the location of the defect and the proximity to free margins a crescentic advancement flap was deemed most appropriate.  Using a sterile surgical marker, the appropriate advancement flap was drawn incorporating the defect and placing the expected incisions within the relaxed skin tension lines where possible.    The area thus outlined was incised deep to adipose tissue with a #15 scalpel blade.  The skin margins were undermined to an appropriate distance in all directions utilizing iris scissors.
S Plasty Text: Given the location and shape of the defect, and the orientation of relaxed skin tension lines, an S-plasty was deemed most appropriate for repair.  Using a sterile surgical marker, the appropriate outline of the S-plasty was drawn, incorporating the defect and placing the expected incisions within the relaxed skin tension lines where possible.  The area thus outlined was incised deep to adipose tissue with a #15 scalpel blade.  The skin margins were undermined to an appropriate distance in all directions utilizing iris scissors. The skin flaps were advanced over the defect.  The opposing margins were then approximated with interrupted buried subcutaneous sutures.
V-Y Plasty Text: The defect edges were debeveled with a #15 scalpel blade.  Given the location of the defect, shape of the defect and the proximity to free margins an V-Y advancement flap was deemed most appropriate.  Using a sterile surgical marker, an appropriate advancement flap was drawn incorporating the defect and placing the expected incisions within the relaxed skin tension lines where possible.    The area thus outlined was incised deep to adipose tissue with a #15 scalpel blade.  The skin margins were undermined to an appropriate distance in all directions utilizing iris scissors.
Repair Type: Graft
A-T Advancement Flap Text: The defect edges were debeveled with a #15 scalpel blade.  Given the location of the defect, shape of the defect and the proximity to free margins an A-T advancement flap was deemed most appropriate.  Using a sterile surgical marker, an appropriate advancement flap was drawn incorporating the defect and placing the expected incisions within the relaxed skin tension lines where possible.    The area thus outlined was incised deep to adipose tissue with a #15 scalpel blade.  The skin margins were undermined to an appropriate distance in all directions utilizing iris scissors.
Bilateral Helical Rim Advancement Flap Text: The defect edges were debeveled with a #15 blade scalpel.  Given the location of the defect and the proximity to free margins (helical rim) a bilateral helical rim advancement flap was deemed most appropriate.  Using a sterile surgical marker, the appropriate advancement flaps were drawn incorporating the defect and placing the expected incisions between the helical rim and antihelix where possible.  The area thus outlined was incised through and through with a #15 scalpel blade.  With a skin hook and iris scissors, the flaps were gently and sharply undermined and freed up.
Helical Rim Text: The closure involved the helical rim.
No Residual Tumor Seen Histology Text: There were no malignant cells seen in the sections examined.
Skin Substitute Text: The defect edges were debeveled with a #15 scalpel blade.  Given the location of the defect, shape of the defect and the proximity to free margins a skin substitute graft was deemed most appropriate.  The graft material was trimmed to fit the size of the defect. The graft was then placed in the primary defect and oriented appropriately.
Mohs Case Number: [id number]
Double O-Z Flap Text: The defect edges were debeveled with a #15 scalpel blade.  Given the location of the defect, shape of the defect and the proximity to free margins a Double O-Z flap was deemed most appropriate.  Using a sterile surgical marker, an appropriate transposition flap was drawn incorporating the defect and placing the expected incisions within the relaxed skin tension lines where possible. The area thus outlined was incised deep to adipose tissue with a #15 scalpel blade.  The skin margins were undermined to an appropriate distance in all directions utilizing iris scissors.
Mauc Instructions: By selecting yes to the question below the MAUC number will be added into the note.  This will be calculated automatically based on the diagnosis chosen, the size entered, the body zone selected (H,M,L) and the specific indications you chose. You will also have the option to override the Mohs AUC if you disagree with the automatically calculated number and this option is found in the Case Summary tab.
Alternatives Discussed Intro (Do Not Add Period): I discussed alternative treatments to Mohs surgery and specifically discussed the risks and benefits of
Cheek Interpolation Flap Text: A decision was made to reconstruct the defect utilizing an interpolation axial flap and a staged reconstruction.  A telfa template was made of the defect.  This telfa template was then used to outline the Cheek Interpolation flap.  The donor area for the pedicle flap was then injected with anesthesia.  The flap was excised through the skin and subcutaneous tissue down to the layer of the underlying musculature.  The interpolation flap was carefully excised within this deep plane to maintain its blood supply.  The edges of the donor site were undermined.   The donor site was closed in a primary fashion.  The pedicle was then rotated into position and sutured.  Once the tube was sutured into place, adequate blood supply was confirmed with blanching and refill.  The pedicle was then wrapped with xeroform gauze and dressed appropriately with a telfa and gauze bandage to ensure continued blood supply and protect the attached pedicle.
Secondary Intention Text (Leave Blank If You Do Not Want): The defect will heal with secondary intention.
Purse String (Intermediate) Text: Given the location of the defect and the characteristics of the surrounding skin a purse string intermediate closure was deemed most appropriate.  Undermining was performed circumfirentially around the surgical defect.  A purse string suture was then placed and tightened.
Bilobed Transposition Flap Text: The defect edges were debeveled with a #15 scalpel blade.  Given the location of the defect and the proximity to free margins a bilobed transposition flap was deemed most appropriate.  Using a sterile surgical marker, an appropriate bilobe flap drawn around the defect.    The area thus outlined was incised deep to adipose tissue with a #15 scalpel blade.  The skin margins were undermined to an appropriate distance in all directions utilizing iris scissors.
Information: Selecting Yes will display possible errors in your note based on the variables you have selected. This validation is only offered as a suggestion for you. PLEASE NOTE THAT THE VALIDATION TEXT WILL BE REMOVED WHEN YOU FINALIZE YOUR NOTE. IF YOU WANT TO FAX A PRELIMINARY NOTE YOU WILL NEED TO TOGGLE THIS TO 'NO' IF YOU DO NOT WANT IT IN YOUR FAXED NOTE.
Undermining Type: Entire Wound
Skin Substitute Units (Will Override Primary Defect Units If Greater Than 0): 25
Mastoid Interpolation Flap Text: A decision was made to reconstruct the defect utilizing an interpolation axial flap and a staged reconstruction.  A telfa template was made of the defect.  This telfa template was then used to outline the mastoid interpolation flap.  The donor area for the pedicle flap was then injected with anesthesia.  The flap was excised through the skin and subcutaneous tissue down to the layer of the underlying musculature.  The pedicle flap was carefully excised within this deep plane to maintain its blood supply.  The edges of the donor site were undermined.   The donor site was closed in a primary fashion.  The pedicle was then rotated into position and sutured.  Once the tube was sutured into place, adequate blood supply was confirmed with blanching and refill.  The pedicle was then wrapped with xeroform gauze and dressed appropriately with a telfa and gauze bandage to ensure continued blood supply and protect the attached pedicle.
Mohs Histo Method Verbiage: Each section was then chromacoded and processed in the Mohs lab using the Mohs protocol and submitted for frozen section.
Modified Advancement Flap Text: The defect edges were debeveled with a #15 scalpel blade.  Given the location of the defect, shape of the defect and the proximity to free margins a modified advancement flap was deemed most appropriate.  Using a sterile surgical marker, an appropriate advancement flap was drawn incorporating the defect and placing the expected incisions within the relaxed skin tension lines where possible.    The area thus outlined was incised deep to adipose tissue with a #15 scalpel blade.  The skin margins were undermined to an appropriate distance in all directions utilizing iris scissors.
Consent (Temporal Branch)/Introductory Paragraph: The rationale for Mohs was explained to the patient and consent was obtained. The risks, benefits and alternatives to therapy were discussed in detail. Specifically, the risks of damage to the temporal branch of the facial nerve, infection, scarring, bleeding, prolonged wound healing, incomplete removal, allergy to anesthesia, and recurrence were addressed. Prior to the procedure, the treatment site was clearly identified and confirmed by the patient. All components of Universal Protocol/PAUSE Rule completed.
Posterior Auricular Interpolation Flap Text: A decision was made to reconstruct the defect utilizing an interpolation axial flap and a staged reconstruction.  A telfa template was made of the defect.  This telfa template was then used to outline the posterior auricular interpolation flap.  The donor area for the pedicle flap was then injected with anesthesia.  The flap was excised through the skin and subcutaneous tissue down to the layer of the underlying musculature.  The pedicle flap was carefully excised within this deep plane to maintain its blood supply.  The edges of the donor site were undermined.   The donor site was closed in a primary fashion.  The pedicle was then rotated into position and sutured.  Once the tube was sutured into place, adequate blood supply was confirmed with blanching and refill.  The pedicle was then wrapped with xeroform gauze and dressed appropriately with a telfa and gauze bandage to ensure continued blood supply and protect the attached pedicle.
Epidermal Closure Graft Donor Site (Optional): simple interrupted
Alar Island Pedicle Flap Text: The defect edges were debeveled with a #15 scalpel blade.  Given the location of the defect, shape of the defect and the proximity to the alar rim an island pedicle advancement flap was deemed most appropriate.  Using a sterile surgical marker, an appropriate advancement flap was drawn incorporating the defect, outlining the appropriate donor tissue and placing the expected incisions within the nasal ala running parallel to the alar rim. The area thus outlined was incised with a #15 scalpel blade.  The skin margins were undermined minimally to an appropriate distance in all directions around the primary defect and laterally outward around the island pedicle utilizing iris scissors.  There was minimal undermining beneath the pedicle flap.
Detail Level: Detailed
Star Wedge Flap Text: The defect edges were debeveled with a #15 scalpel blade.  Given the location of the defect, shape of the defect and the proximity to free margins a star wedge flap was deemed most appropriate.  Using a sterile surgical marker, an appropriate rotation flap was drawn incorporating the defect and placing the expected incisions within the relaxed skin tension lines where possible. The area thus outlined was incised deep to adipose tissue with a #15 scalpel blade.  The skin margins were undermined to an appropriate distance in all directions utilizing iris scissors.
Dressing: pressure dressing with telfa
Ear Wedge Repair Text: A wedge excision was completed by carrying down an excision through the full thickness of the ear and cartilage with an inward facing Burow's triangle. The wound was then closed in a layered fashion.
Suturegard Intro: Intraoperative tissue expansion was performed, utilizing the SUTUREGARD device, in order to reduce wound tension.
Primary Defect Length In Cm (Final Defect Size - Required For Flaps/Grafts): 0.8
Advancement Flap (Double) Text: The defect edges were debeveled with a #15 scalpel blade.  Given the location of the defect and the proximity to free margins a double advancement flap was deemed most appropriate.  Using a sterile surgical marker, the appropriate advancement flaps were drawn incorporating the defect and placing the expected incisions within the relaxed skin tension lines where possible.    The area thus outlined was incised deep to adipose tissue with a #15 scalpel blade.  The skin margins were undermined to an appropriate distance in all directions utilizing iris scissors.
O-T Plasty Text: The defect edges were debeveled with a #15 scalpel blade.  Given the location of the defect, shape of the defect and the proximity to free margins an O-T plasty was deemed most appropriate.  Using a sterile surgical marker, an appropriate O-T plasty was drawn incorporating the defect and placing the expected incisions within the relaxed skin tension lines where possible.    The area thus outlined was incised deep to adipose tissue with a #15 scalpel blade.  The skin margins were undermined to an appropriate distance in all directions utilizing iris scissors.
Cartilage Graft Text: The defect edges were debeveled with a #15 scalpel blade.  Given the location of the defect, shape of the defect, the fact the defect involved a full thickness cartilage defect a cartilage graft was deemed most appropriate.  An appropriate donor site was identified, cleansed, and anesthetized. The cartilage graft was then harvested and transferred to the recipient site, oriented appropriately and then sutured into place.  The secondary defect was then repaired using a primary closure.
Hemostasis: Electricator
Rhombic Flap Text: The defect edges were debeveled with a #15 scalpel blade.  Given the location of the defect and the proximity to free margins a rhombic flap was deemed most appropriate.  Using a sterile surgical marker, an appropriate rhombic flap was drawn incorporating the defect.    The area thus outlined was incised deep to adipose tissue with a #15 scalpel blade.  The skin margins were undermined to an appropriate distance in all directions utilizing iris scissors.
Area H Indication Text: Tumors in this location are included in Area H (eyelids, eyebrows, nose, lips, chin, ear, pre-auricular, post-auricular, temple, genitalia, hands, feet, ankles and areola).  Tissue conservation is critical in these anatomic locations.
Area M Indication Text: Tumors in this location are included in Area M (cheek, forehead, scalp, neck, jawline and pretibial skin).  Mohs surgery is indicated for tumors in these anatomic locations.
O-T Advancement Flap Text: The defect edges were debeveled with a #15 scalpel blade.  Given the location of the defect, shape of the defect and the proximity to free margins an O-T advancement flap was deemed most appropriate.  Using a sterile surgical marker, an appropriate advancement flap was drawn incorporating the defect and placing the expected incisions within the relaxed skin tension lines where possible.    The area thus outlined was incised deep to adipose tissue with a #15 scalpel blade.  The skin margins were undermined to an appropriate distance in all directions utilizing iris scissors.
H Plasty Text: Given the location of the defect, shape of the defect and the proximity to free margins a H-plasty was deemed most appropriate for repair.  Using a sterile surgical marker, the appropriate advancement arms of the H-plasty were drawn incorporating the defect and placing the expected incisions within the relaxed skin tension lines where possible. The area thus outlined was incised deep to adipose tissue with a #15 scalpel blade. The skin margins were undermined to an appropriate distance in all directions utilizing iris scissors.  The opposing advancement arms were then advanced into place in opposite direction and anchored with interrupted buried subcutaneous sutures.
Vermilion Border Text: The closure involved the vermilion border.
Ear Star Wedge Flap Text: The defect edges were debeveled with a #15 blade scalpel.  Given the location of the defect and the proximity to free margins (helical rim) an ear star wedge flap was deemed most appropriate.  Using a sterile surgical marker, the appropriate flap was drawn incorporating the defect and placing the expected incisions between the helical rim and antihelix where possible.  The area thus outlined was incised through and through with a #15 scalpel blade.
Inflammation Suggestive Of Cancer Camouflage Histology Text: There was a dense lymphocytic infiltrate which prevented adequate histologic evaluation of adjacent structures.
Tissue Cultured Epidermal Autograft Text: The defect edges were debeveled with a #15 scalpel blade.  Given the location of the defect, shape of the defect and the proximity to free margins a tissue cultured epidermal autograft was deemed most appropriate.  The graft was then trimmed to fit the size of the defect.  The graft was then placed in the primary defect and oriented appropriately.
Eye Protection Verbiage: Before proceeding with the stage, a plastic scleral shield was inserted. The globe was anesthetized with a few drops of 1% lidocaine with 1:100,000 epinephrine. Then, an appropriate sized scleral shield was chosen and coated with lacrilube ointment. The shield was gently inserted and left in place for the duration of each stage. After the stage was completed, the shield was gently removed.
V-Y Flap Text: The defect edges were debeveled with a #15 scalpel blade.  Given the location of the defect, shape of the defect and the proximity to free margins a V-Y flap was deemed most appropriate.  Using a sterile surgical marker, an appropriate advancement flap was drawn incorporating the defect and placing the expected incisions within the relaxed skin tension lines where possible.    The area thus outlined was incised deep to adipose tissue with a #15 scalpel blade.  The skin margins were undermined to an appropriate distance in all directions utilizing iris scissors.
Consent 1/Introductory Paragraph: The rationale for Mohs was explained to the patient and consent was obtained. The risks, benefits and alternatives to therapy were discussed in detail. Specifically, the risks of infection, scarring, bleeding, prolonged wound healing, incomplete removal, allergy to anesthesia, nerve injury and recurrence were addressed. Prior to the procedure, the treatment site was clearly identified and confirmed by the patient. All components of Universal Protocol/PAUSE Rule completed.
Cheek-To-Nose Interpolation Flap Text: A decision was made to reconstruct the defect utilizing an interpolation axial flap and a staged reconstruction.  A telfa template was made of the defect.  This telfa template was then used to outline the Cheek-To-Nose Interpolation flap.  The donor area for the pedicle flap was then injected with anesthesia.  The flap was excised through the skin and subcutaneous tissue down to the layer of the underlying musculature.  The interpolation flap was carefully excised within this deep plane to maintain its blood supply.  The edges of the donor site were undermined.   The donor site was closed in a primary fashion.  The pedicle was then rotated into position and sutured.  Once the tube was sutured into place, adequate blood supply was confirmed with blanching and refill.  The pedicle was then wrapped with xeroform gauze and dressed appropriately with a telfa and gauze bandage to ensure continued blood supply and protect the attached pedicle.
Retention Suture Text: Retention sutures were placed to support the closure and prevent dehiscence.
Trilobed Flap Text: The defect edges were debeveled with a #15 scalpel blade.  Given the location of the defect and the proximity to free margins a trilobed flap was deemed most appropriate.  Using a sterile surgical marker, an appropriate trilobed flap drawn around the defect.    The area thus outlined was incised deep to adipose tissue with a #15 scalpel blade.  The skin margins were undermined to an appropriate distance in all directions utilizing iris scissors.
Consent (Ear)/Introductory Paragraph: The rationale for Mohs was explained to the patient and consent was obtained. The risks, benefits and alternatives to therapy were discussed in detail. Specifically, the risks of ear deformity, infection, scarring, bleeding, prolonged wound healing, incomplete removal, allergy to anesthesia, nerve injury and recurrence were addressed. Prior to the procedure, the treatment site was clearly identified and confirmed by the patient. All components of Universal Protocol/PAUSE Rule completed.
No Repair - Repaired With Adjacent Surgical Defect Text (Leave Blank If You Do Not Want): After obtaining clear surgical margins the defect was repaired concurrently with another surgical defect which was in close approximation.
Partial Purse String (Simple) Text: Given the location of the defect and the characteristics of the surrounding skin a simple purse string closure was deemed most appropriate.  Undermining was performed circumfirentially around the surgical defect.  A purse string suture was then placed and tightened. Wound tension only allowed a partial closure of the circular defect.
Depth Of Tumor Invasion (For Histology): tumor not visualized (deep and peripheral margins are clear of tumor)
Referring Physician (Optional): Sinai Willson MD
Mucosal Advancement Flap Text: Given the location of the defect, shape of the defect and the proximity to free margins a mucosal advancement flap was deemed most appropriate. Incisions were made with a 15 blade scalpel in the appropriate fashion along the cutaneous vermilion border and the mucosal lip. The remaining actinically damaged mucosal tissue was excised.  The mucosal advancement flap was then elevated to the gingival sulcus with care taken to preserve the neurovascular structures and advanced into the primary defect. Care was taken to ensure that precise realignment of the vermilion border was achieved.
Consent (Marginal Mandibular)/Introductory Paragraph: The rationale for Mohs was explained to the patient and consent was obtained. The risks, benefits and alternatives to therapy were discussed in detail. Specifically, the risks of damage to the marginal mandibular branch of the facial nerve, infection, scarring, bleeding, prolonged wound healing, incomplete removal, allergy to anesthesia, and recurrence were addressed. Prior to the procedure, the treatment site was clearly identified and confirmed by the patient. All components of Universal Protocol/PAUSE Rule completed.

## 2020-01-14 ENCOUNTER — APPOINTMENT (RX ONLY)
Dept: URBAN - METROPOLITAN AREA CLINIC 22 | Facility: CLINIC | Age: 75
Setting detail: DERMATOLOGY
End: 2020-01-14

## 2020-01-14 DIAGNOSIS — Z48.817 ENCOUNTER FOR SURGICAL AFTERCARE FOLLOWING SURGERY ON THE SKIN AND SUBCUTANEOUS TISSUE: ICD-10-CM

## 2020-01-14 PROCEDURE — ? POST-OP WOUND EVALUATION

## 2020-01-14 PROCEDURE — 99024 POSTOP FOLLOW-UP VISIT: CPT

## 2020-01-14 ASSESSMENT — LOCATION ZONE DERM: LOCATION ZONE: NOSE

## 2020-01-14 ASSESSMENT — LOCATION SIMPLE DESCRIPTION DERM: LOCATION SIMPLE: LEFT NOSE

## 2020-01-14 ASSESSMENT — LOCATION DETAILED DESCRIPTION DERM: LOCATION DETAILED: LEFT NASAL ALA

## 2020-01-14 NOTE — PROCEDURE: POST-OP WOUND EVALUATION
Wound Evaluated By (Optional): Neftali Saleem MD
Wound Color?: red
Wound Diameter In Cm(Optional): 0
Add 08346 Cpt? (Important Note: In 2017 The Use Of 30730 Is Being Tracked By Cms To Determine Future Global Period Reimbursement For Global Periods): yes
Detail Level: Detailed
Wound Crusting?: clean

## 2020-01-24 ENCOUNTER — APPOINTMENT (RX ONLY)
Dept: URBAN - METROPOLITAN AREA CLINIC 36 | Facility: CLINIC | Age: 75
Setting detail: DERMATOLOGY
End: 2020-01-24

## 2020-01-24 DIAGNOSIS — Z48.817 ENCOUNTER FOR SURGICAL AFTERCARE FOLLOWING SURGERY ON THE SKIN AND SUBCUTANEOUS TISSUE: ICD-10-CM

## 2020-01-24 PROCEDURE — 99024 POSTOP FOLLOW-UP VISIT: CPT

## 2020-01-24 PROCEDURE — ? POST-OP WOUND EVALUATION

## 2020-01-24 ASSESSMENT — LOCATION SIMPLE DESCRIPTION DERM: LOCATION SIMPLE: LEFT NOSE

## 2020-01-24 ASSESSMENT — LOCATION ZONE DERM: LOCATION ZONE: NOSE

## 2020-01-24 ASSESSMENT — LOCATION DETAILED DESCRIPTION DERM: LOCATION DETAILED: LEFT NASAL ALA

## 2020-01-24 NOTE — PROCEDURE: POST-OP WOUND EVALUATION
Wound Crusting?: clean
Wound Color?: pink
Any New Or Residual Neoplasm?: No
Wound Evaluated By (Optional): Neftali Saleem MD
Add 00467 Cpt? (Important Note: In 2017 The Use Of 86811 Is Being Tracked By Cms To Determine Future Global Period Reimbursement For Global Periods): yes
Wound Diameter In Cm(Optional): 0
Detail Level: Detailed
Wound Granulation?: early

## 2020-02-03 ENCOUNTER — TELEPHONE (OUTPATIENT)
Dept: MEDICAL GROUP | Age: 75
End: 2020-02-03

## 2020-02-03 NOTE — TELEPHONE ENCOUNTER
ESTABLISHED PATIENT PRE-VISIT PLANNING     Patient was NOT contacted to complete PVP.     Note: Patient will not be contacted if there is no indication to call.     1.  Reviewed notes from the last few office visits within the medical group: Yes    2.  If any orders were placed at last visit or intended to be done for this visit (i.e. 6 mos follow-up), do we have Results/Consult Notes?        •  Labs - Labs ordered, completed on 8/23/19 and results are in chart.   Note: If patient appointment is for lab review and patient did not complete labs, check with provider if OK to reschedule patient until labs completed.       •  Imaging - Imaging was not ordered at last office visit.       •  Referrals - No referrals were ordered at last office visit.    3. Is this appointment scheduled as a Hospital Follow-Up? No    4.  Immunizations were updated in Epic using WebIZ?: Epic matches WebIZ       •  Web Iz Recommendations: HEPATITIS B    5.  Patient is due for the following Health Maintenance Topics:   Health Maintenance Due   Topic Date Due   • IMM HEP B VACCINE (1 of 3 - Risk 3-dose series) 06/13/1964           6. Orders for overdue Health Maintenance topics pended in Pre-Charting? N\A    7.  AHA (MDX) form printed for Provider? YES    8.  Patient was NOT informed to arrive 15 min prior to their scheduled appointment and bring in their medication bottles.

## 2020-03-04 ENCOUNTER — TELEPHONE (OUTPATIENT)
Dept: MEDICAL GROUP | Age: 75
End: 2020-03-04

## 2020-04-03 ENCOUNTER — HOSPITAL ENCOUNTER (OUTPATIENT)
Dept: LAB | Facility: MEDICAL CENTER | Age: 75
End: 2020-04-03
Attending: INTERNAL MEDICINE
Payer: MEDICARE

## 2020-04-03 DIAGNOSIS — N40.0 BENIGN PROSTATIC HYPERPLASIA, UNSPECIFIED WHETHER LOWER URINARY TRACT SYMPTOMS PRESENT: ICD-10-CM

## 2020-04-03 DIAGNOSIS — Z85.038 H/O COLON CANCER, STAGE IV: ICD-10-CM

## 2020-04-03 DIAGNOSIS — E53.8 VITAMIN B 12 DEFICIENCY: ICD-10-CM

## 2020-04-03 DIAGNOSIS — D69.6 THROMBOCYTOPENIA, UNSPECIFIED (HCC): ICD-10-CM

## 2020-04-03 DIAGNOSIS — E78.2 MIXED HYPERLIPIDEMIA: ICD-10-CM

## 2020-04-03 DIAGNOSIS — E55.9 VITAMIN D DEFICIENCY: ICD-10-CM

## 2020-04-03 DIAGNOSIS — C78.7 SECONDARY MALIGNANT NEOPLASM OF LIVER (HCC): ICD-10-CM

## 2020-04-03 DIAGNOSIS — E83.42 HYPOMAGNESEMIA: ICD-10-CM

## 2020-04-03 LAB
25(OH)D3 SERPL-MCNC: 32 NG/ML (ref 30–100)
ALBUMIN SERPL BCP-MCNC: 3.5 G/DL (ref 3.2–4.9)
ALBUMIN/GLOB SERPL: 1.4 G/DL
ALP SERPL-CCNC: 97 U/L (ref 30–99)
ALT SERPL-CCNC: 18 U/L (ref 2–50)
ANION GAP SERPL CALC-SCNC: 11 MMOL/L (ref 7–16)
AST SERPL-CCNC: 22 U/L (ref 12–45)
BASOPHILS # BLD AUTO: 0.7 % (ref 0–1.8)
BASOPHILS # BLD: 0.03 K/UL (ref 0–0.12)
BILIRUB SERPL-MCNC: 1.4 MG/DL (ref 0.1–1.5)
BUN SERPL-MCNC: 22 MG/DL (ref 8–22)
CALCIUM SERPL-MCNC: 8.8 MG/DL (ref 8.5–10.5)
CEA SERPL-MCNC: 2.5 NG/ML (ref 0–3)
CHLORIDE SERPL-SCNC: 101 MMOL/L (ref 96–112)
CHOLEST SERPL-MCNC: 102 MG/DL (ref 100–199)
CO2 SERPL-SCNC: 27 MMOL/L (ref 20–33)
CREAT SERPL-MCNC: 1.11 MG/DL (ref 0.5–1.4)
EOSINOPHIL # BLD AUTO: 0.15 K/UL (ref 0–0.51)
EOSINOPHIL NFR BLD: 3.6 % (ref 0–6.9)
ERYTHROCYTE [DISTWIDTH] IN BLOOD BY AUTOMATED COUNT: 51.4 FL (ref 35.9–50)
ERYTHROCYTE [SEDIMENTATION RATE] IN BLOOD BY WESTERGREN METHOD: 2 MM/HOUR (ref 0–20)
GLOBULIN SER CALC-MCNC: 2.5 G/DL (ref 1.9–3.5)
GLUCOSE SERPL-MCNC: 85 MG/DL (ref 65–99)
HCT VFR BLD AUTO: 39.7 % (ref 42–52)
HDLC SERPL-MCNC: 62 MG/DL
HGB BLD-MCNC: 12.7 G/DL (ref 14–18)
IMM GRANULOCYTES # BLD AUTO: 0.01 K/UL (ref 0–0.11)
IMM GRANULOCYTES NFR BLD AUTO: 0.2 % (ref 0–0.9)
LDLC SERPL CALC-MCNC: 30 MG/DL
LYMPHOCYTES # BLD AUTO: 0.61 K/UL (ref 1–4.8)
LYMPHOCYTES NFR BLD: 14.7 % (ref 22–41)
MAGNESIUM SERPL-MCNC: 1.8 MG/DL (ref 1.5–2.5)
MCH RBC QN AUTO: 28.7 PG (ref 27–33)
MCHC RBC AUTO-ENTMCNC: 32 G/DL (ref 33.7–35.3)
MCV RBC AUTO: 89.8 FL (ref 81.4–97.8)
MONOCYTES # BLD AUTO: 0.48 K/UL (ref 0–0.85)
MONOCYTES NFR BLD AUTO: 11.6 % (ref 0–13.4)
NEUTROPHILS # BLD AUTO: 2.87 K/UL (ref 1.82–7.42)
NEUTROPHILS NFR BLD: 69.2 % (ref 44–72)
NRBC # BLD AUTO: 0 K/UL
NRBC BLD-RTO: 0 /100 WBC
PLATELET # BLD AUTO: 58 K/UL (ref 164–446)
PMV BLD AUTO: 11.6 FL (ref 9–12.9)
POTASSIUM SERPL-SCNC: 3.8 MMOL/L (ref 3.6–5.5)
PROT SERPL-MCNC: 6 G/DL (ref 6–8.2)
PSA SERPL-MCNC: 0.69 NG/ML (ref 0–4)
RBC # BLD AUTO: 4.42 M/UL (ref 4.7–6.1)
SODIUM SERPL-SCNC: 139 MMOL/L (ref 135–145)
TRIGL SERPL-MCNC: 49 MG/DL (ref 0–149)
TSH SERPL DL<=0.005 MIU/L-ACNC: 2.76 UIU/ML (ref 0.38–5.33)
VIT B12 SERPL-MCNC: 347 PG/ML (ref 211–911)
WBC # BLD AUTO: 4.2 K/UL (ref 4.8–10.8)

## 2020-04-03 PROCEDURE — 80053 COMPREHEN METABOLIC PANEL: CPT

## 2020-04-03 PROCEDURE — 84443 ASSAY THYROID STIM HORMONE: CPT

## 2020-04-03 PROCEDURE — 85025 COMPLETE CBC W/AUTO DIFF WBC: CPT

## 2020-04-03 PROCEDURE — 82378 CARCINOEMBRYONIC ANTIGEN: CPT

## 2020-04-03 PROCEDURE — 80061 LIPID PANEL: CPT

## 2020-04-03 PROCEDURE — 85652 RBC SED RATE AUTOMATED: CPT

## 2020-04-03 PROCEDURE — 82607 VITAMIN B-12: CPT

## 2020-04-03 PROCEDURE — 36415 COLL VENOUS BLD VENIPUNCTURE: CPT

## 2020-04-03 PROCEDURE — 82306 VITAMIN D 25 HYDROXY: CPT

## 2020-04-03 PROCEDURE — 83735 ASSAY OF MAGNESIUM: CPT

## 2020-04-03 PROCEDURE — 84153 ASSAY OF PSA TOTAL: CPT

## 2020-04-06 ENCOUNTER — TELEPHONE (OUTPATIENT)
Dept: MEDICAL GROUP | Age: 75
End: 2020-04-06

## 2020-04-06 NOTE — TELEPHONE ENCOUNTER
ESTABLISHED PATIENT PRE-VISIT PLANNING     Patient was NOT contacted to complete PVP.     Note: Patient will not be contacted if there is no indication to call.     1.  Reviewed notes from the last few office visits within the medical group: Yes    2.  If any orders were placed at last visit or intended to be done for this visit (i.e. 6 mos follow-up), do we have Results/Consult Notes?        •  Labs - Labs ordered, completed on 4/3/2020 and results are in chart.   Note: If patient appointment is for lab review and patient did not complete labs, check with provider if OK to reschedule patient until labs completed.       •  Imaging - Imaging was not ordered at last office visit.       •  Referrals - No referrals were ordered at last office visit.    3. Is this appointment scheduled as a Hospital Follow-Up? No    4.  Immunizations were updated in Epic using WebIZ?: Epic matches WebIZ       •  Web Iz Recommendations: HEPATITIS B    5.  Patient is due for the following Health Maintenance Topics:   Health Maintenance Due   Topic Date Due   • IMM HEP B VACCINE (1 of 3 - Risk 3-dose series) 06/13/1964           6. Orders for overdue Health Maintenance topics pended in Pre-Charting? N\A    7.  AHA (MDX) form printed for Provider? YES    8.  Patient was NOT informed to arrive 15 min prior to their scheduled appointment and bring in their medication bottles.

## 2020-04-07 ENCOUNTER — OFFICE VISIT (OUTPATIENT)
Dept: MEDICAL GROUP | Age: 75
End: 2020-04-07
Payer: MEDICARE

## 2020-04-07 DIAGNOSIS — I25.10 ASCVD (ARTERIOSCLEROTIC CARDIOVASCULAR DISEASE): ICD-10-CM

## 2020-04-07 DIAGNOSIS — I81 PORTAL VEIN THROMBOSIS: ICD-10-CM

## 2020-04-07 DIAGNOSIS — I82.512 CHRONIC DEEP VEIN THROMBOSIS (DVT) OF FEMORAL VEIN OF LEFT LOWER EXTREMITY (HCC): ICD-10-CM

## 2020-04-07 DIAGNOSIS — Z96.649 STATUS POST TOTAL REPLACEMENT OF HIP, UNSPECIFIED LATERALITY: ICD-10-CM

## 2020-04-07 DIAGNOSIS — Z87.19 HISTORY OF ESOPHAGEAL VARICES: ICD-10-CM

## 2020-04-07 DIAGNOSIS — R18.8 CIRRHOSIS OF LIVER WITH ASCITES, UNSPECIFIED HEPATIC CIRRHOSIS TYPE (HCC): ICD-10-CM

## 2020-04-07 DIAGNOSIS — E66.9 OBESITY (BMI 30-39.9): ICD-10-CM

## 2020-04-07 DIAGNOSIS — R60.0 EDEMA OF LEFT LOWER EXTREMITY: ICD-10-CM

## 2020-04-07 DIAGNOSIS — K92.0 GASTROINTESTINAL HEMORRHAGE WITH HEMATEMESIS: ICD-10-CM

## 2020-04-07 DIAGNOSIS — D69.6 THROMBOCYTOPENIA, UNSPECIFIED (HCC): ICD-10-CM

## 2020-04-07 DIAGNOSIS — K76.6 PORTAL HYPERTENSION WITH ESOPHAGEAL VARICES (HCC): ICD-10-CM

## 2020-04-07 DIAGNOSIS — G62.0 PERIPHERAL NEUROPATHY DUE TO CHEMOTHERAPY (HCC): ICD-10-CM

## 2020-04-07 DIAGNOSIS — G62.0 DRUG-INDUCED POLYNEUROPATHY (HCC): ICD-10-CM

## 2020-04-07 DIAGNOSIS — I86.4 GASTRIC VARICES: ICD-10-CM

## 2020-04-07 DIAGNOSIS — Z85.038 H/O COLON CANCER, STAGE IV: ICD-10-CM

## 2020-04-07 DIAGNOSIS — E55.9 VITAMIN D DEFICIENCY: ICD-10-CM

## 2020-04-07 DIAGNOSIS — C78.7 SECONDARY MALIGNANT NEOPLASM OF LIVER (HCC): ICD-10-CM

## 2020-04-07 DIAGNOSIS — R60.1 GENERALIZED EDEMA: ICD-10-CM

## 2020-04-07 DIAGNOSIS — T45.1X5A PERIPHERAL NEUROPATHY DUE TO CHEMOTHERAPY (HCC): ICD-10-CM

## 2020-04-07 DIAGNOSIS — K74.60 CIRRHOSIS OF LIVER WITH ASCITES, UNSPECIFIED HEPATIC CIRRHOSIS TYPE (HCC): ICD-10-CM

## 2020-04-07 DIAGNOSIS — I85.10 SECONDARY ESOPHAGEAL VARICES WITHOUT BLEEDING (HCC): ICD-10-CM

## 2020-04-07 DIAGNOSIS — N40.0 BENIGN PROSTATIC HYPERPLASIA, UNSPECIFIED WHETHER LOWER URINARY TRACT SYMPTOMS PRESENT: ICD-10-CM

## 2020-04-07 DIAGNOSIS — K76.9 LIVER LESION: ICD-10-CM

## 2020-04-07 DIAGNOSIS — I85.00 PORTAL HYPERTENSION WITH ESOPHAGEAL VARICES (HCC): ICD-10-CM

## 2020-04-07 DIAGNOSIS — I10 ESSENTIAL HYPERTENSION: ICD-10-CM

## 2020-04-07 DIAGNOSIS — M46.04 SPINAL ENTHESOPATHY OF THORACIC REGION (HCC): ICD-10-CM

## 2020-04-07 DIAGNOSIS — E78.2 MIXED HYPERLIPIDEMIA: ICD-10-CM

## 2020-04-07 PROCEDURE — 99214 OFFICE O/P EST MOD 30 MIN: CPT | Performed by: INTERNAL MEDICINE

## 2020-04-07 PROCEDURE — 8041 PR SCP AHA: Performed by: INTERNAL MEDICINE

## 2020-04-07 RX ORDER — AMOXICILLIN 500 MG/1
2000 CAPSULE ORAL
Qty: 28 CAP | Refills: 2
Start: 2020-04-07 | End: 2022-11-08

## 2020-04-07 NOTE — PROGRESS NOTES
Telemedicine Visit: Established Patient     This encounter was conducted via [facility] ????.   Verbal consent was obtained. Patient's identity was verified.    Subjective:   CC: hypertension, hyperlipidemia, lab results.  Torey Lima is a 74 y.o. male presenting for evaluation and management of:    Vitamin D deficiency  He takes 1000 IU's of OTC Vitamin D supplementation. Blood work was completed prior to this visit.     Mixed hyperlipidemia  He has been taking atorvastatin 40 mg as prescribed for his dyslipidemia without myalgias or other side effects. Blood work was done before this visit.     Liver lesion- ablation of malignant met from colon ca - f/u [facility] q 6 mos - stable MRI abd 3/2018- redo annually  Cirrhosis of liver with ascites, unspecified hepatic cirrhosis type  The patient has a history of a liver lesion and has followed up in the past with [facility] for treatment. The patient is concerned about this number trending upwards and is wondering if he needs a repeat MRI to evaluate his liver. His last MRI of his abdomen was in 3/2018 showin. Redemonstration of irregular lesion in segment 4 with persistent peripheral enhancement, in keeping with residual viable disease. The peripheral enhancement is slightly less than prior.  2. Cirrhotic liver with splenomegaly and portal hypertension.  3. Unchanged similar nonocclusive clot in the main portal vein.  4. Moderate ascites. Small bilateral pleural effusions.  5. Nonspecific mild stranding surrounding the celiac and SMA origin, similar to prior exam and of unclear etiology.    Generalized edema  Cirrhosis of liver with ascites, unspecified hepatic cirrhosis type (HCC)  The patient has a history of edema and is well controlled on spironolactone 25 mg, metolazone 2.5 mg, and torsemide 10 mg. He reports not side effects.     Essential hypertension  Patient has a history of chronic hypertension and regularly monitors his blood pressure at home.     ROS    Denies any recent fevers or chills. No nausea or vomiting. No chest pains or shortness of breath.     Allergies   Allergen Reactions   • Influenza Virus Vaccine Live      Guillan [place]    • Anticoagulant Sodium Citrate [Sodium Citrate]      HIGH BLEEDING RISK   • Lisinopril Cough   • Nkda [No Known Drug Allergy]        Current medicines (including changes today)  Current Outpatient Medications   Medication Sig Dispense Refill   • atorvastatin (LIPITOR) 40 MG Tab TAKE ONE TABLET BY MOUTH AT BEDTIME 100 Tab 4   • potassium chloride SA (KDUR) 20 MEQ Tab CR TAKE 1 TABLET BY MOUTH TWICE DAILY 200 Tab 4   • folic acid (FOLVITE) 1 MG Tab TAKE 1 TABLET BY MOUTH ONCE DAILY 100 Tab 4   • amoxicillin (AMOXIL) 500 MG Cap Take 500 mg by mouth 3 times a day.     • spironolactone (ALDACTONE) 25 MG Tab TAKE ONE TABLET BY MOUTH ONCE DAILY 90 Tab 4   • ursodiol (ACTIGALL) 300 MG Cap TAKE ONE CAPSULE BY MOUTH ONCE DAILY 90 Cap 4   • metOLazone (ZAROXOLYN) 2.5 MG Tab TAKE ONE TABLET BY MOUTH ONCE DAILY 90 Tab 4   • torsemide (DEMADEX) 10 MG tablet TAKE TWO TABLETS BY MOUTH ONCE DAILY 180 Tab 4   • Cholecalciferol (VITAMIN D3) 1000 units Cap Take 1,000 Units by mouth.     • glucosamine-chondroitin 500-400 MG tablet Take  by mouth.     • metolazone (ZAROXOLYN) 2.5 MG Tab Take 1 Tab by mouth every 48 hours. 45 Tab 4   • torsemide (DEMADEX) 10 MG tablet Take 2 Tabs by mouth every 48 hours. 90 Tab 4   • ferrous sulfate 325 (65 Fe) MG tablet Take 1 Tab by mouth every morning with breakfast. 30 Tab    • folic acid (FOLVITE) 1 MG Tab Take 1 Tab by mouth every day. 30 Tab      No current facility-administered medications for this visit.        Patient Active Problem List    Diagnosis Date Noted   • Secondary esophageal varices without bleeding (HCC)- BANDED 4/2016- repeat egd tbd 10//29/16- gic 04/04/2016     Priority: High   • ASCVD (arteriosclerotic cardiovascular disease)subtotalled small distal LCx and 40% LAD med rx by cath 12/13/12  12/14/2012     Priority: High   • Gastric varices- s/p BRTO procedure at [facility] 04/04/2016     Priority: Medium   • Gastrointestinal hemorrhage with hematemesis- recurrernt from varices 04/03/2016     Priority: Medium   • Secondary malignant neoplasm of liver (HCC) 04/23/2019   • Thrombocytopenia, unspecified (HCC)- from previous chemorx 04/23/2019   • Chronic deep vein thrombosis (DVT) of femoral vein of left lower extremity (HCC)- s/p IVC filter 2016 11/13/2018   • Portal hypertension with esophageal varices (HCC) 11/13/2018   • Generalized edema 12/14/2017   • Obesity (BMI 30-39.9) 12/14/2017   • Edema of left lower extremity- due to dvt july 2017 09/19/2017   • BPH (benign prostatic hyperplasia) 08/11/2017   • Peripheral neuropathy due to chemotherapy (HCC) 08/11/2017   • H/O colon cancer, stage IV- neg colonoscopy 2012/2018 at [facility] 05/20/2017   • History of esophageal varices 10/24/2016   • Portal vein thrombosis- on CT [facility] 2/2015 04/14/2016   • Cirrhosis of liver with ascites (HCC)- ? DUE TO OLD HEP B, CHEMO RX,  OR THERMO RAD OF MAGNANT LESION 04/14/2016   • Mixed hyperlipidemia 06/20/2013   • Essential hypertension 06/20/2013   • Vitamin D deficiency 08/02/2012   • Overlapping malignant neoplasm of colon (HCC)Overlapping malignant neoplasm of colon (HCC)--2010 left hemicolectomy; no colostomy; ablation of liver met at [facility] 1/2011- MRI abd 3/2018 no change in per 12/06/2010   • Liver lesion- ablation of malignant met from colon ca 2011- f/u [facility] q 6 mos - stable MRI abd 3/2018- redo annually 12/06/2010   • Drug-induced polyneuropathy (HCC) 12/06/2010       Family History   Problem Relation Age of Onset   • Heart Disease Mother    • Cancer Mother    • Sleep Apnea Neg Hx        He  has a past medical history of ASCVD (arteriosclerotic cardiovascular disease) (12/14/2012), BMI 33.0-33.9,adult (2/15/2013), Cancer (HCC) (10/10-6/11), Chickenpox, Colon cancer (HCC) (12/6/2010), Elevated CEA (12/6/2010), English  "measles, GI bleed, HLD (hyperlipidemia) (6/20/2013), HTN (hypertension) (6/20/2013), Hypertension, Impaired fasting glucose (8/2/2012), Liver cirrhosis (HCC), Liver lesion (12/6/2010), Peripheral neuropathy, secondary to drugs or chemicals (12/6/2010), Thrombocytopenia due to drugs (4/25/2012), and Vitamin d deficiency (8/2/2012).  He  has a past surgical history that includes low anterior resection robotic (8/10/2010); cath placement (9/20/2010); hip arthroplasty mis total; other; other (comments); cath removal (7/20/2011); full thickness block resection (4/11/2012); gastroscopy with banding (4/3/2016); gastroscopy with banding (10/25/2016); colon resection; hip replacement, total; and lumbar laminectomy diskectomy (5/25/2017).       Objective:   Vitals obtained by patient:  Respirations through observation: 12, Height: 1.905 m (6' 3\") and Weight: 109.4 kg (241 lb 3.2 oz)    Physical Exam:  Constitutional: Alert, no distress, well-groomed.  Skin: No rashes in visible areas.  Eye: Round. Conjunctiva clear, lids normal. No icterus.   ENMT: Lips pink without lesions, good dentition, moist mucous membranes. Phonation normal.  Neck: No masses, no thyromegaly. Moves freely without pain.  CV: Pulse as reported by patient  Respiratory: Unlabored respiratory effort, no cough or audible wheeze  Psych: Alert and oriented x3, normal affect and mood.       Assessment and Plan:   The following treatment plan was discussed:     1. Vitamin D deficiency  Patient's Vitamin D level is within the target range at 32. Advised to continue current OTC supplementation of 1000 IU's daily.      2. Thrombocytopenia, unspecified (HCC)- from previous chemorx  Patient has been stable with current management  We will make no changes for now     3. Portal vein thrombosis- on CT [facility] 2/2015  Patient has been stable with current management  We will make no changes for now     4. Portal hypertension with esophageal varices (HCC)  Patient has been " stable with current management  We will make no changes for now     5. Peripheral neuropathy due to chemotherapy (HCC)  Patient has been stable with current management  We will make no changes for now     6. Obesity (BMI 30-39.9)  Chronic and stable.    7. Mixed hyperlipidemia  His lipid panel values are all within goal. Stable on current atorvastatin dosage. I have advised the patient to increase his exercise regimen and avoid fatty foods. Labs have been ordered for the next follow up visit.    - Comp Metabolic Panel; Future  - Lipid Profile; Future  - CBC WITH DIFFERENTIAL; Future    8. Liver lesion- ablation of malignant met from colon ca 2011- f/u [facility] q 6 mos - stable MRI abd 3/2018- redo annually  Lab work shows the patient's CEA increased from 1.8 to 2.5. Liver enzymes stable.  I encouraged the patient to contact his doctor at [facility] for a repeat MRI and to discuss trending CEA. We will continue to monitor his CEA values. I have replaced a new referral to [facility].   - CEA; Future  - REFERRAL TO GASTROENTEROLOGY    9. History of esophageal varices  Patient has been stable with current management  We will make no changes for now     10. H/O colon cancer, stage IV- neg colonoscopy 2012/2018 at [facility]  We will continue to monitor the patient's CEA values in the future. Referral is placed.  - CEA; Future  - REFERRAL TO GASTROENTEROLOGY    11. Generalized edema  Under good control. Continue same regimen with spironolactone 25 mg, metolazone 2.5 mg, and torsemide 10 mg.     12. Essential hypertension  Under good control. Continue same regimen.     13. Edema of left lower extremity- due to dvt july 2017  Patient has been stable with current management  We will make no changes for now     14. Drug-induced polyneuropathy (HCC)  Patient has been stable with current management  We will make no changes for now     15. Cirrhosis of liver with ascites, unspecified hepatic cirrhosis type (HCC)  Referral placed.   - REFERRAL TO  GASTROENTEROLOGY    16. Chronic deep vein thrombosis (DVT) of femoral vein of left lower extremity (HCC)- s/p IVC filter 2016  Chronic and stable.     17. Gastric varices- s/p BRTO procedure at [facility]  Chronic and stable.     18. Gastrointestinal hemorrhage with hematemesis- recurrernt from varices  Chronic and stable.     19. Benign prostatic hyperplasia, unspecified whether lower urinary tract symptoms present  Lab work shows patient's PSA value is normal at 0.69. We will continue to monitor.     20. ASCVD (arteriosclerotic cardiovascular disease)subtotalled small distal LCx and 40% LAD med rx by cath 12/13/12  Chronic and stable.     21. Secondary esophageal varices without bleeding (HCC)- BANDED 4/2016- repeat egd tbd 10//29/16- gic  Chronic and stable.     22,Secondary malignant neoplasm of liver (HCC)-previously under good control but now with rising CEA may need to further investigate with repeat MRI of the liver which will be ordered by his gastroenterologist.      23.Spinal enthesopathy of thoracic region (HCC)   Under good control. Continue same regimen.      Patient verbalized understanding.-He will contact his GI doctor to arrange for MRI of his liver and for further evaluation of his rising CEA even though it is still less than 3.           Follow-up: No follow-ups on file.

## 2020-04-08 ENCOUNTER — PATIENT OUTREACH (OUTPATIENT)
Dept: HEALTH INFORMATION MANAGEMENT | Facility: OTHER | Age: 75
End: 2020-04-08

## 2020-04-08 NOTE — PROGRESS NOTES
1. [facility]Connect Verified: yes    2. Verify PCP: yes    3. Review and add  to Care Team: yes    5. Reviewed/Updated the following with patient:       •   Communication Preference Obtained? YES  • MyChart Activation: already active       •   E-Mail Address Obtained? YES       •   Appointment Day and Time Preferences? YES       •   Preferred Pharmacy? YES       •   Preferred Lab? YES    6. Care Gap Scheduling (Attempt to Schedule EACH Overdue Care Gap!)    No caregaps

## 2020-05-07 ENCOUNTER — HOSPITAL ENCOUNTER (OUTPATIENT)
Dept: RADIOLOGY | Facility: MEDICAL CENTER | Age: 75
End: 2020-05-07
Attending: INTERNAL MEDICINE
Payer: MEDICARE

## 2020-05-07 DIAGNOSIS — K76.6 PORTAL HYPERTENSION (HCC): ICD-10-CM

## 2020-05-07 DIAGNOSIS — C78.7 METASTATIC CARCINOMA TO LIVER (HCC): ICD-10-CM

## 2020-05-07 DIAGNOSIS — Z85.038 PERSONAL HISTORY OF COLON CANCER: ICD-10-CM

## 2020-05-07 DIAGNOSIS — I81 PORTAL VEIN THROMBOSIS: ICD-10-CM

## 2020-05-07 PROCEDURE — 71260 CT THORAX DX C+: CPT

## 2020-05-07 PROCEDURE — 700117 HCHG RX CONTRAST REV CODE 255: Performed by: INTERNAL MEDICINE

## 2020-05-07 RX ADMIN — IOHEXOL 25 ML: 240 INJECTION, SOLUTION INTRATHECAL; INTRAVASCULAR; INTRAVENOUS; ORAL at 14:30

## 2020-05-07 RX ADMIN — IOHEXOL 100 ML: 350 INJECTION, SOLUTION INTRAVENOUS at 14:30

## 2020-05-19 DIAGNOSIS — Z20.822 EXPOSURE TO COVID-19 VIRUS: ICD-10-CM

## 2020-06-11 ENCOUNTER — APPOINTMENT (RX ONLY)
Dept: URBAN - METROPOLITAN AREA CLINIC 35 | Facility: CLINIC | Age: 75
Setting detail: DERMATOLOGY
End: 2020-06-11

## 2020-06-11 DIAGNOSIS — D22 MELANOCYTIC NEVI: ICD-10-CM

## 2020-06-11 DIAGNOSIS — Z85.828 PERSONAL HISTORY OF OTHER MALIGNANT NEOPLASM OF SKIN: ICD-10-CM

## 2020-06-11 DIAGNOSIS — Z71.89 OTHER SPECIFIED COUNSELING: ICD-10-CM

## 2020-06-11 DIAGNOSIS — L81.4 OTHER MELANIN HYPERPIGMENTATION: ICD-10-CM

## 2020-06-11 DIAGNOSIS — L82.1 OTHER SEBORRHEIC KERATOSIS: ICD-10-CM

## 2020-06-11 PROBLEM — D22.5 MELANOCYTIC NEVI OF TRUNK: Status: ACTIVE | Noted: 2020-06-11

## 2020-06-11 PROCEDURE — 99213 OFFICE O/P EST LOW 20 MIN: CPT

## 2020-06-11 PROCEDURE — ? COUNSELING

## 2020-06-11 ASSESSMENT — LOCATION SIMPLE DESCRIPTION DERM
LOCATION SIMPLE: RIGHT UPPER BACK
LOCATION SIMPLE: POSTERIOR NECK
LOCATION SIMPLE: LEFT NOSE
LOCATION SIMPLE: POSTERIOR SCALP

## 2020-06-11 ASSESSMENT — LOCATION ZONE DERM
LOCATION ZONE: NOSE
LOCATION ZONE: NECK
LOCATION ZONE: TRUNK
LOCATION ZONE: SCALP

## 2020-06-11 ASSESSMENT — LOCATION DETAILED DESCRIPTION DERM
LOCATION DETAILED: LEFT OCCIPITAL SCALP
LOCATION DETAILED: LEFT NASAL ALA
LOCATION DETAILED: RIGHT LATERAL TRAPEZIAL NECK
LOCATION DETAILED: RIGHT SUPERIOR LATERAL UPPER BACK

## 2020-06-12 NOTE — PROGRESS NOTES
Outcome: Left voicemail/ message- BIRTHDAY CALL    Please transfer to [facility]  [phone] when patient returns call.     HealthConnect Verified: yes     Attempt # 1

## 2020-07-01 DIAGNOSIS — R60.1 GENERALIZED EDEMA: ICD-10-CM

## 2020-07-01 RX ORDER — SPIRONOLACTONE 25 MG/1
TABLET ORAL
Qty: 90 TAB | Refills: 0 | Status: SHIPPED | OUTPATIENT
Start: 2020-07-01 | End: 2020-11-12 | Stop reason: SDUPTHER

## 2020-07-14 ENCOUNTER — HOSPITAL ENCOUNTER (OUTPATIENT)
Dept: LAB | Facility: MEDICAL CENTER | Age: 75
End: 2020-07-14
Payer: MEDICARE

## 2020-07-14 LAB
ANION GAP SERPL CALC-SCNC: 9 MMOL/L (ref 7–16)
BUN SERPL-MCNC: 23 MG/DL (ref 8–22)
CALCIUM SERPL-MCNC: 8.7 MG/DL (ref 8.5–10.5)
CHLORIDE SERPL-SCNC: 104 MMOL/L (ref 96–112)
CO2 SERPL-SCNC: 25 MMOL/L (ref 20–33)
CREAT SERPL-MCNC: 1.05 MG/DL (ref 0.5–1.4)
GLUCOSE SERPL-MCNC: 105 MG/DL (ref 65–99)
POTASSIUM SERPL-SCNC: 3.7 MMOL/L (ref 3.6–5.5)
SODIUM SERPL-SCNC: 138 MMOL/L (ref 135–145)

## 2020-07-14 PROCEDURE — 36415 COLL VENOUS BLD VENIPUNCTURE: CPT

## 2020-07-14 PROCEDURE — 80048 BASIC METABOLIC PNL TOTAL CA: CPT

## 2020-07-30 ENCOUNTER — HOSPITAL ENCOUNTER (OUTPATIENT)
Dept: LAB | Facility: MEDICAL CENTER | Age: 75
End: 2020-07-30
Payer: MEDICARE

## 2020-07-30 ENCOUNTER — HOSPITAL ENCOUNTER (OUTPATIENT)
Dept: LAB | Facility: MEDICAL CENTER | Age: 75
End: 2020-07-30
Attending: INTERNAL MEDICINE
Payer: MEDICARE

## 2020-07-30 DIAGNOSIS — Z20.822 EXPOSURE TO COVID-19 VIRUS: ICD-10-CM

## 2020-07-30 LAB
ALBUMIN SERPL BCP-MCNC: 4 G/DL (ref 3.2–4.9)
ALP SERPL-CCNC: 82 U/L (ref 30–99)
ALT SERPL-CCNC: 23 U/L (ref 2–50)
AST SERPL-CCNC: 25 U/L (ref 12–45)
BASOPHILS # BLD AUTO: 0.5 % (ref 0–1.8)
BASOPHILS # BLD: 0.03 K/UL (ref 0–0.12)
BILIRUB SERPL-MCNC: 2.5 MG/DL (ref 0.1–1.5)
BUN SERPL-MCNC: 19 MG/DL (ref 8–22)
CREAT SERPL-MCNC: 1.02 MG/DL (ref 0.5–1.4)
EOSINOPHIL # BLD AUTO: 0.13 K/UL (ref 0–0.51)
EOSINOPHIL NFR BLD: 2.3 % (ref 0–6.9)
ERYTHROCYTE [DISTWIDTH] IN BLOOD BY AUTOMATED COUNT: 52.6 FL (ref 35.9–50)
HCT VFR BLD AUTO: 39.9 % (ref 42–52)
HGB BLD-MCNC: 12.8 G/DL (ref 14–18)
IMM GRANULOCYTES # BLD AUTO: 0.02 K/UL (ref 0–0.11)
IMM GRANULOCYTES NFR BLD AUTO: 0.4 % (ref 0–0.9)
INR PPP: 1.31 (ref 0.87–1.13)
LYMPHOCYTES # BLD AUTO: 0.48 K/UL (ref 1–4.8)
LYMPHOCYTES NFR BLD: 8.6 % (ref 22–41)
MCH RBC QN AUTO: 28.6 PG (ref 27–33)
MCHC RBC AUTO-ENTMCNC: 32.1 G/DL (ref 33.7–35.3)
MCV RBC AUTO: 89.3 FL (ref 81.4–97.8)
MONOCYTES # BLD AUTO: 0.57 K/UL (ref 0–0.85)
MONOCYTES NFR BLD AUTO: 10.3 % (ref 0–13.4)
NEUTROPHILS # BLD AUTO: 4.32 K/UL (ref 1.82–7.42)
NEUTROPHILS NFR BLD: 77.9 % (ref 44–72)
NRBC # BLD AUTO: 0 K/UL
NRBC BLD-RTO: 0 /100 WBC
PLATELET # BLD AUTO: 81 K/UL (ref 164–446)
PMV BLD AUTO: 10.8 FL (ref 9–12.9)
POTASSIUM SERPL-SCNC: 4.4 MMOL/L (ref 3.6–5.5)
PROTHROMBIN TIME: 16.7 SEC (ref 12–14.6)
RBC # BLD AUTO: 4.47 M/UL (ref 4.7–6.1)
SARS-COV-2 AB SERPL QL IA: NORMAL
SODIUM SERPL-SCNC: 138 MMOL/L (ref 135–145)
WBC # BLD AUTO: 5.6 K/UL (ref 4.8–10.8)

## 2020-07-30 PROCEDURE — 86769 SARS-COV-2 COVID-19 ANTIBODY: CPT

## 2020-07-30 PROCEDURE — 84520 ASSAY OF UREA NITROGEN: CPT

## 2020-07-30 PROCEDURE — 84450 TRANSFERASE (AST) (SGOT): CPT

## 2020-07-30 PROCEDURE — 84460 ALANINE AMINO (ALT) (SGPT): CPT

## 2020-07-30 PROCEDURE — 36415 COLL VENOUS BLD VENIPUNCTURE: CPT

## 2020-07-30 PROCEDURE — 82247 BILIRUBIN TOTAL: CPT

## 2020-07-30 PROCEDURE — 84295 ASSAY OF SERUM SODIUM: CPT

## 2020-07-30 PROCEDURE — 82565 ASSAY OF CREATININE: CPT

## 2020-07-30 PROCEDURE — 84132 ASSAY OF SERUM POTASSIUM: CPT

## 2020-07-30 PROCEDURE — 82040 ASSAY OF SERUM ALBUMIN: CPT

## 2020-07-30 PROCEDURE — 84075 ASSAY ALKALINE PHOSPHATASE: CPT

## 2020-07-30 PROCEDURE — 85025 COMPLETE CBC W/AUTO DIFF WBC: CPT

## 2020-07-30 PROCEDURE — 82105 ALPHA-FETOPROTEIN SERUM: CPT

## 2020-07-30 PROCEDURE — 85610 PROTHROMBIN TIME: CPT

## 2020-08-01 LAB — AFP-TM SERPL-MCNC: 2 NG/ML (ref 0–9)

## 2020-08-14 ENCOUNTER — HOSPITAL ENCOUNTER (OUTPATIENT)
Dept: LAB | Facility: MEDICAL CENTER | Age: 75
End: 2020-08-14
Payer: MEDICARE

## 2020-08-14 LAB
ALBUMIN SERPL BCP-MCNC: 3.7 G/DL (ref 3.2–4.9)
ALP SERPL-CCNC: 94 U/L (ref 30–99)
ALT SERPL-CCNC: 24 U/L (ref 2–50)
AST SERPL-CCNC: 32 U/L (ref 12–45)
BILIRUB SERPL-MCNC: 1.1 MG/DL (ref 0.1–1.5)
BUN SERPL-MCNC: 23 MG/DL (ref 8–22)
CREAT SERPL-MCNC: 1.03 MG/DL (ref 0.5–1.4)
INR PPP: 1.25 (ref 0.87–1.13)
POTASSIUM SERPL-SCNC: 4.5 MMOL/L (ref 3.6–5.5)
PROTHROMBIN TIME: 16.1 SEC (ref 12–14.6)
SODIUM SERPL-SCNC: 136 MMOL/L (ref 135–145)

## 2020-08-14 PROCEDURE — 84450 TRANSFERASE (AST) (SGOT): CPT

## 2020-08-14 PROCEDURE — 84460 ALANINE AMINO (ALT) (SGPT): CPT

## 2020-08-14 PROCEDURE — 82040 ASSAY OF SERUM ALBUMIN: CPT

## 2020-08-14 PROCEDURE — 84132 ASSAY OF SERUM POTASSIUM: CPT

## 2020-08-14 PROCEDURE — 82247 BILIRUBIN TOTAL: CPT

## 2020-08-14 PROCEDURE — 84075 ASSAY ALKALINE PHOSPHATASE: CPT

## 2020-08-14 PROCEDURE — 84520 ASSAY OF UREA NITROGEN: CPT

## 2020-08-14 PROCEDURE — 85610 PROTHROMBIN TIME: CPT

## 2020-08-14 PROCEDURE — 36415 COLL VENOUS BLD VENIPUNCTURE: CPT

## 2020-08-14 PROCEDURE — 82565 ASSAY OF CREATININE: CPT

## 2020-08-14 PROCEDURE — 84295 ASSAY OF SERUM SODIUM: CPT

## 2020-09-28 ENCOUNTER — HOSPITAL ENCOUNTER (OUTPATIENT)
Dept: LAB | Facility: MEDICAL CENTER | Age: 75
End: 2020-09-28
Payer: MEDICARE

## 2020-09-28 LAB
ALBUMIN SERPL BCP-MCNC: 3.6 G/DL (ref 3.2–4.9)
ALP SERPL-CCNC: 102 U/L (ref 30–99)
ALT SERPL-CCNC: 21 U/L (ref 2–50)
AST SERPL-CCNC: 24 U/L (ref 12–45)
BILIRUB SERPL-MCNC: 1.2 MG/DL (ref 0.1–1.5)
BUN SERPL-MCNC: 29 MG/DL (ref 8–22)
CREAT SERPL-MCNC: 1.13 MG/DL (ref 0.5–1.4)
INR PPP: 1.32 (ref 0.87–1.13)
POTASSIUM SERPL-SCNC: 4.4 MMOL/L (ref 3.6–5.5)
PROTHROMBIN TIME: 16.8 SEC (ref 12–14.6)
SODIUM SERPL-SCNC: 139 MMOL/L (ref 135–145)

## 2020-09-28 PROCEDURE — 85025 COMPLETE CBC W/AUTO DIFF WBC: CPT

## 2020-09-28 PROCEDURE — 84075 ASSAY ALKALINE PHOSPHATASE: CPT

## 2020-09-28 PROCEDURE — 36415 COLL VENOUS BLD VENIPUNCTURE: CPT

## 2020-09-28 PROCEDURE — 84295 ASSAY OF SERUM SODIUM: CPT

## 2020-09-28 PROCEDURE — 84132 ASSAY OF SERUM POTASSIUM: CPT

## 2020-09-28 PROCEDURE — 82565 ASSAY OF CREATININE: CPT

## 2020-09-28 PROCEDURE — 84460 ALANINE AMINO (ALT) (SGPT): CPT

## 2020-09-28 PROCEDURE — 82040 ASSAY OF SERUM ALBUMIN: CPT

## 2020-09-28 PROCEDURE — 84450 TRANSFERASE (AST) (SGOT): CPT

## 2020-09-28 PROCEDURE — 85610 PROTHROMBIN TIME: CPT

## 2020-09-28 PROCEDURE — 82247 BILIRUBIN TOTAL: CPT

## 2020-09-28 PROCEDURE — 84520 ASSAY OF UREA NITROGEN: CPT

## 2020-09-29 LAB
BASOPHILS # BLD AUTO: 0.4 % (ref 0–1.8)
BASOPHILS # BLD: 0.02 K/UL (ref 0–0.12)
EOSINOPHIL # BLD AUTO: 0.13 K/UL (ref 0–0.51)
EOSINOPHIL NFR BLD: 2.6 % (ref 0–6.9)
ERYTHROCYTE [DISTWIDTH] IN BLOOD BY AUTOMATED COUNT: 55.9 FL (ref 35.9–50)
HCT VFR BLD AUTO: 37.2 % (ref 42–52)
HGB BLD-MCNC: 12 G/DL (ref 14–18)
IMM GRANULOCYTES # BLD AUTO: 0.02 K/UL (ref 0–0.11)
IMM GRANULOCYTES NFR BLD AUTO: 0.4 % (ref 0–0.9)
LYMPHOCYTES # BLD AUTO: 0.49 K/UL (ref 1–4.8)
LYMPHOCYTES NFR BLD: 9.8 % (ref 22–41)
MCH RBC QN AUTO: 29.6 PG (ref 27–33)
MCHC RBC AUTO-ENTMCNC: 32.3 G/DL (ref 33.7–35.3)
MCV RBC AUTO: 91.9 FL (ref 81.4–97.8)
MONOCYTES # BLD AUTO: 0.4 K/UL (ref 0–0.85)
MONOCYTES NFR BLD AUTO: 8 % (ref 0–13.4)
NEUTROPHILS # BLD AUTO: 3.96 K/UL (ref 1.82–7.42)
NEUTROPHILS NFR BLD: 78.8 % (ref 44–72)
NRBC # BLD AUTO: 0 K/UL
NRBC BLD-RTO: 0 /100 WBC
PLATELET # BLD AUTO: 68 K/UL (ref 164–446)
PMV BLD AUTO: 12.4 FL (ref 9–12.9)
RBC # BLD AUTO: 4.05 M/UL (ref 4.7–6.1)
WBC # BLD AUTO: 5 K/UL (ref 4.8–10.8)

## 2020-11-02 DIAGNOSIS — Z85.038 H/O COLON CANCER, STAGE IV: ICD-10-CM

## 2020-11-10 ENCOUNTER — HOSPITAL ENCOUNTER (OUTPATIENT)
Dept: LAB | Facility: MEDICAL CENTER | Age: 75
End: 2020-11-10
Attending: INTERNAL MEDICINE
Payer: MEDICARE

## 2020-11-10 DIAGNOSIS — Z85.038 H/O COLON CANCER, STAGE IV: ICD-10-CM

## 2020-11-10 LAB — CEA SERPL-MCNC: 1.9 NG/ML (ref 0–3)

## 2020-11-10 PROCEDURE — 82378 CARCINOEMBRYONIC ANTIGEN: CPT

## 2020-11-10 PROCEDURE — 36415 COLL VENOUS BLD VENIPUNCTURE: CPT

## 2020-11-12 ENCOUNTER — OFFICE VISIT (OUTPATIENT)
Dept: MEDICAL GROUP | Age: 75
End: 2020-11-12
Payer: MEDICARE

## 2020-11-12 VITALS
HEIGHT: 75 IN | SYSTOLIC BLOOD PRESSURE: 112 MMHG | TEMPERATURE: 97.6 F | HEART RATE: 58 BPM | BODY MASS INDEX: 29.74 KG/M2 | OXYGEN SATURATION: 96 % | WEIGHT: 239.2 LBS | DIASTOLIC BLOOD PRESSURE: 62 MMHG

## 2020-11-12 DIAGNOSIS — K74.60 CIRRHOSIS OF LIVER WITH ASCITES, UNSPECIFIED HEPATIC CIRRHOSIS TYPE (HCC): ICD-10-CM

## 2020-11-12 DIAGNOSIS — R60.1 GENERALIZED EDEMA: ICD-10-CM

## 2020-11-12 DIAGNOSIS — E55.9 VITAMIN D DEFICIENCY: ICD-10-CM

## 2020-11-12 DIAGNOSIS — C18.8 OVERLAPPING MALIGNANT NEOPLASM OF COLON (HCC): ICD-10-CM

## 2020-11-12 DIAGNOSIS — E78.2 MIXED HYPERLIPIDEMIA: ICD-10-CM

## 2020-11-12 DIAGNOSIS — R18.8 CIRRHOSIS OF LIVER WITH ASCITES, UNSPECIFIED HEPATIC CIRRHOSIS TYPE (HCC): ICD-10-CM

## 2020-11-12 DIAGNOSIS — E87.6 HYPOKALEMIA: ICD-10-CM

## 2020-11-12 PROCEDURE — 99214 OFFICE O/P EST MOD 30 MIN: CPT | Performed by: INTERNAL MEDICINE

## 2020-11-12 RX ORDER — SPIRONOLACTONE 25 MG/1
TABLET ORAL
Qty: 90 TAB | Refills: 4 | Status: SHIPPED | OUTPATIENT
Start: 2020-11-12 | End: 2021-11-23

## 2020-11-12 RX ORDER — METOLAZONE 2.5 MG/1
TABLET ORAL
Qty: 90 TAB | Refills: 4 | Status: SHIPPED | OUTPATIENT
Start: 2020-11-12 | End: 2021-11-23

## 2020-11-12 RX ORDER — ATORVASTATIN CALCIUM 40 MG/1
TABLET, FILM COATED ORAL
Qty: 100 TAB | Refills: 4 | Status: SHIPPED | OUTPATIENT
Start: 2020-11-12 | End: 2021-11-23

## 2020-11-12 RX ORDER — POTASSIUM CHLORIDE 20 MEQ/1
TABLET, EXTENDED RELEASE ORAL
Qty: 200 TAB | Refills: 4 | Status: SHIPPED | OUTPATIENT
Start: 2020-11-12 | End: 2023-02-28 | Stop reason: SDUPTHER

## 2020-11-12 ASSESSMENT — ENCOUNTER SYMPTOMS
CARDIOVASCULAR NEGATIVE: 1
EYES NEGATIVE: 1
PSYCHIATRIC NEGATIVE: 1
GASTROINTESTINAL NEGATIVE: 1
RESPIRATORY NEGATIVE: 1
MUSCULOSKELETAL NEGATIVE: 1
CONSTITUTIONAL NEGATIVE: 1
NEUROLOGICAL NEGATIVE: 1

## 2020-11-12 ASSESSMENT — FIBROSIS 4 INDEX: FIB4 SCORE: 5.85

## 2020-11-12 ASSESSMENT — PATIENT HEALTH QUESTIONNAIRE - PHQ9: CLINICAL INTERPRETATION OF PHQ2 SCORE: 0

## 2020-11-12 NOTE — PROGRESS NOTES
Subjective:      Torey Lima is a 75 y.o. male who presents with Lab Results  .The patient is here for followup of chronic medical problems listed below. The patient is compliant with medications and having no side effects from them. Denies chest pain, abdominal pain, dyspnea, myalgias, or cough.     Patient Active Problem List    Diagnosis Date Noted   • Secondary esophageal varices without bleeding (HCC)- BANDED 4/2016- repeat egd tbd 10//29/16- gic 04/04/2016     Priority: High   • ASCVD (arteriosclerotic cardiovascular disease)subtotalled small distal LCx and 40% LAD med rx by cath 12/13/12 12/14/2012     Priority: High   • Gastric varices- s/p BRTO procedure at [facility] 04/04/2016     Priority: Medium   • Gastrointestinal hemorrhage with hematemesis- recurrernt from varices 04/03/2016     Priority: Medium   • Spinal enthesopathy of thoracic region (HCC) 04/07/2020   • Secondary malignant neoplasm of liver (HCC) 04/23/2019   • Thrombocytopenia, unspecified (HCC)- from previous chemorx 04/23/2019   • Chronic deep vein thrombosis (DVT) of femoral vein of left lower extremity (HCC)- s/p IVC filter 2016 11/13/2018   • Portal hypertension with esophageal varices (HCC) 11/13/2018   • Generalized edema 12/14/2017   • Obesity (BMI 30-39.9) 12/14/2017   • Edema of left lower extremity- due to dvt july 2017 09/19/2017   • BPH (benign prostatic hyperplasia) 08/11/2017   • Peripheral neuropathy due to chemotherapy (HCC) 08/11/2017   • H/O colon cancer, stage IV- neg colonoscopy 2012/2018 at [facility] 05/20/2017   • History of esophageal varices 10/24/2016   • Portal vein thrombosis- on CT [facility] 2/2015 04/14/2016   • Cirrhosis of liver with ascites (HCC)- ? DUE TO OLD HEP B, CHEMO RX,  OR THERMO RAD OF MAGNANT LESION 04/14/2016   • Mixed hyperlipidemia 06/20/2013   • Essential hypertension 06/20/2013   • Vitamin D deficiency 08/02/2012   • Overlapping malignant neoplasm of colon (HCC)Overlapping malignant neoplasm of colon  "(HCC)--2010 left hemicolectomy; no colostomy; ablation of liver met at [facility] 1/2011- MRI abd 3/2018 no change in per 12/06/2010   • Liver lesion- ablation of malignant met from colon ca 2011- f/u [facility] q 6 mos - stable MRI abd 3/2018- redo annually 12/06/2010   • Drug-induced polyneuropathy (HCC) 12/06/2010     Allergies   Allergen Reactions   • Influenza Virus Vaccine Live      Guillan [place]    • Anticoagulant Sodium Citrate [Sodium Citrate]      HIGH BLEEDING RISK   • Lisinopril Cough   • Nkda [No Known Drug Allergy]                HPI    Review of Systems   Constitutional: Negative.    HENT: Negative.    Eyes: Negative.    Respiratory: Negative.    Cardiovascular: Negative.    Gastrointestinal: Negative.    Genitourinary: Negative.    Musculoskeletal: Negative.    Skin: Negative.    Neurological: Negative.    Endo/Heme/Allergies: Negative.    Psychiatric/Behavioral: Negative.           Objective:     /62 (BP Location: Left arm, Patient Position: Sitting, BP Cuff Size: Adult)   Pulse (!) 58   Temp 36.4 °C (97.6 °F) (Temporal)   Ht 1.905 m (6' 3\")   Wt 108.5 kg (239 lb 3.2 oz)   SpO2 96%   BMI 29.90 kg/m²      Physical Exam  Constitutional:       General: He is not in acute distress.     Appearance: He is well-developed. He is not diaphoretic.   HENT:      Head: Normocephalic and atraumatic.      Right Ear: External ear normal.      Left Ear: External ear normal.      Nose: Nose normal.      Mouth/Throat:      Pharynx: No oropharyngeal exudate.   Eyes:      General: No scleral icterus.        Right eye: No discharge.         Left eye: No discharge.      Conjunctiva/sclera: Conjunctivae normal.      Pupils: Pupils are equal, round, and reactive to light.   Neck:      Musculoskeletal: Normal range of motion and neck supple.      Thyroid: No thyromegaly.      Vascular: No JVD.      Trachea: No tracheal deviation.   Cardiovascular:      Rate and Rhythm: Normal rate and regular rhythm.      Heart sounds: " Normal heart sounds. No murmur. No friction rub. No gallop.    Pulmonary:      Effort: Pulmonary effort is normal. No respiratory distress.      Breath sounds: Normal breath sounds. No stridor. No wheezing or rales.   Chest:      Chest wall: No tenderness.   Abdominal:      General: Bowel sounds are normal. There is no distension.      Palpations: Abdomen is soft. There is no mass.      Tenderness: There is no abdominal tenderness. There is no guarding or rebound.   Musculoskeletal: Normal range of motion.         General: No tenderness.   Lymphadenopathy:      Cervical: No cervical adenopathy.   Skin:     General: Skin is warm and dry.      Coloration: Skin is not pale.      Findings: No erythema or rash.   Neurological:      Mental Status: He is alert and oriented to person, place, and time.      Motor: No abnormal muscle tone.      Coordination: Coordination normal.      Deep Tendon Reflexes: Reflexes are normal and symmetric. Reflexes normal.   Psychiatric:         Behavior: Behavior normal.         Thought Content: Thought content normal.         Judgment: Judgment normal.       Hospital Outpatient Visit on 11/10/2020   Component Date Value   • Carcinoembryonic Antigen 11/10/2020 1.9       Lab Results   Component Value Date/Time    HBA1C 5.2 10/25/2016 12:06 AM    HBA1C 5.3 07/08/2016 07:02 AM     Lab Results   Component Value Date/Time    SODIUM 139 09/28/2020 10:33 AM    POTASSIUM 4.4 09/28/2020 10:33 AM    CHLORIDE 104 07/14/2020 03:06 PM    CO2 25 07/14/2020 03:06 PM    GLUCOSE 105 (H) 07/14/2020 03:06 PM    BUN 29 (H) 09/28/2020 10:33 AM    CREATININE 1.13 09/28/2020 10:33 AM    CREATININE 1.14 01/23/2010 12:00 AM    BUNCREATRAT 15 01/23/2010 12:00 AM    GLOMRATE >59 01/23/2010 12:00 AM    ALKPHOSPHAT 102 (H) 09/28/2020 10:33 AM    ASTSGOT 24 09/28/2020 10:33 AM    ALTSGPT 21 09/28/2020 10:33 AM    TBILIRUBIN 1.2 09/28/2020 10:33 AM     Lab Results   Component Value Date/Time    INR 1.32 (H) 09/28/2020  10:33 AM    INR 1.25 (H) 08/14/2020 06:27 AM    INR 1.31 (H) 07/30/2020 12:16 PM     Lab Results   Component Value Date/Time    CHOLSTRLTOT 102 04/03/2020 06:27 AM    LDL 30 04/03/2020 06:27 AM    HDL 62 04/03/2020 06:27 AM    TRIGLYCERIDE 49 04/03/2020 06:27 AM       Lab Results   Component Value Date/Time    TESTOSTERONE 333 04/02/2015 06:47 AM     No results found for: TSH  Lab Results   Component Value Date/Time    FREET4 0.85 04/02/2015 06:47 AM    FREET4 0.85 04/12/2014 07:26 AM     No results found for: URICACID  No components found for: VITB12  Lab Results   Component Value Date/Time    25HYDROXY 32 04/03/2020 06:27 AM    25HYDROXY 32 08/11/2017 04:50 AM                  Assessment/Plan:        1. Generalized edema  Under good control. Continue same regimen.     - spironolactone (ALDACTONE) 25 MG Tab; Take 1 tablet by mouth once daily  Dispense: 90 Tab; Refill: 4  - metOLazone (ZAROXOLYN) 2.5 MG Tab; Take 1 tablet by mouth once daily  Dispense: 90 Tab; Refill: 4    2. Hypokalemia  Under good control. Continue same regimen.     - potassium chloride SA (KDUR) 20 MEQ Tab CR; TAKE 1 TABLET BY MOUTH TWICE DAILY  Dispense: 200 Tab; Refill: 4    3. Mixed hyperlipidemia  Under good control. Continue same regimen.     - atorvastatin (LIPITOR) 40 MG Tab; TAKE ONE TABLET BY MOUTH AT BEDTIME  Dispense: 100 Tab; Refill: 4  - TSH; Future  - Comp Metabolic Panel; Future  - Lipid Profile; Future  - CBC WITH DIFFERENTIAL; Future    4. Cirrhosis of liver with ascites, unspecified hepatic cirrhosis type (HCC)  Under good control. Continue same regimen.     - metOLazone (ZAROXOLYN) 2.5 MG Tab; Take 1 tablet by mouth once daily  Dispense: 90 Tab; Refill: 4    5. Vitamin D deficiency   Under good control. Continue same regimen.    - VITAMIN D,25 HYDROXY; Future    6. Overlapping malignant neoplasm of colon (HCC)Overlapping malignant neoplasm of colon (HCC)--2010 left hemicolectomy; no colostomy; ablation of liver met at [facility]  1/2011- MRI abd 3/2018 no change in per       Under good control. Continue same regimen.

## 2020-12-09 ENCOUNTER — APPOINTMENT (RX ONLY)
Dept: URBAN - METROPOLITAN AREA CLINIC 35 | Facility: CLINIC | Age: 75
Setting detail: DERMATOLOGY
End: 2020-12-09

## 2020-12-09 DIAGNOSIS — L20.89 OTHER ATOPIC DERMATITIS: ICD-10-CM

## 2020-12-09 DIAGNOSIS — Z85.828 PERSONAL HISTORY OF OTHER MALIGNANT NEOPLASM OF SKIN: ICD-10-CM

## 2020-12-09 DIAGNOSIS — L21.8 OTHER SEBORRHEIC DERMATITIS: ICD-10-CM

## 2020-12-09 DIAGNOSIS — Z71.89 OTHER SPECIFIED COUNSELING: ICD-10-CM

## 2020-12-09 DIAGNOSIS — D22 MELANOCYTIC NEVI: ICD-10-CM

## 2020-12-09 DIAGNOSIS — L82.1 OTHER SEBORRHEIC KERATOSIS: ICD-10-CM

## 2020-12-09 DIAGNOSIS — L57.0 ACTINIC KERATOSIS: ICD-10-CM

## 2020-12-09 DIAGNOSIS — L81.4 OTHER MELANIN HYPERPIGMENTATION: ICD-10-CM

## 2020-12-09 PROBLEM — L20.84 INTRINSIC (ALLERGIC) ECZEMA: Status: ACTIVE | Noted: 2020-12-09

## 2020-12-09 PROBLEM — D22.5 MELANOCYTIC NEVI OF TRUNK: Status: ACTIVE | Noted: 2020-12-09

## 2020-12-09 PROCEDURE — ? LIQUID NITROGEN

## 2020-12-09 PROCEDURE — ? COUNSELING

## 2020-12-09 PROCEDURE — ? TREATMENT REGIMEN

## 2020-12-09 PROCEDURE — 99213 OFFICE O/P EST LOW 20 MIN: CPT | Mod: 25

## 2020-12-09 PROCEDURE — 17003 DESTRUCT PREMALG LES 2-14: CPT

## 2020-12-09 PROCEDURE — 17000 DESTRUCT PREMALG LESION: CPT

## 2020-12-09 PROCEDURE — ? PRESCRIPTION

## 2020-12-09 RX ORDER — TRIAMCINOLONE ACETONIDE 1 MG/G
THIN LAYER CREAM TOPICAL TWICE A DAY
Qty: 1 | Refills: 2 | Status: ERX | COMMUNITY
Start: 2020-12-09

## 2020-12-09 RX ORDER — KETOCONAZOLE 20 MG/G
THIN LAYER CREAM TOPICAL BID
Qty: 1 | Refills: 11 | Status: ERX | COMMUNITY
Start: 2020-12-09

## 2020-12-09 RX ADMIN — KETOCONAZOLE THIN LAYER: 20 CREAM TOPICAL at 00:00

## 2020-12-09 RX ADMIN — TRIAMCINOLONE ACETONIDE THIN LAYER: 1 CREAM TOPICAL at 00:00

## 2020-12-09 ASSESSMENT — LOCATION DETAILED DESCRIPTION DERM
LOCATION DETAILED: LEFT NASAL ALA
LOCATION DETAILED: SUPERIOR LUMBAR SPINE
LOCATION DETAILED: INFERIOR THORACIC SPINE
LOCATION DETAILED: LEFT CENTRAL PARIETAL SCALP
LOCATION DETAILED: RIGHT POSTERIOR SHOULDER
LOCATION DETAILED: LEFT DISTAL DORSAL FOREARM
LOCATION DETAILED: RIGHT DISTAL DORSAL FOREARM
LOCATION DETAILED: LEFT OCCIPITAL SCALP
LOCATION DETAILED: LEFT SUPERIOR CRUS OF ANTIHELIX
LOCATION DETAILED: RIGHT SUPERIOR MEDIAL FOREHEAD
LOCATION DETAILED: RIGHT INFERIOR MEDIAL UPPER BACK
LOCATION DETAILED: LEFT POSTERIOR SHOULDER
LOCATION DETAILED: EPIGASTRIC SKIN

## 2020-12-09 ASSESSMENT — LOCATION SIMPLE DESCRIPTION DERM
LOCATION SIMPLE: LEFT EAR
LOCATION SIMPLE: POSTERIOR SCALP
LOCATION SIMPLE: LEFT FOREARM
LOCATION SIMPLE: LEFT SHOULDER
LOCATION SIMPLE: UPPER BACK
LOCATION SIMPLE: RIGHT SHOULDER
LOCATION SIMPLE: ABDOMEN
LOCATION SIMPLE: SCALP
LOCATION SIMPLE: LEFT NOSE
LOCATION SIMPLE: RIGHT FOREARM
LOCATION SIMPLE: RIGHT FOREHEAD
LOCATION SIMPLE: RIGHT UPPER BACK
LOCATION SIMPLE: LOWER BACK

## 2020-12-09 ASSESSMENT — LOCATION ZONE DERM
LOCATION ZONE: ARM
LOCATION ZONE: EAR
LOCATION ZONE: NOSE
LOCATION ZONE: SCALP
LOCATION ZONE: FACE
LOCATION ZONE: TRUNK

## 2020-12-09 NOTE — PROCEDURE: TREATMENT REGIMEN
Initiate Treatment: Triamcinolone 0.1% bid for 2 weeks alternating with 2 weeks off
Detail Level: Zone
Initiate Treatment: Ketoconazole cream 2% bid

## 2020-12-28 ENCOUNTER — HOSPITAL ENCOUNTER (OUTPATIENT)
Dept: LAB | Facility: MEDICAL CENTER | Age: 75
End: 2020-12-28
Payer: MEDICARE

## 2020-12-28 LAB
ALBUMIN SERPL BCP-MCNC: 3.8 G/DL (ref 3.2–4.9)
ALP SERPL-CCNC: 101 U/L (ref 30–99)
ALT SERPL-CCNC: 25 U/L (ref 2–50)
AST SERPL-CCNC: 24 U/L (ref 12–45)
BASOPHILS # BLD AUTO: 0.5 % (ref 0–1.8)
BASOPHILS # BLD: 0.03 K/UL (ref 0–0.12)
BILIRUB SERPL-MCNC: 1.6 MG/DL (ref 0.1–1.5)
BUN SERPL-MCNC: 22 MG/DL (ref 8–22)
CREAT SERPL-MCNC: 1.04 MG/DL (ref 0.5–1.4)
EOSINOPHIL # BLD AUTO: 0.17 K/UL (ref 0–0.51)
EOSINOPHIL NFR BLD: 3 % (ref 0–6.9)
ERYTHROCYTE [DISTWIDTH] IN BLOOD BY AUTOMATED COUNT: 55.4 FL (ref 35.9–50)
HCT VFR BLD AUTO: 37.5 % (ref 42–52)
HGB BLD-MCNC: 12.1 G/DL (ref 14–18)
IMM GRANULOCYTES # BLD AUTO: 0.02 K/UL (ref 0–0.11)
IMM GRANULOCYTES NFR BLD AUTO: 0.4 % (ref 0–0.9)
INR PPP: 1.34 (ref 0.87–1.13)
LYMPHOCYTES # BLD AUTO: 0.5 K/UL (ref 1–4.8)
LYMPHOCYTES NFR BLD: 8.8 % (ref 22–41)
MCH RBC QN AUTO: 30.3 PG (ref 27–33)
MCHC RBC AUTO-ENTMCNC: 32.3 G/DL (ref 33.7–35.3)
MCV RBC AUTO: 94 FL (ref 81.4–97.8)
MONOCYTES # BLD AUTO: 0.6 K/UL (ref 0–0.85)
MONOCYTES NFR BLD AUTO: 10.5 % (ref 0–13.4)
NEUTROPHILS # BLD AUTO: 4.39 K/UL (ref 1.82–7.42)
NEUTROPHILS NFR BLD: 76.8 % (ref 44–72)
NRBC # BLD AUTO: 0 K/UL
NRBC BLD-RTO: 0 /100 WBC
PLATELET # BLD AUTO: 71 K/UL (ref 164–446)
PMV BLD AUTO: 10.7 FL (ref 9–12.9)
POTASSIUM SERPL-SCNC: 4.6 MMOL/L (ref 3.6–5.5)
PROTHROMBIN TIME: 17 SEC (ref 12–14.6)
RBC # BLD AUTO: 3.99 M/UL (ref 4.7–6.1)
SODIUM SERPL-SCNC: 137 MMOL/L (ref 135–145)
WBC # BLD AUTO: 5.7 K/UL (ref 4.8–10.8)

## 2020-12-28 PROCEDURE — 85025 COMPLETE CBC W/AUTO DIFF WBC: CPT

## 2020-12-28 PROCEDURE — 84460 ALANINE AMINO (ALT) (SGPT): CPT

## 2020-12-28 PROCEDURE — 82105 ALPHA-FETOPROTEIN SERUM: CPT

## 2020-12-28 PROCEDURE — 82565 ASSAY OF CREATININE: CPT

## 2020-12-28 PROCEDURE — 84520 ASSAY OF UREA NITROGEN: CPT

## 2020-12-28 PROCEDURE — 36415 COLL VENOUS BLD VENIPUNCTURE: CPT

## 2020-12-28 PROCEDURE — 84450 TRANSFERASE (AST) (SGOT): CPT

## 2020-12-28 PROCEDURE — 85610 PROTHROMBIN TIME: CPT

## 2020-12-28 PROCEDURE — 84075 ASSAY ALKALINE PHOSPHATASE: CPT

## 2020-12-28 PROCEDURE — 82040 ASSAY OF SERUM ALBUMIN: CPT

## 2020-12-28 PROCEDURE — 82247 BILIRUBIN TOTAL: CPT

## 2020-12-28 PROCEDURE — 84132 ASSAY OF SERUM POTASSIUM: CPT

## 2020-12-28 PROCEDURE — 84295 ASSAY OF SERUM SODIUM: CPT

## 2020-12-30 LAB — AFP-TM SERPL-MCNC: 2 NG/ML (ref 0–9)

## 2021-01-11 DIAGNOSIS — Z23 NEED FOR VACCINATION: ICD-10-CM

## 2021-01-26 ENCOUNTER — IMMUNIZATION (OUTPATIENT)
Dept: FAMILY PLANNING/WOMEN'S HEALTH CLINIC | Facility: IMMUNIZATION CENTER | Age: 76
End: 2021-01-26
Attending: INTERNAL MEDICINE
Payer: MEDICARE

## 2021-01-26 DIAGNOSIS — Z23 NEED FOR VACCINATION: ICD-10-CM

## 2021-01-26 DIAGNOSIS — Z23 ENCOUNTER FOR VACCINATION: Primary | ICD-10-CM

## 2021-01-26 PROCEDURE — 91300 PFIZER SARS-COV-2 VACCINE: CPT

## 2021-01-26 PROCEDURE — 0001A PFIZER SARS-COV-2 VACCINE: CPT

## 2021-02-01 ENCOUNTER — APPOINTMENT (RX ONLY)
Dept: URBAN - METROPOLITAN AREA CLINIC 35 | Facility: CLINIC | Age: 76
Setting detail: DERMATOLOGY
End: 2021-02-01

## 2021-02-01 DIAGNOSIS — L82.1 OTHER SEBORRHEIC KERATOSIS: ICD-10-CM

## 2021-02-01 DIAGNOSIS — L82.0 INFLAMED SEBORRHEIC KERATOSIS: ICD-10-CM

## 2021-02-01 DIAGNOSIS — L57.0 ACTINIC KERATOSIS: ICD-10-CM

## 2021-02-01 PROCEDURE — ? LIQUID NITROGEN (COSMETIC)

## 2021-02-01 PROCEDURE — ? LIQUID NITROGEN

## 2021-02-01 PROCEDURE — 17110 DESTRUCTION B9 LES UP TO 14: CPT

## 2021-02-01 PROCEDURE — 17000 DESTRUCT PREMALG LESION: CPT | Mod: 59

## 2021-02-01 ASSESSMENT — LOCATION ZONE DERM
LOCATION ZONE: FACE
LOCATION ZONE: TRUNK

## 2021-02-01 ASSESSMENT — LOCATION SIMPLE DESCRIPTION DERM
LOCATION SIMPLE: RIGHT CHEEK
LOCATION SIMPLE: ABDOMEN

## 2021-02-01 ASSESSMENT — LOCATION DETAILED DESCRIPTION DERM
LOCATION DETAILED: RIGHT SUPERIOR MEDIAL MALAR CHEEK
LOCATION DETAILED: RIGHT LATERAL ABDOMEN

## 2021-02-01 NOTE — PROCEDURE: LIQUID NITROGEN (COSMETIC)
Consent: The patient's consent was obtained including but not limited to risks of crusting, scabbing, blistering, scarring, darker or lighter pigmentary change, recurrence, incomplete removal and infection. The patient understands that the procedure is cosmetic in nature and is not covered by insurance.
Price (Use Numbers Only, No Special Characters Or $): 0
Post-Care Instructions: I reviewed with the patient in detail post-care instructions. Patient is to wear sunprotection, and avoid picking at any of the treated lesions. Pt may apply Vaseline to crusted or scabbing areas.
Detail Level: Detailed
Billing Information: Bill by Static Price
Render Post-Care Instructions In Note?: no

## 2021-02-01 NOTE — PROCEDURE: LIQUID NITROGEN
Duration Of Freeze Thaw-Cycle (Seconds): 10
Number Of Freeze-Thaw Cycles: 1 freeze-thaw cycle
Post-Care Instructions: I reviewed with the patient in detail post-care instructions. Patient is to wear sunprotection, and avoid picking at any of the treated lesions. Pt may apply Vaseline to crusted or scabbing areas.
Render Note In Bullet Format When Appropriate: No
Detail Level: Detailed
Consent: The patient's consent was obtained including but not limited to risks of crusting, scabbing, blistering, scarring, darker or lighter pigmentary change, recurrence, incomplete removal and infection.
Number Of Freeze-Thaw Cycles: 2 freeze-thaw cycles
Medical Necessity Information: It is in your best interest to select a reason for this procedure from the list below. All of these items fulfill various CMS LCD requirements except the new and changing color options.
Medical Necessity Clause: This procedure was medically necessary because the lesions that were treated were:

## 2021-02-18 ENCOUNTER — IMMUNIZATION (OUTPATIENT)
Dept: FAMILY PLANNING/WOMEN'S HEALTH CLINIC | Facility: IMMUNIZATION CENTER | Age: 76
End: 2021-02-18
Attending: INTERNAL MEDICINE
Payer: MEDICARE

## 2021-02-18 DIAGNOSIS — Z23 ENCOUNTER FOR VACCINATION: Primary | ICD-10-CM

## 2021-02-18 PROCEDURE — 0002A PFIZER SARS-COV-2 VACCINE: CPT

## 2021-02-18 PROCEDURE — 91300 PFIZER SARS-COV-2 VACCINE: CPT

## 2021-03-18 ENCOUNTER — HOSPITAL ENCOUNTER (OUTPATIENT)
Dept: LAB | Facility: MEDICAL CENTER | Age: 76
End: 2021-03-18
Payer: MEDICARE

## 2021-03-18 LAB
ALBUMIN SERPL BCP-MCNC: 3.7 G/DL (ref 3.2–4.9)
ALP SERPL-CCNC: 87 U/L (ref 30–99)
ALT SERPL-CCNC: 20 U/L (ref 2–50)
AST SERPL-CCNC: 20 U/L (ref 12–45)
BASOPHILS # BLD AUTO: 0.5 % (ref 0–1.8)
BASOPHILS # BLD: 0.03 K/UL (ref 0–0.12)
BILIRUB SERPL-MCNC: 1.9 MG/DL (ref 0.1–1.5)
BUN SERPL-MCNC: 19 MG/DL (ref 8–22)
CREAT SERPL-MCNC: 0.96 MG/DL (ref 0.5–1.4)
EOSINOPHIL # BLD AUTO: 0.19 K/UL (ref 0–0.51)
EOSINOPHIL NFR BLD: 3.4 % (ref 0–6.9)
ERYTHROCYTE [DISTWIDTH] IN BLOOD BY AUTOMATED COUNT: 53.4 FL (ref 35.9–50)
HCT VFR BLD AUTO: 38.1 % (ref 42–52)
HGB BLD-MCNC: 12.2 G/DL (ref 14–18)
IMM GRANULOCYTES # BLD AUTO: 0.01 K/UL (ref 0–0.11)
IMM GRANULOCYTES NFR BLD AUTO: 0.2 % (ref 0–0.9)
INR PPP: 1.3 (ref 0.87–1.13)
LYMPHOCYTES # BLD AUTO: 0.59 K/UL (ref 1–4.8)
LYMPHOCYTES NFR BLD: 10.6 % (ref 22–41)
MCH RBC QN AUTO: 29.5 PG (ref 27–33)
MCHC RBC AUTO-ENTMCNC: 32 G/DL (ref 33.7–35.3)
MCV RBC AUTO: 92.3 FL (ref 81.4–97.8)
MONOCYTES # BLD AUTO: 0.59 K/UL (ref 0–0.85)
MONOCYTES NFR BLD AUTO: 10.6 % (ref 0–13.4)
NEUTROPHILS # BLD AUTO: 4.17 K/UL (ref 1.82–7.42)
NEUTROPHILS NFR BLD: 74.7 % (ref 44–72)
NRBC # BLD AUTO: 0 K/UL
NRBC BLD-RTO: 0 /100 WBC
PLATELET # BLD AUTO: 69 K/UL (ref 164–446)
PMV BLD AUTO: 10.9 FL (ref 9–12.9)
POTASSIUM SERPL-SCNC: 5 MMOL/L (ref 3.6–5.5)
PROTHROMBIN TIME: 16.6 SEC (ref 12–14.6)
RBC # BLD AUTO: 4.13 M/UL (ref 4.7–6.1)
SODIUM SERPL-SCNC: 137 MMOL/L (ref 135–145)
WBC # BLD AUTO: 5.6 K/UL (ref 4.8–10.8)

## 2021-03-18 PROCEDURE — 85610 PROTHROMBIN TIME: CPT

## 2021-03-18 PROCEDURE — 84520 ASSAY OF UREA NITROGEN: CPT

## 2021-03-18 PROCEDURE — 84460 ALANINE AMINO (ALT) (SGPT): CPT

## 2021-03-18 PROCEDURE — 85025 COMPLETE CBC W/AUTO DIFF WBC: CPT

## 2021-03-18 PROCEDURE — 36415 COLL VENOUS BLD VENIPUNCTURE: CPT

## 2021-03-18 PROCEDURE — 82565 ASSAY OF CREATININE: CPT

## 2021-03-18 PROCEDURE — 84295 ASSAY OF SERUM SODIUM: CPT

## 2021-03-18 PROCEDURE — 82040 ASSAY OF SERUM ALBUMIN: CPT

## 2021-03-18 PROCEDURE — 82247 BILIRUBIN TOTAL: CPT

## 2021-03-18 PROCEDURE — 84132 ASSAY OF SERUM POTASSIUM: CPT

## 2021-03-18 PROCEDURE — 84075 ASSAY ALKALINE PHOSPHATASE: CPT

## 2021-03-18 PROCEDURE — 84450 TRANSFERASE (AST) (SGOT): CPT

## 2021-03-19 ENCOUNTER — APPOINTMENT (OUTPATIENT)
Dept: LAB | Facility: MEDICAL CENTER | Age: 76
End: 2021-03-19
Attending: INTERNAL MEDICINE
Payer: MEDICARE

## 2021-04-02 ENCOUNTER — PATIENT MESSAGE (OUTPATIENT)
Dept: HEALTH INFORMATION MANAGEMENT | Facility: OTHER | Age: 76
End: 2021-04-02

## 2021-04-08 RX ORDER — TRIAMCINOLONE ACETONIDE 1 MG/G
THIN LAYER CREAM TOPICAL TWICE A DAY
Qty: 3 | Refills: 0 | Status: ERX

## 2021-04-20 ENCOUNTER — PATIENT OUTREACH (OUTPATIENT)
Dept: HEALTH INFORMATION MANAGEMENT | Facility: OTHER | Age: 76
End: 2021-04-20

## 2021-04-26 ENCOUNTER — HOSPITAL ENCOUNTER (OUTPATIENT)
Dept: LAB | Facility: MEDICAL CENTER | Age: 76
End: 2021-04-26
Payer: MEDICARE

## 2021-04-26 LAB
ALP SERPL-CCNC: 101 U/L (ref 30–99)
ALT SERPL-CCNC: 21 U/L (ref 2–50)
AST SERPL-CCNC: 27 U/L (ref 12–45)
BASOPHILS # BLD AUTO: 0.4 % (ref 0–1.8)
BASOPHILS # BLD: 0.02 K/UL (ref 0–0.12)
BILIRUB SERPL-MCNC: 1.7 MG/DL (ref 0.1–1.5)
BUN SERPL-MCNC: 19 MG/DL (ref 8–22)
CREAT SERPL-MCNC: 1.05 MG/DL (ref 0.5–1.4)
EOSINOPHIL # BLD AUTO: 0.24 K/UL (ref 0–0.51)
EOSINOPHIL NFR BLD: 4.3 % (ref 0–6.9)
ERYTHROCYTE [DISTWIDTH] IN BLOOD BY AUTOMATED COUNT: 55.3 FL (ref 35.9–50)
HCT VFR BLD AUTO: 38.8 % (ref 42–52)
HGB BLD-MCNC: 12.5 G/DL (ref 14–18)
IMM GRANULOCYTES # BLD AUTO: 0.02 K/UL (ref 0–0.11)
IMM GRANULOCYTES NFR BLD AUTO: 0.4 % (ref 0–0.9)
INR PPP: 1.29 (ref 0.87–1.13)
LYMPHOCYTES # BLD AUTO: 0.49 K/UL (ref 1–4.8)
LYMPHOCYTES NFR BLD: 8.7 % (ref 22–41)
MCH RBC QN AUTO: 29.8 PG (ref 27–33)
MCHC RBC AUTO-ENTMCNC: 32.2 G/DL (ref 33.7–35.3)
MCV RBC AUTO: 92.6 FL (ref 81.4–97.8)
MONOCYTES # BLD AUTO: 0.58 K/UL (ref 0–0.85)
MONOCYTES NFR BLD AUTO: 10.3 % (ref 0–13.4)
NEUTROPHILS # BLD AUTO: 4.29 K/UL (ref 1.82–7.42)
NEUTROPHILS NFR BLD: 75.9 % (ref 44–72)
NRBC # BLD AUTO: 0 K/UL
NRBC BLD-RTO: 0 /100 WBC
PLATELET # BLD AUTO: 72 K/UL (ref 164–446)
PMV BLD AUTO: 11 FL (ref 9–12.9)
POTASSIUM SERPL-SCNC: 4.4 MMOL/L (ref 3.6–5.5)
PROTHROMBIN TIME: 16.5 SEC (ref 12–14.6)
RBC # BLD AUTO: 4.19 M/UL (ref 4.7–6.1)
SODIUM SERPL-SCNC: 136 MMOL/L (ref 135–145)
WBC # BLD AUTO: 5.6 K/UL (ref 4.8–10.8)

## 2021-04-26 PROCEDURE — 84450 TRANSFERASE (AST) (SGOT): CPT

## 2021-04-26 PROCEDURE — 82565 ASSAY OF CREATININE: CPT

## 2021-04-26 PROCEDURE — 84075 ASSAY ALKALINE PHOSPHATASE: CPT

## 2021-04-26 PROCEDURE — 85610 PROTHROMBIN TIME: CPT

## 2021-04-26 PROCEDURE — 84520 ASSAY OF UREA NITROGEN: CPT

## 2021-04-26 PROCEDURE — 84460 ALANINE AMINO (ALT) (SGPT): CPT

## 2021-04-26 PROCEDURE — 84132 ASSAY OF SERUM POTASSIUM: CPT

## 2021-04-26 PROCEDURE — 82247 BILIRUBIN TOTAL: CPT

## 2021-04-26 PROCEDURE — 82105 ALPHA-FETOPROTEIN SERUM: CPT

## 2021-04-26 PROCEDURE — 36415 COLL VENOUS BLD VENIPUNCTURE: CPT

## 2021-04-26 PROCEDURE — 84295 ASSAY OF SERUM SODIUM: CPT

## 2021-04-26 PROCEDURE — 85025 COMPLETE CBC W/AUTO DIFF WBC: CPT

## 2021-04-28 LAB — AFP-TM SERPL-MCNC: 2 NG/ML (ref 0–9)

## 2021-05-26 ENCOUNTER — HOSPITAL ENCOUNTER (OUTPATIENT)
Dept: LAB | Facility: MEDICAL CENTER | Age: 76
End: 2021-05-26
Payer: MEDICARE

## 2021-05-26 LAB
ALBUMIN SERPL BCP-MCNC: 3.7 G/DL (ref 3.2–4.9)
ALP SERPL-CCNC: 96 U/L (ref 30–99)
ALT SERPL-CCNC: 20 U/L (ref 2–50)
AST SERPL-CCNC: 34 U/L (ref 12–45)
BILIRUB SERPL-MCNC: 2.1 MG/DL (ref 0.1–1.5)
BUN SERPL-MCNC: 29 MG/DL (ref 8–22)
CREAT SERPL-MCNC: 1.21 MG/DL (ref 0.5–1.4)
POTASSIUM SERPL-SCNC: 4.8 MMOL/L (ref 3.6–5.5)
SODIUM SERPL-SCNC: 136 MMOL/L (ref 135–145)

## 2021-05-26 PROCEDURE — 84520 ASSAY OF UREA NITROGEN: CPT

## 2021-05-26 PROCEDURE — 84132 ASSAY OF SERUM POTASSIUM: CPT

## 2021-05-26 PROCEDURE — 82565 ASSAY OF CREATININE: CPT

## 2021-05-26 PROCEDURE — 36415 COLL VENOUS BLD VENIPUNCTURE: CPT

## 2021-05-26 PROCEDURE — 84460 ALANINE AMINO (ALT) (SGPT): CPT

## 2021-05-26 PROCEDURE — 84295 ASSAY OF SERUM SODIUM: CPT

## 2021-05-26 PROCEDURE — 84075 ASSAY ALKALINE PHOSPHATASE: CPT

## 2021-05-26 PROCEDURE — 84450 TRANSFERASE (AST) (SGOT): CPT

## 2021-05-26 PROCEDURE — 82040 ASSAY OF SERUM ALBUMIN: CPT

## 2021-05-26 PROCEDURE — 82247 BILIRUBIN TOTAL: CPT

## 2021-06-07 ENCOUNTER — APPOINTMENT (RX ONLY)
Dept: URBAN - METROPOLITAN AREA CLINIC 35 | Facility: CLINIC | Age: 76
Setting detail: DERMATOLOGY
End: 2021-06-07

## 2021-06-07 DIAGNOSIS — L81.4 OTHER MELANIN HYPERPIGMENTATION: ICD-10-CM

## 2021-06-07 DIAGNOSIS — D22 MELANOCYTIC NEVI: ICD-10-CM

## 2021-06-07 DIAGNOSIS — D485 NEOPLASM OF UNCERTAIN BEHAVIOR OF SKIN: ICD-10-CM

## 2021-06-07 DIAGNOSIS — L82.1 OTHER SEBORRHEIC KERATOSIS: ICD-10-CM

## 2021-06-07 DIAGNOSIS — Z71.89 OTHER SPECIFIED COUNSELING: ICD-10-CM

## 2021-06-07 DIAGNOSIS — Z85.828 PERSONAL HISTORY OF OTHER MALIGNANT NEOPLASM OF SKIN: ICD-10-CM

## 2021-06-07 PROBLEM — D48.5 NEOPLASM OF UNCERTAIN BEHAVIOR OF SKIN: Status: ACTIVE | Noted: 2021-06-07

## 2021-06-07 PROBLEM — D22.5 MELANOCYTIC NEVI OF TRUNK: Status: ACTIVE | Noted: 2021-06-07

## 2021-06-07 PROCEDURE — ? COUNSELING

## 2021-06-07 PROCEDURE — ? BIOPSY BY SHAVE METHOD

## 2021-06-07 PROCEDURE — 11102 TANGNTL BX SKIN SINGLE LES: CPT

## 2021-06-07 PROCEDURE — 99213 OFFICE O/P EST LOW 20 MIN: CPT | Mod: 25

## 2021-06-07 ASSESSMENT — LOCATION DETAILED DESCRIPTION DERM
LOCATION DETAILED: LEFT INFERIOR FRONTAL SCALP
LOCATION DETAILED: LEFT DISTAL DORSAL FOREARM
LOCATION DETAILED: INFERIOR THORACIC SPINE
LOCATION DETAILED: RIGHT POSTERIOR SHOULDER
LOCATION DETAILED: RIGHT INFERIOR MEDIAL UPPER BACK
LOCATION DETAILED: RIGHT DISTAL DORSAL FOREARM
LOCATION DETAILED: LEFT NASAL ALA
LOCATION DETAILED: LEFT POSTERIOR SHOULDER
LOCATION DETAILED: EPIGASTRIC SKIN
LOCATION DETAILED: LEFT OCCIPITAL SCALP

## 2021-06-07 ASSESSMENT — LOCATION ZONE DERM
LOCATION ZONE: TRUNK
LOCATION ZONE: SCALP
LOCATION ZONE: ARM
LOCATION ZONE: NOSE

## 2021-06-07 ASSESSMENT — LOCATION SIMPLE DESCRIPTION DERM
LOCATION SIMPLE: RIGHT FOREARM
LOCATION SIMPLE: ABDOMEN
LOCATION SIMPLE: LEFT FOREARM
LOCATION SIMPLE: SCALP
LOCATION SIMPLE: LEFT SHOULDER
LOCATION SIMPLE: LEFT NOSE
LOCATION SIMPLE: POSTERIOR SCALP
LOCATION SIMPLE: UPPER BACK
LOCATION SIMPLE: RIGHT SHOULDER
LOCATION SIMPLE: RIGHT UPPER BACK

## 2021-06-07 NOTE — PROCEDURE: BIOPSY BY SHAVE METHOD
Body Location Override (Optional - Billing Will Still Be Based On Selected Body Map Location If Applicable): Left inferior parietal scalp
Detail Level: Detailed
Depth Of Biopsy: dermis
Was A Bandage Applied: Yes
Size Of Lesion In Cm: 0.5
X Size Of Lesion In Cm: 0
Biopsy Type: H and E
Biopsy Method: double edge Personna blade
Anesthesia Type: 0.5% lidocaine with 1:200,000 epinephrine and a 1:10 solution of 8.4% sodium bicarbonate
Hemostasis: Aluminum Chloride
Wound Care: Petrolatum
Dressing: bandage
Destruction After The Procedure: No
Type Of Destruction Used: Curettage
Curettage Text: The wound bed was treated with curettage after the biopsy was performed.
Cryotherapy Text: The wound bed was treated with cryotherapy after the biopsy was performed.
Electrodesiccation Text: The wound bed was treated with electrodesiccation after the biopsy was performed.
Electrodesiccation And Curettage Text: The wound bed was treated with electrodesiccation and curettage after the biopsy was performed.
Silver Nitrate Text: The wound bed was treated with silver nitrate after the biopsy was performed.
Lab: [id number]
Lab Facility: 
Consent: Written consent was obtained and risks were reviewed including but not limited to scarring, infection, bleeding, scabbing, incomplete removal, nerve damage and allergy to anesthesia.
Post-Care Instructions: I reviewed with the patient in detail post-care instructions. Keep the biopsy site dry overnight, and then change the dressing once daily and apply a thin layer of petrolatum with a clean q-tip. \\nShowers are OK after 24 hours.  Allow clean water to run over the area.  Do not touch the biopsy site with your hands.  Do not immerse the biopsy site in water such as going into a swimming pool or bathtub until the sutures are removed.  If the biopsy site gets more painful with time, red, or drains, this is concerning for infection.  If you have signs of infection please call the office to come in for a walk in visit Monday through Friday 8:30 am to 12 pm or 1 pm to 4:30 pm.  If we are not in the office, please call through the answering service.
Notification Instructions: Patient will be notified of biopsy results. However, patient instructed to call the office if not contacted within 2 weeks.
Billing Type: Third-Party Bill
Information: Selecting Yes will display possible errors in your note based on the variables you have selected. This validation is only offered as a suggestion for you. PLEASE NOTE THAT THE VALIDATION TEXT WILL BE REMOVED WHEN YOU FINALIZE YOUR NOTE. IF YOU WANT TO FAX A PRELIMINARY NOTE YOU WILL NEED TO TOGGLE THIS TO 'NO' IF YOU DO NOT WANT IT IN YOUR FAXED NOTE.

## 2021-06-16 ENCOUNTER — TELEPHONE (OUTPATIENT)
Dept: MEDICAL GROUP | Age: 76
End: 2021-06-16

## 2021-06-16 NOTE — TELEPHONE ENCOUNTER
Left message for patient to call back regarding pre-visit planning. Please transfer call to [phone] available until  4pm. 2nd attempt.

## 2021-06-22 ENCOUNTER — RX ONLY (OUTPATIENT)
Age: 76
Setting detail: RX ONLY
End: 2021-06-22

## 2021-06-22 RX ORDER — AMOXICILLIN 500 MG/1
CAPSULE ORAL
Qty: 20 | Refills: 0 | Status: ERX | COMMUNITY
Start: 2021-06-21

## 2021-07-12 ENCOUNTER — APPOINTMENT (RX ONLY)
Dept: URBAN - METROPOLITAN AREA CLINIC 36 | Facility: CLINIC | Age: 76
Setting detail: DERMATOLOGY
End: 2021-07-12

## 2021-07-12 PROBLEM — C44.41 BASAL CELL CARCINOMA OF SKIN OF SCALP AND NECK: Status: ACTIVE | Noted: 2021-07-12

## 2021-07-12 PROCEDURE — ? PRESCRIPTION

## 2021-07-12 PROCEDURE — 17311 MOHS 1 STAGE H/N/HF/G: CPT

## 2021-07-12 PROCEDURE — 17312 MOHS ADDL STAGE: CPT

## 2021-07-12 PROCEDURE — ? MOHS SURGERY

## 2021-07-12 PROCEDURE — 13121 CMPLX RPR S/A/L 2.6-7.5 CM: CPT

## 2021-07-12 RX ORDER — DOXYCYCLINE 100 MG/1
CAPSULE ORAL
Qty: 20 | Refills: 0 | Status: ERX | COMMUNITY
Start: 2021-07-12

## 2021-07-12 RX ADMIN — DOXYCYCLINE: 100 CAPSULE ORAL at 00:00

## 2021-07-12 NOTE — PROCEDURE: MOHS SURGERY
Number Of Stages: 2
Closure 4 Information: This tab is for additional flaps and grafts above and beyond our usual structured repairs.  Please note if you enter information here it will not currently bill and you will need to add the billing information manually.
Island Pedicle Flap With Canthal Suspension Text: The defect edges were debeveled with a #15 scalpel blade.  Given the location of the defect, shape of the defect and the proximity to free margins an island pedicle advancement flap was deemed most appropriate.  Using a sterile surgical marker, an appropriate advancement flap was drawn incorporating the defect, outlining the appropriate donor tissue and placing the expected incisions within the relaxed skin tension lines where possible. The area thus outlined was incised deep to adipose tissue with a #15 scalpel blade.  The skin margins were undermined to an appropriate distance in all directions around the primary defect and laterally outward around the island pedicle utilizing iris scissors.  There was minimal undermining beneath the pedicle flap. A suspension suture was placed in the canthal tendon to prevent tension and prevent ectropion.
Epidermal Closure Graft Donor Site (Optional): running
Anesthesia Volume In Cc: 3
Oculoplastic Surgeon Procedure Text (F): After obtaining clear surgical margins the patient was sent to oculoplastics for surgical repair.  The patient understands they will receive post-surgical care and follow-up from the referring physician's office.
Show Repair Surgeon Variable: Yes
Lazy S Intermediate Repair Preamble Text (Leave Blank If You Do Not Want): Undermining was performed with blunt dissection.
Stage 2: Additional Anesthesia Type: 1% lidocaine with epinephrine
Quadrant Reporting?: no
Surgical Defect Length In Cm (Optional): 1.2
Ear Wedge Repair Text: A wedge excision was completed by carrying down an excision through the full thickness of the ear and cartilage with an inward facing Burow's triangle. The wound was then closed in a layered fashion.
Mart-1 - Positive Histology Text: MART-1 staining demonstrates areas of higher density and clustering of melanocytes with Pagetoid spread upwards within the epidermis. The surgical margins are positive for tumor cells.
Lazy S Complex Repair Preamble Text (Leave Blank If You Do Not Want): Extensive wide undermining was performed.
Quadrants Reporting?: 0
Suturegard Retention Suture: 2-0 Nylon
Location Indication Override (Is Already Calculated Based On Selected Body Location): Area M
Ftsg Text: The defect edges were debeveled with a #15 scalpel blade.  Given the location of the defect, shape of the defect and the proximity to free margins a full thickness skin graft was deemed most appropriate.  Using a sterile surgical marker, the primary defect shape was transferred to the donor site. The area thus outlined was incised deep to adipose tissue with a #15 scalpel blade.  The harvested graft was then trimmed of adipose tissue until only dermis and epidermis was left.  The skin margins of the secondary defect were undermined to an appropriate distance in all directions utilizing iris scissors.  The secondary defect was closed with interrupted buried subcutaneous sutures.  The skin edges were then re-apposed with running  sutures.  The skin graft was then placed in the primary defect and oriented appropriately.
Special Stains Stage 8 - Results: Base On Clearance Noted Above
Cheek Interpolation Flap Text: A decision was made to reconstruct the defect utilizing an interpolation axial flap and a staged reconstruction.  A telfa template was made of the defect.  This telfa template was then used to outline the Cheek Interpolation flap.  The donor area for the pedicle flap was then injected with anesthesia.  The flap was excised through the skin and subcutaneous tissue down to the layer of the underlying musculature.  The interpolation flap was carefully excised within this deep plane to maintain its blood supply.  The edges of the donor site were undermined.   The donor site was closed in a primary fashion.  The pedicle was then rotated into position and sutured.  Once the tube was sutured into place, adequate blood supply was confirmed with blanching and refill.  The pedicle was then wrapped with xeroform gauze and dressed appropriately with a telfa and gauze bandage to ensure continued blood supply and protect the attached pedicle.
Cheiloplasty (Less Than 50%) Text: A decision was made to reconstruct the defect with a  cheiloplasty.  The defect was undermined extensively.  Additional obicularis oris muscle was excised with a 15 blade scalpel.  The defect was converted into a full thickness wedge, of less than 50% of the vertical height of the lip, to facilite a better cosmetic result.  Small vessels were then tied off with 5-0 monocyrl. The obicularis oris, superficial fascia, adipose and dermis were then reapproximated.  After the deeper layers were approximated the epidermis was reapproximated with particular care given to realign the vermilion border.
Tissue Cultured Epidermal Autograft Text: The defect edges were debeveled with a #15 scalpel blade.  Given the location of the defect, shape of the defect and the proximity to free margins a tissue cultured epidermal autograft was deemed most appropriate.  The graft was then trimmed to fit the size of the defect.  The graft was then placed in the primary defect and oriented appropriately.
Consent Type: Consent 1 (Standard)
Is There Documentation In The Chart Showing Discussion Of Changes With Another Physician?: Please Select the Appropriate Response
Repair Hemostasis (Optional): Pinpoint electrocautery
Repair Type: Complex Repair
Plastic Surgeon Procedure Text (D): After obtaining clear surgical margins the patient was sent to plastics for surgical repair.  The patient understands they will receive post-surgical care and follow-up from the referring physician's office.
Consent 1/Introductory Paragraph: The rationale for Mohs was explained to the patient and consent was obtained. The risks, benefits and alternatives to therapy were discussed in detail. Specifically, the risks of infection, scarring, bleeding, prolonged wound healing, incomplete removal, allergy to anesthesia, nerve injury and recurrence were addressed. Prior to the procedure, the treatment site was clearly identified and confirmed by the patient. All components of Universal Protocol/PAUSE Rule completed.
Primary Defect Length In Cm (Final Defect Size - Required For Flaps/Grafts): 1.4
Bcc Histology Text: There were numerous aggregates of basaloid cells.
Simple / Intermediate / Complex Repair - Final Wound Length In Cm: 3.6
Consent (Near Eyelid Margin)/Introductory Paragraph: The rationale for Mohs was explained to the patient and consent was obtained. The risks, benefits and alternatives to therapy were discussed in detail. Specifically, the risks of ectropion or eyelid deformity, infection, scarring, bleeding, prolonged wound healing, incomplete removal, allergy to anesthesia, nerve injury and recurrence were addressed. Prior to the procedure, the treatment site was clearly identified and confirmed by the patient. All components of Universal Protocol/PAUSE Rule completed.
Cartilage Graft Text: The defect edges were debeveled with a #15 scalpel blade.  Given the location of the defect, shape of the defect, the fact the defect involved a full thickness cartilage defect a cartilage graft was deemed most appropriate.  An appropriate donor site was identified, cleansed, and anesthetized. The cartilage graft was then harvested and transferred to the recipient site, oriented appropriately and then sutured into place.  The secondary defect was then repaired using a primary closure.
H Plasty Text: Given the location of the defect, shape of the defect and the proximity to free margins a H-plasty was deemed most appropriate for repair.  Using a sterile surgical marker, the appropriate advancement arms of the H-plasty were drawn incorporating the defect and placing the expected incisions within the relaxed skin tension lines where possible. The area thus outlined was incised deep to adipose tissue with a #15 scalpel blade. The skin margins were undermined to an appropriate distance in all directions utilizing iris scissors.  The opposing advancement arms were then advanced into place in opposite direction and anchored with interrupted buried subcutaneous sutures.
Hemigard Postcare Instructions: The HEMIGARD strips are to remain completely dry for at least 5-7 days.
Epidermal Sutures: 4-0 Surgipro
Dermal Autograft Text: The defect edges were debeveled with a #15 scalpel blade.  Given the location of the defect, shape of the defect and the proximity to free margins a dermal autograft was deemed most appropriate.  Using a sterile surgical marker, the primary defect shape was transferred to the donor site. The area thus outlined was incised deep to adipose tissue with a #15 scalpel blade.  The harvested graft was then trimmed of adipose and epidermal tissue until only dermis was left.  The skin graft was then placed in the primary defect and oriented appropriately.
Bilateral Helical Rim Advancement Flap Text: The defect edges were debeveled with a #15 blade scalpel.  Given the location of the defect and the proximity to free margins (helical rim) a bilateral helical rim advancement flap was deemed most appropriate.  Using a sterile surgical marker, the appropriate advancement flaps were drawn incorporating the defect and placing the expected incisions between the helical rim and antihelix where possible.  The area thus outlined was incised through and through with a #15 scalpel blade.  With a skin hook and iris scissors, the flaps were gently and sharply undermined and freed up.
Modified Advancement Flap Text: The defect edges were debeveled with a #15 scalpel blade.  Given the location of the defect, shape of the defect and the proximity to free margins a modified advancement flap was deemed most appropriate.  Using a sterile surgical marker, an appropriate advancement flap was drawn incorporating the defect and placing the expected incisions within the relaxed skin tension lines where possible.    The area thus outlined was incised deep to adipose tissue with a #15 scalpel blade.  The skin margins were undermined to an appropriate distance in all directions utilizing iris scissors.
Tumor Depth: Less than 6mm from granular layer and no invasion beyond the subcutaneous fat
Bcc Infiltrative Histology Text: There were numerous aggregates of basaloid cells demonstrating an infiltrative pattern.
Donor Site Anesthesia Type: same as repair anesthesia
Closure 2 Information: This tab is for additional flaps and grafts, including complex repair and grafts and complex repair and flaps. You can also specify a different location for the additional defect, if the location is the same you do not need to select a new one. We will insert the automated text for the repair you select below just as we do for solitary flaps and grafts. Please note that at this time if you select a location with a different insurance zone you will need to override the ICD10 and CPT if appropriate.
Suturegard Intro: Intraoperative tissue expansion was performed, utilizing the SUTUREGARD device, in order to reduce wound tension.
Alternatives Discussed Intro (Do Not Add Period): I discussed alternative treatments to Mohs surgery and specifically discussed the risks and benefits of
Posterior Auricular Interpolation Flap Text: A decision was made to reconstruct the defect utilizing an interpolation axial flap and a staged reconstruction.  A telfa template was made of the defect.  This telfa template was then used to outline the posterior auricular interpolation flap.  The donor area for the pedicle flap was then injected with anesthesia.  The flap was excised through the skin and subcutaneous tissue down to the layer of the underlying musculature.  The pedicle flap was carefully excised within this deep plane to maintain its blood supply.  The edges of the donor site were undermined.   The donor site was closed in a primary fashion.  The pedicle was then rotated into position and sutured.  Once the tube was sutured into place, adequate blood supply was confirmed with blanching and refill.  The pedicle was then wrapped with xeroform gauze and dressed appropriately with a telfa and gauze bandage to ensure continued blood supply and protect the attached pedicle.
Cheek-To-Nose Interpolation Flap Text: A decision was made to reconstruct the defect utilizing an interpolation axial flap and a staged reconstruction.  A telfa template was made of the defect.  This telfa template was then used to outline the Cheek-To-Nose Interpolation flap.  The donor area for the pedicle flap was then injected with anesthesia.  The flap was excised through the skin and subcutaneous tissue down to the layer of the underlying musculature.  The interpolation flap was carefully excised within this deep plane to maintain its blood supply.  The edges of the donor site were undermined.   The donor site was closed in a primary fashion.  The pedicle was then rotated into position and sutured.  Once the tube was sutured into place, adequate blood supply was confirmed with blanching and refill.  The pedicle was then wrapped with xeroform gauze and dressed appropriately with a telfa and gauze bandage to ensure continued blood supply and protect the attached pedicle.
Referring Physician (Optional): [facility]
Number Of Hemigard Strips Per Side: 1
Mercedes Flap Text: The defect edges were debeveled with a #15 scalpel blade.  Given the location of the defect, shape of the defect and the proximity to free margins a Mercedes flap was deemed most appropriate.  Using a sterile surgical marker, an appropriate advancement flap was drawn incorporating the defect and placing the expected incisions within the relaxed skin tension lines where possible. The area thus outlined was incised deep to adipose tissue with a #15 scalpel blade.  The skin margins were undermined to an appropriate distance in all directions utilizing iris scissors.
Pain Refusal Text: I offered to prescribe pain medication but the patient refused to take this medication.
X Size Of Lesion In Cm (Optional): 0.7
Surgeon/Pathologist Verbiage (Will Incorporate Name Of Surgeon From Intro If Not Blank): operated in two distinct and integrated capacities as the surgeon and pathologist.
Provider Procedure Text (C): After obtaining clear surgical margins the defect was repaired by another provider.
Referred To Otolaryngology For Closure Text (Leave Blank If You Do Not Want): After obtaining clear surgical margins the patient was sent to otolaryngology for surgical repair.  The patient understands they will receive post-surgical care and follow-up from the referring physician's office.
Hatchet Flap Text: The defect edges were debeveled with a #15 scalpel blade.  Given the location of the defect, shape of the defect and the proximity to free margins a hatchet flap was deemed most appropriate.  Using a sterile surgical marker, an appropriate hatchet flap was drawn incorporating the defect and placing the expected incisions within the relaxed skin tension lines where possible.    The area thus outlined was incised deep to adipose tissue with a #15 scalpel blade.  The skin margins were undermined to an appropriate distance in all directions utilizing iris scissors.
Complex Repair And Graft Additional Text (Will Appearing After The Standard Complex Repair Text): The complex repair was not sufficient to completely close the primary defect. The remaining additional defect was repaired with the graft mentioned below.
Suturegard Body: The suture ends were repeatedly re-tightened and re-clamped to achieve the desired tissue expansion.
Referred To Mid-Level For Closure Text (Leave Blank If You Do Not Want): After obtaining clear surgical margins the patient was sent to a mid-level provider for surgical repair.  The patient understands they will receive post-surgical care and follow-up from the mid-level provider.
Consent (Spinal Accessory)/Introductory Paragraph: The rationale for Mohs was explained to the patient and consent was obtained. The risks, benefits and alternatives to therapy were discussed in detail. Specifically, the risks of damage to the spinal accessory nerve, infection, scarring, bleeding, prolonged wound healing, incomplete removal, allergy to anesthesia, and recurrence were addressed. Prior to the procedure, the treatment site was clearly identified and confirmed by the patient. All components of Universal Protocol/PAUSE Rule completed.
Full Thickness Lip Wedge Repair (Flap) Text: Given the location of the defect and the proximity to free margins a full thickness wedge repair was deemed most appropriate.  Using a sterile surgical marker, the appropriate repair was drawn incorporating the defect and placing the expected incisions perpendicular to the vermilion border.  The vermilion border was also meticulously outlined to ensure appropriate reapproximation during the repair.  The area thus outlined was incised through and through with a #15 scalpel blade.  The muscularis and dermis were reaproximated with deep sutures following hemostasis. Care was taken to realign the vermilion border before proceeding with the superficial closure.  Once the vermilion was realigned the superfical and mucosal closure was finished.
Double Island Pedicle Flap Text: The defect edges were debeveled with a #15 scalpel blade.  Given the location of the defect, shape of the defect and the proximity to free margins a double island pedicle advancement flap was deemed most appropriate.  Using a sterile surgical marker, an appropriate advancement flap was drawn incorporating the defect, outlining the appropriate donor tissue and placing the expected incisions within the relaxed skin tension lines where possible.    The area thus outlined was incised deep to adipose tissue with a #15 scalpel blade.  The skin margins were undermined to an appropriate distance in all directions around the primary defect and laterally outward around the island pedicle utilizing iris scissors.  There was minimal undermining beneath the pedicle flap.
Purse String (Intermediate) Text: Given the location of the defect and the characteristics of the surrounding skin a purse string intermediate closure was deemed most appropriate.  Undermining was performed circumfirentially around the surgical defect.  A purse string suture was then placed and tightened.
Partial Purse String (Intermediate) Text: Given the location of the defect and the characteristics of the surrounding skin an intermediate purse string closure was deemed most appropriate.  Undermining was performed circumfirentially around the surgical defect.  A purse string suture was then placed and tightened. Wound tension only allowed a partial closure of the circular defect.
Helical Rim Text: The closure involved the helical rim.
Helical Rim Advancement Flap Text: The defect edges were debeveled with a #15 blade scalpel.  Given the location of the defect and the proximity to free margins (helical rim) a double helical rim advancement flap was deemed most appropriate.  Using a sterile surgical marker, the appropriate advancement flaps were drawn incorporating the defect and placing the expected incisions between the helical rim and antihelix where possible.  The area thus outlined was incised through and through with a #15 scalpel blade.  With a skin hook and iris scissors, the flaps were gently and sharply undermined and freed up.
Burow's Advancement Flap Text: The defect edges were debeveled with a #15 scalpel blade.  Given the location of the defect and the proximity to free margins a Burow's advancement flap was deemed most appropriate.  Using a sterile surgical marker, the appropriate advancement flap was drawn incorporating the defect and placing the expected incisions within the relaxed skin tension lines where possible.    The area thus outlined was incised deep to adipose tissue with a #15 scalpel blade.  The skin margins were undermined to an appropriate distance in all directions utilizing iris scissors.
Mohs Case Number: [id number]
Epidermal Closure: running cuticular
Mastoid Interpolation Flap Text: A decision was made to reconstruct the defect utilizing an interpolation axial flap and a staged reconstruction.  A telfa template was made of the defect.  This telfa template was then used to outline the mastoid interpolation flap.  The donor area for the pedicle flap was then injected with anesthesia.  The flap was excised through the skin and subcutaneous tissue down to the layer of the underlying musculature.  The pedicle flap was carefully excised within this deep plane to maintain its blood supply.  The edges of the donor site were undermined.   The donor site was closed in a primary fashion.  The pedicle was then rotated into position and sutured.  Once the tube was sutured into place, adequate blood supply was confirmed with blanching and refill.  The pedicle was then wrapped with xeroform gauze and dressed appropriately with a telfa and gauze bandage to ensure continued blood supply and protect the attached pedicle.
Wound Care (No Sutures): Petrolatum
No Repair - Repaired With Adjacent Surgical Defect Text (Leave Blank If You Do Not Want): After obtaining clear surgical margins the defect was repaired concurrently with another surgical defect which was in close approximation.
Where Do You Want The Question To Include Opioid Counseling Located?: Case Summary Tab
Inflammation Suggestive Of Cancer Camouflage Histology Text: There was a dense lymphocytic infiltrate which prevented adequate histologic evaluation of adjacent structures.
Estimated Blood Loss (Cc): minimal
Complex Repair And Flap Additional Text (Will Appearing After The Standard Complex Repair Text): The complex repair was not sufficient to completely close the primary defect. The remaining additional defect was repaired with the flap mentioned below.
Muscle Hinge Flap Text: The defect edges were debeveled with a #15 scalpel blade.  Given the size, depth and location of the defect and the proximity to free margins a muscle hinge flap was deemed most appropriate.  Using a sterile surgical marker, an appropriate hinge flap was drawn incorporating the defect. The area thus outlined was incised with a #15 scalpel blade.  The skin margins were undermined to an appropriate distance in all directions utilizing iris scissors.
Non-Graft Cartilage Fenestration Text: The cartilage was fenestrated with a 2mm punch biopsy to help facilitate healing.
Detail Level: Detailed
Skin Substitute Text: The defect edges were debeveled with a #15 scalpel blade.  Given the location of the defect, shape of the defect and the proximity to free margins a skin substitute graft was deemed most appropriate.  The graft material was trimmed to fit the size of the defect. The graft was then placed in the primary defect and oriented appropriately.
Graft Donor Site Dermal Sutures (Optional): 5-0 Polysorb
Xenograft Text: The defect edges were debeveled with a #15 scalpel blade.  Given the location of the defect, shape of the defect and the proximity to free margins a xenograft was deemed most appropriate.  The graft was then trimmed to fit the size of the defect.  The graft was then placed in the primary defect and oriented appropriately.
Area M Indication Text: Tumors in this location are included in Area M (cheek, forehead, scalp, neck, jawline and pretibial skin).  Mohs surgery is indicated for tumors in these anatomic locations.
O-Z Plasty Text: The defect edges were debeveled with a #15 scalpel blade.  Given the location of the defect, shape of the defect and the proximity to free margins an O-Z plasty (double transposition flap) was deemed most appropriate.  Using a sterile surgical marker, the appropriate transposition flaps were drawn incorporating the defect and placing the expected incisions within the relaxed skin tension lines where possible.    The area thus outlined was incised deep to adipose tissue with a #15 scalpel blade.  The skin margins were undermined to an appropriate distance in all directions utilizing iris scissors.  Hemostasis was achieved with electrocautery.  The flaps were then transposed into place, one clockwise and the other counterclockwise, and anchored with interrupted buried subcutaneous sutures.
Subsequent Stages Histo Method Verbiage: Using a similar technique to that described above, a thin layer of tissue was removed from all areas where tumor was visible on the previous stage.  The tissue was again oriented, mapped, dyed, and processed as above.
Hemostasis: Electrocautery
Consent 3/Introductory Paragraph: I gave the patient a chance to ask questions they had about the procedure.  Following this I explained the Mohs procedure and consent was obtained. The risks, benefits and alternatives to therapy were discussed in detail. Specifically, the risks of infection, scarring, bleeding, prolonged wound healing, incomplete removal, allergy to anesthesia, nerve injury and recurrence were addressed. Prior to the procedure, the treatment site was clearly identified and confirmed by the patient. All components of Universal Protocol/PAUSE Rule completed.
Previous Accession (Optional): [id number]
Mohs Method Verbiage: An incision at a 45 degree angle following the standard Mohs approach was done and the specimen was harvested as a microscopic controlled layer.
O-T Plasty Text: The defect edges were debeveled with a #15 scalpel blade.  Given the location of the defect, shape of the defect and the proximity to free margins an O-T plasty was deemed most appropriate.  Using a sterile surgical marker, an appropriate O-T plasty was drawn incorporating the defect and placing the expected incisions within the relaxed skin tension lines where possible.    The area thus outlined was incised deep to adipose tissue with a #15 scalpel blade.  The skin margins were undermined to an appropriate distance in all directions utilizing iris scissors.
W Plasty Text: The lesion was extirpated to the level of the fat with a #15 scalpel blade.  Given the location of the defect, shape of the defect and the proximity to free margins a W-plasty was deemed most appropriate for repair.  Using a sterile surgical marker, the appropriate transposition arms of the W-plasty were drawn incorporating the defect and placing the expected incisions within the relaxed skin tension lines where possible.    The area thus outlined was incised deep to adipose tissue with a #15 scalpel blade.  The skin margins were undermined to an appropriate distance in all directions utilizing iris scissors.  The opposing transposition arms were then transposed into place in opposite direction and anchored with interrupted buried subcutaneous sutures.
Trilobed Flap Text: The defect edges were debeveled with a #15 scalpel blade.  Given the location of the defect and the proximity to free margins a trilobed flap was deemed most appropriate.  Using a sterile surgical marker, an appropriate trilobed flap drawn around the defect.    The area thus outlined was incised deep to adipose tissue with a #15 scalpel blade.  The skin margins were undermined to an appropriate distance in all directions utilizing iris scissors.
Partial Purse String (Simple) Text: Given the location of the defect and the characteristics of the surrounding skin a simple purse string closure was deemed most appropriate.  Undermining was performed circumfirentially around the surgical defect.  A purse string suture was then placed and tightened. Wound tension only allowed a partial closure of the circular defect.
Manual Repair Warning Statement: We plan on removing the manually selected variable below in favor of our much easier automatic structured text blocks found in the previous tab. We decided to do this to help make the flow better and give you the full power of structured data. Manual selection is never going to be ideal in our platform and I would encourage you to avoid using manual selection from this point on, especially since I will be sunsetting this feature. It is important that you do one of two things with the customized text below. First, you can save all of the text in a word file so you can have it for future reference. Second, transfer the text to the appropriate area in the Library tab. Lastly, if there is a flap or graft type which we do not have you need to let us know right away so I can add it in before the variable is hidden. No need to panic, we plan to give you roughly 6 months to make the change.
Asc Procedure Text (A): After obtaining clear surgical margins the patient was sent to an ASC for surgical repair.  The patient understands they will receive post-surgical care and follow-up from the ASC physician.
Graft Donor Site Epidermal Sutures (Optional): 5-0 Surgipro
Graft Cartilage Fenestration Text: The cartilage was fenestrated with a 2mm punch biopsy to help facilitate graft survival and healing.
Post-Care Instructions: I reviewed with the patient in detail post-care instructions. Patient is not to engage in any heavy lifting, exercise, or swimming for the next 14 days. Should the patient develop any fevers, chills, bleeding, severe pain patient will contact the office immediately.
Melolabial Interpolation Flap Text: A decision was made to reconstruct the defect utilizing an interpolation axial flap and a staged reconstruction.  A telfa template was made of the defect.  This telfa template was then used to outline the melolabial interpolation flap.  The donor area for the pedicle flap was then injected with anesthesia.  The flap was excised through the skin and subcutaneous tissue down to the layer of the underlying musculature.  The pedicle flap was carefully excised within this deep plane to maintain its blood supply.  The edges of the donor site were undermined.   The donor site was closed in a primary fashion.  The pedicle was then rotated into position and sutured.  Once the tube was sutured into place, adequate blood supply was confirmed with blanching and refill.  The pedicle was then wrapped with xeroform gauze and dressed appropriately with a telfa and gauze bandage to ensure continued blood supply and protect the attached pedicle.
Rhomboid Transposition Flap Text: The defect edges were debeveled with a #15 scalpel blade.  Given the location of the defect and the proximity to free margins a rhomboid transposition flap was deemed most appropriate.  Using a sterile surgical marker, an appropriate rhomboid flap was drawn incorporating the defect.    The area thus outlined was incised deep to adipose tissue with a #15 scalpel blade.  The skin margins were undermined to an appropriate distance in all directions utilizing iris scissors.
Nasal Turnover Hinge Flap Text: The defect edges were debeveled with a #15 scalpel blade.  Given the size, depth, location of the defect and the defect being full thickness a nasal turnover hinge flap was deemed most appropriate.  Using a sterile surgical marker, an appropriate hinge flap was drawn incorporating the defect. The area thus outlined was incised with a #15 scalpel blade. The flap was designed to recreate the nasal mucosal lining and the alar rim. The skin margins were undermined to an appropriate distance in all directions utilizing iris scissors.
Alar Island Pedicle Flap Text: The defect edges were debeveled with a #15 scalpel blade.  Given the location of the defect, shape of the defect and the proximity to the alar rim an island pedicle advancement flap was deemed most appropriate.  Using a sterile surgical marker, an appropriate advancement flap was drawn incorporating the defect, outlining the appropriate donor tissue and placing the expected incisions within the nasal ala running parallel to the alar rim. The area thus outlined was incised with a #15 scalpel blade.  The skin margins were undermined minimally to an appropriate distance in all directions around the primary defect and laterally outward around the island pedicle utilizing iris scissors.  There was minimal undermining beneath the pedicle flap.
Information: Selecting Yes will display possible errors in your note based on the variables you have selected. This validation is only offered as a suggestion for you. PLEASE NOTE THAT THE VALIDATION TEXT WILL BE REMOVED WHEN YOU FINALIZE YOUR NOTE. IF YOU WANT TO FAX A PRELIMINARY NOTE YOU WILL NEED TO TOGGLE THIS TO 'NO' IF YOU DO NOT WANT IT IN YOUR FAXED NOTE.
Star Wedge Flap Text: The defect edges were debeveled with a #15 scalpel blade.  Given the location of the defect, shape of the defect and the proximity to free margins a star wedge flap was deemed most appropriate.  Using a sterile surgical marker, an appropriate rotation flap was drawn incorporating the defect and placing the expected incisions within the relaxed skin tension lines where possible. The area thus outlined was incised deep to adipose tissue with a #15 scalpel blade.  The skin margins were undermined to an appropriate distance in all directions utilizing iris scissors.
Spiral Flap Text: The defect edges were debeveled with a #15 scalpel blade.  Given the location of the defect, shape of the defect and the proximity to free margins a spiral flap was deemed most appropriate.  Using a sterile surgical marker, an appropriate rotation flap was drawn incorporating the defect and placing the expected incisions within the relaxed skin tension lines where possible. The area thus outlined was incised deep to adipose tissue with a #15 scalpel blade.  The skin margins were undermined to an appropriate distance in all directions utilizing iris scissors.
Burow's Graft Text: The defect edges were debeveled with a #15 scalpel blade.  Given the location of the defect, shape of the defect, the proximity to free margins and the presence of a standing cone deformity a Burow's skin graft was deemed most appropriate. The standing cone was removed and this tissue was then trimmed to the shape of the primary defect. The adipose tissue was also removed until only dermis and epidermis were left.  The skin margins of the secondary defect were undermined to an appropriate distance in all directions utilizing iris scissors.  The secondary defect was closed with interrupted buried subcutaneous sutures.  The skin edges were then re-apposed with running  sutures.  The skin graft was then placed in the primary defect and oriented appropriately.
Tarsorrhaphy Text: A tarsorrhaphy was performed using Frost sutures.
Consent (Marginal Mandibular)/Introductory Paragraph: The rationale for Mohs was explained to the patient and consent was obtained. The risks, benefits and alternatives to therapy were discussed in detail. Specifically, the risks of damage to the marginal mandibular branch of the facial nerve, infection, scarring, bleeding, prolonged wound healing, incomplete removal, allergy to anesthesia, and recurrence were addressed. Prior to the procedure, the treatment site was clearly identified and confirmed by the patient. All components of Universal Protocol/PAUSE Rule completed.
Hemigard Intro: Due to skin fragility and wound tension, it was decided to use HEMIGARD adhesive retention suture devices to permit a linear closure. The skin was cleaned and dried for a 6cm distance away from the wound. Excessive hair, if present, was removed to allow for adhesion.
Surgeon: Yesenia Harman MD
O-L Flap Text: The defect edges were debeveled with a #15 scalpel blade.  Given the location of the defect, shape of the defect and the proximity to free margins an O-L flap was deemed most appropriate.  Using a sterile surgical marker, an appropriate advancement flap was drawn incorporating the defect and placing the expected incisions within the relaxed skin tension lines where possible.    The area thus outlined was incised deep to adipose tissue with a #15 scalpel blade.  The skin margins were undermined to an appropriate distance in all directions utilizing iris scissors.
Nostril Rim Text: The closure involved the nostril rim.
O-Z Flap Text: The defect edges were debeveled with a #15 scalpel blade.  Given the location of the defect, shape of the defect and the proximity to free margins an O-Z flap was deemed most appropriate.  Using a sterile surgical marker, an appropriate transposition flap was drawn incorporating the defect and placing the expected incisions within the relaxed skin tension lines where possible. The area thus outlined was incised deep to adipose tissue with a #15 scalpel blade.  The skin margins were undermined to an appropriate distance in all directions utilizing iris scissors.
Same Histology In Subsequent Stages Text: The pattern and morphology of the tumor is as described in the first stage.
Brow Lift Text: A midfrontal incision was made medially to the defect to allow access to the tissues just superior to the left eyebrow. Following careful dissection inferiorly in a supraperiosteal plane to the level of the left eyebrow, several 3-0 monocryl sutures were used to resuspend the eyebrow orbicularis oculi muscular unit to the superior frontal bone periosteum. This resulted in an appropriate reapproximation of static eyebrow symmetry and correction of the left brow ptosis.
Tumor Debulked?: curette
Keystone Flap Text: The defect edges were debeveled with a #15 scalpel blade.  Given the location of the defect, shape of the defect a keystone flap was deemed most appropriate.  Using a sterile surgical marker, an appropriate keystone flap was drawn incorporating the defect, outlining the appropriate donor tissue and placing the expected incisions within the relaxed skin tension lines where possible. The area thus outlined was incised deep to adipose tissue with a #15 scalpel blade.  The skin margins were undermined to an appropriate distance in all directions around the primary defect and laterally outward around the flap utilizing iris scissors.
Double O-Z Flap Text: The defect edges were debeveled with a #15 scalpel blade.  Given the location of the defect, shape of the defect and the proximity to free margins a Double O-Z flap was deemed most appropriate.  Using a sterile surgical marker, an appropriate transposition flap was drawn incorporating the defect and placing the expected incisions within the relaxed skin tension lines where possible. The area thus outlined was incised deep to adipose tissue with a #15 scalpel blade.  The skin margins were undermined to an appropriate distance in all directions utilizing iris scissors.
Retention Suture Text: Retention sutures were placed to support the closure and prevent dehiscence.
Advancement Flap (Single) Text: The defect edges were debeveled with a #15 scalpel blade.  Given the location of the defect and the proximity to free margins a single advancement flap was deemed most appropriate.  Using a sterile surgical marker, an appropriate advancement flap was drawn incorporating the defect and placing the expected incisions within the relaxed skin tension lines where possible.    The area thus outlined was incised deep to adipose tissue with a #15 scalpel blade.  The skin margins were undermined to an appropriate distance in all directions utilizing iris scissors.
Bi-Rhombic Flap Text: The defect edges were debeveled with a #15 scalpel blade.  Given the location of the defect and the proximity to free margins a bi-rhombic flap was deemed most appropriate.  Using a sterile surgical marker, an appropriate rhombic flap was drawn incorporating the defect. The area thus outlined was incised deep to adipose tissue with a #15 scalpel blade.  The skin margins were undermined to an appropriate distance in all directions utilizing iris scissors.
Staging Info: By selecting yes to the question above you will include information on AJCC 8 tumor staging in your Mohs note. Information on tumor staging will be automatically added for SCCs on the head and neck. AJCC 8 includes tumor size, tumor depth, perineural involvement and bone invasion.
Unna Boot Text: An Unna boot was placed to help immobilize the limb and facilitate more rapid healing.
O-T Advancement Flap Text: The defect edges were debeveled with a #15 scalpel blade.  Given the location of the defect, shape of the defect and the proximity to free margins an O-T advancement flap was deemed most appropriate.  Using a sterile surgical marker, an appropriate advancement flap was drawn incorporating the defect and placing the expected incisions within the relaxed skin tension lines where possible.    The area thus outlined was incised deep to adipose tissue with a #15 scalpel blade.  The skin margins were undermined to an appropriate distance in all directions utilizing iris scissors.
V-Y Flap Text: The defect edges were debeveled with a #15 scalpel blade.  Given the location of the defect, shape of the defect and the proximity to free margins a V-Y flap was deemed most appropriate.  Using a sterile surgical marker, an appropriate advancement flap was drawn incorporating the defect and placing the expected incisions within the relaxed skin tension lines where possible.    The area thus outlined was incised deep to adipose tissue with a #15 scalpel blade.  The skin margins were undermined to an appropriate distance in all directions utilizing iris scissors.
Bilobed Flap Text: The defect edges were debeveled with a #15 scalpel blade.  Given the location of the defect and the proximity to free margins a bilobe flap was deemed most appropriate.  Using a sterile surgical marker, an appropriate bilobe flap drawn around the defect.    The area thus outlined was incised deep to adipose tissue with a #15 scalpel blade.  The skin margins were undermined to an appropriate distance in all directions utilizing iris scissors.
Island Pedicle Flap-Requiring Vessel Identification Text: The defect edges were debeveled with a #15 scalpel blade.  Given the location of the defect, shape of the defect and the proximity to free margins an island pedicle advancement flap was deemed most appropriate.  Using a sterile surgical marker, an appropriate advancement flap was drawn, based on the axial vessel mentioned above, incorporating the defect, outlining the appropriate donor tissue and placing the expected incisions within the relaxed skin tension lines where possible.    The area thus outlined was incised deep to adipose tissue with a #15 scalpel blade.  The skin margins were undermined to an appropriate distance in all directions around the primary defect and laterally outward around the island pedicle utilizing iris scissors.  There was minimal undermining beneath the pedicle flap.
Consent (Scalp)/Introductory Paragraph: The rationale for Mohs was explained to the patient and consent was obtained. The risks, benefits and alternatives to therapy were discussed in detail. Specifically, the risks of changes in hair growth pattern secondary to repair, infection, scarring, bleeding, prolonged wound healing, incomplete removal, allergy to anesthesia, nerve injury and recurrence were addressed. Prior to the procedure, the treatment site was clearly identified and confirmed by the patient. All components of Universal Protocol/PAUSE Rule completed.
Nasalis-Muscle-Based Myocutaneous Island Pedicle Flap Text: Using a #15 blade, an incision was made around the donor flap to the level of the nasalis muscle. Wide lateral undermining was then performed in both the subcutaneous plane above the nasalis muscle, and in a submuscular plane just above periosteum. This allowed the formation of a free nasalis muscle axial pedicle (based on the angular artery) which was still attached to the actual cutaneous flap, increasing its mobility and vascular viability. Hemostasis was obtained with pinpoint electrocoagulation. The flap was mobilized into position and the pivotal anchor points positioned and stabilized with buried interrupted sutures. Subcutaneous and dermal tissues were closed in a multilayered fashion with sutures. Tissue redundancies were excised, and the epidermal edges were apposed without significant tension and sutured with sutures.
Crescentic Advancement Flap Text: The defect edges were debeveled with a #15 scalpel blade.  Given the location of the defect and the proximity to free margins a crescentic advancement flap was deemed most appropriate.  Using a sterile surgical marker, the appropriate advancement flap was drawn incorporating the defect and placing the expected incisions within the relaxed skin tension lines where possible.    The area thus outlined was incised deep to adipose tissue with a #15 scalpel blade.  The skin margins were undermined to an appropriate distance in all directions utilizing iris scissors.
Secondary Intention Text (Leave Blank If You Do Not Want): The defect will heal with secondary intention.
Dressing (No Sutures): pressure dressing with telfa
Eye Protection Verbiage: Before proceeding with the stage, a plastic scleral shield was inserted. The globe was anesthetized with a few drops of 1% lidocaine with 1:100,000 epinephrine. Then, an appropriate sized scleral shield was chosen and coated with lacrilube ointment. The shield was gently inserted and left in place for the duration of each stage. After the stage was completed, the shield was gently removed.
Banner Transposition Flap Text: The defect edges were debeveled with a #15 scalpel blade.  Given the location of the defect and the proximity to free margins a Banner transposition flap was deemed most appropriate.  Using a sterile surgical marker, an appropriate flap drawn around the defect. The area thus outlined was incised deep to adipose tissue with a #15 scalpel blade.  The skin margins were undermined to an appropriate distance in all directions utilizing iris scissors.
Transposition Flap Text: The defect edges were debeveled with a #15 scalpel blade.  Given the location of the defect and the proximity to free margins a transposition flap was deemed most appropriate.  Using a sterile surgical marker, an appropriate transposition flap was drawn incorporating the defect.    The area thus outlined was incised deep to adipose tissue with a #15 scalpel blade.  The skin margins were undermined to an appropriate distance in all directions utilizing iris scissors.
Mauc Instructions: By selecting yes to the question below the MAUC number will be added into the note.  This will be calculated automatically based on the diagnosis chosen, the size entered, the body zone selected (H,M,L) and the specific indications you chose. You will also have the option to override the Mohs AUC if you disagree with the automatically calculated number and this option is found in the Case Summary tab.
Paramedian Forehead Flap Text: A decision was made to reconstruct the defect utilizing an interpolation axial flap and a staged reconstruction.  A telfa template was made of the defect.  This telfa template was then used to outline the paramedian forehead pedicle flap.  The donor area for the pedicle flap was then injected with anesthesia.  The flap was excised through the skin and subcutaneous tissue down to the layer of the underlying musculature.  The pedicle flap was carefully excised within this deep plane to maintain its blood supply.  The edges of the donor site were undermined.   The donor site was closed in a primary fashion.  The pedicle was then rotated into position and sutured.  Once the tube was sutured into place, adequate blood supply was confirmed with blanching and refill.  The pedicle was then wrapped with xeroform gauze and dressed appropriately with a telfa and gauze bandage to ensure continued blood supply and protect the attached pedicle.
Mucosal Advancement Flap Text: Given the location of the defect, shape of the defect and the proximity to free margins a mucosal advancement flap was deemed most appropriate. Incisions were made with a 15 blade scalpel in the appropriate fashion along the cutaneous vermilion border and the mucosal lip. The remaining actinically damaged mucosal tissue was excised.  The mucosal advancement flap was then elevated to the gingival sulcus with care taken to preserve the neurovascular structures and advanced into the primary defect. Care was taken to ensure that precise realignment of the vermilion border was achieved.
Orbicularis Oris Muscle Flap Text: The defect edges were debeveled with a #15 scalpel blade.  Given that the defect affected the competency of the oral sphincter an orbicularis oris muscle flap was deemed most appropriate to restore this competency and normal muscle function.  Using a sterile surgical marker, an appropriate flap was drawn incorporating the defect. The area thus outlined was incised with a #15 scalpel blade.
Home Suture Removal Text: Patient was provided instructions on removing sutures and will remove their sutures at home.  If they have any questions or difficulties they will call the office.
Undermining Type: Entire Wound
Area L Indication Text: Tumors in this location are included in Area L (trunk and extremities).  Mohs surgery is indicated for larger tumors, or tumors with aggressive histologic features, in these anatomic locations.
Mohs Histo Method Verbiage: Each section was then chromacoded and processed in the Mohs lab using the Mohs protocol and submitted for frozen section.
Advancement-Rotation Flap Text: The defect edges were debeveled with a #15 scalpel blade.  Given the location of the defect, shape of the defect and the proximity to free margins an advancement-rotation flap was deemed most appropriate.  Using a sterile surgical marker, an appropriate flap was drawn incorporating the defect and placing the expected incisions within the relaxed skin tension lines where possible. The area thus outlined was incised deep to adipose tissue with a #15 scalpel blade.  The skin margins were undermined to an appropriate distance in all directions utilizing iris scissors.
Graft Donor Site Bandage (Optional-Leave Blank If You Don't Want In Note): Aquaplast was fitted to the graft site and sewn into place. A pressure bandage were applied to the donor site and over the aquaplast bolster.
Anesthesia Volume In Cc: 5
Postop Diagnosis: same
Purse String (Simple) Text: Given the location of the defect and the characteristics of the surrounding skin a purse string closure was deemed most appropriate.  Undermining was performed circumfirentially around the surgical defect.  A purse string suture was then placed and tightened.
Deep Sutures: 4-0 Maxon
Consent (Nose)/Introductory Paragraph: The rationale for Mohs was explained to the patient and consent was obtained. The risks, benefits and alternatives to therapy were discussed in detail. Specifically, the risks of nasal deformity, changes in the flow of air through the nose, infection, scarring, bleeding, prolonged wound healing, incomplete removal, allergy to anesthesia, nerve injury and recurrence were addressed. Prior to the procedure, the treatment site was clearly identified and confirmed by the patient. All components of Universal Protocol/PAUSE Rule completed.
Peng Advancement Flap Text: The defect edges were debeveled with a #15 scalpel blade.  Given the location of the defect, shape of the defect and the proximity to free margins a Peng advancement flap was deemed most appropriate.  Using a sterile surgical marker, an appropriate advancement flap was drawn incorporating the defect and placing the expected incisions within the relaxed skin tension lines where possible. The area thus outlined was incised deep to adipose tissue with a #15 scalpel blade.  The skin margins were undermined to an appropriate distance in all directions utilizing iris scissors.
Cheiloplasty (Complex) Text: A decision was made to reconstruct the defect with a  cheiloplasty.  The defect was undermined extensively.  Additional obicularis oris muscle was excised with a 15 blade scalpel.  The defect was converted into a full thickness wedge to facilite a better cosmetic result.  Small vessels were then tied off with 5-0 monocyrl. The obicularis oris, superficial fascia, adipose and dermis were then reapproximated.  After the deeper layers were approximated the epidermis was reapproximated with particular care given to realign the vermilion border.
Dorsal Nasal Flap Text: The defect edges were debeveled with a #15 scalpel blade.  Given the location of the defect and the proximity to free margins a dorsal nasal flap was deemed most appropriate.  Using a sterile surgical marker, an appropriate dorsal nasal flap was drawn around the defect.    The area thus outlined was incised deep to adipose tissue with a #15 scalpel blade.  The skin margins were undermined to an appropriate distance in all directions utilizing iris scissors.
Additional Anesthesia Volume In Cc: 6
Consent (Ear)/Introductory Paragraph: The rationale for Mohs was explained to the patient and consent was obtained. The risks, benefits and alternatives to therapy were discussed in detail. Specifically, the risks of ear deformity, infection, scarring, bleeding, prolonged wound healing, incomplete removal, allergy to anesthesia, nerve injury and recurrence were addressed. Prior to the procedure, the treatment site was clearly identified and confirmed by the patient. All components of Universal Protocol/PAUSE Rule completed.
Debridement Text: The wound edges were debrided prior to proceeding with the closure to facilitate wound healing.
Repair Anesthesia Method: local infiltration
Initial Size Of Lesion: 0.9
Interpolation Flap Text: A decision was made to reconstruct the defect utilizing an interpolation axial flap and a staged reconstruction.  A telfa template was made of the defect.  This telfa template was then used to outline the interpolation flap.  The donor area for the pedicle flap was then injected with anesthesia.  The flap was excised through the skin and subcutaneous tissue down to the layer of the underlying musculature.  The interpolation flap was carefully excised within this deep plane to maintain its blood supply.  The edges of the donor site were undermined.   The donor site was closed in a primary fashion.  The pedicle was then rotated into position and sutured.  Once the tube was sutured into place, adequate blood supply was confirmed with blanching and refill.  The pedicle was then wrapped with xeroform gauze and dressed appropriately with a telfa and gauze bandage to ensure continued blood supply and protect the attached pedicle.
Date Of Previous Biopsy (Optional): MW21
Split-Thickness Skin Graft Text: The defect edges were debeveled with a #15 scalpel blade.  Given the location of the defect, shape of the defect and the proximity to free margins a split thickness skin graft was deemed most appropriate.  Using a sterile surgical marker, the primary defect shape was transferred to the donor site. The split thickness graft was then harvested.  The skin graft was then placed in the primary defect and oriented appropriately.
Z Plasty Text: The lesion was extirpated to the level of the fat with a #15 scalpel blade.  Given the location of the defect, shape of the defect and the proximity to free margins a Z-plasty was deemed most appropriate for repair.  Using a sterile surgical marker, the appropriate transposition arms of the Z-plasty were drawn incorporating the defect and placing the expected incisions within the relaxed skin tension lines where possible.    The area thus outlined was incised deep to adipose tissue with a #15 scalpel blade.  The skin margins were undermined to an appropriate distance in all directions utilizing iris scissors.  The opposing transposition arms were then transposed into place in opposite direction and anchored with interrupted buried subcutaneous sutures.
Vermilion Border Text: The closure involved the vermilion border.
Mohs Rapid Report Verbiage: The area of clinically evident tumor was marked with skin marking ink and appropriately hatched.  The initial incision was made following the Mohs approach through the skin.  The specimen was taken to the lab, divided into the necessary number of pieces, chromacoded and processed according to the Mohs protocol.  This was repeated in successive stages until a tumor free defect was achieved.
Localized Dermabrasion With Wire Brush Text: The patient was draped in routine manner.  Localized dermabrasion using 3 x 17 mm wire brush was performed in routine manner to papillary dermis. This spot dermabrasion is being performed to complete skin cancer reconstruction. It also will eliminate the other sun damaged precancerous cells that are known to be part of the regional effect of a lifetime's worth of sun exposure. This localized dermabrasion is therapeutic and should not be considered cosmetic in any regard.
Length To Time In Minutes Device Was In Place: 45
Composite Graft Text: The defect edges were debeveled with a #15 scalpel blade.  Given the location of the defect, shape of the defect, the proximity to free margins and the fact the defect was full thickness a composite graft was deemed most appropriate.  The defect was outline and then transferred to the donor site.  A full thickness graft was then excised from the donor site. The graft was then placed in the primary defect, oriented appropriately and then sutured into place.  The secondary defect was then repaired using a primary closure.
Double O-Z Plasty Text: The defect edges were debeveled with a #15 scalpel blade.  Given the location of the defect, shape of the defect and the proximity to free margins a Double O-Z plasty (double transposition flap) was deemed most appropriate.  Using a sterile surgical marker, the appropriate transposition flaps were drawn incorporating the defect and placing the expected incisions within the relaxed skin tension lines where possible. The area thus outlined was incised deep to adipose tissue with a #15 scalpel blade.  The skin margins were undermined to an appropriate distance in all directions utilizing iris scissors.  Hemostasis was achieved with electrocautery.  The flaps were then transposed into place, one clockwise and the other counterclockwise, and anchored with interrupted buried subcutaneous sutures.
Advancement Flap (Double) Text: The defect edges were debeveled with a #15 scalpel blade.  Given the location of the defect and the proximity to free margins a double advancement flap was deemed most appropriate.  Using a sterile surgical marker, the appropriate advancement flaps were drawn incorporating the defect and placing the expected incisions within the relaxed skin tension lines where possible.    The area thus outlined was incised deep to adipose tissue with a #15 scalpel blade.  The skin margins were undermined to an appropriate distance in all directions utilizing iris scissors.
Suture Removal: 10 days
Melolabial Transposition Flap Text: The defect edges were debeveled with a #15 scalpel blade.  Given the location of the defect and the proximity to free margins a melolabial flap was deemed most appropriate.  Using a sterile surgical marker, an appropriate melolabial transposition flap was drawn incorporating the defect.    The area thus outlined was incised deep to adipose tissue with a #15 scalpel blade.  The skin margins were undermined to an appropriate distance in all directions utilizing iris scissors.
Bilobed Transposition Flap Text: The defect edges were debeveled with a #15 scalpel blade.  Given the location of the defect and the proximity to free margins a bilobed transposition flap was deemed most appropriate.  Using a sterile surgical marker, an appropriate bilobe flap drawn around the defect.    The area thus outlined was incised deep to adipose tissue with a #15 scalpel blade.  The skin margins were undermined to an appropriate distance in all directions utilizing iris scissors.
Undermining Location (Optional): in the superficial subcutaneous fat
Hemigard Retention Suture: 0-0 Nylon
Consent 2/Introductory Paragraph: Mohs surgery was explained to the patient and consent was obtained. The risks, benefits and alternatives to therapy were discussed in detail. Specifically, the risks of infection, scarring, bleeding, prolonged wound healing, incomplete removal, allergy to anesthesia, nerve injury and recurrence were addressed. Prior to the procedure, the treatment site was clearly identified and confirmed by the patient. All components of Universal Protocol/PAUSE Rule completed.
S Plasty Text: Given the location and shape of the defect, and the orientation of relaxed skin tension lines, an S-plasty was deemed most appropriate for repair.  Using a sterile surgical marker, the appropriate outline of the S-plasty was drawn, incorporating the defect and placing the expected incisions within the relaxed skin tension lines where possible.  The area thus outlined was incised deep to adipose tissue with a #15 scalpel blade.  The skin margins were undermined to an appropriate distance in all directions utilizing iris scissors. The skin flaps were advanced over the defect.  The opposing margins were then approximated with interrupted buried subcutaneous sutures.
Zygomaticofacial Flap Text: Given the location of the defect, shape of the defect and the proximity to free margins a zygomaticofacial flap was deemed most appropriate for repair.  Using a sterile surgical marker, the appropriate flap was drawn incorporating the defect and placing the expected incisions within the relaxed skin tension lines where possible. The area thus outlined was incised deep to adipose tissue with a #15 scalpel blade with preservation of a vascular pedicle.  The skin margins were undermined to an appropriate distance in all directions utilizing iris scissors.  The flap was then placed into the defect and anchored with interrupted buried subcutaneous sutures.
Surgical Defect Width In Cm (Optional): 1.0
V-Y Plasty Text: The defect edges were debeveled with a #15 scalpel blade.  Given the location of the defect, shape of the defect and the proximity to free margins an V-Y advancement flap was deemed most appropriate.  Using a sterile surgical marker, an appropriate advancement flap was drawn incorporating the defect and placing the expected incisions within the relaxed skin tension lines where possible.    The area thus outlined was incised deep to adipose tissue with a #15 scalpel blade.  The skin margins were undermined to an appropriate distance in all directions utilizing iris scissors.
Medical Necessity Statement: Based on my medical judgement, Mohs surgery is the most appropriate treatment for this cancer compared to other treatments.
Island Pedicle Flap Text: The defect edges were debeveled with a #15 scalpel blade.  Given the location of the defect, shape of the defect and the proximity to free margins an island pedicle advancement flap was deemed most appropriate.  Using a sterile surgical marker, an appropriate advancement flap was drawn incorporating the defect, outlining the appropriate donor tissue and placing the expected incisions within the relaxed skin tension lines where possible.    The area thus outlined was incised deep to adipose tissue with a #15 scalpel blade.  The skin margins were undermined to an appropriate distance in all directions around the primary defect and laterally outward around the island pedicle utilizing iris scissors.  There was minimal undermining beneath the pedicle flap.
No Residual Tumor Seen Histology Text: There were no malignant cells seen in the sections examined.
Rhombic Flap Text: The defect edges were debeveled with a #15 scalpel blade.  Given the location of the defect and the proximity to free margins a rhombic flap was deemed most appropriate.  Using a sterile surgical marker, an appropriate rhombic flap was drawn incorporating the defect.    The area thus outlined was incised deep to adipose tissue with a #15 scalpel blade.  The skin margins were undermined to an appropriate distance in all directions utilizing iris scissors.
Epidermal Autograft Text: The defect edges were debeveled with a #15 scalpel blade.  Given the location of the defect, shape of the defect and the proximity to free margins an epidermal autograft was deemed most appropriate.  Using a sterile surgical marker, the primary defect shape was transferred to the donor site. The epidermal graft was then harvested.  The skin graft was then placed in the primary defect and oriented appropriately.
Wound Check: 6 weeks
Wound Care: Vaseline
Area H Indication Text: Tumors in this location are included in Area H (eyelids, eyebrows, nose, lips, chin, ear, pre-auricular, post-auricular, temple, genitalia, hands, feet, ankles and areola).  Tissue conservation is critical in these anatomic locations.
Consent (Temporal Branch)/Introductory Paragraph: The rationale for Mohs was explained to the patient and consent was obtained. The risks, benefits and alternatives to therapy were discussed in detail. Specifically, the risks of damage to the temporal branch of the facial nerve, infection, scarring, bleeding, prolonged wound healing, incomplete removal, allergy to anesthesia, and recurrence were addressed. Prior to the procedure, the treatment site was clearly identified and confirmed by the patient. All components of Universal Protocol/PAUSE Rule completed.
Retention Suture Bite Size: 5 mm
Chonodrocutaneous Helical Advancement Flap Text: The defect edges were debeveled with a #15 scalpel blade.  Given the location of the defect and the proximity to free margins a chondrocutaneous helical advancement flap was deemed most appropriate.  Using a sterile surgical marker, the appropriate advancement flap was drawn incorporating the defect and placing the expected incisions within the relaxed skin tension lines where possible.    The area thus outlined was incised deep to adipose tissue with a #15 scalpel blade.  The skin margins were undermined to an appropriate distance in all directions utilizing iris scissors.
A-T Advancement Flap Text: The defect edges were debeveled with a #15 scalpel blade.  Given the location of the defect, shape of the defect and the proximity to free margins an A-T advancement flap was deemed most appropriate.  Using a sterile surgical marker, an appropriate advancement flap was drawn incorporating the defect and placing the expected incisions within the relaxed skin tension lines where possible.    The area thus outlined was incised deep to adipose tissue with a #15 scalpel blade.  The skin margins were undermined to an appropriate distance in all directions utilizing iris scissors.
Rotation Flap Text: The defect edges were debeveled with a #15 scalpel blade.  Given the location of the defect, shape of the defect and the proximity to free margins a rotation flap was deemed most appropriate.  Using a sterile surgical marker, an appropriate rotation flap was drawn incorporating the defect and placing the expected incisions within the relaxed skin tension lines where possible.    The area thus outlined was incised deep to adipose tissue with a #15 scalpel blade.  The skin margins were undermined to an appropriate distance in all directions utilizing iris scissors.
Mart-1 - Negative Histology Text: MART-1 staining demonstrates a normal density and pattern of melanocytes along the dermal-epidermal junction. The surgical margins are negative for tumor cells.
Consent (Lip)/Introductory Paragraph: The rationale for Mohs was explained to the patient and consent was obtained. The risks, benefits and alternatives to therapy were discussed in detail. Specifically, the risks of lip deformity, changes in the oral aperture, infection, scarring, bleeding, prolonged wound healing, incomplete removal, allergy to anesthesia, nerve injury and recurrence were addressed. Prior to the procedure, the treatment site was clearly identified and confirmed by the patient. All components of Universal Protocol/PAUSE Rule completed.
Ear Star Wedge Flap Text: The defect edges were debeveled with a #15 blade scalpel.  Given the location of the defect and the proximity to free margins (helical rim) an ear star wedge flap was deemed most appropriate.  Using a sterile surgical marker, the appropriate flap was drawn incorporating the defect and placing the expected incisions between the helical rim and antihelix where possible.  The area thus outlined was incised through and through with a #15 scalpel blade.
Staged Advancement Flap Text: The defect edges were debeveled with a #15 scalpel blade.  Given the location of the defect, shape of the defect and the proximity to free margins a staged advancement flap was deemed most appropriate.  Using a sterile surgical marker, an appropriate advancement flap was drawn incorporating the defect and placing the expected incisions within the relaxed skin tension lines where possible. The area thus outlined was incised deep to adipose tissue with a #15 scalpel blade.  The skin margins were undermined to an appropriate distance in all directions utilizing iris scissors.

## 2021-07-22 ENCOUNTER — APPOINTMENT (RX ONLY)
Dept: URBAN - METROPOLITAN AREA CLINIC 36 | Facility: CLINIC | Age: 76
Setting detail: DERMATOLOGY
End: 2021-07-22

## 2021-07-22 DIAGNOSIS — Z48.02 ENCOUNTER FOR REMOVAL OF SUTURES: ICD-10-CM

## 2021-07-22 PROCEDURE — ? SUTURE REMOVAL (GLOBAL PERIOD)

## 2021-07-22 PROCEDURE — 99024 POSTOP FOLLOW-UP VISIT: CPT

## 2021-07-22 ASSESSMENT — LOCATION DETAILED DESCRIPTION DERM: LOCATION DETAILED: LEFT INFERIOR PARIETAL SCALP

## 2021-07-22 ASSESSMENT — LOCATION SIMPLE DESCRIPTION DERM: LOCATION SIMPLE: SCALP

## 2021-07-22 ASSESSMENT — LOCATION ZONE DERM: LOCATION ZONE: SCALP

## 2021-07-22 NOTE — PROCEDURE: SUTURE REMOVAL (GLOBAL PERIOD)
Detail Level: Detailed
Add 50882 Cpt? (Important Note: In 2017 The Use Of 29754 Is Being Tracked By Cms To Determine Future Global Period Reimbursement For Global Periods): yes

## 2021-07-28 ENCOUNTER — HOSPITAL ENCOUNTER (OUTPATIENT)
Dept: LAB | Facility: MEDICAL CENTER | Age: 76
End: 2021-07-28
Payer: MEDICARE

## 2021-07-28 LAB
ALBUMIN SERPL BCP-MCNC: 3.7 G/DL (ref 3.2–4.9)
ALP SERPL-CCNC: 90 U/L (ref 30–99)
ALT SERPL-CCNC: 19 U/L (ref 2–50)
AST SERPL-CCNC: 21 U/L (ref 12–45)
BILIRUB SERPL-MCNC: 1.8 MG/DL (ref 0.1–1.5)
BUN SERPL-MCNC: 28 MG/DL (ref 8–22)
CREAT SERPL-MCNC: 1.23 MG/DL (ref 0.5–1.4)
POTASSIUM SERPL-SCNC: 4.5 MMOL/L (ref 3.6–5.5)
SODIUM SERPL-SCNC: 136 MMOL/L (ref 135–145)

## 2021-07-28 PROCEDURE — 84460 ALANINE AMINO (ALT) (SGPT): CPT

## 2021-07-28 PROCEDURE — 84295 ASSAY OF SERUM SODIUM: CPT

## 2021-07-28 PROCEDURE — 82040 ASSAY OF SERUM ALBUMIN: CPT

## 2021-07-28 PROCEDURE — 84450 TRANSFERASE (AST) (SGOT): CPT

## 2021-07-28 PROCEDURE — 84075 ASSAY ALKALINE PHOSPHATASE: CPT

## 2021-07-28 PROCEDURE — 82565 ASSAY OF CREATININE: CPT

## 2021-07-28 PROCEDURE — 84520 ASSAY OF UREA NITROGEN: CPT

## 2021-07-28 PROCEDURE — 82247 BILIRUBIN TOTAL: CPT

## 2021-07-28 PROCEDURE — 36415 COLL VENOUS BLD VENIPUNCTURE: CPT

## 2021-07-28 PROCEDURE — 84132 ASSAY OF SERUM POTASSIUM: CPT

## 2021-08-28 ENCOUNTER — HOSPITAL ENCOUNTER (OUTPATIENT)
Dept: RADIOLOGY | Facility: MEDICAL CENTER | Age: 76
End: 2021-08-28
Payer: MEDICARE

## 2021-08-28 DIAGNOSIS — R16.0 LIVER MASS: ICD-10-CM

## 2021-08-28 DIAGNOSIS — K76.6 PORTAL HYPERTENSION (HCC): ICD-10-CM

## 2021-08-28 PROCEDURE — 700117 HCHG RX CONTRAST REV CODE 255

## 2021-08-28 PROCEDURE — 74178 CT ABD&PLV WO CNTR FLWD CNTR: CPT | Mod: MH

## 2021-08-28 RX ADMIN — IOHEXOL 100 ML: 350 INJECTION, SOLUTION INTRAVENOUS at 07:58

## 2021-09-14 ENCOUNTER — HOSPITAL ENCOUNTER (OUTPATIENT)
Dept: LAB | Facility: MEDICAL CENTER | Age: 76
End: 2021-09-14
Payer: MEDICARE

## 2021-09-14 LAB
ALBUMIN SERPL BCP-MCNC: 3.6 G/DL (ref 3.2–4.9)
ALP SERPL-CCNC: 88 U/L (ref 30–99)
ALT SERPL-CCNC: 16 U/L (ref 2–50)
AST SERPL-CCNC: 23 U/L (ref 12–45)
BASOPHILS # BLD AUTO: 0.7 % (ref 0–1.8)
BASOPHILS # BLD: 0.04 K/UL (ref 0–0.12)
BILIRUB SERPL-MCNC: 1.8 MG/DL (ref 0.1–1.5)
CREAT SERPL-MCNC: 1.12 MG/DL (ref 0.5–1.4)
EOSINOPHIL # BLD AUTO: 0.29 K/UL (ref 0–0.51)
EOSINOPHIL NFR BLD: 5.1 % (ref 0–6.9)
ERYTHROCYTE [DISTWIDTH] IN BLOOD BY AUTOMATED COUNT: 54.4 FL (ref 35.9–50)
HCT VFR BLD AUTO: 37.8 % (ref 42–52)
HGB BLD-MCNC: 12.1 G/DL (ref 14–18)
IMM GRANULOCYTES # BLD AUTO: 0.02 K/UL (ref 0–0.11)
IMM GRANULOCYTES NFR BLD AUTO: 0.3 % (ref 0–0.9)
INR PPP: 1.37 (ref 0.87–1.13)
LYMPHOCYTES # BLD AUTO: 0.54 K/UL (ref 1–4.8)
LYMPHOCYTES NFR BLD: 9.4 % (ref 22–41)
MCH RBC QN AUTO: 29.7 PG (ref 27–33)
MCHC RBC AUTO-ENTMCNC: 32 G/DL (ref 33.7–35.3)
MCV RBC AUTO: 92.9 FL (ref 81.4–97.8)
MONOCYTES # BLD AUTO: 0.59 K/UL (ref 0–0.85)
MONOCYTES NFR BLD AUTO: 10.3 % (ref 0–13.4)
NEUTROPHILS # BLD AUTO: 4.24 K/UL (ref 1.82–7.42)
NEUTROPHILS NFR BLD: 74.2 % (ref 44–72)
NRBC # BLD AUTO: 0 K/UL
NRBC BLD-RTO: 0 /100 WBC
PLATELET # BLD AUTO: 73 K/UL (ref 164–446)
PMV BLD AUTO: 10.5 FL (ref 9–12.9)
POTASSIUM SERPL-SCNC: 4.7 MMOL/L (ref 3.6–5.5)
PROTHROMBIN TIME: 16.5 SEC (ref 12–14.6)
RBC # BLD AUTO: 4.07 M/UL (ref 4.7–6.1)
SODIUM SERPL-SCNC: 138 MMOL/L (ref 135–145)
UUN UR-MCNC: 733 MG/DL
WBC # BLD AUTO: 5.7 K/UL (ref 4.8–10.8)

## 2021-09-14 PROCEDURE — 82040 ASSAY OF SERUM ALBUMIN: CPT

## 2021-09-14 PROCEDURE — 84295 ASSAY OF SERUM SODIUM: CPT

## 2021-09-14 PROCEDURE — 36415 COLL VENOUS BLD VENIPUNCTURE: CPT

## 2021-09-14 PROCEDURE — 84075 ASSAY ALKALINE PHOSPHATASE: CPT

## 2021-09-14 PROCEDURE — 82105 ALPHA-FETOPROTEIN SERUM: CPT

## 2021-09-14 PROCEDURE — 84460 ALANINE AMINO (ALT) (SGPT): CPT

## 2021-09-14 PROCEDURE — 84132 ASSAY OF SERUM POTASSIUM: CPT

## 2021-09-14 PROCEDURE — 84450 TRANSFERASE (AST) (SGOT): CPT

## 2021-09-14 PROCEDURE — 85025 COMPLETE CBC W/AUTO DIFF WBC: CPT

## 2021-09-14 PROCEDURE — 82247 BILIRUBIN TOTAL: CPT

## 2021-09-14 PROCEDURE — 82565 ASSAY OF CREATININE: CPT

## 2021-09-14 PROCEDURE — 85610 PROTHROMBIN TIME: CPT

## 2021-09-14 PROCEDURE — 84540 ASSAY OF URINE/UREA-N: CPT

## 2021-09-16 NOTE — CARE PLAN
Problem: Pain Management  Goal: Pain level will decrease to patient's comfort goal  Outcome: PROGRESSING SLOWER THAN EXPECTED  Patient able to verbalize pain level and had reports of 7/10 mid - lower back discomfort. Patient briefly educated on measures to manage pain non- pharmacologically. Will continue to monitor patient and call light with reach of  Patient.         Tried to contact patient with the below results but she did not answer. Message left for patient with the below results. She should call back with any questions.

## 2021-09-17 LAB — AFP-TM SERPL-MCNC: 2 NG/ML (ref 0–9)

## 2021-09-21 ENCOUNTER — HOSPITAL ENCOUNTER (OUTPATIENT)
Dept: RADIOLOGY | Facility: MEDICAL CENTER | Age: 76
End: 2021-09-21
Attending: INTERNAL MEDICINE
Payer: MEDICARE

## 2021-09-21 DIAGNOSIS — R18.8 OTHER ASCITES: ICD-10-CM

## 2021-09-21 PROCEDURE — 49083 ABD PARACENTESIS W/IMAGING: CPT

## 2021-09-21 NOTE — PROGRESS NOTES
"Patient given [facility] \"Preventing the Spread of Infection\" brochure upon arrival.   ?  US guided therapeutic paracentesis done by Dr. Simmons;(no H&P required as this is a NON SEDATION procedure) RLQ access site; 2400mL obtained and discarded; pt tolerated the procedure well; pt hemodynamically stable pre/intra/post procedure; all questions and concerns answered prior to d/c; patient provided with all appropriate education for procedure; pt d/c home.   "

## 2021-11-09 NOTE — PROCEDURES
CONSULT NOTE     CHIEF COMPLAINT  Chief Complaint   Patient presents with   • Lung Nodule     7mm and 3 mm New Patient        Primary Care Provider  Chucho Colon     Referring Provider  Chucho Colon, *    Patient Complaint    ICD-10-CM ICD-9-CM   1. Lung nodule < 6cm on CT  R91.1 793.11   2. RADHA (obstructive sleep apnea)  G47.33 327.23   3. Ex-smoker  Z87.891 V15.82   4. Dyspnea on exertion  R06.00 786.09   5. Allergy, initial encounter  T78.40XA 995.3   6. Class 2 obesity due to excess calories with body mass index (BMI) of 35.0 to 35.9 in adult, unspecified whether serious comorbidity present  E66.09 278.00    Z68.35 V85.35            Subjective          History of Presenting Illness  Jana I Schoenbaechler is a 64 y.o. female referred to the pulmonary practice for the lung nodule. Recently the patient has been having some significant chest discomfort and at that time patient was taken to the emergency room and found that patient has significant cardiac arrhythmias and patient was put on pacemaker. After that patient symptoms got some better and her shortness of breath is decreased but still feeling very fatigued. During the process patient had the CT scan of the chest done without the dye and found to having 6 mm lung nodule and hence the patient is referred here. Patient used to smoke very heavily and quit smoking in May 2017 but unfortunately stopped smoking the electronic cigarettes. 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 Patient was told that she snores and wake up multiple times during the nighttime and extremely tired during the day and take the nap on regular basis. Denies any fever, chills, sweating or any exposure to anybody who is sick around her. No history of exposure to tuberculosis. [place]  Patient gained weight because she quit  DATE OF SERVICE:  05/25/2017    DATE OF SERVICE:  05/25/2017    SURGEON:  Felton Escobar MD    FIRST ASSISTANT:  Matthew Reynoso PA-C    ANESTHESIOLOGIST:  Hugo Mcghee MD    PREOPERATIVE DIAGNOSIS:  Severe lumbar stenosis L4-L5.    POSTOPERATIVE DIAGNOSIS:  Severe lumbar stenosis L4-L5.    PROCEDURE PERFORMED:  1.  Bilateral L5 transpedicular approach for decompression of bilateral L5   nerve roots.  2.  Complete L4, L5, and S1 laminectomy.  3.  Foraminotomy bilateral L4, L5, and S1 nerve roots.    INDICATION FOR PROCEDURE:  This is a 71-year-old male with progressive   immobility and profound weakness of his left lower extremity, who was admitted   to the hospital and an MRI showed very severe central stenosis at L4-L5 due   to massive facet hypertrophy bilaterally as well as ligamentous hypertrophy.    Surgery is indicated to decompress his nerve roots, alleviate his symptoms,   help him become mobile again.  He understood the risks, benefits, and   alternatives of the procedure and wished to proceed.    PROCEDURE IN DETAIL:  The patient was brought to the operating room, intubated   by the anesthesiologist, placed prone on a Matthew OSI table.  Preoperative   fluoroscopy was used to confirm the appropriate surgical level.  Skin incision   was marked.  He was prepped and draped in the usual sterile fashion.  0.5%   Marcaine with epinephrine was used to infiltrate the skin.  A 10 blade was   used to sharply incise the skin and subcutaneous tissue.  Monopolar   electrocautery was used to subperiosteally dissect the multifidus muscle off   the spinous process of lamina at L4, L5, and S1.  Self-retaining retractor was   placed.  Intraoperative fluoroscopy was used to confirm the appropriate   surgical level.  A very wide laminectomy of L4, L5, and S1 was completed with   a Leksell rongeur, high speed Midas Monty drill and a 3 and 4 mm Kerrison punch.    At the completion of this, there was still significant lateral  recess   stenosis at the L4-L5 interspace bilaterally, which was anticipated from the   preoperative MRI.  Because of this, bilateral transpedicular L5 approach was   required to alleviate the severe lateral recess stenosis, undercut the facets   appropriately, decompressed the traversing L5 nerve roots as well as rest of   the cauda equina nerve roots.  This was completed with the high speed Midas   Monty drill and 3 mm Kerrison punch.  Generous foraminotomies of bilateral L4,   L5, and S1 nerve roots were created with 4 mm and 5 mm Kerrison punch,   removing massively hypertrophic ligamentum flavum at these levels.  All bone   edges were curetted to remove any sharp edges.  Hemostasis was achieved with   Surgiflo.  Epidural veins were bipolared.  The wound was copiously irrigated.    2 g of vancomycin powder was infiltrated deep in the wound.  A drain was left   in the epidural space and tunneled through a separate stab incision and fixed   to the skin with a suture.  The wound was closed in layers.  0.5% Marcaine   without epinephrine was infiltrated into the deep paraspinal musculature for   postoperative analgesia.  Steri-Strips were applied to the skin.  Sterile   dressing was applied.  Patient was extubated in the operating room, taken to   PACU in stable condition.    ESTIMATED BLOOD LOSS:  100 mL    BLOOD PRODUCTS:  1 unit of platelets given.    COMPLICATIONS:  None.       ____________________________________     Felton Escobar MD SA / LEEANN    DD:  05/25/2017 20:17:52  DT:  05/25/2017 22:33:41    D#:  [id number]  Job#:  [id number]   smoking. Denied any chest pain, palpitation or any other related diaphoretic symptoms.  Denied any weakness or any dizzy spells at this time.  I have personally reviewed the review of systems, past family, social, medical and surgical histories; and agree with their findings.  HISTORY    History reviewed. No pertinent family history.     Social History     Socioeconomic History   • Marital status:    Tobacco Use   • Smoking status: Former Smoker     Years: 40.00     Types: Cigarettes     Quit date: 2017     Years since quittin.6   • Smokeless tobacco: Never Used   Vaping Use   • Vaping Use: Former   • Substances: Nicotine   • Devices: Refillable tank   Substance and Sexual Activity   • Alcohol use: Not Currently   • Drug use: Never   • Sexual activity: Not Currently        Past Medical History:   Diagnosis Date   • Arthritis    • CHB s/p cardiac pacemaker 10/25/2021   • Chronic HFrEF (heart failure with reduced ejection fraction)  10/25/2021    10/25/21 echo: Estimated right ventricular systolic pressure from tricuspid regurgitation is markedly elevated (>55 mmHg). Mild LVH. Estimated left ventricular EF = 45% Left ventricular systolic function is mildly decreased.     • GERD (gastroesophageal reflux disease)    • Glaucoma     LEFT EYE   • Hypertension    • Osteoporosis         Immunization History   Administered Date(s) Administered   • Fluzone High Dose =>65 Years ([employer] ONLY) 10/08/2020       No Known Allergies       Current Outpatient Medications:   •  alendronate (FOSAMAX) 70 MG tablet, Take 1 tablet by mouth Every 7 (Seven) Days. Pt takes on , Disp: , Rfl:   •  brimonidine (ALPHAGAN) 0.2 % ophthalmic solution, Administer 1 drop to both eyes 2 (two) times a day., Disp: , Rfl:   •  calcium carbonate (TUMS) 500 MG chewable tablet, Chew 1 tablet Daily., Disp: , Rfl:   •  cholecalciferol (VITAMIN D3) 25 MCG (1000 UT) tablet, Take 1.25 Units by mouth Take As Directed. Every 5 days, Disp: ,  Rfl:   •  dorzolamide-timolol (COSOPT) 22.3-6.8 MG/ML ophthalmic solution, Administer 1 drop to both eyes 2 (Two) Times a Day., Disp: , Rfl:   •  ergocalciferol (ERGOCALCIFEROL) 1.25 MG (66885 UT) capsule, Take 50,000 Units by mouth Take As Directed. Pt takes every 5 days, Disp: , Rfl:   •  HYDROcod Polst-CPM Polst ER (TUSSIONEX PENNKINETIC) 10-8 MG/5ML ER suspension, , Disp: , Rfl:   •  hyoscyamine (ANASPAZ,LEVSIN) 0.125 MG tablet, Take 0.125 mg by mouth Every 4 (Four) Hours As Needed for Cramping., Disp: , Rfl:   •  latanoprost (XALATAN) 0.005 % ophthalmic solution, Administer 1 drop to both eyes Every Night., Disp: , Rfl:   •  lisinopril (PRINIVIL,ZESTRIL) 5 MG tablet, Take 1 tablet by mouth Daily., Disp: 90 tablet, Rfl: 1  •  Netarsudil Dimesylate (Rhopressa) 0.02 % solution, Apply 1 drop to eye(s) as directed by provider Daily., Disp: , Rfl:   •  pantoprazole (PROTONIX) 40 MG EC tablet, Take 40 mg by mouth Daily., Disp: , Rfl:   •  HYDROcodone-acetaminophen (NORCO) 5-325 MG per tablet, Take 1 tablet by mouth Every 6 (Six) Hours As Needed for Moderate Pain  or Severe Pain  for up to 12 doses., Disp: 12 tablet, Rfl: 0     Smoking status: Quit regular smoking but patient is smoking the e-cigarettes    Objective     Vital Signs:   Vitals:    11/09/21 1506   BP: 147/71   Pulse: 65   Temp: 98.4 °F (36.9 °C)   SpO2: 100%        Physical Exam:  Very pleasant female. Alert and oriented appears to be somewhat anxious and accompanied by her .  HEENT: Atraumatic, anicteric. Significant deviated nasal septum with a narrowed right nasal cavity.  High arched palate. Mallampati class III airway. Overbite. Poor dental hygiene.  Neck: Short and supple. No masses palpable. Trachea central.  Chest and lungs: Clear to auscultation.  Cardiovascular system: Regular rate and rhythm and no murmurs appreciated.  Abdomen: Soft and benign.  Extremities: No clubbing, no cyanosis, no edema.  Central nervous system grossly intact.        Result Review :   I have personally reviewed the CT scan of the chest was done which showed noncalcified 7 mm pulmonary nodule within the right upper lobe and also about the 3 mm noncalcified nodule within the right lower lobe. Intralobular septal thickening with both lungs with a lower lobe predominance felt to be secondary to pulmonary interstitial edema and that this is done in October 26, 2021 chest x-ray post pacemaker is reviewed and did not show any evidence of pneumothorax.         Impression:  Lung nodule  Quit smoking but continued to smoke the electronic cigarettes.  Patient with symptoms of cough and shortness of breath.  Obstructive sleep apnea with the symptoms of snoring multiple awakenings and a feeling very sleepy and take naps every day  Obesity.  Cardiac arrhythmia status post pacemaker placement    Plan:  Reviewed the CAT scan and other findings with the patient and her .  Discussed about the present lung nodule and the odds of getting to be a cancer which are very low.  I did not see any reason to jump to the PET scan at this time however will follow with the repeat CAT scan in 3 months.  Pulmonary function testing.  6-minute walk.  Discussed about the importance of quitting the electronic cigarettes and its effects on the body and on the lungs  Schedule for the home sleep study.  Alpha-1 antitrypsin with the phenotype.  Any problem in between patient to call us or if the symptoms are getting any worse should go to the emergency room.  Did not give her any medical therapy and will decide depending upon the work-up results as ordered above. They expressed understanding. Patient education was given to my chart as well.  Follow Up   Return in about 3 months (around 2/9/2022).  Patient was given instructions and counseling regarding her condition or for health maintenance advice. Please see specific information pulled into the AVS if appropriate.     Donnie Orosco MD

## 2021-11-10 DIAGNOSIS — N40.0 BENIGN PROSTATIC HYPERPLASIA, UNSPECIFIED WHETHER LOWER URINARY TRACT SYMPTOMS PRESENT: ICD-10-CM

## 2021-11-10 DIAGNOSIS — E78.2 MIXED HYPERLIPIDEMIA: ICD-10-CM

## 2021-11-10 DIAGNOSIS — E55.9 VITAMIN D DEFICIENCY: ICD-10-CM

## 2021-11-18 ENCOUNTER — HOSPITAL ENCOUNTER (OUTPATIENT)
Dept: LAB | Facility: MEDICAL CENTER | Age: 76
End: 2021-11-18
Attending: INTERNAL MEDICINE
Payer: MEDICARE

## 2021-11-18 DIAGNOSIS — E78.2 MIXED HYPERLIPIDEMIA: ICD-10-CM

## 2021-11-18 DIAGNOSIS — N40.0 BENIGN PROSTATIC HYPERPLASIA, UNSPECIFIED WHETHER LOWER URINARY TRACT SYMPTOMS PRESENT: ICD-10-CM

## 2021-11-18 DIAGNOSIS — E55.9 VITAMIN D DEFICIENCY: ICD-10-CM

## 2021-11-18 LAB
ALBUMIN SERPL BCP-MCNC: 3.4 G/DL (ref 3.2–4.9)
ALBUMIN/GLOB SERPL: 1.1 G/DL
ALP SERPL-CCNC: 108 U/L (ref 30–99)
ALT SERPL-CCNC: 15 U/L (ref 2–50)
ANION GAP SERPL CALC-SCNC: 8 MMOL/L (ref 7–16)
AST SERPL-CCNC: 18 U/L (ref 12–45)
BASOPHILS # BLD AUTO: 0.9 % (ref 0–1.8)
BASOPHILS # BLD: 0.05 K/UL (ref 0–0.12)
BILIRUB SERPL-MCNC: 1.4 MG/DL (ref 0.1–1.5)
BUN SERPL-MCNC: 26 MG/DL (ref 8–22)
CALCIUM SERPL-MCNC: 8.8 MG/DL (ref 8.5–10.5)
CHLORIDE SERPL-SCNC: 106 MMOL/L (ref 96–112)
CHOLEST SERPL-MCNC: 98 MG/DL (ref 100–199)
CO2 SERPL-SCNC: 25 MMOL/L (ref 20–33)
CREAT SERPL-MCNC: 1.13 MG/DL (ref 0.5–1.4)
EOSINOPHIL # BLD AUTO: 0.34 K/UL (ref 0–0.51)
EOSINOPHIL NFR BLD: 6.4 % (ref 0–6.9)
ERYTHROCYTE [DISTWIDTH] IN BLOOD BY AUTOMATED COUNT: 55.1 FL (ref 35.9–50)
EST. AVERAGE GLUCOSE BLD GHB EST-MCNC: 85 MG/DL
FASTING STATUS PATIENT QL REPORTED: NORMAL
GLOBULIN SER CALC-MCNC: 3 G/DL (ref 1.9–3.5)
GLUCOSE SERPL-MCNC: 101 MG/DL (ref 65–99)
HBA1C MFR BLD: 4.6 % (ref 4–5.6)
HCT VFR BLD AUTO: 37.5 % (ref 42–52)
HDLC SERPL-MCNC: 63 MG/DL
HGB BLD-MCNC: 11.7 G/DL (ref 14–18)
IMM GRANULOCYTES # BLD AUTO: 0.02 K/UL (ref 0–0.11)
IMM GRANULOCYTES NFR BLD AUTO: 0.4 % (ref 0–0.9)
LDLC SERPL CALC-MCNC: 27 MG/DL
LYMPHOCYTES # BLD AUTO: 0.49 K/UL (ref 1–4.8)
LYMPHOCYTES NFR BLD: 9.2 % (ref 22–41)
MCH RBC QN AUTO: 29.4 PG (ref 27–33)
MCHC RBC AUTO-ENTMCNC: 31.2 G/DL (ref 33.7–35.3)
MCV RBC AUTO: 94.2 FL (ref 81.4–97.8)
MONOCYTES # BLD AUTO: 0.56 K/UL (ref 0–0.85)
MONOCYTES NFR BLD AUTO: 10.5 % (ref 0–13.4)
NEUTROPHILS # BLD AUTO: 3.89 K/UL (ref 1.82–7.42)
NEUTROPHILS NFR BLD: 72.6 % (ref 44–72)
NRBC # BLD AUTO: 0 K/UL
NRBC BLD-RTO: 0 /100 WBC
PLATELET # BLD AUTO: 63 K/UL (ref 164–446)
PMV BLD AUTO: 10.4 FL (ref 9–12.9)
POTASSIUM SERPL-SCNC: 4.9 MMOL/L (ref 3.6–5.5)
PROT SERPL-MCNC: 6.4 G/DL (ref 6–8.2)
PSA SERPL-MCNC: 0.35 NG/ML (ref 0–4)
RBC # BLD AUTO: 3.98 M/UL (ref 4.7–6.1)
SODIUM SERPL-SCNC: 139 MMOL/L (ref 135–145)
TRIGL SERPL-MCNC: 39 MG/DL (ref 0–149)
TSH SERPL DL<=0.005 MIU/L-ACNC: 2.84 UIU/ML (ref 0.38–5.33)
WBC # BLD AUTO: 5.4 K/UL (ref 4.8–10.8)

## 2021-11-18 PROCEDURE — 80053 COMPREHEN METABOLIC PANEL: CPT

## 2021-11-18 PROCEDURE — 36415 COLL VENOUS BLD VENIPUNCTURE: CPT

## 2021-11-18 PROCEDURE — 84443 ASSAY THYROID STIM HORMONE: CPT

## 2021-11-18 PROCEDURE — 83036 HEMOGLOBIN GLYCOSYLATED A1C: CPT

## 2021-11-18 PROCEDURE — 84153 ASSAY OF PSA TOTAL: CPT

## 2021-11-18 PROCEDURE — 82306 VITAMIN D 25 HYDROXY: CPT

## 2021-11-18 PROCEDURE — 85025 COMPLETE CBC W/AUTO DIFF WBC: CPT

## 2021-11-18 PROCEDURE — 80061 LIPID PANEL: CPT

## 2021-11-19 LAB — 25(OH)D3 SERPL-MCNC: 30 NG/ML (ref 30–80)

## 2021-11-23 ENCOUNTER — OFFICE VISIT (OUTPATIENT)
Dept: MEDICAL GROUP | Age: 76
End: 2021-11-23
Payer: MEDICARE

## 2021-11-23 VITALS
SYSTOLIC BLOOD PRESSURE: 110 MMHG | OXYGEN SATURATION: 99 % | DIASTOLIC BLOOD PRESSURE: 58 MMHG | BODY MASS INDEX: 29.62 KG/M2 | HEART RATE: 61 BPM | WEIGHT: 238.2 LBS | TEMPERATURE: 97.9 F | HEIGHT: 75 IN

## 2021-11-23 DIAGNOSIS — E78.2 MIXED HYPERLIPIDEMIA: ICD-10-CM

## 2021-11-23 DIAGNOSIS — I10 ESSENTIAL HYPERTENSION: ICD-10-CM

## 2021-11-23 DIAGNOSIS — E55.9 VITAMIN D DEFICIENCY: ICD-10-CM

## 2021-11-23 DIAGNOSIS — N40.0 BENIGN PROSTATIC HYPERPLASIA, UNSPECIFIED WHETHER LOWER URINARY TRACT SYMPTOMS PRESENT: ICD-10-CM

## 2021-11-23 DIAGNOSIS — R73.01 IFG (IMPAIRED FASTING GLUCOSE): ICD-10-CM

## 2021-11-23 PROCEDURE — 99214 OFFICE O/P EST MOD 30 MIN: CPT | Performed by: INTERNAL MEDICINE

## 2021-11-23 RX ORDER — SPIRONOLACTONE 50 MG/1
TABLET, FILM COATED ORAL
COMMUNITY
Start: 2021-09-09 | End: 2021-11-23

## 2021-11-23 ASSESSMENT — PATIENT HEALTH QUESTIONNAIRE - PHQ9: CLINICAL INTERPRETATION OF PHQ2 SCORE: 0

## 2021-11-23 ASSESSMENT — FIBROSIS 4 INDEX: FIB4 SCORE: 5.68

## 2021-11-23 ASSESSMENT — ACTIVITIES OF DAILY LIVING (ADL): BATHING_REQUIRES_ASSISTANCE: 0

## 2021-11-23 ASSESSMENT — ENCOUNTER SYMPTOMS: GENERAL WELL-BEING: GOOD

## 2021-11-23 NOTE — PROGRESS NOTES
Chief Complaint   Patient presents with   • Annual Wellness Visit         HPI:  Torey is a 76 y.o. here for Medicare Annual Wellness Visit         Patient Active Problem List    Diagnosis Date Noted   • Spinal enthesopathy of thoracic region (HCC) 04/07/2020   • Secondary malignant neoplasm of liver (HCC) 04/23/2019   • Thrombocytopenia, unspecified (HCC)- from previous chemorx 04/23/2019   • Chronic deep vein thrombosis (DVT) of femoral vein of left lower extremity (HCC)- s/p IVC filter 2016 11/13/2018   • Portal hypertension with esophageal varices (HCC) 11/13/2018   • Generalized edema 12/14/2017   • Obesity (BMI 30-39.9) 12/14/2017   • Edema of left lower extremity- due to dvt july 2017 09/19/2017   • BPH (benign prostatic hyperplasia) 08/11/2017   • Peripheral neuropathy due to chemotherapy (HCC) 08/11/2017   • H/O colon cancer, stage IV- neg colonoscopy 2012/2018 at [facility] 05/20/2017   • History of esophageal varices 10/24/2016   • Portal vein thrombosis- on CT [facility] 2/2015 04/14/2016   • Cirrhosis of liver with ascites (HCC)- ? DUE TO OLD HEP B, CHEMO RX,  OR THERMO RAD OF MAGNANT LESION 04/14/2016   • Secondary esophageal varices without bleeding (HCC)- BANDED 4/2016- repeat egd tbd 10//29/16- [facility] 04/04/2016   • Gastric varices- s/p BRTO procedure at [facility] 04/04/2016   • Gastrointestinal hemorrhage with hematemesis- recurrernt from varices 04/03/2016   • Mixed hyperlipidemia 06/20/2013   • Essential hypertension 06/20/2013   • ASCVD (arteriosclerotic cardiovascular disease)subtotalled small distal LCx and 40% LAD med rx by cath 12/13/12 12/14/2012   • Vitamin D deficiency 08/02/2012   • Overlapping malignant neoplasm of colon (HCC)Overlapping malignant neoplasm of colon (HCC)--2010 left hemicolectomy; no colostomy; ablation of liver met at [facility] 1/2011- MRI abd 3/2018 no change in per 12/06/2010   • Liver lesion- ablation of malignant met from colon ca 2011- f/u [facility] q 6 mos - stable MRI abd 3/2018- redo annually  12/06/2010   • Drug-induced polyneuropathy (HCC) 12/06/2010       Current Outpatient Medications   Medication Sig Dispense Refill   • potassium chloride SA (KDUR) 20 MEQ Tab CR TAKE 1 TABLET BY MOUTH TWICE DAILY 200 Tab 4   • atorvastatin (LIPITOR) 40 MG Tab TAKE ONE TABLET BY MOUTH AT BEDTIME 100 Tab 4   • spironolactone (ALDACTONE) 25 MG Tab Take 1 tablet by mouth once daily (Patient taking differently: 50 mg. Take 1 tablet by mouth once daily) 90 Tab 4   • folic acid (FOLVITE) 1 MG Tab Take 1 tablet by mouth once daily 100 Tab 4   • ursodiol (ACTIGALL) 300 MG Cap Take 1 capsule by mouth once daily 90 Cap 4   • torsemide (DEMADEX) 10 MG tablet Take 2 tablets by mouth once daily 180 Tab 4   • amoxicillin (AMOXIL) 500 MG Cap Take 4 Caps by mouth Pre-Op Once PRN (prior to dental work or other surgical procedures) for up to 1 dose. 28 Cap 2   • Cholecalciferol (VITAMIN D3) 1000 units Cap Take 1,000 Units by mouth.     • ferrous sulfate 325 (65 Fe) MG tablet Take 1 Tab by mouth every morning with breakfast. 30 Tab    • metOLazone (ZAROXOLYN) 2.5 MG Tab Take 1 tablet by mouth once daily (Patient not taking: Reported on 11/23/2021) 90 Tab 4     No current facility-administered medications for this visit.        Patient is taking medications as noted in medication list.  Current supplements as per medication list.     Allergies: Influenza virus vaccine live, Anticoagulant sodium citrate [sodium citrate], and Lisinopril    Current social contact/activities: Patient states he visit with family.        Is patient current with immunizations? Yes.    He  reports that he quit smoking about 57 years ago. His smoking use included cigarettes and cigars. He has a 0.50 pack-year smoking history. He has never used smokeless tobacco. He reports that he does not drink alcohol and does not use drugs.  Counseling given: Not Answered  Comment: 1 cigar per week.        DPA/Advanced directive: Patient has Advanced Directive on file.      ROS:    Gait: Uses no assistive device    Ostomy: No    Other tubes: No    Amputations: No    Chronic oxygen use No    Last eye exam DEC.2021    Wears hearing aids: No    : Denies any urinary leakage during the last 6 months       Screening:         Depression Screening    Little interest or pleasure in doing things?  0 - not at all  Feeling down, depressed, or hopeless? 0 - not at all  Patient Health Questionnaire Score: 0    If depressive symptoms identified deferred to follow up visit unless specifically addressed in assessment and plan.    Interpretation of PHQ-9 Total Score   Score Severity   1-4 No Depression   5-9 Mild Depression   10-14 Moderate Depression   15-19 Moderately Severe Depression   20-27 Severe Depression    Screening for Cognitive Impairment    Three Minute Recall (captain, garden, picture)  3/3    Humberto clock face with all 12 numbers and set the hands to show 5 past 8.  Yes 5/5  If cognitive concerns identified, deferred for follow up unless specifically addressed in assessment and plan.    Fall Risk Assessment    Has the patient had two or more falls in the last year or any fall with injury in the last year?  No  If fall risk identified, deferred for follow up unless specifically addressed in assessment and plan.    Safety Assessment    Throw rugs on floor.  No  Handrails on all stairs.  Yes  Good lighting in all hallways.  Yes  Difficulty hearing.  Yes  Patient counseled about all safety risks that were identified.    Functional Assessment ADLs    Are there any barriers preventing you from cooking for yourself or meeting nutritional needs?  No.    Are there any barriers preventing you from driving safely or obtaining transportation?  No.    Are there any barriers preventing you from using a telephone or calling for help?  No.    Are there any barriers preventing you from shopping?  No.    Are there any barriers preventing you from taking care of your own finances?  No.    Are there any  barriers preventing you from managing your medications?  No.    Are there any barriers preventing you from showering, bathing or dressing yourself?  No.    Are you currently engaging in any exercise or physical activity?  Yes.  Patient states he walks everyday.   What is your perception of your health?  Good.    Health Maintenance Summary          Overdue - IMM HEP B VACCINE (1 of 3 - Risk 3-dose series) Overdue - never done    No completion history exists for this topic.          Overdue - IMM PNEUMOCOCCAL VACCINE: 65+ Years (1 of 2 - PPSV23) Overdue since 12/4/2015    10/09/2015  Imm Admin: Pneumococcal Conjugate Vaccine (Prevnar/PCV-13)    10/09/2015  Imm Admin: Pneumococcal polysaccharide vaccine (PPSV-23)    10/26/2011  Imm Admin: Pneumococcal Vaccine (UF) - HISTORICAL DATA    10/26/2011  Imm Admin: Pneumococcal polysaccharide vaccine (PPSV-23)          Overdue - Annual Wellness Visit (Every 366 Days) Overdue since 8/2/2020 08/02/2019  Visit Dx: Medicare annual wellness visit, subsequent    08/02/2019  Subsequent Annual Wellness Visit - Includes PPPS ()    08/02/2019  Subsequent Annual Wellness Visit - Includes PPPS ()    06/21/2016  Visit Dx: Medicare annual wellness visit, subsequent          IMM DTaP/Tdap/Td Vaccine (2 - Td or Tdap) Next due on 4/25/2022 04/25/2012  Imm Admin: Tdap Vaccine    04/25/2012  Imm Admin: Dtap Vaccine          COLORECTAL CANCER SCREENING (COLONOSCOPY - Every 5 Years) Tentatively due on 9/21/2025 09/21/2020  REFERRAL TO GI FOR COLONOSCOPY    02/01/2018  REFERRAL TO GI FOR COLONOSCOPY    05/20/2017  OCCULT BLOOD X3 (STOOL)    08/19/2014  AMB REFERRAL TO GI FOR COLONOSCOPY    04/19/2012  OCCULT BLOOD FECES IMMUNOASSAY    Only the first 5 history entries have been loaded, but more history exists.          COVID-19 Vaccine (Series Information) Completed    11/04/2021  Imm Admin: Pfizer SARS-CoV-2 Vaccine 12+    02/18/2021  Imm Admin: Pfizer SARS-CoV-2 Vaccine     2021  Imm Admin: Pfizer SARS-CoV-2 Vaccine          IMM MENINGOCOCCAL VACCINE (MCV4) (Series Information) Aged Out    No completion history exists for this topic.          Discontinued - IMM INFLUENZA  Discontinued    10/10/2016  Imm Admin: Influenza Vaccine Adult HD    10/09/2015  Imm Admin: Influenza Vaccine Adult HD    10/20/2014  Imm Admin: Influenza Vaccine Quad Inj (Pf)    10/10/2013  Imm Admin: Influenza Vaccine Pediatric Split - Historical Data    10/10/2013  Imm Admin: Influenza Seasonal Injectable - Historical Data    Only the first 5 history entries have been loaded, but more history exists.          Discontinued - IMM ZOSTER VACCINES  Discontinued    2012  Imm Admin: Zoster Vaccine Live (ZVL) (Zostavax) - HISTORICAL DATA                Patient Care Team:  Fabián Urena M.D. as PCP - General  Pierre Waggoner M.D. as Consulting Physician (Gastroenterology)  Sal Yao M.D. as Consulting Physician (Medical Oncology)  Herman Corey O.D. as Consulting Physician (Optometry)  Geovany Nuñez, PT, DPT, OCS as Physical Therapist (Physical Therapy)  Payal Singh (Inactive) as [facility]     Social History     Tobacco Use   • Smoking status: Former Smoker     Packs/day: 0.50     Years: 1.00     Pack years: 0.50     Types: Cigarettes, Cigars     Quit date: 1964     Years since quittin.9   • Smokeless tobacco: Never Used   • Tobacco comment: 1 cigar per week.   Vaping Use   • Vaping Use: Never used   Substance Use Topics   • Alcohol use: No     Alcohol/week: 0.6 oz     Types: 1 Standard drinks or equivalent per week     Comment: minimal, Pt stop drinking since the beginning of 2016   • Drug use: No     Family History   Problem Relation Age of Onset   • Heart Disease Mother    • Cancer Mother    • Sleep Apnea Neg Hx      He  has a past medical history of ASCVD (arteriosclerotic cardiovascular disease) (2012), BMI 33.0-33.9,adult (2/15/2013),  Cancer (HCC) (10/10-6/11), Chickenpox, Colon cancer (HCC) (12/6/2010), Elevated CEA (12/6/2010), Armenian measles, GI bleed, HLD (hyperlipidemia) (6/20/2013), HTN (hypertension) (6/20/2013), Hypertension, Impaired fasting glucose (8/2/2012), Liver cirrhosis (HCC), Liver lesion (12/6/2010), Peripheral neuropathy, secondary to drugs or chemicals (12/6/2010), Thrombocytopenia due to drugs (4/25/2012), and Vitamin d deficiency (8/2/2012).   Past Surgical History:   Procedure Laterality Date   • LUMBAR LAMINECTOMY DISKECTOMY  5/25/2017    Procedure:  LAMINECTOMY LUMBAR  4 TO SACRAL 1;  Surgeon: Felton Escobar M.D.;  Location: SURGERY [facility];  Service:    • GASTROSCOPY WITH BANDING  10/25/2016    Procedure: GASTROSCOPY WITH BANDING;  Surgeon: Pierre Waggoner M.D.;  Location: ENDOSCOPY [facility];  Service:    • GASTROSCOPY WITH BANDING  4/3/2016    Procedure: GASTROSCOPY WITH BANDING;  Surgeon: Cayetano Stovall M.D.;  Location: ENDOSCOPY [facility];  Service:    • FULL THICKNESS BLOCK RESECTION  4/11/2012    Performed by LANI BOWMAN at [facility]   • CATH REMOVAL  7/20/2011    Performed by RUBY RUIZ at [facility]   • CATH PLACEMENT  9/20/2010    Performed by RUBY RUIZ at SURGERY [facility]   • LOW ANTERIOR RESECTION ROBOTIC  8/10/2010    Performed by RUBY RUIZ at [facility]   • COLON RESECTION     • HIP ARTHROPLASTY MIS TOTAL      rt   • HIP REPLACEMENT, TOTAL     • OTHER      Cholecystectomy   • OTHER (COMMENTS)      liver surgery             Exam:     There were no vitals taken for this visit. There is no height or weight on file to calculate BMI.    Hearing good.    Dentition good  Alert, oriented in no acute distress.  Eye contact is good, speech goal directed, affect calm       Assessment and Plan. The following treatment and monitoring plan is recommended:     1. Vitamin D deficiency    Under good control. Continue  same regimen.    - VITAMIN D,25 HYDROXY; Future    2. Mixed hyperlipidemia    Under good control. Continue same regimen.  - Comp Metabolic Panel; Future  - Lipid Profile; Future  - CBC WITH DIFFERENTIAL; Future  - TSH; Future    3. Benign prostatic hyperplasia, unspecified whether lower urinary tract symptoms present    Under good control. Continue same regimen.  - PROSTATE SPECIFIC AG DIAGNOSTIC; Future    4. Essential hypertension    Under good control. Continue same regimen.  - Comp Metabolic Panel; Future  - Lipid Profile; Future  - CBC WITH DIFFERENTIAL; Future  - TSH; Future    5. IFG (impaired fasting glucose)    Under good control. Continue same regimen.  - HEMOGLOBIN A1C; Future       Services suggested: No services needed at this time    Health Care Screening recommendations as per orders if indicated.  Referrals offered: PT/OT/Nutrition counseling/Behavioral Health/Smoking cessation as per orders if indicated.    Discussion today about general wellness and lifestyle habits:    · Prevent falls and reduce trip hazards; Cautioned about securing or removing rugs.  · Have a working fire alarm and carbon monoxide detector;   · Engage in regular physical activity and social activities       Follow-up: No follow-ups on file.

## 2021-12-04 NOTE — DISCHARGE PLANNING
CASE MANAGEMENT / I met with pt and spouse providing an update on team conference, plan of care, and projected dc date of 9/16 with anticipated recommendations for home health.     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 Had rtesidual stone in dorsal duct distribution, on previous CT, and confirmed by EUS. ERCP April 2021 with needle knife precut of minor papilla, fairly deep, unable to pass wire deeply across minor papilla due to obstructing stone, either antegrade through major papilla w papilltome and wire in minor, two 4 F main duct stents to body through major papilla. Patient here now for ESWL followed by ERCP     Findings  - Major papilla patent, main duct patent to tail, reflux through minor. No residual large stone as prior.   - Swept major duct and removed residual stones fragments from yesterday's ESWL, injected methylene blue to identify orifice of minor papilla, unable to cannulate minor papilla despite multiple devices.   - Needle knife precut of minor papilla, still unable to cannulate minor with papillotome.                       - Passed papillotomy antegrade through ventral out dorsal duct, wire bounced off obstructing dorsal duct stone and into large side-branch.  - Placement of two 4 F main duct stents to body through major papilla.     Plan  - Observe patient in PACU for 4 hours for possible discharge same day.   - Avoid aspirin and nonsteroidal anti-inflammatory medicines for 3 days.   - Lipase amlyase at 2 hours  - Observe clinical course and repeat CT A/P with contrast in 2-3 months,followed by ERCP within same visit if  residual pain and stone, depending on patient's availability from [place].  - The findings and recommendations were discussed with the patient and their family.

## 2021-12-08 ENCOUNTER — APPOINTMENT (RX ONLY)
Dept: URBAN - METROPOLITAN AREA CLINIC 35 | Facility: CLINIC | Age: 76
Setting detail: DERMATOLOGY
End: 2021-12-08

## 2021-12-08 DIAGNOSIS — Z71.89 OTHER SPECIFIED COUNSELING: ICD-10-CM

## 2021-12-08 DIAGNOSIS — L81.4 OTHER MELANIN HYPERPIGMENTATION: ICD-10-CM

## 2021-12-08 DIAGNOSIS — L82.1 OTHER SEBORRHEIC KERATOSIS: ICD-10-CM

## 2021-12-08 DIAGNOSIS — D22 MELANOCYTIC NEVI: ICD-10-CM

## 2021-12-08 DIAGNOSIS — Z85.828 PERSONAL HISTORY OF OTHER MALIGNANT NEOPLASM OF SKIN: ICD-10-CM

## 2021-12-08 DIAGNOSIS — L30.9 DERMATITIS, UNSPECIFIED: ICD-10-CM

## 2021-12-08 DIAGNOSIS — L57.0 ACTINIC KERATOSIS: ICD-10-CM

## 2021-12-08 PROBLEM — D22.5 MELANOCYTIC NEVI OF TRUNK: Status: ACTIVE | Noted: 2021-12-08

## 2021-12-08 PROCEDURE — 17000 DESTRUCT PREMALG LESION: CPT

## 2021-12-08 PROCEDURE — ? LIQUID NITROGEN

## 2021-12-08 PROCEDURE — 17003 DESTRUCT PREMALG LES 2-14: CPT

## 2021-12-08 PROCEDURE — ? COUNSELING

## 2021-12-08 PROCEDURE — 99213 OFFICE O/P EST LOW 20 MIN: CPT | Mod: 25

## 2021-12-08 ASSESSMENT — LOCATION DETAILED DESCRIPTION DERM
LOCATION DETAILED: INFERIOR THORACIC SPINE
LOCATION DETAILED: LEFT POSTERIOR SHOULDER
LOCATION DETAILED: RIGHT POSTERIOR SHOULDER
LOCATION DETAILED: LEFT OCCIPITAL SCALP
LOCATION DETAILED: RIGHT CENTRAL PARIETAL SCALP
LOCATION DETAILED: LEFT DISTAL DORSAL FOREARM
LOCATION DETAILED: RIGHT INFERIOR MEDIAL UPPER BACK
LOCATION DETAILED: EPIGASTRIC SKIN
LOCATION DETAILED: RIGHT SUPERIOR OCCIPITAL SCALP
LOCATION DETAILED: LEFT NASAL ALA
LOCATION DETAILED: RIGHT SUPERIOR PARIETAL SCALP
LOCATION DETAILED: PERIUMBILICAL SKIN
LOCATION DETAILED: RIGHT DISTAL DORSAL FOREARM

## 2021-12-08 ASSESSMENT — LOCATION SIMPLE DESCRIPTION DERM
LOCATION SIMPLE: LEFT NOSE
LOCATION SIMPLE: ABDOMEN
LOCATION SIMPLE: RIGHT FOREARM
LOCATION SIMPLE: POSTERIOR SCALP
LOCATION SIMPLE: RIGHT OCCIPITAL SCALP
LOCATION SIMPLE: SCALP
LOCATION SIMPLE: LEFT SHOULDER
LOCATION SIMPLE: UPPER BACK
LOCATION SIMPLE: RIGHT UPPER BACK
LOCATION SIMPLE: LEFT FOREARM
LOCATION SIMPLE: RIGHT SHOULDER

## 2021-12-08 ASSESSMENT — LOCATION ZONE DERM
LOCATION ZONE: ARM
LOCATION ZONE: NOSE
LOCATION ZONE: TRUNK
LOCATION ZONE: SCALP

## 2021-12-20 ENCOUNTER — HOSPITAL ENCOUNTER (OUTPATIENT)
Dept: RADIOLOGY | Facility: MEDICAL CENTER | Age: 76
End: 2021-12-20
Payer: MEDICARE

## 2021-12-20 DIAGNOSIS — R16.0 LIVER MASS, RIGHT LOBE: ICD-10-CM

## 2021-12-20 DIAGNOSIS — K76.6 PORTAL HYPERTENSION (HCC): ICD-10-CM

## 2021-12-20 PROCEDURE — 74178 CT ABD&PLV WO CNTR FLWD CNTR: CPT

## 2021-12-20 PROCEDURE — 700117 HCHG RX CONTRAST REV CODE 255

## 2021-12-20 RX ADMIN — IOHEXOL 100 ML: 350 INJECTION, SOLUTION INTRAVENOUS at 09:49

## 2022-01-06 ENCOUNTER — HOSPITAL ENCOUNTER (OUTPATIENT)
Dept: LAB | Facility: MEDICAL CENTER | Age: 77
End: 2022-01-06
Payer: MEDICARE

## 2022-01-06 LAB
ALBUMIN SERPL BCP-MCNC: 3.8 G/DL (ref 3.2–4.9)
ALBUMIN/GLOB SERPL: 1.6 G/DL
ALP SERPL-CCNC: 86 U/L (ref 30–99)
ALT SERPL-CCNC: 20 U/L (ref 2–50)
ANION GAP SERPL CALC-SCNC: 10 MMOL/L (ref 7–16)
AST SERPL-CCNC: 26 U/L (ref 12–45)
BASOPHILS # BLD AUTO: 0.6 % (ref 0–1.8)
BASOPHILS # BLD: 0.03 K/UL (ref 0–0.12)
BILIRUB SERPL-MCNC: 1.8 MG/DL (ref 0.1–1.5)
BUN SERPL-MCNC: 18 MG/DL (ref 8–22)
CALCIUM SERPL-MCNC: 8.7 MG/DL (ref 8.5–10.5)
CHLORIDE SERPL-SCNC: 105 MMOL/L (ref 96–112)
CO2 SERPL-SCNC: 22 MMOL/L (ref 20–33)
CREAT SERPL-MCNC: 1.07 MG/DL (ref 0.5–1.4)
EOSINOPHIL # BLD AUTO: 0.24 K/UL (ref 0–0.51)
EOSINOPHIL NFR BLD: 4.7 % (ref 0–6.9)
ERYTHROCYTE [DISTWIDTH] IN BLOOD BY AUTOMATED COUNT: 55.1 FL (ref 35.9–50)
FASTING STATUS PATIENT QL REPORTED: NORMAL
GLOBULIN SER CALC-MCNC: 2.4 G/DL (ref 1.9–3.5)
GLUCOSE SERPL-MCNC: 98 MG/DL (ref 65–99)
HCT VFR BLD AUTO: 37.1 % (ref 42–52)
HGB BLD-MCNC: 11.8 G/DL (ref 14–18)
IMM GRANULOCYTES # BLD AUTO: 0.02 K/UL (ref 0–0.11)
IMM GRANULOCYTES NFR BLD AUTO: 0.4 % (ref 0–0.9)
INR PPP: 1.3 (ref 0.87–1.13)
LYMPHOCYTES # BLD AUTO: 0.48 K/UL (ref 1–4.8)
LYMPHOCYTES NFR BLD: 9.4 % (ref 22–41)
MCH RBC QN AUTO: 30.3 PG (ref 27–33)
MCHC RBC AUTO-ENTMCNC: 31.8 G/DL (ref 33.7–35.3)
MCV RBC AUTO: 95.1 FL (ref 81.4–97.8)
MONOCYTES # BLD AUTO: 0.46 K/UL (ref 0–0.85)
MONOCYTES NFR BLD AUTO: 9.1 % (ref 0–13.4)
NEUTROPHILS # BLD AUTO: 3.85 K/UL (ref 1.82–7.42)
NEUTROPHILS NFR BLD: 75.8 % (ref 44–72)
NRBC # BLD AUTO: 0 K/UL
NRBC BLD-RTO: 0 /100 WBC
PLATELET # BLD AUTO: 64 K/UL (ref 164–446)
PMV BLD AUTO: 10.5 FL (ref 9–12.9)
POTASSIUM SERPL-SCNC: 4.6 MMOL/L (ref 3.6–5.5)
PROT SERPL-MCNC: 6.2 G/DL (ref 6–8.2)
PROTHROMBIN TIME: 15.8 SEC (ref 12–14.6)
RBC # BLD AUTO: 3.9 M/UL (ref 4.7–6.1)
SODIUM SERPL-SCNC: 137 MMOL/L (ref 135–145)
WBC # BLD AUTO: 5.1 K/UL (ref 4.8–10.8)

## 2022-01-06 PROCEDURE — 85610 PROTHROMBIN TIME: CPT

## 2022-01-06 PROCEDURE — 80053 COMPREHEN METABOLIC PANEL: CPT

## 2022-01-06 PROCEDURE — 85025 COMPLETE CBC W/AUTO DIFF WBC: CPT

## 2022-01-06 PROCEDURE — 36415 COLL VENOUS BLD VENIPUNCTURE: CPT

## 2022-01-06 PROCEDURE — 82105 ALPHA-FETOPROTEIN SERUM: CPT

## 2022-01-07 LAB — AFP-TM SERPL-MCNC: 2 NG/ML (ref 0–9)

## 2022-01-10 ENCOUNTER — TELEPHONE (OUTPATIENT)
Dept: MEDICAL GROUP | Age: 77
End: 2022-01-10

## 2022-01-10 NOTE — TELEPHONE ENCOUNTER
----- Message from Ciaran Martinez M.D. sent at 1/7/2022  6:30 PM PST -----  Pt needs to schedule appointment ASAP to discuss results, thanks, Dr Lam

## 2022-01-10 NOTE — TELEPHONE ENCOUNTER
Phone Number Called: [phone] (home) [phone] (work)    Call outcome: Spoke to patient regarding message below.    Message: Patient is going over labs with [facility] doctor

## 2022-04-21 ENCOUNTER — APPOINTMENT (RX ONLY)
Dept: URBAN - METROPOLITAN AREA CLINIC 6 | Facility: CLINIC | Age: 77
Setting detail: DERMATOLOGY
End: 2022-04-21

## 2022-04-21 DIAGNOSIS — L29.89 OTHER PRURITUS: ICD-10-CM

## 2022-04-21 DIAGNOSIS — L29.8 OTHER PRURITUS: ICD-10-CM

## 2022-04-21 DIAGNOSIS — D69.2 OTHER NONTHROMBOCYTOPENIC PURPURA: ICD-10-CM

## 2022-04-21 PROBLEM — L29.9 PRURITUS, UNSPECIFIED: Status: ACTIVE | Noted: 2022-04-21

## 2022-04-21 PROCEDURE — ? ORDER TESTS

## 2022-04-21 PROCEDURE — ? DIAGNOSIS COMMENT

## 2022-04-21 PROCEDURE — ? COUNSELING

## 2022-04-21 PROCEDURE — 99213 OFFICE O/P EST LOW 20 MIN: CPT

## 2022-04-21 ASSESSMENT — LOCATION ZONE DERM
LOCATION ZONE: ARM
LOCATION ZONE: HAND

## 2022-04-21 ASSESSMENT — LOCATION DETAILED DESCRIPTION DERM
LOCATION DETAILED: LEFT RADIAL DORSAL HAND
LOCATION DETAILED: RIGHT DISTAL DORSAL FOREARM
LOCATION DETAILED: LEFT DISTAL DORSAL FOREARM
LOCATION DETAILED: LEFT ULNAR DORSAL HAND
LOCATION DETAILED: RIGHT RADIAL DORSAL HAND
LOCATION DETAILED: RIGHT ULNAR DORSAL HAND

## 2022-04-21 ASSESSMENT — LOCATION SIMPLE DESCRIPTION DERM
LOCATION SIMPLE: RIGHT HAND
LOCATION SIMPLE: RIGHT FOREARM
LOCATION SIMPLE: LEFT FOREARM
LOCATION SIMPLE: LEFT HAND

## 2022-04-21 NOTE — PROCEDURE: DIAGNOSIS COMMENT
Comment: Present for 2 weeks. Patient does have advanced liver disease being treated by a specialist at [facility]. We will order labs today. Patient reports calamine lotion helps significantly. Moisturizer handout given. Follow up PRN.
Detail Level: Simple
Render Risk Assessment In Note?: no

## 2022-04-21 NOTE — PROCEDURE: ORDER TESTS
Bill For Surgical Tray: no
Performing Laboratory: 0
Expected Date Of Service: 04/21/2022
Billing Type: Third-Party Bill

## 2022-04-21 NOTE — HPI: PRURITUS
How Did Your Itching Occur?: sudden in onset (over a period of weeks to a few months)
How Severe Is Your Itching?: moderate
Additional History: Patient reports a 2 week history of itching on bilateral forearms.  No rash present. No history of the same. No new medications or personal products. Patient does have history of liver disease secondary to chemotherapy and is seeing a specialist.

## 2022-04-22 ENCOUNTER — HOSPITAL ENCOUNTER (OUTPATIENT)
Dept: LAB | Facility: MEDICAL CENTER | Age: 77
End: 2022-04-22
Attending: PHYSICIAN ASSISTANT
Payer: MEDICARE

## 2022-04-22 LAB
ALBUMIN SERPL BCP-MCNC: 4 G/DL (ref 3.2–4.9)
ALBUMIN/GLOB SERPL: 1.7 G/DL
ALP SERPL-CCNC: 98 U/L (ref 30–99)
ALT SERPL-CCNC: 23 U/L (ref 2–50)
ANION GAP SERPL CALC-SCNC: 8 MMOL/L (ref 7–16)
AST SERPL-CCNC: 24 U/L (ref 12–45)
BASOPHILS # BLD AUTO: 0.6 % (ref 0–1.8)
BASOPHILS # BLD: 0.03 K/UL (ref 0–0.12)
BILIRUB SERPL-MCNC: 1.5 MG/DL (ref 0.1–1.5)
BUN SERPL-MCNC: 34 MG/DL (ref 8–22)
CALCIUM SERPL-MCNC: 9.1 MG/DL (ref 8.5–10.5)
CHLORIDE SERPL-SCNC: 104 MMOL/L (ref 96–112)
CO2 SERPL-SCNC: 24 MMOL/L (ref 20–33)
CREAT SERPL-MCNC: 1.27 MG/DL (ref 0.5–1.4)
EOSINOPHIL # BLD AUTO: 0.33 K/UL (ref 0–0.51)
EOSINOPHIL NFR BLD: 6.2 % (ref 0–6.9)
ERYTHROCYTE [DISTWIDTH] IN BLOOD BY AUTOMATED COUNT: 54.3 FL (ref 35.9–50)
ERYTHROCYTE [SEDIMENTATION RATE] IN BLOOD BY WESTERGREN METHOD: 12 MM/HOUR (ref 0–20)
GFR SERPLBLD CREATININE-BSD FMLA CKD-EPI: 58 ML/MIN/1.73 M 2
GLOBULIN SER CALC-MCNC: 2.4 G/DL (ref 1.9–3.5)
GLUCOSE SERPL-MCNC: 96 MG/DL (ref 65–99)
HCT VFR BLD AUTO: 38.8 % (ref 42–52)
HGB BLD-MCNC: 12.1 G/DL (ref 14–18)
IMM GRANULOCYTES # BLD AUTO: 0.02 K/UL (ref 0–0.11)
IMM GRANULOCYTES NFR BLD AUTO: 0.4 % (ref 0–0.9)
LYMPHOCYTES # BLD AUTO: 0.51 K/UL (ref 1–4.8)
LYMPHOCYTES NFR BLD: 9.6 % (ref 22–41)
MCH RBC QN AUTO: 29.1 PG (ref 27–33)
MCHC RBC AUTO-ENTMCNC: 31.2 G/DL (ref 33.7–35.3)
MCV RBC AUTO: 93.3 FL (ref 81.4–97.8)
MONOCYTES # BLD AUTO: 0.53 K/UL (ref 0–0.85)
MONOCYTES NFR BLD AUTO: 9.9 % (ref 0–13.4)
NEUTROPHILS # BLD AUTO: 3.92 K/UL (ref 1.82–7.42)
NEUTROPHILS NFR BLD: 73.3 % (ref 44–72)
NRBC # BLD AUTO: 0 K/UL
NRBC BLD-RTO: 0 /100 WBC
PLATELET # BLD AUTO: 58 K/UL (ref 164–446)
PMV BLD AUTO: 11 FL (ref 9–12.9)
POTASSIUM SERPL-SCNC: 4.8 MMOL/L (ref 3.6–5.5)
PROT SERPL-MCNC: 6.4 G/DL (ref 6–8.2)
RBC # BLD AUTO: 4.16 M/UL (ref 4.7–6.1)
SODIUM SERPL-SCNC: 136 MMOL/L (ref 135–145)
T4 FREE SERPL-MCNC: 1.16 NG/DL (ref 0.93–1.7)
TSH SERPL DL<=0.005 MIU/L-ACNC: 3.66 UIU/ML (ref 0.38–5.33)
WBC # BLD AUTO: 5.3 K/UL (ref 4.8–10.8)

## 2022-04-22 PROCEDURE — 84439 ASSAY OF FREE THYROXINE: CPT

## 2022-04-22 PROCEDURE — 84443 ASSAY THYROID STIM HORMONE: CPT

## 2022-04-22 PROCEDURE — 80053 COMPREHEN METABOLIC PANEL: CPT

## 2022-04-22 PROCEDURE — 36415 COLL VENOUS BLD VENIPUNCTURE: CPT

## 2022-04-22 PROCEDURE — 85652 RBC SED RATE AUTOMATED: CPT

## 2022-04-22 PROCEDURE — 85025 COMPLETE CBC W/AUTO DIFF WBC: CPT

## 2022-04-27 ENCOUNTER — RX ONLY (OUTPATIENT)
Age: 77
Setting detail: RX ONLY
End: 2022-04-27

## 2022-04-27 RX ORDER — TRIAMCINOLONE ACETONIDE 1 MG/G
1 APPLICATION CREAM TOPICAL BID
Qty: 1 | Refills: 2 | Status: ERX | COMMUNITY
Start: 2022-04-27

## 2022-05-02 ENCOUNTER — OFFICE VISIT (OUTPATIENT)
Dept: MEDICAL GROUP | Age: 77
End: 2022-05-02
Payer: MEDICARE

## 2022-05-02 VITALS
WEIGHT: 240.2 LBS | HEIGHT: 75 IN | RESPIRATION RATE: 16 BRPM | DIASTOLIC BLOOD PRESSURE: 76 MMHG | TEMPERATURE: 98.1 F | HEART RATE: 58 BPM | OXYGEN SATURATION: 97 % | BODY MASS INDEX: 29.86 KG/M2 | SYSTOLIC BLOOD PRESSURE: 118 MMHG

## 2022-05-02 DIAGNOSIS — I82.512 CHRONIC DEEP VEIN THROMBOSIS (DVT) OF FEMORAL VEIN OF LEFT LOWER EXTREMITY (HCC): ICD-10-CM

## 2022-05-02 DIAGNOSIS — C18.8 OVERLAPPING MALIGNANT NEOPLASM OF COLON (HCC): ICD-10-CM

## 2022-05-02 DIAGNOSIS — R73.01 IFG (IMPAIRED FASTING GLUCOSE): ICD-10-CM

## 2022-05-02 DIAGNOSIS — I10 ESSENTIAL HYPERTENSION: ICD-10-CM

## 2022-05-02 DIAGNOSIS — I85.10 SECONDARY ESOPHAGEAL VARICES WITHOUT BLEEDING (HCC): ICD-10-CM

## 2022-05-02 DIAGNOSIS — G62.0 PERIPHERAL NEUROPATHY DUE TO CHEMOTHERAPY (HCC): ICD-10-CM

## 2022-05-02 DIAGNOSIS — L30.9 ECZEMA, UNSPECIFIED TYPE: ICD-10-CM

## 2022-05-02 DIAGNOSIS — I81 PORTAL VEIN THROMBOSIS: ICD-10-CM

## 2022-05-02 DIAGNOSIS — N40.1 BENIGN PROSTATIC HYPERPLASIA WITH LOWER URINARY TRACT SYMPTOMS, SYMPTOM DETAILS UNSPECIFIED: ICD-10-CM

## 2022-05-02 DIAGNOSIS — K74.60 CIRRHOSIS OF LIVER WITH ASCITES, UNSPECIFIED HEPATIC CIRRHOSIS TYPE (HCC): ICD-10-CM

## 2022-05-02 DIAGNOSIS — M46.04 SPINAL ENTHESOPATHY OF THORACIC REGION (HCC): ICD-10-CM

## 2022-05-02 DIAGNOSIS — R18.8 CIRRHOSIS OF LIVER WITH ASCITES, UNSPECIFIED HEPATIC CIRRHOSIS TYPE (HCC): ICD-10-CM

## 2022-05-02 DIAGNOSIS — E66.01 CLASS 2 SEVERE OBESITY DUE TO EXCESS CALORIES WITH SERIOUS COMORBIDITY IN ADULT, UNSPECIFIED BMI (HCC): ICD-10-CM

## 2022-05-02 DIAGNOSIS — G62.0 DRUG-INDUCED POLYNEUROPATHY (HCC): ICD-10-CM

## 2022-05-02 DIAGNOSIS — C78.7 SECONDARY MALIGNANT NEOPLASM OF LIVER (HCC): ICD-10-CM

## 2022-05-02 DIAGNOSIS — E78.2 MIXED HYPERLIPIDEMIA: ICD-10-CM

## 2022-05-02 DIAGNOSIS — I85.00 PORTAL HYPERTENSION WITH ESOPHAGEAL VARICES (HCC): ICD-10-CM

## 2022-05-02 DIAGNOSIS — I25.10 ASCVD (ARTERIOSCLEROTIC CARDIOVASCULAR DISEASE): ICD-10-CM

## 2022-05-02 DIAGNOSIS — T45.1X5A PERIPHERAL NEUROPATHY DUE TO CHEMOTHERAPY (HCC): ICD-10-CM

## 2022-05-02 DIAGNOSIS — D69.6 THROMBOCYTOPENIA, UNSPECIFIED (HCC): ICD-10-CM

## 2022-05-02 DIAGNOSIS — K76.6 PORTAL HYPERTENSION WITH ESOPHAGEAL VARICES (HCC): ICD-10-CM

## 2022-05-02 PROBLEM — E66.09 OBESITY DUE TO EXCESS CALORIES WITH SERIOUS COMORBIDITY: Status: ACTIVE | Noted: 2022-05-02

## 2022-05-02 PROCEDURE — 99214 OFFICE O/P EST MOD 30 MIN: CPT | Performed by: INTERNAL MEDICINE

## 2022-05-02 RX ORDER — BETAMETHASONE DIPROPIONATE 0.05 %
1 OINTMENT (GRAM) TOPICAL DAILY
Qty: 45 G | Refills: 1 | Status: SHIPPED | OUTPATIENT
Start: 2022-05-02 | End: 2023-02-28

## 2022-05-02 ASSESSMENT — ENCOUNTER SYMPTOMS
RESPIRATORY NEGATIVE: 1
EYES NEGATIVE: 1
CARDIOVASCULAR NEGATIVE: 1
PSYCHIATRIC NEGATIVE: 1
MUSCULOSKELETAL NEGATIVE: 1
NEUROLOGICAL NEGATIVE: 1
GASTROINTESTINAL NEGATIVE: 1
CONSTITUTIONAL NEGATIVE: 1

## 2022-05-02 ASSESSMENT — PATIENT HEALTH QUESTIONNAIRE - PHQ9: CLINICAL INTERPRETATION OF PHQ2 SCORE: 0

## 2022-05-02 ASSESSMENT — FIBROSIS 4 INDEX: FIB4 SCORE: 6.64

## 2022-05-03 NOTE — PROGRESS NOTES
Subjective     Torey Lima is a 76 y.o. male who presents with Paperwork (Lab review )  and  The patient is here for followup of chronic medical problems listed below. The patient is compliant with medications and having no side effects from them. Denies chest pain, abdominal pain, dyspnea, myalgias, or cough.   Patient Active Problem List    Diagnosis Date Noted   • Obesity due to excess calories with serious comorbidity 05/02/2022   • Spinal enthesopathy of thoracic region (HCC) 04/07/2020   • Secondary malignant neoplasm of liver (HCC) 04/23/2019   • Thrombocytopenia, unspecified (HCC)- from previous chemorx 04/23/2019   • Chronic deep vein thrombosis (DVT) of femoral vein of left lower extremity (HCC)- s/p IVC filter 2016 11/13/2018   • Portal hypertension with esophageal varices (HCC) 11/13/2018   • Generalized edema 12/14/2017   • Obesity (BMI 30-39.9) 12/14/2017   • Edema of left lower extremity- due to dvt july 2017 09/19/2017   • BPH (benign prostatic hyperplasia) 08/11/2017   • Peripheral neuropathy due to chemotherapy (HCC) 08/11/2017   • H/O colon cancer, stage IV- neg colonoscopy 2012/2018 at [facility] 05/20/2017   • History of esophageal varices 10/24/2016   • Portal vein thrombosis- on CT [facility] 2/2015 04/14/2016   • Cirrhosis of liver with ascites (HCC)- ? DUE TO OLD HEP B, CHEMO RX,  OR THERMO RAD OF MAGNANT LESION 04/14/2016   • Secondary esophageal varices without bleeding (HCC)- BANDED 4/2016- repeat egd tbd 10//29/16- [facility] 04/04/2016   • Gastric varices- s/p BRTO procedure at [facility] 04/04/2016   • Gastrointestinal hemorrhage with hematemesis- recurrernt from varices 04/03/2016   • Mixed hyperlipidemia 06/20/2013   • Essential hypertension 06/20/2013   • ASCVD (arteriosclerotic cardiovascular disease)subtotalled small distal LCx and 40% LAD med rx by cath 12/13/12 12/14/2012   • Vitamin D deficiency 08/02/2012   • Overlapping malignant neoplasm of colon (HCC)Overlapping malignant neoplasm of colon  (HCC)--2010 left hemicolectomy; no colostomy; ablation of liver met at [facility] 1/2011- MRI abd 3/2018 no change in per 12/06/2010   • Liver lesion- ablation of malignant met from colon ca 2011- f/u [facility] q 6 mos - stable MRI abd 3/2018- redo annually 12/06/2010   • Drug-induced polyneuropathy (HCC) 12/06/2010     Allergies   Allergen Reactions   • Influenza Virus Vaccine Live      Guillan [place]    • Anticoagulant Sodium Citrate [Sodium Citrate]      HIGH BLEEDING RISK   • Lisinopril Cough     Outpatient Medications Prior to Visit   Medication Sig Dispense Refill   • potassium chloride SA (KDUR) 20 MEQ Tab CR TAKE 1 TABLET BY MOUTH TWICE DAILY 200 Tab 4   • folic acid (FOLVITE) 1 MG Tab Take 1 tablet by mouth once daily 100 Tab 4   • ursodiol (ACTIGALL) 300 MG Cap Take 1 capsule by mouth once daily 90 Cap 4   • torsemide (DEMADEX) 10 MG tablet Take 2 tablets by mouth once daily 180 Tab 4   • Cholecalciferol (VITAMIN D3) 1000 units Cap Take 1,000 Units by mouth.     • ferrous sulfate 325 (65 Fe) MG tablet Take 1 Tab by mouth every morning with breakfast. 30 Tab    • amoxicillin (AMOXIL) 500 MG Cap Take 4 Caps by mouth Pre-Op Once PRN (prior to dental work or other surgical procedures) for up to 1 dose. (Patient not taking: Reported on 5/2/2022) 28 Cap 2     No facility-administered medications prior to visit.     .  Hospital Outpatient Visit on 04/22/2022   Component Date Value   • WBC 04/22/2022 5.3    • RBC 04/22/2022 4.16 (A)   • Hemoglobin 04/22/2022 12.1 (A)   • Hematocrit 04/22/2022 38.8 (A)   • MCV 04/22/2022 93.3    • MCH 04/22/2022 29.1    • MCHC 04/22/2022 31.2 (A)   • RDW 04/22/2022 54.3 (A)   • Platelet Count 04/22/2022 58 (A)   • MPV 04/22/2022 11.0    • Neutrophils-Polys 04/22/2022 73.30 (A)   • Lymphocytes 04/22/2022 9.60 (A)   • Monocytes 04/22/2022 9.90    • Eosinophils 04/22/2022 6.20    • Basophils 04/22/2022 0.60    • Immature Granulocytes 04/22/2022 0.40    • Nucleated RBC 04/22/2022 0.00    •  Neutrophils (Absolute) 04/22/2022 3.92    • Lymphs (Absolute) 04/22/2022 0.51 (A)   • Monos (Absolute) 04/22/2022 0.53    • Eos (Absolute) 04/22/2022 0.33    • Baso (Absolute) 04/22/2022 0.03    • Immature Granulocytes (a* 04/22/2022 0.02    • NRBC (Absolute) 04/22/2022 0.00    • Sodium 04/22/2022 136    • Potassium 04/22/2022 4.8    • Chloride 04/22/2022 104    • Co2 04/22/2022 24    • Anion Gap 04/22/2022 8.0    • Glucose 04/22/2022 96    • Bun 04/22/2022 34 (A)   • Creatinine 04/22/2022 1.27    • Calcium 04/22/2022 9.1    • AST(SGOT) 04/22/2022 24    • ALT(SGPT) 04/22/2022 23    • Alkaline Phosphatase 04/22/2022 98    • Total Bilirubin 04/22/2022 1.5    • Albumin 04/22/2022 4.0    • Total Protein 04/22/2022 6.4    • Globulin 04/22/2022 2.4    • A-G Ratio 04/22/2022 1.7    • Sed Rate Westergren 04/22/2022 12    • TSH 04/22/2022 3.660    • Free T-4 04/22/2022 1.16    • GFR (CKD-EPI) 04/22/2022 58 (A)      Lab Results   Component Value Date/Time    HBA1C 4.6 11/18/2021 06:35 AM    HBA1C 5.2 10/25/2016 12:06 AM     Lab Results   Component Value Date/Time    SODIUM 136 04/22/2022 06:12 AM    POTASSIUM 4.8 04/22/2022 06:12 AM    CHLORIDE 104 04/22/2022 06:12 AM    CO2 24 04/22/2022 06:12 AM    GLUCOSE 96 04/22/2022 06:12 AM    BUN 34 (H) 04/22/2022 06:12 AM    CREATININE 1.27 04/22/2022 06:12 AM    CREATININE 1.14 01/23/2010 12:00 AM    BUNCREATRAT 15 01/23/2010 12:00 AM    GLOMRATE >59 01/23/2010 12:00 AM    ALKPHOSPHAT 98 04/22/2022 06:12 AM    ASTSGOT 24 04/22/2022 06:12 AM    ALTSGPT 23 04/22/2022 06:12 AM    TBILIRUBIN 1.5 04/22/2022 06:12 AM     Lab Results   Component Value Date/Time    INR 1.30 (H) 01/06/2022 06:26 AM    INR 1.37 (H) 09/14/2021 02:59 PM    INR 1.29 (H) 04/26/2021 10:25 AM     Lab Results   Component Value Date/Time    CHOLSTRLTOT 98 (L) 11/18/2021 06:35 AM    LDL 27 11/18/2021 06:35 AM    HDL 63 11/18/2021 06:35 AM    TRIGLYCERIDE 39 11/18/2021 06:35 AM       Lab Results   Component Value  "Date/Time    TESTOSTERONE 333 04/02/2015 06:47 AM     No results found for: TSH  Lab Results   Component Value Date/Time    FREET4 1.16 04/22/2022 06:12 AM    FREET4 0.85 04/02/2015 06:47 AM     No results found for: URICACID  No components found for: VITB12  Lab Results   Component Value Date/Time    25HYDROXY 30 11/18/2021 06:35 AM    25HYDROXY 32 04/03/2020 06:27 AM               HPI    Review of Systems   Constitutional: Negative.    HENT: Negative.    Eyes: Negative.    Respiratory: Negative.    Cardiovascular: Negative.    Gastrointestinal: Negative.    Genitourinary: Negative.    Musculoskeletal: Negative.    Skin: Negative.    Neurological: Negative.    Endo/Heme/Allergies: Negative.    Psychiatric/Behavioral: Negative.               Objective     /76 (BP Location: Right arm, Patient Position: Sitting, BP Cuff Size: Adult)   Pulse (!) 58   Temp 36.7 °C (98.1 °F) (Temporal)   Resp 16   Ht 1.905 m (6' 3\")   Wt 109 kg (240 lb 3.2 oz)   SpO2 97%   BMI 30.02 kg/m²      Physical Exam  Constitutional:       General: He is not in acute distress.     Appearance: He is well-developed. He is not diaphoretic.   HENT:      Head: Normocephalic and atraumatic.      Right Ear: External ear normal.      Left Ear: External ear normal.      Nose: Nose normal.      Mouth/Throat:      Pharynx: No oropharyngeal exudate.   Eyes:      General: No scleral icterus.        Right eye: No discharge.         Left eye: No discharge.      Conjunctiva/sclera: Conjunctivae normal.      Pupils: Pupils are equal, round, and reactive to light.   Neck:      Thyroid: No thyromegaly.      Vascular: No JVD.      Trachea: No tracheal deviation.   Cardiovascular:      Rate and Rhythm: Normal rate and regular rhythm.      Heart sounds: Normal heart sounds. No murmur heard.    No friction rub. No gallop.   Pulmonary:      Effort: Pulmonary effort is normal. No respiratory distress.      Breath sounds: Normal breath sounds. No stridor. No " wheezing or rales.   Chest:      Chest wall: No tenderness.   Abdominal:      General: Bowel sounds are normal. There is no distension.      Palpations: Abdomen is soft. There is no mass.      Tenderness: There is no abdominal tenderness. There is no guarding or rebound.   Musculoskeletal:         General: No tenderness. Normal range of motion.      Cervical back: Normal range of motion and neck supple.   Lymphadenopathy:      Cervical: No cervical adenopathy.   Skin:     General: Skin is warm and dry.      Coloration: Skin is not pale.      Findings: Rash present. No erythema.      Comments: On 2 fingers of patient's right hand distal phalanx he has eczematous rash measuring about 1 cm diameter.  Not inflamed.  Not tender or swollen.   Neurological:      Mental Status: He is alert and oriented to person, place, and time.      Motor: No abnormal muscle tone.      Coordination: Coordination normal.      Deep Tendon Reflexes: Reflexes are normal and symmetric. Reflexes normal.   Psychiatric:         Behavior: Behavior normal.         Thought Content: Thought content normal.         Judgment: Judgment normal.                            Assessment & Plan        1. Eczema, unspecified type-appears to be classic eczema unclear etiology.  Probably a nonspecific contact dermatitis.  Trial of topical steroids and referral to dermatology at patient request      - betamethasone dipropionate (DIPROLENE) 0.05 % Ointment; Apply 1 Application topically every day.  Dispense: 45 g; Refill: 1  - Referral to Dermatology    2. Mixed hyperlipidemia  Good control continue current regimen  - Comp Metabolic Panel; Future  - Lipid Profile; Future  - CBC WITH DIFFERENTIAL; Future    3. Essential hypertension   Good control continue current regimen    4. IFG (impaired fasting glucose)   Good control continue current regimen    5. Thrombocytopenia, unspecified (HCC)- from previous chemorx   Good control continue current regimen    6. ASCVD  (arteriosclerotic cardiovascular disease)subtotalled small distal LCx and 40% LAD med rx by cath 12/13/12   Good control continue current regimen    7. Benign prostatic hyperplasia with lower urinary tract symptoms, symptom details unspecified   Good control continue current regimen    8. Cirrhosis of liver with ascites, unspecified hepatic cirrhosis type (HCC)   Good control continue current regimen    9. Chronic deep vein thrombosis (DVT) of femoral vein of left lower extremity (HCC)- s/p IVC filter 2016   Good control continue current regimen    10. Drug-induced polyneuropathy (HCC)   Good control continue current regimen    11. Secondary esophageal varices without bleeding (HCC)- BANDED 4/2016- repeat egd tbd 10//29/16- [facility]     Good control continue current regimen  12. Portal hypertension with esophageal varices (HCC)    Good control continue current regimen    13. Portal vein thrombosis- on CT [facility] 2/2015   Good control continue current regimen  14. Peripheral neuropathy due to chemotherapy (HCC)   Good control continue current regimen    15. Spinal enthesopathy of thoracic region (HCC)   Good control continue current regimen    16. Secondary malignant neoplasm of liver (HCC)   Good control continue current regimen  17. Overlapping malignant neoplasm of colon (HCC)Overlapping malignant neoplasm of colon (HCC)--2010 left hemicolectomy; no colostomy; ablation of liver met at [facility] 1/2011- MRI abd 3/2018 no change in per   Good control continue current regimen    18. Class 2 severe obesity due to excess calories with serious comorbidity in adult, unspecified BMI (HCC)       Good control continue current regimen

## 2022-06-14 ENCOUNTER — HOSPITAL ENCOUNTER (OUTPATIENT)
Dept: RADIOLOGY | Facility: MEDICAL CENTER | Age: 77
End: 2022-06-14
Attending: INTERNAL MEDICINE
Payer: MEDICARE

## 2022-06-14 DIAGNOSIS — R18.8 OTHER ASCITES: ICD-10-CM

## 2022-06-14 PROCEDURE — 49083 ABD PARACENTESIS W/IMAGING: CPT

## 2022-06-14 PROCEDURE — 700111 HCHG RX REV CODE 636 W/ 250 OVERRIDE (IP)

## 2022-06-14 RX ORDER — LIDOCAINE HYDROCHLORIDE 10 MG/ML
INJECTION, SOLUTION EPIDURAL; INFILTRATION; INTRACAUDAL; PERINEURAL
Status: COMPLETED
Start: 2022-06-14 | End: 2022-06-14

## 2022-06-14 RX ADMIN — LIDOCAINE HYDROCHLORIDE: 10 INJECTION, SOLUTION EPIDURAL; INFILTRATION; INTRACAUDAL; PERINEURAL at 13:15

## 2022-06-14 NOTE — PROGRESS NOTES
Pt presents to [facility] for paracentesis. Pt consented at BS by Dr. Browne. Pt prepped and draped in sterile fashion.     1311 Time out procedure start right side accessed.     3160ml aspirated. Dressing placed pt ambulated out of department

## 2022-06-23 ENCOUNTER — OFFICE VISIT (OUTPATIENT)
Dept: MEDICAL GROUP | Age: 77
End: 2022-06-23
Payer: MEDICARE

## 2022-06-23 VITALS
TEMPERATURE: 98.2 F | SYSTOLIC BLOOD PRESSURE: 110 MMHG | BODY MASS INDEX: 29.05 KG/M2 | HEART RATE: 60 BPM | RESPIRATION RATE: 16 BRPM | DIASTOLIC BLOOD PRESSURE: 80 MMHG | OXYGEN SATURATION: 97 % | WEIGHT: 233.6 LBS | HEIGHT: 75 IN

## 2022-06-23 DIAGNOSIS — K74.60 CIRRHOSIS OF LIVER WITH ASCITES, UNSPECIFIED HEPATIC CIRRHOSIS TYPE (HCC): ICD-10-CM

## 2022-06-23 DIAGNOSIS — K76.6 PORTAL HYPERTENSION WITH ESOPHAGEAL VARICES (HCC): ICD-10-CM

## 2022-06-23 DIAGNOSIS — I10 ESSENTIAL HYPERTENSION: ICD-10-CM

## 2022-06-23 DIAGNOSIS — G31.9 CEREBRAL ATROPHY (HCC): ICD-10-CM

## 2022-06-23 DIAGNOSIS — Z86.69 HISTORY OF GUILLAIN-BARRE SYNDROME: ICD-10-CM

## 2022-06-23 DIAGNOSIS — E78.2 MIXED HYPERLIPIDEMIA: ICD-10-CM

## 2022-06-23 DIAGNOSIS — I25.10 ASCVD (ARTERIOSCLEROTIC CARDIOVASCULAR DISEASE): ICD-10-CM

## 2022-06-23 DIAGNOSIS — L30.9 ECZEMA, UNSPECIFIED TYPE: ICD-10-CM

## 2022-06-23 DIAGNOSIS — R18.8 CIRRHOSIS OF LIVER WITH ASCITES, UNSPECIFIED HEPATIC CIRRHOSIS TYPE (HCC): ICD-10-CM

## 2022-06-23 DIAGNOSIS — D69.6 THROMBOCYTOPENIA, UNSPECIFIED (HCC): ICD-10-CM

## 2022-06-23 DIAGNOSIS — R73.01 IFG (IMPAIRED FASTING GLUCOSE): ICD-10-CM

## 2022-06-23 DIAGNOSIS — N18.31 STAGE 3A CHRONIC KIDNEY DISEASE: ICD-10-CM

## 2022-06-23 DIAGNOSIS — I82.512 CHRONIC DEEP VEIN THROMBOSIS (DVT) OF FEMORAL VEIN OF LEFT LOWER EXTREMITY (HCC): ICD-10-CM

## 2022-06-23 DIAGNOSIS — G62.0 DRUG-INDUCED POLYNEUROPATHY (HCC): ICD-10-CM

## 2022-06-23 DIAGNOSIS — I85.00 PORTAL HYPERTENSION WITH ESOPHAGEAL VARICES (HCC): ICD-10-CM

## 2022-06-23 DIAGNOSIS — C18.8 OVERLAPPING MALIGNANT NEOPLASM OF COLON (HCC): ICD-10-CM

## 2022-06-23 PROCEDURE — 99214 OFFICE O/P EST MOD 30 MIN: CPT | Performed by: INTERNAL MEDICINE

## 2022-06-23 ASSESSMENT — FIBROSIS 4 INDEX: FIB4 SCORE: 6.73

## 2022-06-23 NOTE — PROGRESS NOTES
Subjective:     Torey Lima is a 77 y.o. male here today for Annual Health Assessment.    In for follow-up of his forearm skin rash for which she was prescribed Diprolene by his dermatologist with marked improvement.  Is now resolved we will keep it on hand for future use.       And The patient is here for followup of chronic medical problems listed below. The patient is compliant with medications and having no side effects from them. Denies chest pain, abdominal pain, dyspnea, myalgias, or cough.   ,  Patient Active Problem List    Diagnosis Date Noted   • Obesity due to excess calories with serious comorbidity 05/02/2022   • Spinal enthesopathy of thoracic region (HCC) 04/07/2020   • Secondary malignant neoplasm of liver (HCC) 04/23/2019   • Thrombocytopenia, unspecified (HCC)- from previous chemorx 04/23/2019   • Chronic deep vein thrombosis (DVT) of femoral vein of left lower extremity (HCC)- s/p IVC filter 2016 11/13/2018   • Portal hypertension with esophageal varices (HCC) 11/13/2018   • Generalized edema 12/14/2017   • Obesity (BMI 30-39.9) 12/14/2017   • Edema of left lower extremity- due to dvt july 2017 09/19/2017   • BPH (benign prostatic hyperplasia) 08/11/2017   • Peripheral neuropathy due to chemotherapy (HCC) 08/11/2017   • H/O colon cancer, stage IV- neg colonoscopy 2012/2018 at [facility] 05/20/2017   • History of esophageal varices 10/24/2016   • Portal vein thrombosis- on CT [facility] 2/2015 04/14/2016   • Cirrhosis of liver with ascites (HCC)- ? DUE TO OLD HEP B, CHEMO RX,  OR THERMO RAD OF MAGNANT LESION 04/14/2016   • Secondary esophageal varices without bleeding (HCC)- BANDED 4/2016- repeat egd tbd 10//29/16- [facility] 04/04/2016   • Gastric varices- s/p BRTO procedure at [facility] 04/04/2016   • Gastrointestinal hemorrhage with hematemesis- recurrernt from varices 04/03/2016   • Mixed hyperlipidemia 06/20/2013   • Essential hypertension 06/20/2013   • ASCVD (arteriosclerotic cardiovascular  disease)subtotalled small distal LCx and 40% LAD med rx by cath 12/13/12 12/14/2012   • Vitamin D deficiency 08/02/2012   • Overlapping malignant neoplasm of colon (HCC)Overlapping malignant neoplasm of colon (HCC)--2010 left hemicolectomy; no colostomy; ablation of liver met at [facility] 1/2011- MRI abd 3/2018 no change in per 12/06/2010   • Liver lesion- ablation of malignant met from colon ca 2011- f/u [facility] q 6 mos - stable MRI abd 3/2018- redo annually 12/06/2010   • Drug-induced polyneuropathy (HCC) 12/06/2010         Health Maintenance Summary          Overdue - IMM HEP B VACCINE (1 of 3 - Risk 3-dose series) Overdue - never done    No completion history exists for this topic.          Overdue - IMM PNEUMOCOCCAL VACCINE: 65+ Years (2 - PPSV23 or PCV20) Overdue since 10/9/2016    10/09/2015  Imm Admin: Pneumococcal Conjugate Vaccine (Prevnar/PCV-13)    10/09/2015  Imm Admin: Pneumococcal polysaccharide vaccine (PPSV-23)    10/26/2011  Imm Admin: Pneumococcal Vaccine (UF) - HISTORICAL DATA    10/26/2011  Imm Admin: Pneumococcal polysaccharide vaccine (PPSV-23)          Overdue - Annual Wellness Visit (Every 366 Days) Overdue since 8/2/2020 08/02/2019  Visit Dx: Medicare annual wellness visit, subsequent    08/02/2019  Subsequent Annual Wellness Visit - Includes PPPS ()    08/02/2019  Subsequent Annual Wellness Visit - Includes PPPS ()    06/21/2016  Visit Dx: Medicare annual wellness visit, subsequent          Overdue - IMM DTaP/Tdap/Td Vaccine (2 - Td or Tdap) Overdue since 4/25/2022 04/25/2012  Imm Admin: Tdap Vaccine    04/25/2012  Imm Admin: Dtap Vaccine          COLORECTAL CANCER SCREENING (COLONOSCOPY - Every 5 Years) Next due on 9/21/2025 09/21/2020  REFERRAL TO GI FOR COLONOSCOPY    02/01/2018  REFERRAL TO GI FOR COLONOSCOPY    05/20/2017  OCCULT BLOOD X3 (STOOL)    08/19/2014  AMB REFERRAL TO GI FOR COLONOSCOPY    04/19/2012  OCCULT BLOOD FECES IMMUNOASSAY    Only the first 5 history  entries have been loaded, but more history exists.          COVID-19 Vaccine (Series Information) Completed    05/25/2022  Imm Admin: PFIZER BAUM CAP SARS-COV-2 VACCINATION (12+)    11/04/2021  Imm Admin: PFIZER PURPLE CAP SARS-COV-2 VACCINATION (12+)    02/18/2021  Imm Admin: PFIZER PURPLE CAP SARS-COV-2 VACCINATION (12+)    01/26/2021  Imm Admin: PFIZER PURPLE CAP SARS-COV-2 VACCINATION (12+)          IMM MENINGOCOCCAL VACCINE (MCV4) (Series Information) Aged Out    No completion history exists for this topic.          Discontinued - IMM INFLUENZA  Discontinued    10/10/2016  Imm Admin: Influenza Vaccine Adult HD    10/09/2015  Imm Admin: Influenza Vaccine Adult HD    10/20/2014  Imm Admin: Influenza Vaccine Quad Inj (Pf)    10/10/2013  Imm Admin: Influenza Vaccine Pediatric Split - Historical Data    10/10/2013  Imm Admin: Influenza Seasonal Injectable - Historical Data    Only the first 5 history entries have been loaded, but more history exists.          Discontinued - IMM ZOSTER VACCINES  Discontinued    11/03/2012  Imm Admin: Zoster Vaccine Live (ZVL) (Zostavax) - HISTORICAL DATA                   Annual Health Assessment Questions:      1.  Are you currently engaging in any exercise or physical activity? Yes    2.  How would you describe your mood or emotional well-being today? good    3.  Have you had any falls in the last year? No    4.  Have you noticed any problems with your balance or had difficulty walking? No    5.  In the last six months have you experienced any leakage of urine? No    6. DPA/Advanced Directive: Patient has Advanced Directive on file.     Current medicines (including changes today)  Current Outpatient Medications   Medication Sig Dispense Refill   • potassium chloride SA (KDUR) 20 MEQ Tab CR TAKE 1 TABLET BY MOUTH TWICE DAILY 200 Tab 4   • folic acid (FOLVITE) 1 MG Tab Take 1 tablet by mouth once daily 100 Tab 4   • ursodiol (ACTIGALL) 300 MG Cap Take 1 capsule by mouth once daily 90  Cap 4   • torsemide (DEMADEX) 10 MG tablet Take 2 tablets by mouth once daily 180 Tab 4   • Cholecalciferol (VITAMIN D3) 1000 units Cap Take 1,000 Units by mouth.     • ferrous sulfate 325 (65 Fe) MG tablet Take 1 Tab by mouth every morning with breakfast. 30 Tab    • betamethasone dipropionate (DIPROLENE) 0.05 % Ointment Apply 1 Application topically every day. 45 g 1   • amoxicillin (AMOXIL) 500 MG Cap Take 4 Caps by mouth Pre-Op Once PRN (prior to dental work or other surgical procedures) for up to 1 dose. (Patient not taking: No sig reported) 28 Cap 2     No current facility-administered medications for this visit.       He  has a past medical history of ASCVD (arteriosclerotic cardiovascular disease) (12/14/2012), BMI 33.0-33.9,adult (2/15/2013), Cancer (HCC) (10/10-6/11), Chickenpox, Colon cancer (HCC) (12/6/2010), Elevated CEA (12/6/2010), [place] measles, GI bleed, HLD (hyperlipidemia) (6/20/2013), HTN (hypertension) (6/20/2013), Hypertension, Impaired fasting glucose (8/2/2012), Liver cirrhosis (HCC), Liver lesion (12/6/2010), Peripheral neuropathy, secondary to drugs or chemicals (12/6/2010), Thrombocytopenia due to drugs (4/25/2012), and Vitamin d deficiency (8/2/2012).    Influenza virus vaccine live, Anticoagulant sodium citrate [sodium citrate], and Lisinopril    He  reports that he quit smoking about 58 years ago. His smoking use included cigarettes and cigars. He has a 0.50 pack-year smoking history. He has never used smokeless tobacco. He reports that he does not drink alcohol and does not use drugs.  Counseling given: Not Answered  Comment: 1 cigar per week.      ROS    No chest pain, no shortness of breath, no abdominal pain.     Objective:     Physical Exam:  There were no vitals taken for this visit. There is no height or weight on file to calculate BMI.           Constitutional: Alert, no distress.  Skin: Warm, dry, good turgor, no rashes in visible areas.  Eye: Equal, round and reactive,  conjunctiva clear, lids normal.  ENMT: Lips without lesions, good dentition, oropharynx clear.  Neck: Trachea midline, no masses, no thyromegaly. No cervical or supraclavicular lymphadenopathy.  Respiratory: Unlabored respiratory effort, lungs clear to auscultation, no wheezes, no rhonchi.  Cardiovascular: Normal S1, S2, no murmur, no edema.  Abdomen: Soft, non-tender, no masses, no hepatosplenomegaly.  Psych: Alert and oriented x3, normal affect and mood.    Assessment and Plan:      1. Eczema, unspecified type  Resolved with Diprolene usage clobetasol    2. Essential hypertension   Under good control. Continue same regimen.'       3. IFG (impaired fasting glucose)   Under good control. Continue same regimen.'       - HEMOGLOBIN A1C; Future    4. ASCVD (arteriosclerotic cardiovascular disease)   Under good control. Continue same regimen.'         5. Thrombocytopenia, unspecified (HCC)    Under good control. Continue same regimen.'       - CBC WITH DIFFERENTIAL; Future    6. Mixed hyperlipidemia   Under good control. Continue same regimen.'       - TSH; Future  - Comp Metabolic Panel; Future  - Lipid Profile; Future  - CBC WITH DIFFERENTIAL; Future    7. Portal hypertension with esophageal varices (HCC)   Under good control. Continue same regimen.'         8. Cirrhosis of liver with ascites, unspecified hepatic cirrhosis type (HCC)       Under good control. Continue same regimen.'  Underwent recent therapeutic paracentesis last week with marked improvement.  Continue follow-up with GI.      9. Chronic deep vein thrombosis (DVT) of femoral vein of left lower extremity (HCC)- s/p IVC filter 2016    Under good control. Continue same regimen.'       10. Overlapping malignant neoplasm of colon (HCC)Overlapping malignant neoplasm of colon (HCC)--2010 left hemicolectomy; no colostomy; ablation [facility] liver met [email or web address] 1/2011- MRI abd 3/2018 no change in per   Under good control. Continue same regimen.'         11. Drug-induced  polyneuropathy (HCC)    Under good control. 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  Removed from problem list and moved to history.  No residual.  Neurologic exam has returned to normal for the last 3 years.  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  No need for further follow-up of this historical condition.           Discussion today about general wellness and lifestyle habits:    · Engage in regular physical activity and social activities.  · Prevent falls and reduce trip hazards; using ambulatory aides, hearing and vision testing if appropriate.  · Steps to improve urinary incontinence.  · Advanced care planning.    Follow-Up: No follow-ups on file.       Follow-up in 3 months with lab prior to visit to monitor blood pressure electrolytes and platelet counts complications and above problems.    Patient has an appointment with his next few months with his oncologist and will be getting CT scan of the abdomen at that time.         PLEASE NOTE: This dictation was created using voice recognition software. I have made every reasonable attempt to correct obvious errors, but I expect that there are errors of grammar and possibly content that I did not discover before finalizing the note.

## 2022-06-25 PROBLEM — G31.9 CEREBRAL ATROPHY (HCC): Status: ACTIVE | Noted: 2022-06-25

## 2022-06-25 PROBLEM — Z86.69 HISTORY OF GUILLAIN-BARRE SYNDROME: Status: ACTIVE | Noted: 2022-06-25

## 2022-06-25 PROBLEM — N18.31 STAGE 3A CHRONIC KIDNEY DISEASE: Status: ACTIVE | Noted: 2022-06-25

## 2022-06-30 ENCOUNTER — APPOINTMENT (OUTPATIENT)
Dept: RADIOLOGY | Facility: MEDICAL CENTER | Age: 77
End: 2022-06-30
Payer: MEDICARE

## 2022-06-30 ENCOUNTER — TELEPHONE (OUTPATIENT)
Dept: HEALTH INFORMATION MANAGEMENT | Facility: OTHER | Age: 77
End: 2022-06-30

## 2022-07-05 ENCOUNTER — HOSPITAL ENCOUNTER (OUTPATIENT)
Dept: LAB | Facility: MEDICAL CENTER | Age: 77
End: 2022-07-05
Attending: INTERNAL MEDICINE
Payer: MEDICARE

## 2022-07-05 DIAGNOSIS — N40.0 BENIGN PROSTATIC HYPERPLASIA, UNSPECIFIED WHETHER LOWER URINARY TRACT SYMPTOMS PRESENT: ICD-10-CM

## 2022-07-05 DIAGNOSIS — D69.6 THROMBOCYTOPENIA, UNSPECIFIED (HCC): ICD-10-CM

## 2022-07-05 DIAGNOSIS — E55.9 VITAMIN D DEFICIENCY: ICD-10-CM

## 2022-07-05 DIAGNOSIS — I10 ESSENTIAL HYPERTENSION: ICD-10-CM

## 2022-07-05 DIAGNOSIS — R73.01 IFG (IMPAIRED FASTING GLUCOSE): ICD-10-CM

## 2022-07-05 DIAGNOSIS — E78.2 MIXED HYPERLIPIDEMIA: ICD-10-CM

## 2022-07-05 LAB
25(OH)D3 SERPL-MCNC: 30 NG/ML (ref 30–100)
ALBUMIN SERPL BCP-MCNC: 3.8 G/DL (ref 3.2–4.9)
ALBUMIN/GLOB SERPL: 1.5 G/DL
ALP SERPL-CCNC: 114 U/L (ref 30–99)
ALT SERPL-CCNC: 25 U/L (ref 2–50)
ANION GAP SERPL CALC-SCNC: 11 MMOL/L (ref 7–16)
AST SERPL-CCNC: 34 U/L (ref 12–45)
BASOPHILS # BLD AUTO: 0.4 % (ref 0–1.8)
BASOPHILS # BLD: 0.03 K/UL (ref 0–0.12)
BILIRUB SERPL-MCNC: 1.6 MG/DL (ref 0.1–1.5)
BUN SERPL-MCNC: 24 MG/DL (ref 8–22)
CALCIUM SERPL-MCNC: 9 MG/DL (ref 8.5–10.5)
CHLORIDE SERPL-SCNC: 102 MMOL/L (ref 96–112)
CHOLEST SERPL-MCNC: 101 MG/DL (ref 100–199)
CO2 SERPL-SCNC: 22 MMOL/L (ref 20–33)
CREAT SERPL-MCNC: 1.06 MG/DL (ref 0.5–1.4)
EOSINOPHIL # BLD AUTO: 0.31 K/UL (ref 0–0.51)
EOSINOPHIL NFR BLD: 4.3 % (ref 0–6.9)
ERYTHROCYTE [DISTWIDTH] IN BLOOD BY AUTOMATED COUNT: 53.9 FL (ref 35.9–50)
EST. AVERAGE GLUCOSE BLD GHB EST-MCNC: 88 MG/DL
FASTING STATUS PATIENT QL REPORTED: NORMAL
GFR SERPLBLD CREATININE-BSD FMLA CKD-EPI: 72 ML/MIN/1.73 M 2
GLOBULIN SER CALC-MCNC: 2.5 G/DL (ref 1.9–3.5)
GLUCOSE SERPL-MCNC: 98 MG/DL (ref 65–99)
HBA1C MFR BLD: 4.7 % (ref 4–5.6)
HCT VFR BLD AUTO: 37.4 % (ref 42–52)
HDLC SERPL-MCNC: 57 MG/DL
HGB BLD-MCNC: 12.1 G/DL (ref 14–18)
IMM GRANULOCYTES # BLD AUTO: 0.04 K/UL (ref 0–0.11)
IMM GRANULOCYTES NFR BLD AUTO: 0.6 % (ref 0–0.9)
LDLC SERPL CALC-MCNC: 34 MG/DL
LYMPHOCYTES # BLD AUTO: 0.43 K/UL (ref 1–4.8)
LYMPHOCYTES NFR BLD: 5.9 % (ref 22–41)
MCH RBC QN AUTO: 29.7 PG (ref 27–33)
MCHC RBC AUTO-ENTMCNC: 32.4 G/DL (ref 33.7–35.3)
MCV RBC AUTO: 91.7 FL (ref 81.4–97.8)
MONOCYTES # BLD AUTO: 0.88 K/UL (ref 0–0.85)
MONOCYTES NFR BLD AUTO: 12.1 % (ref 0–13.4)
NEUTROPHILS # BLD AUTO: 5.57 K/UL (ref 1.82–7.42)
NEUTROPHILS NFR BLD: 76.7 % (ref 44–72)
NRBC # BLD AUTO: 0 K/UL
NRBC BLD-RTO: 0 /100 WBC
PLATELET # BLD AUTO: 76 K/UL (ref 164–446)
PMV BLD AUTO: 10.5 FL (ref 9–12.9)
POTASSIUM SERPL-SCNC: 4.5 MMOL/L (ref 3.6–5.5)
PROT SERPL-MCNC: 6.3 G/DL (ref 6–8.2)
PSA SERPL-MCNC: 0.3 NG/ML (ref 0–4)
RBC # BLD AUTO: 4.08 M/UL (ref 4.7–6.1)
SODIUM SERPL-SCNC: 135 MMOL/L (ref 135–145)
TRIGL SERPL-MCNC: 51 MG/DL (ref 0–149)
TSH SERPL DL<=0.005 MIU/L-ACNC: 3.6 UIU/ML (ref 0.38–5.33)
WBC # BLD AUTO: 7.3 K/UL (ref 4.8–10.8)

## 2022-07-05 PROCEDURE — 80061 LIPID PANEL: CPT

## 2022-07-05 PROCEDURE — 83036 HEMOGLOBIN GLYCOSYLATED A1C: CPT

## 2022-07-05 PROCEDURE — 80053 COMPREHEN METABOLIC PANEL: CPT

## 2022-07-05 PROCEDURE — 36415 COLL VENOUS BLD VENIPUNCTURE: CPT

## 2022-07-05 PROCEDURE — 84443 ASSAY THYROID STIM HORMONE: CPT

## 2022-07-05 PROCEDURE — 85025 COMPLETE CBC W/AUTO DIFF WBC: CPT

## 2022-07-05 PROCEDURE — 82306 VITAMIN D 25 HYDROXY: CPT

## 2022-07-05 PROCEDURE — 84153 ASSAY OF PSA TOTAL: CPT

## 2022-07-15 ENCOUNTER — HOSPITAL ENCOUNTER (OUTPATIENT)
Dept: RADIOLOGY | Facility: MEDICAL CENTER | Age: 77
End: 2022-07-15
Payer: MEDICARE

## 2022-07-15 DIAGNOSIS — K76.6 PORTAL HYPERTENSION (HCC): ICD-10-CM

## 2022-07-15 PROCEDURE — 700117 HCHG RX CONTRAST REV CODE 255

## 2022-07-15 PROCEDURE — 74178 CT ABD&PLV WO CNTR FLWD CNTR: CPT

## 2022-07-15 RX ADMIN — IOHEXOL 100 ML: 350 INJECTION, SOLUTION INTRAVENOUS at 09:48

## 2022-09-12 ENCOUNTER — DOCUMENTATION (OUTPATIENT)
Dept: HEALTH INFORMATION MANAGEMENT | Facility: OTHER | Age: 77
End: 2022-09-12
Payer: MEDICARE

## 2022-09-12 ENCOUNTER — TELEPHONE (OUTPATIENT)
Dept: HEALTH INFORMATION MANAGEMENT | Facility: OTHER | Age: 77
End: 2022-09-12
Payer: MEDICARE

## 2022-09-20 ENCOUNTER — HOSPITAL ENCOUNTER (OUTPATIENT)
Dept: LAB | Facility: MEDICAL CENTER | Age: 77
End: 2022-09-20
Attending: INTERNAL MEDICINE
Payer: MEDICARE

## 2022-09-20 LAB
ALBUMIN SERPL BCP-MCNC: 3.4 G/DL (ref 3.2–4.9)
ALBUMIN/GLOB SERPL: 1.3 G/DL
ALP SERPL-CCNC: 110 U/L (ref 30–99)
ALT SERPL-CCNC: 21 U/L (ref 2–50)
ANION GAP SERPL CALC-SCNC: 9 MMOL/L (ref 7–16)
AST SERPL-CCNC: 23 U/L (ref 12–45)
BASOPHILS # BLD AUTO: 0.7 % (ref 0–1.8)
BASOPHILS # BLD: 0.04 K/UL (ref 0–0.12)
BILIRUB SERPL-MCNC: 1.4 MG/DL (ref 0.1–1.5)
BUN SERPL-MCNC: 22 MG/DL (ref 8–22)
CALCIUM SERPL-MCNC: 8.5 MG/DL (ref 8.5–10.5)
CHLORIDE SERPL-SCNC: 105 MMOL/L (ref 96–112)
CHOLEST SERPL-MCNC: 98 MG/DL (ref 100–199)
CO2 SERPL-SCNC: 24 MMOL/L (ref 20–33)
CREAT SERPL-MCNC: 0.88 MG/DL (ref 0.5–1.4)
EOSINOPHIL # BLD AUTO: 0.29 K/UL (ref 0–0.51)
EOSINOPHIL NFR BLD: 4.9 % (ref 0–6.9)
ERYTHROCYTE [DISTWIDTH] IN BLOOD BY AUTOMATED COUNT: 56.7 FL (ref 35.9–50)
FASTING STATUS PATIENT QL REPORTED: NORMAL
GFR SERPLBLD CREATININE-BSD FMLA CKD-EPI: 88 ML/MIN/1.73 M 2
GLOBULIN SER CALC-MCNC: 2.6 G/DL (ref 1.9–3.5)
GLUCOSE SERPL-MCNC: 100 MG/DL (ref 65–99)
HCT VFR BLD AUTO: 35.9 % (ref 42–52)
HDLC SERPL-MCNC: 62 MG/DL
HGB BLD-MCNC: 11.7 G/DL (ref 14–18)
IMM GRANULOCYTES # BLD AUTO: 0.03 K/UL (ref 0–0.11)
IMM GRANULOCYTES NFR BLD AUTO: 0.5 % (ref 0–0.9)
LDLC SERPL CALC-MCNC: 29 MG/DL
LYMPHOCYTES # BLD AUTO: 0.46 K/UL (ref 1–4.8)
LYMPHOCYTES NFR BLD: 7.8 % (ref 22–41)
MCH RBC QN AUTO: 30.7 PG (ref 27–33)
MCHC RBC AUTO-ENTMCNC: 32.6 G/DL (ref 33.7–35.3)
MCV RBC AUTO: 94.2 FL (ref 81.4–97.8)
MONOCYTES # BLD AUTO: 0.68 K/UL (ref 0–0.85)
MONOCYTES NFR BLD AUTO: 11.6 % (ref 0–13.4)
NEUTROPHILS # BLD AUTO: 4.36 K/UL (ref 1.82–7.42)
NEUTROPHILS NFR BLD: 74.5 % (ref 44–72)
NRBC # BLD AUTO: 0 K/UL
NRBC BLD-RTO: 0 /100 WBC
PLATELET # BLD AUTO: 58 K/UL (ref 164–446)
PMV BLD AUTO: 10.8 FL (ref 9–12.9)
POTASSIUM SERPL-SCNC: 4.4 MMOL/L (ref 3.6–5.5)
PROT SERPL-MCNC: 6 G/DL (ref 6–8.2)
RBC # BLD AUTO: 3.81 M/UL (ref 4.7–6.1)
SODIUM SERPL-SCNC: 138 MMOL/L (ref 135–145)
TRIGL SERPL-MCNC: 35 MG/DL (ref 0–149)
TSH SERPL DL<=0.005 MIU/L-ACNC: 3.11 UIU/ML (ref 0.38–5.33)
WBC # BLD AUTO: 5.9 K/UL (ref 4.8–10.8)

## 2022-09-20 PROCEDURE — 84443 ASSAY THYROID STIM HORMONE: CPT

## 2022-09-20 PROCEDURE — 36415 COLL VENOUS BLD VENIPUNCTURE: CPT

## 2022-09-20 PROCEDURE — 80061 LIPID PANEL: CPT

## 2022-09-20 PROCEDURE — 80053 COMPREHEN METABOLIC PANEL: CPT

## 2022-09-20 PROCEDURE — 85025 COMPLETE CBC W/AUTO DIFF WBC: CPT

## 2022-09-28 ENCOUNTER — OFFICE VISIT (OUTPATIENT)
Dept: MEDICAL GROUP | Age: 77
End: 2022-09-28
Payer: MEDICARE

## 2022-09-28 VITALS
RESPIRATION RATE: 16 BRPM | HEART RATE: 61 BPM | BODY MASS INDEX: 29.09 KG/M2 | OXYGEN SATURATION: 96 % | HEIGHT: 75 IN | TEMPERATURE: 97.8 F | SYSTOLIC BLOOD PRESSURE: 120 MMHG | DIASTOLIC BLOOD PRESSURE: 58 MMHG | WEIGHT: 234 LBS

## 2022-09-28 DIAGNOSIS — K76.6 PORTAL HYPERTENSION WITH ESOPHAGEAL VARICES (HCC): ICD-10-CM

## 2022-09-28 DIAGNOSIS — G62.0 PERIPHERAL NEUROPATHY DUE TO CHEMOTHERAPY (HCC): ICD-10-CM

## 2022-09-28 DIAGNOSIS — E78.2 MIXED HYPERLIPIDEMIA: ICD-10-CM

## 2022-09-28 DIAGNOSIS — D69.6 THROMBOCYTOPENIA, UNSPECIFIED (HCC): ICD-10-CM

## 2022-09-28 DIAGNOSIS — E55.9 VITAMIN D DEFICIENCY: ICD-10-CM

## 2022-09-28 DIAGNOSIS — N18.31 STAGE 3A CHRONIC KIDNEY DISEASE: ICD-10-CM

## 2022-09-28 DIAGNOSIS — I85.00 PORTAL HYPERTENSION WITH ESOPHAGEAL VARICES (HCC): ICD-10-CM

## 2022-09-28 DIAGNOSIS — K74.60 CIRRHOSIS OF LIVER WITH ASCITES, UNSPECIFIED HEPATIC CIRRHOSIS TYPE (HCC): ICD-10-CM

## 2022-09-28 DIAGNOSIS — M46.04 SPINAL ENTHESOPATHY OF THORACIC REGION (HCC): ICD-10-CM

## 2022-09-28 DIAGNOSIS — R73.01 IFG (IMPAIRED FASTING GLUCOSE): ICD-10-CM

## 2022-09-28 DIAGNOSIS — T45.1X5A PERIPHERAL NEUROPATHY DUE TO CHEMOTHERAPY (HCC): ICD-10-CM

## 2022-09-28 DIAGNOSIS — R18.8 CIRRHOSIS OF LIVER WITH ASCITES, UNSPECIFIED HEPATIC CIRRHOSIS TYPE (HCC): ICD-10-CM

## 2022-09-28 DIAGNOSIS — C18.8 OVERLAPPING MALIGNANT NEOPLASM OF COLON (HCC): ICD-10-CM

## 2022-09-28 DIAGNOSIS — C78.7 SECONDARY MALIGNANT NEOPLASM OF LIVER (HCC): ICD-10-CM

## 2022-09-28 DIAGNOSIS — I85.10 SECONDARY ESOPHAGEAL VARICES WITHOUT BLEEDING (HCC): ICD-10-CM

## 2022-09-28 PROBLEM — E66.09 OBESITY DUE TO EXCESS CALORIES WITH SERIOUS COMORBIDITY: Status: RESOLVED | Noted: 2022-05-02 | Resolved: 2022-09-28

## 2022-09-28 PROBLEM — E66.9 OBESITY (BMI 30-39.9): Status: RESOLVED | Noted: 2017-12-14 | Resolved: 2022-09-28

## 2022-09-28 PROCEDURE — 99214 OFFICE O/P EST MOD 30 MIN: CPT | Performed by: INTERNAL MEDICINE

## 2022-09-28 RX ORDER — ATORVASTATIN CALCIUM 20 MG/1
40 TABLET, FILM COATED ORAL NIGHTLY
COMMUNITY
End: 2023-02-28

## 2022-09-28 ASSESSMENT — FIBROSIS 4 INDEX: FIB4 SCORE: 6.75

## 2022-09-28 ASSESSMENT — ENCOUNTER SYMPTOMS
NEUROLOGICAL NEGATIVE: 1
EYES NEGATIVE: 1
PSYCHIATRIC NEGATIVE: 1
CARDIOVASCULAR NEGATIVE: 1
MUSCULOSKELETAL NEGATIVE: 1
RESPIRATORY NEGATIVE: 1
GASTROINTESTINAL NEGATIVE: 1
CONSTITUTIONAL NEGATIVE: 1

## 2022-09-28 NOTE — PROGRESS NOTES
Subjective     Torey Lima is a 77 y.o. male who presents with Follow-Up (Lab review )  The patient is here for followup of chronic medical problems listed below. The patient is compliant with medications and having no side effects from them. Denies chest pain, abdominal pain, dyspnea, myalgias, or cough.   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    Cerebral atrophy (HCC)-MRI brain 2019, mild diffuse cerebral substance loss 06/25/2022    Stage 3a chronic kidney disease (HCC) 06/25/2022    Obesity due to excess calories with serious comorbidity 05/02/2022    Spinal enthesopathy of thoracic region (HCC) 04/07/2020    Secondary malignant neoplasm of liver (HCC) 04/23/2019    Thrombocytopenia, unspecified (HCC)- from previous chemorx 04/23/2019    Chronic deep vein thrombosis (DVT) of femoral vein of left lower extremity (HCC)- s/p IVC filter 2016 11/13/2018    Portal hypertension with esophageal varices (HCC) 11/13/2018    Generalized edema 12/14/2017    Obesity (BMI 30-39.9) 12/14/2017    Edema of left lower extremity- due to dvt july 2017 09/19/2017    BPH (benign prostatic hyperplasia) 08/11/2017    Peripheral neuropathy due to chemotherapy (HCC) 08/11/2017    H/O colon cancer, stage IV- neg colonoscopy 2012/2018 at [facility] 05/20/2017    History of esophageal varices 10/24/2016    Portal vein thrombosis- on CT [facility] 2/2015 04/14/2016    Cirrhosis of liver with ascites (HCC)- ? 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      potassium chloride SA (KDUR) 20 MEQ Tab CR TAKE 1 TABLET BY MOUTH TWICE DAILY 200 Tab 4    folic acid (FOLVITE) 1 MG Tab Take 1 tablet by mouth once daily 100 Tab 4    ursodiol (ACTIGALL) 300 MG Cap Take 1 capsule by mouth once daily (Patient taking differently: Take  by mouth 3 times a day.) 90 Cap 4    torsemide (DEMADEX) 10 MG tablet Take 2 tablets by mouth once daily 180 Tab 4    Cholecalciferol (VITAMIN D3) 1000 units Cap Take 1,000 Units by mouth.      ferrous sulfate 325 (65 Fe) MG tablet Take 1 Tab by mouth every morning with breakfast. 30 Tab     betamethasone dipropionate (DIPROLENE) 0.05 % Ointment Apply 1 Application topically every day. 45 g 1    amoxicillin (AMOXIL) 500 MG Cap Take 4 Caps by mouth Pre-Op Once PRN (prior to dental work or other surgical procedures) for up to 1 dose. (Patient not taking: No sig reported) 28 Cap 2     No facility-administered medications prior to visit.     Lab Results   Component Value Date/Time    HBA1C 4.7 07/05/2022 06:48 AM    HBA1C 4.6 11/18/2021 06:35 AM     Lab Results   Component Value Date/Time    SODIUM 138 09/20/2022 06:22 AM    POTASSIUM 4.4 09/20/2022 06:22 AM    CHLORIDE 105 09/20/2022 06:22 AM    CO2 24 09/20/2022 06:22 AM    GLUCOSE 100 (H) 09/20/2022 06:22 AM    BUN 22 09/20/2022 06:22 AM    CREATININE 0.88 09/20/2022 06:22 AM    CREATININE 1.14  01/23/2010 12:00 AM    BUNCREATRAT 15 01/23/2010 12:00 AM    GLOMRATE >59 01/23/2010 12:00 AM    ALKPHOSPHAT 110 (H) 09/20/2022 06:22 AM    ASTSGOT 23 09/20/2022 06:22 AM    ALTSGPT 21 09/20/2022 06:22 AM    TBILIRUBIN 1.4 09/20/2022 06:22 AM     Lab Results   Component Value Date/Time    INR 1.30 (H) 01/06/2022 06:26 AM    INR 1.37 (H) 09/14/2021 02:59 PM    INR 1.29 (H) 04/26/2021 10:25 AM     Lab Results   Component Value Date/Time    CHOLSTRLTOT 98 (L) 09/20/2022 06:22 AM    LDL 29 09/20/2022 06:22 AM    HDL 62 09/20/2022 06:22 AM    TRIGLYCERIDE 35 09/20/2022 06:22 AM       Lab Results   Component Value Date/Time    TESTOSTERONE 333 04/02/2015 06:47 AM     No results found for: TSH  Lab Results   Component Value Date/Time    FREET4 1.16 04/22/2022 06:12 AM    FREET4 0.85 04/02/2015 06:47 AM     No results found for: URICACID  No components found for: VITB12  Lab Results   Component Value Date/Time    25HYDROXY 30 07/05/2022 06:48 AM    25HYDROXY 30 11/18/2021 06:35 AM     Hospital Outpatient Visit on 09/20/2022   Component Date Value    TSH 09/20/2022 3.110     Sodium 09/20/2022 138     Potassium 09/20/2022 4.4     Chloride 09/20/2022 105     Co2 09/20/2022 24     Anion Gap 09/20/2022 9.0     Glucose 09/20/2022 100 (A)    Bun 09/20/2022 22     Creatinine 09/20/2022 0.88     Calcium 09/20/2022 8.5     AST(SGOT) 09/20/2022 23     ALT(SGPT) 09/20/2022 21     Alkaline Phosphatase 09/20/2022 110 (A)    Total Bilirubin 09/20/2022 1.4     Albumin 09/20/2022 3.4     Total Protein 09/20/2022 6.0     Globulin 09/20/2022 2.6     A-G Ratio 09/20/2022 1.3     Cholesterol,Tot 09/20/2022 98 (A)    Triglycerides 09/20/2022 35     HDL 09/20/2022 62     LDL 09/20/2022 29     Fasting Status 09/20/2022 Fasting     WBC 09/20/2022 5.9     RBC 09/20/2022 3.81 (A)    Hemoglobin 09/20/2022 11.7 (A)    Hematocrit 09/20/2022 35.9 (A)    MCV 09/20/2022 94.2     MCH 09/20/2022 30.7     MCHC 09/20/2022 32.6 (A)    RDW 09/20/2022 56.7 (A)     Platelet Count 09/20/2022 58 (A)    MPV 09/20/2022 10.8     Neutrophils-Polys 09/20/2022 74.50 (A)    Lymphocytes 09/20/2022 7.80 (A)    Monocytes 09/20/2022 11.60     Eosinophils 09/20/2022 4.90     Basophils 09/20/2022 0.70     Immature Granulocytes 09/20/2022 0.50     Nucleated RBC 09/20/2022 0.00     Neutrophils (Absolute) 09/20/2022 4.36     Lymphs (Absolute) 09/20/2022 0.46 (A)    Monos (Absolute) 09/20/2022 0.68     Eos (Absolute) 09/20/2022 0.29     Baso (Absolute) 09/20/2022 0.04     Immature Granulocytes (a* 09/20/2022 0.03     NRBC (Absolute) 09/20/2022 0.00     GFR (CKD-EPI) 09/20/2022 88       Lab Results   Component Value Date/Time    HBA1C 4.7 07/05/2022 06:48 AM    HBA1C 4.6 11/18/2021 06:35 AM     Lab Results   Component Value Date/Time    SODIUM 138 09/20/2022 06:22 AM    POTASSIUM 4.4 09/20/2022 06:22 AM    CHLORIDE 105 09/20/2022 06:22 AM    CO2 24 09/20/2022 06:22 AM    GLUCOSE 100 (H) 09/20/2022 06:22 AM    BUN 22 09/20/2022 06:22 AM    CREATININE 0.88 09/20/2022 06:22 AM    CREATININE 1.14 01/23/2010 12:00 AM    BUNCREATRAT 15 01/23/2010 12:00 AM    GLOMRATE >59 01/23/2010 12:00 AM    ALKPHOSPHAT 110 (H) 09/20/2022 06:22 AM    ASTSGOT 23 09/20/2022 06:22 AM    ALTSGPT 21 09/20/2022 06:22 AM    TBILIRUBIN 1.4 09/20/2022 06:22 AM     Lab Results   Component Value Date/Time    INR 1.30 (H) 01/06/2022 06:26 AM    INR 1.37 (H) 09/14/2021 02:59 PM    INR 1.29 (H) 04/26/2021 10:25 AM     Lab Results   Component Value Date/Time    CHOLSTRLTOT 98 (L) 09/20/2022 06:22 AM    LDL 29 09/20/2022 06:22 AM    HDL 62 09/20/2022 06:22 AM    TRIGLYCERIDE 35 09/20/2022 06:22 AM       Lab Results   Component Value Date/Time    TESTOSTERONE 333 04/02/2015 06:47 AM     No results found for: TSH  Lab Results   Component Value Date/Time    FREET4 1.16 04/22/2022 06:12 AM    FREET4 0.85 04/02/2015 06:47 AM     No results found for: URICACID  No components found for: VITB12  Lab Results   Component Value Date/Time     "25HYDROXY 30 07/05/2022 06:48 AM    25HYDROXY 30 11/18/2021 06:35 AM               HPI    Review of Systems   Constitutional: Negative.    HENT: Negative.     Eyes: Negative.    Respiratory: Negative.     Cardiovascular: Negative.    Gastrointestinal: Negative.    Genitourinary: Negative.    Musculoskeletal: Negative.    Skin: Negative.    Neurological: Negative.    Endo/Heme/Allergies: Negative.    Psychiatric/Behavioral: Negative.               Objective     /58 (BP Location: Right arm, Patient Position: Sitting, BP Cuff Size: Adult)   Pulse 61   Temp 36.6 °C (97.8 °F) (Temporal)   Resp 16   Ht 1.905 m (6' 3\")   Wt 106 kg (234 lb)   SpO2 96%   BMI 29.25 kg/m²      Physical Exam  Constitutional:       General: He is not in acute distress.     Appearance: He is well-developed. He is not diaphoretic.   HENT:      Head: Normocephalic and atraumatic.      Right Ear: External ear normal.      Left Ear: External ear normal.      Nose: Nose normal.      Mouth/Throat:      Pharynx: No oropharyngeal exudate.   Eyes:      General: No scleral icterus.        Right eye: No discharge.         Left eye: No discharge.      Conjunctiva/sclera: Conjunctivae normal.      Pupils: Pupils are equal, round, and reactive to light.   Neck:      Thyroid: No thyromegaly.      Vascular: No JVD.      Trachea: No tracheal deviation.   Cardiovascular:      Rate and Rhythm: Normal rate and regular rhythm.      Heart sounds: Normal heart sounds. No murmur heard.    No friction rub. No gallop.   Pulmonary:      Effort: Pulmonary effort is normal. No respiratory distress.      Breath sounds: Normal breath sounds. No stridor. No wheezing or rales.   Chest:      Chest wall: No tenderness.   Abdominal:      General: Bowel sounds are normal. There is no distension.      Palpations: Abdomen is soft. There is no mass.      Tenderness: There is no abdominal tenderness. There is no guarding or rebound.   Musculoskeletal:         General: No " tenderness. Normal range of motion.      Cervical back: Normal range of motion and neck supple.   Lymphadenopathy:      Cervical: No cervical adenopathy.   Skin:     General: Skin is warm and dry.      Coloration: Skin is not pale.      Findings: No erythema or rash.   Neurological:      Mental Status: He is alert and oriented to person, place, and time.      Motor: No abnormal muscle tone.      Coordination: Coordination normal.      Deep Tendon Reflexes: Reflexes are normal and symmetric. Reflexes normal.   Psychiatric:         Behavior: Behavior normal.         Thought Content: Thought content normal.         Judgment: Judgment normal.                           Assessment & Plan        1. Thrombocytopenia, unspecified (HCC)- from previous chemorx     Good control continue current regimen    2. Stage 3a chronic kidney disease (HCC)      Good control con Good control continue current regimentinue current regimen  3. Spinal enthesopathy of thoracic region (HCC)    Good control continue current regimen    4. Secondary malignant neoplasm of liver (HCC)    Good control continue current regimen    5. Secondary esophageal varices without bleeding (HCC)- BANDED 4/2016- repeat egd tbd 10//29/16- [facility]      Good control continue current regimen  6. Portal hypertension with esophageal varices (HCC)     Good control continue current regimen    7. Peripheral neuropathy due to chemotherapy (HCC)    Good control continue current regimen    8. 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 Class 2 severe obesity due to excess calories with serious comorbidity in adult, unspecified BMI (HCC)    Good control continue current regimen    10. Obesity (BMI 30-39.9)    diet/exercise/lose 15 lbs.; patient counseled'      11. Mixed  hyperlipidemia    Good control continue current regimen    12. Cirrhosis of liver with ascites, unspecified hepatic cirrhosis type (HCC)       Good control continue current regimen

## 2022-11-08 ENCOUNTER — OFFICE VISIT (OUTPATIENT)
Dept: URGENT CARE | Facility: CLINIC | Age: 77
End: 2022-11-08
Payer: MEDICARE

## 2022-11-08 VITALS
HEIGHT: 75 IN | OXYGEN SATURATION: 98 % | SYSTOLIC BLOOD PRESSURE: 118 MMHG | WEIGHT: 231 LBS | DIASTOLIC BLOOD PRESSURE: 60 MMHG | HEART RATE: 62 BPM | TEMPERATURE: 97.7 F | BODY MASS INDEX: 28.72 KG/M2

## 2022-11-08 DIAGNOSIS — S41.111A SKIN TEAR OF RIGHT UPPER ARM WITHOUT COMPLICATION, INITIAL ENCOUNTER: ICD-10-CM

## 2022-11-08 DIAGNOSIS — Z23 NEED FOR VACCINATION: ICD-10-CM
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
## 2022-11-08 PROCEDURE — 99213 OFFICE O/P EST LOW 20 MIN: CPT | Mod: 25 | Performed by: FAMILY MEDICINE

## 2022-11-08 PROCEDURE — 90471 IMMUNIZATION ADMIN: CPT | Performed by: FAMILY MEDICINE

## 2022-11-08 PROCEDURE — 90715 TDAP VACCINE 7 YRS/> IM: CPT | Performed by: FAMILY MEDICINE

## 2022-11-08 ASSESSMENT — FIBROSIS 4 INDEX: FIB4 SCORE: 6.75

## 2022-11-08 NOTE — PROGRESS NOTES
"  Subjective:      77 y.o. male presents to urgent care for skin tear to his right arm.  Last night he had a mechanical fall down a step, and scraped his right forearm on the way down.  He did not hit his head or lose consciousness.  He cleaned the area out and applied a dressing last night.  Bleeding has stopped.  He denies any associated pain.  Last Tdap was 4/25/2022.    He denies any other questions or concerns at this time.    Current problem list, medication, and past medical/surgical history were reviewed in Epic.    ROS  See HPI     Objective:      Ht 1.905 m (6' 3\")   Wt 105 kg (231 lb)   BMI 28.87 kg/m²     Physical Exam  Constitutional:       General: He is not in acute distress.     Appearance: He is not diaphoretic.   Cardiovascular:      Rate and Rhythm: Normal rate and regular rhythm.      Heart sounds: Normal heart sounds.   Pulmonary:      Effort: Pulmonary effort is normal. No respiratory distress.      Breath sounds: Normal breath sounds.   Skin:     Comments: Superficial skin tear to his right forearm.  Hemostasis has been achieved.   Neurological:      Mental Status: He is alert.   Psychiatric:         Mood and Affect: Affect normal.         Judgment: Judgment normal.     Assessment/Plan:     1. Fall, initial encounter  2. Skin tear of right upper arm without complication, initial encounter  3. Need for vaccination  No need for mechanical closure.  Area was cleansed, triple antibiotic ointment applied along with nonstick dressing.  Patient encouraged to repeat this daily.  Tdap updated today in urgent care.  - Tdap =>5yo IM      Instructed to return to Urgent Care or nearest Emergency Department if symptoms fail to improve, for any change in condition, further concerns, or new concerning symptoms. Patient states understanding of the plan of care and discharge instructions.    Jeanne Louis M.D.   "

## 2022-11-11 NOTE — HPI: SKIN LESION
4 = No assist / stand by assistance
Is This A New Presentation, Or A Follow-Up?: Skin Lesion
How Severe Is Your Skin Lesion?: mild
Has Your Skin Lesion Been Treated?: not been treated
normal...

## 2022-12-19 ENCOUNTER — OFFICE VISIT (OUTPATIENT)
Dept: URGENT CARE | Facility: CLINIC | Age: 77
End: 2022-12-19
Payer: MEDICARE

## 2022-12-19 ENCOUNTER — HOSPITAL ENCOUNTER (OUTPATIENT)
Facility: MEDICAL CENTER | Age: 77
End: 2022-12-19
Attending: PHYSICIAN ASSISTANT
Payer: MEDICARE

## 2022-12-19 VITALS
HEIGHT: 75 IN | DIASTOLIC BLOOD PRESSURE: 76 MMHG | SYSTOLIC BLOOD PRESSURE: 104 MMHG | RESPIRATION RATE: 18 BRPM | TEMPERATURE: 97 F | WEIGHT: 231 LBS | OXYGEN SATURATION: 99 % | BODY MASS INDEX: 28.72 KG/M2 | HEART RATE: 63 BPM

## 2022-12-19 DIAGNOSIS — R68.83 CHILLS: ICD-10-CM

## 2022-12-19 DIAGNOSIS — M79.10 MYALGIA: ICD-10-CM

## 2022-12-19 LAB
FLUAV+FLUBV AG SPEC QL IA: NEGATIVE
INT CON NEG: NORMAL
INT CON POS: NORMAL

## 2022-12-19 PROCEDURE — 0240U HCHG SARS-COV-2 COVID-19 NFCT DS RESP RNA 3 TRGT MIC: CPT

## 2022-12-19 PROCEDURE — 87804 INFLUENZA ASSAY W/OPTIC: CPT | Performed by: PHYSICIAN ASSISTANT

## 2022-12-19 PROCEDURE — 99213 OFFICE O/P EST LOW 20 MIN: CPT | Performed by: PHYSICIAN ASSISTANT

## 2022-12-19 RX ORDER — SPIRONOLACTONE 50 MG/1
1 TABLET, FILM COATED ORAL 2 TIMES DAILY
COMMUNITY
Start: 2022-12-01 | End: 2023-02-28 | Stop reason: SDUPTHER

## 2022-12-19 ASSESSMENT — FIBROSIS 4 INDEX: FIB4 SCORE: 6.75

## 2022-12-19 ASSESSMENT — ENCOUNTER SYMPTOMS: CHILLS: 1

## 2022-12-19 NOTE — PROGRESS NOTES
Subjective:   Torey Lima is a 77 y.o. male who presents for Runny Nose (X2 days ) and Chills  Patient presents with chief complaint of rhinorrhea, chills, general malaise which started yesterday.  He states his symptoms started with rhinorrhea.  He reports associated fatigue.  He reports some associated myalgias.  He denies significant cough.  He is vaccinated against COVID-19 but did not receive his flu vaccination as he has a history of Guillain-[place].  He denies objective fever.  He denies chest pain or shortness of breath.  No headache.  He denies underlying respiratory illnesses.  He denies lightheadedness.      Chills  Associated symptoms include chills.       Review of Systems   Constitutional:  Positive for chills.     Medications:  atorvastatin Tabs  betamethasone dipropionate Oint  ferrous sulfate  folic acid Tabs  potassium chloride SA Tbcr  spironolactone Tabs  torsemide  ursodiol Caps  Vitamin D3 Caps    Allergies:             Influenza virus vaccine live, Anticoagulant sodium citrate [sodium citrate], and Lisinopril    Surgical History:         Past Surgical History:   Procedure Laterality Date    LUMBAR LAMINECTOMY DISKECTOMY  5/25/2017    Procedure:  LAMINECTOMY LUMBAR  4 TO SACRAL 1;  Surgeon: Felton Escobar M.D.;  Location: SURGERY [facility];  Service:     GASTROSCOPY WITH BANDING  10/25/2016    Procedure: GASTROSCOPY WITH BANDING;  Surgeon: Pierre Waggoner M.D.;  Location: [facility];  Service:     GASTROSCOPY WITH BANDING  4/3/2016    Procedure: GASTROSCOPY WITH BANDING;  Surgeon: Cayetano Stovall M.D.;  Location: ENDOSCOPY [facility];  Service:     FULL THICKNESS BLOCK RESECTION  4/11/2012    Performed by LANI BOWMAN at [facility] ORS    CATH REMOVAL  7/20/2011    Performed by RUBY RUIZ at [facility]    CATH PLACEMENT  9/20/2010    Performed by RUBY RUIZ at [facility]    LOW ANTERIOR RESECTION  "ROBOTIC  8/10/2010    Performed by RUBY RUIZ at SURGERY [facility] ORS    COLON RESECTION      HIP ARTHROPLASTY MIS TOTAL      rt    HIP REPLACEMENT, TOTAL      OTHER      Cholecystectomy    OTHER (COMMENTS)      liver surgery         Past Social Hx:  Torey Lima  reports that he quit smoking about 59 years ago. His smoking use included cigarettes and cigars. He has a 0.50 pack-year smoking history. He has never used smokeless tobacco. He reports that he does not drink alcohol and does not use drugs.     Past Family Hx:   Torey Lima family history includes Cancer in his mother; Heart Disease in his mother.       Problem list, medications, and allergies reviewed by myself today in Epic.     Objective:     /76   Pulse 63   Temp 36.1 °C (97 °F) (Temporal)   Resp 18   Ht 1.905 m (6' 3\")   Wt 105 kg (231 lb)   SpO2 99%   BMI 28.87 kg/m²     Physical Exam  Constitutional:       General: He is not in acute distress.     Appearance: He is well-developed. He is not ill-appearing or diaphoretic.   HENT:      Head: Normocephalic.      Right Ear: Tympanic membrane, ear canal and external ear normal.      Left Ear: Tympanic membrane, ear canal and external ear normal.      Nose: Mucosal edema, congestion and rhinorrhea present.      Mouth/Throat:      Pharynx: Posterior oropharyngeal erythema present. No oropharyngeal exudate.   Eyes:      General:         Right eye: No discharge.         Left eye: No discharge.      Conjunctiva/sclera: Conjunctivae normal.      Pupils: Pupils are equal, round, and reactive to light.   Cardiovascular:      Rate and Rhythm: Normal rate. Rhythm irregular.      Pulses: Normal pulses.      Heart sounds: Normal heart sounds. No murmur heard.    No friction rub.   Pulmonary:      Effort: Pulmonary effort is normal. No accessory muscle usage or respiratory distress.      Breath sounds: No stridor. No wheezing, rhonchi or rales.      Comments: Lungs are clear to " auscultation bilaterally, no rhonchi rales or wheezes identified   Abdominal:      Palpations: Abdomen is soft.   Musculoskeletal:         General: Normal range of motion.      Cervical back: Normal range of motion.      Right lower leg: No edema.      Left lower leg: No edema.   Lymphadenopathy:      Cervical: No cervical adenopathy.   Skin:     General: Skin is warm and dry.   Neurological:      Mental Status: He is alert and oriented to person, place, and time.     Rapid flu negative  Assessment/Plan:     Diagnosis and Associated Orders:     1. Myalgia  - POCT Influenza A/B  - CoV-2 and Flu A/B by PCR (24 hour In-House): Collect NP swab in VTM; Future    2. Chills  - POCT Influenza A/B  - CoV-2 and Flu A/B by PCR (24 hour In-House): Collect NP swab in VTM; Future    Other orders  - spironolactone (ALDACTONE) 50 MG Tab; Take 1 Tablet by mouth 2 times a day.      Comments/MDM:    Suspect viral etiology with onset of chills, malaise, fatigue.  No shortness of breath.  No underlying respiratory illnesses.  Rapid flu negative.  We do not have rapid COVID available in the office.  COVID and influenza PCR testing obtained.  Did note some PVCs which patient will speak with primary care physician about. Denies CP, shortness of breath, dizziness  Symptomatic and supportive care:   Plenty of oral hydration and rest   Over the counter cough suppressant as directed for adults or Zarbees/Hylands otc for children  Tylenol or ibuprofen for pain and fever as directed.   Warm salt water gargles for sore throat, soft foods, cool liquids, throat lozenges  Saline nasal spray, Flonase, and/or otc sudafed (if no history of hypertension) as a decongestant.   Infection control measures at home. Stay away from people, Hand washing, covering sneeze/cough, disinfect surfaces.   Remain home from work, school, and other populated environments while ill.  Overall, the patient is well-appearing. They are not hypoxic, afebrile, and a normal  pulmonary exam.  Patient should to proceed to ED for development of symptoms including but not limited to shortness of breath breath, respiratory distress, increased fever, or worsening symptoms not manageable at home.      I personally reviewed prior external notes and test results pertinent to today's visit.  Red flags discussed as well as indications to present to the Emergency Department.  Supportive care, natural history, differential diagnoses, and indications for immediate follow-up discussed.  Patient expresses understanding and agrees to plan.  Patient denies any other questions or concerns.    Follow-up with the primary care physician for recheck, reevaluation, and consideration of further management.      Please note that this dictation was created using voice recognition software. I have made a reasonable attempt to correct obvious errors, but I expect that there are errors of grammar and possibly content that I did not discover before finalizing the note.    This note was electronically signed by Yamilex Toro PA-C

## 2022-12-20 DIAGNOSIS — R68.83 CHILLS: ICD-10-CM

## 2022-12-20 DIAGNOSIS — M79.10 MYALGIA: ICD-10-CM

## 2022-12-20 LAB
FLUAV RNA SPEC QL NAA+PROBE: NEGATIVE
FLUBV RNA SPEC QL NAA+PROBE: NEGATIVE
SARS-COV-2 RNA RESP QL NAA+PROBE: NOTDETECTED
SPECIMEN SOURCE: NORMAL

## 2023-01-16 ENCOUNTER — HOSPITAL ENCOUNTER (OUTPATIENT)
Dept: LAB | Facility: MEDICAL CENTER | Age: 78
End: 2023-01-16
Attending: INTERNAL MEDICINE
Payer: MEDICARE

## 2023-01-16 DIAGNOSIS — K74.60 CIRRHOSIS OF LIVER WITH ASCITES, UNSPECIFIED HEPATIC CIRRHOSIS TYPE (HCC): ICD-10-CM

## 2023-01-16 DIAGNOSIS — R18.8 CIRRHOSIS OF LIVER WITH ASCITES, UNSPECIFIED HEPATIC CIRRHOSIS TYPE (HCC): ICD-10-CM

## 2023-01-16 DIAGNOSIS — E78.2 MIXED HYPERLIPIDEMIA: ICD-10-CM

## 2023-01-16 DIAGNOSIS — R73.01 IFG (IMPAIRED FASTING GLUCOSE): ICD-10-CM

## 2023-01-16 DIAGNOSIS — E55.9 VITAMIN D DEFICIENCY: ICD-10-CM

## 2023-01-16 LAB
25(OH)D3 SERPL-MCNC: 30 NG/ML (ref 30–100)
ALBUMIN SERPL BCP-MCNC: 3.8 G/DL (ref 3.2–4.9)
ALBUMIN/GLOB SERPL: 1.3 G/DL
ALP SERPL-CCNC: 92 U/L (ref 30–99)
ALT SERPL-CCNC: 21 U/L (ref 2–50)
ANION GAP SERPL CALC-SCNC: 10 MMOL/L (ref 7–16)
AST SERPL-CCNC: 24 U/L (ref 12–45)
BASOPHILS # BLD AUTO: 1 % (ref 0–1.8)
BASOPHILS # BLD: 0.05 K/UL (ref 0–0.12)
BILIRUB SERPL-MCNC: 1.7 MG/DL (ref 0.1–1.5)
BUN SERPL-MCNC: 28 MG/DL (ref 8–22)
CALCIUM ALBUM COR SERPL-MCNC: 9.3 MG/DL (ref 8.5–10.5)
CALCIUM SERPL-MCNC: 9.1 MG/DL (ref 8.5–10.5)
CHLORIDE SERPL-SCNC: 104 MMOL/L (ref 96–112)
CHOLEST SERPL-MCNC: 108 MG/DL (ref 100–199)
CO2 SERPL-SCNC: 23 MMOL/L (ref 20–33)
CREAT SERPL-MCNC: 1.14 MG/DL (ref 0.5–1.4)
EOSINOPHIL # BLD AUTO: 0.15 K/UL (ref 0–0.51)
EOSINOPHIL NFR BLD: 3 % (ref 0–6.9)
ERYTHROCYTE [DISTWIDTH] IN BLOOD BY AUTOMATED COUNT: 55.9 FL (ref 35.9–50)
EST. AVERAGE GLUCOSE BLD GHB EST-MCNC: 88 MG/DL
GFR SERPLBLD CREATININE-BSD FMLA CKD-EPI: 66 ML/MIN/1.73 M 2
GLOBULIN SER CALC-MCNC: 2.9 G/DL (ref 1.9–3.5)
GLUCOSE SERPL-MCNC: 100 MG/DL (ref 65–99)
HBA1C MFR BLD: 4.7 % (ref 4–5.6)
HCT VFR BLD AUTO: 36.4 % (ref 42–52)
HDLC SERPL-MCNC: 61 MG/DL
HGB BLD-MCNC: 11.8 G/DL (ref 14–18)
IMM GRANULOCYTES # BLD AUTO: 0.02 K/UL (ref 0–0.11)
IMM GRANULOCYTES NFR BLD AUTO: 0.4 % (ref 0–0.9)
INR PPP: 1.31 (ref 0.87–1.13)
LDLC SERPL CALC-MCNC: 38 MG/DL
LYMPHOCYTES # BLD AUTO: 0.35 K/UL (ref 1–4.8)
LYMPHOCYTES NFR BLD: 6.9 % (ref 22–41)
MCH RBC QN AUTO: 30 PG (ref 27–33)
MCHC RBC AUTO-ENTMCNC: 32.4 G/DL (ref 33.7–35.3)
MCV RBC AUTO: 92.6 FL (ref 81.4–97.8)
MONOCYTES # BLD AUTO: 0.44 K/UL (ref 0–0.85)
MONOCYTES NFR BLD AUTO: 8.7 % (ref 0–13.4)
NEUTROPHILS # BLD AUTO: 4.05 K/UL (ref 1.82–7.42)
NEUTROPHILS NFR BLD: 80 % (ref 44–72)
NRBC # BLD AUTO: 0 K/UL
NRBC BLD-RTO: 0 /100 WBC
PLATELET # BLD AUTO: 69 K/UL (ref 164–446)
PMV BLD AUTO: 10.4 FL (ref 9–12.9)
POTASSIUM SERPL-SCNC: 4.2 MMOL/L (ref 3.6–5.5)
PROT SERPL-MCNC: 6.7 G/DL (ref 6–8.2)
PROTHROMBIN TIME: 16.1 SEC (ref 12–14.6)
RBC # BLD AUTO: 3.93 M/UL (ref 4.7–6.1)
SODIUM SERPL-SCNC: 137 MMOL/L (ref 135–145)
TRIGL SERPL-MCNC: 44 MG/DL (ref 0–149)
TSH SERPL DL<=0.005 MIU/L-ACNC: 3.06 UIU/ML (ref 0.38–5.33)
WBC # BLD AUTO: 5.1 K/UL (ref 4.8–10.8)

## 2023-01-16 PROCEDURE — 80053 COMPREHEN METABOLIC PANEL: CPT

## 2023-01-16 PROCEDURE — 83036 HEMOGLOBIN GLYCOSYLATED A1C: CPT

## 2023-01-16 PROCEDURE — 84443 ASSAY THYROID STIM HORMONE: CPT

## 2023-01-16 PROCEDURE — 80061 LIPID PANEL: CPT

## 2023-01-16 PROCEDURE — 36415 COLL VENOUS BLD VENIPUNCTURE: CPT

## 2023-01-16 PROCEDURE — 82306 VITAMIN D 25 HYDROXY: CPT

## 2023-01-16 PROCEDURE — 82105 ALPHA-FETOPROTEIN SERUM: CPT

## 2023-01-16 PROCEDURE — 85610 PROTHROMBIN TIME: CPT

## 2023-01-16 PROCEDURE — 85025 COMPLETE CBC W/AUTO DIFF WBC: CPT

## 2023-01-17 LAB — AFP-TM SERPL-MCNC: 2 NG/ML (ref 0–9)

## 2023-01-18 ENCOUNTER — OFFICE VISIT (OUTPATIENT)
Dept: MEDICAL GROUP | Age: 78
End: 2023-01-18
Payer: MEDICARE

## 2023-01-18 VITALS
TEMPERATURE: 97.5 F | SYSTOLIC BLOOD PRESSURE: 120 MMHG | WEIGHT: 237 LBS | RESPIRATION RATE: 16 BRPM | OXYGEN SATURATION: 97 % | HEIGHT: 75 IN | HEART RATE: 63 BPM | DIASTOLIC BLOOD PRESSURE: 70 MMHG | BODY MASS INDEX: 29.47 KG/M2

## 2023-01-18 DIAGNOSIS — E53.8 VITAMIN B 12 DEFICIENCY: ICD-10-CM

## 2023-01-18 DIAGNOSIS — R73.01 IFG (IMPAIRED FASTING GLUCOSE): ICD-10-CM

## 2023-01-18 DIAGNOSIS — R17 ELEVATED BILIRUBIN: ICD-10-CM

## 2023-01-18 DIAGNOSIS — E61.1 IRON DEFICIENCY: ICD-10-CM

## 2023-01-18 DIAGNOSIS — N40.0 BPH WITHOUT URINARY OBSTRUCTION: ICD-10-CM

## 2023-01-18 DIAGNOSIS — I25.10 ASCVD (ARTERIOSCLEROTIC CARDIOVASCULAR DISEASE): ICD-10-CM

## 2023-01-18 DIAGNOSIS — I10 ESSENTIAL HYPERTENSION: ICD-10-CM

## 2023-01-18 DIAGNOSIS — E55.9 VITAMIN D DEFICIENCY: ICD-10-CM

## 2023-01-18 DIAGNOSIS — R60.1 GENERALIZED EDEMA: ICD-10-CM

## 2023-01-18 DIAGNOSIS — E78.5 DYSLIPIDEMIA: ICD-10-CM

## 2023-01-18 PROBLEM — N18.31 STAGE 3A CHRONIC KIDNEY DISEASE: Status: RESOLVED | Noted: 2022-06-25 | Resolved: 2023-01-18

## 2023-01-18 PROCEDURE — 99214 OFFICE O/P EST MOD 30 MIN: CPT | Performed by: INTERNAL MEDICINE

## 2023-01-18 ASSESSMENT — ENCOUNTER SYMPTOMS
CONSTITUTIONAL NEGATIVE: 1
NEUROLOGICAL NEGATIVE: 1
GASTROINTESTINAL NEGATIVE: 1
PSYCHIATRIC NEGATIVE: 1
CARDIOVASCULAR NEGATIVE: 1
MUSCULOSKELETAL NEGATIVE: 1
RESPIRATORY NEGATIVE: 1
EYES NEGATIVE: 1

## 2023-01-18 ASSESSMENT — PATIENT HEALTH QUESTIONNAIRE - PHQ9: CLINICAL INTERPRETATION OF PHQ2 SCORE: 0

## 2023-01-18 ASSESSMENT — FIBROSIS 4 INDEX: FIB4 SCORE: 5.92

## 2023-01-18 NOTE — PROGRESS NOTES
Subjective     Torey Lima is a 77 y.o. male who presents with Follow-Up (Lab review )  The patient is here for followup of chronic medical problems listed below. The patient is compliant with medications and having no side effects from them. Denies chest pain, abdominal pain, dyspnea, myalgias, or cough.      Since patient's last visit he underwent banding and ligation of tomorrow esophageal varices by local gastroenterologist at [facility].  He has had no blood in the stool and no melena.  No hematemesis.  Does have a history of cirrhosis.    Today the patient does not seem to be real clear on his medications and says that we should refer to his list which she did not bring in.  I asked him specifically about atorvastatin and he thought he might of had a reaction to it and maybe someone substituted something for this but was not sure and cannot say for sure whether or not he was still on it.  Does not look as though he has had his Lipitor refilled.    Previously were seeing him for his fluid retention which is good at this time with no further edema and he continues on his Demadex for this and spironolactone.    I told patient bring a list of medications  I will go over them more detail.      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)--2010 left hemicolectomy; no colostomy; ablation of liver met at [facility] 1/2011- MRI abd 3/2018 no change in per    Liver lesion- ablation of malignant met from colon ca 2011- f/u [facility] [place] q 6 mos - stable MRI abd 3/2018- redo annually    Drug-induced polyneuropathy (HCC)    Vitamin D deficiency    ASCVD (arteriosclerotic cardiovascular disease)subtotalled small distal LCx and 40% LAD med rx by cath 12/13/12    Essential hypertension    Gastrointestinal hemorrhage with hematemesis- recurrernt from varices    Secondary esophageal varices without bleeding (HCC)- BANDED 4/2016- repeat egd tbd 10//29/16-  [facility]    Gastric varices- s/p BRTO procedure at [facility]    Portal vein thrombosis- on CT [facility] 2/2015    Cirrhosis of liver with ascites (HCC)- ? DUE TO OLD HEP B, CHEMO RX,  OR THERMO RAD OF MAGNANT LESION    History of esophageal varices    H/O colon cancer, stage IV- neg colonoscopy 2012/2018 at [facility]    BPH (benign prostatic hyperplasia)    Peripheral neuropathy due to chemotherapy (HCC)    Edema of left lower extremity- due to dvt july 2017    Generalized edema    Anemia, chronic disease    Chronic deep vein thrombosis (DVT) of femoral vein of left lower extremity (HCC)- s/p IVC filter 2016    Portal hypertension with esophageal varices (HCC)    Secondary malignant neoplasm of liver (HCC)    Thrombocytopenia, unspecified (HCC)- from previous chemorx    Spinal enthesopathy of thoracic region (HCC)    History of Guillain-[place] syndrome    Cerebral atrophy (HCC)-MRI brain 2019, mild diffuse cerebral substance loss    Dyslipidemia        Outpatient Medications Prior to Visit   Medication Sig Dispense Refill    spironolactone (ALDACTONE) 50 MG Tab Take 1 Tablet by mouth 2 times a day.      atorvastatin (LIPITOR) 20 MG Tab Take 2 Tablets by mouth every evening.      betamethasone dipropionate (DIPROLENE) 0.05 % Ointment Apply 1 Application topically every day. 45 g 1    potassium chloride SA (KDUR) 20 MEQ Tab CR TAKE 1 TABLET BY MOUTH TWICE DAILY 200 Tab 4    folic acid (FOLVITE) 1 MG Tab Take 1 tablet by mouth once daily 100 Tab 4    ursodiol (ACTIGALL) 300 MG Cap Take 1 capsule by mouth once daily (Patient taking differently: Take  by mouth 3 times a day.) 90 Cap 4    torsemide (DEMADEX) 10 MG tablet Take 2 tablets by mouth once daily 180 Tab 4    Cholecalciferol (VITAMIN D3) 1000 units Cap Take 1,000 Units by mouth.      ferrous sulfate 325 (65 Fe) MG tablet Take 1 Tab by mouth every morning with breakfast. 30 Tab      No facility-administered medications prior to visit.     Allergies   Allergen Reactions    Influenza  Virus Vaccine Live      Guillan [place]     Anticoagulant Sodium Citrate [Sodium Citrate]      HIGH BLEEDING RISK    Lisinopril Cough     Hospital Outpatient Visit on 01/16/2023   Component Date Value    TSH 01/16/2023 3.060     Sodium 01/16/2023 137     Potassium 01/16/2023 4.2     Chloride 01/16/2023 104     Co2 01/16/2023 23     Anion Gap 01/16/2023 10.0     Glucose 01/16/2023 100 (H)     Bun 01/16/2023 28 (H)     Creatinine 01/16/2023 1.14     Calcium 01/16/2023 9.1     AST(SGOT) 01/16/2023 24     ALT(SGPT) 01/16/2023 21     Alkaline Phosphatase 01/16/2023 92     Total Bilirubin 01/16/2023 1.7 (H)     Albumin 01/16/2023 3.8     Total Protein 01/16/2023 6.7     Globulin 01/16/2023 2.9     A-G Ratio 01/16/2023 1.3     Cholesterol,Tot 01/16/2023 108     Triglycerides 01/16/2023 44     HDL 01/16/2023 61     LDL 01/16/2023 38     WBC 01/16/2023 5.1     RBC 01/16/2023 3.93 (L)     Hemoglobin 01/16/2023 11.8 (L)     Hematocrit 01/16/2023 36.4 (L)     MCV 01/16/2023 92.6     MCH 01/16/2023 30.0     MCHC 01/16/2023 32.4 (L)     RDW 01/16/2023 55.9 (H)     Platelet Count 01/16/2023 69 (L)     MPV 01/16/2023 10.4     Neutrophils-Polys 01/16/2023 80.00 (H)     Lymphocytes 01/16/2023 6.90 (L)     Monocytes 01/16/2023 8.70     Eosinophils 01/16/2023 3.00     Basophils 01/16/2023 1.00     Immature Granulocytes 01/16/2023 0.40     Nucleated RBC 01/16/2023 0.00     Neutrophils (Absolute) 01/16/2023 4.05     Lymphs (Absolute) 01/16/2023 0.35 (L)     Monos (Absolute) 01/16/2023 0.44     Eos (Absolute) 01/16/2023 0.15     Baso (Absolute) 01/16/2023 0.05     Immature Granulocytes (a* 01/16/2023 0.02     NRBC (Absolute) 01/16/2023 0.00     Glycohemoglobin 01/16/2023 4.7     Est Avg Glucose 01/16/2023 88     25-Hydroxy   Vitamin D 25 01/16/2023 30     PT 01/16/2023 16.1 (H)     INR 01/16/2023 1.31 (H)     Alpha Fetoprotein 01/16/2023 2     Correct Calcium 01/16/2023 9.3     GFR (CKD-EPI) 01/16/2023 66    Hospital Outpatient Visit on  "12/19/2022   Component Date Value    Influenza virus A RNA 12/19/2022 Negative     Influenza virus B, PCR 12/19/2022 Negative     SARS-CoV-2 by PCR 12/19/2022 NotDetected     SARS-CoV-2 Source 12/19/2022 Nasal Swab    Office Visit on 12/19/2022   Component Date Value    Rapid Influenza A-B 12/19/2022 NEGATIVE     Internal Control Positive 12/19/2022 Valid     Internal Control Negative 12/19/2022 Valid       .alll            HPI    Review of Systems   Constitutional: Negative.    HENT: Negative.     Eyes: Negative.    Respiratory: Negative.     Cardiovascular: Negative.    Gastrointestinal: Negative.    Genitourinary: Negative.    Musculoskeletal: Negative.    Skin: Negative.    Neurological: Negative.    Endo/Heme/Allergies: Negative.    Psychiatric/Behavioral: Negative.              Objective     /70 (BP Location: Right arm, Patient Position: Sitting, BP Cuff Size: Adult)   Pulse 63   Temp 36.4 °C (97.5 °F) (Temporal)   Resp 16   Ht 1.905 m (6' 3\")   Wt 108 kg (237 lb)   SpO2 97%   BMI 29.62 kg/m²      Physical Exam  Constitutional:       General: He is not in acute distress.     Appearance: He is well-developed. He is not diaphoretic.   HENT:      Head: Normocephalic and atraumatic.      Right Ear: External ear normal.      Left Ear: External ear normal.      Nose: Nose normal.      Mouth/Throat:      Pharynx: No oropharyngeal exudate.   Eyes:      General: No scleral icterus.        Right eye: No discharge.         Left eye: No discharge.      Conjunctiva/sclera: Conjunctivae normal.      Pupils: Pupils are equal, round, and reactive to light.   Neck:      Thyroid: No thyromegaly.      Vascular: No JVD.      Trachea: No tracheal deviation.   Cardiovascular:      Rate and Rhythm: Normal rate and regular rhythm.      Heart sounds: Normal heart sounds. No murmur heard.    No friction rub. No gallop.   Pulmonary:      Effort: Pulmonary effort is normal. No respiratory distress.      Breath sounds: Normal " breath sounds. No stridor. No wheezing or rales.   Chest:      Chest wall: No tenderness.   Abdominal:      General: Bowel sounds are normal. There is distension.      Palpations: Abdomen is soft. There is no mass.      Tenderness: There is no abdominal tenderness. There is no guarding or rebound.      Comments: There appears to be some ascites present with shifting dullness and protrusion of his abdomen.  But no tenderness.   Musculoskeletal:         General: No tenderness. Normal range of motion.      Cervical back: Normal range of motion and neck supple.      Comments: No edema.  No calf tenderness.   Lymphadenopathy:      Cervical: No cervical adenopathy.   Skin:     General: Skin is warm and dry.      Coloration: Skin is not pale.      Findings: No erythema or rash.      Comments: No jaundice or petechiae.   Neurological:      Mental Status: He is alert and oriented to person, place, and time.      Motor: No abnormal muscle tone.      Coordination: Coordination normal.      Deep Tendon Reflexes: Reflexes are normal and symmetric. Reflexes normal.      Comments: Patient does not seem as sharp mentally or cognitively today.  His speech is slower and he hesitates with recalling issues.  His gait is a little bit more shuffling and slower.  There is no cogwheel rigidity but he does have some blank facies.  Concern for Parkinson comes to my attention.  We will surveilled closely for this.   Psychiatric:         Behavior: Behavior normal.         Thought Content: Thought content normal.         Judgment: Judgment normal.                            Assessment & Plan        1. Essential hypertension   Under good control. Continue same regimen.  Spironolactone 50 mg daily and Demadex 10 mg twice daily    2. Generalized edema-good control continue current regimen   Spironolactone 50 mg daily and Demadex 10 mg twice daily    3. ASCVD (arteriosclerotic cardiovascular disease)subtotalled small distal LCx and 40% LAD med rx  by cath 12/13/12   good control continue current regimen    4. Dyslipidemia  Good control continue current regimen.  Not clear if he still taking Lipitor at this time.  Will bring in his list of medications  Along with his bag of medicines to go over these more detail.  Follow-up with cardiology.  It may be that atorvastatin was discontinued because of his cirrhosis.  Its not clear to me and he cannot recall.  - TSH; Future  - Comp Metabolic Panel; Future  - Lipid Profile; Future  - CBC WITH DIFFERENTIAL; Future    5. Elevated bilirubin  Under good control.  Continue ursodiol 3 mg 3 times daily.  Per GI.  - Comp Metabolic Panel; Future    6. Iron deficiency  This needs rechecking.  Labs were continue iron sulfate 325 mg once a day  - IRON/TOTAL IRON BIND; Future  - FERRITIN; Future    7. Vitamin B 12 deficiency  Under good control continue B12 1000 mcg daily  - FOLATE; Future  - VITAMIN B12; Future    8. Vitamin D deficiency  Under good control continue 1000 units of vitamin D3 daily.  - VITAMIN D,25 HYDROXY (DEFICIENCY); Future    9. BPH without urinary obstruction  Needs follow-up with repeat PSA ordered no symptoms.  - PROSTATE SPECIFIC AG DIAGNOSTIC; Future    10. IFG (impaired fasting glucose)-stable with A1c = 4.7      Under good control.  Continue Mediterranean diet.  A1c as above.      11.  Mild cognitive impairment.  Has chronic cerebral atrophy seen on previous brain imaging.  Seems less sharp and with blank facies and slow speech and shuffling gait and concerned about possible development of Parkinson which we should screen more closely for on follow-up visit.    Patient does have a neurologist Dr. Rosales with whom he will continue to follow status post history of Guillain-[place].  On next visit I will asked him to discuss this more fully with his neurologist.    Patient will return in 3 months with labs prior to visit for annual wellness visit

## 2023-02-28 ENCOUNTER — OFFICE VISIT (OUTPATIENT)
Dept: MEDICAL GROUP | Age: 78
End: 2023-02-28
Payer: MEDICARE

## 2023-02-28 VITALS
OXYGEN SATURATION: 98 % | HEIGHT: 75 IN | DIASTOLIC BLOOD PRESSURE: 80 MMHG | WEIGHT: 241 LBS | TEMPERATURE: 98.2 F | SYSTOLIC BLOOD PRESSURE: 116 MMHG | HEART RATE: 60 BPM | BODY MASS INDEX: 29.97 KG/M2

## 2023-02-28 DIAGNOSIS — R06.00 PND (PAROXYSMAL NOCTURNAL DYSPNEA): ICD-10-CM

## 2023-02-28 DIAGNOSIS — K74.60 CIRRHOSIS OF LIVER WITH ASCITES, UNSPECIFIED HEPATIC CIRRHOSIS TYPE (HCC): ICD-10-CM

## 2023-02-28 DIAGNOSIS — R18.8 CIRRHOSIS OF LIVER WITH ASCITES, UNSPECIFIED HEPATIC CIRRHOSIS TYPE (HCC): ICD-10-CM

## 2023-02-28 DIAGNOSIS — E78.5 DYSLIPIDEMIA: ICD-10-CM

## 2023-02-28 DIAGNOSIS — R60.1 GENERALIZED EDEMA: ICD-10-CM

## 2023-02-28 DIAGNOSIS — R17 ELEVATED BILIRUBIN: ICD-10-CM

## 2023-02-28 DIAGNOSIS — I49.9 IRREGULAR HEARTBEAT: ICD-10-CM

## 2023-02-28 DIAGNOSIS — E66.9 OBESITY (BMI 30-39.9): ICD-10-CM

## 2023-02-28 DIAGNOSIS — E87.6 HYPOKALEMIA: ICD-10-CM

## 2023-02-28 DIAGNOSIS — G47.33 OSA (OBSTRUCTIVE SLEEP APNEA): ICD-10-CM

## 2023-02-28 PROBLEM — E66.09 OBESITY DUE TO EXCESS CALORIES WITH SERIOUS COMORBIDITY: Status: ACTIVE | Noted: 2023-02-28

## 2023-02-28 PROCEDURE — 93000 ELECTROCARDIOGRAM COMPLETE: CPT | Performed by: INTERNAL MEDICINE

## 2023-02-28 PROCEDURE — 99214 OFFICE O/P EST MOD 30 MIN: CPT | Performed by: INTERNAL MEDICINE

## 2023-02-28 RX ORDER — ATORVASTATIN CALCIUM 40 MG/1
40 TABLET, FILM COATED ORAL NIGHTLY
Qty: 90 TABLET | Refills: 4 | Status: SHIPPED | DISCHARGE
Start: 2023-02-28 | End: 2023-04-19 | Stop reason: SDUPTHER

## 2023-02-28 RX ORDER — POTASSIUM CHLORIDE 20 MEQ/1
20 TABLET, EXTENDED RELEASE ORAL DAILY
Qty: 90 TABLET | Refills: 4 | Status: SHIPPED | DISCHARGE
Start: 2023-02-28 | End: 2023-04-19 | Stop reason: SDUPTHER

## 2023-02-28 RX ORDER — URSODIOL 300 MG/1
300 CAPSULE ORAL 3 TIMES DAILY
Qty: 270 CAPSULE | Refills: 3 | Status: SHIPPED | DISCHARGE
Start: 2023-02-28 | End: 2023-04-19 | Stop reason: SDUPTHER

## 2023-02-28 RX ORDER — SPIRONOLACTONE 50 MG/1
50 TABLET, FILM COATED ORAL 2 TIMES DAILY
Qty: 180 TABLET | Refills: 3 | Status: SHIPPED | DISCHARGE
Start: 2023-02-28 | End: 2023-04-19 | Stop reason: SDUPTHER

## 2023-02-28 RX ORDER — TORSEMIDE 10 MG/1
10 TABLET ORAL DAILY
Qty: 90 TABLET | Refills: 4 | Status: SHIPPED | DISCHARGE
Start: 2023-02-28 | End: 2023-04-19 | Stop reason: SDUPTHER

## 2023-02-28 ASSESSMENT — ENCOUNTER SYMPTOMS
GASTROINTESTINAL NEGATIVE: 1
PSYCHIATRIC NEGATIVE: 1
NEUROLOGICAL NEGATIVE: 1
CONSTITUTIONAL NEGATIVE: 1
PALPITATIONS: 1
MUSCULOSKELETAL NEGATIVE: 1
EYES NEGATIVE: 1
SHORTNESS OF BREATH: 1

## 2023-02-28 ASSESSMENT — FIBROSIS 4 INDEX: FIB4 SCORE: 5.92

## 2023-03-01 NOTE — PROGRESS NOTES
Subjective     Torey Lima is a 77 y.o. male who presents with Follow-Up (Apple watch says A-Fib possible please apnea)  Patient presents with recent onset over the past few weeks of nocturnal dyspnea noticed by his wife where she has to shake him to wake him up after prolonged apneic spells.  Patient states his wife is never noticed that before.  Patient also is here for evaluation of palpitations irregular heart beat that he consents as well as finding of atrial fibrillation on his Apple Watch.  The patient has a past history of coronary disease for which he underwent cardiac catheterization in 2012 and was found to have a small distal circumflex and a 40% LAD obstruction for which medical management was recommended.  No further chest pains or exertional dyspnea since then.    However the patient does have a very complicated past medical history of colon cancer metastatic to the liver complicated by portal vein thrombosis and esophageal varices with ascites and cirrhosis of the liver.  He was warned by his hepatologist and oncologist at [facility] where he is treated never to go on anticoagulation therapy due to these findings.    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)--2010 left hemicolectomy; no colostomy; ablation of liver met at [facility] 1/2011- MRI abd 3/2018 no change in per    Liver lesion- ablation of malignant met from colon ca 2011- f/u [facility] q 6 mos - stable MRI abd 3/2018- redo annually    Drug-induced polyneuropathy (HCC)    Vitamin D deficiency    ASCVD (arteriosclerotic cardiovascular disease)subtotalled small distal LCx and 40% LAD med rx by cath 12/13/12    Essential hypertension    Gastrointestinal hemorrhage with hematemesis- recurrernt from varices    Secondary esophageal varices without bleeding (HCC)- BANDED 4/2016- repeat egd tbd 10//29/16- gi    Gastric varices- s/p BRTO procedure at [facility]    Portal  vein thrombosis- on CT [facility] 2/2015    Cirrhosis of liver with ascites (HCC)- ? DUE TO OLD HEP B, CHEMO RX,  OR THERMO RAD OF MAGNANT LESION    History of esophageal varices    H/O colon cancer, stage IV- neg colonoscopy 2012/2018 at [facility]    BPH (benign prostatic hyperplasia)    Peripheral neuropathy due to chemotherapy (HCC)    Edema of left lower extremity- due to dvt july 2017    Generalized edema    Anemia, chronic disease    Chronic deep vein thrombosis (DVT) of femoral vein of left lower extremity (HCC)- s/p IVC filter 2016    Portal hypertension with esophageal varices (HCC)    Secondary malignant neoplasm of liver (HCC)    Thrombocytopenia, unspecified (HCC)- from previous chemorx    Spinal enthesopathy of thoracic region (HCC)    History of Guillain-[place] syndrome    Cerebral atrophy (HCC)-MRI brain 2019, mild diffuse cerebral substance loss    Dyslipidemia    Irregular heartbeat- A. FIB ON [employer]     Outpatient Medications Prior to Visit   Medication Sig Dispense Refill    spironolactone (ALDACTONE) 50 MG Tab Take 1 Tablet by mouth 2 times a day.      folic acid (FOLVITE) 1 MG Tab Take 1 tablet by mouth once daily 100 Tab 4    Cholecalciferol (VITAMIN D3) 1000 units Cap Take 1,000 Units by mouth.      ferrous sulfate 325 (65 Fe) MG tablet Take 1 Tab by mouth every morning with breakfast. 30 Tab     atorvastatin (SSHDYYE09 MG Tab Take 1 Tablet by mouth every evening.              potassium chloride SA (KDUR) 20 MEQ Tab CR TAKE 1 TABLET BY MOUTH TWICE DAILY 200 Tab 4    ursodiol (ACTIGALL) 300 MG Cap Take 3 capsules by mouth 3 times daily  90 Cap 4    torsemide (DEMADEX) 10 MG tablet Take 1 tablet by mouth once daily 180 Tab 4   Reviewed together with the patient and the records from [facility] and corrected all the medication mistakes that were in accordance with the [facility] records.  Epic.  These are now correct as above in accordance with [facility]'s medical record.  No facility-administered medications prior to  visit.     No visits with results within 1 Month(s) from this visit.   Latest known visit with results is:   Hospital Outpatient Visit on 01/16/2023   Component Date Value    TSH 01/16/2023 3.060     Sodium 01/16/2023 137     Potassium 01/16/2023 4.2     Chloride 01/16/2023 104     Co2 01/16/2023 23     Anion Gap 01/16/2023 10.0     Glucose 01/16/2023 100 (H)     Bun 01/16/2023 28 (H)     Creatinine 01/16/2023 1.14     Calcium 01/16/2023 9.1     AST(SGOT) 01/16/2023 24     ALT(SGPT) 01/16/2023 21     Alkaline Phosphatase 01/16/2023 92     Total Bilirubin 01/16/2023 1.7 (H)     Albumin 01/16/2023 3.8     Total Protein 01/16/2023 6.7     Globulin 01/16/2023 2.9     A-G Ratio 01/16/2023 1.3     Cholesterol,Tot 01/16/2023 108     Triglycerides 01/16/2023 44     HDL 01/16/2023 61     LDL 01/16/2023 38     WBC 01/16/2023 5.1     RBC 01/16/2023 3.93 (L)     Hemoglobin 01/16/2023 11.8 (L)     Hematocrit 01/16/2023 36.4 (L)     MCV 01/16/2023 92.6     MCH 01/16/2023 30.0     MCHC 01/16/2023 32.4 (L)     RDW 01/16/2023 55.9 (H)     Platelet Count 01/16/2023 69 (L)     MPV 01/16/2023 10.4     Neutrophils-Polys 01/16/2023 80.00 (H)     Lymphocytes 01/16/2023 6.90 (L)     Monocytes 01/16/2023 8.70     Eosinophils 01/16/2023 3.00     Basophils 01/16/2023 1.00     Immature Granulocytes 01/16/2023 0.40     Nucleated RBC 01/16/2023 0.00     Neutrophils (Absolute) 01/16/2023 4.05     Lymphs (Absolute) 01/16/2023 0.35 (L)     Monos (Absolute) 01/16/2023 0.44     Eos (Absolute) 01/16/2023 0.15     Baso (Absolute) 01/16/2023 0.05     Immature Granulocytes (a* 01/16/2023 0.02     NRBC (Absolute) 01/16/2023 0.00     Glycohemoglobin 01/16/2023 4.7     Est Avg Glucose 01/16/2023 88     25-Hydroxy   Vitamin D 25 01/16/2023 30     PT 01/16/2023 16.1 (H)     INR 01/16/2023 1.31 (H)     Alpha Fetoprotein 01/16/2023 2     Correct Calcium 01/16/2023 9.3     GFR (CKD-EPI) 01/16/2023 66       Lab Results   Component Value Date/Time    HBA1C 4.7  01/16/2023 06:29 AM    HBA1C 4.7 07/05/2022 06:48 AM     Lab Results   Component Value Date/Time    SODIUM 137 01/16/2023 06:29 AM    POTASSIUM 4.2 01/16/2023 06:29 AM    CHLORIDE 104 01/16/2023 06:29 AM    CO2 23 01/16/2023 06:29 AM    GLUCOSE 100 (H) 01/16/2023 06:29 AM    BUN 28 (H) 01/16/2023 06:29 AM    CREATININE 1.14 01/16/2023 06:29 AM    CREATININE 1.14 01/23/2010 12:00 AM    BUNCREATRAT 15 01/23/2010 12:00 AM    GLOMRATE >59 01/23/2010 12:00 AM    ALKPHOSPHAT 92 01/16/2023 06:29 AM    ASTSGOT 24 01/16/2023 06:29 AM    ALTSGPT 21 01/16/2023 06:29 AM    TBILIRUBIN 1.7 (H) 01/16/2023 06:29 AM     Lab Results   Component Value Date/Time    INR 1.31 (H) 01/16/2023 06:29 AM    INR 1.30 (H) 01/06/2022 06:26 AM    INR 1.37 (H) 09/14/2021 02:59 PM     Lab Results   Component Value Date/Time    CHOLSTRLTOT 108 01/16/2023 06:29 AM    LDL 38 01/16/2023 06:29 AM    HDL 61 01/16/2023 06:29 AM    TRIGLYCERIDE 44 01/16/2023 06:29 AM       Lab Results   Component Value Date/Time    TESTOSTERONE 333 04/02/2015 06:47 AM     No results found for: TSH  Lab Results   Component Value Date/Time    FREET4 1.16 04/22/2022 06:12 AM    FREET4 0.85 04/02/2015 06:47 AM     No results found for: URICACID  No components found for: VITB12  Lab Results   Component Value Date/Time    25HYDROXY 30 01/16/2023 06:29 AM    25HYDROXY 30 07/05/2022 06:48 AM     '                      HPI    Review of Systems   Constitutional: Negative.    HENT: Negative.     Eyes: Negative.    Respiratory:  Positive for shortness of breath.    Cardiovascular:  Positive for palpitations.   Gastrointestinal: Negative.    Genitourinary: Negative.    Musculoskeletal: Negative.    Skin: Negative.    Neurological: Negative.    Endo/Heme/Allergies: Negative.    Psychiatric/Behavioral: Negative.              Objective     /80 (BP Location: Left arm, Patient Position: Sitting, BP Cuff Size: Adult)   Pulse 60   Temp 36.8 °C (98.2 °F) (Temporal)   Ht 1.905 m (6'  "3\")   Wt 109 kg (241 lb)   SpO2 98%   BMI 30.12 kg/m²      Physical Exam  Constitutional:       General: He is not in acute distress.     Appearance: He is well-developed. He is not diaphoretic.   HENT:      Head: Normocephalic and atraumatic.      Right Ear: External ear normal.      Left Ear: External ear normal.      Nose: Nose normal.      Mouth/Throat:      Pharynx: No oropharyngeal exudate.   Eyes:      General: No scleral icterus.        Right eye: No discharge.         Left eye: No discharge.      Conjunctiva/sclera: Conjunctivae normal.      Pupils: Pupils are equal, round, and reactive to light.   Neck:      Thyroid: No thyromegaly.      Vascular: No JVD.      Trachea: No tracheal deviation.   Cardiovascular:      Rate and Rhythm: Normal rate and regular rhythm.      Heart sounds: Normal heart sounds. No murmur heard.    No friction rub. No gallop.   Pulmonary:      Effort: Pulmonary effort is normal. No respiratory distress.      Breath sounds: Normal breath sounds. No stridor. No wheezing or rales.   Chest:      Chest wall: No tenderness.   Abdominal:      General: Bowel sounds are normal. There is no distension.      Palpations: Abdomen is soft. There is no mass.      Tenderness: There is no abdominal tenderness. There is no guarding or rebound.   Musculoskeletal:         General: No tenderness. Normal range of motion.      Cervical back: Normal range of motion and neck supple.   Lymphadenopathy:      Cervical: No cervical adenopathy.   Skin:     General: Skin is warm and dry.      Coloration: Skin is not pale.      Findings: No erythema or rash.   Neurological:      Mental Status: He is alert and oriented to person, place, and time.      Motor: No abnormal muscle tone.      Coordination: Coordination normal.      Deep Tendon Reflexes: Reflexes are normal and symmetric. Reflexes normal.   Psychiatric:         Behavior: Behavior normal.         Thought Content: Thought content normal.         " Judgment: Judgment normal.        EKG shows sinus rhythm at 66 and no pauses or AV block or other arrhythmias.  Read by me.                   Assessment & Plan        1. Irregular heartbeat- A. FIB ON APPLE WATCH  I suspect the patient may be having some arrhythmia that is occurring primarily at night that may be atrial fibs and perhaps brought on by sleep apnea.  He needs evaluation for both.  2-week Zio patch monitor ordered as well as echocardiogram and referral to cardiology.  Referral for sleep study is also sent.  - EKG  - HOLTER - Cardiology Performed (48HR); Future  - EC-ECHOCARDIOGRAM COMPLETE W/O CONT; Future  - Referral to Pulmonary and Sleep Medicine  - REFERRAL TO CARDIOLOGY    2. PND (paroxysmal nocturnal dyspnea)  See above  - HOLTER - Cardiology Performed (48HR); Future  - EC-ECHOCARDIOGRAM COMPLETE W/O CONT; Future  - Referral to Pulmonary and Sleep Medicine  - REFERRAL TO CARDIOLOGY    3. JAXON (obstructive sleep apnea)  As above  - Referral to Pulmonary and Sleep Medicine  - REFERRAL TO CARDIOLOGY    4. Dyslipidemia  Good control continue current regimen  - atorvastatin (LIPITOR) 40 MG Tab; Take 1 Tablet by mouth every evening.  Dispense: 90 Tablet; Refill: 4    5. Generalized edema  Good control continue current regimen  - torsemide (DEMADEX) 10 MG tablet; Take 1 Tablet by mouth every day.  Dispense: 90 Tablet; Refill: 4    6. Cirrhosis of liver with ascites, unspecified hepatic cirrhosis type (HCC)  Good control continue current regimen.  - torsemide (DEMADEX) 10 MG tablet; Take 1 Tablet by mouth every day.  Dispense: 90 Tablet; Refill: 4    7. Hypokalemia       Good control continue current regimen.  - potassium chloride SA (KDUR) 20 MEQ Tab CR; Take 1 Tablet by mouth every day for 90 days. TAKE 1 TABLET BY MOUTH TWICE DAILY  Dispense: 90 Tablet; Refill: 4    8. Elevated bilirubin  Good control continue current regimen.  - ursodiol (ACTIGALL) 300 MG Cap; Take 1 Capsule by mouth in the morning, at  noon, and at bedtime.  Dispense: 270 Capsule; Refill: 3

## 2023-03-13 ENCOUNTER — NON-PROVIDER VISIT (OUTPATIENT)
Dept: CARDIOLOGY | Facility: MEDICAL CENTER | Age: 78
End: 2023-03-13
Attending: INTERNAL MEDICINE
Payer: MEDICARE

## 2023-03-13 DIAGNOSIS — I47.10 PSVT (PAROXYSMAL SUPRAVENTRICULAR TACHYCARDIA) (HCC): ICD-10-CM

## 2023-03-13 DIAGNOSIS — I49.9 IRREGULAR HEARTBEAT: ICD-10-CM

## 2023-03-13 DIAGNOSIS — R06.00 PND (PAROXYSMAL NOCTURNAL DYSPNEA): ICD-10-CM

## 2023-03-13 NOTE — PROGRESS NOTES
Patient enrolled in the [facility] monitoring program per Fabián Urena MD.  >Office hook-up with Baseline transmitted, serial # [id number]..  >Pending EOS

## 2023-03-17 ENCOUNTER — HOSPITAL ENCOUNTER (OUTPATIENT)
Dept: CARDIOLOGY | Facility: MEDICAL CENTER | Age: 78
End: 2023-03-17
Attending: INTERNAL MEDICINE
Payer: MEDICARE

## 2023-03-17 DIAGNOSIS — R06.00 PND (PAROXYSMAL NOCTURNAL DYSPNEA): ICD-10-CM

## 2023-03-17 DIAGNOSIS — I49.9 IRREGULAR HEARTBEAT: ICD-10-CM

## 2023-03-17 PROCEDURE — 93306 TTE W/DOPPLER COMPLETE: CPT

## 2023-03-21 LAB — LV EJECT FRACT  99904: 65

## 2023-03-21 PROCEDURE — 93306 TTE W/DOPPLER COMPLETE: CPT | Mod: 26 | Performed by: INTERNAL MEDICINE

## 2023-03-24 ENCOUNTER — HOSPITAL ENCOUNTER (OUTPATIENT)
Dept: RADIOLOGY | Facility: MEDICAL CENTER | Age: 78
End: 2023-03-24
Attending: INTERNAL MEDICINE
Payer: MEDICARE

## 2023-03-24 DIAGNOSIS — R10.84 ABDOMINAL PAIN, GENERALIZED: ICD-10-CM

## 2023-03-24 DIAGNOSIS — R18.8 OTHER ASCITES: ICD-10-CM

## 2023-03-24 PROCEDURE — 49083 ABD PARACENTESIS W/IMAGING: CPT

## 2023-03-24 RX ORDER — LIDOCAINE HYDROCHLORIDE 20 MG/ML
INJECTION, SOLUTION INFILTRATION; PERINEURAL
Status: DISPENSED
Start: 2023-03-24 | End: 2023-03-24

## 2023-03-25 NOTE — PROGRESS NOTES
US guided therapeutic paracentesis done by Dr. Peterson;(no H&P required as this is a NON SEDATION procedure) RLQ and LLQ access sites; 2100mL obtained and discarded; pt tolerated the procedure well. All questions and concerns answered prior to d/c; patient provided with all appropriate education for procedure; pt d/c home.

## 2023-04-04 ENCOUNTER — TELEPHONE (OUTPATIENT)
Dept: CARDIOLOGY | Facility: MEDICAL CENTER | Age: 78
End: 2023-04-04
Payer: MEDICARE

## 2023-04-07 ENCOUNTER — TELEPHONE (OUTPATIENT)
Dept: CARDIOLOGY | Facility: MEDICAL CENTER | Age: 78
End: 2023-04-07
Payer: MEDICARE

## 2023-04-07 PROCEDURE — 93268 ECG RECORD/REVIEW: CPT | Performed by: INTERNAL MEDICINE

## 2023-04-07 NOTE — TELEPHONE ENCOUNTER
Spoke to patient in regards to obtaining records for NP appointment with . Per patient has never been treated after his last Visit .

## 2023-04-14 ENCOUNTER — HOSPITAL ENCOUNTER (OUTPATIENT)
Dept: LAB | Facility: MEDICAL CENTER | Age: 78
End: 2023-04-14
Attending: INTERNAL MEDICINE
Payer: MEDICARE

## 2023-04-14 DIAGNOSIS — R17 ELEVATED BILIRUBIN: ICD-10-CM

## 2023-04-14 DIAGNOSIS — E78.5 DYSLIPIDEMIA: ICD-10-CM

## 2023-04-14 DIAGNOSIS — N40.0 BPH WITHOUT URINARY OBSTRUCTION: ICD-10-CM

## 2023-04-14 DIAGNOSIS — E61.1 IRON DEFICIENCY: ICD-10-CM

## 2023-04-14 DIAGNOSIS — E53.8 VITAMIN B 12 DEFICIENCY: ICD-10-CM

## 2023-04-14 DIAGNOSIS — E55.9 VITAMIN D DEFICIENCY: ICD-10-CM

## 2023-04-14 LAB
25(OH)D3 SERPL-MCNC: 29 NG/ML (ref 30–100)
ALBUMIN SERPL BCP-MCNC: 3.5 G/DL (ref 3.2–4.9)
ALBUMIN/GLOB SERPL: 1.3 G/DL
ALP SERPL-CCNC: 92 U/L (ref 30–99)
ALT SERPL-CCNC: 23 U/L (ref 2–50)
ANION GAP SERPL CALC-SCNC: 10 MMOL/L (ref 7–16)
AST SERPL-CCNC: 22 U/L (ref 12–45)
BASOPHILS # BLD AUTO: 0.6 % (ref 0–1.8)
BASOPHILS # BLD: 0.03 K/UL (ref 0–0.12)
BILIRUB SERPL-MCNC: 1.6 MG/DL (ref 0.1–1.5)
BUN SERPL-MCNC: 27 MG/DL (ref 8–22)
CALCIUM ALBUM COR SERPL-MCNC: 9.1 MG/DL (ref 8.5–10.5)
CALCIUM SERPL-MCNC: 8.7 MG/DL (ref 8.5–10.5)
CHLORIDE SERPL-SCNC: 103 MMOL/L (ref 96–112)
CHOLEST SERPL-MCNC: 96 MG/DL (ref 100–199)
CO2 SERPL-SCNC: 23 MMOL/L (ref 20–33)
CREAT SERPL-MCNC: 1.34 MG/DL (ref 0.5–1.4)
EOSINOPHIL # BLD AUTO: 0.3 K/UL (ref 0–0.51)
EOSINOPHIL NFR BLD: 5.6 % (ref 0–6.9)
ERYTHROCYTE [DISTWIDTH] IN BLOOD BY AUTOMATED COUNT: 58.2 FL (ref 35.9–50)
FASTING STATUS PATIENT QL REPORTED: NORMAL
FERRITIN SERPL-MCNC: 90.9 NG/ML (ref 22–322)
FOLATE SERPL-MCNC: 20 NG/ML
GFR SERPLBLD CREATININE-BSD FMLA CKD-EPI: 54 ML/MIN/1.73 M 2
GLOBULIN SER CALC-MCNC: 2.8 G/DL (ref 1.9–3.5)
GLUCOSE SERPL-MCNC: 99 MG/DL (ref 65–99)
HCT VFR BLD AUTO: 36.4 % (ref 42–52)
HDLC SERPL-MCNC: 56 MG/DL
HGB BLD-MCNC: 11.2 G/DL (ref 14–18)
IMM GRANULOCYTES # BLD AUTO: 0.03 K/UL (ref 0–0.11)
IMM GRANULOCYTES NFR BLD AUTO: 0.6 % (ref 0–0.9)
IRON SATN MFR SERPL: 19 % (ref 15–55)
IRON SERPL-MCNC: 55 UG/DL (ref 50–180)
LDLC SERPL CALC-MCNC: 32 MG/DL
LYMPHOCYTES # BLD AUTO: 0.36 K/UL (ref 1–4.8)
LYMPHOCYTES NFR BLD: 6.7 % (ref 22–41)
MCH RBC QN AUTO: 29.4 PG (ref 27–33)
MCHC RBC AUTO-ENTMCNC: 30.8 G/DL (ref 33.7–35.3)
MCV RBC AUTO: 95.5 FL (ref 81.4–97.8)
MONOCYTES # BLD AUTO: 0.58 K/UL (ref 0–0.85)
MONOCYTES NFR BLD AUTO: 10.7 % (ref 0–13.4)
NEUTROPHILS # BLD AUTO: 4.1 K/UL (ref 1.82–7.42)
NEUTROPHILS NFR BLD: 75.8 % (ref 44–72)
NRBC # BLD AUTO: 0 K/UL
NRBC BLD-RTO: 0 /100 WBC
PLATELET # BLD AUTO: 66 K/UL (ref 164–446)
PMV BLD AUTO: 10.1 FL (ref 9–12.9)
POTASSIUM SERPL-SCNC: 4.5 MMOL/L (ref 3.6–5.5)
PROT SERPL-MCNC: 6.3 G/DL (ref 6–8.2)
PSA SERPL-MCNC: 0.29 NG/ML (ref 0–4)
RBC # BLD AUTO: 3.81 M/UL (ref 4.7–6.1)
SODIUM SERPL-SCNC: 136 MMOL/L (ref 135–145)
TIBC SERPL-MCNC: 287 UG/DL (ref 250–450)
TRIGL SERPL-MCNC: 42 MG/DL (ref 0–149)
TSH SERPL DL<=0.005 MIU/L-ACNC: 2.54 UIU/ML (ref 0.38–5.33)
UIBC SERPL-MCNC: 232 UG/DL (ref 110–370)
VIT B12 SERPL-MCNC: 422 PG/ML (ref 211–911)
WBC # BLD AUTO: 5.4 K/UL (ref 4.8–10.8)

## 2023-04-14 PROCEDURE — 36415 COLL VENOUS BLD VENIPUNCTURE: CPT

## 2023-04-14 PROCEDURE — 82306 VITAMIN D 25 HYDROXY: CPT

## 2023-04-14 PROCEDURE — 83550 IRON BINDING TEST: CPT

## 2023-04-14 PROCEDURE — 82607 VITAMIN B-12: CPT

## 2023-04-14 PROCEDURE — 84443 ASSAY THYROID STIM HORMONE: CPT

## 2023-04-14 PROCEDURE — 84153 ASSAY OF PSA TOTAL: CPT

## 2023-04-14 PROCEDURE — 83540 ASSAY OF IRON: CPT

## 2023-04-14 PROCEDURE — 82728 ASSAY OF FERRITIN: CPT

## 2023-04-14 PROCEDURE — 82746 ASSAY OF FOLIC ACID SERUM: CPT

## 2023-04-14 PROCEDURE — 80061 LIPID PANEL: CPT

## 2023-04-14 PROCEDURE — 85025 COMPLETE CBC W/AUTO DIFF WBC: CPT

## 2023-04-14 PROCEDURE — 80053 COMPREHEN METABOLIC PANEL: CPT

## 2023-04-18 SDOH — ECONOMIC STABILITY: HOUSING INSECURITY
IN THE LAST 12 MONTHS, WAS THERE A TIME WHEN YOU DID NOT HAVE A STEADY PLACE TO SLEEP OR SLEPT IN A SHELTER (INCLUDING NOW)?: NO

## 2023-04-18 SDOH — ECONOMIC STABILITY: HOUSING INSECURITY: IN THE LAST 12 MONTHS, HOW MANY PLACES HAVE YOU LIVED?: 1

## 2023-04-18 SDOH — HEALTH STABILITY: PHYSICAL HEALTH: ON AVERAGE, HOW MANY DAYS PER WEEK DO YOU ENGAGE IN MODERATE TO STRENUOUS EXERCISE (LIKE A BRISK WALK)?: 0 DAYS

## 2023-04-18 SDOH — ECONOMIC STABILITY: TRANSPORTATION INSECURITY
IN THE PAST 12 MONTHS, HAS LACK OF TRANSPORTATION KEPT YOU FROM MEETINGS, WORK, OR FROM GETTING THINGS NEEDED FOR DAILY LIVING?: NO

## 2023-04-18 SDOH — HEALTH STABILITY: PHYSICAL HEALTH: ON AVERAGE, HOW MANY MINUTES DO YOU ENGAGE IN EXERCISE AT THIS LEVEL?: 0 MIN

## 2023-04-18 SDOH — ECONOMIC STABILITY: INCOME INSECURITY: IN THE LAST 12 MONTHS, WAS THERE A TIME WHEN YOU WERE NOT ABLE TO PAY THE MORTGAGE OR RENT ON TIME?: NO

## 2023-04-18 SDOH — ECONOMIC STABILITY: TRANSPORTATION INSECURITY
IN THE PAST 12 MONTHS, HAS LACK OF RELIABLE TRANSPORTATION KEPT YOU FROM MEDICAL APPOINTMENTS, MEETINGS, WORK OR FROM GETTING THINGS NEEDED FOR DAILY LIVING?: NO

## 2023-04-18 SDOH — ECONOMIC STABILITY: FOOD INSECURITY: WITHIN THE PAST 12 MONTHS, YOU WORRIED THAT YOUR FOOD WOULD RUN OUT BEFORE YOU GOT MONEY TO BUY MORE.: NEVER TRUE

## 2023-04-18 SDOH — ECONOMIC STABILITY: INCOME INSECURITY: HOW HARD IS IT FOR YOU TO PAY FOR THE VERY BASICS LIKE FOOD, HOUSING, MEDICAL CARE, AND HEATING?: NOT HARD AT ALL

## 2023-04-18 SDOH — ECONOMIC STABILITY: TRANSPORTATION INSECURITY
IN THE PAST 12 MONTHS, HAS THE LACK OF TRANSPORTATION KEPT YOU FROM MEDICAL APPOINTMENTS OR FROM GETTING MEDICATIONS?: NO

## 2023-04-18 SDOH — ECONOMIC STABILITY: FOOD INSECURITY: WITHIN THE PAST 12 MONTHS, THE FOOD YOU BOUGHT JUST DIDN'T LAST AND YOU DIDN'T HAVE MONEY TO GET MORE.: NEVER TRUE

## 2023-04-18 SDOH — HEALTH STABILITY: MENTAL HEALTH
STRESS IS WHEN SOMEONE FEELS TENSE, NERVOUS, ANXIOUS, OR CAN'T SLEEP AT NIGHT BECAUSE THEIR MIND IS TROUBLED. HOW STRESSED ARE YOU?: TO SOME EXTENT

## 2023-04-18 ASSESSMENT — SOCIAL DETERMINANTS OF HEALTH (SDOH)
HOW OFTEN DO YOU ATTEND CHURCH OR RELIGIOUS SERVICES?: NEVER
DO YOU BELONG TO ANY CLUBS OR ORGANIZATIONS SUCH AS CHURCH GROUPS UNIONS, FRATERNAL OR ATHLETIC GROUPS, OR SCHOOL GROUPS?: NO
HOW HARD IS IT FOR YOU TO PAY FOR THE VERY BASICS LIKE FOOD, HOUSING, MEDICAL CARE, AND HEATING?: NOT HARD AT ALL
HOW OFTEN DO YOU HAVE A DRINK CONTAINING ALCOHOL: NEVER
IN A TYPICAL WEEK, HOW MANY TIMES DO YOU TALK ON THE PHONE WITH FAMILY, FRIENDS, OR NEIGHBORS?: ONCE A WEEK
WITHIN THE PAST 12 MONTHS, YOU WORRIED THAT YOUR FOOD WOULD RUN OUT BEFORE YOU GOT THE MONEY TO BUY MORE: NEVER TRUE
HOW OFTEN DO YOU GET TOGETHER WITH FRIENDS OR RELATIVES?: ONCE A WEEK
HOW OFTEN DO YOU ATTEND CHURCH OR RELIGIOUS SERVICES?: NEVER
IN A TYPICAL WEEK, HOW MANY TIMES DO YOU TALK ON THE PHONE WITH FAMILY, FRIENDS, OR NEIGHBORS?: ONCE A WEEK
HOW OFTEN DO YOU GET TOGETHER WITH FRIENDS OR RELATIVES?: ONCE A WEEK
DO YOU BELONG TO ANY CLUBS OR ORGANIZATIONS SUCH AS CHURCH GROUPS UNIONS, FRATERNAL OR ATHLETIC GROUPS, OR SCHOOL GROUPS?: NO

## 2023-04-18 ASSESSMENT — LIFESTYLE VARIABLES: HOW OFTEN DO YOU HAVE A DRINK CONTAINING ALCOHOL: NEVER

## 2023-04-19 ENCOUNTER — OFFICE VISIT (OUTPATIENT)
Dept: MEDICAL GROUP | Age: 78
End: 2023-04-19
Payer: MEDICARE

## 2023-04-19 VITALS
WEIGHT: 240 LBS | OXYGEN SATURATION: 97 % | TEMPERATURE: 98.4 F | BODY MASS INDEX: 29.84 KG/M2 | DIASTOLIC BLOOD PRESSURE: 60 MMHG | SYSTOLIC BLOOD PRESSURE: 130 MMHG | HEART RATE: 67 BPM | HEIGHT: 75 IN

## 2023-04-19 DIAGNOSIS — I10 ESSENTIAL HYPERTENSION: ICD-10-CM

## 2023-04-19 DIAGNOSIS — E78.5 DYSLIPIDEMIA: ICD-10-CM

## 2023-04-19 DIAGNOSIS — C18.8 OVERLAPPING MALIGNANT NEOPLASM OF COLON (HCC): ICD-10-CM

## 2023-04-19 DIAGNOSIS — I85.00 PORTAL HYPERTENSION WITH ESOPHAGEAL VARICES (HCC): ICD-10-CM

## 2023-04-19 DIAGNOSIS — R18.8 CIRRHOSIS OF LIVER WITH ASCITES, UNSPECIFIED HEPATIC CIRRHOSIS TYPE (HCC): ICD-10-CM

## 2023-04-19 DIAGNOSIS — R17 ELEVATED BILIRUBIN: ICD-10-CM

## 2023-04-19 DIAGNOSIS — I85.10 SECONDARY ESOPHAGEAL VARICES WITHOUT BLEEDING (HCC): ICD-10-CM

## 2023-04-19 DIAGNOSIS — G31.9 CEREBRAL ATROPHY (HCC): ICD-10-CM

## 2023-04-19 DIAGNOSIS — T45.1X5A PERIPHERAL NEUROPATHY DUE TO CHEMOTHERAPY (HCC): ICD-10-CM

## 2023-04-19 DIAGNOSIS — G62.0 DRUG-INDUCED POLYNEUROPATHY (HCC): ICD-10-CM

## 2023-04-19 DIAGNOSIS — Z00.00 MEDICARE ANNUAL WELLNESS VISIT, SUBSEQUENT: ICD-10-CM

## 2023-04-19 DIAGNOSIS — K74.60 CIRRHOSIS OF LIVER WITH ASCITES, UNSPECIFIED HEPATIC CIRRHOSIS TYPE (HCC): ICD-10-CM

## 2023-04-19 DIAGNOSIS — R60.1 GENERALIZED EDEMA: ICD-10-CM

## 2023-04-19 DIAGNOSIS — G62.0 PERIPHERAL NEUROPATHY DUE TO CHEMOTHERAPY (HCC): ICD-10-CM

## 2023-04-19 DIAGNOSIS — E55.9 VITAMIN D DEFICIENCY: ICD-10-CM

## 2023-04-19 DIAGNOSIS — D69.6 THROMBOCYTOPENIA, UNSPECIFIED (HCC): ICD-10-CM

## 2023-04-19 DIAGNOSIS — K76.6 PORTAL HYPERTENSION WITH ESOPHAGEAL VARICES (HCC): ICD-10-CM

## 2023-04-19 DIAGNOSIS — I82.512 CHRONIC DEEP VEIN THROMBOSIS (DVT) OF FEMORAL VEIN OF LEFT LOWER EXTREMITY (HCC): ICD-10-CM

## 2023-04-19 DIAGNOSIS — D63.8 ANEMIA, CHRONIC DISEASE: ICD-10-CM

## 2023-04-19 DIAGNOSIS — I25.10 ASCVD (ARTERIOSCLEROTIC CARDIOVASCULAR DISEASE): ICD-10-CM

## 2023-04-19 DIAGNOSIS — E87.6 HYPOKALEMIA: ICD-10-CM

## 2023-04-19 DIAGNOSIS — R73.01 IFG (IMPAIRED FASTING GLUCOSE): ICD-10-CM

## 2023-04-19 DIAGNOSIS — M46.04 SPINAL ENTHESOPATHY OF THORACIC REGION (HCC): ICD-10-CM

## 2023-04-19 DIAGNOSIS — N40.0 BPH WITHOUT URINARY OBSTRUCTION: ICD-10-CM

## 2023-04-19 PROCEDURE — G0439 PPPS, SUBSEQ VISIT: HCPCS | Mod: KB | Performed by: INTERNAL MEDICINE

## 2023-04-19 RX ORDER — SPIRONOLACTONE 50 MG/1
50 TABLET, FILM COATED ORAL 2 TIMES DAILY
Qty: 180 TABLET | Refills: 3 | Status: SHIPPED | OUTPATIENT
Start: 2023-04-19 | End: 2024-03-04 | Stop reason: SDUPTHER

## 2023-04-19 RX ORDER — URSODIOL 300 MG/1
300 CAPSULE ORAL 3 TIMES DAILY
Qty: 270 CAPSULE | Refills: 3 | Status: SHIPPED | OUTPATIENT
Start: 2023-04-19 | End: 2024-02-27

## 2023-04-19 RX ORDER — TORSEMIDE 10 MG/1
10 TABLET ORAL DAILY
Qty: 90 TABLET | Refills: 4 | Status: SHIPPED | OUTPATIENT
Start: 2023-04-19 | End: 2024-03-04 | Stop reason: SDUPTHER

## 2023-04-19 RX ORDER — FOLIC ACID 1 MG/1
1 TABLET ORAL DAILY
Qty: 100 TABLET | Refills: 4 | Status: SHIPPED | OUTPATIENT
Start: 2023-04-19 | End: 2024-03-04 | Stop reason: SDUPTHER

## 2023-04-19 RX ORDER — ATORVASTATIN CALCIUM 40 MG/1
40 TABLET, FILM COATED ORAL NIGHTLY
Qty: 90 TABLET | Refills: 4 | Status: SHIPPED | OUTPATIENT
Start: 2023-04-19 | End: 2024-03-04 | Stop reason: SDUPTHER

## 2023-04-19 RX ORDER — FERROUS SULFATE 325(65) MG
325 TABLET ORAL
Qty: 100 TABLET | Refills: 3 | Status: SHIPPED | OUTPATIENT
Start: 2023-04-19 | End: 2024-03-04 | Stop reason: SDUPTHER

## 2023-04-19 RX ORDER — POTASSIUM CHLORIDE 20 MEQ/1
20 TABLET, EXTENDED RELEASE ORAL DAILY
Qty: 90 TABLET | Refills: 4 | Status: SHIPPED | OUTPATIENT
Start: 2023-04-19 | End: 2023-07-18

## 2023-04-19 ASSESSMENT — ACTIVITIES OF DAILY LIVING (ADL): BATHING_REQUIRES_ASSISTANCE: 0

## 2023-04-19 ASSESSMENT — ENCOUNTER SYMPTOMS: GENERAL WELL-BEING: GOOD

## 2023-04-19 ASSESSMENT — PATIENT HEALTH QUESTIONNAIRE - PHQ9: CLINICAL INTERPRETATION OF PHQ2 SCORE: 0

## 2023-04-19 ASSESSMENT — FIBROSIS 4 INDEX: FIB4 SCORE: 5.42

## 2023-04-19 NOTE — PROGRESS NOTES
Subjective     Torey Lima is a 77 y.o. male who presents with Annual Exam  See physician note.           HPI    ROS           Objective     There were no vitals taken for this visit.     Physical Exam                        Assessment & Plan        1. Overlapping malignant neoplasm of colon (HCC)Overlapping malignant neoplasm of colon (HCC)--2010 left hemicolectomy; no colostomy; ablation of liver met at [facility] 1/2011- MRI abd 3/2018 no change in per        2. Drug-induced polyneuropathy (HCC)       3. Vitamin D deficiency       4. ASCVD (arteriosclerotic cardiovascular disease)subtotalled small distal LCx and 40% LAD med rx by cath 12/13/12       5. Essential hypertension        6. Secondary esophageal varices without bleeding (HCC)- BANDED 4/2016- repeat egd tbd 10//29/16- gic       7. Cirrhosis of liver with ascites, unspecified hepatic cirrhosis type (HCC)       8. Peripheral neuropathy due to chemotherapy (HCC)       9. Anemia, chronic disease       10. Chronic deep vein thrombosis (DVT) of femoral vein of left lower extremity (HCC)- s/p IVC filter 2016       11. Portal hypertension with esophageal varices (HCC)       12. Thrombocytopenia, unspecified (HCC)- from previous chemorx       13. Spinal enthesopathy of thoracic region (HCC)       14. Cerebral atrophy (HCC)-MRI brain 2019, mild diffuse cerebral substance loss        15. Dyslipidemia

## 2023-04-19 NOTE — PROGRESS NOTES
Chief Complaint   Patient presents with    Annual Exam       HPI:  Torey is a 77 y.o. here for Medicare Annual Wellness Visit         Patient Active Problem List    Diagnosis Date Noted    Irregular heartbeat- A. 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    Cerebral atrophy (HCC)-MRI brain 2019, mild diffuse cerebral substance loss 06/25/2022    Spinal enthesopathy of thoracic region (HCC) 04/07/2020    Secondary malignant neoplasm of liver (HCC) 04/23/2019    Thrombocytopenia, unspecified (HCC)- from previous chemorx 04/23/2019    Anemia, chronic disease 11/13/2018    Chronic deep vein thrombosis (DVT) of femoral vein of left lower extremity (HCC)- s/p IVC filter 2016 11/13/2018    Portal hypertension with esophageal varices (HCC) 11/13/2018    Generalized edema 12/14/2017    Edema of left lower extremity- due to dvt july 2017 09/19/2017    BPH (benign prostatic hyperplasia) 08/11/2017    Peripheral neuropathy due to chemotherapy (HCC) 08/11/2017    H/O colon cancer, stage IV- neg colonoscopy 2012/2018 at [facility] [address] [facility] 05/20/2017    History of esophageal varices 10/24/2016    Portal vein thrombosis- on [facility] 2/2015 04/14/2016    Cirrhosis of liver with ascites (HCC)- ? DUE TO OLD HEP B, CHEMO RX,  OR THERMO RAD OF MAGNANT LESION 04/14/2016    Secondary esophageal varices without bleeding (HCC)- BANDED 4/2016- repeat egd tbd 10//29/16- [facility] 04/04/2016    Gastric varices- s/p BRTO procedure at [facility] 04/04/2016    Gastrointestinal hemorrhage with hematemesis- recurrernt from varices 04/03/2016    Essential hypertension 06/20/2013    ASCVD (arteriosclerotic cardiovascular disease)subtotalled small distal LCx and 40% LAD med rx by cath 12/13/12 12/14/2012    Vitamin D deficiency 08/02/2012    Overlapping malignant neoplasm of colon (HCC)Overlapping malignant  neoplasm of colon (HCC)--2010 left hemicolectomy; no colostomy; ablation of liver met at [facility] 1/2011- MRI abd 3/2018 no change in per 12/06/2010    Liver lesion- ablation of malignant met from colon ca [facility] - stable MRI abd 3/2018- redo annually 12/06/2010    Drug-induced polyneuropathy (HCC) 12/06/2010       Current Outpatient Medications   Medication Sig Dispense Refill    atorvastatin (LIPITOR) 40 MG Tab Take 1 Tablet by mouth every evening. 90 Tablet 4    torsemide (DEMADEX) 10 MG tablet Take 1 Tablet by mouth every day. 90 Tablet 4    potassium chloride SA (KDUR) 20 MEQ Tab CR Take 1 Tablet by mouth every day for 90 days. TAKE 1 TABLET BY MOUTH TWICE DAILY 90 Tablet 4    ursodiol (ACTIGALL) 300 MG Cap Take 1 Capsule by mouth in the morning, at noon, and at bedtime. 270 Capsule 3    spironolactone (ALDACTONE) 50 MG Tab Take 1 Tablet by mouth 2 times a day. 180 Tablet 3    folic acid (FOLVITE) 1 MG Tab Take 1 tablet by mouth once daily 100 Tab 4    Cholecalciferol (VITAMIN D3) 1000 units Cap Take 1,000 Units by mouth.      ferrous sulfate 325 (65 Fe) MG tablet Take 1 Tab by mouth every morning with breakfast. 30 Tab      No current facility-administered medications for this visit.        Patient is taking medications as noted in medication list.  Current supplements as per medication list.     Allergies: Influenza virus vaccine live, Anticoagulant sodium citrate [sodium citrate], and Lisinopril    Current social contact/activities: work as a , visit friends and relatives.      Is patient current with immunizations? Yes.    He  reports that he quit smoking about 59 years ago. His smoking use included cigarettes and cigars. He has a 0.50 pack-year smoking history. He has never used smokeless tobacco. He reports that he does not drink alcohol and does not use drugs.  Counseling given: Not Answered  Tobacco comments: 1 cigar per week.      ROS:    Gait: Uses no assistive device   Ostomy: No    Other tubes: No   Amputations: No   Chronic oxygen use No   Last eye exam : 2022   Wears hearing aids: No   : Denies any urinary leakage during the last 6 months    Screening:    Depression Screening  Little interest or pleasure in doing things?  0 - not at all  Feeling down, depressed, or hopeless? 0 - not at all  Patient Health Questionnaire Score: 0    If depressive symptoms identified deferred to follow up visit unless specifically addressed in assessment and plan.    Interpretation of PHQ-9 Total Score   Score Severity   1-4 No Depression   5-9 Mild Depression   10-14 Moderate Depression   15-19 Moderately Severe Depression   20-27 Severe Depression    Screening for Cognitive Impairment  Three Minute Recall (daughter, heaven, mountain)  2/3    Humberto clock face with all 12 numbers and set the hands to show 10 past 11.  No    If cognitive concerns identified, deferred for follow up unless specifically addressed in assessment and plan.    Fall Risk Assessment  Has the patient had two or more falls in the last year or any fall with injury in the last year?  No  If fall risk identified, deferred for follow up unless specifically addressed in assessment and plan.    Safety Assessment  Throw rugs on floor.  Yes  Handrails on all stairs.  No  Good lighting in all hallways.  Yes  Difficulty hearing.  No  Patient counseled about all safety risks that were identified.    Functional Assessment ADLs  Are there any barriers preventing you from cooking for yourself or meeting nutritional needs?  No.    Are there any barriers preventing you from driving safely or obtaining transportation?  No.    Are there any barriers preventing you from using a telephone or calling for help?  No.    Are there any barriers preventing you from shopping?  No.    Are there any barriers preventing you from taking care of your own finances?  No.    Are there any barriers preventing you from managing your medications?  No.    Are there any  barriers preventing you from showering, bathing or dressing yourself?  No.    Are you currently engaging in any exercise or physical activity?  No.     What is your perception of your health?  Good.    Advance Care Planning  Do you have an Advance Directive, Living Will, Durable Power of , or POLST? Yes  Advance Directive       is on file    Health Maintenance Summary            Overdue - Annual Wellness Visit (Every 366 Days) Overdue since 8/2/2020 08/02/2019  Subsequent Annual Wellness Visit - Includes PPPS ()    08/02/2019  Subsequent Annual Wellness Visit - Includes PPPS ()    08/02/2019  Visit Dx: Medicare annual wellness visit, subsequent    06/21/2016  Visit Dx: Medicare annual wellness visit, subsequent              IMM DTaP/Tdap/Td Vaccine (4 - Td or Tdap) Next due on 11/8/2032 11/08/2022  Imm Admin: Tdap Vaccine    04/25/2012  Imm Admin: Tdap Vaccine    04/25/2012  Imm Admin: Dtap Vaccine              IMM PNEUMOCOCCAL VACCINE: 65+ Years (Series Information) Completed      10/09/2015  Imm Admin: Pneumococcal Conjugate Vaccine (Prevnar/PCV-13)    10/09/2015  Imm Admin: Pneumococcal polysaccharide vaccine (PPSV-23)    10/26/2011  Imm Admin: Pneumococcal Vaccine (UF) - HISTORICAL DATA    10/26/2011  Imm Admin: Pneumococcal polysaccharide vaccine (PPSV-23)              HEPATITIS C SCREENING  Completed      04/03/2016  HEP C VIRUS ANTIBODY              COVID-19 Vaccine (Series Information) Completed      10/22/2022  Imm Admin: PFIZER BIVALENT BOOSTER SARS-COV-2 VACCINE (12+)    05/25/2022  Imm Admin: PFIZER BAUM CAP SARS-COV-2 VACCINATION (12+)    11/04/2021  Imm Admin: PFIZER PURPLE CAP SARS-COV-2 VACCINATION (12+)    02/18/2021  Imm Admin: PFIZER PURPLE CAP SARS-COV-2 VACCINATION (12+)    01/26/2021  Imm Admin: PFIZER PURPLE CAP SARS-COV-2 VACCINATION (12+)              IMM MENINGOCOCCAL ACWY VACCINE (Series Information) Aged Out      No completion history exists for this topic.               Discontinued - COLORECTAL CANCER SCREENING  Discontinued        Frequency changed to Never automatically (Topic No Longer Applies)    2020  REFERRAL TO GI FOR COLONOSCOPY    2018  REFERRAL TO GI FOR COLONOSCOPY    2017  OCCULT BLOOD X3 (STOOL)    2014  AMB REFERRAL TO GI FOR COLONOSCOPY    Only the first 5 history entries have been loaded, but more history exists.              Discontinued - IMM HEP B VACCINE  Discontinued      No completion history exists for this topic.              Discontinued - IMM INFLUENZA  Discontinued      10/10/2016  Imm Admin: Influenza Vaccine Adult HD    10/09/2015  Imm Admin: Influenza Vaccine Adult HD    10/20/2014  Imm Admin: Influenza Vaccine Quad Inj (Pf)    10/10/2013  Imm Admin: Influenza Vaccine Pediatric Split - Historical Data    10/10/2013  Imm Admin: Influenza Seasonal Injectable - Historical Data    Only the first 5 history entries have been loaded, but more history exists.              Discontinued - IMM ZOSTER VACCINES  Discontinued      2012  Imm Admin: Zoster Vaccine Live (ZVL) (Zostavax) - HISTORICAL DATA                    Patient Care Team:  Fabián Urena M.D. as PCP - General  Fabián Urena M.D. as PCP - [facility] Paneled  Pierre Waggoner M.D. as Consulting Physician (Gastroenterology)  Sal Yao M.D. as Consulting Physician (Medical Oncology)  Herman Corey O.D. as Consulting Physician (Optometry)  Geovany Nuñez, PT, DPT, OCS as Physical Therapist (Physical Therapy)  Payal Singh (Inactive) as [facility]     Social History     Tobacco Use    Smoking status: Former     Packs/day: 0.50     Years: 1.00     Pack years: 0.50     Types: Cigarettes, Cigars     Quit date: 1964     Years since quittin.3    Smokeless tobacco: Never    Tobacco comments:     1 cigar per week.   Vaping Use    Vaping Use: Never used   Substance Use Topics    Alcohol use: No     Alcohol/week: 0.6 oz      Types: 1 Standard drinks or equivalent per week     Comment: minimal, Pt stop drinking since the beginning of April 2016    Drug use: No     Family History   Problem Relation Age of Onset    Heart Disease Mother     Cancer Mother     Sleep Apnea Neg Hx      He  has a past medical history of ASCVD (arteriosclerotic cardiovascular disease) (12/14/2012), BMI 33.0-33.9,adult (2/15/2013), Cancer (HCC) (10/10-6/11), Chickenpox, Colon cancer (HCC) (12/6/2010), Elevated CEA (12/6/2010), Icelandic measles, GI bleed, HLD (hyperlipidemia) (6/20/2013), HTN (hypertension) (6/20/2013), Hypertension, Impaired fasting glucose (8/2/2012), Liver cirrhosis (HCC), Liver lesion (12/6/2010), Mixed hyperlipidemia (6/20/2013), Obesity (BMI 30-39.9) (12/14/2017), Peripheral neuropathy, secondary to drugs or chemicals (12/6/2010), Stage 3a chronic kidney disease (HCC) (6/25/2022), Thrombocytopenia due to drugs (4/25/2012), and Vitamin d deficiency (8/2/2012).   Past Surgical History:   Procedure Laterality Date    LUMBAR LAMINECTOMY DISKECTOMY  5/25/2017    Procedure:  LAMINECTOMY LUMBAR  4 TO SACRAL 1;  Surgeon: Felton Escobar M.D.;  Location: [facility];  Service:     GASTROSCOPY WITH BANDING  10/25/2016    Procedure: GASTROSCOPY WITH BANDING;  Surgeon: Pierre Waggoner M.D.;  Location: ENDOSCOPY [facility];  Service:     GASTROSCOPY WITH BANDING  4/3/2016    Procedure: GASTROSCOPY WITH BANDING;  Surgeon: Cayetano Stovall M.D.;  Location: ENDOSCOPY [facility];  Service:     FULL THICKNESS BLOCK RESECTION  4/11/2012    Performed by LANI BOWMAN at [facility] ORS    CATH REMOVAL  7/20/2011    Performed by RUBY RUIZ at [facility]    CATH PLACEMENT  9/20/2010    Performed by RUBY RUIZ at SURGERY [facility]    LOW ANTERIOR RESECTION ROBOTIC  8/10/2010    Performed by RUBY RUIZ at SURGERY [facility]    COLON RESECTION      HIP ARTHROPLASTY MIS TOTAL      rt     HIP REPLACEMENT, TOTAL      OTHER      Cholecystectomy    OTHER (COMMENTS)      liver surgery         Exam:   There were no vitals taken for this visit. There is no height or weight on file to calculate BMI.    Hearing good.    Dentition good  Alert, oriented in no acute distress.  Eye contact is good, speech goal directed, affect calm  yes    Assessment and Plan. The following treatment and monitoring plan is recommended:         1. Medicare annual wellness visit, subsequent  All metrics up-to-date.  Unremarkable exam.  Only has trace edema in the left lower leg which has been chronic and unchanged.    2. Overlapping malignant neoplasm of colon (HCC)Overlapping malignant neoplasm of colon (HCC)--2010 left hemicolectomy; no colostomy; ablation of liver met at [facility] 1/2011- MRI abd 3/2018 no change in per   Under good control. Continue same regimen.'    3. Drug-induced polyneuropathy (HCC)    Under good control. Continue same regimen.'    4. Vitamin D deficiency    Under good control. Continue same regimen.'  - VITAMIN D,25 HYDROXY (DEFICIENCY); Future  - Cholecalciferol (VITAMIN D3) 25 MCG (1000 UT) Cap; Take 1,000 Units by mouth every day.  Dispense: 100 Capsule; Refill: 3    5. ASCVD (arteriosclerotic cardiovascular disease)subtotalled small distal LCx and 40% LAD med rx by cath 12/13/12    Under good control. Continue same regimen.'.  No chest pain.  Atorvastatin 40 mg daily  - Comp Metabolic Panel; Future  - Lipid Profile; Future    6. Essential hypertension    Under good control. Continue same regimen.'.  Low-sodium diet and Demadex 10 mg daily and Aldactone 50 mg twice daily  - Comp Metabolic Panel; Future    7. Secondary esophageal varices without bleeding (HCC)- BANDED 4/2016- repeat egd tbd 10//29/16- gic    Under good control. Continue same regimen.'    8. Cirrhosis of liver with ascites, unspecified hepatic cirrhosis type (HCC)    Under good control. Continue same regimen.'  - Comp Metabolic Panel;  Future  - torsemide (DEMADEX) 10 MG tablet; Take 1 Tablet by mouth every day.  Dispense: 90 Tablet; Refill: 4  - folic acid (FOLVITE) 1 MG Tab; Take 1 Tablet by mouth every day.  Dispense: 100 Tablet; Refill: 4    9. Peripheral neuropathy due to chemotherapy (HCC)    Under good control. Continue same regimen.'.  Continue follow-up with neurologist.  No progression of symptoms such as dysesthesias paresthesias or weakness.    10. Anemia, chronic disease    Under good control. Continue same regimen.'  - CBC WITH DIFFERENTIAL; Future  - ferrous sulfate 325 (65 Fe) MG tablet; Take 1 Tablet by mouth every morning with breakfast.  Dispense: 100 Tablet; Refill: 3    11. Chronic deep vein thrombosis (DVT) of femoral vein of left lower extremity (HCC)- s/p IVC filter 2016       Under good control. Continue same regimen.'.  Filter in place.  Not a candidate for anticoagulants.  12. Portal hypertension with esophageal varices (HCC)         Under good control. Continue same regimen.'  Continue follow-up at [facility].    13. Thrombocytopenia, unspecified (HCC)- from previous chemorx     Under good control. Continue same regimen.'  Stable.  No change in platelet count at 66.  - CBC WITH DIFFERENTIAL; Future    14. Spinal enthesopathy of thoracic region (HCC)    Under good control. Continue same regimen.'  Pain is much better.  No treatment indicated other than continued stretching exercises and strengthening    15. Cerebral atrophy (HCC)-MRI brain 2019, mild diffuse cerebral substance loss    Under good control. Continue same regimen.'  Continue surveillance.  No cognitive changes or focal neurological symptoms.    16. Dyslipidemia  Good control continue current regimen  - Comp Metabolic Panel; Future  - CBC WITH DIFFERENTIAL; Future  - TSH; Future  - Lipid Profile; Future  - atorvastatin (LIPITOR) 40 MG Tab; Take 1 Tablet by mouth every evening.  Dispense: 90 Tablet; Refill: 4    17. IFG (impaired fasting glucose)  Mediterranean diet  and exercise  - HEMOGLOBIN A1C; Future    18. BPH without urinary obstruction  No dysuria frequency or urgency.  No need for any particular alpha blockers at this time  - PROSTATE SPECIFIC AG DIAGNOSTIC; Future    19. Generalized edema    Under good control. Continue same regimen.'  - torsemide (DEMADEX) 10 MG tablet; Take 1 Tablet by mouth every day.  Dispense: 90 Tablet; Refill: 4  - spironolactone (ALDACTONE) 50 MG Tab; Take 1 Tablet by mouth 2 times a day.  Dispense: 180 Tablet; Refill: 3    20. Hypokalemia    Under good control. Continue same regimen.'  - potassium chloride SA (KDUR) 20 MEQ Tab CR; Take 1 Tablet by mouth every day for 90 days. TAKE 1 TABLET BY MOUTH TWICE DAILY  Dispense: 90 Tablet; Refill: 4    21. Elevated bilirubin    Under good control. Continue same regimen.'  Bilirubin stable.  No jaundice.  - ursodiol (ACTIGALL) 300 MG Cap; Take 1 Capsule by mouth in the morning, at noon, and at bedtime.  Dispense: 270 Capsule; Refill: 3    22. Cirrhosis of liver with ascites, unspecified hepatic cirrhosis type (HCC)    Under good control. Continue same regimen.'  - Comp Metabolic Panel; Future  - torsemide (DEMADEX) 10 MG tablet; Take 1 Tablet by mouth every day.  Dispense: 90 Tablet; Refill: 4  - folic acid (FOLVITE) 1 MG Tab; Take 1 Tablet by mouth every day.  Dispense: 100 Tablet; Refill: 4       Services suggested: No services needed at this time  Health Care Screening recommendations as per orders if indicated.  Referrals offered: PT/OT/Nutrition counseling/Behavioral Health/Smoking cessation as per orders if indicated.    Discussion today about general wellness and lifestyle habits:    Prevent falls and reduce trip hazards; Cautioned about securing or removing rugs.  Have a working fire alarm and carbon monoxide detector;   Engage in regular physical activity and social activities     Follow-up: No follow-ups on file.

## 2023-04-20 NOTE — PROGRESS NOTES
Cardiology Initial Consultation Note    Date of note:    4/21/2023    Primary Care Provider: Fabián Urena M.D.  Referring Provider: Fabián Urena M.D.     Patient Name: Torey Lima   YOB: 1945  MRN:              [id number]    Chief Complaint: Irregular heart beat    History of Present Illness: Mr. Torey Lima is a 77 y.o. male whose current medical problems include hypertension, dyslipidemia, history of Guillain-Barré syndrome, history of malignancy (colon, liver, bile duct, in remission), GI bleed with esophageal varices who is here for cardiac consultation for irregular heart beat.    The patient previously had a cardiologist, Dr. Pearl, seen in 2017.  The patient reportedly went into A-fib in 2016 requiring cardioversion during the procedure when interventional radiology had a wire in the right ventricle.  Otherwise, the patient had not had any recurrence of A-fib.  However, his Apple Watch warned that the patient possibly had A-fib, as such he was ordered an event monitor, which did not show any evidence of A-fib.  I personally reviewed the Zio patch, which did not show any evidence of A-fib but did have frequent SVTs and PACs.  Of note, the patient has history of colon cancer and liver cancer (in remission) as well as liver cirrhosis with esophageal varices.  The patient follows with hepatologist at [facility].  According to Dr. Culp, coagulation of any kind is contraindicated due to frequent GI bleed.  I also reviewed the patient's labs, which showed low hemoglobin as well as low platelet.    The patient presents today to establish care.  The patient presents with his wife.  The patient denies any symptoms in terms of palpitations.  He only knew about irregular heartbeats due to his Apple watch.  The patient is an , and has been very stressed due to recent cases.  He drinks a cup of coffee a day.  He does not drink any alcohol.  He denies any chest pain or shortness of  breath on exertion.  No orthopnea, PND, or leg swelling.  No syncope or presyncopal episodes.    Cardiovascular Risk Factors:  1. Smoking status: Former smoker  2. Type II Diabetes Mellitus: None  Lab Results   Component Value Date/Time    HBA1C 4.7 01/16/2023 06:29 AM    HBA1C 4.7 07/05/2022 06:48 AM     3. Hypertension: On medication  4. Dyslipidemia: On statin  Cholesterol,Tot   Date Value Ref Range Status   04/14/2023 96 (L) 100 - 199 mg/dL Final     LDL   Date Value Ref Range Status   04/14/2023 32 <100 mg/dL Final     HDL   Date Value Ref Range Status   04/14/2023 56 >=40 mg/dL Final     Triglycerides   Date Value Ref Range Status   04/14/2023 42 0 - 149 mg/dL Final     5. Family history of early Coronary Artery Disease in a first degree relative (Male less than 55 years of age; Female less than 65 years of age): None  6.  Obesity and/or Metabolic Syndrome: Body mass index is 30.12 kg/m².  7. Sedentary lifestyle: Not active    Review of Systems   Constitutional: Negative for fever, malaise/fatigue and night sweats.   Cardiovascular:  Negative for chest pain, dyspnea on exertion, irregular heartbeat, leg swelling, near-syncope, orthopnea, palpitations, paroxysmal nocturnal dyspnea and syncope.   Respiratory:  Negative for cough, shortness of breath and wheezing.    Gastrointestinal:  Negative for abdominal pain, diarrhea, nausea and vomiting.   Neurological:  Negative for dizziness, focal weakness and weakness.       All other systems reviewed and are negative.       Current Outpatient Medications   Medication Sig Dispense Refill    atorvastatin (LIPITOR) 40 MG Tab Take 1 Tablet by mouth every evening. 90 Tablet 4    torsemide (DEMADEX) 10 MG tablet Take 1 Tablet by mouth every day. 90 Tablet 4    potassium chloride SA (KDUR) 20 MEQ Tab CR Take 1 Tablet by mouth every day for 90 days. TAKE 1 TABLET BY MOUTH TWICE DAILY 90 Tablet 4    ursodiol (ACTIGALL) 300 MG Cap Take 1 Capsule by mouth in the morning, at  "noon, and at bedtime. 270 Capsule 3    spironolactone (ALDACTONE) 50 MG Tab Take 1 Tablet by mouth 2 times a day. 180 Tablet 3    folic acid (FOLVITE) 1 MG Tab Take 1 Tablet by mouth every day. 100 Tablet 4    Cholecalciferol (VITAMIN D3) 25 MCG (1000 UT) Cap Take 1,000 Units by mouth every day. 100 Capsule 3    ferrous sulfate 325 (65 Fe) MG tablet Take 1 Tablet by mouth every morning with breakfast. 100 Tablet 3     No current facility-administered medications for this visit.         Allergies   Allergen Reactions    Influenza Virus Vaccine Live      Guillan [place]     Anticoagulant Sodium Citrate [Sodium Citrate]      HIGH BLEEDING RISK    Lisinopril Cough       Physical Exam:  Ambulatory Vitals  /72 (BP Location: Left arm, Patient Position: Sitting, BP Cuff Size: Adult)   Pulse (!) 58   Resp 16   Ht 1.905 m (6' 3\")   Wt 109 kg (241 lb)   SpO2 97%    Oxygen Therapy:  Pulse Oximetry: 97 %  BP Readings from Last 4 Encounters:   04/21/23 110/72   04/19/23 130/60   02/28/23 116/80   01/18/23 120/70       Weight/BMI: Body mass index is 30.12 kg/m².  Wt Readings from Last 4 Encounters:   04/21/23 109 kg (241 lb)   04/19/23 109 kg (240 lb)   02/28/23 109 kg (241 lb)   01/18/23 108 kg (237 lb)         General: Well appearing and in no apparent distress  Eyes: nl conjunctiva, no icteric sclera  ENT: wearing a mask, normal external appearance of ears  Neck: no visible JVP,  no carotid bruits  Lungs: normal respiratory effort, CTAB  Heart: RRR, no murmurs, no rubs or gallops,  no edema bilateral lower extremities. No LV/RV heave on cardiac palpatation. + bilateral radial pulses.  + bilateral dp pulses.   Abdomen: distended but soft, non tender, non distended, no masses, normal bowel sounds.  No HSM.  Extremities/MSK: no clubbing, no cyanosis  Neurological: No focal sensory deficits  Psychiatric: Appropriate affect, A/O x 3, intact judgement and insight  Skin: Warm extremities      Lab Data Review:  Lab Results "   Component Value Date/Time    CHOLSTRLTOT 96 (L) 04/14/2023 06:45 AM    LDL 32 04/14/2023 06:45 AM    HDL 56 04/14/2023 06:45 AM    TRIGLYCERIDE 42 04/14/2023 06:45 AM       Lab Results   Component Value Date/Time    SODIUM 136 04/14/2023 06:45 AM    POTASSIUM 4.5 04/14/2023 06:45 AM    CHLORIDE 103 04/14/2023 06:45 AM    CO2 23 04/14/2023 06:45 AM    GLUCOSE 99 04/14/2023 06:45 AM    BUN 27 (H) 04/14/2023 06:45 AM    CREATININE 1.34 04/14/2023 06:45 AM    CREATININE 1.14 01/23/2010 12:00 AM    BUNCREATRAT 15 01/23/2010 12:00 AM    GLOMRATE >59 01/23/2010 12:00 AM     Lab Results   Component Value Date/Time    ALKPHOSPHAT 92 04/14/2023 06:45 AM    ASTSGOT 22 04/14/2023 06:45 AM    ALTSGPT 23 04/14/2023 06:45 AM    TBILIRUBIN 1.6 (H) 04/14/2023 06:45 AM      Lab Results   Component Value Date/Time    WBC 5.4 04/14/2023 06:45 AM     Lab Results   Component Value Date/Time    HBA1C 4.7 01/16/2023 06:29 AM    HBA1C 4.7 07/05/2022 06:48 AM         Cardiac Imaging and Procedures Review:    EKG dated 4/21/2023: My personal interpretation is sinus bradycardia, inferior infarct    Echo dated 3/21/2023:   CONCLUSIONS  Left ventricular ejection fraction estimated to be 65%.  Calcified aortic valve leaflets.   Aortic valve sclerosis without significant stenosis.  Mild aortic insufficiency.  Unable to estimate right ventricular systolic pressure due to an   inadequate tricuspid regurgitant jet.         Holter Monitor (4/4/2023):   DOS: 4/7/2023       Summary:   Sinus rhythm with frequent supraventricular ectopy and frequent PSVT       Assessment & Plan     1. Essential hypertension  EKG      2. Irregular heartbeat  EKG    CL-IMPLANTABLE LOOP RECORDER      3. Paroxysmal SVT (supraventricular tachycardia) (HCC)  CL-IMPLANTABLE LOOP RECORDER      4. Dyslipidemia        5. Colon cancer metastasized to liver (HCC)  CL-IMPLANTABLE LOOP RECORDER      6. Esophageal varices without bleeding, unspecified esophageal varices type (HCC)   CL-IMPLANTABLE LOOP RECORDER            Shared Medical Decision Making:    Irregular heartbeat  Paroxysmal SVT  History of GIB with varices  History of cirrhosis and liver cancer  The patient reportedly went into A-fib in 2016 requiring cardioversion during the procedure when interventional radiology had a wire in the right ventricle.  Otherwise, the patient had not had any recurrence of A-fib.  Due to his Apple Watch alerting irregular heartbeat, the patient underwent an event monitor which did not show any A-fib.  However, it did show frequent SVTs as well as frequent PACs, which could be precursor for A-fib.  -Will plan for implanted loop recorder.       -The patient's heart rate is low, will hold off beta-blocker at this time  -The patient has GI bleed with esophageal varices.  Anticoagulation is contraindicated as per hematologist (Dr. Culp) at [facility].  We can potentially consider watchman if the patient does have A-fib on loop recorder.  However, the patient will have to be okay with a few weeks of anticoagulation, and I will check with Dr. Culp prior.  However, no evidence of A-fib thus far.  -Counseled the patient to avoid caffeine and to work on stress management.  The patient does not drink alcohol.    Hypertension  BP well controlled this visit  -Continue torsemide and Aldactone (taking them for liver cirrhosis with ascites as well)    Dyslipidemia  -Continue atorvastatin 40 mg daily    A total of 61 minutes of time was spent on day of encounter reviewing medical record, performing history and examination, counseling, ordering medication/test/consults and documentation.    All of patient's excellent questions were answered to the best of my knowledge and to his satisfaction.  It was a pleasure seeing Mr. Torey Lima in my clinic today. Return in about 4 months (around 8/21/2023). Patient is aware to call the cardiology clinic with any questions or concerns.      Rubens Hall MD  [facility]  [facility], [address]  [address] [address]  Phone: [phone]  Fax: [phone]

## 2023-04-21 ENCOUNTER — TELEPHONE (OUTPATIENT)
Dept: CARDIOLOGY | Facility: MEDICAL CENTER | Age: 78
End: 2023-04-21

## 2023-04-21 ENCOUNTER — OFFICE VISIT (OUTPATIENT)
Dept: CARDIOLOGY | Facility: MEDICAL CENTER | Age: 78
End: 2023-04-21
Attending: INTERNAL MEDICINE
Payer: MEDICARE

## 2023-04-21 VITALS
RESPIRATION RATE: 16 BRPM | DIASTOLIC BLOOD PRESSURE: 72 MMHG | HEART RATE: 58 BPM | SYSTOLIC BLOOD PRESSURE: 110 MMHG | HEIGHT: 75 IN | BODY MASS INDEX: 29.97 KG/M2 | WEIGHT: 241 LBS | OXYGEN SATURATION: 97 %

## 2023-04-21 DIAGNOSIS — C78.7 COLON CANCER METASTASIZED TO LIVER (HCC): ICD-10-CM

## 2023-04-21 DIAGNOSIS — I10 ESSENTIAL HYPERTENSION: ICD-10-CM

## 2023-04-21 DIAGNOSIS — I49.9 IRREGULAR HEARTBEAT: ICD-10-CM

## 2023-04-21 DIAGNOSIS — I47.10 PAROXYSMAL SVT (SUPRAVENTRICULAR TACHYCARDIA) (HCC): ICD-10-CM

## 2023-04-21 DIAGNOSIS — I85.00 ESOPHAGEAL VARICES WITHOUT BLEEDING, UNSPECIFIED ESOPHAGEAL VARICES TYPE (HCC): ICD-10-CM

## 2023-04-21 DIAGNOSIS — E78.5 DYSLIPIDEMIA: ICD-10-CM

## 2023-04-21 DIAGNOSIS — C18.9 COLON CANCER METASTASIZED TO LIVER (HCC): ICD-10-CM

## 2023-04-21 LAB — EKG IMPRESSION: NORMAL

## 2023-04-21 PROCEDURE — 93000 ELECTROCARDIOGRAM COMPLETE: CPT | Performed by: STUDENT IN AN ORGANIZED HEALTH CARE EDUCATION/TRAINING PROGRAM

## 2023-04-21 PROCEDURE — 99204 OFFICE O/P NEW MOD 45 MIN: CPT | Mod: 25 | Performed by: STUDENT IN AN ORGANIZED HEALTH CARE EDUCATION/TRAINING PROGRAM

## 2023-04-21 ASSESSMENT — ENCOUNTER SYMPTOMS
NAUSEA: 0
NIGHT SWEATS: 0
SYNCOPE: 0
SHORTNESS OF BREATH: 0
ABDOMINAL PAIN: 0
WHEEZING: 0
PND: 0
WEAKNESS: 0
PALPITATIONS: 0
DYSPNEA ON EXERTION: 0
NEAR-SYNCOPE: 0
ORTHOPNEA: 0
DIARRHEA: 0
DIZZINESS: 0
FOCAL WEAKNESS: 0
VOMITING: 0
FEVER: 0
COUGH: 0
IRREGULAR HEARTBEAT: 0

## 2023-04-21 ASSESSMENT — FIBROSIS 4 INDEX: FIB4 SCORE: 5.42

## 2023-04-21 NOTE — TELEPHONE ENCOUNTER
Fabrice Avelar,      Patient was informed you will be reaching out to schedule: CL-IMPLANTABLE LOOP RECORDER. Ordered per , Thank you.

## 2023-04-21 NOTE — TELEPHONE ENCOUNTER
Patient scheduled for loop insert on 5-4-23 with Dr. Ryan. Patent has been instructed to check in at 2:00 for 3:00 case time. Message sent to catalina Bradley with [employer] notified.

## 2023-04-25 ENCOUNTER — APPOINTMENT (OUTPATIENT)
Dept: ADMISSIONS | Facility: MEDICAL CENTER | Age: 78
End: 2023-04-25
Attending: INTERNAL MEDICINE
Payer: MEDICARE

## 2023-05-02 ENCOUNTER — APPOINTMENT (OUTPATIENT)
Dept: ADMISSIONS | Facility: MEDICAL CENTER | Age: 78
End: 2023-05-02
Attending: INTERNAL MEDICINE
Payer: MEDICARE

## 2023-05-03 ENCOUNTER — PRE-ADMISSION TESTING (OUTPATIENT)
Dept: ADMISSIONS | Facility: MEDICAL CENTER | Age: 78
End: 2023-05-03
Payer: MEDICARE

## 2023-05-03 VITALS — WEIGHT: 297 LBS | BODY MASS INDEX: 36.93 KG/M2 | HEIGHT: 75 IN

## 2023-05-03 ASSESSMENT — FIBROSIS 4 INDEX: FIB4 SCORE: 5.42

## 2023-05-04 ENCOUNTER — APPOINTMENT (OUTPATIENT)
Dept: CARDIOLOGY | Facility: MEDICAL CENTER | Age: 78
End: 2023-05-04
Attending: STUDENT IN AN ORGANIZED HEALTH CARE EDUCATION/TRAINING PROGRAM
Payer: MEDICARE

## 2023-05-04 ENCOUNTER — HOSPITAL ENCOUNTER (OUTPATIENT)
Facility: MEDICAL CENTER | Age: 78
End: 2023-05-04
Attending: INTERNAL MEDICINE | Admitting: INTERNAL MEDICINE
Payer: MEDICARE

## 2023-05-04 VITALS
HEIGHT: 75 IN | SYSTOLIC BLOOD PRESSURE: 102 MMHG | HEART RATE: 65 BPM | RESPIRATION RATE: 16 BRPM | BODY MASS INDEX: 29.69 KG/M2 | WEIGHT: 238.76 LBS | TEMPERATURE: 96.8 F | OXYGEN SATURATION: 97 % | DIASTOLIC BLOOD PRESSURE: 59 MMHG

## 2023-05-04 DIAGNOSIS — I47.10 PAROXYSMAL SVT (SUPRAVENTRICULAR TACHYCARDIA) (HCC): ICD-10-CM

## 2023-05-04 DIAGNOSIS — C18.9 COLON CANCER METASTASIZED TO LIVER (HCC): ICD-10-CM

## 2023-05-04 DIAGNOSIS — C78.7 COLON CANCER METASTASIZED TO LIVER (HCC): ICD-10-CM

## 2023-05-04 DIAGNOSIS — I85.00 ESOPHAGEAL VARICES WITHOUT BLEEDING, UNSPECIFIED ESOPHAGEAL VARICES TYPE (HCC): ICD-10-CM

## 2023-05-04 DIAGNOSIS — I49.9 IRREGULAR HEARTBEAT: ICD-10-CM

## 2023-05-04 PROCEDURE — 33285 INSJ SUBQ CAR RHYTHM MNTR: CPT

## 2023-05-04 PROCEDURE — 160046 HCHG PACU - 1ST 60 MINS PHASE II

## 2023-05-04 PROCEDURE — 160002 HCHG RECOVERY MINUTES (STAT)

## 2023-05-04 PROCEDURE — 700101 HCHG RX REV CODE 250

## 2023-05-04 PROCEDURE — 33285 INSJ SUBQ CAR RHYTHM MNTR: CPT | Performed by: INTERNAL MEDICINE

## 2023-05-04 RX ORDER — LIDOCAINE HYDROCHLORIDE AND EPINEPHRINE BITARTRATE 20; .01 MG/ML; MG/ML
10 INJECTION, SOLUTION SUBCUTANEOUS ONCE
Status: COMPLETED | OUTPATIENT
Start: 2023-05-04 | End: 2023-05-04

## 2023-05-04 RX ORDER — LIDOCAINE HYDROCHLORIDE AND EPINEPHRINE BITARTRATE 20; .01 MG/ML; MG/ML
INJECTION, SOLUTION SUBCUTANEOUS
Status: COMPLETED
Start: 2023-05-04 | End: 2023-05-04

## 2023-05-04 RX ADMIN — LIDOCAINE HYDROCHLORIDE AND EPINEPHRINE 10 ML: 20; 10 INJECTION, SOLUTION INFILTRATION; PERINEURAL at 12:01

## 2023-05-04 RX ADMIN — LIDOCAINE HYDROCHLORIDE AND EPINEPHRINE BITARTRATE 10 ML: 20; .01 INJECTION, SOLUTION SUBCUTANEOUS at 12:01

## 2023-05-04 ASSESSMENT — FIBROSIS 4 INDEX: FIB4 SCORE: 5.42

## 2023-05-04 NOTE — OR NURSING
Assumed care in pre op , 77 y.o male for loop recorder insertion by Dr. Ryan. Patient is alert , oriented x 4 walks with a steady gait without any assitive devices. Denies any chest pain , palpitation with exertional Sob. Multiple bruises noted on both forearm . Huge abdomen no tenderness . Hx of colon ca with mets to liver.  NPO , allergies ,Surgical consent signed by the patient. Wife Lorelei at bedside . Per silvano robertson no need for IV . Patient resting on bed waiting for hand off .

## 2023-05-04 NOTE — OP REPORT
PROCEDURE PERFORMED: Implantable Loop Recorder    DATE OF SERVICE: 5/4/2023    : Darshan Ryan MD    ASSISTANT: None    ANESTHESIA: Local    EBL: <5 cc    SPECIMENS: None    STATEMENT OF MEDICAL NECESSITY:  Paroxysmal atrial fibrillation    DESCRIPTION OF PROCEDURE:  After informed written consent, the patient was brought to the cath lab pre/post procedure area. The patient was prepped and draped in the usual sterile fashion. The procedure was performed with local anesthetic. Using the supplied incision tool, a <1 cm incision was made in the skin about 2 cm leftward and lateral to the sternal border. Using the supplied insertion tool, a tunnel was made in the subcutaneous tissue at a 45 degree angle to the sternum and the device was inserted with the supplied plunger. Manual compression was used until hemostasis achieved. A single 4-0 vicryl suture was used to appose the skin. Dermabond was applied to the incision site. Sterile dressing was applied.    IMPLANTED DEVICE INFORMATION:  Model: [employer] LNQ22  Serial number:  [id number]    IMPRESSIONS:  1. Successful implantable loop recorder implantation    RECOMMENDATIONS:  1. Routine follow-up and device interrogation

## 2023-05-04 NOTE — DISCHARGE INSTRUCTIONS
Don't submerge in water for a week. Maintain dressing for 7days or until f/u appointment. May shower next day of procedure.  Any sign/symptoms of infection please call the cardiologist at [phone].

## 2023-05-10 ENCOUNTER — NON-PROVIDER VISIT (OUTPATIENT)
Dept: CARDIOLOGY | Facility: MEDICAL CENTER | Age: 78
End: 2023-05-10
Payer: MEDICARE

## 2023-05-10 PROCEDURE — 99024 POSTOP FOLLOW-UP VISIT: CPT | Performed by: INTERNAL MEDICINE

## 2023-05-10 PROCEDURE — 1126F AMNT PAIN NOTED NONE PRSNT: CPT | Performed by: INTERNAL MEDICINE

## 2023-05-11 NOTE — NON-PROVIDER
Wound site is healing well.  Patient advised to watch for increased redness, swelling, oozing or fever--verbalized understanding.     Reviewed loop recorder function, monitoring and billing-patient verbalized understanding.

## 2023-05-15 ENCOUNTER — HOSPITAL ENCOUNTER (OUTPATIENT)
Dept: RADIOLOGY | Facility: MEDICAL CENTER | Age: 78
End: 2023-05-15
Attending: INTERNAL MEDICINE
Payer: MEDICARE

## 2023-05-15 DIAGNOSIS — K76.6 PORTAL HYPERTENSION (HCC): ICD-10-CM

## 2023-05-15 PROCEDURE — 700117 HCHG RX CONTRAST REV CODE 255

## 2023-05-15 PROCEDURE — 74178 CT ABD&PLV WO CNTR FLWD CNTR: CPT

## 2023-05-15 RX ADMIN — IOHEXOL 100 ML: 350 INJECTION, SOLUTION INTRAVENOUS at 12:32

## 2023-05-24 ENCOUNTER — HOSPITAL ENCOUNTER (OUTPATIENT)
Dept: LAB | Facility: MEDICAL CENTER | Age: 78
End: 2023-05-24
Payer: MEDICARE

## 2023-05-24 LAB
ALBUMIN SERPL BCP-MCNC: 3.5 G/DL (ref 3.2–4.9)
ALBUMIN/GLOB SERPL: 1.2 G/DL
ALP SERPL-CCNC: 94 U/L (ref 30–99)
ALT SERPL-CCNC: 23 U/L (ref 2–50)
ANION GAP SERPL CALC-SCNC: 8 MMOL/L (ref 7–16)
ANISOCYTOSIS BLD QL SMEAR: ABNORMAL
AST SERPL-CCNC: 26 U/L (ref 12–45)
BASOPHILS # BLD AUTO: 0.4 % (ref 0–1.8)
BASOPHILS # BLD: 0.02 K/UL (ref 0–0.12)
BILIRUB SERPL-MCNC: 1.6 MG/DL (ref 0.1–1.5)
BUN SERPL-MCNC: 24 MG/DL (ref 8–22)
BURR CELLS BLD QL SMEAR: NORMAL
CALCIUM ALBUM COR SERPL-MCNC: 9.1 MG/DL (ref 8.5–10.5)
CALCIUM SERPL-MCNC: 8.7 MG/DL (ref 8.5–10.5)
CHLORIDE SERPL-SCNC: 108 MMOL/L (ref 96–112)
CO2 SERPL-SCNC: 24 MMOL/L (ref 20–33)
COMMENT 1642: NORMAL
CREAT SERPL-MCNC: 1.14 MG/DL (ref 0.5–1.4)
EOSINOPHIL # BLD AUTO: 0.21 K/UL (ref 0–0.51)
EOSINOPHIL NFR BLD: 4.3 % (ref 0–6.9)
ERYTHROCYTE [DISTWIDTH] IN BLOOD BY AUTOMATED COUNT: 58.8 FL (ref 35.9–50)
GFR SERPLBLD CREATININE-BSD FMLA CKD-EPI: 65 ML/MIN/1.73 M 2
GLOBULIN SER CALC-MCNC: 2.9 G/DL (ref 1.9–3.5)
GLUCOSE SERPL-MCNC: 96 MG/DL (ref 65–99)
HCT VFR BLD AUTO: 34.5 % (ref 42–52)
HGB BLD-MCNC: 10.9 G/DL (ref 14–18)
IMM GRANULOCYTES # BLD AUTO: 0.01 K/UL (ref 0–0.11)
IMM GRANULOCYTES NFR BLD AUTO: 0.2 % (ref 0–0.9)
LYMPHOCYTES # BLD AUTO: 0.33 K/UL (ref 1–4.8)
LYMPHOCYTES NFR BLD: 6.8 % (ref 22–41)
MCH RBC QN AUTO: 30.2 PG (ref 27–33)
MCHC RBC AUTO-ENTMCNC: 31.6 G/DL (ref 32.3–36.5)
MCV RBC AUTO: 95.6 FL (ref 81.4–97.8)
MICROCYTES BLD QL SMEAR: ABNORMAL
MONOCYTES # BLD AUTO: 0.55 K/UL (ref 0–0.85)
MONOCYTES NFR BLD AUTO: 11.4 % (ref 0–13.4)
MORPHOLOGY BLD-IMP: NORMAL
NEUTROPHILS # BLD AUTO: 3.72 K/UL (ref 1.82–7.42)
NEUTROPHILS NFR BLD: 76.9 % (ref 44–72)
NRBC # BLD AUTO: 0 K/UL
NRBC BLD-RTO: 0 /100 WBC (ref 0–0.2)
OVALOCYTES BLD QL SMEAR: NORMAL
PLATELET # BLD AUTO: 70 K/UL (ref 164–446)
PLATELET BLD QL SMEAR: NORMAL
PLATELETS.RETICULATED NFR BLD AUTO: 3.9 % (ref 0.6–13.1)
PMV BLD AUTO: 9.8 FL (ref 9–12.9)
POIKILOCYTOSIS BLD QL SMEAR: NORMAL
POTASSIUM SERPL-SCNC: 4.9 MMOL/L (ref 3.6–5.5)
PROT SERPL-MCNC: 6.4 G/DL (ref 6–8.2)
RBC # BLD AUTO: 3.61 M/UL (ref 4.7–6.1)
RBC BLD AUTO: PRESENT
SODIUM SERPL-SCNC: 140 MMOL/L (ref 135–145)
WBC # BLD AUTO: 4.8 K/UL (ref 4.8–10.8)

## 2023-05-24 PROCEDURE — 85025 COMPLETE CBC W/AUTO DIFF WBC: CPT

## 2023-05-24 PROCEDURE — 36415 COLL VENOUS BLD VENIPUNCTURE: CPT

## 2023-05-24 PROCEDURE — 85055 RETICULATED PLATELET ASSAY: CPT

## 2023-05-24 PROCEDURE — 82105 ALPHA-FETOPROTEIN SERUM: CPT

## 2023-05-24 PROCEDURE — 80053 COMPREHEN METABOLIC PANEL: CPT

## 2023-05-26 LAB — AFP-TM SERPL-MCNC: 2 NG/ML (ref 0–9)

## 2023-06-06 ENCOUNTER — TELEPHONE (OUTPATIENT)
Dept: CARDIOLOGY | Facility: MEDICAL CENTER | Age: 78
End: 2023-06-06
Payer: MEDICARE

## 2023-06-06 NOTE — TELEPHONE ENCOUNTER
"Patients loop recorder transmitted via home monitor.  Device recorded \"AF\" episode 5/29 @ 10:50 am lasting 20 minutes--appears to be PAC's, however please confirm.  Artifact noted as well.     Scanned for review.   "

## 2023-06-06 NOTE — TELEPHONE ENCOUNTER
Rubens Hall M.D.  You; Zay Lowe Ass't 5 minutes ago (3:30 PM)     Stephanie, these look to be very frequent PACs. The patient has contraindications to AC. I would recommend metop xl 12.5mg daily to decrease these epiosdes. Thanks.      [employer] message sent to pt.

## 2023-06-08 DIAGNOSIS — I47.10 PAROXYSMAL SVT (SUPRAVENTRICULAR TACHYCARDIA) (HCC): ICD-10-CM

## 2023-06-08 DIAGNOSIS — I10 ESSENTIAL HYPERTENSION: ICD-10-CM

## 2023-06-08 DIAGNOSIS — I49.1 PAC (PREMATURE ATRIAL CONTRACTION): ICD-10-CM

## 2023-06-08 RX ORDER — METOPROLOL SUCCINATE 25 MG/1
12.5 TABLET, EXTENDED RELEASE ORAL DAILY
Qty: 45 TABLET | Refills: 3 | Status: SHIPPED | OUTPATIENT
Start: 2023-06-08 | End: 2024-01-24 | Stop reason: SDUPTHER

## 2023-06-19 ENCOUNTER — TELEPHONE (OUTPATIENT)
Dept: HEALTH INFORMATION MANAGEMENT | Facility: OTHER | Age: 78
End: 2023-06-19
Payer: MEDICARE

## 2023-07-06 ENCOUNTER — NON-PROVIDER VISIT (OUTPATIENT)
Dept: INTERNAL MEDICINE | Facility: OTHER | Age: 78
End: 2023-07-06
Payer: MEDICARE

## 2023-07-06 VITALS — HEIGHT: 75 IN | BODY MASS INDEX: 29.47 KG/M2 | WEIGHT: 237 LBS

## 2023-07-06 DIAGNOSIS — R18.8 CIRRHOSIS OF LIVER WITH ASCITES, UNSPECIFIED HEPATIC CIRRHOSIS TYPE (HCC): ICD-10-CM

## 2023-07-06 DIAGNOSIS — I25.10 ASCVD (ARTERIOSCLEROTIC CARDIOVASCULAR DISEASE): ICD-10-CM

## 2023-07-06 DIAGNOSIS — D63.8 ANEMIA, CHRONIC DISEASE: ICD-10-CM

## 2023-07-06 DIAGNOSIS — K74.60 CIRRHOSIS OF LIVER WITH ASCITES, UNSPECIFIED HEPATIC CIRRHOSIS TYPE (HCC): ICD-10-CM

## 2023-07-06 DIAGNOSIS — R60.0 EDEMA OF LEFT LOWER EXTREMITY: ICD-10-CM

## 2023-07-06 DIAGNOSIS — E78.5 DYSLIPIDEMIA: ICD-10-CM

## 2023-07-06 PROCEDURE — 97802 MEDICAL NUTRITION INDIV IN: CPT | Performed by: DIETITIAN, REGISTERED

## 2023-07-06 ASSESSMENT — FIBROSIS 4 INDEX: FIB4 SCORE: 6.12

## 2023-07-06 NOTE — PROGRESS NOTES
"Torey Lima is a 78 y.o. year old, male initial evaluation for overall nutrition management for chronic disease.    ROS:  Torey reports his wife is an excellent cook, she is keeping him alive!    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     My Health Profile:    PHYSICAL ASSESSMENT  Current Height:  75\"  Ht Readings from Last 1 Encounters:   05/04/23 1.905 m (6' 3\")     Current Weight:  237  Wt Readings from Last 1 Encounters:   05/04/23 108 kg (238 lb 12.1 oz)     BMI:  29      FLUID INTAKE:  Decaff coffee in am  Typical Beverages: water, Coke Zero, avoid added sugars  Servings Alcohol/D/WK/MO: none    ACTIVITY REVIEW: still works  Current Exercise: ADL, walks with wife  Hobbies: projects around home that keep him moving, activity with purpose    PERTINENT LABS:      Latest Reference Range & Units 07/05/22 06:48 01/16/23 06:29   Glycohemoglobin 4.0 - 5.6 % 4.7 4.7     Patient Behaviors (indicate frequency)  Meal/Snack Pattern: 3 m, not much snacking    Wife is a great cook    B: cold cereal, non fat milk  L:  [place] and side green salad  Or macaroni salad, piece of fruit (apple, orange)  D: chicken casserole, air fryer for chicken, grilled pork chops, steaks- split with wife; portions are sensible    Likes veggies and some fruits    Dines away from Home: occassionally  Locations:  restaurants  Who lives in home: wife, self  Additional Patient Behavior Information: wife cooks wonderfully    PES: Overweight, h/o colon cancer, liver damage and Guillain-[place] recovery r/t ascites, occasional sodium as evidenced by BMI 29    NUTRITION COMMENTS:    Torey is a 77 yo who had a h/o colon cancerw/liver cancer and ablation for cancer at [facility], before ablation, fell at home, 1454-[address]bkf[address]-Twnut syndrome with hospitalization and rehab x 3 months. [facility] from [facility]- cut out alcohol, caffeine.      Recent a-fib presented, " saw cardiologist in [place]- on implanted cardiac monitor    Continues to practice as an .   x 58 years, never argued with wife, spirited discussions!    Overall prudent practices; reviewed MeD (mediterranean-DASH) principles for reinforcement of what his wife watches closely in his diet and lifestyle practices.      Monitoring sodium and fluids with liver condition, sensible practices with quality proteins reinforced for long-term protection of liver.       RTC x 3-4 months    Time Spent: 60 minutes

## 2023-07-06 NOTE — PATIENT INSTRUCTIONS
Maintain sensible approach to eating  - add in new plant-based protein for overall good health practices  - watch sodium  - adequate protein intake, every meal    Maintain ADL and find intentional movement in his day

## 2023-07-07 ENCOUNTER — NON-PROVIDER VISIT (OUTPATIENT)
Dept: CARDIOLOGY | Facility: MEDICAL CENTER | Age: 78
End: 2023-07-07
Payer: MEDICARE

## 2023-07-07 PROCEDURE — 93298 REM INTERROG DEV EVAL SCRMS: CPT | Performed by: INTERNAL MEDICINE

## 2023-07-07 NOTE — CARDIAC REMOTE MONITOR - SCAN
Device transmission reviewed. Device demonstrated appropriate function.       Electronically Signed by: Derrick Allison M.D.    7/20/2023  2:09 PM

## 2023-07-10 ENCOUNTER — OFFICE VISIT (OUTPATIENT)
Dept: URGENT CARE | Facility: CLINIC | Age: 78
End: 2023-07-10
Payer: MEDICARE

## 2023-07-10 VITALS
SYSTOLIC BLOOD PRESSURE: 100 MMHG | OXYGEN SATURATION: 95 % | HEIGHT: 75 IN | RESPIRATION RATE: 17 BRPM | BODY MASS INDEX: 29.47 KG/M2 | DIASTOLIC BLOOD PRESSURE: 60 MMHG | HEART RATE: 70 BPM | WEIGHT: 237 LBS | TEMPERATURE: 97 F

## 2023-07-10 DIAGNOSIS — S51.811A SKIN TEAR OF RIGHT FOREARM WITHOUT COMPLICATION, INITIAL ENCOUNTER: ICD-10-CM

## 2023-07-10 PROCEDURE — 3078F DIAST BP <80 MM HG: CPT | Performed by: NURSE PRACTITIONER

## 2023-07-10 PROCEDURE — 3074F SYST BP LT 130 MM HG: CPT | Performed by: NURSE PRACTITIONER

## 2023-07-10 PROCEDURE — 99213 OFFICE O/P EST LOW 20 MIN: CPT | Performed by: NURSE PRACTITIONER

## 2023-07-10 RX ORDER — CEPHALEXIN 500 MG/1
500 CAPSULE ORAL 4 TIMES DAILY
Qty: 20 CAPSULE | Refills: 0 | Status: SHIPPED | OUTPATIENT
Start: 2023-07-10 | End: 2023-07-15

## 2023-07-10 ASSESSMENT — FIBROSIS 4 INDEX: FIB4 SCORE: 6.12

## 2023-07-10 ASSESSMENT — VISUAL ACUITY: OU: 1

## 2023-07-10 ASSESSMENT — ENCOUNTER SYMPTOMS
CONSTITUTIONAL NEGATIVE: 1
MUSCULOSKELETAL NEGATIVE: 1
NEUROLOGICAL NEGATIVE: 1
ROS SKIN COMMENTS: PER HPI

## 2023-07-10 NOTE — PROGRESS NOTES
Subjective:     Torey Lima is a 78 y.o. male who presents for Fall and Wound Check       Wound Check  He was originally treated yesterday.     Patient tripped on the corner near the stairs.  Fell and struck his right elbow/forearm against the edge of the wood stairs.  Has a skin tear.  Wife performed wound care and applied a dressing.  Concerned of bleeding.  Not on blood thinner.  Elbow feels fine.  Full range of motion.  CMS intact.    Tdap received 11/8/2022.    Review of Systems   Constitutional: Negative.    Musculoskeletal: Negative.    Skin:         Per HPI   Neurological: Negative.    All other systems reviewed and are negative.    Refer to HPI for additional details.    During this visit, appropriate PPE was worn, and hand hygiene was performed.    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    MEDS:   Current Outpatient Medications:     cephALEXin (KEFLEX) 500 MG Cap, Take 1 Capsule by mouth 4 times a day for 5 days., Disp: 20 Capsule, Rfl: 0    metoprolol SR (TOPROL XL) 25 MG TABLET SR 24 HR, Take 0.5 Tablets by mouth every day., Disp: 45 Tablet, Rfl: 3    atorvastatin (LIPITOR) 40 MG Tab, Take 1 Tablet by mouth every evening., Disp: 90 Tablet, Rfl: 4    torsemide  (DEMADEX) 10 MG tablet, Take 1 Tablet by mouth every day., Disp: 90 Tablet, Rfl: 4    potassium chloride SA (KDUR) 20 MEQ Tab CR, Take 1 Tablet by mouth every day for 90 days. TAKE 1 TABLET BY MOUTH TWICE DAILY, Disp: 90 Tablet, Rfl: 4    ursodiol (ACTIGALL) 300 MG Cap, Take 1 Capsule by mouth in the morning, at noon, and at bedtime., Disp: 270 Capsule, Rfl: 3    spironolactone (ALDACTONE) 50 MG Tab, Take 1 Tablet by mouth 2 times a day., Disp: 180 Tablet, Rfl: 3    folic acid (FOLVITE) 1 MG Tab, Take 1 Tablet by mouth every day., Disp: 100 Tablet, Rfl: 4    Cholecalciferol (VITAMIN D3) 25 MCG (1000 UT) Cap, Take 1,000 Units by mouth every day., Disp: 100 Capsule, Rfl: 3    ferrous sulfate 325 (65 Fe) MG tablet, Take 1 Tablet by mouth every morning with breakfast., Disp: 100 Tablet, Rfl: 3    ALLERGIES:   Allergies   Allergen Reactions    Influenza Virus Vaccine Live      Guillan [place]     Anticoagulant Sodium Citrate [Sodium Citrate]      HIGH BLEEDING RISK    Lisinopril Cough     SURGHX:   Past Surgical History:   Procedure Laterality Date    LUMBAR LAMINECTOMY DISKECTOMY  5/25/2017    Procedure:  LAMINECTOMY LUMBAR  4 TO SACRAL 1;  Surgeon: Felton Escobar M.D.;  Location: [facility];  Service:     GASTROSCOPY WITH BANDING  10/25/2016    Procedure: GASTROSCOPY WITH BANDING;  Surgeon: Pierre Waggoner M.D.;  Location: ENDOSCOPY [facility];  Service:     GASTROSCOPY WITH BANDING  4/3/2016    Procedure: GASTROSCOPY WITH BANDING;  Surgeon: Cayetano Stovall M.D.;  Location: ENDOSCOPY [facility];  Service:     FULL THICKNESS BLOCK RESECTION  4/11/2012    Performed by LANI BOWMAN at [facility] ORS    CATH REMOVAL  7/20/2011    Performed [place] RUBY RUIZ [place]S    CATH PLACEMENT  9/20/2010    Performed by RUBY RUIZ at [facility]    LOW ANTERIOR RESECTION ROBOTIC  8/10/2010    Performed by RUBY RUIZ at [place]  "ORS    COLON RESECTION      HIP ARTHROPLASTY MIS TOTAL      rt    HIP REPLACEMENT, TOTAL      OTHER      Cholecystectomy    OTHER (COMMENTS)      liver surgery       SOCHX:  reports that he has quit smoking. His smoking use included cigars. He has never used smokeless tobacco. He reports that he does not currently use alcohol after a past usage of about 1.8 oz of alcohol per week. He reports that he does not use drugs.    FH: Per HPI as applicable/pertinent.      Objective:     /60   Pulse 70   Temp 36.1 °C (97 °F)   Resp 17   Ht 1.905 m (6' 3\")   Wt 108 kg (237 lb)   SpO2 95%   BMI 29.62 kg/m²     Physical Exam  Nursing note reviewed.   Constitutional:       General: He is not in acute distress.     Appearance: He is well-developed. He is not ill-appearing or toxic-appearing.   Eyes:      General: Vision grossly intact.   Cardiovascular:      Rate and Rhythm: Normal rate.   Pulmonary:      Effort: Pulmonary effort is normal. No respiratory distress.   Musculoskeletal:         General: No deformity. Normal range of motion.      Right elbow: No swelling or deformity. Normal range of motion. No tenderness.      Right forearm: No swelling, deformity or tenderness.        Arms:       Comments: Partial thickness skin tear distal to right elbow, small bleeding, no visible tendon/bone   Skin:     General: Skin is warm and dry.      Coloration: Skin is not pale.   Neurological:      Mental Status: He is alert and oriented to person, place, and time.      Motor: No weakness.   Psychiatric:         Behavior: Behavior normal. Behavior is cooperative.       Assessment/Plan:     1. Skin tear of right forearm without complication, initial encounter  - cephALEXin (KEFLEX) 500 MG Cap; Take 1 Capsule by mouth 4 times a day for 5 days.  Dispense: 20 Capsule; Refill: 0  - Referral to Wound Clinic    Wound care performed.  Irrigated copiously with NS.  Devitalized skin excised with sterile instruments.  Antibiotic ointment " applied.  Nonstick dressing applied.  Coban applied.  Referred to wound care clinic.  Advised to return to clinic Wednesday if not able to get into wound care by then.  Prophylactic antibiotics sent to pharmacy.    Differential diagnosis, natural history, supportive care, over-the-counter symptom management per 's instructions, close monitoring, and indications for immediate follow-up discussed.     All questions answered. Patient agrees with the plan of care.    Discharge summary provided via [facility].

## 2023-07-12 ENCOUNTER — OFFICE VISIT (OUTPATIENT)
Dept: URGENT CARE | Facility: CLINIC | Age: 78
End: 2023-07-12
Payer: MEDICARE

## 2023-07-12 VITALS
OXYGEN SATURATION: 95 % | HEART RATE: 66 BPM | DIASTOLIC BLOOD PRESSURE: 88 MMHG | SYSTOLIC BLOOD PRESSURE: 132 MMHG | HEIGHT: 75 IN | RESPIRATION RATE: 16 BRPM | TEMPERATURE: 98.7 F | WEIGHT: 237 LBS | BODY MASS INDEX: 29.47 KG/M2

## 2023-07-12 DIAGNOSIS — S51.811D SKIN TEAR OF RIGHT FOREARM WITHOUT COMPLICATION, SUBSEQUENT ENCOUNTER: ICD-10-CM

## 2023-07-12 DIAGNOSIS — Z51.89 VISIT FOR WOUND CHECK: ICD-10-CM

## 2023-07-12 PROCEDURE — 3075F SYST BP GE 130 - 139MM HG: CPT

## 2023-07-12 PROCEDURE — 3079F DIAST BP 80-89 MM HG: CPT

## 2023-07-12 PROCEDURE — 99999 PR NO CHARGE: CPT

## 2023-07-12 ASSESSMENT — FIBROSIS 4 INDEX: FIB4 SCORE: 6.12

## 2023-07-13 NOTE — PROGRESS NOTES
Subjective:   Torey Lima is a 78 y.o. male who presents for Wound Check (Right forearm wound )      HPI:    Patient presents for skin tear wound check and dressing change  Reports compliance with antibiotics  Denies increased redness, drainage, warmth, tenderness  Denies decreased ROM  Denies fever, chills      ROS As above in HPI    Medications:    Current Outpatient Medications on File Prior to Visit   Medication Sig Dispense Refill    cephALEXin (KEFLEX) 500 MG Cap Take 1 Capsule by mouth 4 times a day for 5 days. 20 Capsule 0    metoprolol SR (TOPROL XL) 25 MG TABLET SR 24 HR Take 0.5 Tablets by mouth every day. 45 Tablet 3    atorvastatin (LIPITOR) 40 MG Tab Take 1 Tablet by mouth every evening. 90 Tablet 4    torsemide (DEMADEX) 10 MG tablet Take 1 Tablet by mouth every day. 90 Tablet 4    potassium chloride SA (KDUR) 20 MEQ Tab CR Take 1 Tablet by mouth every day for 90 days. TAKE 1 TABLET BY MOUTH TWICE DAILY 90 Tablet 4    ursodiol (ACTIGALL) 300 MG Cap Take 1 Capsule by mouth in the morning, at noon, and at bedtime. 270 Capsule 3    spironolactone (ALDACTONE) 50 MG Tab Take 1 Tablet by mouth 2 times a day. 180 Tablet 3    folic acid (FOLVITE) 1 MG Tab Take 1 Tablet by mouth every day. 100 Tablet 4    Cholecalciferol (VITAMIN D3) 25 MCG (1000 UT) Cap Take 1,000 Units by mouth every day. 100 Capsule 3    ferrous sulfate 325 (65 Fe) MG tablet Take 1 Tablet by mouth every morning with breakfast. 100 Tablet 3     No current facility-administered medications on file prior to visit.        Allergies:   Influenza virus vaccine live, Anticoagulant sodium citrate [sodium citrate], and Lisinopril    Problem List:   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)--2010 left hemicolectomy; no colostomy; ablation of liver met at [facility] 1/2011- MRI abd 3/2018 no change in per    Liver lesion- ablation of malignant met from colon  ca 2011- f/u [facility] q 6 mos - stable MRI abd 3/2018- redo annually    Drug-induced polyneuropathy (HCC)    Vitamin D deficiency    ASCVD (arteriosclerotic cardiovascular disease)subtotalled small distal LCx and 40% LAD med rx by cath 12/13/12    Essential hypertension    Gastrointestinal hemorrhage with hematemesis- recurrernt from varices    Secondary esophageal varices without bleeding (HCC)- BANDED 4/2016- repeat egd tbd 10//29/16- [facility]    Gastric varices- s/p BRTO procedure at [facility]    Portal vein thrombosis- on CT [facility] 2/2015    Cirrhosis of liver with ascites (HCC)- ? DUE TO OLD HEP B, CHEMO RX,  OR THERMO RAD OF MAGNANT LESION    History of esophageal varices    H/O colon cancer, stage IV- neg colonoscopy 2012/2018 at [facility]    BPH (benign prostatic hyperplasia)    Peripheral neuropathy due to chemotherapy (HCC)    Edema of left lower extremity- due to dvt july 2017    Generalized edema    Anemia, chronic disease    Chronic deep vein thrombosis (DVT) of femoral vein of left lower extremity (HCC)- s/p IVC filter 2016    Portal hypertension with esophageal varices (HCC)    Secondary malignant neoplasm of liver (HCC)    Thrombocytopenia, unspecified (HCC)- from previous chemorx    Spinal enthesopathy of thoracic region (HCC)    History of Guillain-Billings syndrome    Cerebral atrophy (HCC)-MRI brain 2019, mild diffuse cerebral substance loss    Dyslipidemia    Irregular heartbeat- A. FIB ON APPLE WATCH    Obesity (BMI 30-39.9)    Obesity due to excess calories with serious comorbidity    Paroxysmal SVT (supraventricular tachycardia) (HCC)        Surgical History:  Past Surgical History:   Procedure Laterality Date    LUMBAR LAMINECTOMY DISKECTOMY  5/25/2017    Procedure:  LAMINECTOMY LUMBAR  4 TO SACRAL 1;  Surgeon: Felton Escobar M.D.;  Location: SURGERY [facility];  Service:     GASTROSCOPY WITH BANDING  10/25/2016    Procedure: GASTROSCOPY WITH BANDING;  Surgeon: Pierre Waggoner M.D.;  Location: ENDOSCOPY  "[facility];  Service:     GASTROSCOPY WITH BANDING  4/3/2016    Procedure: GASTROSCOPY WITH BANDING;  Surgeon: Cayetano Stovall M.D.;  Location: ENDOSCOPY [facility];  Service:     FULL THICKNESS BLOCK RESECTION  4/11/2012    Performed by LANI BOWMAN at SURGERY SURGICAL [facility] ORS    CATH REMOVAL  7/20/2011    Performed by RUBY RUIZ at SURGERY [facility] ORS    CATH PLACEMENT  9/20/2010    Performed by RUBY RUIZ at SURGERY [facility] ORS    LOW ANTERIOR RESECTION ROBOTIC  8/10/2010    Performed by RUBY RUIZ at SURGERY [facility] ORS    COLON RESECTION      HIP ARTHROPLASTY MIS TOTAL      rt    HIP REPLACEMENT, TOTAL      OTHER      Cholecystectomy    OTHER (COMMENTS)      liver surgery         Past Social Hx:   Social History     Tobacco Use    Smoking status: Former     Types: Cigars    Smokeless tobacco: Never    Tobacco comments:     1 cigar per week.   Vaping Use    Vaping Use: Never used   Substance Use Topics    Alcohol use: Not Currently     Alcohol/week: 1.8 oz     Types: 1 Glasses of wine, 1 Cans of beer, 1 Standard drinks or equivalent per week     Comment: minimal, Pt stop drinking since the beginning of April 2016    Drug use: No          Problem list, medications, and allergies reviewed by myself today in Epic.     Objective:     /88   Pulse 66   Temp 37.1 °C (98.7 °F) (Temporal)   Resp 16   Ht 1.905 m (6' 3\")   Wt 108 kg (237 lb)   SpO2 95%   BMI 29.62 kg/m²     Physical Exam  Vitals and nursing note reviewed.   Constitutional:       Appearance: Normal appearance.   Cardiovascular:      Rate and Rhythm: Normal rate and regular rhythm.      Heart sounds: Normal heart sounds.   Pulmonary:      Effort: Pulmonary effort is normal.      Breath sounds: Normal breath sounds.   Musculoskeletal:         General: No swelling, tenderness, deformity or signs of injury.   Skin:     Capillary Refill: Capillary refill takes less than 2 seconds.      Findings: " No erythema.      Comments: Right posterior forearm has skin tear. No erythema, edema, fluctuance, warmth. Slight serosanguinous drainage present. +AROM. NVI.   Neurological:      Mental Status: He is alert and oriented to person, place, and time.         Assessment/Plan:       Diagnosis and associated orders:   1. Skin tear of right forearm without complication, subsequent encounter    2. Visit for wound check        Comments/MDM:     No signs of infection appreciated.   Dressing changed by provider  Right forearm irrigated with NS  Thin layer of polysporin, tegaderm, nonstick pad applied and secured by ace wrap.   Reinforced home care instructions  Will have patient follow up in 2 days for wound check.           Please note that this dictation was created using voice recognition software. I have made a reasonable attempt to correct obvious errors, but I expect that there are errors of grammar and possibly content that I did not discover before finalizing the note.    This note was electronically signed by Ilana Villaseñor, DNP

## 2023-07-14 ENCOUNTER — OFFICE VISIT (OUTPATIENT)
Dept: URGENT CARE | Facility: CLINIC | Age: 78
End: 2023-07-14
Payer: MEDICARE

## 2023-07-14 VITALS
DIASTOLIC BLOOD PRESSURE: 72 MMHG | OXYGEN SATURATION: 96 % | RESPIRATION RATE: 16 BRPM | BODY MASS INDEX: 29.47 KG/M2 | TEMPERATURE: 97.7 F | SYSTOLIC BLOOD PRESSURE: 114 MMHG | WEIGHT: 237 LBS | HEART RATE: 54 BPM | HEIGHT: 75 IN

## 2023-07-14 DIAGNOSIS — S51.811D SKIN TEAR OF RIGHT FOREARM WITHOUT COMPLICATION, SUBSEQUENT ENCOUNTER: ICD-10-CM

## 2023-07-14 DIAGNOSIS — Z51.89 VISIT FOR WOUND CHECK: ICD-10-CM

## 2023-07-14 PROCEDURE — 3074F SYST BP LT 130 MM HG: CPT | Performed by: NURSE PRACTITIONER

## 2023-07-14 PROCEDURE — 3078F DIAST BP <80 MM HG: CPT | Performed by: NURSE PRACTITIONER

## 2023-07-14 PROCEDURE — 99213 OFFICE O/P EST LOW 20 MIN: CPT | Performed by: NURSE PRACTITIONER

## 2023-07-14 ASSESSMENT — FIBROSIS 4 INDEX: FIB4 SCORE: 6.12

## 2023-07-14 ASSESSMENT — ENCOUNTER SYMPTOMS: ROS SKIN COMMENTS: SKIN TEAR RIGHT FOREARM

## 2023-07-14 NOTE — PROGRESS NOTES
Subjective     Torey iLma is a 78 y.o. male who presents with Wound Check (Dressing change on right forearm area )            Wound Check  Treated in ED: pt reports he sustained a skin tear to right forearm about 4 days ago. he has been seen at  for wound care/dressing changes with good relief. The redness has improved. The swelling has improved. He has no difficulty moving the affected extremity or digit.       Review of Systems   Skin:         Skin tear right forearm   All other systems reviewed and are negative.         Past Medical History:   Diagnosis Date    Arrhythmia April 1, 2023    Dr. Hall    ASCVD (arteriosclerotic cardiovascular disease) 12/14/2012    BMI 33.0-33.9,adult 02/15/2013    Breath shortness April 1, 2023    Dr. Hall    Cancer (HCC) 10/10-6/11    colon, liver cance    Chickenpox     Colon cancer (HCC) 12/06/2010    Elevated CEA 12/06/2010    [place] measles     GI bleed     HLD (hyperlipidemia) 06/20/2013    HTN (hypertension) 06/20/2013    Hypertension     Impaired fasting glucose 08/02/2012    Liver cirrhosis (HCC)     Liver lesion 12/06/2010    Mixed hyperlipidemia 06/20/2013    Obesity (BMI 30-39.9) 12/14/2017    Peripheral neuropathy, secondary to drugs or chemicals 12/06/2010    Snoring Many Years    Wife    Stage 3a chronic kidney disease (HCC) 06/25/2022    Thrombocytopenia due to drugs 04/25/2012    Vitamin d deficiency 08/02/2012      Past Surgical History:   Procedure Laterality Date    LUMBAR LAMINECTOMY DISKECTOMY  5/25/2017    Procedure:  LAMINECTOMY LUMBAR  4 TO SACRAL 1;  Surgeon: Felton Escobar M.D.;  Location: SURGERY [facility];  Service:     GASTROSCOPY WITH BANDING  10/25/2016    Procedure: GASTROSCOPY WITH BANDING;  Surgeon: Pierre Waggoner M.D.;  Location: ENDOSCOPY [facility];  Service:     GASTROSCOPY WITH BANDING  4/3/2016    Procedure: GASTROSCOPY WITH BANDING;  Surgeon: Cayetano Stovall M.D.;  Location: ENDOSCOPY [facility];   Service:     FULL THICKNESS BLOCK RESECTION  4/11/2012    Performed by LANI BOWMAN at SURGERY [facility] ORS    CATH REMOVAL  7/20/2011    Performed by RUBY RUIZ at SURGERY [facility] ORS    CATH PLACEMENT  9/20/2010    Performed by RUBY RUIZ at SURGERY [facility] ORS    LOW ANTERIOR RESECTION ROBOTIC  8/10/2010    Performed by RUBY RUIZ at SURGERY [facility] ORS    COLON RESECTION      HIP ARTHROPLASTY MIS TOTAL      rt    HIP REPLACEMENT, TOTAL      OTHER      Cholecystectomy    OTHER (COMMENTS)      liver surgery        Social History     Socioeconomic History    Marital status:      Spouse name: Not on file    Number of children: Not on file    Years of education: Not on file    Highest education level: Professional school degree (e.g., MD, DDS, DVM, IQRA)   Occupational History    Not on file   Tobacco Use    Smoking status: Former     Types: Cigars    Smokeless tobacco: Never    Tobacco comments:     1 cigar per week.   Vaping Use    Vaping Use: Never used   Substance and Sexual Activity    Alcohol use: Not Currently     Alcohol/week: 1.8 oz     Types: 1 Glasses of wine, 1 Cans of beer, 1 Standard drinks or equivalent per week     Comment: minimal, Pt stop drinking since the beginning of April 2016    Drug use: No    Sexual activity: Yes     Partners: Female   Other Topics Concern    Not on file   Social History Narrative    ** Merged History Encounter **          Social Determinants of Health     Financial Resource Strain: Low Risk  (4/18/2023)    Overall Financial Resource Strain (CARDIA)     Difficulty of Paying Living Expenses: Not hard at all   Food Insecurity: No Food Insecurity (4/18/2023)    Hunger Vital Sign     Worried About Running Out of Food in the Last Year: Never true     Ran Out of Food in the Last Year: Never true   Transportation Needs: No Transportation Needs (4/18/2023)    PRAPARE - Transportation     Lack of Transportation (Medical): No     Lack of  "Transportation (Non-Medical): No   Physical Activity: Inactive (4/18/2023)    Exercise Vital Sign     Days of Exercise per Week: 0 days     Minutes of Exercise per Session: 0 min   Stress: Stress Concern Present (4/18/2023)    [employer] - Occupational Stress Questionnaire     Feeling of Stress : To some extent   Social Connections: Socially Isolated (4/18/2023)    Social Connection and Isolation Panel [NHANES]     Frequency of Communication with Friends and Family: Once a week     Frequency of Social Gatherings with Friends and Family: Once a week     Attends Mandaeism Services: Never     Active Member of Clubs or Organizations: No     Attends Club or Organization Meetings: Not on file     Marital Status:    Intimate Partner Violence: Not on file   Housing Stability: Low Risk  (4/18/2023)    Housing Stability Vital Sign     Unable to Pay for Housing in the Last Year: No     Number of Places Lived in the Last Year: 1     Unstable Housing in the Last Year: No         Objective     /72   Pulse (!) 54   Temp 36.5 °C (97.7 °F) (Temporal)   Resp 16   Ht 1.905 m (6' 3\")   Wt 108 kg (237 lb)   SpO2 96%   BMI 29.62 kg/m²      Physical Exam  Vitals and nursing note reviewed.   Constitutional:       Appearance: Normal appearance.   HENT:      Head: Normocephalic and atraumatic.      Nose: Nose normal.      Mouth/Throat:      Mouth: Mucous membranes are moist.      Pharynx: Oropharynx is clear.   Eyes:      Extraocular Movements: Extraocular movements intact.      Pupils: Pupils are equal, round, and reactive to light.   Cardiovascular:      Rate and Rhythm: Normal rate and regular rhythm.   Pulmonary:      Effort: Pulmonary effort is normal.   Musculoskeletal:         General: Normal range of motion.        Arms:       Cervical back: Normal range of motion and neck supple.   Skin:     General: Skin is warm and dry.      Capillary Refill: Capillary refill takes less than 2 " seconds.   Neurological:      General: No focal deficit present.      Mental Status: He is alert and oriented to person, place, and time. Mental status is at baseline.   Psychiatric:         Mood and Affect: Mood normal.         Thought Content: Thought content normal.         Judgment: Judgment normal.                             Assessment & Plan        1. Skin tear of right forearm without complication, subsequent encounter    2. Visit for wound check    Applied polysporin, telfa and ace wrap  Advised pt to use this dressing for about 4 more days, then encouraged him to leave the area open to air to help scab form  He understands  Keep the area protected when bandage is removed  Supportive care, differential diagnoses, and indications for immediate follow-up discussed with patient.    Pathogenesis of diagnosis discussed including typical length and natural progression.    Instructed to return to  or nearest emergency department if symptoms fail to improve, for any change in condition, further concerns, or new concerning symptoms.  Patient states understanding of the plan of care and discharge instructions.

## 2023-07-23 ENCOUNTER — OFFICE VISIT (OUTPATIENT)
Dept: URGENT CARE | Facility: CLINIC | Age: 78
End: 2023-07-23
Payer: MEDICARE

## 2023-07-23 VITALS
WEIGHT: 237 LBS | SYSTOLIC BLOOD PRESSURE: 112 MMHG | RESPIRATION RATE: 18 BRPM | BODY MASS INDEX: 29.47 KG/M2 | TEMPERATURE: 98.1 F | OXYGEN SATURATION: 96 % | HEIGHT: 75 IN | DIASTOLIC BLOOD PRESSURE: 52 MMHG | HEART RATE: 70 BPM

## 2023-07-23 DIAGNOSIS — T14.8XXA SKIN AVULSION: ICD-10-CM

## 2023-07-23 PROCEDURE — 99213 OFFICE O/P EST LOW 20 MIN: CPT | Performed by: PHYSICIAN ASSISTANT

## 2023-07-23 PROCEDURE — 3074F SYST BP LT 130 MM HG: CPT | Performed by: PHYSICIAN ASSISTANT

## 2023-07-23 PROCEDURE — 3078F DIAST BP <80 MM HG: CPT | Performed by: PHYSICIAN ASSISTANT

## 2023-07-23 RX ORDER — POTASSIUM CHLORIDE 20 MEQ/1
1 TABLET, EXTENDED RELEASE ORAL DAILY
COMMUNITY
Start: 2023-05-20 | End: 2023-08-29

## 2023-07-23 ASSESSMENT — FIBROSIS 4 INDEX: FIB4 SCORE: 6.12

## 2023-07-23 NOTE — PROGRESS NOTES
Subjective:   Torey Lima is a 78 y.o. male who presents for Arm Pain (As he was helping neighbor cutting tree branches x today, under Rt arm )     This is a very pleasant 78-year-old male who presents with chief complaint of skin avulsion to the right upper extremity sustained while helping a neighbor trim some branches.  He reports his wife did clean the area up before presenting here today.  His tetanus is up-to-date.  He is not on blood thinners but does have thin skin and history of cirrhosis        Medications:  atorvastatin Tabs  ferrous sulfate  folic acid Tabs  metoprolol SR Tb24  spironolactone Tabs  torsemide  ursodiol Caps  Vitamin D3 Caps    Allergies:             Influenza virus vaccine live, Anticoagulant sodium citrate [sodium citrate], and Lisinopril    Surgical History:         Past Surgical History:   Procedure Laterality Date    LUMBAR LAMINECTOMY DISKECTOMY  5/25/2017    Procedure:  LAMINECTOMY LUMBAR  4 TO SACRAL 1;  Surgeon: Felton Escobar M.D.;  Location: SURGERY [facility];  Service:     GASTROSCOPY WITH BANDING  10/25/2016    Procedure: GASTROSCOPY WITH BANDING;  Surgeon: Pierre Waggoner M.D.;  Location: ENDOSCOPY [facility];  Service:     GASTROSCOPY WITH BANDING  4/3/2016    Procedure: GASTROSCOPY WITH BANDING;  Surgeon: Cayetano Stovall M.D.;  Location: ENDOSCOPY [facility];  Service:     FULL THICKNESS BLOCK RESECTION  4/11/2012    Performed by LANI BOWMAN at SURGERY SURGICAL [facility] ORS    CATH REMOVAL  7/20/2011    Performed by RUBY RUIZ at SURGERY [facility] ORS    CATH PLACEMENT  9/20/2010    Performed by RUBY RUIZ at SURGERY [facility] ORS    LOW ANTERIOR RESECTION ROBOTIC  8/10/2010    Performed by RUBY RUIZ at SURGERY [facility] ORS    COLON RESECTION      HIP ARTHROPLASTY MIS TOTAL      rt    HIP REPLACEMENT, TOTAL      OTHER      Cholecystectomy    OTHER (COMMENTS)      liver surgery         Past Social Hx:  Torey  "Janes Lima  reports that he has quit smoking. His smoking use included cigars. He has never used smokeless tobacco. He reports that he does not currently use alcohol after a past usage of about 1.8 oz of alcohol per week. He reports that he does not use drugs.     Past Family Hx:   Torey Lima family history includes Cancer in his mother; Heart Disease in his mother.       Problem list, medications, and allergies reviewed by myself today in Epic.     Objective:     /52 (BP Location: Left arm, Patient Position: Sitting, BP Cuff Size: Adult)   Pulse 70   Temp 36.7 °C (98.1 °F) (Temporal)   Resp 18   Ht 1.905 m (6' 3\")   Wt 108 kg (237 lb)   SpO2 96%   BMI 29.62 kg/m²     Physical Exam  Skin:            Comments: 2 areas of skin avulsion noted.  1-1/2 x 1-1/2 cm area of avulsed skin superior to the olecranon and 4 x 2 cm skin avulsion noted over the ulnar surface of the forearm.  Skin has completely avulsed.  Bleeding is well controlled.  Full range of motion noted to elbow.  No bony tenderness.         Assessment/Plan:     Diagnosis and Associated Orders:     1. Skin avulsion    Other orders  - potassium chloride SA (KLOR-CON M20) 20 MEQ Tab CR; Take 1 Tablet by mouth every day.        Comments/MDM:  Skin avulsion, no skin flap to repair.  Wound appropriately dressed with nonstick, Coban, Xeroform.  Dressing supplies provided.  Recommend recheck in 3 days time for reevaluation.  Monitor for signs of infection.  No indication for antibiotics today.  Tetanus up-to-date.  I personally reviewed prior external notes and test results pertinent to today's visit. Supportive care, natural history, differential diagnoses, and indications for immediate follow-up discussed. Return to clinic or go to ED if symptoms worsen or persist.  Red flag symptoms discussed.  Patient/Parent/Guardian voices understanding. Follow-up with your primary care provider in 3-5 days.  All side effects of medication discussed " including allergic response, GI upset, tendon injury, rash, sedation etc    Please note that this dictation was created using voice recognition software. I have made a reasonable attempt to correct obvious errors, but I expect that there are errors of grammar and possibly content that I did not discover before finalizing the note.    This note was electronically signed by Yamilex Toro PA-C

## 2023-07-26 ENCOUNTER — OFFICE VISIT (OUTPATIENT)
Dept: URGENT CARE | Facility: CLINIC | Age: 78
End: 2023-07-26
Payer: MEDICARE

## 2023-07-26 VITALS
HEART RATE: 55 BPM | DIASTOLIC BLOOD PRESSURE: 54 MMHG | BODY MASS INDEX: 29.47 KG/M2 | OXYGEN SATURATION: 98 % | HEIGHT: 75 IN | SYSTOLIC BLOOD PRESSURE: 102 MMHG | WEIGHT: 237 LBS | RESPIRATION RATE: 16 BRPM | TEMPERATURE: 97.3 F

## 2023-07-26 DIAGNOSIS — T14.8XXA SKIN AVULSION: ICD-10-CM

## 2023-07-26 DIAGNOSIS — Z51.89 VISIT FOR WOUND CHECK: ICD-10-CM

## 2023-07-26 PROCEDURE — 3074F SYST BP LT 130 MM HG: CPT | Performed by: NURSE PRACTITIONER

## 2023-07-26 PROCEDURE — 3078F DIAST BP <80 MM HG: CPT | Performed by: NURSE PRACTITIONER

## 2023-07-26 PROCEDURE — 99212 OFFICE O/P EST SF 10 MIN: CPT | Performed by: NURSE PRACTITIONER

## 2023-07-26 ASSESSMENT — FIBROSIS 4 INDEX: FIB4 SCORE: 6.12

## 2023-07-26 NOTE — PROGRESS NOTES
Patient has consented to treatment and for use of patient information for treatment and billing purposes.    Date: 07/26/23     Arrival Mode: Private Vehicle    Chief Complaint:    Chief Complaint   Patient presents with    Wound Check     Was seen here on 7/23/23 for wound, right arm         History of Present Illness: 78 y.o.  male presents to clinic with with a skin tear to his right arm.  He was seen in this clinic 3 days ago.  Patient denies any new symptoms no fevers body aches or change in drainage.  His wife has been applying Polysporin and nonstick Telfa.  He has no questions at this time.      ROS:    As stated in HPI     Pertinent Medical History:  Past Medical History:   Diagnosis Date    Arrhythmia April 1, 2023    Dr. Hall    ASCVD (arteriosclerotic cardiovascular disease) 12/14/2012    BMI 33.0-33.9,adult 02/15/2013    Breath shortness April 1, 2023    Dr. Hall    Cancer (HCC) 10/10-6/11    colon, liver cance    Chickenpox     Colon cancer (HCC) 12/06/2010    Elevated CEA 12/06/2010    Sammarinese measles     GI bleed     HLD (hyperlipidemia) 06/20/2013    HTN (hypertension) 06/20/2013    Hypertension     Impaired fasting glucose 08/02/2012    Liver cirrhosis (HCC)     Liver lesion 12/06/2010    Mixed hyperlipidemia 06/20/2013    Obesity (BMI 30-39.9) 12/14/2017    Peripheral neuropathy, secondary to drugs or chemicals 12/06/2010    Snoring Many Years    Wife    Stage 3a chronic kidney disease (HCC) 06/25/2022    Thrombocytopenia due to drugs 04/25/2012    Vitamin d deficiency 08/02/2012        Pertinent Surgical History:  Past Surgical History:   Procedure Laterality Date    LUMBAR LAMINECTOMY DISKECTOMY  5/25/2017    Procedure:  LAMINECTOMY LUMBAR  4 TO SACRAL 1;  Surgeon: Felton Escobar M.D.;  Location: SURGERY [facility];  Service:     GASTROSCOPY WITH BANDING  10/25/2016    Procedure: GASTROSCOPY WITH BANDING;  Surgeon: Pierre Waggoner M.D.;  Location: ENDOSCOPY [facility];  Service:      GASTROSCOPY WITH BANDING  4/3/2016    Procedure: GASTROSCOPY WITH BANDING;  Surgeon: Cayetano Stovall M.D.;  Location: ENDOSCOPY [facility];  Service:     FULL THICKNESS BLOCK RESECTION  4/11/2012    Performed by LANI BOWMAN at SURGERY [facility]    CATH REMOVAL  7/20/2011    Performed by RUBY RUIZ at SURGERY [facility]    CATH PLACEMENT  9/20/2010    Performed by RUBY RUIZ at SURGERY [facility]    LOW ANTERIOR RESECTION ROBOTIC  8/10/2010    Performed by RUBY RUIZ at SURGERY [facility]    COLON RESECTION      HIP ARTHROPLASTY MIS TOTAL      rt    HIP REPLACEMENT, TOTAL      OTHER      Cholecystectomy    OTHER (COMMENTS)      liver surgery          Pertinent Medications:    Current Outpatient Medications on File Prior to Visit   Medication Sig Dispense Refill    potassium chloride SA (KLOR-CON M20) 20 MEQ Tab CR Take 1 Tablet by mouth every day.      metoprolol SR (TOPROL XL) 25 MG TABLET SR 24 HR Take 0.5 Tablets by mouth every day. 45 Tablet 3    atorvastatin (LIPITOR) 40 MG Tab Take 1 Tablet by mouth every evening. 90 Tablet 4    torsemide (DEMADEX) 10 MG tablet Take 1 Tablet by mouth every day. 90 Tablet 4    ursodiol (ACTIGALL) 300 MG Cap Take 1 Capsule by mouth in the morning, at noon, and at bedtime. 270 Capsule 3    spironolactone (ALDACTONE) 50 MG Tab Take 1 Tablet by mouth 2 times a day. 180 Tablet 3    folic acid (FOLVITE) 1 MG Tab Take 1 Tablet by mouth every day. 100 Tablet 4    Cholecalciferol (VITAMIN D3) 25 MCG (1000 UT) Cap Take 1,000 Units by mouth every day. 100 Capsule 3    ferrous sulfate 325 (65 Fe) MG tablet Take 1 Tablet by mouth every morning with breakfast. 100 Tablet 3     No current facility-administered medications on file prior to visit.        Allergies:    Influenza virus vaccine live, Anticoagulant sodium citrate [sodium citrate], and Lisinopril     Social History:  Social History     Tobacco Use    Smoking status: Former      Types: Cigars    Smokeless tobacco: Never    Tobacco comments:     1 cigar per week.   Vaping Use    Vaping Use: Never used   Substance Use Topics    Alcohol use: Not Currently     Alcohol/week: 1.8 oz     Types: 1 Glasses of wine, 1 Cans of beer, 1 Standard drinks or equivalent per week     Comment: minimal, Pt stop drinking since the beginning of April 2016    Drug use: No        No LMP for male patient.           Physical Exam:    Vitals:    07/26/23 0828   BP: 102/54   Pulse: (!) 55   Resp: 16   Temp: 36.3 °C (97.3 °F)   SpO2: 98%             Physical Exam  Constitutional:       Appearance: Normal appearance.   HENT:      Head: Normocephalic and atraumatic.   Skin:            Comments: Distal neurovascular status grossly intact.   Neurological:      Mental Status: He is alert.                  Diagnostics:    None     Medical Decision making and clinic course :  I personally reviewed prior external notes and test results pertinent to today's visit. Pt is clinically stable at today's acute urgent care visit.  No acute distress noted. Appropriate for outpatient care at this time. Shared decision-making was utilized with patient for treatment plan.    Pleasant nontoxic-appearing 78-year-old male presenting clinic for a wound check for skin avulsion of his right arm.  Exam findings reassuring no signs of such as secondary bacterial infection.  Discussed continued wound care.  Advised that if he has noted symptoms increased pain or signs of infection he does not need to return to clinic for evaluation.  Although if he has any concerns return to clinic in 3 days for recheck.        The patient remained stable during the urgent care visit.    Plan:    Medication discussed included indication for use and the potential  benefits and side effects.      1. Skin avulsion      2. Visit for wound check       All of the patient's questions were answered to their satisfaction at the time of discharge.    Follow  up:    Patient was encouraged to monitor symptoms closely. Those signs and symptoms which would warrant concern and mandate seeking a higher level of service through the emergency department discussed at length and included in discharge papers.  Patient stated agreement and understanding of this plan of care.    Disposition:  Home in stable condition       Voice Recognition Disclaimer:  Portions of this document were created using voice recognition software. The software does have a chance of producing errors of grammar and possibly content. I have made every reasonable attempt to correct obvious errors, but there may be errors of grammar and possibly content that I did not discover before finalizing the documentation.    Yazmin Malin, [email or web address].

## 2023-08-08 ENCOUNTER — TELEPHONE (OUTPATIENT)
Dept: CARDIOLOGY | Facility: MEDICAL CENTER | Age: 78
End: 2023-08-08
Payer: MEDICARE

## 2023-08-08 NOTE — TELEPHONE ENCOUNTER
Patients loop recorder transmitted via home monitor.  Patient had episodes 7/12 with  longest lasting 1.5 hours--appear to be PAC's difficult to discern at times due to artifact noted. No episodes noted since 7/12    Scanned for review

## 2023-08-23 ENCOUNTER — OFFICE VISIT (OUTPATIENT)
Dept: MEDICAL GROUP | Age: 78
End: 2023-08-23
Payer: MEDICARE

## 2023-08-23 VITALS
HEART RATE: 63 BPM | DIASTOLIC BLOOD PRESSURE: 60 MMHG | BODY MASS INDEX: 29.47 KG/M2 | OXYGEN SATURATION: 95 % | SYSTOLIC BLOOD PRESSURE: 108 MMHG | HEIGHT: 75 IN | WEIGHT: 237 LBS | TEMPERATURE: 96.9 F

## 2023-08-23 DIAGNOSIS — C18.8 OVERLAPPING MALIGNANT NEOPLASM OF COLON (HCC): ICD-10-CM

## 2023-08-23 DIAGNOSIS — I47.10 PAROXYSMAL SVT (SUPRAVENTRICULAR TACHYCARDIA) (HCC): ICD-10-CM

## 2023-08-23 DIAGNOSIS — K42.0 UMBILICAL HERNIA WITH OBSTRUCTION, WITHOUT GANGRENE: ICD-10-CM

## 2023-08-23 DIAGNOSIS — R21 RASH: ICD-10-CM

## 2023-08-23 PROCEDURE — 99214 OFFICE O/P EST MOD 30 MIN: CPT | Performed by: INTERNAL MEDICINE

## 2023-08-23 PROCEDURE — 3078F DIAST BP <80 MM HG: CPT | Performed by: INTERNAL MEDICINE

## 2023-08-23 PROCEDURE — 3074F SYST BP LT 130 MM HG: CPT | Performed by: INTERNAL MEDICINE

## 2023-08-23 ASSESSMENT — FIBROSIS 4 INDEX: FIB4 SCORE: 6.12

## 2023-08-23 ASSESSMENT — ENCOUNTER SYMPTOMS
RESPIRATORY NEGATIVE: 1
EYES NEGATIVE: 1
GASTROINTESTINAL NEGATIVE: 1
CONSTITUTIONAL NEGATIVE: 1
NEUROLOGICAL NEGATIVE: 1
PSYCHIATRIC NEGATIVE: 1
CARDIOVASCULAR NEGATIVE: 1
MUSCULOSKELETAL NEGATIVE: 1

## 2023-08-23 NOTE — PROGRESS NOTES
Subjective     Torey Lima is a 78 y.o. male who presents with Follow-Up (4 month )  The patient is here for followup of chronic medical problems listed below. The patient is compliant with medications and having no side effects from them. Denies chest pain, abdominal pain, dyspnea, myalgias, or cough.   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)--2010 left hemicolectomy; no colostomy; ablation of liver met at [facility] 1/2011- MRI abd 3/2018 no change in per    Liver lesion- ablation of malignant met from colon ca 2011- f/u [facility] q 6 mos - stable MRI abd 3/2018- redo annually    Drug-induced polyneuropathy (HCC)    Vitamin D deficiency    ASCVD (arteriosclerotic cardiovascular disease)subtotalled small distal LCx and 40% LAD med rx by cath 12/13/12    Essential hypertension    Gastrointestinal hemorrhage with hematemesis- recurrernt from varices    Secondary esophageal varices without bleeding (HCC)- BANDED 4/2016- repeat egd tbd 10//29/16- [facility]    Gastric varices- s/p BRTO procedure at [facility]    Portal vein thrombosis- on CT [facility] 2/2015    Cirrhosis of liver with ascites (HCC)- ? DUE TO OLD HEP B, CHEMO RX,  OR THERMO RAD OF MAGNANT LESION    History of esophageal varices    H/O colon cancer, stage IV- neg colonoscopy 2012/2018 at [facility]    BPH (benign prostatic hyperplasia)    Peripheral neuropathy due to chemotherapy (HCC)    Edema of left lower extremity- due to dvt july 2017    Generalized edema    Anemia, chronic disease    Chronic deep vein thrombosis (DVT) of femoral vein of left lower extremity (HCC)- s/p IVC filter 2016    Portal hypertension with esophageal varices (HCC)    Secondary malignant neoplasm of liver (HCC)    Thrombocytopenia, unspecified (HCC)- from previous chemorx    Spinal enthesopathy of thoracic region (HCC)    History of Guillain-Canterbury syndrome    Cerebral atrophy (HCC)-MRI brain 2019, mild diffuse  cerebral substance loss    Dyslipidemia    Irregular heartbeat- A. FIB ON APPLE WATCH    Obesity (BMI 30-39.9)    Obesity due to excess calories with serious comorbidity    Paroxysmal SVT (supraventricular tachycardia) (HCC)     Outpatient Medications Prior to Visit   Medication Sig Dispense Refill    potassium chloride SA (KLOR-CON M20) 20 MEQ Tab CR Take 1 Tablet by mouth every day.      metoprolol SR (TOPROL XL) 25 MG TABLET SR 24 HR Take 0.5 Tablets by mouth every day. 45 Tablet 3    atorvastatin (LIPITOR) 40 MG Tab Take 1 Tablet by mouth every evening. 90 Tablet 4    torsemide (DEMADEX) 10 MG tablet Take 1 Tablet by mouth every day. 90 Tablet 4    ursodiol (ACTIGALL) 300 MG Cap Take 1 Capsule by mouth in the morning, at noon, and at bedtime. 270 Capsule 3    spironolactone (ALDACTONE) 50 MG Tab Take 1 Tablet by mouth 2 times a day. 180 Tablet 3    folic acid (FOLVITE) 1 MG Tab Take 1 Tablet by mouth every day. 100 Tablet 4    Cholecalciferol (VITAMIN D3) 25 MCG (1000 UT) Cap Take 1,000 Units by mouth every day. 100 Capsule 3    ferrous sulfate 325 (65 Fe) MG tablet Take 1 Tablet by mouth every morning with breakfast. 100 Tablet 3     No facility-administered medications prior to visit.     No visits with results within 1 Month(s) from this visit.   Latest known visit with results is:   Hospital Outpatient Visit on 05/24/2023   Component Date Value    Alpha Fetoprotein 05/24/2023 2     WBC 05/24/2023 4.8     RBC 05/24/2023 3.61 (L)     Hemoglobin 05/24/2023 10.9 (L)     Hematocrit 05/24/2023 34.5 (L)     MCV 05/24/2023 95.6     MCH 05/24/2023 30.2     MCHC 05/24/2023 31.6 (L)     RDW 05/24/2023 58.8 (H)     Platelet Count 05/24/2023 70 (L)     MPV 05/24/2023 9.8     Neutrophils-Polys 05/24/2023 76.90 (H)     Lymphocytes 05/24/2023 6.80 (L)     Monocytes 05/24/2023 11.40     Eosinophils 05/24/2023 4.30     Basophils 05/24/2023 0.40     Immature Granulocytes 05/24/2023 0.20     Nucleated RBC 05/24/2023 0.00      Neutrophils (Absolute) 05/24/2023 3.72     Lymphs (Absolute) 05/24/2023 0.33 (L)     Monos (Absolute) 05/24/2023 0.55     Eos (Absolute) 05/24/2023 0.21     Baso (Absolute) 05/24/2023 0.02     Immature Granulocytes (a* 05/24/2023 0.01     NRBC (Absolute) 05/24/2023 0.00     Anisocytosis 05/24/2023 1+     Microcytosis 05/24/2023 1+     Sodium 05/24/2023 140     Potassium 05/24/2023 4.9     Chloride 05/24/2023 108     Co2 05/24/2023 24     Anion Gap 05/24/2023 8.0     Glucose 05/24/2023 96     Bun 05/24/2023 24 (H)     Creatinine 05/24/2023 1.14     Calcium 05/24/2023 8.7     AST(SGOT) 05/24/2023 26     ALT(SGPT) 05/24/2023 23     Alkaline Phosphatase 05/24/2023 94     Total Bilirubin 05/24/2023 1.6 (H)     Albumin 05/24/2023 3.5     Total Protein 05/24/2023 6.4     Globulin 05/24/2023 2.9     A-G Ratio 05/24/2023 1.2     Plt Estimation 05/24/2023 Decreased     RBC Morphology 05/24/2023 Present     Poikilocytosis 05/24/2023 1+     Ovalocytes 05/24/2023 1+     Echinocytes 05/24/2023 1+     Peripheral Smear Review 05/24/2023 see below     Imm. Plt Fraction 05/24/2023 3.9     Comments-Diff 05/24/2023 see below     Correct Calcium 05/24/2023 9.1     GFR (CKD-EPI) 05/24/2023 65     .A  LLL  '  Lab Results   Component Value Date/Time    HBA1C 4.7 01/16/2023 06:29 AM    HBA1C 4.7 07/05/2022 06:48 AM     Lab Results   Component Value Date/Time    SODIUM 140 05/24/2023 06:56 AM    POTASSIUM 4.9 05/24/2023 06:56 AM    CHLORIDE 108 05/24/2023 06:56 AM    CO2 24 05/24/2023 06:56 AM    GLUCOSE 96 05/24/2023 06:56 AM    BUN 24 (H) 05/24/2023 06:56 AM    CREATININE 1.14 05/24/2023 06:56 AM    CREATININE 1.14 01/23/2010 12:00 AM    BUNCREATRAT 15 01/23/2010 12:00 AM    GLOMRATE >59 01/23/2010 12:00 AM    ALKPHOSPHAT 94 05/24/2023 06:56 AM    ASTSGOT 26 05/24/2023 06:56 AM    ALTSGPT 23 05/24/2023 06:56 AM    TBILIRUBIN 1.6 (H) 05/24/2023 06:56 AM     Lab Results   Component Value Date/Time    INR 1.31 (H) 01/16/2023 06:29 AM    INR  "1.30 (H) 01/06/2022 06:26 AM    INR 1.37 (H) 09/14/2021 02:59 PM     Lab Results   Component Value Date/Time    CHOLSTRLTOT 96 (L) 04/14/2023 06:45 AM    LDL 32 04/14/2023 06:45 AM    HDL 56 04/14/2023 06:45 AM    TRIGLYCERIDE 42 04/14/2023 06:45 AM       Lab Results   Component Value Date/Time    TESTOSTERONE 333 04/02/2015 06:47 AM     No results found for: \"TSH\"  Lab Results   Component Value Date/Time    FREET4 1.16 04/22/2022 06:12 AM    FREET4 0.85 04/02/2015 06:47 AM     No results found for: \"URICACID\"  No components found for: \"VITB12\"  Lab Results   Component Value Date/Time    25HYDROXY 29 (L) 04/14/2023 06:45 AM    25HYDROXY 30 01/16/2023 06:29 AM                HPI    Review of Systems   Constitutional: Negative.    HENT: Negative.     Eyes: Negative.    Respiratory: Negative.     Cardiovascular: Negative.    Gastrointestinal: Negative.    Genitourinary: Negative.    Musculoskeletal: Negative.    Skin: Negative.    Neurological: Negative.    Endo/Heme/Allergies: Negative.    Psychiatric/Behavioral: Negative.                Objective     /60 (BP Location: Right arm, Patient Position: Sitting, BP Cuff Size: Adult)   Pulse 63   Temp 36.1 °C (96.9 °F) (Temporal)   Ht 1.905 m (6' 3\")   Wt 108 kg (237 lb)   SpO2 95%   BMI 29.62 kg/m²      Physical Exam  Constitutional:       General: He is not in acute distress.     Appearance: He is well-developed. He is not diaphoretic.   HENT:      Head: Normocephalic and atraumatic.      Right Ear: External ear normal.      Left Ear: External ear normal.      Nose: Nose normal.      Mouth/Throat:      Pharynx: No oropharyngeal exudate.   Eyes:      General: No scleral icterus.        Right eye: No discharge.         Left eye: No discharge.      Conjunctiva/sclera: Conjunctivae normal.      Pupils: Pupils are equal, round, and reactive to light.   Neck:      Thyroid: No thyromegaly.      Vascular: No JVD.      Trachea: No tracheal deviation.   Cardiovascular: "      Rate and Rhythm: Normal rate and regular rhythm.      Heart sounds: Normal heart sounds. No murmur heard.     No friction rub. No gallop.   Pulmonary:      Effort: Pulmonary effort is normal. No respiratory distress.      Breath sounds: Normal breath sounds. No stridor. No wheezing or rales.   Chest:      Chest wall: No tenderness.   Abdominal:      General: Bowel sounds are normal. There is no distension.      Palpations: Abdomen is soft. There is no mass.      Tenderness: There is no abdominal tenderness. There is no guarding or rebound.   Musculoskeletal:         General: No tenderness. Normal range of motion.      Cervical back: Normal range of motion and neck supple.   Lymphadenopathy:      Cervical: No cervical adenopathy.   Skin:     General: Skin is warm and dry.      Coloration: Skin is not pale.      Findings: No erythema or rash.   Neurological:      Mental Status: He is alert and oriented to person, place, and time.      Motor: No abnormal muscle tone.      Coordination: Coordination normal.      Deep Tendon Reflexes: Reflexes are normal and symmetric. Reflexes normal.   Psychiatric:         Behavior: Behavior normal.         Thought Content: Thought content normal.         Judgment: Judgment normal.                             Assessment & Plan         1. Umbilical hernia with obstruction, without gangrene     - Referral to General Surgery    2. Rash     - Referral to Dermatology  - Comp Metabolic Panel; Future  - CBC WITH DIFFERENTIAL; Future  - HEMOGLOBIN A1C; Future    3. 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 Continue same regimen.'    4. Paroxysmal SVT (supraventricular tachycardia) (HCC)         Under good control. Continue same regimen.

## 2023-08-24 DIAGNOSIS — R21 RASH: ICD-10-CM

## 2023-08-28 ASSESSMENT — ENCOUNTER SYMPTOMS
WHEEZING: 0
SHORTNESS OF BREATH: 0
SYNCOPE: 0
COUGH: 0
FOCAL WEAKNESS: 0
DIARRHEA: 0
VOMITING: 0
NAUSEA: 0
DIZZINESS: 0
ABDOMINAL PAIN: 0
PALPITATIONS: 0
ORTHOPNEA: 0
DYSPNEA ON EXERTION: 0
FEVER: 0
PND: 0
WEAKNESS: 0
NIGHT SWEATS: 0
IRREGULAR HEARTBEAT: 0
NEAR-SYNCOPE: 0

## 2023-08-29 ENCOUNTER — OFFICE VISIT (OUTPATIENT)
Dept: CARDIOLOGY | Facility: MEDICAL CENTER | Age: 78
End: 2023-08-29
Attending: STUDENT IN AN ORGANIZED HEALTH CARE EDUCATION/TRAINING PROGRAM
Payer: MEDICARE

## 2023-08-29 VITALS
HEART RATE: 60 BPM | BODY MASS INDEX: 29.84 KG/M2 | OXYGEN SATURATION: 96 % | WEIGHT: 240 LBS | SYSTOLIC BLOOD PRESSURE: 102 MMHG | RESPIRATION RATE: 16 BRPM | DIASTOLIC BLOOD PRESSURE: 62 MMHG | HEIGHT: 75 IN

## 2023-08-29 DIAGNOSIS — I49.1 PAC (PREMATURE ATRIAL CONTRACTION): ICD-10-CM

## 2023-08-29 DIAGNOSIS — I47.10 PAROXYSMAL SVT (SUPRAVENTRICULAR TACHYCARDIA) (HCC): ICD-10-CM

## 2023-08-29 DIAGNOSIS — E78.5 DYSLIPIDEMIA: ICD-10-CM

## 2023-08-29 DIAGNOSIS — C78.7 COLON CANCER METASTASIZED TO LIVER (HCC): ICD-10-CM

## 2023-08-29 DIAGNOSIS — I10 ESSENTIAL HYPERTENSION: ICD-10-CM

## 2023-08-29 DIAGNOSIS — I35.1 MILD AORTIC INSUFFICIENCY: ICD-10-CM

## 2023-08-29 DIAGNOSIS — C18.9 COLON CANCER METASTASIZED TO LIVER (HCC): ICD-10-CM

## 2023-08-29 DIAGNOSIS — I85.00 ESOPHAGEAL VARICES WITHOUT BLEEDING, UNSPECIFIED ESOPHAGEAL VARICES TYPE (HCC): ICD-10-CM

## 2023-08-29 DIAGNOSIS — R00.1 BRADYCARDIA: ICD-10-CM

## 2023-08-29 PROCEDURE — 99212 OFFICE O/P EST SF 10 MIN: CPT | Performed by: STUDENT IN AN ORGANIZED HEALTH CARE EDUCATION/TRAINING PROGRAM

## 2023-08-29 PROCEDURE — 3078F DIAST BP <80 MM HG: CPT | Performed by: STUDENT IN AN ORGANIZED HEALTH CARE EDUCATION/TRAINING PROGRAM

## 2023-08-29 PROCEDURE — 3074F SYST BP LT 130 MM HG: CPT | Performed by: STUDENT IN AN ORGANIZED HEALTH CARE EDUCATION/TRAINING PROGRAM

## 2023-08-29 PROCEDURE — 99215 OFFICE O/P EST HI 40 MIN: CPT | Performed by: STUDENT IN AN ORGANIZED HEALTH CARE EDUCATION/TRAINING PROGRAM

## 2023-08-29 ASSESSMENT — FIBROSIS 4 INDEX: FIB4 SCORE: 6.12

## 2023-08-29 NOTE — PROGRESS NOTES
Cardiology Follow-up Consultation Note    Date of note:    08/29/23  Primary Care Provider: Fabián Urena M.D.    Patient Name: Torey Lima   YOB: 1945  MRN:              [id number]    Chief Complaint: Follow-up frequent PACs    History of Present Illness: Mr. Torey Lima is a 78 y.o. 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    The patient was last seen in clinic on 4/21/2023  (his initial visit with me, previously had a cardiologist, Dr. Pearl, seen in 2017).  The patient reportedly went into A-fib in 2016 requiring cardioversion during the procedure when interventional radiology had a wire in the right ventricle.  Otherwise, the patient had not had any recurrence of A-fib.  During the last visit, he reported that his Apple Watch warned that the patient possibly had A-fib.  As such he was ordered an event monitor, which did not show any evidence of A-fib.  I personally reviewed the Zio patch, which did not show any evidence of A-fib but did have frequent SVTs and PACs.  Of note, the patient has history of colon cancer and liver cancer (in remission) as well as liver cirrhosis with esophageal varices.  The patient follows with hepatologist at [facility].  According to Dr. Culp, coagulation of any kind is contraindicated due to frequent GI bleed.  I also reviewed the patient's labs, which showed low hemoglobin as well as low platelet.  The patient was doing well during the last visit, and no changes were made to his medications.  Due to irregular heartbeat, the patient was referred for implanted loop recorder.  Due to low heart rate, beta-blocker was not initiated last visit until loop recorder was placed.  Implanted loop recorder showed frequent  PACs, and the patient was initiated on low-dose metoprolol (okay by Dr. Culp.)    The patient presents today for follow-up.  The patient presents with his wife.  The patient denies any symptoms in terms of palpitations.  He reports that Dr. Culp is okay with low-dose metoprolol, but told him that he cannot increase metoprolol any further.  He only knew about irregular heartbeats due to his Apple watch.  The patient is an , and has been very stressed due to recent case, and he is working on retiring.  He no longer drinks any coffee only drinks 1 cup of decaf.  He does not drink any alcohol.  He denies any chest pain on exertion, but does have occasional shortness of breath on exertion.  No orthopnea, PND, or leg swelling.  No syncope or presyncopal episodes.    Cardiovascular Risk Factors:  1. Smoking status: Former smoker  2. Type II Diabetes Mellitus: None  Lab Results   Component Value Date/Time    HBA1C 4.7 01/16/2023 06:29 AM    HBA1C 4.7 07/05/2022 06:48 AM     3. Hypertension: On medication  4. Dyslipidemia: On statin  Cholesterol,Tot   Date Value Ref Range Status   04/14/2023 96 (L) 100 - 199 mg/dL Final     LDL   Date Value Ref Range Status   04/14/2023 32 <100 mg/dL Final     HDL   Date Value Ref Range Status   04/14/2023 56 >=40 mg/dL Final     Triglycerides   Date Value Ref Range Status   04/14/2023 42 0 - 149 mg/dL Final     5. Family history of early Coronary Artery Disease in a first degree relative (Male less than 55 years of age; Female less than 65 years of age): None  6.  Obesity and/or Metabolic Syndrome: Body mass index is 30 kg/m².  7. Sedentary lifestyle: Not active    Review of Systems   Constitutional: Negative for fever, malaise/fatigue and night sweats.   Cardiovascular:  Negative for chest pain, dyspnea on exertion, irregular heartbeat, leg swelling, near-syncope, orthopnea, palpitations, paroxysmal nocturnal dyspnea and syncope.   Respiratory:  Negative for cough, shortness of  "breath and wheezing.    Gastrointestinal:  Negative for abdominal pain, diarrhea, nausea and vomiting.   Neurological:  Negative for dizziness, focal weakness and weakness.         All other systems reviewed and are negative.       Current Outpatient Medications   Medication Sig Dispense Refill    metoprolol SR (TOPROL XL) 25 MG TABLET SR 24 HR Take 0.5 Tablets by mouth every day. 45 Tablet 3    atorvastatin (LIPITOR) 40 MG Tab Take 1 Tablet by mouth every evening. 90 Tablet 4    torsemide (DEMADEX) 10 MG tablet Take 1 Tablet by mouth every day. 90 Tablet 4    ursodiol (ACTIGALL) 300 MG Cap Take 1 Capsule by mouth in the morning, at noon, and at bedtime. 270 Capsule 3    spironolactone (ALDACTONE) 50 MG Tab Take 1 Tablet by mouth 2 times a day. 180 Tablet 3    folic acid (FOLVITE) 1 MG Tab Take 1 Tablet by mouth every day. 100 Tablet 4    Cholecalciferol (VITAMIN D3) 25 MCG (1000 UT) Cap Take 1,000 Units by mouth every day. 100 Capsule 3    ferrous sulfate 325 (65 Fe) MG tablet Take 1 Tablet by mouth every morning with breakfast. 100 Tablet 3     No current facility-administered medications for this visit.         Allergies   Allergen Reactions    Influenza Virus Vaccine Live      [place]     Anticoagulant Sodium Citrate [Sodium Citrate]      HIGH BLEEDING RISK    Lisinopril Cough       Physical Exam:  Ambulatory Vitals  /62 (BP Location: Left arm, Patient Position: Sitting, BP Cuff Size: Adult)   Pulse 60   Resp 16   Ht 1.905 m (6' 3\")   Wt 109 kg (240 lb)   SpO2 96%    Oxygen Therapy:  Pulse Oximetry: 96 %  BP Readings from Last 4 Encounters:   08/29/23 102/62   08/23/23 108/60   07/26/23 102/54   07/23/23 112/52       Weight/BMI: Body mass index is 30 kg/m².  Wt Readings from Last 4 Encounters:   08/29/23 109 kg (240 lb)   08/23/23 108 kg (237 lb)   07/26/23 108 kg (237 lb)   07/23/23 108 kg (237 lb)         General: Well appearing and in no apparent distress  Eyes: nl conjunctiva, no icteric " sclera  ENT: wearing a mask, normal external appearance of ears  Neck: no visible JVP,  no carotid bruits  Lungs: normal respiratory effort, CTAB  Heart: RRR, no murmurs, no rubs or gallops,  no edema bilateral lower extremities. No LV/RV heave on cardiac palpatation. + bilateral radial pulses.  + bilateral dp pulses.   Abdomen: distended but soft, non tender, non distended, no masses, normal bowel sounds.  No HSM.  Extremities/MSK: no clubbing, no cyanosis  Neurological: No focal sensory deficits  Psychiatric: Appropriate affect, A/O x 3, intact judgement and insight  Skin: Warm extremities      Lab Data Review:  Lab Results   Component Value Date/Time    CHOLSTRLTOT 96 (L) 04/14/2023 06:45 AM    LDL 32 04/14/2023 06:45 AM    HDL 56 04/14/2023 06:45 AM    TRIGLYCERIDE 42 04/14/2023 06:45 AM       Lab Results   Component Value Date/Time    SODIUM 140 05/24/2023 06:56 AM    POTASSIUM 4.9 05/24/2023 06:56 AM    CHLORIDE 108 05/24/2023 06:56 AM    CO2 24 05/24/2023 06:56 AM    GLUCOSE 96 05/24/2023 06:56 AM    BUN 24 (H) 05/24/2023 06:56 AM    CREATININE 1.14 05/24/2023 06:56 AM    CREATININE 1.14 01/23/2010 12:00 AM    BUNCREATRAT 15 01/23/2010 12:00 AM    GLOMRATE >59 01/23/2010 12:00 AM     Lab Results   Component Value Date/Time    ALKPHOSPHAT 94 05/24/2023 06:56 AM    ASTSGOT 26 05/24/2023 06:56 AM    ALTSGPT 23 05/24/2023 06:56 AM    TBILIRUBIN 1.6 (H) 05/24/2023 06:56 AM      Lab Results   Component Value Date/Time    WBC 4.8 05/24/2023 06:56 AM    HEMOGLOBIN 10.9 (L) 05/24/2023 06:56 AM     Lab Results   Component Value Date/Time    HBA1C 4.7 01/16/2023 06:29 AM    HBA1C 4.7 07/05/2022 06:48 AM         Cardiac Imaging and Procedures Review:    EKG dated 4/21/2023: My personal interpretation is sinus bradycardia, inferior infarct    Echo dated 3/21/2023:   CONCLUSIONS  Left ventricular ejection fraction estimated to be 65%.  Calcified aortic valve leaflets.   Aortic valve sclerosis without significant  stenosis.  Mild aortic insufficiency.  Unable to estimate right ventricular systolic pressure due to an   inadequate tricuspid regurgitant jet.         Holter Monitor (4/4/2023):   DOS: 4/7/2023       Summary:   Sinus rhythm with frequent supraventricular ectopy and frequent PSVT       Assessment & Plan     1. Paroxysmal SVT (supraventricular tachycardia) (HCC)  REFERRAL TO CARDIOLOGY      2. Essential hypertension        3. PAC (premature atrial contraction)  REFERRAL TO CARDIOLOGY      4. Colon cancer metastasized to liver (HCC)        5. Esophageal varices without bleeding, unspecified esophageal varices type (HCC)  REFERRAL TO CARDIOLOGY      6. Dyslipidemia        7. Mild aortic insufficiency  EC-ECHOCARDIOGRAM COMPLETE W/O CONT      8. Bradycardia  REFERRAL TO CARDIOLOGY              Shared Medical Decision Making:    PACs  Paroxysmal SVT  History of GIB with varices  History of cirrhosis and liver cancer  The patient reportedly went into A-fib in 2016 requiring cardioversion during the procedure when interventional radiology had a wire in the right ventricle.  Otherwise, the patient had not had any recurrence of A-fib.  Due to his Apple Watch alerting irregular heartbeat, the patient underwent an event monitor which did not show any A-fib.  However, it did show frequent SVTs as well as frequent PACs, which could be precursor for A-fib.  -Implanted loop recorder to no Afib thus far, did have frequent PACs.   -Continue metoprolol 12.5mg daily (per Dr. Culp, he would like for the patient to not increase).    -The patient has GI bleed with esophageal varices.  Anticoagulation is absolutely contraindicated as per hepatologist (Dr. Culp) at [facility].   -We will refer to [facility] cardiology in case the patient needs pacemaker placement and/or other invasive procedures.  -Counseled the patient to avoid caffeine and to work on stress management.  The patient does not drink alcohol (cirrhosis due to chemotherapy).    Dyspnea on  exertion  Unclear etiology.  Could be related to frequent PACs versus need for pacemaker versus obstructive disease.  -Discussed potential stress test, but if the patient ends up needing procedure, he will likely need to get it done at [facility] due to his liver condition.  As such, I will refer the patient to [facility] cardiology.  -Continue implanted loop recorder, if bradycardic and/or pauses on metoprolol, can consider stopping metoprolol versus pacemaker placement.  Again, if the patient needs pacemaker placement, it should be done at [facility] the patient's complex disease    Hypertension  BP well controlled this visit  -Continue torsemide and Aldactone (taking them for liver cirrhosis with ascites as well)    Dyslipidemia  -Continue atorvastatin 40 mg daily    Mild AI  Euvolemic on exam  -Repeat echocardiogram prior to next.  Could get it done at [facility] if the patient prefers.    A total of 46 minutes of time was spent on day of encounter reviewing medical record, performing history and examination, counseling, ordering medication/test/consults and documentation.    All of patient's excellent questions were answered to the best of my knowledge and to his satisfaction.  It was a pleasure seeing Mr. Torey Lima in my clinic today. Return in about 6 months (around 2/29/2024). Patient is aware to call the cardiology clinic with any questions or concerns.      Rubens Hall MD  [facility]  [place] [facility], [facility]  [address] [address]  Phone: [phone]  Fax: [phone]

## 2023-09-07 ENCOUNTER — NON-PROVIDER VISIT (OUTPATIENT)
Dept: CARDIOLOGY | Facility: MEDICAL CENTER | Age: 78
End: 2023-09-07
Payer: MEDICARE

## 2023-09-07 PROCEDURE — 93298 REM INTERROG DEV EVAL SCRMS: CPT | Performed by: INTERNAL MEDICINE

## 2023-09-07 NOTE — CARDIAC REMOTE MONITOR - SCAN
Device transmission reviewed. Device demonstrated appropriate function.       Electronically Signed by: Darshan Ryan M.D.    9/11/2023  8:59 PM

## 2023-09-11 ENCOUNTER — HOSPITAL ENCOUNTER (OUTPATIENT)
Dept: RADIOLOGY | Facility: MEDICAL CENTER | Age: 78
End: 2023-09-11
Attending: INTERNAL MEDICINE
Payer: MEDICARE

## 2023-09-11 VITALS — DIASTOLIC BLOOD PRESSURE: 59 MMHG | SYSTOLIC BLOOD PRESSURE: 106 MMHG | OXYGEN SATURATION: 97 % | HEART RATE: 56 BPM

## 2023-09-11 DIAGNOSIS — R18.8 OTHER ASCITES: ICD-10-CM

## 2023-09-11 PROCEDURE — 49083 ABD PARACENTESIS W/IMAGING: CPT

## 2023-09-12 ENCOUNTER — HOSPITAL ENCOUNTER (OUTPATIENT)
Dept: LAB | Facility: MEDICAL CENTER | Age: 78
End: 2023-09-12
Attending: SURGERY
Payer: MEDICARE

## 2023-09-12 ENCOUNTER — HOSPITAL ENCOUNTER (OUTPATIENT)
Dept: LAB | Facility: MEDICAL CENTER | Age: 78
End: 2023-09-12
Attending: INTERNAL MEDICINE
Payer: MEDICARE

## 2023-09-12 DIAGNOSIS — R21 RASH: ICD-10-CM

## 2023-09-12 LAB
ALBUMIN SERPL BCP-MCNC: 3.9 G/DL (ref 3.2–4.9)
ALBUMIN/GLOB SERPL: 1.3 G/DL
ALP SERPL-CCNC: 87 U/L (ref 30–99)
ALT SERPL-CCNC: 17 U/L (ref 2–50)
ANION GAP SERPL CALC-SCNC: 13 MMOL/L (ref 7–16)
AST SERPL-CCNC: 25 U/L (ref 12–45)
BASOPHILS # BLD AUTO: 0.5 % (ref 0–1.8)
BASOPHILS # BLD: 0.02 K/UL (ref 0–0.12)
BILIRUB SERPL-MCNC: 1.8 MG/DL (ref 0.1–1.5)
BUN SERPL-MCNC: 30 MG/DL (ref 8–22)
CALCIUM ALBUM COR SERPL-MCNC: 9.3 MG/DL (ref 8.5–10.5)
CALCIUM SERPL-MCNC: 9.2 MG/DL (ref 8.5–10.5)
CFT BLD TEG: 5.9 MIN (ref 4.6–9.1)
CFT P HPASE BLD TEG: 5.7 MIN (ref 4.3–8.3)
CHLORIDE SERPL-SCNC: 104 MMOL/L (ref 96–112)
CLOT ANGLE BLD TEG: 72.4 DEGREES (ref 63–78)
CLOT LYSIS 30M P MA LENFR BLD TEG: 0.4 % (ref 0–2.6)
CO2 SERPL-SCNC: 22 MMOL/L (ref 20–33)
CREAT SERPL-MCNC: 1.2 MG/DL (ref 0.5–1.4)
CT.EXTRINSIC BLD ROTEM: 1.5 MIN (ref 0.8–2.1)
EOSINOPHIL # BLD AUTO: 0.21 K/UL (ref 0–0.51)
EOSINOPHIL NFR BLD: 4.8 % (ref 0–6.9)
ERYTHROCYTE [DISTWIDTH] IN BLOOD BY AUTOMATED COUNT: 56.6 FL (ref 35.9–50)
EST. AVERAGE GLUCOSE BLD GHB EST-MCNC: 85 MG/DL
GFR SERPLBLD CREATININE-BSD FMLA CKD-EPI: 61 ML/MIN/1.73 M 2
GLOBULIN SER CALC-MCNC: 2.9 G/DL (ref 1.9–3.5)
GLUCOSE SERPL-MCNC: 88 MG/DL (ref 65–99)
HBA1C MFR BLD: 4.6 % (ref 4–5.6)
HCT VFR BLD AUTO: 37.3 % (ref 42–52)
HGB BLD-MCNC: 12 G/DL (ref 14–18)
IMM GRANULOCYTES # BLD AUTO: 0.02 K/UL (ref 0–0.11)
IMM GRANULOCYTES NFR BLD AUTO: 0.5 % (ref 0–0.9)
LYMPHOCYTES # BLD AUTO: 0.31 K/UL (ref 1–4.8)
LYMPHOCYTES NFR BLD: 7.1 % (ref 22–41)
MCF BLD TEG: 52.9 MM (ref 52–69)
MCF.PLATELET INHIB BLD ROTEM: 17.9 MM (ref 15–32)
MCH RBC QN AUTO: 29.6 PG (ref 27–33)
MCHC RBC AUTO-ENTMCNC: 32.2 G/DL (ref 32.3–36.5)
MCV RBC AUTO: 91.9 FL (ref 81.4–97.8)
MONOCYTES # BLD AUTO: 0.43 K/UL (ref 0–0.85)
MONOCYTES NFR BLD AUTO: 9.9 % (ref 0–13.4)
NEUTROPHILS # BLD AUTO: 3.36 K/UL (ref 1.82–7.42)
NEUTROPHILS NFR BLD: 77.2 % (ref 44–72)
NRBC # BLD AUTO: 0 K/UL
NRBC BLD-RTO: 0 /100 WBC (ref 0–0.2)
PA AA BLD-ACNC: 42 % (ref 0–11)
PA ADP BLD-ACNC: 46.4 % (ref 0–17)
PLATELET # BLD AUTO: 60 K/UL (ref 164–446)
PLATELETS.RETICULATED NFR BLD AUTO: 4.3 % (ref 0.6–13.1)
PMV BLD AUTO: 10.8 FL (ref 9–12.9)
POTASSIUM SERPL-SCNC: 4.5 MMOL/L (ref 3.6–5.5)
PROT SERPL-MCNC: 6.8 G/DL (ref 6–8.2)
RBC # BLD AUTO: 4.06 M/UL (ref 4.7–6.1)
SODIUM SERPL-SCNC: 139 MMOL/L (ref 135–145)
TEG ALGORITHM TGALG: ABNORMAL
WBC # BLD AUTO: 4.4 K/UL (ref 4.8–10.8)

## 2023-09-12 PROCEDURE — 83036 HEMOGLOBIN GLYCOSYLATED A1C: CPT

## 2023-09-12 PROCEDURE — 85384 FIBRINOGEN ACTIVITY: CPT

## 2023-09-12 PROCEDURE — 80053 COMPREHEN METABOLIC PANEL: CPT

## 2023-09-12 PROCEDURE — 85576 BLOOD PLATELET AGGREGATION: CPT | Mod: 91

## 2023-09-12 PROCEDURE — 85055 RETICULATED PLATELET ASSAY: CPT

## 2023-09-12 PROCEDURE — 85025 COMPLETE CBC W/AUTO DIFF WBC: CPT

## 2023-09-12 PROCEDURE — 36415 COLL VENOUS BLD VENIPUNCTURE: CPT

## 2023-09-12 PROCEDURE — 85347 COAGULATION TIME ACTIVATED: CPT

## 2023-09-14 ENCOUNTER — OFFICE VISIT (OUTPATIENT)
Dept: SLEEP MEDICINE | Facility: MEDICAL CENTER | Age: 78
End: 2023-09-14
Attending: INTERNAL MEDICINE
Payer: MEDICARE

## 2023-09-14 VITALS
RESPIRATION RATE: 16 BRPM | OXYGEN SATURATION: 95 % | HEIGHT: 75 IN | WEIGHT: 225 LBS | DIASTOLIC BLOOD PRESSURE: 60 MMHG | SYSTOLIC BLOOD PRESSURE: 90 MMHG | HEART RATE: 57 BPM | BODY MASS INDEX: 27.98 KG/M2

## 2023-09-14 DIAGNOSIS — R06.00 PND (PAROXYSMAL NOCTURNAL DYSPNEA): ICD-10-CM

## 2023-09-14 DIAGNOSIS — R06.83 SNORING: ICD-10-CM

## 2023-09-14 DIAGNOSIS — I49.9 IRREGULAR HEARTBEAT: ICD-10-CM

## 2023-09-14 DIAGNOSIS — F51.02 ADJUSTMENT INSOMNIA: ICD-10-CM

## 2023-09-14 DIAGNOSIS — R06.81 WITNESSED EPISODE OF APNEA: ICD-10-CM

## 2023-09-14 DIAGNOSIS — G47.30 BREATHING-RELATED SLEEP DISORDER: ICD-10-CM

## 2023-09-14 PROCEDURE — 3074F SYST BP LT 130 MM HG: CPT | Performed by: PREVENTIVE MEDICINE

## 2023-09-14 PROCEDURE — 3078F DIAST BP <80 MM HG: CPT | Performed by: PREVENTIVE MEDICINE

## 2023-09-14 PROCEDURE — 99204 OFFICE O/P NEW MOD 45 MIN: CPT | Performed by: PREVENTIVE MEDICINE

## 2023-09-14 PROCEDURE — 99212 OFFICE O/P EST SF 10 MIN: CPT | Performed by: PREVENTIVE MEDICINE

## 2023-09-14 RX ORDER — POTASSIUM CHLORIDE 1.5 G/1.58G
20 POWDER, FOR SOLUTION ORAL DAILY
COMMUNITY
End: 2024-02-27

## 2023-09-14 RX ORDER — KETOCONAZOLE 20 MG/G
CREAM TOPICAL DAILY
COMMUNITY

## 2023-09-14 RX ORDER — TRIAMCINOLONE ACETONIDE 1 MG/G
CREAM TOPICAL 2 TIMES DAILY
COMMUNITY

## 2023-09-14 RX ORDER — HYDROXYZINE HYDROCHLORIDE 25 MG/1
25 TABLET, FILM COATED ORAL NIGHTLY PRN
Qty: 1 TABLET | Refills: 0 | Status: SHIPPED | OUTPATIENT
Start: 2023-09-14 | End: 2024-09-14

## 2023-09-14 ASSESSMENT — FIBROSIS 4 INDEX: FIB4 SCORE: 7.98

## 2023-09-14 NOTE — PROGRESS NOTES
"CHIEF COMPLIANT: \"Establish care.\"  COLLATERAL:  Wife Lorie  HISTORY OF PRESENT ILLNESS:  Torey Lima is a 78 y.o. male kindly referred by Fabián Urena M.D. and  is here for a sleep medicine consultation.  This patient has a significant medical history that includes colon cancer stage 4 diagnosed in 2010, liver cancer that was diagnosed in 2011, hepatic vein clots and bigeminy.  Dr. Culp at [facility] approves all of his medications.        Sleep History:  Torey Lima has never been tested for sleep apnea. The patient reports witnessed apneas and loud snoring as well as poor quality sleep.  The patient goes to bed at 11 pm and wakes up at 5:30 am. It usually takes the patient approximately 5 mins to fall asleep. The patient does feel refreshed and does not  nap during the day.  This pt is a former smoker.  He smoked one to two cigars daily and stopped in 2000.  He smoked for 20 years.  Pt does not use cannabis.  This pt does not drink alcohol and has 2 caffeinated beverages daily.     The patient denies any symptoms of narcolepsy such as sleep paralysis or cataplexy, or any symptoms that suggest parasomnias such as sleep walking or acting out of dreams.    Torey Lima is a retired      Endorses family members who use PAP therapy/snore: unknown    EPWORTH SCORE:    2  /24, this is  not consistent with EDS      Significant comorbidities and modifying factors: see below    PAST MEDICAL HISTORY:  Past Medical History:   Diagnosis Date    Arrhythmia April 1, 2023    Dr. Hall    ASCVD (arteriosclerotic cardiovascular disease) 12/14/2012    BMI 33.0-33.9,adult 02/15/2013    Breath shortness April 1, 2023    Dr. Hall    Cancer (HCC) 10/10-6/11    colon, liver cance    Chickenpox     Colon cancer (HCC) 12/06/2010    Elevated CEA 12/06/2010    Lithuanian measles     GI bleed     HLD (hyperlipidemia) 06/20/2013    HTN (hypertension) 06/20/2013    Hypertension     Impaired fasting glucose 08/02/2012    " Liver cirrhosis (HCC)     Liver lesion 12/06/2010    Mixed hyperlipidemia 06/20/2013    Obesity (BMI 30-39.9) 12/14/2017    Peripheral neuropathy, secondary to drugs or chemicals 12/06/2010    Snoring Many Years    Wife    Stage 3a chronic kidney disease (HCC) 06/25/2022    Thrombocytopenia due to drugs 04/25/2012    Vitamin d deficiency 08/02/2012      PROBLEM LIST:  Patient Active Problem List    Diagnosis Date Noted    Paroxysmal SVT (supraventricular tachycardia) (HCC) 04/21/2023    Irregular heartbeat- A. 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    Cerebral atrophy (HCC)-MRI brain 2019, mild diffuse cerebral substance loss 06/25/2022    Spinal enthesopathy of thoracic region (HCC) 04/07/2020    Secondary malignant neoplasm of liver (HCC) 04/23/2019    Thrombocytopenia, unspecified (HCC)- from previous chemorx 04/23/2019    Anemia, chronic disease 11/13/2018    Chronic deep vein thrombosis (DVT) of femoral vein of left lower extremity (HCC)- s/p IVC filter 2016 11/13/2018    Portal hypertension with esophageal varices (HCC) 11/13/2018    Generalized edema 12/14/2017    Edema of left lower extremity- due to dvt july 2017 09/19/2017    BPH (benign prostatic hyperplasia) 08/11/2017    Peripheral neuropathy due to chemotherapy (HCC) 08/11/2017    H/O colon cancer, stage IV- neg colonoscopy 2012/2018 at [facility] 05/20/2017    History of esophageal varices 10/24/2016    Portal vein thrombosis- on CT [facility] 2/2015 04/14/2016    Cirrhosis of liver with ascites (HCC)- ? DUE TO OLD HEP B, CHEMO RX,  OR THERMO RAD OF MAGNANT LESION 04/14/2016    Secondary esophageal varices without bleeding (HCC)- BANDED 4/2016- repeat egd tbd 10//29/16- [facility] 04/04/2016    Gastric varices- s/p BRTO procedure at [facility] 04/04/2016    Gastrointestinal hemorrhage with hematemesis-  recurrernt from varices 04/03/2016    Essential hypertension 06/20/2013    ASCVD (arteriosclerotic cardiovascular disease)subtotalled small distal LCx and 40% LAD med rx by cath 12/13/12 12/14/2012    Vitamin D deficiency 08/02/2012    Overlapping malignant neoplasm of colon (HCC)Overlapping malignant neoplasm of colon (HCC)--2010 left hemicolectomy; no colostomy; ablation of liver met at [facility] 1/2011- MRI abd 3/2018 no change in per 12/06/2010    Liver lesion- ablation of malignant met from colon ca 2011- f/u [facility] q 6 mos - stable MRI abd 3/2018- redo annually 12/06/2010    Drug-induced polyneuropathy (HCC) 12/06/2010     PAST SOCIAL HISTORY:  Past Surgical History:   Procedure Laterality Date    LUMBAR LAMINECTOMY DISKECTOMY  5/25/2017    Procedure:  LAMINECTOMY LUMBAR  4 TO SACRAL 1;  Surgeon: Felton Escobar M.D.;  Location: SURGERY [facility];  Service:     GASTROSCOPY WITH BANDING  10/25/2016    Procedure: GASTROSCOPY WITH BANDING;  Surgeon: Pierre Waggoner M.D.;  Location: ENDOSCOPY [facility];  Service:     GASTROSCOPY WITH BANDING  4/3/2016    Procedure: GASTROSCOPY WITH BANDING;  Surgeon: Cayetano Stovall M.D.;  Location: ENDOSCOPY [facility];  Service:     FULL THICKNESS BLOCK RESECTION  4/11/2012    Performed by LANI BOWMAN at SURGERY [facility] ORS    CATH REMOVAL  7/20/2011    Performed by RUBY RUIZ at SURGERY [facility] ORS    CATH PLACEMENT  9/20/2010    Performed by RUBY RUIZ at SURGERY [facility] ORS    LOW ANTERIOR RESECTION ROBOTIC  8/10/2010    Performed by RUBY RUIZ at SURGERY [facility] ORS    COLON RESECTION      HIP ARTHROPLASTY MIS TOTAL      rt    HIP REPLACEMENT, TOTAL      OTHER      Cholecystectomy    OTHER (COMMENTS)      liver surgery       PAST FAMILY HISTORY:  Family History   Problem Relation Age of Onset    Heart Disease Mother     Cancer Mother     Sleep Apnea Neg Hx      SOCIAL HISTORY:  Social History     Socioeconomic  History    Marital status:      Spouse name: Not on file    Number of children: Not on file    Years of education: Not on file    Highest education level: Professional school degree (e.g., MD, DDS, DVM, IQRA)   Occupational History    Not on file   Tobacco Use    Smoking status: Former     Types: Cigars    Smokeless tobacco: Never    Tobacco comments:     1 cigar per week.   Vaping Use    Vaping Use: Never used   Substance and Sexual Activity    Alcohol use: Not Currently     Alcohol/week: 1.8 oz     Types: 1 Glasses of wine, 1 Cans of beer, 1 Standard drinks or equivalent per week     Comment: minimal, Pt stop drinking since the beginning of April 2016    Drug use: No    Sexual activity: Yes     Partners: Female   Other Topics Concern    Not on file   Social History Narrative    ** Merged History Encounter **          Social Determinants of Health     Financial Resource Strain: Low Risk  (4/18/2023)    Overall Financial Resource Strain (CARDIA)     Difficulty of Paying Living Expenses: Not hard at all   Food Insecurity: No Food Insecurity (4/18/2023)    Hunger Vital Sign     Worried About Running Out of Food in the Last Year: Never true     Ran Out of Food in the Last Year: Never true   Transportation Needs: No Transportation Needs (4/18/2023)    PRAPARE - Transportation     Lack of Transportation (Medical): No     Lack of Transportation (Non-Medical): No   Physical Activity: Inactive (4/18/2023)    Exercise Vital Sign     Days of Exercise per Week: 0 days     Minutes of Exercise per Session: 0 min   Stress: Stress Concern Present (4/18/2023)    [place] [employer] - Occupational Stress Questionnaire     Feeling of Stress : To some extent   Social Connections: Socially Isolated (4/18/2023)    Social Connection and Isolation Panel [NHANES]     Frequency of Communication with Friends and Family: Once a week     Frequency of Social Gatherings with Friends and Family: Once a week     Attends  Hinduism Services: Never     Active Member of Clubs or Organizations: No     Attends Club or Organization Meetings: Not on file     Marital Status:    Intimate Partner Violence: Not on file   Housing Stability: Low Risk  (4/18/2023)    Housing Stability Vital Sign     Unable to Pay for Housing in the Last Year: No     Number of Places Lived in the Last Year: 1     Unstable Housing in the Last Year: No     ALLERGIES: Influenza virus vaccine live, Anticoagulant sodium citrate [sodium citrate], and Lisinopril  MEDICATIONS:  Current Outpatient Medications   Medication Sig Dispense Refill    VITAMIN D PO Take  by mouth.      potassium chloride (KLOR-CON) 20 MEQ Pack Take 20 mEq by mouth every day.      AMOXICILLIN PO Take 50 mg by mouth as needed. PRN for Dental Procedures      Propylene Glycol (SYSTANE BALANCE OP) Administer  into affected eye(s).      ketoconazole (NIZORAL) 2 % Cream Apply  topically every day.      triamcinolone acetonide (KENALOG) 0.1 % Cream Apply  topically 2 times a day.      hydrOXYzine HCl (ATARAX) 25 MG Tab Take 1 Tablet by mouth at bedtime as needed for Anxiety (for sleep study) for up to 1 dose. One tab is a one day dose 1 Tablet 0    metoprolol SR (TOPROL XL) 25 MG TABLET SR 24 HR Take 0.5 Tablets by mouth every day. 45 Tablet 3    atorvastatin (LIPITOR) 40 MG Tab Take 1 Tablet by mouth every evening. 90 Tablet 4    torsemide (DEMADEX) 10 MG tablet Take 1 Tablet by mouth every day. 90 Tablet 4    ursodiol (ACTIGALL) 300 MG Cap Take 1 Capsule by mouth in the morning, at noon, and at bedtime. 270 Capsule 3    spironolactone (ALDACTONE) 50 MG Tab Take 1 Tablet by mouth 2 times a day. 180 Tablet 3    folic acid (FOLVITE) 1 MG Tab Take 1 Tablet by mouth every day. 100 Tablet 4    ferrous sulfate 325 (65 Fe) MG tablet Take 1 Tablet by mouth every morning with breakfast. 100 Tablet 3    Cholecalciferol (VITAMIN D3) 25 MCG (1000 UT) Cap Take 1,000 Units by mouth every day. (Patient not  "taking: Reported on 9/14/2023) 100 Capsule 3     No current facility-administered medications for this visit.    \"CURRENT RX\"    REVIEW OF SYSTEMS:  Constitutional: Denies weight loss, endorses chronic daytime fatigue --also see HPI    PHYSICAL EXAM/VITALS:  BP 90/60 (BP Location: Left arm, Patient Position: Sitting, BP Cuff Size: Large adult)   Pulse (!) 57   Resp 16   Ht 1.905 m (6' 3\")   Wt 102 kg (225 lb)   SpO2 95%   BMI 28.12 kg/m²   Neck circumference (inches): 15.5  Appearance: Well-nourished, well-developed,  looks stated age, no acute distress  Eyes:   EOMI  ENMT: Mallampati:3-4    Neck: Supple, trachea midline  Respiratory effort:  No intercostal retractions or use of accessory muscles  Lung auscultation:  No wheezes rhonchi rubs or rales  Cardiac: No murmurs, rubs, or gallops; regular rhythm, normal rate; no edema  Musculoskeletal:  Grossly normal; gait and station normal  Neurologic:  oriented to person, time, place, and purpose; judgement intact  Psychiatric:  No depression, anxiety, agitation    MEDICAL DECISION MAKING:  The medical record was reviewed as it pertains to this referral. This includes records from primary care,consultants notes, referral request, hospital records, labs and imaging. Any available diagnostic and titration nocturnal polysomnograms, home sleep apnea tests, continuous nocturnal oximetry results, multiple sleep latency tests, and recent compliance reports were reviewed with the patient.    ASSESSMENT AND PLAN:  Mr. Lima is a 79 YO male with a complex medical history.  He will be studied in lab with a split night polysomnogram.   He does have a high pretest probability of having sleep apnea.      DIAGNOSES :    1. Irregular heartbeat  - Polysomnography, 4 or More; Future    2. PND (paroxysmal nocturnal dyspnea)  - Polysomnography, 4 or More; Future    3. Snoring  - Polysomnography, 4 or More; Future    4. Breathing-related sleep disorder  - Polysomnography, 4 or More; " Future    5. Witnessed episode of apnea  - Polysomnography, 4 or More; Future    6. Adjustment insomnia  - hydrOXYzine HCl (ATARAX) 25 MG Tab; Take 1 Tablet by mouth at bedtime as needed for Anxiety (for sleep study) for up to 1 dose. One tab is a one day dose  Dispense: 1 Tablet; Refill: 0    Other orders  - VITAMIN D PO; Take  by mouth.  - potassium chloride (KLOR-CON) 20 MEQ Pack; Take 20 mEq by mouth every day.  - AMOXICILLIN PO; Take 50 mg by mouth as needed. PRN for Dental Procedures  - Propylene Glycol (SYSTANE BALANCE OP); Administer  into affected eye(s).  - ketoconazole (NIZORAL) 2 % Cream; Apply  topically every day.  - triamcinolone acetonide (KENALOG) 0.1 % Cream; Apply  topically 2 times a day.    The patient has signs and symptoms consistent with obstructive sleep apnea hypopnea syndrome. Will schedule a nocturnal polysomnogram or a home sleep apnea test (please see plan).  The risks of untreated sleep apnea were discussed with the patient at length. Patients with JAXON are at increased risk of cardiovascular disease including coronary artery disease, systemic arterial hypertension, pulmonary arterial hypertension, cardiac arrhythmias, and stroke. JAXON patients have an increased risk of motor vehicle accidents, type 2 diabetes, chronic kidney disease, and non-alcoholic liver disease. The patient was advised to avoid driving a motor vehicle when drowsy.  Have advised the patient to follow up with the appropriate healthcare practitioners for all other medical problems and issues.    RETURN TO CLINIC: Return for 3 weeks after SS - discuss results w/ any provider.    My total time spent caring for the patient on the day of the encounter was 40 minutes. This includes time spent on a thorough chart review including other physician notes, all sleep studies, as well as critical labs and pulmonary and cardiac studies.  Additionally, it includes a thorough discussion of good sleep hygiene and stimulus control, as  well as  the need for consistency in terms of sleep preparation and practice.    Please note that this dictation was created using voice recognition software.  I have made every reasonable attempt to correct obvious errors, I expect that there are errors of grammar and possibly content that I did not discover before finalizing this note.

## 2023-09-15 ENCOUNTER — APPOINTMENT (OUTPATIENT)
Dept: ADMISSIONS | Facility: MEDICAL CENTER | Age: 78
End: 2023-09-15
Attending: SURGERY
Payer: MEDICARE

## 2023-09-26 ENCOUNTER — PRE-ADMISSION TESTING (OUTPATIENT)
Dept: ADMISSIONS | Facility: MEDICAL CENTER | Age: 78
End: 2023-09-26
Attending: SURGERY
Payer: MEDICARE

## 2023-09-26 NOTE — OR NURSING
Patient has had a disruption in his e mail service.  I have resent his pre op e mails to two address.  He will call if he still has not received them.

## 2023-09-28 ENCOUNTER — TELEPHONE (OUTPATIENT)
Dept: HEALTH INFORMATION MANAGEMENT | Facility: OTHER | Age: 78
End: 2023-09-28

## 2023-10-03 ENCOUNTER — ANCILLARY PROCEDURE (OUTPATIENT)
Dept: CARDIOLOGY | Facility: MEDICAL CENTER | Age: 78
End: 2023-10-03
Attending: STUDENT IN AN ORGANIZED HEALTH CARE EDUCATION/TRAINING PROGRAM
Payer: MEDICARE

## 2023-10-03 DIAGNOSIS — I35.1 MILD AORTIC INSUFFICIENCY: ICD-10-CM

## 2023-10-03 PROCEDURE — 93306 TTE W/DOPPLER COMPLETE: CPT

## 2023-10-04 LAB
LV EJECT FRACT MOD 2C 99903: 67.12
LV EJECT FRACT MOD 4C 99902: 65.58
LV EJECT FRACT MOD BP 99901: 66.42

## 2023-10-04 PROCEDURE — 93306 TTE W/DOPPLER COMPLETE: CPT | Mod: 26 | Performed by: STUDENT IN AN ORGANIZED HEALTH CARE EDUCATION/TRAINING PROGRAM

## 2023-10-06 ENCOUNTER — OFFICE VISIT (OUTPATIENT)
Dept: URGENT CARE | Facility: CLINIC | Age: 78
End: 2023-10-06
Payer: MEDICARE

## 2023-10-06 VITALS
OXYGEN SATURATION: 95 % | HEIGHT: 75 IN | TEMPERATURE: 97.3 F | WEIGHT: 226 LBS | HEART RATE: 66 BPM | RESPIRATION RATE: 16 BRPM | SYSTOLIC BLOOD PRESSURE: 116 MMHG | DIASTOLIC BLOOD PRESSURE: 68 MMHG | BODY MASS INDEX: 28.1 KG/M2

## 2023-10-06 DIAGNOSIS — S51.812A SKIN TEAR OF FOREARM WITHOUT COMPLICATION, LEFT, INITIAL ENCOUNTER: ICD-10-CM

## 2023-10-06 PROCEDURE — 99213 OFFICE O/P EST LOW 20 MIN: CPT

## 2023-10-06 PROCEDURE — 3078F DIAST BP <80 MM HG: CPT

## 2023-10-06 PROCEDURE — 3074F SYST BP LT 130 MM HG: CPT

## 2023-10-06 ASSESSMENT — FIBROSIS 4 INDEX: FIB4 SCORE: 7.98

## 2023-10-06 NOTE — PROGRESS NOTES
Subjective     Torey Lima is a 78 y.o. male who presents with Arm Injury (X 1 day, LT forearm laceration due to hitting arm on an open door during a power outage.)            HPI patient states he has a as needed amoxicillin on his chart that he utilizes when he needs to go to the dentist due to a hip replacement.    Patient states he was a large power outage appointment Karen and now they are working on electrical repairs.  He says they were notified they would have the power out from 11 PM last night to 5 AM this morning    Patient states early this morning at about 3 AM, he got up to go the bathroom and even though he had a flashlight he did not realize the door was [facility] open and he hit the door with his left arm which then made him fall and scrape it.  He states his wife got up and checked in the he stated that it was bleeding a lot, she cleaned it up and put some gauze bandaging on it.  He did have to go to court earlier today so he was not able to come in until now.  He came in to have this checked      ROS Same as above        Allergies   Allergen Reactions    Influenza Virus Vaccine Live      Guillan Currie     Anticoagulant Sodium Citrate [Sodium Citrate]      HIGH BLEEDING RISK    Lisinopril Cough       Current Outpatient Medications   Medication Sig    potassium chloride (KLOR-CON) 20 MEQ Pack Take 20 mEq by mouth every day.    AMOXICILLIN PO Take 50 mg by mouth as needed. PRN for Dental Procedures    Propylene Glycol (SYSTANE BALANCE OP) Administer  into affected eye(s).    ketoconazole (NIZORAL) 2 % Cream Apply  topically every day.    triamcinolone acetonide (KENALOG) 0.1 % Cream Apply  topically 2 times a day.    hydrOXYzine HCl (ATARAX) 25 MG Tab Take 1 Tablet by mouth at bedtime as needed for Anxiety (for sleep study) for up to 1 dose. One tab is a one day dose    metoprolol SR (TOPROL XL) 25 MG TABLET SR 24 HR Take 0.5 Tablets by mouth every day.    atorvastatin (LIPITOR) 40 MG Tab Take 1  "Tablet by mouth every evening.    torsemide (DEMADEX) 10 MG tablet Take 1 Tablet by mouth every day.    ursodiol (ACTIGALL) 300 MG Cap Take 1 Capsule by mouth in the morning, at noon, and at bedtime.    spironolactone (ALDACTONE) 50 MG Tab Take 1 Tablet by mouth 2 times a day.    folic acid (FOLVITE) 1 MG Tab Take 1 Tablet by mouth every day.    Cholecalciferol (VITAMIN D3) 25 MCG (1000 UT) Cap Take 1,000 Units by mouth every day.    ferrous sulfate 325 (65 Fe) MG tablet Take 1 Tablet by mouth every morning with breakfast.        Past Medical History:   Diagnosis Date    Arrhythmia April 1, 2023    Dr. Hall    ASCVD (arteriosclerotic cardiovascular disease) 12/14/2012    BMI 33.0-33.9,adult 02/15/2013    Breath shortness April 1, 2023    Dr. Hall    Cancer (HCC) 10/10-6/11    colon, liver cance    Chickenpox     Colon cancer (HCC) 12/06/2010    Elevated CEA 12/06/2010    Pashto measles     GI bleed     HLD (hyperlipidemia) 06/20/2013    HTN (hypertension) 06/20/2013    Hypertension     Impaired fasting glucose 08/02/2012    Liver cirrhosis (HCC)     Liver lesion 12/06/2010    Mixed hyperlipidemia 06/20/2013    Obesity (BMI 30-39.9) 12/14/2017    Peripheral neuropathy, secondary to drugs or chemicals 12/06/2010    Snoring Many Years    Wife    Stage 3a chronic kidney disease (HCC) 06/25/2022    Thrombocytopenia due to drugs 04/25/2012    Vitamin d deficiency 08/02/2012        Objective     /68   Pulse 66   Temp 36.3 °C (97.3 °F) (Temporal)   Resp 16   Ht 1.905 m (6' 3\")   Wt 103 kg (226 lb)   SpO2 95%   BMI 28.25 kg/m²      Physical Exam  Vitals reviewed.   Constitutional:       Appearance: Normal appearance. He is not toxic-appearing.   HENT:      Head: Normocephalic and atraumatic.   Eyes:      Extraocular Movements: Extraocular movements intact.   Cardiovascular:      Rate and Rhythm: Normal rate.   Pulmonary:      Effort: Pulmonary effort is normal.   Musculoskeletal:         General: Normal range " of motion.      Cervical back: Normal range of motion.   Skin:     General: Skin is warm.      Capillary Refill: Capillary refill takes less than 2 seconds.      Findings: Wound present.      Comments: Skin tear to left lateral forearm just distal of the elbow approximately 4 cm x 3 cm.  Wound bed has red healthy tissue, scant bloody drainage.   Neurological:      Mental Status: He is alert and oriented to person, place, and time.           Assessment & Plan      Patient comes in after a fall this morning at about 3 AM.  He was trying to get to the bathroom in the dark and he hit the bathroom door with his left forearm.  He then fell states that he scraped it.  It was normal of the night and although he had for flashlights for light, they were able to clean it up and manage it, he then had to go to court earlier today and is here to urgent care to have it checked.      On exam there is a skin tear to the left lateral forearm just distal of the elbow.  This is approximately 4 cm long by 3 cm wide.  The wound bed has red healthy tissue, there is scant bloody drainage.  There is no flap of epithelial tissue left, this was scraped off during the accident.  Cleansed copiously with normal saline.  Applied Xeroform dressing over wound bed, secured with nonadherent gauze, Coban.  Discussed daily dressing changes with patient to monitor for signs and symptoms of infective process, such as increasing redness, heat, drainage.  Advised if this was to happen that he should return to urgent care for further management and possible need for antibiotics.  Wound supplies provided.  Patient verbalized understanding and agreement plan. Differential diagnosis, natural history, supportive care, and indications for immediate follow-up were discussed.      1. Skin tear of forearm without complication, left, initial encounter

## 2023-10-08 ENCOUNTER — NON-PROVIDER VISIT (OUTPATIENT)
Dept: CARDIOLOGY | Facility: MEDICAL CENTER | Age: 78
End: 2023-10-08
Payer: MEDICARE

## 2023-10-08 PROCEDURE — 93298 REM INTERROG DEV EVAL SCRMS: CPT | Performed by: INTERNAL MEDICINE

## 2023-10-09 NOTE — CARDIAC REMOTE MONITOR - SCAN
Device transmission reviewed. Device demonstrated appropriate function.       Electronically Signed by: Derrick Allison M.D.    10/12/2023  1:07 PM

## 2023-10-10 ENCOUNTER — ANESTHESIA EVENT (OUTPATIENT)
Dept: SURGERY | Facility: MEDICAL CENTER | Age: 78
End: 2023-10-10
Payer: MEDICARE

## 2023-10-10 ENCOUNTER — ANESTHESIA (OUTPATIENT)
Dept: SURGERY | Facility: MEDICAL CENTER | Age: 78
End: 2023-10-10
Payer: MEDICARE

## 2023-10-10 ENCOUNTER — HOSPITAL ENCOUNTER (OUTPATIENT)
Facility: MEDICAL CENTER | Age: 78
End: 2023-10-10
Attending: SURGERY | Admitting: SURGERY
Payer: MEDICARE

## 2023-10-10 VITALS
SYSTOLIC BLOOD PRESSURE: 110 MMHG | RESPIRATION RATE: 16 BRPM | HEIGHT: 75 IN | WEIGHT: 224.21 LBS | TEMPERATURE: 97.3 F | HEART RATE: 61 BPM | DIASTOLIC BLOOD PRESSURE: 57 MMHG | BODY MASS INDEX: 27.88 KG/M2 | OXYGEN SATURATION: 96 %

## 2023-10-10 DIAGNOSIS — G89.18 POSTOPERATIVE PAIN: ICD-10-CM

## 2023-10-10 LAB
INR PPP: 1.35 (ref 0.87–1.13)
PROTHROMBIN TIME: 16.8 SEC (ref 12–14.6)

## 2023-10-10 PROCEDURE — 36415 COLL VENOUS BLD VENIPUNCTURE: CPT

## 2023-10-10 PROCEDURE — 160048 HCHG OR STATISTICAL LEVEL 1-5: Performed by: SURGERY

## 2023-10-10 PROCEDURE — 85610 PROTHROMBIN TIME: CPT

## 2023-10-10 PROCEDURE — 160009 HCHG ANES TIME/MIN: Performed by: SURGERY

## 2023-10-10 PROCEDURE — 700101 HCHG RX REV CODE 250: Performed by: SURGERY

## 2023-10-10 PROCEDURE — 700111 HCHG RX REV CODE 636 W/ 250 OVERRIDE (IP): Mod: JZ | Performed by: ANESTHESIOLOGY

## 2023-10-10 PROCEDURE — 160039 HCHG SURGERY MINUTES - EA ADDL 1 MIN LEVEL 3: Performed by: SURGERY

## 2023-10-10 PROCEDURE — 700101 HCHG RX REV CODE 250: Performed by: ANESTHESIOLOGY

## 2023-10-10 PROCEDURE — 700105 HCHG RX REV CODE 258: Performed by: SURGERY

## 2023-10-10 PROCEDURE — 160028 HCHG SURGERY MINUTES - 1ST 30 MINS LEVEL 3: Performed by: SURGERY

## 2023-10-10 PROCEDURE — 160002 HCHG RECOVERY MINUTES (STAT): Performed by: SURGERY

## 2023-10-10 PROCEDURE — 160035 HCHG PACU - 1ST 60 MINS PHASE I: Performed by: SURGERY

## 2023-10-10 PROCEDURE — 160046 HCHG PACU - 1ST 60 MINS PHASE II: Performed by: SURGERY

## 2023-10-10 PROCEDURE — 160025 RECOVERY II MINUTES (STATS): Performed by: SURGERY

## 2023-10-10 RX ORDER — HALOPERIDOL 5 MG/ML
1 INJECTION INTRAMUSCULAR
Status: DISCONTINUED | OUTPATIENT
Start: 2023-10-10 | End: 2023-10-10 | Stop reason: HOSPADM

## 2023-10-10 RX ORDER — ROCURONIUM BROMIDE 10 MG/ML
INJECTION, SOLUTION INTRAVENOUS PRN
Status: DISCONTINUED | OUTPATIENT
Start: 2023-10-10 | End: 2023-10-10 | Stop reason: SURG

## 2023-10-10 RX ORDER — HYDROMORPHONE HYDROCHLORIDE 1 MG/ML
0.2 INJECTION, SOLUTION INTRAMUSCULAR; INTRAVENOUS; SUBCUTANEOUS
Status: DISCONTINUED | OUTPATIENT
Start: 2023-10-10 | End: 2023-10-10 | Stop reason: HOSPADM

## 2023-10-10 RX ORDER — SODIUM CHLORIDE, SODIUM LACTATE, POTASSIUM CHLORIDE, CALCIUM CHLORIDE 600; 310; 30; 20 MG/100ML; MG/100ML; MG/100ML; MG/100ML
INJECTION, SOLUTION INTRAVENOUS CONTINUOUS
Status: DISCONTINUED | OUTPATIENT
Start: 2023-10-10 | End: 2023-10-10 | Stop reason: HOSPADM

## 2023-10-10 RX ORDER — BUPIVACAINE HYDROCHLORIDE AND EPINEPHRINE 5; 5 MG/ML; UG/ML
INJECTION, SOLUTION EPIDURAL; INTRACAUDAL; PERINEURAL
Status: DISCONTINUED | OUTPATIENT
Start: 2023-10-10 | End: 2023-10-10 | Stop reason: HOSPADM

## 2023-10-10 RX ORDER — EPHEDRINE SULFATE 50 MG/ML
5 INJECTION, SOLUTION INTRAVENOUS
Status: DISCONTINUED | OUTPATIENT
Start: 2023-10-10 | End: 2023-10-10 | Stop reason: HOSPADM

## 2023-10-10 RX ORDER — HYDROMORPHONE HYDROCHLORIDE 1 MG/ML
0.4 INJECTION, SOLUTION INTRAMUSCULAR; INTRAVENOUS; SUBCUTANEOUS
Status: DISCONTINUED | OUTPATIENT
Start: 2023-10-10 | End: 2023-10-10 | Stop reason: HOSPADM

## 2023-10-10 RX ORDER — OXYCODONE HCL 5 MG/5 ML
5 SOLUTION, ORAL ORAL
Status: DISCONTINUED | OUTPATIENT
Start: 2023-10-10 | End: 2023-10-10 | Stop reason: HOSPADM

## 2023-10-10 RX ORDER — ONDANSETRON 2 MG/ML
4 INJECTION INTRAMUSCULAR; INTRAVENOUS
Status: DISCONTINUED | OUTPATIENT
Start: 2023-10-10 | End: 2023-10-10 | Stop reason: HOSPADM

## 2023-10-10 RX ORDER — HYDROCODONE BITARTRATE AND ACETAMINOPHEN 5; 325 MG/1; MG/1
1 TABLET ORAL EVERY 4 HOURS PRN
Qty: 30 TABLET | Refills: 0 | Status: SHIPPED | OUTPATIENT
Start: 2023-10-10 | End: 2023-10-15

## 2023-10-10 RX ORDER — HYDROMORPHONE HYDROCHLORIDE 1 MG/ML
0.1 INJECTION, SOLUTION INTRAMUSCULAR; INTRAVENOUS; SUBCUTANEOUS
Status: DISCONTINUED | OUTPATIENT
Start: 2023-10-10 | End: 2023-10-10 | Stop reason: HOSPADM

## 2023-10-10 RX ORDER — OXYCODONE HCL 5 MG/5 ML
10 SOLUTION, ORAL ORAL
Status: DISCONTINUED | OUTPATIENT
Start: 2023-10-10 | End: 2023-10-10 | Stop reason: HOSPADM

## 2023-10-10 RX ORDER — DIPHENHYDRAMINE HYDROCHLORIDE 50 MG/ML
12.5 INJECTION INTRAMUSCULAR; INTRAVENOUS
Status: DISCONTINUED | OUTPATIENT
Start: 2023-10-10 | End: 2023-10-10 | Stop reason: HOSPADM

## 2023-10-10 RX ORDER — LIDOCAINE HYDROCHLORIDE 20 MG/ML
INJECTION, SOLUTION EPIDURAL; INFILTRATION; INTRACAUDAL; PERINEURAL PRN
Status: DISCONTINUED | OUTPATIENT
Start: 2023-10-10 | End: 2023-10-10 | Stop reason: SURG

## 2023-10-10 RX ORDER — ONDANSETRON 2 MG/ML
INJECTION INTRAMUSCULAR; INTRAVENOUS PRN
Status: DISCONTINUED | OUTPATIENT
Start: 2023-10-10 | End: 2023-10-10 | Stop reason: SURG

## 2023-10-10 RX ORDER — LABETALOL HYDROCHLORIDE 5 MG/ML
5 INJECTION, SOLUTION INTRAVENOUS
Status: DISCONTINUED | OUTPATIENT
Start: 2023-10-10 | End: 2023-10-10 | Stop reason: HOSPADM

## 2023-10-10 RX ORDER — SODIUM CHLORIDE, SODIUM LACTATE, POTASSIUM CHLORIDE, CALCIUM CHLORIDE 600; 310; 30; 20 MG/100ML; MG/100ML; MG/100ML; MG/100ML
INJECTION, SOLUTION INTRAVENOUS CONTINUOUS
Status: ACTIVE | OUTPATIENT
Start: 2023-10-10 | End: 2023-10-10

## 2023-10-10 RX ORDER — DEXAMETHASONE SODIUM PHOSPHATE 4 MG/ML
INJECTION, SOLUTION INTRA-ARTICULAR; INTRALESIONAL; INTRAMUSCULAR; INTRAVENOUS; SOFT TISSUE PRN
Status: DISCONTINUED | OUTPATIENT
Start: 2023-10-10 | End: 2023-10-10 | Stop reason: SURG

## 2023-10-10 RX ORDER — CEFAZOLIN SODIUM 1 G/3ML
INJECTION, POWDER, FOR SOLUTION INTRAMUSCULAR; INTRAVENOUS PRN
Status: DISCONTINUED | OUTPATIENT
Start: 2023-10-10 | End: 2023-10-10 | Stop reason: SURG

## 2023-10-10 RX ADMIN — DEXAMETHASONE SODIUM PHOSPHATE 8 MG: 4 INJECTION INTRA-ARTICULAR; INTRALESIONAL; INTRAMUSCULAR; INTRAVENOUS; SOFT TISSUE at 13:16

## 2023-10-10 RX ADMIN — ONDANSETRON 4 MG: 2 INJECTION INTRAMUSCULAR; INTRAVENOUS at 13:16

## 2023-10-10 RX ADMIN — PROPOFOL 60 MG: 10 INJECTION, EMULSION INTRAVENOUS at 13:13

## 2023-10-10 RX ADMIN — CEFAZOLIN 2 G: 1 INJECTION, POWDER, FOR SOLUTION INTRAMUSCULAR; INTRAVENOUS at 13:15

## 2023-10-10 RX ADMIN — SODIUM CHLORIDE, POTASSIUM CHLORIDE, SODIUM LACTATE AND CALCIUM CHLORIDE: 600; 310; 30; 20 INJECTION, SOLUTION INTRAVENOUS at 12:09

## 2023-10-10 RX ADMIN — FENTANYL CITRATE 50 MCG: 50 INJECTION, SOLUTION INTRAMUSCULAR; INTRAVENOUS at 13:50

## 2023-10-10 RX ADMIN — LIDOCAINE HYDROCHLORIDE 100 MG: 20 INJECTION, SOLUTION EPIDURAL; INFILTRATION; INTRACAUDAL at 13:13

## 2023-10-10 RX ADMIN — ROCURONIUM BROMIDE 50 MG: 50 INJECTION, SOLUTION INTRAVENOUS at 13:13

## 2023-10-10 RX ADMIN — FENTANYL CITRATE 50 MCG: 50 INJECTION, SOLUTION INTRAMUSCULAR; INTRAVENOUS at 13:42

## 2023-10-10 RX ADMIN — SUGAMMADEX 200 MG: 100 INJECTION, SOLUTION INTRAVENOUS at 13:47

## 2023-10-10 RX ADMIN — SODIUM CHLORIDE, POTASSIUM CHLORIDE, SODIUM LACTATE AND CALCIUM CHLORIDE: 600; 310; 30; 20 INJECTION, SOLUTION INTRAVENOUS at 13:07

## 2023-10-10 ASSESSMENT — FIBROSIS 4 INDEX
FIB4 SCORE: 7.98
FIB4 SCORE: 7.98

## 2023-10-10 ASSESSMENT — PAIN DESCRIPTION - PAIN TYPE
TYPE: SURGICAL PAIN
TYPE: SURGICAL PAIN

## 2023-10-10 ASSESSMENT — PAIN SCALES - GENERAL: PAIN_LEVEL: 0

## 2023-10-10 NOTE — DISCHARGE INSTRUCTIONS
HOME CARE INSTRUCTIONS    ACTIVITY: Rest and take it easy for the first 24 hours.  A responsible adult is recommended to remain with you during that time.  It is normal to feel sleepy.  We encourage you to not do anything that requires balance, judgment or coordination.    FOR 24 HOURS DO NOT:  Drive, operate machinery or run household appliances.  Drink beer or alcoholic beverages.  Make important decisions or sign legal documents.    SPECIAL INSTRUCTIONS: Open Hernia Repair, Adult, Care After  What can I expect after the procedure?  After the procedure, it is common to have:  Mild discomfort.  Slight bruising.  Mild swelling.  Pain in the belly (abdomen).  A small amount of blood from the cut from surgery (incision).  Follow these instructions at home:  Your doctor may give you more specific instructions. If you have problems, call your doctor.  Medicines  Take over-the-counter and prescription medicines only as told by your doctor.  If told, take steps to prevent problems with pooping (constipation). You may need to:  Drink enough fluid to keep your pee (urine) pale yellow.  Take medicines. You will be told what medicines to take.  Eat foods that are high in fiber. These include beans, whole grains, and fresh fruits and vegetables.  Limit foods that are high in fat and sugar. These include fried or sweet foods.  Ask your doctor if you should avoid driving or using machines while you are taking your medicine.  Incision care    Follow instructions from your doctor about how to take care of your incision. Make sure you:  Wash your hands with soap and water for at least 20 seconds before and after you change your bandage (dressing). If you cannot use soap and water, use hand .  Change your bandage.  Leave stitches or skin glue in place for at least 2 weeks.  Leave tape strips alone unless you are told to take them off. You may trim the edges of the tape strips if they curl up.  Check your incision every day  for signs of infection. Check for:  More redness, swelling, or pain.  More fluid or blood.  Warmth.  Pus or a bad smell.  Wear loose, soft clothing while your incision heals.  Activity    Rest as told by your doctor.  Do not lift anything that is heavier than 10 lb (4.5 kg), or the limit that you are told.  Do not play contact sports until your doctor says that this is safe.  If you were given a sedative during your procedure, do not drive or use machines until your doctor says that it is safe. A sedative is a medicine that helps you relax.  Return to your normal activities when your doctor says that it is safe.  General instructions  Do not take baths, swim, or use a hot tub. Ask your doctor about taking showers or sponge baths.  Hold a pillow over your belly when you cough or sneeze. This helps with pain.  Do not smoke or use any products that contain nicotine or tobacco. If you need help quitting, ask your doctor.  Keep all follow-up visits.  Contact a doctor if:  You have any of these signs of infection in or around your incision:  More redness, swelling, or pain.  More fluid or blood.  Warmth.  Pus.  A bad smell.  You have a fever or chills.  You have blood in your poop (stool).  You have not pooped (had a bowel movement) in 2-3 days.  Medicine does not help your pain.  Get help right away if:  You have chest pain, or you are short of breath.  You feel faint or light-headed.  You have very bad pain.  You vomit and your pain is worse.  You have pain, swelling, or redness in a leg.  These symptoms may be an emergency. Get help right away. Call your local emergency services (911 in the [place]  Do not wait to see if the symptoms will go away.  Do not drive yourself to the hospital.  Summary  After this procedure, it is common to have mild discomfort, slight bruising, and mild swelling.  Follow instructions from your doctor about how to take care of your cut from surgery (incision). Check every day for signs of  infection.  Do not lift heavy objects or play contact sports until your doctor says it is safe.  Return to your normal activities as told by your doctor.  This information is not intended to replace advice given to you by your health care provider. Make sure you discuss any questions you have with your health care provider.  Document Revised: 08/02/2021 Document Reviewed: 08/02/2021  [employer] Patient Education © 2023 [employer]      DIET: To avoid nausea, slowly advance diet as tolerated, avoiding spicy or greasy foods for the first day.  Add more substantial food to your diet according to your physician's instructions.  Babies can be fed formula or breast milk as soon as they are hungry.  INCREASE FLUIDS AND FIBER TO AVOID CONSTIPATION.    SURGICAL DRESSING/BATHING: Shower in 24 hours, but no submerging.     MEDICATIONS: Resume taking daily medication.  Take prescribed pain medication with food.  If no medication is prescribed, you may take non-aspirin pain medication if needed.  PAIN MEDICATION CAN BE VERY CONSTIPATING.  Take a stool softener or laxative such as senokot, pericolace, or milk of magnesia if needed.    Prescription given for norco.     A follow-up appointment should be arranged with your doctor in 1-2 weeks; call to schedule.    You should CALL YOUR PHYSICIAN if you develop:  Fever greater than 101 degrees F.  Pain not relieved by medication, or persistent nausea or vomiting.  Excessive bleeding (blood soaking through dressing) or unexpected drainage from the wound.  Extreme redness or swelling around the incision site, drainage of pus or foul smelling drainage.  Inability to urinate or empty your bladder within 8 hours.  Problems with breathing or chest pain.    You should call 911 if you develop problems with breathing or chest pain.  If you are unable to contact your doctor or surgical center, you should go to the nearest emergency room or urgent care center.  Physician's telephone #:  [phone]    MILD FLU-LIKE SYMPTOMS ARE NORMAL.  YOU MAY EXPERIENCE GENERALIZED MUSCLE ACHES, THROAT IRRITATION, HEADACHE AND/OR SOME NAUSEA.    If any questions arise, call your doctor.  If your doctor is not available, please feel free to call the Surgical Center at [phone].  The Center is open Monday through Friday from 7AM to 7PM.      A registered nurse may call you a few days after your surgery to see how you are doing after your procedure.    You may also receive a survey in the mail within the next two weeks and we ask that you take a few moments to complete the survey and return it to us.  Our goal is to provide you with very good care and we value your comments.     Depression / Suicide Risk    As you are discharged from this [facility] facility, it is important to learn how to keep safe from harming yourself.    Recognize the warning signs:  Abrupt changes in personality, positive or negative- including increase in energy   Giving away possessions  Change in eating patterns- significant weight changes-  positive or negative  Change in sleeping patterns- unable to sleep or sleeping all the time   Unwillingness or inability to communicate  Depression  Unusual sadness, discouragement and loneliness  Talk of wanting to die  Neglect of personal appearance   Rebelliousness- reckless behavior  Withdrawal from people/activities they love  Confusion- inability to concentrate     If you or a loved one observes any of these behaviors or has concerns about self-harm, here's what you can do:  Talk about it- your feelings and reasons for harming yourself  Remove any means that you might use to hurt yourself (examples: pills, rope, extension cords, firearm)  Get professional help from the community (Mental Health, Substance Abuse, psychological counseling)  Do not be alone:Call your Safe Contact- someone whom you trust who will be there for you.  Call your local CRISIS HOTLINE 552-2956 or 983-886-1951  Call  your local Children's Mobile Crisis Response Team [place] (678) 939-6833 or www.[place]  Call the toll free National Suicide Prevention Hotlines   National Suicide Prevention Lifeline 857-370-KDEK (7435)  [facility] Line Network 800-SUICIDE (727-3131)    I acknowledge receipt and understanding of these Home Care instructions.

## 2023-10-10 NOTE — OP REPORT
Preoperative diagnosis; nonreducible anterior abdominal wall hernia 6 cm  Postoperative diagnosis same  Operation reduction and repair of anterior abdominal wall hernia 6 cm  [facility]  Anesthesia Dr. Giorgi Hoyos M.D.    Wound classification clean specimens none  Blood loss 20 cc  Complications none  Operative note  This 78-year-old male presents with a nonreducible anterior abdominal wall hernia underlying his umbilicus.  The hernia is not reducible contained primarily ascitic fluid patient has end-stage liver disease.  Despite his increased MELD score and risk for surgery was felt that his hernia was symptomatic and warranted repair the risk possible complications of operation were carefully discussed with the patient who provided consent to proceed.  Preoperative evaluation included an abnormal pro time but thromboelastogram was normal patient received prophylactic antibiotics and sequential stockings applied as antibiotic prophylaxis was taken to the operating room and placed under anesthesia once anesthetized his abdomen is prepped with ChloraPrep solution and sterile drapes were applied a timeout was affected a solution of half percent Marcaine with epinephrine was liberally infiltrated along the lines of incision the umbilical skin and was elevated and resected through an elliptical incision.  Using sharp dissection the hernia sac was carefully mobilized down to the fascia.  I hole in the end of the sac was oversewn in 2 layers using running 2-0 Vicryl suture.  The fascia was circumferentially mobilized using electrocautery.  The peritoneum was involuted into the hernia defect.  The fascia was injected with half percent Marcaine with epinephrine then closed using tight 2-0 PDS sutures.  These were applied in a running fashion.  The subcutaneous layers were closed in 2 layers using running 2-0 Vicryl suture and the skin was closed using running 4-0 Monocryl subcuticular and the wound was sealed  with Dermabond.  Patient had an abdominal binder placed when the glue was dry.  He was awakened extubated taken to recovery room in stable satisfactory condition.  His procedure was uncomplicated as anticipated he will be treated on an ambulatory basis.  He was instructed on postoperative analgesia which included a prescription for hydrocodone/acetaminophen 5 mg 1 every 4 hours as needed for pain patient should call if problems arise interim discharged to follow-up.

## 2023-10-10 NOTE — ANESTHESIA PROCEDURE NOTES
Airway    Date/Time: 10/10/2023 1:15 PM    Performed by: Giorgi Hoyos M.D.  Authorized by: Giorgi Hoyos M.D.    Location:  OR  Urgency:  Elective  Indications for Airway Management:  Anesthesia      Spontaneous Ventilation: absent    Sedation Level:  Deep  Preoxygenated: Yes    Patient Position:  Sniffing  MILS Maintained Throughout: Yes    Mask Difficulty Assessment:  1 - vent by mask  Final Airway Type:  Endotracheal airway  Final Endotracheal Airway:  ETT  Cuffed: Yes    Technique Used for Successful ETT Placement:  Direct laryngoscopy  Devices/Methods Used in Placement:  Intubating stylet    Insertion Site:  Oral  Blade Type:  Interiano  Laryngoscope Blade/Videolaryngoscope Blade Size:  2  ETT Size (mm):  7.5  Measured from:  Lips  ETT to Lips (cm):  21  Placement Verified by: capnometry    Cormack-Lehane Classification:  Grade I - full view of glottis  Number of Attempts at Approach:  1

## 2023-10-10 NOTE — ANESTHESIA POSTPROCEDURE EVALUATION
Patient: Torey Lima    Procedure Summary     Date: 10/10/23 Room / Location: [place] [address] / SURGERY [facility]    Anesthesia Start: 1307 Anesthesia Stop: 1355    Procedure: OPEN UMBILICAL HERNIA REPAIR (Abdomen) Diagnosis: (UMBILICAL HERNIA)    Surgeons: Julian Ennis M.D. Responsible Provider: Giorgi Hoyos M.D.    Anesthesia Type: general ASA Status: 3          Final Anesthesia Type: general  Last vitals  BP   Blood Pressure : 104/55    Temp   36.5 °C (97.7 °F)    Pulse   61   Resp   15    SpO2   96 %      Anesthesia Post Evaluation    Patient location during evaluation: PACU  Patient participation: complete - patient participated  Level of consciousness: awake and alert  Pain score: 0    Airway patency: patent  Anesthetic complications: no  Cardiovascular status: adequate and hemodynamically stable  Respiratory status: acceptable  Hydration status: acceptable    PONV: controlled          No notable events documented.     Nurse Pain Score: 0 (NPRS)

## 2023-10-10 NOTE — OR NURSING
Pt VSS, pain controlled, tolerating po intake. Pt and family given discharge education/instructions, all questions answered. IV discontinued, site wnl. Surgical site to mid abd, DB, well approximated, no drng, site wnl. Abd binder on. Pt discharged home in stable condition with all belongings.

## 2023-10-10 NOTE — OR NURSING
1355: Pt received via gurney with anesthesia and RN. VSS. Dermabond CDI. Abdominal binder in place. Orders reviewed and initiated.   1500: Pt ambulated to restroom. Tolerated well.   1501: Report given to DEWEY Olmos. All questions answered. VSS. Dermabond CDI. Abdominal binder in place. No patient distress. Alert and oriented x4. Pt transported to phase II in stable condition on room air with no personal belongings. Family updated.

## 2023-10-10 NOTE — ANESTHESIA PREPROCEDURE EVALUATION
Case: [id number] Date/Time: 10/10/23 1245    Procedure: OPEN UMBILICAL HERNIA REPAIR    Pre-op diagnosis: UMBILICAL HERNIA    Location: [facility] OR 15 / SURGERY [facility]    Surgeons: Julian Ennis M.D.        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  Hx of DVT  Hx of guillain barre syndrome    Allergies to flu vax, lisinopril    ECHO 03/21/2023: EF 65%  Relevant Problems   CARDIAC   (positive) ASCVD (arteriosclerotic cardiovascular disease)subtotalled small distal LCx and 40% LAD med rx by cath 12/13/12   (positive) Chronic deep vein thrombosis (DVT) of femoral vein of left lower extremity (HCC)- s/p IVC filter 2016   (positive) Essential hypertension   (positive) Gastric varices- s/p BRTO procedure at [facility]   (positive) Paroxysmal SVT (supraventricular tachycardia)   (positive) Portal hypertension with esophageal varices (HCC)   (positive) Portal vein thrombosis- on CT [facility] 2/2015   (positive) Secondary esophageal varices without bleeding (HCC)- BANDED 4/2016- repeat egd tbd 10//29/16- gic         (positive) Cirrhosis of liver with ascites (HCC)- ? DUE TO OLD HEP B, CHEMO RX,  OR THERMO RAD OF MAGNANT LESION   (positive) Liver lesion- ablation of malignant met from colon ca 2011- f/u [facility] q 6 mos - stable MRI abd 3/2018- redo annually   (positive) Secondary malignant neoplasm of liver (HCC)       Physical Exam    Airway   Mallampati: II       Cardiovascular - normal exam     Dental - normal exam           Pulmonary - normal exam     Abdominal - normal exam     Neurological - normal exam                 Anesthesia Plan    ASA 3   ASA physical status 3 criteria: other (comment) and CAD/stents (> 3 months)    Plan - general       Airway plan  will be ETT          Induction: intravenous    Postoperative Plan: Postoperative administration of opioids is intended.    Pertinent diagnostic labs and testing reviewed    Informed Consent:    Anesthetic plan and risks discussed with patient.

## 2023-10-10 NOTE — ANESTHESIA TIME REPORT
Anesthesia Start and Stop Event Times     Date Time Event    10/10/2023 1224 Ready for Procedure     1307 Anesthesia Start     1355 Anesthesia Stop        Responsible Staff  10/10/23    Name Role Begin End    Giorgi Hoyos M.D. Anesth 1307 1359        Overtime Reason:  no overtime (within assigned shift)    Comments:

## 2023-10-17 ENCOUNTER — APPOINTMENT (OUTPATIENT)
Dept: INTERNAL MEDICINE | Facility: OTHER | Age: 78
End: 2023-10-17
Payer: MEDICARE

## 2023-11-01 NOTE — PROGRESS NOTES
Progress Note               Author: Jimmy Leyva Date & Time created: 2017  10:36 AM     Interval History:  POD #2 open L4/5 lami  Pt stable  Per wife pt likely has had chronic and progressing foot drop  Has progressive b/l UE/LE weakness not explained by MRI c-spine  Notes significant improvement in leg pain as compared to pre-op  Very concerned about the delay with MRI Brain not being able to be done until Tuesday 2/2 anesthesia requirements  Drain-165/8 hours    Review of Systems:  Review of Systems   Musculoskeletal: Positive for back pain.   Neurological: Positive for focal weakness.       Physical Exam:  Physical Exam   Neurological:   Pt is generally weak  Left > right sided leg weakness; mainly to AT and EHL  Wound intact    drain in place  UE 4/5 weakness    Labs:        Invalid input(s): [id number]      No results for input(s): SODIUM, POTASSIUM, CHLORIDE, CO2, BUN, CREATININE, MAGNESIUM, PHOSPHORUS, CALCIUM in the last 72 hours.  No results for input(s): ALTSGPT, ASTSGOT, ALKPHOSPHAT, TBILIRUBIN, DBILIRUBIN, GAMMAGT, AMYLASE, LIPASE, ALB, PREALBUMIN, GLUCOSE in the last 72 hours.  Recent Labs      17   1613   RBC  4.83   HEMOGLOBIN  13.4*   HEMATOCRIT  40.6*   PLATELETCT  76*     Recent Labs      17   1613   WBC  5.5   NEUTSPOLYS  66.70   LYMPHOCYTES  13.90*   MONOCYTES  12.80   EOSINOPHILS  5.90   BASOPHILS  0.50     Hemodynamics:  Temp (24hrs), Av.3 °C (99.1 °F), Min:37 °C (98.6 °F), Max:37.5 °C (99.5 °F)  Temperature: 37 °C (98.6 °F)  Pulse  Av.7  Min: 60  Max: 91   Blood Pressure : 114/62 mmHg     Respiratory:    Respiration: 18, Pulse Oximetry: 95 %        RUL Breath Sounds: Clear, RML Breath Sounds: Clear, RLL Breath Sounds: Diminished, JOSE Breath Sounds: Clear, LLL Breath Sounds: Diminished  Fluids:    Intake/Output Summary (Last 24 hours) at 17 0831  Last data filed at 17 0600   Gross per 24 hour   Intake   2700 ml   Output   2020 ml   Net    680 ml  What Type Of Note Output Would You Prefer (Optional)?: Standard Output        GI/Nutrition:  Orders Placed This Encounter   Procedures   • DIET ORDER     Standing Status: Standing      Number of Occurrences: 1      Standing Expiration Date:      Order Specific Question:  Diet:     Answer:  2 Gram Sodium [7]     Medical Decision Making, by Problem:  Active Hospital Problems    Diagnosis   • *Generalized weakness [R53.1]   • Esophageal varices- BANDED 4/2016- repeat egd tbd 10//29/16- gic [I85.00]   • Central stenosis of spinal canal [M48.00]   • Lumbar canal stenosis [M48.06]   • Occult blood positive stool [R19.5]   • Cirrhosis (CMS-HCC) [K74.60]   • H/O colon cancer, stage IV [Z85.038]   • Cirrhosis of liver with ascites (HCC)- ? DUE TO OLD HEP B, CHEMO RX,  OR THERMO RAD OF MAGNANT LESION [K74.60]       Plan:  PT/OT  Brain MRI, will call and see if this can be done in a more expeditious manner  Recommend EMG/NCV; neurology consult  Leave drain in place  Will likely need rehab    Labs reviewed, Radiology images reviewed and Medications reviewed                       What Is The Reason For Today's Visit?: Full Body Skin Examination What Is The Reason For Today's Visit? (Being Monitored For X): the development of new lesions

## 2023-11-08 ENCOUNTER — NON-PROVIDER VISIT (OUTPATIENT)
Dept: CARDIOLOGY | Facility: MEDICAL CENTER | Age: 78
End: 2023-11-08
Payer: MEDICARE

## 2023-11-08 PROCEDURE — 93298 REM INTERROG DEV EVAL SCRMS: CPT | Performed by: INTERNAL MEDICINE

## 2023-11-08 NOTE — CARDIAC REMOTE MONITOR - SCAN
Device transmission reviewed. Device demonstrated appropriate function.       Electronically Signed by: Darshan Ryan M.D.    11/11/2023  2:42 PM

## 2023-11-14 ENCOUNTER — HOSPITAL ENCOUNTER (OUTPATIENT)
Dept: RADIOLOGY | Facility: MEDICAL CENTER | Age: 78
End: 2023-11-14
Attending: INTERNAL MEDICINE
Payer: MEDICARE

## 2023-11-14 DIAGNOSIS — R18.8 OTHER ASCITES: ICD-10-CM

## 2023-11-14 PROCEDURE — 49083 ABD PARACENTESIS W/IMAGING: CPT

## 2023-11-14 NOTE — PROGRESS NOTES
"Patient given [facility] \"Preventing the Spread of Infection\" Brochure upon being checked in.     US guided therapeutic paracentesis done by Dr. Angela; NON-SEDATION  (no H&P required as this is a NON SEDATION Procedure); RLQ access site; 900 mL dark ricky fluid without particulate obtained and discarded; procedural RN: MARYBETH Marie;  Time out was completed by all staff; pt tolerated the procedure well; pt vitals remained stable pre/intra/post procedure; please reference scanned paper chart for all recorded vitals; all questions and concerns answered prior to d/c; patient provided with all appropriate education for procedure; pt d/c home.        "

## 2023-12-04 ENCOUNTER — HOSPITAL ENCOUNTER (OUTPATIENT)
Dept: LAB | Facility: MEDICAL CENTER | Age: 78
End: 2023-12-04
Attending: INTERNAL MEDICINE
Payer: MEDICARE

## 2023-12-04 ENCOUNTER — HOSPITAL ENCOUNTER (OUTPATIENT)
Dept: LAB | Facility: MEDICAL CENTER | Age: 78
End: 2023-12-04
Payer: MEDICARE

## 2023-12-04 LAB
ALBUMIN SERPL BCP-MCNC: 3.4 G/DL (ref 3.2–4.9)
ALBUMIN/GLOB SERPL: 1.2 G/DL
ALP SERPL-CCNC: 91 U/L (ref 30–99)
ALT SERPL-CCNC: 14 U/L (ref 2–50)
ANION GAP SERPL CALC-SCNC: 9 MMOL/L (ref 7–16)
AST SERPL-CCNC: 19 U/L (ref 12–45)
BASOPHILS # BLD AUTO: 0.8 % (ref 0–1.8)
BASOPHILS # BLD: 0.04 K/UL (ref 0–0.12)
BILIRUB SERPL-MCNC: 1.7 MG/DL (ref 0.1–1.5)
BUN SERPL-MCNC: 21 MG/DL (ref 8–22)
BUN SERPL-MCNC: 21 MG/DL (ref 8–22)
CALCIUM ALBUM COR SERPL-MCNC: 9 MG/DL (ref 8.5–10.5)
CALCIUM SERPL-MCNC: 8.5 MG/DL (ref 8.5–10.5)
CEA SERPL-MCNC: 1.6 NG/ML (ref 0–3)
CHLORIDE SERPL-SCNC: 104 MMOL/L (ref 96–112)
CO2 SERPL-SCNC: 24 MMOL/L (ref 20–33)
CREAT SERPL-MCNC: 1.01 MG/DL (ref 0.5–1.4)
CREAT SERPL-MCNC: 1.06 MG/DL (ref 0.5–1.4)
EOSINOPHIL # BLD AUTO: 0.25 K/UL (ref 0–0.51)
EOSINOPHIL NFR BLD: 4.7 % (ref 0–6.9)
ERYTHROCYTE [DISTWIDTH] IN BLOOD BY AUTOMATED COUNT: 54.7 FL (ref 35.9–50)
GFR SERPLBLD CREATININE-BSD FMLA CKD-EPI: 71 ML/MIN/1.73 M 2
GFR SERPLBLD CREATININE-BSD FMLA CKD-EPI: 75 ML/MIN/1.73 M 2
GLOBULIN SER CALC-MCNC: 2.8 G/DL (ref 1.9–3.5)
GLUCOSE SERPL-MCNC: 96 MG/DL (ref 65–99)
HCT VFR BLD AUTO: 35.4 % (ref 42–52)
HGB BLD-MCNC: 11.5 G/DL (ref 14–18)
IMM GRANULOCYTES # BLD AUTO: 0.01 K/UL (ref 0–0.11)
IMM GRANULOCYTES NFR BLD AUTO: 0.2 % (ref 0–0.9)
INR PPP: 1.33 (ref 0.87–1.13)
LYMPHOCYTES # BLD AUTO: 0.34 K/UL (ref 1–4.8)
LYMPHOCYTES NFR BLD: 6.4 % (ref 22–41)
MCH RBC QN AUTO: 30.7 PG (ref 27–33)
MCHC RBC AUTO-ENTMCNC: 32.5 G/DL (ref 32.3–36.5)
MCV RBC AUTO: 94.4 FL (ref 81.4–97.8)
MONOCYTES # BLD AUTO: 0.61 K/UL (ref 0–0.85)
MONOCYTES NFR BLD AUTO: 11.6 % (ref 0–13.4)
NEUTROPHILS # BLD AUTO: 4.03 K/UL (ref 1.82–7.42)
NEUTROPHILS NFR BLD: 76.3 % (ref 44–72)
NRBC # BLD AUTO: 0 K/UL
NRBC BLD-RTO: 0 /100 WBC (ref 0–0.2)
PLATELET # BLD AUTO: 61 K/UL (ref 164–446)
PLATELETS.RETICULATED NFR BLD AUTO: 3.7 % (ref 0.6–13.1)
PMV BLD AUTO: 10 FL (ref 9–12.9)
POTASSIUM SERPL-SCNC: 4.2 MMOL/L (ref 3.6–5.5)
PROT SERPL-MCNC: 6.2 G/DL (ref 6–8.2)
PROTHROMBIN TIME: 16.6 SEC (ref 12–14.6)
RBC # BLD AUTO: 3.75 M/UL (ref 4.7–6.1)
SODIUM SERPL-SCNC: 137 MMOL/L (ref 135–145)
WBC # BLD AUTO: 5.3 K/UL (ref 4.8–10.8)

## 2023-12-04 PROCEDURE — 84520 ASSAY OF UREA NITROGEN: CPT

## 2023-12-04 PROCEDURE — 82565 ASSAY OF CREATININE: CPT

## 2023-12-04 PROCEDURE — 80053 COMPREHEN METABOLIC PANEL: CPT

## 2023-12-04 PROCEDURE — 36415 COLL VENOUS BLD VENIPUNCTURE: CPT

## 2023-12-04 PROCEDURE — 82378 CARCINOEMBRYONIC ANTIGEN: CPT

## 2023-12-04 PROCEDURE — 85610 PROTHROMBIN TIME: CPT

## 2023-12-04 PROCEDURE — 85055 RETICULATED PLATELET ASSAY: CPT

## 2023-12-04 PROCEDURE — 82105 ALPHA-FETOPROTEIN SERUM: CPT

## 2023-12-04 PROCEDURE — 85025 COMPLETE CBC W/AUTO DIFF WBC: CPT

## 2023-12-04 PROCEDURE — 82105 ALPHA-FETOPROTEIN SERUM: CPT | Mod: 91

## 2023-12-05 LAB
AFP-TM SERPL-MCNC: 2 NG/ML (ref 0–9)
AFP-TM SERPL-MCNC: 2 NG/ML (ref 0–9)

## 2023-12-06 ENCOUNTER — OFFICE VISIT (OUTPATIENT)
Dept: URGENT CARE | Facility: CLINIC | Age: 78
End: 2023-12-06
Payer: MEDICARE

## 2023-12-06 VITALS
BODY MASS INDEX: 29.59 KG/M2 | RESPIRATION RATE: 20 BRPM | HEART RATE: 64 BPM | WEIGHT: 238 LBS | TEMPERATURE: 99.6 F | SYSTOLIC BLOOD PRESSURE: 104 MMHG | OXYGEN SATURATION: 98 % | HEIGHT: 75 IN | DIASTOLIC BLOOD PRESSURE: 54 MMHG

## 2023-12-06 DIAGNOSIS — J06.9 UPPER RESPIRATORY INFECTION, ACUTE: Primary | ICD-10-CM

## 2023-12-06 DIAGNOSIS — R05.1 ACUTE COUGH: ICD-10-CM

## 2023-12-06 DIAGNOSIS — J02.9 SORE THROAT: ICD-10-CM

## 2023-12-06 LAB
FLUAV RNA SPEC QL NAA+PROBE: NEGATIVE
FLUBV RNA SPEC QL NAA+PROBE: NEGATIVE
RSV RNA SPEC QL NAA+PROBE: NEGATIVE
S PYO DNA SPEC NAA+PROBE: NOT DETECTED
SARS-COV-2 RNA RESP QL NAA+PROBE: NEGATIVE

## 2023-12-06 PROCEDURE — 87651 STREP A DNA AMP PROBE: CPT | Performed by: NURSE PRACTITIONER

## 2023-12-06 PROCEDURE — 3078F DIAST BP <80 MM HG: CPT | Performed by: NURSE PRACTITIONER

## 2023-12-06 PROCEDURE — 0241U POCT CEPHEID COV-2, FLU A/B, RSV - PCR: CPT | Performed by: NURSE PRACTITIONER

## 2023-12-06 PROCEDURE — 3074F SYST BP LT 130 MM HG: CPT | Performed by: NURSE PRACTITIONER

## 2023-12-06 PROCEDURE — 99214 OFFICE O/P EST MOD 30 MIN: CPT | Performed by: NURSE PRACTITIONER

## 2023-12-06 ASSESSMENT — FIBROSIS 4 INDEX: FIB4 SCORE: 6.58

## 2023-12-06 NOTE — PROGRESS NOTES
Torey Lima is a 78 y.o. male who presents for Pharyngitis (X1 day Sore throat, coughing started last night)      HPI  This is a new problem. Torey Lima is a 78 y.o. patient who presents to urgent care with c/o: sore throat x 1 day with coughing and congestion. Denies fever, sob, NVD, c/p.  His wife thought he was wheezing a little last night. They have been traveling for the Thanksgiving holiday. Uncertain if he was exposed to anyone sick but were with a lot of family members and others.     ROS See HPI    Allergies:       Allergies   Allergen Reactions    Influenza Virus Vaccine Live      Guillan [place]     Anticoagulant Sodium Citrate [Sodium Citrate]      HIGH BLEEDING RISK    Lisinopril Cough       PMSFS Hx:  Past Medical History:   Diagnosis Date    Arrhythmia April 1, 2023    Dr. Hall    ASCVD (arteriosclerotic cardiovascular disease) 12/14/2012    BMI 33.0-33.9,adult 02/15/2013    Breath shortness April 1, 2023    Dr. Hall    Cancer (HCC) 10/10-6/11    colon, liver cance    Chickenpox     Colon cancer (HCC) 12/06/2010    Elevated CEA 12/06/2010    Prydeinig measles     GI bleed     HLD (hyperlipidemia) 06/20/2013    HTN (hypertension) 06/20/2013    Hypertension     Impaired fasting glucose 08/02/2012    Liver cirrhosis (HCC)     Liver lesion 12/06/2010    Mixed hyperlipidemia 06/20/2013    Obesity (BMI 30-39.9) 12/14/2017    Peripheral neuropathy, secondary to drugs or chemicals 12/06/2010    Snoring Many Years    Wife    Stage 3a chronic kidney disease (HCC) 06/25/2022    Thrombocytopenia due to drugs 04/25/2012    Vitamin d deficiency 08/02/2012     Past Surgical History:   Procedure Laterality Date    UMBILICAL HERNIA REPAIR N/A 10/10/2023    Procedure: OPEN UMBILICAL HERNIA REPAIR;  Surgeon: Julian nEnis M.D.;  Location: SURGERY [facility];  Service: General    LUMBAR LAMINECTOMY DISKECTOMY  5/25/2017    Procedure:  LAMINECTOMY LUMBAR  4 TO SACRAL 1;  Surgeon: Felton Escobar M.D.;   Location: SURGERY [facility];  Service:     GASTROSCOPY WITH BANDING  10/25/2016    Procedure: GASTROSCOPY WITH BANDING;  Surgeon: Pierre Waggoner M.D.;  Location: ENDOSCOPY [facility];  Service:     GASTROSCOPY WITH BANDING  4/3/2016    Procedure: GASTROSCOPY WITH BANDING;  Surgeon: Cayetano Stovall M.D.;  Location: ENDOSCOPY [facility];  Service:     FULL THICKNESS BLOCK RESECTION  4/11/2012    Performed by LANI BOWMAN at SURGERY [facility] ORS    CATH REMOVAL  7/20/2011    Performed by RUBY RUIZ at SURGERY [facility] ORS    CATH PLACEMENT  9/20/2010    Performed by RUBY RUIZ at SURGERY [facility] ORS    LOW ANTERIOR RESECTION ROBOTIC  8/10/2010    Performed by RUBY RUIZ at SURGERY [facility] ORS    COLON RESECTION      HIP ARTHROPLASTY MIS TOTAL      rt    HIP REPLACEMENT, TOTAL      OTHER      Cholecystectomy    OTHER (COMMENTS)      liver surgery       Family History   Problem Relation Age of Onset    Heart Disease Mother     Cancer Mother     Sleep Apnea Neg Hx      Social History     Tobacco Use    Smoking status: Former     Types: Cigars    Smokeless tobacco: Never    Tobacco comments:     1 cigar per week.   Substance Use Topics    Alcohol use: Not Currently     Alcohol/week: 1.8 oz     Types: 1 Glasses of wine, 1 Cans of beer, 1 Standard drinks or equivalent per week     Comment: minimal, Pt stop drinking since the beginning of April 2016       Problems:   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)--2010 left hemicolectomy; no colostomy; ablation of liver met at [facility] 1/2011- MRI abd 3/2018 no change in per    Liver lesion- ablation of malignant met from colon ca 2011- f/u [facility] q 6 mos - stable MRI abd 3/2018- redo annually    Drug-induced polyneuropathy (HCC)    Vitamin D deficiency    ASCVD (arteriosclerotic cardiovascular disease)subtotalled small distal LCx  and 40% LAD med rx by cath 12/13/12    Essential hypertension    Gastrointestinal hemorrhage with hematemesis- recurrernt from varices    Secondary esophageal varices without bleeding (HCC)- BANDED 4/2016- repeat egd tbd 10//29/16- [facility]    Gastric varices- s/p BRTO procedure at [facility]    Portal vein thrombosis- on CT [facility] 2/2015    Cirrhosis of liver with ascites (HCC)- ? DUE TO OLD HEP B, CHEMO RX,  OR THERMO RAD OF MAGNANT LESION    History of esophageal varices    H/O colon cancer, stage IV- neg colonoscopy 2012/2018 at [facility]    BPH (benign prostatic hyperplasia)    Peripheral neuropathy due to chemotherapy (HCC)    Edema of left lower extremity- due to dvt july 2017    Generalized edema    Anemia, chronic disease    Chronic deep vein thrombosis (DVT) of femoral vein of left lower extremity (HCC)- s/p IVC filter 2016    Portal hypertension with esophageal varices (HCC)    Secondary malignant neoplasm of liver (HCC)    Thrombocytopenia, unspecified (HCC)- from previous chemorx    Spinal enthesopathy of thoracic region (HCC)    History of Guillain-Walsh syndrome    Cerebral atrophy (HCC)-MRI brain 2019, mild diffuse cerebral substance loss    Dyslipidemia    Irregular heartbeat- A. FIB ON APPLE WATCH    Obesity (BMI 30-39.9)    Obesity due to excess calories with serious comorbidity    Paroxysmal SVT (supraventricular tachycardia)       Medications:   Current Outpatient Medications on File Prior to Visit   Medication Sig Dispense Refill    potassium chloride (KLOR-CON) 20 MEQ Pack Take 20 mEq by mouth every day.      AMOXICILLIN PO Take 50 mg by mouth as needed. PRN for Dental Procedures      Propylene Glycol (SYSTANE BALANCE OP) Administer  into affected eye(s).      ketoconazole (NIZORAL) 2 % Cream Apply  topically every day.      triamcinolone acetonide (KENALOG) 0.1 % Cream Apply  topically 2 times a day.      hydrOXYzine HCl (ATARAX) 25 MG Tab Take 1 Tablet by mouth at bedtime as needed for Anxiety (for sleep  "study) for up to 1 dose. One tab is a one day dose 1 Tablet 0    metoprolol SR (TOPROL XL) 25 MG TABLET SR 24 HR Take 0.5 Tablets by mouth every day. 45 Tablet 3    atorvastatin (LIPITOR) 40 MG Tab Take 1 Tablet by mouth every evening. 90 Tablet 4    torsemide (DEMADEX) 10 MG tablet Take 1 Tablet by mouth every day. 90 Tablet 4    ursodiol (ACTIGALL) 300 MG Cap Take 1 Capsule by mouth in the morning, at noon, and at bedtime. 270 Capsule 3    spironolactone (ALDACTONE) 50 MG Tab Take 1 Tablet by mouth 2 times a day. 180 Tablet 3    folic acid (FOLVITE) 1 MG Tab Take 1 Tablet by mouth every day. 100 Tablet 4    Cholecalciferol (VITAMIN D3) 25 MCG (1000 UT) Cap Take 1,000 Units by mouth every day. 100 Capsule 3    ferrous sulfate 325 (65 Fe) MG tablet Take 1 Tablet by mouth every morning with breakfast. 100 Tablet 3     No current facility-administered medications on file prior to visit.        Objective:     /54 (BP Location: Left arm, Patient Position: Sitting, BP Cuff Size: Adult)   Pulse 64   Temp 37.6 °C (99.6 °F) (Temporal)   Resp 20   Ht 1.905 m (6' 3\")   Wt 108 kg (238 lb)   SpO2 98%   BMI 29.75 kg/m²     Physical Exam  Vitals and nursing note reviewed.   Constitutional:       General: He is not in acute distress.     Appearance: He is well-developed. He is not ill-appearing.   HENT:      Head: Normocephalic.      Right Ear: Tympanic membrane, ear canal and external ear normal.      Left Ear: Tympanic membrane, ear canal and external ear normal.      Mouth/Throat:      Mouth: Mucous membranes are moist.      Pharynx: Posterior oropharyngeal erythema (mild) present.   Eyes:      Conjunctiva/sclera: Conjunctivae normal.   Cardiovascular:      Rate and Rhythm: Normal rate and regular rhythm.      Pulses: Normal pulses.      Heart sounds: Normal heart sounds.   Pulmonary:      Effort: Pulmonary effort is normal.      Breath sounds: Normal breath sounds.   Musculoskeletal:      Cervical back: Normal " range of motion and neck supple.   Lymphadenopathy:      Head:      Right side of head: No tonsillar adenopathy.      Left side of head: No tonsillar adenopathy.      Cervical: No cervical adenopathy.      Upper Body:      Right upper body: No supraclavicular adenopathy.      Left upper body: No supraclavicular adenopathy.   Skin:     General: Skin is warm and dry.      Capillary Refill: Capillary refill takes less than 2 seconds.   Neurological:      Mental Status: He is alert and oriented to person, place, and time.   Psychiatric:         Mood and Affect: Mood normal.         Speech: Speech normal.         Behavior: Behavior normal. Behavior is cooperative.         Thought Content: Thought content normal.       Results for orders placed or performed in visit on 12/06/23   POCT CEPHEID COV-2, FLU A/B, RSV - PCR   Result Value Ref Range    SARS-CoV-2 by PCR Negative Negative, Invalid    Influenza virus A RNA Negative Negative, Invalid    Influenza virus B, PCR Negative Negative, Invalid    RSV, PCR Negative Negative, Invalid   POCT CEPHEID GROUP A STREP - PCR   Result Value Ref Range    POC Group A Strep, PCR Not Detected Not Detected, Invalid         Assessment /Associated Orders:      1. Upper respiratory infection, acute        2. Sore throat  POCT CEPHEID COV-2, FLU A/B, RSV - PCR    POCT CEPHEID GROUP A STREP - PCR      3. Acute cough  POCT CEPHEID COV-2, FLU A/B, RSV - PCR    POCT CEPHEID GROUP A STREP - PCR          Medical Decision Making:    Torey  is a very pleasant 78 y.o. male who is clinically stable at today's acute urgent care visit.  No acute distress noted.  VSS. Appropriate for outpatient care at this time.   Acute problem today with uncertain prognosis.   Discussed that this illness appears to be viral in nature. I did not see any evidence of a bacterial process on exam today.   OTC medications can be used for symptom relief. Follow manufacturers guidelines for dosing and instructions.  These OTC  medications will not make you better any faster, but can help reduce your discomfort.   OTC Antipyretic of choice (Acetaminophen, Ibuprofen) for fevers greater than or equal to 101.5 * F or 38.6*C   Keep well hydrated  Increase rest  Salt water gargles BID and prn. Suggested 1/4 to 1/2 teaspoon (1.5 to 3.0 g) of salt per one cup (8 ounces or 250 mL) of warm water.   OTC throat analgesic spray or lozenge of choice prn throat pain. Dosage and directions per   Educated in infection control practices.      Discussed Dx, management options (risks,benefits, and alternatives to planned treatment), natural progression and supportive care.  Expressed understanding and the treatment plan was agreed upon.   Questions were encouraged and answered   Return to urgent care prn if new or worsening sx or if there is no improvement in condition prn.    Educated in Red flags and indications to immediately call 911 or present to the Emergency Department.       Time I spent evaluating Torey Lima in urgent care today was 30  minutes. This time includes preparing for visit, reviewing any pertinent notes or test results, counseling/education, exam, obtaining HPI, interpretation of lab tests, medication management and documentation as indicated above.Time does not include separately billable procedures noted .       Please note that this dictation was created using voice recognition software. I have worked with consultants from the vendor as well as technical experts from [facility] [employer] to optimize the interface. I have made every reasonable attempt to correct obvious errors, but I expect that there are errors of grammar and possibly content that I did not discover before finalizing the note.  This note was electronically signed by provider

## 2023-12-09 ENCOUNTER — NON-PROVIDER VISIT (OUTPATIENT)
Dept: CARDIOLOGY | Facility: MEDICAL CENTER | Age: 78
End: 2023-12-09
Payer: MEDICARE

## 2023-12-09 PROCEDURE — 93298 REM INTERROG DEV EVAL SCRMS: CPT | Performed by: INTERNAL MEDICINE

## 2023-12-10 ENCOUNTER — OFFICE VISIT (OUTPATIENT)
Dept: URGENT CARE | Facility: CLINIC | Age: 78
End: 2023-12-10
Payer: MEDICARE

## 2023-12-10 VITALS
OXYGEN SATURATION: 95 % | RESPIRATION RATE: 18 BRPM | WEIGHT: 238 LBS | TEMPERATURE: 97.7 F | DIASTOLIC BLOOD PRESSURE: 56 MMHG | BODY MASS INDEX: 29.59 KG/M2 | HEIGHT: 75 IN | HEART RATE: 62 BPM | SYSTOLIC BLOOD PRESSURE: 108 MMHG

## 2023-12-10 DIAGNOSIS — R05.9 COUGH, UNSPECIFIED TYPE: ICD-10-CM

## 2023-12-10 DIAGNOSIS — J98.8 RTI (RESPIRATORY TRACT INFECTION): ICD-10-CM

## 2023-12-10 PROCEDURE — 3078F DIAST BP <80 MM HG: CPT | Performed by: STUDENT IN AN ORGANIZED HEALTH CARE EDUCATION/TRAINING PROGRAM

## 2023-12-10 PROCEDURE — 99214 OFFICE O/P EST MOD 30 MIN: CPT | Performed by: STUDENT IN AN ORGANIZED HEALTH CARE EDUCATION/TRAINING PROGRAM

## 2023-12-10 PROCEDURE — 3074F SYST BP LT 130 MM HG: CPT | Performed by: STUDENT IN AN ORGANIZED HEALTH CARE EDUCATION/TRAINING PROGRAM

## 2023-12-10 RX ORDER — AMOXICILLIN AND CLAVULANATE POTASSIUM 875; 125 MG/1; MG/1
1 TABLET, FILM COATED ORAL 2 TIMES DAILY
Qty: 10 TABLET | Refills: 0 | Status: SHIPPED | OUTPATIENT
Start: 2023-12-10 | End: 2023-12-15

## 2023-12-10 RX ORDER — HYDROCODONE BITARTRATE AND ACETAMINOPHEN 5; 325 MG/1; MG/1
TABLET ORAL
COMMUNITY
End: 2024-02-27

## 2023-12-10 RX ORDER — BENZONATATE 100 MG/1
100 CAPSULE ORAL 3 TIMES DAILY PRN
Qty: 60 CAPSULE | Refills: 0 | Status: SHIPPED | OUTPATIENT
Start: 2023-12-10 | End: 2024-02-27

## 2023-12-10 ASSESSMENT — ENCOUNTER SYMPTOMS
MYALGIAS: 0
CONSTIPATION: 0
SHORTNESS OF BREATH: 0
WHEEZING: 0
NAUSEA: 0
FEVER: 0
COUGH: 1
VOMITING: 0
SORE THROAT: 1
PALPITATIONS: 0
CHILLS: 0
ABDOMINAL PAIN: 0
DIARRHEA: 0

## 2023-12-10 ASSESSMENT — FIBROSIS 4 INDEX: FIB4 SCORE: 6.58

## 2023-12-10 NOTE — PROGRESS NOTES
"Subjective     Torey Lima is a 78 y.o. male who presents with Cough (Sore throat, cough Cannot sleep from the cough: green/yellow phlegm, last visit: 12/06/23, few days after that: worsened, temp at 99.8 F last night )            Torey is a 70 y.o. male who presents to urgent care for cough. Cough is productive in nature. Patient states cough developed over a week ago and have worsened since onset.  Patient was seen in urgent care on 12/6/2023 and states he tested negative for COVID, flu, RSV and strep.  Symptoms have worsened since last urgent care visit.  Patient redeveloped fever last night of 99.8 F. Cough is worse at night. No SOB/wheezing. Patient concerned because symptoms have worsened since last visit and he was told that his symptoms would improve.    Cough  This is a new problem. Episode onset: 7-8 days. The problem has been gradually worsening. The cough is Productive of sputum. Associated symptoms include nasal congestion, postnasal drip and a sore throat. Pertinent negatives include no chest pain, chills, ear pain, fever, myalgias, shortness of breath or wheezing. He has tried nothing for the symptoms.       Review of Systems   Constitutional:  Positive for malaise/fatigue. Negative for chills and fever.   HENT:  Positive for congestion, postnasal drip and sore throat. Negative for ear pain.    Respiratory:  Positive for cough. Negative for shortness of breath and wheezing.    Cardiovascular:  Negative for chest pain and palpitations.   Gastrointestinal:  Negative for abdominal pain, constipation, diarrhea, nausea and vomiting.   Musculoskeletal:  Negative for myalgias.   All other systems reviewed and are negative.             Objective     /56 (BP Location: Left arm, Patient Position: Sitting, BP Cuff Size: Adult)   Pulse 62   Temp 36.5 °C (97.7 °F) (Temporal)   Resp 18   Ht 1.905 m (6' 3\")   Wt 108 kg (238 lb)   SpO2 95%   BMI 29.75 kg/m²      Physical Exam  Vitals reviewed. "   Constitutional:       General: He is not in acute distress.     Appearance: Normal appearance. He is not toxic-appearing.   HENT:      Head: Normocephalic and atraumatic.      Right Ear: Tympanic membrane, ear canal and external ear normal.      Left Ear: Tympanic membrane, ear canal and external ear normal.      Nose: Nose normal.      Mouth/Throat:      Mouth: Mucous membranes are moist.      Pharynx: Oropharynx is clear.   Eyes:      Extraocular Movements: Extraocular movements intact.      Conjunctiva/sclera: Conjunctivae normal.      Pupils: Pupils are equal, round, and reactive to light.   Cardiovascular:      Rate and Rhythm: Normal rate and regular rhythm.   Pulmonary:      Effort: Pulmonary effort is normal. No respiratory distress.      Breath sounds: Normal breath sounds. No stridor. No wheezing, rhonchi or rales.   Skin:     General: Skin is warm and dry.   Neurological:      General: No focal deficit present.      Mental Status: He is alert. Mental status is at baseline.                             Assessment & Plan        1. RTI (respiratory tract infection)  - amoxicillin-clavulanate (AUGMENTIN) 875-125 MG Tab; Take 1 Tablet by mouth 2 times a day for 5 days.  Dispense: 10 Tablet; Refill: 0    2. Cough, unspecified type  - benzonatate (TESSALON) 100 MG Cap; Take 1 Capsule by mouth 3 times a day as needed for Cough.  Dispense: 60 Capsule; Refill: 0     I personally reviewed prior external notes and test results pertinent to today's visit, including urgent care visit on 12/6/2023.  Patient tested negative for COVID, FLU, RSV and strep. Patient reports 7-8 days of gradually worsening symptoms. Cough is productive in nature.  Pulmonary exam revealed normal effort and breath sounds bilaterally without wheezes, rales, rhonchi vital signs are stable. Vital signs are stable.    Differential diagnoses, supportive care measures and indications for immediate follow-up discussed with patient. Pathogenesis of  diagnosis discussed including typical length and natural progression.  Follow up with PCO.    My total time spent caring for the patient on the day of the encounter was 30 minutes including 30 minutes including reviewing prior external notes/test results pertinent to today's visit, obtain patient history, physical exam, discussing differential diagnosis, plan of care, supportive care measures, appropriate follow-up and indications for immediate follow-up. This does not include time spent on separately billable procedures/tests.     Instructed to return to urgent care or nearest emergency department if symptoms fail to improve, for any change in condition, further concerns, or new concerning symptoms.    Patient states understanding and agrees with the plan of care and discharge instructions.

## 2023-12-11 ENCOUNTER — HOSPITAL ENCOUNTER (OUTPATIENT)
Dept: RADIOLOGY | Facility: MEDICAL CENTER | Age: 78
End: 2023-12-11
Payer: MEDICARE

## 2023-12-11 DIAGNOSIS — K76.6 PORTAL HYPERTENSION (HCC): ICD-10-CM

## 2023-12-11 PROCEDURE — 74178 CT ABD&PLV WO CNTR FLWD CNTR: CPT

## 2023-12-11 PROCEDURE — 700117 HCHG RX CONTRAST REV CODE 255

## 2023-12-11 RX ADMIN — IOHEXOL 100 ML: 350 INJECTION, SOLUTION INTRAVENOUS at 14:02

## 2023-12-11 NOTE — CARDIAC REMOTE MONITOR - SCAN
Device transmission reviewed. Device demonstrated appropriate function.       Electronically Signed by: Derrick Allison M.D.    12/22/2023  7:59 AM

## 2023-12-15 NOTE — PROCEDURE: LIQUID NITROGEN
Show Applicator Variable?: Yes
[Normal] : affect appropriate
Render Note In Bullet Format When Appropriate: No
Number Of Freeze-Thaw Cycles: 1 freeze-thaw cycle
Duration Of Freeze Thaw-Cycle (Seconds): 10
Post-Care Instructions: I reviewed with the patient in detail post-care instructions. Patient is to wear sunprotection, and avoid picking at any of the treated lesions. Pt may apply Vaseline to crusted or scabbing areas.
Consent: The patient's consent was obtained including but not limited to risks of crusting, scabbing, blistering, scarring, darker or lighter pigmentary change, recurrence, incomplete removal and infection.
Detail Level: Detailed

## 2024-01-09 ENCOUNTER — NON-PROVIDER VISIT (OUTPATIENT)
Dept: CARDIOLOGY | Facility: MEDICAL CENTER | Age: 79
End: 2024-01-09
Payer: MEDICARE

## 2024-01-09 PROCEDURE — 93298 REM INTERROG DEV EVAL SCRMS: CPT | Mod: 26 | Performed by: INTERNAL MEDICINE

## 2024-01-09 NOTE — CARDIAC REMOTE MONITOR - SCAN
Device transmission reviewed. Device demonstrated appropriate function.       Electronically Signed by: Derrick Allison M.D.    1/15/2024  12:29 PM

## 2024-01-19 ENCOUNTER — PHARMACY VISIT (OUTPATIENT)
Dept: PHARMACY | Facility: MEDICAL CENTER | Age: 79
End: 2024-01-19
Payer: COMMERCIAL

## 2024-01-19 PROCEDURE — RXMED WILLOW AMBULATORY MEDICATION CHARGE

## 2024-01-19 RX ORDER — FOLIC ACID 1 MG/1
TABLET ORAL
Qty: 90 TABLET | Refills: 3 | Status: SHIPPED | OUTPATIENT
Start: 2024-01-19 | End: 2024-02-27

## 2024-01-19 RX ORDER — POTASSIUM CHLORIDE 20 MEQ/1
TABLET, EXTENDED RELEASE ORAL
Qty: 90 TABLET | Refills: 3 | OUTPATIENT
Start: 2024-01-19

## 2024-01-19 RX ORDER — FERROUS SULFATE 325(65) MG
TABLET ORAL
Qty: 90 TABLET | Refills: 3 | Status: SHIPPED | OUTPATIENT
Start: 2024-01-19 | End: 2024-02-27

## 2024-01-19 RX ORDER — URSODIOL 250 MG/1
TABLET, FILM COATED ORAL
Qty: 270 TABLET | Refills: 3 | OUTPATIENT
Start: 2024-01-19

## 2024-01-19 RX ORDER — ATORVASTATIN CALCIUM 40 MG/1
TABLET, FILM COATED ORAL
Qty: 100 TABLET | Refills: 3 | Status: SHIPPED | OUTPATIENT
Start: 2024-01-19 | End: 2024-02-27

## 2024-01-19 RX ORDER — TORSEMIDE 10 MG/1
10 TABLET ORAL DAILY
Qty: 90 TABLET | Refills: 3 | Status: SHIPPED | OUTPATIENT
Start: 2024-01-19 | End: 2024-02-07

## 2024-01-19 RX ORDER — SPIRONOLACTONE 50 MG/1
TABLET, FILM COATED ORAL
Qty: 180 TABLET | Refills: 3 | Status: SHIPPED | OUTPATIENT
Start: 2024-01-19 | End: 2024-02-27

## 2024-01-24 DIAGNOSIS — I49.1 PAC (PREMATURE ATRIAL CONTRACTION): ICD-10-CM

## 2024-01-24 DIAGNOSIS — I10 ESSENTIAL HYPERTENSION: ICD-10-CM

## 2024-01-24 DIAGNOSIS — I47.10 PAROXYSMAL SVT (SUPRAVENTRICULAR TACHYCARDIA) (HCC): ICD-10-CM

## 2024-01-24 RX ORDER — METOPROLOL SUCCINATE 25 MG/1
12.5 TABLET, EXTENDED RELEASE ORAL DAILY
Qty: 45 TABLET | Refills: 1 | Status: SHIPPED | OUTPATIENT
Start: 2024-01-24

## 2024-01-24 NOTE — TELEPHONE ENCOUNTER
Received request via: Patient    Was the patient seen in the last year in this department? Yes    Does the patient have an active prescription (recently filled or refills available) for medication(s) requested?     Pharmacy Name: [facility] [address]    Does the patient have correction Plus and need 100 day supply (blood pressure, diabetes and cholesterol meds only)? Yes    Thank You  Dana SR

## 2024-02-09 ENCOUNTER — NON-PROVIDER VISIT (OUTPATIENT)
Dept: CARDIOLOGY | Facility: MEDICAL CENTER | Age: 79
End: 2024-02-09
Payer: MEDICARE

## 2024-02-09 PROCEDURE — 93298 REM INTERROG DEV EVAL SCRMS: CPT | Mod: 26 | Performed by: INTERNAL MEDICINE

## 2024-02-09 NOTE — CARDIAC REMOTE MONITOR - SCAN
Device transmission reviewed. Device demonstrated appropriate function.       Electronically Signed by: Derrick Allison M.D.    2/28/2024  1:38 PM

## 2024-02-13 ENCOUNTER — PHARMACY VISIT (OUTPATIENT)
Dept: PHARMACY | Facility: MEDICAL CENTER | Age: 79
End: 2024-02-13
Payer: COMMERCIAL

## 2024-02-13 PROCEDURE — RXMED WILLOW AMBULATORY MEDICATION CHARGE: Performed by: STUDENT IN AN ORGANIZED HEALTH CARE EDUCATION/TRAINING PROGRAM

## 2024-02-20 ENCOUNTER — HOSPITAL ENCOUNTER (OUTPATIENT)
Dept: RADIOLOGY | Facility: MEDICAL CENTER | Age: 79
End: 2024-02-20
Attending: INTERNAL MEDICINE
Payer: MEDICARE

## 2024-02-20 DIAGNOSIS — R18.8 OTHER ASCITES: ICD-10-CM

## 2024-02-20 PROCEDURE — 49083 ABD PARACENTESIS W/IMAGING: CPT

## 2024-02-20 NOTE — PROGRESS NOTES
US guided therapeutic paracentesis done by Dr. Child;(no H&P required as this is a NON SEDATION procedure) RLQ access site; dressing CDI; 2,400 mL obtained and 2400 ml  discarded. Pt tolerated the procedure well; pt hemodynamically stable pre/intra/post procedure. All questions and concerns answered prior to d/c. Patient provided with all appropriate education for procedure. Pt d/c home.

## 2024-02-23 ENCOUNTER — HOSPITAL ENCOUNTER (OUTPATIENT)
Dept: LAB | Facility: MEDICAL CENTER | Age: 79
End: 2024-02-23
Attending: INTERNAL MEDICINE
Payer: MEDICARE

## 2024-02-23 DIAGNOSIS — E78.5 DYSLIPIDEMIA: ICD-10-CM

## 2024-02-23 DIAGNOSIS — I25.10 ASCVD (ARTERIOSCLEROTIC CARDIOVASCULAR DISEASE): ICD-10-CM

## 2024-02-23 DIAGNOSIS — D69.6 THROMBOCYTOPENIA, UNSPECIFIED (HCC): ICD-10-CM

## 2024-02-23 DIAGNOSIS — I10 ESSENTIAL HYPERTENSION: ICD-10-CM

## 2024-02-23 DIAGNOSIS — N40.0 BPH WITHOUT URINARY OBSTRUCTION: ICD-10-CM

## 2024-02-23 DIAGNOSIS — E55.9 VITAMIN D DEFICIENCY: ICD-10-CM

## 2024-02-23 DIAGNOSIS — R73.01 IFG (IMPAIRED FASTING GLUCOSE): ICD-10-CM

## 2024-02-23 DIAGNOSIS — D63.8 ANEMIA, CHRONIC DISEASE: ICD-10-CM

## 2024-02-23 DIAGNOSIS — K74.60 CIRRHOSIS OF LIVER WITH ASCITES, UNSPECIFIED HEPATIC CIRRHOSIS TYPE (HCC): ICD-10-CM

## 2024-02-23 DIAGNOSIS — R18.8 CIRRHOSIS OF LIVER WITH ASCITES, UNSPECIFIED HEPATIC CIRRHOSIS TYPE (HCC): ICD-10-CM

## 2024-02-23 LAB
BASOPHILS # BLD AUTO: 0.8 % (ref 0–1.8)
BASOPHILS # BLD: 0.04 K/UL (ref 0–0.12)
EOSINOPHIL # BLD AUTO: 0.35 K/UL (ref 0–0.51)
EOSINOPHIL NFR BLD: 6.7 % (ref 0–6.9)
ERYTHROCYTE [DISTWIDTH] IN BLOOD BY AUTOMATED COUNT: 55.8 FL (ref 35.9–50)
EST. AVERAGE GLUCOSE BLD GHB EST-MCNC: 82 MG/DL
HBA1C MFR BLD: 4.5 % (ref 4–5.6)
HCT VFR BLD AUTO: 33.3 % (ref 42–52)
HGB BLD-MCNC: 10.9 G/DL (ref 14–18)
IMM GRANULOCYTES # BLD AUTO: 0.02 K/UL (ref 0–0.11)
IMM GRANULOCYTES NFR BLD AUTO: 0.4 % (ref 0–0.9)
LYMPHOCYTES # BLD AUTO: 0.35 K/UL (ref 1–4.8)
LYMPHOCYTES NFR BLD: 6.7 % (ref 22–41)
MCH RBC QN AUTO: 30.4 PG (ref 27–33)
MCHC RBC AUTO-ENTMCNC: 32.7 G/DL (ref 32.3–36.5)
MCV RBC AUTO: 93 FL (ref 81.4–97.8)
MONOCYTES # BLD AUTO: 0.71 K/UL (ref 0–0.85)
MONOCYTES NFR BLD AUTO: 13.7 % (ref 0–13.4)
NEUTROPHILS # BLD AUTO: 3.73 K/UL (ref 1.82–7.42)
NEUTROPHILS NFR BLD: 71.7 % (ref 44–72)
NRBC # BLD AUTO: 0 K/UL
NRBC BLD-RTO: 0 /100 WBC (ref 0–0.2)
PLATELET # BLD AUTO: 64 K/UL (ref 164–446)
PLATELETS.RETICULATED NFR BLD AUTO: 3.8 % (ref 0.6–13.1)
PMV BLD AUTO: 10.5 FL (ref 9–12.9)
RBC # BLD AUTO: 3.58 M/UL (ref 4.7–6.1)
WBC # BLD AUTO: 5.2 K/UL (ref 4.8–10.8)

## 2024-02-23 PROCEDURE — 80061 LIPID PANEL: CPT

## 2024-02-23 PROCEDURE — 83036 HEMOGLOBIN GLYCOSYLATED A1C: CPT

## 2024-02-23 PROCEDURE — 80053 COMPREHEN METABOLIC PANEL: CPT

## 2024-02-23 PROCEDURE — 82306 VITAMIN D 25 HYDROXY: CPT

## 2024-02-23 PROCEDURE — 84153 ASSAY OF PSA TOTAL: CPT

## 2024-02-23 PROCEDURE — 85025 COMPLETE CBC W/AUTO DIFF WBC: CPT

## 2024-02-23 PROCEDURE — 84443 ASSAY THYROID STIM HORMONE: CPT

## 2024-02-23 PROCEDURE — 36415 COLL VENOUS BLD VENIPUNCTURE: CPT

## 2024-02-23 PROCEDURE — 85055 RETICULATED PLATELET ASSAY: CPT

## 2024-02-24 LAB
25(OH)D3 SERPL-MCNC: 31 NG/ML (ref 30–100)
ALBUMIN SERPL BCP-MCNC: 3.2 G/DL (ref 3.2–4.9)
ALBUMIN/GLOB SERPL: 1 G/DL
ALP SERPL-CCNC: 97 U/L (ref 30–99)
ALT SERPL-CCNC: 21 U/L (ref 2–50)
ANION GAP SERPL CALC-SCNC: 10 MMOL/L (ref 7–16)
AST SERPL-CCNC: 27 U/L (ref 12–45)
BILIRUB SERPL-MCNC: 1.5 MG/DL (ref 0.1–1.5)
BUN SERPL-MCNC: 22 MG/DL (ref 8–22)
CALCIUM ALBUM COR SERPL-MCNC: 9 MG/DL (ref 8.5–10.5)
CALCIUM SERPL-MCNC: 8.4 MG/DL (ref 8.5–10.5)
CHLORIDE SERPL-SCNC: 102 MMOL/L (ref 96–112)
CHOLEST SERPL-MCNC: 90 MG/DL (ref 100–199)
CO2 SERPL-SCNC: 23 MMOL/L (ref 20–33)
CREAT SERPL-MCNC: 1.04 MG/DL (ref 0.5–1.4)
FASTING STATUS PATIENT QL REPORTED: NORMAL
GFR SERPLBLD CREATININE-BSD FMLA CKD-EPI: 73 ML/MIN/1.73 M 2
GLOBULIN SER CALC-MCNC: 3.2 G/DL (ref 1.9–3.5)
GLUCOSE SERPL-MCNC: 93 MG/DL (ref 65–99)
HDLC SERPL-MCNC: 57 MG/DL
LDLC SERPL CALC-MCNC: 25 MG/DL
POTASSIUM SERPL-SCNC: 4.3 MMOL/L (ref 3.6–5.5)
PROT SERPL-MCNC: 6.4 G/DL (ref 6–8.2)
PSA SERPL-MCNC: 0.25 NG/ML (ref 0–4)
SODIUM SERPL-SCNC: 135 MMOL/L (ref 135–145)
TRIGL SERPL-MCNC: 39 MG/DL (ref 0–149)
TSH SERPL DL<=0.005 MIU/L-ACNC: 3.3 UIU/ML (ref 0.38–5.33)

## 2024-02-27 ENCOUNTER — OFFICE VISIT (OUTPATIENT)
Dept: MEDICAL GROUP | Age: 79
End: 2024-02-27
Payer: MEDICARE

## 2024-02-27 VITALS
WEIGHT: 238 LBS | SYSTOLIC BLOOD PRESSURE: 110 MMHG | OXYGEN SATURATION: 98 % | HEART RATE: 64 BPM | DIASTOLIC BLOOD PRESSURE: 68 MMHG | BODY MASS INDEX: 29.59 KG/M2 | TEMPERATURE: 97.9 F | HEIGHT: 75 IN

## 2024-02-27 DIAGNOSIS — R73.01 IFG (IMPAIRED FASTING GLUCOSE): ICD-10-CM

## 2024-02-27 DIAGNOSIS — E78.5 DYSLIPIDEMIA: ICD-10-CM

## 2024-02-27 DIAGNOSIS — I25.10 ASCVD (ARTERIOSCLEROTIC CARDIOVASCULAR DISEASE): ICD-10-CM

## 2024-02-27 DIAGNOSIS — K74.60 CIRRHOSIS OF LIVER WITH ASCITES, UNSPECIFIED HEPATIC CIRRHOSIS TYPE (HCC): ICD-10-CM

## 2024-02-27 DIAGNOSIS — N40.0 BENIGN PROSTATIC HYPERPLASIA WITHOUT LOWER URINARY TRACT SYMPTOMS: ICD-10-CM

## 2024-02-27 DIAGNOSIS — K76.6 PORTAL HYPERTENSION WITH ESOPHAGEAL VARICES (HCC): ICD-10-CM

## 2024-02-27 DIAGNOSIS — Z91.81 RISK FOR FALLS: ICD-10-CM

## 2024-02-27 DIAGNOSIS — I10 ESSENTIAL HYPERTENSION: ICD-10-CM

## 2024-02-27 DIAGNOSIS — C78.7 SECONDARY MALIGNANT NEOPLASM OF LIVER (HCC): ICD-10-CM

## 2024-02-27 DIAGNOSIS — E53.8 VITAMIN B 12 DEFICIENCY: ICD-10-CM

## 2024-02-27 DIAGNOSIS — I85.00 PORTAL HYPERTENSION WITH ESOPHAGEAL VARICES (HCC): ICD-10-CM

## 2024-02-27 DIAGNOSIS — E55.9 VITAMIN D DEFICIENCY: ICD-10-CM

## 2024-02-27 DIAGNOSIS — D69.6 THROMBOCYTOPENIA, UNSPECIFIED (HCC): ICD-10-CM

## 2024-02-27 DIAGNOSIS — R18.8 CIRRHOSIS OF LIVER WITH ASCITES, UNSPECIFIED HEPATIC CIRRHOSIS TYPE (HCC): ICD-10-CM

## 2024-02-27 DIAGNOSIS — G31.9 CEREBRAL ATROPHY (HCC): ICD-10-CM

## 2024-02-27 DIAGNOSIS — I85.10 SECONDARY ESOPHAGEAL VARICES WITHOUT BLEEDING (HCC): ICD-10-CM

## 2024-02-27 DIAGNOSIS — R41.3 MEMORY LOSS, SHORT TERM: ICD-10-CM

## 2024-02-27 DIAGNOSIS — G62.0 DRUG-INDUCED POLYNEUROPATHY (HCC): ICD-10-CM

## 2024-02-27 DIAGNOSIS — D63.8 ANEMIA, CHRONIC DISEASE: ICD-10-CM

## 2024-02-27 DIAGNOSIS — C18.8 OVERLAPPING MALIGNANT NEOPLASM OF COLON (HCC): ICD-10-CM

## 2024-02-27 DIAGNOSIS — I82.512 CHRONIC DEEP VEIN THROMBOSIS (DVT) OF FEMORAL VEIN OF LEFT LOWER EXTREMITY (HCC): ICD-10-CM

## 2024-02-27 PROCEDURE — 3078F DIAST BP <80 MM HG: CPT | Performed by: INTERNAL MEDICINE

## 2024-02-27 PROCEDURE — 99214 OFFICE O/P EST MOD 30 MIN: CPT | Performed by: INTERNAL MEDICINE

## 2024-02-27 PROCEDURE — 3074F SYST BP LT 130 MM HG: CPT | Performed by: INTERNAL MEDICINE

## 2024-02-27 RX ORDER — DONEPEZIL HYDROCHLORIDE 5 MG/1
5 TABLET, FILM COATED ORAL NIGHTLY
Qty: 100 TABLET | Refills: 3 | Status: SHIPPED | OUTPATIENT
Start: 2024-02-27

## 2024-02-27 ASSESSMENT — ENCOUNTER SYMPTOMS
CARDIOVASCULAR NEGATIVE: 1
RESPIRATORY NEGATIVE: 1
NEUROLOGICAL NEGATIVE: 1
PSYCHIATRIC NEGATIVE: 1
MUSCULOSKELETAL NEGATIVE: 1
EYES NEGATIVE: 1
CONSTITUTIONAL NEGATIVE: 1
GASTROINTESTINAL NEGATIVE: 1

## 2024-02-27 ASSESSMENT — FIBROSIS 4 INDEX: FIB4 SCORE: 7.27

## 2024-02-27 ASSESSMENT — PATIENT HEALTH QUESTIONNAIRE - PHQ9: CLINICAL INTERPRETATION OF PHQ2 SCORE: 0

## 2024-02-28 NOTE — PROGRESS NOTES
Subjective     Torey Lima is a 78 y.o. male who presents with Follow-Up (Lab review )  The patient is here for followup of chronic medical problems listed below. The patient is compliant with medications and having no side effects from them. Denies chest pain, abdominal pain, dyspnea, myalgias, or cough.   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 1/2011- MRI abd 3/2018 no change in per    Liver lesion- ablation of malignant met from colon ca 2011- f/u [facility] q 6 mos - stable MRI abd 3/2018- redo annually    Drug-induced polyneuropathy (HCC)    Vitamin D deficiency    ASCVD (arteriosclerotic cardiovascular disease)subtotalled small distal LCx and 40% LAD med rx by cath 12/13/12    Essential hypertension    Gastrointestinal hemorrhage with hematemesis- recurrernt from varices    Secondary esophageal varices without bleeding (HCC)- BANDED 4/2016- repeat egd tbd 10//29/16- [facility]    Gastric varices- s/p BRTO procedure at [facility]    Portal vein thrombosis- on CT [facility] 2/2015    Cirrhosis of liver with ascites (HCC)- ? DUE TO OLD HEP B, CHEMO RX,  OR THERMO RAD OF MAGNANT LESION    History of esophageal varices    H/O colon cancer, stage IV- neg colonoscopy 2012/2018 at [facility]    BPH (benign prostatic hyperplasia)    Peripheral neuropathy due to chemotherapy (HCC)    Edema of left lower extremity- due to dvt july 2017    Generalized edema    Anemia, chronic disease    Chronic deep vein thrombosis (DVT) of femoral vein of left lower extremity (HCC)- s/p IVC filter 2016    Portal hypertension with esophageal varices (HCC)    Secondary malignant neoplasm of liver (HCC)    Thrombocytopenia, unspecified (HCC)- from previous chemorx    Spinal enthesopathy of thoracic region (HCC)    History of Guillain-Plano syndrome    Cerebral atrophy (HCC)-MRI brain 2019, mild diffuse  cerebral substance loss    Dyslipidemia    Irregular heartbeat- A. FIB ON APPLE WATCH    Obesity (BMI 30-39.9)    Obesity due to excess calories with serious comorbidity    Paroxysmal SVT (supraventricular tachycardia)    Risk for falls     Outpatient Medications Prior to Visit   Medication Sig Dispense Refill    metoprolol SR (TOPROL XL) 25 MG TABLET SR 24 HR Take 0.5 Tablets by mouth every day. 45 Tablet 1    potassium chloride SA (KDUR) 20 MEQ Tab CR Take 1 tablet (20 mEq total) by mouth daily 90 Tablet 3    ursodiol (JUNE 250) 250 MG Tab Take 1 tablet (250 mg total) by mouth every morning, afternoon, and evening. 270 Tablet 3    AMOXICILLIN PO Take 50 mg by mouth as needed. PRN for Dental Procedures      ketoconazole (NIZORAL) 2 % Cream Apply  topically every day.      triamcinolone acetonide (KENALOG) 0.1 % Cream Apply  topically 2 times a day.      hydrOXYzine HCl (ATARAX) 25 MG Tab Take 1 Tablet by mouth at bedtime as needed for Anxiety (for sleep study) for up to 1 dose. One tab is a one day dose 1 Tablet 0    atorvastatin (LIPITOR) 40 MG Tab Take 1 Tablet by mouth every evening. 90 Tablet 4    torsemide (DEMADEX) 10 MG tablet Take 1 Tablet by mouth every day. 90 Tablet 4    spironolactone (ALDACTONE) 50 MG Tab Take 1 Tablet by mouth 2 times a day. 180 Tablet 3    folic acid (FOLVITE) 1 MG Tab Take 1 Tablet by mouth every day. 100 Tablet 4    Cholecalciferol (VITAMIN D3) 25 MCG (1000 UT) Cap Take 1,000 Units by mouth every day. 100 Capsule 3    ferrous sulfate 325 (65 Fe) MG tablet Take 1 Tablet by mouth every morning with breakfast. 100 Tablet 3    metoprolol tartrate (LOPRESSOR) 25 MG Tab Take 1 tablet (25 mg total) by mouth in the morning and 1 tablet (25 mg total) in the evening. 180 Tablet 0    atorvastatin (LIPITOR) 40 MG Tab Take 1 tablet (40 mg total) by mouth daily 100 Tablet 3    Cholecalciferol (VITAMIN D3) 25 MCG (1000 UT) Cap Take 1 capsule (1,000 Units total) by mouth daily 90 Capsule 3     folic acid (FOLVITE) 1 MG Tab Take 1 tablet (1,000 mcg total) by mouth daily 90 Tablet 3    spironolactone (ALDACTONE) 50 MG Tab Take 1 tablet (50 mg total) by mouth 2 (two) times daily 180 Tablet 3    ferrous sulfate 325 (65 Fe) MG tablet TAKE ONE TABLET BY MOUTH DAILY WITH BREAKFAST 90 Tablet 3    HYDROcodone-acetaminophen (NORCO) 5-325 MG Tab per tablet TAKE 1 TABLET BY MOUTH EVERY FOUR HOURS AS NEEDED (PAIN) FOR UP TO 5 DAYS. (Patient not taking: Reported on 2/27/2024)      benzonatate (TESSALON) 100 MG Cap Take 1 Capsule by mouth 3 times a day as needed for Cough. (Patient not taking: Reported on 2/27/2024) 60 Capsule 0    Propylene Glycol (SYSTANE BALANCE OP) Administer  into affected eye(s).      potassium chloride (KLOR-CON) 20 MEQ Pack Take 20 mEq by mouth every day.      ursodiol (ACTIGALL) 300 MG Cap Take 1 Capsule by mouth in the morning, at noon, and at bedtime. 270 Capsule 3     No facility-administered medications prior to visit.     Hospital Outpatient Visit on 02/23/2024   Component Date Value    Sodium 02/23/2024 135     Potassium 02/23/2024 4.3     Chloride 02/23/2024 102     Co2 02/23/2024 23     Anion Gap 02/23/2024 10.0     Glucose 02/23/2024 93     Bun 02/23/2024 22     Creatinine 02/23/2024 1.04     Calcium 02/23/2024 8.4 (L)     Correct Calcium 02/23/2024 9.0     AST(SGOT) 02/23/2024 27     ALT(SGPT) 02/23/2024 21     Alkaline Phosphatase 02/23/2024 97     Total Bilirubin 02/23/2024 1.5     Albumin 02/23/2024 3.2     Total Protein 02/23/2024 6.4     Globulin 02/23/2024 3.2     A-G Ratio 02/23/2024 1.0     WBC 02/23/2024 5.2     RBC 02/23/2024 3.58 (L)     Hemoglobin 02/23/2024 10.9 (L)     Hematocrit 02/23/2024 33.3 (L)     MCV 02/23/2024 93.0     MCH 02/23/2024 30.4     MCHC 02/23/2024 32.7     RDW 02/23/2024 55.8 (H)     Platelet Count 02/23/2024 64 (L)     MPV 02/23/2024 10.5     Neutrophils-Polys 02/23/2024 71.70     Lymphocytes 02/23/2024 6.70 (L)     Monocytes 02/23/2024 13.70 (H)      "Eosinophils 02/23/2024 6.70     Basophils 02/23/2024 0.80     Immature Granulocytes 02/23/2024 0.40     Nucleated RBC 02/23/2024 0.00     Neutrophils (Absolute) 02/23/2024 3.73     Lymphs (Absolute) 02/23/2024 0.35 (L)     Monos (Absolute) 02/23/2024 0.71     Eos (Absolute) 02/23/2024 0.35     Baso (Absolute) 02/23/2024 0.04     Immature Granulocytes (a* 02/23/2024 0.02     NRBC (Absolute) 02/23/2024 0.00     TSH 02/23/2024 3.300     Cholesterol,Tot 02/23/2024 90 (L)     Triglycerides 02/23/2024 39     HDL 02/23/2024 57     LDL 02/23/2024 25     Glycohemoglobin 02/23/2024 4.5     Est Avg Glucose 02/23/2024 82     25-Hydroxy   Vitamin D 25 02/23/2024 31     Prostatic Specific Antig* 02/23/2024 0.25     Fasting Status 02/23/2024 Fasting     Imm. Plt Fraction 02/23/2024 3.8     GFR (CKD-EPI) 02/23/2024 73       Lab Results   Component Value Date/Time    HBA1C 4.5 02/23/2024 06:26 AM    HBA1C 4.6 09/12/2023 06:59 AM     Lab Results   Component Value Date/Time    SODIUM 135 02/23/2024 06:26 AM    POTASSIUM 4.3 02/23/2024 06:26 AM    CHLORIDE 102 02/23/2024 06:26 AM    CO2 23 02/23/2024 06:26 AM    GLUCOSE 93 02/23/2024 06:26 AM    BUN 22 02/23/2024 06:26 AM    CREATININE 1.04 02/23/2024 06:26 AM    CREATININE 1.14 01/23/2010 12:00 AM    BUNCREATRAT 15 01/23/2010 12:00 AM    GLOMRATE >59 01/23/2010 12:00 AM    ALKPHOSPHAT 97 02/23/2024 06:26 AM    ASTSGOT 27 02/23/2024 06:26 AM    ALTSGPT 21 02/23/2024 06:26 AM    TBILIRUBIN 1.5 02/23/2024 06:26 AM     Lab Results   Component Value Date/Time    INR 1.33 (H) 12/04/2023 06:25 AM    INR 1.35 (H) 10/10/2023 12:10 PM    INR 1.31 (H) 01/16/2023 06:29 AM     Lab Results   Component Value Date/Time    CHOLSTRLTOT 90 (L) 02/23/2024 06:26 AM    LDL 25 02/23/2024 06:26 AM    HDL 57 02/23/2024 06:26 AM    TRIGLYCERIDE 39 02/23/2024 06:26 AM       Lab Results   Component Value Date/Time    TESTOSTERONE 333 04/02/2015 06:47 AM     No results found for: \"TSH\"  Lab Results   Component " "Value Date/Time    FREET4 1.16 04/22/2022 06:12 AM    FREET4 0.85 04/02/2015 06:47 AM     No results found for: \"URICACID\"  No components found for: \"VITB12\"  Lab Results   Component Value Date/Time    25HYDROXY 31 02/23/2024 06:26 AM    25HYDROXY 29 (L) 04/14/2023 06:45 AM               HPI    Review of Systems   Constitutional: Negative.    HENT: Negative.     Eyes: Negative.    Respiratory: Negative.     Cardiovascular: Negative.    Gastrointestinal: Negative.    Genitourinary: Negative.    Musculoskeletal: Negative.    Skin: Negative.    Neurological: Negative.    Endo/Heme/Allergies: Negative.    Psychiatric/Behavioral: Negative.                Objective     /68 (BP Location: Right arm, Patient Position: Sitting, BP Cuff Size: Adult)   Pulse 64   Temp 36.6 °C (97.9 °F) (Temporal)   Ht 1.905 m (6' 3\")   Wt 108 kg (238 lb)   SpO2 98%   BMI 29.75 kg/m²      Physical Exam  Constitutional:       General: He is not in acute distress.     Appearance: He is well-developed. He is not diaphoretic.   HENT:      Head: Normocephalic and atraumatic.      Right Ear: External ear normal.      Left Ear: External ear normal.      Nose: Nose normal.      Mouth/Throat:      Pharynx: No oropharyngeal exudate.   Eyes:      General: No scleral icterus.        Right eye: No discharge.         Left eye: No discharge.      Conjunctiva/sclera: Conjunctivae normal.      Pupils: Pupils are equal, round, and reactive to light.   Neck:      Thyroid: No thyromegaly.      Vascular: No JVD.      Trachea: No tracheal deviation.   Cardiovascular:      Rate and Rhythm: Normal rate and regular rhythm.      Heart sounds: Normal heart sounds. No murmur heard.     No friction rub. No gallop.   Pulmonary:      Effort: Pulmonary effort is normal. No respiratory distress.      Breath sounds: Normal breath sounds. No stridor. No wheezing or rales.   Chest:      Chest wall: No tenderness.   Abdominal:      General: Bowel sounds are normal. " There is no distension.      Palpations: Abdomen is soft. There is no mass.      Tenderness: There is no abdominal tenderness. There is no guarding or rebound.   Musculoskeletal:         General: No tenderness. Normal range of motion.      Cervical back: Normal range of motion and neck supple.      Left lower leg: Edema present.   Lymphadenopathy:      Cervical: No cervical adenopathy.   Skin:     General: Skin is warm and dry.      Coloration: Skin is not pale.      Findings: No erythema or rash.   Neurological:      Mental Status: He is alert and oriented to person, place, and time.      Motor: No abnormal muscle tone.      Coordination: Coordination normal.      Deep Tendon Reflexes: Reflexes are normal and symmetric. Reflexes normal.   Psychiatric:         Behavior: Behavior normal.         Thought Content: Thought content normal.         Judgment: Judgment normal.                             Assessment & Plan        1. Memory loss, short term  New problem.  Start donepezil.  Titrate up to 10 mg and add Namenda if necessary over the next several months.  - donepezil (ARICEPT) 5 MG Tab; Take 1 Tablet by mouth every evening.  Dispense: 100 Tablet; Refill: 3    2. Vitamin D deficiency  Under good control continue vitamin D3 2000 units daily.  - VITAMIN D,25 HYDROXY (DEFICIENCY); Future    3. Thrombocytopenia, unspecified (HCC)- from previous chemorx  Good control continue surveillance.  No bleeding.    4. Secondary esophageal varices without bleeding (HCC)- BANDED 4/2016- repeat egd tbd 10//29/16- [facility]       Under good control. Continue same regimen.'  5. Portal hypertension with esophageal varices (HCC)     Under good control. Continue same regimen.'  6. 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 Continue same regimen.'  7. Essential  hypertension       Under good control. Continue same regimen.'  - Comp Metabolic Panel; Future  - CBC WITH DIFFERENTIAL; Future  - TSH; Future  - Lipid Profile; Future    8. Dyslipidemia   Under good control. Continue same regimen.'  - Comp Metabolic Panel; Future  - CBC WITH DIFFERENTIAL; Future  - TSH; Future  - Lipid Profile; Future    9. Cirrhosis of liver with ascites, unspecified hepatic cirrhosis type (HCC)     Under good control. Continue same regimen.'.  He is getting abdominal paracentesis for ascites every 3 months which seems to be maintaining his chronic liver disease.  10. Benign prostatic hyperplasia without lower urinary tract symptoms   Under good control. Continue same regimen.'  - PROSTATE SPECIFIC AG DIAGNOSTIC; Future    11. Chronic deep vein thrombosis (DVT) of femoral vein of left lower extremity (HCC)- s/p IVC filter 2016       12. ASCVD (arteriosclerotic cardiovascular disease)subtotalled small distal LCx and 40% LAD med rx by cath 12/13/12     Under good control. Continue same regimen.'  13. Secondary malignant neoplasm of liver (HCC)   Under good control. Continue same regimen.'    14. Drug-induced polyneuropathy (HCC)   Under good control. Continue same regimen.'    15. Cerebral atrophy (HCC)-MRI brain 2019, mild diffuse cerebral substance loss     Under good control. Continue same regimen.'  16. Anemia, chronic disease   Under good control. Continue same regimen.'    17. IFG (impaired fasting glucose)     - HEMOGLOBIN A1C; Future    18. Vitamin B 12 deficiency      - VITAMIN B12; Future  - FOLATE; Future    19. Risk for falls     - Patient identified as fall risk.  Appropriate orders and counseling given.

## 2024-03-02 ENCOUNTER — APPOINTMENT (OUTPATIENT)
Dept: URGENT CARE | Facility: CLINIC | Age: 79
End: 2024-03-02
Payer: MEDICARE

## 2024-03-04 ENCOUNTER — OFFICE VISIT (OUTPATIENT)
Dept: URGENT CARE | Facility: CLINIC | Age: 79
End: 2024-03-04
Payer: MEDICARE

## 2024-03-04 VITALS
TEMPERATURE: 97.9 F | RESPIRATION RATE: 16 BRPM | DIASTOLIC BLOOD PRESSURE: 60 MMHG | HEIGHT: 75 IN | HEART RATE: 67 BPM | OXYGEN SATURATION: 96 % | BODY MASS INDEX: 30.21 KG/M2 | WEIGHT: 243 LBS | SYSTOLIC BLOOD PRESSURE: 110 MMHG

## 2024-03-04 DIAGNOSIS — E78.5 DYSLIPIDEMIA: ICD-10-CM

## 2024-03-04 DIAGNOSIS — R18.8 CIRRHOSIS OF LIVER WITH ASCITES, UNSPECIFIED HEPATIC CIRRHOSIS TYPE (HCC): ICD-10-CM

## 2024-03-04 DIAGNOSIS — E55.9 VITAMIN D DEFICIENCY: ICD-10-CM

## 2024-03-04 DIAGNOSIS — K74.60 CIRRHOSIS OF LIVER WITH ASCITES, UNSPECIFIED HEPATIC CIRRHOSIS TYPE (HCC): ICD-10-CM

## 2024-03-04 DIAGNOSIS — D63.8 ANEMIA, CHRONIC DISEASE: ICD-10-CM

## 2024-03-04 DIAGNOSIS — R60.1 GENERALIZED EDEMA: ICD-10-CM

## 2024-03-04 DIAGNOSIS — T14.8XXA ABRASION OF SKIN: ICD-10-CM

## 2024-03-04 DIAGNOSIS — S51.812A SKIN TEAR OF FOREARM WITHOUT COMPLICATION, LEFT, INITIAL ENCOUNTER: ICD-10-CM

## 2024-03-04 PROCEDURE — 3074F SYST BP LT 130 MM HG: CPT | Performed by: FAMILY MEDICINE

## 2024-03-04 PROCEDURE — 99213 OFFICE O/P EST LOW 20 MIN: CPT | Performed by: FAMILY MEDICINE

## 2024-03-04 PROCEDURE — 3078F DIAST BP <80 MM HG: CPT | Performed by: FAMILY MEDICINE

## 2024-03-04 RX ORDER — FERROUS SULFATE 325(65) MG
325 TABLET ORAL
Qty: 100 TABLET | Refills: 3 | Status: SHIPPED | OUTPATIENT
Start: 2024-03-04

## 2024-03-04 RX ORDER — TORSEMIDE 10 MG/1
10 TABLET ORAL DAILY
Qty: 90 TABLET | Refills: 4 | Status: SHIPPED | OUTPATIENT
Start: 2024-03-04

## 2024-03-04 RX ORDER — ATORVASTATIN CALCIUM 40 MG/1
40 TABLET, FILM COATED ORAL NIGHTLY
Qty: 90 TABLET | Refills: 4 | Status: SHIPPED | OUTPATIENT
Start: 2024-03-04

## 2024-03-04 RX ORDER — FOLIC ACID 1 MG/1
1 TABLET ORAL DAILY
Qty: 100 TABLET | Refills: 4 | Status: SHIPPED | OUTPATIENT
Start: 2024-03-04

## 2024-03-04 RX ORDER — SPIRONOLACTONE 50 MG/1
50 TABLET, FILM COATED ORAL 2 TIMES DAILY
Qty: 180 TABLET | Refills: 3 | Status: SHIPPED | OUTPATIENT
Start: 2024-03-04

## 2024-03-04 ASSESSMENT — ENCOUNTER SYMPTOMS
FEVER: 0
DIZZINESS: 0
TINGLING: 0
HEMOPTYSIS: 0
FOCAL WEAKNESS: 0
PALPITATIONS: 0
BRUISES/BLEEDS EASILY: 1
SENSORY CHANGE: 0
COUGH: 0
WEAKNESS: 0

## 2024-03-04 ASSESSMENT — FIBROSIS 4 INDEX: FIB4 SCORE: 7.27

## 2024-03-04 NOTE — PROGRESS NOTES
"Subjective:   Torey Lima is a 78 y.o. male who presents for Fall (Patient states did fall x 3 days ago, fell on L elbow would like it to be looked at, does have damaged liver he also mentions )    Reports falling on Friday, 3/1/2024.  He reports falling onto the wall or brushing his arm and wrist.  Primarily during 2 wounds on his forearm.  He reports a history of cirrhosis and bruises quite easily.  He reports over the past 3 days to be try to keep clean and treated at home.  He does have some oozing from both wounds present.  And significant bruising to the area.  He is able to move his left upper extremity with no discomfort and no pain.  He denies additional trauma to the joints.  Did not hit his head or loss consciousness.  He reports only 2 skin wounds on his forearm.  He reports it was a mechanical fall no prodrome symptoms no dizziness no loss of balance.    He does have a history of cirrhosis, gets therapeutic paracentesis approximately twice per year with the last being 1 month ago.  He does follow with his primary care and GI    Review of Systems   Constitutional:  Negative for fever.   Respiratory:  Negative for cough and hemoptysis.    Cardiovascular:  Negative for chest pain and palpitations.   Skin:  Negative for rash.        Skin tears and abrasions on left forearm   Neurological:  Negative for dizziness, tingling, sensory change, focal weakness and weakness.   Endo/Heme/Allergies:  Bruises/bleeds easily.       Medications, Allergies, and current problem list reviewed today in Epic.     Objective:     /60 (BP Location: Left arm, Patient Position: Sitting, BP Cuff Size: Large adult)   Pulse 67   Temp 36.6 °C (97.9 °F)   Resp 16   Ht 1.905 m (6' 3\")   Wt 110 kg (243 lb)   SpO2 96%     Physical Exam  Constitutional:       Appearance: Normal appearance.   HENT:      Head: Normocephalic and atraumatic.   Eyes:      Extraocular Movements: Extraocular movements intact.   Cardiovascular: "      Rate and Rhythm: Normal rate and regular rhythm.      Pulses: Normal pulses.      Heart sounds: Normal heart sounds.   Pulmonary:      Effort: Pulmonary effort is normal.      Breath sounds: Normal breath sounds.   Abdominal:      General: There is distension.      Tenderness: There is no abdominal tenderness.   Musculoskeletal:      Left upper arm: No swelling, tenderness or bony tenderness.      Left elbow: No swelling or deformity. Normal range of motion. No tenderness.      Right forearm: No swelling, deformity, tenderness or bony tenderness.      Left forearm: Laceration present. No swelling, deformity, tenderness or bony tenderness.      Right wrist: Normal. No tenderness or bony tenderness. Normal range of motion.      Left wrist: Normal. No tenderness or bony tenderness. Normal range of motion.      Cervical back: Normal range of motion. No rigidity or tenderness.   Skin:     General: Skin is warm.      Capillary Refill: Capillary refill takes less than 2 seconds.      Findings: Bruising and lesion present.      Comments: Approximately 3 inch area of abrasion on the left dorsal aspect of the forearm.  And a small 1.5 cm skin tear on the lateral aspect of the distal forearm healed with secondary intention.  No significant discharge present no significant edema, numerous ecchymosis throughout upper extremities.   Neurological:      General: No focal deficit present.      Mental Status: He is alert and oriented to person, place, and time.         Assessment/Plan:     Diagnosis and associated orders:     1. Abrasion of skin        2. Skin tear of forearm without complication, left, initial encounter           Comments/MDM:     Patient with approximately 3 inch abrasion on the left forearm as well as 1.5 cm skin tear adjacent to abrasion.  Area is soft nontender.  Patient has full range of motion and no tenderness to joints, distal sensation intact, full strength in upper extremity.  Area is well  cleaned.  Areas wrapped with gauze and Coban.  Small amount of antibacterial cream was placed on the wound.    Wound care instructions were given to patient including twice daily cleaning and monitoring for signs of infection and worsening swelling.  At this time no apparent bony injuries patient has full use of upper extremity with no tenderness or pain,  Return precautions discussed including signs of infection, worsening swelling, inability to control any additional bleeding.         Differential diagnosis, natural history, supportive care, and indications for immediate follow-up discussed.    Advised the patient to follow-up with the primary care physician for recheck, reevaluation, and consideration of further management.    Please note that this dictation was created using voice recognition software. I have made a reasonable attempt to correct obvious errors, but I expect that there are errors of grammar and possibly content that I did not discover before finalizing the note.    This note was electronically signed by Geovany Leon M.D.

## 2024-03-06 PROCEDURE — RXMED WILLOW AMBULATORY MEDICATION CHARGE: Performed by: STUDENT IN AN ORGANIZED HEALTH CARE EDUCATION/TRAINING PROGRAM

## 2024-03-07 ENCOUNTER — PHARMACY VISIT (OUTPATIENT)
Dept: PHARMACY | Facility: MEDICAL CENTER | Age: 79
End: 2024-03-07
Payer: COMMERCIAL

## 2024-03-11 ENCOUNTER — NON-PROVIDER VISIT (OUTPATIENT)
Dept: CARDIOLOGY | Facility: MEDICAL CENTER | Age: 79
End: 2024-03-11
Payer: MEDICARE

## 2024-03-11 ENCOUNTER — PATIENT MESSAGE (OUTPATIENT)
Dept: MEDICAL GROUP | Age: 79
End: 2024-03-11
Payer: MEDICARE

## 2024-03-11 DIAGNOSIS — G62.0 PERIPHERAL NEUROPATHY DUE TO CHEMOTHERAPY (HCC): ICD-10-CM

## 2024-03-11 DIAGNOSIS — T45.1X5A PERIPHERAL NEUROPATHY DUE TO CHEMOTHERAPY (HCC): ICD-10-CM

## 2024-03-11 PROCEDURE — 93298 REM INTERROG DEV EVAL SCRMS: CPT | Mod: 26 | Performed by: INTERNAL MEDICINE

## 2024-03-11 NOTE — CARDIAC REMOTE MONITOR - SCAN
Device transmission reviewed. Device demonstrated appropriate function.       Electronically Signed by: Darshan Ryan M.D.    3/12/2024  1:50 PM

## 2024-03-28 ENCOUNTER — OFFICE VISIT (OUTPATIENT)
Dept: URGENT CARE | Facility: CLINIC | Age: 79
End: 2024-03-28
Payer: MEDICARE

## 2024-03-28 VITALS
RESPIRATION RATE: 18 BRPM | WEIGHT: 243 LBS | DIASTOLIC BLOOD PRESSURE: 58 MMHG | HEIGHT: 75 IN | HEART RATE: 66 BPM | OXYGEN SATURATION: 98 % | SYSTOLIC BLOOD PRESSURE: 114 MMHG | BODY MASS INDEX: 30.21 KG/M2 | TEMPERATURE: 98 F

## 2024-03-28 DIAGNOSIS — S51.811A SKIN TEAR OF RIGHT FOREARM WITHOUT COMPLICATION, INITIAL ENCOUNTER: ICD-10-CM

## 2024-03-28 PROCEDURE — 3074F SYST BP LT 130 MM HG: CPT | Performed by: NURSE PRACTITIONER

## 2024-03-28 PROCEDURE — 99213 OFFICE O/P EST LOW 20 MIN: CPT | Performed by: NURSE PRACTITIONER

## 2024-03-28 PROCEDURE — 3078F DIAST BP <80 MM HG: CPT | Performed by: NURSE PRACTITIONER

## 2024-03-28 RX ORDER — ACETAMINOPHEN 500 MG
1000 TABLET ORAL EVERY 6 HOURS PRN
COMMUNITY

## 2024-03-28 ASSESSMENT — FIBROSIS 4 INDEX: FIB4 SCORE: 7.27

## 2024-03-28 NOTE — PROGRESS NOTES
Subjective:     Torey Lima is a 78 y.o. male who presents for Abrasion (Rt arm, x last night, was bleeding, skin rolled and skin was trimmed)      Patient had fallen and scraped his arm on a keyboard or furniture last night. Denies wrist or elbow pain. They had trimmed off some of the folded skin prioir to arrival. Has had continued bleeding from the site.           Abrasion  This is a new problem. The current episode started yesterday. Pertinent negatives include no fever.       Past Medical History:   Diagnosis Date    Arrhythmia April 1, 2023    Dr. Hall    ASCVD (arteriosclerotic cardiovascular disease) 12/14/2012    BMI 33.0-33.9,adult 02/15/2013    Breath shortness April 1, 2023    Dr. 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/06/2010    Snoring Many Years    Wife    Stage 3a chronic kidney disease (HCC) 06/25/2022    Thrombocytopenia due to drugs 04/25/2012    Vitamin d deficiency 08/02/2012       Past Surgical History:   Procedure Laterality Date    UMBILICAL HERNIA REPAIR N/A 10/10/2023    Procedure: OPEN UMBILICAL HERNIA REPAIR;  Surgeon: Julian Ennis M.D.;  Location: SURGERY [facility];  Service: General    LUMBAR LAMINECTOMY DISKECTOMY  5/25/2017    Procedure:  LAMINECTOMY LUMBAR  4 TO SACRAL 1;  Surgeon: Felton Escobar M.D.;  Location: SURGERY [facility];  Service:     GASTROSCOPY WITH BANDING  10/25/2016    Procedure: GASTROSCOPY WITH BANDING;  Surgeon: Pierre Waggoner M.D.;  Location: ENDOSCOPY [facility];  Service:     GASTROSCOPY WITH BANDING   4/3/2016    Procedure: GASTROSCOPY WITH BANDING;  Surgeon: Cayetano Stovall M.D.;  Location: ENDOSCOPY [facility];  Service:     FULL THICKNESS BLOCK RESECTION  4/11/2012    Performed by LANI BOWMAN at SURGERY [facility] ORS    CATH REMOVAL  7/20/2011    Performed by RUBY RUIZ at SURGERY [facility] ORS    CATH PLACEMENT  9/20/2010    Performed by RUBY RUIZ at SURGERY [facility] ORS    LOW ANTERIOR RESECTION ROBOTIC  8/10/2010    Performed by RUBY RUIZ at SURGERY [facility] ORS    COLON RESECTION      HIP ARTHROPLASTY MIS TOTAL      rt    HIP REPLACEMENT, TOTAL      OTHER      Cholecystectomy    OTHER (COMMENTS)      liver surgery         Social History     Socioeconomic History    Marital status:      Spouse name: Not on file    Number of children: Not on file    Years of education: Not on file    Highest education level: Professional school degree (e.g., MD, DDS, DVM, IQRA)   Occupational History    Not on file   Tobacco Use    Smoking status: Former     Types: Cigars    Smokeless tobacco: Never    Tobacco comments:     1 cigar per week.   Vaping Use    Vaping Use: Never used   Substance and Sexual Activity    Alcohol use: Not Currently     Alcohol/week: 1.8 oz     Types: 1 Glasses of wine, 1 Cans of beer, 1 Standard drinks or equivalent per week     Comment: minimal, Pt stop drinking since the beginning of April 2016    Drug use: No    Sexual activity: Yes     Partners: Female   Other Topics Concern    Not on file   Social History Narrative    ** Merged History Encounter **          Social Determinants of Health     Financial Resource Strain: Low Risk  (4/18/2023)    Overall Financial Resource Strain (CARDIA)     Difficulty of Paying Living Expenses: Not hard at all   Food Insecurity: No Food Insecurity (4/18/2023)    Hunger Vital Sign     Worried About Running Out of Food in the Last Year: Never true     Ran Out of Food in the Last Year: Never true   Transportation  "Needs: No Transportation Needs (4/18/2023)    PRAPARE - Transportation     Lack of Transportation (Medical): No     Lack of Transportation (Non-Medical): No   Physical Activity: Inactive (4/18/2023)    Exercise Vital Sign     Days of Exercise per Week: 0 days     Minutes of Exercise per Session: 0 min   Stress: Stress Concern Present (4/18/2023)    [employer] [place] [employer] - Occupational Stress Questionnaire     Feeling of Stress : To some extent   Social Connections: Socially Isolated (4/18/2023)    Social Connection and Isolation Panel [NHANES]     Frequency of Communication with Friends and Family: Once a week     Frequency of Social Gatherings with Friends and Family: Once a week     Attends [employer] Services: Never     Active Member of Clubs or Organizations: No     Attends Club or Organization Meetings: Not on file     Marital Status:    Intimate Partner Violence: Not on file   Housing Stability: Low Risk  (4/18/2023)    Housing Stability Vital Sign     Unable to Pay for Housing in the Last Year: No     Number of Places Lived in the Last Year: 1     Unstable Housing in the Last Year: No        Family History   Problem Relation Age of Onset    Heart Disease Mother     Cancer Mother     Sleep Apnea Neg Hx         Allergies   Allergen Reactions    Influenza Virus Vaccine Live      Guillan [place]     Anticoagulant Sodium Citrate [Sodium Citrate]      HIGH BLEEDING RISK    Influenza Virus Vaccine Unspecified    Lisinopril Cough       Review of Systems   Constitutional:  Negative for fever.   All other systems reviewed and are negative.       Objective:   /58 (BP Location: Left arm, Patient Position: Sitting, BP Cuff Size: Adult)   Pulse 66   Temp 36.7 °C (98 °F) (Temporal)   Resp 18   Ht 1.905 m (6' 3\")   Wt 110 kg (243 lb)   SpO2 98%   BMI 30.37 kg/m²     Physical Exam  Musculoskeletal:      Right elbow: Normal.      Right forearm: Tenderness present. No bony tenderness.      " Right wrist: Normal. Normal pulse.      Right hand: Normal capillary refill.   Skin:     General: Skin is warm and dry.      Findings: Bruising and wound present.      Comments: Right forearm: Five skin tears ranging from  3-4 cm x 1-1.5 cm, with surrounding bruising.          Assessment/Plan:   1. Skin tear of right forearm without complication, initial encounter  - mupirocin (BACTROBAN) 2 % Ointment; Apply 1 Application topically 2 times a day for 5 days.  Dispense: 22 g; Refill: 0  - Referral to Wound Clinic    Other orders  - acetaminophen (TYLENOL) 500 MG Tab; Take 1,000 mg by mouth every 6 hours as needed. Last dosage was on 3/27/24 of 1000 MG around 19:30.  -Wound care: Cleaned with NS. Dressed with polysporin, xeroform, and Kerlix.  -RICE Therapy: Rest, Elevation  -Can keep current dressing in place for 2 days. If dressing becomes soiled, gently clean area with mild soap and water, and re-dress the wound with xeroform and antibiotic ointment.   -Follow up in #2 days for dressing change and re-evaluation.    Follow up sooner for signs of infection (redness, red streaking, pus, foul odor, severe pain, increased swelling or fever) or any other concerns. Follow up emergently for severe uncontrolled pain, neurovascular compromise (decreased sensation, motion, or circulation).    -Presents with his spouse. Patient had debrided tissue prior to arrival. Home wound care instructions are provided. Tetanus vaccination status reviewed: last tetanus booster within 10 years; 2022. Discussed f/u in 2 days for dressing change and wound evaluation. Also discussed referral to wound care if continued assistance is needed with wound management and dressing changes.     Differential diagnosis, natural history, supportive care, and indications for immediate follow-up discussed.

## 2024-03-29 ASSESSMENT — ENCOUNTER SYMPTOMS: FEVER: 0

## 2024-03-30 ENCOUNTER — OFFICE VISIT (OUTPATIENT)
Dept: URGENT CARE | Facility: CLINIC | Age: 79
End: 2024-03-30
Payer: MEDICARE

## 2024-03-30 VITALS
HEART RATE: 60 BPM | HEIGHT: 75 IN | OXYGEN SATURATION: 97 % | TEMPERATURE: 98.4 F | SYSTOLIC BLOOD PRESSURE: 112 MMHG | RESPIRATION RATE: 16 BRPM | WEIGHT: 243 LBS | BODY MASS INDEX: 30.21 KG/M2 | DIASTOLIC BLOOD PRESSURE: 60 MMHG

## 2024-03-30 DIAGNOSIS — L08.9 INFECTED SKIN TEAR: ICD-10-CM

## 2024-03-30 DIAGNOSIS — S51.811A SKIN TEAR OF RIGHT FOREARM WITHOUT COMPLICATION, INITIAL ENCOUNTER: ICD-10-CM

## 2024-03-30 DIAGNOSIS — T14.8XXA INFECTED SKIN TEAR: ICD-10-CM

## 2024-03-30 RX ORDER — CEPHALEXIN 500 MG/1
500 CAPSULE ORAL 3 TIMES DAILY
Qty: 21 CAPSULE | Refills: 0 | Status: SHIPPED | OUTPATIENT
Start: 2024-03-30 | End: 2024-04-06

## 2024-03-30 ASSESSMENT — FIBROSIS 4 INDEX: FIB4 SCORE: 7.27

## 2024-03-30 ASSESSMENT — ENCOUNTER SYMPTOMS
BRUISES/BLEEDS EASILY: 1
FEVER: 0

## 2024-03-30 NOTE — PROGRESS NOTES
"Subjective:   Torey Lima is a 78 y.o. male who presents for Dressing Change (Right arm )      78-year-old male presents for dressing change secondary to skin tear of right arm, seen 2 days ago with Xeroform and Curlex applied    Review of Systems   Constitutional:  Negative for fever.   Endo/Heme/Allergies:  Bruises/bleeds easily.       Medications, Allergies, and current problem list reviewed today in Epic.     Objective:     /60 (BP Location: Left arm, Patient Position: Sitting, BP Cuff Size: Adult long)   Pulse 60   Temp 36.9 °C (98.4 °F) (Temporal)   Resp 16   Ht 1.905 m (6' 3\")   Wt 110 kg (243 lb)   SpO2 97%     Physical Exam  Vitals reviewed.   Constitutional:       Appearance: Normal appearance.   HENT:      Head: Normocephalic and atraumatic.      Right Ear: External ear normal.      Left Ear: External ear normal.      Nose: Nose normal.      Mouth/Throat:      Mouth: Mucous membranes are moist.   Eyes:      Conjunctiva/sclera: Conjunctivae normal.   Cardiovascular:      Rate and Rhythm: Normal rate.   Pulmonary:      Effort: Pulmonary effort is normal.   Skin:     General: Skin is warm and dry.      Capillary Refill: Capillary refill takes less than 2 seconds.      Comments: Scattered skin skin tears over volar forearm, no active bleeding, no fluctuance however there is around 3 cm of circumferential erythema surrounding the area of debrided and granulated tissue.  No streaking lymphangitis.  No purulence   Neurological:      Mental Status: He is alert and oriented to person, place, and time.         Assessment/Plan:     Diagnosis and associated orders:     1. Infected skin tear  cephALEXin (KEFLEX) 500 MG Cap         Comments/MDM:     Dressing change performed.  Remove the Xeroform and applied Mirabello which should be capable of remaining in place for overlying adherent dressing changes.  Provided with specific instructions to apply mupirocin ointment over the marrow paddle and then " replace the nonadherent white dressings daily with overwrap.  Will start on Keflex.  Recommend follow-up in 48 to 72 hours, present to the ER if significantly worsening pain redness or any streaking lymphangitis.         Differential diagnosis, natural history, supportive care, and indications for immediate follow-up discussed.    Advised the patient to follow-up with the primary care physician for recheck, reevaluation, and consideration of further management.    Please note that this dictation was created using voice recognition software. I have made a reasonable attempt to correct obvious errors, but I expect that there are errors of grammar and possibly content that I did not discover before finalizing the note.    This note was electronically signed by Cortez Gross PA-C

## 2024-04-02 ENCOUNTER — OFFICE VISIT (OUTPATIENT)
Dept: URGENT CARE | Facility: CLINIC | Age: 79
End: 2024-04-02
Payer: MEDICARE

## 2024-04-02 VITALS
TEMPERATURE: 98.3 F | BODY MASS INDEX: 30.09 KG/M2 | SYSTOLIC BLOOD PRESSURE: 122 MMHG | HEIGHT: 75 IN | WEIGHT: 242 LBS | RESPIRATION RATE: 20 BRPM | DIASTOLIC BLOOD PRESSURE: 78 MMHG | HEART RATE: 75 BPM | OXYGEN SATURATION: 96 %

## 2024-04-02 DIAGNOSIS — S51.811D SKIN TEAR OF RIGHT FOREARM WITHOUT COMPLICATION, SUBSEQUENT ENCOUNTER: ICD-10-CM

## 2024-04-02 PROCEDURE — 3074F SYST BP LT 130 MM HG: CPT | Performed by: REGISTERED NURSE

## 2024-04-02 PROCEDURE — 3078F DIAST BP <80 MM HG: CPT | Performed by: REGISTERED NURSE

## 2024-04-02 PROCEDURE — 99212 OFFICE O/P EST SF 10 MIN: CPT | Performed by: REGISTERED NURSE

## 2024-04-02 ASSESSMENT — FIBROSIS 4 INDEX: FIB4 SCORE: 7.27

## 2024-04-02 NOTE — PROGRESS NOTES
"Subjective:     Torey Lima is a 78 y.o. male who presents for Wound Check      Wound Check    Patient obtained a skin tear to the volar forearm on 3/28/2024, has been doing wound care at home at last visit was started on Keflex and mupirocin.  Symptoms are improving.  No fevers chills body aches here for wound check and bandage change    ROS : Negative unless mentioned in HPI    Allergies:   Allergies   Allergen Reactions    Influenza Virus Vaccine Live      Guillan [place]     Anticoagulant Sodium Citrate [Sodium Citrate]      HIGH BLEEDING RISK    Influenza Virus Vaccine Unspecified    Lisinopril Cough       Current Medications:  acetaminophen Tabs  atorvastatin Tabs  cephALEXin Caps  donepezil Tabs  ferrous sulfate  folic acid Tabs  hydrOXYzine HCl Tabs  ketoconazole Crea  metoprolol SR Tb24  metoprolol tartrate Tabs  mupirocin Oint  potassium chloride SA Tbcr  spironolactone Tabs  SYSTANE BALANCE OP  torsemide  triamcinolone acetonide Crea  ursodiol Tabs  Vitamin D3 Caps    (Allergies, Medications, & Tobacco/Substance Use were reconciled by the Medical Assistant and reviewed by myself. )       Objective:     /78   Pulse 75   Temp 36.8 °C (98.3 °F) (Temporal)   Resp 20   Ht 1.905 m (6' 3\")   Wt 110 kg (242 lb)   SpO2 96%   BMI 30.25 kg/m²     Physical Exam  Vitals and nursing note reviewed.   Constitutional:       Appearance: He is not ill-appearing or toxic-appearing.   HENT:      Head: Normocephalic.   Eyes:      Pupils: Pupils are equal, round, and reactive to light.   Cardiovascular:      Rate and Rhythm: Normal rate.   Pulmonary:      Effort: Pulmonary effort is normal.   Musculoskeletal:      Comments: Neurovascular intact distal right upper extremity   Skin:     Comments: Healing skin tear to the volar right forearm.  No purulent drainage.  Removing the dressing was difficult as it was adhered to the skin and started some bleeding after removed.   Neurological:      General: No focal " deficit present.      Mental Status: He is alert.   Psychiatric:         Mood and Affect: Mood normal.         Assessment/Plan:     1. Skin tear of right forearm without complication, subsequent encounter          Wound is healing.  Continue with antibiotic.  Dressing change performed.  Educated patient on purchasing supplies to perform ongoing wound care at home.  Discussed signs and symptoms that require need to follow-up    Differential diagnosis, natural history, supportive care, and indications for immediate follow-up discussed.    Advised the patient to follow-up with the primary care physician for recheck, reevaluation, and consideration of further management.    Please note that this dictation was created using voice recognition software. I have made reasonable attempt to correct obvious errors, but I expect that there are errors of grammar and possibly content that I did not discover before finalizing the note.    This note was electronically signed by CRISTIANA Stokes

## 2024-04-03 ENCOUNTER — TELEPHONE (OUTPATIENT)
Dept: HEALTH INFORMATION MANAGEMENT | Facility: OTHER | Age: 79
End: 2024-04-03
Payer: MEDICARE

## 2024-04-03 PROCEDURE — RXMED WILLOW AMBULATORY MEDICATION CHARGE

## 2024-04-03 PROCEDURE — RXMED WILLOW AMBULATORY MEDICATION CHARGE: Performed by: STUDENT IN AN ORGANIZED HEALTH CARE EDUCATION/TRAINING PROGRAM

## 2024-04-05 ENCOUNTER — PHARMACY VISIT (OUTPATIENT)
Dept: PHARMACY | Facility: MEDICAL CENTER | Age: 79
End: 2024-04-05
Payer: COMMERCIAL

## 2024-04-11 ENCOUNTER — NON-PROVIDER VISIT (OUTPATIENT)
Dept: CARDIOLOGY | Facility: MEDICAL CENTER | Age: 79
End: 2024-04-11
Payer: MEDICARE

## 2024-04-11 PROCEDURE — 93298 REM INTERROG DEV EVAL SCRMS: CPT | Mod: 26 | Performed by: INTERNAL MEDICINE

## 2024-04-22 PROCEDURE — RXMED WILLOW AMBULATORY MEDICATION CHARGE: Performed by: STUDENT IN AN ORGANIZED HEALTH CARE EDUCATION/TRAINING PROGRAM

## 2024-04-23 ENCOUNTER — PHARMACY VISIT (OUTPATIENT)
Dept: PHARMACY | Facility: MEDICAL CENTER | Age: 79
End: 2024-04-23
Payer: COMMERCIAL

## 2024-05-01 ENCOUNTER — HOSPITAL ENCOUNTER (EMERGENCY)
Facility: MEDICAL CENTER | Age: 79
End: 2024-05-01
Attending: EMERGENCY MEDICINE
Payer: MEDICARE

## 2024-05-01 ENCOUNTER — OFFICE VISIT (OUTPATIENT)
Dept: URGENT CARE | Facility: CLINIC | Age: 79
End: 2024-05-01
Payer: MEDICARE

## 2024-05-01 ENCOUNTER — APPOINTMENT (OUTPATIENT)
Dept: RADIOLOGY | Facility: MEDICAL CENTER | Age: 79
End: 2024-05-01
Attending: EMERGENCY MEDICINE
Payer: MEDICARE

## 2024-05-01 VITALS
RESPIRATION RATE: 16 BRPM | DIASTOLIC BLOOD PRESSURE: 78 MMHG | HEIGHT: 75 IN | WEIGHT: 242 LBS | SYSTOLIC BLOOD PRESSURE: 124 MMHG | BODY MASS INDEX: 30.09 KG/M2 | TEMPERATURE: 98 F | HEART RATE: 78 BPM | OXYGEN SATURATION: 95 %

## 2024-05-01 VITALS
RESPIRATION RATE: 16 BRPM | WEIGHT: 236.11 LBS | OXYGEN SATURATION: 97 % | BODY MASS INDEX: 29.36 KG/M2 | HEART RATE: 81 BPM | DIASTOLIC BLOOD PRESSURE: 47 MMHG | HEIGHT: 75 IN | TEMPERATURE: 98.2 F | SYSTOLIC BLOOD PRESSURE: 100 MMHG

## 2024-05-01 DIAGNOSIS — R69 MULTIPLE COMORBID CONDITIONS: ICD-10-CM

## 2024-05-01 DIAGNOSIS — R60.0 PERIPHERAL EDEMA: ICD-10-CM

## 2024-05-01 DIAGNOSIS — M79.662 PAIN AND SWELLING OF LOWER LEG, LEFT: ICD-10-CM

## 2024-05-01 DIAGNOSIS — L03.116 LEFT LEG CELLULITIS: ICD-10-CM

## 2024-05-01 DIAGNOSIS — Z79.899 POLYPHARMACY: ICD-10-CM

## 2024-05-01 DIAGNOSIS — M79.89 PAIN AND SWELLING OF LOWER LEG, LEFT: ICD-10-CM

## 2024-05-01 LAB
ALBUMIN SERPL BCP-MCNC: 3.5 G/DL (ref 3.2–4.9)
ALBUMIN/GLOB SERPL: 1.2 G/DL
ALP SERPL-CCNC: 117 U/L (ref 30–99)
ALT SERPL-CCNC: 20 U/L (ref 2–50)
ANION GAP SERPL CALC-SCNC: 10 MMOL/L (ref 7–16)
AST SERPL-CCNC: 25 U/L (ref 12–45)
BASOPHILS # BLD AUTO: 0.6 % (ref 0–1.8)
BASOPHILS # BLD: 0.03 K/UL (ref 0–0.12)
BILIRUB SERPL-MCNC: 1.3 MG/DL (ref 0.1–1.5)
BUN SERPL-MCNC: 29 MG/DL (ref 8–22)
CALCIUM ALBUM COR SERPL-MCNC: 9.2 MG/DL (ref 8.5–10.5)
CALCIUM SERPL-MCNC: 8.8 MG/DL (ref 8.4–10.2)
CHLORIDE SERPL-SCNC: 105 MMOL/L (ref 96–112)
CO2 SERPL-SCNC: 23 MMOL/L (ref 20–33)
CREAT SERPL-MCNC: 1.31 MG/DL (ref 0.5–1.4)
CRP SERPL HS-MCNC: 0.8 MG/DL (ref 0–0.75)
EOSINOPHIL # BLD AUTO: 0.19 K/UL (ref 0–0.51)
EOSINOPHIL NFR BLD: 3.7 % (ref 0–6.9)
ERYTHROCYTE [DISTWIDTH] IN BLOOD BY AUTOMATED COUNT: 57.1 FL (ref 35.9–50)
ERYTHROCYTE [SEDIMENTATION RATE] IN BLOOD BY WESTERGREN METHOD: 11 MM/HOUR (ref 0–20)
GFR SERPLBLD CREATININE-BSD FMLA CKD-EPI: 55 ML/MIN/1.73 M 2
GLOBULIN SER CALC-MCNC: 3 G/DL (ref 1.9–3.5)
GLUCOSE SERPL-MCNC: 77 MG/DL (ref 65–99)
HCT VFR BLD AUTO: 33.2 % (ref 42–52)
HGB BLD-MCNC: 10.6 G/DL (ref 14–18)
IMM GRANULOCYTES # BLD AUTO: 0.02 K/UL (ref 0–0.11)
IMM GRANULOCYTES NFR BLD AUTO: 0.4 % (ref 0–0.9)
LYMPHOCYTES # BLD AUTO: 0.32 K/UL (ref 1–4.8)
LYMPHOCYTES NFR BLD: 6.2 % (ref 22–41)
MCH RBC QN AUTO: 29.9 PG (ref 27–33)
MCHC RBC AUTO-ENTMCNC: 31.9 G/DL (ref 32.3–36.5)
MCV RBC AUTO: 93.8 FL (ref 81.4–97.8)
MONOCYTES # BLD AUTO: 0.59 K/UL (ref 0–0.85)
MONOCYTES NFR BLD AUTO: 11.3 % (ref 0–13.4)
NEUTROPHILS # BLD AUTO: 4.05 K/UL (ref 1.82–7.42)
NEUTROPHILS NFR BLD: 77.8 % (ref 44–72)
NRBC # BLD AUTO: 0 K/UL
NRBC BLD-RTO: 0 /100 WBC (ref 0–0.2)
PLATELET # BLD AUTO: 60 K/UL (ref 164–446)
PLATELETS.RETICULATED NFR BLD AUTO: 3.8 % (ref 0.6–13.1)
PMV BLD AUTO: 10.5 FL (ref 9–12.9)
POTASSIUM SERPL-SCNC: 4.2 MMOL/L (ref 3.6–5.5)
PROT SERPL-MCNC: 6.5 G/DL (ref 6–8.2)
RBC # BLD AUTO: 3.54 M/UL (ref 4.7–6.1)
SODIUM SERPL-SCNC: 138 MMOL/L (ref 135–145)
WBC # BLD AUTO: 5.2 K/UL (ref 4.8–10.8)

## 2024-05-01 PROCEDURE — 3074F SYST BP LT 130 MM HG: CPT | Performed by: REGISTERED NURSE

## 2024-05-01 PROCEDURE — 3078F DIAST BP <80 MM HG: CPT | Performed by: REGISTERED NURSE

## 2024-05-01 PROCEDURE — 99215 OFFICE O/P EST HI 40 MIN: CPT | Performed by: REGISTERED NURSE

## 2024-05-01 RX ORDER — CEPHALEXIN 500 MG/1
500 CAPSULE ORAL 3 TIMES DAILY
Qty: 21 CAPSULE | Refills: 0 | Status: ACTIVE | OUTPATIENT
Start: 2024-05-01 | End: 2024-05-08

## 2024-05-01 ASSESSMENT — ENCOUNTER SYMPTOMS
FEVER: 0
HEADACHES: 0
DIZZINESS: 0
SHORTNESS OF BREATH: 0

## 2024-05-01 ASSESSMENT — FIBROSIS 4 INDEX
FIB4 SCORE: 7.27
FIB4 SCORE: 7.27

## 2024-05-01 NOTE — ED TRIAGE NOTES
Chief Complaint   Patient presents with    Leg Swelling     LLE x2 days. Reports concern for DVT. Denies CP or SOB. Endorses hx of blood clots but states he can not be on a blood thinner d/t previous surgery on his liver. Pt. Reports he has a vascular filter in previously placed in this leg.      Physical Exam  Pulmonary:      Effort: Pulmonary effort is normal.   Skin:     General: Skin is warm and dry.   Neurological:      Mental Status: He is alert.

## 2024-05-01 NOTE — ED NOTES
Medication history reviewed with pt and pts wife. Med rec is complete.  Allergies reviewed, per pt  Interviewed pt with wife at bedside with permission from pt.    Pts wife reports that pt stopped his DONEPEZIL 5MG about 3 weeks, he was have bad diarrhea.       Pt and pts wife reports that pt had not used his KETOCONAZOLE 2% cream, TRIAMCINOLONE 0.1% cream or taken his HYDROXYZINE 25MG in the last 30 days or longer.      Patient has not had any outpatient antibiotics in the last 30 days.    Pt is not on any anticoagulants

## 2024-05-01 NOTE — ED PROVIDER NOTES
ED Provider Note    CHIEF COMPLAINT  Chief Complaint   Patient presents with    Leg Swelling     LLE x2 days. Reports concern for DVT. Denies CP or SOB. Endorses hx of blood clots but states he can not be on a blood thinner d/t previous surgery on his liver. Pt. Reports he has a vascular filter in previously placed in this leg.        EXTERNAL RECORDS REVIEWED  Outpatient Notes patient was seen at [facility] urgent care earlier today for swelling of the left lower extremity he has a history of chronic DVT in the left femoral vein and had a filter he is not on any anticoagulation.  He was sent to the emergency department for an ultrasound and further evaluation.    HPI/ROS  LIMITATION TO HISTORY   Select: : None  OUTSIDE HISTORIAN(S):  Significant other the patient's wife states that he had a filter placed after his last DVT in 2017 that he got when he was diagnosed with colon cancer with mets to the liver.    Torey Lima is a 78 y.o. male who presents because he was sent by the urgent care for increased swelling and some redness in his left lower extremity for the last 2 days.  He denies any pain in the chest or shortness of breath.  He has a history of colon cancer with liver metastasis and cirrhosis.  He receives paracentesis once every 3 months and he has had esophageal varices banded in the past.  Patient states that he has noticed increased swelling in his leg for the last 2 days he also has some increased redness he denies any falls or trauma.  He denies any numbness or tingling distal to the swelling.  He states that he is not allowed to be on any blood thinning medications because of his history of GI bleeding cirrhosis on liver disease.    PAST MEDICAL HISTORY   has a past medical history of Arrhythmia (April 1, 2023), ASCVD (arteriosclerotic cardiovascular disease) (12/14/2012), BMI 33.0-33.9,adult (02/15/2013), Breath shortness (April 1, 2023), Cancer (HCC) (10/10-6/11), Chickenpox, Colon cancer (HCC)  (12/06/2010), Elevated CEA (12/06/2010), Armenian measles, GI bleed, HLD (hyperlipidemia) (06/20/2013), HTN (hypertension) (06/20/2013), Hypertension, Impaired fasting glucose (08/02/2012), Liver cirrhosis (HCC), Liver lesion (12/06/2010), Mixed hyperlipidemia (06/20/2013), Obesity (BMI 30-39.9) (12/14/2017), Peripheral neuropathy, 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    SURGICAL HISTORY   has a past surgical history that includes low anterior resection robotic (8/10/2010); cath placement (9/20/2010); hip arthroplasty mis total; other; other (comments); cath removal (7/20/2011); full thickness block resection (4/11/2012); gastroscopy with banding (4/3/2016); gastroscopy with banding (10/25/2016); colon resection; hip replacement, total; lumbar laminectomy diskectomy (5/25/2017); and umbilical hernia repair (N/A, 10/10/2023).    FAMILY HISTORY  Family History   Problem Relation Age of Onset    Heart Disease Mother     Cancer Mother     Sleep Apnea Neg Hx        SOCIAL HISTORY  Social History     Tobacco Use    Smoking status: Former     Types: Cigars    Smokeless tobacco: Never    Tobacco comments:     1 cigar per week.   Vaping Use    Vaping Use: Never used   Substance and Sexual Activity    Alcohol use: Not Currently     Alcohol/week: 1.8 oz     Types: 1 Glasses of wine, 1 Cans of beer, 1 Standard drinks or equivalent per week     Comment: minimal, Pt stop drinking since the beginning of April 2016    Drug use: No    Sexual activity: Yes     Partners: Female       CURRENT MEDICATIONS  Home Medications       Reviewed by Sara Galloway (Pharmacy Tech) on 05/01/24 at 1615  Med List Status: Complete     Medication Last Dose Status   acetaminophen (TYLENOL) 500 MG Tab > 1 week Active   atorvastatin (LIPITOR) 40 MG Tab 4/30/2024 Active   Cholecalciferol  "(VITAMIN D3) 1000 UNIT Cap 5/1/2024 Active   donepezil (ARICEPT) 5 MG Tab > 3 weeks Active   ferrous sulfate 325 (65 Fe) MG tablet 5/1/2024 Active   folic acid (FOLVITE) 1 MG Tab 5/1/2024 Active   metoprolol SR (TOPROL XL) 25 MG TABLET SR 24 HR 5/1/2024 Active   potassium chloride SA (KDUR) 20 MEQ Tab CR 5/1/2024 Active   spironolactone (ALDACTONE) 50 MG Tab 5/1/2024 Active   torsemide (DEMADEX) 10 MG tablet 5/1/2024 Active   ursodiol (JUNE 250) 250 MG Tab 5/1/2024 Active                    ALLERGIES  Allergies   Allergen Reactions    Influenza Virus Vaccine      Guillan Dulce     Influenza Virus Vaccine Live      Guillan Dulce     Anticoagulant Sodium Citrate [Sodium Citrate]      HIGH BLEEDING RISK    Lisinopril Cough       PHYSICAL EXAM  VITAL SIGNS: /72   Pulse 86   Temp 36.8 °C (98.2 °F) (Temporal)   Resp 18   Ht 1.905 m (6' 3\")   Wt 107 kg (236 lb 1.8 oz)   SpO2 94%   BMI 29.51 kg/m²      Constitutional: Well developed, Well nourished, No acute distress, Non-toxic appearance.   HEENT: Normocephalic, Atraumatic,  external ears normal, pharynx pink,  Mucous  Membranes moist, No rhinorrhea or mucosal edema  Eyes: PERRL, EOMI, Conjunctiva normal, No discharge.   Neck: Normal range of motion, No tenderness, Supple, No stridor.   Lymphatic: No lymphadenopathy    Cardiovascular: Regular Rate and Rhythm, No murmurs,  rubs, or gallops.   Thorax & Lungs: Lungs clear to auscultation bilaterally, No respiratory distress, No wheezes, rhales or rhonchi, No chest wall tenderness.   Abdomen: Bowel sounds normal, Soft, non tender, non distended,  No pulsatile masses., no rebound guarding or peritoneal signs.   Skin: Warm, Dry, No erythema, No rash,   Back:  No CVA tenderness,  No spinal tenderness, bony crepitance step offs or instability.   Extremities: Equal, intact distal pulses, No cyanosis, clubbing positive edema 2+ to the left lower extremity compared to the right he has had a contusion to his anterior shin " and some erythema below that approximately 6 cm x 4 cm it is slightly warm but not tender.,  No tenderness.   Musculoskeletal: Good range of motion in all major joints. No tenderness to palpation or major deformities noted.   Neurologic: Alert & oriented No focal deficits noted.  Psychiatric: Affect normal, Judgment normal, Mood normal.      EKG/LABS  Results for orders placed or performed during the hospital encounter of 05/01/24   CBC WITH DIFFERENTIAL   Result Value Ref Range    WBC 5.2 4.8 - 10.8 K/uL    RBC 3.54 (L) 4.70 - 6.10 M/uL    Hemoglobin 10.6 (L) 14.0 - 18.0 g/dL    Hematocrit 33.2 (L) 42.0 - 52.0 %    MCV 93.8 81.4 - 97.8 fL    MCH 29.9 27.0 - 33.0 pg    MCHC 31.9 (L) 32.3 - 36.5 g/dL    RDW 57.1 (H) 35.9 - 50.0 fL    Platelet Count 60 (L) 164 - 446 K/uL    MPV 10.5 9.0 - 12.9 fL    Neutrophils-Polys 77.80 (H) 44.00 - 72.00 %    Lymphocytes 6.20 (L) 22.00 - 41.00 %    Monocytes 11.30 0.00 - 13.40 %    Eosinophils 3.70 0.00 - 6.90 %    Basophils 0.60 0.00 - 1.80 %    Immature Granulocytes 0.40 0.00 - 0.90 %    Nucleated RBC 0.00 0.00 - 0.20 /100 WBC    Neutrophils (Absolute) 4.05 1.82 - 7.42 K/uL    Lymphs (Absolute) 0.32 (L) 1.00 - 4.80 K/uL    Monos (Absolute) 0.59 0.00 - 0.85 K/uL    Eos (Absolute) 0.19 0.00 - 0.51 K/uL    Baso (Absolute) 0.03 0.00 - 0.12 K/uL    Immature Granulocytes (abs) 0.02 0.00 - 0.11 K/uL    NRBC (Absolute) 0.00 K/uL   Comp Metabolic Panel   Result Value Ref Range    Sodium 138 135 - 145 mmol/L    Potassium 4.2 3.6 - 5.5 mmol/L    Chloride 105 96 - 112 mmol/L    Co2 23 20 - 33 mmol/L    Anion Gap 10.0 7.0 - 16.0    Glucose 77 65 - 99 mg/dL    Bun 29 (H) 8 - 22 mg/dL    Creatinine 1.31 0.50 - 1.40 mg/dL    Calcium 8.8 8.4 - 10.2 mg/dL    Correct Calcium 9.2 8.5 - 10.5 mg/dL    AST(SGOT) 25 12 - 45 U/L    ALT(SGPT) 20 2 - 50 U/L    Alkaline Phosphatase 117 (H) 30 - 99 U/L    Total Bilirubin 1.3 0.1 - 1.5 mg/dL    Albumin 3.5 3.2 - 4.9 g/dL    Total Protein 6.5 6.0 - 8.2 g/dL     Globulin 3.0 1.9 - 3.5 g/dL    A-G Ratio 1.2 g/dL   CRP QUANTITIVE (NON-CARDIAC)   Result Value Ref Range    Stat C-Reactive Protein 0.80 (H) 0.00 - 0.75 mg/dL   ESTIMATED GFR   Result Value Ref Range    GFR (CKD-EPI) 55 (A) >60 mL/min/1.73 m 2   IMMATURE PLT FRACTION   Result Value Ref Range    Imm. Plt Fraction 3.8 0.6 - 13.1 %       I have independently interpreted this EKG    RADIOLOGY/PROCEDURES   I have independently interpreted the diagnostic imaging associated with this visit and am waiting the final reading from the radiologist.       Radiologist interpretation:  US-EXTREMITY VENOUS LOWER UNILAT LEFT         No DVT    COURSE & MEDICAL DECISION MAKING    ASSESSMENT, COURSE AND PLAN  Care Narrative: Torey Lima is a 78 y.o. male who presents because he was sent by the urgent care for increased swelling and some redness in his left lower extremity for the last 2 days.  He denies any pain in the chest or shortness of breath.  He has a history of colon cancer with liver metastasis and cirrhosis.  He receives paracentesis once every 3 months and he has had esophageal varices banded in the past.  Patient states that he has noticed increased swelling in his leg for the last 2 days he also has some increased redness he denies any falls or trauma.  He denies any numbness or tingling distal to the swelling.  He states that he is not allowed to be on any blood thinning medications because of his history of GI bleeding cirrhosis on liver disease.  On physical exam he has edema to his left lower extremity that is 2+ compared to the right.  He has a hematoma on his anterior shin and some surrounding warm erythema there is no fluctuance.  Distally he is neurovascularly intact with normal sensation normal cap refill normal tendon function.  He has no posterior venous coning or Homans' sign            ADDITIONAL PROBLEMS MANAGED  DVT July 2017  SVT  Atrial fibrillation  Obesity secondary  Malignant neoplasm of  liver  Cirrhosis with ascites      DISPOSITION AND DISCUSSIONS    I ordered some labs to make sure he does not have an elevated white count this could be also small cellulitis.  I did order the ultrasound of the lower extremity to evaluate for acute or chronic DVT.    His white blood cell count is normal at 5.2.  Hemoglobin low at 10.6 but this is stable for him.  His platelet count is also low at 60 but this is stable for him as well.  He is 77% neutrophils.  Comprehensive metabolic panel is a normal glucose normal electrolytes and normal liver function tests BUN is slightly evaded at 29 creatinine 1.31.  Patient's CRP is elevated at 0.8.    Patient's duplex ultrasound is negative for any deep venous thrombosis.  Because of the erythema I think that he may have a little bit of a cellulitis.  I will place him on Keflex for this and I advised him to return for any increased redness weeping or worsening symptoms.    I have discussed management of the patient with the following physicians and NICOLLE's:  none    Discussion of management with other Hospitals in [place] or appropriate source(s): Pharmacy regarding antibiotic choice      Escalation of care considered, and ultimately not performed:none    Barriers to care at this time, including but not limited to:  none.     Decision tools and prescription drugs considered including, but not limited to: Antibiotics keflex .      The patient will return for new or worsening symptoms and is stable at the time of discharge.    The patient is referred to a primary physician for blood pressure management, diabetic screening, and for all other preventative health concerns.        DISPOSITION:  Patient will be discharged home in stable condition.    FOLLOW UP:  Fabián Urena M.D.  [place] [id number] [phone]    Call in 2 days  for recheck      OUTPATIENT MEDICATIONS:  New Prescriptions    CEPHALEXIN (KEFLEX) 500 MG CAP    Take 1 Capsule by mouth 3 times a day for 7 days.        FINAL DIAGNOSIS  1. Left leg cellulitis    2. Peripheral edema           Electronically signed by: Liliana Langston M.D., 5/1/2024 3:33 PM

## 2024-05-01 NOTE — PROGRESS NOTES
Subjective:   Torey Lima is a 78 y.o. male who presents for Other (Possible lower left leg infection, redness, swollen  x 1 day )    HPI  Reports some swelling of the left lower extremity in the past but yesterday developed significantly worse left lower leg redness, swelling. Tried a topical cream which did not help. Denies hx of blood clots but in chart I do see chronic DVT of left femoral vein which had a filter. No recent long travel. Does not smoke. Has hx of colon CA, cirrhosis, esophageal varices, anemia, neoplasm of liver.  Is not on anticoagulation.    Review of Systems   Constitutional:  Negative for fever.   Respiratory:  Negative for shortness of breath.    Cardiovascular:  Negative for chest pain.   Neurological:  Negative for dizziness and headaches.       Allergies   Allergen Reactions    Influenza Virus Vaccine Live      Guillan [place]     Anticoagulant Sodium Citrate [Sodium Citrate]      HIGH BLEEDING RISK    Influenza Virus Vaccine Unspecified    Lisinopril Cough       Patient Active Problem List    Diagnosis Date Noted    Risk for falls 02/27/2024    Paroxysmal SVT (supraventricular tachycardia) (HCC) 04/21/2023    Irregular heartbeat- A. 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    Cerebral atrophy (HCC)-MRI brain 2019, mild diffuse cerebral substance loss 06/25/2022    Spinal enthesopathy of thoracic region (HCC) 04/07/2020    Secondary malignant neoplasm of liver (HCC) 04/23/2019    Thrombocytopenia, unspecified (HCC)- from previous chemorx 04/23/2019    Anemia, chronic disease 11/13/2018    Chronic deep vein thrombosis (DVT) of femoral vein of left lower extremity (HCC)- s/p IVC filter 2016 11/13/2018    Portal hypertension with esophageal varices (HCC) 11/13/2018    Generalized edema 12/14/2017    Edema of left lower  extremity- due to dvt july 2017 09/19/2017    BPH (benign prostatic hyperplasia) 08/11/2017    Peripheral neuropathy due to chemotherapy (HCC) 08/11/2017    H/O colon cancer, stage IV- neg colonoscopy 2012/2018 at [facility] 05/20/2017    History of esophageal varices 10/24/2016    Portal vein thrombosis- on CT [email or web address] 2/2015 04/14/2016    Cirrhosis of liver with ascites (HCC)- ? DUE TO OLD HEP B, CHEMO RX,  OR THERMO RAD OF MAGNANT LESION 04/14/2016    Secondary esophageal varices without bleeding (HCC)- BANDED 4/2016- repeat egd tbd 10//29/16- [facility] 04/04/2016    Gastric varices- s/p BRTO procedure at [facility] 04/04/2016    Gastrointestinal hemorrhage with hematemesis- recurrernt from varices 04/03/2016    Essential hypertension 06/20/2013    ASCVD (arteriosclerotic cardiovascular disease)subtotalled small distal LCx and 40% LAD med rx by cath 12/13/12 12/14/2012    Vitamin D deficiency 08/02/2012    Overlapping malignant neoplasm of colon (HCC)Overlapping malignant neoplasm of colon (HCC)--2010 left hemicolectomy; no colostomy; ablation of liver met at [email or web address] 1/2011- MRI abd 3/2018 no change in per 12/06/2010    Liver lesion- ablation of malignant met from colon ca 2011- f/u [facility] q 6 mos - stable MRI abd 3/2018- redo annually 12/06/2010    Drug-induced polyneuropathy (HCC) 12/06/2010       Current Outpatient Medications Ordered in Epic   Medication Sig Dispense Refill    acetaminophen (TYLENOL) 500 MG Tab Take 1,000 mg by mouth every 6 hours as needed. Last dosage was on 3/27/24 of 1000 MG around 19:30.      atorvastatin (LIPITOR) 40 MG Tab Take 1 Tablet by mouth every evening. 90 Tablet 4    Cholecalciferol (VITAMIN D3) 1000 UNIT Cap Take 1 Capsule by mouth every day. 100 Capsule 3    spironolactone (ALDACTONE) 50 MG Tab Take 1 Tablet by mouth 2 times a day. 180 Tablet 3    ferrous sulfate 325 (65 Fe) MG tablet Take 1 Tablet by mouth every morning with breakfast. 100 Tablet 3    torsemide (DEMADEX) 10 MG tablet Take 1 Tablet by  mouth every day. 90 Tablet 4    folic acid (FOLVITE) 1 MG Tab Take 1 Tablet by mouth every day. 100 Tablet 4    donepezil (ARICEPT) 5 MG Tab Take 1 Tablet by mouth every evening. 100 Tablet 3    metoprolol SR (TOPROL XL) 25 MG TABLET SR 24 HR Take 0.5 Tablets by mouth every day. 45 Tablet 1    metoprolol tartrate (LOPRESSOR) 25 MG Tab Take 1 tablet (25 mg total) by mouth in the morning and 1 tablet (25 mg total) in the evening. 180 Tablet 0    potassium chloride SA (KDUR) 20 MEQ Tab CR Take 1 tablet (20 mEq total) by mouth daily 90 Tablet 3    ursodiol (JUNE 250) 250 MG Tab Take 1 tablet (250 mg total) by mouth every morning, afternoon, and evening. 270 Tablet 3    Propylene Glycol (SYSTANE BALANCE OP) Administer  into affected eye(s).      ketoconazole (NIZORAL) 2 % Cream Apply  topically every day.      triamcinolone acetonide (KENALOG) 0.1 % Cream Apply  topically 2 times a day.      hydrOXYzine HCl (ATARAX) 25 MG Tab Take 1 Tablet by mouth at bedtime as needed for Anxiety (for sleep study) for up to 1 dose. One tab is a one day dose 1 Tablet 0     No current Epic-ordered facility-administered medications on file.       Past Surgical History:   Procedure Laterality Date    UMBILICAL HERNIA REPAIR N/A 10/10/2023    Procedure: OPEN UMBILICAL HERNIA REPAIR;  Surgeon: Julian Ennis M.D.;  Location: SURGERY [facility];  Service: General    LUMBAR LAMINECTOMY DISKECTOMY  5/25/2017    Procedure:  LAMINECTOMY LUMBAR  4 TO SACRAL 1;  Surgeon: Felton Escobar M.D.;  Location: SURGERY [facility];  Service:     GASTROSCOPY WITH BANDING  10/25/2016    Procedure: GASTROSCOPY WITH BANDING;  Surgeon: Pierre Waggoner M.D.;  Location: ENDOSCOPY [facility];  Service:     GASTROSCOPY WITH BANDING  4/3/2016    Procedure: GASTROSCOPY WITH BANDING;  Surgeon: Cayetano Stovall M.D.;  Location: ENDOSCOPY [facility];  Service:     FULL THICKNESS BLOCK RESECTION  4/11/2012    Performed by LANI BOWMAN at SURGERY  [facility]    CATH REMOVAL  7/20/2011    Performed by RUBY RUIZ at SURGERY [facility]    CATH PLACEMENT  9/20/2010    Performed by RUBY RUIZ at SURGERY [facility]    LOW ANTERIOR RESECTION ROBOTIC  8/10/2010    Performed by RUBY RUIZ at SURGERY [facility] ORS    COLON RESECTION      HIP ARTHROPLASTY MIS TOTAL      rt    HIP REPLACEMENT, TOTAL      OTHER      Cholecystectomy    OTHER (COMMENTS)      liver surgery         Social History     Tobacco Use    Smoking status: Former     Types: Cigars    Smokeless tobacco: Never    Tobacco comments:     1 cigar per week.   Vaping Use    Vaping Use: Never used   Substance Use Topics    Alcohol use: Not Currently     Alcohol/week: 1.8 oz     Types: 1 Glasses of wine, 1 Cans of beer, 1 Standard drinks or equivalent per week     Comment: minimal, Pt stop drinking since the beginning of April 2016    Drug use: No       family history includes Cancer in his mother; Heart Disease in his mother.     Problem list, medications, and allergies reviewed by myself today in Epic.     Objective:   /78   Pulse 78   Temp 36.7 °C (98 °F) (Temporal)   Resp 16   Ht 1.905 m (6' 3\")   Wt 110 kg (242 lb)   SpO2 95%   BMI 30.25 kg/m²     Physical Exam  Vitals and nursing note reviewed.   Constitutional:       Appearance: Normal appearance. He is not ill-appearing or toxic-appearing.   HENT:      Mouth/Throat:      Mouth: Mucous membranes are moist.   Eyes:      Pupils: Pupils are equal, round, and reactive to light.   Cardiovascular:      Rate and Rhythm: Normal rate.   Pulmonary:      Effort: Pulmonary effort is normal. No respiratory distress.      Breath sounds: Normal breath sounds.   Musculoskeletal:         General: Normal range of motion.      Cervical back: Normal range of motion.      Left lower leg: Edema (2+ pitting edema left lower extremity) present.   Skin:     General: Skin is warm and dry.      Capillary Refill: Capillary refill " takes less than 2 seconds.      Findings: Erythema (Mid left shin) present.   Neurological:      General: No focal deficit present.      Mental Status: He is alert and oriented to person, place, and time.   Psychiatric:         Mood and Affect: Mood normal.         Assessment/Plan:     I personally reviewed prior external notes and test results pertinent to today's visit as well as additional imaging and testing completed in clinic today.     1. Pain and swelling of lower leg, left        2. Multiple comorbid conditions        3. Polypharmacy          Very pleasant 78-year-old male presenting with significant increase in left lower extremity swelling, pain and redness.  History of DVT chronic in the left femoral vein is not on anticoagulation.  Also has had few different types of cancer in the past.  Multiple comorbidities and polypharmacy.  Vital signs are reassuring.  On exam he does have approximately 2+ pitting edema of the left lower extremity there is some redness mid shin, no notable tenderness with palpation.  Given patient's comorbidities he requires higher level of care which we discussed at length during the visit and he is agreeable to this, will go to [facility] and have his wife drive him there, transfer center notified    Please note that this dictation was created using voice recognition software. I have made every reasonable attempt to correct obvious errors, but I expect that there are errors of grammar and possibly content that I did not discover before finalizing the note.    This note was electronically signed by CRISTIANA Stokes

## 2024-05-02 NOTE — ED NOTES
Pt provided with DC paper work and eduction on new medications and follow up care. Pt family requested wheelchair for pt to leave ER. Pt ambulated from gurney to wheelchair with no difficulty, family wheeled pt out of Er.

## 2024-05-06 PROCEDURE — RXMED WILLOW AMBULATORY MEDICATION CHARGE: Performed by: STUDENT IN AN ORGANIZED HEALTH CARE EDUCATION/TRAINING PROGRAM

## 2024-05-07 ENCOUNTER — PHARMACY VISIT (OUTPATIENT)
Dept: PHARMACY | Facility: MEDICAL CENTER | Age: 79
End: 2024-05-07
Payer: COMMERCIAL

## 2024-05-12 ENCOUNTER — NON-PROVIDER VISIT (OUTPATIENT)
Dept: CARDIOLOGY | Facility: MEDICAL CENTER | Age: 79
End: 2024-05-12
Payer: MEDICARE

## 2024-05-12 PROCEDURE — 93298 REM INTERROG DEV EVAL SCRMS: CPT | Mod: 26 | Performed by: INTERNAL MEDICINE

## 2024-05-15 ENCOUNTER — HOSPITAL ENCOUNTER (OUTPATIENT)
Dept: RADIOLOGY | Facility: MEDICAL CENTER | Age: 79
End: 2024-05-15
Attending: INTERNAL MEDICINE
Payer: MEDICARE

## 2024-05-15 DIAGNOSIS — R18.8 OTHER ASCITES: ICD-10-CM

## 2024-05-15 RX ORDER — LIDOCAINE HYDROCHLORIDE 10 MG/ML
INJECTION, SOLUTION EPIDURAL; INFILTRATION; INTRACAUDAL; PERINEURAL
Status: COMPLETED
Start: 2024-05-15 | End: 2024-05-15

## 2024-05-15 RX ORDER — ALBUMIN (HUMAN) 12.5 G/50ML
50 SOLUTION INTRAVENOUS ONCE
Status: COMPLETED | OUTPATIENT
Start: 2024-05-15 | End: 2024-05-15

## 2024-05-15 RX ORDER — ALBUMIN (HUMAN) 12.5 G/50ML
SOLUTION INTRAVENOUS
Status: COMPLETED
Start: 2024-05-15 | End: 2024-05-15

## 2024-05-15 RX ADMIN — ALBUMIN (HUMAN) 50 G: 12.5 SOLUTION INTRAVENOUS at 10:00

## 2024-05-15 NOTE — PROGRESS NOTES
US guided therapeutic paracentesis done by Darcie Marie;(no H&P required as this is a NON SEDATION procedure) Left LQ access site; dressing CDI; 6350 mL obtained and 6350 ml discarded. Pt tolerated the procedure well; pt hemodynamically stable pre/intra/post procedure. IV started, albumin 50 grams given per protocol, IV d/c'ed upon completion of albumin administration. All questions and concerns answered prior to d/c. Patient provided with all appropriate education for procedure. Pt d/c home.

## 2024-05-25 ENCOUNTER — OFFICE VISIT (OUTPATIENT)
Dept: URGENT CARE | Facility: CLINIC | Age: 79
End: 2024-05-25
Payer: MEDICARE

## 2024-05-25 VITALS
WEIGHT: 236 LBS | SYSTOLIC BLOOD PRESSURE: 128 MMHG | BODY MASS INDEX: 29.34 KG/M2 | DIASTOLIC BLOOD PRESSURE: 88 MMHG | HEART RATE: 72 BPM | HEIGHT: 75 IN | TEMPERATURE: 98.3 F | OXYGEN SATURATION: 98 % | RESPIRATION RATE: 16 BRPM

## 2024-05-25 DIAGNOSIS — S51.811A SKIN TEAR OF RIGHT FOREARM WITHOUT COMPLICATION, INITIAL ENCOUNTER: ICD-10-CM

## 2024-05-25 DIAGNOSIS — S51.812A SKIN TEAR OF LEFT FOREARM WITHOUT COMPLICATION, INITIAL ENCOUNTER: ICD-10-CM
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
## 2024-05-25 PROCEDURE — 3074F SYST BP LT 130 MM HG: CPT | Performed by: NURSE PRACTITIONER

## 2024-05-25 PROCEDURE — 3079F DIAST BP 80-89 MM HG: CPT | Performed by: NURSE PRACTITIONER

## 2024-05-25 PROCEDURE — 99214 OFFICE O/P EST MOD 30 MIN: CPT | Performed by: NURSE PRACTITIONER

## 2024-05-25 ASSESSMENT — FIBROSIS 4 INDEX: FIB4 SCORE: 7.36

## 2024-05-25 NOTE — PROGRESS NOTES
Torey Lima is a 78 y.o. male who presents for Fall (About 1 hour, fell and scraped both arms, skin tear )      HPI  This is a new problem. Torey Lima is a 78 y.o. patient who presents to urgent care with c/o: Fell at home about an hour ago.  GLF. No LOC. He was sitting down on a stool and slipped.  He hit both his right forearm and his left forearm on unknown objects in his office.  His wife washed it with wound solution that she had at home and then covered it.  He has had skin tears in the past.  He has a high bleeding risk due to underlying comorbidities.  Denies head, neck or back pain. No other aggravating leaving factors.    ROS See HPI    Allergies:       Allergies   Allergen Reactions    Influenza Virus Vaccine      Gu[place]an [place]     Influenza Virus Vaccine Live      Guillan [place]     Anticoagulant Sodium Citrate [Sodium Citrate]      HIGH BLEEDING RISK    Lisinopril Cough       PMSFS Hx:  Past Medical History:   Diagnosis Date    Arrhythmia April 1, 2023    Dr. Hall    ASCVD (arteriosclerotic cardiovascular disease) 12/14/2012    BMI 33.0-33.9,adult 02/15/2013    Breath shortness April 1, 2023    Dr. Hall    Cancer (HCC) 10/10-6/11    colon, liver cance    Chickenpox     Colon cancer (HCC) 12/06/2010    Elevated CEA 12/06/2010    [place] measles     GI bleed     HLD (hyperlipidemia) 06/20/2013    HTN (hypertension) 06/20/2013    Hypertension     Impaired fasting glucose 08/02/2012    Liver cirrhosis (HCC)     Liver lesion 12/06/2010    Mixed hyperlipidemia 06/20/2013    Obesity (BMI 30-39.9) 12/14/2017    Peripheral neuropathy, secondary to drugs or chemicals 12/06/2010    Snoring Many Years    Wife    Stage 3a chronic kidney disease 06/25/2022    Thrombocytopenia due to drugs 04/25/2012    Vitamin d deficiency 08/02/2012     Past Surgical History:   Procedure Laterality Date    UMBILICAL HERNIA REPAIR N/A 10/10/2023    Procedure: OPEN UMBILICAL HERNIA REPAIR;  Surgeon: Julian Ennis M.D.;   Location: SURGERY [facility];  Service: General    LUMBAR LAMINECTOMY DISKECTOMY  5/25/2017    Procedure:  LAMINECTOMY LUMBAR  4 TO SACRAL 1;  Surgeon: Felton Escobar M.D.;  Location: SURGERY [facility];  Service:     GASTROSCOPY WITH BANDING  10/25/2016    Procedure: GASTROSCOPY WITH BANDING;  Surgeon: Pierre Waggoner M.D.;  Location: ENDOSCOPY [facility];  Service:     GASTROSCOPY WITH BANDING  4/3/2016    Procedure: GASTROSCOPY WITH BANDING;  Surgeon: Cayetano Stovall M.D.;  Location: ENDOSCOPY [facility];  Service:     FULL THICKNESS BLOCK RESECTION  4/11/2012    Performed by LANI BOWMAN at SURGERY [facility] ORS    CATH REMOVAL  7/20/2011    Performed by RUBY RUIZ at SURGERY [facility] ORS    CATH PLACEMENT  9/20/2010    Performed by RUBY RUIZ at SURGERY [facility] ORS    LOW ANTERIOR RESECTION ROBOTIC  8/10/2010    Performed by RUBY RUIZ at SURGERY [facility] ORS    COLON RESECTION      HIP ARTHROPLASTY MIS TOTAL      rt    HIP REPLACEMENT, TOTAL      OTHER      Cholecystectomy    OTHER (COMMENTS)      liver surgery       Family History   Problem Relation Age of Onset    Heart Disease Mother     Cancer Mother     Sleep Apnea Neg Hx      Social History     Tobacco Use    Smoking status: Former     Types: Cigars    Smokeless tobacco: Never    Tobacco comments:     1 cigar per week.   Substance Use Topics    Alcohol use: Not Currently     Alcohol/week: 1.8 oz     Types: 1 Glasses of wine, 1 Cans of beer, 1 Standard drinks or equivalent per week     Comment: minimal, Pt stop drinking since the beginning of April 2016       Problems:   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)--2010 left hemicolectomy; no colostomy; ablation of liver met at [facility] 1/2011- MRI abd 3/2018 no change in per    Liver lesion- ablation of malignant met from colon ca 2011- f/u [facility] q 6 mos -  stable MRI abd 3/2018- redo annually    Drug-induced polyneuropathy (HCC)    Vitamin D deficiency    ASCVD (arteriosclerotic cardiovascular disease)subtotalled small distal LCx and 40% LAD med rx by cath 12/13/12    Essential hypertension    Gastrointestinal hemorrhage with hematemesis- recurrernt from varices    Secondary esophageal varices without bleeding (HCC)- BANDED 4/2016- repeat egd tbd 10//29/16- [facility]    Gastric varices- s/p BRTO procedure at [facility]    Portal vein thrombosis- on CT [facility] 2/2015    Cirrhosis of liver with ascites (HCC)- ? DUE TO OLD HEP B, CHEMO RX,  OR THERMO RAD OF MAGNANT LESION    History of esophageal varices    H/O colon cancer, stage IV- neg colonoscopy 2012/2018 at [facility]    BPH (benign prostatic hyperplasia)    Peripheral neuropathy due to chemotherapy (HCC)    Edema of left lower extremity- due to dvt july 2017    Generalized edema    Anemia, chronic disease    Chronic deep vein thrombosis (DVT) of femoral vein of left lower extremity (HCC)- s/p IVC filter 2016    Portal hypertension with esophageal varices (HCC)    Secondary malignant neoplasm of liver (HCC)    Thrombocytopenia, unspecified (HCC)- from previous chemorx    Spinal enthesopathy of thoracic region (HCC)    History of Guillain-[place] syndrome    Cerebral atrophy (HCC)-MRI brain 2019, mild diffuse cerebral substance loss    Dyslipidemia    Irregular heartbeat- A. FIB ON APPLE WATCH    Obesity (BMI 30-39.9)    Obesity due to excess calories with serious comorbidity    Paroxysmal SVT (supraventricular tachycardia) (HCC)    Risk for falls       Medications:   Current Outpatient Medications on File Prior to Visit   Medication Sig Dispense Refill    acetaminophen (TYLENOL) 500 MG Tab Take 1,000 mg by mouth every 6 hours as needed.      atorvastatin (LIPITOR) 40 MG Tab Take 1 Tablet by mouth every evening. 90 Tablet 4    spironolactone (ALDACTONE) 50 MG Tab Take 1 Tablet by mouth 2 times a day. 180 Tablet 3    ferrous sulfate 325 (65  "Fe) MG tablet Take 1 Tablet by mouth every morning with breakfast. 100 Tablet 3    Cholecalciferol (VITAMIN D3) 1000 UNIT Cap Take 1 Capsule by mouth every day. 100 Capsule 3    torsemide (DEMADEX) 10 MG tablet Take 1 Tablet by mouth every day. 90 Tablet 4    folic acid (FOLVITE) 1 MG Tab Take 1 Tablet by mouth every day. 100 Tablet 4    donepezil (ARICEPT) 5 MG Tab Take 1 Tablet by mouth every evening. 100 Tablet 3    metoprolol SR (TOPROL XL) 25 MG TABLET SR 24 HR Take 0.5 Tablets by mouth every day. 45 Tablet 1     No current facility-administered medications on file prior to visit.        Objective:     /88   Pulse 72   Temp 36.8 °C (98.3 °F) (Temporal)   Resp 16   Ht 1.905 m (6' 3\")   Wt 107 kg (236 lb)   SpO2 98%   BMI 29.50 kg/m²     Physical Exam  Vitals and nursing note reviewed.   Constitutional:       Appearance: Normal appearance. He is normal weight.   Cardiovascular:      Rate and Rhythm: Normal rate.      Pulses: Normal pulses.   Pulmonary:      Effort: Pulmonary effort is normal.   Skin:     General: Skin is warm.      Capillary Refill: Capillary refill takes less than 2 seconds.             Comments: 1.Left arm dressing: Steri-Strip with Metipel dressing covered with a nonstick Telfa and then gauze    2. right arm dressing: A few Steri-Strips were applied where the skin was able to be replaced in anatomic position.  Xeroform gauze applied with Telfa nonstick dressing and then gauze wrap.  3 inch Ace wrap was applied from elbow to wrist.   Neurological:      Mental Status: He is alert and oriented to person, place, and time.   Psychiatric:         Mood and Affect: Mood normal.         Behavior: Behavior normal.         Thought Content: Thought content normal.         Assessment /Associated Orders:      1. Fall on same level from slipping, tripping or stumbling, initial encounter        2. Skin tear of right forearm without complication, initial encounter        3. Skin tear of left " forearm without complication, initial encounter              Medical Decision Making:    Torey is a very pleasant 78 y.o. male who is clinically stable at today's acute urgent care visit.  No acute distress noted.  VSS. Appropriate for outpatient care at this time.   Acute problem today with uncertain prognosis.    The patient is alerted to watch for any signs of infection (redness, pus, pain, increased swelling or fever) and call if such occurs. Home wound care instructions are provided.  His wife feels she will be able to manage his wounds at home without difficulty. Change dressing on right arm Q2 days. Left arm dressing - remove outer gauze in 2 days. Leave metipel dressing for up to 10 days.   FU prn     Discussed Dx, management options (risks,benefits, and alternatives to planned treatment), natural progression and supportive care.  Expressed understanding and the treatment plan was agreed upon.   Questions were encouraged and answered   Return to urgent care prn if new or worsening sx or if there is no improvement in condition prn.          Time I spent evaluating Torey Lima in urgent care today was 32  minutes. This time includes preparing for visit, reviewing any pertinent notes or test results, counseling/education, exam, obtaining HPI, interpretation of lab tests, medication management and documentation as indicated above.Time does not include separately billable procedures noted .       Please note that this dictation was created using voice recognition software. I have worked with consultants from the vendor as well as technical experts from [employer] to optimize the interface. I have made every reasonable attempt to correct obvious errors, but I expect that there are errors of grammar and possibly content that I did not discover before finalizing the note.  This note was electronically signed by provider

## 2024-05-27 ENCOUNTER — OFFICE VISIT (OUTPATIENT)
Dept: URGENT CARE | Facility: CLINIC | Age: 79
End: 2024-05-27
Payer: MEDICARE

## 2024-05-27 VITALS
SYSTOLIC BLOOD PRESSURE: 128 MMHG | DIASTOLIC BLOOD PRESSURE: 80 MMHG | HEART RATE: 87 BPM | WEIGHT: 243 LBS | BODY MASS INDEX: 30.21 KG/M2 | HEIGHT: 75 IN | TEMPERATURE: 99 F | RESPIRATION RATE: 19 BRPM | OXYGEN SATURATION: 98 %

## 2024-05-27 DIAGNOSIS — R21 RASH: ICD-10-CM

## 2024-05-27 DIAGNOSIS — S51.811D SKIN TEAR OF RIGHT FOREARM WITHOUT COMPLICATION, SUBSEQUENT ENCOUNTER: ICD-10-CM

## 2024-05-27 DIAGNOSIS — S41.112D SKIN TEAR OF UPPER ARM WITHOUT COMPLICATION, LEFT, SUBSEQUENT ENCOUNTER: ICD-10-CM

## 2024-05-27 PROCEDURE — 99214 OFFICE O/P EST MOD 30 MIN: CPT

## 2024-05-27 PROCEDURE — 3074F SYST BP LT 130 MM HG: CPT

## 2024-05-27 PROCEDURE — 3079F DIAST BP 80-89 MM HG: CPT

## 2024-05-27 RX ORDER — DEXAMETHASONE SODIUM PHOSPHATE 10 MG/ML
10 INJECTION INTRAMUSCULAR; INTRAVENOUS ONCE
Status: COMPLETED | OUTPATIENT
Start: 2024-05-27 | End: 2024-05-27

## 2024-05-27 RX ADMIN — DEXAMETHASONE SODIUM PHOSPHATE 10 MG: 10 INJECTION INTRAMUSCULAR; INTRAVENOUS at 15:06

## 2024-05-27 ASSESSMENT — ENCOUNTER SYMPTOMS
CHILLS: 0
FEVER: 0

## 2024-05-27 ASSESSMENT — FIBROSIS 4 INDEX: FIB4 SCORE: 7.36

## 2024-05-27 NOTE — PROGRESS NOTES
Chief Complaint   Patient presents with    Arm Injury     Rash left arm        HISTORY OF PRESENT ILLNESS: Patient is a pleasant 78 y.o. male who presents to urgent care today patient fell 2 days ago, was seen here in urgent care and had dressings to some right forearm skin tears.  He notes that the dressings have been causing some mild itching to certain areas on his arms.  Patient is here to have the dressings looked at and wounds checked.    Patient Active Problem List    Diagnosis Date Noted    Risk for falls 02/27/2024    Paroxysmal SVT (supraventricular tachycardia) (HCC) 04/21/2023    Irregular heartbeat- A. 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    Cerebral atrophy (HCC)-MRI brain 2019, mild diffuse cerebral substance loss 06/25/2022    Spinal enthesopathy of thoracic region (HCC) 04/07/2020    Secondary malignant neoplasm of liver (HCC) 04/23/2019    Thrombocytopenia, unspecified (HCC)- from previous chemorx 04/23/2019    Anemia, chronic disease 11/13/2018    Chronic deep vein thrombosis (DVT) of femoral vein of left lower extremity (HCC)- s/p IVC filter 2016 11/13/2018    Portal hypertension with esophageal varices (HCC) 11/13/2018    Generalized edema 12/14/2017    Edema of left lower extremity- due to dvt july 2017 09/19/2017    BPH (benign prostatic hyperplasia) 08/11/2017    Peripheral neuropathy due to chemotherapy (HCC) 08/11/2017    H/O colon cancer, stage IV- neg colonoscopy 2012/2018 at [facility] 05/20/2017    History of esophageal varices 10/24/2016    Portal vein thrombosis- on CT [facility] 2/2015 04/14/2016    Cirrhosis of liver with ascites (HCC)- ? DUE TO OLD HEP B, CHEMO RX,  OR THERMO RAD OF MAGNANT LESION 04/14/2016    Secondary esophageal varices without bleeding (HCC)- BANDED 4/2016- repeat egd tbd 10//29/16- c 04/04/2016     Gastric varices- s/p BRTO procedure at [facility] 04/04/2016    Gastrointestinal hemorrhage with hematemesis- recurrernt from varices 04/03/2016    Essential hypertension 06/20/2013    ASCVD (arteriosclerotic cardiovascular disease)subtotalled small distal LCx and 40% LAD med rx by cath 12/13/12 12/14/2012    Vitamin D deficiency 08/02/2012    Overlapping malignant neoplasm of colon (HCC)Overlapping malignant neoplasm of colon (HCC)--2010 left hemicolectomy; no colostomy; ablation of liver met [facility] 1/2011- MRI abd 3/2018 no change in per 12/06/2010    Liver lesion- ablation of malignant met from colon ca 2011- f/u [facility] q 6 mos - stable MRI abd 3/2018- redo annually 12/06/2010    Drug-induced polyneuropathy (HCC) 12/06/2010       Allergies:Influenza virus vaccine, Influenza virus vaccine live, Anticoagulant sodium citrate [sodium citrate], and Lisinopril    Current Outpatient Medications Ordered in Epic   Medication Sig Dispense Refill    hydrocortisone 2.5 % Cream topical cream Apply 1 Application topically 2 times a day. 453 g 0    acetaminophen (TYLENOL) 500 MG Tab Take 1,000 mg by mouth every 6 hours as needed.      atorvastatin (LIPITOR) 40 MG Tab Take 1 Tablet by mouth every evening. 90 Tablet 4    spironolactone (ALDACTONE) 50 MG Tab Take 1 Tablet by mouth 2 times a day. 180 Tablet 3    ferrous sulfate 325 (65 Fe) MG tablet Take 1 Tablet by mouth every morning with breakfast. 100 Tablet 3    Cholecalciferol (VITAMIN D3) 1000 UNIT Cap Take 1 Capsule by mouth every day. 100 Capsule 3    torsemide (DEMADEX) 10 MG tablet Take 1 Tablet by mouth every day. 90 Tablet 4    folic acid (FOLVITE) 1 MG Tab Take 1 Tablet by mouth every day. 100 Tablet 4    donepezil (ARICEPT) 5 MG Tab Take 1 Tablet by mouth every evening. 100 Tablet 3    metoprolol SR (TOPROL XL) 25 MG TABLET SR 24 HR Take 0.5 Tablets by mouth every day. 45 Tablet 1     No current Epic-ordered facility-administered medications on file.       Past Medical  "History:   Diagnosis Date    Arrhythmia 2023    Dr. Hall    ASCVD (arteriosclerotic cardiovascular disease) 2012    BMI 33.0-33.9,adult 02/15/2013    Breath shortness 2023    Dr. Hall    Cancer (HCC) 10/10-    colon, liver cance    Chickenpox     Colon cancer (HCC) 2010    Elevated CEA 2010    [place] measles     GI bleed     HLD (hyperlipidemia) 2013    HTN (hypertension) 2013    Hypertension     Impaired fasting glucose 2012    Liver cirrhosis (HCC)     Liver lesion 2010    Mixed hyperlipidemia 2013    Obesity (BMI 30-39.9) 2017    Peripheral neuropathy, secondary to drugs or chemicals 2010    Snoring Many Years    Wife    Stage 3a chronic kidney disease 2022    Thrombocytopenia due to drugs 2012    Vitamin d deficiency 2012       Social History     Tobacco Use    Smoking status: Former     Types: Cigars    Smokeless tobacco: Never    Tobacco comments:     1 cigar per week.   Vaping Use    Vaping status: Never Used   Substance Use Topics    Alcohol use: Not Currently     Alcohol/week: 1.8 oz     Types: 1 Glasses of wine, 1 Cans of beer, 1 Standard drinks or equivalent per week     Comment: minimal, Pt stop drinking since the beginning of 2016    Drug use: No       Family Status   Relation Name Status    Mo      Fa          cirrhosis    Sis 1 Alive    Bro 1 Alive    Neg Hx  (Not Specified)     Family History   Problem Relation Age of Onset    Heart Disease Mother     Cancer Mother     Sleep Apnea Neg Hx        Review of Systems   Constitutional:  Negative for chills, fever and malaise/fatigue.   Skin:  Positive for rash.        Bilateral arm skin tears       Exam:  /80   Pulse 87   Temp 37.2 °C (99 °F) (Temporal)   Resp 19   Ht 1.905 m (6' 3\")   Wt 110 kg (243 lb)   SpO2 98%   Physical Exam  Vitals reviewed.   Constitutional:       Appearance: Normal appearance.   HENT:      Head: " Normocephalic.      Right Ear: Tympanic membrane and ear canal normal. There is no impacted cerumen. Tympanic membrane is not injected or erythematous.      Left Ear: Tympanic membrane and ear canal normal. There is no impacted cerumen. Tympanic membrane is not injected or erythematous.      Mouth/Throat:      Mouth: Mucous membranes are moist.      Pharynx: Oropharynx is clear. No oropharyngeal exudate.      Tonsils: No tonsillar exudate. 0 on the right. 0 on the left.   Eyes:      General:         Right eye: No discharge.         Left eye: No discharge.      Extraocular Movements: Extraocular movements intact.      Conjunctiva/sclera: Conjunctivae normal.      Pupils: Pupils are equal, round, and reactive to light.   Cardiovascular:      Rate and Rhythm: Normal rate and regular rhythm.      Pulses: Normal pulses.      Heart sounds: Normal heart sounds. No murmur heard.  Pulmonary:      Effort: Pulmonary effort is normal. No respiratory distress.      Breath sounds: Normal breath sounds. No stridor. No wheezing.   Musculoskeletal:         General: Normal range of motion.      Cervical back: Normal range of motion.   Lymphadenopathy:      Cervical: No cervical adenopathy.   Skin:     General: Skin is warm and dry.      Capillary Refill: Capillary refill takes less than 2 seconds.      Findings: Rash present. No bruising.      Comments: Noted skin tear to the left upper arm and right forearm   Neurological:      General: No focal deficit present.      Mental Status: He is alert.      Sensory: No sensory deficit.      Motor: No weakness.   Psychiatric:         Mood and Affect: Mood normal.         Behavior: Behavior normal.         Thought Content: Thought content normal.         Judgment: Judgment normal.           Assessment/Plan:  1. Skin tear of right forearm without complication, subsequent encounter  - Referral to Wound Clinic  - hydrocortisone 2.5 % Cream topical cream; Apply 1 Application topically 2 times a  day.  Dispense: 453 g; Refill: 0    2. Skin tear of upper arm without complication, left, subsequent encounter  - Referral to Wound Clinic  - hydrocortisone 2.5 % Cream topical cream; Apply 1 Application topically 2 times a day.  Dispense: 453 g; Refill: 0    3. Rash  - dexamethasone (Decadron) injection (check route below) 10 mg    Based on physical exam along with review of systems I did place a referral for the wound care clinic, patient has an extensive skin tear that extends his whole right forearm.  Dressing is quite large and needs extensive help in an effort to properly dress and monitor for signs and symptoms of infection.  Patient given hydrocortisone cream for the itchy areas above his dressing.  Small dose of Decadron given here in the office for the rash.  Patient has a noted left upper arm skin tear that appears to be healing well, area is dry, nonbleeding.  Dressing placed here in the office and monitored.  Patient advised to plan, he is aware and agreeable.    Supportive care, differential diagnoses, and indications for immediate follow-up discussed with patient.   Pathogenesis of diagnosis discussed including typical length and natural progression.   Instructed to return to clinic or nearest emergency department for any change in condition, further concerns, or worsening of symptoms.  Patient states understanding of the plan of care and discharge instructions.  Instructed to make an appointment, for follow up, with primary care provider.    Please note that this dictation was created using voice recognition software. I have made every reasonable attempt to correct obvious errors, but I expect that there are errors of grammar and possibly content that I did not discover before finalizing the note.      Lalita CAIN

## 2024-05-30 ENCOUNTER — APPOINTMENT (OUTPATIENT)
Dept: URGENT CARE | Facility: CLINIC | Age: 79
End: 2024-05-30
Payer: MEDICARE

## 2024-05-30 ENCOUNTER — OFFICE VISIT (OUTPATIENT)
Dept: URGENT CARE | Facility: CLINIC | Age: 79
End: 2024-05-30
Payer: MEDICARE

## 2024-05-30 VITALS
RESPIRATION RATE: 14 BRPM | HEART RATE: 56 BPM | DIASTOLIC BLOOD PRESSURE: 60 MMHG | TEMPERATURE: 98.1 F | SYSTOLIC BLOOD PRESSURE: 110 MMHG | HEIGHT: 75 IN | WEIGHT: 243 LBS | BODY MASS INDEX: 30.21 KG/M2 | OXYGEN SATURATION: 96 %

## 2024-05-30 DIAGNOSIS — Z51.89 ENCOUNTER FOR WOUND CARE: ICD-10-CM

## 2024-05-30 DIAGNOSIS — S41.112D SKIN TEAR OF LEFT UPPER ARM WITHOUT COMPLICATION, SUBSEQUENT ENCOUNTER: ICD-10-CM

## 2024-05-30 ASSESSMENT — FIBROSIS 4 INDEX: FIB4 SCORE: 7.36

## 2024-05-30 NOTE — PROGRESS NOTES
Torey Lima is a 78 y.o. male who presents for Wound Check (Wound check of Rt and Lt arm.)      HPI  This is a new problem. Torey Lima is a 78 y.o. patient who presents to urgent care with c/o: Wound to bilateral arms.  He is here today for a dressing change.  He was unable to get an appointment with wound care clinic until June 4.  His wife is unable to change the dressings.  He is unable to change them himself.  He denies pain or fever.  He had been having some itchiness around the dressing but the itchiness has stopped.  He did use a prescription anti-itch cream a few times around the dressing as it was prescribed to him.  He has not had to use it for a few days.  No other aggravating leaving factors.    ROS See HPI    Allergies:       Allergies   Allergen Reactions    Influenza Virus Vaccine      Guillan [place]     Influenza Virus Vaccine Live      Guillan [place]     Anticoagulant Sodium Citrate [Sodium Citrate]      HIGH BLEEDING RISK    Lisinopril Cough       PMSFS Hx:  Past Medical History:   Diagnosis Date    Arrhythmia April 1, 2023    Dr. Hall    ASCVD (arteriosclerotic cardiovascular disease) 12/14/2012    BMI 33.0-33.9,adult 02/15/2013    Breath shortness April 1, 2023    Dr. Hall    Cancer (HCC) 10/10-6/11    colon, liver cance    Chickenpox     Colon cancer (HCC) 12/06/2010    Elevated CEA 12/06/2010    [place] measles     GI bleed     HLD (hyperlipidemia) 06/20/2013    HTN (hypertension) 06/20/2013    Hypertension     Impaired fasting glucose 08/02/2012    Liver cirrhosis (HCC)     Liver lesion 12/06/2010    Mixed hyperlipidemia 06/20/2013    Obesity (BMI 30-39.9) 12/14/2017    Peripheral neuropathy, secondary to drugs or chemicals 12/06/2010    Snoring Many Years    Wife    Stage 3a chronic kidney disease 06/25/2022    Thrombocytopenia due to drugs 04/25/2012    Vitamin d deficiency 08/02/2012     Past Surgical History:   Procedure Laterality Date    UMBILICAL HERNIA REPAIR N/A  10/10/2023    Procedure: OPEN UMBILICAL HERNIA REPAIR;  Surgeon: Julian Ennis M.D.;  Location: SURGERY [facility];  Service: General    LUMBAR LAMINECTOMY DISKECTOMY  5/25/2017    Procedure:  LAMINECTOMY LUMBAR  4 TO SACRAL 1;  Surgeon: Felton Escobar M.D.;  Location: SURGERY [facility];  Service:     GASTROSCOPY WITH BANDING  10/25/2016    Procedure: GASTROSCOPY WITH BANDING;  Surgeon: Pierre Waggoner M.D.;  Location: ENDOSCOPY [facility];  Service:     GASTROSCOPY WITH BANDING  4/3/2016    Procedure: GASTROSCOPY WITH BANDING;  Surgeon: Cayetano Stovall M.D.;  Location: ENDOSCOPY [facility];  Service:     FULL THICKNESS BLOCK RESECTION  4/11/2012    Performed by LANI BOWMAN at SURGERY [facility] ORS    CATH REMOVAL  7/20/2011    Performed by RUBY RUIZ at SURGERY [facility] ORS    CATH PLACEMENT  9/20/2010    Performed by RUBY RUIZ at SURGERY [facility] ORS    LOW ANTERIOR RESECTION ROBOTIC  8/10/2010    Performed by RUBY RUIZ at SURGERY [facility] ORS    COLON RESECTION      HIP ARTHROPLASTY MIS TOTAL      rt    HIP REPLACEMENT, TOTAL      OTHER      Cholecystectomy    OTHER (COMMENTS)      liver surgery       Family History   Problem Relation Age of Onset    Heart Disease Mother     Cancer Mother     Sleep Apnea Neg Hx      Social History     Tobacco Use    Smoking status: Former     Types: Cigars    Smokeless tobacco: Never    Tobacco comments:     1 cigar per week.   Substance Use Topics    Alcohol use: Not Currently     Alcohol/week: 1.8 oz     Types: 1 Glasses of wine, 1 Cans of beer, 1 Standard drinks or equivalent per week     Comment: minimal, Pt stop drinking since the beginning of April 2016       Problems:   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)--2010 left hemicolectomy; no colostomy; ablation of liver met at [facility] 1/2011- MRI [facility] 3/2018 no change in  per    Liver lesion- ablation of malignant met from colon ca 2011- f/u [facility] q 6 mos - stable MRI abd 3/2018- redo annually    Drug-induced polyneuropathy (HCC)    Vitamin D deficiency    ASCVD (arteriosclerotic cardiovascular disease)subtotalled small distal LCx and 40% LAD med rx by cath 12/13/12    Essential hypertension    Gastrointestinal hemorrhage with hematemesis- recurrernt from varices    Secondary esophageal varices without bleeding (HCC)- BANDED 4/2016- repeat egd tbd 10//29/16- [facility]    Gastric varices- s/p BRTO procedure at [facility]    Portal vein thrombosis- on CT [facility] 2/2015    Cirrhosis of liver with ascites (HCC)- ? DUE TO OLD HEP B, CHEMO RX,  OR THERMO RAD OF MAGNANT LESION    History of esophageal varices    H/O colon cancer, stage IV- neg colonoscopy 2012/2018 at [facility]    BPH (benign prostatic hyperplasia)    Peripheral neuropathy due to chemotherapy (HCC)    Edema of left lower extremity- due to dvt july 2017    Generalized edema    Anemia, chronic disease    Chronic deep vein thrombosis (DVT) of femoral vein of left lower extremity (HCC)- s/p IVC filter 2016    Portal hypertension with esophageal varices (HCC)    Secondary malignant neoplasm of liver (HCC)    Thrombocytopenia, unspecified (HCC)- from previous chemorx    Spinal enthesopathy of thoracic region (HCC)    History of Guillain-[place] syndrome    Cerebral atrophy (HCC)-MRI brain 2019, mild diffuse cerebral substance loss    Dyslipidemia    Irregular heartbeat- A. FIB ON APPLE WATCH    Obesity (BMI 30-39.9)    Obesity due to excess calories with serious comorbidity    Paroxysmal SVT (supraventricular tachycardia) (HCC)    Risk for falls       Medications:   Current Outpatient Medications on File Prior to Visit   Medication Sig Dispense Refill    hydrocortisone 2.5 % Cream topical cream Apply 1 Application topically 2 times a day. 453 g 0    acetaminophen (TYLENOL) 500 MG Tab Take 1,000 mg by mouth every 6 hours as needed.      atorvastatin  "(LIPITOR) 40 MG Tab Take 1 Tablet by mouth every evening. 90 Tablet 4    spironolactone (ALDACTONE) 50 MG Tab Take 1 Tablet by mouth 2 times a day. 180 Tablet 3    ferrous sulfate 325 (65 Fe) MG tablet Take 1 Tablet by mouth every morning with breakfast. 100 Tablet 3    Cholecalciferol (VITAMIN D3) 1000 UNIT Cap Take 1 Capsule by mouth every day. 100 Capsule 3    torsemide (DEMADEX) 10 MG tablet Take 1 Tablet by mouth every day. 90 Tablet 4    folic acid (FOLVITE) 1 MG Tab Take 1 Tablet by mouth every day. 100 Tablet 4    donepezil (ARICEPT) 5 MG Tab Take 1 Tablet by mouth every evening. 100 Tablet 3    metoprolol SR (TOPROL XL) 25 MG TABLET SR 24 HR Take 0.5 Tablets by mouth every day. 45 Tablet 1     No current facility-administered medications on file prior to visit.        Objective:     /60   Pulse (!) 56   Temp 36.7 °C (98.1 °F) (Temporal)   Resp 14   Ht 1.905 m (6' 3\")   Wt 110 kg (243 lb)   SpO2 96%   BMI 30.37 kg/m²     Physical Exam  Vitals reviewed.   Constitutional:       Appearance: Normal appearance. He is normal weight.   Cardiovascular:      Rate and Rhythm: Normal rate.      Pulses: Normal pulses.   Pulmonary:      Effort: Pulmonary effort is normal.   Skin:         Neurological:      Mental Status: He is alert.         Assessment /Associated Orders:      1. Skin tear of left upper arm without complication, subsequent encounter        2. Encounter for wound care              Medical Decision Making:    Torey is a very pleasant 78 y.o. male who is clinically stable at today's acute urgent care visit.  No acute distress noted.  VSS. Appropriate for outpatient care at this time.   Acute problem today with uncertain prognosis.   Wound care was sent today.  Patient is unable to manage his wound at home by himself or with the assistance of his wife.  He should return to urgent care in 2 days for a new dressing on the right arm and evaluation of the dressing on the left arm.  Today his wounds " appear to be healing well.  He should keep his appointment with wound care clinic next Tuesday.      Time I spent evaluating Torey Lima in urgent care today was 25  minutes. This time includes preparing for visit, reviewing any pertinent notes or test results, counseling/education, exam, obtaining HPI, interpretation of lab tests, medication management and documentation as indicated above.Time does not include separately billable procedures noted .       Please note that this dictation was created using voice recognition software. I have worked with consultants from the vendor as well as technical experts from [facility] to optimize the interface. I have made every reasonable attempt to correct obvious errors, but I expect that there are errors of grammar and possibly content that I did not discover before finalizing the note.  This note was electronically signed by provider

## 2024-06-02 ENCOUNTER — OFFICE VISIT (OUTPATIENT)
Dept: URGENT CARE | Facility: CLINIC | Age: 79
End: 2024-06-02
Payer: MEDICARE

## 2024-06-02 VITALS
DIASTOLIC BLOOD PRESSURE: 62 MMHG | TEMPERATURE: 97.8 F | HEART RATE: 68 BPM | OXYGEN SATURATION: 94 % | WEIGHT: 242 LBS | HEIGHT: 75 IN | BODY MASS INDEX: 30.09 KG/M2 | SYSTOLIC BLOOD PRESSURE: 118 MMHG | RESPIRATION RATE: 16 BRPM

## 2024-06-02 DIAGNOSIS — Z51.89 VISIT FOR WOUND CHECK: ICD-10-CM

## 2024-06-02 PROCEDURE — 3074F SYST BP LT 130 MM HG: CPT

## 2024-06-02 PROCEDURE — 99213 OFFICE O/P EST LOW 20 MIN: CPT

## 2024-06-02 PROCEDURE — 3078F DIAST BP <80 MM HG: CPT

## 2024-06-02 ASSESSMENT — ENCOUNTER SYMPTOMS
CHILLS: 0
FEVER: 0

## 2024-06-02 ASSESSMENT — FIBROSIS 4 INDEX: FIB4 SCORE: 7.36

## 2024-06-02 NOTE — PROGRESS NOTES
CHIEF COMPLAINT  Chief Complaint   Patient presents with    Dressing Change     Rt arm dressing change     Subjective:   Torey Lima is a 78 y.o. male who presents to urgent care for dressing change.  Patient sustained a fall on 5/25/2024, at which time was treated in urgent care for skin tears.  He has been following up to urgent care for dressing changes, it is difficult to do at home.  Patient denies any symptoms of fever or chills.  He does report follow-up appointment scheduled on 6/4/2024 with wound care.       Review of Systems   Constitutional:  Negative for chills and fever.       PAST MEDICAL HISTORY  Patient Active Problem List    Diagnosis Date Noted    Risk for falls 02/27/2024    Paroxysmal SVT (supraventricular tachycardia) (HCC) 04/21/2023    Irregular heartbeat- A. 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    Cerebral atrophy (HCC)-MRI brain 2019, mild diffuse cerebral substance loss 06/25/2022    Spinal enthesopathy of thoracic region (HCC) 04/07/2020    Secondary malignant neoplasm of liver (HCC) 04/23/2019    Thrombocytopenia, unspecified (HCC)- from previous chemorx 04/23/2019    Anemia, chronic disease 11/13/2018    Chronic deep vein thrombosis (DVT) of femoral vein of left lower extremity (HCC)- s/p IVC filter 2016 11/13/2018    Portal hypertension with esophageal varices (HCC) 11/13/2018    Generalized edema 12/14/2017    Edema of left lower extremity- due to dvt july 2017 09/19/2017    BPH (benign prostatic hyperplasia) 08/11/2017    Peripheral neuropathy due to chemotherapy (HCC) 08/11/2017    H/O colon cancer, stage IV- neg colonoscopy 2012/2018 at [facility] 05/20/2017    History of esophageal varices 10/24/2016    Portal vein thrombosis- on CT [facility] 2/2015 04/14/2016    Cirrhosis of liver with ascites (HCC)- ? DUE TO  OLD HEP B, CHEMO RX,  OR THERMO RAD OF MAGNANT LESION 04/14/2016    Secondary esophageal varices without bleeding (HCC)- BANDED 4/2016- repeat egd tbd 10//29/16- [facility] 04/04/2016    Gastric varices- s/p BRTO procedure at [facility] 04/04/2016    Gastrointestinal hemorrhage with hematemesis- recurrernt from varices 04/03/2016    Essential hypertension 06/20/2013    ASCVD (arteriosclerotic cardiovascular disease)subtotalled small distal LCx and 40% LAD med rx by cath 12/13/12 12/14/2012    Vitamin D deficiency 08/02/2012    Overlapping malignant neoplasm of colon (HCC)Overlapping malignant neoplasm of colon (HCC)--2010 left hemicolectomy; no colostomy; ablation of liver met at [facility] 1/2011- MRI abd 3/2018 no change in per 12/06/2010    Liver lesion- ablation of malignant met from colon ca 2011- f/u [facility] q 6 mos - stable MRI abd 3/2018- redo annually 12/06/2010    Drug-induced polyneuropathy (HCC) 12/06/2010       SURGICAL HISTORY   has a past surgical history that includes low anterior resection robotic (8/10/2010); cath placement (9/20/2010); hip arthroplasty mis total; other; other (comments); cath removal (7/20/2011); full thickness block resection (4/11/2012); gastroscopy with banding (4/3/2016); gastroscopy with banding (10/25/2016); colon resection; hip replacement, total; lumbar laminectomy diskectomy (5/25/2017); and umbilical hernia repair (N/A, 10/10/2023).    ALLERGIES  Allergies   Allergen Reactions    Influenza Virus Vaccine      Guillan [place]     Influenza Virus Vaccine Live      Guillan [place]     Anticoagulant Sodium Citrate [Sodium Citrate]      HIGH BLEEDING RISK    Lisinopril Cough       CURRENT MEDICATIONS  Home Medications       Reviewed by Zay Montenegro Ass't (Medical Assistant) on 06/02/24 at 0916  Med List Status: <None>     Medication Last Dose Status   acetaminophen (TYLENOL) 500 MG Tab Taking Active   atorvastatin (LIPITOR) 40 MG Tab Taking Active   Cholecalciferol (VITAMIN D3) 1000 UNIT Cap  "Taking Active   donepezil (ARICEPT) 5 MG Tab Taking Active   ferrous sulfate 325 (65 Fe) MG tablet Taking Active   folic acid (FOLVITE) 1 MG Tab Taking Active   hydrocortisone 2.5 % Cream topical cream Taking Active   metoprolol SR (TOPROL XL) 25 MG TABLET SR 24 HR Taking Active   spironolactone (ALDACTONE) 50 MG Tab Taking Active   torsemide (DEMADEX) 10 MG tablet Taking Active                    SOCIAL HISTORY  Social History     Tobacco Use    Smoking status: Former     Types: Cigars    Smokeless tobacco: Never    Tobacco comments:     1 cigar per week.   Vaping Use    Vaping status: Never Used   Substance and Sexual Activity    Alcohol use: Not Currently     Alcohol/week: 1.8 oz     Types: 1 Glasses of wine, 1 Cans of beer, 1 Standard drinks or equivalent per week     Comment: minimal, Pt stop drinking since the beginning of April 2016    Drug use: No    Sexual activity: Yes     Partners: Female       FAMILY HISTORY  Family History   Problem Relation Age of Onset    Heart Disease Mother     Cancer Mother     Sleep Apnea Neg Hx          Medications, Allergies, and current problem list reviewed today in Epic.     Objective:     /62 (BP Location: Left arm, Patient Position: Sitting, BP Cuff Size: Large adult)   Pulse 68   Temp 36.6 °C (97.8 °F)   Resp 16   Ht 1.905 m (6' 3\")   Wt 110 kg (242 lb)   SpO2 94%     Physical Exam  Constitutional:       General: He is not in acute distress.     Appearance: Normal appearance. He is normal weight. He is not ill-appearing or toxic-appearing.   Cardiovascular:      Rate and Rhythm: Normal rate and regular rhythm.      Pulses: Normal pulses.      Heart sounds: Normal heart sounds.   Pulmonary:      Effort: Pulmonary effort is normal.      Breath sounds: Normal breath sounds.   Skin:     General: Skin is warm.      Findings: Erythema and wound present.             Comments: Noticed erythema.  No bleeding.  Skin is intact.  No abnormal drainage or induration. "   Neurological:      Mental Status: He is alert.         Assessment/Plan:     Diagnosis and associated orders:     1. Visit for wound check           Comments/MDM:     Pulse exam patient is alert apparent signs distress.  Right skin tear is well-appearing.  No signs of infection.  Skin cleansed.  Xeroform dressing applied with nonadherent gauze.  Gauze wrap to secure in place.  Follow-up with wound care on 6/4, return to clinic sooner if needed.         Differential diagnosis, natural history, supportive care, and indications for immediate follow-up discussed.    Advised the patient to follow-up with the primary care physician for recheck, reevaluation, and consideration of further management.    Please note that this dictation was created using voice recognition software. I have made a reasonable attempt to correct obvious errors, but I expect that there are errors of grammar and possibly content that I did not discover before finalizing the note.    This note was electronically signed by CRISTIANA Benedict

## 2024-06-03 ENCOUNTER — APPOINTMENT (OUTPATIENT)
Dept: WOUND CARE | Facility: MEDICAL CENTER | Age: 79
End: 2024-06-03
Payer: MEDICARE

## 2024-06-03 VITALS
RESPIRATION RATE: 18 BRPM | OXYGEN SATURATION: 92 % | DIASTOLIC BLOOD PRESSURE: 57 MMHG | HEART RATE: 79 BPM | SYSTOLIC BLOOD PRESSURE: 101 MMHG | TEMPERATURE: 97.4 F

## 2024-06-03 DIAGNOSIS — I87.2 VENOUS INSUFFICIENCY OF BOTH LOWER EXTREMITIES: ICD-10-CM

## 2024-06-03 DIAGNOSIS — I82.512 CHRONIC DEEP VEIN THROMBOSIS (DVT) OF FEMORAL VEIN OF LEFT LOWER EXTREMITY (HCC): ICD-10-CM

## 2024-06-03 DIAGNOSIS — S41.112D SKIN TEAR OF LEFT UPPER ARM WITHOUT COMPLICATION, SUBSEQUENT ENCOUNTER: ICD-10-CM

## 2024-06-03 DIAGNOSIS — S51.811D SKIN TEAR OF RIGHT FOREARM WITHOUT COMPLICATION, SUBSEQUENT ENCOUNTER: ICD-10-CM

## 2024-06-03 PROCEDURE — 99214 OFFICE O/P EST MOD 30 MIN: CPT

## 2024-06-03 PROCEDURE — 99214 OFFICE O/P EST MOD 30 MIN: CPT | Mod: 25 | Performed by: STUDENT IN AN ORGANIZED HEALTH CARE EDUCATION/TRAINING PROGRAM

## 2024-06-03 PROCEDURE — 3074F SYST BP LT 130 MM HG: CPT | Performed by: STUDENT IN AN ORGANIZED HEALTH CARE EDUCATION/TRAINING PROGRAM

## 2024-06-03 PROCEDURE — 11042 DBRDMT SUBQ TIS 1ST 20SQCM/<: CPT

## 2024-06-03 PROCEDURE — 11042 DBRDMT SUBQ TIS 1ST 20SQCM/<: CPT | Performed by: STUDENT IN AN ORGANIZED HEALTH CARE EDUCATION/TRAINING PROGRAM

## 2024-06-03 PROCEDURE — 3078F DIAST BP <80 MM HG: CPT | Performed by: STUDENT IN AN ORGANIZED HEALTH CARE EDUCATION/TRAINING PROGRAM

## 2024-06-03 ASSESSMENT — ENCOUNTER SYMPTOMS
CLAUDICATION: 0
CHILLS: 0
FEVER: 0

## 2024-06-03 NOTE — PATIENT INSTRUCTIONS
-Keep your wound dressing clean, dry, and intact.     -Should you experience any significant changes in your wound(s), such as infection (redness, swelling, localized heat, increased pain, fever > 101 F, chills) or have any questions regarding your home care instructions, please contact the wound center at [phone]. If after hours, contact your primary care physician or go to the hospital emergency room.

## 2024-06-03 NOTE — PROGRESS NOTES
Provider Encounter- Full Thickness wound    HISTORY OF PRESENT ILLNESS  Wound History:    START OF CARE IN CLINIC: 6/3/2024    REFERRING PROVIDER:   ANT Arriaga     WOUND- Full Thickness Wound   LOCATION: Right forearm skin tear     Left upper arm posterior arm skin tear        HISTORY:  78M with PMHx of Cirrhosis, Hx DVT, ASCVD, Hx colon cancer, peripheral neuropathy secondary to chemotherapy. Patient saw urgent care on 5/1/2024 and was transferred to ED for concerns of left lower extremity swelling and erythema. He underwent venous US, negative for DVT. Patient was diagnosed with cellulitis and was discharged from ED on Keflex. He returned to urgent care on 5/25/2024 for GLF where he suffered skin tears to bilateral upper extremities. Patient has been following with urgent care for dressing changes every few days. Patient was referred to [facility] for further wound care    Pertinent Medical History: See above     TOBACCO USE: Former smoker     Patient's problem list, allergies, and current medications reviewed and updated in Epic    Interval History:  6/3/2024: Clinic visit with Gasper Gonzalez MD. Patient reports doing well, denies any signs or symptoms of infection. He reports that he developed rash, appears to be associated with coban that is applied at urgent care. Patient was prescribed steroids by urgent care which is helping with pruritus. His wounds do not appear infected and appear to be progressing nicely. Patient has steri-strips left posterior upper arm skin tear covering viable skin flap which appears to be adhering well.    REVIEW OF SYSTEMS:   Review of Systems   Constitutional:  Negative for chills, fever and malaise/fatigue.   Cardiovascular:  Positive for leg swelling. Negative for claudication.   Skin:         Skin tear right and left arm       PHYSICAL EXAMINATION:   /57   Pulse 79   Temp 36.3 °C (97.4 °F) (Temporal)   Resp 18   SpO2 92%     Physical Exam  Constitutional:        General: He is not in acute distress.  Cardiovascular:      Rate and Rhythm: Normal rate.      Pulses: Normal pulses.   Pulmonary:      Effort: Pulmonary effort is normal. No respiratory distress.   Musculoskeletal:      Right lower leg: Edema present.      Left lower leg: Edema present.   Skin:     Comments: Right forearm constellation of skin tears: Multiple superficial skin tears appear to be improving. One distal skin tear with nonviable epithelium. Moderate serosanguinous drainage. No evidence of gross infection.    Left posterior upper extremity skin tear: Patient with steri-strips in place, skin flap appears viable and adhering well without significant drainage.    Bilateral lower extremity varicose veins, hemosiderin staining, and edema consistent with venous insufficiency.   Neurological:      Mental Status: He is alert.         WOUND ASSESSMENT  Wound 06/03/24 Skin Tear Distal Right Forarm (Active)   Wound Image    06/03/24 0830   Site Assessment Red;Yellow;Brown;Dry 06/03/24 0830   Periwound Assessment Ecchymosis;Fragile 06/03/24 0830   Margins Attached edges;Unattached edges 06/03/24 0830   Drainage Amount Scant 06/03/24 0830   Drainage Description Serosanguineous 06/03/24 0830   Treatments Cleansed;Topical Lidocaine;Provider debridement 06/03/24 0830   Wound Cleansing Hypochlorus Acid 06/03/24 0830   Periwound Protectant Skin Protectant Wipes to Periwound 06/03/24 0830   Dressing Cleansing/Solutions Not Applicable 06/03/24 0830   Dressing Options Hydrofera Blue Ready;Silicone Adhesive Foam 06/03/24 0830   Dressing Change/Treatment Frequency Twice Weekly 06/03/24 0830   Non-staged Wound Description Full thickness 06/03/24 0830   Wound Length (cm) 3.2 cm 06/03/24 0830   Wound Width (cm) 1.5 cm 06/03/24 0830   Wound Depth (cm) 0.1 cm 06/03/24 0830   Wound Surface Area (cm^2) 4.8 cm^2 06/03/24 0830   Wound Volume (cm^3) 0.48 cm^3 06/03/24 0830   Post-Procedure Length (cm) 3.2 cm 06/03/24 0830    Post-Procedure Width (cm) 1.5 cm 06/03/24 0830   Post-Procedure Depth (cm) 0.1 cm 06/03/24 0830   Post-Procedure Surface Area (cm^2) 4.8 cm^2 06/03/24 0830   Post-Procedure Volume (cm^3) 0.48 cm^3 06/03/24 0830   Tunneling (cm) 0 cm 06/03/24 0830   Undermining (cm) 0 cm 06/03/24 0830   Wound Odor None 06/03/24 0830   Exposed Structures None 06/03/24 0830   Number of days: 0       Wound 06/03/24 Skin Tear Proximal Right Forarm Wound Cluster (Active)   Wound Image    06/03/24 0830   Site Assessment Pink;Yellow;Dry 06/03/24 0830   Periwound Assessment Fragile;Ecchymosis 06/03/24 0830   Margins Attached edges 06/03/24 0830   Drainage Amount Scant 06/03/24 0830   Drainage Description Serosanguineous 06/03/24 0830   Treatments Cleansed;Topical Lidocaine;Provider debridement 06/03/24 0830   Wound Cleansing Hypochlorus Acid 06/03/24 0830   Periwound Protectant Skin Protectant Wipes to Periwound 06/03/24 0830   Dressing Cleansing/Solutions Not Applicable 06/03/24 0830   Dressing Options Hydrofera Blue Ready;Silicone Adhesive Foam 06/03/24 0830   Dressing Change/Treatment Frequency Twice Weekly 06/03/24 0830   Wound Team Following Weekly 06/03/24 0830   Non-staged Wound Description Full thickness 06/03/24 0830   Wound Length (cm) 4 cm 06/03/24 0830   Wound Width (cm) 3.2 cm 06/03/24 0830   Wound Depth (cm) 0.1 cm 06/03/24 0830   Wound Surface Area (cm^2) 12.8 cm^2 06/03/24 0830   Wound Volume (cm^3) 1.28 cm^3 06/03/24 0830   Post-Procedure Length (cm) 4 cm 06/03/24 0830   Post-Procedure Width (cm) 3 cm 06/03/24 0830   Post-Procedure Depth (cm) 0.1 cm 06/03/24 0830   Post-Procedure Surface Area (cm^2) 12 cm^2 06/03/24 0830   Post-Procedure Volume (cm^3) 1.2 cm^3 06/03/24 0830   Tunneling (cm) 0 cm 06/03/24 0830   Undermining (cm) 0 cm 06/03/24 0830   Wound Odor None 06/03/24 0830   Exposed Structures None 06/03/24 0830   Number of days: 0       Wound 06/03/24 Skin Tear Arm Proximal;Upper;Posterior Left (Active)   Wound Image    06/03/24 0830   Site Assessment Brown;Other (Comment) 06/03/24 0830   Periwound Assessment Fragile;Ecchymosis 06/03/24 0830   Margins Attached edges 06/03/24 0830   Closure Closure strips 06/03/24 0830   Drainage Amount None 06/03/24 0830   Wound Cleansing Not Applicable 06/03/24 0830   Dressing Cleansing/Solutions Not Applicable 06/03/24 0830   Dressing Options Other (Comments);Open to Air 06/03/24 0830   Wound Team Following Weekly 06/03/24 0830   Wound Odor None 06/03/24 0830   Exposed Structures ALESHA 06/03/24 0830   Number of days: 0       PROCEDURE: Excisional debridement of right forearm skin tears (multiple).  -2% viscous lidocaine applied topically to wound bed for approximately 5 minutes prior to debridement  - Scissors used to remove nonviable epithelium from distal skin tear  -Curette used to debride wound bed.  Excisional debridement was performed to remove devitalized tissue until healthy, bleeding tissue was visualized.   Entire surface of wound, approximately 5 cm2 debrided.  Tissue debrided into the subcutaneous layer.    -Bleeding controlled with manual pressure.    -Wound care completed by wound RN, refer to flowsheet  -Patient tolerated the procedure well, without c/o pain or discomfort.       Pertinent Labs and Diagnostics:    Labs:     A1c:   Lab Results   Component Value Date/Time    HBA1C 4.5 02/23/2024 06:26 AM          IMAGING: None    VASCULAR STUDIES: None    LAST  WOUND CULTURE:  DATE :   Lab Results   Component Value Date/Time    CULTRSULT No growth at 72 hours. 11/19/2019 01:36 PM         ASSESSMENT AND PLAN:     1. Skin tear of right forearm without complication, subsequent encounter    6/3/2024  - Right forearm distal wound with nonviable epithelium flap. Rest of right forearm skin tears appear superficial and appear to be healing well  - Excisional debridement was performed in clinic, medically necessary to promote wound healing. Used scissors to remove nonviable epithelium flap  -  Patient instructed on wound care with advanced dressings  - Reports lives with wife who can assist with dressing changes. Not candidate for home health as he is currently working and not home bound  - Patient appears to have sensitivity to Coban, avoid use  - Return to clinic weekly for treatment   Wound Care: Hydrofera Blue Ready, silicone adhesive foam    2. Skin tear of left upper arm without complication, subsequent encounter    6/3/2024  - Patient has steristrips placed in urgent care to hold skin flap in place. Skin flap appears to be viable and well adherent  - Continue to monitor  - No evidence of infection.    3. Venous insufficiency of both lower extremities  4. Chronic deep vein thrombosis (DVT) of femoral vein of left lower extremity (HCC)- s/p IVC filter 2016    6/3/2024  - Patient with chronic lower extremity edema, varicose veins, hemosiderin staining  - Counseled about etiology of disease  - Patient sits at desk working for prolonged periods of time. Encouraged patient to elevate extremities, calf pumps when seated  - Recommend wearing compression garments, given handout to consider purchasing.  - Counseled about risks of CVI including recent cellulitis episode and development of wounds.    PATIENT EDUCATION  - Importance of adequate nutrition for wound healing  -Advised to go to ER for any increased redness, swelling, drainage, or odor, or if patient develops fever, chills, nausea or vomiting.     My total time spent caring for the patient on the day of the encounter was 30 minutes, reviewing history, assessment, counseling and education, and coordination of care.  This does not include time spent on separately billable procedures/tests.    Please note that this note may have been created using voice recognition software. I have worked with technical experts from [employer] to optimize the interface.  I have made every reasonable attempt to correct obvious errors, but there may be errors of  grammar and possibly content that I did not discover before finalizing the note.    N

## 2024-06-05 PROCEDURE — RXMED WILLOW AMBULATORY MEDICATION CHARGE: Performed by: STUDENT IN AN ORGANIZED HEALTH CARE EDUCATION/TRAINING PROGRAM

## 2024-06-07 ENCOUNTER — PHARMACY VISIT (OUTPATIENT)
Dept: PHARMACY | Facility: MEDICAL CENTER | Age: 79
End: 2024-06-07
Payer: COMMERCIAL

## 2024-06-11 ENCOUNTER — NON-PROVIDER VISIT (OUTPATIENT)
Dept: WOUND CARE | Facility: MEDICAL CENTER | Age: 79
End: 2024-06-11
Payer: MEDICARE

## 2024-06-11 PROCEDURE — 97602 WOUND(S) CARE NON-SELECTIVE: CPT

## 2024-06-11 NOTE — PATIENT INSTRUCTIONS
-Keep dressings clean and dry. Change dressings every 3-4 days, and if they become over saturated, soiled or fall off.     -If you need to change your dressings at home: Wash your wound with normal saline, wound cleanser, or unscented soap and water prior to applying your new dressings. Please avoid cleansing with hydrogen peroxide or rubbing alcohol. Hydrogen peroxide and rubbing alcohol are toxic to new tissue and skin cells.    -Should you experience any significant changes in your wound, such as signs of infection (increasing redness, swelling, localized heat, increased pain, fever > 101 F, chills) or have any questions regarding your home care instructions, please contact the wound center at [phone]. If after hours, contact your primary care physician or go to the hospital emergency room.     -If you are 5 or more minutes late for an appointment, we reserve the right to cancel and reschedule that appointment. Additionally, if you are habitually late or not showing (3 late cancellations and/or no shows), we reserve the right to cancel your remaining appointments and it will be your responsibility to obtain a new referral if services are still needed.

## 2024-06-11 NOTE — PROGRESS NOTES
RN Wound Care Procedures 6/11/2024:  Nonselective debridement with normal saline moistened gauze to remove biofilm from the right proximal forearm wound.    Cutimed Sorbact used as a nonstick wound contact layer today to prevent wound from sticking to dressings.

## 2024-06-12 ENCOUNTER — NON-PROVIDER VISIT (OUTPATIENT)
Dept: CARDIOLOGY | Facility: MEDICAL CENTER | Age: 79
End: 2024-06-12
Payer: MEDICARE

## 2024-06-12 PROCEDURE — 93298 REM INTERROG DEV EVAL SCRMS: CPT | Mod: 26 | Performed by: INTERNAL MEDICINE

## 2024-06-12 NOTE — CARDIAC REMOTE MONITOR - SCAN
Device transmission reviewed. Device demonstrated appropriate function.       Electronically Signed by: Warren Meng M.D.    6/12/2024  10:40 AM

## 2024-06-13 ENCOUNTER — HOSPITAL ENCOUNTER (OUTPATIENT)
Dept: RADIOLOGY | Facility: MEDICAL CENTER | Age: 79
End: 2024-06-13
Payer: MEDICARE

## 2024-06-13 DIAGNOSIS — Z85.038 HISTORY OF COLON CANCER: ICD-10-CM

## 2024-06-13 PROCEDURE — 700117 HCHG RX CONTRAST REV CODE 255

## 2024-06-13 PROCEDURE — 74170 CT ABD WO CNTRST FLWD CNTRST: CPT

## 2024-06-13 RX ADMIN — IOHEXOL 100 ML: 350 INJECTION, SOLUTION INTRAVENOUS at 17:04

## 2024-06-16 ASSESSMENT — ENCOUNTER SYMPTOMS
IRREGULAR HEARTBEAT: 0
DIARRHEA: 0
NEAR-SYNCOPE: 0
WHEEZING: 0
NIGHT SWEATS: 0
ABDOMINAL PAIN: 0
WEAKNESS: 0
DYSPNEA ON EXERTION: 0
PALPITATIONS: 0
NAUSEA: 0
COUGH: 0
SHORTNESS OF BREATH: 0
VOMITING: 0
SYNCOPE: 0
PND: 0
ORTHOPNEA: 0
FEVER: 0
FOCAL WEAKNESS: 0
DIZZINESS: 0

## 2024-06-17 NOTE — PROGRESS NOTES
Cardiology Follow-up Consultation Note    Date of note:    06/18/24  Primary Care Provider: Fabián Urena M.D.    Patient Name: Torey Lima   YOB: 1945  MRN:              [id number]    Chief Complaint: Follow-up frequent PACs    History of Present Illness: Mr. Torey Lima is a 78 y.o. 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    The patient was last seen in clinic on 08/29/23.  No changes are made to his medications from last visit.  Repeat echocardiogram 10/2020 showed normal LVEF with mild AI, unchanged from prior.    The patient previously had a cardiologist, Dr. Pearl, seen in 2017.  The patient reportedly went into A-fib in 2016 requiring cardioversion during the procedure when interventional radiology had a wire in the right ventricle.  Otherwise, the patient had not had any recurrence of A-fib.  During the last visit, he reported that his Apple Watch warned that the patient possibly had A-fib.  As such he was ordered an event monitor, which did not show any evidence of A-fib.  I personally reviewed the Zio patch, which did not show any evidence of A-fib but did have frequent SVTs and PACs.  Of note, the patient has history of colon cancer and liver cancer (in remission) as well as liver cirrhosis with esophageal varices.  The patient follows with hepatologist at [facility].  According to Dr. Culp, coagulation of any kind is contraindicated due to frequent GI bleed.  I also reviewed the patient's labs, which showed low hemoglobin as well as low platelet.  Implanted loop recorder showed frequent PACs, and the patient was initiated on low-dose metoprolol.    The patient presents today for follow-up. The patient presents with his wife.  The patient  denies any symptoms in terms of palpitations.  He reports that Dr. Culp is okay with low-dose metoprolol, but told him that he cannot increase metoprolol any further.  He denies any chest pain on exertion, but does have occasional shortness of breath on exertion.  No orthopnea, PND, or leg swelling.  No syncope or presyncopal episodes.    Cardiovascular Risk Factors:  1. Smoking status: Former smoker  2. Type II Diabetes Mellitus: None  Lab Results   Component Value Date/Time    HBA1C 4.5 02/23/2024 06:26 AM    HBA1C 4.6 09/12/2023 06:59 AM     3. Hypertension: On medication  4. Dyslipidemia: On statin  Cholesterol,Tot   Date Value Ref Range Status   04/14/2023 96 (L) 100 - 199 mg/dL Final     LDL   Date Value Ref Range Status   04/14/2023 32 <100 mg/dL Final     HDL   Date Value Ref Range Status   04/14/2023 56 >=40 mg/dL Final     Triglycerides   Date Value Ref Range Status   04/14/2023 42 0 - 149 mg/dL Final     5. Family history of early Coronary Artery Disease in a first degree relative (Male less than 55 years of age; Female less than 65 years of age): None  6.  Obesity and/or Metabolic Syndrome: Body mass index is 31.75 kg/m².  7. Sedentary lifestyle: Not active    Review of Systems   Constitutional: Negative for fever, malaise/fatigue and night sweats.   Cardiovascular:  Negative for chest pain, dyspnea on exertion, irregular heartbeat, leg swelling, near-syncope, orthopnea, palpitations, paroxysmal nocturnal dyspnea and syncope.   Respiratory:  Negative for cough, shortness of breath and wheezing.    Gastrointestinal:  Negative for abdominal pain, diarrhea, nausea and vomiting.   Neurological:  Negative for dizziness, focal weakness and weakness.         All other systems reviewed and are negative.       Current Outpatient Medications   Medication Sig Dispense Refill    hydrocortisone 2.5 % Cream topical cream Apply 1 Application topically 2 times a day. 453 g 0    acetaminophen (TYLENOL) 500 MG Tab Take  "1,000 mg by mouth every 6 hours as needed.      atorvastatin (LIPITOR) 40 MG Tab Take 1 Tablet by mouth every evening. 90 Tablet 4    spironolactone (ALDACTONE) 50 MG Tab Take 1 Tablet by mouth 2 times a day. 180 Tablet 3    ferrous sulfate 325 (65 Fe) MG tablet Take 1 Tablet by mouth every morning with breakfast. 100 Tablet 3    Cholecalciferol (VITAMIN D3) 1000 UNIT Cap Take 1 Capsule by mouth every day. 100 Capsule 3    torsemide (DEMADEX) 10 MG tablet Take 1 Tablet by mouth every day. 90 Tablet 4    folic acid (FOLVITE) 1 MG Tab Take 1 Tablet by mouth every day. 100 Tablet 4    metoprolol SR (TOPROL XL) 25 MG TABLET SR 24 HR Take 0.5 Tablets by mouth every day. 45 Tablet 1    donepezil (ARICEPT) 5 MG Tab Take 1 Tablet by mouth every evening. (Patient not taking: Reported on 6/18/2024) 100 Tablet 3     No current facility-administered medications for this visit.         Allergies   Allergen Reactions    Influenza Virus Vaccine      Guillan [place]     Influenza Virus Vaccine Live      Guillan [place]     Anticoagulant Sodium Citrate [Sodium Citrate]      HIGH BLEEDING RISK    Lisinopril Cough       Physical Exam:  Ambulatory Vitals  BP 98/54 (BP Location: Left arm, Patient Position: Sitting, BP Cuff Size: Adult)   Pulse 70   Ht 1.905 m (6' 3\")   Wt 115 kg (254 lb)   SpO2 97%    Oxygen Therapy:  Pulse Oximetry: 97 %  BP Readings from Last 4 Encounters:   06/18/24 98/54   06/03/24 101/57   06/02/24 118/62   05/30/24 110/60       Weight/BMI: Body mass index is 31.75 kg/m².  Wt Readings from Last 4 Encounters:   06/18/24 115 kg (254 lb)   06/02/24 110 kg (242 lb)   05/30/24 110 kg (243 lb)   05/27/24 110 kg (243 lb)         General: Well appearing and in no apparent distress  Eyes: nl conjunctiva, no icteric sclera  ENT: normal external appearance of ears, nose, and throat  Neck: no visible JVP,  no carotid bruits  Lungs: normal respiratory effort, CTAB  Heart: RRR, no murmurs, no rubs or gallops,  no edema bilateral " lower extremities. No LV/RV heave on cardiac palpatation. + bilateral radial pulses.  + bilateral dp pulses.   Abdomen: distended but soft, non tender, non distended, no masses, normal bowel sounds.  No HSM.  Extremities/MSK: no clubbing, no cyanosis  Neurological: No focal sensory deficits  Psychiatric: Appropriate affect, A/O x 3, intact judgement and insight  Skin: Warm extremities      Lab Data Review:  Lab Results   Component Value Date/Time    CHOLSTRLTOT 90 (L) 02/23/2024 06:26 AM    LDL 25 02/23/2024 06:26 AM    HDL 57 02/23/2024 06:26 AM    TRIGLYCERIDE 39 02/23/2024 06:26 AM       Lab Results   Component Value Date/Time    SODIUM 135 06/18/2024 06:32 AM    POTASSIUM 4.0 06/18/2024 06:32 AM    CHLORIDE 103 06/18/2024 06:32 AM    CO2 22 06/18/2024 06:32 AM    GLUCOSE 95 06/18/2024 06:32 AM    BUN 22 06/18/2024 06:32 AM    CREATININE 1.09 06/18/2024 06:32 AM    CREATININE 1.14 01/23/2010 12:00 AM    BUNCREATRAT 15 01/23/2010 12:00 AM    GLOMRATE >59 01/23/2010 12:00 AM     Lab Results   Component Value Date/Time    ALKPHOSPHAT 101 (H) 06/18/2024 06:32 AM    ASTSGOT 25 06/18/2024 06:32 AM    ALTSGPT 19 06/18/2024 06:32 AM    TBILIRUBIN 1.5 06/18/2024 06:32 AM      Lab Results   Component Value Date/Time    WBC 3.5 (L) 06/18/2024 06:32 AM    HEMOGLOBIN 10.7 (L) 06/18/2024 06:32 AM     Lab Results   Component Value Date/Time    HBA1C 4.5 02/23/2024 06:26 AM    HBA1C 4.6 09/12/2023 06:59 AM         Cardiac Imaging and Procedures Review:    EKG dated 4/21/2023: My personal interpretation is sinus bradycardia, inferior infarct    Echo dated 10/3/2023  CONCLUSIONS  Normal left ventricular systolic function. The ejection fraction is   measured to be 66 % by Silveira's biplane.   Normal right ventricular size and systolic function.  Mild aortic insufficiency.  Mild dilatation of ascending aorta 4.2cm.  Compared to the prior study on 03/17/2023, the ascending aorta size has   slightly increased. No other significant  changes.    Echo dated 3/21/2023:   CONCLUSIONS  Left ventricular ejection fraction estimated to be 65%.  Calcified aortic valve leaflets.   Aortic valve sclerosis without significant stenosis.  Mild aortic insufficiency.  Unable to estimate right ventricular systolic pressure due to an   inadequate tricuspid regurgitant jet.         Holter Monitor (4/4/2023):   DOS: 4/7/2023       Summary:   Sinus rhythm with frequent supraventricular ectopy and frequent PSVT       Assessment & Plan     1. Paroxysmal SVT (supraventricular tachycardia) (HCC)        2. PAC (premature atrial contraction)        3. Esophageal varices without bleeding, unspecified esophageal varices type (HCC)        4. Colon cancer metastasized to liver (HCC)        5. Dyslipidemia        6. Mild aortic insufficiency        7. Essential hypertension        8. HUA (dyspnea on exertion)                  Shared Medical Decision Making:    PACs  Paroxysmal SVT  History of GIB with varices  History of cirrhosis and liver cancer  The patient reportedly went into A-fib in 2016 requiring cardioversion during the procedure when interventional radiology had a wire in the right ventricle.  Otherwise, the patient had not had any recurrence of A-fib.  Due to his Apple Watch alerting irregular heartbeat, the patient underwent an event monitor which did not show any A-fib.  However, it did show frequent SVTs as well as frequent PACs, which could be precursor for A-fib.  -Implanted loop recorder to no Afib thus far, did have PACs.   -Continue metoprolol 12.5mg daily (per Dr. Culp, he would like for the patient to not increase).    -The patient has GI bleed with esophageal varices.  Anticoagulation is absolutely contraindicated as per hepatologist (Dr. Culp) at [facility].   -We will refer to [facility] cardiology in case the patient needs pacemaker placement and/or other invasive procedures.  -Counseled the patient to avoid caffeine and to work on stress management.  The patient  does not drink alcohol (cirrhosis due to chemotherapy).    Dyspnea on exertion  Unclear etiology.  Could be related to frequent PACs versus anemia.  No prior ischemic evaluation.  -Discussed potential stress test, but with his high risk of bleed and low platelet, he would not be a candidate for invasive procedures/antiplatelet therapy according to Dr. Culp.  As such, we will continue to manage medically  -Continue implanted loop recorder, if bradycardic and/or pauses on metoprolol, can consider stopping metoprolol versus pacemaker placement.     Hypertension  BP well controlled this visit  -Continue torsemide and Aldactone (taking them for liver cirrhosis with ascites as well)  -Continue metoprolol as above    Dyslipidemia  -Continue atorvastatin 40 mg daily    Mild AI  Euvolemic on exam  -Repeat echocardiogram every 3 years or so, next one due 10/2026    All of patient's excellent questions were answered to the best of my knowledge and to his satisfaction.  It was a pleasure seeing Mr. Torey Lima in my clinic today. Return in about 6 months (around 12/18/2024), or if symptoms worsen or fail to improve. Patient is aware to call the cardiology clinic with any questions or concerns.      Rubens Hall MD  [facility]  [place] [facility], [address]  [address] [address]  Phone: [phone]  Fax: [phone]

## 2024-06-18 ENCOUNTER — OFFICE VISIT (OUTPATIENT)
Dept: CARDIOLOGY | Facility: MEDICAL CENTER | Age: 79
End: 2024-06-18
Attending: STUDENT IN AN ORGANIZED HEALTH CARE EDUCATION/TRAINING PROGRAM
Payer: MEDICARE

## 2024-06-18 ENCOUNTER — HOSPITAL ENCOUNTER (OUTPATIENT)
Dept: LAB | Facility: MEDICAL CENTER | Age: 79
End: 2024-06-18
Payer: MEDICARE

## 2024-06-18 ENCOUNTER — NON-PROVIDER VISIT (OUTPATIENT)
Dept: WOUND CARE | Facility: MEDICAL CENTER | Age: 79
End: 2024-06-18
Payer: MEDICARE

## 2024-06-18 VITALS
HEIGHT: 75 IN | HEART RATE: 70 BPM | BODY MASS INDEX: 31.58 KG/M2 | OXYGEN SATURATION: 97 % | SYSTOLIC BLOOD PRESSURE: 98 MMHG | DIASTOLIC BLOOD PRESSURE: 54 MMHG | WEIGHT: 254 LBS

## 2024-06-18 DIAGNOSIS — I35.1 MILD AORTIC INSUFFICIENCY: ICD-10-CM

## 2024-06-18 DIAGNOSIS — R06.09 DOE (DYSPNEA ON EXERTION): ICD-10-CM

## 2024-06-18 DIAGNOSIS — C18.9 COLON CANCER METASTASIZED TO LIVER (HCC): ICD-10-CM

## 2024-06-18 DIAGNOSIS — I85.00 ESOPHAGEAL VARICES WITHOUT BLEEDING, UNSPECIFIED ESOPHAGEAL VARICES TYPE (HCC): ICD-10-CM

## 2024-06-18 DIAGNOSIS — I49.1 PAC (PREMATURE ATRIAL CONTRACTION): ICD-10-CM

## 2024-06-18 DIAGNOSIS — E78.5 DYSLIPIDEMIA: ICD-10-CM

## 2024-06-18 DIAGNOSIS — I47.10 PAROXYSMAL SVT (SUPRAVENTRICULAR TACHYCARDIA) (HCC): ICD-10-CM

## 2024-06-18 DIAGNOSIS — I10 ESSENTIAL HYPERTENSION: ICD-10-CM

## 2024-06-18 DIAGNOSIS — C78.7 COLON CANCER METASTASIZED TO LIVER (HCC): ICD-10-CM

## 2024-06-18 LAB
ALBUMIN SERPL BCP-MCNC: 3.4 G/DL (ref 3.2–4.9)
ALBUMIN/GLOB SERPL: 1.3 G/DL
ALP SERPL-CCNC: 101 U/L (ref 30–99)
ALT SERPL-CCNC: 19 U/L (ref 2–50)
ANION GAP SERPL CALC-SCNC: 10 MMOL/L (ref 7–16)
AST SERPL-CCNC: 25 U/L (ref 12–45)
BASOPHILS # BLD AUTO: 0.9 % (ref 0–1.8)
BASOPHILS # BLD: 0.03 K/UL (ref 0–0.12)
BILIRUB SERPL-MCNC: 1.5 MG/DL (ref 0.1–1.5)
BUN SERPL-MCNC: 22 MG/DL (ref 8–22)
CALCIUM ALBUM COR SERPL-MCNC: 9 MG/DL (ref 8.5–10.5)
CALCIUM SERPL-MCNC: 8.5 MG/DL (ref 8.5–10.5)
CHLORIDE SERPL-SCNC: 103 MMOL/L (ref 96–112)
CO2 SERPL-SCNC: 22 MMOL/L (ref 20–33)
CREAT SERPL-MCNC: 1.09 MG/DL (ref 0.5–1.4)
EOSINOPHIL # BLD AUTO: 0.22 K/UL (ref 0–0.51)
EOSINOPHIL NFR BLD: 6.4 % (ref 0–6.9)
ERYTHROCYTE [DISTWIDTH] IN BLOOD BY AUTOMATED COUNT: 60.3 FL (ref 35.9–50)
GFR SERPLBLD CREATININE-BSD FMLA CKD-EPI: 69 ML/MIN/1.73 M 2
GLOBULIN SER CALC-MCNC: 2.6 G/DL (ref 1.9–3.5)
GLUCOSE SERPL-MCNC: 95 MG/DL (ref 65–99)
HCT VFR BLD AUTO: 33 % (ref 42–52)
HGB BLD-MCNC: 10.7 G/DL (ref 14–18)
IMM GRANULOCYTES # BLD AUTO: 0.01 K/UL (ref 0–0.11)
IMM GRANULOCYTES NFR BLD AUTO: 0.3 % (ref 0–0.9)
INR PPP: 1.41 (ref 0.87–1.13)
LYMPHOCYTES # BLD AUTO: 0.29 K/UL (ref 1–4.8)
LYMPHOCYTES NFR BLD: 8.4 % (ref 22–41)
MCH RBC QN AUTO: 30.4 PG (ref 27–33)
MCHC RBC AUTO-ENTMCNC: 32.4 G/DL (ref 32.3–36.5)
MCV RBC AUTO: 93.8 FL (ref 81.4–97.8)
MONOCYTES # BLD AUTO: 0.5 K/UL (ref 0–0.85)
MONOCYTES NFR BLD AUTO: 14.5 % (ref 0–13.4)
NEUTROPHILS # BLD AUTO: 2.4 K/UL (ref 1.82–7.42)
NEUTROPHILS NFR BLD: 69.5 % (ref 44–72)
NRBC # BLD AUTO: 0 K/UL
NRBC BLD-RTO: 0 /100 WBC (ref 0–0.2)
PLATELET # BLD AUTO: 59 K/UL (ref 164–446)
PLATELETS.RETICULATED NFR BLD AUTO: 3.3 % (ref 0.6–13.1)
PMV BLD AUTO: 10.1 FL (ref 9–12.9)
POTASSIUM SERPL-SCNC: 4 MMOL/L (ref 3.6–5.5)
PROT SERPL-MCNC: 6 G/DL (ref 6–8.2)
PROTHROMBIN TIME: 17.4 SEC (ref 12–14.6)
RBC # BLD AUTO: 3.52 M/UL (ref 4.7–6.1)
SODIUM SERPL-SCNC: 135 MMOL/L (ref 135–145)
WBC # BLD AUTO: 3.5 K/UL (ref 4.8–10.8)

## 2024-06-18 PROCEDURE — 99214 OFFICE O/P EST MOD 30 MIN: CPT | Performed by: STUDENT IN AN ORGANIZED HEALTH CARE EDUCATION/TRAINING PROGRAM

## 2024-06-18 PROCEDURE — 85055 RETICULATED PLATELET ASSAY: CPT

## 2024-06-18 PROCEDURE — 3074F SYST BP LT 130 MM HG: CPT | Performed by: STUDENT IN AN ORGANIZED HEALTH CARE EDUCATION/TRAINING PROGRAM

## 2024-06-18 PROCEDURE — 85025 COMPLETE CBC W/AUTO DIFF WBC: CPT

## 2024-06-18 PROCEDURE — 36415 COLL VENOUS BLD VENIPUNCTURE: CPT

## 2024-06-18 PROCEDURE — 99211 OFF/OP EST MAY X REQ PHY/QHP: CPT

## 2024-06-18 PROCEDURE — 99212 OFFICE O/P EST SF 10 MIN: CPT | Performed by: STUDENT IN AN ORGANIZED HEALTH CARE EDUCATION/TRAINING PROGRAM

## 2024-06-18 PROCEDURE — 82105 ALPHA-FETOPROTEIN SERUM: CPT

## 2024-06-18 PROCEDURE — 80053 COMPREHEN METABOLIC PANEL: CPT

## 2024-06-18 PROCEDURE — 85610 PROTHROMBIN TIME: CPT

## 2024-06-18 PROCEDURE — 3078F DIAST BP <80 MM HG: CPT | Performed by: STUDENT IN AN ORGANIZED HEALTH CARE EDUCATION/TRAINING PROGRAM

## 2024-06-18 ASSESSMENT — FIBROSIS 4 INDEX: FIB4 SCORE: 7.58

## 2024-06-18 NOTE — CERTIFICATION
Discharge Note      Referring Provider: Lalita CAIN  Wound location: Right arm, left arm  Date of Discharge: 6/18/2024    Assessment: Discharge patient at this time secondary to wound resolution. Advised patient that he can shower normally, and to moisturize skin daily. Recommended unscented lotions such as Cerave, Eucerin, or Aquaphor.

## 2024-06-20 LAB — AFP-TM SERPL-MCNC: 2 NG/ML (ref 0–9)

## 2024-06-21 ENCOUNTER — TELEPHONE (OUTPATIENT)
Dept: NEUROLOGY | Facility: MEDICAL CENTER | Age: 79
End: 2024-06-21
Payer: MEDICARE

## 2024-06-25 ENCOUNTER — OFFICE VISIT (OUTPATIENT)
Dept: MEDICAL GROUP | Age: 79
End: 2024-06-25
Payer: MEDICARE

## 2024-06-25 ENCOUNTER — APPOINTMENT (OUTPATIENT)
Dept: WOUND CARE | Facility: MEDICAL CENTER | Age: 79
End: 2024-06-25
Payer: MEDICARE

## 2024-06-25 VITALS
HEIGHT: 75 IN | TEMPERATURE: 98.2 F | WEIGHT: 249 LBS | OXYGEN SATURATION: 96 % | HEART RATE: 72 BPM | BODY MASS INDEX: 30.96 KG/M2 | DIASTOLIC BLOOD PRESSURE: 54 MMHG | SYSTOLIC BLOOD PRESSURE: 102 MMHG

## 2024-06-25 DIAGNOSIS — R29.0 CARPOPEDAL SPASM: ICD-10-CM

## 2024-06-25 DIAGNOSIS — R18.8 CIRRHOSIS OF LIVER WITH ASCITES, UNSPECIFIED HEPATIC CIRRHOSIS TYPE (HCC): ICD-10-CM

## 2024-06-25 DIAGNOSIS — R60.1 GENERALIZED EDEMA: ICD-10-CM

## 2024-06-25 DIAGNOSIS — K74.60 CIRRHOSIS OF LIVER WITH ASCITES, UNSPECIFIED HEPATIC CIRRHOSIS TYPE (HCC): ICD-10-CM

## 2024-06-25 DIAGNOSIS — I35.8 AORTIC VALVE SCLEROSIS: ICD-10-CM

## 2024-06-25 DIAGNOSIS — E66.1 CLASS 1 DRUG-INDUCED OBESITY WITH BODY MASS INDEX (BMI) OF 31.0 TO 31.9 IN ADULT, UNSPECIFIED WHETHER SERIOUS COMORBIDITY PRESENT: ICD-10-CM

## 2024-06-25 DIAGNOSIS — I87.2 VENOUS INSUFFICIENCY OF BOTH LOWER EXTREMITIES: ICD-10-CM

## 2024-06-25 DIAGNOSIS — D64.9 ANEMIA, NORMOCYTIC NORMOCHROMIC: ICD-10-CM

## 2024-06-25 DIAGNOSIS — E87.6 HYPOKALEMIA: ICD-10-CM

## 2024-06-25 RX ORDER — IBUPROFEN 200 MG
500 CAPSULE ORAL DAILY
Qty: 100 TABLET | Refills: 3 | Status: SHIPPED | OUTPATIENT
Start: 2024-06-25

## 2024-06-25 RX ORDER — POTASSIUM CHLORIDE 20 MEQ/1
TABLET, EXTENDED RELEASE ORAL
Qty: 90 TABLET | Refills: 2
Start: 2024-06-25

## 2024-06-25 RX ORDER — LANOLIN ALCOHOL/MO/W.PET/CERES
400 CREAM (GRAM) TOPICAL DAILY
Qty: 100 TABLET | Refills: 3 | Status: SHIPPED | OUTPATIENT
Start: 2024-06-25

## 2024-06-25 RX ORDER — LANOLIN ALCOHOL/MO/W.PET/CERES
400 CREAM (GRAM) TOPICAL DAILY
Qty: 100 TABLET | Refills: 3 | Status: SHIPPED | OUTPATIENT
Start: 2024-06-25 | End: 2024-06-25 | Stop reason: SDUPTHER

## 2024-06-25 RX ORDER — TORSEMIDE 10 MG/1
10 TABLET ORAL 2 TIMES DAILY
Qty: 200 TABLET | Refills: 1 | Status: SHIPPED | OUTPATIENT
Start: 2024-06-25

## 2024-06-25 ASSESSMENT — ENCOUNTER SYMPTOMS
PSYCHIATRIC NEGATIVE: 1
RESPIRATORY NEGATIVE: 1
GASTROINTESTINAL NEGATIVE: 1
MUSCULOSKELETAL NEGATIVE: 1
EYES NEGATIVE: 1
NEUROLOGICAL NEGATIVE: 1
CONSTITUTIONAL NEGATIVE: 1

## 2024-06-25 ASSESSMENT — FIBROSIS 4 INDEX: FIB4 SCORE: 7.78

## 2024-06-25 NOTE — PROGRESS NOTES
Subjective     Torey Lima is a 79 y.o. male who presents with Follow-Up (Following up from E.R and wife would like you to ask  how his mental health and memory )  The patient is here for followup of chronic medical problems listed below. The patient is compliant with medications and having no side effects from them. Denies chest pain, abdominal pain, dyspnea, myalgias, or cough.     His chief complaint is bilateral lower leg edema with much worse on the left than on the right.  Doppler ultrasound last month was negative for DVT.  Has had intermittent cramping and spasms of his fingers of both hands lasting several minutes at a time intermittently of unknown precipitating factor.  Seems to resolve on its own.    Outpatient Medications Prior to Visit   Medication Sig Dispense Refill    hydrocortisone 2.5 % Cream topical cream Apply 1 Application topically 2 times a day. 453 g 0    acetaminophen (TYLENOL) 500 MG Tab Take 1,000 mg by mouth every 6 hours as needed.      atorvastatin (LIPITOR) 40 MG Tab Take 1 Tablet by mouth every evening. 90 Tablet 4    spironolactone (ALDACTONE) 50 MG Tab Take 1 Tablet by mouth 2 times a day. 180 Tablet 3    ferrous sulfate 325 (65 Fe) MG tablet Take 1 Tablet by mouth every morning with breakfast. 100 Tablet 3    Cholecalciferol (VITAMIN D3) 1000 UNIT Cap Take 1 Capsule by mouth every day. 100 Capsule 3    folic acid (FOLVITE) 1 MG Tab Take 1 Tablet by mouth every day. 100 Tablet 4    metoprolol SR (TOPROL XL) 25 MG TABLET SR 24 HR Take 0.5 Tablets by mouth every day. 45 Tablet 1    torsemide (DEMADEX) 10 MG tablet Take 1 Tablet by mouth every day. 90 Tablet 4    donepezil (ARICEPT) 5 MG Tab Take 1 Tablet by mouth every evening. (Patient not taking: Reported on 6/18/2024) 100 Tablet 3     No facility-administered medications prior to visit.     Patient Active Problem List   Diagnosis    Overlapping malignant neoplasm of colon (HCC)Overlapping malignant neoplasm of colon  (HCC)--2010 left hemicolectomy; no colostomy; ablation of liver met at [facility] 1/2011- MRI abd 3/2018 no change in per    Liver lesion- ablation of malignant met from colon ca 2011- f/u [facility] q 6 mos - stable MRI abd 3/2018- redo annually    Drug-induced polyneuropathy (HCC)    Vitamin D deficiency    ASCVD (arteriosclerotic cardiovascular disease)subtotalled small distal LCx and 40% LAD med rx by cath 12/13/12    Essential hypertension    Gastrointestinal hemorrhage with hematemesis- recurrernt from varices    Secondary esophageal varices without bleeding (HCC)- BANDED 4/2016- repeat egd tbd 10//29/16- [facility]    Gastric varices- s/p BRTO procedure at [facility]    Portal vein thrombosis- on CT [facility] 2/2015    Cirrhosis of liver with ascites (HCC)- ? DUE TO OLD HEP B, CHEMO RX,  OR THERMO RAD OF MAGNANT LESION    History of esophageal varices    H/O colon cancer, stage IV- neg colonoscopy 2012/2018 at [facility]    BPH (benign prostatic hyperplasia)    Peripheral neuropathy due to chemotherapy (HCC)    Edema of left lower extremity- due to dvt july 2017    Generalized edema    Anemia, chronic disease    Chronic deep vein thrombosis (DVT) of femoral vein of left lower extremity (HCC)- s/p IVC filter 2016    Portal hypertension with esophageal varices (HCC)    Secondary malignant neoplasm of liver (HCC)    Thrombocytopenia, unspecified (HCC)- from previous chemorx    Spinal enthesopathy of thoracic region (HCC)    History of Guillain-[place] syndrome    Cerebral atrophy (HCC)-MRI brain 2019, mild diffuse cerebral substance loss    Dyslipidemia    Irregular heartbeat- A. FIB ON APPLE WATCH    Obesity (BMI 30-39.9)    Obesity due to excess calories with serious comorbidity    Paroxysmal SVT (supraventricular tachycardia) (HCC)    Risk for falls    Aortic valve sclerosis               HPI    Review of Systems   Constitutional: Negative.    HENT: Negative.     Eyes: Negative.    Respiratory: Negative.     Cardiovascular:  Positive for leg  "swelling.        Doppler ultrasound 6 weeks ago of left lower leg showed no DVT.   Gastrointestinal: Negative.    Genitourinary: Negative.    Musculoskeletal: Negative.    Skin: Negative.    Neurological: Negative.         There is cramping and spasm intermittently of both hands usually when patient wakes up in the morning.  No trigger fingers.  But difficulty opening up his hand completely for several minutes.   Endo/Heme/Allergies: Negative.    Psychiatric/Behavioral: Negative.                Objective     /54 (BP Location: Left arm, Patient Position: Sitting, BP Cuff Size: Adult)   Pulse 72   Temp 36.8 °C (98.2 °F) (Temporal)   Ht 1.905 m (6' 3\")   Wt 113 kg (249 lb)   SpO2 96%   BMI 31.12 kg/m²      Physical Exam  Vitals and nursing note reviewed.   Constitutional:       General: He is not in acute distress.     Appearance: He is well-developed. He is not diaphoretic.   HENT:      Head: Normocephalic and atraumatic.      Right Ear: External ear normal.      Left Ear: External ear normal.      Nose: Nose normal.      Mouth/Throat:      Pharynx: No oropharyngeal exudate.   Eyes:      General: No scleral icterus.        Right eye: No discharge.         Left eye: No discharge.      Conjunctiva/sclera: Conjunctivae normal.      Pupils: Pupils are equal, round, and reactive to light.   Neck:      Thyroid: No thyromegaly.      Vascular: No JVD.      Trachea: No tracheal deviation.   Cardiovascular:      Rate and Rhythm: Normal rate and regular rhythm.      Heart sounds: Murmur heard.      No friction rub. No gallop.      Comments: Grade 1/6 to 2/6 systolic jaret-shaped murmur at the second right intercostal space.  No radiation.  Normal carotid upstroke.  Normal PMI.  No bruits  Pulmonary:      Effort: Pulmonary effort is normal. No respiratory distress.      Breath sounds: Normal breath sounds. No stridor. No wheezing or rales.   Chest:      Chest wall: No tenderness.   Abdominal:      General: Bowel " sounds are normal. There is no distension.      Palpations: Abdomen is soft. There is no mass.      Tenderness: There is no abdominal tenderness. There is no guarding or rebound.      Hernia: No hernia is present.   Genitourinary:     Penis: No tenderness.       Rectum: Guaiac result negative.   Musculoskeletal:         General: Swelling present. No tenderness. Normal range of motion.      Cervical back: Normal range of motion and neck supple.      Comments: There is 3 mm of pretibial edema left lower leg and 2 mm of pretibial edema on the right lower leg.  No calf tenderness in either lower extremity.  There is mild chafing and more extensive venous stasis brawny changes of the left lower leg.  However there is no severe erythema or warmth or tenderness.    There is no spasm or contractures or trigger fingers of either hand and he has good fist closure and opening of his hands bilaterally   Lymphadenopathy:      Cervical: No cervical adenopathy.   Skin:     General: Skin is warm and dry.      Coloration: Skin is not pale.      Findings: No erythema or rash.   Neurological:      Mental Status: He is alert and oriented to person, place, and time.      Cranial Nerves: No cranial nerve deficit.      Motor: No abnormal muscle tone.      Coordination: Coordination normal.      Deep Tendon Reflexes: Reflexes are normal and symmetric. Reflexes normal.   Psychiatric:         Behavior: Behavior normal.         Thought Content: Thought content normal.         Judgment: Judgment normal.       Hospital Outpatient Visit on 06/18/2024   Component Date Value    PT 06/18/2024 17.4 (H)     INR 06/18/2024 1.41 (H)     Sodium 06/18/2024 135     Potassium 06/18/2024 4.0     Chloride 06/18/2024 103     Co2 06/18/2024 22     Anion Gap 06/18/2024 10.0     Glucose 06/18/2024 95     Bun 06/18/2024 22     Creatinine 06/18/2024 1.09     Calcium 06/18/2024 8.5     Correct Calcium 06/18/2024 9.0     AST(SGOT) 06/18/2024 25     ALT(SGPT)  06/18/2024 19     Alkaline Phosphatase 06/18/2024 101 (H)     Total Bilirubin 06/18/2024 1.5     Albumin 06/18/2024 3.4     Total Protein 06/18/2024 6.0     Globulin 06/18/2024 2.6     A-G Ratio 06/18/2024 1.3     WBC 06/18/2024 3.5 (L)     RBC 06/18/2024 3.52 (L)     Hemoglobin 06/18/2024 10.7 (L)     Hematocrit 06/18/2024 33.0 (L)     MCV 06/18/2024 93.8     MCH 06/18/2024 30.4     MCHC 06/18/2024 32.4     RDW 06/18/2024 60.3 (H)     Platelet Count 06/18/2024 59 (L)     MPV 06/18/2024 10.1     Neutrophils-Polys 06/18/2024 69.50     Lymphocytes 06/18/2024 8.40 (L)     Monocytes 06/18/2024 14.50 (H)     Eosinophils 06/18/2024 6.40     Basophils 06/18/2024 0.90     Immature Granulocytes 06/18/2024 0.30     Nucleated RBC 06/18/2024 0.00     Neutrophils (Absolute) 06/18/2024 2.40     Lymphs (Absolute) 06/18/2024 0.29 (L)     Monos (Absolute) 06/18/2024 0.50     Eos (Absolute) 06/18/2024 0.22     Baso (Absolute) 06/18/2024 0.03     Immature Granulocytes (a* 06/18/2024 0.01     NRBC (Absolute) 06/18/2024 0.00     Alpha Fetoprotein 06/18/2024 2     Imm. Plt Fraction 06/18/2024 3.3     GFR (CKD-EPI) 06/18/2024 69       Lab Results   Component Value Date/Time    HBA1C 4.5 02/23/2024 06:26 AM    HBA1C 4.6 09/12/2023 06:59 AM     Lab Results   Component Value Date/Time    SODIUM 135 06/18/2024 06:32 AM    POTASSIUM 4.0 06/18/2024 06:32 AM    CHLORIDE 103 06/18/2024 06:32 AM    CO2 22 06/18/2024 06:32 AM    GLUCOSE 95 06/18/2024 06:32 AM    BUN 22 06/18/2024 06:32 AM    CREATININE 1.09 06/18/2024 06:32 AM    CREATININE 1.14 01/23/2010 12:00 AM    BUNCREATRAT 15 01/23/2010 12:00 AM    GLOMRATE >59 01/23/2010 12:00 AM    ALKPHOSPHAT 101 (H) 06/18/2024 06:32 AM    ASTSGOT 25 06/18/2024 06:32 AM    ALTSGPT 19 06/18/2024 06:32 AM    TBILIRUBIN 1.5 06/18/2024 06:32 AM     Lab Results   Component Value Date/Time    INR 1.41 (H) 06/18/2024 06:32 AM    INR 1.33 (H) 12/04/2023 06:25 AM    INR 1.35 (H) 10/10/2023 12:10 PM     Lab Results  "  Component Value Date/Time    CHOLSTRLTOT 90 (L) 02/23/2024 06:26 AM    LDL 25 02/23/2024 06:26 AM    HDL 57 02/23/2024 06:26 AM    TRIGLYCERIDE 39 02/23/2024 06:26 AM       Lab Results   Component Value Date/Time    TESTOSTERONE 333 04/02/2015 06:47 AM     No results found for: \"TSH\"  Lab Results   Component Value Date/Time    FREET4 1.16 04/22/2022 06:12 AM    FREET4 0.85 04/02/2015 06:47 AM     No results found for: \"URICACID\"  No components found for: \"VITB12\"  Lab Results   Component Value Date/Time    25HYDROXY 31 02/23/2024 06:26 AM    25HYDROXY 29 (L) 04/14/2023 06:45 AM   '                        Assessment & Plan        1. Venous insufficiency of both lower extremities  This appears to be significant venous insufficiency left lower leg with negative Doppler ultrasound a month ago.  Since there is no calf tenderness and no evidence of any cellulitis will treat with compression stockings 35 mmHg bilaterally since he has in both legs and follow-up in 1 to 2 months.    Will increase his torsemide to 10 mg twice daily.    His caterer had inadvertently been discontinued but he still has been taking it at home and will continue on the same dosage of 20 mill equivalents daily.  Recheck electrolytes in 2 months after increasing torsemide dosage.  Continue on the Aldactone 50 mg twice daily.  - Misc. Devices Misc; Venous compression stockings bilat knee high; 35 mm hg; wear continuously during day ; off hs  Dispense: 3 Each; Refill: 3  - calcium carbonate (OS-AMPARO 500) 1250 (500 Ca) MG Tab; Take 1 Tablet by mouth every day.  Dispense: 100 Tablet; Refill: 3    2. Anemia, normocytic normochromic  Patient had documented iron deficiency before as well as B12 deficiency and need to recheck these levels and treat accordingly.    - IRON/TOTAL IRON BIND; Future  - FERRITIN; Future  - VITAMIN B12; Future  - FOLATE; Future  - CBC WITH DIFFERENTIAL; Future    3. Hypokalemia  Most recent labs shows good control.  Continue " current regimen.  - potassium chloride SA (KDUR) 20 MEQ Tab CR; Take 1 tablet (20 mEq total) by mouth daily  Dispense: 90 Tablet; Refill: 2    4. Aortic valve sclerosis  Previous echocardiogram showed aortic sclerosis without stenosis.  Has grade 1/6 to 2/6 systolic murmur at the second right intercostal space consistent with this finding.  But should be rechecked periodically and we will check it again in 6 months with another echocardiogram at that time.  Rule out progression to aortic stenosis.    5. Generalized edema  Increase Demadex to 10 mg twice daily.  Support hose ordered.  - torsemide (DEMADEX) 10 MG tablet; Take 1 Tablet by mouth 2 times a day.  Dispense: 200 Tablet; Refill: 1    6. Cirrhosis of liver with ascites, unspecified hepatic cirrhosis type (HCC)       As above  - torsemide (DEMADEX) 10 MG tablet; Take 1 Tablet by mouth 2 times a day.  Dispense: 200 Tablet; Refill: 1    7. Carpopedal spasm-new problem.  Is been going on for the last several weeks.  May be secondary to relatively low serum calcium level of 8.5 or magnesium level 1.7.  Start replacement therapy as below.     - calcium carbonate (OS-AMPARO 500) 1250 (500 Ca) MG Tab; Take 1 Tablet by mouth every day.  Dispense: 100 Tablet; Refill: 3  - magnesium oxide 400 (240 Mg) MG Tab; Take 1 Tablet by mouth every day.  Dispense: 100 Tablet; Refill: 3    8. Class 1 drug-induced obesity with body mass index (BMI) of 31.0 to 31.9 in adult, unspecified whether serious comorbidity present  Continue with Mediterranean diet.  - Patient identified as having weight management issue.  Appropriate orders and counseling given.        Patient had confided in me that he has been having some trouble with his memory which we will delve into further on his follow-up visit in 2 months.

## 2024-06-26 PROCEDURE — RXMED WILLOW AMBULATORY MEDICATION CHARGE: Performed by: STUDENT IN AN ORGANIZED HEALTH CARE EDUCATION/TRAINING PROGRAM

## 2024-06-27 ENCOUNTER — OFFICE VISIT (OUTPATIENT)
Dept: NEUROLOGY | Facility: MEDICAL CENTER | Age: 79
End: 2024-06-27
Attending: SPECIALIST
Payer: MEDICARE

## 2024-06-27 VITALS
SYSTOLIC BLOOD PRESSURE: 120 MMHG | HEART RATE: 68 BPM | DIASTOLIC BLOOD PRESSURE: 58 MMHG | TEMPERATURE: 98.1 F | BODY MASS INDEX: 30.67 KG/M2 | HEIGHT: 75 IN | WEIGHT: 246.69 LBS | OXYGEN SATURATION: 95 %

## 2024-06-27 DIAGNOSIS — R25.2 SPASTICITY: ICD-10-CM

## 2024-06-27 PROCEDURE — 99212 OFFICE O/P EST SF 10 MIN: CPT | Performed by: SPECIALIST

## 2024-06-27 ASSESSMENT — FIBROSIS 4 INDEX: FIB4 SCORE: 7.78

## 2024-06-27 NOTE — PROGRESS NOTES
Subjective     Torey Lima is a 79 y.o. male who presents with New Patient (Neuropathy )            HPI  Mr. Torey Lima is a 79-year-old gentleman referred by Mariusz Turner MD who sees Fabián Urena is a primary care physician.  He comes in with his wife Lorelei who provides some of the history.  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  He was treated with IVIG and improved.  The was admitted to the hospital on May 27 and transferred to rehab after stay in skilled care and was in rehab from August 10 through September 27 and has been home since that time.  He continues to have some residual leg weakness but otherwise has done well.  He states that for the last 1 to 2 years has had periods where his head spasms and contractures in his left hand that occur spontaneously.  These last for a minute or 2.  They are painful spasms.  If he puts his hand in warm water and resolves with almost immediately.  He has no preparatory symptoms and no tonic-clonic jerking activity.  This occurs per the patient once or twice a month and per the wife perhaps more often but only lasts a minute or 2.  He has not had any warning symptoms and does not have tonic spasms with the episodes.  He has no leg weakness.  He does not have headaches or visual complaints.  He does have cramps in his legs and recently saw his primary who gave him magnesium.    Past medical history:    1.  AIDP May 2017 as above.    2.  Colon cancer treated with resection and resection of mets to the liver and chemotherapy.    3.  This insufficiency in the lower extremities.    4.  Cirrhosis of the liver due to chemotherapy.    Medications-atorvastatin, magnesium recently started, metoprolol 25 mg half tablet daily, spironolactone.    Allergies-lisinopril    Social history-formerly smoked cigars and does not drink    Family history-positive for heart disease and cancer    ROS  As above positive for spasms in his  "extremities.         Objective     /58 (BP Location: Left arm, Patient Position: Sitting, BP Cuff Size: Adult)   Pulse 68   Temp 36.7 °C (98.1 °F) (Temporal)   Ht 1.905 m (6' 3\") Comment: patient reported  Wt 112 kg (246 lb 11.1 oz)   SpO2 95%   BMI 30.83 kg/m²      Physical Exam  Patient is a cooperative gentleman who appears his stated age.  Speech is fluent and mental status is grossly intact.  He was oriented x 3.  He recalled 3 of 3 objects at 5 minutes.    HEENT exam revealed him to be normocephalic and atraumatic.  Pupils are equal round reactive.  EOMs are full visual fields are full.  Optic disks are flat.    His neck is supple.        Neurological Exam  He stands with mild difficulty.  His gait is somewhat steppage and slightly wide-based.  He has no drift or dysmetria.  Rapid movements of the hands are symmetric.  Testing reveals intact bulk tone and strength throughout.    Sensory testing is intact to pin.    Reflexes are 1+ in the arms trace at the knees absent at the ankles he has downgoing toes.    Cranial nerves are unremarkable.           Assessment & Plan        1. Spasticity  Patient is an 79-year-old gentleman who had a prior history of AIDP who presents with tonic spasm mainly in his left hand which occurs once or twice a month and lasts only for several minutes.  His primary terms of \"carpal pedal spasm\" and question whether was related to prior hypocalcemia.  His calcium was 8.4 in February but on the 18th of this month was 8.5 which is a normal value.  He gave him supplements of calcium and magnesium which certainly is a reasonable option.  I did suggest he have a CT brain scan to exclude the possibility that he has a central lesion resulting in spasticity.  In addition he will have electroencephalogram.  I will see him back after that.  He does have mild residual of his AIDP as his legs are mildly weak.  He does not appear to have significant cognitive abnormalities.  - CT-HEAD " W/O; Future  - Referral to Neurodiagnostics (EEG,EP,EMG/NCS/DBS)

## 2024-06-28 PROCEDURE — RXMED WILLOW AMBULATORY MEDICATION CHARGE: Performed by: STUDENT IN AN ORGANIZED HEALTH CARE EDUCATION/TRAINING PROGRAM

## 2024-07-01 DIAGNOSIS — E87.6 HYPOKALEMIA: ICD-10-CM

## 2024-07-01 RX ORDER — POTASSIUM CHLORIDE 20 MEQ/1
TABLET, EXTENDED RELEASE ORAL
Qty: 90 TABLET | Refills: 2 | Status: SHIPPED | OUTPATIENT
Start: 2024-07-01

## 2024-07-02 ENCOUNTER — APPOINTMENT (OUTPATIENT)
Dept: WOUND CARE | Facility: MEDICAL CENTER | Age: 79
End: 2024-07-02
Payer: MEDICARE

## 2024-07-02 ENCOUNTER — PHARMACY VISIT (OUTPATIENT)
Dept: PHARMACY | Facility: MEDICAL CENTER | Age: 79
End: 2024-07-02
Payer: COMMERCIAL

## 2024-07-08 ENCOUNTER — PATIENT MESSAGE (OUTPATIENT)
Dept: NEUROLOGY | Facility: MEDICAL CENTER | Age: 79
End: 2024-07-08
Payer: MEDICARE

## 2024-07-08 ENCOUNTER — TELEPHONE (OUTPATIENT)
Dept: NEUROLOGY | Facility: MEDICAL CENTER | Age: 79
End: 2024-07-08
Payer: MEDICARE

## 2024-07-08 DIAGNOSIS — R25.2 SPASTICITY: ICD-10-CM

## 2024-07-09 ENCOUNTER — APPOINTMENT (OUTPATIENT)
Dept: WOUND CARE | Facility: MEDICAL CENTER | Age: 79
End: 2024-07-09
Payer: MEDICARE

## 2024-07-13 ENCOUNTER — NON-PROVIDER VISIT (OUTPATIENT)
Dept: CARDIOLOGY | Facility: MEDICAL CENTER | Age: 79
End: 2024-07-13
Payer: MEDICARE

## 2024-07-13 PROCEDURE — 93298 REM INTERROG DEV EVAL SCRMS: CPT | Mod: 26 | Performed by: INTERNAL MEDICINE

## 2024-07-26 ENCOUNTER — HOSPITAL ENCOUNTER (OUTPATIENT)
Dept: RADIOLOGY | Facility: MEDICAL CENTER | Age: 79
End: 2024-07-26
Attending: SPECIALIST
Payer: MEDICARE

## 2024-07-26 DIAGNOSIS — R25.2 SPASTICITY: ICD-10-CM

## 2024-07-26 PROCEDURE — 70450 CT HEAD/BRAIN W/O DYE: CPT

## 2024-07-30 PROCEDURE — RXMED WILLOW AMBULATORY MEDICATION CHARGE: Performed by: INTERNAL MEDICINE

## 2024-07-31 ENCOUNTER — PHARMACY VISIT (OUTPATIENT)
Dept: PHARMACY | Facility: MEDICAL CENTER | Age: 79
End: 2024-07-31
Payer: COMMERCIAL

## 2024-08-01 DIAGNOSIS — E78.5 DYSLIPIDEMIA: ICD-10-CM

## 2024-08-01 PROCEDURE — RXMED WILLOW AMBULATORY MEDICATION CHARGE: Performed by: STUDENT IN AN ORGANIZED HEALTH CARE EDUCATION/TRAINING PROGRAM

## 2024-08-01 RX ORDER — ATORVASTATIN CALCIUM 40 MG/1
40 TABLET, FILM COATED ORAL NIGHTLY
Qty: 100 TABLET | Refills: 4 | OUTPATIENT
Start: 2024-08-01

## 2024-08-02 ENCOUNTER — NON-PROVIDER VISIT (OUTPATIENT)
Dept: NEUROLOGY | Facility: MEDICAL CENTER | Age: 79
End: 2024-08-02
Attending: STUDENT IN AN ORGANIZED HEALTH CARE EDUCATION/TRAINING PROGRAM
Payer: MEDICARE

## 2024-08-02 ENCOUNTER — PHARMACY VISIT (OUTPATIENT)
Dept: PHARMACY | Facility: MEDICAL CENTER | Age: 79
End: 2024-08-02
Payer: COMMERCIAL

## 2024-08-02 DIAGNOSIS — R25.2 SPASTICITY: ICD-10-CM

## 2024-08-02 PROCEDURE — 99999 PR NO CHARGE: CPT | Performed by: STUDENT IN AN ORGANIZED HEALTH CARE EDUCATION/TRAINING PROGRAM

## 2024-08-02 PROCEDURE — 95819 EEG AWAKE AND ASLEEP: CPT | Performed by: PSYCHIATRY & NEUROLOGY

## 2024-08-02 PROCEDURE — 95819 EEG AWAKE AND ASLEEP: CPT | Mod: 26 | Performed by: PSYCHIATRY & NEUROLOGY

## 2024-08-02 NOTE — PROCEDURES
VIDEO ELECTROENCEPHALOGRAM REPORT      Referring provider: Dr. Rosales    DOS: 08/02/24 (total recording of 23 minutes).     INDICATION:  Torey Lima 79 y.o. male presenting with spasticity     CURRENT ANTIEPILEPTIC REGIMEN: none     TECHNIQUE: 30 channel video electroencephalogram (EEG) was performed in accordance with the international 10-20 system. The study was reviewed in bipolar and referential montages. The recording examined the patient during   awake, drowsy and sleep states    DESCRIPTION OF THE RECORD:  During the wakefulness, the background showed a symmetrical 9 Hz alpha activity posteriorly with amplitude of 70 mV.  There was reactivity to eye closure/opening.  A normal anterior-posterior gradient was noted with faster beta frequencies seen anteriorly.  During drowsiness, increased theta/beta frequencies were seen.    During the brief sleep state,symmetrical sleep spindles and vertex sharps were seen in the leads over the central regions. No slow wave stage seen.     ACTIVATION PROCEDURES:     hyperventilation was not performed    Intermittent Photic stimulation was performed in a stepwise fashion from 1 to 30 Hz and elicited a normal response (photic driving), most noticeable in the posterior leads.    ICTAL AND/OR INTERICTAL FINDINGS:   No focal or generalized epileptiform activity noted. No regional slowing was seen during this routine study.  No clinical events or seizures were reported or recorded during the study.     EKG: sampling of the EKG recording demonstrated sinus rhythm.     EVENTS: none     INTERPRETATION:    This is a normal video EEG recording in the awake, drowsy and brief sleep states.   Note: A normal EEG does not rule out epilepsy.      Dawood Izaguirre MD  Diplomate in Neurology&Epilepsy  Office: [phone]  Fax: [phone]

## 2024-08-13 ENCOUNTER — NON-PROVIDER VISIT (OUTPATIENT)
Dept: CARDIOLOGY | Facility: MEDICAL CENTER | Age: 79
End: 2024-08-13
Payer: MEDICARE

## 2024-08-13 PROCEDURE — 93298 REM INTERROG DEV EVAL SCRMS: CPT | Mod: 26 | Performed by: INTERNAL MEDICINE

## 2024-08-13 NOTE — CARDIAC REMOTE MONITOR - SCAN
Device transmission reviewed. Device demonstrated appropriate function.       Electronically Signed by: Warren Meng M.D.    8/13/2024  6:33 PM

## 2024-08-21 ENCOUNTER — PHARMACY VISIT (OUTPATIENT)
Dept: PHARMACY | Facility: MEDICAL CENTER | Age: 79
End: 2024-08-21
Payer: COMMERCIAL

## 2024-08-21 ENCOUNTER — OFFICE VISIT (OUTPATIENT)
Dept: URGENT CARE | Facility: CLINIC | Age: 79
End: 2024-08-21
Payer: MEDICARE

## 2024-08-21 VITALS
WEIGHT: 250 LBS | BODY MASS INDEX: 31.08 KG/M2 | HEIGHT: 75 IN | OXYGEN SATURATION: 95 % | RESPIRATION RATE: 14 BRPM | HEART RATE: 91 BPM | DIASTOLIC BLOOD PRESSURE: 66 MMHG | TEMPERATURE: 97.2 F | SYSTOLIC BLOOD PRESSURE: 120 MMHG

## 2024-08-21 DIAGNOSIS — I47.10 PAROXYSMAL SVT (SUPRAVENTRICULAR TACHYCARDIA) (HCC): ICD-10-CM

## 2024-08-21 DIAGNOSIS — I49.1 PAC (PREMATURE ATRIAL CONTRACTION): ICD-10-CM

## 2024-08-21 DIAGNOSIS — R10.9 SIDE PAIN: ICD-10-CM

## 2024-08-21 DIAGNOSIS — I10 ESSENTIAL HYPERTENSION: ICD-10-CM

## 2024-08-21 PROCEDURE — 3074F SYST BP LT 130 MM HG: CPT

## 2024-08-21 PROCEDURE — 3078F DIAST BP <80 MM HG: CPT

## 2024-08-21 PROCEDURE — RXMED WILLOW AMBULATORY MEDICATION CHARGE: Performed by: STUDENT IN AN ORGANIZED HEALTH CARE EDUCATION/TRAINING PROGRAM

## 2024-08-21 PROCEDURE — RXMED WILLOW AMBULATORY MEDICATION CHARGE

## 2024-08-21 PROCEDURE — 99213 OFFICE O/P EST LOW 20 MIN: CPT

## 2024-08-21 RX ORDER — METOPROLOL SUCCINATE 25 MG/1
12.5 TABLET, EXTENDED RELEASE ORAL DAILY
Qty: 50 TABLET | Refills: 3 | Status: SHIPPED | OUTPATIENT
Start: 2024-08-21

## 2024-08-21 ASSESSMENT — ENCOUNTER SYMPTOMS
CHILLS: 0
FEVER: 0
WHEEZING: 0
SHORTNESS OF BREATH: 0
COUGH: 0
ABDOMINAL PAIN: 0
BACK PAIN: 0

## 2024-08-21 ASSESSMENT — FIBROSIS 4 INDEX: FIB4 SCORE: 7.78

## 2024-08-21 NOTE — TELEPHONE ENCOUNTER
Is the patient due for a refill? Yes    Was the patient seen the past year? Yes    Date of last office visit: 06/18/2024    Does the patient have an upcoming appointment?  Yes   If yes, When? 12/31/2024    Provider to refill:HK    Does the patient have [facility] and need 100-day supply? (This applies to ALL medications) Yes, quantity updated to 100 days

## 2024-08-21 NOTE — PROGRESS NOTES
CHIEF COMPLAINT  Chief Complaint   Patient presents with    Leg Pain     Leg leg pain x 3 days, from waist down     Subjective:   Torey Lima is a 79 y.o. male with a pertinent past medical history of atrial fib and liver cirrhosis with ascites who presents to urgent care with concerns for left side pain that radiated down to his waist and left leg.  Patient reports he experienced symptoms approximately 3 days ago after waking up and first thing in the morning.  He reports that symptoms of discomfort resolved throughout the day.  He denies any recent episodes of pain, or any pain today in clinic.  Patient denies any symptoms of chest pain.  He denies any shortness of breath.  Patient denies any numbness or tingling.  He denies any loss of strength or weakness.  Patient denies any history of injury or fall.  He denies any fevers or chills.  Denies any back pain.  Denies any dysuria, urgency or frequency.  No other pertinent past medical history.    Review of Systems   Constitutional:  Negative for chills and fever.   Respiratory:  Negative for cough, shortness of breath and wheezing.    Cardiovascular:  Negative for chest pain and leg swelling.   Gastrointestinal:  Negative for abdominal pain.   Genitourinary:  Negative for dysuria, frequency and urgency.   Musculoskeletal:  Negative for back pain.       PAST MEDICAL HISTORY  Patient Active Problem List    Diagnosis Date Noted    Aortic valve sclerosis 06/25/2024    Risk for falls 02/27/2024    Paroxysmal SVT (supraventricular tachycardia) (HCC) 04/21/2023    Irregular heartbeat- A. 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    Cerebral atrophy (HCC)-MRI brain 2019, mild diffuse cerebral substance loss 06/25/2022    Spinal enthesopathy of thoracic region (HCC) 04/07/2020    Secondary  malignant neoplasm of liver (HCC) 04/23/2019    Thrombocytopenia, unspecified (HCC)- from previous chemorx 04/23/2019    Anemia, chronic disease 11/13/2018    Chronic deep vein thrombosis (DVT) of femoral vein of left lower extremity (HCC)- s/p IVC filter 2016 11/13/2018    Portal hypertension with esophageal varices (HCC) 11/13/2018    Generalized edema 12/14/2017    Edema of left lower extremity- due to dvt july 2017 09/19/2017    BPH (benign prostatic hyperplasia) 08/11/2017    Peripheral neuropathy due to chemotherapy (HCC) 08/11/2017    H/O colon cancer, stage IV- neg colonoscopy 2012/2018 at [facility] 05/20/2017    History of esophageal varices 10/24/2016    Portal vein thrombosis- on CT [facility] 2/2015 04/14/2016    Cirrhosis of liver with ascites (HCC)- ? DUE TO OLD HEP B, CHEMO RX,  OR THERMO RAD OF MAGNANT LESION 04/14/2016    Secondary esophageal varices without bleeding (HCC)- BANDED 4/2016- repeat egd tbd 10//29/16- [facility] 04/04/2016    Gastric varices- s/p BRTO procedure at [facility] 04/04/2016    Gastrointestinal hemorrhage with hematemesis- recurrernt from varices 04/03/2016    Essential hypertension 06/20/2013    ASCVD (arteriosclerotic cardiovascular disease)subtotalled small distal LCx and 40% LAD med rx by cath 12/13/12 12/14/2012    Vitamin D deficiency 08/02/2012    Overlapping malignant neoplasm of colon (HCC)Overlapping malignant neoplasm of colon (HCC)--2010 left hemicolectomy; no colostomy; ablation of liver met at [facility] 1/2011- MRI abd 3/2018 no change in per 12/06/2010    Liver lesion- ablation of malignant met from colon ca 2011- f/u [facility] q 6 mos - stable MRI abd 3/2018- redo annually 12/06/2010    Drug-induced polyneuropathy (HCC) 12/06/2010       SURGICAL HISTORY   has a past surgical history that includes low anterior resection robotic (8/10/2010); cath placement (9/20/2010); hip arthroplasty mis total; other; other (comments); cath removal (7/20/2011); full thickness block resection (4/11/2012);  gastroscopy with banding (4/3/2016); gastroscopy with banding (10/25/2016); colon resection; hip replacement, total; lumbar laminectomy diskectomy (5/25/2017); and umbilical hernia repair (N/A, 10/10/2023).    ALLERGIES  Allergies   Allergen Reactions    Influenza Virus Vaccine      Gu[place]an [place]anca     Influenza Virus Vaccine Live      Guillan Blanca     Anticoagulant Sodium Citrate [Sodium Citrate]      HIGH BLEEDING RISK    Lisinopril Cough       CURRENT MEDICATIONS  Home Medications       Reviewed by Zay Montenegro Ass't (Medical Assistant) on 08/21/24 at 1042  Med List Status: <None>     Medication Last Dose Status   acetaminophen (TYLENOL) 500 MG Tab Taking Active   atorvastatin (LIPITOR) 40 MG Tab Taking Active   calcium carbonate (OS-AMPARO 500) 1250 (500 Ca) MG Tab Taking Active   Cholecalciferol (VITAMIN D3) 1000 UNIT Cap Taking Active   ferrous sulfate 325 (65 Fe) MG tablet Taking Active   folic acid (FOLVITE) 1 MG Tab Taking Active   hydrocortisone 2.5 % Cream topical cream Taking Active   magnesium oxide 400 (240 Mg) MG Tab Taking Active   metoprolol SR (TOPROL XL) 25 MG TABLET SR 24 HR Taking Active   Misc. Devices Misc Taking Active   potassium chloride SA (KDUR) 20 MEQ Tab CR Taking Active   spironolactone (ALDACTONE) 50 MG Tab Taking Active   torsemide (DEMADEX) 10 MG tablet Taking Active   ursodiol (JUNE 250) 250 MG Tab Taking Active                    SOCIAL HISTORY  Social History     Tobacco Use    Smoking status: Former     Types: Cigars    Smokeless tobacco: Never    Tobacco comments:     1 cigar per week.   Vaping Use    Vaping status: Never Used   Substance and Sexual Activity    Alcohol use: Not Currently     Comment: minimal, Pt stop drinking since the beginning of April 2016    Drug use: No    Sexual activity: Yes     Partners: Female       FAMILY HISTORY  Family History   Problem Relation Age of Onset    Heart Disease Mother     Cancer Mother     Sleep Apnea Neg Hx          Medications,  "Allergies, and current problem list reviewed today in Epic.     Objective:     /66 (BP Location: Left arm, Patient Position: Sitting, BP Cuff Size: Large adult)   Pulse 91   Temp 36.2 °C (97.2 °F)   Resp 14   Ht 1.905 m (6' 3\")   Wt 113 kg (250 lb)   SpO2 95%     Physical Exam  Vitals reviewed.   Constitutional:       General: He is not in acute distress.     Appearance: Normal appearance. He is normal weight. He is not ill-appearing or toxic-appearing.   HENT:      Head: Normocephalic.   Cardiovascular:      Rate and Rhythm: Normal rate and regular rhythm.      Pulses: Normal pulses.      Heart sounds: Normal heart sounds.   Pulmonary:      Effort: Pulmonary effort is normal. No respiratory distress.      Breath sounds: Normal breath sounds. No stridor. No wheezing, rhonchi or rales.   Abdominal:      General: There is distension.      Tenderness: There is no abdominal tenderness.      Comments: Ascites present    Musculoskeletal:      Cervical back: Normal range of motion and neck supple.   Skin:     General: Skin is warm.      Capillary Refill: Capillary refill takes less than 2 seconds.      Findings: No bruising or erythema.   Neurological:      General: No focal deficit present.      Mental Status: He is alert.   Psychiatric:         Mood and Affect: Mood normal.         Assessment/Plan:     Diagnosis and associated orders:     1. Side pain           Comments/MDM:     Patient presents to urgent care after experiencing a transient episode of left side pain that seem to radiate to his leg.  He denies any symptoms of pain today in clinic, and notes symptoms of pain have resolved.  Patient states pain occurred immediately after waking up first thing in the morning.  He denies any history of falls.  No fevers or chills.  No red flag signs or symptoms.  Physical exam overall reassuring.  Ascites is present, as patient has history of cirrhosis.  Follow-up scheduled for management and drainage.  Vital " signs are stable in clinic.  Given transient nature of pain, as well as reports it occurred first thing in the morning I do suspect potential musculoskeletal.  Patient is overall well-appearing and in no apparent signs of distress.  Advised to use heat pack as needed for alleviation of discomfort.  Instructed to return to urgent care or ER if symptoms return, or if any concerning symptoms develop.         Differential diagnosis, natural history, supportive care, and indications for immediate follow-up discussed.    Advised the patient to follow-up with the primary care physician for recheck, reevaluation, and consideration of further management.    Please note that this dictation was created using voice recognition software. I have made a reasonable attempt to correct obvious errors, but I expect that there are errors of grammar and possibly content that I did not discover before finalizing the note.    This note was electronically signed by JOHN Benedict.

## 2024-08-23 ENCOUNTER — HOSPITAL ENCOUNTER (OUTPATIENT)
Dept: LAB | Facility: MEDICAL CENTER | Age: 79
End: 2024-08-23
Attending: INTERNAL MEDICINE
Payer: MEDICARE

## 2024-08-23 DIAGNOSIS — R73.01 IFG (IMPAIRED FASTING GLUCOSE): ICD-10-CM

## 2024-08-23 DIAGNOSIS — E55.9 VITAMIN D DEFICIENCY: ICD-10-CM

## 2024-08-23 DIAGNOSIS — N40.0 BENIGN PROSTATIC HYPERPLASIA WITHOUT LOWER URINARY TRACT SYMPTOMS: ICD-10-CM

## 2024-08-23 DIAGNOSIS — E78.5 DYSLIPIDEMIA: ICD-10-CM

## 2024-08-23 DIAGNOSIS — I10 ESSENTIAL HYPERTENSION: ICD-10-CM

## 2024-08-23 DIAGNOSIS — D64.9 ANEMIA, NORMOCYTIC NORMOCHROMIC: ICD-10-CM

## 2024-08-23 LAB
25(OH)D3 SERPL-MCNC: 25 NG/ML (ref 30–100)
ALBUMIN SERPL BCP-MCNC: 3.3 G/DL (ref 3.2–4.9)
ALBUMIN/GLOB SERPL: 1.2 G/DL
ALP SERPL-CCNC: 108 U/L (ref 30–99)
ALT SERPL-CCNC: 18 U/L (ref 2–50)
ANION GAP SERPL CALC-SCNC: 8 MMOL/L (ref 7–16)
AST SERPL-CCNC: 26 U/L (ref 12–45)
BASOPHILS # BLD AUTO: 0.6 % (ref 0–1.8)
BASOPHILS # BLD: 0.03 K/UL (ref 0–0.12)
BILIRUB SERPL-MCNC: 1.4 MG/DL (ref 0.1–1.5)
BUN SERPL-MCNC: 28 MG/DL (ref 8–22)
CALCIUM ALBUM COR SERPL-MCNC: 9.3 MG/DL (ref 8.5–10.5)
CALCIUM SERPL-MCNC: 8.7 MG/DL (ref 8.5–10.5)
CHLORIDE SERPL-SCNC: 104 MMOL/L (ref 96–112)
CHOLEST SERPL-MCNC: 87 MG/DL (ref 100–199)
CO2 SERPL-SCNC: 24 MMOL/L (ref 20–33)
CREAT SERPL-MCNC: 1.11 MG/DL (ref 0.5–1.4)
EOSINOPHIL # BLD AUTO: 0.27 K/UL (ref 0–0.51)
EOSINOPHIL NFR BLD: 5.5 % (ref 0–6.9)
ERYTHROCYTE [DISTWIDTH] IN BLOOD BY AUTOMATED COUNT: 58.1 FL (ref 35.9–50)
EST. AVERAGE GLUCOSE BLD GHB EST-MCNC: 82 MG/DL
FASTING STATUS PATIENT QL REPORTED: NORMAL
FERRITIN SERPL-MCNC: 79.9 NG/ML (ref 22–322)
FOLATE SERPL-MCNC: 27.4 NG/ML
GFR SERPLBLD CREATININE-BSD FMLA CKD-EPI: 67 ML/MIN/1.73 M 2
GLOBULIN SER CALC-MCNC: 2.7 G/DL (ref 1.9–3.5)
GLUCOSE SERPL-MCNC: 93 MG/DL (ref 65–99)
HBA1C MFR BLD: 4.5 % (ref 4–5.6)
HCT VFR BLD AUTO: 31.6 % (ref 42–52)
HDLC SERPL-MCNC: 53 MG/DL
HGB BLD-MCNC: 10 G/DL (ref 14–18)
IMM GRANULOCYTES # BLD AUTO: 0.01 K/UL (ref 0–0.11)
IMM GRANULOCYTES NFR BLD AUTO: 0.2 % (ref 0–0.9)
IRON SATN MFR SERPL: 17 % (ref 15–55)
IRON SERPL-MCNC: 47 UG/DL (ref 50–180)
LDLC SERPL CALC-MCNC: 27 MG/DL
LYMPHOCYTES # BLD AUTO: 0.33 K/UL (ref 1–4.8)
LYMPHOCYTES NFR BLD: 6.8 % (ref 22–41)
MCH RBC QN AUTO: 30.3 PG (ref 27–33)
MCHC RBC AUTO-ENTMCNC: 31.6 G/DL (ref 32.3–36.5)
MCV RBC AUTO: 95.8 FL (ref 81.4–97.8)
MONOCYTES # BLD AUTO: 0.65 K/UL (ref 0–0.85)
MONOCYTES NFR BLD AUTO: 13.3 % (ref 0–13.4)
NEUTROPHILS # BLD AUTO: 3.59 K/UL (ref 1.82–7.42)
NEUTROPHILS NFR BLD: 73.6 % (ref 44–72)
NRBC # BLD AUTO: 0 K/UL
NRBC BLD-RTO: 0 /100 WBC (ref 0–0.2)
PLATELET # BLD AUTO: 60 K/UL (ref 164–446)
PLATELETS.RETICULATED NFR BLD AUTO: 3.5 % (ref 0.6–13.1)
PMV BLD AUTO: 10.6 FL (ref 9–12.9)
POTASSIUM SERPL-SCNC: 4.8 MMOL/L (ref 3.6–5.5)
PROT SERPL-MCNC: 6 G/DL (ref 6–8.2)
PSA SERPL-MCNC: 0.19 NG/ML (ref 0–4)
RBC # BLD AUTO: 3.3 M/UL (ref 4.7–6.1)
SODIUM SERPL-SCNC: 136 MMOL/L (ref 135–145)
TIBC SERPL-MCNC: 284 UG/DL (ref 250–450)
TRIGL SERPL-MCNC: 35 MG/DL (ref 0–149)
TSH SERPL-ACNC: 2.54 UIU/ML (ref 0.35–5.5)
UIBC SERPL-MCNC: 237 UG/DL (ref 110–370)
VIT B12 SERPL-MCNC: 423 PG/ML (ref 211–911)
WBC # BLD AUTO: 4.9 K/UL (ref 4.8–10.8)

## 2024-08-23 PROCEDURE — 83036 HEMOGLOBIN GLYCOSYLATED A1C: CPT

## 2024-08-23 PROCEDURE — 84153 ASSAY OF PSA TOTAL: CPT

## 2024-08-23 PROCEDURE — 85025 COMPLETE CBC W/AUTO DIFF WBC: CPT

## 2024-08-23 PROCEDURE — 36415 COLL VENOUS BLD VENIPUNCTURE: CPT

## 2024-08-23 PROCEDURE — 82607 VITAMIN B-12: CPT

## 2024-08-23 PROCEDURE — 80061 LIPID PANEL: CPT

## 2024-08-23 PROCEDURE — 84443 ASSAY THYROID STIM HORMONE: CPT

## 2024-08-23 PROCEDURE — 82746 ASSAY OF FOLIC ACID SERUM: CPT

## 2024-08-23 PROCEDURE — 83540 ASSAY OF IRON: CPT

## 2024-08-23 PROCEDURE — 82728 ASSAY OF FERRITIN: CPT

## 2024-08-23 PROCEDURE — 85055 RETICULATED PLATELET ASSAY: CPT

## 2024-08-23 PROCEDURE — 83550 IRON BINDING TEST: CPT

## 2024-08-23 PROCEDURE — 80053 COMPREHEN METABOLIC PANEL: CPT

## 2024-08-23 PROCEDURE — 82306 VITAMIN D 25 HYDROXY: CPT

## 2024-08-27 SDOH — HEALTH STABILITY: PHYSICAL HEALTH: ON AVERAGE, HOW MANY DAYS PER WEEK DO YOU ENGAGE IN MODERATE TO STRENUOUS EXERCISE (LIKE A BRISK WALK)?: 7 DAYS

## 2024-08-27 SDOH — ECONOMIC STABILITY: INCOME INSECURITY: IN THE LAST 12 MONTHS, WAS THERE A TIME WHEN YOU WERE NOT ABLE TO PAY THE MORTGAGE OR RENT ON TIME?: NO

## 2024-08-27 SDOH — ECONOMIC STABILITY: FOOD INSECURITY: WITHIN THE PAST 12 MONTHS, YOU WORRIED THAT YOUR FOOD WOULD RUN OUT BEFORE YOU GOT MONEY TO BUY MORE.: NEVER TRUE

## 2024-08-27 SDOH — ECONOMIC STABILITY: FOOD INSECURITY: WITHIN THE PAST 12 MONTHS, THE FOOD YOU BOUGHT JUST DIDN'T LAST AND YOU DIDN'T HAVE MONEY TO GET MORE.: NEVER TRUE

## 2024-08-27 SDOH — ECONOMIC STABILITY: INCOME INSECURITY: HOW HARD IS IT FOR YOU TO PAY FOR THE VERY BASICS LIKE FOOD, HOUSING, MEDICAL CARE, AND HEATING?: NOT HARD AT ALL

## 2024-08-27 SDOH — HEALTH STABILITY: PHYSICAL HEALTH: ON AVERAGE, HOW MANY MINUTES DO YOU ENGAGE IN EXERCISE AT THIS LEVEL?: 30 MIN

## 2024-08-27 SDOH — HEALTH STABILITY: MENTAL HEALTH
STRESS IS WHEN SOMEONE FEELS TENSE, NERVOUS, ANXIOUS, OR CAN'T SLEEP AT NIGHT BECAUSE THEIR MIND IS TROUBLED. HOW STRESSED ARE YOU?: NOT AT ALL

## 2024-08-27 ASSESSMENT — SOCIAL DETERMINANTS OF HEALTH (SDOH)
HOW OFTEN DO YOU ATTEND CHURCH OR RELIGIOUS SERVICES?: NEVER
IN A TYPICAL WEEK, HOW MANY TIMES DO YOU TALK ON THE PHONE WITH FAMILY, FRIENDS, OR NEIGHBORS?: MORE THAN THREE TIMES A WEEK
HOW OFTEN DO YOU HAVE A DRINK CONTAINING ALCOHOL: NEVER
DO YOU BELONG TO ANY CLUBS OR ORGANIZATIONS SUCH AS CHURCH GROUPS UNIONS, FRATERNAL OR ATHLETIC GROUPS, OR SCHOOL GROUPS?: NO
IN A TYPICAL WEEK, HOW MANY TIMES DO YOU TALK ON THE PHONE WITH FAMILY, FRIENDS, OR NEIGHBORS?: MORE THAN THREE TIMES A WEEK
WITHIN THE PAST 12 MONTHS, YOU WORRIED THAT YOUR FOOD WOULD RUN OUT BEFORE YOU GOT THE MONEY TO BUY MORE: NEVER TRUE
HOW OFTEN DO YOU ATTEND CHURCH OR RELIGIOUS SERVICES?: NEVER
HOW MANY DRINKS CONTAINING ALCOHOL DO YOU HAVE ON A TYPICAL DAY WHEN YOU ARE DRINKING: PATIENT DOES NOT DRINK
HOW OFTEN DO YOU HAVE SIX OR MORE DRINKS ON ONE OCCASION: NEVER
HOW OFTEN DO YOU ATTENT MEETINGS OF THE CLUB OR ORGANIZATION YOU BELONG TO?: NEVER
HOW HARD IS IT FOR YOU TO PAY FOR THE VERY BASICS LIKE FOOD, HOUSING, MEDICAL CARE, AND HEATING?: NOT HARD AT ALL
HOW OFTEN DO YOU ATTENT MEETINGS OF THE CLUB OR ORGANIZATION YOU BELONG TO?: NEVER
DO YOU BELONG TO ANY CLUBS OR ORGANIZATIONS SUCH AS CHURCH GROUPS UNIONS, FRATERNAL OR ATHLETIC GROUPS, OR SCHOOL GROUPS?: NO
HOW OFTEN DO YOU GET TOGETHER WITH FRIENDS OR RELATIVES?: ONCE A WEEK
IN THE PAST 12 MONTHS, HAS THE ELECTRIC, GAS, OIL, OR WATER COMPANY THREATENED TO SHUT OFF SERVICE IN YOUR HOME?: NO
HOW OFTEN DO YOU GET TOGETHER WITH FRIENDS OR RELATIVES?: ONCE A WEEK

## 2024-08-27 ASSESSMENT — LIFESTYLE VARIABLES
HOW OFTEN DO YOU HAVE SIX OR MORE DRINKS ON ONE OCCASION: NEVER
HOW OFTEN DO YOU HAVE A DRINK CONTAINING ALCOHOL: NEVER
SKIP TO QUESTIONS 9-10: 1
HOW MANY STANDARD DRINKS CONTAINING ALCOHOL DO YOU HAVE ON A TYPICAL DAY: PATIENT DOES NOT DRINK
AUDIT-C TOTAL SCORE: 0

## 2024-08-28 ENCOUNTER — OFFICE VISIT (OUTPATIENT)
Dept: MEDICAL GROUP | Age: 79
End: 2024-08-28
Payer: MEDICARE

## 2024-08-28 VITALS
HEIGHT: 75 IN | WEIGHT: 256 LBS | HEART RATE: 96 BPM | BODY MASS INDEX: 31.83 KG/M2 | SYSTOLIC BLOOD PRESSURE: 110 MMHG | OXYGEN SATURATION: 97 % | DIASTOLIC BLOOD PRESSURE: 70 MMHG | TEMPERATURE: 97.5 F

## 2024-08-28 DIAGNOSIS — R73.01 IFG (IMPAIRED FASTING GLUCOSE): ICD-10-CM

## 2024-08-28 DIAGNOSIS — E78.5 DYSLIPIDEMIA: ICD-10-CM

## 2024-08-28 DIAGNOSIS — E26.1 SECONDARY HYPERALDOSTERONISM (HCC): ICD-10-CM

## 2024-08-28 DIAGNOSIS — E66.09 CLASS 1 OBESITY DUE TO EXCESS CALORIES WITH SERIOUS COMORBIDITY AND BODY MASS INDEX (BMI) OF 32.0 TO 32.9 IN ADULT: ICD-10-CM

## 2024-08-28 DIAGNOSIS — E55.9 VITAMIN D DEFICIENCY: ICD-10-CM

## 2024-08-28 DIAGNOSIS — I10 ESSENTIAL HYPERTENSION: ICD-10-CM

## 2024-08-28 DIAGNOSIS — G62.0 DRUG-INDUCED POLYNEUROPATHY (HCC): ICD-10-CM

## 2024-08-28 DIAGNOSIS — D50.8 OTHER IRON DEFICIENCY ANEMIA: ICD-10-CM

## 2024-08-28 DIAGNOSIS — Z00.00 MEDICARE ANNUAL WELLNESS VISIT, SUBSEQUENT: ICD-10-CM

## 2024-08-28 ASSESSMENT — FIBROSIS 4 INDEX: FIB4 SCORE: 8.17

## 2024-08-28 ASSESSMENT — ACTIVITIES OF DAILY LIVING (ADL): BATHING_REQUIRES_ASSISTANCE: 0

## 2024-08-28 ASSESSMENT — ENCOUNTER SYMPTOMS: GENERAL WELL-BEING: GOOD

## 2024-08-28 ASSESSMENT — PATIENT HEALTH QUESTIONNAIRE - PHQ9: CLINICAL INTERPRETATION OF PHQ2 SCORE: 0

## 2024-08-28 NOTE — PROGRESS NOTES
MA COMPONENT     HPI:  Torey Lima is a 79 y.o. here today for a Medicare Annual Wellness Visit.     Patient Active Problem List    Diagnosis Date Noted    Aortic valve sclerosis 06/25/2024    Risk for falls 02/27/2024    Paroxysmal SVT (supraventricular tachycardia) (HCC) 04/21/2023    Irregular heartbeat- A. 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    Cerebral atrophy (HCC)-MRI brain 2019, mild diffuse cerebral substance loss 06/25/2022    Spinal enthesopathy of thoracic region (HCC) 04/07/2020    Secondary malignant neoplasm of liver (HCC) 04/23/2019    Thrombocytopenia, unspecified (HCC)- from previous chemorx 04/23/2019    Anemia, chronic disease 11/13/2018    Chronic deep vein thrombosis (DVT) of femoral vein of left lower extremity (HCC)- s/p IVC filter 2016 11/13/2018    Portal hypertension with esophageal varices (HCC) 11/13/2018    Generalized edema 12/14/2017    Edema of left lower extremity- due to dvt july 2017 09/19/2017    BPH (benign prostatic hyperplasia) 08/11/2017    Peripheral neuropathy due to chemotherapy (HCC) 08/11/2017    H/O colon cancer, stage IV- neg colonoscopy 2012/2018 at [facility] 05/20/2017    History of esophageal varices 10/24/2016    Portal vein thrombosis- on CT [facility] 2/2015 04/14/2016    Cirrhosis of liver with ascites (HCC)- ? 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     Allergies: Influenza virus vaccine, Influenza virus vaccine live, Anticoagulant sodium citrate [sodium citrate], and Lisinopril     Current social contact/activities: yes     He  reports that he has quit smoking. His smoking use included cigars. He has never used smokeless tobacco. He reports that he does not currently use alcohol. He reports that he does not use drugs.     Lives with at home: spouse  Any caregivers: no  Is the caregiver paid or unpaid: no     ROS:   Gait: Uses no assistive device  Ostomy: No  Other tubes: No  Amputations: No  Chronic oxygen use: No  Last eye exam: over due  Wears hearing aids: No  : Denies any urinary leakage during the last 6 months     Depression Screening  Little interest or pleasure in doing things?  0 - not at all  Feeling down, depressed , or hopeless? 0 - not at all  Patient Health Questionnaire Score: 0     If depressive symptoms identified deferred to follow up visit unless specifically addressed in assessment and plan.    Interpretation of PHQ-9 Total Score   Score Severity   1-4 No Depression   5-9 Mild Depression   10-14 Moderate Depression   15-19 Moderately Severe Depression   20-27 Severe Depression    Screening for Cognitive Impairment  Do you or any of your friends or family members have any concern about your memory? Yes  Three Minute Recall (Leader, Season, Table) 2/3    Humberto clock face with all 12 numbers and set the hands to show 10 minutes after 11.  Yes    Cognitive concerns identified  deferred for follow up unless specifically addressed in assessment and plan.    Fall Risk Assessment  Has the patient had two or more falls in the last year or any fall with injury in the last year?  No    Safety Assessment  Do you always wear your seatbelt?  Yes  Any changes to home needed to function safely? No  Difficulty hearing.  No  Patient counseled about all safety risks that were identified.    Functional Assessment ADLs  Are there any barriers preventing you from cooking for yourself or meeting nutritional needs?  No.    Are there any barriers preventing you from driving safely or obtaining transportation?  No.    Are there any barriers preventing you from using a telephone or calling for help?  No    Are there any barriers preventing you from shopping?  No.    Are there any barriers preventing you from taking care of your own finances?  No    Are there any barriers preventing you from managing your medications?  No    Are there any barriers preventing you from showering, bathing or dressing yourself? No    Are there any barriers preventing you from doing housework or laundry? No  Are there any barriers preventing you from using the toilet?No  Are you currently engaging in any exercise or physical activity?  Yes.      Self-Assessment of Health  What is your perception of your health? Good    Do you sleep more than six hours a night? Yes    In the past 7 days, how much did pain keep you from doing your normal work? Some    Do you spend quality time with family or friends (virtually or in person)? Yes    Do you usually eat a heart healthy diet that constists of a variety of fruits, vegetables, whole grains and fiber? Yes    Do you eat foods high in fat and/or Fast Food more than three times per week? No    How concerned are you that your medical conditions are not being well managed? Not at all    Are you worried that in the next 2 months, you may not have stable housing that you own, rent, or stay in as  part of a household? No      Advance Care Planning  Do you have an Advance Directive, Living Will, Durable Power of , or POLST? No                 Health Maintenance Summary            Overdue - Hepatitis A Vaccine (Hep A) (1 of 2 - Risk 2-dose series) Never done      No completion history exists for this topic.              Overdue - COVID-19 Vaccine (7 - 2023-24 season) Overdue since 1/27/2024 09/27/2023  Imm Admin: Covid-19 Mrna (Spikevax) Moderna 12+ Years    10/22/2022  Imm Admin: PFIZER BIVALENT SARS-COV-2 VACCINE (12+)    05/25/2022  Imm Admin: PFIZER BAUM CAP SARS-COV-2 VACCINATION (12+)    11/04/2021  Imm Admin: PFIZER PURPLE CAP SARS-COV-2 VACCINATION (12+)    02/18/2021  Imm Admin: PFIZER PURPLE CAP SARS-COV-2 VACCINATION (12+)    Only the first 5 history entries have been loaded, but more history exists.              Overdue - Annual Wellness Visit (Yearly) Overdue since 4/19/2024 04/19/2023  Visit Dx: Medicare annual wellness visit, subsequent    04/19/2023  Level of Service: [place] ANNUAL WELLNESS VISIT-INCLUDES PPPS SUBSEQUE*    08/02/2019  Subsequent Annual Wellness Visit - Includes PPPS ()    08/02/2019  Subsequent Annual Wellness Visit - Includes PPPS ()    08/02/2019  Visit Dx: Medicare annual wellness visit, subsequent    Only the first 5 history entries have been loaded, but more history exists.              IMM DTaP/Tdap/Td Vaccine (4 - Td or Tdap) Next due on 11/8/2032 11/08/2022  Imm Admin: Tdap Vaccine    04/25/2012  Imm Admin: Tdap Vaccine    04/25/2012  Imm Admin: Dtap Vaccine              Pneumococcal Vaccine: 65+ Years (Series Information) Completed      10/09/2015  Imm Admin: Pneumococcal Conjugate Vaccine (Prevnar/PCV-13)    10/09/2015  Imm Admin: Pneumococcal polysaccharide vaccine (PPSV-23)    10/26/2011  Imm Admin: Pneumococcal Vaccine (UF) - HISTORICAL DATA    10/26/2011  Imm Admin: Pneumococcal polysaccharide vaccine (PPSV-23)              HPV  Vaccines (Series Information) Aged Out      No completion history exists for this topic.              Polio Vaccine (Inactivated Polio) (Series Information) Aged Out      No completion history exists for this topic.              Meningococcal Immunization (Series Information) Aged Out      No completion history exists for this topic.              Discontinued - Colorectal Cancer Screening  Discontinued        Frequency changed to Never automatically (Topic No Longer Applies)    09/21/2020  REFERRAL TO GI FOR COLONOSCOPY    02/01/2018  REFERRAL TO GI FOR COLONOSCOPY    05/20/2017  OCCULT BLOOD X3 (STOOL)    08/19/2014  AMB REFERRAL TO GI FOR COLONOSCOPY    Only the first 5 history entries have been loaded, but more history exists.              Discontinued - Hepatitis B Vaccine (Hep B)  Discontinued      No completion history exists for this topic.              Discontinued - Influenza Vaccine  Discontinued      10/10/2016  Imm Admin: Influenza Vaccine Adult HD    10/09/2015  Imm Admin: Influenza Vaccine Adult HD    10/20/2014  Imm Admin: Influenza Vaccine Quad Inj (Pf)    10/10/2013  Imm Admin: Influenza Vaccine Pediatric Split - Historical Data    10/10/2013  Imm Admin: Influenza Seasonal Injectable - Historical Data    Only the first 5 history entries have been loaded, but more history exists.              Discontinued - Zoster (Shingles) Vaccines  Discontinued      11/03/2012  Imm Admin: Zoster Vaccine Live (ZVL) (Zostavax) - HISTORICAL DATA              Discontinued - Hepatitis C Screening  Discontinued        Frequency changed to Never automatically (Topic No Longer Applies)    04/03/2016  Hepatitis C Antibody component of HEP C VIRUS ANTIBODY    01/29/2011  Outside Procedure: HEP C VIRUS ANTIBODY                    Patient Care Team:  Fabián Urena M.D. as PCP - General  Fabián Urena M.D. as PCP - [facility] Paneled  Pierre Waggoner M.D. as Consulting Physician (Gastroenterology)  Sal Yao M.D. as  Consulting Physician (Medical Oncology)  Herman Corey O.D. as Consulting Physician (Optometry)  Geovany Nuñez, PT, DPT, OCS as Physical Therapist (Physical Therapy)  Payal Singh (Inactive) as [facility]          Patient Care Team:  Fabián Urena M.D. as PCP - General  Fabián Urena M.D. as PCP - [facility] Paneled  Pierre Waggoner M.D. as Consulting Physician (Gastroenterology)  Sal Yao M.D. as Consulting Physician (Medical Oncology)  Herman Corey O.D. as Consulting Physician (Optometry)  Geovany Nuñez, PT, DPT, OCS as Physical Therapist (Physical Therapy)  Payal Singh (Inactive) as [facility]      Social History     Tobacco Use    Smoking status: Former     Types: Cigars    Smokeless tobacco: Never    Tobacco comments:     1 cigar per week.   Vaping Use    Vaping status: Never Used   Substance Use Topics    Alcohol use: Not Currently     Comment: minimal, Pt stop drinking since the beginning of April 2016    Drug use: No        Family History   Problem Relation Age of Onset    Heart Disease Mother     Cancer Mother     Sleep Apnea Neg Hx         Past Surgical History:   Procedure Laterality Date    UMBILICAL HERNIA REPAIR N/A 10/10/2023    Procedure: OPEN UMBILICAL HERNIA REPAIR;  Surgeon: Julian Ennis M.D.;  Location: SURGERY [facility];  Service: General    LUMBAR LAMINECTOMY DISKECTOMY  5/25/2017    Procedure:  LAMINECTOMY LUMBAR  4 TO SACRAL 1;  Surgeon: Felton Escobar M.D.;  Location: SURGERY [facility];  Service:     GASTROSCOPY WITH BANDING  10/25/2016    Procedure: GASTROSCOPY WITH BANDING;  Surgeon: Pierre Waggoner M.D.;  Location: ENDOSCOPY [facility];  Service:     GASTROSCOPY WITH BANDING  4/3/2016    Procedure: GASTROSCOPY WITH BANDING;  Surgeon: Cayetano Stovall M.D.;  Location: ENDOSCOPY [facility];  Service:     FULL THICKNESS BLOCK RESECTION  4/11/2012    Performed by LANI BOWMAN at SURGERY  [facility]    CATH REMOVAL  7/20/2011    Performed by RUBY RUIZ at SURGERY [facility] ORS    CATH PLACEMENT  9/20/2010    Performed by RUBY RUIZ at SURGERY [facility] ORS    LOW ANTERIOR RESECTION ROBOTIC  8/10/2010    Performed by RUBY RUIZ at SURGERY [facility] ORS    COLON RESECTION      HIP ARTHROPLASTY MIS TOTAL      rt    HIP REPLACEMENT, TOTAL      OTHER      Cholecystectomy    OTHER (COMMENTS)      liver surgery          Whisper test - stand 3 feet  behind patient and whisper 3 numbers for each ear while covering other ear..  - Right 2/3, Left 2/3     Vision test - using Snellen eye chart test both eyes separately and together, with and without correction.   - Without correction:      OD  /40, OS /40, OU /40  - With correction:             OD  /40, OS /40, OU /40     Rise and walk test - Have patient stand, walk 10 feet and return to chair.  -   seconds     Stay independent checklist:  Yes (2) No (0) I have fallen in the past year. 0  Yes (2) No (0) I use or have been advised to use a cane or  walker to get around safely.0  Yes (1) No (0) Sometimes I feel unsteady when I am walking. 0  Yes (1) No (0) I steady myself by holding onto furniture  when walking at home.0  Yes (1) No (0) I am worried about falling0  Yes (1) No (0) I need to push with my hands to stand up  from a chair.0  Yes (1) No (0) I have some trouble stepping up onto a curb. 0  Yes (1) No (0) I often have to rush to the toilet. 0  Yes (1) No (0) I have lost some feeling in my feet. 0  Yes (1) No (0) I take medicine that sometimes makes me feel  light-headed or more tired than usual.0  Yes (1) No (0) I take medicine to help me sleep or improve  my mood. 0  Yes (1) No (0) I often feel sad or depressed. 0  Total Add up the number of points for each “yes” answer. 0     EXAM  /70 (BP Location: Left arm, Patient Position: Sitting, BP Cuff Size: Adult)   Pulse 96   Temp 36.4 °C (97.5 °F) (Temporal)   Ht  "1.905 m (6' 3\")   Wt 116 kg (256 lb)   SpO2 97%  - {      Hearing good.   Dentition fair  Alert, oriented in no acute distress.  Eye contact is good, speech goal directed, affect calm                  Health maintenance review: (catch them up on all care gaps - vaccines, mammogram, colon cancer screen)  Health Maintenance Summary            Overdue - Hepatitis A Vaccine (Hep A) (1 of 2 - Risk 2-dose series) Never done      No completion history exists for this topic.              Overdue - COVID-19 Vaccine (7 - 2023-24 season) Overdue since 1/27/2024 09/27/2023  Imm Admin: Covid-19 Mrna (Spikevax) Moderna 12+ Years    10/22/2022  Imm Admin: PFIZER BIVALENT SARS-COV-2 VACCINE (12+)    05/25/2022  Imm Admin: PFIZER BAUM CAP SARS-COV-2 VACCINATION (12+)    11/04/2021  Imm Admin: PFIZER PURPLE CAP SARS-COV-2 VACCINATION (12+)    02/18/2021  Imm Admin: PFIZER PURPLE CAP SARS-COV-2 VACCINATION (12+)    Only the first 5 history entries have been loaded, but more history exists.              Overdue - Annual Wellness Visit (Yearly) Overdue since 4/19/2024 04/19/2023  Visit Dx: Medicare annual wellness visit, subsequent    04/19/2023  Level of Service: [place] ANNUAL WELLNESS VISIT-INCLUDES PPPS SUBSEQUE*    08/02/2019  Subsequent Annual Wellness Visit - Includes PPPS ()    08/02/2019  Subsequent Annual Wellness Visit - Includes PPPS ()    08/02/2019  Visit Dx: Medicare annual wellness visit, subsequent    Only the first 5 history entries have been loaded, but more history exists.              IMM DTaP/Tdap/Td Vaccine (4 - Td or Tdap) Next due on 11/8/2032 11/08/2022  Imm Admin: Tdap Vaccine    04/25/2012  Imm Admin: Tdap Vaccine    04/25/2012  Imm Admin: Dtap Vaccine              Pneumococcal Vaccine: 65+ Years (Series Information) Completed      10/09/2015  Imm Admin: Pneumococcal Conjugate Vaccine (Prevnar/PCV-13)    10/09/2015  Imm Admin: Pneumococcal polysaccharide vaccine (PPSV-23)    10/26/2011  " Imm Admin: Pneumococcal Vaccine (UF) - HISTORICAL DATA    10/26/2011  Imm Admin: Pneumococcal polysaccharide vaccine (PPSV-23)              HPV Vaccines (Series Information) Aged Out      No completion history exists for this topic.              Polio Vaccine (Inactivated Polio) (Series Information) Aged Out      No completion history exists for this topic.              Meningococcal Immunization (Series Information) Aged Out      No completion history exists for this topic.              Discontinued - Colorectal Cancer Screening  Discontinued        Frequency changed to Never automatically (Topic No Longer Applies)    09/21/2020  REFERRAL TO GI FOR COLONOSCOPY    02/01/2018  REFERRAL TO GI FOR COLONOSCOPY    05/20/2017  OCCULT BLOOD X3 (STOOL)    08/19/2014  AMB REFERRAL TO GI FOR COLONOSCOPY    Only the first 5 history entries have been loaded, but more history exists.              Discontinued - Hepatitis B Vaccine (Hep B)  Discontinued      No completion history exists for this topic.              Discontinued - Influenza Vaccine  Discontinued      10/10/2016  Imm Admin: Influenza Vaccine Adult HD    10/09/2015  Imm Admin: Influenza Vaccine Adult HD    10/20/2014  Imm Admin: Influenza Vaccine Quad Inj (Pf)    10/10/2013  Imm Admin: Influenza Vaccine Pediatric Split - Historical Data    10/10/2013  Imm Admin: Influenza Seasonal Injectable - Historical Data    Only the first 5 history entries have been loaded, but more history exists.              Discontinued - Zoster (Shingles) Vaccines  Discontinued      11/03/2012  Imm Admin: Zoster Vaccine Live (ZVL) (Zostavax) - HISTORICAL DATA              Discontinued - Hepatitis C Screening  Discontinued        Frequency changed to Never automatically (Topic No Longer Applies)    04/03/2016  Hepatitis C Antibody component of HEP C VIRUS ANTIBODY    01/29/2011  Outside Procedure: HEP C VIRUS ANTIBODY                     Medication review:  Current Outpatient Medications    Medication Sig Dispense Refill    metoprolol SR (TOPROL XL) 25 MG TABLET SR 24 HR Take 0.5 Tablets by mouth every day. 50 Tablet 3    atorvastatin (LIPITOR) 40 MG Tab Take 1 Tablet by mouth every evening. 100 Tablet 4    potassium chloride SA (KDUR) 20 MEQ Tab CR Take 1 tablet (20 mEq total) by mouth daily 90 Tablet 2    Misc. Devices Misc Venous compression stockings bilat knee high; 35 mm hg; wear continuously during day ; off hs 3 Each 3    calcium carbonate (OS-AMPARO 500) 1250 (500 Ca) MG Tab Take 1 Tablet by mouth every day. 100 Tablet 3    torsemide (DEMADEX) 10 MG tablet Take 1 Tablet by mouth 2 times a day. 200 Tablet 1    magnesium oxide 400 (240 Mg) MG Tab Take 1 Tablet by mouth every day. 100 Tablet 3    hydrocortisone 2.5 % Cream topical cream Apply 1 Application topically 2 times a day. 453 g 0    acetaminophen (TYLENOL) 500 MG Tab Take 1,000 mg by mouth every 6 hours as needed.      spironolactone (ALDACTONE) 50 MG Tab Take 1 Tablet by mouth 2 times a day. 180 Tablet 3    ferrous sulfate 325 (65 Fe) MG tablet Take 1 Tablet by mouth every morning with breakfast. 100 Tablet 3    Cholecalciferol (VITAMIN D3) 1000 UNIT Cap Take 1 Capsule by mouth every day. 100 Capsule 3    folic acid (FOLVITE) 1 MG Tab Take 1 Tablet by mouth every day. 100 Tablet 4    ursodiol (JUNE 250) 250 MG Tab Take 1 tablet (250 mg total) by mouth every morning, afternoon, and evening. (Patient taking differently: Take 250 mg by mouth 3 times a day.) 270 Tablet 3     No current facility-administered medications for this visit.        If opioid/benzo/sedative on med list discussion of reduction/elimination - referral     At this point document if pt refuses to consider changes off of controlled substance      Vital sign review - unintentional >10 pound weight loss evaluation (BMI and Muscle Mass review)      Whisper test fail - audiology referral      Eye test fail - ophthalmology referral      Stay independent checklist - PT referral  for a score of 4 or more      PHQ evaluation - consider visit for medication change/referral      Mini-cog evaluation - consider visit for further evaluation/referral      POLST on file- if not need to have on hand for patient to fill out - consider Palliative referral etc.     GIULIANA screen -     Audit-C Questionnaire     1. How often did you have a drink containing alcohol in the past year?   Never (0 points)  * If you answered Never, score questions 2 and 3 below as zero.    Monthly or less (1 point)   2 to 4 times a month (2 points)   2 or 3 times per week (3 points)   4 or more times a week (4 points)     0     2. How many drinks did you have on a typical day when you were drinking in the past year?   1 - 2 (0 points)   3 - 4 (1point)   5 - 6 (2 points)   7 - 9 (3 points)   10 or more (4 points)     0     3. How often did you have 6 or more drinks on one occasion in the past year?   Never (0 points)   Less than monthly (1 point)   Monthly (2 points)   Weekly (3 points)   Daily or almost daily (4 points)     0     Total: 0     Scorin or more for men and 3 or more for women is an indication for further evaluation for hazardous drinking.     Chronic pain screen:        PEG: A Three-Item Scale Assessing Pain Intensity and Interference  1. What number best describes your pain on average in the past week?   0       1         2              3                     4                          5              6               7             8           9            10  No pain                                                                                                            Pain as bad as you can imagine     Depends      2. What number best describes how, during the past week, pain has interfered  with your enjoyment of life?  0       1         2              3                     4                          5              6               7             8           9            10  No pain                                "                                                                             Pain as bad as you can imagine     depends  3. What number best describes how, during the past week, pain has interfered  with your general activity?   0       1         2              3                     4                          5              6               7             8           9            10  No pain                                                                                                            Pain as bad as you can imagine     Depends      Scoring: average of 3 scales: 0  Serves as a baseline measurement for chronic pain issues and quality of life and can be used to prompt pain clinic referral.           Tobacco use - consider visit for discussion if user - discuss alternatives and quitting     Food insecurity screen - (The Hunger Vital Sign)  Within the past 12 months were you worried whether food would run out before you got money to buy more?  Often true          sometimes true          never true  never     Within the past 12 months did the food you bought not last and you did not have money to buy more food?  Often true         sometimes  true          never true  never     Scoring: answering \"often true\" or \"sometimes true\" to either question is a positive screen for food insecurity and should prompt a social work/ referral if needed     Review of Rise and walk test - PT referral for a score over 13 seconds     Clasp arms behind back and head test - have the patient try and clasp hands behind back and then over head, failure to accomplish may indicate shoulder issues- PT/ortho/pmr referral         Assessment and Plan. The following treatment and monitoring plan is recommended:          Services suggested: No services needed at this time  Health Care Screening: Age-appropriate preventive services recommended by USPTF and ACIP covered by Medicare were discussed today. Services ordered if indicated " and agreed upon by the patient.  Referrals offered: Community-based lifestyle interventions to reduce health risks and promote self-management and wellness, fall prevention, nutrition, physical activity, tobacco-use cessation, weight loss, and mental health services as per orders if indicated.     Discussion today about general wellness and lifestyle habits:   ?              Prevent falls and reduce trip hazards; Cautioned about securing or removing rugs.  ?              Have a working fire alarm and carbon monoxide detector;  ?              Engage in regular physical activity and social activities     There are no diagnoses linked to this encounter.     Follow-up: No follow-ups on file.

## 2024-09-09 ENCOUNTER — OFFICE VISIT (OUTPATIENT)
Dept: NEUROLOGY | Facility: MEDICAL CENTER | Age: 79
End: 2024-09-09
Attending: SPECIALIST
Payer: MEDICARE

## 2024-09-09 VITALS
BODY MASS INDEX: 31.61 KG/M2 | OXYGEN SATURATION: 97 % | HEART RATE: 65 BPM | RESPIRATION RATE: 16 BRPM | TEMPERATURE: 97.8 F | DIASTOLIC BLOOD PRESSURE: 52 MMHG | SYSTOLIC BLOOD PRESSURE: 100 MMHG | WEIGHT: 254.19 LBS | HEIGHT: 75 IN

## 2024-09-09 DIAGNOSIS — G62.9 NEUROPATHY: ICD-10-CM

## 2024-09-09 PROCEDURE — 3074F SYST BP LT 130 MM HG: CPT | Performed by: SPECIALIST

## 2024-09-09 PROCEDURE — 99212 OFFICE O/P EST SF 10 MIN: CPT | Performed by: SPECIALIST

## 2024-09-09 PROCEDURE — 3078F DIAST BP <80 MM HG: CPT | Performed by: SPECIALIST

## 2024-09-09 PROCEDURE — 99213 OFFICE O/P EST LOW 20 MIN: CPT | Performed by: SPECIALIST

## 2024-09-09 RX ORDER — GABAPENTIN 100 MG/1
100 CAPSULE ORAL 3 TIMES DAILY
Qty: 90 CAPSULE | Refills: 11 | Status: SHIPPED | OUTPATIENT
Start: 2024-09-09

## 2024-09-09 ASSESSMENT — PATIENT HEALTH QUESTIONNAIRE - PHQ9: CLINICAL INTERPRETATION OF PHQ2 SCORE: 0

## 2024-09-09 ASSESSMENT — FIBROSIS 4 INDEX: FIB4 SCORE: 8.17

## 2024-09-09 NOTE — PROGRESS NOTES
"Subjective     Torey Lima is a 79 y.o. male who presents with Follow-Up (Spasticity/)            HPI  I saw Mr. Torey Lima on June 27.  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  He was treated with IVIG and improved.  He was transferred to rehabilitation.  When I saw him in June he stated that for 1 to 2 years he had episodes where he had spasms and contractures in his left hand that seem to occur spontaneously.  This resolved almost immediately and it cleared up when he stuck his hand in warm water.  He had no weakness.  His primary question whether they were spasms.  He did have a CT brain scan that is unremarkable and an electroencephalogram that revealed no electrophysiologic abnormalities.  The episodes of spasms in the hand are still occurring only rarely, once or twice a month and last for long several minutes.  He also complains of pain in his ankles and feet bilaterally.  This interferes with his sleeping on occasion.    Past medical history:    1.  AIDP 2017 as above.    2.  Colon cancer treated with resection and resection of mets to the liver and chemotherapy.  Residual cirrhosis.    3.  Right total hip arthroplasty.    Medications-atorvastatin 40 mg daily, calcium carbonate, ferrous sulfate, folic acid, metoprolol, spironolactone,    Allergies-influenza vaccination, lisinopril    Social history-formerly smoked cigars and does not drink.    4.  Status post lumbar laminectomy.    ROS  As above, positive for spasms in his extremities and pain distally in the ankles and feet.         Objective     /52 (BP Location: Left arm, Patient Position: Sitting, BP Cuff Size: Adult)   Pulse 65   Temp 36.6 °C (97.8 °F) (Temporal)   Resp 16   Ht 1.905 m (6' 3\")   Wt 115 kg (254 lb 3.1 oz)   SpO2 97%   BMI 31.77 kg/m²      Physical Exam  Patient is a cooperative gentleman who is alert.  His speech is fluent and mental status is grossly " intact.    HEENT exam reveals a normocephalic and atraumatic.  Pupils are equal round reactive.  EOMs are full visual fields are full.    His neck is supple.  He has obvious ascites.        Neurological Exam  He stands with mild difficulty.  His gait is mildly ataxic.  He has no drift and no dysmetria.    Motor testing reveals intact bulk tone and strength throughout.    He has diminished pin sensation distally in the feet.    Reflexes are 1+ at the arms trace at the knees absent at the ankles he has downgoing toes.    Cranial nerves are unremarkable.           Assessment & Plan   Mr. Torey Lima is an 79-year-old gentleman who had an episode of AIDP in 2017 following a lumbar laminectomy.  He now reports episodes of tonic spasms primarily in his hand on the left which occurs once or twice a month and only for several minutes.  He has a rare and resolves when he puts his hands in warm water.  He is now reporting pain in his ankles and feet as well and on examination has mild decrease in sensation distally in both lower extremities.  He may well have either painful neuropathy are residual of AIDP.  As he is on a statin I will have a CPK done.  He will start gabapentin 100 mg at at bedtime for 2 weeks then twice daily for 2 weeks at 3 times daily if he still having symptoms.  He is advised that there is an increased fall risk with this medication in the elderly.  I will see him back in 2 months.     Assessment & Plan  Neuropathy    Orders:    CREATINE KINASE; Future    gabapentin (NEURONTIN) 100 MG Cap; Take 1 Capsule by mouth 3 times a day.

## 2024-09-13 ENCOUNTER — APPOINTMENT (OUTPATIENT)
Dept: RADIOLOGY | Facility: IMAGING CENTER | Age: 79
End: 2024-09-13
Attending: NURSE PRACTITIONER
Payer: MEDICARE

## 2024-09-13 ENCOUNTER — APPOINTMENT (OUTPATIENT)
Dept: RADIOLOGY | Facility: MEDICAL CENTER | Age: 79
DRG: 603 | End: 2024-09-13
Attending: NURSE PRACTITIONER
Payer: MEDICARE

## 2024-09-13 ENCOUNTER — OFFICE VISIT (OUTPATIENT)
Dept: URGENT CARE | Facility: CLINIC | Age: 79
End: 2024-09-13
Payer: MEDICARE

## 2024-09-13 ENCOUNTER — TELEPHONE (OUTPATIENT)
Dept: URGENT CARE | Facility: CLINIC | Age: 79
End: 2024-09-13

## 2024-09-13 ENCOUNTER — NON-PROVIDER VISIT (OUTPATIENT)
Dept: CARDIOLOGY | Facility: MEDICAL CENTER | Age: 79
End: 2024-09-13

## 2024-09-13 VITALS
TEMPERATURE: 98 F | DIASTOLIC BLOOD PRESSURE: 60 MMHG | OXYGEN SATURATION: 98 % | HEIGHT: 75 IN | SYSTOLIC BLOOD PRESSURE: 108 MMHG | RESPIRATION RATE: 18 BRPM | HEART RATE: 58 BPM | BODY MASS INDEX: 31.58 KG/M2 | WEIGHT: 254 LBS

## 2024-09-13 DIAGNOSIS — S89.92XA INJURY OF LEFT KNEE, INITIAL ENCOUNTER: ICD-10-CM

## 2024-09-13 DIAGNOSIS — R60.0 PERIPHERAL EDEMA: ICD-10-CM

## 2024-09-13 DIAGNOSIS — I82.512 CHRONIC DEEP VEIN THROMBOSIS (DVT) OF FEMORAL VEIN OF LEFT LOWER EXTREMITY (HCC): ICD-10-CM

## 2024-09-13 DIAGNOSIS — K74.60 CIRRHOSIS OF LIVER WITH ASCITES, UNSPECIFIED HEPATIC CIRRHOSIS TYPE (HCC): ICD-10-CM
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
## 2024-09-13 DIAGNOSIS — S40.811A ABRASION OF SKIN OF RIGHT UPPER ARM: ICD-10-CM

## 2024-09-13 DIAGNOSIS — R18.8 CIRRHOSIS OF LIVER WITH ASCITES, UNSPECIFIED HEPATIC CIRRHOSIS TYPE (HCC): ICD-10-CM

## 2024-09-13 PROCEDURE — 3078F DIAST BP <80 MM HG: CPT | Performed by: NURSE PRACTITIONER

## 2024-09-13 PROCEDURE — 99215 OFFICE O/P EST HI 40 MIN: CPT | Performed by: NURSE PRACTITIONER

## 2024-09-13 PROCEDURE — 93298 REM INTERROG DEV EVAL SCRMS: CPT | Mod: 26 | Performed by: INTERNAL MEDICINE

## 2024-09-13 PROCEDURE — 93971 EXTREMITY STUDY: CPT | Mod: LT

## 2024-09-13 PROCEDURE — 3074F SYST BP LT 130 MM HG: CPT | Performed by: NURSE PRACTITIONER

## 2024-09-13 PROCEDURE — 73564 X-RAY EXAM KNEE 4 OR MORE: CPT | Mod: TC,FY,LT | Performed by: NURSE PRACTITIONER

## 2024-09-13 ASSESSMENT — FIBROSIS 4 INDEX: FIB4 SCORE: 8.17

## 2024-09-13 NOTE — PROGRESS NOTES
Torey Lima is a 79 y.o. male who presents for Knee Pain (Fell x3 days ago, Lt knee swelling(swelling gone down), bruised, painful to walk, tore skin on Rt arm: )      HPI  This is a new problem. Torey Lima is a 79 y.o. patient who presents to urgent care with c/o: Mechanical trip and fall when he was going up 1 step in his home.  He has a step from his living room into the hallway that he goes up.  He his wife reports that he most likely got his slipper caught and he fell down.  She says that his abdominal ascites throws him off balance sometimes.  He fell down onto the hard floor onto his knees.  His left knee is very painful, swollen and bruised.  Is painful to walk.  He tore the skin on his right arm.  He has a history of skin tears.  His left lower extremity is swollen.  His wife reports that it has been like that for a while now.  There has been no change in the amount of swelling to his left leg.  He has pitting edema present.  He does have a history of DVT.  He has not IVC filter in place in his left groin.  They have been using ice packs for pain and swelling.his pain is not well controlled as he cannot take pain medications due to liver.   He denies chest pain, shortness of breath, nausea or vomiting.  He has a history of colon cancer with mets to his liver and no cirrhosis of the liver.  He is not on anticoagulant therapy secondary to fall risk, GI bleeding and cirrhosis.    ROS See HPI    Allergies:       Allergies   Allergen Reactions    Influenza Virus Vaccine      Guillan Alden     Influenza Virus Vaccine Live      Guillan Alden     Anticoagulant Sodium Citrate [Sodium Citrate]      HIGH BLEEDING RISK    Lisinopril Cough       PMSFS Hx:  Past Medical History:   Diagnosis Date    Arrhythmia April 1, 2023    Dr. Hall    ASCVD (arteriosclerotic cardiovascular disease) 12/14/2012    BMI 33.0-33.9,adult 02/15/2013    Breath shortness April 1, 2023    Dr. Hall    Cancer (HCC) 10/10-6/11     colon, liver cance    Chickenpox     Colon cancer (HCC) 12/06/2010    Elevated CEA 12/06/2010    Sinhala measles     GI bleed     HLD (hyperlipidemia) 06/20/2013    HTN (hypertension) 06/20/2013    Hypertension     Impaired fasting glucose 08/02/2012    Liver cirrhosis (HCC)     Liver lesion 12/06/2010    Mixed hyperlipidemia 06/20/2013    Obesity (BMI 30-39.9) 12/14/2017    Peripheral neuropathy, secondary to drugs or chemicals 12/06/2010    Snoring Many Years    Wife    Stage 3a chronic kidney disease 06/25/2022    Thrombocytopenia due to drugs 04/25/2012    Vitamin d deficiency 08/02/2012     Past Surgical History:   Procedure Laterality Date    UMBILICAL HERNIA REPAIR N/A 10/10/2023    Procedure: OPEN UMBILICAL HERNIA REPAIR;  Surgeon: Julian Ennis M.D.;  Location: SURGERY [facility];  Service: General    LUMBAR LAMINECTOMY DISKECTOMY  5/25/2017    Procedure:  LAMINECTOMY LUMBAR  4 TO SACRAL 1;  Surgeon: Felton Escobar M.D.;  Location: SURGERY [facility];  Service:     GASTROSCOPY WITH BANDING  10/25/2016    Procedure: GASTROSCOPY WITH BANDING;  Surgeon: Pierre Waggoner M.D.;  Location: ENDOSCOPY [facility];  Service:     GASTROSCOPY WITH BANDING  4/3/2016    Procedure: GASTROSCOPY WITH BANDING;  Surgeon: Cayetano Stovall M.D.;  Location: ENDOSCOPY [facility];  Service:     FULL THICKNESS BLOCK RESECTION  4/11/2012    Performed by LANI BOWMAN at [facility] ORS    CATH REMOVAL  7/20/2011    Performed by RUBY RUIZ at SURGERY [facility] ORS    CATH PLACEMENT  9/20/2010    Performed by RUBY RUIZ at SURGERY [facility]    LOW ANTERIOR RESECTION ROBOTIC  8/10/2010    Performed by RUBY RUIZ at SURGERY [facility]    COLON RESECTION      HIP ARTHROPLASTY MIS TOTAL      rt    HIP REPLACEMENT, TOTAL      OTHER      Cholecystectomy    OTHER (COMMENTS)      liver surgery       Family History   Problem Relation Age of Onset    Heart Disease Mother      Cancer Mother     Sleep Apnea Neg Hx      Social History     Tobacco Use    Smoking status: Former     Types: Cigars    Smokeless tobacco: Never    Tobacco comments:     1 cigar per week.   Substance Use Topics    Alcohol use: Not Currently     Comment: minimal, Pt stop drinking since the beginning of April 2016       Problems:   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)--2010 left hemicolectomy; no colostomy; ablation of liver met at [facility] 1/2011- MRI abd 3/2018 no change in per    Liver lesion- ablation of malignant met from colon ca 2011- f/u [facility] q 6 mos - stable MRI abd 3/2018- redo annually    Drug-induced polyneuropathy (HCC)    Vitamin D deficiency    ASCVD (arteriosclerotic cardiovascular disease)subtotalled small distal LCx and 40% LAD med rx by cath 12/13/12    Essential hypertension    Gastrointestinal hemorrhage with hematemesis- recurrernt from varices    Secondary esophageal varices without bleeding (HCC)- BANDED 4/2016- repeat egd tbd 10//29/16- [facility]    Gastric varices- s/p BRTO procedure at [facility]    Portal vein thrombosis- on CT [facility] 2/2015    Cirrhosis of liver with ascites (HCC)- ? DUE TO OLD HEP B, CHEMO RX,  OR THERMO RAD OF MAGNANT LESION    History of esophageal varices    H/O colon cancer, stage IV- neg colonoscopy 2012/2018 at [facility]    BPH (benign prostatic hyperplasia)    Peripheral neuropathy due to chemotherapy (HCC)    Edema of left lower extremity- due to dvt july 2017    Generalized edema    Anemia, chronic disease    Chronic deep vein thrombosis (DVT) of femoral vein of left lower extremity (HCC)- s/p IVC filter 2016    Portal hypertension with esophageal varices (HCC)    Secondary malignant neoplasm of liver (HCC)    Thrombocytopenia, unspecified (HCC)- from previous chemorx    Spinal enthesopathy of thoracic region (HCC)    History of Guillain-[place] syndrome    Cerebral atrophy (HCC)-MRI brain  2019, mild diffuse cerebral substance loss    Dyslipidemia    Irregular heartbeat- A. FIB ON APPLE WATCH    Obesity (BMI 30-39.9)    Obesity due to excess calories with serious comorbidity    Paroxysmal SVT (supraventricular tachycardia) (HCC)    Risk for falls    Aortic valve sclerosis    Secondary hyperaldosteronism (HCC)       Medications:   Current Outpatient Medications on File Prior to Visit   Medication Sig Dispense Refill    gabapentin (NEURONTIN) 100 MG Cap Take 1 Capsule by mouth 3 times a day. 90 Capsule 11    ursodiol (JUNE 250) 250 MG Tab Take 1 tablet (250 mg total) by mouth every morning, afternoon, and evening. 270 Tablet 3    metoprolol SR (TOPROL XL) 25 MG TABLET SR 24 HR Take 0.5 Tablets by mouth every day. 50 Tablet 3    atorvastatin (LIPITOR) 40 MG Tab Take 1 Tablet by mouth every evening. 100 Tablet 4    potassium chloride SA (KDUR) 20 MEQ Tab CR Take 1 tablet (20 mEq total) by mouth daily 90 Tablet 2    Misc. Devices Misc Venous compression stockings bilat knee high; 35 mm hg; wear continuously during day ; off hs 3 Each 3    calcium carbonate (OS-AMPARO 500) 1250 (500 Ca) MG Tab Take 1 Tablet by mouth every day. 100 Tablet 3    torsemide (DEMADEX) 10 MG tablet Take 1 Tablet by mouth 2 times a day. 200 Tablet 1    magnesium oxide 400 (240 Mg) MG Tab Take 1 Tablet by mouth every day. 100 Tablet 3    hydrocortisone 2.5 % Cream topical cream Apply 1 Application topically 2 times a day. 453 g 0    spironolactone (ALDACTONE) 50 MG Tab Take 1 Tablet by mouth 2 times a day. 180 Tablet 3    ferrous sulfate 325 (65 Fe) MG tablet Take 1 Tablet by mouth every morning with breakfast. 100 Tablet 3    Cholecalciferol (VITAMIN D3) 1000 UNIT Cap Take 1 Capsule by mouth every day. 100 Capsule 3    folic acid (FOLVITE) 1 MG Tab Take 1 Tablet by mouth every day. 100 Tablet 4    acetaminophen (TYLENOL) 500 MG Tab Take 1,000 mg by mouth every 6 hours as needed. (Patient not taking: Reported on 9/13/2024)       No  "current facility-administered medications on file prior to visit.        Objective:     /60 (BP Location: Left arm, Patient Position: Sitting, BP Cuff Size: Adult)   Pulse (!) 58   Temp 36.7 °C (98 °F) (Temporal)   Resp 18   Ht 1.905 m (6' 3\")   Wt 115 kg (254 lb)   SpO2 98%   BMI 31.75 kg/m²     Physical Exam  Vitals and nursing note reviewed.   Constitutional:       Appearance: Normal appearance. He is well-developed and well-groomed.   Cardiovascular:      Rate and Rhythm: Normal rate and regular rhythm.      Pulses: Normal pulses.      Heart sounds: Normal heart sounds.   Pulmonary:      Effort: Pulmonary effort is normal. No respiratory distress.   Abdominal:      General: There is distension.   Musculoskeletal:      Left knee: Swelling and ecchymosis present. No deformity or crepitus. Decreased range of motion. Tenderness (generalized) present.      Right lower le+ Pitting Edema present.      Left lower leg: Swelling present. 3+ Pitting Edema present.   Skin:     General: Skin is warm and dry.      Capillary Refill: Capillary refill takes less than 2 seconds.   Neurological:      Mental Status: He is alert and oriented to person, place, and time.      Cranial Nerves: No cranial nerve deficit.      Coordination: Coordination normal.      Deep Tendon Reflexes: Reflexes are normal and symmetric.   Psychiatric:         Mood and Affect: Mood normal.         Speech: Speech normal.         Behavior: Behavior normal. Behavior is cooperative.         Thought Content: Thought content normal.     Wound to right arm was cleansed. Dressing applied to skin tear with xeroform gauze and gauze covering.         Assessment /Associated Orders:      1. Fall on same level from slipping, tripping or stumbling, initial encounter  DX-KNEE COMPLETE 4+ LEFT    US-EXTREMITY VENOUS LOWER UNILAT LEFT      2. Injury of left knee, initial encounter  DX-KNEE COMPLETE 4+ LEFT      3. Abrasion of skin of right upper arm      "   4. Peripheral edema  US-EXTREMITY VENOUS LOWER UNILAT LEFT      5. Chronic deep vein thrombosis (DVT) of femoral vein of left lower extremity (HCC)- s/p IVC filter 2016  US-EXTREMITY VENOUS LOWER UNILAT LEFT            Medical Decision Making:    Torey  is a very pleasant 79 y.o. male who is clinically stable at today's acute urgent care visit.  No acute distress noted.  VSS.   Acute problem today with uncertain prognosis.  Patient with multiple comorbidities and polypharmacy.  He is currently not on blood thinners due to significant fall risk.  Escalation of his care considered.  US of his LLE on 05/01/24 was negative for DVT.  He and his wife both report that his edema has been unchanged since that time. No US is available in the outpatient setting until next week. After discussion with the patient and his wife they have agreed to go to ER for further evaluation and management   [facility] notified of transfer to [facility] ER . Transport POV with his wife. Decline ambulance transport.         Addendum: I have spent additional 20 minutes with this patient chart and continue care.  I called the emergency room to see if the patient was being evaluated in the emergency room as he was directed.  They did not have the patient in the emergency room.  Patient did not go to the emergency room.  Instead he went to the [facility] and checked in for a DVT ultrasound as an outpatient.  The spoke with the ultrasound technician who reports that she did not view his chart to see that he was supposed to be in the emergency room.  She just to the imaging.  Went and spoke with her the patient already left the hospital.  I tried to call him and his wife and he is not answering his phone.  I have left a message for him to return the call.  I also reiterated to him that the ER was expecting him as I would like him evaluated in the emergency room and perhaps have an additional evaluation for his pain management, peripheral  edema, abdominal ascites and decreased mobility.  Ultrasound did not show DVT.       I have spent 55 minutes on the care of Torey Lima.  This includes preparing for the urgent care visit. This time includes review of previous visits/ documents, obtaining HPI, examination and evaluation of patient, ordering and interpretation of labs, imaging, tests, medical management, counseling, education and documentation.        Please note that this dictation was created using voice recognition software. I have worked with consultants from the vendor as well as technical experts from [facility] to optimize the interface. I have made every reasonable attempt to correct obvious errors, but I expect that there are errors of grammar and possibly content that I did not discover before finalizing the note.  This note was electronically signed by provider

## 2024-09-13 NOTE — CARDIAC REMOTE MONITOR - SCAN
Device transmission reviewed. Device demonstrated appropriate function.       Electronically Signed by: Derrick Allison M.D.    9/23/2024  1:41 PM

## 2024-09-13 NOTE — TELEPHONE ENCOUNTER
Pt did not go to ER as directed. Had US done when they went to imaging at [facility] and US was completed.     T/C to pt to review US results.    No answer. Pt was still directed to go to ER for further evaluation of peripheral edema and pain management.

## 2024-09-14 NOTE — TELEPHONE ENCOUNTER
Neg DVT on US    OTC analgesic topical muscle/ joint cream/ patch prn pain. Dosage and directions per     Ice/ heat packs prn pain     OTC acetaminophen for pain. Dosage and directions per . Do not exceed 2000 mg in 24 hours.       Use FWW for ambulation and support.     ER precautions reviewed.

## 2024-09-16 ENCOUNTER — HOSPITAL ENCOUNTER (OUTPATIENT)
Dept: RADIOLOGY | Facility: MEDICAL CENTER | Age: 79
End: 2024-09-16
Attending: EMERGENCY MEDICINE

## 2024-09-16 ENCOUNTER — HOSPITAL ENCOUNTER (EMERGENCY)
Facility: MEDICAL CENTER | Age: 79
DRG: 603 | End: 2024-09-16
Attending: NURSE PRACTITIONER
Payer: MEDICARE

## 2024-09-16 VITALS
RESPIRATION RATE: 15 BRPM | SYSTOLIC BLOOD PRESSURE: 122 MMHG | TEMPERATURE: 98 F | DIASTOLIC BLOOD PRESSURE: 58 MMHG | HEART RATE: 83 BPM | HEIGHT: 75 IN | OXYGEN SATURATION: 98 % | WEIGHT: 256.39 LBS | BODY MASS INDEX: 31.88 KG/M2
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
## 2024-09-16 DIAGNOSIS — I82.512 CHRONIC DEEP VEIN THROMBOSIS (DVT) OF FEMORAL VEIN OF LEFT LOWER EXTREMITY (HCC): ICD-10-CM

## 2024-09-16 DIAGNOSIS — R60.0 PERIPHERAL EDEMA: ICD-10-CM

## 2024-09-16 DIAGNOSIS — S89.92XA INJURY OF LEFT KNEE, INITIAL ENCOUNTER: ICD-10-CM

## 2024-09-16 DIAGNOSIS — L03.116 CELLULITIS OF LEFT LOWER EXTREMITY: ICD-10-CM

## 2024-09-16 PROCEDURE — 73562 X-RAY EXAM OF KNEE 3: CPT | Mod: LT

## 2024-09-16 PROCEDURE — 99284 EMERGENCY DEPT VISIT MOD MDM: CPT

## 2024-09-16 PROCEDURE — 73700 CT LOWER EXTREMITY W/O DYE: CPT | Mod: LT

## 2024-09-16 RX ORDER — CEPHALEXIN 500 MG/1
500 CAPSULE ORAL 4 TIMES DAILY
Qty: 20 CAPSULE | Refills: 0 | Status: ON HOLD | OUTPATIENT
Start: 2024-09-16 | End: 2024-09-23

## 2024-09-16 RX ORDER — AMOXICILLIN 250 MG
1 CAPSULE ORAL DAILY
Qty: 10 TABLET | Refills: 0 | Status: SHIPPED | OUTPATIENT
Start: 2024-09-16 | End: 2024-09-26

## 2024-09-16 RX ORDER — OXYCODONE HYDROCHLORIDE 5 MG/1
5 TABLET ORAL EVERY 4 HOURS PRN
Qty: 15 TABLET | Refills: 0 | Status: ON HOLD | OUTPATIENT
Start: 2024-09-16 | End: 2024-09-23

## 2024-09-16 ASSESSMENT — FIBROSIS 4 INDEX: FIB4 SCORE: 8.17
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
## 2024-09-16 NOTE — ED TRIAGE NOTES
Pt comes in w/ wife  c/o L knee pain s/p falling last Thursday  L knee swelling and bruising making it difficult walking   pt w/ Hx of poor circulation and swelling to same leg worsening    pt is unable to take blood thinners due to blood clot in liver per pt and wife

## 2024-09-16 NOTE — DISCHARGE INSTRUCTIONS
Your imaging today was reassuring.  You can take the oxycodone as needed for pain.  Return if symptoms worsen significantly.  Take the antibiotic as directed.  Take a probiotic for the next few days as well.

## 2024-09-17 ENCOUNTER — HOSPITAL ENCOUNTER (OUTPATIENT)
Dept: RADIOLOGY | Facility: MEDICAL CENTER | Age: 79
End: 2024-09-17
Attending: INTERNAL MEDICINE
Payer: MEDICARE

## 2024-09-17 DIAGNOSIS — R18.8 OTHER ASCITES: ICD-10-CM

## 2024-09-17 PROCEDURE — 49083 ABD PARACENTESIS W/IMAGING: CPT

## 2024-09-17 RX ORDER — ALBUMIN (HUMAN) 12.5 G/50ML
50 SOLUTION INTRAVENOUS ONCE
Status: COMPLETED | OUTPATIENT
Start: 2024-09-17 | End: 2024-09-17

## 2024-09-17 RX ORDER — ALBUMIN (HUMAN) 12.5 G/50ML
SOLUTION INTRAVENOUS
Status: COMPLETED
Start: 2024-09-17 | End: 2024-09-17

## 2024-09-17 NOTE — PROGRESS NOTES
US guided therapeutic paracentesis done by Hortencia;(no H&P required as this is a NON SEDATION procedure) Right LQ access site; dressing CDI; 7600 mL obtained and 7600 ml discarded. Pt tolerated the procedure well; pt hemodynamically stable pre/intra/post procedure. IV started, albumin 50 g given per protocol, IV d/c'ed upon completion of albumin administration. All questions and concerns answered prior to d/c. Patient provided with all appropriate education for procedure. Pt d/c home.

## 2024-09-18 ENCOUNTER — HOSPITAL ENCOUNTER (INPATIENT)
Facility: MEDICAL CENTER | Age: 79
LOS: 5 days | DRG: 603 | End: 2024-09-23
Attending: EMERGENCY MEDICINE | Admitting: HOSPITALIST
Payer: MEDICARE

## 2024-09-18 ENCOUNTER — OFFICE VISIT (OUTPATIENT)
Dept: MEDICAL GROUP | Age: 79
End: 2024-09-18
Payer: MEDICARE

## 2024-09-18 VITALS
DIASTOLIC BLOOD PRESSURE: 70 MMHG | HEART RATE: 78 BPM | WEIGHT: 256 LBS | TEMPERATURE: 97.8 F | OXYGEN SATURATION: 91 % | HEIGHT: 75 IN | BODY MASS INDEX: 31.83 KG/M2 | SYSTOLIC BLOOD PRESSURE: 124 MMHG

## 2024-09-18 DIAGNOSIS — M25.562 ACUTE PAIN OF LEFT KNEE: ICD-10-CM

## 2024-09-18 DIAGNOSIS — L03.116 CELLULITIS OF LEFT LOWER EXTREMITY: ICD-10-CM

## 2024-09-18 DIAGNOSIS — L03.116 CELLULITIS OF LEFT LOWER LEG: ICD-10-CM

## 2024-09-18 DIAGNOSIS — M25.462 EFFUSION OF BURSA OF LEFT KNEE: ICD-10-CM

## 2024-09-18 DIAGNOSIS — L03.116 CELLULITIS OF LEG, LEFT: ICD-10-CM

## 2024-09-18 DIAGNOSIS — N40.1 BENIGN PROSTATIC HYPERPLASIA WITH LOWER URINARY TRACT SYMPTOMS, SYMPTOM DETAILS UNSPECIFIED: ICD-10-CM

## 2024-09-18 DIAGNOSIS — E83.42 HYPOMAGNESEMIA: ICD-10-CM

## 2024-09-18 DIAGNOSIS — E26.1 SECONDARY HYPERALDOSTERONISM (HCC): ICD-10-CM

## 2024-09-18 DIAGNOSIS — I95.9 HYPOTENSION, UNSPECIFIED HYPOTENSION TYPE: ICD-10-CM

## 2024-09-18 PROBLEM — E87.1 HYPONATREMIA: Status: ACTIVE | Noted: 2024-09-18

## 2024-09-18 LAB
ALBUMIN SERPL BCP-MCNC: 3.3 G/DL (ref 3.2–4.9)
ALBUMIN/GLOB SERPL: 1.3 G/DL
ALP SERPL-CCNC: 86 U/L (ref 30–99)
ALT SERPL-CCNC: 19 U/L (ref 2–50)
ANION GAP SERPL CALC-SCNC: 9 MMOL/L (ref 7–16)
APPEARANCE UR: CLEAR
AST SERPL-CCNC: 29 U/L (ref 12–45)
BASOPHILS # BLD AUTO: 0.5 % (ref 0–1.8)
BASOPHILS # BLD: 0.03 K/UL (ref 0–0.12)
BILIRUB SERPL-MCNC: 2.2 MG/DL (ref 0.1–1.5)
BILIRUB UR QL STRIP.AUTO: NEGATIVE
BUN SERPL-MCNC: 30 MG/DL (ref 8–22)
CALCIUM ALBUM COR SERPL-MCNC: 8.8 MG/DL (ref 8.5–10.5)
CALCIUM SERPL-MCNC: 8.2 MG/DL (ref 8.4–10.2)
CHLORIDE SERPL-SCNC: 98 MMOL/L (ref 96–112)
CO2 SERPL-SCNC: 22 MMOL/L (ref 20–33)
COLOR UR: YELLOW
CREAT SERPL-MCNC: 1.33 MG/DL (ref 0.5–1.4)
EOSINOPHIL # BLD AUTO: 0.26 K/UL (ref 0–0.51)
EOSINOPHIL NFR BLD: 4.3 % (ref 0–6.9)
ERYTHROCYTE [DISTWIDTH] IN BLOOD BY AUTOMATED COUNT: 58.7 FL (ref 35.9–50)
GFR SERPLBLD CREATININE-BSD FMLA CKD-EPI: 54 ML/MIN/1.73 M 2
GLOBULIN SER CALC-MCNC: 2.5 G/DL (ref 1.9–3.5)
GLUCOSE SERPL-MCNC: 102 MG/DL (ref 65–99)
GLUCOSE UR STRIP.AUTO-MCNC: NEGATIVE MG/DL
HCT VFR BLD AUTO: 31.5 % (ref 42–52)
HGB BLD-MCNC: 9.9 G/DL (ref 14–18)
IMM GRANULOCYTES # BLD AUTO: 0.03 K/UL (ref 0–0.11)
IMM GRANULOCYTES NFR BLD AUTO: 0.5 % (ref 0–0.9)
KETONES UR STRIP.AUTO-MCNC: NEGATIVE MG/DL
LACTATE SERPL-SCNC: 1.1 MMOL/L (ref 0.5–2)
LEUKOCYTE ESTERASE UR QL STRIP.AUTO: NEGATIVE
LYMPHOCYTES # BLD AUTO: 0.37 K/UL (ref 1–4.8)
LYMPHOCYTES NFR BLD: 6.2 % (ref 22–41)
MCH RBC QN AUTO: 30.2 PG (ref 27–33)
MCHC RBC AUTO-ENTMCNC: 31.4 G/DL (ref 32.3–36.5)
MCV RBC AUTO: 96 FL (ref 81.4–97.8)
MICRO URNS: NORMAL
MONOCYTES # BLD AUTO: 0.86 K/UL (ref 0–0.85)
MONOCYTES NFR BLD AUTO: 14.4 % (ref 0–13.4)
NEUTROPHILS # BLD AUTO: 4.44 K/UL (ref 1.82–7.42)
NEUTROPHILS NFR BLD: 74.1 % (ref 44–72)
NITRITE UR QL STRIP.AUTO: NEGATIVE
NRBC # BLD AUTO: 0 K/UL
NRBC BLD-RTO: 0 /100 WBC (ref 0–0.2)
PH UR STRIP.AUTO: 6 [PH] (ref 5–8)
PLATELET # BLD AUTO: 70 K/UL (ref 164–446)
PLATELET BLD QL SMEAR: NORMAL
PLATELETS.RETICULATED NFR BLD AUTO: 3.4 % (ref 0.6–13.1)
PMV BLD AUTO: 10.9 FL (ref 9–12.9)
POTASSIUM SERPL-SCNC: 5 MMOL/L (ref 3.6–5.5)
PROCALCITONIN SERPL-MCNC: 0.11 NG/ML
PROT SERPL-MCNC: 5.8 G/DL (ref 6–8.2)
PROT UR QL STRIP: NEGATIVE MG/DL
RBC # BLD AUTO: 3.28 M/UL (ref 4.7–6.1)
RBC UR QL AUTO: NEGATIVE
SODIUM SERPL-SCNC: 129 MMOL/L (ref 135–145)
SP GR UR STRIP.AUTO: 1.02
WBC # BLD AUTO: 6 K/UL (ref 4.8–10.8)

## 2024-09-18 PROCEDURE — 80053 COMPREHEN METABOLIC PANEL: CPT

## 2024-09-18 PROCEDURE — 99215 OFFICE O/P EST HI 40 MIN: CPT | Performed by: INTERNAL MEDICINE

## 2024-09-18 PROCEDURE — 81003 URINALYSIS AUTO W/O SCOPE: CPT

## 2024-09-18 PROCEDURE — 99285 EMERGENCY DEPT VISIT HI MDM: CPT

## 2024-09-18 PROCEDURE — 3078F DIAST BP <80 MM HG: CPT | Performed by: INTERNAL MEDICINE

## 2024-09-18 PROCEDURE — 700102 HCHG RX REV CODE 250 W/ 637 OVERRIDE(OP): Performed by: HOSPITALIST

## 2024-09-18 PROCEDURE — 36415 COLL VENOUS BLD VENIPUNCTURE: CPT

## 2024-09-18 PROCEDURE — 700111 HCHG RX REV CODE 636 W/ 250 OVERRIDE (IP): Mod: JZ | Performed by: HOSPITALIST

## 2024-09-18 PROCEDURE — 83605 ASSAY OF LACTIC ACID: CPT

## 2024-09-18 PROCEDURE — A9270 NON-COVERED ITEM OR SERVICE: HCPCS | Performed by: HOSPITALIST

## 2024-09-18 PROCEDURE — 85055 RETICULATED PLATELET ASSAY: CPT

## 2024-09-18 PROCEDURE — 770001 HCHG ROOM/CARE - MED/SURG/GYN PRIV*

## 2024-09-18 PROCEDURE — 700111 HCHG RX REV CODE 636 W/ 250 OVERRIDE (IP): Mod: JZ | Performed by: EMERGENCY MEDICINE

## 2024-09-18 PROCEDURE — 99223 1ST HOSP IP/OBS HIGH 75: CPT | Mod: AI | Performed by: HOSPITALIST

## 2024-09-18 PROCEDURE — 3074F SYST BP LT 130 MM HG: CPT | Performed by: INTERNAL MEDICINE

## 2024-09-18 PROCEDURE — 700105 HCHG RX REV CODE 258: Performed by: EMERGENCY MEDICINE

## 2024-09-18 PROCEDURE — 96365 THER/PROPH/DIAG IV INF INIT: CPT

## 2024-09-18 PROCEDURE — 87641 MR-STAPH DNA AMP PROBE: CPT

## 2024-09-18 PROCEDURE — 94760 N-INVAS EAR/PLS OXIMETRY 1: CPT

## 2024-09-18 PROCEDURE — 85025 COMPLETE CBC W/AUTO DIFF WBC: CPT

## 2024-09-18 PROCEDURE — 700105 HCHG RX REV CODE 258: Performed by: HOSPITALIST

## 2024-09-18 PROCEDURE — 84145 PROCALCITONIN (PCT): CPT

## 2024-09-18 RX ORDER — FOLIC ACID 1 MG/1
1 TABLET ORAL DAILY
Status: DISCONTINUED | OUTPATIENT
Start: 2024-09-19 | End: 2024-09-23 | Stop reason: HOSPADM

## 2024-09-18 RX ORDER — HYDROMORPHONE HYDROCHLORIDE 1 MG/ML
0.25 INJECTION, SOLUTION INTRAMUSCULAR; INTRAVENOUS; SUBCUTANEOUS
Status: DISCONTINUED | OUTPATIENT
Start: 2024-09-18 | End: 2024-09-19

## 2024-09-18 RX ORDER — ACETAMINOPHEN 325 MG/1
650 TABLET ORAL EVERY 6 HOURS PRN
Status: DISCONTINUED | OUTPATIENT
Start: 2024-09-18 | End: 2024-09-22

## 2024-09-18 RX ORDER — VITAMIN B COMPLEX
1000 TABLET ORAL DAILY
Status: DISCONTINUED | OUTPATIENT
Start: 2024-09-19 | End: 2024-09-23 | Stop reason: HOSPADM

## 2024-09-18 RX ORDER — METOPROLOL SUCCINATE 25 MG/1
12.5 TABLET, EXTENDED RELEASE ORAL DAILY
Status: DISCONTINUED | OUTPATIENT
Start: 2024-09-19 | End: 2024-09-23 | Stop reason: HOSPADM

## 2024-09-18 RX ORDER — ONDANSETRON 4 MG/1
4 TABLET, ORALLY DISINTEGRATING ORAL EVERY 4 HOURS PRN
Status: DISCONTINUED | OUTPATIENT
Start: 2024-09-18 | End: 2024-09-23 | Stop reason: HOSPADM

## 2024-09-18 RX ORDER — URSODIOL 300 MG/1
300 CAPSULE ORAL 3 TIMES DAILY
Status: DISCONTINUED | OUTPATIENT
Start: 2024-09-18 | End: 2024-09-23 | Stop reason: HOSPADM

## 2024-09-18 RX ORDER — SPIRONOLACTONE 50 MG/1
50 TABLET, FILM COATED ORAL 2 TIMES DAILY
Status: DISCONTINUED | OUTPATIENT
Start: 2024-09-18 | End: 2024-09-23 | Stop reason: HOSPADM

## 2024-09-18 RX ORDER — LABETALOL HYDROCHLORIDE 5 MG/ML
10 INJECTION, SOLUTION INTRAVENOUS EVERY 4 HOURS PRN
Status: DISCONTINUED | OUTPATIENT
Start: 2024-09-18 | End: 2024-09-23 | Stop reason: HOSPADM

## 2024-09-18 RX ORDER — OXYCODONE HYDROCHLORIDE 5 MG/1
5 TABLET ORAL
Status: DISCONTINUED | OUTPATIENT
Start: 2024-09-18 | End: 2024-09-19

## 2024-09-18 RX ORDER — ENOXAPARIN SODIUM 100 MG/ML
40 INJECTION SUBCUTANEOUS DAILY
Status: DISCONTINUED | OUTPATIENT
Start: 2024-09-18 | End: 2024-09-19

## 2024-09-18 RX ORDER — OXYCODONE AND ACETAMINOPHEN 5; 325 MG/1; MG/1
1 TABLET ORAL ONCE
Status: ON HOLD | COMMUNITY
End: 2024-09-23

## 2024-09-18 RX ORDER — LINEZOLID 600 MG/1
600 TABLET, FILM COATED ORAL EVERY 12 HOURS
Status: DISCONTINUED | OUTPATIENT
Start: 2024-09-18 | End: 2024-09-19

## 2024-09-18 RX ORDER — FERROUS SULFATE 325(65) MG
325 TABLET ORAL
Status: DISCONTINUED | OUTPATIENT
Start: 2024-09-19 | End: 2024-09-23 | Stop reason: HOSPADM

## 2024-09-18 RX ORDER — TAMSULOSIN HYDROCHLORIDE 0.4 MG/1
0.4 CAPSULE ORAL
Status: DISCONTINUED | OUTPATIENT
Start: 2024-09-18 | End: 2024-09-18

## 2024-09-18 RX ORDER — OXYCODONE HYDROCHLORIDE 5 MG/1
5 TABLET ORAL EVERY 4 HOURS PRN
Status: DISCONTINUED | OUTPATIENT
Start: 2024-09-18 | End: 2024-09-18

## 2024-09-18 RX ORDER — CEFAZOLIN 2 G/1
2 INJECTION, POWDER, FOR SOLUTION INTRAMUSCULAR; INTRAVENOUS ONCE
Status: DISCONTINUED | OUTPATIENT
Start: 2024-09-18 | End: 2024-09-18

## 2024-09-18 RX ORDER — AMOXICILLIN 250 MG
2 CAPSULE ORAL EVERY EVENING
Status: DISCONTINUED | OUTPATIENT
Start: 2024-09-18 | End: 2024-09-23 | Stop reason: HOSPADM

## 2024-09-18 RX ORDER — ATORVASTATIN CALCIUM 40 MG/1
40 TABLET, FILM COATED ORAL NIGHTLY
Status: DISCONTINUED | OUTPATIENT
Start: 2024-09-18 | End: 2024-09-23 | Stop reason: HOSPADM

## 2024-09-18 RX ORDER — POLYETHYLENE GLYCOL 3350 17 G/17G
1 POWDER, FOR SOLUTION ORAL
Status: DISCONTINUED | OUTPATIENT
Start: 2024-09-18 | End: 2024-09-23 | Stop reason: HOSPADM

## 2024-09-18 RX ORDER — ONDANSETRON 2 MG/ML
4 INJECTION INTRAMUSCULAR; INTRAVENOUS EVERY 4 HOURS PRN
Status: DISCONTINUED | OUTPATIENT
Start: 2024-09-18 | End: 2024-09-23 | Stop reason: HOSPADM

## 2024-09-18 RX ORDER — GABAPENTIN 100 MG/1
100 CAPSULE ORAL
Status: DISCONTINUED | OUTPATIENT
Start: 2024-09-18 | End: 2024-09-22

## 2024-09-18 RX ORDER — FINASTERIDE 5 MG/1
5 TABLET, FILM COATED ORAL DAILY
Status: DISCONTINUED | OUTPATIENT
Start: 2024-09-19 | End: 2024-09-23 | Stop reason: HOSPADM

## 2024-09-18 RX ORDER — TORSEMIDE 5 MG/1
10 TABLET ORAL 2 TIMES DAILY
Status: DISCONTINUED | OUTPATIENT
Start: 2024-09-18 | End: 2024-09-20

## 2024-09-18 RX ORDER — OXYCODONE HYDROCHLORIDE 5 MG/1
2.5 TABLET ORAL
Status: DISCONTINUED | OUTPATIENT
Start: 2024-09-18 | End: 2024-09-18

## 2024-09-18 RX ORDER — TAMSULOSIN HYDROCHLORIDE 0.4 MG/1
0.4 CAPSULE ORAL
Status: DISCONTINUED | OUTPATIENT
Start: 2024-09-19 | End: 2024-09-23 | Stop reason: HOSPADM

## 2024-09-18 RX ADMIN — ATORVASTATIN CALCIUM 40 MG: 40 TABLET, FILM COATED ORAL at 20:10

## 2024-09-18 RX ADMIN — AMPICILLIN AND SULBACTAM 3 G: 1; 2 INJECTION, POWDER, FOR SOLUTION INTRAMUSCULAR; INTRAVENOUS at 15:21

## 2024-09-18 RX ADMIN — AMPICILLIN AND SULBACTAM 3 G: 1; 2 INJECTION, POWDER, FOR SOLUTION INTRAMUSCULAR; INTRAVENOUS at 23:49

## 2024-09-18 RX ADMIN — SENNOSIDES AND DOCUSATE SODIUM 2 TABLET: 50; 8.6 TABLET ORAL at 18:41

## 2024-09-18 RX ADMIN — GABAPENTIN 100 MG: 100 CAPSULE ORAL at 20:10

## 2024-09-18 RX ADMIN — LINEZOLID 600 MG: 600 TABLET, FILM COATED ORAL at 18:41

## 2024-09-18 RX ADMIN — TORSEMIDE 10 MG: 5 TABLET ORAL at 18:41

## 2024-09-18 RX ADMIN — AMPICILLIN AND SULBACTAM 3 G: 1; 2 INJECTION, POWDER, FOR SOLUTION INTRAMUSCULAR; INTRAVENOUS at 18:43

## 2024-09-18 RX ADMIN — SPIRONOLACTONE 50 MG: 50 TABLET ORAL at 18:41

## 2024-09-18 RX ADMIN — URSODIOL 300 MG: 300 CAPSULE ORAL at 20:10

## 2024-09-18 ASSESSMENT — COGNITIVE AND FUNCTIONAL STATUS - GENERAL
MOVING TO AND FROM BED TO CHAIR: A LITTLE
SUGGESTED CMS G CODE MODIFIER MOBILITY: CJ
MOVING FROM LYING ON BACK TO SITTING ON SIDE OF FLAT BED: A LITTLE
SUGGESTED CMS G CODE MODIFIER DAILY ACTIVITY: CH
DAILY ACTIVITIY SCORE: 24
CLIMB 3 TO 5 STEPS WITH RAILING: A LITTLE
MOBILITY SCORE: 21

## 2024-09-18 ASSESSMENT — FIBROSIS 4 INDEX
FIB4 SCORE: 8.17
FIB4 SCORE: 8.17
FIB4 SCORE: 7.6

## 2024-09-18 ASSESSMENT — ENCOUNTER SYMPTOMS
NERVOUS/ANXIOUS: 0
PALPITATIONS: 0
PSYCHIATRIC NEGATIVE: 1
FOCAL WEAKNESS: 0
DIAPHORESIS: 0
WHEEZING: 0
BRUISES/BLEEDS EASILY: 0
DOUBLE VISION: 0
CHILLS: 0
RESPIRATORY NEGATIVE: 1
NEUROLOGICAL NEGATIVE: 1
BLOOD IN STOOL: 0
VOMITING: 0
GASTROINTESTINAL NEGATIVE: 1
MUSCULOSKELETAL NEGATIVE: 1
NAUSEA: 0
CONSTIPATION: 0
CONSTITUTIONAL NEGATIVE: 1
DIARRHEA: 0
FEVER: 0
EYES NEGATIVE: 1
HEADACHES: 0
HEARTBURN: 0
SEIZURES: 0
ABDOMINAL PAIN: 0
LOSS OF CONSCIOUSNESS: 0
HEMOPTYSIS: 0
COUGH: 0
DIZZINESS: 0

## 2024-09-18 ASSESSMENT — SOCIAL DETERMINANTS OF HEALTH (SDOH)
WITHIN THE PAST 12 MONTHS, THE FOOD YOU BOUGHT JUST DIDN'T LAST AND YOU DIDN'T HAVE MONEY TO GET MORE: NEVER TRUE
WITHIN THE LAST YEAR, HAVE YOU BEEN KICKED, HIT, SLAPPED, OR OTHERWISE PHYSICALLY HURT BY YOUR PARTNER OR EX-PARTNER?: NO
WITHIN THE PAST 12 MONTHS, YOU WORRIED THAT YOUR FOOD WOULD RUN OUT BEFORE YOU GOT THE MONEY TO BUY MORE: NEVER TRUE
WITHIN THE LAST YEAR, HAVE TO BEEN RAPED OR FORCED TO HAVE ANY KIND OF SEXUAL ACTIVITY BY YOUR PARTNER OR EX-PARTNER?: NO
IN THE PAST 12 MONTHS, HAS THE ELECTRIC, GAS, OIL, OR WATER COMPANY THREATENED TO SHUT OFF SERVICE IN YOUR HOME?: NO
WITHIN THE LAST YEAR, HAVE YOU BEEN AFRAID OF YOUR PARTNER OR EX-PARTNER?: NO
WITHIN THE LAST YEAR, HAVE YOU BEEN HUMILIATED OR EMOTIONALLY ABUSED IN OTHER WAYS BY YOUR PARTNER OR EX-PARTNER?: NO

## 2024-09-18 ASSESSMENT — LIFESTYLE VARIABLES
EVER FELT BAD OR GUILTY ABOUT YOUR DRINKING: NO
HOW MANY TIMES IN THE PAST YEAR HAVE YOU HAD 5 OR MORE DRINKS IN A DAY: 0
CONSUMPTION TOTAL: NEGATIVE
TOTAL SCORE: 0
TOTAL SCORE: 0
ON A TYPICAL DAY WHEN YOU DRINK ALCOHOL HOW MANY DRINKS DO YOU HAVE: 0
ALCOHOL_USE: NO
AVERAGE NUMBER OF DAYS PER WEEK YOU HAVE A DRINK CONTAINING ALCOHOL: 0
HAVE YOU EVER FELT YOU SHOULD CUT DOWN ON YOUR DRINKING: NO
HAVE PEOPLE ANNOYED YOU BY CRITICIZING YOUR DRINKING: NO
EVER HAD A DRINK FIRST THING IN THE MORNING TO STEADY YOUR NERVES TO GET RID OF A HANGOVER: NO
TOTAL SCORE: 0

## 2024-09-18 ASSESSMENT — PAIN DESCRIPTION - PAIN TYPE: TYPE: ACUTE PAIN

## 2024-09-18 ASSESSMENT — PATIENT HEALTH QUESTIONNAIRE - PHQ9
2. FEELING DOWN, DEPRESSED, IRRITABLE, OR HOPELESS: NOT AT ALL
SUM OF ALL RESPONSES TO PHQ9 QUESTIONS 1 AND 2: 0
1. LITTLE INTEREST OR PLEASURE IN DOING THINGS: NOT AT ALL

## 2024-09-18 NOTE — ED TRIAGE NOTES
Torey Lima  79 y.o.  Chief Complaint   Patient presents with    Sent by MD     He is here to be admitted     Pt to triage by wheelchair with wife and daughter.  Wife states they were sent by the doctor to be admitted.  Pt reports pain to L knee & cellulitis to L knee.  He has been taking Keflex, but it is not resolving.  Pt last had a pain pill about noon.  Pt had a paracentesis yesterday.

## 2024-09-18 NOTE — PROGRESS NOTES
Subjective     Torey Lima is a 79 y.o. male who presents with Follow-Up (Follow up from E.R )  Patient is here for 2-day hospital ER visit where he was diagnosed with cellulitis of the left leg and started on oral antibiotics as well as for pain medication for recent knee trauma from a contusion with CAT scan showing no fracture.  This is superimposed upon chronic venous insufficiency he has had for several months.  Ultrasound a few days ago was negative for DVT.  He states the 5 mg of oxycodone is not controlling his pain.  He states that the swelling in the left lower leg is much worse than it was 2 days ago and seen in the ER and the redness according to the patient's wife is much more intense and extensive than it was 2 days ago.  The pain likewise is much worse.  No fever or chills.    Incidentally yesterday he had a scheduled paracentesis where 7 L of fluid was removed from his abdomen due to history of cirrhosis nonalcoholic of the liver calling causing ascites and followed by GI.  This is incidental to the presenting complaint today however.  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)--2010 left hemicolectomy; no colostomy; ablation of liver met at [facility] 1/2011- MRI abd 3/2018 no change in per    Liver lesion- ablation of malignant met from colon ca 2011- f/u [facility] q 6 mos - stable MRI abd 3/2018- redo annually    Drug-induced polyneuropathy (HCC)    Vitamin D deficiency    ASCVD (arteriosclerotic cardiovascular disease)subtotalled small distal LCx and 40% LAD med rx by cath 12/13/12    Essential hypertension    Gastrointestinal hemorrhage with hematemesis- recurrernt from varices    Secondary esophageal varices without bleeding (HCC)- BANDED 4/2016- repeat egd tbd 10//29/16- [facility]    Gastric varices- s/p BRTO procedure at [facility]    Portal vein thrombosis- on CT [facility] 2/2015    Cirrhosis of liver with ascites (HCC)- ?  DUE TO OLD HEP B, CHEMO RX,  OR THERMO RAD OF MAGNANT LESION    History of esophageal varices    H/O colon cancer, stage IV- neg colonoscopy 2012/2018 at [facility]    BPH (benign prostatic hyperplasia)    Peripheral neuropathy due to chemotherapy (HCC)    Edema of left lower extremity- due to dvt july 2017    Generalized edema    Anemia, chronic disease    Chronic deep vein thrombosis (DVT) of femoral vein of left lower extremity (HCC)- s/p IVC filter 2016    Portal hypertension with esophageal varices (HCC)    Secondary malignant neoplasm of liver (HCC)    Thrombocytopenia, unspecified (HCC)- from previous chemorx    Spinal enthesopathy of thoracic region (HCC)    History of Guillain-[place] syndrome    Cerebral atrophy (HCC)-MRI brain 2019, mild diffuse cerebral substance loss    Dyslipidemia    Irregular heartbeat- A. FIB ON APPLE WATCH    Obesity (BMI 30-39.9)    Obesity due to excess calories with serious comorbidity    Paroxysmal SVT (supraventricular tachycardia) (HCC)    Risk for falls    Aortic valve sclerosis    Secondary hyperaldosteronism (HCC)     Outpatient Medications Prior to Visit   Medication Sig Dispense Refill    cephALEXin (KEFLEX) 500 MG Cap Take 1 Capsule by mouth 4 times a day for 5 days. 20 Capsule 0    senna-docusate (PERICOLACE OR SENOKOT S) 8.6-50 MG Tab Take 1 Tablet by mouth every day for 10 days. 10 Tablet 0    oxyCODONE immediate-release (ROXICODONE) 5 MG Tab Take 1 Tablet by mouth every four hours as needed for Severe Pain for up to 5 days. 15 Tablet 0    gabapentin (NEURONTIN) 100 MG Cap Take 1 Capsule by mouth 3 times a day. 90 Capsule 11    ursodiol (JUNE 250) 250 MG Tab Take 1 tablet (250 mg total) by mouth every morning, afternoon, and evening. 270 Tablet 3    metoprolol SR (TOPROL XL) 25 MG TABLET SR 24 HR Take 0.5 Tablets by mouth every day. 50 Tablet 3    atorvastatin (LIPITOR) 40 MG Tab Take 1 Tablet by mouth every evening. 100 Tablet 4    potassium chloride SA (KDUR) 20 MEQ Tab CR  "Take 1 tablet (20 mEq total) by mouth daily 90 Tablet 2    Misc. Devices Misc Venous compression stockings bilat knee high; 35 mm hg; wear continuously during day ; off hs 3 Each 3    calcium carbonate (OS-AMPARO 500) 1250 (500 Ca) MG Tab Take 1 Tablet by mouth every day. 100 Tablet 3    torsemide (DEMADEX) 10 MG tablet Take 1 Tablet by mouth 2 times a day. 200 Tablet 1    magnesium oxide 400 (240 Mg) MG Tab Take 1 Tablet by mouth every day. 100 Tablet 3    hydrocortisone 2.5 % Cream topical cream Apply 1 Application topically 2 times a day. 453 g 0    spironolactone (ALDACTONE) 50 MG Tab Take 1 Tablet by mouth 2 times a day. 180 Tablet 3    ferrous sulfate 325 (65 Fe) MG tablet Take 1 Tablet by mouth every morning with breakfast. 100 Tablet 3    Cholecalciferol (VITAMIN D3) 1000 UNIT Cap Take 1 Capsule by mouth every day. 100 Capsule 3    folic acid (FOLVITE) 1 MG Tab Take 1 Tablet by mouth every day. 100 Tablet 4    acetaminophen (TYLENOL) 500 MG Tab Take 1,000 mg by mouth every 6 hours as needed. (Patient not taking: Reported on 9/13/2024)       No facility-administered medications prior to visit.                 HPI    Review of Systems   Cardiovascular:  Positive for leg swelling.   Musculoskeletal:  Positive for joint pain.   Skin:  Positive for rash.              Objective     /70 (BP Location: Left arm, Patient Position: Sitting, BP Cuff Size: Adult)   Pulse 78   Temp 36.6 °C (97.8 °F) (Temporal)   Ht 1.905 m (6' 3\")   Wt 116 kg (256 lb)   SpO2 91%   BMI 32.00 kg/m²      Physical Exam  Constitutional:       General: He is not in acute distress.     Appearance: He is well-developed. He is not diaphoretic.   HENT:      Head: Normocephalic and atraumatic.      Right Ear: External ear normal.      Left Ear: External ear normal.      Nose: Nose normal.      Mouth/Throat:      Pharynx: No oropharyngeal exudate.   Eyes:      General: No scleral icterus.        Right eye: No discharge.         Left eye: No " discharge.      Conjunctiva/sclera: Conjunctivae normal.      Pupils: Pupils are equal, round, and reactive to light.   Neck:      Thyroid: No thyromegaly.      Vascular: No JVD.      Trachea: No tracheal deviation.   Cardiovascular:      Rate and Rhythm: Normal rate and regular rhythm.      Heart sounds: Normal heart sounds. No murmur heard.     No friction rub. No gallop.   Pulmonary:      Effort: Pulmonary effort is normal. No respiratory distress.      Breath sounds: Normal breath sounds. No stridor. No wheezing or rales.   Chest:      Chest wall: No tenderness.   Abdominal:      General: Bowel sounds are normal. There is no distension.      Palpations: Abdomen is soft. There is no mass.      Tenderness: There is no abdominal tenderness. There is no guarding or rebound.   Musculoskeletal:         General: Swelling, tenderness and signs of injury present. Normal range of motion.      Cervical back: Normal range of motion and neck supple.      Left lower leg: Edema present.      Comments: The left lower leg shows extensive 4+ edema, with marked tenderness to palpation.  There is marked induration of the left lower leg skin extensive erythema from the ankle to the knee.   This is consistent with a severe cellulitis of the left lower leg.    The left knee is markedly swollen with a marked effusion and diffuse tenderness.  No erythema overlying the knee but it is markedly tender to palpation and any movement whatsoever.   Lymphadenopathy:      Cervical: No cervical adenopathy.   Skin:     General: Skin is warm and dry.      Coloration: Skin is not pale.      Findings: Erythema and rash present.   Neurological:      Mental Status: He is alert and oriented to person, place, and time.      Motor: No abnormal muscle tone.      Coordination: Coordination normal.      Deep Tendon Reflexes: Reflexes are normal and symmetric. Reflexes normal.   Psychiatric:         Behavior: Behavior normal.         Thought Content: Thought  content normal.         Judgment: Judgment normal.                             Assessment & Plan        Assessment & Plan  Cellulitis of leg, left-severe and not responding to oral antibiotics elevation and compression.  Since the patient has failed to respond to oral antibiotics, and since his pain is uncontrollable with oral opiates, and because he is at risk for falling which would induce further injury and risk his safety, I have advised the patient to return to the emergency room and be strongly considered for admission to the hospital for possible IV antibiotics, higher level of pain management, chronic elevation of leg and close observation to guard against further following and possible injury, as a safety issue...       Effusion of bursa of left knee-secondary to contusion from recent fall, negative for fracture on CT 2 days ago  Continue with compression elevation and possible aspiration of the joint effusion or bursal swelling, which ever it is determined to be.

## 2024-09-18 NOTE — ED NOTES
Medication history reviewed with pts wife. Med rec is complete.  Allergies reviewed, per pts wife    Pts wife reports that pt started on 9/9/2024 GABAPENTIN 100MG 1 capsule every evening for 2 weeks, then 2 capsule (200MG) for 2 weeks, then 3 capsules (300MG) for 2 weeks     Pts wife reports pt started SENNA-DOCUSATE 8.6-50MG on 9/16/2024 for 10 day course.  Pt took a dose today at 0630.  Pts wife reports that she gave pt one of her PERCOCET 5-325MG today at 1200.    Patient has had outpatient antibiotics in the last 30 days, pt started KEFLEX 500MG on 9/16/2024 for 5 day course.  Last dose was taken at 1200 today.    Pt is not on any anticoagulants

## 2024-09-18 NOTE — ED PROVIDER NOTES
ED Provider Note    CHIEF COMPLAINT  Chief Complaint   Patient presents with    Sent by MD     He is here to be admitted       EXTERNAL RECORDS REVIEWED  Outpatient labs & studies negative ultrasound for DVT of the left leg on September 13, 2024    HPI/ROS  LIMITATION TO HISTORY   Select: : None  OUTSIDE HISTORIAN(S):  Significant other patient's wife at bedside for discussion history and symptoms    Torey Lima is a 79 y.o. male who presents with complaint of left lower leg and knee pain.  He fell 1 week ago injuring his left knee, workup in the ER showed a negative x-ray of the left knee, negative CT scan of the left knee.  Subsequent visit, patient had ultrasound of the left leg negative for DVT.  He was placed on Keflex.  He states oxycodone has been inadequate for control of his pain.  Patient saw his doctor today who felt Keflex was not covering the infection, referred to the Emergency Department for admission and IV antibiotics.  Patient denies fever or chills.  He had paracentesis yesterday with removal of 8 L of fluid, states he feels improved and his breathing has improved.  No acute abdominal pain.  No chest pain.  No cough, no dysuria.  Patient states he has history of liver and colon cancer which resulted in ascites.  Per his wife, he last had paracentesis in May.    PAST MEDICAL HISTORY   has a past medical history of Arrhythmia (April 1, 2023), ASCVD (arteriosclerotic cardiovascular disease) (12/14/2012), BMI 33.0-33.9,adult (02/15/2013), Breath shortness (April 1, 2023), Cancer (HCC) (10/10-6/11), Chickenpox, Colon cancer (HCC) (12/06/2010), Elevated CEA (12/06/2010), Portuguese measles, GI bleed, HLD (hyperlipidemia) (06/20/2013), HTN (hypertension) (06/20/2013), Hypertension, Impaired fasting glucose (08/02/2012), Liver cirrhosis (HCC), Liver lesion (12/06/2010), Mixed hyperlipidemia (06/20/2013), Obesity (BMI 30-39.9) (12/14/2017), Peripheral neuropathy, secondary to drugs or chemicals  "(12/06/2010), Snoring (Many Years), Stage 3a chronic kidney disease (06/25/2022), 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    SURGICAL HISTORY   has a past surgical history that includes low anterior resection robotic (8/10/2010); cath placement (9/20/2010); hip arthroplasty mis total; other; other (comments); cath removal (7/20/2011); full thickness block resection (4/11/2012); gastroscopy with banding (4/3/2016); gastroscopy with banding (10/25/2016); colon resection; hip replacement, total; lumbar laminectomy diskectomy (5/25/2017); and umbilical hernia repair (N/A, 10/10/2023).    FAMILY HISTORY  Family History   Problem Relation Age of Onset    Heart Disease Mother     Cancer Mother     Sleep Apnea Neg Hx        SOCIAL HISTORY  Social History     Tobacco Use    Smoking status: Former     Types: Cigars    Smokeless tobacco: Never    Tobacco comments:     1 cigar per week.   Vaping Use    Vaping status: Never Used   Substance and Sexual Activity    Alcohol use: Not Currently     Comment: minimal, Pt stop drinking since the beginning of April 2016    Drug use: No    Sexual activity: Yes     Partners: Female       CURRENT MEDICATIONS  Home Medications    **Home medications have not yet been reviewed for this encounter**         ALLERGIES  Allergies   Allergen Reactions    Influenza Virus Vaccine      Guillan Fisher     Influenza Virus Vaccine Live      Guillan Fisher     Anticoagulant Sodium Citrate [Sodium Citrate]      HIGH BLEEDING RISK    Lisinopril Cough       PHYSICAL EXAM  VITAL SIGNS: /67   Pulse 97   Temp 36.4 °C (97.6 °F) (Temporal)   Resp 18   Ht 1.905 m (6' 3\")   Wt 111 kg (244 lb 4.3 oz)   SpO2 91%   BMI 30.53 kg/m²    Respiratory: Clear lung sounds  Cardiac: Regular rate  GI: Abdomen is soft, protuberant, nontender  Neurologic: Speech clear, strength intact  Psychiatric: Normal mood  Skeletal: Tender left knee  Skin: Brawny skin change bilateral " lower extremities consistent with venous stasis change.  Left lower leg is erythematous change extending to the medial left thigh, with warmth and tenderness, appears cellulitic.  Fading bruising left anterior knee.    Vascular: Pulses present both lower extremities    EKG/LABS  Results for orders placed or performed during the hospital encounter of 09/18/24   CBC WITH DIFFERENTIAL   Result Value Ref Range    WBC 6.0 4.8 - 10.8 K/uL    RBC 3.28 (L) 4.70 - 6.10 M/uL    Hemoglobin 9.9 (L) 14.0 - 18.0 g/dL    Hematocrit 31.5 (L) 42.0 - 52.0 %    MCV 96.0 81.4 - 97.8 fL    MCH 30.2 27.0 - 33.0 pg    MCHC 31.4 (L) 32.3 - 36.5 g/dL    RDW 58.7 (H) 35.9 - 50.0 fL    Platelet Count 70 (L) 164 - 446 K/uL    MPV 10.9 9.0 - 12.9 fL    Neutrophils-Polys 74.10 (H) 44.00 - 72.00 %    Lymphocytes 6.20 (L) 22.00 - 41.00 %    Monocytes 14.40 (H) 0.00 - 13.40 %    Eosinophils 4.30 0.00 - 6.90 %    Basophils 0.50 0.00 - 1.80 %    Immature Granulocytes 0.50 0.00 - 0.90 %    Nucleated RBC 0.00 0.00 - 0.20 /100 WBC    Neutrophils (Absolute) 4.44 1.82 - 7.42 K/uL    Lymphs (Absolute) 0.37 (L) 1.00 - 4.80 K/uL    Monos (Absolute) 0.86 (H) 0.00 - 0.85 K/uL    Eos (Absolute) 0.26 0.00 - 0.51 K/uL    Baso (Absolute) 0.03 0.00 - 0.12 K/uL    Immature Granulocytes (abs) 0.03 0.00 - 0.11 K/uL    NRBC (Absolute) 0.00 K/uL   COMP METABOLIC PANEL   Result Value Ref Range    Sodium 129 (L) 135 - 145 mmol/L    Potassium 5.0 3.6 - 5.5 mmol/L    Chloride 98 96 - 112 mmol/L    Co2 22 20 - 33 mmol/L    Anion Gap 9.0 7.0 - 16.0    Glucose 102 (H) 65 - 99 mg/dL    Bun 30 (H) 8 - 22 mg/dL    Creatinine 1.33 0.50 - 1.40 mg/dL    Calcium 8.2 (L) 8.4 - 10.2 mg/dL    Correct Calcium 8.8 8.5 - 10.5 mg/dL    AST(SGOT) 29 12 - 45 U/L    ALT(SGPT) 19 2 - 50 U/L    Alkaline Phosphatase 86 30 - 99 U/L    Total Bilirubin 2.2 (H) 0.1 - 1.5 mg/dL    Albumin 3.3 3.2 - 4.9 g/dL    Total Protein 5.8 (L) 6.0 - 8.2 g/dL    Globulin 2.5 1.9 - 3.5 g/dL    A-G Ratio 1.3 g/dL    Lactic Acid   Result Value Ref Range    Lactic Acid 1.1 0.5 - 2.0 mmol/L   ESTIMATED GFR   Result Value Ref Range    GFR (CKD-EPI) 54 (A) >60 mL/min/1.73 m 2   PLATELET ESTIMATE   Result Value Ref Range    Plt Estimation Decreased    IMMATURE PLT FRACTION   Result Value Ref Range    Imm. Plt Fraction 3.4 0.6 - 13.1 %             COURSE & MEDICAL DECISION MAKING    ASSESSMENT, COURSE AND PLAN  Care Narrative: Patient with cellulitis left leg has failed outpatient treatment with Keflex.  Patient had x-ray and CAT scan of his left knee after injury, both negative.  Patient and his wife are planning orthopedic follow-up once cellulitis is treated.  Patient is at risk for development of DVT although ultrasound obtained 5 days ago was negative.  He is not septic, negative lactic acid, normal white blood cell count although with other cardinal signs of infection present, plan for admission for IV antibiotics for treatment of cellulitis which extends from left lower leg up the medial aspect of his left thigh.        DISPOSITION AND DISCUSSIONS  I have discussed management of the patient with the following physicians and NICOLLE's: Admitting hospitalist service      FINAL DIAGNOSIS  1. Cellulitis of left lower leg         Electronically signed by: Yaya Diallo M.D., 9/18/2024 2:06 PM

## 2024-09-19 ENCOUNTER — APPOINTMENT (OUTPATIENT)
Dept: RADIOLOGY | Facility: MEDICAL CENTER | Age: 79
DRG: 603 | End: 2024-09-19
Attending: HOSPITALIST
Payer: MEDICARE

## 2024-09-19 ENCOUNTER — TELEPHONE (OUTPATIENT)
Dept: MEDICAL GROUP | Age: 79
End: 2024-09-19
Payer: MEDICARE

## 2024-09-19 LAB
ANION GAP SERPL CALC-SCNC: 8 MMOL/L (ref 7–16)
BUN SERPL-MCNC: 30 MG/DL (ref 8–22)
CALCIUM SERPL-MCNC: 8 MG/DL (ref 8.4–10.2)
CHLORIDE SERPL-SCNC: 101 MMOL/L (ref 96–112)
CO2 SERPL-SCNC: 21 MMOL/L (ref 20–33)
CREAT SERPL-MCNC: 1.27 MG/DL (ref 0.5–1.4)
ERYTHROCYTE [DISTWIDTH] IN BLOOD BY AUTOMATED COUNT: 57 FL (ref 35.9–50)
GFR SERPLBLD CREATININE-BSD FMLA CKD-EPI: 57 ML/MIN/1.73 M 2
GLUCOSE SERPL-MCNC: 113 MG/DL (ref 65–99)
HCT VFR BLD AUTO: 29.7 % (ref 42–52)
HGB BLD-MCNC: 9.5 G/DL (ref 14–18)
LACTATE SERPL-SCNC: 1 MMOL/L (ref 0.5–2)
LACTATE SERPL-SCNC: 1.1 MMOL/L (ref 0.5–2)
MCH RBC QN AUTO: 30 PG (ref 27–33)
MCHC RBC AUTO-ENTMCNC: 32 G/DL (ref 32.3–36.5)
MCV RBC AUTO: 93.7 FL (ref 81.4–97.8)
PLATELET # BLD AUTO: 62 K/UL (ref 164–446)
PLATELETS.RETICULATED NFR BLD AUTO: 2.7 % (ref 0.6–13.1)
PMV BLD AUTO: 9.8 FL (ref 9–12.9)
POTASSIUM SERPL-SCNC: 4.4 MMOL/L (ref 3.6–5.5)
RBC # BLD AUTO: 3.17 M/UL (ref 4.7–6.1)
SCCMEC + MECA PNL NOSE NAA+PROBE: NEGATIVE
SODIUM SERPL-SCNC: 130 MMOL/L (ref 135–145)
WBC # BLD AUTO: 5.4 K/UL (ref 4.8–10.8)

## 2024-09-19 PROCEDURE — 83605 ASSAY OF LACTIC ACID: CPT

## 2024-09-19 PROCEDURE — 36415 COLL VENOUS BLD VENIPUNCTURE: CPT

## 2024-09-19 PROCEDURE — 700111 HCHG RX REV CODE 636 W/ 250 OVERRIDE (IP): Mod: JZ | Performed by: HOSPITALIST

## 2024-09-19 PROCEDURE — 700102 HCHG RX REV CODE 250 W/ 637 OVERRIDE(OP): Performed by: HOSPITALIST

## 2024-09-19 PROCEDURE — 85055 RETICULATED PLATELET ASSAY: CPT

## 2024-09-19 PROCEDURE — A9270 NON-COVERED ITEM OR SERVICE: HCPCS | Performed by: HOSPITALIST

## 2024-09-19 PROCEDURE — 99232 SBSQ HOSP IP/OBS MODERATE 35: CPT | Performed by: HOSPITALIST

## 2024-09-19 PROCEDURE — 85027 COMPLETE CBC AUTOMATED: CPT

## 2024-09-19 PROCEDURE — 93971 EXTREMITY STUDY: CPT | Mod: LT

## 2024-09-19 PROCEDURE — 700105 HCHG RX REV CODE 258: Performed by: HOSPITALIST

## 2024-09-19 PROCEDURE — 80048 BASIC METABOLIC PNL TOTAL CA: CPT

## 2024-09-19 PROCEDURE — 770001 HCHG ROOM/CARE - MED/SURG/GYN PRIV*

## 2024-09-19 RX ORDER — MORPHINE SULFATE 4 MG/ML
2 INJECTION INTRAVENOUS EVERY 4 HOURS PRN
Status: DISCONTINUED | OUTPATIENT
Start: 2024-09-19 | End: 2024-09-20

## 2024-09-19 RX ORDER — OXYCODONE HYDROCHLORIDE 5 MG/1
5 TABLET ORAL EVERY 4 HOURS PRN
Status: DISCONTINUED | OUTPATIENT
Start: 2024-09-19 | End: 2024-09-20

## 2024-09-19 RX ADMIN — URSODIOL 300 MG: 300 CAPSULE ORAL at 08:16

## 2024-09-19 RX ADMIN — AMPICILLIN AND SULBACTAM 3 G: 1; 2 INJECTION, POWDER, FOR SOLUTION INTRAMUSCULAR; INTRAVENOUS at 05:33

## 2024-09-19 RX ADMIN — MORPHINE SULFATE 2 MG: 4 INJECTION, SOLUTION INTRAMUSCULAR; INTRAVENOUS at 11:35

## 2024-09-19 RX ADMIN — FERROUS SULFATE TAB 325 MG (65 MG ELEMENTAL FE) 325 MG: 325 (65 FE) TAB at 08:16

## 2024-09-19 RX ADMIN — AMPICILLIN AND SULBACTAM 3 G: 1; 2 INJECTION, POWDER, FOR SOLUTION INTRAMUSCULAR; INTRAVENOUS at 11:31

## 2024-09-19 RX ADMIN — SPIRONOLACTONE 50 MG: 50 TABLET ORAL at 16:49

## 2024-09-19 RX ADMIN — AMPICILLIN AND SULBACTAM 3 G: 1; 2 INJECTION, POWDER, FOR SOLUTION INTRAMUSCULAR; INTRAVENOUS at 16:51

## 2024-09-19 RX ADMIN — TORSEMIDE 10 MG: 5 TABLET ORAL at 16:49

## 2024-09-19 RX ADMIN — ATORVASTATIN CALCIUM 40 MG: 40 TABLET, FILM COATED ORAL at 20:46

## 2024-09-19 RX ADMIN — ACETAMINOPHEN 650 MG: 325 TABLET ORAL at 11:29

## 2024-09-19 RX ADMIN — FINASTERIDE 5 MG: 5 TABLET, FILM COATED ORAL at 05:27

## 2024-09-19 RX ADMIN — OXYCODONE HYDROCHLORIDE 5 MG: 5 TABLET ORAL at 20:51

## 2024-09-19 RX ADMIN — MORPHINE SULFATE 2 MG: 4 INJECTION, SOLUTION INTRAMUSCULAR; INTRAVENOUS at 22:45

## 2024-09-19 RX ADMIN — URSODIOL 300 MG: 300 CAPSULE ORAL at 20:45

## 2024-09-19 RX ADMIN — FOLIC ACID 1 MG: 1 TABLET ORAL at 05:27

## 2024-09-19 RX ADMIN — TORSEMIDE 10 MG: 5 TABLET ORAL at 05:27

## 2024-09-19 RX ADMIN — Medication 1000 UNITS: at 05:27

## 2024-09-19 RX ADMIN — TAMSULOSIN HYDROCHLORIDE 0.4 MG: 0.4 CAPSULE ORAL at 08:16

## 2024-09-19 RX ADMIN — GABAPENTIN 100 MG: 100 CAPSULE ORAL at 20:46

## 2024-09-19 RX ADMIN — URSODIOL 300 MG: 300 CAPSULE ORAL at 14:41

## 2024-09-19 RX ADMIN — METOPROLOL SUCCINATE 12.5 MG: 25 TABLET, FILM COATED, EXTENDED RELEASE ORAL at 05:28

## 2024-09-19 RX ADMIN — LINEZOLID 600 MG: 600 TABLET, FILM COATED ORAL at 05:27

## 2024-09-19 RX ADMIN — SPIRONOLACTONE 50 MG: 50 TABLET ORAL at 05:28

## 2024-09-19 ASSESSMENT — ENCOUNTER SYMPTOMS
VOMITING: 0
COUGH: 0
PALPITATIONS: 0
ORTHOPNEA: 0
HEMOPTYSIS: 0
DIZZINESS: 0
SPUTUM PRODUCTION: 0
CHILLS: 0
NAUSEA: 0

## 2024-09-19 ASSESSMENT — PAIN DESCRIPTION - PAIN TYPE
TYPE: ACUTE PAIN

## 2024-09-19 NOTE — PROGRESS NOTES
4 Eyes Skin Assessment Completed by DEWEY Vance and DEWEY Curiel.    Head WDL  Ears WDL  Nose WDL  Mouth WDL  Neck WDL  Breast/Chest WDL  Shoulder Blades WDL  Spine WDL  (R) Arm/Elbow/Hand Bruising and Scar, large skin tear on upper arm and small skin tear on forearm  (L) Arm/Elbow/Hand Bruising and Scar  Abdomen WDL  Groin WDL  Scrotum/Coccyx/Buttocks Redness and Blanching  (R) Leg Redness, Swelling, and Shiny  (L) Leg Redness, Swelling, and Shiny  (R) Heel/Foot/Toe Redness and Swelling  (L) Heel/Foot/Toe Redness and Swelling          Devices In Places none      Interventions In Place Low Air Loss Mattress    Possible Skin Injury Yes    Pictures Uploaded Into Epic Yes  Wound Consult Placed Yes  RN Wound Prevention Protocol Ordered Yes

## 2024-09-19 NOTE — TELEPHONE ENCOUNTER
Patient message my chart please advise :  Good Morning Dr. Urena.  This is Celestina Lima.  First of all, thank you for getting Torey into the hospital where all of his issues are being addressed.       It appears, according to the nurses at [facility], that there is nothing in Torey's record to show that HE CANNOT HAVE A BLOOD THINNER.  They are currently passing along from shift to shift.  How do we get that in Torey's medical record?     Thanks.

## 2024-09-19 NOTE — PROGRESS NOTES
Assumed care of pt at 1915, bedside report. Pt is AAOx 4, resting comfortably in bed with NADN, pain currently 0/10.    Pt using call light appropriately and to get up with assistance. Discussed plan of care for the night with patient, bed in lowest position, call light in reach, personal belongings in reach. No complaints at this time.

## 2024-09-19 NOTE — CARE PLAN
The patient is Stable - Low risk of patient condition declining or worsening    Shift Goals  Clinical Goals: monitor redness and swelling of LLE, manage pain, no falls, maintain pt safety  Patient Goals: rest    Progress made toward(s) clinical / shift goals:  pt had no complaints of pain during the night. No falls. Redness and edema to BLE having no change from beginning of shift.    Patient is not progressing towards the following goals:

## 2024-09-19 NOTE — HOSPITAL COURSE
Torey Lima has complicated medical history which includes chronic liver disease, colon cancer in remission, thrombocytopenia, hypertension, and hyperlipidemia.  The patient presented to the emergency room on 9/18/2024 with symptoms of pain and swelling in his left knee.  Patient previously been diagnosed with cellulitis and treated with Keflex without improvement

## 2024-09-19 NOTE — ASSESSMENT & PLAN NOTE
Continue metoprolol. BP still low 97/55, I increased midodrine to 10mg TID.  Holding torsemide and spironolactone  If BP increases, will start IV lasix

## 2024-09-19 NOTE — H&P
Hospital Medicine History & Physical Note    Date of Service  9/18/2024    Primary Care Physician  Fabián Urena M.D.    Consultants  None    Specialist Names: None    Code Status  Full Code    Chief Complaint  Chief Complaint   Patient presents with    Sent by MD     He is here to be admitted       History of Presenting Illness  Torey Lima is a 79 y.o. male who presented 9/18/2024 with pain and swelling of the left lower extremity.  He went back to see his primary care physician who sent him to the emergency room because he has failed outpatient Keflex.  The patient at this point has been's changed to IV Unasyn and Zyvox.  Patient will have an MRSA PCR done if the MRSA PCR is negative will discontinue the Zyvox.  The patient will need monitoring of the lactic acid level and procalcitonin level.  Will obtain a DVT study.  Continue with IV morphine for pain management as needed..    I discussed the plan of care with patient, family, and emergency room physician .    Review of Systems  Review of Systems   Constitutional: Negative.  Negative for chills, diaphoresis and fever.   HENT: Negative.     Eyes: Negative.  Negative for double vision.   Respiratory: Negative.  Negative for cough, hemoptysis and wheezing.    Cardiovascular:  Positive for leg swelling (Left only). Negative for chest pain and palpitations.   Gastrointestinal: Negative.  Negative for abdominal pain, blood in stool, constipation, diarrhea, heartburn, nausea and vomiting.   Genitourinary: Negative.  Negative for frequency, hematuria and urgency.   Musculoskeletal: Negative.  Negative for joint pain.   Skin:  Positive for itching and rash (On the left lower extremity).   Neurological: Negative.  Negative for dizziness, focal weakness, seizures, loss of consciousness and headaches.   Endo/Heme/Allergies: Negative.  Does not bruise/bleed easily.   Psychiatric/Behavioral: Negative.  Negative for suicidal ideas. The patient is not  nervous/anxious.    All other systems reviewed and are negative.      Past Medical History   has a past medical history of Arrhythmia (April 1, 2023), ASCVD (arteriosclerotic cardiovascular disease) (12/14/2012), BMI 33.0-33.9,adult (02/15/2013), Breath shortness (April 1, 2023), Cancer (HCC) (10/10-6/11), Chickenpox, Colon cancer (HCC) (12/06/2010), Elevated CEA (12/06/2010), Wallisian measles, GI bleed, HLD (hyperlipidemia) (06/20/2013), HTN (hypertension) (06/20/2013), Hypertension, Impaired fasting glucose (08/02/2012), Liver cirrhosis (HCC), Liver lesion (12/06/2010), Mixed hyperlipidemia (06/20/2013), Obesity (BMI 30-39.9) (12/14/2017), Peripheral neuropathy, 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    Surgical History   has a past surgical history that includes low anterior resection robotic (8/10/2010); cath placement (9/20/2010); hip arthroplasty mis total; other; other (comments); cath removal (7/20/2011); full thickness block resection (4/11/2012); gastroscopy with banding (4/3/2016); gastroscopy with banding (10/25/2016); colon resection; hip replacement, total; lumbar laminectomy diskectomy (5/25/2017); and umbilical hernia repair (N/A, 10/10/2023).     Family History  family history includes Cancer in his mother; Heart Disease in his mother.   Family history reviewed with patient. There is no family history that is pertinent to the chief complaint.     Social History   reports that he has quit smoking. His smoking use included cigars. He has never used smokeless tobacco. He reports that he does not currently use alcohol. He reports that he does not use drugs.    Allergies  Allergies   Allergen Reactions    Influenza Virus Vaccine      Guillan Delbarton     Influenza Virus Vaccine Live      Guillan Delbarton     Anticoagulant Sodium Citrate [Sodium Citrate]       HIGH BLEEDING RISK    Lisinopril Cough       Medications  Prior to Admission Medications   Prescriptions Last Dose Informant Patient Reported? Taking?   Cholecalciferol (VITAMIN D3) 1000 UNIT Cap 9/18/2024 at 0630 Patient No Yes   Sig: Take 1 Capsule by mouth every day.   Misc. Devices Misc Unknown at unknown Patient No No   Sig: Venous compression stockings bilat knee high; 35 mm hg; wear continuously during day ; off hs   atorvastatin (LIPITOR) 40 MG Tab 9/17/2024 at 2200 Patient No Yes   Sig: Take 1 Tablet by mouth every evening.   calcium carbonate (OS-AMPARO 500) 1250 (500 Ca) MG Tab 9/18/2024 at 0630 Patient No Yes   Sig: Take 1 Tablet by mouth every day.   cephALEXin (KEFLEX) 500 MG Cap 9/18/2024 at 1200 Patient No Yes   Sig: Take 1 Capsule by mouth 4 times a day for 5 days.   ferrous sulfate 325 (65 Fe) MG tablet 9/18/2024 at 0630 Patient No Yes   Sig: Take 1 Tablet by mouth every morning with breakfast.   folic acid (FOLVITE) 1 MG Tab 9/18/2024 at 0630 Patient No Yes   Sig: Take 1 Tablet by mouth every day.   gabapentin (NEURONTIN) 100 MG Cap 9/17/2024 at 2200 Patient No Yes   Sig: Take 1 Capsule by mouth 3 times a day.   Patient taking differently: Take 100 mg by mouth at bedtime.   hydrocortisone 2.5 % Cream topical cream Not Taking Patient No No   Sig: Apply 1 Application topically 2 times a day.   Patient not taking: Reported on 9/18/2024   magnesium oxide 400 (240 Mg) MG Tab 9/18/2024 at 0630 Patient No Yes   Sig: Take 1 Tablet by mouth every day.   metoprolol SR (TOPROL XL) 25 MG TABLET SR 24 HR 9/18/2024 at 0630 Patient No Yes   Sig: Take 0.5 Tablets by mouth every day.   oxyCODONE immediate-release (ROXICODONE) 5 MG Tab 9/17/2024 at 1200 Patient No Yes   Sig: Take 1 Tablet by mouth every four hours as needed for Severe Pain for up to 5 days.   oxyCODONE-acetaminophen (PERCOCET) 5-325 MG Tab 9/18/2024 at 1200 Patient Yes Yes   Sig: Take 1 Tablet by mouth one time. Pts wife RX   potassium chloride SA  (KDUR) 20 MEQ Tab CR 9/18/2024 at 0630 Patient No Yes   Sig: Take 1 tablet (20 mEq total) by mouth daily   Patient taking differently: Take 20 mEq by mouth every day.   senna-docusate (PERICOLACE OR SENOKOT S) 8.6-50 MG Tab 9/18/2024 at 0630 Patient No Yes   Sig: Take 1 Tablet by mouth every day for 10 days.   spironolactone (ALDACTONE) 50 MG Tab 9/18/2024 at 0630 Patient No Yes   Sig: Take 1 Tablet by mouth 2 times a day.   torsemide (DEMADEX) 10 MG tablet 9/18/2024 at 0630 Patient No Yes   Sig: Take 1 Tablet by mouth 2 times a day.   ursodiol (JUNE 250) 250 MG Tab 9/18/2024 at 1200 Patient No Yes   Sig: Take 1 tablet (250 mg total) by mouth every morning, afternoon, and evening.   Patient taking differently: Take 250 mg by mouth 3 times a day.      Facility-Administered Medications: None       Physical Exam  Temp:  [36.4 °C (97.6 °F)-36.9 °C (98.5 °F)] 36.9 °C (98.5 °F)  Pulse:  [62-97] 62  Resp:  [18] 18  BP: (101-124)/(60-70) 103/60  SpO2:  [91 %-96 %] 94 %  Blood Pressure : 103/60   Temperature: 36.9 °C (98.5 °F)   Pulse: 62   Respiration: 18   Pulse Oximetry: 94 %       Physical Exam  Vitals and nursing note reviewed. Exam conducted with a chaperone present.   Constitutional:       General: He is not in acute distress.     Appearance: Normal appearance. He is well-developed. He is obese. He is ill-appearing. He is not toxic-appearing or diaphoretic.   HENT:      Head: Normocephalic and atraumatic.      Right Ear: External ear normal.      Left Ear: External ear normal.      Nose: Nose normal.      Mouth/Throat:      Mouth: Mucous membranes are dry.      Pharynx: Oropharynx is clear.   Eyes:      General:         Right eye: No discharge.         Left eye: No discharge.      Extraocular Movements: Extraocular movements intact.      Conjunctiva/sclera: Conjunctivae normal.      Pupils: Pupils are equal, round, and reactive to light.   Neck:      Thyroid: No thyromegaly.      Vascular: No carotid bruit or JVD.    Cardiovascular:      Rate and Rhythm: Normal rate and regular rhythm.      Pulses: Normal pulses.      Heart sounds: Normal heart sounds.   Pulmonary:      Effort: Pulmonary effort is normal.      Breath sounds: Normal breath sounds.   Chest:      Chest wall: No tenderness.   Abdominal:      General: Abdomen is flat. Bowel sounds are normal. There is distension.      Palpations: Abdomen is soft. There is no mass.      Tenderness: There is no abdominal tenderness. There is no guarding or rebound.   Musculoskeletal:         General: Normal range of motion.      Cervical back: Normal range of motion and neck supple.      Right lower leg: No edema.      Left lower leg: Edema present.   Lymphadenopathy:      Cervical: No cervical adenopathy.   Skin:     General: Skin is warm and dry.      Capillary Refill: Capillary refill takes less than 2 seconds.      Findings: Erythema and rash present. No lesion.   Neurological:      General: No focal deficit present.      Mental Status: He is alert and oriented to person, place, and time. Mental status is at baseline.      GCS: GCS eye subscore is 4. GCS verbal subscore is 5. GCS motor subscore is 6.      Cranial Nerves: No cranial nerve deficit.      Sensory: No sensory deficit.      Motor: No weakness.      Gait: Gait normal.      Deep Tendon Reflexes: Reflexes are normal and symmetric.   Psychiatric:         Mood and Affect: Mood normal.         Behavior: Behavior normal.         Thought Content: Thought content normal.         Judgment: Judgment normal.         Laboratory:  Recent Labs     09/18/24  1458   WBC 6.0   RBC 3.28*   HEMOGLOBIN 9.9*   HEMATOCRIT 31.5*   MCV 96.0   MCH 30.2   MCHC 31.4*   RDW 58.7*   PLATELETCT 70*   MPV 10.9     Recent Labs     09/18/24  1458   SODIUM 129*   POTASSIUM 5.0   CHLORIDE 98   CO2 22   GLUCOSE 102*   BUN 30*   CREATININE 1.33   CALCIUM 8.2*     Recent Labs     09/18/24  1458   ALTSGPT 19   ASTSGOT 29   ALKPHOSPHAT 86   TBILIRUBIN 2.2*  "  GLUCOSE 102*         No results for input(s): \"NTPROBNP\" in the last 72 hours.      No results for input(s): \"TROPONINT\" in the last 72 hours.    Imaging:  US-EXTREMITY VENOUS LOWER UNILAT LEFT    (Results Pending)       X-Ray:  I have personally reviewed the images and compared with prior images.  EKG:  I have personally reviewed the images and compared with prior images.    Assessment/Plan:  Justification for Admission Status  I anticipate this patient will require at least two midnights for appropriate medical management, necessitating inpatient admission because patient has acute left lower extremity cellulitis as has failed outpatient antibiotics and will require IV antibiotics and this for at least 48 hours of inpatient management    Patient will need a Med/Surg bed on MEDICAL service .  The need is secondary to left lower extremity cellulitis.    * Cellulitis of left lower extremity- (present on admission)  Assessment & Plan  Patient has developed cellulitis of the left lower extremity which has now gotten worse even on Keflex.  Obtain procalcitonin level, lactic acid level, MRSA PCR  Since patient has failed outpatient antibiotics I will start him on IV Unasyn and add Zyvox  If the MRSA PCR is negative we will discontinue Zyvox  Continue with pain management utilizing IV morphine every 3 hours as needed  Obtain an ultrasound previous ultrasound done 4 days ago was negative for DVT.    Hyponatremia- (present on admission)  Assessment & Plan  Mild hyponatremia secondary to the cirrhosis  Continue monitor sodium levels  Do not give fluid resuscitation    Total bilirubin, elevated- (present on admission)  Assessment & Plan  Bilirubin is elevated secondary to chronic cirrhosis of the liver    Cirrhosis of liver with ascites (HCC)- ? DUE TO OLD HEP B, CHEMO RX,  OR THERMO RAD OF MAGNANT LESION- (present on admission)  Assessment & Plan  Chronic cirrhosis of the liver with ascites status post hepatitis " B.  Continue to monitor to cirrhosis and if ascites is significant we will do a paracentesis.  Pain management with IV morphine  Continue folic acid  Continue diuresis with torsemide and Aldactone.  Continue with ursodiol to prevent ascending cholangitis    Essential hypertension- (present on admission)  Assessment & Plan  Optimize blood pressure management keep systolic blood pressure less than 140 diastolic under 90  Continue with metoprolol XL 12.5 mg daily, as needed labetalol    Obesity (BMI 30-39.9)- (present on admission)  Assessment & Plan  Body mass index is 30.42 kg/m².  Outpatient weight loss management program lifestyle modification highly recommended    Dyslipidemia- (present on admission)  Assessment & Plan  Low-fat low-cholesterol diet  Continue Lipitor 40 mg nightly    Thrombocytopenia, unspecified (HCC)- from previous chemorx- (present on admission)  Assessment & Plan  Secondary to chronic cirrhosis patient has chronic thrombocytopenia currently not at the stage where he needs a transfusion    Anemia, chronic disease- (present on admission)  Assessment & Plan  Chronic anemia secondary to cirrhosis of liver continue to monitor if H&H drops below 7 or 21 transfuse    BPH (benign prostatic hyperplasia)- (present on admission)  Assessment & Plan  Patient would benefit from finasteride and Flomax.    H/O colon cancer, stage IV- neg colonoscopy 2012/2018 at [facility]- (present on admission)  Assessment & Plan  In remission    Vitamin D deficiency- (present on admission)  Assessment & Plan  Vitamin D supplementation        VTE prophylaxis: SCDs/TEDs

## 2024-09-19 NOTE — PROGRESS NOTES
Hospital Medicine Daily Progress Note    Date of Service  9/19/2024    Chief Complaint  Torey Lima is a 79 y.o. male admitted 9/18/2024 with knee pain and cellulitis    Hospital Course  Torey Lima has complicated medical history which includes chronic liver disease, colon cancer in remission, thrombocytopenia, hypertension, and hyperlipidemia.  The patient presented to the emergency room on 9/18/2024 with symptoms of pain and swelling in his left knee.  Patient previously been diagnosed with cellulitis and treated with Keflex without improvement    Interval Problem Update  Patient is having excruciating pain in his left knee.  States that oxycodone is ineffective.  Requesting intravenous morphine.  Unable to mobilize due to the pain.  Discussed treatment with antibiotics.  He is agreeable to this plan    I have discussed this patient's plan of care and discharge plan at IDT rounds today with Case Management, Nursing, Nursing leadership, and other members of the IDT team.    Consultants/Specialty  None    Code Status  Full Code    Disposition  The patient is not medically cleared for discharge to home or a post-acute facility.  Anticipate discharge to: home with organized home healthcare and close outpatient follow-up    I have placed the appropriate orders for post-discharge needs.    Review of Systems  Review of Systems   Constitutional:  Negative for chills and malaise/fatigue.   Respiratory:  Negative for cough, hemoptysis and sputum production.    Cardiovascular:  Negative for chest pain, palpitations and orthopnea.   Gastrointestinal:  Negative for nausea and vomiting.   Musculoskeletal:  Positive for joint pain.   Skin:  Negative for itching and rash.   Neurological:  Negative for dizziness.   All other systems reviewed and are negative.       Physical Exam  Temp:  [36.3 °C (97.4 °F)-36.9 °C (98.5 °F)] 36.6 °C (97.9 °F)  Pulse:  [] 72  Resp:  [16-18] 18  BP: ()/(52-65) 110/56  SpO2:  [91  %-97 %] 93 %    Physical Exam  Constitutional:       General: He is not in acute distress.     Appearance: Normal appearance. He is normal weight.   HENT:      Head: Normocephalic and atraumatic.      Right Ear: External ear normal.      Left Ear: External ear normal.      Nose: Nose normal.   Eyes:      Extraocular Movements: Extraocular movements intact.   Cardiovascular:      Rate and Rhythm: Normal rate and regular rhythm.      Pulses: Normal pulses.   Pulmonary:      Effort: Pulmonary effort is normal.      Breath sounds: Normal breath sounds.   Abdominal:      General: Abdomen is flat.   Musculoskeletal:      Cervical back: Normal range of motion and neck supple.      Comments: Left knee is swollen with a area of erythema surrounding the patellar tendon.  There is an area of erythema at the left anterior shin which patient states is   Skin:     General: Skin is warm and dry.   Neurological:      General: No focal deficit present.      Mental Status: He is alert and oriented to person, place, and time.      Cranial Nerves: No cranial nerve deficit.   Psychiatric:         Mood and Affect: Mood normal.         Behavior: Behavior normal.         Fluids  No intake or output data in the 24 hours ending 09/19/24 1545     Laboratory  Recent Labs     09/18/24  1458 09/19/24  0221   WBC 6.0 5.4   RBC 3.28* 3.17*   HEMOGLOBIN 9.9* 9.5*   HEMATOCRIT 31.5* 29.7*   MCV 96.0 93.7   MCH 30.2 30.0   MCHC 31.4* 32.0*   RDW 58.7* 57.0*   PLATELETCT 70* 62*   MPV 10.9 9.8     Recent Labs     09/18/24  1458 09/19/24  0221   SODIUM 129* 130*   POTASSIUM 5.0 4.4   CHLORIDE 98 101   CO2 22 21   GLUCOSE 102* 113*   BUN 30* 30*   CREATININE 1.33 1.27   CALCIUM 8.2* 8.0*                   Imaging  US-EXTREMITY VENOUS LOWER UNILAT LEFT   Final Result           Assessment/Plan  * Cellulitis of left lower extremity- (present on admission)  Assessment & Plan  Patient has developed cellulitis of the left lower extremity  Cellulitis started  after a fall with injury to the knee, CT imaging of the knee is negative for acute fracture  Worsened prior to admission despite treatment with keflex    9/19:  Continue Unasyn  Stop linezolid, MRSA screen is negative  Patient has severe pain at the area, I ordered IV morphine    Essential hypertension- (present on admission)  Assessment & Plan  Continue metoprolol and torsemide    Hyponatremia- (present on admission)  Assessment & Plan  9/19:  Likely due to cirrhosis      Obesity (BMI 30-39.9)- (present on admission)  Assessment & Plan  Body mass index is 30.42 kg/m².    Dyslipidemia- (present on admission)  Assessment & Plan  Atorvastatin    Thrombocytopenia, unspecified (HCC)- from previous chemorx- (present on admission)  Assessment & Plan  Near baseline    Anemia, chronic disease- (present on admission)  Assessment & Plan  Chronic anemia secondary to cirrhosis of liver continue to monitor if H&H drops below 7 or 21 transfuse    BPH (benign prostatic hyperplasia)- (present on admission)  Assessment & Plan  Finasteride and Flomax    H/O colon cancer, stage IV- neg colonoscopy 2012/2018 at [facility]- (present on admission)  Assessment & Plan  In remission    Total bilirubin, elevated- (present on admission)  Assessment & Plan  Bilirubin is elevated secondary to chronic cirrhosis of the liver    Cirrhosis of liver with ascites (HCC)- ? DUE TO OLD HEP B, CHEMO RX,  OR THERMO RAD OF MAGNANT LESION- (present on admission)  Assessment & Plan  Monitor ascites    Vitamin D deficiency- (present on admission)  Assessment & Plan  Vitamin D supplementation         VTE prophylaxis:   SCDs/TEDs      I have performed a physical exam and reviewed and updated ROS and Plan today (9/19/2024). In review of yesterday's note (9/18/2024), there are no changes except as documented above.

## 2024-09-19 NOTE — ASSESSMENT & PLAN NOTE
Patient has developed cellulitis of the left lower extremity  Cellulitis started after a fall with injury to the knee, CT imaging of the knee is negative for acute fracture  US was negative for DVT  Worsened prior to admission despite treatment with keflex    I changed Unasyn to PO linezolid, to cover staph better.  Patient had been on 5 days of IV Unasyn with minimal improvement.  He was also on Keflex prior to hospitalization.  Strep cellulitis should have been already treated.  Is likely Staphylococcus aureus is not being treated with sufficiently.
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
## 2024-09-20 ENCOUNTER — APPOINTMENT (OUTPATIENT)
Dept: RADIOLOGY | Facility: MEDICAL CENTER | Age: 79
DRG: 603 | End: 2024-09-20
Attending: INTERNAL MEDICINE
Payer: MEDICARE

## 2024-09-20 PROBLEM — M25.562 ACUTE PAIN OF LEFT KNEE: Status: ACTIVE | Noted: 2024-09-20

## 2024-09-20 LAB — URATE SERPL-MCNC: 6.6 MG/DL (ref 2.5–8.3)

## 2024-09-20 PROCEDURE — 700102 HCHG RX REV CODE 250 W/ 637 OVERRIDE(OP): Performed by: HOSPITALIST

## 2024-09-20 PROCEDURE — 94760 N-INVAS EAR/PLS OXIMETRY 1: CPT

## 2024-09-20 PROCEDURE — 700102 HCHG RX REV CODE 250 W/ 637 OVERRIDE(OP): Performed by: INTERNAL MEDICINE

## 2024-09-20 PROCEDURE — 84550 ASSAY OF BLOOD/URIC ACID: CPT

## 2024-09-20 PROCEDURE — A9270 NON-COVERED ITEM OR SERVICE: HCPCS | Performed by: INTERNAL MEDICINE

## 2024-09-20 PROCEDURE — A9270 NON-COVERED ITEM OR SERVICE: HCPCS | Performed by: HOSPITALIST

## 2024-09-20 PROCEDURE — 700111 HCHG RX REV CODE 636 W/ 250 OVERRIDE (IP): Mod: JZ | Performed by: HOSPITALIST

## 2024-09-20 PROCEDURE — 99233 SBSQ HOSP IP/OBS HIGH 50: CPT | Performed by: INTERNAL MEDICINE

## 2024-09-20 PROCEDURE — 700105 HCHG RX REV CODE 258: Performed by: HOSPITALIST

## 2024-09-20 PROCEDURE — 36415 COLL VENOUS BLD VENIPUNCTURE: CPT

## 2024-09-20 PROCEDURE — 700111 HCHG RX REV CODE 636 W/ 250 OVERRIDE (IP): Performed by: INTERNAL MEDICINE

## 2024-09-20 PROCEDURE — 770001 HCHG ROOM/CARE - MED/SURG/GYN PRIV*

## 2024-09-20 RX ORDER — COLCHICINE 0.6 MG/1
1.2 TABLET ORAL ONCE
Status: COMPLETED | OUTPATIENT
Start: 2024-09-20 | End: 2024-09-20

## 2024-09-20 RX ORDER — OXYCODONE HYDROCHLORIDE 5 MG/1
5 TABLET ORAL
Status: DISCONTINUED | OUTPATIENT
Start: 2024-09-20 | End: 2024-09-23 | Stop reason: HOSPADM

## 2024-09-20 RX ORDER — HYDROMORPHONE HYDROCHLORIDE 1 MG/ML
0.5 INJECTION, SOLUTION INTRAMUSCULAR; INTRAVENOUS; SUBCUTANEOUS
Status: DISCONTINUED | OUTPATIENT
Start: 2024-09-20 | End: 2024-09-23 | Stop reason: HOSPADM

## 2024-09-20 RX ORDER — COLCHICINE 0.6 MG/1
0.6 TABLET ORAL DAILY
Status: DISCONTINUED | OUTPATIENT
Start: 2024-09-20 | End: 2024-09-22

## 2024-09-20 RX ORDER — OXYCODONE HYDROCHLORIDE 5 MG/1
2.5 TABLET ORAL
Status: DISCONTINUED | OUTPATIENT
Start: 2024-09-20 | End: 2024-09-23 | Stop reason: HOSPADM

## 2024-09-20 RX ORDER — LORAZEPAM 2 MG/ML
1 INJECTION INTRAMUSCULAR ONCE
Status: COMPLETED | OUTPATIENT
Start: 2024-09-20 | End: 2024-09-20

## 2024-09-20 RX ADMIN — TORSEMIDE 10 MG: 5 TABLET ORAL at 05:29

## 2024-09-20 RX ADMIN — GABAPENTIN 100 MG: 100 CAPSULE ORAL at 20:57

## 2024-09-20 RX ADMIN — URSODIOL 300 MG: 300 CAPSULE ORAL at 20:57

## 2024-09-20 RX ADMIN — COLCHICINE 0.6 MG: 0.6 TABLET, FILM COATED ORAL at 13:33

## 2024-09-20 RX ADMIN — LORAZEPAM 1 MG: 2 INJECTION INTRAMUSCULAR; INTRAVENOUS at 15:53

## 2024-09-20 RX ADMIN — AMPICILLIN AND SULBACTAM 3 G: 1; 2 INJECTION, POWDER, FOR SOLUTION INTRAMUSCULAR; INTRAVENOUS at 23:13

## 2024-09-20 RX ADMIN — ATORVASTATIN CALCIUM 40 MG: 40 TABLET, FILM COATED ORAL at 20:57

## 2024-09-20 RX ADMIN — AMPICILLIN AND SULBACTAM 3 G: 1; 2 INJECTION, POWDER, FOR SOLUTION INTRAMUSCULAR; INTRAVENOUS at 05:32

## 2024-09-20 RX ADMIN — FOLIC ACID 1 MG: 1 TABLET ORAL at 05:29

## 2024-09-20 RX ADMIN — AMPICILLIN AND SULBACTAM 3 G: 1; 2 INJECTION, POWDER, FOR SOLUTION INTRAMUSCULAR; INTRAVENOUS at 00:28

## 2024-09-20 RX ADMIN — FINASTERIDE 5 MG: 5 TABLET, FILM COATED ORAL at 05:30

## 2024-09-20 RX ADMIN — Medication 1000 UNITS: at 05:29

## 2024-09-20 RX ADMIN — METOPROLOL SUCCINATE 12.5 MG: 25 TABLET, FILM COATED, EXTENDED RELEASE ORAL at 05:29

## 2024-09-20 RX ADMIN — URSODIOL 300 MG: 300 CAPSULE ORAL at 15:54

## 2024-09-20 RX ADMIN — TAMSULOSIN HYDROCHLORIDE 0.4 MG: 0.4 CAPSULE ORAL at 08:35

## 2024-09-20 RX ADMIN — URSODIOL 300 MG: 300 CAPSULE ORAL at 08:35

## 2024-09-20 RX ADMIN — SPIRONOLACTONE 50 MG: 50 TABLET ORAL at 05:29

## 2024-09-20 RX ADMIN — COLCHICINE 1.2 MG: 0.6 TABLET, FILM COATED ORAL at 12:10

## 2024-09-20 RX ADMIN — FERROUS SULFATE TAB 325 MG (65 MG ELEMENTAL FE) 325 MG: 325 (65 FE) TAB at 08:35

## 2024-09-20 RX ADMIN — AMPICILLIN AND SULBACTAM 3 G: 1; 2 INJECTION, POWDER, FOR SOLUTION INTRAMUSCULAR; INTRAVENOUS at 18:18

## 2024-09-20 RX ADMIN — AMPICILLIN AND SULBACTAM 3 G: 1; 2 INJECTION, POWDER, FOR SOLUTION INTRAMUSCULAR; INTRAVENOUS at 12:14

## 2024-09-20 RX ADMIN — MORPHINE SULFATE 2 MG: 4 INJECTION, SOLUTION INTRAMUSCULAR; INTRAVENOUS at 05:29

## 2024-09-20 ASSESSMENT — ENCOUNTER SYMPTOMS: WEAKNESS: 1

## 2024-09-20 ASSESSMENT — PAIN DESCRIPTION - PAIN TYPE
TYPE: ACUTE PAIN

## 2024-09-20 ASSESSMENT — FIBROSIS 4 INDEX: FIB4 SCORE: 8.58

## 2024-09-20 NOTE — CARE PLAN
The patient is Stable - Low risk of patient condition declining or worsening    Shift Goals  Clinical Goals: monitor leg swelling  Patient Goals: feel better    Progress made toward(s) clinical / shift goals:    Problem: Pain - Standard  Goal: Alleviation of pain or a reduction in pain to the patient’s comfort goal  Outcome: Progressing     Problem: Knowledge Deficit - Standard  Goal: Patient and family/care givers will demonstrate understanding of plan of care, disease process/condition, diagnostic tests and medications  Outcome: Progressing       Patient is not progressing towards the following goals:

## 2024-09-20 NOTE — CARE PLAN
The patient is Stable - Low risk of patient condition declining or worsening    Shift Goals  Clinical Goals: Pain mgmt, less swelling and redness  Patient Goals: Pain mgmt    Progress made toward(s) clinical / shift goals: Pt pain was managed effectively with morphine.     Patient is not progressing towards the following goals:      Problem: Fall Risk  Goal: Patient will remain free from falls  Outcome: Not Progressing  Note: Pt attempted to get out of bed x1. Pt educated on fall precaution importance and used call light thereafter. Alarms in use.      Problem: Pain - Standard  Goal: Alleviation of pain or a reduction in pain to the patient’s comfort goal  Outcome: Progressing  Note: Pt pain was increasing, exacerbated by movement. Pt's pain Rx was changed, morphine was added which provided effective pain relief for ~5 hours.

## 2024-09-21 ENCOUNTER — APPOINTMENT (OUTPATIENT)
Dept: RADIOLOGY | Facility: MEDICAL CENTER | Age: 79
DRG: 603 | End: 2024-09-21
Attending: INTERNAL MEDICINE
Payer: MEDICARE

## 2024-09-21 LAB
ALBUMIN SERPL BCP-MCNC: 3 G/DL (ref 3.2–4.9)
ALP SERPL-CCNC: 83 U/L (ref 30–99)
ALT SERPL-CCNC: 18 U/L (ref 2–50)
AST SERPL-CCNC: 24 U/L (ref 12–45)
BILIRUB CONJ SERPL-MCNC: 0.5 MG/DL (ref 0.1–0.5)
BILIRUB INDIRECT SERPL-MCNC: 1 MG/DL (ref 0–1)
BILIRUB SERPL-MCNC: 1.5 MG/DL (ref 0.1–1.5)
BUN SERPL-MCNC: 25 MG/DL (ref 8–22)
CALCIUM ALBUM COR SERPL-MCNC: 8.9 MG/DL (ref 8.5–10.5)
CALCIUM SERPL-MCNC: 8.1 MG/DL (ref 8.4–10.2)
CHLORIDE SERPL-SCNC: 98 MMOL/L (ref 96–112)
CO2 SERPL-SCNC: 24 MMOL/L (ref 20–33)
CREAT SERPL-MCNC: 1.18 MG/DL (ref 0.5–1.4)
ERYTHROCYTE [DISTWIDTH] IN BLOOD BY AUTOMATED COUNT: 56 FL (ref 35.9–50)
ERYTHROCYTE [SEDIMENTATION RATE] IN BLOOD BY WESTERGREN METHOD: 15 MM/HOUR (ref 0–20)
GFR SERPLBLD CREATININE-BSD FMLA CKD-EPI: 62 ML/MIN/1.73 M 2
GLUCOSE SERPL-MCNC: 101 MG/DL (ref 65–99)
HCT VFR BLD AUTO: 28.3 % (ref 42–52)
HGB BLD-MCNC: 9.2 G/DL (ref 14–18)
MAGNESIUM SERPL-MCNC: 1.9 MG/DL (ref 1.5–2.5)
MCH RBC QN AUTO: 30.4 PG (ref 27–33)
MCHC RBC AUTO-ENTMCNC: 32.5 G/DL (ref 32.3–36.5)
MCV RBC AUTO: 93.4 FL (ref 81.4–97.8)
PHOSPHATE SERPL-MCNC: 2.9 MG/DL (ref 2.5–4.5)
PLATELET # BLD AUTO: 58 K/UL (ref 164–446)
PLATELETS.RETICULATED NFR BLD AUTO: 3.5 % (ref 0.6–13.1)
PMV BLD AUTO: 9.9 FL (ref 9–12.9)
POTASSIUM SERPL-SCNC: 4.5 MMOL/L (ref 3.6–5.5)
PROT SERPL-MCNC: 5.6 G/DL (ref 6–8.2)
RBC # BLD AUTO: 3.03 M/UL (ref 4.7–6.1)
SODIUM SERPL-SCNC: 132 MMOL/L (ref 135–145)
WBC # BLD AUTO: 3.9 K/UL (ref 4.8–10.8)

## 2024-09-21 PROCEDURE — 84155 ASSAY OF PROTEIN SERUM: CPT

## 2024-09-21 PROCEDURE — 770001 HCHG ROOM/CARE - MED/SURG/GYN PRIV*

## 2024-09-21 PROCEDURE — A9579 GAD-BASE MR CONTRAST NOS,1ML: HCPCS | Mod: JZ | Performed by: INTERNAL MEDICINE

## 2024-09-21 PROCEDURE — 82248 BILIRUBIN DIRECT: CPT

## 2024-09-21 PROCEDURE — 700117 HCHG RX CONTRAST REV CODE 255: Mod: JZ | Performed by: INTERNAL MEDICINE

## 2024-09-21 PROCEDURE — 700102 HCHG RX REV CODE 250 W/ 637 OVERRIDE(OP): Performed by: INTERNAL MEDICINE

## 2024-09-21 PROCEDURE — 84450 TRANSFERASE (AST) (SGOT): CPT

## 2024-09-21 PROCEDURE — 36415 COLL VENOUS BLD VENIPUNCTURE: CPT

## 2024-09-21 PROCEDURE — 82247 BILIRUBIN TOTAL: CPT

## 2024-09-21 PROCEDURE — A9270 NON-COVERED ITEM OR SERVICE: HCPCS | Performed by: INTERNAL MEDICINE

## 2024-09-21 PROCEDURE — 73723 MRI JOINT LWR EXTR W/O&W/DYE: CPT | Mod: LT

## 2024-09-21 PROCEDURE — 94760 N-INVAS EAR/PLS OXIMETRY 1: CPT

## 2024-09-21 PROCEDURE — A9270 NON-COVERED ITEM OR SERVICE: HCPCS | Performed by: HOSPITALIST

## 2024-09-21 PROCEDURE — 700105 HCHG RX REV CODE 258: Performed by: HOSPITALIST

## 2024-09-21 PROCEDURE — 85055 RETICULATED PLATELET ASSAY: CPT

## 2024-09-21 PROCEDURE — 83735 ASSAY OF MAGNESIUM: CPT

## 2024-09-21 PROCEDURE — 80069 RENAL FUNCTION PANEL: CPT

## 2024-09-21 PROCEDURE — 85027 COMPLETE CBC AUTOMATED: CPT

## 2024-09-21 PROCEDURE — 700102 HCHG RX REV CODE 250 W/ 637 OVERRIDE(OP): Performed by: HOSPITALIST

## 2024-09-21 PROCEDURE — 84460 ALANINE AMINO (ALT) (SGPT): CPT

## 2024-09-21 PROCEDURE — 700111 HCHG RX REV CODE 636 W/ 250 OVERRIDE (IP): Mod: JZ | Performed by: HOSPITALIST

## 2024-09-21 PROCEDURE — 99233 SBSQ HOSP IP/OBS HIGH 50: CPT | Performed by: INTERNAL MEDICINE

## 2024-09-21 PROCEDURE — 700111 HCHG RX REV CODE 636 W/ 250 OVERRIDE (IP): Performed by: INTERNAL MEDICINE

## 2024-09-21 PROCEDURE — 84075 ASSAY ALKALINE PHOSPHATASE: CPT

## 2024-09-21 PROCEDURE — 85652 RBC SED RATE AUTOMATED: CPT

## 2024-09-21 RX ORDER — LORAZEPAM 2 MG/ML
0.5 INJECTION INTRAMUSCULAR ONCE
Status: COMPLETED | OUTPATIENT
Start: 2024-09-21 | End: 2024-09-21

## 2024-09-21 RX ORDER — BENZOCAINE/MENTHOL 6 MG-10 MG
LOZENGE MUCOUS MEMBRANE 2 TIMES DAILY
Status: DISCONTINUED | OUTPATIENT
Start: 2024-09-21 | End: 2024-09-23 | Stop reason: HOSPADM

## 2024-09-21 RX ORDER — LORAZEPAM 2 MG/ML
1 INJECTION INTRAMUSCULAR ONCE
Status: DISCONTINUED | OUTPATIENT
Start: 2024-09-21 | End: 2024-09-21

## 2024-09-21 RX ORDER — MIDODRINE HYDROCHLORIDE 5 MG/1
5 TABLET ORAL
Status: DISCONTINUED | OUTPATIENT
Start: 2024-09-21 | End: 2024-09-22

## 2024-09-21 RX ADMIN — AMPICILLIN AND SULBACTAM 3 G: 1; 2 INJECTION, POWDER, FOR SOLUTION INTRAMUSCULAR; INTRAVENOUS at 12:46

## 2024-09-21 RX ADMIN — AMPICILLIN AND SULBACTAM 3 G: 1; 2 INJECTION, POWDER, FOR SOLUTION INTRAMUSCULAR; INTRAVENOUS at 18:31

## 2024-09-21 RX ADMIN — HYDROCORTISONE: 1 CREAM TOPICAL at 21:24

## 2024-09-21 RX ADMIN — URSODIOL 300 MG: 300 CAPSULE ORAL at 21:24

## 2024-09-21 RX ADMIN — OXYCODONE HYDROCHLORIDE 5 MG: 5 TABLET ORAL at 21:37

## 2024-09-21 RX ADMIN — FINASTERIDE 5 MG: 5 TABLET, FILM COATED ORAL at 05:21

## 2024-09-21 RX ADMIN — FOLIC ACID 1 MG: 1 TABLET ORAL at 05:21

## 2024-09-21 RX ADMIN — GABAPENTIN 100 MG: 100 CAPSULE ORAL at 21:24

## 2024-09-21 RX ADMIN — AMPICILLIN AND SULBACTAM 3 G: 1; 2 INJECTION, POWDER, FOR SOLUTION INTRAMUSCULAR; INTRAVENOUS at 05:20

## 2024-09-21 RX ADMIN — GADOTERIDOL 20 ML: 279.3 INJECTION, SOLUTION INTRAVENOUS at 11:04

## 2024-09-21 RX ADMIN — ATORVASTATIN CALCIUM 40 MG: 40 TABLET, FILM COATED ORAL at 21:24

## 2024-09-21 RX ADMIN — FERROUS SULFATE TAB 325 MG (65 MG ELEMENTAL FE) 325 MG: 325 (65 FE) TAB at 08:47

## 2024-09-21 RX ADMIN — LORAZEPAM 0.5 MG: 2 INJECTION INTRAMUSCULAR; INTRAVENOUS at 08:47

## 2024-09-21 RX ADMIN — OXYCODONE HYDROCHLORIDE 5 MG: 5 TABLET ORAL at 12:53

## 2024-09-21 RX ADMIN — TAMSULOSIN HYDROCHLORIDE 0.4 MG: 0.4 CAPSULE ORAL at 08:47

## 2024-09-21 RX ADMIN — METOPROLOL SUCCINATE 12.5 MG: 25 TABLET, FILM COATED, EXTENDED RELEASE ORAL at 05:20

## 2024-09-21 RX ADMIN — MIDODRINE HYDROCHLORIDE 5 MG: 5 TABLET ORAL at 18:31

## 2024-09-21 RX ADMIN — Medication 1000 UNITS: at 05:21

## 2024-09-21 RX ADMIN — URSODIOL 300 MG: 300 CAPSULE ORAL at 16:01

## 2024-09-21 RX ADMIN — URSODIOL 300 MG: 300 CAPSULE ORAL at 08:47

## 2024-09-21 RX ADMIN — COLCHICINE 0.6 MG: 0.6 TABLET, FILM COATED ORAL at 05:21

## 2024-09-21 ASSESSMENT — ENCOUNTER SYMPTOMS: WEAKNESS: 1

## 2024-09-21 ASSESSMENT — PAIN DESCRIPTION - PAIN TYPE
TYPE: ACUTE PAIN
TYPE: ACUTE PAIN

## 2024-09-21 NOTE — PROGRESS NOTES
Hospital Medicine Daily Progress Note    Date of Service  9/20/2024    Chief Complaint  Torey Lima is a 79 y.o. male admitted 9/18/2024 with left knee pain and rash on LLE    Hospital Course  Torey Lima has complicated medical history which includes chronic liver disease, colon cancer in remission, thrombocytopenia, hypertension, and hyperlipidemia.  The patient presented to the emergency room on 9/18/2024 with symptoms of pain and swelling in his left knee.  Patient previously been diagnosed with cellulitis and treated with Keflex without improvement    Interval Problem Update  Pt c/o ongoing severe 10/10 pain to left knee  Wife present, gave updates  - attempted to get MRI, but machine went down as patient was in there. IV Ativan was helpful.  - I started colchicine for possible gout. Wife stated patient has history of gout.  - BP low, I am stopping torsemide and spironolactone for now. Will consider IV lasix to help diuresis better. Consider midodrine 5mg PO TID for BP support.   - continue IV Unasyn for LLE cellulitis    I have discussed this patient's plan of care and discharge plan at IDT rounds today with Case Management, Nursing, Nursing leadership, and other members of the IDT team.    Consultants/Specialty  none    Code Status  Full Code    Disposition  The patient is not medically cleared for discharge to home or a post-acute facility.      I have placed the appropriate orders for post-discharge needs.    Review of Systems  Review of Systems   Constitutional:  Positive for malaise/fatigue.   Cardiovascular:  Positive for leg swelling.   Musculoskeletal:  Positive for joint pain.   Neurological:  Positive for weakness.   All other systems reviewed and are negative.       Physical Exam  Temp:  [36.4 °C (97.6 °F)-37.2 °C (98.9 °F)] 37.2 °C (98.9 °F)  Pulse:  [60-66] 66  Resp:  [16-18] 16  BP: ()/(48-90) 103/60  SpO2:  [94 %-96 %] 95 %    Physical Exam  Vitals and nursing note reviewed.    Constitutional:       General: He is not in acute distress.     Appearance: He is not diaphoretic.   Cardiovascular:      Rate and Rhythm: Normal rate and regular rhythm.      Pulses: Normal pulses.      Heart sounds: Normal heart sounds. No murmur heard.     Comments: Difficult to auscultate due to body habitus, distant heart sounds  Pulmonary:      Effort: Pulmonary effort is normal. No respiratory distress.      Breath sounds: Normal breath sounds. No wheezing or rales.      Comments: Difficult to auscultate due to body habitus  Abdominal:      General: Bowel sounds are normal. There is distension.      Tenderness: There is no abdominal tenderness.   Musculoskeletal:         General: Tenderness (left knee) present. No swelling.      Right lower leg: Edema present.      Left lower leg: Edema present.      Comments: Decreased ROM to left knee   Skin:     General: Skin is warm.      Capillary Refill: Capillary refill takes less than 2 seconds.      Coloration: Skin is not jaundiced or pale.      Findings: Erythema (LLE) present.   Neurological:      General: No focal deficit present.      Mental Status: He is alert and oriented to person, place, and time. Mental status is at baseline.   Psychiatric:         Mood and Affect: Mood normal.         Behavior: Behavior normal.         Thought Content: Thought content normal.         Judgment: Judgment normal.         Fluids    Intake/Output Summary (Last 24 hours) at 9/20/2024 1851  Last data filed at 9/20/2024 1800  Gross per 24 hour   Intake 120 ml   Output --   Net 120 ml        Laboratory  Recent Labs     09/18/24  1458 09/19/24 0221   WBC 6.0 5.4   RBC 3.28* 3.17*   HEMOGLOBIN 9.9* 9.5*   HEMATOCRIT 31.5* 29.7*   MCV 96.0 93.7   MCH 30.2 30.0   MCHC 31.4* 32.0*   RDW 58.7* 57.0*   PLATELETCT 70* 62*   MPV 10.9 9.8     Recent Labs     09/18/24  1458 09/19/24 0221   SODIUM 129* 130*   POTASSIUM 5.0 4.4   CHLORIDE 98 101   CO2 22 21   GLUCOSE 102* 113*   BUN 30* 30*    CREATININE 1.33 1.27   CALCIUM 8.2* 8.0*                   Imaging  US-EXTREMITY VENOUS LOWER UNILAT LEFT   Final Result      MR-KNEE-WITH & W/O LEFT    (Results Pending)        Assessment/Plan  * Cellulitis of left lower extremity- (present on admission)  Assessment & Plan  Patient has developed cellulitis of the left lower extremity  Cellulitis started after a fall with injury to the knee, CT imaging of the knee is negative for acute fracture  Worsened prior to admission despite treatment with keflex    Continue IV Unasyn  LLE cellulitis likely not causing pain to left knee    Acute pain of left knee- (present on admission)  Assessment & Plan  Pt fell at home  CT scan was negative for fractures  - I ordered MRI, pain is out of proportion to exam. Ddx: torn ligament, tendon, muscle. Occult bone fracture. Joint infection or inflammatory condition.  - I started colchicine, consider prednisone for possible gout. Uric acid 6.6, normal range but can be normal in an acute gout flare.  - continue IV Unasyn, less likely a septic joint.  - if pain does not improve, consider arthrocentesis with orthopedic surgery.    Hyponatremia- (present on admission)  Assessment & Plan  Na 130, monitoring while on diuretics      Obesity (BMI 30-39.9)- (present on admission)  Assessment & Plan  Body mass index is 30.42 kg/m².  Not accurate due to volume overload, pt not at dry weight    Dyslipidemia- (present on admission)  Assessment & Plan  Continue atorvastatin    Thrombocytopenia, unspecified (HCC)- from previous chemorx- (present on admission)  Assessment & Plan  Chronic  Due to chronic cirrhosis  Plt 62    Anemia, chronic disease- (present on admission)  Assessment & Plan  Hgb 9.5.  Due to Chronic inflammation from cirrhosis    BPH (benign prostatic hyperplasia)- (present on admission)  Assessment & Plan  Continue tamsulosin and finasteride    H/O colon cancer, stage IV- neg colonoscopy 2012/2018 at [facility]- (present on  admission)  Assessment & Plan  In remission    Total bilirubin, elevated- (present on admission)  Assessment & Plan  Bilirubin is elevated secondary to chronic cirrhosis of the liver  Continue Ursodiol    Cirrhosis of liver with ascites (HCC)- ? DUE TO OLD HEP B, CHEMO RX,  OR THERMO RAD OF MAGNANT LESION- (present on admission)  Assessment & Plan  Monitor ascites  Has ongoing chronic ascites    Essential hypertension- (present on admission)  Assessment & Plan  Continue metoprolol  Holding torsemide and spironolactone    Vitamin D deficiency- (present on admission)  Assessment & Plan  VitD25 = 25  PO replacements         VTE prophylaxis:    pharmacologic prophylaxis contraindicated due to worsening thrombocytopenia      I have performed a physical exam and reviewed and updated ROS and Plan today (9/20/2024). In review of yesterday's note (9/19/2024), there are no changes except as documented above.      Total time spent 51 minutes. I spent greater than 50% of the time for patient care, including unit/floor time, multiple face-to-face encounters with the patient, counseling on treatment plan and discussion with bedside RN.  Further, I spent time on my own review of patient's overnight events, RN notes, imaging and lab analysis, and developing my assessment and plan above.  In addition, working with , social workers, and Charge RN on case coordination on this date.    This note was generated using voice recognition software which has a chance of producing errors of grammar and content.  I have made every reasonable attempt to find and correct any errors, but it should be expected that some may not be found prior to finalization of this note.

## 2024-09-21 NOTE — CARE PLAN
The patient is Stable - Low risk of patient condition declining or worsening    Shift Goals  Clinical Goals: MRI today, monitor redness/swelling of LLE  Patient Goals: rest and comfort  Family Goals: updates    Progress made toward(s) clinical / shift goals: Pt AxO 4. Pt denies pain laying in bed, however intermittent pain upon ambulation. Pt medicated for MRI, pending results.      Problem: Pain - Standard  Goal: Alleviation of pain or a reduction in pain to the patient’s comfort goal  Outcome: Progressing     Problem: Knowledge Deficit - Standard  Goal: Patient and family/care givers will demonstrate understanding of plan of care, disease process/condition, diagnostic tests and medications  Outcome: Progressing       Patient is not progressing towards the following goals:

## 2024-09-21 NOTE — CARE PLAN
The patient is Watcher - Medium risk of patient condition declining or worsening    Shift Goals  Clinical Goals: vitals, mentation  Patient Goals: rest and comfort  Family Goals: updates    Progress made toward(s) clinical / shift goals:    Problem: Pain - Standard  Goal: Alleviation of pain or a reduction in pain to the patient’s comfort goal  Outcome: Progressing  Note: Pt reported mild right leg pain, medicated per MAR. Pt educated on use of call light for any pain management needs.      Problem: Knowledge Deficit - Standard  Goal: Patient and family/care givers will demonstrate understanding of plan of care, disease process/condition, diagnostic tests and medications  Outcome: Progressing  Note: Pt updated on POC, agreeable. Pt is lethargic and drowsy, Aox3, disoriented to time. All questions and concerns addressed at this time.      Problem: Fall Risk  Goal: Patient will remain free from falls  Outcome: Progressing  Note: Pt is high fall risk, bed alarm in place. Pt educated on use of call light for any needed assistance.

## 2024-09-21 NOTE — PROGRESS NOTES
Hospital Medicine Daily Progress Note    Date of Service  9/21/2024    Chief Complaint  Torey Lima is a 79 y.o. male admitted 9/18/2024 with left knee pain and rash on LLE    Hospital Course  Torey Lima has complicated medical history which includes chronic liver disease, colon cancer in remission, thrombocytopenia, hypertension, and hyperlipidemia.  The patient presented to the emergency room on 9/18/2024 with symptoms of pain and swelling in his left knee.  Patient previously been diagnosed with cellulitis and treated with Keflex without improvement    Interval Problem Update  9/20:  Pt c/o ongoing severe 10/10 pain to left knee  Wife present, gave updates  - attempted to get MRI, but machine went down as patient was in there. IV Ativan was helpful.  - I started colchicine for possible gout. Wife stated patient has history of gout.  - BP low, I am stopping torsemide and spironolactone for now. Will consider IV lasix to help diuresis better. Consider midodrine 5mg PO TID for BP support.   - continue IV Unasyn for LLE cellulitis    9/21:  Patient today went for MRI, was not able to complete yesterday's machine went down.  Even while patient sleeping, he is frequently moving his left knee likely due to pain.  MRI results are pending, I called MRI and it should be read by [facility] radiologist.  Unclear if there is any occult fractures, tendon, ligament or other structural abnormalities.  May need orthopedic surgery to do arthrocentesis.  At this time colchicine is ineffective but he is not on steroids.  I reviewed labs, WBC 3.9, hemoglobin 9.2, platelets 58, sodium 132 potassium 4.5, creatinine 1.18, magnesium 1.9, Phos 2.9.  Continue IV Unasyn for left leg cellulitis  BP is still low 90/63, 95/49.    I have discussed this patient's plan of care and discharge plan at IDT rounds today with Case Management, Nursing, Nursing leadership, and other members of the IDT team.    Consultants/Specialty  none    Code  Status  Full Code    Disposition  The patient is not medically cleared for discharge to home or a post-acute facility.      I have placed the appropriate orders for post-discharge needs.    Review of Systems  Review of Systems   Constitutional:  Positive for malaise/fatigue.   Cardiovascular:  Positive for leg swelling.   Musculoskeletal:  Positive for joint pain.   Neurological:  Positive for weakness.   All other systems reviewed and are negative.       Physical Exam  Temp:  [36.4 °C (97.5 °F)-37.2 °C (98.9 °F)] 36.5 °C (97.7 °F)  Pulse:  [60-83] (P) 69  Resp:  [16-19] 18  BP: ()/(47-90) (P) 105/48  SpO2:  [86 %-96 %] 94 %    Physical Exam  Vitals and nursing note reviewed.   Constitutional:       General: He is not in acute distress.     Appearance: He is not diaphoretic.   Cardiovascular:      Rate and Rhythm: Normal rate and regular rhythm.      Pulses: Normal pulses.      Heart sounds: Normal heart sounds. No murmur heard.     Comments: Difficult to auscultate due to body habitus, distant heart sounds  Pulmonary:      Effort: Pulmonary effort is normal. No respiratory distress.      Breath sounds: Normal breath sounds. No wheezing or rales.      Comments: Difficult to auscultate due to body habitus  Abdominal:      General: Bowel sounds are normal. There is distension.      Tenderness: There is no abdominal tenderness.   Musculoskeletal:         General: Tenderness (left knee) present. No swelling.      Right lower leg: Edema present.      Left lower leg: Edema present.      Comments: Decreased ROM to left knee   Skin:     General: Skin is warm.      Capillary Refill: Capillary refill takes less than 2 seconds.      Coloration: Skin is not jaundiced or pale.      Findings: Erythema (LLE) present.   Neurological:      General: No focal deficit present.      Mental Status: He is alert and oriented to person, place, and time. Mental status is at baseline.   Psychiatric:         Mood and Affect: Mood normal.          Behavior: Behavior normal.         Thought Content: Thought content normal.         Judgment: Judgment normal.         Fluids    Intake/Output Summary (Last 24 hours) at 9/21/2024 1436  Last data filed at 9/21/2024 1300  Gross per 24 hour   Intake 600 ml   Output 300 ml   Net 300 ml        Laboratory  Recent Labs     09/18/24  1458 09/19/24 0221 09/21/24  0144   WBC 6.0 5.4 3.9*   RBC 3.28* 3.17* 3.03*   HEMOGLOBIN 9.9* 9.5* 9.2*   HEMATOCRIT 31.5* 29.7* 28.3*   MCV 96.0 93.7 93.4   MCH 30.2 30.0 30.4   MCHC 31.4* 32.0* 32.5   RDW 58.7* 57.0* 56.0*   PLATELETCT 70* 62* 58*   MPV 10.9 9.8 9.9     Recent Labs     09/18/24  1458 09/19/24  0221 09/21/24  0144   SODIUM 129* 130* 132*   POTASSIUM 5.0 4.4 4.5   CHLORIDE 98 101 98   CO2 22 21 24   GLUCOSE 102* 113* 101*   BUN 30* 30* 25*   CREATININE 1.33 1.27 1.18   CALCIUM 8.2* 8.0* 8.1*                   Imaging  US-EXTREMITY VENOUS LOWER UNILAT LEFT   Final Result      MR-KNEE-WITH & W/O LEFT    (Results Pending)        Assessment/Plan  * Cellulitis of left lower extremity- (present on admission)  Assessment & Plan  Patient has developed cellulitis of the left lower extremity  Cellulitis started after a fall with injury to the knee, CT imaging of the knee is negative for acute fracture  Worsened prior to admission despite treatment with keflex    Continue IV Unasyn for left leg cellulitis    Acute pain of left knee- (present on admission)  Assessment & Plan  Pt fell at home  CT scan was negative for fractures  Ddx: torn ligament, tendon, muscle. Occult bone fracture. Joint infection or inflammatory condition.  Even while patient sleeping, he is frequently moving his left knee likely due to pain.  MRI results are pending, I called MRI and it should be read by [facility] radiologist.  Unclear if there is any occult fractures, tendon, ligament or other structural abnormalities.  May need orthopedic surgery to do arthrocentesis.  At this time colchicine is ineffective but  he is not on steroids.  I consulted and discussed with Dr. Taylor with [facility] for evaluation, if arthrocentesis is needed versus possible occult fracture. She will come evaluate patient.    Hyponatremia- (present on admission)  Assessment & Plan  Na 130, monitoring while on diuretics      Obesity (BMI 30-39.9)- (present on admission)  Assessment & Plan  Body mass index is 30.42 kg/m².  Not accurate due to volume overload, pt not at dry weight    Dyslipidemia- (present on admission)  Assessment & Plan  Continue atorvastatin    Thrombocytopenia, unspecified (HCC)- from previous chemorx- (present on admission)  Assessment & Plan  Chronic  Due to chronic cirrhosis  Plt 62    Anemia, chronic disease- (present on admission)  Assessment & Plan  Hgb 9.5.  Due to Chronic inflammation from cirrhosis    BPH (benign prostatic hyperplasia)- (present on admission)  Assessment & Plan  Continue tamsulosin and finasteride    H/O colon cancer, stage IV- neg colonoscopy 2012/2018 at [facility]- (present on admission)  Assessment & Plan  In remission    Total bilirubin, elevated- (present on admission)  Assessment & Plan  Bilirubin is elevated secondary to chronic cirrhosis of the liver  Continue Ursodiol    Cirrhosis of liver with ascites (HCC)- ? DUE TO OLD HEP B, CHEMO RX,  OR THERMO RAD OF MAGNANT LESION- (present on admission)  Assessment & Plan  Monitor ascites  Has ongoing chronic ascites  BP is still low 90/63, 95/49. I am starting midodrine for BP support.    Essential hypertension- (present on admission)  Assessment & Plan  Continue metoprolol  Holding torsemide and spironolactone, BP is still low 90/63, 95/49.    Vitamin D deficiency- (present on admission)  Assessment & Plan  VitD25 = 25  Continue p.o. replacements         VTE prophylaxis:   SCDs/TEDs   pharmacologic prophylaxis contraindicated due to worsening thrombocytopenia      I have performed a physical exam and reviewed and updated ROS and Plan today (9/21/2024).  In review of yesterday's note (9/20/2024), there are no changes except as documented above.      Total time spent 52 minutes.    This included my review of patient's overnight RN notes, face to face interview, physical examination, lab analysis.  Also includes repeat visits with the patient, and my documented assessments and interventions above.  In addition, I spoke with entire care team on patient's treatment plan, and DC planning on morning rounds and IDT rounds.    This note was generated using voice recognition software which has a chance of producing errors of grammar and content.  I have made every reasonable attempt to find and correct any errors, but it should be expected that some may not be found prior to finalization of this note.

## 2024-09-21 NOTE — PROGRESS NOTES
MRI tech called RN to notify that patient did not complete MRI due to machine problems. Per MRI tech, will update when machine is fixed. Ativan given prior to MRI. MD made aware.

## 2024-09-21 NOTE — ASSESSMENT & PLAN NOTE
Pt fell at home  CT scan was negative for fractures  Ddx: torn ligament, tendon, muscle. Occult bone fracture. Joint infection or inflammatory condition.  Even while patient sleeping, he is frequently moving his left knee likely due to pain.  MRI results are pending, I called MRI and it should be read by [facility] radiologist.  Unclear if there is any occult fractures, tendon, ligament or other structural abnormalities.  May need orthopedic surgery to do arthrocentesis.  At this time colchicine is ineffective but he is not on steroids.  I discussed with Dr. Taylor, patient's left knee does not appear to be a bone fracture, ligament tear or tendon tear, not inflammatory. Recommended gabapentin, steroids and anti-inflammatories if possible.  I added voltaren gel, increased gabapentin, added Prednisone 50mg, schedule acetaminophen.  Pain did improve with Oxycodone 5mg.

## 2024-09-22 PROBLEM — Z53.09 CONTRAINDICATION TO ANTICOAGULATION THERAPY: Chronic | Status: ACTIVE | Noted: 2024-09-22

## 2024-09-22 PROCEDURE — 700101 HCHG RX REV CODE 250: Performed by: INTERNAL MEDICINE

## 2024-09-22 PROCEDURE — 700102 HCHG RX REV CODE 250 W/ 637 OVERRIDE(OP): Performed by: INTERNAL MEDICINE

## 2024-09-22 PROCEDURE — 700111 HCHG RX REV CODE 636 W/ 250 OVERRIDE (IP): Mod: JZ | Performed by: HOSPITALIST

## 2024-09-22 PROCEDURE — 700102 HCHG RX REV CODE 250 W/ 637 OVERRIDE(OP): Performed by: HOSPITALIST

## 2024-09-22 PROCEDURE — 770001 HCHG ROOM/CARE - MED/SURG/GYN PRIV*

## 2024-09-22 PROCEDURE — A9270 NON-COVERED ITEM OR SERVICE: HCPCS | Performed by: HOSPITALIST

## 2024-09-22 PROCEDURE — A9270 NON-COVERED ITEM OR SERVICE: HCPCS | Performed by: INTERNAL MEDICINE

## 2024-09-22 PROCEDURE — 99233 SBSQ HOSP IP/OBS HIGH 50: CPT | Performed by: INTERNAL MEDICINE

## 2024-09-22 PROCEDURE — 700111 HCHG RX REV CODE 636 W/ 250 OVERRIDE (IP): Performed by: INTERNAL MEDICINE

## 2024-09-22 PROCEDURE — 700105 HCHG RX REV CODE 258: Performed by: HOSPITALIST

## 2024-09-22 RX ORDER — GABAPENTIN 100 MG/1
200 CAPSULE ORAL
Status: DISCONTINUED | OUTPATIENT
Start: 2024-09-22 | End: 2024-09-23 | Stop reason: HOSPADM

## 2024-09-22 RX ORDER — ACETAMINOPHEN 500 MG
1000 TABLET ORAL EVERY 6 HOURS
Status: DISCONTINUED | OUTPATIENT
Start: 2024-09-22 | End: 2024-09-23 | Stop reason: HOSPADM

## 2024-09-22 RX ORDER — MIDODRINE HYDROCHLORIDE 5 MG/1
5 TABLET ORAL
Status: DISCONTINUED | OUTPATIENT
Start: 2024-09-22 | End: 2024-09-23 | Stop reason: HOSPADM

## 2024-09-22 RX ORDER — LINEZOLID 600 MG/1
600 TABLET, FILM COATED ORAL EVERY 12 HOURS
Status: DISCONTINUED | OUTPATIENT
Start: 2024-09-22 | End: 2024-09-23 | Stop reason: HOSPADM

## 2024-09-22 RX ORDER — SULFAMETHOXAZOLE AND TRIMETHOPRIM 800; 160 MG/1; MG/1
1 TABLET ORAL EVERY 12 HOURS
Status: DISCONTINUED | OUTPATIENT
Start: 2024-09-22 | End: 2024-09-22

## 2024-09-22 RX ORDER — PREDNISONE 50 MG/1
50 TABLET ORAL DAILY
Status: DISCONTINUED | OUTPATIENT
Start: 2024-09-22 | End: 2024-09-23 | Stop reason: HOSPADM

## 2024-09-22 RX ORDER — MIDODRINE HYDROCHLORIDE 5 MG/1
10 TABLET ORAL
Status: DISCONTINUED | OUTPATIENT
Start: 2024-09-22 | End: 2024-09-22

## 2024-09-22 RX ADMIN — Medication 1000 UNITS: at 05:15

## 2024-09-22 RX ADMIN — TAMSULOSIN HYDROCHLORIDE 0.4 MG: 0.4 CAPSULE ORAL at 07:36

## 2024-09-22 RX ADMIN — GABAPENTIN 200 MG: 100 CAPSULE ORAL at 20:01

## 2024-09-22 RX ADMIN — URSODIOL 300 MG: 300 CAPSULE ORAL at 07:37

## 2024-09-22 RX ADMIN — ACETAMINOPHEN 1000 MG: 500 TABLET ORAL at 23:06

## 2024-09-22 RX ADMIN — DICLOFENAC SODIUM 2 G: 10 GEL TOPICAL at 20:01

## 2024-09-22 RX ADMIN — AMPICILLIN AND SULBACTAM 3 G: 1; 2 INJECTION, POWDER, FOR SOLUTION INTRAMUSCULAR; INTRAVENOUS at 07:39

## 2024-09-22 RX ADMIN — ACETAMINOPHEN 1000 MG: 500 TABLET ORAL at 17:09

## 2024-09-22 RX ADMIN — MIDODRINE HYDROCHLORIDE 5 MG: 5 TABLET ORAL at 17:10

## 2024-09-22 RX ADMIN — AMPICILLIN AND SULBACTAM 3 G: 1; 2 INJECTION, POWDER, FOR SOLUTION INTRAMUSCULAR; INTRAVENOUS at 11:31

## 2024-09-22 RX ADMIN — DICLOFENAC SODIUM 2 G: 10 GEL TOPICAL at 14:00

## 2024-09-22 RX ADMIN — MIDODRINE HYDROCHLORIDE 5 MG: 5 TABLET ORAL at 11:29

## 2024-09-22 RX ADMIN — HYDROCORTISONE: 1 CREAM TOPICAL at 05:18

## 2024-09-22 RX ADMIN — FOLIC ACID 1 MG: 1 TABLET ORAL at 05:18

## 2024-09-22 RX ADMIN — FERROUS SULFATE TAB 325 MG (65 MG ELEMENTAL FE) 325 MG: 325 (65 FE) TAB at 07:37

## 2024-09-22 RX ADMIN — LINEZOLID 600 MG: 600 TABLET, FILM COATED ORAL at 17:10

## 2024-09-22 RX ADMIN — URSODIOL 300 MG: 300 CAPSULE ORAL at 20:01

## 2024-09-22 RX ADMIN — ATORVASTATIN CALCIUM 40 MG: 40 TABLET, FILM COATED ORAL at 20:01

## 2024-09-22 RX ADMIN — URSODIOL 300 MG: 300 CAPSULE ORAL at 13:59

## 2024-09-22 RX ADMIN — PREDNISONE 50 MG: 50 TABLET ORAL at 12:33

## 2024-09-22 RX ADMIN — ACETAMINOPHEN 1000 MG: 500 TABLET ORAL at 12:33

## 2024-09-22 RX ADMIN — AMPICILLIN AND SULBACTAM 3 G: 1; 2 INJECTION, POWDER, FOR SOLUTION INTRAMUSCULAR; INTRAVENOUS at 01:02

## 2024-09-22 RX ADMIN — DICLOFENAC SODIUM 2 G: 10 GEL TOPICAL at 17:10

## 2024-09-22 RX ADMIN — MIDODRINE HYDROCHLORIDE 5 MG: 5 TABLET ORAL at 07:37

## 2024-09-22 RX ADMIN — FINASTERIDE 5 MG: 5 TABLET, FILM COATED ORAL at 05:15

## 2024-09-22 ASSESSMENT — PAIN DESCRIPTION - PAIN TYPE
TYPE: ACUTE PAIN
TYPE: ACUTE PAIN

## 2024-09-22 ASSESSMENT — ENCOUNTER SYMPTOMS: WEAKNESS: 1

## 2024-09-22 NOTE — CARE PLAN
The patient is Stable - Low risk of patient condition declining or worsening    Shift Goals  Clinical Goals: manage knee pain, iv abx  Patient Goals: sleep, comfort, pain management  Family Goals: updates    Progress made toward(s) clinical / shift goals:      Patient is not progressing towards the following goals:      Problem: Pain - Standard  Goal: Alleviation of pain or a reduction in pain to the patient’s comfort goal  Outcome: Not Progressing  Note: Patient continues to complain of pain in left knee.      Problem: Knowledge Deficit - Standard  Goal: Patient and family/care givers will demonstrate understanding of plan of care, disease process/condition, diagnostic tests and medications  Outcome: Progressing  Note: Plan of care discussed with patient and patient's wife. Continuing IV abx, awaiting ortho to consult regarding knee

## 2024-09-22 NOTE — ASSESSMENT & PLAN NOTE
As per patient and wife, patient has esophageal varices history and has high risk bleeding.  They requested not to have any anticoagulation.  - We will not have chemoprophylaxis for DVT prophylaxis

## 2024-09-22 NOTE — CARE PLAN
The patient is Stable - Low risk of patient condition declining or worsening    Shift Goals  Clinical Goals: Patient to remain free from falls throughout shift, manage LLE pain per MAR prn.  Patient Goals: sleep comfortably  Family Goals: updates    Progress made toward(s) clinical / shift goals:  Pt remained free from falls throughout shift with fall precautions including pt education, call light within reach, bed in locked and lowest position, bed nearest nurses station. Pain managed post prn per MAR.    Patient is not progressing towards the following goals:

## 2024-09-22 NOTE — PROGRESS NOTES
[facility] Medicine Daily Progress Note    Date of Service  9/22/2024    Chief Complaint  Torey Lima is a 79 y.o. male admitted 9/18/2024 with left knee pain and rash on LLE    Hospital Course  Torey Lima has complicated medical history which includes chronic liver disease, colon cancer in remission, thrombocytopenia, hypertension, and hyperlipidemia.  The patient presented to the emergency room on 9/18/2024 with symptoms of pain and swelling in his left knee.  Patient previously been diagnosed with cellulitis and treated with Keflex without improvement    Interval Problem Update  9/20:  Pt c/o ongoing severe 10/10 pain to left knee  Wife present, gave updates  - attempted to get MRI, but machine went down as patient was in there. IV Ativan was helpful.  - I started colchicine for possible gout. Wife stated patient has history of gout.  - BP low, I am stopping torsemide and spironolactone for now. Will consider IV lasix to help diuresis better. Consider midodrine 5mg PO TID for BP support.   - continue IV Unasyn for LLE cellulitis    9/21:  Patient today went for MRI, was not able to complete yesterday's machine went down.  Even while patient sleeping, he is frequently moving his left knee likely due to pain.  MRI results are pending, I called MRI and it should be read by [facility] radiologist.  Unclear if there is any occult fractures, tendon, ligament or other structural abnormalities.  May need orthopedic surgery to do arthrocentesis.  At this time colchicine is ineffective but he is not on steroids.  I reviewed labs, WBC 3.9, hemoglobin 9.2, platelets 58, sodium 132 potassium 4.5, creatinine 1.18, magnesium 1.9, Phos 2.9.  Continue IV Unasyn for left leg cellulitis  BP is still low 90/63, 95/49.    9/22:  Pt with ongoing left knee pain, swelling.  I discussed with Dr. Taylor, patient's left knee does not appear to be a bone fracture, ligament tear or tendon tear, not inflammatory. Recommended gabapentin,  steroids and anti-inflammatories if possible.  I added voltaren gel, increased gabapentin, added Prednisone 50mg, schedule acetaminophen.  Pain did improve with Oxycodone 5mg    I have discussed this patient's plan of care and discharge plan at IDT rounds today with Case Management, Nursing, Nursing leadership, and other members of the IDT team.    Consultants/Specialty  none    Code Status  Full Code    Disposition  The patient is not medically cleared for discharge to home or a post-acute facility.  Anticipate discharge to: home with organized home healthcare and close outpatient follow-up    I have placed the appropriate orders for post-discharge needs.    Review of Systems  Review of Systems   Constitutional:  Positive for malaise/fatigue.   Cardiovascular:  Positive for leg swelling.   Musculoskeletal:  Positive for joint pain.   Neurological:  Positive for weakness.   All other systems reviewed and are negative.       Physical Exam  Temp:  [36.3 °C (97.4 °F)-36.8 °C (98.3 °F)] 36.8 °C (98.2 °F)  Pulse:  [59-96] 64  Resp:  [16-18] 16  BP: ()/(51-82) 97/55  SpO2:  [95 %-98 %] 95 %    Physical Exam  Vitals and nursing note reviewed.   Constitutional:       General: He is not in acute distress.     Appearance: He is not diaphoretic.   Cardiovascular:      Rate and Rhythm: Normal rate and regular rhythm.      Pulses: Normal pulses.      Heart sounds: Normal heart sounds. No murmur heard.     Comments: Difficult to auscultate due to body habitus, distant heart sounds  Pulmonary:      Effort: Pulmonary effort is normal. No respiratory distress.      Breath sounds: Normal breath sounds. No wheezing or rales.      Comments: Difficult to auscultate due to body habitus  Abdominal:      General: Bowel sounds are normal. There is distension.      Tenderness: There is no abdominal tenderness.   Musculoskeletal:         General: Tenderness (left knee) present. No swelling.      Right lower leg: Edema present.      Left  lower leg: Edema present.      Comments: Decreased ROM to left knee   Skin:     General: Skin is warm.      Capillary Refill: Capillary refill takes less than 2 seconds.      Coloration: Skin is not jaundiced or pale.      Findings: Erythema (LLE) present.   Neurological:      General: No focal deficit present.      Mental Status: He is alert and oriented to person, place, and time. Mental status is at baseline.   Psychiatric:         Mood and Affect: Mood normal.         Behavior: Behavior normal.         Thought Content: Thought content normal.         Judgment: Judgment normal.         Fluids    Intake/Output Summary (Last 24 hours) at 9/22/2024 1329  Last data filed at 9/22/2024 0800  Gross per 24 hour   Intake 240 ml   Output 200 ml   Net 40 ml        Laboratory  Recent Labs     09/21/24  0144   WBC 3.9*   RBC 3.03*   HEMOGLOBIN 9.2*   HEMATOCRIT 28.3*   MCV 93.4   MCH 30.4   MCHC 32.5   RDW 56.0*   PLATELETCT 58*   MPV 9.9     Recent Labs     09/21/24  0144   SODIUM 132*   POTASSIUM 4.5   CHLORIDE 98   CO2 24   GLUCOSE 101*   BUN 25*   CREATININE 1.18   CALCIUM 8.1*                   Imaging  MR-KNEE-WITH & W/O LEFT   Final Result      1.  Very limited study due to extensive patient motion artifact on all sequences.      2.  No evidence of osteomyelitis or septic arthritis.      3.  Cellulitis.      4.  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      5.  Possible horizontal type tear of the posterior horn of the lateral meniscus.      US-EXTREMITY VENOUS LOWER UNILAT LEFT   Final Result           Assessment/Plan  * Cellulitis of left lower extremity- (present on admission)  Assessment & Plan  Patient has developed cellulitis of the left lower extremity  Cellulitis started after a fall with injury to the knee, CT imaging of the knee is negative for acute fracture  US was negative for DVT  Worsened prior to admission despite treatment with keflex    I changed Unasyn to PO linezolid, to  cover staph better.  Patient had been on 5 days of IV Unasyn with minimal improvement.  He was also on Keflex prior to hospitalization.  Strep cellulitis should have been already treated.  Is likely Staphylococcus aureus is not being treated with sufficiently.    Acute pain of left knee- (present on admission)  Assessment & Plan  Pt fell at home  CT scan was negative for fractures  Ddx: torn ligament, tendon, muscle. Occult bone fracture. Joint infection or inflammatory condition.  Even while patient sleeping, he is frequently moving his left knee likely due to pain.  MRI results are pending, I called MRI and it should be read by [facility] radiologist.  Unclear if there is any occult fractures, tendon, ligament or other structural abnormalities.  May need orthopedic surgery to do arthrocentesis.  At this time colchicine is ineffective but he is not on steroids.  I discussed with Dr. Taylor, patient's left knee does not appear to be a bone fracture, ligament tear or tendon tear, not inflammatory. Recommended gabapentin, steroids and anti-inflammatories if possible.  I added voltaren gel, increased gabapentin, added Prednisone 50mg, schedule acetaminophen.  Pain did improve with Oxycodone 5mg.    Contraindication to anticoagulation therapy- (present on admission)  Assessment & Plan  As per patient and wife, patient has esophageal varices history and has high risk bleeding.  They requested not to have any anticoagulation.  - We will not have chemoprophylaxis for DVT prophylaxis    Hyponatremia- (present on admission)  Assessment & Plan  Na 132, monitoring      Obesity (BMI 30-39.9)- (present on admission)  Assessment & Plan  Body mass index is 30.42 kg/m².  Not accurate due to volume overload, pt not at dry weight    Dyslipidemia- (present on admission)  Assessment & Plan  Continue atorvastatin    Thrombocytopenia, unspecified (HCC)- from previous chemorx- (present on admission)  Assessment & Plan  Chronic  Due to chronic  cirrhosis  Plt 58    Anemia, chronic disease- (present on admission)  Assessment & Plan  Hgb 9.5.  Due to Chronic inflammation from cirrhosis    BPH (benign prostatic hyperplasia)- (present on admission)  Assessment & Plan  Continue tamsulosin and finasteride    H/O colon cancer, stage IV- neg colonoscopy 2012/2018 at [facility]- (present on admission)  Assessment & Plan  In remission    Total bilirubin, elevated- (present on admission)  Assessment & Plan  Bilirubin is elevated secondary to chronic cirrhosis of the liver  Continue Ursodiol    Cirrhosis of liver with ascites (HCC)- ? DUE TO OLD HEP B, CHEMO RX,  OR THERMO RAD OF MAGNANT LESION- (present on admission)  Assessment & Plan  Monitor ascites  Has ongoing chronic ascites  I increased midodrine to 10mg TID    Essential hypertension- (present on admission)  Assessment & Plan  Continue metoprolol. BP still low 97/55, I increased midodrine to 10mg TID.  Holding torsemide and spironolactone  If BP increases, will start IV lasix    Vitamin D deficiency- (present on admission)  Assessment & Plan  VitD25 = 25  Continue p.o. replacements         VTE prophylaxis:    Xarelto 10mg daily as prophylaxis      I have performed a physical exam and reviewed and updated ROS and Plan today (9/22/2024). In review of yesterday's note (9/21/2024), there are no changes except as documented above.      Total time spent 53 minutes.    This included my review of patient's overnight RN notes, face to face interview, physical examination, lab analysis.  Also includes repeat visits with the patient, and my documented assessments and interventions above.  I have spoken with specialist from orthopedic surgeon.  In addition, I spoke with entire care team on patient's treatment plan, and DC planning on morning rounds and IDT rounds.    This note was generated using voice recognition software which has a chance of producing errors of grammar and content.  I have made every reasonable attempt to  find and correct any errors, but it should be expected that some may not be found prior to finalization of this note.

## 2024-09-22 NOTE — PROGRESS NOTES
Patient accidentally pulled out iv getting up to go to the restroom.Will place another shortly and continue Unasyn administration.

## 2024-09-22 NOTE — CONSULTS
CC: knee pain    HPI:   Torey Lima is an 78 yo M who presented for evaluation of L knee pain. He has histry of liver disease, colon cancer in remission, thrombocytopenia, HTN and HLD. He developed knee pain and swelling on 9/18 and was admitted for LE cellulitis. Inflammatory labs are not elevated. Anti-inflammatories have been started d/t hx of gout. Xrays and MRI unremarkable.    ROS: Positive for musculoskeletal pain and what is stated in the HPI and PMH, otherwise negative review of systems    PMH:   Past Medical History:   Diagnosis Date    Arrhythmia April 1, 2023    Dr. Hall    ASCVD (arteriosclerotic cardiovascular disease) 12/14/2012    BMI 33.0-33.9,adult 02/15/2013    Breath shortness April 1, 2023    Dr. Hall    Cancer (HCC) 10/10-6/11    colon, liver cance    Chickenpox     Colon cancer (HCC) 12/06/2010    Elevated CEA 12/06/2010    [place] measles     GI bleed     HLD (hyperlipidemia) 06/20/2013    HTN (hypertension) 06/20/2013    Hypertension     Impaired fasting glucose 08/02/2012    Liver cirrhosis (HCC)     Liver lesion 12/06/2010    Mixed hyperlipidemia 06/20/2013    Obesity (BMI 30-39.9) 12/14/2017    Peripheral neuropathy, secondary to drugs or chemicals 12/06/2010    Snoring Many Years    Wife    Stage 3a chronic kidney disease 06/25/2022    Thrombocytopenia due to drugs 04/25/2012    Vitamin d deficiency 08/02/2012       PSH:   Past Surgical History:   Procedure Laterality Date    UMBILICAL HERNIA REPAIR N/A 10/10/2023    Procedure: OPEN UMBILICAL HERNIA REPAIR;  Surgeon: Julian Ennis M.D.;  Location: SURGERY [facility];  Service: General    LUMBAR LAMINECTOMY DISKECTOMY  5/25/2017    Procedure:  LAMINECTOMY LUMBAR  4 TO SACRAL 1;  Surgeon: Felton Escobar M.D.;  Location: SURGERY [facility];  Service:     GASTROSCOPY WITH BANDING  10/25/2016    Procedure: GASTROSCOPY WITH BANDING;  Surgeon: Pierre Waggoner M.D.;  Location: ENDOSCOPY [facility];  Service:     GASTROSCOPY  WITH BANDING  4/3/2016    Procedure: GASTROSCOPY WITH BANDING;  Surgeon: Cayetano Stovall M.D.;  Location: ENDOSCOPY [facility];  Service:     FULL THICKNESS BLOCK RESECTION  4/11/2012    Performed by LANI BOWMAN at SURGERY [facility] ORS    CATH REMOVAL  7/20/2011    Performed by RUBY RUIZ at SURGERY [facility] ORS    CATH PLACEMENT  9/20/2010    Performed by RUBY RUIZ at SURGERY [facility] ORS    LOW ANTERIOR RESECTION ROBOTIC  8/10/2010    Performed by RUBY RUIZ at SURGERY [facility] ORS    COLON RESECTION      HIP ARTHROPLASTY MIS TOTAL      rt    HIP REPLACEMENT, TOTAL      OTHER      Cholecystectomy    OTHER (COMMENTS)      liver surgery         Meds:   Medications Prior to Admission   Medication Sig Dispense Refill Last Dose    oxyCODONE-acetaminophen (PERCOCET) 5-325 MG Tab Take 1 Tablet by mouth one time. Pts wife RX   9/18/2024 at 1200    cephALEXin (KEFLEX) 500 MG Cap Take 1 Capsule by mouth 4 times a day for 5 days. 20 Capsule 0 9/18/2024 at 1200    senna-docusate (PERICOLACE OR SENOKOT S) 8.6-50 MG Tab Take 1 Tablet by mouth every day for 10 days. 10 Tablet 0 9/18/2024 at 0630    oxyCODONE immediate-release (ROXICODONE) 5 MG Tab Take 1 Tablet by mouth every four hours as needed for Severe Pain for up to 5 days. 15 Tablet 0 9/17/2024 at 1200    gabapentin (NEURONTIN) 100 MG Cap Take 1 Capsule by mouth 3 times a day. (Patient taking differently: Take 100 mg by mouth at bedtime.) 90 Capsule 11 9/17/2024 at 2200    ursodiol (JUNE 250) 250 MG Tab Take 1 tablet (250 mg total) by mouth every morning, afternoon, and evening. (Patient taking differently: Take 250 mg by mouth 3 times a day.) 270 Tablet 3 9/18/2024 at 1200    metoprolol SR (TOPROL XL) 25 MG TABLET SR 24 HR Take 0.5 Tablets by mouth every day. 50 Tablet 3 9/18/2024 at 0630    atorvastatin (LIPITOR) 40 MG Tab Take 1 Tablet by mouth every evening. 100 Tablet 4 9/17/2024 at 2200    potassium chloride SA  (KDUR) 20 MEQ Tab CR Take 1 tablet (20 mEq total) by mouth daily (Patient taking differently: Take 20 mEq by mouth every day.) 90 Tablet 2 9/18/2024 at 0630    calcium carbonate (OS-AMPARO 500) 1250 (500 Ca) MG Tab Take 1 Tablet by mouth every day. 100 Tablet 3 9/18/2024 at 0630    torsemide (DEMADEX) 10 MG tablet Take 1 Tablet by mouth 2 times a day. 200 Tablet 1 9/18/2024 at 0630    magnesium oxide 400 (240 Mg) MG Tab Take 1 Tablet by mouth every day. 100 Tablet 3 9/18/2024 at 0630    spironolactone (ALDACTONE) 50 MG Tab Take 1 Tablet by mouth 2 times a day. 180 Tablet 3 9/18/2024 at 0630    ferrous sulfate 325 (65 Fe) MG tablet Take 1 Tablet by mouth every morning with breakfast. 100 Tablet 3 9/18/2024 at 0630    Cholecalciferol (VITAMIN D3) 1000 UNIT Cap Take 1 Capsule by mouth every day. 100 Capsule 3 9/18/2024 at 0630    folic acid (FOLVITE) 1 MG Tab Take 1 Tablet by mouth every day. 100 Tablet 4 9/18/2024 at 0630    Misc. Devices Misc Venous compression stockings bilat knee high; 35 mm hg; wear continuously during day ; off hs 3 Each 3 Unknown at unknown    hydrocortisone 2.5 % Cream topical cream Apply 1 Application topically 2 times a day. (Patient not taking: Reported on 9/18/2024) 453 g 0 Not Taking       Allergies:   Allergies   Allergen Reactions    Influenza Virus Vaccine      [place]     Influenza Virus Vaccine Live      [place]an [place]     Anticoagulant Sodium Citrate [Sodium Citrate]      HIGH BLEEDING RISK    Lisinopril Cough    Lovenox [Enoxaparin]      Thrombocytopenia         SH:   Social History     Socioeconomic History    Marital status:      Spouse name: Not on file    Number of children: Not on file    Years of education: Not on file    Highest education level: Professional school degree (e.g., MD, DDS, DVM, IQRA)   Occupational History    Not on file   Tobacco Use    Smoking status: Former     Types: Cigars    Smokeless tobacco: Never    Tobacco comments:     1 cigar per week.    Vaping Use    Vaping status: Never Used   Substance and Sexual Activity    Alcohol use: Not Currently     Comment: minimal, Pt stop drinking since the beginning of April 2016    Drug use: No    Sexual activity: Yes     Partners: Female   Other Topics Concern    Not on file   Social History Narrative    ** Merged History Encounter **          Social Determinants of Health     Financial Resource Strain: Low Risk  (8/27/2024)    Overall Financial Resource Strain (CARDIA)     Difficulty of Paying Living Expenses: Not hard at all   Food Insecurity: No Food Insecurity (9/18/2024)    Hunger Vital Sign     Worried About Running Out of Food in the Last Year: Never true     Ran Out of Food in the Last Year: Never true   Transportation Needs: No Transportation Needs (9/18/2024)    PRAPARE - Transportation     Lack of Transportation (Medical): No     Lack of Transportation (Non-Medical): No   Physical Activity: Sufficiently Active (8/27/2024)    Exercise Vital Sign     Days of Exercise per Week: 7 days     Minutes of Exercise per Session: 30 min   Stress: No Stress Concern Present (8/27/2024)    [place] [employer] - Occupational Stress Questionnaire     Feeling of Stress : Not at all   Social Connections: Moderately Isolated (8/27/2024)    Social Connection and Isolation Panel [NHANES]     Frequency of Communication with Friends and Family: More than three times a week     Frequency of Social Gatherings with Friends and Family: Once a week     Attends Bahai Services: Never     Active Member of Clubs or Organizations: No     Attends Club or Organization Meetings: Never     Marital Status:    Intimate Partner Violence: Not At Risk (9/18/2024)    Humiliation, Afraid, Rape, and Kick questionnaire     Fear of Current or Ex-Partner: No     Emotionally Abused: No     Physically Abused: No     Sexually Abused: No   Housing Stability: Low Risk  (9/18/2024)    Housing Stability Vital Sign     Unable to  Pay for Housing in the Last Year: No     Number of Times Moved in the Last Year: 1     Homeless in the Last Year: No       FH:   Family History   Problem Relation Age of Onset    Heart Disease Mother     Cancer Mother     Sleep Apnea Neg Hx        Physical Exam:   General: AO x4  Eyes: non-icteric  Breathing: nonlabored  MSK:   Bilateral lower extremities with erythema up to the level of the mid tibia. Fires EHL, FHL, GSC and TA. Grossly intact sensation albeit diminished in bilateral LE and UE. With distraction, no significant TTP about knee.     Imaging:   Xrays, CT and MRI reviewed and demonstrate no obvious bony abnormalities; no effusion or osteomyelitis    Assessment/Plan:   79M admitted for treatment of LE cellulitis with significant L knee pain. Imaging negative for infection, fracture, ligamentous injury. He does have some cellulitis in bilateral LE. Recommend against aspiration given no effusion, no elevation in inflammatory labs; low concern for septic joint. Agree with continued treatment for cellulitis and mobilization as tolerated. Unfortunately, motion in MRI made it difficult to see many details but there is no effusion and low concern for infection as above. Additionally low concern for gout given that there is no effusion. Likely not a candidate for steroids given cellulitis and treatment, but may consider once cellultis improves. Mobilize with PT/OT as tolerated. I remain available as needed.      Fahad Taylor M.D.

## 2024-09-22 NOTE — PROGRESS NOTES
Report received from Ellyn RN, assumed care of pt at 1045.   POC and medications reviewed with pt. Pt verbalized understanding.   AOx4  C/o 4/10 pain in left leg at this time.  Denies SOB, or dizziness at this time.   Safety measures in place.  Hourly rounding in place.   I agree with previous RN(Ellyn) assessments.

## 2024-09-23 ENCOUNTER — PHARMACY VISIT (OUTPATIENT)
Dept: PHARMACY | Facility: MEDICAL CENTER | Age: 79
End: 2024-09-23
Payer: COMMERCIAL

## 2024-09-23 VITALS
DIASTOLIC BLOOD PRESSURE: 49 MMHG | HEIGHT: 75 IN | BODY MASS INDEX: 30.07 KG/M2 | SYSTOLIC BLOOD PRESSURE: 98 MMHG | WEIGHT: 241.84 LBS | RESPIRATION RATE: 18 BRPM | TEMPERATURE: 97.7 F | OXYGEN SATURATION: 96 % | HEART RATE: 60 BPM

## 2024-09-23 LAB
ALBUMIN SERPL BCP-MCNC: 3 G/DL (ref 3.2–4.9)
BUN SERPL-MCNC: 22 MG/DL (ref 8–22)
CALCIUM ALBUM COR SERPL-MCNC: 8.7 MG/DL (ref 8.5–10.5)
CALCIUM SERPL-MCNC: 7.9 MG/DL (ref 8.4–10.2)
CHLORIDE SERPL-SCNC: 102 MMOL/L (ref 96–112)
CO2 SERPL-SCNC: 22 MMOL/L (ref 20–33)
CREAT SERPL-MCNC: 1.08 MG/DL (ref 0.5–1.4)
ERYTHROCYTE [DISTWIDTH] IN BLOOD BY AUTOMATED COUNT: 55 FL (ref 35.9–50)
GFR SERPLBLD CREATININE-BSD FMLA CKD-EPI: 69 ML/MIN/1.73 M 2
GLUCOSE SERPL-MCNC: 152 MG/DL (ref 65–99)
HCT VFR BLD AUTO: 27.7 % (ref 42–52)
HGB BLD-MCNC: 9 G/DL (ref 14–18)
MAGNESIUM SERPL-MCNC: 2.1 MG/DL (ref 1.5–2.5)
MCH RBC QN AUTO: 29.9 PG (ref 27–33)
MCHC RBC AUTO-ENTMCNC: 32.5 G/DL (ref 32.3–36.5)
MCV RBC AUTO: 92 FL (ref 81.4–97.8)
PHOSPHATE SERPL-MCNC: 2.2 MG/DL (ref 2.5–4.5)
PLATELET # BLD AUTO: 51 K/UL (ref 164–446)
PLATELETS.RETICULATED NFR BLD AUTO: 3.1 % (ref 0.6–13.1)
PMV BLD AUTO: 9.8 FL (ref 9–12.9)
POTASSIUM SERPL-SCNC: 4.6 MMOL/L (ref 3.6–5.5)
RBC # BLD AUTO: 3.01 M/UL (ref 4.7–6.1)
SODIUM SERPL-SCNC: 133 MMOL/L (ref 135–145)
WBC # BLD AUTO: 3.7 K/UL (ref 4.8–10.8)

## 2024-09-23 PROCEDURE — RXMED WILLOW AMBULATORY MEDICATION CHARGE: Performed by: INTERNAL MEDICINE

## 2024-09-23 PROCEDURE — 99239 HOSP IP/OBS DSCHRG MGMT >30: CPT | Performed by: INTERNAL MEDICINE

## 2024-09-23 PROCEDURE — 97535 SELF CARE MNGMENT TRAINING: CPT

## 2024-09-23 PROCEDURE — 80069 RENAL FUNCTION PANEL: CPT

## 2024-09-23 PROCEDURE — 700102 HCHG RX REV CODE 250 W/ 637 OVERRIDE(OP): Performed by: INTERNAL MEDICINE

## 2024-09-23 PROCEDURE — 94760 N-INVAS EAR/PLS OXIMETRY 1: CPT

## 2024-09-23 PROCEDURE — 36415 COLL VENOUS BLD VENIPUNCTURE: CPT

## 2024-09-23 PROCEDURE — 85027 COMPLETE CBC AUTOMATED: CPT

## 2024-09-23 PROCEDURE — 85055 RETICULATED PLATELET ASSAY: CPT

## 2024-09-23 PROCEDURE — 700102 HCHG RX REV CODE 250 W/ 637 OVERRIDE(OP): Performed by: HOSPITALIST

## 2024-09-23 PROCEDURE — 97161 PT EVAL LOW COMPLEX 20 MIN: CPT

## 2024-09-23 PROCEDURE — A9270 NON-COVERED ITEM OR SERVICE: HCPCS | Performed by: HOSPITALIST

## 2024-09-23 PROCEDURE — A9270 NON-COVERED ITEM OR SERVICE: HCPCS | Performed by: INTERNAL MEDICINE

## 2024-09-23 PROCEDURE — 97165 OT EVAL LOW COMPLEX 30 MIN: CPT

## 2024-09-23 PROCEDURE — 700111 HCHG RX REV CODE 636 W/ 250 OVERRIDE (IP): Performed by: INTERNAL MEDICINE

## 2024-09-23 PROCEDURE — 83735 ASSAY OF MAGNESIUM: CPT

## 2024-09-23 RX ORDER — MIDODRINE HYDROCHLORIDE 5 MG/1
5 TABLET ORAL
Qty: 90 TABLET | Refills: 3 | Status: ON HOLD | OUTPATIENT
Start: 2024-09-23 | End: 2024-10-04

## 2024-09-23 RX ORDER — PREDNISONE 50 MG/1
50 TABLET ORAL DAILY
Qty: 6 TABLET | Refills: 0 | Status: SHIPPED | OUTPATIENT
Start: 2024-09-24 | End: 2024-09-30

## 2024-09-23 RX ORDER — LINEZOLID 600 MG/1
600 TABLET, FILM COATED ORAL EVERY 12 HOURS
Qty: 12 TABLET | Refills: 0 | Status: ACTIVE | OUTPATIENT
Start: 2024-09-23 | End: 2024-09-29

## 2024-09-23 RX ORDER — TAMSULOSIN HYDROCHLORIDE 0.4 MG/1
0.4 CAPSULE ORAL
Qty: 30 CAPSULE | Refills: 3 | Status: SHIPPED | OUTPATIENT
Start: 2024-09-24 | End: 2024-09-30

## 2024-09-23 RX ORDER — FINASTERIDE 5 MG/1
5 TABLET, FILM COATED ORAL DAILY
Qty: 30 TABLET | Refills: 3 | Status: SHIPPED | OUTPATIENT
Start: 2024-09-24 | End: 2024-09-30

## 2024-09-23 RX ORDER — MAGNESIUM GLYCINATE 100 MG
1 CAPSULE ORAL
COMMUNITY
Start: 2024-09-23

## 2024-09-23 RX ORDER — OXYCODONE HYDROCHLORIDE 5 MG/1
5 TABLET ORAL EVERY 8 HOURS PRN
Qty: 15 TABLET | Refills: 0 | Status: SHIPPED | OUTPATIENT
Start: 2024-09-23 | End: 2024-09-28

## 2024-09-23 RX ADMIN — DICLOFENAC SODIUM 2 G: 10 GEL TOPICAL at 08:17

## 2024-09-23 RX ADMIN — MIDODRINE HYDROCHLORIDE 5 MG: 5 TABLET ORAL at 12:12

## 2024-09-23 RX ADMIN — DICLOFENAC SODIUM 2 G: 10 GEL TOPICAL at 12:14

## 2024-09-23 RX ADMIN — ACETAMINOPHEN 1000 MG: 500 TABLET ORAL at 12:12

## 2024-09-23 RX ADMIN — TAMSULOSIN HYDROCHLORIDE 0.4 MG: 0.4 CAPSULE ORAL at 08:17

## 2024-09-23 RX ADMIN — URSODIOL 300 MG: 300 CAPSULE ORAL at 08:17

## 2024-09-23 RX ADMIN — FERROUS SULFATE TAB 325 MG (65 MG ELEMENTAL FE) 325 MG: 325 (65 FE) TAB at 08:17

## 2024-09-23 RX ADMIN — HYDROCORTISONE: 1 CREAM TOPICAL at 05:19

## 2024-09-23 RX ADMIN — LINEZOLID 600 MG: 600 TABLET, FILM COATED ORAL at 05:19

## 2024-09-23 RX ADMIN — URSODIOL 300 MG: 300 CAPSULE ORAL at 14:33

## 2024-09-23 RX ADMIN — FOLIC ACID 1 MG: 1 TABLET ORAL at 05:19

## 2024-09-23 RX ADMIN — FINASTERIDE 5 MG: 5 TABLET, FILM COATED ORAL at 05:19

## 2024-09-23 RX ADMIN — ACETAMINOPHEN 1000 MG: 500 TABLET ORAL at 05:19

## 2024-09-23 RX ADMIN — PREDNISONE 50 MG: 50 TABLET ORAL at 05:19

## 2024-09-23 RX ADMIN — Medication 1000 UNITS: at 05:19

## 2024-09-23 RX ADMIN — MIDODRINE HYDROCHLORIDE 5 MG: 5 TABLET ORAL at 08:17

## 2024-09-23 ASSESSMENT — ACTIVITIES OF DAILY LIVING (ADL): TOILETING: INDEPENDENT

## 2024-09-23 ASSESSMENT — COGNITIVE AND FUNCTIONAL STATUS - GENERAL
HELP NEEDED FOR BATHING: A LITTLE
DRESSING REGULAR LOWER BODY CLOTHING: A LITTLE
SUGGESTED CMS G CODE MODIFIER MOBILITY: CJ
MOVING TO AND FROM BED TO CHAIR: A LITTLE
DAILY ACTIVITIY SCORE: 21
CLIMB 3 TO 5 STEPS WITH RAILING: A LITTLE
STANDING UP FROM CHAIR USING ARMS: A LITTLE
SUGGESTED CMS G CODE MODIFIER DAILY ACTIVITY: CJ
WALKING IN HOSPITAL ROOM: A LITTLE
MOBILITY SCORE: 20
TOILETING: A LITTLE

## 2024-09-23 ASSESSMENT — GAIT ASSESSMENTS
DISTANCE (FEET): 15
DEVIATION: ANTALGIC
ASSISTIVE DEVICE: FRONT WHEEL WALKER
DISTANCE (FEET): 180
GAIT LEVEL OF ASSIST: STANDBY ASSIST

## 2024-09-23 ASSESSMENT — FIBROSIS 4 INDEX: FIB4 SCORE: 8.87

## 2024-09-23 ASSESSMENT — PAIN DESCRIPTION - PAIN TYPE: TYPE: ACUTE PAIN

## 2024-09-23 NOTE — CARE PLAN
The patient is Stable - Low risk of patient condition declining or worsening    Shift Goals  Clinical Goals: Pain Management, IV ABx  Patient Goals: Comfort  Family Goals: updates    Progress made toward(s) clinical / shift goals:      Problem: Pain - Standard  Goal: Alleviation of pain or a reduction in pain to the patient’s comfort goal  Outcome: Progressing     Problem: Knowledge Deficit - Standard  Goal: Patient and family/care givers will demonstrate understanding of plan of care, disease process/condition, diagnostic tests and medications  Outcome: Progressing     Problem: Fall Risk  Goal: Patient will remain free from falls  Outcome: Progressing

## 2024-09-23 NOTE — THERAPY
Physical Therapy   Initial Evaluation     Patient Name: Torey Lima  Age:  79 y.o., Sex:  male  Medical Record #: [id number]  Today's Date: 9/23/2024     Precautions  Precautions: Fall Risk    Assessment  Patient is 79 y.o. male with medical history which includes chronic liver disease, colon cancer in remission, thrombocytopenia, hypertension, and hyperlipidemia.  The patient presented to the emergency room on 9/18/2024 with symptoms of pain and swelling in his left knee. Pt was agreeable to therapy evaluation and able to demonstrate safe upright mobility with use of FWW use. Pt is near his baseline with upright mobility and was primarily limited due to L knee pain and swelling. Pt demonstrated with dec ROM in L knee ROM due to pain, however, able to demonstrate functional PROM. Pt was provided with education on elevation, ROM exercises, icing, and positioning to assist in dec pain level and improving ROM. Pt is in no acute skilled PT needs at this time, anticipate pt to d/c home once medically clear and pain in managed. Recommend outpatient physical therapy services to address higher level deficits.     Plan    Physical Therapy Initial Treatment Plan   Duration: (P) Evaluation only    DC Equipment Recommendations: (P) Front-Wheel Walker  Discharge Recommendations: (P) Recommend outpatient physical therapy services to address higher level deficits     Objective       09/23/24 0955   Initial Contact Note    Initial Contact Note Order Received and Verified, Evaluation Only - Patient Does Not Require Further Acute Physical Therapy at this Time.  However, May Benefit from Post Acute Therapy for Higher Level Functional Deficits.   Precautions   Precautions Fall Risk   Pain 0 - 10 Group   Location Knee   Location Orientation Left   Description Aching   Therapist Pain Assessment During Activity;Prior to Activity;Post Activity;Nurse Notified;8   Prior Living Situation   Prior Services None   Housing / Facility 1 Story  House   Steps Into Home 1   Steps In Home 2  (sunken living room)   Equipment Owned Front-Wheel Walker;Single Point Cane   Lives with - Patient's Self Care Capacity Spouse   Comments spouse is supportive and able to assist upon d/c to home   Prior Level of Functional Mobility   Bed Mobility Independent   Transfer Status Independent   Ambulation Independent   Ambulation Distance   (community)   Assistive Devices Used Front-Wheel Walker   Stairs Independent   Comments pt intermittently used SPC prior to GLF   History of Falls   History of Falls Yes   Date of Last Fall   (spouse states 2 falls in past couple months, last fall was last tuesday)   Cognition    Cognition / Consciousness WDL   Level of Consciousness Alert   Passive ROM Lower Body   Passive ROM Lower Body X   Comments limited in L knee ROM due to swelling and pain, able to demo functional PROM   Active ROM Lower Body    Active ROM Lower Body  X   Comments same as above   Strength Lower Body   Lower Body Strength  X   Comments limited in L LE due to L knee pain, WNL for R LE, able to demo functional strength for ambulation, STS, and step navigation   Sensation Lower Body   Lower Extremity Sensation   WDL   Lower Body Muscle Tone   Lower Body Muscle Tone  WDL   Neurological Concerns   Neurological Concerns No   Coordination Upper Body   Coordination Not Tested   Coordination Lower Body    Coordination Lower Body  WDL   Vision   Vision Comments wear glasses   Balance Assessment   Sitting Balance (Static) Good   Sitting Balance (Dynamic) Good   Standing Balance (Static) Good   Standing Balance (Dynamic) Fair +   Weight Shift Sitting Good   Weight Shift Standing Fair   Comments w/fww use, no baldo LOB noted   Bed Mobility    Supine to Sit Supervised   Sit to Supine Supervised   Scooting Supervised   Comments HOB elevated and rails up   Gait Analysis   Gait Level Of Assist Standby Assist   Assistive Device Front Wheel Walker   Distance (Feet) 180   # of Times  Distance was Traveled 1   Deviation Antalgic   # of Stairs Climbed 1   Level of Assist with Stairs Standby Assist   Weight Bearing Status fwb   Functional Mobility   Sit to Stand Standby Assist   Bed, Chair, Wheelchair Transfer Standby Assist   Transfer Method Stand Step   Mobility EOB, sit<>stand, ambulation, 1 step, back to bed   6 Clicks Assessment - How much HELP from from another person do you currently need... (If the patient hasn't done an activity recently, how much help from another person do you think he/she would need if he/she tried?)   Turning from your back to your side while in a flat bed without using bedrails? 4   Moving from lying on your back to sitting on the side of a flat bed without using bedrails? 4   Moving to and from a bed to a chair (including a wheelchair)? 3   Standing up from a chair using your arms (e.g., wheelchair, or bedside chair)? 3   Walking in hospital room? 3   Climbing 3-5 steps with a railing? 3   6 clicks Mobility Score 20   Activity Tolerance   Sitting in Chair NT   Sitting Edge of Bed 5 mins   Standing 8 mins   Comments limited by L knee pain   Edema / Skin Assessment   Edema / Skin  X   Comments demonstrated with L LE redness/swelling   Education Group   Education Provided Role of Physical Therapist   Role of Physical Therapist Patient Response Patient;Acceptance;Explanation;Demonstration;Verbal Demonstration;Action Demonstration   Physical Therapy Initial Treatment Plan    Duration Evaluation only   Anticipated Discharge Equipment and Recommendations   DC Equipment Recommendations Front-Wheel Walker   Discharge Recommendations Recommend outpatient physical therapy services to address higher level deficits   Interdisciplinary Plan of Care Collaboration   IDT Collaboration with  Nursing;Family / Caregiver   Patient Position at End of Therapy In Bed;Bed Alarm On;Call Light within Reach;Tray Table within Reach;Phone within Reach;Family / Friend in Room   Collaboration  Comments aware of visit and recs   Session Information   Date / Session Number  9/23 eval only

## 2024-09-23 NOTE — CARE PLAN
The patient is Stable - Low risk of patient condition declining or worsening    Shift Goals  Clinical Goals: Pain Management, IV ABx  Patient Goals: Comfort  Family Goals: updates    Progress made toward(s) clinical / shift goals:    Problem: Pain - Standard  Goal: Alleviation of pain or a reduction in pain to the patient’s comfort goal  Outcome: Progressing     Problem: Knowledge Deficit - Standard  Goal: Patient and family/care givers will demonstrate understanding of plan of care, disease process/condition, diagnostic tests and medications  Outcome: Progressing     Problem: Fall Risk  Goal: Patient will remain free from falls  Outcome: Progressing       Patient is not progressing towards the following goals:

## 2024-09-23 NOTE — DISCHARGE INSTRUCTIONS
Instructions for home monitoring of blood pressure:    ** Please obtain an ARM blood pressure machine, if you do not have one. Please obtain machine that can also track heart rate, if possible.  Please DO NOT use wrist type machine.    1) Please continue to take your medications as directed  2)  If you have symptoms of dizziness or nausea or falling or syncope or blurry vision, please check your blood pressure. IF your blood pressure remains low (Systolic or top number less than 100mmHg) please hold your medication and speak with your primary care provider as soon as possible.  Please seek help at an emergency room if your blood pressure does not improve at home.  3)  Please adhere to a LUNDBERG diet (limited sodium diet, 2 grams per day, no added salt to food) or diet recommended for cardiac, renal, low fat or diabetic diet you were on before.    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  2)  Please take blood pressure reading when you wake up, around lunch time and before sleeping.  3)  Please bring blood pressure diary to your primary care provider or cardiologist for blood pressure medication management.

## 2024-09-23 NOTE — THERAPY
Occupational Therapy   Initial Evaluation     Patient Name: Torey Lima  Age:  79 y.o., Sex:  male  Medical Record #: [id number]  Today's Date: 9/23/2024     Precautions  Precautions: Fall Risk    Assessment  Patient is 79 y.o. male who presented 9/18 w/ LLE pain and swelling.  Admitted with a diagnosis of cellulitis of LLE.  PMH - colon cancer/remission, chronic liver disease, HTN, HLD.  Additional factors influencing patient status / progress: LLE pain/discomfort.  Pt and wife reside in a [facility] in [place], [place].  Wife is available to assist as needed.  PLOF Indep for ADL's (exception of socks/shoes - wife assists), transfers and ambulation w/ intermittent use of SPC (Wife reported since cellulitis of LE, pt had been using a FWW to ambulate in the past week).      Plan    DC Equipment Recommendations: (P) None  Discharge Recommendations: (P) Anticipate that the patient will have no further occupational therapy needs after discharge from the hospital     Subjective    Pt was alert and cooperative w/ tx.     Objective       09/23/24 0920    Services   Is patient using  services for this encounter? No   Initial Contact Note    Initial Contact Note Order Received and Verified, Evaluation Only - Patient Does Not Require Further Acute Occupational Therapy at this Time.  However, May Benefit from Post Acute Therapy for Higher Level Functional Deficits.   Prior Living Situation   Prior Services None   Housing / Facility 1 Story House   Steps Into Home 1   Steps In Home 2  (Sunken living room w/ no rail)   Rail None   Bathroom Set up Bathtub / Shower Combination;Shower Chair   Equipment Owned Front-Wheel Walker;Single Point Cane;Tub / Shower Seat;Bed Side Commode;Wheelchair   Lives with - Patient's Self Care Capacity Spouse   Comments Pt and wife reside in a [facility] in [place], [place].  Wife is available to assist as needed.  PLOF Indep for ADL's (exception of socks/shoes - wife assists), transfers and ambulation w/  intermittent use of SPC (Wife reported since cellulitis of LE, pt had been using a FWW to ambulate in the past week).   Prior Level of ADL Function   Self Feeding Independent   Grooming / Hygiene Independent   Bathing Independent   Dressing Requires Assist  (Wife assists w/ socks and shoes.)   Toileting Independent   Prior Level of IADL Function   Kitchen Mobility Independent   Finances Independent   Prior Level Of Mobility Independent With Device in Home;Independent Without Device in Home;Independent With Steps in Home;Independent Without Device in Community;Independent With Device in Community;Independent With Steps in Community   Comments Intermittent use of SPC at baseline, last week use of FWW due to LE swelling/pain   History of Falls   History of Falls Yes   Date of Last Fall 09/17/24   Precautions   Precautions Fall Risk   Vitals   Pulse Oximetry 93 %   O2 (LPM) 0   O2 Delivery Device Room air w/o2 available   Pain   Intervention Rest;Repositioned   Pain 0 - 10 Group   Location Knee   Location Orientation Left   Description Aching   Comfort Goal Comfort with Movement;Perform Activity   Non Verbal Descriptors   Non Verbal Scale  Calm;Unlabored Breathing   Cognition    Cognition / Consciousness WDL   Level of Consciousness Alert   Active ROM Upper Body   Active ROM Upper Body  WDL   Dominant Hand Right   Strength Upper Body   Upper Body Strength  WDL   Coordination Upper Body   Coordination WDL   Balance Assessment   Sitting Balance (Static) Good   Sitting Balance (Dynamic) Good   Standing Balance (Static) Fair +   Standing Balance (Dynamic) Fair +   Weight Shift Sitting Good   Weight Shift Standing Fair   Comments OOB FWW   Bed Mobility    Supine to Sit Supervised   Sit to Supine Supervised   ADL Assessment   Grooming Modified Independent;Standing   Upper Body Dressing Modified Independent   Lower Body Dressing Minimal Assist  (Same as baseline - assist for socks)   Toileting Supervision   How much help from  another person does the patient currently need...   Putting on and taking off regular lower body clothing? 3   Bathing (including washing, rinsing, and drying)? 3   Toileting, which includes using a toilet, bedpan, or urinal? 3   Putting on and taking off regular upper body clothing? 4   Taking care of personal grooming such as brushing teeth? 4   Eating meals? 4   6 Clicks Daily Activity Score 21   Functional Mobility   Sit to Stand Standby Assist   Bed, Chair, Wheelchair Transfer Standby Assist   Toilet Transfers Contact Guard Assist   Transfer Method Stand Step   Mobility SBA FWW   Distance (Feet) 15   # of Times Distance was Traveled 2   Education Group   Education Provided Home Safety;Transfers;Role of Occupational Therapist;Activities of Daily Living   Role of Occupational Therapist Patient Response Patient;Significant Other;Acceptance;Explanation;Verbal Demonstration   Home Safety Patient Response Patient;Significant Other;Acceptance;Explanation;Demonstration;Verbal Demonstration   Transfers Patient Response Patient;Significant Other;Acceptance;Explanation;Demonstration;Teach Back;Verbal Demonstration;Action Demonstration   ADL Patient Response Patient;Significant Other;Acceptance;Explanation;Demonstration;Teach Back;Verbal Demonstration;Action Demonstration   Anticipated Discharge Equipment and Recommendations   DC Equipment Recommendations None   Discharge Recommendations Anticipate that the patient will have no further occupational therapy needs after discharge from the hospital

## 2024-09-24 ENCOUNTER — TELEPHONE (OUTPATIENT)
Dept: CARDIOLOGY | Facility: MEDICAL CENTER | Age: 79
End: 2024-09-24
Payer: MEDICARE

## 2024-09-24 ENCOUNTER — PATIENT OUTREACH (OUTPATIENT)
Dept: MEDICAL GROUP | Age: 79
End: 2024-09-24
Payer: MEDICARE

## 2024-09-24 NOTE — TELEPHONE ENCOUNTER
Hey, does look like atrial fibrillation. Should discuss with primary and gastro if patient can tolerate anticoagulation for cva prevention. If temporary ok should consider watchman with EP. Thank you!

## 2024-09-24 NOTE — PROGRESS NOTES
Transitional Care Management  TCM Outreach Date and Time: Filed (9/24/2024 10:49 AM)    Discharge Questions  Actual Discharge Date: 09/23/24  Now that you are home, how are you feeling?: Good  Did you receive any new prescriptions?: Yes (Prescribed finasteride, linezolid, magnesium glycinate, midodrine, prednisone, and tamsulosin, STOP: keflex, magnesium oxide, and percocet)  Were you able to get them filled?: Yes  Meds to Bed or Pharmacy filled?: Pharmacy  Do you have any questions about your current medications or new medications (Review Med Rec)?: No  Did you have any durable medical equipment ordered?: No  Do you have a follow up appointment scheduled with your PCP?: Yes  Appointment Date: 10/01/24  Appointment Time: 1040  Any issues or paperwork you wish to discuss with your PCP?: No  Are you (patient) able to get to the appointment?: Yes  If Home Health was ordered, have they contacted you (Patient): Not Applicable  Did you have enough support after your last discharge?: Yes  Does this patient qualify for the CCM program?: Yes    Transitional Care  Number of attempts made to contact patient: 1  Current or previous attempts completed within two business days of discharge? : Yes  Provided education regarding treatment plan, medications, self-management, ADLs?: No  Has patient completed an Advanced Directive?: No  Has the Care Manager's phone number provided?: No  Is there anything else I can help you with?: No    Discharge Summary  Chief Complaint: Sent by MD  Admitting Diagnosis: Cellulitis of left lower leg  Discharge Diagnosis: Cellulitis of left lower extremity

## 2024-09-24 NOTE — DISCHARGE SUMMARY
Discharge Summary    CHIEF COMPLAINT ON ADMISSION  Chief Complaint   Patient presents with    Sent by MD     He is here to be admitted       Reason for Admission  Wound Care, Knee Pain     Admission Date  9/18/2024    CODE STATUS  Full code    HPI & HOSPITAL COURSE  This is Torey Lima has complicated medical history which includes chronic liver disease, colon cancer in remission, thrombocytopenia, hypertension, and hyperlipidemia.  The patient presented to the emergency room on 9/18/2024 with symptoms of pain and swelling in his left knee.  Patient previously been diagnosed with cellulitis and treated with Keflex without improvement    Patient had been initially treated for his left leg cellulitis with p.o. Keflex at home.  Upon admission he was transition to IV Unasyn which she received for about 5 days.  After further reevaluation, patient did not show increased need significant further improvement with Unasyn.  I switched him to p.o. linezolid and had significant improvement in redness.  Wife was in agreement that his treatment was improving.  We kept him on linezolid upon discharge.    Patient had severe left knee pain, he did have a ground-level fall at home a couple weeks ago.  We performed a CT scan which did not show any fractures.  We performed a MRI of the left knee which did not show any fractures but possible left lateral meniscus tear.  I consulted orthopedic surgery with Dr. Fahad Taylor at [facility], she did not think patient had any fractures, tendon rupture, ligament tears and no surgery recommended.  I started patient on prednisone which did help his swelling come down.  Upon discharge his pain was no longer palpable but he had pain along the calf muscles.  His initial ultrasound of his left leg did not show any signs of DVT.  I suspect now his left knee pain has subsided but now he has ongoing left leg pain from his swelling and cellulitis.  I had also tried colchicine, patient did not  have gout as there was no improvement.    Reevaluated with PT and OT, they recommended outpatient physical therapy.  I provided referral.  I also provided referral for orthopedic surgery follow-up as outpatient.    Upon discharge, patient still had low blood pressure, I kept him on midodrine.  This is associated with his longtime liver cirrhosis.  I explained to wife that we will need to hold his home antihypertensive medication including torsemide, spironolactone, metoprolol, new medications finasteride and tamsulosin if his blood pressure is remaining less than 110 or 100 mmHg.  I gave clear instructions on her AVS paperwork and discussed each medication myself.  I explained she will need to monitor his blood pressure 3 times a day.  I explained they will need to return to [facility] to discuss his torsemide and spironolactone medication for recurrent ascites if this can be decreased.  He will stay on midodrine at this time.      Therefore, he is discharged in good and stable condition to home with close outpatient follow-up.    The patient met 2-midnight criteria for an inpatient stay at the time of discharge.    Discharge Date  9/23/2024    FOLLOW UP ITEMS POST DISCHARGE  09/23/2024    DISCHARGE DIAGNOSES  Principal Problem:    Cellulitis of left lower extremity (POA: Yes)  Active Problems:    Acute pain of left knee (POA: Yes)    Vitamin D deficiency (POA: Yes)    Essential hypertension (POA: Yes)    Cirrhosis of liver with ascites (HCC)- ? DUE TO OLD HEP B, CHEMO RX,  OR THERMO RAD OF MAGNANT LESION (POA: Yes)    Total bilirubin, elevated (POA: Yes)    H/O colon cancer, stage IV- neg colonoscopy 2012/2018 at [facility] (POA: Yes)    BPH (benign prostatic hyperplasia) (POA: Yes)    Anemia, chronic disease (POA: Yes)    Thrombocytopenia, unspecified (HCC)- from previous chemorx (POA: Yes)    Dyslipidemia (POA: Yes)    Obesity (BMI 30-39.9) (POA: Yes)    Hyponatremia (POA: Yes)    Contraindication to anticoagulation therapy  (Chronic) (POA: Yes)  Resolved Problems:    * No resolved hospital problems. *      FOLLOW UP  Future Appointments   Date Time Provider Department Center   11/11/2024 11:30 AM BEN Rosales M.D. [facility] None   12/31/2024  9:15 AM Rubens Hall M.D. [facility] None   3/5/2025  7:00 AM Fabián Urena M.D. 25 Robbie Urena M.D.  [address]arrido  [address] [place] [id number]  [phone]    Schedule an appointment as soon as possible for a visit in 1 week(s)  Please call for posthospital visit follow-up immediately.  - Please check on blood pressure, Mr. Lima has had to be placed on oral midodrine.  He will need to hold medications such as spironolactone.  - Please check your blood pressure closely, if your systolic blood pressure remains less than 110mmHg, you will need to have readjustment of your medications.  - You will be on linezolid to complete treatment for left leg cellulitis.  -You will need follow-up for left knee pain.  You were evaluated by Dr. Fahad Taylor, no surgery was needed.  You were kept on prednisone for 6 more days.    Fahad Taylor M.D.  [address] [place] [id number]  [phone]    Schedule an appointment as soon as possible for a visit in 1 week(s)  Please follow-up for your left knee pain if there is no improvement      MEDICATIONS ON DISCHARGE     Medication List        START taking these medications        Instructions   finasteride 5 MG Tabs  Start taking on: September 24, 2024  Commonly known as: Proscar   Take 1 Tablet by mouth every day for 30 days.  Dose: 5 mg     linezolid 600 MG Tabs  Commonly known as: Zyvox   Take 1 Tablet by mouth every 12 hours for 6 days.  Dose: 600 mg     Magnesium Glycinate 100 MG Caps   Take 1 Capsule by mouth every day with lunch.  Dose: 1 Capsule     midodrine 5 MG Tabs  Commonly known as: Proamatine   Take 1 Tablet by mouth 3 times a day with meals for 30 days.  Dose: 5 mg     predniSONE 50 MG Tabs  Start taking on: September 24,  2024  Commonly known as: Deltasone   Take 1 Tablet by mouth every day for 6 days.  Dose: 50 mg     tamsulosin 0.4 MG capsule  Start taking on: September 24, 2024  Commonly known as: Flomax   Take 1 Capsule by mouth 1/2 hour after breakfast for 30 days.  Dose: 0.4 mg            CHANGE how you take these medications        Instructions   oxyCODONE immediate-release 5 MG Tabs  What changed: when to take this  Commonly known as: Roxicodone   Take 1 Tablet by mouth every 8 hours as needed for Severe Pain for up to 5 days.  Dose: 5 mg            CONTINUE taking these medications        Instructions   atorvastatin 40 MG Tabs  Commonly known as: Lipitor   Doctor's comments: [facility] to rx/xf  Take 1 Tablet by mouth every evening.  Dose: 40 mg     calcium carbonate 1250 (500 Ca) MG Tabs  Commonly known as: Os-Marc 500   Take 1 Tablet by mouth every day.  Dose: 500 mg     Cholecalciferol 1000 UNIT Caps   Take 1 Capsule by mouth every day.  Dose: 1,000 Units     FeroSul 325 (65 Fe) MG tablet  Generic drug: ferrous sulfate   Take 1 Tablet by mouth every morning with breakfast.  Dose: 325 mg     folic acid 1 MG Tabs  Commonly known as: Folvite   Take 1 Tablet by mouth every day.  Dose: 1 mg     gabapentin 100 MG Caps  Commonly known as: Neurontin   Take 1 Capsule by mouth 3 times a day.  Dose: 100 mg     metoprolol SR 25 MG Tb24  Commonly known as: Toprol XL   Take 0.5 Tablets by mouth every day.  Dose: 12.5 mg     Misc. Devices Misc   Venous compression stockings bilat knee high; 35 mm hg; wear continuously during day ; off hs     potassium chloride SA 20 MEQ Tbcr  Commonly known as: Kdur   Take 1 tablet (20 mEq total) by mouth daily     senna-docusate 8.6-50 MG Tabs  Commonly known as: Pericolace Or Senokot S   Take 1 Tablet by mouth every day for 10 days.  Dose: 1 Tablet     spironolactone 50 MG Tabs  Commonly known as: Aldactone   Doctor's comments: [facility] to rx/rf  Take 1 Tablet by mouth 2 times a day.  Dose: 50 mg     torsemide  10 MG tablet  Commonly known as: Demadex   Doctor's comments: [facility] to rx/rf  Take 1 Tablet by mouth 2 times a day.  Dose: 10 mg     ursodiol 250 MG Tabs  Commonly known as: Lucy 250   Take 1 tablet (250 mg total) by mouth every morning, afternoon, and evening.            STOP taking these medications      cephALEXin 500 MG Caps  Commonly known as: Keflex     magnesium oxide 400 (240 Mg) MG Tabs     oxyCODONE-acetaminophen 5-325 MG Tabs  Commonly known as: Percocet            ASK your doctor about these medications        Instructions   hydrocortisone 2.5 % Crea topical cream   Apply 1 Application topically 2 times a day.  Dose: 1 Application              Allergies  Allergies   Allergen Reactions    Influenza Virus Vaccine      Guillan [place]     Influenza Virus Vaccine Live      Guillan Columbia     Anticoagulant Sodium Citrate [Sodium Citrate]      HIGH BLEEDING RISK    Lisinopril Cough    Lovenox [Enoxaparin]      Thrombocytopenia         DIET  Cardiac    ACTIVITY  As tolerated.  Weight bearing as tolerated    CONSULTATIONS  [facility] - Dr. Taylor    PROCEDURES  none    LABORATORY  Lab Results   Component Value Date    SODIUM 133 (L) 09/23/2024    POTASSIUM 4.6 09/23/2024    CHLORIDE 102 09/23/2024    CO2 22 09/23/2024    GLUCOSE 152 (H) 09/23/2024    BUN 22 09/23/2024    CREATININE 1.08 09/23/2024    CREATININE 1.14 01/23/2010    GLOMRATE >59 01/23/2010        Lab Results   Component Value Date    WBC 3.7 (L) 09/23/2024    HEMOGLOBIN 9.0 (L) 09/23/2024    HEMATOCRIT 27.7 (L) 09/23/2024    PLATELETCT 51 (L) 09/23/2024      MR-KNEE-WITH & W/O LEFT   Final Result      1.  Very limited study due to extensive patient motion artifact on all sequences.      2.  No evidence of osteomyelitis or septic arthritis.      3.  Cellulitis.      4.  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      5.  Possible horizontal type tear of the posterior horn of the lateral meniscus.       US-EXTREMITY VENOUS LOWER UNILAT LEFT   Final Result          Total time of the discharge process exceeds 35 minutes.

## 2024-09-24 NOTE — TELEPHONE ENCOUNTER
FYI - remote transmission received via home monitor, device shows AF episode, possible PACs but irregular R-R intervals, can not rule out AF, difficult to discern due to artifact, full report scanned into Media for review.

## 2024-09-25 ENCOUNTER — TELEPHONE (OUTPATIENT)
Dept: MEDICAL GROUP | Age: 79
End: 2024-09-25
Payer: MEDICARE

## 2024-09-25 NOTE — TELEPHONE ENCOUNTER
Fabián Urena M.D.  Carlton Cox M.D.1 hour ago (2:27 PM)       Thanks for the follow-up.  I will have the patient and his wife contact their specialist at [facility] to get their take on the anticoagulation situation the Watchman.  But I have a feeling that might be a no go.  Will discuss further with the patient when he returns from his hospitalization and rehab following a severe cellulitis of his legs.     Carlton Cox M.D.  You; Fabián Urena M.D.1 hour ago (2:22 PM)       Thank you Dr Urena. For Watchman the anticoagulation period would be about 45 days (usually doac) followed by antiplatelet if device endothelialized on repeat NEIRS. Patient's gastroenterologist would have to weigh in. Will let Dr Hall take over her patient as this sounds pretty complicated. Dr Urena if you don't mind looping in patient's GI?  Thanks team!     MEGAN Arizmendi M.D.2 hours ago (12:52 PM)       Patient has told me repeatedly that his hepatologist and gastroenterologist at [facility] have forbidden him from ever taking any anticoagulants.  I suppose heparin bridging prior to a left atrial closure device might be acceptable however.  I just do not know.  Patient has an appointment for the end of December 2024 to discuss this further with your electrophysiologist.  Sounds like the Watchman device is the way to go as you suggested.    Perhaps you or your staff could contact the patient to come in sooner than the end of December or at least make phone call discussion with him and his wife.  The wife is the better  since she does all the messaging.  Her name is Lorelei.    Best regards Dr. Urena

## 2024-09-25 NOTE — TELEPHONE ENCOUNTER
Patients wife called in and states that Dr Reece , she is not sure of what to do . She states that Dr Reece put on there Tamsulosin , metoprolol , spironolactone and torsemide , she stated on two of those if his systolic went below hundred to hold off and the other two if the systolic went below 110 to hold off and she states that this morning his BP was 93/42 please advise

## 2024-09-30 ENCOUNTER — HOSPITAL ENCOUNTER (EMERGENCY)
Facility: MEDICAL CENTER | Age: 79
DRG: 433 | End: 2024-09-30
Attending: EMERGENCY MEDICINE
Payer: MEDICARE

## 2024-09-30 VITALS
BODY MASS INDEX: 32.24 KG/M2 | DIASTOLIC BLOOD PRESSURE: 61 MMHG | HEART RATE: 69 BPM | OXYGEN SATURATION: 99 % | RESPIRATION RATE: 16 BRPM | TEMPERATURE: 97.5 F | WEIGHT: 259.26 LBS | HEIGHT: 75 IN | SYSTOLIC BLOOD PRESSURE: 104 MMHG

## 2024-09-30 DIAGNOSIS — R18.8 OTHER ASCITES: ICD-10-CM

## 2024-09-30 LAB
ACANTHOCYTES BLD QL SMEAR: NORMAL
ALBUMIN SERPL BCP-MCNC: 3.3 G/DL (ref 3.2–4.9)
ALBUMIN/GLOB SERPL: 1.4 G/DL
ALP SERPL-CCNC: 101 U/L (ref 30–99)
ALT SERPL-CCNC: 15 U/L (ref 2–50)
ANION GAP SERPL CALC-SCNC: 10 MMOL/L (ref 7–16)
ANISOCYTOSIS BLD QL SMEAR: ABNORMAL
APPEARANCE FLD: NORMAL
APPEARANCE UR: CLEAR
AST SERPL-CCNC: 18 U/L (ref 12–45)
BASOPHILS # BLD AUTO: 0.1 % (ref 0–1.8)
BASOPHILS # BLD: 0.01 K/UL (ref 0–0.12)
BILIRUB SERPL-MCNC: 0.9 MG/DL (ref 0.1–1.5)
BILIRUB UR QL STRIP.AUTO: NEGATIVE
BODY FLD TYPE: NORMAL
BODY FLD TYPE: NORMAL
BUN SERPL-MCNC: 34 MG/DL (ref 8–22)
CALCIUM ALBUM COR SERPL-MCNC: 8.8 MG/DL (ref 8.5–10.5)
CALCIUM SERPL-MCNC: 8.2 MG/DL (ref 8.4–10.2)
CELLS FLD: 4
CHLORIDE SERPL-SCNC: 102 MMOL/L (ref 96–112)
CO2 SERPL-SCNC: 22 MMOL/L (ref 20–33)
COLOR FLD: YELLOW
COLOR UR: YELLOW
CREAT SERPL-MCNC: 1.39 MG/DL (ref 0.5–1.4)
EOSINOPHIL # BLD AUTO: 0.06 K/UL (ref 0–0.51)
EOSINOPHIL NFR BLD: 0.9 % (ref 0–6.9)
ERYTHROCYTE [DISTWIDTH] IN BLOOD BY AUTOMATED COUNT: 59.4 FL (ref 35.9–50)
GFR SERPLBLD CREATININE-BSD FMLA CKD-EPI: 51 ML/MIN/1.73 M 2
GLOBULIN SER CALC-MCNC: 2.4 G/DL (ref 1.9–3.5)
GLUCOSE FLD-MCNC: 139 MG/DL
GLUCOSE SERPL-MCNC: 93 MG/DL (ref 65–99)
GLUCOSE UR STRIP.AUTO-MCNC: NEGATIVE MG/DL
GRAM STN SPEC: NORMAL
HCT VFR BLD AUTO: 31.8 % (ref 42–52)
HGB BLD-MCNC: 9.9 G/DL (ref 14–18)
IMM GRANULOCYTES # BLD AUTO: 0.03 K/UL (ref 0–0.11)
IMM GRANULOCYTES NFR BLD AUTO: 0.4 % (ref 0–0.9)
INR PPP: 1.43 (ref 0.87–1.13)
KETONES UR STRIP.AUTO-MCNC: NEGATIVE MG/DL
LEUKOCYTE ESTERASE UR QL STRIP.AUTO: NEGATIVE
LIPASE SERPL-CCNC: 31 U/L (ref 11–82)
LYMPHOCYTES # BLD AUTO: 0.27 K/UL (ref 1–4.8)
LYMPHOCYTES NFR BLD: 3.9 % (ref 22–41)
LYMPHOCYTES NFR FLD: 33 %
MCH RBC QN AUTO: 30.4 PG (ref 27–33)
MCHC RBC AUTO-ENTMCNC: 31.1 G/DL (ref 32.3–36.5)
MCV RBC AUTO: 97.5 FL (ref 81.4–97.8)
MICRO URNS: NORMAL
MONOCYTES # BLD AUTO: 0.66 K/UL (ref 0–0.85)
MONOCYTES NFR BLD AUTO: 9.6 % (ref 0–13.4)
MONOS+MACROS NFR FLD MANUAL: 54 %
NEUTROPHILS # BLD AUTO: 5.81 K/UL (ref 1.82–7.42)
NEUTROPHILS NFR BLD: 85.1 % (ref 44–72)
NEUTROPHILS NFR FLD: 10 %
NITRITE UR QL STRIP.AUTO: NEGATIVE
NRBC # BLD AUTO: 0.05 K/UL
NRBC BLD-RTO: 0.7 /100 WBC (ref 0–0.2)
NUC CELL # FLD: 40 CELLS/UL
OVALOCYTES BLD QL SMEAR: NORMAL
PH UR STRIP.AUTO: 6 [PH] (ref 5–8)
PLATELET # BLD AUTO: 40 K/UL (ref 164–446)
PLATELET BLD QL SMEAR: NORMAL
PLATELETS.RETICULATED NFR BLD AUTO: 3.1 % (ref 0.6–13.1)
PMV BLD AUTO: 10.9 FL (ref 9–12.9)
POIKILOCYTOSIS BLD QL SMEAR: NORMAL
POTASSIUM SERPL-SCNC: 4.6 MMOL/L (ref 3.6–5.5)
PROT SERPL-MCNC: 5.7 G/DL (ref 6–8.2)
PROT UR QL STRIP: NEGATIVE MG/DL
PROTHROMBIN TIME: 17.9 SEC (ref 12–14.6)
RBC # BLD AUTO: 3.26 M/UL (ref 4.7–6.1)
RBC # FLD: 2000 CELLS/UL
RBC BLD AUTO: PRESENT
RBC UR QL AUTO: NEGATIVE
SIGNIFICANT IND 70042: NORMAL
SITE SITE: NORMAL
SODIUM SERPL-SCNC: 134 MMOL/L (ref 135–145)
SOURCE SOURCE: NORMAL
SP GR UR STRIP.AUTO: 1.02
WBC # BLD AUTO: 6.8 K/UL (ref 4.8–10.8)

## 2024-09-30 PROCEDURE — 80053 COMPREHEN METABOLIC PANEL: CPT

## 2024-09-30 PROCEDURE — P9047 ALBUMIN (HUMAN), 25%, 50ML: HCPCS | Mod: JZ | Performed by: EMERGENCY MEDICINE

## 2024-09-30 PROCEDURE — 36415 COLL VENOUS BLD VENIPUNCTURE: CPT

## 2024-09-30 PROCEDURE — 700111 HCHG RX REV CODE 636 W/ 250 OVERRIDE (IP): Mod: JZ | Performed by: EMERGENCY MEDICINE

## 2024-09-30 PROCEDURE — 85025 COMPLETE CBC W/AUTO DIFF WBC: CPT

## 2024-09-30 PROCEDURE — 0W9G3ZZ DRAINAGE OF PERITONEAL CAVITY, PERCUTANEOUS APPROACH: ICD-10-PCS | Performed by: EMERGENCY MEDICINE

## 2024-09-30 PROCEDURE — 85055 RETICULATED PLATELET ASSAY: CPT

## 2024-09-30 PROCEDURE — 96365 THER/PROPH/DIAG IV INF INIT: CPT

## 2024-09-30 PROCEDURE — 82945 GLUCOSE OTHER FLUID: CPT

## 2024-09-30 PROCEDURE — 83690 ASSAY OF LIPASE: CPT

## 2024-09-30 PROCEDURE — 49083 ABD PARACENTESIS W/IMAGING: CPT

## 2024-09-30 PROCEDURE — 87070 CULTURE OTHR SPECIMN AEROBIC: CPT

## 2024-09-30 PROCEDURE — 85610 PROTHROMBIN TIME: CPT

## 2024-09-30 PROCEDURE — 89051 BODY FLUID CELL COUNT: CPT

## 2024-09-30 PROCEDURE — 81003 URINALYSIS AUTO W/O SCOPE: CPT

## 2024-09-30 PROCEDURE — 87205 SMEAR GRAM STAIN: CPT

## 2024-09-30 PROCEDURE — 99285 EMERGENCY DEPT VISIT HI MDM: CPT

## 2024-09-30 PROCEDURE — 96366 THER/PROPH/DIAG IV INF ADDON: CPT

## 2024-09-30 RX ORDER — ALBUMIN (HUMAN) 12.5 G/50ML
50 SOLUTION INTRAVENOUS ONCE
Status: COMPLETED | OUTPATIENT
Start: 2024-09-30 | End: 2024-09-30

## 2024-09-30 RX ADMIN — ALBUMIN (HUMAN) 50 G: 0.25 INJECTION, SOLUTION INTRAVENOUS at 10:30

## 2024-09-30 ASSESSMENT — FIBROSIS 4 INDEX: FIB4 SCORE: 8.87

## 2024-10-01 ENCOUNTER — APPOINTMENT (OUTPATIENT)
Dept: MEDICAL GROUP | Age: 79
End: 2024-10-01
Payer: MEDICARE

## 2024-10-01 VITALS
HEIGHT: 75 IN | DIASTOLIC BLOOD PRESSURE: 62 MMHG | OXYGEN SATURATION: 97 % | HEART RATE: 86 BPM | SYSTOLIC BLOOD PRESSURE: 108 MMHG | TEMPERATURE: 97.3 F | BODY MASS INDEX: 30.84 KG/M2 | WEIGHT: 248 LBS

## 2024-10-01 DIAGNOSIS — Z09 HOSPITAL DISCHARGE FOLLOW-UP: Primary | ICD-10-CM

## 2024-10-01 DIAGNOSIS — R18.8 CIRRHOSIS OF LIVER WITH ASCITES, UNSPECIFIED HEPATIC CIRRHOSIS TYPE (HCC): ICD-10-CM

## 2024-10-01 DIAGNOSIS — Z09 HOSPITAL DISCHARGE FOLLOW-UP: ICD-10-CM

## 2024-10-01 DIAGNOSIS — K74.60 CIRRHOSIS OF LIVER WITH ASCITES, UNSPECIFIED HEPATIC CIRRHOSIS TYPE (HCC): ICD-10-CM

## 2024-10-01 ASSESSMENT — FIBROSIS 4 INDEX: FIB4 SCORE: 9.3

## 2024-10-01 ASSESSMENT — ENCOUNTER SYMPTOMS: ABDOMINAL PAIN: 1

## 2024-10-02 ENCOUNTER — HOSPITAL ENCOUNTER (INPATIENT)
Facility: MEDICAL CENTER | Age: 79
LOS: 2 days | DRG: 433 | End: 2024-10-04
Attending: EMERGENCY MEDICINE | Admitting: INTERNAL MEDICINE
Payer: MEDICARE

## 2024-10-02 ENCOUNTER — APPOINTMENT (OUTPATIENT)
Dept: RADIOLOGY | Facility: MEDICAL CENTER | Age: 79
DRG: 433 | End: 2024-10-02
Attending: EMERGENCY MEDICINE
Payer: MEDICARE

## 2024-10-02 ENCOUNTER — TELEPHONE (OUTPATIENT)
Dept: MEDICAL GROUP | Age: 79
End: 2024-10-02

## 2024-10-02 ENCOUNTER — TELEPHONE (OUTPATIENT)
Dept: CARDIOLOGY | Facility: MEDICAL CENTER | Age: 79
End: 2024-10-02
Payer: MEDICARE

## 2024-10-02 ENCOUNTER — PATIENT MESSAGE (OUTPATIENT)
Dept: MEDICAL GROUP | Age: 79
End: 2024-10-02
Payer: MEDICARE

## 2024-10-02 DIAGNOSIS — I95.9 HYPOTENSION, UNSPECIFIED HYPOTENSION TYPE: ICD-10-CM

## 2024-10-02 DIAGNOSIS — I48.0 PAF (PAROXYSMAL ATRIAL FIBRILLATION) (HCC): ICD-10-CM

## 2024-10-02 DIAGNOSIS — L03.116 CELLULITIS OF LEFT LOWER EXTREMITY: ICD-10-CM

## 2024-10-02 DIAGNOSIS — G62.9 NEUROPATHY: ICD-10-CM

## 2024-10-02 DIAGNOSIS — E87.70 HYPERVOLEMIA, UNSPECIFIED HYPERVOLEMIA TYPE: ICD-10-CM

## 2024-10-02 LAB
ALBUMIN SERPL BCP-MCNC: 3.6 G/DL (ref 3.2–4.9)
ALBUMIN/GLOB SERPL: 1.6 G/DL
ALP SERPL-CCNC: 94 U/L (ref 30–99)
ALT SERPL-CCNC: 19 U/L (ref 2–50)
ANION GAP SERPL CALC-SCNC: 12 MMOL/L (ref 7–16)
APPEARANCE FLD: NORMAL
AST SERPL-CCNC: 19 U/L (ref 12–45)
BASOPHILS # BLD AUTO: 0.2 % (ref 0–1.8)
BASOPHILS # BLD: 0.02 K/UL (ref 0–0.12)
BILIRUB SERPL-MCNC: 2.3 MG/DL (ref 0.1–1.5)
BODY FLD TYPE: NORMAL
BUN SERPL-MCNC: 34 MG/DL (ref 8–22)
CALCIUM ALBUM COR SERPL-MCNC: 8.7 MG/DL (ref 8.5–10.5)
CALCIUM SERPL-MCNC: 8.4 MG/DL (ref 8.4–10.2)
CHLORIDE SERPL-SCNC: 100 MMOL/L (ref 96–112)
CO2 SERPL-SCNC: 19 MMOL/L (ref 20–33)
COLOR FLD: YELLOW
CREAT SERPL-MCNC: 1.28 MG/DL (ref 0.5–1.4)
EOSINOPHIL # BLD AUTO: 0.33 K/UL (ref 0–0.51)
EOSINOPHIL NFR BLD: 3.8 % (ref 0–6.9)
ERYTHROCYTE [DISTWIDTH] IN BLOOD BY AUTOMATED COUNT: 57.6 FL (ref 35.9–50)
GFR SERPLBLD CREATININE-BSD FMLA CKD-EPI: 56 ML/MIN/1.73 M 2
GLOBULIN SER CALC-MCNC: 2.3 G/DL (ref 1.9–3.5)
GLUCOSE FLD-MCNC: 126 MG/DL
GLUCOSE SERPL-MCNC: 108 MG/DL (ref 65–99)
HCT VFR BLD AUTO: 32 % (ref 42–52)
HGB BLD-MCNC: 10.3 G/DL (ref 14–18)
IMM GRANULOCYTES # BLD AUTO: 0.02 K/UL (ref 0–0.11)
IMM GRANULOCYTES NFR BLD AUTO: 0.2 % (ref 0–0.9)
INR PPP: 1.6 (ref 0.87–1.13)
LYMPHOCYTES # BLD AUTO: 0.3 K/UL (ref 1–4.8)
LYMPHOCYTES NFR BLD: 3.4 % (ref 22–41)
LYMPHOCYTES NFR FLD: 39 %
MCH RBC QN AUTO: 30.6 PG (ref 27–33)
MCHC RBC AUTO-ENTMCNC: 32.2 G/DL (ref 32.3–36.5)
MCV RBC AUTO: 95 FL (ref 81.4–97.8)
MONOCYTES # BLD AUTO: 0.7 K/UL (ref 0–0.85)
MONOCYTES NFR BLD AUTO: 8 % (ref 0–13.4)
MONOS+MACROS NFR FLD MANUAL: 45 %
NEUTROPHILS # BLD AUTO: 7.35 K/UL (ref 1.82–7.42)
NEUTROPHILS NFR BLD: 84.4 % (ref 44–72)
NEUTROPHILS NFR FLD: 16 %
NRBC # BLD AUTO: 0 K/UL
NRBC BLD-RTO: 0 /100 WBC (ref 0–0.2)
NUC CELL # FLD: 30 CELLS/UL
PLATELET # BLD AUTO: 40 K/UL (ref 164–446)
PLATELETS.RETICULATED NFR BLD AUTO: 3.3 % (ref 0.6–13.1)
PMV BLD AUTO: 11.3 FL (ref 9–12.9)
POTASSIUM SERPL-SCNC: 4.7 MMOL/L (ref 3.6–5.5)
PROT SERPL-MCNC: 5.9 G/DL (ref 6–8.2)
PROTHROMBIN TIME: 19.6 SEC (ref 12–14.6)
RBC # BLD AUTO: 3.37 M/UL (ref 4.7–6.1)
RBC # FLD: 4000 CELLS/UL
SODIUM SERPL-SCNC: 131 MMOL/L (ref 135–145)
WBC # BLD AUTO: 8.7 K/UL (ref 4.8–10.8)

## 2024-10-02 PROCEDURE — 82042 OTHER SOURCE ALBUMIN QUAN EA: CPT

## 2024-10-02 PROCEDURE — 700111 HCHG RX REV CODE 636 W/ 250 OVERRIDE (IP): Mod: JZ | Performed by: EMERGENCY MEDICINE

## 2024-10-02 PROCEDURE — 85025 COMPLETE CBC W/AUTO DIFF WBC: CPT

## 2024-10-02 PROCEDURE — 80053 COMPREHEN METABOLIC PANEL: CPT

## 2024-10-02 PROCEDURE — 0W9G3ZZ DRAINAGE OF PERITONEAL CAVITY, PERCUTANEOUS APPROACH: ICD-10-PCS | Performed by: EMERGENCY MEDICINE

## 2024-10-02 PROCEDURE — 770001 HCHG ROOM/CARE - MED/SURG/GYN PRIV*

## 2024-10-02 PROCEDURE — 96365 THER/PROPH/DIAG IV INF INIT: CPT

## 2024-10-02 PROCEDURE — 36415 COLL VENOUS BLD VENIPUNCTURE: CPT

## 2024-10-02 PROCEDURE — P9047 ALBUMIN (HUMAN), 25%, 50ML: HCPCS | Mod: JZ | Performed by: EMERGENCY MEDICINE

## 2024-10-02 PROCEDURE — 99223 1ST HOSP IP/OBS HIGH 75: CPT | Mod: AI | Performed by: INTERNAL MEDICINE

## 2024-10-02 PROCEDURE — 85055 RETICULATED PLATELET ASSAY: CPT

## 2024-10-02 PROCEDURE — 700117 HCHG RX CONTRAST REV CODE 255: Performed by: EMERGENCY MEDICINE

## 2024-10-02 PROCEDURE — 89051 BODY FLUID CELL COUNT: CPT

## 2024-10-02 PROCEDURE — 87205 SMEAR GRAM STAIN: CPT

## 2024-10-02 PROCEDURE — 82945 GLUCOSE OTHER FLUID: CPT

## 2024-10-02 PROCEDURE — 85610 PROTHROMBIN TIME: CPT

## 2024-10-02 PROCEDURE — 99285 EMERGENCY DEPT VISIT HI MDM: CPT

## 2024-10-02 PROCEDURE — 49082 ABD PARACENTESIS: CPT

## 2024-10-02 PROCEDURE — 82247 BILIRUBIN TOTAL: CPT

## 2024-10-02 PROCEDURE — 82150 ASSAY OF AMYLASE: CPT

## 2024-10-02 PROCEDURE — 74177 CT ABD & PELVIS W/CONTRAST: CPT

## 2024-10-02 PROCEDURE — 87070 CULTURE OTHR SPECIMN AEROBIC: CPT

## 2024-10-02 RX ORDER — FOLIC ACID 1 MG/1
1 TABLET ORAL DAILY
Status: DISCONTINUED | OUTPATIENT
Start: 2024-10-03 | End: 2024-10-04 | Stop reason: HOSPADM

## 2024-10-02 RX ORDER — MIDODRINE HYDROCHLORIDE 5 MG/1
5 TABLET ORAL
Status: DISCONTINUED | OUTPATIENT
Start: 2024-10-03 | End: 2024-10-03

## 2024-10-02 RX ORDER — ALBUMIN (HUMAN) 12.5 G/50ML
50 SOLUTION INTRAVENOUS ONCE
Status: COMPLETED | OUTPATIENT
Start: 2024-10-02 | End: 2024-10-02

## 2024-10-02 RX ORDER — URSODIOL 250 MG/1
250 TABLET, FILM COATED ORAL 3 TIMES DAILY
Status: DISCONTINUED | OUTPATIENT
Start: 2024-10-03 | End: 2024-10-03

## 2024-10-02 RX ORDER — ATORVASTATIN CALCIUM 40 MG/1
40 TABLET, FILM COATED ORAL NIGHTLY
Status: DISCONTINUED | OUTPATIENT
Start: 2024-10-03 | End: 2024-10-04 | Stop reason: HOSPADM

## 2024-10-02 RX ORDER — FERROUS SULFATE 325(65) MG
325 TABLET ORAL
Status: DISCONTINUED | OUTPATIENT
Start: 2024-10-03 | End: 2024-10-04 | Stop reason: HOSPADM

## 2024-10-02 RX ORDER — GABAPENTIN 100 MG/1
100 CAPSULE ORAL
Status: DISCONTINUED | OUTPATIENT
Start: 2024-10-03 | End: 2024-10-04 | Stop reason: HOSPADM

## 2024-10-02 RX ORDER — SPIRONOLACTONE 50 MG/1
50 TABLET, FILM COATED ORAL 2 TIMES DAILY
Status: DISCONTINUED | OUTPATIENT
Start: 2024-10-03 | End: 2024-10-04 | Stop reason: HOSPADM

## 2024-10-02 RX ORDER — FUROSEMIDE 10 MG/ML
20 INJECTION INTRAMUSCULAR; INTRAVENOUS 2 TIMES DAILY
Status: DISCONTINUED | OUTPATIENT
Start: 2024-10-03 | End: 2024-10-03 | Stop reason: DRUGHIGH

## 2024-10-02 RX ADMIN — IOHEXOL 100 ML: 350 INJECTION, SOLUTION INTRAVENOUS at 20:59

## 2024-10-02 RX ADMIN — ALBUMIN (HUMAN) 50 G: 0.25 INJECTION, SOLUTION INTRAVENOUS at 19:50

## 2024-10-02 ASSESSMENT — ENCOUNTER SYMPTOMS
VOMITING: 0
DEPRESSION: 0
LOSS OF CONSCIOUSNESS: 0
HEADACHES: 0
DIZZINESS: 0
SHORTNESS OF BREATH: 0
NAUSEA: 0
SPUTUM PRODUCTION: 0
COUGH: 0
WEAKNESS: 0
CONSTIPATION: 0
STRIDOR: 0
TINGLING: 0
PALPITATIONS: 0
FEVER: 0
DIARRHEA: 0
MYALGIAS: 0
FALLS: 0
ABDOMINAL PAIN: 1
CHILLS: 0

## 2024-10-02 ASSESSMENT — FIBROSIS 4 INDEX: FIB4 SCORE: 9.3

## 2024-10-03 ENCOUNTER — APPOINTMENT (OUTPATIENT)
Dept: RADIOLOGY | Facility: MEDICAL CENTER | Age: 79
DRG: 433 | End: 2024-10-03
Attending: INTERNAL MEDICINE
Payer: MEDICARE

## 2024-10-03 PROBLEM — D64.9 NORMOCYTIC ANEMIA: Status: ACTIVE | Noted: 2024-10-03

## 2024-10-03 PROBLEM — I48.91 AF (ATRIAL FIBRILLATION) (HCC): Status: ACTIVE | Noted: 2024-10-03

## 2024-10-03 PROBLEM — I95.9 HYPOTENSION: Status: ACTIVE | Noted: 2024-10-03

## 2024-10-03 LAB
ALBUMIN FLD-MCNC: <0.2 G/DL
ALBUMIN SERPL BCP-MCNC: 3.7 G/DL (ref 3.2–4.9)
ALBUMIN/GLOB SERPL: 2.1 G/DL
ALP SERPL-CCNC: 63 U/L (ref 30–99)
ALT SERPL-CCNC: 17 U/L (ref 2–50)
AMYLASE FLD-CCNC: 24 U/L
ANION GAP SERPL CALC-SCNC: 11 MMOL/L (ref 7–16)
AST SERPL-CCNC: 16 U/L (ref 12–45)
BACTERIA FLD AEROBE CULT: NORMAL
BILIRUB SERPL-MCNC: 2.4 MG/DL (ref 0.1–1.5)
BODY FLD TYPE: NORMAL
BUN SERPL-MCNC: 32 MG/DL (ref 8–22)
CALCIUM ALBUM COR SERPL-MCNC: 8.2 MG/DL (ref 8.5–10.5)
CALCIUM SERPL-MCNC: 8 MG/DL (ref 8.4–10.2)
CHLORIDE SERPL-SCNC: 103 MMOL/L (ref 96–112)
CO2 SERPL-SCNC: 20 MMOL/L (ref 20–33)
CREAT SERPL-MCNC: 1.3 MG/DL (ref 0.5–1.4)
ERYTHROCYTE [DISTWIDTH] IN BLOOD BY AUTOMATED COUNT: 56 FL (ref 35.9–50)
GFR SERPLBLD CREATININE-BSD FMLA CKD-EPI: 55 ML/MIN/1.73 M 2
GLOBULIN SER CALC-MCNC: 1.8 G/DL (ref 1.9–3.5)
GLUCOSE SERPL-MCNC: 102 MG/DL (ref 65–99)
GRAM STN SPEC: NORMAL
GRAM STN SPEC: NORMAL
HCT VFR BLD AUTO: 27.3 % (ref 42–52)
HGB BLD-MCNC: 8.9 G/DL (ref 14–18)
MCH RBC QN AUTO: 30.3 PG (ref 27–33)
MCHC RBC AUTO-ENTMCNC: 32.6 G/DL (ref 32.3–36.5)
MCV RBC AUTO: 92.9 FL (ref 81.4–97.8)
PLATELET # BLD AUTO: 24 K/UL (ref 164–446)
PLATELET BLD QL SMEAR: NORMAL
PLATELETS.RETICULATED NFR BLD AUTO: 3 % (ref 0.6–13.1)
PMV BLD AUTO: 10.5 FL (ref 9–12.9)
POTASSIUM SERPL-SCNC: 4.3 MMOL/L (ref 3.6–5.5)
PROT SERPL-MCNC: 5.5 G/DL (ref 6–8.2)
RBC # BLD AUTO: 2.94 M/UL (ref 4.7–6.1)
RBC BLD AUTO: NORMAL
SIGNIFICANT IND 70042: NORMAL
SIGNIFICANT IND 70042: NORMAL
SITE SITE: NORMAL
SITE SITE: NORMAL
SODIUM SERPL-SCNC: 134 MMOL/L (ref 135–145)
SOURCE SOURCE: NORMAL
SOURCE SOURCE: NORMAL
WBC # BLD AUTO: 4.8 K/UL (ref 4.8–10.8)

## 2024-10-03 PROCEDURE — 700111 HCHG RX REV CODE 636 W/ 250 OVERRIDE (IP): Performed by: INTERNAL MEDICINE

## 2024-10-03 PROCEDURE — 770001 HCHG ROOM/CARE - MED/SURG/GYN PRIV*

## 2024-10-03 PROCEDURE — A9270 NON-COVERED ITEM OR SERVICE: HCPCS | Performed by: INTERNAL MEDICINE

## 2024-10-03 PROCEDURE — 99233 SBSQ HOSP IP/OBS HIGH 50: CPT | Performed by: INTERNAL MEDICINE

## 2024-10-03 PROCEDURE — 80053 COMPREHEN METABOLIC PANEL: CPT

## 2024-10-03 PROCEDURE — 85027 COMPLETE CBC AUTOMATED: CPT

## 2024-10-03 PROCEDURE — 74176 CT ABD & PELVIS W/O CONTRAST: CPT

## 2024-10-03 PROCEDURE — 700102 HCHG RX REV CODE 250 W/ 637 OVERRIDE(OP): Performed by: INTERNAL MEDICINE

## 2024-10-03 PROCEDURE — 96375 TX/PRO/DX INJ NEW DRUG ADDON: CPT

## 2024-10-03 PROCEDURE — 76705 ECHO EXAM OF ABDOMEN: CPT

## 2024-10-03 PROCEDURE — 94760 N-INVAS EAR/PLS OXIMETRY 1: CPT

## 2024-10-03 PROCEDURE — 36415 COLL VENOUS BLD VENIPUNCTURE: CPT

## 2024-10-03 PROCEDURE — 85055 RETICULATED PLATELET ASSAY: CPT

## 2024-10-03 RX ORDER — TORSEMIDE 5 MG/1
10 TABLET ORAL 3 TIMES DAILY
Status: DISCONTINUED | OUTPATIENT
Start: 2024-10-03 | End: 2024-10-03

## 2024-10-03 RX ORDER — URSODIOL 300 MG/1
300 CAPSULE ORAL 3 TIMES DAILY
Status: DISCONTINUED | OUTPATIENT
Start: 2024-10-03 | End: 2024-10-04 | Stop reason: HOSPADM

## 2024-10-03 RX ORDER — MIDODRINE HYDROCHLORIDE 5 MG/1
10 TABLET ORAL
Status: DISCONTINUED | OUTPATIENT
Start: 2024-10-03 | End: 2024-10-04 | Stop reason: HOSPADM

## 2024-10-03 RX ORDER — TORSEMIDE 20 MG/1
10 TABLET ORAL 2 TIMES DAILY
Status: DISCONTINUED | OUTPATIENT
Start: 2024-10-04 | End: 2024-10-04 | Stop reason: HOSPADM

## 2024-10-03 RX ORDER — CLINDAMYCIN PHOSPHATE 11.9 MG/ML
2 SOLUTION TOPICAL 2 TIMES DAILY
Qty: 60 ML | Refills: 2 | Status: ACTIVE | OUTPATIENT
Start: 2024-10-03

## 2024-10-03 RX ORDER — LINEZOLID 600 MG/1
600 TABLET, FILM COATED ORAL 2 TIMES DAILY
COMMUNITY
Start: 2024-09-23

## 2024-10-03 RX ADMIN — GABAPENTIN 100 MG: 100 CAPSULE ORAL at 21:00

## 2024-10-03 RX ADMIN — GABAPENTIN 100 MG: 100 CAPSULE ORAL at 01:05

## 2024-10-03 RX ADMIN — MIDODRINE HYDROCHLORIDE 10 MG: 5 TABLET ORAL at 17:32

## 2024-10-03 RX ADMIN — FUROSEMIDE 20 MG: 10 INJECTION INTRAMUSCULAR; INTRAVENOUS at 01:05

## 2024-10-03 RX ADMIN — FERROUS SULFATE TAB 325 MG (65 MG ELEMENTAL FE) 325 MG: 325 (65 FE) TAB at 08:09

## 2024-10-03 RX ADMIN — ATORVASTATIN CALCIUM 40 MG: 40 TABLET, FILM COATED ORAL at 21:00

## 2024-10-03 RX ADMIN — SPIRONOLACTONE 50 MG: 50 TABLET ORAL at 05:12

## 2024-10-03 RX ADMIN — URSODIOL 300 MG: 300 CAPSULE ORAL at 17:32

## 2024-10-03 RX ADMIN — MIDODRINE HYDROCHLORIDE 5 MG: 5 TABLET ORAL at 08:09

## 2024-10-03 RX ADMIN — MIDODRINE HYDROCHLORIDE 5 MG: 5 TABLET ORAL at 11:21

## 2024-10-03 RX ADMIN — URSODIOL 300 MG: 300 CAPSULE ORAL at 11:21

## 2024-10-03 RX ADMIN — SPIRONOLACTONE 50 MG: 50 TABLET ORAL at 17:32

## 2024-10-03 RX ADMIN — FOLIC ACID 1 MG: 1 TABLET ORAL at 05:12

## 2024-10-03 ASSESSMENT — COGNITIVE AND FUNCTIONAL STATUS - GENERAL
CLIMB 3 TO 5 STEPS WITH RAILING: A LITTLE
DRESSING REGULAR LOWER BODY CLOTHING: A LITTLE
HELP NEEDED FOR BATHING: A LITTLE
TOILETING: A LITTLE
MOVING FROM LYING ON BACK TO SITTING ON SIDE OF FLAT BED: A LITTLE
STANDING UP FROM CHAIR USING ARMS: A LITTLE
TURNING FROM BACK TO SIDE WHILE IN FLAT BAD: A LITTLE
CLIMB 3 TO 5 STEPS WITH RAILING: A LITTLE
STANDING UP FROM CHAIR USING ARMS: A LITTLE
DRESSING REGULAR LOWER BODY CLOTHING: A LITTLE
MOBILITY SCORE: 18
EATING MEALS: A LITTLE
TOILETING: A LITTLE
PERSONAL GROOMING: A LITTLE
MOVING TO AND FROM BED TO CHAIR: A LITTLE
DRESSING REGULAR UPPER BODY CLOTHING: A LITTLE
EATING MEALS: A LITTLE
TURNING FROM BACK TO SIDE WHILE IN FLAT BAD: A LITTLE
HELP NEEDED FOR BATHING: A LITTLE
DAILY ACTIVITIY SCORE: 18
SUGGESTED CMS G CODE MODIFIER DAILY ACTIVITY: CK
MOVING FROM LYING ON BACK TO SITTING ON SIDE OF FLAT BED: A LITTLE
MOVING TO AND FROM BED TO CHAIR: A LITTLE
WALKING IN HOSPITAL ROOM: A LITTLE
PERSONAL GROOMING: A LITTLE
WALKING IN HOSPITAL ROOM: A LITTLE
MOBILITY SCORE: 18
SUGGESTED CMS G CODE MODIFIER MOBILITY: CK
SUGGESTED CMS G CODE MODIFIER MOBILITY: CK

## 2024-10-03 ASSESSMENT — PAIN DESCRIPTION - PAIN TYPE: TYPE: ACUTE PAIN

## 2024-10-03 ASSESSMENT — LIFESTYLE VARIABLES
TOTAL SCORE: 0
ALCOHOL_USE: NO
TOTAL SCORE: 0
AVERAGE NUMBER OF DAYS PER WEEK YOU HAVE A DRINK CONTAINING ALCOHOL: 0
ON A TYPICAL DAY WHEN YOU DRINK ALCOHOL HOW MANY DRINKS DO YOU HAVE: 0
DOES PATIENT WANT TO STOP DRINKING: NO
HOW MANY TIMES IN THE PAST YEAR HAVE YOU HAD 5 OR MORE DRINKS IN A DAY: 0
HAVE YOU EVER FELT YOU SHOULD CUT DOWN ON YOUR DRINKING: NO
EVER FELT BAD OR GUILTY ABOUT YOUR DRINKING: NO
CONSUMPTION TOTAL: NEGATIVE
TOTAL SCORE: 0
HAVE PEOPLE ANNOYED YOU BY CRITICIZING YOUR DRINKING: NO
EVER HAD A DRINK FIRST THING IN THE MORNING TO STEADY YOUR NERVES TO GET RID OF A HANGOVER: NO

## 2024-10-03 ASSESSMENT — ENCOUNTER SYMPTOMS
NEUROLOGICAL NEGATIVE: 1
BRUISES/BLEEDS EASILY: 1
CONSTITUTIONAL NEGATIVE: 1
MUSCULOSKELETAL NEGATIVE: 1
EYES NEGATIVE: 1
RESPIRATORY NEGATIVE: 1
PSYCHIATRIC NEGATIVE: 1
GASTROINTESTINAL NEGATIVE: 1

## 2024-10-03 ASSESSMENT — SOCIAL DETERMINANTS OF HEALTH (SDOH)

## 2024-10-03 ASSESSMENT — PATIENT HEALTH QUESTIONNAIRE - PHQ9
1. LITTLE INTEREST OR PLEASURE IN DOING THINGS: NOT AT ALL
SUM OF ALL RESPONSES TO PHQ9 QUESTIONS 1 AND 2: 0
2. FEELING DOWN, DEPRESSED, IRRITABLE, OR HOPELESS: NOT AT ALL

## 2024-10-03 ASSESSMENT — FIBROSIS 4 INDEX: FIB4 SCORE: 12.94

## 2024-10-04 ENCOUNTER — PHARMACY VISIT (OUTPATIENT)
Dept: PHARMACY | Facility: MEDICAL CENTER | Age: 79
End: 2024-10-04
Payer: COMMERCIAL

## 2024-10-04 VITALS
BODY MASS INDEX: 28.78 KG/M2 | DIASTOLIC BLOOD PRESSURE: 50 MMHG | OXYGEN SATURATION: 95 % | HEIGHT: 75 IN | WEIGHT: 231.48 LBS | RESPIRATION RATE: 16 BRPM | SYSTOLIC BLOOD PRESSURE: 105 MMHG | TEMPERATURE: 97.2 F | HEART RATE: 64 BPM

## 2024-10-04 PROCEDURE — 700102 HCHG RX REV CODE 250 W/ 637 OVERRIDE(OP): Performed by: INTERNAL MEDICINE

## 2024-10-04 PROCEDURE — A9270 NON-COVERED ITEM OR SERVICE: HCPCS | Performed by: NURSE PRACTITIONER

## 2024-10-04 PROCEDURE — 700102 HCHG RX REV CODE 250 W/ 637 OVERRIDE(OP): Performed by: NURSE PRACTITIONER

## 2024-10-04 PROCEDURE — A9270 NON-COVERED ITEM OR SERVICE: HCPCS | Performed by: INTERNAL MEDICINE

## 2024-10-04 PROCEDURE — RXMED WILLOW AMBULATORY MEDICATION CHARGE: Performed by: INTERNAL MEDICINE

## 2024-10-04 PROCEDURE — 99239 HOSP IP/OBS DSCHRG MGMT >30: CPT | Performed by: INTERNAL MEDICINE

## 2024-10-04 RX ORDER — GABAPENTIN 100 MG/1
100 CAPSULE ORAL 2 TIMES DAILY
Qty: 1 CAPSULE | Refills: 0
Start: 2024-10-04

## 2024-10-04 RX ORDER — MIDODRINE HYDROCHLORIDE 10 MG/1
10 TABLET ORAL
Qty: 90 TABLET | Refills: 3 | Status: SHIPPED | OUTPATIENT
Start: 2024-10-04 | End: 2025-02-01

## 2024-10-04 RX ADMIN — URSODIOL 300 MG: 300 CAPSULE ORAL at 05:41

## 2024-10-04 RX ADMIN — MIDODRINE HYDROCHLORIDE 10 MG: 5 TABLET ORAL at 11:46

## 2024-10-04 RX ADMIN — TORSEMIDE 10 MG: 20 TABLET ORAL at 05:41

## 2024-10-04 RX ADMIN — FERROUS SULFATE TAB 325 MG (65 MG ELEMENTAL FE) 325 MG: 325 (65 FE) TAB at 07:56

## 2024-10-04 RX ADMIN — SPIRONOLACTONE 50 MG: 50 TABLET ORAL at 05:41

## 2024-10-04 RX ADMIN — FOLIC ACID 1 MG: 1 TABLET ORAL at 05:41

## 2024-10-04 RX ADMIN — MIDODRINE HYDROCHLORIDE 10 MG: 5 TABLET ORAL at 07:56

## 2024-10-04 RX ADMIN — URSODIOL 300 MG: 300 CAPSULE ORAL at 11:46

## 2024-10-04 ASSESSMENT — ENCOUNTER SYMPTOMS
MUSCULOSKELETAL NEGATIVE: 1
NEUROLOGICAL NEGATIVE: 1
BRUISES/BLEEDS EASILY: 1
CONSTITUTIONAL NEGATIVE: 1
RESPIRATORY NEGATIVE: 1
EYES NEGATIVE: 1
GASTROINTESTINAL NEGATIVE: 1
PSYCHIATRIC NEGATIVE: 1

## 2024-10-04 ASSESSMENT — PAIN DESCRIPTION - PAIN TYPE
TYPE: ACUTE PAIN

## 2024-10-04 ASSESSMENT — FIBROSIS 4 INDEX: FIB4 SCORE: 12.94

## 2024-10-05 LAB
BILIRUB FLD-MCNC: <0.2 MG/DL
SPECIMEN SOURCE: NORMAL

## 2024-10-07 ENCOUNTER — PATIENT OUTREACH (OUTPATIENT)
Dept: MEDICAL GROUP | Age: 79
End: 2024-10-07
Payer: MEDICARE

## 2024-10-07 ENCOUNTER — HOSPITAL ENCOUNTER (OUTPATIENT)
Dept: LAB | Facility: MEDICAL CENTER | Age: 79
End: 2024-10-07
Attending: FAMILY MEDICINE
Payer: MEDICARE

## 2024-10-07 ENCOUNTER — TELEPHONE (OUTPATIENT)
Dept: SCHEDULING | Facility: IMAGING CENTER | Age: 79
End: 2024-10-07

## 2024-10-07 DIAGNOSIS — R18.8 OTHER ASCITES: ICD-10-CM

## 2024-10-07 LAB
ALBUMIN SERPL BCP-MCNC: 3.8 G/DL (ref 3.2–4.9)
ALBUMIN/GLOB SERPL: 1.6 G/DL
ALP SERPL-CCNC: 89 U/L (ref 30–99)
ALT SERPL-CCNC: 18 U/L (ref 2–50)
ANION GAP SERPL CALC-SCNC: 14 MMOL/L (ref 7–16)
AST SERPL-CCNC: 20 U/L (ref 12–45)
BASOPHILS # BLD AUTO: 0.3 % (ref 0–1.8)
BASOPHILS # BLD: 0.02 K/UL (ref 0–0.12)
BILIRUB SERPL-MCNC: 2.1 MG/DL (ref 0.1–1.5)
BUN SERPL-MCNC: 37 MG/DL (ref 8–22)
CALCIUM ALBUM COR SERPL-MCNC: 9.7 MG/DL (ref 8.5–10.5)
CALCIUM SERPL-MCNC: 9.5 MG/DL (ref 8.5–10.5)
CHLORIDE SERPL-SCNC: 104 MMOL/L (ref 96–112)
CO2 SERPL-SCNC: 23 MMOL/L (ref 20–33)
CREAT SERPL-MCNC: 1.72 MG/DL (ref 0.5–1.4)
EOSINOPHIL # BLD AUTO: 0.21 K/UL (ref 0–0.51)
EOSINOPHIL NFR BLD: 2.8 % (ref 0–6.9)
ERYTHROCYTE [DISTWIDTH] IN BLOOD BY AUTOMATED COUNT: 55.5 FL (ref 35.9–50)
GFR SERPLBLD CREATININE-BSD FMLA CKD-EPI: 40 ML/MIN/1.73 M 2
GLOBULIN SER CALC-MCNC: 2.4 G/DL (ref 1.9–3.5)
GLUCOSE SERPL-MCNC: 117 MG/DL (ref 65–99)
HCT VFR BLD AUTO: 29.7 % (ref 42–52)
HGB BLD-MCNC: 9.3 G/DL (ref 14–18)
IMM GRANULOCYTES # BLD AUTO: 0.04 K/UL (ref 0–0.11)
IMM GRANULOCYTES NFR BLD AUTO: 0.5 % (ref 0–0.9)
LYMPHOCYTES # BLD AUTO: 0.47 K/UL (ref 1–4.8)
LYMPHOCYTES NFR BLD: 6.4 % (ref 22–41)
MCH RBC QN AUTO: 29.2 PG (ref 27–33)
MCHC RBC AUTO-ENTMCNC: 31.3 G/DL (ref 32.3–36.5)
MCV RBC AUTO: 93.4 FL (ref 81.4–97.8)
MONOCYTES # BLD AUTO: 1.24 K/UL (ref 0–0.85)
MONOCYTES NFR BLD AUTO: 16.8 % (ref 0–13.4)
NEUTROPHILS # BLD AUTO: 5.39 K/UL (ref 1.82–7.42)
NEUTROPHILS NFR BLD: 73.2 % (ref 44–72)
NRBC # BLD AUTO: 0.02 K/UL
NRBC BLD-RTO: 0.3 /100 WBC (ref 0–0.2)
PLATELET # BLD AUTO: 34 K/UL (ref 164–446)
PLATELETS.RETICULATED NFR BLD AUTO: 7.7 % (ref 0.6–13.1)
PMV BLD AUTO: 11.3 FL (ref 9–12.9)
POTASSIUM SERPL-SCNC: 4.9 MMOL/L (ref 3.6–5.5)
PROT SERPL-MCNC: 6.2 G/DL (ref 6–8.2)
RBC # BLD AUTO: 3.18 M/UL (ref 4.7–6.1)
SODIUM SERPL-SCNC: 141 MMOL/L (ref 135–145)
WBC # BLD AUTO: 7.4 K/UL (ref 4.8–10.8)

## 2024-10-07 PROCEDURE — 85055 RETICULATED PLATELET ASSAY: CPT

## 2024-10-07 PROCEDURE — 85730 THROMBOPLASTIN TIME PARTIAL: CPT

## 2024-10-07 PROCEDURE — 85025 COMPLETE CBC W/AUTO DIFF WBC: CPT

## 2024-10-07 PROCEDURE — 85610 PROTHROMBIN TIME: CPT

## 2024-10-07 PROCEDURE — 36415 COLL VENOUS BLD VENIPUNCTURE: CPT

## 2024-10-07 PROCEDURE — 80053 COMPREHEN METABOLIC PANEL: CPT

## 2024-10-08 ENCOUNTER — OFFICE VISIT (OUTPATIENT)
Dept: CARDIOLOGY | Facility: MEDICAL CENTER | Age: 79
End: 2024-10-08
Attending: PHYSICIAN ASSISTANT
Payer: MEDICARE

## 2024-10-08 VITALS
SYSTOLIC BLOOD PRESSURE: 100 MMHG | OXYGEN SATURATION: 96 % | BODY MASS INDEX: 27.88 KG/M2 | HEART RATE: 70 BPM | WEIGHT: 224.2 LBS | DIASTOLIC BLOOD PRESSURE: 54 MMHG | RESPIRATION RATE: 16 BRPM | HEIGHT: 75 IN

## 2024-10-08 DIAGNOSIS — I48.0 PAROXYSMAL ATRIAL FIBRILLATION (HCC): ICD-10-CM

## 2024-10-08 DIAGNOSIS — R00.1 BRADYCARDIA: ICD-10-CM

## 2024-10-08 DIAGNOSIS — C18.9 COLON CANCER METASTASIZED TO LIVER (HCC): ICD-10-CM

## 2024-10-08 DIAGNOSIS — I48.91 ATRIAL FIBRILLATION, UNSPECIFIED TYPE (HCC): ICD-10-CM

## 2024-10-08 DIAGNOSIS — I35.1 MILD AORTIC INSUFFICIENCY: ICD-10-CM

## 2024-10-08 DIAGNOSIS — I47.10 PAROXYSMAL SVT (SUPRAVENTRICULAR TACHYCARDIA) (HCC): ICD-10-CM

## 2024-10-08 DIAGNOSIS — I85.00 ESOPHAGEAL VARICES WITHOUT BLEEDING, UNSPECIFIED ESOPHAGEAL VARICES TYPE (HCC): ICD-10-CM

## 2024-10-08 DIAGNOSIS — E78.5 DYSLIPIDEMIA: ICD-10-CM

## 2024-10-08 DIAGNOSIS — L03.116 CELLULITIS OF LEFT LOWER EXTREMITY: ICD-10-CM

## 2024-10-08 DIAGNOSIS — I10 ESSENTIAL HYPERTENSION: ICD-10-CM

## 2024-10-08 DIAGNOSIS — I25.10 ASCVD (ARTERIOSCLEROTIC CARDIOVASCULAR DISEASE): ICD-10-CM

## 2024-10-08 DIAGNOSIS — C78.7 COLON CANCER METASTASIZED TO LIVER (HCC): ICD-10-CM

## 2024-10-08 DIAGNOSIS — R06.09 DOE (DYSPNEA ON EXERTION): ICD-10-CM

## 2024-10-08 LAB
APTT PPP: 31 SEC (ref 24.7–36)
INR PPP: 1.61 (ref 0.87–1.13)
PROTHROMBIN TIME: 19.2 SEC (ref 12–14.6)

## 2024-10-08 PROCEDURE — 3078F DIAST BP <80 MM HG: CPT | Performed by: PHYSICIAN ASSISTANT

## 2024-10-08 PROCEDURE — 3074F SYST BP LT 130 MM HG: CPT | Performed by: PHYSICIAN ASSISTANT

## 2024-10-08 PROCEDURE — 99213 OFFICE O/P EST LOW 20 MIN: CPT | Performed by: PHYSICIAN ASSISTANT

## 2024-10-08 PROCEDURE — 99214 OFFICE O/P EST MOD 30 MIN: CPT | Performed by: PHYSICIAN ASSISTANT

## 2024-10-08 ASSESSMENT — ENCOUNTER SYMPTOMS
PALPITATIONS: 0
BLURRED VISION: 0
COUGH: 0
PND: 0
ABDOMINAL PAIN: 0
ORTHOPNEA: 0
PSYCHIATRIC NEGATIVE: 1
MYALGIAS: 0
CHILLS: 0
NEUROLOGICAL NEGATIVE: 1
FEVER: 0
NAUSEA: 0
LOSS OF CONSCIOUSNESS: 0
BRUISES/BLEEDS EASILY: 0
VOMITING: 0
CONSTITUTIONAL NEGATIVE: 1
DOUBLE VISION: 0
HEADACHES: 0
GASTROINTESTINAL NEGATIVE: 1
CLAUDICATION: 0
MUSCULOSKELETAL NEGATIVE: 1
EYES NEGATIVE: 1
DIZZINESS: 0
WEAKNESS: 0
SHORTNESS OF BREATH: 0

## 2024-10-08 ASSESSMENT — FIBROSIS 4 INDEX: FIB4 SCORE: 11.09

## 2024-10-09 ENCOUNTER — HOSPITAL ENCOUNTER (OUTPATIENT)
Dept: RADIOLOGY | Facility: MEDICAL CENTER | Age: 79
End: 2024-10-09
Attending: INTERNAL MEDICINE
Payer: MEDICARE

## 2024-10-09 ENCOUNTER — HOSPITAL ENCOUNTER (OUTPATIENT)
Dept: LAB | Facility: MEDICAL CENTER | Age: 79
End: 2024-10-09
Attending: INTERNAL MEDICINE
Payer: MEDICARE

## 2024-10-09 ENCOUNTER — OFFICE VISIT (OUTPATIENT)
Dept: MEDICAL GROUP | Age: 79
End: 2024-10-09
Payer: MEDICARE

## 2024-10-09 VITALS
DIASTOLIC BLOOD PRESSURE: 58 MMHG | OXYGEN SATURATION: 95 % | BODY MASS INDEX: 27.48 KG/M2 | WEIGHT: 221 LBS | SYSTOLIC BLOOD PRESSURE: 110 MMHG | HEIGHT: 75 IN | HEART RATE: 58 BPM | TEMPERATURE: 97 F

## 2024-10-09 DIAGNOSIS — I10 ESSENTIAL HYPERTENSION: ICD-10-CM

## 2024-10-09 DIAGNOSIS — R18.8 CIRRHOSIS OF LIVER WITH ASCITES, UNSPECIFIED HEPATIC CIRRHOSIS TYPE (HCC): ICD-10-CM

## 2024-10-09 DIAGNOSIS — E78.5 DYSLIPIDEMIA: ICD-10-CM

## 2024-10-09 DIAGNOSIS — I25.10 ASCVD (ARTERIOSCLEROTIC CARDIOVASCULAR DISEASE): ICD-10-CM

## 2024-10-09 DIAGNOSIS — K74.60 CIRRHOSIS OF LIVER WITH ASCITES, UNSPECIFIED HEPATIC CIRRHOSIS TYPE (HCC): ICD-10-CM

## 2024-10-09 DIAGNOSIS — L03.116 CELLULITIS OF LEFT LOWER EXTREMITY: ICD-10-CM

## 2024-10-09 DIAGNOSIS — K74.60 CHRONIC LIVER DISEASE AND CIRRHOSIS (HCC): ICD-10-CM

## 2024-10-09 DIAGNOSIS — R18.8 POORLY CONTROLLED ASCITES: ICD-10-CM

## 2024-10-09 DIAGNOSIS — R18.8 OTHER ASCITES: ICD-10-CM

## 2024-10-09 DIAGNOSIS — Z53.09 CONTRAINDICATION TO ANTICOAGULATION THERAPY: Chronic | ICD-10-CM

## 2024-10-09 DIAGNOSIS — I48.91 ATRIAL FIBRILLATION, UNSPECIFIED TYPE (HCC): ICD-10-CM

## 2024-10-09 DIAGNOSIS — K76.9 CHRONIC LIVER DISEASE AND CIRRHOSIS (HCC): ICD-10-CM

## 2024-10-09 DIAGNOSIS — R60.0 EDEMA OF LEFT LOWER EXTREMITY: ICD-10-CM

## 2024-10-09 PROBLEM — E87.1 HYPONATREMIA: Status: RESOLVED | Noted: 2024-09-18 | Resolved: 2024-10-09

## 2024-10-09 LAB
ALBUMIN SERPL BCP-MCNC: 3.9 G/DL (ref 3.2–4.9)
ALBUMIN/GLOB SERPL: 1.8 G/DL
ALP SERPL-CCNC: 97 U/L (ref 30–99)
ALT SERPL-CCNC: 19 U/L (ref 2–50)
AMMONIA PLAS-SCNC: 54 UMOL/L (ref 11–45)
ANION GAP SERPL CALC-SCNC: 13 MMOL/L (ref 7–16)
AST SERPL-CCNC: 23 U/L (ref 12–45)
BILIRUB SERPL-MCNC: 2 MG/DL (ref 0.1–1.5)
BUN SERPL-MCNC: 32 MG/DL (ref 8–22)
CALCIUM ALBUM COR SERPL-MCNC: 9.2 MG/DL (ref 8.5–10.5)
CALCIUM SERPL-MCNC: 9.1 MG/DL (ref 8.5–10.5)
CHLORIDE SERPL-SCNC: 103 MMOL/L (ref 96–112)
CO2 SERPL-SCNC: 24 MMOL/L (ref 20–33)
CREAT SERPL-MCNC: 1.45 MG/DL (ref 0.5–1.4)
GFR SERPLBLD CREATININE-BSD FMLA CKD-EPI: 49 ML/MIN/1.73 M 2
GLOBULIN SER CALC-MCNC: 2.2 G/DL (ref 1.9–3.5)
GLUCOSE SERPL-MCNC: 89 MG/DL (ref 65–99)
POTASSIUM SERPL-SCNC: 4.7 MMOL/L (ref 3.6–5.5)
PROT SERPL-MCNC: 6.1 G/DL (ref 6–8.2)
SODIUM SERPL-SCNC: 140 MMOL/L (ref 135–145)

## 2024-10-09 PROCEDURE — 80053 COMPREHEN METABOLIC PANEL: CPT

## 2024-10-09 PROCEDURE — 36415 COLL VENOUS BLD VENIPUNCTURE: CPT

## 2024-10-09 PROCEDURE — C1729 CATH, DRAINAGE: HCPCS

## 2024-10-09 PROCEDURE — 82140 ASSAY OF AMMONIA: CPT

## 2024-10-09 ASSESSMENT — ENCOUNTER SYMPTOMS
MUSCULOSKELETAL NEGATIVE: 1
NEUROLOGICAL NEGATIVE: 1
GASTROINTESTINAL NEGATIVE: 1
EYES NEGATIVE: 1
PSYCHIATRIC NEGATIVE: 1
RESPIRATORY NEGATIVE: 1
CONSTITUTIONAL NEGATIVE: 1

## 2024-10-09 ASSESSMENT — FIBROSIS 4 INDEX: FIB4 SCORE: 11.09

## 2024-10-11 ENCOUNTER — HOSPITAL ENCOUNTER (OUTPATIENT)
Dept: CARDIOLOGY | Facility: MEDICAL CENTER | Age: 79
End: 2024-10-11
Attending: STUDENT IN AN ORGANIZED HEALTH CARE EDUCATION/TRAINING PROGRAM
Payer: MEDICARE

## 2024-10-11 DIAGNOSIS — R06.09 DOE (DYSPNEA ON EXERTION): ICD-10-CM

## 2024-10-11 DIAGNOSIS — I47.10 PAROXYSMAL SVT (SUPRAVENTRICULAR TACHYCARDIA) (HCC): ICD-10-CM

## 2024-10-11 DIAGNOSIS — I35.1 MILD AORTIC INSUFFICIENCY: ICD-10-CM

## 2024-10-11 DIAGNOSIS — I48.0 PAROXYSMAL ATRIAL FIBRILLATION (HCC): ICD-10-CM

## 2024-10-11 PROCEDURE — 93306 TTE W/DOPPLER COMPLETE: CPT

## 2024-10-13 ENCOUNTER — HOSPITAL ENCOUNTER (EMERGENCY)
Facility: MEDICAL CENTER | Age: 79
End: 2024-10-13
Attending: EMERGENCY MEDICINE
Payer: MEDICARE

## 2024-10-13 ENCOUNTER — APPOINTMENT (OUTPATIENT)
Dept: RADIOLOGY | Facility: MEDICAL CENTER | Age: 79
End: 2024-10-13
Attending: EMERGENCY MEDICINE
Payer: MEDICARE

## 2024-10-13 ENCOUNTER — OFFICE VISIT (OUTPATIENT)
Dept: URGENT CARE | Facility: CLINIC | Age: 79
End: 2024-10-13
Payer: MEDICARE

## 2024-10-13 VITALS
SYSTOLIC BLOOD PRESSURE: 78 MMHG | TEMPERATURE: 98.6 F | OXYGEN SATURATION: 96 % | DIASTOLIC BLOOD PRESSURE: 30 MMHG | HEART RATE: 70 BPM

## 2024-10-13 VITALS
WEIGHT: 221 LBS | OXYGEN SATURATION: 98 % | RESPIRATION RATE: 16 BRPM | HEART RATE: 74 BPM | BODY MASS INDEX: 27.48 KG/M2 | SYSTOLIC BLOOD PRESSURE: 92 MMHG | TEMPERATURE: 96.6 F | HEIGHT: 75 IN | DIASTOLIC BLOOD PRESSURE: 52 MMHG

## 2024-10-13 DIAGNOSIS — S09.90XA CLOSED HEAD INJURY, INITIAL ENCOUNTER: ICD-10-CM

## 2024-10-13 DIAGNOSIS — I95.9 HYPOTENSION, UNSPECIFIED HYPOTENSION TYPE: ICD-10-CM
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
## 2024-10-13 DIAGNOSIS — R18.8 CIRRHOSIS OF LIVER WITH ASCITES, UNSPECIFIED HEPATIC CIRRHOSIS TYPE (HCC): ICD-10-CM

## 2024-10-13 DIAGNOSIS — S51.811A SKIN TEAR OF RIGHT FOREARM WITHOUT COMPLICATION, INITIAL ENCOUNTER: ICD-10-CM

## 2024-10-13 DIAGNOSIS — R03.1 LOW BLOOD PRESSURE READING: ICD-10-CM

## 2024-10-13 DIAGNOSIS — S09.90XA INJURY OF HEAD, INITIAL ENCOUNTER: ICD-10-CM

## 2024-10-13 DIAGNOSIS — D75.839 THROMBOCYTOSIS: ICD-10-CM

## 2024-10-13 DIAGNOSIS — K74.60 CIRRHOSIS OF LIVER WITH ASCITES, UNSPECIFIED HEPATIC CIRRHOSIS TYPE (HCC): ICD-10-CM

## 2024-10-13 DIAGNOSIS — S41.111A SKIN TEAR OF RIGHT UPPER ARM WITHOUT COMPLICATION, INITIAL ENCOUNTER: ICD-10-CM

## 2024-10-13 DIAGNOSIS — S01.01XA LACERATION OF SCALP, INITIAL ENCOUNTER: ICD-10-CM

## 2024-10-13 LAB
ANION GAP SERPL CALC-SCNC: 10 MMOL/L (ref 7–16)
BASOPHILS # BLD AUTO: 0.2 % (ref 0–1.8)
BASOPHILS # BLD: 0.02 K/UL (ref 0–0.12)
BUN SERPL-MCNC: 30 MG/DL (ref 8–22)
CALCIUM SERPL-MCNC: 8.4 MG/DL (ref 8.4–10.2)
CHLORIDE SERPL-SCNC: 99 MMOL/L (ref 96–112)
CO2 SERPL-SCNC: 24 MMOL/L (ref 20–33)
CREAT SERPL-MCNC: 1.66 MG/DL (ref 0.5–1.4)
EOSINOPHIL # BLD AUTO: 0.27 K/UL (ref 0–0.51)
EOSINOPHIL NFR BLD: 3 % (ref 0–6.9)
ERYTHROCYTE [DISTWIDTH] IN BLOOD BY AUTOMATED COUNT: 59 FL (ref 35.9–50)
GFR SERPLBLD CREATININE-BSD FMLA CKD-EPI: 41 ML/MIN/1.73 M 2
GLUCOSE SERPL-MCNC: 123 MG/DL (ref 65–99)
HCT VFR BLD AUTO: 28.9 % (ref 42–52)
HGB BLD-MCNC: 9.2 G/DL (ref 14–18)
IMM GRANULOCYTES # BLD AUTO: 0.04 K/UL (ref 0–0.11)
IMM GRANULOCYTES NFR BLD AUTO: 0.5 % (ref 0–0.9)
LYMPHOCYTES # BLD AUTO: 0.3 K/UL (ref 1–4.8)
LYMPHOCYTES NFR BLD: 3.4 % (ref 22–41)
MCH RBC QN AUTO: 30.1 PG (ref 27–33)
MCHC RBC AUTO-ENTMCNC: 31.8 G/DL (ref 32.3–36.5)
MCV RBC AUTO: 94.4 FL (ref 81.4–97.8)
MONOCYTES # BLD AUTO: 0.79 K/UL (ref 0–0.85)
MONOCYTES NFR BLD AUTO: 8.9 % (ref 0–13.4)
NEUTROPHILS # BLD AUTO: 7.44 K/UL (ref 1.82–7.42)
NEUTROPHILS NFR BLD: 84 % (ref 44–72)
NRBC # BLD AUTO: 0 K/UL
NRBC BLD-RTO: 0 /100 WBC (ref 0–0.2)
PLATELET # BLD AUTO: 57 K/UL (ref 164–446)
PLATELETS.RETICULATED NFR BLD AUTO: 4.2 % (ref 0.6–13.1)
PMV BLD AUTO: 10.7 FL (ref 9–12.9)
POTASSIUM SERPL-SCNC: 4.7 MMOL/L (ref 3.6–5.5)
RBC # BLD AUTO: 3.06 M/UL (ref 4.7–6.1)
SODIUM SERPL-SCNC: 133 MMOL/L (ref 135–145)
WBC # BLD AUTO: 8.9 K/UL (ref 4.8–10.8)

## 2024-10-13 PROCEDURE — 36415 COLL VENOUS BLD VENIPUNCTURE: CPT

## 2024-10-13 PROCEDURE — 80048 BASIC METABOLIC PNL TOTAL CA: CPT

## 2024-10-13 PROCEDURE — 700101 HCHG RX REV CODE 250: Performed by: EMERGENCY MEDICINE

## 2024-10-13 PROCEDURE — 85055 RETICULATED PLATELET ASSAY: CPT

## 2024-10-13 PROCEDURE — 304999 HCHG REPAIR-SIMPLE/INTERMED LEVEL 1

## 2024-10-13 PROCEDURE — 70450 CT HEAD/BRAIN W/O DYE: CPT

## 2024-10-13 PROCEDURE — 99284 EMERGENCY DEPT VISIT MOD MDM: CPT

## 2024-10-13 PROCEDURE — 3078F DIAST BP <80 MM HG: CPT | Performed by: FAMILY MEDICINE

## 2024-10-13 PROCEDURE — 85025 COMPLETE CBC W/AUTO DIFF WBC: CPT

## 2024-10-13 PROCEDURE — 72125 CT NECK SPINE W/O DYE: CPT

## 2024-10-13 PROCEDURE — 304217 HCHG IRRIGATION SYSTEM

## 2024-10-13 PROCEDURE — 99215 OFFICE O/P EST HI 40 MIN: CPT | Performed by: FAMILY MEDICINE

## 2024-10-13 PROCEDURE — 3074F SYST BP LT 130 MM HG: CPT | Performed by: FAMILY MEDICINE

## 2024-10-13 PROCEDURE — 305308 HCHG STAPLER,SKIN,DISP.

## 2024-10-13 RX ORDER — LIDOCAINE HYDROCHLORIDE AND EPINEPHRINE BITARTRATE 20; .01 MG/ML; MG/ML
10 INJECTION, SOLUTION SUBCUTANEOUS ONCE
Status: COMPLETED | OUTPATIENT
Start: 2024-10-13 | End: 2024-10-13

## 2024-10-13 RX ADMIN — LIDOCAINE HYDROCHLORIDE,EPINEPHRINE BITARTRATE 10 ML: 20; .01 INJECTION, SOLUTION INFILTRATION; PERINEURAL at 18:00

## 2024-10-13 ASSESSMENT — FIBROSIS 4 INDEX: FIB4 SCORE: 12.42

## 2024-10-14 ENCOUNTER — NON-PROVIDER VISIT (OUTPATIENT)
Dept: CARDIOLOGY | Facility: MEDICAL CENTER | Age: 79
End: 2024-10-14
Payer: MEDICARE

## 2024-10-14 PROCEDURE — 93298 REM INTERROG DEV EVAL SCRMS: CPT | Mod: 26 | Performed by: INTERNAL MEDICINE

## 2024-10-15 ENCOUNTER — PATIENT OUTREACH (OUTPATIENT)
Dept: HEALTH INFORMATION MANAGEMENT | Facility: OTHER | Age: 79
End: 2024-10-15

## 2024-10-15 LAB
LV EJECT FRACT MOD 2C 99903: 44.63
LV EJECT FRACT MOD 4C 99902: 53.08
LV EJECT FRACT MOD BP 99901: 48.42

## 2024-10-15 PROCEDURE — 93306 TTE W/DOPPLER COMPLETE: CPT | Mod: 26 | Performed by: STUDENT IN AN ORGANIZED HEALTH CARE EDUCATION/TRAINING PROGRAM

## 2024-10-16 ENCOUNTER — HOSPITAL ENCOUNTER (EMERGENCY)
Facility: MEDICAL CENTER | Age: 79
End: 2024-10-16
Attending: EMERGENCY MEDICINE
Payer: MEDICARE

## 2024-10-16 VITALS
HEIGHT: 75 IN | WEIGHT: 220.46 LBS | SYSTOLIC BLOOD PRESSURE: 102 MMHG | DIASTOLIC BLOOD PRESSURE: 52 MMHG | HEART RATE: 76 BPM | BODY MASS INDEX: 27.41 KG/M2 | RESPIRATION RATE: 13 BRPM | OXYGEN SATURATION: 94 % | TEMPERATURE: 97.3 F

## 2024-10-16 DIAGNOSIS — R19.7 DIARRHEA, UNSPECIFIED TYPE: ICD-10-CM

## 2024-10-16 LAB
ALBUMIN SERPL BCP-MCNC: 3.4 G/DL (ref 3.2–4.9)
ALBUMIN/GLOB SERPL: 1.4 G/DL
ALP SERPL-CCNC: 136 U/L (ref 30–99)
ALT SERPL-CCNC: 17 U/L (ref 2–50)
ANION GAP SERPL CALC-SCNC: 12 MMOL/L (ref 7–16)
AST SERPL-CCNC: 21 U/L (ref 12–45)
BASOPHILS # BLD AUTO: 0.4 % (ref 0–1.8)
BASOPHILS # BLD: 0.03 K/UL (ref 0–0.12)
BILIRUB SERPL-MCNC: 1.7 MG/DL (ref 0.1–1.5)
BUN SERPL-MCNC: 24 MG/DL (ref 8–22)
C DIFF DNA SPEC QL NAA+PROBE: NEGATIVE
C DIFF TOX A+B STL QL IA: NEGATIVE
C DIFF TOX GENS STL QL NAA+PROBE: NORMAL
CALCIUM ALBUM COR SERPL-MCNC: 8.5 MG/DL (ref 8.5–10.5)
CALCIUM SERPL-MCNC: 8 MG/DL (ref 8.4–10.2)
CHLORIDE SERPL-SCNC: 99 MMOL/L (ref 96–112)
CO2 SERPL-SCNC: 20 MMOL/L (ref 20–33)
CREAT SERPL-MCNC: 1.5 MG/DL (ref 0.5–1.4)
EOSINOPHIL # BLD AUTO: 0.3 K/UL (ref 0–0.51)
EOSINOPHIL NFR BLD: 3.7 % (ref 0–6.9)
ERYTHROCYTE [DISTWIDTH] IN BLOOD BY AUTOMATED COUNT: 59.7 FL (ref 35.9–50)
GFR SERPLBLD CREATININE-BSD FMLA CKD-EPI: 47 ML/MIN/1.73 M 2
GLOBULIN SER CALC-MCNC: 2.4 G/DL (ref 1.9–3.5)
GLUCOSE SERPL-MCNC: 99 MG/DL (ref 65–99)
HCT VFR BLD AUTO: 30 % (ref 42–52)
HGB BLD-MCNC: 9.4 G/DL (ref 14–18)
IMM GRANULOCYTES # BLD AUTO: 0.03 K/UL (ref 0–0.11)
IMM GRANULOCYTES NFR BLD AUTO: 0.4 % (ref 0–0.9)
LIPASE SERPL-CCNC: 27 U/L (ref 11–82)
LYMPHOCYTES # BLD AUTO: 0.4 K/UL (ref 1–4.8)
LYMPHOCYTES NFR BLD: 5 % (ref 22–41)
MCH RBC QN AUTO: 29.8 PG (ref 27–33)
MCHC RBC AUTO-ENTMCNC: 31.3 G/DL (ref 32.3–36.5)
MCV RBC AUTO: 95.2 FL (ref 81.4–97.8)
MONOCYTES # BLD AUTO: 0.86 K/UL (ref 0–0.85)
MONOCYTES NFR BLD AUTO: 10.7 % (ref 0–13.4)
NEUTROPHILS # BLD AUTO: 6.44 K/UL (ref 1.82–7.42)
NEUTROPHILS NFR BLD: 79.8 % (ref 44–72)
NRBC # BLD AUTO: 0 K/UL
NRBC BLD-RTO: 0 /100 WBC (ref 0–0.2)
OVALOCYTES BLD QL SMEAR: NORMAL
PLATELET # BLD AUTO: 83 K/UL (ref 164–446)
PLATELET BLD QL SMEAR: NORMAL
PLATELETS.RETICULATED NFR BLD AUTO: 4.2 % (ref 0.6–13.1)
PMV BLD AUTO: 10.3 FL (ref 9–12.9)
POIKILOCYTOSIS BLD QL SMEAR: NORMAL
POLYCHROMASIA BLD QL SMEAR: NORMAL
POTASSIUM SERPL-SCNC: 4.2 MMOL/L (ref 3.6–5.5)
PROT SERPL-MCNC: 5.8 G/DL (ref 6–8.2)
RBC # BLD AUTO: 3.15 M/UL (ref 4.7–6.1)
RBC BLD AUTO: PRESENT
SODIUM SERPL-SCNC: 131 MMOL/L (ref 135–145)
WBC # BLD AUTO: 8.1 K/UL (ref 4.8–10.8)

## 2024-10-16 PROCEDURE — 87045 FECES CULTURE AEROBIC BACT: CPT

## 2024-10-16 PROCEDURE — 99284 EMERGENCY DEPT VISIT MOD MDM: CPT

## 2024-10-16 PROCEDURE — 85055 RETICULATED PLATELET ASSAY: CPT

## 2024-10-16 PROCEDURE — 80053 COMPREHEN METABOLIC PANEL: CPT

## 2024-10-16 PROCEDURE — 87046 STOOL CULTR AEROBIC BACT EA: CPT

## 2024-10-16 PROCEDURE — 87077 CULTURE AEROBIC IDENTIFY: CPT

## 2024-10-16 PROCEDURE — 87899 AGENT NOS ASSAY W/OPTIC: CPT

## 2024-10-16 PROCEDURE — 87493 C DIFF AMPLIFIED PROBE: CPT

## 2024-10-16 PROCEDURE — 36415 COLL VENOUS BLD VENIPUNCTURE: CPT

## 2024-10-16 PROCEDURE — 85025 COMPLETE CBC W/AUTO DIFF WBC: CPT

## 2024-10-16 PROCEDURE — 83690 ASSAY OF LIPASE: CPT

## 2024-10-16 PROCEDURE — 87324 CLOSTRIDIUM AG IA: CPT | Mod: XU

## 2024-10-16 ASSESSMENT — FIBROSIS 4 INDEX: FIB4 SCORE: 7.41

## 2024-10-17 ENCOUNTER — TELEPHONE (OUTPATIENT)
Dept: MEDICAL GROUP | Age: 79
End: 2024-10-17
Payer: MEDICARE

## 2024-10-17 DIAGNOSIS — R19.7 DIARRHEA, UNSPECIFIED TYPE: ICD-10-CM

## 2024-10-17 LAB
E COLI SXT1+2 STL IA: NORMAL
SIGNIFICANT IND 70042: NORMAL
SITE SITE: NORMAL
SOURCE SOURCE: NORMAL

## 2024-10-17 RX ORDER — DIPHENOXYLATE HYDROCHLORIDE AND ATROPINE SULFATE 2.5; .025 MG/1; MG/1
1 TABLET ORAL 4 TIMES DAILY PRN
Qty: 30 TABLET | Refills: 2 | Status: SHIPPED | OUTPATIENT
Start: 2024-10-17 | End: 2024-10-24

## 2024-10-19 LAB
BACTERIA STL CULT: NORMAL
E COLI SXT1+2 STL IA: NORMAL
SIGNIFICANT IND 70042: NORMAL
SITE SITE: NORMAL
SOURCE SOURCE: NORMAL

## 2024-10-20 ENCOUNTER — OFFICE VISIT (OUTPATIENT)
Dept: URGENT CARE | Facility: CLINIC | Age: 79
End: 2024-10-20
Payer: MEDICARE

## 2024-10-20 VITALS
BODY MASS INDEX: 26.61 KG/M2 | TEMPERATURE: 97.6 F | SYSTOLIC BLOOD PRESSURE: 114 MMHG | DIASTOLIC BLOOD PRESSURE: 78 MMHG | OXYGEN SATURATION: 97 % | WEIGHT: 214 LBS | RESPIRATION RATE: 19 BRPM | HEIGHT: 75 IN | HEART RATE: 60 BPM

## 2024-10-20 DIAGNOSIS — Z48.02 ENCOUNTER FOR STAPLE REMOVAL: ICD-10-CM

## 2024-10-20 PROCEDURE — 3078F DIAST BP <80 MM HG: CPT | Performed by: PHYSICIAN ASSISTANT

## 2024-10-20 PROCEDURE — 99212 OFFICE O/P EST SF 10 MIN: CPT | Performed by: PHYSICIAN ASSISTANT

## 2024-10-20 PROCEDURE — 3074F SYST BP LT 130 MM HG: CPT | Performed by: PHYSICIAN ASSISTANT

## 2024-10-20 ASSESSMENT — ENCOUNTER SYMPTOMS
DIARRHEA: 0
EYE DISCHARGE: 0
FEVER: 0
EYE REDNESS: 0
VOMITING: 0
HEADACHES: 0

## 2024-10-20 ASSESSMENT — FIBROSIS 4 INDEX: FIB4 SCORE: 4.91

## 2024-11-01 PROCEDURE — RXMED WILLOW AMBULATORY MEDICATION CHARGE: Performed by: INTERNAL MEDICINE

## 2024-11-01 PROCEDURE — RXMED WILLOW AMBULATORY MEDICATION CHARGE: Performed by: STUDENT IN AN ORGANIZED HEALTH CARE EDUCATION/TRAINING PROGRAM

## 2024-11-07 ENCOUNTER — PHARMACY VISIT (OUTPATIENT)
Dept: PHARMACY | Facility: MEDICAL CENTER | Age: 79
End: 2024-11-07
Payer: COMMERCIAL

## 2024-11-07 PROCEDURE — RXMED WILLOW AMBULATORY MEDICATION CHARGE: Performed by: STUDENT IN AN ORGANIZED HEALTH CARE EDUCATION/TRAINING PROGRAM

## 2024-11-11 ENCOUNTER — OFFICE VISIT (OUTPATIENT)
Dept: NEUROLOGY | Facility: MEDICAL CENTER | Age: 79
End: 2024-11-11
Attending: SPECIALIST
Payer: MEDICARE

## 2024-11-11 ENCOUNTER — OFFICE VISIT (OUTPATIENT)
Dept: MEDICAL GROUP | Age: 79
End: 2024-11-11
Payer: MEDICARE

## 2024-11-11 VITALS
HEART RATE: 69 BPM | TEMPERATURE: 98.2 F | BODY MASS INDEX: 27.98 KG/M2 | DIASTOLIC BLOOD PRESSURE: 58 MMHG | SYSTOLIC BLOOD PRESSURE: 100 MMHG | WEIGHT: 218.03 LBS | HEIGHT: 74 IN | RESPIRATION RATE: 16 BRPM | OXYGEN SATURATION: 98 %

## 2024-11-11 VITALS
BODY MASS INDEX: 26.86 KG/M2 | HEIGHT: 75 IN | OXYGEN SATURATION: 96 % | DIASTOLIC BLOOD PRESSURE: 56 MMHG | SYSTOLIC BLOOD PRESSURE: 106 MMHG | WEIGHT: 216 LBS | HEART RATE: 115 BPM | TEMPERATURE: 97.1 F

## 2024-11-11 DIAGNOSIS — K74.60 CIRRHOSIS OF LIVER WITH ASCITES, UNSPECIFIED HEPATIC CIRRHOSIS TYPE (HCC): ICD-10-CM

## 2024-11-11 DIAGNOSIS — R18.8 CIRRHOSIS OF LIVER WITH ASCITES, UNSPECIFIED HEPATIC CIRRHOSIS TYPE (HCC): ICD-10-CM

## 2024-11-11 DIAGNOSIS — R27.0 ATAXIA: ICD-10-CM

## 2024-11-11 DIAGNOSIS — N17.9 AKI (ACUTE KIDNEY INJURY) (HCC): ICD-10-CM

## 2024-11-11 PROCEDURE — 3074F SYST BP LT 130 MM HG: CPT | Performed by: STUDENT IN AN ORGANIZED HEALTH CARE EDUCATION/TRAINING PROGRAM

## 2024-11-11 PROCEDURE — 99212 OFFICE O/P EST SF 10 MIN: CPT | Performed by: SPECIALIST

## 2024-11-11 PROCEDURE — 99214 OFFICE O/P EST MOD 30 MIN: CPT | Performed by: STUDENT IN AN ORGANIZED HEALTH CARE EDUCATION/TRAINING PROGRAM

## 2024-11-11 PROCEDURE — 3078F DIAST BP <80 MM HG: CPT | Performed by: STUDENT IN AN ORGANIZED HEALTH CARE EDUCATION/TRAINING PROGRAM

## 2024-11-11 PROCEDURE — 3078F DIAST BP <80 MM HG: CPT | Performed by: SPECIALIST

## 2024-11-11 PROCEDURE — 3074F SYST BP LT 130 MM HG: CPT | Performed by: SPECIALIST

## 2024-11-11 PROCEDURE — 99213 OFFICE O/P EST LOW 20 MIN: CPT | Performed by: SPECIALIST

## 2024-11-11 ASSESSMENT — ENCOUNTER SYMPTOMS
FEVER: 0
BRUISES/BLEEDS EASILY: 0
DEPRESSION: 0
ORTHOPNEA: 0
SHORTNESS OF BREATH: 0
HEADACHES: 0
BLURRED VISION: 0
WEAKNESS: 0
DIZZINESS: 0
CHILLS: 0
PALPITATIONS: 0
ABDOMINAL PAIN: 0
DOUBLE VISION: 0
BLOOD IN STOOL: 0
WHEEZING: 0
BACK PAIN: 0
COUGH: 0

## 2024-11-11 ASSESSMENT — PATIENT HEALTH QUESTIONNAIRE - PHQ9: CLINICAL INTERPRETATION OF PHQ2 SCORE: 0

## 2024-11-11 ASSESSMENT — FIBROSIS 4 INDEX
FIB4 SCORE: 4.91
FIB4 SCORE: 4.91

## 2024-11-11 NOTE — PROGRESS NOTES
"Subjective     Torey Lima is a 79 y.o. male who presents with Follow-Up (Neuropathy)            HPI  Mr. Torey Lima has been unchanged since I saw him on September 9.  He was initially admitted to [facility] from October 2 through 4 with an increase in his ascites.  He been admitted prior to that with cellulitis in his diuretics were held due to hypotension.  He did have edema.  He was then seen in the emergency room on October 13 after a fall hitting his head and was noted to have lacerations.  He was hypotensive at that time as well.  He had skin tears in the right arm and laceration of the skull.  I had seen him on September 9 and prescribed gabapentin.  The ER told him to cut the dose to 100 mg twice daily from 3 times daily.  He continues to note episodes where he feels he has spasms in his hand that occur spontaneously.  These are resolved almost immediately when he sticks his hand in warm water.  CT brain scan and EEG were both unremarkable.    Past medical history:    1.  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    2.  Colon cancer treated with resection and resection of mets to the liver and chemotherapy.  He has had residual cirrhosis.    3.  Right total hip arthroplasty.    Medications-atorvastatin 40 mg daily, calcium carbonate, ferrous sulfate, folic acid, metoprolol, spironolactone    Allergies-influenza vaccination, lisinopril.    Social history -formerly smoked cigars and does not drink    ROS  As above, describes spasms in his hands for 1 to 2 years which resolved when he puts his hands in warm water.         Objective     /58 (BP Location: Left arm, Patient Position: Sitting, BP Cuff Size: Adult)   Pulse 69   Temp 36.8 °C (98.2 °F) (Temporal)   Resp 16   Ht 1.88 m (6' 2\")   Wt 98.9 kg (218 lb 0.6 oz)   SpO2 98%   BMI 27.99 kg/m²      Physical Exam  Patient is a cooperative gentleman who appears his stated age.  Speech is fluent.    HEENT exam " reveals an normocephalic and atraumatic.  Pupils are equal round reactive.  EOMs are full visual fields are full.    His neck is suppleCranial nerves are unremarkable.        Neurological Exam  He stands with with mild difficulty and with the use of his hands.  His gait is again mildly ataxic.  He has no drift and no dysmetria.    Motor testing reveals intact bulk tone and strength throughout.    He has diminished pin sensation in his feet.    Reflexes are 1+ in the arms trace at the knees and absent at the ankles he has downgoing toes.    Annual nerves are unremarkable.           Assessment & Plan   Mr. Torey Lima is an 79-year-old gentleman who had an episode of AIDP in 2017 following a lumbar laminectomy.  He now reports episodes of tonic spasms primarily in his hands more on the left than the right which only last for several minutes and resolved when he puts his hands in warm water.  He has decreased sensation distally in his lower extremities.  He may well have either painful neuropathy or residual of AIDP.  As he does not feel it is helpful he will stop the gabapentin.  He will continue immersing his hands in warm water if he has no symptoms.  Will see him back in 6 months.     Assessment & Plan  Ataxia

## 2024-11-13 PROCEDURE — RXMED WILLOW AMBULATORY MEDICATION CHARGE: Performed by: INTERNAL MEDICINE

## 2024-11-14 ENCOUNTER — NON-PROVIDER VISIT (OUTPATIENT)
Dept: CARDIOLOGY | Facility: MEDICAL CENTER | Age: 79
End: 2024-11-14
Payer: MEDICARE

## 2024-11-14 ENCOUNTER — PHARMACY VISIT (OUTPATIENT)
Dept: PHARMACY | Facility: MEDICAL CENTER | Age: 79
End: 2024-11-14
Payer: COMMERCIAL

## 2024-11-14 PROCEDURE — 93298 REM INTERROG DEV EVAL SCRMS: CPT | Mod: 26 | Performed by: STUDENT IN AN ORGANIZED HEALTH CARE EDUCATION/TRAINING PROGRAM

## 2024-11-14 NOTE — CARDIAC REMOTE MONITOR - SCAN
Device transmission reviewed. Device demonstrated appropriate function.       Electronically Signed by: Toya Gaines MD, PhD    11/14/2024  12:26 PM

## 2024-11-15 ENCOUNTER — HOSPITAL ENCOUNTER (OUTPATIENT)
Dept: LAB | Facility: MEDICAL CENTER | Age: 79
End: 2024-11-15
Attending: STUDENT IN AN ORGANIZED HEALTH CARE EDUCATION/TRAINING PROGRAM
Payer: MEDICARE

## 2024-11-15 DIAGNOSIS — N17.9 AKI (ACUTE KIDNEY INJURY) (HCC): ICD-10-CM

## 2024-11-15 LAB
ALBUMIN SERPL BCP-MCNC: 3.7 G/DL (ref 3.2–4.9)
ALBUMIN/GLOB SERPL: 1.3 G/DL
ALP SERPL-CCNC: 103 U/L (ref 30–99)
ALT SERPL-CCNC: 26 U/L (ref 2–50)
ANION GAP SERPL CALC-SCNC: 10 MMOL/L (ref 7–16)
AST SERPL-CCNC: 34 U/L (ref 12–45)
BILIRUB SERPL-MCNC: 2.4 MG/DL (ref 0.1–1.5)
BUN SERPL-MCNC: 27 MG/DL (ref 8–22)
CALCIUM ALBUM COR SERPL-MCNC: 9.4 MG/DL (ref 8.5–10.5)
CALCIUM SERPL-MCNC: 9.2 MG/DL (ref 8.5–10.5)
CHLORIDE SERPL-SCNC: 102 MMOL/L (ref 96–112)
CO2 SERPL-SCNC: 23 MMOL/L (ref 20–33)
CREAT SERPL-MCNC: 1.48 MG/DL (ref 0.5–1.4)
GFR SERPLBLD CREATININE-BSD FMLA CKD-EPI: 47 ML/MIN/1.73 M 2
GLOBULIN SER CALC-MCNC: 2.8 G/DL (ref 1.9–3.5)
GLUCOSE SERPL-MCNC: 143 MG/DL (ref 65–99)
POTASSIUM SERPL-SCNC: 4.4 MMOL/L (ref 3.6–5.5)
PROT SERPL-MCNC: 6.5 G/DL (ref 6–8.2)
SODIUM SERPL-SCNC: 135 MMOL/L (ref 135–145)

## 2024-11-15 PROCEDURE — 36415 COLL VENOUS BLD VENIPUNCTURE: CPT

## 2024-11-15 PROCEDURE — 80053 COMPREHEN METABOLIC PANEL: CPT

## 2024-11-19 PROCEDURE — RXMED WILLOW AMBULATORY MEDICATION CHARGE

## 2024-11-20 ENCOUNTER — PHARMACY VISIT (OUTPATIENT)
Dept: PHARMACY | Facility: MEDICAL CENTER | Age: 79
End: 2024-11-20
Payer: COMMERCIAL

## 2024-12-03 ENCOUNTER — APPOINTMENT (OUTPATIENT)
Dept: RADIOLOGY | Facility: MEDICAL CENTER | Age: 79
End: 2024-12-03
Attending: EMERGENCY MEDICINE
Payer: MEDICARE

## 2024-12-03 ENCOUNTER — HOSPITAL ENCOUNTER (EMERGENCY)
Facility: MEDICAL CENTER | Age: 79
End: 2024-12-03
Attending: EMERGENCY MEDICINE
Payer: MEDICARE

## 2024-12-03 ENCOUNTER — OFFICE VISIT (OUTPATIENT)
Dept: URGENT CARE | Facility: CLINIC | Age: 79
End: 2024-12-03
Payer: MEDICARE

## 2024-12-03 VITALS
BODY MASS INDEX: 27.98 KG/M2 | WEIGHT: 218 LBS | TEMPERATURE: 97.9 F | SYSTOLIC BLOOD PRESSURE: 96 MMHG | HEART RATE: 74 BPM | DIASTOLIC BLOOD PRESSURE: 58 MMHG | OXYGEN SATURATION: 98 % | HEIGHT: 74 IN

## 2024-12-03 VITALS
DIASTOLIC BLOOD PRESSURE: 59 MMHG | HEIGHT: 74 IN | OXYGEN SATURATION: 95 % | BODY MASS INDEX: 27.98 KG/M2 | TEMPERATURE: 97.7 F | WEIGHT: 218.03 LBS | RESPIRATION RATE: 18 BRPM | SYSTOLIC BLOOD PRESSURE: 111 MMHG | HEART RATE: 62 BPM
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
## 2024-12-03 DIAGNOSIS — S51.012A SKIN TEAR OF LEFT ELBOW WITHOUT COMPLICATION, INITIAL ENCOUNTER: ICD-10-CM

## 2024-12-03 DIAGNOSIS — S01.81XA LACERATION OF FACE WITHOUT COMPLICATION, INITIAL ENCOUNTER: ICD-10-CM

## 2024-12-03 DIAGNOSIS — R55 NEAR SYNCOPE: ICD-10-CM

## 2024-12-03 PROCEDURE — 12002 RPR S/N/AX/GEN/TRNK2.6-7.5CM: CPT | Performed by: NURSE PRACTITIONER

## 2024-12-03 PROCEDURE — 70450 CT HEAD/BRAIN W/O DYE: CPT

## 2024-12-03 PROCEDURE — 99284 EMERGENCY DEPT VISIT MOD MDM: CPT

## 2024-12-03 PROCEDURE — 12011 RPR F/E/E/N/L/M 2.5 CM/<: CPT | Performed by: NURSE PRACTITIONER

## 2024-12-03 PROCEDURE — 72125 CT NECK SPINE W/O DYE: CPT

## 2024-12-03 RX ORDER — ACETAMINOPHEN 500 MG
1000 TABLET ORAL EVERY 6 HOURS PRN
COMMUNITY

## 2024-12-03 ASSESSMENT — FIBROSIS 4 INDEX
FIB4 SCORE: 6.43
FIB4 SCORE: 6.43

## 2024-12-03 NOTE — PROCEDURES
Laceration Repair    Date/Time: 12/3/2024 10:56 AM    Performed by: CRISTIANA May  Authorized by: CRISTIANA May  Body area: head/neck  Location details: left cheek  Laceration length: 1 cm  Tendon involvement: none  Nerve involvement: none  Vascular damage: no    Sedation:  Patient sedated: no    Irrigation solution: saline  Irrigation method: syringe  Amount of cleaning: standard  Debridement: minimal  Degree of undermining: none  Wound skin closure material used: gauze.  Dressing: antibiotic ointment and 4x4 sterile gauze  Patient tolerance: patient tolerated the procedure well with no immediate complications  Comments: Skin tear repair

## 2024-12-03 NOTE — ED PROVIDER NOTES
ED Provider Note    CHIEF COMPLAINT  Chief Complaint   Patient presents with    Fall     Became dizzy while ambulating to BR last night  Unk LOC  Denies taking blood thinners    Facial Injury     Left side of face    Elbow Injury     Left       EXTERNAL RECORDS REVIEWED  Patient was seen in the urgent care prior to arrival.   evaluated for the fall, had his skin tear, left elbow dressed and was directed here for further care.    HPI/ROS  LIMITATION TO HISTORY   Short term memory loss  OUTSIDE HISTORIAN(S):  Significant other spouse    Torey Lima is a 79 y.o. male who presents to the emergency department via private vehicle.  He triggers a code TBI.  He is accompanied by his wife who brought him here.  He apparently, fell early this morning about 1:00 in the morning.  He does not recollect this fall but his wife states that he has short-term memory loss and frequently, does not recollect events of the day.  He has extensive past medical history.  History of colon cancer with liver metastases and liver failure.  He has ongoing need for paracentesis.  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  In discussion with the patient as well as wife who is at the bedside, patient confirms that he is a DNR and DNI.  Other than this fall, he denies any recent changes in his health.  No fever.  No cough or cold symptoms.  No chest pain or shortness of breath.  No GI symptoms.  EKG was done in the urgent care prior to arrival.  Patient has a history of esophageal varices and as a result, is not on any anticoagulation.    PAST MEDICAL HISTORY   has a past medical history of Arrhythmia (April 1, 2023), ASCVD (arteriosclerotic cardiovascular disease) (12/14/2012), BMI 33.0-33.9,adult (02/15/2013), Breath shortness (April 1, 2023), Cancer (HCC) (10/10-6/11), Chickenpox, Colon cancer (HCC) (12/06/2010), Elevated CEA (12/06/2010), Croatian measles, GI bleed, HLD (hyperlipidemia) (06/20/2013), HTN (hypertension) (06/20/2013),  Hypertension, Hyponatremia (09/18/2024), Impaired fasting glucose (08/02/2012), Liver cirrhosis (HCC), Liver lesion (12/06/2010), Mixed hyperlipidemia (06/20/2013), Obesity (BMI 30-39.9) (12/14/2017), Peripheral neuropathy, 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    SURGICAL HISTORY   has a past surgical history that includes low anterior resection robotic (8/10/2010); cath placement (9/20/2010); hip arthroplasty mis total; other; other (comments); cath removal (7/20/2011); full thickness block resection (4/11/2012); gastroscopy with banding (4/3/2016); gastroscopy with banding (10/25/2016); colon resection; hip replacement, total; lumbar laminectomy diskectomy (5/25/2017); and umbilical hernia repair (N/A, 10/10/2023).    FAMILY HISTORY  Family History   Problem Relation Age of Onset    Heart Disease Mother     Cancer Mother     Sleep Apnea Neg Hx        SOCIAL HISTORY  Social History     Tobacco Use    Smoking status: Former     Types: Cigars    Smokeless tobacco: Never    Tobacco comments:     1 cigar per week.   Vaping Use    Vaping status: Never Used   Substance and Sexual Activity    Alcohol use: Not Currently    Drug use: No    Sexual activity: Yes     Partners: Female       CURRENT MEDICATIONS  Home Medications       Reviewed by Sara Galloway (Pharmacy Tech) on 12/03/24 at 1154  Med List Status: Complete     Medication Last Dose Status   acetaminophen (TYLENOL) 500 MG Tab 12/2/2024 Active   atorvastatin (LIPITOR) 40 MG Tab 12/2/2024 Active   calcium carbonate (OS-AMPARO 500) 1250 (500 Ca) MG Tab 12/3/2024 Active   Cholecalciferol (VITAMIN D3) 1000 UNIT Cap 12/3/2024 Active   clindamycin (CLEOCIN) 1 % Solution 12/2/2024 Active   diclofenac sodium (VOLTAREN) 1 % Gel Not Taking Active   ferrous sulfate 325 (65 Fe) MG tablet 12/3/2024 Active   folic  "acid (FOLVITE) 1 MG Tab 12/3/2024 Active   gabapentin (NEURONTIN) 100 MG Cap Not Taking Active   hydrocortisone 2.5 % Cream topical cream 12/2/2024 Active   Magnesium Glycinate 100 MG Cap 12/2/2024 Active   metoprolol SR (TOPROL XL) 25 MG TABLET SR 24 HR 12/3/2024 Active   midodrine (PROAMATINE) 10 MG tablet 12/3/2024 Active   potassium chloride SA (KDUR) 20 MEQ Tab CR 12/3/2024 Active   spironolactone (ALDACTONE) 50 MG Tab 12/3/2024 Active   torsemide (DEMADEX) 10 MG tablet 12/3/2024 Active   ursodiol (JUNE 250) 250 MG Tab 12/3/2024 Active                  Audit from Redirected Encounters    **Home medications have not yet been reviewed for this encounter**         ALLERGIES  Allergies   Allergen Reactions    Anticoagulant Sodium Citrate [Sodium Citrate] Unspecified     HIGH BLEEDING RISK    Influenza Virus Vaccine      Guillan Golden     Lovenox [Enoxaparin] Unspecified     Thrombocytopenia      Lisinopril Cough       PHYSICAL EXAM  VITAL SIGNS: /59   Pulse 62   Temp 36.5 °C (97.7 °F) (Temporal)   Resp 18   Ht 1.88 m (6' 2\")   Wt 98.9 kg (218 lb 0.6 oz)   SpO2 95%   BMI 27.99 kg/m²    Vitals reviewed.  Constitutional: Patient is oriented to person, place, and time.  Chronically ill appearing.  Slow-moving.  No distress.    Head/Face: Normocephalic and atraumatic.  Small skin tear left lower face.  Ears: Normal external ears bilaterally.   Mouth/Throat: Oropharynx is clear and moist, no no malocclusion.  Eyes: Conjunctivae are normal. Pupils are equal, round, and reactive to light.  No subconjunctival hemorrhage.  Neck: Normal range of motion. Neck supple. No tracheal deviation present.  No midline tenderness or step-offs.  Cardiovascular: Normal rate, regular rhythm and normal heart sounds. Normal peripheral pulses.  Pulmonary/Chest: Effort normal and breath sounds normal. No respiratory distress, no wheezes, rhonchi, or rales. No chest wall tenderness.  Abdominal: Distended.  Multiple well-healed " surgical scars. Bowel sounds are normal. There is no tenderness, rebound or guarding, or peritoneal signs, no masses. No CVA tenderness.  Musculoskeletal: No edema and no tenderness.   Neurological: No cranial nerve deficits. Normal motor and sensory exam.  Normal gait with walker.  No focal deficits.    Skin: Skin is warm and dry. No erythema. No pallor.   Psychiatric: Patient has a normal mood and affect.     EKG/LABS    RADIOLOGY/PROCEDURES   I have independently interpreted the diagnostic imaging associated with this visit and am waiting the final reading from the radiologist.   My preliminary interpretation is as follows: No ICH, mass, lesions    Radiologist interpretation:  CT-CSPINE WITHOUT PLUS RECONS   Final Result      No evidence of cervical spine fracture.      CT-HEAD W/O   Final Result      1.  No evidence of acute intracranial process.      2.  Cerebral atrophy as well as periventricular chronic small vessel ischemic change.                 COURSE & MEDICAL DECISION MAKING    ASSESSMENT, COURSE AND PLAN  Care Narrative:     Called to the bedside.  Patient is an elderly male with extensive past medical history.  Patient triggers code TBI.  Initially presented the urgent care.  His wife drove him here.  Patient denies any new complaints.  States that he feels to be in his his normal state of health.  He is not anticoagulated.  He has a history of colon cancer with liver metastases.  He has ascites.  He has required paracentesis in the past and wife reports that there is a plan for repeat of this, upcoming.  Patient denies a headache or neck pain.  No fever.  Wife does report that over the last half a year or so, he has had worsening short-term memory.  Both wife and patient, confirmed to me now that he is a DNR and DNI.    12:27 PM patient is reevaluated at the bedside.  CT imaging of the head and C-spine are overall unrevealing from a traumatic standpoint.  Patient has no new complaints.  Blood  pressure is soft, 96/54 but wife notes that this is normal for him.  On reevaluation, it increases to 111.  Patient has extensive past medical history but again, is not experiencing any new symptoms.  His wife has plans to follow-up for repeat paracentesis which is for therapeutic purposes.  Additionally, I have spoken with [facility],  who will have [facility] liaison, follow him as an outpatient.  At this point, I feel the patient can discharge to home.    ADDITIONAL PROBLEMS MANAGED per    DISPOSITION AND DISCUSSIONS  I have discussed management of the patient with the following physicians and NICOLLE's:  None    Discussion of management with other [facility] or appropriate source(s): Case Management discussed with [facility] liaison, will follow-up with patient as an outpatient.      Escalation of care considered, and ultimately not performed:acute inpatient care management, however at this time, the patient is most appropriate for outpatient management    Barriers to care at this time, including but not limited to: None.     FINAL DIAGNOSIS  1. Fall, initial encounter    2. Skin tear of left elbow without complication, initial encounter         Electronically signed by: Yazmin Moody D.O., 12/3/2024 11:45 AM

## 2024-12-03 NOTE — ED NOTES
D/c pt home,no  rx given . Pt aware of f/u instructions and wound care, aware to return for any changes or concerns. No further questions upon d/c home from ed

## 2024-12-03 NOTE — ED NOTES
Medication history reviewed with pts wife. Med rec is complete.  Allergies reviewed, per pts wife.  Interviewed pt with wife at bedside with permission from pt.    Pt uses CLINDAMYCIN 1% on his lower legs QDAY, last used on 12/2/2024 at 2000.    Pt is not on any anticoagulants
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
## 2024-12-03 NOTE — ED TRIAGE NOTES
"Chief Complaint   Patient presents with    Fall     Became dizzy while ambulating to BR last night  Unk LOC  Denies taking blood thinners    Facial Injury     Left side of face    Elbow Injury     Left     BP 96/54   Pulse 61   Temp 36.5 °C (97.7 °F) (Temporal)   Resp 18   Ht 1.88 m (6' 2\")   Wt 98.9 kg (218 lb 0.6 oz)   SpO2 95%   BMI 27.99 kg/m²     Pt ambulated to ED using walker w/ visitor for c/o fall during the night.  Pt unsure exactly what happened, unsure if hit head or LOC.  Visitor states lacerations to Left side of face and Left elbow.  Pt was seen at  this am and sent to ED for further eval.    "

## 2024-12-03 NOTE — PROCEDURES
Laceration Repair    Date/Time: 12/3/2024 10:56 AM    Performed by: CRISTIANA May  Authorized by: CRISTIANA May  Body area: upper extremity  Location details: left lower arm  Laceration length: 4 cm  Tendon involvement: none  Nerve involvement: none  Vascular damage: no    Sedation:  Patient sedated: no    Irrigation solution: saline  Irrigation method: syringe  Amount of cleaning: standard  Debridement: minimal  Degree of undermining: none  Dressing: 4x4 sterile gauze, antibiotic ointment and gauze roll  Patient tolerance: patient tolerated the procedure well with no immediate complications  Comments: See photo for both skin tears. Dressed with xeroform and Kerlix once debrided.

## 2024-12-03 NOTE — PROGRESS NOTES
Verbal consent was acquired by the patient to use [employer] ambient listening note generation during this visit     Date: 12/03/24        Chief Complaint   Patient presents with    Fall     Left elbow laceration due to fall x 1 am          History of Present Illness  The patient presents for evaluation of multiple medical concerns. He is accompanied by his wife.    He has a history of esophageal varices and is scheduled for an endoscopy with Dr. Sheriff on 12/16/2024. His last consultation with Dr. Holterman, a gastroenterologist, was in 2023. During his previous endoscopy, no bleeding was detected, although he has had banding procedures in the past. He is unable to take blood thinners due to his condition.    He also has a history of colon cancer that metastasized to the liver, resulting in a blood clot in the hepatic vein. He has undergone three paracentesis procedures within a month. His primary care physician, Dr. Urena, is currently unavailable, so he consulted with Dr. Hodge two weeks ago. Dr. Hodge conducted a comprehensive metabolic panel and assessed his kidney function. He was advised to limit his fluid intake to no more than 48 ounces per day to protect his kidneys.    He experienced a dizzy spell last night while getting up to use the bathroom, which resulted in a fall. He has a scheduled appointment with his cardiologist, Dr. Rodriguez, on Thursday. His wife reports that he appears pale and slightly jaundiced.         ROS:    No severe shortness of breath   No Cardiac like chest pain, as discussed   As otherwise stated in HPI        Pertinent Medications:   Torey Lima  Current Outpatient Medications on File Prior to Visit   Medication Sig Dispense Refill    midodrine (PROAMATINE) 10 MG tablet Take 1 Tablet by mouth 3 times a day with meals for 120 days. 90 Tablet 3    clindamycin (CLEOCIN) 1 % Solution Apply 2 mL topically 2 times a day. To lower legs (Patient taking differently: Apply 2 mL  topically every day. To lower legs) 60 mL 2    diclofenac sodium (VOLTAREN) 1 % Gel Apply 2 g topically 4 times a day as needed (left knee pain). (Patient not taking: Reported on 12/3/2024) 50 g 0    Magnesium Glycinate 100 MG Cap Take 1 Capsule by mouth every day with lunch.      ursodiol (JUNE 250) 250 MG Tab Take 1 tablet (250 mg total) by mouth every morning, afternoon, and evening. (Patient taking differently: Take 250 mg by mouth 3 times a day.) 270 Tablet 3    metoprolol SR (TOPROL XL) 25 MG TABLET SR 24 HR Take 0.5 Tablets by mouth every day. 50 Tablet 3    atorvastatin (LIPITOR) 40 MG Tab Take 1 Tablet by mouth every evening. 100 Tablet 4    potassium chloride SA (KDUR) 20 MEQ Tab CR Take 1 tablet (20 mEq total) by mouth daily (Patient taking differently: Take 20 mEq by mouth every day.) 90 Tablet 2    calcium carbonate (OS-AMPARO 500) 1250 (500 Ca) MG Tab Take 1 Tablet by mouth every day. 100 Tablet 3    torsemide (DEMADEX) 10 MG tablet Take 1 Tablet by mouth 2 times a day. 200 Tablet 1    hydrocortisone 2.5 % Cream topical cream Apply 1 Application topically 2 times a day. (Patient taking differently: Apply 1 Application topically every day. Apply's on both arms and legs) 453 g 0    spironolactone (ALDACTONE) 50 MG Tab Take 1 Tablet by mouth 2 times a day. 180 Tablet 3    ferrous sulfate 325 (65 Fe) MG tablet Take 1 Tablet by mouth every morning with breakfast. 100 Tablet 3    Cholecalciferol (VITAMIN D3) 1000 UNIT Cap Take 1 Capsule by mouth every day. 100 Capsule 3    folic acid (FOLVITE) 1 MG Tab Take 1 Tablet by mouth every day. 100 Tablet 4    gabapentin (NEURONTIN) 100 MG Cap Take 1 Capsule by mouth 2 times a day. (Patient not taking: Reported on 12/3/2024) 1 Capsule 0     No current facility-administered medications on file prior to visit.        Allergies:  Torey Lima Anticoagulant sodium citrate [sodium citrate], Influenza virus vaccine, Lovenox [enoxaparin], and Lisinopril   Problem List:    Torey Lima does not have any pertinent problems on file.    Surgical History:   Past Surgical History:   Procedure Laterality Date    UMBILICAL HERNIA REPAIR N/A 10/10/2023    Procedure: OPEN UMBILICAL HERNIA REPAIR;  Surgeon: Julian Ennis M.D.;  Location: SURGERY [facility];  Service: General    LUMBAR LAMINECTOMY DISKECTOMY  5/25/2017    Procedure:  LAMINECTOMY LUMBAR  4 TO SACRAL 1;  Surgeon: Felton Escobar M.D.;  Location: SURGERY [facility];  Service:     GASTROSCOPY WITH BANDING  10/25/2016    Procedure: GASTROSCOPY WITH BANDING;  Surgeon: Pierre Waggoner M.D.;  Location: ENDOSCOPY [facility];  Service:     GASTROSCOPY WITH BANDING  4/3/2016    Procedure: GASTROSCOPY WITH BANDING;  Surgeon: Cayetano Stovall M.D.;  Location: ENDOSCOPY [facility];  Service:     FULL THICKNESS BLOCK RESECTION  4/11/2012    Performed by LANI BOWMAN at SURGERY [facility] ORS    CATH REMOVAL  7/20/2011    Performed by RUBY RUIZ at SURGERY [facility] ORS    CATH PLACEMENT  9/20/2010    Performed by RUBY RUIZ at SURGERY [facility] ORS    LOW ANTERIOR RESECTION ROBOTIC  8/10/2010    Performed by RUBY RUIZ at SURGERY [facility] ORS    COLON RESECTION      HIP ARTHROPLASTY MIS TOTAL      rt    HIP REPLACEMENT, TOTAL      OTHER      Cholecystectomy    OTHER (COMMENTS)      liver surgery         Past Social Hx:Torey Lima  reports that he has quit smoking. His smoking use included cigars. He has never used smokeless tobacco. He reports that he does not currently use alcohol. He reports that he does not use drugs.     Past Family Hx: Torey Lima family history includes Cancer in his mother; Heart Disease in his mother.     Problem list, medications, and allergies reviewed by myself today in Epic     Physical Exam:    Vitals:    12/03/24 1020   BP: 96/58   Pulse: 74   Temp: 36.6 °C (97.9 °F)   SpO2: 98%               Physical Exam  Constitutional:        General: He is not in acute distress.     Appearance: He is ill-appearing.   HENT:      Head: Normocephalic.        Comments: Left buccal skin tear approx 1cm     Right Ear: Hearing normal.      Left Ear: Hearing normal.      Nose: Nose normal.   Eyes:      General: Lids are normal.      Pupils: Pupils are equal, round, and reactive to light.      Comments: Bilateral mildly icteric sclera   Cardiovascular:      Heart sounds: Heart sounds are distant.   Abdominal:      General: There is distension.      Tenderness: There is no abdominal tenderness.   Musculoskeletal:      Right lower le+ Edema present.      Left lower leg: 3+ Edema present.   Skin:     Coloration: Skin is jaundiced.      Findings: Bruising, ecchymosis, signs of injury and laceration present.             Comments: Left forearm with diffuse ecchymosis and 2 skin tears.  See photo       Physical Exam      Diagnostics:  Results for orders placed or performed during the hospital encounter of 11/15/24   Comp Metabolic Panel    Collection Time: 11/15/24  8:30 AM   Result Value Ref Range    Sodium 135 135 - 145 mmol/L    Potassium 4.4 3.6 - 5.5 mmol/L    Chloride 102 96 - 112 mmol/L    Co2 23 20 - 33 mmol/L    Anion Gap 10.0 7.0 - 16.0    Glucose 143 (H) 65 - 99 mg/dL    Bun 27 (H) 8 - 22 mg/dL    Creatinine 1.48 (H) 0.50 - 1.40 mg/dL    Calcium 9.2 8.5 - 10.5 mg/dL    Correct Calcium 9.4 8.5 - 10.5 mg/dL    AST(SGOT) 34 12 - 45 U/L    ALT(SGPT) 26 2 - 50 U/L    Alkaline Phosphatase 103 (H) 30 - 99 U/L    Total Bilirubin 2.4 (H) 0.1 - 1.5 mg/dL    Albumin 3.7 3.2 - 4.9 g/dL    Total Protein 6.5 6.0 - 8.2 g/dL    Globulin 2.8 1.9 - 3.5 g/dL    A-G Ratio 1.3 g/dL   ESTIMATED GFR    Collection Time: 11/15/24  8:30 AM   Result Value Ref Range    GFR (CKD-EPI) 47 (A) >60 mL/min/1.73 m 2     *Note: Due to a large number of results and/or encounters for the requested time period, some results have not been displayed. A complete set of results can be found in  Results Review.     Results      Medical Decision making and plan :  1. Near syncope  - EKG    2. Skin tear of left elbow without complication, initial encounter  - Laceration Repair    3. Fall, initial encounter  - EKG    4. Laceration of face without complication, initial encounter  - Laceration Repair      Pleasant 79 y.o. male presented clinic with:     Assessment & Plan  1. Esophageal varices.  His history of esophageal varices necessitates careful monitoring of blood levels to detect any potential bleeding or varices. A CBC has been ordered to assess his current condition. He is scheduled to see Dr. Sheriff on the 16th for the next endoscopy.    2. Suspected syncope.  Given his history and the recent episode of suspected syncope, further investigations are required. An EKG has been ordered. He has been advised to seek immediate medical attention at the ER for further evaluation.    3. Jaundice.  Mild jaundice was observed, likely related to his liver condition. Continued monitoring and management of his liver health are recommended.    4. Liver metastasis from colon cancer.  He has a history of colon cancer that metastasized to the liver. He is currently under the care of Dr. Culp at [facility], with follow-up visits conducted via Zoom.    5. Hepatic vein blood clot.  He has a blood clot in the hepatic vein, and due to his bleeding risk, he cannot take blood thinners. Continued monitoring and management of his condition are necessary.    6. Paracentesis.  He has had three paracentesis procedures in less than a month and requires another one soon. An appointment for the procedure needs to be scheduled.    Wounds dressed, but with his history and multiple comorbidities I am recommending transfer to the emergency department for further evaluation.  I do believe he had a syncopal event.    Disposition:  Instructed to be seen in the emergency department for further evaluation.  Attestation       Voice Recognition  Disclaimer:  Portions of this document were created using voice recognition software and [employer] technology provided by [employer]. The software does have a chance of producing errors of grammar and possibly content. I have made every reasonable attempt to correct obvious errors, but there may be errors of grammar and possibly content that I did not discover before finalizing the  documentation.    JOHN May.

## 2024-12-05 ENCOUNTER — HOSPITAL ENCOUNTER (OUTPATIENT)
Dept: RADIOLOGY | Facility: MEDICAL CENTER | Age: 79
End: 2024-12-05
Attending: INTERNAL MEDICINE
Payer: MEDICARE

## 2024-12-05 ENCOUNTER — PATIENT OUTREACH (OUTPATIENT)
Dept: HEALTH INFORMATION MANAGEMENT | Facility: OTHER | Age: 79
End: 2024-12-05

## 2024-12-05 DIAGNOSIS — I47.10 PAROXYSMAL SVT (SUPRAVENTRICULAR TACHYCARDIA) (HCC): ICD-10-CM

## 2024-12-05 DIAGNOSIS — K76.9 CHRONIC LIVER DISEASE AND CIRRHOSIS (HCC): ICD-10-CM

## 2024-12-05 DIAGNOSIS — E78.5 DYSLIPIDEMIA: ICD-10-CM

## 2024-12-05 DIAGNOSIS — I48.91 ATRIAL FIBRILLATION, UNSPECIFIED TYPE (HCC): ICD-10-CM

## 2024-12-05 DIAGNOSIS — K74.60 CHRONIC LIVER DISEASE AND CIRRHOSIS (HCC): ICD-10-CM

## 2024-12-05 DIAGNOSIS — Z91.81 RISK FOR FALLS: ICD-10-CM

## 2024-12-05 DIAGNOSIS — I10 ESSENTIAL HYPERTENSION: ICD-10-CM

## 2024-12-05 DIAGNOSIS — R18.8 OTHER ASCITES: ICD-10-CM

## 2024-12-05 PROCEDURE — 49083 ABD PARACENTESIS W/IMAGING: CPT

## 2024-12-05 NOTE — PROGRESS NOTES
Received referral from both the ED and pt's PCP. Contacted pt to introduce the PCM program. Unable to reach; LVM w/ contact information.

## 2024-12-07 PROCEDURE — RXMED WILLOW AMBULATORY MEDICATION CHARGE: Performed by: STUDENT IN AN ORGANIZED HEALTH CARE EDUCATION/TRAINING PROGRAM

## 2024-12-10 ENCOUNTER — TELEPHONE (OUTPATIENT)
Dept: CARDIOLOGY | Facility: MEDICAL CENTER | Age: 79
End: 2024-12-10
Payer: MEDICARE

## 2024-12-10 NOTE — LETTER
PROCEDURE/SURGERY CLEARANCE FORM      Encounter Date: 12/10/2024    Patient: Torey Lima  YOB: 1945    CARDIOLOGIST:  Nani Caal P.A.-C.    REFERRING DOCTOR:  No ref. provider found    The following procedure/surgery: EGD with deep sedation                                            PROCEDURE/SURGERY CLEARANCE FORM    Date: 12/10/2024   Patient Name: Torey Lima    Dear Surgeon or Proceduralist,      Thank you for your request for cardiac stratification of our mutual patient Torey Lima 1945. We have reviewed their [facility] records; and to the best of our understanding this patient has not had stenting, ablation, watchman, cardiothoracic surgery or hospitalization for cardiovascular reasons in the past 6 months.  Torey Lima has been seen within the past 15 months and is considered to have non-modifiable cardiac risk for this low-risk procedure/surgery. They may proceed from a cardiovascular standpoint and may hold their antiplatelet/anticoagulation as briefly as possible. Please have patient resume this medication when hemodynamically stable to do so.     Aspirin or Prasugrel   - hold 7 days prior to procedure/surgery, resume when hemodynamically stable      Clopidrogrel or Ticagrelor  - hold 7 days for all neurological procedures, hold 5 days prior to all other procedure/surgery,  resume when hemodynamically stable     Warfarin - hold 7 days for all neurological procedures, hold 5 days prior to all other procedure/surgery and coordinate with [facility] ([phone]) INR testing and dose management.      Pradaxa/Xarelto/Eliquis/Savesya - hold 1 day prior to procedure for low bleeding risk procedure, 2 days for high bleeding risk procedure, or consider holding 3 days or longer for patients with reduced kidney function (CrCl <30mL/min) or spinal/cranial surgeries/procedures.      If they have a mechanical heart valve, please coordinate with [facility]  Anticoagulation Service ([phone]) the proper management of their anticoagulant in the periprocedural or perioperative period.      Some patients have higher risk for cardiovascular complications or holding medication. If our patient has had prior complications of holding antiplatelet or anticoagulants in the past and we have seen them after these events, we have addressed these concerns with the patient. They are at an unknown degree of increased risk for recurrent complication.  You may hold anticoagulation/antiplatelets for the procedure or surgery if the benefits of the procedure or surgery outweigh this nonmodifiable risk.      If Torey Lima 1945 has new symptoms of heart failure decompensation, unstable arrythmia, or angina please reach out and we will assess the patient.      If you have other patient-specific concerns, please feel free to reach out to the patient's cardiologist directly at [phone].     Thank you,       [facility]

## 2024-12-11 NOTE — TELEPHONE ENCOUNTER
Last OV: 10/08/2024  Proposed Surgery: EGD with deep sedation  Surgery Date: 12/16/2024  Requesting Office Name: [facility]   Fax Number: [phone]  Preference of Location (default is surgery center unless specified by Cardiologist or NICOLLE)  Prior Clearance Addressed: No    Is this a general clearance? YES   Anticoags/Antiplatelets: Other none   Anticoags/Antiplatelet managed by Cardiology? NA    Outstanding Cardiac Imaging : No  Ablation, Cardioversion, Stent, Cardiac Devices, Catheterization, Watchman: Yes  Date : 05/04/2023   TAVR/Valve, Mitral Clip, Watchman (including open heart),: N/A   Recent Cardiac Hospitalization: No            When: N/A  History (cardiac history):   Past Medical History:   Diagnosis Date    Arrhythmia April 1, 2023    Dr. Hall    ASCVD (arteriosclerotic cardiovascular disease) 12/14/2012    BMI 33.0-33.9,adult 02/15/2013    Breath shortness April 1, 2023    Dr. 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 NO  Ablation, Cardioversion, Watchman, Stents, Cath, Devices within the last 3 months? No   If yes- Send dental wait letter, do not forward to provider for review.     TAVR / Valve, Mitral clip within the last 6 months? No  If yes- Send dental wait letter, do not forward to provider for review.     If completing a general clearance,  continue per protocol.           Surgical Clearance Letter Sent: YES   **Scan clearance request letter into [facility].**

## 2024-12-12 PROCEDURE — RXMED WILLOW AMBULATORY MEDICATION CHARGE: Performed by: STUDENT IN AN ORGANIZED HEALTH CARE EDUCATION/TRAINING PROGRAM

## 2024-12-12 PROCEDURE — RXMED WILLOW AMBULATORY MEDICATION CHARGE: Performed by: INTERNAL MEDICINE

## 2024-12-12 ASSESSMENT — PATIENT HEALTH QUESTIONNAIRE - PHQ9: CLINICAL INTERPRETATION OF PHQ2 SCORE: 0

## 2024-12-13 ENCOUNTER — PHARMACY VISIT (OUTPATIENT)
Dept: PHARMACY | Facility: MEDICAL CENTER | Age: 79
End: 2024-12-13
Payer: COMMERCIAL

## 2024-12-13 NOTE — PROGRESS NOTES
INITIAL CARE MANAGEMENT CARE PLAN/ASSESSMENT     Torey is a pleasant 78 y/o male. He was referred to PCM for Afib and HTN. Pt also has a diagnosis of dyslipidemia. Torey confirmed his name and date of birth and consented to intake. He said his support system consists of his spouse Lorelei and his daughter Samantha. He provided verbal consent to discuss past and present medical history w/ Lorelei if he is not available. He and his spouse live in a private residence w/ a few steps. He has been practicing as an , but will be retiring soon. He is independent in his ADLs/IADLs, but he said his wife cooks. He uses pill boxes and gets his medications delivered. He did have a fall about two weeks ago; he said he tripped. He would be interested in attending PT. We talked about fall prevention measures. He has an active referral. He has a hx of colon cancer w/ liver metastasis and ascites. He had a scheduled paracentesis last week. He sees GI. He has a hx of hypertension, but also has episodes of hypotension. He takes midodrine. He sees cardiology for management of Afib, SVT, and blood pressure. No other questions, concerns, or needs at this time.     Medication Self-Management Goals:     Reviewed medications listed below with patient.      Current Outpatient Medications:     midodrine (PROAMATINE) 10 MG tablet, Take 1 Tablet by mouth 3 times a day with meals for 120 days., Disp: 90 Tablet, Rfl: 3    clindamycin (CLEOCIN) 1 % Solution, Apply 2 mL topically 2 times a day. To lower legs, Disp: 60 mL, Rfl: 2    Magnesium Glycinate 100 MG Cap, Take 1 Capsule by mouth every day with lunch., Disp: , Rfl:     ursodiol (JUNE 250) 250 MG Tab, Take 1 tablet (250 mg total) by mouth every morning, afternoon, and evening., Disp: 270 Tablet, Rfl: 3    metoprolol SR (TOPROL XL) 25 MG TABLET SR 24 HR, Take 0.5 Tablets by mouth every day., Disp: 50 Tablet, Rfl: 3    atorvastatin (LIPITOR) 40 MG Tab, Take 1 Tablet by mouth every evening.,  Disp: 100 Tablet, Rfl: 4    potassium chloride SA (KDUR) 20 MEQ Tab CR, Take 1 tablet (20 mEq total) by mouth daily, Disp: 90 Tablet, Rfl: 2    calcium carbonate (OS-AMPARO 500) 1250 (500 Ca) MG Tab, Take 1 Tablet by mouth every day., Disp: 100 Tablet, Rfl: 3    torsemide (DEMADEX) 10 MG tablet, Take 1 Tablet by mouth 2 times a day., Disp: 200 Tablet, Rfl: 1    hydrocortisone 2.5 % Cream topical cream, Apply 1 Application topically 2 times a day., Disp: 453 g, Rfl: 0    spironolactone (ALDACTONE) 50 MG Tab, Take 1 Tablet by mouth 2 times a day., Disp: 180 Tablet, Rfl: 3    ferrous sulfate 325 (65 Fe) MG tablet, Take 1 Tablet by mouth every morning with breakfast., Disp: 100 Tablet, Rfl: 3    Cholecalciferol (VITAMIN D3) 1000 UNIT Cap, Take 1 Capsule by mouth every day., Disp: 100 Capsule, Rfl: 3    folic acid (FOLVITE) 1 MG Tab, Take 1 Tablet by mouth every day., Disp: 100 Tablet, Rfl: 4    acetaminophen (TYLENOL) 500 MG Tab, Take 1,000 mg by mouth every 6 hours as needed (For headache)., Disp: , Rfl:     gabapentin (NEURONTIN) 100 MG Cap, Take 1 Capsule by mouth 2 times a day. (Patient not taking: Reported on 12/3/2024), Disp: 1 Capsule, Rfl: 0    diclofenac sodium (VOLTAREN) 1 % Gel, Apply 2 g topically 4 times a day as needed (left knee pain). (Patient not taking: Reported on 12/3/2024), Disp: 50 g, Rfl: 0         Goal:  Maintain medication regimen as prescribed        Physical/Functional/Environmental Status:     Activities of Daily Living:  Bathing: independent   Dressing: independent  Grooming: independent  Mouth Care: independent  Toileting: independent  Climbing a Flight of Stairs: independent    Independent Activities of Daily Living:  Shopping: independent  Cooking: needs assistance  Managing Medications: independent  Using the phone and looking up numbers: independent  Driving or using public transportation: independent  Managing Finances: independent        12/12/2024     2:40 PM 12/12/2024     2:42 PM    STEADI Fall Risk   STEADI Risk for Falling Score  9   One or more falls in the last year Yes Yes   Advised to use a cane or walker to get around safely  Yes   Feels unsteady when walking  Yes   Steadies self on furniture while walking at home  No   Worried about falling  Yes   Needs to push with hands when rising from a chair  Yes   Has trouble stepping up onto a curb / using stairs  Yes   Often has to rush to the toilet  Yes   Has lost some feeling in feet  No   Takes medicine that makes him/her feel lightheaded or more tired than usual  No   Takes medicine to sleep or improve mood  No   Often feels sad or depressed  No         Goal:  Prevent falls     Social Determinants of Health       Financial Status:      Financial Resource Strain: Low Risk  (8/27/2024)    Overall Financial Resource Strain (CARDIA)     Difficulty of Paying Living Expenses: Not hard at all         Referred to CHW/SW:  NA       Transportation Status:      Transportation Needs: No Transportation Needs (10/3/2024)    PRAPARE - Transportation     Lack of Transportation (Medical): No     Lack of Transportation (Non-Medical): No        Referred to CHW/SW:  NA      Food Insecurity:      Food Insecurity: No Food Insecurity (10/3/2024)    Hunger Vital Sign     Worried About Running Out of Food in the Last Year: Never true     Ran Out of Food in the Last Year: Never true        Referred to CHW/SW:  NA       Housing Status:     Housing Stability: Low Risk  (10/3/2024)    Housing Stability Vital Sign     Unable to Pay for Housing in the Last Year: No     Number of Times Moved in the Last Year: 1     Homeless in the Last Year: No        Referred to CHW/SW:  NA      Social Connections:     Social Connections: Moderately Isolated (8/27/2024)    Social Connection and Isolation Panel [NHANES]     Frequency of Communication with Friends and Family: More than three times a week     Frequency of Social Gatherings with Friends and Family: Once a week     Attends  Catholic Services: Never     Active Member of Clubs or Organizations: No     Attends Club or Organization Meetings: Never     Marital Status:         Referred to CHW/SW:  ALLEN      Mental/Behavioral/Psychosocial Status:        10/3/2024    12:40 PM 11/11/2024    11:30 AM 12/5/2024     2:12 PM   Depression Screen (PHQ-2/PHQ-9)   PHQ-2 Total Score 0     PHQ-2 Total Score  0 0       Interpretation of PHQ-9 Total Score   Score Severity   1-4 No Depression   5-9 Mild Depression   10-14 Moderate Depression   15-19 Moderately Severe Depression   20-27 Severe Depression       Goal:  N/A    Review of Benefits    This service is an included benefit with your insurance plan, for all other benefits a copy of the website and phone number have been provided for any questions.    Phone Number and Website provided for questions:    [facility] [employer]:  [phone]   [email or web address]    Advance Planning    Do you have an Advance Directive?  Yes  Is there one on file? No (If No, advise patient to bring a copy to appointment.)      (If no, a copy can be provided for patient.)    Would you like an Advance Directive Packet sent to you? NA      Chronic Care Management Care Plan    Care Plans       Chronic Care Management (CMS)    Met: 0 of 10 Met: 0 of 10      No Outcome (10)       Demonstrate Knowledge of Hypertension - Short Term (Knowledge of hypertension)  Disciplines:  Interdisciplinary  Expected end:  04/30/25     Demonstrate factors that can contribute to high blood pressure - Short Term (Knowledge of factors contributing to hypertension)  Disciplines:  Interdisciplinary  Expected end:  04/30/25     Identify which modifiable health habits can be modified - Short Term (Recognize current health habits that may contribute to hypertension)  Disciplines:  Interdisciplinary  Expected end:  04/30/25     Identify barriers to managing blood pressure - Short Term (Patient barriers to managing high blood  pressure)  Disciplines:  Interdisciplinary  Expected end:  04/30/25     Demonstrate the elements of self-monitoring blood pressure - Short Term (Knowledge of self-monitoring blood pressure)  Disciplines:  Interdisciplinary  Expected end:  01/31/25     Improve medication adherence - Short Term (Adherence to prescribed medications)  Disciplines:  Interdisciplinary  Expected end:  02/28/25     Reduce the Risk of Falls and Fall Related Injuries - Long Term (Risk of Falls)  Disciplines:  Nurse, Interdisciplinary  Expected end:  09/30/25     Demonstrate ability to verbalize fall safety concerns - Short Term (Knowledge surrounding fall safety)  Disciplines:  Nurse, Interdisciplinary  Expected end:  04/30/25     Patient Stated Goal: Promote Cardiovascular Health - Long Term (Patient Stated Problem: Atrial Fibrillation )  Disciplines:  Nurse, Interdisciplinary  Expected end:  09/30/25   Patient-stated:  Yes     Patient Stated Goal: Promote Cardiovascular Health - Long Term (Patient Stated Problem: Dyslipidemia)  Disciplines:  Nurse, Interdisciplinary  Expected end:  09/30/25   Patient-stated:  Yes                               Discussion of Self Management of Care:       HTN: monitors home BP, adheres to med regimen, is followed by cardiology  Afib: Monitors vitals, loop recorder, metoprolol, not currently on anticoagulation, has discussed watchman w/ cardiology, previously referred to [facility]  Dyslipidemia: Diet, medication    Barriers to Goals: Disease processes, aging, memory    Resources Provided:  PT information      Next Scheduled patient outreach:  January, 2025

## 2024-12-15 ENCOUNTER — NON-PROVIDER VISIT (OUTPATIENT)
Dept: CARDIOLOGY | Facility: MEDICAL CENTER | Age: 79
End: 2024-12-15
Payer: MEDICARE

## 2024-12-16 PROCEDURE — 93298 REM INTERROG DEV EVAL SCRMS: CPT | Mod: 26 | Performed by: STUDENT IN AN ORGANIZED HEALTH CARE EDUCATION/TRAINING PROGRAM

## 2024-12-16 NOTE — CARDIAC REMOTE MONITOR - SCAN
Device transmission reviewed. Device demonstrated appropriate function.       Electronically Signed by: Toya Gaines MD, PhD    12/17/2024  4:00 PM

## 2025-01-01 ENCOUNTER — PATIENT OUTREACH (OUTPATIENT)
Dept: HEALTH INFORMATION MANAGEMENT | Facility: OTHER | Age: 80
End: 2025-01-01
Payer: MEDICARE

## 2025-01-01 ENCOUNTER — HOSPITAL ENCOUNTER (OUTPATIENT)
Dept: LAB | Facility: MEDICAL CENTER | Age: 80
End: 2025-03-11
Payer: MEDICARE

## 2025-01-01 ENCOUNTER — HOSPITAL ENCOUNTER (INPATIENT)
Facility: MEDICAL CENTER | Age: 80
LOS: 5 days | DRG: 442 | End: 2025-03-20
Attending: EMERGENCY MEDICINE | Admitting: STUDENT IN AN ORGANIZED HEALTH CARE EDUCATION/TRAINING PROGRAM
Payer: MEDICARE

## 2025-01-01 ENCOUNTER — APPOINTMENT (OUTPATIENT)
Dept: RADIOLOGY | Facility: MEDICAL CENTER | Age: 80
DRG: 442 | End: 2025-01-01
Attending: EMERGENCY MEDICINE
Payer: MEDICARE

## 2025-01-01 ENCOUNTER — HOME CARE VISIT (OUTPATIENT)
Dept: HOSPICE | Facility: HOSPICE | Age: 80
End: 2025-01-01
Payer: MEDICARE

## 2025-01-01 ENCOUNTER — TELEPHONE (OUTPATIENT)
Dept: MEDICAL GROUP | Age: 80
End: 2025-01-01
Payer: MEDICARE

## 2025-01-01 ENCOUNTER — PHARMACY VISIT (OUTPATIENT)
Dept: PHARMACY | Facility: MEDICAL CENTER | Age: 80
End: 2025-01-01
Payer: COMMERCIAL

## 2025-01-01 ENCOUNTER — APPOINTMENT (OUTPATIENT)
Dept: RADIOLOGY | Facility: MEDICAL CENTER | Age: 80
DRG: 442 | End: 2025-01-01
Attending: HOSPITALIST
Payer: MEDICARE

## 2025-01-01 ENCOUNTER — NON-PROVIDER VISIT (OUTPATIENT)
Dept: CARDIOLOGY | Facility: MEDICAL CENTER | Age: 80
End: 2025-01-01
Payer: MEDICARE

## 2025-01-01 ENCOUNTER — HOSPICE ADMISSION (OUTPATIENT)
Dept: HOSPICE | Facility: HOSPICE | Age: 80
End: 2025-01-01
Payer: MEDICARE

## 2025-01-01 VITALS
HEART RATE: 126 BPM | DIASTOLIC BLOOD PRESSURE: 38 MMHG | TEMPERATURE: 97.7 F | OXYGEN SATURATION: 73 % | SYSTOLIC BLOOD PRESSURE: 61 MMHG | BODY MASS INDEX: 28.04 KG/M2 | WEIGHT: 225.53 LBS | HEIGHT: 75 IN | RESPIRATION RATE: 15 BRPM

## 2025-01-01 DIAGNOSIS — I10 ESSENTIAL HYPERTENSION: ICD-10-CM

## 2025-01-01 DIAGNOSIS — R11.10 VOMITING, UNSPECIFIED VOMITING TYPE, UNSPECIFIED WHETHER NAUSEA PRESENT: ICD-10-CM

## 2025-01-01 DIAGNOSIS — F01.50 VASCULAR DEMENTIA WITHOUT BEHAVIORAL DISTURBANCE, PSYCHOTIC DISTURBANCE, MOOD DISTURBANCE, OR ANXIETY, UNSPECIFIED DEMENTIA SEVERITY (HCC): ICD-10-CM

## 2025-01-01 DIAGNOSIS — R41.0 CONFUSION: ICD-10-CM

## 2025-01-01 DIAGNOSIS — Z91.81 RISK FOR FALLS: ICD-10-CM

## 2025-01-01 DIAGNOSIS — R53.1 GENERALIZED WEAKNESS: ICD-10-CM

## 2025-01-01 DIAGNOSIS — I48.91 ATRIAL FIBRILLATION, UNSPECIFIED TYPE (HCC): ICD-10-CM

## 2025-01-01 LAB
AFP-TM SERPL-MCNC: 2 NG/ML (ref 0–9)
ALBUMIN SERPL BCP-MCNC: 3.1 G/DL (ref 3.2–4.9)
ALBUMIN SERPL BCP-MCNC: 3.5 G/DL (ref 3.2–4.9)
ALBUMIN SERPL BCP-MCNC: 3.6 G/DL (ref 3.2–4.9)
ALBUMIN/GLOB SERPL: 1.2 G/DL
ALBUMIN/GLOB SERPL: 1.2 G/DL
ALBUMIN/GLOB SERPL: 1.3 G/DL
ALP SERPL-CCNC: 107 U/L (ref 30–99)
ALP SERPL-CCNC: 87 U/L (ref 30–99)
ALP SERPL-CCNC: 99 U/L (ref 30–99)
ALT SERPL-CCNC: 21 U/L (ref 2–50)
ALT SERPL-CCNC: 27 U/L (ref 2–50)
ALT SERPL-CCNC: 27 U/L (ref 2–50)
AMMONIA PLAS-SCNC: 170 UMOL/L (ref 11–45)
AMMONIA PLAS-SCNC: 83 UMOL/L (ref 11–45)
ANION GAP SERPL CALC-SCNC: 10 MMOL/L (ref 7–16)
ANION GAP SERPL CALC-SCNC: 10 MMOL/L (ref 7–16)
ANION GAP SERPL CALC-SCNC: 12 MMOL/L (ref 7–16)
ANISOCYTOSIS BLD QL SMEAR: ABNORMAL
APPEARANCE UR: ABNORMAL
AST SERPL-CCNC: 28 U/L (ref 12–45)
AST SERPL-CCNC: 29 U/L (ref 12–45)
AST SERPL-CCNC: 39 U/L (ref 12–45)
BACTERIA #/AREA URNS HPF: ABNORMAL /HPF
BACTERIA BLD CULT: NORMAL
BACTERIA BLD CULT: NORMAL
BACTERIA UR CULT: NORMAL
BASOPHILS # BLD AUTO: 0.5 % (ref 0–1.8)
BASOPHILS # BLD AUTO: 0.6 % (ref 0–1.8)
BASOPHILS # BLD: 0.03 K/UL (ref 0–0.12)
BASOPHILS # BLD: 0.03 K/UL (ref 0–0.12)
BILIRUB SERPL-MCNC: 1.6 MG/DL (ref 0.1–1.5)
BILIRUB SERPL-MCNC: 1.7 MG/DL (ref 0.1–1.5)
BILIRUB SERPL-MCNC: 2 MG/DL (ref 0.1–1.5)
BILIRUB UR QL STRIP.AUTO: NEGATIVE
BUN SERPL-MCNC: 30 MG/DL (ref 8–22)
BUN SERPL-MCNC: 31 MG/DL (ref 8–22)
BUN SERPL-MCNC: 34 MG/DL (ref 8–22)
BURR CELLS BLD QL SMEAR: NORMAL
CALCIUM ALBUM COR SERPL-MCNC: 9.3 MG/DL (ref 8.5–10.5)
CALCIUM ALBUM COR SERPL-MCNC: 9.7 MG/DL (ref 8.5–10.5)
CALCIUM ALBUM COR SERPL-MCNC: 9.8 MG/DL (ref 8.5–10.5)
CALCIUM SERPL-MCNC: 8.6 MG/DL (ref 8.5–10.5)
CALCIUM SERPL-MCNC: 9.4 MG/DL (ref 8.5–10.5)
CALCIUM SERPL-MCNC: 9.4 MG/DL (ref 8.5–10.5)
CASTS URNS QL MICRO: ABNORMAL /LPF (ref 0–2)
CHLORIDE SERPL-SCNC: 103 MMOL/L (ref 96–112)
CHLORIDE SERPL-SCNC: 108 MMOL/L (ref 96–112)
CHLORIDE SERPL-SCNC: 108 MMOL/L (ref 96–112)
CO2 SERPL-SCNC: 19 MMOL/L (ref 20–33)
CO2 SERPL-SCNC: 20 MMOL/L (ref 20–33)
CO2 SERPL-SCNC: 23 MMOL/L (ref 20–33)
COLOR UR: YELLOW
COMMENT 1642: NORMAL
CREAT SERPL-MCNC: 1.5 MG/DL (ref 0.5–1.4)
CREAT SERPL-MCNC: 1.53 MG/DL (ref 0.5–1.4)
CREAT SERPL-MCNC: 1.68 MG/DL (ref 0.5–1.4)
EKG IMPRESSION: NORMAL
EKG IMPRESSION: NORMAL
EOSINOPHIL # BLD AUTO: 0.1 K/UL (ref 0–0.51)
EOSINOPHIL # BLD AUTO: 0.21 K/UL (ref 0–0.51)
EOSINOPHIL NFR BLD: 1.9 % (ref 0–6.9)
EOSINOPHIL NFR BLD: 3.4 % (ref 0–6.9)
EPITHELIAL CELLS 1715: ABNORMAL /HPF (ref 0–5)
ERYTHROCYTE [DISTWIDTH] IN BLOOD BY AUTOMATED COUNT: 64.4 FL (ref 35.9–50)
ERYTHROCYTE [DISTWIDTH] IN BLOOD BY AUTOMATED COUNT: 64.6 FL (ref 35.9–50)
ERYTHROCYTE [DISTWIDTH] IN BLOOD BY AUTOMATED COUNT: 65.2 FL (ref 35.9–50)
FLUAV RNA SPEC QL NAA+PROBE: NEGATIVE
FLUBV RNA SPEC QL NAA+PROBE: NEGATIVE
GFR SERPLBLD CREATININE-BSD FMLA CKD-EPI: 41 ML/MIN/1.73 M 2
GFR SERPLBLD CREATININE-BSD FMLA CKD-EPI: 45 ML/MIN/1.73 M 2
GFR SERPLBLD CREATININE-BSD FMLA CKD-EPI: 47 ML/MIN/1.73 M 2
GLOBULIN SER CALC-MCNC: 2.5 G/DL (ref 1.9–3.5)
GLOBULIN SER CALC-MCNC: 2.7 G/DL (ref 1.9–3.5)
GLOBULIN SER CALC-MCNC: 3 G/DL (ref 1.9–3.5)
GLUCOSE SERPL-MCNC: 108 MG/DL (ref 65–99)
GLUCOSE SERPL-MCNC: 109 MG/DL (ref 65–99)
GLUCOSE SERPL-MCNC: 122 MG/DL (ref 65–99)
GLUCOSE UR STRIP.AUTO-MCNC: NEGATIVE MG/DL
HCT VFR BLD AUTO: 29.8 % (ref 42–52)
HCT VFR BLD AUTO: 30 % (ref 42–52)
HCT VFR BLD AUTO: 33.3 % (ref 42–52)
HGB BLD-MCNC: 10.4 G/DL (ref 14–18)
HGB BLD-MCNC: 9.5 G/DL (ref 14–18)
HGB BLD-MCNC: 9.7 G/DL (ref 14–18)
IMM GRANULOCYTES # BLD AUTO: 0.02 K/UL (ref 0–0.11)
IMM GRANULOCYTES # BLD AUTO: 0.03 K/UL (ref 0–0.11)
IMM GRANULOCYTES NFR BLD AUTO: 0.3 % (ref 0–0.9)
IMM GRANULOCYTES NFR BLD AUTO: 0.6 % (ref 0–0.9)
INR PPP: 1.9 (ref 0.87–1.13)
KETONES UR STRIP.AUTO-MCNC: ABNORMAL MG/DL
LACTATE SERPL-SCNC: 1.8 MMOL/L (ref 0.5–2)
LEUKOCYTE ESTERASE UR QL STRIP.AUTO: ABNORMAL
LIPASE SERPL-CCNC: 39 U/L (ref 11–82)
LYMPHOCYTES # BLD AUTO: 0.35 K/UL (ref 1–4.8)
LYMPHOCYTES # BLD AUTO: 0.38 K/UL (ref 1–4.8)
LYMPHOCYTES NFR BLD: 6.2 % (ref 22–41)
LYMPHOCYTES NFR BLD: 6.7 % (ref 22–41)
MAGNESIUM SERPL-MCNC: 2.1 MG/DL (ref 1.5–2.5)
MCH RBC QN AUTO: 31 PG (ref 27–33)
MCH RBC QN AUTO: 31.4 PG (ref 27–33)
MCH RBC QN AUTO: 31.9 PG (ref 27–33)
MCHC RBC AUTO-ENTMCNC: 31.2 G/DL (ref 32.3–36.5)
MCHC RBC AUTO-ENTMCNC: 31.9 G/DL (ref 32.3–36.5)
MCHC RBC AUTO-ENTMCNC: 32.3 G/DL (ref 32.3–36.5)
MCV RBC AUTO: 98.3 FL (ref 81.4–97.8)
MCV RBC AUTO: 98.7 FL (ref 81.4–97.8)
MCV RBC AUTO: 99.1 FL (ref 81.4–97.8)
MICRO URNS: ABNORMAL
MICROCYTES BLD QL SMEAR: ABNORMAL
MONOCYTES # BLD AUTO: 0.66 K/UL (ref 0–0.85)
MONOCYTES # BLD AUTO: 0.81 K/UL (ref 0–0.85)
MONOCYTES NFR BLD AUTO: 12.7 % (ref 0–13.4)
MONOCYTES NFR BLD AUTO: 13.1 % (ref 0–13.4)
MORPHOLOGY BLD-IMP: NORMAL
NEUTROPHILS # BLD AUTO: 4.02 K/UL (ref 1.82–7.42)
NEUTROPHILS # BLD AUTO: 4.71 K/UL (ref 1.82–7.42)
NEUTROPHILS NFR BLD: 76.5 % (ref 44–72)
NEUTROPHILS NFR BLD: 77.5 % (ref 44–72)
NITRITE UR QL STRIP.AUTO: NEGATIVE
NRBC # BLD AUTO: 0 K/UL
NRBC # BLD AUTO: 0 K/UL
NRBC BLD-RTO: 0 /100 WBC (ref 0–0.2)
NRBC BLD-RTO: 0 /100 WBC (ref 0–0.2)
OVALOCYTES BLD QL SMEAR: NORMAL
PH UR STRIP.AUTO: 6.5 [PH] (ref 5–8)
PHOSPHATE SERPL-MCNC: 3 MG/DL (ref 2.5–4.5)
PLATELET # BLD AUTO: 60 K/UL (ref 164–446)
PLATELET # BLD AUTO: 62 K/UL (ref 164–446)
PLATELET # BLD AUTO: 83 K/UL (ref 164–446)
PLATELET BLD QL SMEAR: NORMAL
PLATELETS.RETICULATED NFR BLD AUTO: 2.5 % (ref 0.6–13.1)
PLATELETS.RETICULATED NFR BLD AUTO: 2.9 % (ref 0.6–13.1)
PLATELETS.RETICULATED NFR BLD AUTO: 3 % (ref 0.6–13.1)
PMV BLD AUTO: 10.5 FL (ref 9–12.9)
PMV BLD AUTO: 10.5 FL (ref 9–12.9)
PMV BLD AUTO: 9.6 FL (ref 9–12.9)
POIKILOCYTOSIS BLD QL SMEAR: NORMAL
POTASSIUM SERPL-SCNC: 4.3 MMOL/L (ref 3.6–5.5)
POTASSIUM SERPL-SCNC: 4.6 MMOL/L (ref 3.6–5.5)
POTASSIUM SERPL-SCNC: 5.3 MMOL/L (ref 3.6–5.5)
PROCALCITONIN SERPL-MCNC: 0.13 NG/ML
PROT SERPL-MCNC: 5.6 G/DL (ref 6–8.2)
PROT SERPL-MCNC: 6.2 G/DL (ref 6–8.2)
PROT SERPL-MCNC: 6.6 G/DL (ref 6–8.2)
PROT UR QL STRIP: NEGATIVE MG/DL
PROTHROMBIN TIME: 21.9 SEC (ref 12–14.6)
RBC # BLD AUTO: 3.03 M/UL (ref 4.7–6.1)
RBC # BLD AUTO: 3.04 M/UL (ref 4.7–6.1)
RBC # BLD AUTO: 3.36 M/UL (ref 4.7–6.1)
RBC # URNS HPF: ABNORMAL /HPF (ref 0–2)
RBC BLD AUTO: PRESENT
RBC UR QL AUTO: ABNORMAL
RSV RNA SPEC QL NAA+PROBE: NEGATIVE
SARS-COV-2 RNA RESP QL NAA+PROBE: NOTDETECTED
SCHISTOCYTES BLD QL SMEAR: NORMAL
SIGNIFICANT IND 70042: NORMAL
SITE SITE: NORMAL
SODIUM SERPL-SCNC: 136 MMOL/L (ref 135–145)
SODIUM SERPL-SCNC: 137 MMOL/L (ref 135–145)
SODIUM SERPL-SCNC: 140 MMOL/L (ref 135–145)
SOURCE SOURCE: NORMAL
SP GR UR STRIP.AUTO: 1.02
TROPONIN T SERPL-MCNC: 28 NG/L (ref 6–19)
UROBILINOGEN UR STRIP.AUTO-MCNC: 1 EU/DL
WBC # BLD AUTO: 5.2 K/UL (ref 4.8–10.8)
WBC # BLD AUTO: 5.9 K/UL (ref 4.8–10.8)
WBC # BLD AUTO: 6.2 K/UL (ref 4.8–10.8)
WBC #/AREA URNS HPF: ABNORMAL /HPF
ZINC SERPL-MCNC: 56.6 UG/DL (ref 60–120)

## 2025-01-01 PROCEDURE — 36415 COLL VENOUS BLD VENIPUNCTURE: CPT

## 2025-01-01 PROCEDURE — 80053 COMPREHEN METABOLIC PANEL: CPT

## 2025-01-01 PROCEDURE — 700111 HCHG RX REV CODE 636 W/ 250 OVERRIDE (IP): Mod: JZ | Performed by: HOSPITALIST

## 2025-01-01 PROCEDURE — 84484 ASSAY OF TROPONIN QUANT: CPT

## 2025-01-01 PROCEDURE — RXMED WILLOW AMBULATORY MEDICATION CHARGE

## 2025-01-01 PROCEDURE — 81001 URINALYSIS AUTO W/SCOPE: CPT

## 2025-01-01 PROCEDURE — 83735 ASSAY OF MAGNESIUM: CPT

## 2025-01-01 PROCEDURE — 99497 ADVNCD CARE PLAN 30 MIN: CPT | Performed by: INTERNAL MEDICINE

## 2025-01-01 PROCEDURE — 700102 HCHG RX REV CODE 250 W/ 637 OVERRIDE(OP): Performed by: HOSPITALIST

## 2025-01-01 PROCEDURE — 83605 ASSAY OF LACTIC ACID: CPT

## 2025-01-01 PROCEDURE — 700102 HCHG RX REV CODE 250 W/ 637 OVERRIDE(OP): Performed by: STUDENT IN AN ORGANIZED HEALTH CARE EDUCATION/TRAINING PROGRAM

## 2025-01-01 PROCEDURE — 71045 X-RAY EXAM CHEST 1 VIEW: CPT

## 2025-01-01 PROCEDURE — 93005 ELECTROCARDIOGRAM TRACING: CPT | Mod: TC | Performed by: EMERGENCY MEDICINE

## 2025-01-01 PROCEDURE — 700102 HCHG RX REV CODE 250 W/ 637 OVERRIDE(OP): Performed by: NURSE PRACTITIONER

## 2025-01-01 PROCEDURE — A9270 NON-COVERED ITEM OR SERVICE: HCPCS | Performed by: STUDENT IN AN ORGANIZED HEALTH CARE EDUCATION/TRAINING PROGRAM

## 2025-01-01 PROCEDURE — 770001 HCHG ROOM/CARE - MED/SURG/GYN PRIV*

## 2025-01-01 PROCEDURE — 700111 HCHG RX REV CODE 636 W/ 250 OVERRIDE (IP): Performed by: INTERNAL MEDICINE

## 2025-01-01 PROCEDURE — 700101 HCHG RX REV CODE 250: Performed by: HOSPITALIST

## 2025-01-01 PROCEDURE — 0241U HCHG SARS-COV-2 COVID-19 NFCT DS RESP RNA 4 TRGT ED POC: CPT

## 2025-01-01 PROCEDURE — 85027 COMPLETE CBC AUTOMATED: CPT

## 2025-01-01 PROCEDURE — 99497 ADVNCD CARE PLAN 30 MIN: CPT | Performed by: HOSPITALIST

## 2025-01-01 PROCEDURE — 82140 ASSAY OF AMMONIA: CPT

## 2025-01-01 PROCEDURE — 93005 ELECTROCARDIOGRAM TRACING: CPT | Mod: TC

## 2025-01-01 PROCEDURE — 85610 PROTHROMBIN TIME: CPT

## 2025-01-01 PROCEDURE — 97602 WOUND(S) CARE NON-SELECTIVE: CPT

## 2025-01-01 PROCEDURE — 99291 CRITICAL CARE FIRST HOUR: CPT | Performed by: STUDENT IN AN ORGANIZED HEALTH CARE EDUCATION/TRAINING PROGRAM

## 2025-01-01 PROCEDURE — 85055 RETICULATED PLATELET ASSAY: CPT

## 2025-01-01 PROCEDURE — 85025 COMPLETE CBC W/AUTO DIFF WBC: CPT

## 2025-01-01 PROCEDURE — 700105 HCHG RX REV CODE 258: Performed by: INTERNAL MEDICINE

## 2025-01-01 PROCEDURE — 99232 SBSQ HOSP IP/OBS MODERATE 35: CPT | Performed by: INTERNAL MEDICINE

## 2025-01-01 PROCEDURE — 83690 ASSAY OF LIPASE: CPT

## 2025-01-01 PROCEDURE — 99233 SBSQ HOSP IP/OBS HIGH 50: CPT | Mod: 25 | Performed by: INTERNAL MEDICINE

## 2025-01-01 PROCEDURE — A9270 NON-COVERED ITEM OR SERVICE: HCPCS | Performed by: HOSPITALIST

## 2025-01-01 PROCEDURE — 99233 SBSQ HOSP IP/OBS HIGH 50: CPT | Performed by: HOSPITALIST

## 2025-01-01 PROCEDURE — A9270 NON-COVERED ITEM OR SERVICE: HCPCS | Performed by: NURSE PRACTITIONER

## 2025-01-01 PROCEDURE — 70450 CT HEAD/BRAIN W/O DYE: CPT

## 2025-01-01 PROCEDURE — 99999 PR NO CHARGE: CPT | Performed by: INTERNAL MEDICINE

## 2025-01-01 PROCEDURE — 99418 PROLNG IP/OBS E/M EA 15 MIN: CPT | Performed by: INTERNAL MEDICINE

## 2025-01-01 PROCEDURE — 99285 EMERGENCY DEPT VISIT HI MDM: CPT

## 2025-01-01 PROCEDURE — 87086 URINE CULTURE/COLONY COUNT: CPT

## 2025-01-01 PROCEDURE — 93298 REM INTERROG DEV EVAL SCRMS: CPT | Mod: 26 | Performed by: INTERNAL MEDICINE

## 2025-01-01 PROCEDURE — 84100 ASSAY OF PHOSPHORUS: CPT

## 2025-01-01 PROCEDURE — 87040 BLOOD CULTURE FOR BACTERIA: CPT

## 2025-01-01 PROCEDURE — 99233 SBSQ HOSP IP/OBS HIGH 50: CPT | Mod: 25 | Performed by: HOSPITALIST

## 2025-01-01 PROCEDURE — 770006 HCHG ROOM/CARE - MED/SURG/GYN SEMI*

## 2025-01-01 PROCEDURE — 84630 ASSAY OF ZINC: CPT

## 2025-01-01 PROCEDURE — 82105 ALPHA-FETOPROTEIN SERUM: CPT

## 2025-01-01 PROCEDURE — 84145 PROCALCITONIN (PCT): CPT

## 2025-01-01 RX ORDER — SPIRONOLACTONE 25 MG/1
50 TABLET ORAL
Status: DISCONTINUED | OUTPATIENT
Start: 2025-01-01 | End: 2025-01-01

## 2025-01-01 RX ORDER — MORPHINE SULFATE 100 MG/5ML
10 SOLUTION ORAL
Refills: 0 | Status: DISCONTINUED | OUTPATIENT
Start: 2025-01-01 | End: 2025-01-01 | Stop reason: HOSPADM

## 2025-01-01 RX ORDER — SCOPOLAMINE 1 MG/3D
1 PATCH, EXTENDED RELEASE TRANSDERMAL
Status: DISCONTINUED | OUTPATIENT
Start: 2025-01-01 | End: 2025-01-01 | Stop reason: HOSPADM

## 2025-01-01 RX ORDER — LACTULOSE 10 G/15ML
30 SOLUTION ORAL 3 TIMES DAILY
Status: DISCONTINUED | OUTPATIENT
Start: 2025-01-01 | End: 2025-01-01

## 2025-01-01 RX ORDER — MORPHINE SULFATE 100 MG/5ML
20 SOLUTION ORAL
Refills: 0 | Status: DISCONTINUED | OUTPATIENT
Start: 2025-01-01 | End: 2025-01-01 | Stop reason: HOSPADM

## 2025-01-01 RX ORDER — METOPROLOL SUCCINATE 25 MG/1
12.5 TABLET, EXTENDED RELEASE ORAL DAILY
Status: DISCONTINUED | OUTPATIENT
Start: 2025-01-01 | End: 2025-01-01

## 2025-01-01 RX ORDER — MORPHINE SULFATE 4 MG/ML
4 INJECTION INTRAVENOUS
Status: DISCONTINUED | OUTPATIENT
Start: 2025-01-01 | End: 2025-01-01 | Stop reason: HOSPADM

## 2025-01-01 RX ORDER — QUETIAPINE FUMARATE 25 MG/1
25 TABLET, FILM COATED ORAL ONCE
Status: COMPLETED | OUTPATIENT
Start: 2025-01-01 | End: 2025-01-01

## 2025-01-01 RX ORDER — LORAZEPAM 2 MG/ML
1 INJECTION INTRAMUSCULAR
Status: DISCONTINUED | OUTPATIENT
Start: 2025-01-01 | End: 2025-01-01 | Stop reason: HOSPADM

## 2025-01-01 RX ORDER — MIDODRINE HYDROCHLORIDE 5 MG/1
10 TABLET ORAL
Status: DISCONTINUED | OUTPATIENT
Start: 2025-01-01 | End: 2025-01-01

## 2025-01-01 RX ORDER — ATORVASTATIN CALCIUM 40 MG/1
40 TABLET, FILM COATED ORAL NIGHTLY
Status: DISCONTINUED | OUTPATIENT
Start: 2025-01-01 | End: 2025-01-01

## 2025-01-01 RX ORDER — ATROPINE SULFATE 10 MG/ML
2 SOLUTION/ DROPS OPHTHALMIC EVERY 4 HOURS PRN
Status: DISCONTINUED | OUTPATIENT
Start: 2025-01-01 | End: 2025-01-01 | Stop reason: HOSPADM

## 2025-01-01 RX ORDER — ZINC SULFATE 50(220)MG
220 CAPSULE ORAL DAILY
Status: DISCONTINUED | OUTPATIENT
Start: 2025-01-01 | End: 2025-01-01

## 2025-01-01 RX ORDER — LORAZEPAM 2 MG/ML
1 CONCENTRATE ORAL
Status: DISCONTINUED | OUTPATIENT
Start: 2025-01-01 | End: 2025-01-01 | Stop reason: HOSPADM

## 2025-01-01 RX ADMIN — METOPROLOL SUCCINATE 12.5 MG: 25 TABLET, EXTENDED RELEASE ORAL at 05:56

## 2025-01-01 RX ADMIN — SPIRONOLACTONE 50 MG: 25 TABLET ORAL at 05:56

## 2025-01-01 RX ADMIN — ATROPINE SULFATE 2 DROP: 10 SOLUTION/ DROPS OPHTHALMIC at 04:38

## 2025-01-01 RX ADMIN — MORPHINE SULFATE 20 MG: 100 SOLUTION ORAL at 13:03

## 2025-01-01 RX ADMIN — LACTULOSE 30 ML: 10 SOLUTION ORAL at 21:04

## 2025-01-01 RX ADMIN — MORPHINE SULFATE 4 MG: 4 INJECTION, SOLUTION INTRAMUSCULAR; INTRAVENOUS at 04:31

## 2025-01-01 RX ADMIN — MORPHINE SULFATE 4 MG: 4 INJECTION, SOLUTION INTRAMUSCULAR; INTRAVENOUS at 13:39

## 2025-01-01 RX ADMIN — APIXABAN 5 MG: 5 TABLET, FILM COATED ORAL at 04:49

## 2025-01-01 RX ADMIN — MIDAZOLAM 1 MG/HR: 5 INJECTION INTRAMUSCULAR; INTRAVENOUS at 18:02

## 2025-01-01 RX ADMIN — MORPHINE SULFATE 4 MG: 4 INJECTION, SOLUTION INTRAMUSCULAR; INTRAVENOUS at 08:55

## 2025-01-01 RX ADMIN — LORAZEPAM 1 MG: 2 INJECTION, SOLUTION INTRAMUSCULAR; INTRAVENOUS at 04:32

## 2025-01-01 RX ADMIN — MORPHINE SULFATE 4 MG: 4 INJECTION, SOLUTION INTRAMUSCULAR; INTRAVENOUS at 22:51

## 2025-01-01 RX ADMIN — LORAZEPAM 1 MG: 2 INJECTION, SOLUTION INTRAMUSCULAR; INTRAVENOUS at 08:55

## 2025-01-01 RX ADMIN — LORAZEPAM 1 MG: 2 INJECTION, SOLUTION INTRAMUSCULAR; INTRAVENOUS at 17:17

## 2025-01-01 RX ADMIN — MIDODRINE HYDROCHLORIDE 10 MG: 5 TABLET ORAL at 12:52

## 2025-01-01 RX ADMIN — MORPHINE SULFATE 4 MG: 4 INJECTION, SOLUTION INTRAMUSCULAR; INTRAVENOUS at 12:03

## 2025-01-01 RX ADMIN — LACTULOSE 30 ML: 10 SOLUTION ORAL at 05:55

## 2025-01-01 RX ADMIN — RIFAXIMIN 550 MG: 550 TABLET ORAL at 04:48

## 2025-01-01 RX ADMIN — METOPROLOL SUCCINATE 12.5 MG: 25 TABLET, EXTENDED RELEASE ORAL at 04:49

## 2025-01-01 RX ADMIN — LACTULOSE 30 ML: 10 SOLUTION ORAL at 12:53

## 2025-01-01 RX ADMIN — QUETIAPINE FUMARATE 25 MG: 25 TABLET ORAL at 21:32

## 2025-01-01 RX ADMIN — SPIRONOLACTONE 50 MG: 25 TABLET ORAL at 04:50

## 2025-01-01 RX ADMIN — ATORVASTATIN CALCIUM 40 MG: 40 TABLET, FILM COATED ORAL at 21:05

## 2025-01-01 RX ADMIN — LORAZEPAM 1 MG: 2 INJECTION, SOLUTION INTRAMUSCULAR; INTRAVENOUS at 22:50

## 2025-01-01 RX ADMIN — LORAZEPAM 1 MG: 2 INJECTION, SOLUTION INTRAMUSCULAR; INTRAVENOUS at 20:14

## 2025-01-01 RX ADMIN — MORPHINE SULFATE 4 MG: 4 INJECTION, SOLUTION INTRAMUSCULAR; INTRAVENOUS at 06:22

## 2025-01-01 RX ADMIN — APIXABAN 2.5 MG: 5 TABLET, FILM COATED ORAL at 05:55

## 2025-01-01 RX ADMIN — APIXABAN 5 MG: 5 TABLET, FILM COATED ORAL at 21:04

## 2025-01-01 RX ADMIN — MORPHINE SULFATE 4 MG: 4 INJECTION, SOLUTION INTRAMUSCULAR; INTRAVENOUS at 17:14

## 2025-01-01 RX ADMIN — LORAZEPAM 1 MG: 2 INJECTION, SOLUTION INTRAMUSCULAR; INTRAVENOUS at 13:40

## 2025-01-01 RX ADMIN — RIFAXIMIN 550 MG: 550 TABLET ORAL at 21:04

## 2025-01-01 RX ADMIN — LORAZEPAM 1 MG: 2 INJECTION, SOLUTION INTRAMUSCULAR; INTRAVENOUS at 15:50

## 2025-01-01 RX ADMIN — LORAZEPAM 1 MG: 2 INJECTION, SOLUTION INTRAMUSCULAR; INTRAVENOUS at 06:21

## 2025-01-01 RX ADMIN — RIFAXIMIN 550 MG: 550 TABLET ORAL at 05:55

## 2025-01-01 RX ADMIN — MORPHINE SULFATE 4 MG: 4 INJECTION, SOLUTION INTRAMUSCULAR; INTRAVENOUS at 15:50

## 2025-01-01 RX ADMIN — MORPHINE SULFATE 4 MG: 4 INJECTION, SOLUTION INTRAMUSCULAR; INTRAVENOUS at 20:15

## 2025-01-01 RX ADMIN — LACTULOSE 30 ML: 10 SOLUTION ORAL at 04:48

## 2025-01-01 RX ADMIN — MIDODRINE HYDROCHLORIDE 10 MG: 5 TABLET ORAL at 21:13

## 2025-01-01 RX ADMIN — LORAZEPAM 1 MG: 2 INJECTION, SOLUTION INTRAMUSCULAR; INTRAVENOUS at 12:03

## 2025-01-01 RX ADMIN — SCOPOLAMINE 1 PATCH: 1.5 PATCH, EXTENDED RELEASE TRANSDERMAL at 18:02

## 2025-01-01 RX ADMIN — Medication 50 MG: at 18:04

## 2025-01-01 RX ADMIN — LORAZEPAM 1 MG: 2 INJECTION, SOLUTION INTRAMUSCULAR; INTRAVENOUS at 12:22

## 2025-01-01 RX ADMIN — Medication 50 MG: at 06:23

## 2025-01-01 RX ADMIN — Medication 50 MG: at 12:15

## 2025-01-01 RX ADMIN — Medication 220 MG: at 05:55

## 2025-01-01 RX ADMIN — MIDODRINE HYDROCHLORIDE 10 MG: 5 TABLET ORAL at 07:53

## 2025-01-01 RX ADMIN — Medication 220 MG: at 04:50

## 2025-01-01 ASSESSMENT — LIFESTYLE VARIABLES
CONSUMPTION TOTAL: NEGATIVE
AVERAGE NUMBER OF DAYS PER WEEK YOU HAVE A DRINK CONTAINING ALCOHOL: 0
HOW MANY TIMES IN THE PAST YEAR HAVE YOU HAD 5 OR MORE DRINKS IN A DAY: 0
DOES PATIENT WANT TO STOP DRINKING: NO
ON A TYPICAL DAY WHEN YOU DRINK ALCOHOL HOW MANY DRINKS DO YOU HAVE: 0
EVER HAD A DRINK FIRST THING IN THE MORNING TO STEADY YOUR NERVES TO GET RID OF A HANGOVER: NO
TOTAL SCORE: 0
HAVE PEOPLE ANNOYED YOU BY CRITICIZING YOUR DRINKING: NO
HAVE YOU EVER FELT YOU SHOULD CUT DOWN ON YOUR DRINKING: NO
EVER FELT BAD OR GUILTY ABOUT YOUR DRINKING: NO
TOTAL SCORE: 0
TOTAL SCORE: 0
ALCOHOL_USE: NO

## 2025-01-01 ASSESSMENT — SOCIAL DETERMINANTS OF HEALTH (SDOH)
WITHIN THE LAST YEAR, HAVE YOU BEEN AFRAID OF YOUR PARTNER OR EX-PARTNER?: NO
IN THE PAST 12 MONTHS, HAS THE ELECTRIC, GAS, OIL, OR WATER COMPANY THREATENED TO SHUT OFF SERVICE IN YOUR HOME?: NO
WITHIN THE LAST YEAR, HAVE TO BEEN RAPED OR FORCED TO HAVE ANY KIND OF SEXUAL ACTIVITY BY YOUR PARTNER OR EX-PARTNER?: NO
WITHIN THE PAST 12 MONTHS, YOU WORRIED THAT YOUR FOOD WOULD RUN OUT BEFORE YOU GOT THE MONEY TO BUY MORE: NEVER TRUE
WITHIN THE LAST YEAR, HAVE YOU BEEN HUMILIATED OR EMOTIONALLY ABUSED IN OTHER WAYS BY YOUR PARTNER OR EX-PARTNER?: NO
WITHIN THE PAST 12 MONTHS, THE FOOD YOU BOUGHT JUST DIDN'T LAST AND YOU DIDN'T HAVE MONEY TO GET MORE: NEVER TRUE
WITHIN THE LAST YEAR, HAVE YOU BEEN KICKED, HIT, SLAPPED, OR OTHERWISE PHYSICALLY HURT BY YOUR PARTNER OR EX-PARTNER?: NO

## 2025-01-01 ASSESSMENT — ENCOUNTER SYMPTOMS
DECREASED ORAL INTAKE: 1
INCREASED FATIGUE: 1
INCREASED FATIGUE: 1
DECREASED ORAL INTAKE: 1
INCREASED SLEEPING: 1
DECREASED TO NO URINARY OUTPUT: 1
INCREASED SLEEPING: 1
SHORTNESS OF BREATH: 1
SHORTNESS OF BREATH: 1
CHANGE IN LEVEL OF CONSCIOUSNESS: 1
CHANGE IN LEVEL OF CONSCIOUSNESS: 1

## 2025-01-01 ASSESSMENT — COGNITIVE AND FUNCTIONAL STATUS - GENERAL
WALKING IN HOSPITAL ROOM: TOTAL
MOVING TO AND FROM BED TO CHAIR: TOTAL
TOILETING: TOTAL
DAILY ACTIVITIY SCORE: 8
MOBILITY SCORE: 7
EATING MEALS: A LITTLE
DRESSING REGULAR UPPER BODY CLOTHING: TOTAL
MOVING FROM LYING ON BACK TO SITTING ON SIDE OF FLAT BED: TOTAL
SUGGESTED CMS G CODE MODIFIER DAILY ACTIVITY: CM
STANDING UP FROM CHAIR USING ARMS: TOTAL
CLIMB 3 TO 5 STEPS WITH RAILING: TOTAL
DRESSING REGULAR LOWER BODY CLOTHING: TOTAL
TURNING FROM BACK TO SIDE WHILE IN FLAT BAD: A LOT
SUGGESTED CMS G CODE MODIFIER MOBILITY: CM
HELP NEEDED FOR BATHING: TOTAL
PERSONAL GROOMING: TOTAL

## 2025-01-01 ASSESSMENT — ACTIVITIES OF DAILY LIVING (ADL)
BATHING_REQUIRES_ASSISTANCE: 1
PHYSICAL_TRANSFER_REQUIRES_ASSISTANCE: 1
AMBULATION_REQUIRES_ASSISTANCE: 1
CONTINENCE_REQUIRES_ASSISTANCE: 1
DRESSING_REQUIRES_ASSISTANCE: 1
EATING_REQUIRES_ASSISTANCE: 1

## 2025-01-01 ASSESSMENT — PAIN DESCRIPTION - PAIN TYPE
TYPE: ACUTE PAIN

## 2025-01-01 ASSESSMENT — PATIENT HEALTH QUESTIONNAIRE - PHQ9
1. LITTLE INTEREST OR PLEASURE IN DOING THINGS: NOT AT ALL
2. FEELING DOWN, DEPRESSED, IRRITABLE, OR HOPELESS: NOT AT ALL
SUM OF ALL RESPONSES TO PHQ9 QUESTIONS 1 AND 2: 0

## 2025-01-01 ASSESSMENT — CHA2DS2 SCORE
CHA2DS2 VASC SCORE: 3
SEX: MALE
SEX: MALE
VASCULAR DISEASE: YES
CHF OR LEFT VENTRICULAR DYSFUNCTION: NO
PRIOR STROKE OR TIA OR THROMBOEMBOLISM: NO
DIABETES: NO
HYPERTENSION: NO
HYPERTENSION: NO
AGE 75 OR GREATER: YES
AGE 65 TO 74: NO
AGE 65 TO 74: NO
CHF OR LEFT VENTRICULAR DYSFUNCTION: NO
CHA2DS2 VASC SCORE: 5
VASCULAR DISEASE: YES
PRIOR STROKE OR TIA OR THROMBOEMBOLISM: YES
DIABETES: NO
AGE 75 OR GREATER: YES

## 2025-01-01 ASSESSMENT — FIBROSIS 4 INDEX: FIB4 SCORE: 8.15

## 2025-01-06 ENCOUNTER — HOSPITAL ENCOUNTER (OUTPATIENT)
Dept: LAB | Facility: MEDICAL CENTER | Age: 80
End: 2025-01-06
Payer: MEDICARE

## 2025-01-06 LAB
ALBUMIN SERPL BCP-MCNC: 3.5 G/DL (ref 3.2–4.9)
ALBUMIN/GLOB SERPL: 1.2 G/DL
ALP SERPL-CCNC: 102 U/L (ref 30–99)
ALT SERPL-CCNC: 23 U/L (ref 2–50)
ANION GAP SERPL CALC-SCNC: 13 MMOL/L (ref 7–16)
AST SERPL-CCNC: 29 U/L (ref 12–45)
BASOPHILS # BLD AUTO: 0.7 % (ref 0–1.8)
BASOPHILS # BLD: 0.04 K/UL (ref 0–0.12)
BILIRUB SERPL-MCNC: 1.9 MG/DL (ref 0.1–1.5)
BUN SERPL-MCNC: 27 MG/DL (ref 8–22)
CALCIUM ALBUM COR SERPL-MCNC: 9.3 MG/DL (ref 8.5–10.5)
CALCIUM SERPL-MCNC: 8.9 MG/DL (ref 8.5–10.5)
CHLORIDE SERPL-SCNC: 101 MMOL/L (ref 96–112)
CO2 SERPL-SCNC: 22 MMOL/L (ref 20–33)
CREAT SERPL-MCNC: 1.23 MG/DL (ref 0.5–1.4)
EOSINOPHIL # BLD AUTO: 0.16 K/UL (ref 0–0.51)
EOSINOPHIL NFR BLD: 2.7 % (ref 0–6.9)
ERYTHROCYTE [DISTWIDTH] IN BLOOD BY AUTOMATED COUNT: 57.3 FL (ref 35.9–50)
GFR SERPLBLD CREATININE-BSD FMLA CKD-EPI: 59 ML/MIN/1.73 M 2
GLOBULIN SER CALC-MCNC: 3 G/DL (ref 1.9–3.5)
GLUCOSE SERPL-MCNC: 135 MG/DL (ref 65–99)
HCT VFR BLD AUTO: 34.1 % (ref 42–52)
HGB BLD-MCNC: 11.1 G/DL (ref 14–18)
IMM GRANULOCYTES # BLD AUTO: 0.03 K/UL (ref 0–0.11)
IMM GRANULOCYTES NFR BLD AUTO: 0.5 % (ref 0–0.9)
INR PPP: 1.36 (ref 0.87–1.13)
LYMPHOCYTES # BLD AUTO: 0.22 K/UL (ref 1–4.8)
LYMPHOCYTES NFR BLD: 3.7 % (ref 22–41)
MCH RBC QN AUTO: 30.9 PG (ref 27–33)
MCHC RBC AUTO-ENTMCNC: 32.6 G/DL (ref 32.3–36.5)
MCV RBC AUTO: 95 FL (ref 81.4–97.8)
MONOCYTES # BLD AUTO: 0.72 K/UL (ref 0–0.85)
MONOCYTES NFR BLD AUTO: 12.2 % (ref 0–13.4)
NEUTROPHILS # BLD AUTO: 4.73 K/UL (ref 1.82–7.42)
NEUTROPHILS NFR BLD: 80.2 % (ref 44–72)
NRBC # BLD AUTO: 0 K/UL
NRBC BLD-RTO: 0 /100 WBC (ref 0–0.2)
PLATELET # BLD AUTO: 69 K/UL (ref 164–446)
PLATELETS.RETICULATED NFR BLD AUTO: 3.1 % (ref 0.6–13.1)
PMV BLD AUTO: 10.6 FL (ref 9–12.9)
POTASSIUM SERPL-SCNC: 4 MMOL/L (ref 3.6–5.5)
PROT SERPL-MCNC: 6.5 G/DL (ref 6–8.2)
PROTHROMBIN TIME: 16.8 SEC (ref 12–14.6)
RBC # BLD AUTO: 3.59 M/UL (ref 4.7–6.1)
SODIUM SERPL-SCNC: 136 MMOL/L (ref 135–145)
WBC # BLD AUTO: 5.9 K/UL (ref 4.8–10.8)

## 2025-01-06 PROCEDURE — 80053 COMPREHEN METABOLIC PANEL: CPT

## 2025-01-06 PROCEDURE — 85055 RETICULATED PLATELET ASSAY: CPT

## 2025-01-06 PROCEDURE — 85025 COMPLETE CBC W/AUTO DIFF WBC: CPT

## 2025-01-06 PROCEDURE — 36415 COLL VENOUS BLD VENIPUNCTURE: CPT

## 2025-01-06 PROCEDURE — 82105 ALPHA-FETOPROTEIN SERUM: CPT

## 2025-01-06 PROCEDURE — 85610 PROTHROMBIN TIME: CPT

## 2025-01-06 NOTE — PROGRESS NOTES
Hospital Medicine Progress Note, Adult, Complex               Author: Yaya Martinez Date & Time created: 8/22/2017  9:12 AM     Interval History:  No significant events or changes since last visit  Patient denies SOB, cough, or diarrhea  Patient slept ok last night    Chief Complaint:  Hypertension  Fluid overload      Review of Systems:  Review of Systems   Constitutional: Negative for fever and chills.   Respiratory: Negative for shortness of breath.    Cardiovascular: Negative for chest pain.   Gastrointestinal: Negative for nausea, vomiting, abdominal pain and diarrhea.   Psychiatric/Behavioral: The patient is not nervous/anxious.        Physical Exam:  Physical Exam   Constitutional: He is oriented to person, place, and time. He appears well-nourished.   HENT:   Head: Atraumatic.   Eyes: Conjunctivae are normal. Pupils are equal, round, and reactive to light.   Neck: Normal range of motion. Neck supple. No tracheal deviation present.   Cardiovascular: Normal rate, regular rhythm, S1 normal and S2 normal.    No murmur heard.  Pulmonary/Chest: Effort normal. He has no wheezes. He has no rhonchi. He has no rales.   Abdominal: Soft. He exhibits no distension. There is no tenderness. Hernia confirmed negative in the right inguinal area and confirmed negative in the left inguinal area.   Musculoskeletal: He exhibits no edema.   Neurological: He is alert and oriented to person, place, and time. No sensory deficit.   Skin: Skin is warm and dry. No rash noted. No cyanosis.   Psychiatric: He has a normal mood and affect. His behavior is normal.   Nursing note and vitals reviewed.      Labs:        Invalid input(s): [id number]      Recent Labs      08/21/17 0454 08/22/17   0614   SODIUM  137  138   POTASSIUM  3.3*  3.6   CHLORIDE  101  102   CO2  29  29   BUN  12  15   CREATININE  0.56  0.60   MAGNESIUM  1.9   --    CALCIUM  8.8  9.0     Recent Labs      08/21/17 0454 08/22/17   0614   ALTSGPT  11  13   ASTSGOT   16  19   ALKPHOSPHAT  69  59   TBILIRUBIN  1.1  1.3   GLUCOSE  90  94     Recent Labs      17   0452  17   0454  17   0614   RBC  3.89*  4.00*  4.04*   HEMOGLOBIN  10.5*  10.8*  11.1*   HEMATOCRIT  32.7*  33.7*  34.2*   PLATELETCT  74*  71*  71*   PROTHROMBTM   --   17.0*  17.1*   INR   --   1.34*  1.35*     Recent Labs      17   0452  17   0454  17   0614   WBC  4.5*  4.5*  4.5*   NEUTSPOLYS  61.30  65.20  66.30   LYMPHOCYTES  19.60*  16.40*  17.80*   MONOCYTES  12.30  12.00  10.30   EOSINOPHILS  5.90  5.30  4.50   BASOPHILS  0.70  0.90  0.70   ASTSGOT   --   16  19   ALTSGPT   --   11  13   ALKPHOSPHAT   --   69  59   TBILIRUBIN   --   1.1  1.3           Hemodynamics:  Temp (24hrs), Av.8 °C (98.2 °F), Min:36.3 °C (97.4 °F), Max:37.1 °C (98.8 °F)  Temperature: 37.1 °C (98.8 °F)  Pulse  Av.3  Min: 64  Max: 99   Blood Pressure : 118/68 mmHg     Respiratory:    Respiration: 18, Pulse Oximetry: 95 %     Work Of Breathing / Effort: Mild  RUL Breath Sounds: Clear, RML Breath Sounds: Clear, RLL Breath Sounds: Diminished, JOSE Breath Sounds: Clear, LLL Breath Sounds: Clear  Fluids:    Intake/Output Summary (Last 24 hours) at 17 0912  Last data filed at 17 0901   Gross per 24 hour   Intake   1080 ml   Output    975 ml   Net    105 ml     Weight: 101 kg (222 lb 10.6 oz)  GI/Nutrition:  Orders Placed This Encounter   Procedures   • Diet Order     Standing Status: Standing      Number of Occurrences: 1      Standing Expiration Date:      Order Specific Question:  Diet:     Answer:  Regular [1]     Order Specific Question:  Consistency/Fluid modifications:     Answer:  Thin Liquids [3]     Medical Decision Making, by Problem:  Active Hospital Problems    Diagnosis   • *AIDP (acute inflammatory demyelinating polyneuropathy) (CMS-[facility]) [G37.8]   • Left leg DVT (CMS-HCC) [I82.402]   • Esophageal varices- BANDED 2016- repeat egd tbd 10//29/16- gic [I85.00]   • ASCVD  (arteriosclerotic cardiovascular disease)subtotalled small distal LCx and 40% LAD med rx by cath 12/13/12 [I25.10]   • GIB (gastrointestinal bleeding)- recurrent from varices [K92.2]   • BPH (benign prostatic hyperplasia) [N40.0]   • 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 DUE TO OLD HEP B, CHEMO RX,  OR THERMO RAD OF MAGNANT LESION [K74.60]   • Essential hypertension [I10]   • Colon cancer-2010 left hemicolectomy; no colostomy [C18.9]     *  S/P Recent Lumbar Surgery    *  Hypertension  BP recently ok (8/22)  Off Coreg  Note: on diuretics  Note: home meds were Coreg 3.125 mg 1/2 tab bid and Losartan: 25 mg daily  Monitor    *  Fluid Overload  2nd to Cirrhosis  S/P LE Edema  With some ascites  On fluid restrictions: 1500 cc/day  On Demadex: 20 mg qod  On Metolazone: 2.5 mg qod  On Aldactone: 50 mg daily  On KCL: 10 meq daily  Monitor K+ levels: 3.3 (8/21) --> 3.6 (8/22)    *  Thrombocytopenia  Platelets stable (8/22)  2nd to prior chemo and cirrhosis    *  Cirrhosis  2nd to cancer  With portal vein thrombosis  Hx eso varicies; s/p albaltion  Hx ascites: with therapeutic taps  NH4: 35 (8/22)    *  Hx Colon Cancer: stage IV; in 5 year remission  *  Hyperlipidemia  *  AIDP: s/p IVIG x 5 days      Medications reviewed and Labs reviewed                     4 = No assist / stand by assistance

## 2025-01-08 LAB — AFP-TM SERPL-MCNC: 2 NG/ML (ref 0–9)

## 2025-01-13 PROCEDURE — RXMED WILLOW AMBULATORY MEDICATION CHARGE

## 2025-01-14 ENCOUNTER — PHARMACY VISIT (OUTPATIENT)
Dept: PHARMACY | Facility: MEDICAL CENTER | Age: 80
End: 2025-01-14
Payer: COMMERCIAL

## 2025-01-15 ENCOUNTER — NON-PROVIDER VISIT (OUTPATIENT)
Dept: CARDIOLOGY | Facility: MEDICAL CENTER | Age: 80
End: 2025-01-15

## 2025-01-15 ENCOUNTER — OFFICE VISIT (OUTPATIENT)
Dept: CARDIOLOGY | Facility: MEDICAL CENTER | Age: 80
End: 2025-01-15
Attending: INTERNAL MEDICINE
Payer: MEDICARE

## 2025-01-15 VITALS
SYSTOLIC BLOOD PRESSURE: 104 MMHG | OXYGEN SATURATION: 98 % | BODY MASS INDEX: 26.49 KG/M2 | HEART RATE: 58 BPM | WEIGHT: 213 LBS | RESPIRATION RATE: 16 BRPM | DIASTOLIC BLOOD PRESSURE: 62 MMHG | HEIGHT: 75 IN

## 2025-01-15 DIAGNOSIS — I10 ESSENTIAL HYPERTENSION: ICD-10-CM

## 2025-01-15 DIAGNOSIS — I25.10 ASCVD (ARTERIOSCLEROTIC CARDIOVASCULAR DISEASE): ICD-10-CM

## 2025-01-15 DIAGNOSIS — I48.0 PAROXYSMAL ATRIAL FIBRILLATION (HCC): ICD-10-CM

## 2025-01-15 DIAGNOSIS — I48.91 ATRIAL FIBRILLATION, UNSPECIFIED TYPE (HCC): ICD-10-CM

## 2025-01-15 DIAGNOSIS — I47.10 PAROXYSMAL SVT (SUPRAVENTRICULAR TACHYCARDIA) (HCC): ICD-10-CM

## 2025-01-15 LAB — EKG IMPRESSION: NORMAL

## 2025-01-15 PROCEDURE — 93005 ELECTROCARDIOGRAM TRACING: CPT | Mod: TC | Performed by: INTERNAL MEDICINE

## 2025-01-15 PROCEDURE — 3078F DIAST BP <80 MM HG: CPT | Performed by: INTERNAL MEDICINE

## 2025-01-15 PROCEDURE — 3074F SYST BP LT 130 MM HG: CPT | Performed by: INTERNAL MEDICINE

## 2025-01-15 PROCEDURE — 99215 OFFICE O/P EST HI 40 MIN: CPT | Performed by: INTERNAL MEDICINE

## 2025-01-15 PROCEDURE — 99213 OFFICE O/P EST LOW 20 MIN: CPT | Performed by: INTERNAL MEDICINE

## 2025-01-15 PROCEDURE — 93298 REM INTERROG DEV EVAL SCRMS: CPT | Mod: 26 | Performed by: STUDENT IN AN ORGANIZED HEALTH CARE EDUCATION/TRAINING PROGRAM

## 2025-01-15 PROCEDURE — 93010 ELECTROCARDIOGRAM REPORT: CPT | Performed by: INTERNAL MEDICINE

## 2025-01-15 ASSESSMENT — ENCOUNTER SYMPTOMS
DIARRHEA: 0
PALPITATIONS: 0
DEPRESSION: 0
WEIGHT GAIN: 0
ABDOMINAL PAIN: 0
ORTHOPNEA: 0
PND: 0
DECREASED APPETITE: 0
COUGH: 0
NEAR-SYNCOPE: 0
WEIGHT LOSS: 0
BLURRED VISION: 0
NAUSEA: 0
DYSPNEA ON EXERTION: 0
VOMITING: 0
HEARTBURN: 0
CLAUDICATION: 0
IRREGULAR HEARTBEAT: 0
DIZZINESS: 0
FLANK PAIN: 0
SYNCOPE: 0
SHORTNESS OF BREATH: 0
FEVER: 0
CONSTIPATION: 0
BACK PAIN: 0
ALTERED MENTAL STATUS: 0

## 2025-01-15 ASSESSMENT — FIBROSIS 4 INDEX: FIB4 SCORE: 7.01

## 2025-01-15 NOTE — CARDIAC REMOTE MONITOR - SCAN
Device transmission reviewed. Device demonstrated appropriate function.       Electronically Signed by: Toya Gaines MD, PhD    1/17/2025  2:31 PM

## 2025-01-16 ENCOUNTER — PATIENT MESSAGE (OUTPATIENT)
Dept: CARDIOLOGY | Facility: MEDICAL CENTER | Age: 80
End: 2025-01-16
Payer: MEDICARE

## 2025-01-16 NOTE — PROGRESS NOTES
Cardiology Note    Chief Complaint   Patient presents with    Atrial Fibrillation     F/V DX: Paroxysmal atrial fibrillation (HCC    Hypertension       History of Present Illness: Torey Lima is a 79 y.o. male who presents for follow up. Last seen by Nani Caal PA-C for paroxysmal AF, SVT, AR, HTN, HLD, hx GIB.     Doing well today. No cardiac complaints. Denies further episodes of bleeding. Continues to follow hepatologist via zoom at [facility] and also following with GI locally. Recent endoscopies they describe were stable. Voalt messaged Dr Cayetano Stovall who describes ok for temporary anticoagulation for ultimate plan watchman.    Review of Systems   Constitutional: Negative for decreased appetite, fever, malaise/fatigue, weight gain and weight loss.   HENT:  Negative for congestion and nosebleeds.    Eyes:  Negative for blurred vision.   Cardiovascular:  Negative for chest pain, claudication, dyspnea on exertion, irregular heartbeat, leg swelling, near-syncope, orthopnea, palpitations, paroxysmal nocturnal dyspnea and syncope.   Respiratory:  Negative for cough and shortness of breath.    Endocrine: Negative for cold intolerance and heat intolerance.   Skin:  Negative for rash.   Musculoskeletal:  Negative for back pain.   Gastrointestinal:  Negative for abdominal pain, constipation, diarrhea, heartburn, melena, nausea and vomiting.   Genitourinary:  Negative for dysuria, flank pain and hematuria.   Neurological:  Negative for dizziness.   Psychiatric/Behavioral:  Negative for altered mental status and depression.          Past Medical History:   Diagnosis Date    Arrhythmia April 1, 2023    Dr. Hall    ASCVD (arteriosclerotic cardiovascular disease) 12/14/2012    BMI 33.0-33.9,adult 02/15/2013    Breath shortness April 1, 2023    Dr. 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    Snoring Many Years    Wife    Stage 3a chronic kidney disease 06/25/2022    Thrombocytopenia due to drugs 04/25/2012    Vitamin d deficiency 08/02/2012         Past Surgical History:   Procedure Laterality Date    UMBILICAL HERNIA REPAIR N/A 10/10/2023    Procedure: OPEN UMBILICAL HERNIA REPAIR;  Surgeon: Julian Ennis M.D.;  Location: SURGERY [facility];  Service: General    LUMBAR LAMINECTOMY DISKECTOMY  5/25/2017    Procedure:  LAMINECTOMY LUMBAR  4 TO SACRAL 1;  Surgeon: Felton Escobar M.D.;  Location: SURGERY [facility];  Service:     GASTROSCOPY WITH BANDING  10/25/2016    Procedure: GASTROSCOPY WITH BANDING;  Surgeon: Pierre Waggoner M.D.;  Location: ENDOSCOPY [facility];  Service:     GASTROSCOPY WITH BANDING  4/3/2016    Procedure: GASTROSCOPY WITH BANDING;  Surgeon: Cayetano Stovall M.D.;  Location: ENDOSCOPY [facility];  Service:     FULL THICKNESS BLOCK RESECTION  4/11/2012    Performed by LANI BOWMAN at SURGERY [facility] ORS    CATH REMOVAL  7/20/2011    Performed by RUBY RUIZ at SURGERY [facility] ORS    CATH PLACEMENT  9/20/2010    Performed by RUBY RUIZ at SURGERY [facility] ORS    LOW ANTERIOR RESECTION ROBOTIC  8/10/2010    Performed by RUBY RUIZ at SURGERY [facility] ORS    COLON RESECTION      HIP ARTHROPLASTY MIS TOTAL      rt    HIP REPLACEMENT, TOTAL      OTHER      Cholecystectomy    OTHER (COMMENTS)      liver surgery           Current Outpatient Medications   Medication Sig Dispense Refill    apixaban (ELIQUIS) 5mg Tab Take 1 Tablet by mouth 2 times a day. 60 Tablet 3    lactulose 10 GM/15ML  Solution Take 15 mL by mouth in the morning and 15ml in the evening. 1892 mL 11    spironolactone (ALDACTONE) 50 MG Tab Take 3 Tablets by mouth every day. 270 Tablet 3    acetaminophen (TYLENOL) 500 MG Tab Take 1,000 mg by mouth every 6 hours as needed (For headache).      midodrine (PROAMATINE) 10 MG tablet Take 1 Tablet by mouth 3 times a day with meals for 120 days. 90 Tablet 3    clindamycin (CLEOCIN) 1 % Solution Apply 2 mL topically 2 times a day. To lower legs 60 mL 2    diclofenac sodium (VOLTAREN) 1 % Gel Apply 2 g topically 4 times a day as needed (left knee pain). 50 g 0    Magnesium Glycinate 100 MG Cap Take 1 Capsule by mouth every day with lunch.      ursodiol (JUNE 250) 250 MG Tab Take 1 tablet (250 mg total) by mouth every morning, afternoon, and evening. 270 Tablet 3    metoprolol SR (TOPROL XL) 25 MG TABLET SR 24 HR Take 0.5 Tablets by mouth every day. 50 Tablet 3    atorvastatin (LIPITOR) 40 MG Tab Take 1 Tablet by mouth every evening. 100 Tablet 4    potassium chloride SA (KDUR) 20 MEQ Tab CR Take 1 tablet (20 mEq total) by mouth daily 90 Tablet 2    calcium carbonate (OS-AMPARO 500) 1250 (500 Ca) MG Tab Take 1 Tablet by mouth every day. 100 Tablet 3    torsemide (DEMADEX) 10 MG tablet Take 1 Tablet by mouth 2 times a day. 200 Tablet 1    hydrocortisone 2.5 % Cream topical cream Apply 1 Application topically 2 times a day. 453 g 0    ferrous sulfate 325 (65 Fe) MG tablet Take 1 Tablet by mouth every morning with breakfast. 100 Tablet 3    Cholecalciferol (VITAMIN D3) 1000 UNIT Cap Take 1 Capsule by mouth every day. 100 Capsule 3    folic acid (FOLVITE) 1 MG Tab Take 1 Tablet by mouth every day. 100 Tablet 4    gabapentin (NEURONTIN) 100 MG Cap Take 1 Capsule by mouth 2 times a day. (Patient not taking: Reported on 12/3/2024) 1 Capsule 0    spironolactone (ALDACTONE) 50 MG Tab Take 1 Tablet by mouth 2 times a day. (Patient not taking: Reported on 1/15/2025) 180 Tablet 3     No current  facility-administered medications for this visit.         Allergies   Allergen Reactions    Anticoagulant Sodium Citrate [Sodium Citrate] Unspecified     HIGH BLEEDING RISK    Influenza Virus Vaccine      Guillan Hammond     Lovenox [Enoxaparin] Unspecified     Thrombocytopenia      Lisinopril Cough    Shingrix [Zoster Vac Recomb Adjuvanted]      Guillain-Hammond          Family History   Problem Relation Age of Onset    Heart Disease Mother     Cancer Mother     Sleep Apnea Neg Hx          Social History     Socioeconomic History    Marital status:      Spouse name: Not on file    Number of children: Not on file    Years of education: Not on file    Highest education level: Professional school degree (e.g., MD, DDS, DVM, IQRA)   Occupational History    Not on file   Tobacco Use    Smoking status: Former     Types: Cigars    Smokeless tobacco: Never    Tobacco comments:     1 cigar per week.   Vaping Use    Vaping status: Never Used   Substance and Sexual Activity    Alcohol use: Not Currently    Drug use: No    Sexual activity: Yes     Partners: Female   Other Topics Concern    Not on file   Social History Narrative    ** Merged History Encounter **          Social Drivers of Health     Financial Resource Strain: Low Risk  (8/27/2024)    Overall Financial Resource Strain (CARDIA)     Difficulty of Paying Living Expenses: Not hard at all   Food Insecurity: No Food Insecurity (10/3/2024)    Hunger Vital Sign     Worried About Running Out of Food in the Last Year: Never true     Ran Out of Food in the Last Year: Never true   Transportation Needs: No Transportation Needs (10/3/2024)    PRAPARE - Transportation     Lack of Transportation (Medical): No     Lack of Transportation (Non-Medical): No   Physical Activity: Sufficiently Active (8/27/2024)    Exercise Vital Sign     Days of Exercise per Week: 7 days     Minutes of Exercise per Session: 30 min   Stress: No Stress Concern Present (8/27/2024)    [place]  [employer] Occupational [employer] - Occupational Stress Questionnaire     Feeling of Stress : Not at all   Social Connections: Moderately Isolated (8/27/2024)    Social Connection and Isolation Panel [NHANES]     Frequency of Communication with Friends and Family: More than three times a week     Frequency of Social Gatherings with Friends and Family: Once a week     Attends Muslim Services: Never     Active Member of Clubs or Organizations: No     Attends Club or Organization Meetings: Never     Marital Status:    Intimate Partner Violence: Not At Risk (10/3/2024)    Humiliation, Afraid, Rape, and Kick questionnaire     Fear of Current or Ex-Partner: No     Emotionally Abused: No     Physically Abused: No     Sexually Abused: No   Housing Stability: Low Risk  (10/3/2024)    Housing Stability Vital Sign     Unable to Pay for Housing in the Last Year: No     Number of Times Moved in the Last Year: 1     Homeless in the Last Year: No         Physical Exam:  Ambulatory Vitals  /62 (BP Location: Left arm, Patient Position: Sitting, BP Cuff Size: Adult)   Pulse (!) 58   Resp 16   Ht 1.905 m (6' 3\")   Wt 96.6 kg (213 lb)   SpO2 98%    BP Readings from Last 4 Encounters:   01/15/25 104/62   12/03/24 111/59   12/03/24 96/58   11/11/24 100/58     Weight/BMI:   Vitals:    01/15/25 1620   BP: 104/62   Weight: 96.6 kg (213 lb)   Height: 1.905 m (6' 3\")    Body mass index is 26.62 kg/m².  Wt Readings from Last 4 Encounters:   01/15/25 96.6 kg (213 lb)   12/03/24 98.9 kg (218 lb 0.6 oz)   12/03/24 98.9 kg (218 lb)   11/11/24 98.9 kg (218 lb 0.6 oz)       Physical Exam  Constitutional:       General: He is not in acute distress.  HENT:      Head: Normocephalic and atraumatic.   Eyes:      Conjunctiva/sclera: Conjunctivae normal.      Pupils: Pupils are equal, round, and reactive to light.   Neck:      Vascular: No JVD.   Cardiovascular:      Rate and Rhythm: Normal rate and regular rhythm.      Heart sounds: Normal heart " "sounds. No murmur heard.     No friction rub. No gallop.   Pulmonary:      Effort: Pulmonary effort is normal. No respiratory distress.      Breath sounds: Normal breath sounds. No wheezing or rales.   Chest:      Chest wall: No tenderness.   Abdominal:      General: Bowel sounds are normal. There is no distension.      Palpations: Abdomen is soft.   Musculoskeletal:      Cervical back: Normal range of motion and neck supple.   Skin:     General: Skin is warm and dry.   Neurological:      Mental Status: He is alert and oriented to person, place, and time.   Psychiatric:         Mood and Affect: Affect normal.         Judgment: Judgment normal.         Lab Data Review:  Lab Results   Component Value Date/Time    CHOLSTRLTOT 87 (L) 08/23/2024 06:57 AM    LDL 27 08/23/2024 06:57 AM    HDL 53 08/23/2024 06:57 AM    TRIGLYCERIDE 35 08/23/2024 06:57 AM       Lab Results   Component Value Date/Time    SODIUM 136 01/06/2025 02:20 PM    POTASSIUM 4.0 01/06/2025 02:20 PM    CHLORIDE 101 01/06/2025 02:20 PM    CO2 22 01/06/2025 02:20 PM    GLUCOSE 135 (H) 01/06/2025 02:20 PM    BUN 27 (H) 01/06/2025 02:20 PM    CREATININE 1.23 01/06/2025 02:20 PM    CREATININE 1.14 01/23/2010 12:00 AM    BUNCREATRAT 15 01/23/2010 12:00 AM    GLOMRATE >59 01/23/2010 12:00 AM     CrCl cannot be calculated (Patient's most recent lab result is older than the maximum 7 days allowed.).  Lab Results   Component Value Date/Time    ALKPHOSPHAT 102 (H) 01/06/2025 02:20 PM    ASTSGOT 29 01/06/2025 02:20 PM    ALTSGPT 23 01/06/2025 02:20 PM    TBILIRUBIN 1.9 (H) 01/06/2025 02:20 PM      Lab Results   Component Value Date/Time    WBC 5.9 01/06/2025 02:20 PM     Lab Results   Component Value Date/Time    HBA1C 4.5 08/23/2024 06:57 AM     No components found for: \"TROP\"      Cardiac Imaging and Procedures Review:      EKG 1/15/25 interpreted by radha AF 51 bpm, inferior infarct age indeterminate    Event monitor 4/4/23  Summary:   Sinus rhythm with frequent " supraventricular ectopy and frequent PSVT   Electronically Signed by: Jin Dodd M.D.     TTE 10/2024  CONCLUSIONS  Normal left ventricular systolic function. The left ventricular   ejection fraction is visually estimated to be 55-60%.  Normal right ventricular systolic function.  Mild aortic insufficiency.  The ascending aorta is dilated with a diameter of  4.2cm.  Compared to the prior study on 10/23/2023, there are no significant   changes.       Medical Decision Making:  Problem List Items Addressed This Visit       ASCVD (arteriosclerotic cardiovascular disease)subtotalled small distal LCx and 40% LAD med rx by cath 12/13/12    Relevant Medications    apixaban (ELIQUIS) 5mg Tab    Essential hypertension    Relevant Medications    apixaban (ELIQUIS) 5mg Tab    Paroxysmal SVT (supraventricular tachycardia) (HCC)    Relevant Medications    apixaban (ELIQUIS) 5mg Tab    AF (atrial fibrillation) (HCC)    Relevant Medications    apixaban (ELIQUIS) 5mg Tab    Other Relevant Orders    REFERRAL TO CARDIOLOGY    EKG (Completed)       Paroxysmal AF - multiple events on ILR. Present on EKG today. Rate controlled. Discussed with his GI specialist. Ok for temporary anticoagulation with eliquis. Refer to EP for watchman.     HTN - goal <130/80. Controlled.    Ascvd / CAD - continue statin. On doac no need additional antiplatelet.     It was my pleasure to meet with Mr. Lima.    I spent 66 minutes with Torey Lima, over fifty percent was spent counseling the patient on their condition, best management practices, reviewing test results, risks and benefits of treatment and coordinating care.

## 2025-01-17 ENCOUNTER — TELEPHONE (OUTPATIENT)
Dept: CARDIOLOGY | Facility: MEDICAL CENTER | Age: 80
End: 2025-01-17
Payer: MEDICARE

## 2025-01-17 ENCOUNTER — PHARMACY VISIT (OUTPATIENT)
Dept: PHARMACY | Facility: MEDICAL CENTER | Age: 80
End: 2025-01-17
Payer: COMMERCIAL

## 2025-01-17 DIAGNOSIS — I95.9 HYPOTENSION, UNSPECIFIED HYPOTENSION TYPE: ICD-10-CM

## 2025-01-17 PROCEDURE — RXMED WILLOW AMBULATORY MEDICATION CHARGE: Performed by: INTERNAL MEDICINE

## 2025-01-17 RX ORDER — MIDODRINE HYDROCHLORIDE 10 MG/1
10 TABLET ORAL
Qty: 90 TABLET | Refills: 3 | Status: SHIPPED | OUTPATIENT
Start: 2025-01-17 | End: 2025-05-17

## 2025-01-17 NOTE — TELEPHONE ENCOUNTER
To VR: Please advise if ok to fill midodrine under your name or if patient should follow up with pcp first?

## 2025-01-17 NOTE — TELEPHONE ENCOUNTER
Roosevelt Henry,    I just spoke with this patient's wife Celestina, and she mentioned that Torey's Midodrine was last prescribed as a discharge medication from the ED. Due to the Midodrine being a blood pressure medication, I thought I would ask VR if he would be ok with taking over the script going forward, or if he would like the patient to discontinue the medication next month due to no more refills (acute treatment?) per wife's inquiry.    Please advise...    Thank you!    Zeenat FRANCIS  Rx Coordinator

## 2025-01-17 NOTE — TELEPHONE ENCOUNTER
Patient's wife called me to discuss the lower cost and services with [facility]. Torey has most of his medications filled with [facility] at this time, but is not currently working with a Coordinator.     Torey is being newly started on Eliquis therapy, so I set him up to receive 1 month of Eliquis samples to make sure that he can tolerate the medication prior to continuing with a 100 day supply going forward.     Torey's wife, Celestina, also inquired about his Midodrine therapy, as he only has about 3 tabs on hand and not enough to last the weekend.     Torey had 1 month remaining on his Midodrine prescription, so we set up his  to be delivered SAME DAY today, 01/17/25@ 4-7pm for both Eliquis x 28 days samples and Midodrine x 30 days through [facility].

## 2025-01-17 NOTE — TELEPHONE ENCOUNTER
"Received Erx request via Cardiology [facility] for Eliquis 5mg tabs . (Quantity: 200 , Day Supply: 100 )     Insurance: [employer]       Ran Test claim via [employer] & medication co-pays are as followed:     $47 for a 30 day supply   $117.50 for a 90 day supply      Sent message to patient to offer our Specialty Services with [facility] due to patient's insurance being contracted with [facility] for additional Rx cost savings for all meds.      Patient is eligible to receive Eliquis 5mg tabs delivered to his home next year for $94 for a 90 day supply instead of $117.50. Patient is also eligible to receive \"buy 2 get 1 free\" discount for all other medications filled for 90 day supplies and delivered for no additional cost through [facility] when working with an Rx Coordinator / Pharmacy Liaison.      "

## 2025-01-23 ENCOUNTER — HOSPITAL ENCOUNTER (OUTPATIENT)
Dept: RADIOLOGY | Facility: MEDICAL CENTER | Age: 80
End: 2025-01-23
Payer: MEDICARE

## 2025-01-23 DIAGNOSIS — Z85.038 HISTORY OF COLON CANCER: ICD-10-CM

## 2025-01-23 PROCEDURE — 700117 HCHG RX CONTRAST REV CODE 255

## 2025-01-23 PROCEDURE — 74178 CT ABD&PLV WO CNTR FLWD CNTR: CPT

## 2025-01-23 RX ADMIN — IOHEXOL 100 ML: 350 INJECTION, SOLUTION INTRAVENOUS at 14:46

## 2025-01-24 ENCOUNTER — PATIENT OUTREACH (OUTPATIENT)
Dept: HEALTH INFORMATION MANAGEMENT | Facility: OTHER | Age: 80
End: 2025-01-24
Payer: MEDICARE

## 2025-01-24 DIAGNOSIS — I10 ESSENTIAL HYPERTENSION: ICD-10-CM

## 2025-01-24 DIAGNOSIS — I48.91 ATRIAL FIBRILLATION, UNSPECIFIED TYPE (HCC): ICD-10-CM

## 2025-01-24 NOTE — PROGRESS NOTES
Reason for Follow-Up:    Personal Care Management monthly outreach call.     Current Health Status:    Qualifying conditions include HTN, dyslipidemia, and Afib. Spoke w/ pt's spouse (approved contact). She said that his memory has gotten worse. She said mornings are the worst. They did address this w/ [facility]. See below. Spouse said she needs to schedule a paracentesis appt. She has the contact information.     Medical Updates:  They met w/ his specialist at [facility]. They made some medication changes. He will need to get another CT and labs before his February appointment.      Medication Updates:  Started on lactulose 15cc twice a day by [facility] for memory changes and fluid retention. So far tolerating well. Spironolactone was increased from 2 pills/day to 3 pills/day for fluid retention. Eliquis for Afib. Will monitor for bleeding. Educated on ER for falls, deep cuts, etc..    Symptoms:  Memory, ongoing ascites    Plan of Care Goals and Progress:    Goal 1.  Atrial Fibrillation     Progress:  Saw cardiology 1/15. Started on eliquis.       Barriers:  Commorbidities, disease processes.     Interventions:  Pt's spouse demonstrated understanding of medication regimen and medication side effects, referred to EP watchman     Education:  Eliquis, Afib    Goal 2. Blood pressure management      Progress: Most recently documented BP was 104/62      Barriers:  Episodes of hypotension, disease processes      Interventions:  Home BP monitoring, regular follow ups w/ PCP and specialists     Education:  Medications       Patient's Concerns and Feedback (Self Management of Care):     Expressed no concerns about plan of care. No additional questions or needs.     Next Steps:     Follow-Up Plan:  One month, February, 2025, for PCM monthly outreach      Appointments:  Reviewed upcoming appts      Contact Information:  Call [phone] with any questions or  concerns.

## 2025-01-29 DIAGNOSIS — I95.9 HYPOTENSION, UNSPECIFIED HYPOTENSION TYPE: ICD-10-CM

## 2025-01-29 RX ORDER — MIDODRINE HYDROCHLORIDE 10 MG/1
10 TABLET ORAL
Qty: 90 TABLET | Refills: 3 | OUTPATIENT
Start: 2025-01-29 | End: 2025-05-29

## 2025-01-30 NOTE — CARE PLAN
Problem: Knowledge of hypertension  Goal: Demonstrate Knowledge of Hypertension - Short Term  Outcome: Progressing  Intervention: Educate patient on understanding what the blood pressure reading means  Description: provide handout on understanding blood pressure readings     Problem: Knowledge of factors contributing to hypertension  Goal: Demonstrate factors that can contribute to high blood pressure - Short Term    Problem: Patient Stated Problem: Atrial Fibrillation   Goal: Patient Stated Goal: Promote Cardiovascular Health - Long Term  Outcome: Progressing  Intervention: Second Patient Stated Intervention: Routine Follow Ups w/ PCP and Specialists   Intervention: Third Patient Stated Intervention: Medication Adherence

## 2025-02-03 PROCEDURE — RXMED WILLOW AMBULATORY MEDICATION CHARGE: Performed by: INTERNAL MEDICINE

## 2025-02-04 DIAGNOSIS — R18.8 CIRRHOSIS OF LIVER WITH ASCITES, UNSPECIFIED HEPATIC CIRRHOSIS TYPE (HCC): ICD-10-CM

## 2025-02-04 DIAGNOSIS — R60.1 GENERALIZED EDEMA: ICD-10-CM

## 2025-02-04 DIAGNOSIS — K74.60 CIRRHOSIS OF LIVER WITH ASCITES, UNSPECIFIED HEPATIC CIRRHOSIS TYPE (HCC): ICD-10-CM

## 2025-02-04 RX ORDER — TORSEMIDE 10 MG/1
10 TABLET ORAL 2 TIMES DAILY
Qty: 200 TABLET | Refills: 1 | Status: SHIPPED | OUTPATIENT
Start: 2025-02-04

## 2025-02-05 PROCEDURE — RXMED WILLOW AMBULATORY MEDICATION CHARGE: Performed by: INTERNAL MEDICINE

## 2025-02-07 ENCOUNTER — PHARMACY VISIT (OUTPATIENT)
Dept: PHARMACY | Facility: MEDICAL CENTER | Age: 80
End: 2025-02-07
Payer: COMMERCIAL

## 2025-02-10 PROCEDURE — RXMED WILLOW AMBULATORY MEDICATION CHARGE: Performed by: INTERNAL MEDICINE

## 2025-02-10 PROCEDURE — RXMED WILLOW AMBULATORY MEDICATION CHARGE

## 2025-02-11 ENCOUNTER — OFFICE VISIT (OUTPATIENT)
Dept: MEDICAL GROUP | Age: 80
End: 2025-02-11
Payer: MEDICARE

## 2025-02-11 VITALS
HEART RATE: 58 BPM | SYSTOLIC BLOOD PRESSURE: 90 MMHG | TEMPERATURE: 97.6 F | OXYGEN SATURATION: 98 % | DIASTOLIC BLOOD PRESSURE: 58 MMHG | WEIGHT: 215 LBS | BODY MASS INDEX: 26.73 KG/M2 | HEIGHT: 75 IN

## 2025-02-11 DIAGNOSIS — C78.7 SECONDARY MALIGNANT NEOPLASM OF LIVER (HCC): ICD-10-CM

## 2025-02-11 DIAGNOSIS — K76.6 PORTAL HYPERTENSION WITH ESOPHAGEAL VARICES (HCC): ICD-10-CM

## 2025-02-11 DIAGNOSIS — Z01.89 ENCOUNTER FOR COMPETENCY EVALUATION: ICD-10-CM

## 2025-02-11 DIAGNOSIS — R18.8 CIRRHOSIS OF LIVER WITH ASCITES, UNSPECIFIED HEPATIC CIRRHOSIS TYPE (HCC): ICD-10-CM

## 2025-02-11 DIAGNOSIS — C18.8 OVERLAPPING MALIGNANT NEOPLASM OF COLON (HCC): ICD-10-CM

## 2025-02-11 DIAGNOSIS — F01.B0 MODERATE MIXED VASCULAR AND NEURODEGENERATIVE DEMENTIA WITHOUT BEHAVIORAL DISTURBANCE, PSYCHOTIC DISTURBANCE, MOOD DISTURBANCE, OR ANXIETY (HCC): ICD-10-CM

## 2025-02-11 DIAGNOSIS — I85.10 SECONDARY ESOPHAGEAL VARICES WITHOUT BLEEDING (HCC): ICD-10-CM

## 2025-02-11 DIAGNOSIS — K74.60 CIRRHOSIS OF LIVER WITH ASCITES, UNSPECIFIED HEPATIC CIRRHOSIS TYPE (HCC): ICD-10-CM

## 2025-02-11 DIAGNOSIS — I85.00 PORTAL HYPERTENSION WITH ESOPHAGEAL VARICES (HCC): ICD-10-CM

## 2025-02-11 PROCEDURE — 3074F SYST BP LT 130 MM HG: CPT | Performed by: INTERNAL MEDICINE

## 2025-02-11 PROCEDURE — 99214 OFFICE O/P EST MOD 30 MIN: CPT | Performed by: INTERNAL MEDICINE

## 2025-02-11 PROCEDURE — 3078F DIAST BP <80 MM HG: CPT | Performed by: INTERNAL MEDICINE

## 2025-02-11 ASSESSMENT — ENCOUNTER SYMPTOMS
MUSCULOSKELETAL NEGATIVE: 1
EYES NEGATIVE: 1
RESPIRATORY NEGATIVE: 1
CONSTITUTIONAL NEGATIVE: 1
MEMORY LOSS: 1
DEPRESSION: 1
CARDIOVASCULAR NEGATIVE: 1
GASTROINTESTINAL NEGATIVE: 1
WEAKNESS: 1

## 2025-02-11 ASSESSMENT — FIBROSIS 4 INDEX: FIB4 SCORE: 7.01

## 2025-02-11 ASSESSMENT — PATIENT HEALTH QUESTIONNAIRE - PHQ9: CLINICAL INTERPRETATION OF PHQ2 SCORE: 0

## 2025-02-11 NOTE — LETTER
February 11, 2025    To Whom it may concern :    Torey Janes Queendy is currently under my care and was examined today . He is deemed not competent to handle his own financial , medical and cognitive affairs or duties , due to his medical condition   .            Regards ,    Fabián Urnea M.D                                           3

## 2025-02-11 NOTE — PROGRESS NOTES
Subjective     Torey Lima is a 79 y.o. male who presents with Follow-Up (Memory )  .[facility]    History of Present Illness  The patient is an 79-year-old male who presents for evaluation of memory issues. He is accompanied by his wife.    He has been experiencing cognitive difficulties, including memory impairment and confusion, which have significantly impacted his ability to perform daily activities. 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 His wife reports that he struggles with completing necessary paperwork, leading to the imposition of fines. Despite his efforts, he has been unable to fulfill these requirements. His wife also notes that he has informed his clients of his decision to retire, but he still has one remaining client. 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 He was able to identify his wife and himself but was unable to recall the date, month, year, or city. He was able to identify the day of the week as Tuesday.    He has been on lactulose for 3 weeks, taking 1 tablespoon in the morning and 1 at night, but his wife has not noticed any changes. He sleeps all night and probably 3 hours during the day. He is having blood work done tomorrow morning for Dr. Culp from [facility]. Dr. Culp is monitoring his kidneys and liver closely because his liver is not functioning properly.    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 He could not do anything for himself. He could not move his arms or legs. 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 He had a temperature of 105 degrees.    MEDICATIONS  Current: Lactulose    .prop  Outpatient Medications Prior to Visit   Medication Sig Dispense Refill    torsemide (DEMADEX) 10 MG tablet Take 1 Tablet by mouth 2 times a day. 200 Tablet 1    midodrine (PROAMATINE) 10 MG  tablet Take 1 Tablet by mouth 3 times a day with meals for 120 days. 90 Tablet 3    apixaban (ELIQUIS) 5mg Tab Take 1 Tablet by mouth 2 times a day. 60 Tablet 3    lactulose 10 GM/15ML Solution Take 15 mL by mouth in the morning and 15ml in the evening. 1892 mL 11    spironolactone (ALDACTONE) 50 MG Tab Take 3 Tablets by mouth every day. 270 Tablet 3    acetaminophen (TYLENOL) 500 MG Tab Take 1,000 mg by mouth every 6 hours as needed (For headache).      clindamycin (CLEOCIN) 1 % Solution Apply 2 mL topically 2 times a day. To lower legs 60 mL 2    diclofenac sodium (VOLTAREN) 1 % Gel Apply 2 g topically 4 times a day as needed (left knee pain). 50 g 0    Magnesium Glycinate 100 MG Cap Take 1 Capsule by mouth every day with lunch.      ursodiol (JUNE 250) 250 MG Tab Take 1 tablet (250 mg total) by mouth every morning, afternoon, and evening. 270 Tablet 3    metoprolol SR (TOPROL XL) 25 MG TABLET SR 24 HR Take 0.5 Tablets by mouth every day. 50 Tablet 3    atorvastatin (LIPITOR) 40 MG Tab Take 1 Tablet by mouth every evening. 100 Tablet 4    potassium chloride SA (KDUR) 20 MEQ Tab CR Take 1 tablet (20 mEq total) by mouth daily 90 Tablet 2    calcium carbonate (OS-AMPARO 500) 1250 (500 Ca) MG Tab Take 1 Tablet by mouth every day. 100 Tablet 3    hydrocortisone 2.5 % Cream topical cream Apply 1 Application topically 2 times a day. 453 g 0    ferrous sulfate 325 (65 Fe) MG tablet Take 1 Tablet by mouth every morning with breakfast. 100 Tablet 3    Cholecalciferol (VITAMIN D3) 25 MCG (1000 UT) Cap Take 1 Capsule by mouth every day. 100 Capsule 3    folic acid (FOLVITE) 1 MG Tab Take 1 Tablet by mouth every day. 100 Tablet 4     No facility-administered medications prior to visit.     Office Visit on 01/15/2025   Component Date Value    Report 01/15/2025                      Value:[facility]    Test Date:  2025-01-15  Pt Name:    RYANNE ROMERO                  [facility]  MRN:        [id number]               "        Room:  Gender:     Male                         Technician: NC  :        1945                   Requested By:CARLTON BEAN  Order #:    [id number]                    Reading MD: Carlton Bean MD    Measurements  Intervals                                Axis  Rate:       51                           P:          0  ME:         0                            QRS:        82  QRSD:       109                          T:          -20  QT:         452  QTc:        417    Interpretive Statements  Atrial fibrillation  Inferoposterior infarct, age indeterminate  Electronically Signed On 01- 17:25:49 PST by Carlton Bean MD        Lab Results   Component Value Date/Time    HBA1C 4.5 2024 06:57 AM    HBA1C 4.5 2024 06:26 AM     Lab Results   Component Value Date/Time    SODIUM 136 2025 02:20 PM    POTASSIUM 4.0 2025 02:20 PM    CHLORIDE 101 2025 02:20 PM    CO2 22 2025 02:20 PM    GLUCOSE 135 (H) 2025 02:20 PM    BUN 27 (H) 2025 02:20 PM    CREATININE 1.23 2025 02:20 PM    CREATININE 1.14 2010 12:00 AM    BUNCREATRAT 15 2010 12:00 AM    GLOMRATE >59 2010 12:00 AM    ALKPHOSPHAT 102 (H) 2025 02:20 PM    ASTSGOT 29 2025 02:20 PM    ALTSGPT 23 2025 02:20 PM    TBILIRUBIN 1.9 (H) 2025 02:20 PM     Lab Results   Component Value Date/Time    INR 1.36 (H) 2025 02:20 PM    INR 1.61 (H) 10/07/2024 04:25 PM    INR 1.60 (H) 10/02/2024 05:52 PM     Lab Results   Component Value Date/Time    CHOLSTRLTOT 87 (L) 2024 06:57 AM    LDL 27 2024 06:57 AM    HDL 53 2024 06:57 AM    TRIGLYCERIDE 35 2024 06:57 AM       Lab Results   Component Value Date/Time    TESTOSTERONE 333 2015 06:47 AM     No results found for: \"TSH\"  Lab Results   Component Value Date/Time    FREET4 1.16 2022 06:12 AM    FREET4 0.85 2015 06:47 AM     Lab Results   Component Value Date/Time    URICACID 6.6 " "09/20/2024 08:21 AM     No components found for: \"VITB12\"  Lab Results   Component Value Date/Time    25HYDROXY 25 (L) 08/23/2024 06:57 AM    25HYDROXY 31 02/23/2024 06:26 AM               HPI    Review of Systems   Constitutional: Negative.    HENT: Negative.     Eyes: Negative.    Respiratory: Negative.     Cardiovascular: Negative.    Gastrointestinal: Negative.    Genitourinary: Negative.    Musculoskeletal: Negative.    Skin: Negative.    Neurological:  Positive for weakness.   Endo/Heme/Allergies: Negative.    Psychiatric/Behavioral:  Positive for depression and memory loss.               Objective     BP 90/58 (BP Location: Left arm, Patient Position: Sitting, BP Cuff Size: Adult)   Pulse (!) 58   Temp 36.4 °C (97.6 °F) (Temporal)   Ht 1.905 m (6' 3\")   Wt 97.5 kg (215 lb)   SpO2 98%   BMI 26.87 kg/m²      Physical Exam  Constitutional:       Comments: Appears emaciated and weak.  Slow soft speech.  But understandable.   Neurological:      Mental Status: He is disoriented.      Motor: Weakness present.      Gait: Gait abnormal.      Comments: Slow unsteady gait but able to ambulate with a walker.  Only oriented to person but not to place time or date.  Does recognize his wife and her name Lorie.  Does not remember my name even though he has been my patient for 40 years.  He does recognize me as his physician.  Cannot recall the names of several close friends.  Could not do serial sevens.  No focal deficits.  Mood appears somewhat depressed and judgment however was not clear.  Patient thought he had completed paperwork to end his work and association and member of the [place] [employer].  But wife confirms that he has not completed this and it still on his desk as paperwork had to be completed.                     Assessment & Plan  1. Cognitive impairment.  He exhibits significant cognitive decline, characterized by an inability to recall recent events, dates, and places, although his long-term memory remains " relatively intact. This decline has rendered him incapable of managing his financial, medical, and professional responsibilities. 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 A letter will be provided to document his cognitive impairment and its impact on his ability to fulfill his professional duties. He is advised to continue his consultations with Dr. Culp and his gastroenterologist. An increase in the dosage of lactulose is not recommended at this time.    2. Liver disease.  He is currently on lactulose, taking 1 tablespoon in the morning and 1 at night, as prescribed by Dr. Culp. Despite being on this regimen for 3 weeks, no significant improvement has been observed. Potential side effects of increasing the dosage, such as increased somnolence and gastrointestinal upset, were discussed. He is scheduled for blood work tomorrow to monitor his liver and kidney function. He should continue working with his GI doctors and Dr. Culp for ongoing management.    Follow-up  The patient will follow up in 6 months.        Assessment & Plan        Assessment & Plan  Encounter for competency evaluation  Patient's mental status exam indicates that he is not competent to handle his own personal financial, medical, or professional affairs, and according to wife's history he has been in this condition for at least the last 6 months with a slow steady decline.  He is therefore judged not capable of continuing on in his line of work as an  and member of the [place] [employer].  His difficulty in completing any physical or mental tasks would explain why he has been unable to officially resign from his work as an  and therefore officially resign from the [employer].       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

## 2025-02-11 NOTE — LETTER
February 11, 2025      To Whom it may concern :    Torey Janes Queendy is currently under my care and was examined today . He is deemed not competent to handle his own financial , medical and professional affairs or duties , due to his medical condition   .            Regards ,    Fabián Urena M.D   [phone]

## 2025-02-11 NOTE — LETTER
2025      To Whom it may concern :    Torey Lima  ANDREE 1945 is currently under my care and was examined today . He is deemed not competent to handle his own financial , medical and  professional affairs or duties , due to his medical condition   .            Regards ,    Fabián Urena M.D   [phone]

## 2025-02-12 ENCOUNTER — HOSPITAL ENCOUNTER (OUTPATIENT)
Dept: LAB | Facility: MEDICAL CENTER | Age: 80
End: 2025-02-12
Attending: INTERNAL MEDICINE
Payer: MEDICARE

## 2025-02-12 ENCOUNTER — HOSPITAL ENCOUNTER (OUTPATIENT)
Dept: LAB | Facility: MEDICAL CENTER | Age: 80
End: 2025-02-12
Attending: SPECIALIST
Payer: MEDICARE

## 2025-02-12 DIAGNOSIS — D50.8 OTHER IRON DEFICIENCY ANEMIA: ICD-10-CM

## 2025-02-12 DIAGNOSIS — I10 ESSENTIAL HYPERTENSION: ICD-10-CM

## 2025-02-12 DIAGNOSIS — G62.9 NEUROPATHY: ICD-10-CM

## 2025-02-12 DIAGNOSIS — E55.9 VITAMIN D DEFICIENCY: ICD-10-CM

## 2025-02-12 DIAGNOSIS — R73.01 IFG (IMPAIRED FASTING GLUCOSE): ICD-10-CM

## 2025-02-12 DIAGNOSIS — E78.5 DYSLIPIDEMIA: ICD-10-CM

## 2025-02-12 DIAGNOSIS — E53.8 VITAMIN B 12 DEFICIENCY: ICD-10-CM

## 2025-02-12 LAB
25(OH)D3 SERPL-MCNC: 36 NG/ML (ref 30–100)
ALBUMIN SERPL BCP-MCNC: 3.5 G/DL (ref 3.2–4.9)
ALBUMIN/GLOB SERPL: 1.2 G/DL
ALP SERPL-CCNC: 108 U/L (ref 30–99)
ALT SERPL-CCNC: 24 U/L (ref 2–50)
ANION GAP SERPL CALC-SCNC: 10 MMOL/L (ref 7–16)
ANISOCYTOSIS BLD QL SMEAR: ABNORMAL
AST SERPL-CCNC: 28 U/L (ref 12–45)
BASOPHILS # BLD AUTO: 0.6 % (ref 0–1.8)
BASOPHILS # BLD: 0.03 K/UL (ref 0–0.12)
BILIRUB SERPL-MCNC: 1.6 MG/DL (ref 0.1–1.5)
BUN SERPL-MCNC: 30 MG/DL (ref 8–22)
BURR CELLS BLD QL SMEAR: NORMAL
CALCIUM ALBUM COR SERPL-MCNC: 9.5 MG/DL (ref 8.5–10.5)
CALCIUM SERPL-MCNC: 9.1 MG/DL (ref 8.5–10.5)
CHLORIDE SERPL-SCNC: 103 MMOL/L (ref 96–112)
CHOLEST SERPL-MCNC: 92 MG/DL (ref 100–199)
CK SERPL-CCNC: 63 U/L (ref 0–154)
CO2 SERPL-SCNC: 23 MMOL/L (ref 20–33)
COMMENT 1642: NORMAL
CREAT SERPL-MCNC: 1.55 MG/DL (ref 0.5–1.4)
EOSINOPHIL # BLD AUTO: 0.21 K/UL (ref 0–0.51)
EOSINOPHIL NFR BLD: 4.1 % (ref 0–6.9)
ERYTHROCYTE [DISTWIDTH] IN BLOOD BY AUTOMATED COUNT: 65.1 FL (ref 35.9–50)
EST. AVERAGE GLUCOSE BLD GHB EST-MCNC: 85 MG/DL
FERRITIN SERPL-MCNC: 86.8 NG/ML (ref 22–322)
FOLATE SERPL-MCNC: 29.3 NG/ML
GFR SERPLBLD CREATININE-BSD FMLA CKD-EPI: 45 ML/MIN/1.73 M 2
GLOBULIN SER CALC-MCNC: 2.9 G/DL (ref 1.9–3.5)
GLUCOSE SERPL-MCNC: 100 MG/DL (ref 65–99)
HBA1C MFR BLD: 4.6 % (ref 4–5.6)
HCT VFR BLD AUTO: 31.8 % (ref 42–52)
HDLC SERPL-MCNC: 60 MG/DL
HGB BLD-MCNC: 10.2 G/DL (ref 14–18)
IMM GRANULOCYTES # BLD AUTO: 0.01 K/UL (ref 0–0.11)
IMM GRANULOCYTES NFR BLD AUTO: 0.2 % (ref 0–0.9)
IRON SATN MFR SERPL: 16 % (ref 15–55)
IRON SERPL-MCNC: 56 UG/DL (ref 50–180)
LDLC SERPL CALC-MCNC: 21 MG/DL
LYMPHOCYTES # BLD AUTO: 0.43 K/UL (ref 1–4.8)
LYMPHOCYTES NFR BLD: 8.3 % (ref 22–41)
MACROCYTES BLD QL SMEAR: ABNORMAL
MCH RBC QN AUTO: 31.5 PG (ref 27–33)
MCHC RBC AUTO-ENTMCNC: 32.1 G/DL (ref 32.3–36.5)
MCV RBC AUTO: 98.1 FL (ref 81.4–97.8)
MICROCYTES BLD QL SMEAR: ABNORMAL
MONOCYTES # BLD AUTO: 0.64 K/UL (ref 0–0.85)
MONOCYTES NFR BLD AUTO: 12.4 % (ref 0–13.4)
MORPHOLOGY BLD-IMP: NORMAL
NEUTROPHILS # BLD AUTO: 3.85 K/UL (ref 1.82–7.42)
NEUTROPHILS NFR BLD: 74.4 % (ref 44–72)
NRBC # BLD AUTO: 0 K/UL
NRBC BLD-RTO: 0 /100 WBC (ref 0–0.2)
OVALOCYTES BLD QL SMEAR: NORMAL
PLATELET # BLD AUTO: 63 K/UL (ref 164–446)
PLATELET BLD QL SMEAR: NORMAL
PLATELETS.RETICULATED NFR BLD AUTO: 3.4 % (ref 0.6–13.1)
PMV BLD AUTO: 10.7 FL (ref 9–12.9)
POIKILOCYTOSIS BLD QL SMEAR: NORMAL
POTASSIUM SERPL-SCNC: 4.3 MMOL/L (ref 3.6–5.5)
PROT SERPL-MCNC: 6.4 G/DL (ref 6–8.2)
RBC # BLD AUTO: 3.24 M/UL (ref 4.7–6.1)
RBC BLD AUTO: PRESENT
SODIUM SERPL-SCNC: 136 MMOL/L (ref 135–145)
TIBC SERPL-MCNC: 343 UG/DL (ref 250–450)
TRIGL SERPL-MCNC: 56 MG/DL (ref 0–149)
TSH SERPL-ACNC: 2.72 UIU/ML (ref 0.35–5.5)
UIBC SERPL-MCNC: 287 UG/DL (ref 110–370)
VIT B12 SERPL-MCNC: 397 PG/ML (ref 211–911)
WBC # BLD AUTO: 5.2 K/UL (ref 4.8–10.8)

## 2025-02-12 PROCEDURE — 83036 HEMOGLOBIN GLYCOSYLATED A1C: CPT

## 2025-02-12 PROCEDURE — 85055 RETICULATED PLATELET ASSAY: CPT

## 2025-02-12 PROCEDURE — 84443 ASSAY THYROID STIM HORMONE: CPT

## 2025-02-12 PROCEDURE — 83550 IRON BINDING TEST: CPT

## 2025-02-12 PROCEDURE — 80053 COMPREHEN METABOLIC PANEL: CPT

## 2025-02-12 PROCEDURE — 82607 VITAMIN B-12: CPT

## 2025-02-12 PROCEDURE — 85025 COMPLETE CBC W/AUTO DIFF WBC: CPT

## 2025-02-12 PROCEDURE — 82306 VITAMIN D 25 HYDROXY: CPT

## 2025-02-12 PROCEDURE — 82746 ASSAY OF FOLIC ACID SERUM: CPT

## 2025-02-12 PROCEDURE — 80061 LIPID PANEL: CPT

## 2025-02-12 PROCEDURE — 82728 ASSAY OF FERRITIN: CPT

## 2025-02-12 PROCEDURE — 36415 COLL VENOUS BLD VENIPUNCTURE: CPT

## 2025-02-12 PROCEDURE — 82550 ASSAY OF CK (CPK): CPT

## 2025-02-12 PROCEDURE — 83540 ASSAY OF IRON: CPT

## 2025-02-14 LAB — TEST NAME 95000: NORMAL

## 2025-02-15 ENCOUNTER — NON-PROVIDER VISIT (OUTPATIENT)
Dept: CARDIOLOGY | Facility: MEDICAL CENTER | Age: 80
End: 2025-02-15
Payer: MEDICARE

## 2025-02-18 PROCEDURE — 93298 REM INTERROG DEV EVAL SCRMS: CPT | Mod: 26 | Performed by: INTERNAL MEDICINE

## 2025-02-19 ENCOUNTER — PHARMACY VISIT (OUTPATIENT)
Dept: PHARMACY | Facility: MEDICAL CENTER | Age: 80
End: 2025-02-19
Payer: COMMERCIAL

## 2025-02-19 RX ORDER — SPIRONOLACTONE 50 MG/1
TABLET, FILM COATED ORAL
Qty: 360 TABLET | Refills: 3 | OUTPATIENT
Start: 2025-02-18

## 2025-02-19 RX ORDER — LACTULOSE 10 G/15ML
SOLUTION ORAL; RECTAL
Qty: 3785 ML | Refills: 11 | OUTPATIENT
Start: 2025-02-18

## 2025-03-03 NOTE — PROGRESS NOTES
Reason for Follow-Up:    Personal Care Management monthly outreach call.      Current Health Status:    Qualifying conditions include HTN, dyslipidemia, and Afib. Spoke w/ pt's spouse (approved contact). Torey's memory has continued to decline. Wife also reports he sleeps about 7-8 hours at night and 3-4 hours during the day. Discussed goal change as pt has not been hypertensive in the last several months and actually more frequently has blood pressures below 120/80. Monitors BP at home. Concern for falls has been greater recently d/t ongoing weakness, memory changes, and fatigue. Agreed upon SMART goal addressed below.      Medical Updates:  Since our last outreach he saw his PCP for a competency evaluation. At that visit he was not oriented to the date, year, month, or city. Per PCP note his mental status exam indicated he was not competent to handle his own personal, financial, medical, or professional affairs. He was judged not capable of continuing as an  and member of the [employer]. He then saw [facility]. Per notes, CT showed no change. Medication changes were made to help w/ ascites and s/s related to liver function.     Medication Updates:  Increase spironolactone to 2 twice a day (100 mg twice a day) and keep torsemide twice a day, increase lactulose from 15 to 30 cc twice a day, start rifaximin 550 mg twice a day (pending insurance authorization). Pt's wife said they filled out a patient assistance application for rifaximin and are still waiting to hear back from the pharmacist at [facility]. No other barriers to medication adherence. Pt's spouse did say she wants to check w/ his PCP to see if there are any medications he can stop taking because he takes so many. Will discuss this at his upcoming appt.     Symptoms:  Ongoing memory loss and fatigue.     Plan of Care Goals and Progress:    Goal 1.  Atrial Fibrillation      Progress:  Reports no recent s/s. Taking eliquis.        Barriers:  Disease  processes, comorbidities      Interventions:  Scheduled to see cardiology later this month. Demonstrates understanding of symptom monitoring.     Education:  S/s that require immediate medical attention    Goal 2.  Patient's caregiver stated a need to reduce the risk for falls and fall related injuries to help maintain quality of life. The patient's caregiver is committed to helping him implement fall prevention measures and safety modifications. The pt's progress will be monitored by this RN during our monthly outreach calls. The patient's caregiver is agreeable to receiving education and resources from this RN and his providers. They will begin by identifying extrinsic fall risk factors prior to April, 2025 monthly outreach.     Progress: Fall history assessed. No falls since last outreach.       Barriers:  Disease processes, aging      Interventions:  Pt's spouse encourages use of walker. Discussed intrinsic/extrinsic fall risk factors.      Education:  Fall risk factors. Assistive devices       Patient's Concerns and Feedback (Self Management of Care):     As stated above, they expressed concerns about fall risk. BP has been controlled for the last several months, and they demonstrate understanding of it's management. As a result, we fill now focus on fall prevention in addition to Afib management. Lorie did express concerns about decline in Torey's memory and energy levels. Concerns are being addressed by providers, however we also discussed palliative for extra support. Provided education on role of palliative. She will reach out if they wish to pursue a palliative referral. No additional questions or needs.     Next Steps:     Follow-Up Plan:  4 weeks, April, 2025, for PCM monthly outreach      Appointments:  3/5/25 @0700 w/ PCP; 3/11/25 @1645 at lab; 3/14/25 @1300 w/ cardiology.     Contact Information:  Call [phone] with any questions or concerns.

## 2025-03-05 ENCOUNTER — APPOINTMENT (OUTPATIENT)
Dept: MEDICAL GROUP | Age: 80
End: 2025-03-05
Payer: MEDICARE

## 2025-03-05 VITALS
BODY MASS INDEX: 25.61 KG/M2 | HEIGHT: 75 IN | OXYGEN SATURATION: 95 % | WEIGHT: 206 LBS | DIASTOLIC BLOOD PRESSURE: 76 MMHG | HEART RATE: 83 BPM | TEMPERATURE: 96.9 F | SYSTOLIC BLOOD PRESSURE: 122 MMHG

## 2025-03-05 DIAGNOSIS — E78.5 DYSLIPIDEMIA: ICD-10-CM

## 2025-03-05 DIAGNOSIS — I10 ESSENTIAL HYPERTENSION: ICD-10-CM

## 2025-03-05 DIAGNOSIS — R60.1 GENERALIZED EDEMA: ICD-10-CM

## 2025-03-05 DIAGNOSIS — S51.811D SKIN TEAR OF RIGHT FOREARM WITHOUT COMPLICATION, SUBSEQUENT ENCOUNTER: ICD-10-CM

## 2025-03-05 DIAGNOSIS — K74.60 CIRRHOSIS OF LIVER WITH ASCITES, UNSPECIFIED HEPATIC CIRRHOSIS TYPE (HCC): ICD-10-CM

## 2025-03-05 DIAGNOSIS — L03.116 CELLULITIS OF LEFT LOWER EXTREMITY: ICD-10-CM

## 2025-03-05 DIAGNOSIS — D63.8 ANEMIA, CHRONIC DISEASE: ICD-10-CM

## 2025-03-05 DIAGNOSIS — I87.2 VENOUS INSUFFICIENCY OF BOTH LOWER EXTREMITIES: ICD-10-CM

## 2025-03-05 DIAGNOSIS — M25.562 ACUTE PAIN OF LEFT KNEE: ICD-10-CM

## 2025-03-05 DIAGNOSIS — R18.8 CIRRHOSIS OF LIVER WITH ASCITES, UNSPECIFIED HEPATIC CIRRHOSIS TYPE (HCC): ICD-10-CM

## 2025-03-05 DIAGNOSIS — E83.42 HYPOMAGNESEMIA: ICD-10-CM

## 2025-03-05 DIAGNOSIS — I47.10 PAROXYSMAL SVT (SUPRAVENTRICULAR TACHYCARDIA) (HCC): ICD-10-CM

## 2025-03-05 DIAGNOSIS — I48.0 PAROXYSMAL ATRIAL FIBRILLATION (HCC): ICD-10-CM

## 2025-03-05 DIAGNOSIS — S41.112D SKIN TEAR OF UPPER ARM WITHOUT COMPLICATION, LEFT, SUBSEQUENT ENCOUNTER: ICD-10-CM

## 2025-03-05 DIAGNOSIS — I49.1 PAC (PREMATURE ATRIAL CONTRACTION): ICD-10-CM

## 2025-03-05 DIAGNOSIS — E87.6 HYPOKALEMIA: ICD-10-CM

## 2025-03-05 DIAGNOSIS — R29.0 CARPOPEDAL SPASM: ICD-10-CM

## 2025-03-05 DIAGNOSIS — E55.9 VITAMIN D DEFICIENCY: ICD-10-CM

## 2025-03-05 DIAGNOSIS — I95.9 HYPOTENSION, UNSPECIFIED HYPOTENSION TYPE: ICD-10-CM

## 2025-03-05 PROCEDURE — 99215 OFFICE O/P EST HI 40 MIN: CPT | Performed by: INTERNAL MEDICINE

## 2025-03-05 PROCEDURE — 3074F SYST BP LT 130 MM HG: CPT | Performed by: INTERNAL MEDICINE

## 2025-03-05 PROCEDURE — 3078F DIAST BP <80 MM HG: CPT | Performed by: INTERNAL MEDICINE

## 2025-03-05 RX ORDER — ATORVASTATIN CALCIUM 40 MG/1
40 TABLET, FILM COATED ORAL NIGHTLY
Qty: 100 TABLET | Refills: 3
Start: 2025-03-05 | End: 2025-03-21

## 2025-03-05 RX ORDER — HYDROCORTISONE 25 MG/G
1 CREAM TOPICAL 2 TIMES DAILY
Qty: 453 G | Refills: 0
Start: 2025-03-05 | End: 2025-03-21

## 2025-03-05 RX ORDER — FOLIC ACID 1 MG/1
1 TABLET ORAL DAILY
Qty: 100 TABLET | Refills: 4
Start: 2025-03-05 | End: 2025-03-21

## 2025-03-05 RX ORDER — SPIRONOLACTONE 50 MG/1
TABLET, FILM COATED ORAL
Status: SHIPPED
Start: 2025-03-05 | End: 2025-03-21

## 2025-03-05 RX ORDER — IBUPROFEN 200 MG
500 CAPSULE ORAL DAILY
Qty: 100 TABLET | Refills: 3
Start: 2025-03-05 | End: 2025-03-21

## 2025-03-05 RX ORDER — MIDODRINE HYDROCHLORIDE 10 MG/1
10 TABLET ORAL
Qty: 90 TABLET | Refills: 3
Start: 2025-03-05 | End: 2025-03-21

## 2025-03-05 RX ORDER — LACTULOSE 10 G/15ML
SOLUTION ORAL
Qty: 3785 ML | Refills: 11 | Status: SHIPPED | OUTPATIENT
Start: 2025-03-05 | End: 2025-03-21

## 2025-03-05 RX ORDER — TORSEMIDE 10 MG/1
10 TABLET ORAL 2 TIMES DAILY
Qty: 200 TABLET | Refills: 1
Start: 2025-03-05 | End: 2025-03-21

## 2025-03-05 RX ORDER — FERROUS SULFATE 325(65) MG
325 TABLET ORAL
Qty: 100 TABLET | Refills: 3
Start: 2025-03-05 | End: 2025-03-21

## 2025-03-05 RX ORDER — POTASSIUM CHLORIDE 1500 MG/1
TABLET, EXTENDED RELEASE ORAL
Status: SHIPPED
Start: 2025-03-05 | End: 2025-03-21

## 2025-03-05 RX ORDER — CLINDAMYCIN PHOSPHATE 11.9 MG/ML
2 SOLUTION TOPICAL 2 TIMES DAILY
Qty: 60 ML | Refills: 2
Start: 2025-03-05 | End: 2025-03-21

## 2025-03-05 RX ORDER — METOPROLOL SUCCINATE 25 MG/1
12.5 TABLET, EXTENDED RELEASE ORAL DAILY
Qty: 50 TABLET | Refills: 3
Start: 2025-03-05 | End: 2025-03-21

## 2025-03-05 RX ORDER — MAGNESIUM GLYCINATE 100 MG
1 CAPSULE ORAL
Qty: 100 CAPSULE | Refills: 3
Start: 2025-03-05 | End: 2025-03-21

## 2025-03-05 RX ORDER — URSODIOL 250 MG/1
TABLET, FILM COATED ORAL
Qty: 270 TABLET | Refills: 3
Start: 2025-03-05 | End: 2025-03-21

## 2025-03-05 ASSESSMENT — ENCOUNTER SYMPTOMS
RESPIRATORY NEGATIVE: 1
EYES NEGATIVE: 1
ABDOMINAL PAIN: 1
MUSCULOSKELETAL NEGATIVE: 1
NEUROLOGICAL NEGATIVE: 1
PSYCHIATRIC NEGATIVE: 1

## 2025-03-05 ASSESSMENT — FIBROSIS 4 INDEX: FIB4 SCORE: 7.26

## 2025-03-05 NOTE — ASSESSMENT & PLAN NOTE
Orders:    Cholecalciferol (VITAMIN D3) 25 MCG (1000 UT) Cap; Take 1 Capsule by mouth every day.

## 2025-03-05 NOTE — ASSESSMENT & PLAN NOTE
Orders:    ferrous sulfate 325 (65 Fe) MG tablet; Take 1 Tablet by mouth every morning with breakfast.

## 2025-03-05 NOTE — ASSESSMENT & PLAN NOTE
Orders:    clindamycin (CLEOCIN) 1 % Solution; Apply 2 mL topically 2 times a day. To lower legs

## 2025-03-05 NOTE — ASSESSMENT & PLAN NOTE
Orders:    lactulose 10 g/15mL Solution; Take 30 mL (20 g total) by mouth in the morning and 30 mL (20 g total) in the evening.    spironolactone (ALDACTONE) 50 MG Tab; Take 2 tablets (100 mg total) by mouth in the morning and 2 tablets (100 mg total) in the evening.    torsemide (DEMADEX) 10 MG tablet; Take 1 Tablet by mouth 2 times a day.    folic acid (FOLVITE) 1 MG Tab; Take 1 Tablet by mouth every day.

## 2025-03-05 NOTE — ASSESSMENT & PLAN NOTE
Orders:    metoprolol SR (TOPROL XL) 25 MG TABLET SR 24 HR; Take 0.5 Tablets by mouth every day.

## 2025-03-05 NOTE — ASSESSMENT & PLAN NOTE
Orders:    diclofenac sodium (VOLTAREN) 1 % Gel; Apply 2 g topically 4 times a day as needed (left knee pain).

## 2025-03-05 NOTE — PROGRESS NOTES
Subjective     Torey Lima is a 79 y.o. male who presents with Follow-Up (Wife has a few things to talking  about )    History of Present Illness  The patient is an 79-year-old male who presents for follow-up of liver failure and hepatic encephalopathy.    History is reported by other person in the presence of the patient.  He has been on Eliquis for the past 2 months, with no reported bleeding complications. He was advised to start rifaximin but has not yet done so due to financial constraints. His ammonia levels were last checked a few months ago and were found to be borderline elevated. He has been experiencing significant fatigue, sleeping 7 to 8 hours at night and napping for an additional 3 to 4 hours during the day. Over the past month, he has lost 10 pounds, attributed to decreased appetite. He reports severe pain, which he describes as intense and sharp. Despite an increase in lactulose dosage from 15 mL to 30 mL twice daily, he has not experienced frequent bowel movements as expected. He occasionally experiences diarrhea, but there are also days without any bowel movements. He had an endoscopy in 11/2024, which did not reveal any varices. He is scheduled for blood work on 03/11/2025, as ordered by Dr. Culp.    He has a history of atrial fibrillation and is under the care of a cardiologist, Dr. Warren Meng, with whom he has an appointment on 03/18/2025. The possibility of a Watchman device implantation is being considered.    MEDICATIONS  Current: Lactulose, spironolactone, Eliquis    Outpatient Medications Prior to Visit   Medication Sig Dispense Refill    acetaminophen (TYLENOL) 500 MG Tab Take 1,000 mg by mouth every 6 hours as needed (For headache).      lactulose 10 g/15mL Solution Take 30 mL (20 g total) by mouth in the morning and 30 mL (20 g total) in the evening. 3785 mL 11    spironolactone (ALDACTONE) 50 MG Tab Take 2 tablets (100 mg total) by mouth in the morning and 2 tablets (100 mg  total) in the evening. 360 Tablet 3    torsemide (DEMADEX) 10 MG tablet Take 1 Tablet by mouth 2 times a day. 200 Tablet 1    midodrine (PROAMATINE) 10 MG tablet Take 1 Tablet by mouth 3 times a day with meals for 120 days. 90 Tablet 3    apixaban (ELIQUIS) 5mg Tab Take 1 Tablet by mouth 2 times a day. 60 Tablet 3    lactulose 10 GM/15ML Solution Take 15 mL by mouth in the morning and 15ml in the evening. 1892 mL 11    spironolactone (ALDACTONE) 50 MG Tab Take 3 Tablets by mouth every day. 270 Tablet 3    clindamycin (CLEOCIN) 1 % Solution Apply 2 mL topically 2 times a day. To lower legs 60 mL 2    diclofenac sodium (VOLTAREN) 1 % Gel Apply 2 g topically 4 times a day as needed (left knee pain). 50 g 0    Magnesium Glycinate 100 MG Cap Take 1 Capsule by mouth every day with lunch.      ursodiol (JUNE 250) 250 MG Tab Take 1 tablet (250 mg total) by mouth every morning, afternoon, and evening. 270 Tablet 3    metoprolol SR (TOPROL XL) 25 MG TABLET SR 24 HR Take 0.5 Tablets by mouth every day. 50 Tablet 3    atorvastatin (LIPITOR) 40 MG Tab Take 1 Tablet by mouth every evening. 100 Tablet 4    potassium chloride SA (KDUR) 20 MEQ Tab CR Take 1 tablet (20 mEq total) by mouth daily 90 Tablet 2    calcium carbonate (OS-AMPARO 500) 1250 (500 Ca) MG Tab Take 1 Tablet by mouth every day. 100 Tablet 3    hydrocortisone 2.5 % Cream topical cream Apply 1 Application topically 2 times a day. 453 g 0    ferrous sulfate 325 (65 Fe) MG tablet Take 1 Tablet by mouth every morning with breakfast. 100 Tablet 3    Cholecalciferol (VITAMIN D3) 25 MCG (1000 UT) Cap Take 1 Capsule by mouth every day. 100 Capsule 3    folic acid (FOLVITE) 1 MG Tab Take 1 Tablet by mouth every day. 100 Tablet 4     No facility-administered medications prior to visit.     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)--2010 left hemicolectomy; no colostomy; ablation of liver  met at [facility], [place] 1/2011- MRI abd 3/2018 no change in per    Liver lesion- ablation of malignant met from colon ca 2011- f/u [facility] q 6 mos - stable MRI abd 3/2018- redo annually    Drug-induced polyneuropathy (HCC)    Vitamin D deficiency    ASCVD (arteriosclerotic cardiovascular disease)subtotalled small distal LCx and 40% LAD med rx by cath 12/13/12    Essential hypertension    Gastrointestinal hemorrhage with hematemesis- recurrernt from varices    Secondary esophageal varices without bleeding (HCC)- BANDED 4/2016- repeat egd tbd 10//29/16- [facility]    Gastric varices- s/p BRTO procedure at [facility]    Portal vein thrombosis- on CT [facility], [place] 2/2015    Cirrhosis of liver with ascites (HCC)- ? DUE TO OLD HEP B, CHEMO RX,  OR THERMO RAD OF MAGNANT LESION    History of esophageal varices    Total bilirubin, elevated    H/O colon cancer, stage IV- neg colonoscopy 2012/2018 at [facility]    BPH (benign prostatic hyperplasia)    Peripheral neuropathy due to chemotherapy (HCC)    Edema of left lower extremity- due to dvt july 2017    Generalized edema    Anemia, chronic disease    Chronic deep vein thrombosis (DVT) of femoral vein of left lower extremity (HCC)- s/p IVC filter 2016    Portal hypertension with esophageal varices (HCC)    Secondary malignant neoplasm of liver (HCC)    Thrombocytopenia, unspecified (HCC)- from previous chemorx    Spinal enthesopathy of thoracic region (HCC)    History of Guillain-Caulfield syndrome    Cerebral atrophy (HCC)-MRI brain 2019, mild diffuse cerebral substance loss    Dyslipidemia    Irregular heartbeat- A. 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, psychotic disturbance, mood disturbance, or anxiety (HCC)     Hospital Outpatient Visit on 02/12/2025   Component Date Value    CPK Total 02/12/2025 63    Hospital Outpatient Visit on 02/12/2025   Component Date Value    Vitamin B12 -True Cobala* 02/12/2025 397     Folate -Folic Acid 02/12/2025 29.3     Iron 02/12/2025 56     Total Iron Binding 02/12/2025 343     Unsat Iron Binding 02/12/2025 287     % Saturation 02/12/2025 16     Ferritin 02/12/2025 86.8     25-Hydroxy   Vitamin D 25 02/12/2025 36     Glycohemoglobin 02/12/2025 4.6     Est Avg Glucose 02/12/2025 85     Cholesterol,Tot 02/12/2025 92 (L)     Triglycerides 02/12/2025 56     HDL 02/12/2025 60     LDL 02/12/2025 21     TSH 02/12/2025 2.720     WBC 02/12/2025 5.2     RBC 02/12/2025 3.24 (L)     Hemoglobin 02/12/2025 10.2 (L)     Hematocrit 02/12/2025 31.8 (L)     MCV 02/12/2025 98.1 (H)     MCH 02/12/2025 31.5     MCHC 02/12/2025 32.1 (L)     RDW 02/12/2025 65.1 (H)     Platelet Count 02/12/2025 63 (L)     MPV 02/12/2025 10.7     Neutrophils-Polys 02/12/2025 74.40 (H)     Lymphocytes 02/12/2025 8.30 (L)     Monocytes 02/12/2025 12.40     Eosinophils 02/12/2025 4.10     Basophils 02/12/2025 0.60     Immature Granulocytes 02/12/2025 0.20     Nucleated RBC 02/12/2025 0.00     Neutrophils (Absolute) 02/12/2025 3.85     Lymphs (Absolute) 02/12/2025 0.43 (L)     Monos (Absolute) 02/12/2025 0.64     Eos (Absolute) 02/12/2025 0.21     Baso (Absolute) 02/12/2025 0.03     Immature Granulocytes (a* 02/12/2025 0.01     NRBC (Absolute) 02/12/2025 0.00     Anisocytosis 02/12/2025 2+ (A)     Macrocytosis 02/12/2025 1+     Microcytosis 02/12/2025 2+ (A)     Sodium 02/12/2025 136     Potassium 02/12/2025 4.3     Chloride 02/12/2025 103     Co2 02/12/2025 23     Anion Gap 02/12/2025 10.0     Glucose 02/12/2025 100 (H)      "Bun 02/12/2025 30 (H)     Creatinine 02/12/2025 1.55 (H)     Calcium 02/12/2025 9.1     Correct Calcium 02/12/2025 9.5     AST(SGOT) 02/12/2025 28     ALT(SGPT) 02/12/2025 24     Alkaline Phosphatase 02/12/2025 108 (H)     Total Bilirubin 02/12/2025 1.6 (H)     Albumin 02/12/2025 3.5     Total Protein 02/12/2025 6.4     Globulin 02/12/2025 2.9     A-G Ratio 02/12/2025 1.2     GFR (CKD-EPI) 02/12/2025 45 (A)     Plt Estimation 02/12/2025 SEE BELOW     RBC Morphology 02/12/2025 Present     Poikilocytosis 02/12/2025 2+     Ovalocytes 02/12/2025 2+     Echinocytes 02/12/2025 1+     Peripheral Smear Review 02/12/2025 see below     Imm. Plt Fraction 02/12/2025 3.4     Comments-Diff 02/12/2025 see below     Miscellaneous Lab Result 02/12/2025 SEE NOTE       Lab Results   Component Value Date/Time    HBA1C 4.6 02/12/2025 06:15 AM    HBA1C 4.5 08/23/2024 06:57 AM     Lab Results   Component Value Date/Time    SODIUM 136 02/12/2025 06:15 AM    POTASSIUM 4.3 02/12/2025 06:15 AM    CHLORIDE 103 02/12/2025 06:15 AM    CO2 23 02/12/2025 06:15 AM    GLUCOSE 100 (H) 02/12/2025 06:15 AM    BUN 30 (H) 02/12/2025 06:15 AM    CREATININE 1.55 (H) 02/12/2025 06:15 AM    CREATININE 1.14 01/23/2010 12:00 AM    BUNCREATRAT 15 01/23/2010 12:00 AM    GLOMRATE >59 01/23/2010 12:00 AM    ALKPHOSPHAT 108 (H) 02/12/2025 06:15 AM    ASTSGOT 28 02/12/2025 06:15 AM    ALTSGPT 24 02/12/2025 06:15 AM    TBILIRUBIN 1.6 (H) 02/12/2025 06:15 AM     Lab Results   Component Value Date/Time    INR 1.36 (H) 01/06/2025 02:20 PM    INR 1.61 (H) 10/07/2024 04:25 PM    INR 1.60 (H) 10/02/2024 05:52 PM     Lab Results   Component Value Date/Time    CHOLSTRLTOT 92 (L) 02/12/2025 06:15 AM    LDL 21 02/12/2025 06:15 AM    HDL 60 02/12/2025 06:15 AM    TRIGLYCERIDE 56 02/12/2025 06:15 AM       Lab Results   Component Value Date/Time    TESTOSTERONE 333 04/02/2015 06:47 AM     No results found for: \"TSH\"  Lab Results   Component Value Date/Time    FREET4 1.16 " "04/22/2022 06:12 AM    FREET4 0.85 04/02/2015 06:47 AM     Lab Results   Component Value Date/Time    URICACID 6.6 09/20/2024 08:21 AM     No components found for: \"VITB12\"  Lab Results   Component Value Date/Time    25HYDROXY 36 02/12/2025 06:15 AM    25HYDROXY 25 (L) 08/23/2024 06:57 AM   '            HPI    Review of Systems   Constitutional:  Positive for malaise/fatigue.   HENT: Negative.     Eyes: Negative.    Respiratory: Negative.     Cardiovascular:  Positive for leg swelling.   Gastrointestinal:  Positive for abdominal pain.   Genitourinary: Negative.    Musculoskeletal: Negative.    Skin: Negative.    Neurological: Negative.    Endo/Heme/Allergies: Negative.    Psychiatric/Behavioral: Negative.                Objective     /76 (BP Location: Left arm, Patient Position: Sitting, BP Cuff Size: Adult)   Pulse 83   Temp 36.1 °C (96.9 °F) (Temporal)   Ht 1.905 m (6' 3\")   Wt 93.4 kg (206 lb)   SpO2 95%   BMI 25.75 kg/m²      Physical Exam  Vitals and nursing note reviewed.   Constitutional:       General: He is not in acute distress.     Appearance: He is well-developed. He is not diaphoretic.   HENT:      Head: Normocephalic and atraumatic.      Right Ear: External ear normal.      Left Ear: External ear normal.      Nose: Nose normal.      Mouth/Throat:      Pharynx: No oropharyngeal exudate.   Eyes:      General: No scleral icterus.        Right eye: No discharge.         Left eye: No discharge.      Conjunctiva/sclera: Conjunctivae normal.      Pupils: Pupils are equal, round, and reactive to light.   Neck:      Thyroid: No thyromegaly.      Vascular: No JVD.      Trachea: No tracheal deviation.   Cardiovascular:      Rate and Rhythm: Normal rate and regular rhythm.      Heart sounds: Normal heart sounds. No murmur heard.     No friction rub. No gallop.   Pulmonary:      Effort: Pulmonary effort is normal. No respiratory distress.      Breath sounds: Normal breath sounds. No stridor. No wheezing " or rales.   Chest:      Chest wall: No tenderness.   Abdominal:      General: Bowel sounds are normal. There is no distension.      Palpations: Abdomen is soft. There is no mass.      Tenderness: There is no abdominal tenderness. There is no guarding or rebound.      Hernia: No hernia is present.      Comments: Abdominal exam reveals some mild left lower quadrant abdominal tenderness but without rebound.  The belly is soft and no palpable masses or hernias are appreciated.  Exam essentially unremarkable, and no shifting dullness can be appreciated at this time.   Genitourinary:     Penis: No tenderness.       Rectum: Guaiac result negative.   Musculoskeletal:         General: No tenderness. Normal range of motion.      Cervical back: Normal range of motion and neck supple.      Right lower leg: Edema present.      Left lower leg: Edema present.      Comments: There is only 1+ pretibial ankle edema bilaterally.  No calf tenderness   Lymphadenopathy:      Cervical: No cervical adenopathy.   Skin:     General: Skin is warm and dry.      Coloration: Skin is not pale.      Findings: No erythema or rash.   Neurological:      Mental Status: He is alert and oriented to person, place, and time.      Cranial Nerves: No cranial nerve deficit.      Motor: No abnormal muscle tone.      Coordination: Coordination normal.      Deep Tendon Reflexes: Reflexes are normal and symmetric. Reflexes normal.   Psychiatric:         Behavior: Behavior normal.         Thought Content: Thought content normal.         Judgment: Judgment normal.                                  Assessment & Plan  Anemia, chronic disease    Orders:    ferrous sulfate 325 (65 Fe) MG tablet; Take 1 Tablet by mouth every morning with breakfast.    Dyslipidemia    Orders:    atorvastatin (LIPITOR) 40 MG Tab; Take 1 Tablet by mouth every evening.    Hypokalemia    Orders:    potassium chloride SA (KDUR) 20 MEQ Tab CR; Take 1 tablet (20 mEq total) by mouth  daily    Vitamin D deficiency    Orders:    Cholecalciferol (VITAMIN D3) 25 MCG (1000 UT) Cap; Take 1 Capsule by mouth every day.    Paroxysmal SVT (supraventricular tachycardia) (HCC)    Orders:    metoprolol SR (TOPROL XL) 25 MG TABLET SR 24 HR; Take 0.5 Tablets by mouth every day.    Essential hypertension    Orders:    metoprolol SR (TOPROL XL) 25 MG TABLET SR 24 HR; Take 0.5 Tablets by mouth every day.    PAC (premature atrial contraction)    Orders:    metoprolol SR (TOPROL XL) 25 MG TABLET SR 24 HR; Take 0.5 Tablets by mouth every day.    Acute pain of left knee    Orders:    diclofenac sodium (VOLTAREN) 1 % Gel; Apply 2 g topically 4 times a day as needed (left knee pain).    Skin tear of right forearm without complication, subsequent encounter    Orders:    hydrocortisone 2.5 % Cream topical cream; Apply 1 Application topically 2 times a day.    Skin tear of upper arm without complication, left, subsequent encounter    Orders:    hydrocortisone 2.5 % Cream topical cream; Apply 1 Application topically 2 times a day.    Cellulitis of left lower extremity    Orders:    clindamycin (CLEOCIN) 1 % Solution; Apply 2 mL topically 2 times a day. To lower legs    Hypomagnesemia    Orders:    Magnesium Glycinate 100 MG Cap; Take 1 Capsule by mouth every day with lunch.    Generalized edema    Orders:    torsemide (DEMADEX) 10 MG tablet; Take 1 Tablet by mouth 2 times a day.    Cirrhosis of liver with ascites, unspecified hepatic cirrhosis type (HCC)    Orders:    lactulose 10 g/15mL Solution; Take 30 mL (20 g total) by mouth in the morning and 30 mL (20 g total) in the evening.    spironolactone (ALDACTONE) 50 MG Tab; Take 2 tablets (100 mg total) by mouth in the morning and 2 tablets (100 mg total) in the evening.    torsemide (DEMADEX) 10 MG tablet; Take 1 Tablet by mouth 2 times a day.    folic acid (FOLVITE) 1 MG Tab; Take 1 Tablet by mouth every day.    Paroxysmal atrial fibrillation (HCC)    Orders:    apixaban  (ELIQUIS) 5mg Tab; Take 1 Tablet by mouth 2 times a day.    Hypotension, unspecified hypotension type    Orders:    midodrine (PROAMATINE) 10 MG tablet; Take 1 Tablet by mouth 3 times a day with meals for 120 days.    Venous insufficiency of both lower extremities    Orders:    calcium carbonate (OS-AMPARO 500) 1250 (500 Ca) MG Tab; Take 1 Tablet by mouth every day.    Carpopedal spasm    Orders:    calcium carbonate (OS-AMPARO 500) 1250 (500 Ca) MG Tab; Take 1 Tablet by mouth every day.    Cirrhosis of liver with ascites, unspecified hepatic cirrhosis type (HCC)    Orders:    lactulose 10 g/15mL Solution; Take 30 mL (20 g total) by mouth in the morning and 30 mL (20 g total) in the evening.    spironolactone (ALDACTONE) 50 MG Tab; Take 2 tablets (100 mg total) by mouth in the morning and 2 tablets (100 mg total) in the evening.    torsemide (DEMADEX) 10 MG tablet; Take 1 Tablet by mouth 2 times a day.    folic acid (FOLVITE) 1 MG Tab; Take 1 Tablet by mouth every day.       Addendum-patient was prescribed Xifaxan for his mild hepatic encephalopathy but his ammonia level was only slightly above normal and he seems quite coherent today.  He has not been able to afford the medication since it is over $900 a month with insurance and therefore will not be taking it.  He does not seem to be in great need of it as he is fairly coherent today and the ammonia level is nearly normal so he will remain off of it particular in view of the expense.  However authorization for Xifaxan will proceed assuming that he may progress to significant hepatic encephalopathy in the future.    In summary.  Patient is doing well without bleeding episodes on Eliquis for his atrial fibrillation which appears well-controlled now.  This is auscultation exam reveals only a few extra beats and for the most part in normal sinus rhythm clinically but EKG was not done.  The plan is to consider Watchman device to reduce the risk for bleeding from his  anticoagulant in the near future and will be seeing electrophysiologist at cardiology office soon.    With regard to his recurrent ascites this seems to be pretty well-controlled now with the increase in spironolactone dosing and periodic paracentesis.  The abdominal discomfort is experienced periodically with sharp stabbing pains left lower quadrant might be related to spasm as I can detect nothing to suggest diverticulitis at this time or other acute disorders.  Continue surveillance and continue with his current regimen as prescribed by the [facility] GI department.    I asked him to start on Metamucil 1 teaspoon a day to help with more firm stools and constipation.  He is not getting diarrhea at this point from the increased lactulose dosage.    Recheck in 3 to 4 months with lab prior to visit.  He will be getting more lab from the GI doctor at [facility] next week prior to his next virtual visit with them.    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  Once the [place]  signs off on this this problem should be resolved.

## 2025-03-05 NOTE — ASSESSMENT & PLAN NOTE
Orders:    midodrine (PROAMATINE) 10 MG tablet; Take 1 Tablet by mouth 3 times a day with meals for 120 days.

## 2025-03-06 NOTE — DISCHARGE PLANNING
Received choice form from Tia(TCN). Referral sent to [facility].   VSS. NAD. RR even and unlabored on RA. Discharge instructions given to mom. Mom verbalized understanding and denies any concerns @ this time. Safety maintained.

## 2025-03-12 NOTE — CARE PLAN
Problem: Risk of Falls  Goal: Reduce the Risk of Falls and Fall Related Injuries - Long Term  Outcome: Progressing  Intervention: Assess the patient's history of falls, including frequency, circumstances, and any resulting injuries  Intervention: Evaluate intrinsic risk factors (e.g., age, chronic conditions, medications, balance issues)  Intervention: Assess extrinsic risk factors (e.g., home environment, footwear, assistive devices)     Problem: Knowledge surrounding fall safety  Goal: Demonstrate ability to verbalize fall safety concerns - Short Term  Outcome: Progressing  Intervention: Educate on the proper use of assistive devices (e.g., canes, walkers)     Problem: Patient Stated Problem: Atrial Fibrillation   Goal: Patient Stated Goal: Promote Cardiovascular Health - Long Term  Outcome: Progressing  Intervention: Fifth Patient Stated Intervention: Review signs and symptoms that require immediate medical attention

## 2025-03-15 PROBLEM — K76.82 HEPATIC ENCEPHALOPATHY (HCC): Status: ACTIVE | Noted: 2025-01-01

## 2025-03-16 PROBLEM — F01.50 VASCULAR DEMENTIA WITHOUT BEHAVIORAL DISTURBANCE, PSYCHOTIC DISTURBANCE, MOOD DISTURBANCE, OR ANXIETY (HCC): Status: ACTIVE | Noted: 2025-01-01

## 2025-03-16 PROBLEM — I48.0 PAROXYSMAL ATRIAL FIBRILLATION (HCC): Status: ACTIVE | Noted: 2024-01-01

## 2025-03-16 PROBLEM — F03.90 DEMENTIA WITHOUT BEHAVIORAL DISTURBANCE, PSYCHOTIC DISTURBANCE, MOOD DISTURBANCE, OR ANXIETY (HCC): Status: ACTIVE | Noted: 2025-01-01

## 2025-03-16 NOTE — ED NOTES
Lab called with critical result of AMMONIA 170 2114. Critical lab result read back to LAB.   Dr. GRAY notified of critical lab result at 2114.  Critical lab result read back by Dr. GRAY.

## 2025-03-16 NOTE — ED PROVIDER NOTES
ED Provider Note    CHIEF COMPLAINT  Chief Complaint   Patient presents with    Nausea    Weakness        [facility]    Primary care provider: Fabián Urena M.D.   History obtained from: Wife  History limited by: Patient with dementia    Torey Lima is a 79 y.o. male who presents to the ED by EMS due to increasing weakness.  Wife reports that patient has history of dementia but seems to have gotten worse overnight suddenly.  She reports that patient had urinary incontinence overnight and again today.  She states that patient was so weak that he could not stand or use his walker.  There has been no fever or known illness.  No cough or congestion.  She reports patient has not complained of anything.  During EMS transport, patient had several episodes of vomiting and wife believes that may be due to patient being moved and rocked during the transport.  EMS gave patient IV fluid and Zofran.  Wife reports that patient was seen at [facility] and prescribed lactulose to help with his confusion but she has not noticed any improvement.  Patient is also on Eliquis for history of atrial fibrillation.  She reports patient has been compliant with his medications.  Patient also noted with skin tears and wounds and wife reports that patient has very fragile skin that tears simply from touching and he was scratching his left arm overnight causing more wounds.  She also reports that patient has chronic lower extremity cellulitis.    REVIEW OF SYSTEMS  Please see HPI for pertinent positives/negatives.  All other systems reviewed and are negative.     PAST MEDICAL HISTORY  Past Medical History:   Diagnosis Date    Hyponatremia 09/18/2024    Stage 3a chronic kidney disease 06/25/2022    Obesity (BMI 30-39.9) 12/14/2017    HLD (hyperlipidemia) 06/20/2013    HTN (hypertension) 06/20/2013    Mixed hyperlipidemia 06/20/2013    BMI 33.0-33.9,adult 02/15/2013    ASCVD (arteriosclerotic  cardiovascular disease) 12/14/2012    Impaired fasting glucose 08/02/2012    Vitamin d deficiency 08/02/2012    Thrombocytopenia due to drugs 04/25/2012    Colon cancer (HCC) 12/06/2010    Elevated CEA 12/06/2010    Liver lesion 12/06/2010    Peripheral neuropathy, secondary to drugs or chemicals 12/06/2010    Arrhythmia April 1, 2023    Dr. Hall    Breath shortness April 1, 2023    Dr. Hall    Cancer (HCC) 10/10-6/11    colon, liver cance    Chickenpox     Yi measles     GI bleed     Hypertension     Liver cirrhosis (HCC)     Snoring Many Years    Wife        SURGICAL HISTORY  Past Surgical History:   Procedure Laterality Date    UMBILICAL HERNIA REPAIR N/A 10/10/2023    Procedure: OPEN UMBILICAL HERNIA REPAIR;  Surgeon: Julian Ennis M.D.;  Location: SURGERY [facility];  Service: General    LUMBAR LAMINECTOMY DISKECTOMY  5/25/2017    Procedure:  LAMINECTOMY LUMBAR  4 TO SACRAL 1;  Surgeon: Felton Escobar M.D.;  Location: SURGERY [facility];  Service:     GASTROSCOPY WITH BANDING  10/25/2016    Procedure: GASTROSCOPY WITH BANDING;  Surgeon: Pierre Waggoner M.D.;  Location: ENDOSCOPY [facility];  Service:     GASTROSCOPY WITH BANDING  4/3/2016    Procedure: GASTROSCOPY WITH BANDING;  Surgeon: Cayetano Stovall M.D.;  Location: ENDOSCOPY [facility];  Service:     FULL THICKNESS BLOCK RESECTION  4/11/2012    Performed by LANI BOWMAN at SURGERY [facility] ORS    CATH REMOVAL  7/20/2011    Performed by RUBY RUIZ at SURGERY [facility] ORS    CATH PLACEMENT  9/20/2010    Performed by RUBY RUIZ at SURGERY [facility] ORS    LOW ANTERIOR RESECTION ROBOTIC  8/10/2010    Performed by RUBY RUIZ at SURGERY [facility] ORS    COLON RESECTION      HIP ARTHROPLASTY MIS TOTAL      rt    HIP REPLACEMENT, TOTAL      OTHER      Cholecystectomy    OTHER (COMMENTS)      liver surgery          SOCIAL HISTORY  Social History     Tobacco Use    Smoking status: Former      Types: Cigars    Smokeless tobacco: Never    Tobacco comments:     1 cigar per week.   Vaping Use    Vaping status: Never Used   Substance and Sexual Activity    Alcohol use: Not Currently    Drug use: No    Sexual activity: Yes     Partners: Female        FAMILY HISTORY  Family History   Problem Relation Age of Onset    Heart Disease Mother     Cancer Mother     Sleep Apnea Neg Hx         CURRENT MEDICATIONS  Home Medications       Reviewed by Sara Lopez (Pharmacy Tech) on 03/15/25 at 2206  Med List Status: Complete     Medication Last Dose Status   apixaban (ELIQUIS) 5mg Tab 3/15/2025 Active   atorvastatin (LIPITOR) 40 MG Tab 3/14/2025 Active   calcium carbonate (OS-AMPARO 500) 1250 (500 Ca) MG Tab 3/15/2025 Active   Cholecalciferol (VITAMIN D3) 25 MCG (1000 UT) Cap 3/15/2025 Active   clindamycin (CLEOCIN) 1 % Solution 3/15/2025 Active   diclofenac sodium (VOLTAREN) 1 % Gel 3/15/2025 Active   ferrous sulfate 325 (65 Fe) MG tablet 3/15/2025 Active   folic acid (FOLVITE) 1 MG Tab 3/15/2025 Active   hydrocortisone 2.5 % Cream topical cream 3/15/2025 Active   lactulose 10 g/15mL Solution 3/15/2025 Active   Magnesium Glycinate 100 MG Cap 3/15/2025 Active   metoprolol SR (TOPROL XL) 25 MG TABLET SR 24 HR 3/15/2025 Active   midodrine (PROAMATINE) 10 MG tablet 3/15/2025 Active   potassium chloride SA (KDUR) 20 MEQ Tab CR 3/15/2025 Active   spironolactone (ALDACTONE) 50 MG Tab 3/15/2025 Active   torsemide (DEMADEX) 10 MG tablet 3/15/2025 Active   ursodiol (JUNE 250) 250 MG Tab 3/15/2025 Active                  Audit from Redirected Encounters    **Home medications have not yet been reviewed for this encounter**          ALLERGIES  Allergies   Allergen Reactions    Anticoagulant Sodium Citrate [Sodium Citrate] Unspecified     HIGH BLEEDING RISK    Influenza Virus Vaccine      Guillan [place]     Lovenox [Enoxaparin] Unspecified     Thrombocytopenia      Lisinopril Cough    Shingrix [Zoster Vac Recomb Adjuvanted]       "Guillain-Sulphur         PHYSICAL EXAM  VITAL SIGNS: /63   Pulse 74   Temp 36.7 °C (98.1 °F) (Temporal)   Resp 20   Ht 1.905 m (6' 3\")   Wt 102 kg (225 lb 8.5 oz)   SpO2 100%   BMI 28.19 kg/m²  @MAYA[[id number]::@     Pulse ox interpretation: 98% I interpret this pulse ox as normal     Cardiac monitor interpretation: Sinus rhythm with heart rate in the 60s as interpreted by me.  The patient presented with weakness and confusion and cardiac monitor was ordered to monitor for dysrhythmia.    Constitutional: Well developed, well nourished, alert in no apparent distress, nontoxic appearance    HENT: No external signs of trauma, normocephalic  Eyes: PERRL, conjunctiva without erythema, no discharge, no icterus    Neck: Soft and supple, trachea midline, no stridor, no tenderness, no LAD, no apparent stiffness or discomfort with movement  Cardiovascular: Regular rate and rhythm, no murmurs/rubs/gallops, strong distal pulses and good perfusion    Thorax & Lungs: No respiratory distress, CTAB    Abdomen: Soft, nontender, nondistended, no guarding, no rebound, normal BS    Back: No CVAT     Extremities: No cyanosis, bilateral lower extremity edema slightly more prominent on the left, no gross deformity, intact distal pulses with brisk cap refill    Skin: Warm, dry, no pallor/cyanosis, no rash noted chronic appearing changes including multiple wounds and skin tears  Neuro: Awake and oriented to self only, GCS [id number], moving all 4 extremities spontaneously without apparent focal deficits  Psychiatric: Cooperative, slow and delayed response      DIAGNOSTIC STUDIES / PROCEDURES    EKG  12 Lead EKG obtained at 2007 and interpreted by me:   Rate: 56   Rhythm: Sinus bradycardia  Ectopy: None  Intervals: Borderline first-degree block  Axis: Normal   QRS: IVCD  ST segments: Normal  T Waves: Normal    Clinical Impression: Sinus bradycardia without acute ischemic changes or other dysrhythmia  Compared to January 15, 2025 when " patient was in atrial fibrillation but otherwise with similar appearance      LABS  All labs reviewed by me.     Results for orders placed or performed during the hospital encounter of 03/15/25   EKG    Collection Time: 03/15/25  8:07 PM   Result Value Ref Range    Report       [facility] Emergency Dept.    Test Date:  2025-03-15  Pt Name:    RYANNE ROMERO                  Department: ER  MRN:        [id number]                      Room:       [facility]  Gender:     Male                         Technician: [id number]  :        1945                   Requested By:ER TRIAGE PROTOCOL  Order #:    [id number]                    Reading MD:    Measurements  Intervals                                Axis  Rate:       56                           P:          40  MI:         208                          QRS:        75  QRSD:       124                          T:          25  QT:         469  QTc:        453    Interpretive Statements  Sinus bradycardia  Nonspecific intraventricular conduction delay  Inferior infarct, old  Lateral leads are also involved  Compared to ECG 01/15/2025 16:46:38  Intraventricular conduction delay now present  Atrial fibrillation no longer present  Myocardial infarct finding still present     AMMONIA    Collection Time: 03/15/25  8:11 PM   Result Value Ref Range    Ammonia 170 (HH) 11 - 45 umol/L   Lactic Acid    Collection Time: 03/15/25  8:15 PM   Result Value Ref Range    Lactic Acid 1.8 0.5 - 2.0 mmol/L   CBC with Differential    Collection Time: 03/15/25  8:15 PM   Result Value Ref Range    WBC 5.2 4.8 - 10.8 K/uL    RBC 3.03 (L) 4.70 - 6.10 M/uL    Hemoglobin 9.5 (L) 14.0 - 18.0 g/dL    Hematocrit 29.8 (L) 42.0 - 52.0 %    MCV 98.3 (H) 81.4 - 97.8 fL    MCH 31.4 27.0 - 33.0 pg    MCHC 31.9 (L) 32.3 - 36.5 g/dL    RDW 64.6 (H) 35.9 - 50.0 fL    Platelet Count 60 (L) 164 - 446 K/uL    MPV 10.5 9.0 - 12.9 fL    Neutrophils-Polys 77.50 (H) 44.00 - 72.00 %    Lymphocytes 6.70 (L)  22.00 - 41.00 %    Monocytes 12.70 0.00 - 13.40 %    Eosinophils 1.90 0.00 - 6.90 %    Basophils 0.60 0.00 - 1.80 %    Immature Granulocytes 0.60 0.00 - 0.90 %    Nucleated RBC 0.00 0.00 - 0.20 /100 WBC    Neutrophils (Absolute) 4.02 1.82 - 7.42 K/uL    Lymphs (Absolute) 0.35 (L) 1.00 - 4.80 K/uL    Monos (Absolute) 0.66 0.00 - 0.85 K/uL    Eos (Absolute) 0.10 0.00 - 0.51 K/uL    Baso (Absolute) 0.03 0.00 - 0.12 K/uL    Immature Granulocytes (abs) 0.03 0.00 - 0.11 K/uL    NRBC (Absolute) 0.00 K/uL   IMMATURE PLT FRACTION    Collection Time: 03/15/25  8:15 PM   Result Value Ref Range    Imm. Plt Fraction 3.0 0.6 - 13.1 %   Blood Culture - Draw one from central line and one from peripheral site    Collection Time: 03/15/25  8:18 PM    Specimen: Peripheral; Blood   Result Value Ref Range    Significant Indicator NEG     Source BLD     Site PERIPHERAL     Culture Result       No Growth  Note: Blood cultures are incubated for 5 days and  are monitored continuously.Positive blood cultures  are called to the RN and reported as soon as  they are identified.     Blood Culture - Draw one from central line and one from peripheral site    Collection Time: 03/15/25  8:18 PM    Specimen: Line; Blood   Result Value Ref Range    Significant Indicator NEG     Source BLD     Site Peripheral     Culture Result       No Growth  Note: Blood cultures are incubated for 5 days and  are monitored continuously.Positive blood cultures  are called to the RN and reported as soon as  they are identified.     POC CoV-2, FLU A/B, RSV by PCR    Collection Time: 03/15/25  8:36 PM   Result Value Ref Range    POC Influenza A RNA, PCR Negative Negative    POC Influenza B RNA, PCR Negative Negative    POC RSV, by PCR Negative Negative    POC SARS-CoV-2, PCR NotDetected NotDetected   Comp Metabolic Panel    Collection Time: 03/15/25  9:35 PM   Result Value Ref Range    Sodium 137 135 - 145 mmol/L    Potassium 4.6 3.6 - 5.5 mmol/L    Chloride 108 96 - 112  mmol/L    Co2 19 (L) 20 - 33 mmol/L    Anion Gap 10.0 7.0 - 16.0    Glucose 109 (H) 65 - 99 mg/dL    Bun 34 (H) 8 - 22 mg/dL    Creatinine 1.53 (H) 0.50 - 1.40 mg/dL    Calcium 8.6 8.5 - 10.5 mg/dL    Correct Calcium 9.3 8.5 - 10.5 mg/dL    AST(SGOT) 28 12 - 45 U/L    ALT(SGPT) 21 2 - 50 U/L    Alkaline Phosphatase 99 30 - 99 U/L    Total Bilirubin 1.6 (H) 0.1 - 1.5 mg/dL    Albumin 3.1 (L) 3.2 - 4.9 g/dL    Total Protein 5.6 (L) 6.0 - 8.2 g/dL    Globulin 2.5 1.9 - 3.5 g/dL    A-G Ratio 1.2 g/dL   MAGNESIUM    Collection Time: 03/15/25  9:35 PM   Result Value Ref Range    Magnesium 2.1 1.5 - 2.5 mg/dL   PHOSPHORUS    Collection Time: 03/15/25  9:35 PM   Result Value Ref Range    Phosphorus 3.0 2.5 - 4.5 mg/dL   TROPONIN    Collection Time: 03/15/25  9:35 PM   Result Value Ref Range    Troponin T 28 (H) 6 - 19 ng/L   LIPASE    Collection Time: 03/15/25  9:35 PM   Result Value Ref Range    Lipase 39 11 - 82 U/L   PROCALCITONIN    Collection Time: 03/15/25  9:35 PM   Result Value Ref Range    Procalcitonin 0.13 <0.25 ng/mL   ESTIMATED GFR    Collection Time: 03/15/25  9:35 PM   Result Value Ref Range    GFR (CKD-EPI) 45 (A) >60 mL/min/1.73 m 2   Urinalysis    Collection Time: 03/15/25 10:15 PM    Specimen: Urine, Clean Catch   Result Value Ref Range    Color Yellow     Character Hazy (A)     Specific Gravity 1.016 <1.035    Ph 6.5 5.0 - 8.0    Glucose Negative Negative mg/dL    Ketones Trace (A) Negative mg/dL    Protein Negative Negative mg/dL    Bilirubin Negative Negative    Urobilinogen, Urine 1.0 <=1.0 EU/dL    Nitrite Negative Negative    Leukocyte Esterase Trace (A) Negative    Occult Blood Moderate (A) Negative    Micro Urine Req Microscopic    URINE MICROSCOPIC (W/UA)    Collection Time: 03/15/25 10:15 PM   Result Value Ref Range    WBC 0-2 /hpf    RBC 11-20 (A) 0 - 2 /hpf    Bacteria None Seen None /hpf    Epithelial Cells 3-5 0 - 5 /hpf    Urine Casts 0-2 0 - 2 /lpf     *Note: Due to a large number of  results and/or encounters for the requested time period, some results have not been displayed. A complete set of results can be found in Results Review.        RADIOLOGY  I have independently interpreted the diagnostic imaging associated with this visit and am waiting the final reading from the radiologist.   My preliminary interpretation is as follows: Chronic appearing changes on portable chest x-ray compared to previous.    CT-HEAD W/O   Final Result      1.  No evidence of acute intracranial process.      2.  Cerebral atrophy as well as periventricular chronic small vessel ischemic change.               DX-CHEST-PORTABLE (1 VIEW)   Final Result      Cardiomegaly.             COURSE & MEDICAL DECISION MAKING  Nursing notes, VS, PMSFHx reviewed in chart.     Review of past medical records shows the patient had outpatient visit with PCP on March 5, 2025 for follow-up of liver failure and hepatic encephalopathy.  Patient with anemia of chronic disease, dyslipidemia, hypokalemia, vitamin D deficiency, paroxysmal SVT, essential hypertension, PAC, acute pain of left knee, skin tear of right forearm, skin tear of left upper arm, cellulitis of left lower extremity, hypomagnesemia, generalized edema, cirrhosis of liver with ascites, paroxysmal atrial fibrillation, hypotension, venous insufficiency of bilateral lower extremities, carpopedal spasm.      Differential diagnoses considered include but are not limited to: CVA/TIA/intracranial hemorrhage, encephalitis, sepsis, hepatic encephalopathy, UTI, electrolyte abnormality, endocrine dysfunction, thyroid disorder, dementia       ED Observation Status? No; Patient does not meet criteria for ED Observation.       Discussion of management with other [facility] or appropriate source(s): Hospitalist      [id number]: D/W Dr. Levy, hospitalist, who will admit patient      History and physical exam as above.  This is a 79-year-old male patient with medical history including dementia, CAD,  hypertension, hyperlipidemia, chronic kidney disease, history of colon cancer, cirrhosis brought in by EMS due to above complaints.  Most of the history was obtained from wife given patient's dementia.  EKG did not show evidence for acute ischemic changes.  Troponin with mild elevation although this may be due to his chronic kidney disease.  His renal dysfunction appears fairly stable compared to his prior results.  No significant electrolyte derangement.  Bilirubin level is similar to recent results.  No elevation of transaminases or alk phos.  No elevation of lipase to suggest acute pancreatitis.  No leukocytosis or elevation of lactic acid or procalcitonin to suggest sepsis.  Patient afebrile in the ED.  No overt signs of infection on the UA.  Ammonia level is elevated.  Chest x-ray and CT head without acute findings.  I discussed the findings with patient and wife.  On recheck, patient is alert, in no acute distress and nontoxic in appearance.  He does not have apparent lateralizing symptoms to suggest acute CVA.  No concerning dysrhythmia or hemodynamic instability noted during his ED stay.  No vomiting during his ED stay.  Wife reports that patient's doctor has been increasing his lactulose dose recently.  His increased weakness and confusion may be due to elevated ammonia level causing encephalopathy.  Wife is agreeable to admission.  I discussed with the hospitalist who graciously agreed to admit patient.      FINAL IMPRESSION  1. Generalized weakness Acute   2. Confusion Acute   3. Vomiting, unspecified vomiting type, unspecified whether nausea present Acute          DISPOSITION  Patient will be admitted by hospitalist for further care      Electronically signed by: Sagar Izaguirre D.O., 3/15/2025 7:57 PM      Portions of this record were made with voice recognition software.  Despite my review, errors may remain.  Please interpret this chart in the appropriate context.

## 2025-03-16 NOTE — CARE PLAN
The patient is Watcher - Medium risk of patient condition declining or worsening    Shift Goals  Clinical Goals: remain free from falls  Patient Goals: ALESHA  Family Goals: patient safety and updates    Progress made toward(s) clinical / shift goals:      Problem: Fall Risk  Goal: Patient will remain free from falls  Outcome: Progressing  Note: Patient has remained free of falls throughout shift. Patient has proper fall preventions and protocols in place at this time. Patients fall risk will continue to be assessed each shift. Patient will continue to remain free of falls and will call appropriately.      Problem: Skin Integrity  Goal: Skin integrity is maintained or improved  Outcome: Progressing  Note: Patients skin has remained intact throughout shift with no signs of breakdown at this time. Patients skin will continue to be monitored per shift with proper prevention and protocols in place at this time.        Patient is not progressing towards the following goals:

## 2025-03-16 NOTE — ED NOTES
Med rec updated and complete.  Allergies reviewed.    Interviewed family ( wife) at bedside.    Last dose of Eliquis 03/15/25    No outpatient antibiotic use in last 30 days      Preferred Pharmacy  [facility] [phone]

## 2025-03-16 NOTE — PROGRESS NOTES
4 Eyes Skin Assessment Completed by DEWEY Hawk and DEWEY Caba.    Head WDL  Ears WDL  Nose WDL  Mouth WDL  Neck WDL  Breast/Chest WDL  Shoulder Blades WDL  Spine Redness; scattered abrasions on back  (R) Arm/Elbow/Hand Redness, Bruising, Abrasion, and Discoloration; multiple skin tears   (L) Arm/Elbow/Hand Redness, Bruising, Abrasion, Scab, and Discoloration; multiple skin tears, dressing in place from ED  Abdomen WDL  Groin WDL  Scrotum/Coccyx/Buttocks Redness, Non-Blanching, and Discoloration  (R) Leg Redness, Scab, Bruising, and Abrasion  (L) Leg Redness, Scab, Bruising, and Abrasion  (R) Heel/Foot/Toe Redness, Blanching, and Discoloration  (L) Heel/Foot/Toe Redness, Blanching, and Discoloration          Devices In Places Pulse Ox and Condom Cath      Interventions In Place TAP System, Pillows, Barrier Cream, and Heels Loaded W/Pillows    Possible Skin Injury Yes    Pictures Uploaded Into Epic Yes  Wound Consult Placed Yes  RN Wound Prevention Protocol Ordered Yes

## 2025-03-16 NOTE — DISCHARGE PLANNING
HTH/SCP TCN chart review completed. Collaborated with BRITNEY Maxwell.  The most current review of medical record, knowledge of pt's PLOF and social support, LACE+ score of 81, 6 clicks scores of 8 ADL's and 7 mobility were considered.  Per chart review, patient BIB EMS with increased weakness and urinary incontinence with N & V in route.  Hx dementia at baseline and currently Dx with hepatic encephalopathy.  This TCN referred to PAN Team.       Pt seen at bedside. Introduced TCN program. Provided education regarding post acute levels of care. Education provided regarding case management policy for blanket SNF referrals. Discussed HTH/SCP plan benefits. Pt verbalizes understanding.     Patient seen at bedside with spouse Lorelei Lima (HIPAA verified) present.  Spouse states they live in a one story home and reports patient was modified independent/supervised with ADL's and mobility with use of a FWW at his baseline level of function.  Spouse also provides CGA with shower transfers for safety.  Spouse reports patient has dementia so she completed all IADL's, including patients medication management and provided transportation as needed.  He also has a 4WW that he does not use.  Patient is not at his baseline level of function.  Spouse is hopeful that he will progress to be able to go home with HH if indicated as she feels with his dementia he will do better in a familiar environment.  Spouse gave choice for HH.      Choice proactively obtained for HH (1. [facility], 2. [facility]) if indicated, faxed to DPA and CM notified.  In collaboration with CM, current discharge considerations are developing.      TCN will continue to follow and collaborate with discharge planning team as additional post acute needs arise. Thank you.     Completed today:  PT orders in chart.    Choice obtained: HH (1. [facility], 2. [facility]) if indicated.    Pt aware of [facility]'s blanket referral policy  SCP with [facility] PCP.   Spouse agreeable  with referral for PCP f/u appointment.  TCN will send referral if patient does not need post-acute placement.

## 2025-03-16 NOTE — ED NOTES
Wife came in    Asked questions regarding pt's condition. Wife reports pt kept on scratching his left arm and sustained skin tear.

## 2025-03-16 NOTE — CARE PLAN
The patient is Stable - Low risk of patient condition declining or worsening    Shift Goals  Clinical Goals: Remain free from falls; patient safety  Patient Goals: ALESHA  Family Goals: Patient safety and updates    Progress made toward(s) clinical / shift goals:      Problem: Fall Risk  Goal: Patient will remain free from falls  Outcome: Progressing     Problem: Skin Integrity  Goal: Skin integrity is maintained or improved  Outcome: Progressing     Patient has remained free from falls throughout the night. Tele-sitter in place fro safety, disorientation, and impulsivity. Bed locked/lowest position, bed alarm on, hourly rounding completed, and wife at bedside throughout the night.     Patient is not progressing towards the following goals:

## 2025-03-16 NOTE — H&P
Hospital Medicine History & Physical Note    Date of Service  3/15/2025    Primary Care Physician  Fabián Urena M.D.    Code Status  DNAR/DNI    Chief Complaint  Chief Complaint   Patient presents with    Nausea    Weakness       History of Presenting Illness  Torey Lima is a 79 y.o. male who presented 3/15/2025 with altered mental status.  PMH of colon cancer with mets to the liver (s/p resection 08/2010 and treated with oxaliplatin and Xeloda as well as open repair of ablation in 2011), cirrhosis (history of varices, nonocclusive portal vein thrombosis, recurrent ascites on diuretics), A-fib on anticoagulation, dementia.  Having worsening hepatic encephalopathy and following with GI at [facility] and he was started on lactulose 09/2025 but has only been having 1-2 bowel movements a day, lactulose dose was recently increased without change in his bowel movements.  He was started on rifaximin but unable to afford it and insurance is not covering it.    Brought into the ER today due to worsening confusion no fever/chills, he has been having left-sided abdominal pain for the past 2 weeks, no changes in bowel habit or urinary symptoms.    I discussed the plan of care with patient, bedside RN, and EDP .    Review of Systems  Review of Systems   Unable to perform ROS: Dementia       Past Medical History   has a past medical history of Arrhythmia (April 1, 2023), ASCVD (arteriosclerotic cardiovascular disease) (12/14/2012), BMI 33.0-33.9,adult (02/15/2013), Breath shortness (April 1, 2023), Cancer (HCC) (10/10-6/11), Chickenpox, Colon cancer (HCC) (12/06/2010), Elevated CEA (12/06/2010), French measles, GI bleed, HLD (hyperlipidemia) (06/20/2013), HTN (hypertension) (06/20/2013), Hypertension, Hyponatremia (09/18/2024), Impaired fasting glucose (08/02/2012), Liver cirrhosis (HCC), Liver lesion (12/06/2010), Mixed hyperlipidemia (06/20/2013), Obesity (BMI 30-39.9) (12/14/2017), Peripheral neuropathy, secondary to  drugs or chemicals (12/06/2010), Snoring (Many Years), Stage 3a chronic kidney disease (06/25/2022), 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    Surgical History   has a past surgical history that includes low anterior resection robotic (8/10/2010); cath placement (9/20/2010); hip arthroplasty mis total; other; other (comments); cath removal (7/20/2011); full thickness block resection (4/11/2012); gastroscopy with banding (4/3/2016); gastroscopy with banding (10/25/2016); colon resection; hip replacement, total; lumbar laminectomy diskectomy (5/25/2017); and umbilical hernia repair (N/A, 10/10/2023).     Family History  family history includes Cancer in his mother; Heart Disease in his mother.   Family history reviewed with patient. There is no family history that is pertinent to the chief complaint.     Social History   reports that he has quit smoking. His smoking use included cigars. He has never used smokeless tobacco. He reports that he does not currently use alcohol. He reports that he does not use drugs.    Allergies  Allergies   Allergen Reactions    Anticoagulant Sodium Citrate [Sodium Citrate] Unspecified     HIGH BLEEDING RISK    Influenza Virus Vaccine      Guillan Marathon     Lovenox [Enoxaparin] Unspecified     Thrombocytopenia      Lisinopril Cough    Shingrix [Zoster Vac Recomb Adjuvanted]      Guillain-Marathon        Medications  Prior to Admission Medications   Prescriptions Last Dose Informant Patient Reported? Taking?   Cholecalciferol (VITAMIN D3) 25 MCG (1000 UT) Cap 3/15/2025 Morning  No Yes   Sig: Take 1 Capsule by mouth every day.   Magnesium Glycinate 100 MG Cap 3/15/2025 Noon  No Yes   Sig: Take 1 Capsule by mouth every day with lunch.   apixaban (ELIQUIS) 5mg Tab 3/15/2025 Morning  No Yes   Sig: Take 1 Tablet by mouth 2 times a day.   atorvastatin (LIPITOR) 40 MG Tab 3/14/2025 Evening  No Yes   Sig: Take 1 Tablet by mouth every evening.   calcium  carbonate (OS-AMPARO 500) 1250 (500 Ca) MG Tab 3/15/2025 Morning  No Yes   Sig: Take 1 Tablet by mouth every day.   clindamycin (CLEOCIN) 1 % Solution 3/15/2025 Morning  No Yes   Sig: Apply 2 mL topically 2 times a day. To lower legs   diclofenac sodium (VOLTAREN) 1 % Gel 3/15/2025 Noon  No Yes   Sig: Apply 2 g topically 4 times a day as needed (left knee pain).   ferrous sulfate 325 (65 Fe) MG tablet 3/15/2025 Morning  No Yes   Sig: Take 1 Tablet by mouth every morning with breakfast.   folic acid (FOLVITE) 1 MG Tab 3/15/2025 Morning  No Yes   Sig: Take 1 Tablet by mouth every day.   hydrocortisone 2.5 % Cream topical cream 3/15/2025 Morning  No Yes   Sig: Apply 1 Application topically 2 times a day.   lactulose 10 g/15mL Solution 3/15/2025 Morning  No Yes   Sig: Take 30 mL (20 g total) by mouth in the morning and 30 mL (20 g total) in the evening.   metoprolol SR (TOPROL XL) 25 MG TABLET SR 24 HR 3/15/2025 Morning  No Yes   Sig: Take 0.5 Tablets by mouth every day.   midodrine (PROAMATINE) 10 MG tablet 3/15/2025 Noon  No Yes   Sig: Take 1 Tablet by mouth 3 times a day with meals for 120 days.   potassium chloride SA (KDUR) 20 MEQ Tab CR 3/15/2025 Morning  No Yes   Sig: Take 1 tablet (20 mEq total) by mouth daily   spironolactone (ALDACTONE) 50 MG Tab 3/15/2025 Morning  No Yes   Sig: Take 2 tablets (100 mg total) by mouth in the morning and 2 tablets (100 mg total) in the evening.   torsemide (DEMADEX) 10 MG tablet 3/15/2025 Morning  No Yes   Sig: Take 1 Tablet by mouth 2 times a day.   ursodiol (JUNE 250) 250 MG Tab 3/15/2025 Noon  No Yes   Sig: Take 1 tablet (250 mg total) by mouth every morning, afternoon, and evening.      Facility-Administered Medications: None       Physical Exam  Temp:  [36.2 °C (97.1 °F)-36.7 °C (98.1 °F)] 36.2 °C (97.1 °F)  Pulse:  [53-80] 80  Resp:  [13-20] 20  BP: ()/(51-69) 116/69  SpO2:  [95 %-100 %] 99 %  Blood Pressure : 98/57   Temperature: 36.2 °C (97.2 °F)   Pulse: 66  "  Respiration: 17   Pulse Oximetry: 96 %       Physical Exam  HENT:      Head: Normocephalic and atraumatic.      Mouth/Throat:      Mouth: Mucous membranes are moist.      Pharynx: Oropharynx is clear. No oropharyngeal exudate or posterior oropharyngeal erythema.   Eyes:      General: No scleral icterus.  Cardiovascular:      Rate and Rhythm: Normal rate and regular rhythm.      Pulses: Normal pulses.      Heart sounds: Normal heart sounds. No murmur heard.  Pulmonary:      Effort: Pulmonary effort is normal. No respiratory distress.      Breath sounds: Normal breath sounds. No wheezing.   Abdominal:      Palpations: Abdomen is soft.      Tenderness: There is abdominal tenderness.      Comments: Left upper and lower quadrant tenderness to palpation   Musculoskeletal:         General: No swelling or tenderness. Normal range of motion.      Cervical back: Normal range of motion.   Skin:     General: Skin is warm and dry.   Neurological:      General: No focal deficit present.      Mental Status: He is alert.      Comments: AOx1.  Lethargic but arousable         Laboratory:  Recent Labs     03/15/25  2015   WBC 5.2   RBC 3.03*   HEMOGLOBIN 9.5*   HEMATOCRIT 29.8*   MCV 98.3*   MCH 31.4   MCHC 31.9*   RDW 64.6*   PLATELETCT 60*   MPV 10.5     Recent Labs     03/15/25  2135   SODIUM 137   POTASSIUM 4.6   CHLORIDE 108   CO2 19*   GLUCOSE 109*   BUN 34*   CREATININE 1.53*   CALCIUM 8.6     Recent Labs     03/15/25  2135   ALTSGPT 21   ASTSGOT 28   ALKPHOSPHAT 99   TBILIRUBIN 1.6*   LIPASE 39   GLUCOSE 109*         No results for input(s): \"NTPROBNP\" in the last 72 hours.      Recent Labs     03/15/25  2135   TROPONINT 28*       Imaging:  CT-HEAD W/O   Final Result      1.  No evidence of acute intracranial process.      2.  Cerebral atrophy as well as periventricular chronic small vessel ischemic change.               DX-CHEST-PORTABLE (1 VIEW)   Final Result      Cardiomegaly.          X-Ray:  I have personally reviewed " the images and compared with prior images. and My impression is: No acute process  EKG:  I have personally reviewed the images and compared with prior images. and My impression is: Sinus bradycardia    Assessment/Plan:  Justification for Admission Status  I anticipate this patient will require at least two midnights for appropriate medical management, necessitating inpatient admission because suspicion for hepatic encephalopathy    * Hepatic encephalopathy (HCC)- (present on admission)  Assessment & Plan  Hepatic encephalopathy stage II-3, lethargic and somnolent but arousable  This has been a chronic ongoing issue for a few months now and he was started on lactulose but still only having 1 sometimes 2 bowel movements a day  Continue lactulose, start on rifaximin and zinc    Vascular dementia without behavioral disturbance, psychotic disturbance, mood disturbance, or anxiety (HCC)- (present on admission)  Assessment & Plan  Currently hepatic encephalopathy, delirium precautions    Cirrhosis of liver with ascites (HCC)- ? DUE TO OLD HEP B, CHEMO RX,  OR THERMO RAD OF MAGNANT LESION- (present on admission)  Assessment & Plan  Will be admitted for hepatic encephalopathy.,  See above  Continue spironolactone, midodrine.  Hold torsemide due to soft pressures    Paroxysmal atrial fibrillation (HCC)- (present on admission)  Assessment & Plan  Continue metoprolol and apixaban    Dyslipidemia- (present on admission)  Assessment & Plan  Continue statin      Patient is critically ill.   The patient continues to have: Stage II-III hepatic encephalopathy  If untreated there is a high chance of deterioration And eventually death.   The critical care that I am providing today is: Started on lactulose, rifaximin and zinc for hepatic encephalopathy, patient still little arousable.  If untreated will progress to stage IV hepatic encephalopathy and coma and possibly death.  Multiple bedside evaluations  The critical that has been  undertaken is medically complex.   There has been no overlap in critical care time.   Critical Care Time not including procedures: 38 minutes      VTE prophylaxis: therapeutic anticoagulation with apixaban

## 2025-03-16 NOTE — PROGRESS NOTES
Hospital Medicine Daily Progress Note    Date of Service  3/16/2025    Chief Complaint  Torey Lima is a 79 y.o. male admitted 3/15/2025 with hepatic encephalopathy    Hospital Course  No notes on file    Interval Problem Update  3/16: Discussed with wife at bedside, patient had been taking lactulose at home but only having 1 bowel movement per day usually.  Ordered paracentesis.  Had a difficult time sleeping overnight.  Total time 52 minutes.    I have discussed this patient's plan of care and discharge plan at IDT rounds today with Case Management, Nursing, Nursing leadership, and other members of the IDT team.    Disposition  The patient is not medically cleared for discharge to home or a post-acute facility.      I have placed the appropriate orders for post-discharge needs.    Review of Systems  Review of Systems   Unable to perform ROS: Medical condition        Physical Exam  Temp:  [35.9 °C (96.7 °F)-36.7 °C (98.1 °F)] 35.9 °C (96.7 °F)  Pulse:  [53-80] 68  Resp:  [13-20] 14  BP: ()/(41-69) 104/41  SpO2:  [95 %-100 %] 96 %    Physical Exam  Constitutional:       General: He is not in acute distress.     Appearance: He is well-developed. He is ill-appearing.   HENT:      Head: Normocephalic.   Eyes:      General: Scleral icterus present.   Cardiovascular:      Rate and Rhythm: Normal rate.      Heart sounds:      No gallop.   Pulmonary:      Effort: No respiratory distress.      Breath sounds: No wheezing.   Abdominal:      General: There is no distension.      Tenderness: There is no abdominal tenderness.   Skin:     General: Skin is warm.      Coloration: Skin is jaundiced.   Neurological:      Mental Status: He is disoriented.   Psychiatric:      Comments: Unable to assess         Fluids    Intake/Output Summary (Last 24 hours) at 3/16/2025 1321  Last data filed at 3/16/2025 0613  Gross per 24 hour   Intake 50 ml   Output --   Net 50 ml        Laboratory  Recent Labs     03/15/25  2015   WBC  5.2   RBC 3.03*   HEMOGLOBIN 9.5*   HEMATOCRIT 29.8*   MCV 98.3*   MCH 31.4   MCHC 31.9*   RDW 64.6*   PLATELETCT 60*   MPV 10.5     Recent Labs     03/15/25  2135   SODIUM 137   POTASSIUM 4.6   CHLORIDE 108   CO2 19*   GLUCOSE 109*   BUN 34*   CREATININE 1.53*   CALCIUM 8.6                   Imaging  CT-HEAD W/O   Final Result      1.  No evidence of acute intracranial process.      2.  Cerebral atrophy as well as periventricular chronic small vessel ischemic change.               DX-CHEST-PORTABLE (1 VIEW)   Final Result      Cardiomegaly.      US-PARACENTESIS, ABD WITH IMAGING    (Results Pending)        Assessment/Plan  * Hepatic encephalopathy (HCC)- (present on admission)  Assessment & Plan  Hepatic encephalopathy stage II-3, lethargic and somnolent but arousable  This has been a chronic ongoing issue for a few months now and he was started on lactulose but still only having 1 sometimes 2 bowel movements a day  Increased lactulose   start on rifaximin and zinc while in the hospital, but it seems wife said she cannot afford the rifaximin outpatient even with insurance    Vascular dementia without behavioral disturbance, psychotic disturbance, mood disturbance, or anxiety (HCC)- (present on admission)  Assessment & Plan  Currently hepatic encephalopathy, delirium precautions    Paroxysmal atrial fibrillation (HCC)- (present on admission)  Assessment & Plan  Continue metoprolol and apixaban    Dyslipidemia- (present on admission)  Assessment & Plan  Continue statin    Cirrhosis of liver with ascites (HCC)- ? DUE TO OLD HEP B, CHEMO RX,  OR THERMO RAD OF MAGNANT LESION- (present on admission)  Assessment & Plan  Will be admitted for hepatic encephalopathy.,  See above  Continue spironolactone, midodrine.  Hold torsemide due to soft pressures         VTE prophylaxis: apixiban    I have performed a physical exam and reviewed and updated ROS and Plan today (3/16/2025). In review of yesterday's note (3/15/2025), there  are no changes except as documented above.

## 2025-03-16 NOTE — ASSESSMENT & PLAN NOTE
Will be admitted for hepatic encephalopathy.,  See above  Was on  spironolactone, midodrine.  Hold torsemide due to soft pressures  Now on comfort care

## 2025-03-16 NOTE — ED NOTES
Report from Ashlee RN    Placed pt on bed alarm  Attached to cardiac monitor    Noted bandage on left arm

## 2025-03-16 NOTE — ASSESSMENT & PLAN NOTE
Hepatic encephalopathy stage II-3, lethargic and somnolent but arousable  This has been a chronic ongoing issue for a few months now and he was started on lactulose but still only having 1 sometimes 2 bowel movements a day  Increased lactulose   start on rifaximin and zinc while in the hospital, but it seems wife said she cannot afford the rifaximin outpatient even with insurance  Ammonia decreased but still confused  Now on comfort care

## 2025-03-16 NOTE — PROGRESS NOTES
Received report and assumed care of patient at change of shift. Patient is A&Ox1 (to self only), on RA, and reports no pain at this time. Patient assessment completed, bed in lowest position, and call light and personal belongings are within reach. Patient expressed no further needs at this time.

## 2025-03-16 NOTE — PROGRESS NOTES
"Received report and assumed care of patient. Patient is AOX1, on room air, and arrived to floor via gurney. Patient confused and impulsive, trying to get out of bed and removing covers, stating \"No, I want to walk\". Wife at bedside to provide information. Two-RN skin check complete. Discussed plan with wife and addressed all concerns. Patient appears to have no other needs at this time.   "

## 2025-03-17 NOTE — CARE PLAN
The patient is Watcher - Medium risk of patient condition declining or worsening    Shift Goals  Clinical Goals: Remain free from fall  Patient Goals: ALESHA  Family Goals: patient safety    Progress made toward(s) clinical / shift goals:        Problem: Fall Risk  Goal: Patient will remain free from falls  Outcome: Progressing  Note: Hourly rounding done. Bed placed on low position with bed alarm kept on. Tele sitter also on. Family is at bedside and understood safety education. Remains free from fall. Will continue to be monitored for the rest of the shift.       Patient is not progressing towards the following goals:

## 2025-03-17 NOTE — DISCHARGE PLANNING
HTH/SCP TCN chart review completed. Collaborated with BRITNEY Welch.  Current discharge considerations are developing.  Patient seen at bedside with spouse at bedside.  Tele-sitter on.  Patient seen by PAN Team.  Please see [facility]NA[facility] Team note by Trina Harrell on 3/17/25 at 10:12 AM.    Per PAN Team note, plan for possible transition to Comfort Care with possible hospice.        TCN will continue to follow and collaborate with discharge planning team as additional post acute needs arise. Thank you.    Completed:  PT orders in chart.    Choice obtained: HH (1. [facility], 2. [facility] ) if indicated.    Pt aware of [facility] blanket referral policy  SCP with [facility].   Spouse agreeable with referral for PCP f/u appointment.  TCN will send referral if patient does not need post-acute placement.      Addendum:  1544- Per collaboration with BRITNEY Welch, patient is now on Comfort Care with plans for hospice.  TCN will monitor if change in POC/status though will not actively be involved given comfort care status/hospice plan.

## 2025-03-17 NOTE — PROGRESS NOTES
0800: Assumed care of patient at 0700. Received report from night shift RN. Pt is A&O x 1, on RA. Reporting a pain level of 0/10. Assessment completed and VSS. Bowel sounds are active in all 4 quadrants. Lung sounds are clear. This RN informed MD that patient does not look well and that comfort care may be a good conversation at this time. Call light within reach and bed is locked and in lowest position. Bed alarm on. Reinforced the need to call for assistance. Plan of care discussed with wife and does not have any further needs at this time.

## 2025-03-17 NOTE — PROGRESS NOTES
Hospital Medicine Daily Progress Note    Date of Service  3/17/2025    Chief Complaint  Torey Lima is a 79 y.o. male admitted 3/15/2025 with hepatic encephalopathy    Hospital Course  No notes on file    Interval Problem Update  3/16: Discussed with wife at bedside, patient had been taking lactulose at home but only having 1 bowel movement per day usually.  Ordered paracentesis.  Had a difficult time sleeping overnight.   3/17: Ammonia improved but mentation worsened.  Patient somnolent and not able to participate in conversation.  Total time 54 minutes.  Total time spent on advanced care planning, excluding time spent on daily care: 22 minutes.  Discussed prognosis with wife and changed to comfort care.  Placed hospice consult but wife unable to care for patient at home and cannot afford paying for facility.    I have discussed this patient's plan of care and discharge plan at IDT rounds today with Case Management, Nursing, Nursing leadership, and other members of the IDT team.    Disposition  The patient is not medically cleared for discharge to home or a post-acute facility.      I have placed the appropriate orders for post-discharge needs.    Review of Systems  Review of Systems   Unable to perform ROS: Medical condition        Physical Exam  Temp:  [36 °C (96.8 °F)-36.4 °C (97.5 °F)] 36.3 °C (97.3 °F)  Pulse:  [] 94  Resp:  [17-18] 18  BP: ()/(49-83) 104/54  SpO2:  [94 %-98 %] 98 %    Physical Exam  Constitutional:       General: He is not in acute distress.     Appearance: He is well-developed. He is ill-appearing.   HENT:      Head: Normocephalic.      Right Ear: External ear normal.      Left Ear: External ear normal.   Eyes:      General: Scleral icterus present.   Cardiovascular:      Rate and Rhythm: Normal rate.   Pulmonary:      Effort: No respiratory distress.      Breath sounds: No wheezing.   Abdominal:      General: There is no distension.      Tenderness: There is no abdominal  tenderness.   Skin:     General: Skin is warm.      Coloration: Skin is jaundiced.   Neurological:      Mental Status: He is disoriented.   Psychiatric:      Comments: Unable to assess         Fluids  No intake or output data in the 24 hours ending 03/17/25 1604       Laboratory  Recent Labs     03/15/25  2015 03/17/25  0043   WBC 5.2 5.9   RBC 3.03* 3.36*   HEMOGLOBIN 9.5* 10.4*   HEMATOCRIT 29.8* 33.3*   MCV 98.3* 99.1*   MCH 31.4 31.0   MCHC 31.9* 31.2*   RDW 64.6* 64.4*   PLATELETCT 60* 62*   MPV 10.5 9.6     Recent Labs     03/15/25  2135 03/17/25  0043   SODIUM 137 140   POTASSIUM 4.6 4.3   CHLORIDE 108 108   CO2 19* 20   GLUCOSE 109* 122*   BUN 34* 30*   CREATININE 1.53* 1.50*   CALCIUM 8.6 9.4                   Imaging  CT-HEAD W/O   Final Result      1.  No evidence of acute intracranial process.      2.  Cerebral atrophy as well as periventricular chronic small vessel ischemic change.               DX-CHEST-PORTABLE (1 VIEW)   Final Result      Cardiomegaly.           Assessment/Plan  * Hepatic encephalopathy (HCC)- (present on admission)  Assessment & Plan  Hepatic encephalopathy stage II-3, lethargic and somnolent but arousable  This has been a chronic ongoing issue for a few months now and he was started on lactulose but still only having 1 sometimes 2 bowel movements a day  Increased lactulose   start on rifaximin and zinc while in the hospital, but it seems wife said she cannot afford the rifaximin outpatient even with insurance  Ammonia decreased but still confused  Now on comfort care    Vascular dementia without behavioral disturbance, psychotic disturbance, mood disturbance, or anxiety (HCC)- (present on admission)  Assessment & Plan  Currently hepatic encephalopathy, delirium precautions    Paroxysmal atrial fibrillation (HCC)- (present on admission)  Assessment & Plan  Was on metoprolol and apixaban  Now on comfort care    Dyslipidemia- (present on admission)  Assessment & Plan  Was on  atorvastatin   now on comfort care      Cirrhosis of liver with ascites (HCC)- ? DUE TO OLD HEP B, CHEMO RX,  OR THERMO RAD OF MAGNANT LESION- (present on admission)  Assessment & Plan  Will be admitted for hepatic encephalopathy.,  See above  Was on  spironolactone, midodrine.  Hold torsemide due to soft pressures  Now on comfort care         VTE prophylaxis: comfort care    I have performed a physical exam and reviewed and updated ROS and Plan today (3/17/2025). In review of yesterday's note (3/16/2025), there are no changes except as documented above.

## 2025-03-17 NOTE — DISCHARGE PLANNING
Hospice choice scanned into media.    1250- Referral sent to [facility]    1320- Spoke To: [place]  Agency/Facility Name: [facility]   Plan or Request: Referral received, currently in review.    1405- Spoke To: [place]  Agency/Facility Name: [facility]   Plan or Request: Accepted on service but declined for Guest House.    1545- Spoke To: [place]  Agency/Facility Name: [facility]  Plan or Request: cancelled referral

## 2025-03-17 NOTE — PROGRESS NOTES
Assumed care of patient at 1900. Received Report from DEWEY Lomeli. Patient A&Ox1- only to self., on room air and not on apparent distress. Reporting a pain level of 0 at this time.  Call light within reach, fall prevention education given to patient and family, belongings within reach, bed alarm is on and bed in lowest position. All questions answered, plan of care discussed and family verbalized understanding.

## 2025-03-17 NOTE — DISCHARGE PLANNING
Post Acute Navigator Team    1057-This PANRN met with patient, spouse Celestina and granddennise at bedside. This PANRN explained role and provided education on [facility] services including HH,TCM, Home Palliative, and Hospice. Celestina reports that Dr. Ba felt it might be time for hospice after Celestina reported she gives him over 20 pills a day and he is getting worse. This PANRN asked Celestina to explain more on what patient's wishes are. Celestina and granddennise both report that patient would never want to be in his condition and that he would want hospice. Celestina reports that she is concerned about taking care of patient in his current condition by herself. Celestina reports that patient is no longer able to swallow and eat. This PANRN provided education on end of life signs and symptoms. This RN provided further education on hospice including philosophy, goals of care, individualized care plan, and core CMS requirements hospice agencies provide. Choice form explained including CMS ratings and options discussed. Brochures provided for [facility] and [facility]. Celestina would like to see if patient is a candidate for the [facility] and if not would like to try [facility] for symptom management. Patient was becoming more restless, furrowed brow present, and abdominal breathing RR 24.   1222- Dr. Ba updated via Voalte and informed that family would like to move forward with hospice and for patient to be comfortable. When this PANRN stepped outside to update Daphne enriquez RN and Rebekah TAN, Celestina met with team outside patient room. Dr. Ba verified Celestina would like to proceed with comfort measures.    Choice form obtained for #1 [facility], #2 [facility]  Provided to DPA to scan into chart and Rebekah TAN updated    Octaviano at [facility] updated regarding patient referral and [facility] inquiry.     Patient being followed by [facility] Transitional Care Navigator to assist with discharge- This PANRN  updated TCNs that patient has transitioned to comfort care and will discharge with hospice.    Per chart review, patient has been identified as a candidate for a goals of care discussion.     EOL Index High risk score 86  High LACE + score 82  Low 6 click Mobility score 8     Low 6 click ADL score 7   Readmission: No     This Post Acute Navigator will round on patient to provide information on [facility] Services including home health, home palliative care, and hospice.    Please reach out to me directly should care team feel patient will benefit from a discharge goals of care conversation regarding outpatient palliative care or hospice.

## 2025-03-17 NOTE — PROGRESS NOTES
Report of pulse rate at 125. All other parameters within normal range. Notified provider on call. Obtained order for EKG. Monitoring continued.    EKG image sent to provider on call, Rubens Cedillo.

## 2025-03-17 NOTE — PROGRESS NOTES
4 Eyes Skin Assessment Completed by DEWEY Meredith and DEWEY Mina.    Head WDL  Ears WDL  Nose WDL  Mouth WDL  Neck WDL  Breast/Chest WDL  Shoulder Blades WDL  Spine Rash  (R) Arm/Elbow/Hand Bruising, Abrasion, and Scab  (L) Arm/Elbow/Hand Bruising, Abrasion, and Scab  Abdomen WDL  Groin Redness and Excoriation and Bleeding  Scrotum/Coccyx/Buttocks Redness and Blanching  (R) Leg Scab and Bruising and scratches  (L) Leg Scab and Bruising and scratches  (R) Heel/Foot/Toe Redness and Blanching  (L) Heel/Foot/Toe Redness and Blanching and wound                                                  Devices In Places Blood Pressure Cuff, Pulse Ox, and Condom Cath      Interventions In Place Heel Mepilex, TAP System, Pillows, Q2 Turns, Low Air Loss Mattress, Barrier Cream, and Heels Loaded W/Pillows    Possible Skin Injury Yes    Pictures Uploaded Into Epic Yes  Wound Consult Placed N/A  RN Wound Prevention Protocol Ordered Yes

## 2025-03-17 NOTE — CARE PLAN
The patient is Watcher - Medium risk of patient condition declining or worsening    Shift Goals  Clinical Goals: Pt will remain free from falls, comfort  Patient Goals: ALESHA  Family Goals: comfort    Progress made toward(s) clinical / shift goals:      Patient remained free from falls throughout shift. Bed is locked and in lowest position. Call light and personal belongings are within reach. Patient remained comfortable throughout shift with the use of PRN medications and therapeutic communication to patient and family.      Problem: Fall Risk  Goal: Patient will remain free from falls  Outcome: Progressing     Problem: Knowledge Deficit - Comfort Care  Goal: Patient and family/care givers will demonstrate understanding of dying process and grieving  Outcome: Progressing     Problem: Psychosocial - Comfort Care  Goal: Patient's level of anxiety will decrease  Outcome: Progressing  Goal: Patient and family will demonstrate ability to cope with life altering diagnosis and/or procedure  Outcome: Progressing  Goal: Privacy will be maintained for patient and family  Outcome: Progressing       Patient is not progressing towards the following goals:

## 2025-03-17 NOTE — WOUND TEAM
[facility]  Inpatient Services  Wound and Skin Care Brief Evaluation    Admission Date: 3/15/2025     Last order of IP CONSULT TO WOUND CARE was found on 3/16/2025 from Hospital Encounter on 3/15/2025     HPI, PMH, SH: Reviewed    Chief Complaint   Patient presents with    Nausea    Weakness     Diagnosis: Hepatic encephalopathy (HCC) [K76.82]    Unit where seen by Wound Team: S529/01     Wound consult placed regarding Skin tears and sacrum. Chart and images reviewed.. This clinician in to assess patient. Patient pleasant and agreeable. Skin. Non-selectively debrided with Normal Saline and Gauze.       Skin tears treated per RN wound treatment protocol.      Sacrum and bilateral heels are intact.      No pressure injuries or advanced wound care needs identified. Wound consult completed. No further follow up unless indicated and consulted.          PREVENTATIVE INTERVENTIONS:    Q shift Huan - performed per nursing policy  Q shift pressure point assessments - performed per nursing policy    Surface/Positioning  Standard/trauma mattress - Currently in Place  Reposition q 2 hours - Currently in Place    Offloading/Redistribution  N/A

## 2025-03-17 NOTE — DISCHARGE PLANNING
[facility] referral faxed to Oct[facility] for review.  They do have 3 beds at this time.

## 2025-03-17 NOTE — THERAPY
Physical Therapy Contact Note    Patient Name: Torey Lima  Age:  79 y.o., Sex:  male  Medical Record #: [id number]  Today's Date: 3/17/2025    Per chart, pt has transitioned to comfort care and PT orders have been cancelled. Please reconsult if pt status changes.

## 2025-03-18 PROBLEM — Z51.5 COMFORT MEASURES ONLY STATUS: Status: ACTIVE | Noted: 2025-01-01

## 2025-03-18 NOTE — CARE PLAN
The patient is Stable - Low risk of patient condition declining or worsening    Shift Goals  Clinical Goals: Pt will remain free from falls, comfort  Patient Goals: ALESHA  Family Goals: comfort    Progress made toward(s) clinical / shift goals:      Patient remained free from falls throughout shift. Bed is locked and in lowest position. Call light and personal belongings are within reach. Comfort was maintained for the patient throughout shift.      Problem: Fall Risk  Goal: Patient will remain free from falls  Outcome: Progressing     Problem: Skin Integrity  Goal: Skin integrity is maintained or improved  Outcome: Progressing     Problem: Psychosocial - Comfort Care  Goal: Patient's level of anxiety will decrease  Outcome: Progressing  Goal: Patient and family will demonstrate ability to cope with life altering diagnosis and/or procedure  Outcome: Progressing     Problem: Discharge Planning - Comfort Care  Goal: Patient's continuum of care needs are met  Outcome: Progressing     Patient is not progressing towards the following goals:

## 2025-03-18 NOTE — PROGRESS NOTES
4 Eyes Skin Assessment Completed by DEWEY Caba and DEWEY Wheeler.    Head WDL  Ears WDL  Nose WDL  Mouth WDL  Neck WDL  Breast/Chest WDL  Shoulder Blades WDL  Spine WDLRash  (R) Arm/Elbow/Hand Bruising, Abrasion, and Scab  (L) Arm/Elbow/Hand Bruising, Abrasion, and Scab  Abdomen WDL  Groin Redness and Excoriation  Scrotum/Coccyx/Buttocks Redness and Blanching  (R) Leg Bruising and scratches  (L) Leg Bruising and scratches  (R) Heel/Foot/Toe Redness and Blanching  (L) Heel/Foot/Toe Redness and Blanching          Devices In Places Condom Cath      Interventions In Place Heel Mepilex, TAP System, Pillows, Q2 Turns, Low Air Loss Mattress, and Barrier Cream mepitel     Possible Skin Injury Yes    Pictures Uploaded Into Epic Yes  Wound Consult Placed Yes  RN Wound Prevention Protocol Ordered Yes

## 2025-03-18 NOTE — CARE PLAN
The patient is Stable - Low risk of patient condition declining or worsening    Shift Goals  Clinical Goals: Comfort care  Patient Goals: ALESHA  Family Goals: comfort    Progress made toward(s) clinical / shift goals:        Problem: Fall Risk  Goal: Patient will remain free from falls  Outcome: Progressing  Note: Safety education provided to family. Bed placed on low position with bed alarm kept on. Tele sitter in placed. Remains free from fall. Hourly rounding done. Monitoring to continue for the rest of the shift.     Problem: Psychosocial - Comfort Care  Goal: Privacy will be maintained for patient and family  Outcome: Progressing  Note: Patient is placed in a single bed room. Provided privacy for family members. Explained plan of care for the night; family verbalized understanding.     Problem: Pain - Standard  Goal: Alleviation of pain or a reduction in pain to the patient’s comfort goal  Outcome: Progressing  Note: Patient kept comfortable through pharmacologic and non-pharmacologic measures. Repositioned regularly.        Patient is not progressing towards the following goals:

## 2025-03-18 NOTE — TELEPHONE ENCOUNTER
Lorie martinez called in and wanted you to know that chilo is at [facility] in hospice care she states they are making him comfortable she will keep us posted .CAITIE

## 2025-03-18 NOTE — DIETARY
Nutrition Services: Update    Pt reviewed by RD due to admit screen indicates wound present on admit. Per Wound Care note 3/16/25, pt w/ no pressure wounds or advanced wound care needs.    Pt transitioning to comfort care measures as of 3/17/25. For pt's comfort and pleasure, recommend honoring dietary requests.      RD available PRN.

## 2025-03-18 NOTE — PROGRESS NOTES
Assumed care of patient at 1900. Received Report from DEWEY Meredith. Patient is somnolent. Moaning noted; pain assessed and medicated per MAR. PIV on right forearm checked- patent and no signs of phlebitis.Repositioned. Kept comfortable.fall prevention education given to family,bed alarm is on and bed in lowest position. All questions answered, plan of care discussed with family verbalized understanding.

## 2025-03-18 NOTE — PROGRESS NOTES
0800: Assumed care of patient at 0700. Received report from Lida PALOMO. Pt is A&O x 0, on RA. Pt appears to calm and sleeping. Assessment completed. Bowel sounds are active in all 4 quadrants. Lung sounds are diminished. Family denies any needs at this time and educated on what to expect during the comfort care process. Call light within reach and bed is locked and in lowest position. Bed alarm on.

## 2025-03-18 NOTE — DOCUMENTATION QUERY
"                                                                         [facility]                                                                       Query Response Note      PATIENT:               RYANNE ROMERO  ACCT #:                  [id number]  MRN:                     [id number]  :                      1945  ADMIT DATE:       3/15/2025 7:30 PM  DISCH DATE:          RESPONDING  PROVIDER #:        [id number]           QUERY TEXT:     \"Patient confused and impulsive, trying to get out of bed and removing covers, stating 'No, I want to walk'\" is documented in the 3/16 RN note. \"Difficult time sleeping overnight\" is documented in the 3/16 progress note    Please clarify the clinical relevance for the clinical/diagnostic findings stated below:    The patient's Clinical Indicators include:  3/15 H&P: \"Vascular dementia ... Stage II-III hepatic encephalopathy\"    3/16 PN: \"Had a difficult time sleeping overnight\"    3/16 1254 RN Note: \"Patient confused and impulsive, trying to get out of bed and removing covers, stating 'No, I want to walk'\"    Risk Factors: 79 y.o. male with hepatic encephalopathy, worsening confusion, vascular dementia, liver cirrhosis.  Treatment: Bed alarm, tele sitter, PO Seroquel x1 on 3/16, delirium precautions, transitioned to comfort care on 3/17.    Thank you,  VIDAL Mcnamara, RN, CCDS, CCS  Clinical    Connect via [employer] or [email or web address].[email or web address]  Options provided:   -- Dementia with exit-seeking and sleep disturbance   -- Insignificant findings   -- Other explanation, (please specify other explanation)   -- Unable to determine      Query created by: Roseanne Mon on 3/18/2025 9:30 AM    RESPONSE TEXT:    Dementia with exit-seeking and sleep disturbance          Electronically signed by:  YOLANDA SORIA MD 3/18/2025 10:22 AM              "

## 2025-03-19 NOTE — PROGRESS NOTES
Morphine and Versed infusion supposed to be in lockbox per MAR. Lockbox that is on floor will not fit pump. Morphine and Versed infusion started. Supervisor DEWEY Chan on phone with pharmacy to find a way to lock pump. CRN also aware.

## 2025-03-19 NOTE — PROGRESS NOTES
Patients family at bedside stating that patient became very agitated and restless when night shift tried to do skin check last night. Family requesting not to have skin check completed today. Patient is comfort care status. CRN aware.

## 2025-03-19 NOTE — PROGRESS NOTES
Patient is unresponsive, on comfort care, on continuous Morphine and Versed drip (see MAR). Patient appears comfortable, family at bedside, refused q2 turn and skin assessment.

## 2025-03-19 NOTE — CARE PLAN
The patient is Stable - Low risk of patient condition declining or worsening    Shift Goals  Clinical Goals: Assess for comfort with pain management  Patient Goals: ALESHA  Family Goals: Have patient pass quickly    Progress made toward(s) clinical / shift goals:    Problem: Psychosocial - Comfort Care  Goal: Patient and family will demonstrate ability to cope with life altering diagnosis and/or procedure  Outcome: Progressing  Note: Family addressing the normal process of end of life and monitoring the patient's breathing patterns. Assured family they were seeing a natural progression. Family supporting each other through this time.  Goal: Privacy will be maintained for patient and family  Outcome: Progressing  Note: Family comfortable in the private corner room. Family visiting throughout day and keeping door shut.

## 2025-03-19 NOTE — PROGRESS NOTES
1830- Contacted S6 for controlled substance lockbox availability, per S6 charge, boxes are obtained through [employer].     1832- Called central supply, no lock boxes available at this time. Central supply recommends calling each individual unit for boxes.     1833- called TICU, no lock boxes. Will contact S5 when lock box becomes available.     1839- No boxes on RICU.     1840- No boxes on GSU.    1843- CNU called, will check and then call back.     1844- No boxes on neuro.    1845- NAM contacted and informed situation, suggested call L&D.     1847- Call back from CNU, they have a lock box available. Both medications locked up for patient and pumps locked with key.

## 2025-03-19 NOTE — CARE PLAN
The patient is Unstable - High likelihood or risk of patient condition declining or worsening    Shift Goals  Clinical Goals: Provide comfort care.  Patient Goals: ALESHA (patient is unresponsive)  Family Goals: comfort    Progress made toward(s) clinical / shift goals: Patient is unresponsive and comfort care provided.     Patient is not progressing towards the following goals:      Problem: Knowledge Deficit - Standard  Goal: Patient and family/care givers will demonstrate understanding of plan of care, disease process/condition, diagnostic tests and medications  Description: Target End Date:  1-3 days or as soon as patient condition allowsDocument in Patient Education1.  Patient and family/caregiver oriented to unit, equipment, visitation policy and means for communicating concern2.  Complete/review Learning Assessment3.  Assess knowledge level of disease process/condition, treatment plan, diagnostic tests and medications4.  Explain disease process/condition, treatment plan, diagnostic tests and medications  Outcome: Not Met     Problem: Knowledge Deficit - Comfort Care  Goal: Patient and family/care givers will demonstrate understanding of dying process and grieving  Description: Target End Date:  1-3 days or as soon as patient condition allowsProvide support and education regarding the dying process and grieving.  Outcome: Not Met

## 2025-03-19 NOTE — PROGRESS NOTES
Assumed care of patient after receiving report from [facility]. Pt is comfort care. Bed is low/locked. Discussed staying in hospital, wife and daughter at bedside. Pt has no signs of distress or discomfort. No further needs at this time.

## 2025-03-19 NOTE — PROGRESS NOTES
Hospital Medicine Daily Progress Note    Date of Service  3/18/2025    Chief Complaint  Torey Lima is a 79 y.o. male admitted 3/15/2025 with hepatic encephalopathy    Hospital Course  No notes on file    Interval Problem Update  3/16: Discussed with wife at bedside, patient had been taking lactulose at home but only having 1 bowel movement per day usually.  Ordered paracentesis.  Had a difficult time sleeping overnight.   3/17: Ammonia improved but mentation worsened.  Patient somnolent and not able to participate in conversation.  Total time 54 minutes.  Total time spent on advanced care planning, excluding time spent on daily care: 22 minutes.  Discussed prognosis with wife and changed to comfort care.  Placed hospice consult but wife unable to care for patient at home and cannot afford paying for facility.    3/18.   Long discussion with family. HE seems imminent and they do not want to go home  'Long talk with [facility] who will try to take over TOMORROW> Currently no personnel. I spoke with Dr. Pittman and ordered morphine and versed drips. Discussed with nursing.  I have discussed this patient's plan of care and discharge plan at IDT rounds today with Case Management, Nursing, Nursing leadership, and other members of the IDT team.    Disposition  Medically Cleared  I have placed the appropriate orders for post-discharge needs.    Review of Systems  Review of Systems   Unable to perform ROS: Medical condition        Physical Exam  Temp:  [36.4 °C (97.5 °F)] 36.4 °C (97.5 °F)  Pulse:  [66] 66  Resp:  [15-18] 15  BP: (106)/(61) 106/61  SpO2:  [89 %-90 %] 90 %    Physical Exam  Constitutional:       General: He is not in acute distress.     Appearance: He is well-developed. He is ill-appearing.   HENT:      Head: Normocephalic.      Right Ear: External ear normal.      Left Ear: External ear normal.   Eyes:      General: Scleral icterus present.   Cardiovascular:      Rate and Rhythm: Normal rate.   Pulmonary:       Effort: No respiratory distress.      Breath sounds: No wheezing.   Abdominal:      General: There is no distension.      Tenderness: There is no abdominal tenderness.   Skin:     General: Skin is warm.      Coloration: Skin is jaundiced.   Neurological:      Mental Status: He is disoriented.   Psychiatric:      Comments: Unable to assess         Fluids    Intake/Output Summary (Last 24 hours) at 3/18/2025 1852  Last data filed at 3/18/2025 1300  Gross per 24 hour   Intake --   Output 400 ml   Net -400 ml          Laboratory  Recent Labs     03/15/25  2015 03/17/25  0043   WBC 5.2 5.9   RBC 3.03* 3.36*   HEMOGLOBIN 9.5* 10.4*   HEMATOCRIT 29.8* 33.3*   MCV 98.3* 99.1*   MCH 31.4 31.0   MCHC 31.9* 31.2*   RDW 64.6* 64.4*   PLATELETCT 60* 62*   MPV 10.5 9.6     Recent Labs     03/15/25  2135 03/17/25  0043   SODIUM 137 140   POTASSIUM 4.6 4.3   CHLORIDE 108 108   CO2 19* 20   GLUCOSE 109* 122*   BUN 34* 30*   CREATININE 1.53* 1.50*   CALCIUM 8.6 9.4                   Imaging  CT-HEAD W/O   Final Result      1.  No evidence of acute intracranial process.      2.  Cerebral atrophy as well as periventricular chronic small vessel ischemic change.               DX-CHEST-PORTABLE (1 VIEW)   Final Result      Cardiomegaly.           Assessment/Plan  * Comfort measures only status  Assessment & Plan  Ordere the drips.    Hepatic encephalopathy (HCC)- (present on admission)  Assessment & Plan  Hepatic encephalopathy stage II-3, lethargic and somnolent but arousable  This has been a chronic ongoing issue for a few months now and he was started on lactulose but still only having 1 sometimes 2 bowel movements a day  Increased lactulose   start on rifaximin and zinc while in the hospital, but it seems wife said she cannot afford the rifaximin outpatient even with insurance  Ammonia decreased but still confused  Now on comfort care    Vascular dementia without behavioral disturbance, psychotic disturbance, mood disturbance, or  anxiety (HCC)- (present on admission)  Assessment & Plan  Currently hepatic encephalopathy, delirium precautions    Paroxysmal atrial fibrillation (HCC)- (present on admission)  Assessment & Plan  Was on metoprolol and apixaban  Now on comfort care    Dyslipidemia- (present on admission)  Assessment & Plan  Was on atorvastatin   now on comfort care      Cirrhosis of liver with ascites (HCC)- ? DUE TO OLD HEP B, CHEMO RX,  OR THERMO RAD OF MAGNANT LESION- (present on admission)  Assessment & Plan  Will be admitted for hepatic encephalopathy.,  See above  Was on  spironolactone, midodrine.  Hold torsemide due to soft pressures  Now on comfort care         VTE prophylaxis: comfort care    I have performed a physical exam and reviewed and updated ROS and Plan today (3/18/2025). In review of yesterday's note (3/17/2025), there are no changes except as documented above.    Patient is has a high medical complexity, complex decision making and is at high risk for complication, morbidity, and mortality, thus requiring the highest level of my preparedness for sudden, emergent intervention. Medical decision making is therefore complex. I provided  services, which included ordering labs and/or imaging, and discussing the case with various consultants.medication orders, frequent reevaluations of the patient's condition and response to treatment. Time was also devoted to counseling and coordinating care including review of records, pertinent lab data and studies, as well as discussing diagnostic evaluation and work up, planned therapeutic interventions and future disposition of care. Where indicated, the assessment and plan reflect discussion of patient with consultants, other healthcare providers, family members, and additional research needed to obtain further information in formulating the plan of care for Torey Lima. Total time spent was 65 minutes.   In addition to that I spent another 15 minutes for advanced  care/counseling discussing and confirming code status and goals of care with patient, family, consultants and Palliative team.

## 2025-03-20 NOTE — PROGRESS NOTES
Assumed care of patient after receiving report from [place]. Pt is comfort care. Bed is low/locked. Discussed staying in hospital, wife and daughter at bedside. Pt has increased work of breathing but no signs of distress or discomfort. Will address increasing drips. No further needs at this time.

## 2025-03-20 NOTE — CARDIAC REMOTE MONITOR - SCAN
Device transmission reviewed. Device demonstrated appropriate function.       Electronically Signed by: Derrick Allison M.D.    4/18/2025  2:23 PM

## 2025-03-20 NOTE — CARE PLAN
The patient is Unstable - High likelihood or risk of patient condition declining or worsening    Shift Goals  Clinical Goals: Pt to remain comfortable throughout shift by showing minimal indications of pain and restlessness.  Patient Goals: ALESHA  Family Goals: Comfort for pt    Progress made toward(s) clinical / shift goals:    Pt receiving continuous infusion of morphine and versed during shift. Pt has not shown any indications of pain or restlessness. Pt had a period of noisy breathing and was given prn atropine drops. Family's wishes respected when requesting to not reposition pt. Comfort provided for family. Family at the bedside with pt and in a private room.    Problem: Knowledge Deficit - Comfort Care  Goal: Patient and family/care givers will demonstrate understanding of dying process and grieving  Outcome: Progressing  Problem: Psychosocial - Comfort Care  Goal: Patient and family will demonstrate ability to cope with life altering diagnosis and/or procedure  Outcome: Progressing  Goal: Privacy will be maintained for patient and family  Outcome: Progressing       Patient is not progressing towards the following goals:

## 2025-03-20 NOTE — PROGRESS NOTES
Hospital Medicine Daily Progress Note    Date of Service  3/19/2025    Chief Complaint  Torey Lima is a 79 y.o. male admitted 3/15/2025 with hepatic encephalopathy    Hospital Course  No notes on file    Interval Problem Update  3/16: Discussed with wife at bedside, patient had been taking lactulose at home but only having 1 bowel movement per day usually.  Ordered paracentesis.  Had a difficult time sleeping overnight.   3/17: Ammonia improved but mentation worsened.  Patient somnolent and not able to participate in conversation.  Total time 54 minutes.  Total time spent on advanced care planning, excluding time spent on daily care: 22 minutes.  Discussed prognosis with wife and changed to comfort care.  Placed hospice consult but wife unable to care for patient at home and cannot afford paying for facility.    3/18.   Long discussion with family. HE seems imminent and they do not want to go home  'Long talk with [facility] who will try to take over TOMORROW> Currently no personnel. I spoke with Dr. Pittman and ordered morphine and versed drips. Discussed with nursing.    3/19. Remains comfortable on the drip  Discussed with [facility] nurse and staff.  I have discussed this patient's plan of care and discharge plan at IDT rounds today with Case Management, Nursing, Nursing leadership, and other members of the IDT team.    Disposition  Medically Cleared  I have placed the appropriate orders for post-discharge needs.    Review of Systems  Review of Systems   Unable to perform ROS: Medical condition        Physical Exam  Temp:  [36.8 °C (98.3 °F)] 36.8 °C (98.3 °F)  Pulse:  [123] 123  Resp:  [11-16] 11  BP: (98)/(51) 98/51  SpO2:  [68 %-70 %] 68 %    Physical Exam  Constitutional:       General: He is not in acute distress.     Appearance: He is well-developed. He is ill-appearing.   HENT:      Head: Normocephalic.      Right Ear: External ear normal.      Left Ear: External ear normal.   Eyes:      General: Scleral  icterus present.   Cardiovascular:      Rate and Rhythm: Normal rate.   Pulmonary:      Effort: No respiratory distress.      Breath sounds: No wheezing.   Abdominal:      General: There is no distension.      Tenderness: There is no abdominal tenderness.   Skin:     General: Skin is warm.      Coloration: Skin is jaundiced.   Neurological:      Mental Status: He is disoriented.   Psychiatric:      Comments: Unable to assess         Fluids    Intake/Output Summary (Last 24 hours) at 3/19/2025 1708  Last data filed at 3/18/2025 2011  Gross per 24 hour   Intake 0 ml   Output --   Net 0 ml          Laboratory  Recent Labs     03/17/25  0043   WBC 5.9   RBC 3.36*   HEMOGLOBIN 10.4*   HEMATOCRIT 33.3*   MCV 99.1*   MCH 31.0   MCHC 31.2*   RDW 64.4*   PLATELETCT 62*   MPV 9.6     Recent Labs     03/17/25  0043   SODIUM 140   POTASSIUM 4.3   CHLORIDE 108   CO2 20   GLUCOSE 122*   BUN 30*   CREATININE 1.50*   CALCIUM 9.4                   Imaging  CT-HEAD W/O   Final Result      1.  No evidence of acute intracranial process.      2.  Cerebral atrophy as well as periventricular chronic small vessel ischemic change.               DX-CHEST-PORTABLE (1 VIEW)   Final Result      Cardiomegaly.           Assessment/Plan  * Comfort measures only status  Assessment & Plan  Ordere the drips.    Hepatic encephalopathy (HCC)- (present on admission)  Assessment & Plan  Hepatic encephalopathy stage II-3, lethargic and somnolent but arousable  This has been a chronic ongoing issue for a few months now and he was started on lactulose but still only having 1 sometimes 2 bowel movements a day  Increased lactulose   start on rifaximin and zinc while in the hospital, but it seems wife said she cannot afford the rifaximin outpatient even with insurance  Ammonia decreased but still confused  Now on comfort care    Vascular dementia without behavioral disturbance, psychotic disturbance, mood disturbance, or anxiety (HCC)- (present on  admission)  Assessment & Plan  Currently hepatic encephalopathy, delirium precautions    Paroxysmal atrial fibrillation (HCC)- (present on admission)  Assessment & Plan  Was on metoprolol and apixaban  Now on comfort care    Dyslipidemia- (present on admission)  Assessment & Plan  Was on atorvastatin   now on comfort care      Cirrhosis of liver with ascites (HCC)- ? DUE TO OLD HEP B, CHEMO RX,  OR THERMO RAD OF MAGNANT LESION- (present on admission)  Assessment & Plan  Will be admitted for hepatic encephalopathy.,  See above  Was on  spironolactone, midodrine.  Hold torsemide due to soft pressures  Now on comfort care         VTE prophylaxis: comfort care    I have performed a physical exam and reviewed and updated ROS and Plan today (3/19/2025). In review of yesterday's note (3/18/2025), there are no changes except as documented above.    Patient is has a high medical complexity, complex decision making and is at high risk for complication, morbidity, and mortality, thus requiring the highest level of my preparedness for sudden, emergent intervention. Medical decision making is therefore complex. I provided  services, which included ordering labs and/or imaging, and discussing the case with various consultants.medication orders, frequent reevaluations of the patient's condition and response to treatment. Time was also devoted to counseling and coordinating care including review of records, pertinent lab data and studies, as well as discussing diagnostic evaluation and work up, planned therapeutic interventions and future disposition of care. Where indicated, the assessment and plan reflect discussion of patient with consultants, other healthcare providers, family members, and additional research needed to obtain further information in formulating the plan of care for Torey Lima. Total time spent was 42 minutes.   In addition to that I spent another 15 minutes for advanced care/counseling discussing and  confirming code status and goals of care with patient, family, consultants and Palliative team.

## 2025-03-20 NOTE — PROGRESS NOTES
Assumed care of patient at 1900. Received bedside report from day RN Donita. Patient A&Ox0, pt on comfort care, on RA, does not show any signs of pain or restlessness at this time. Morphine and versed infusions in lockbox. Rate verified with charge RN. Fall precautions in place, bed in lowest position. Family does not have any other needs at this time. POC was discussed with family. All questions were answered. Family verbalized understanding.

## 2025-03-20 NOTE — DOCUMENTATION QUERY
"DOCUMENTATION QUERY    PROVIDERS: Please select “Cosign w/ note”to reply to query.    To better represent the severity of illness of your patient, please review the following information and exercise your independent professional judgment in responding to this query.     1) \"Right upper lobe pneumonia\" is documented in the ED Note dated 6/26.  2) \"Due to the x-ray findings, and fever he was felt to have pneumonia and was given dose of ceftriaxone and doxycycline.\" is documented in H&P dated 6/26.  3) \"Questionable pneumonia on chest x-ray, no symptoms\" is documented in the H&P dated 6/26.  4) \"I highly doubt pneumonia as he does not have any symptoms, although his chest x-ray shows possible infiltrates\" is documented in H&P dated 6/26.    Pneumonia is not documented in subsequent Progress Notes or the Discharge Summary. Based upon the clinical findings, risk factors, and treatment, can this diagnosis be ruled in or ruled out.    • Pneumonia has resolved  • Pneumonia has been ruled in  • Pneumonia has been ruled out  • Other explanation of clinical findings  • Unable to determine    The medical record reflects the following:   Clinical Findings 6/26 DX-CHEST-PORTABLE (1 VIEW): Small right upper lobe airspace process that could be pneumonia.  6/26 WBC's: 10.0  6/26 Blood Culture: Positive \"Coagulase Negative Staphylococcus-possible contaminant -- Isolated from one bottle only, possible skin contaminant.   Treatment Ceftriaxone; doxycycline    Risk Factors Age; fever; left leg DVT; obesity; OCAD; esophageal varices; cirrhosis; recent Guillain-Cumming syndrome; AIDP; JAXON; HTN    Location within medical record History and Physical, Lab Results , Radiology Results and ED Note     Thank you,   Kathy Junior RN  Clinical   [phone]          "
"DOCUMENTATION QUERY    PROVIDERS: Please select “Cosign w/ note”to reply to query.    To better represent the severity of illness of your patient, please review the following information and exercise your independent professional judgment in responding to this query.     There is conflicting documentation in the medical record.  1) \"Stage II sacral decubitus ulcer\" is documented in the History and Physical.  2) \"SDTI, bilateral buttocks and sacrum, POA\" is documented in the Wound Team Note.     Based upon the clinical findings, risk factors, and treatment, can the specific wound type be clarified?    • Agree with Wound Care RN assessment  • Disagree with Wound Care RN assessment (document your clinical findings)  • Other explanation of clinical findings  • Unable to determine    The medical record reflects the following:   Clinical Findings 6/27 Wound Care Note:   Tissue Type and %:    40% deep red/purple, 60% red            Wound Edges:  Ragged, some open areas      Measurements: taken 6/27/17              Length: 12.0cm    Width: 10.0cm    Depth: ua    Tracts/undermining: None             Treatment Wound team consult and dressing changes    Risk Factors Age; obesity; Guillain-Northwood syndrome; cirrhosis; DVT; CAD; HTN; portal vein thrombosis   Location within medical record History and Physical and Wound Team Note     Thank you,   Kathy Junior RN  Clinical   [phone]          "
RALES

## 2025-03-20 NOTE — PROGRESS NOTES
Family requesting that this RN not perform q2h turns, skin check, and new IV placement due to wanting the pt to remain comfortable. Family educated by this RN that the staff is available to reposition the pt when they feel is needed. Pillows in place for positioning and pt on AJAY bed. Family verbalized understanding. Charge RN notified of family's refusal.

## 2025-03-20 NOTE — PROGRESS NOTES
Pt  at 0937, declared by Donita RN and DEWEY Augustin. MD aware. Pt family at bedside, all belongings sent home with family. Post mortem care completed. Pt sent down to [facility] with transport.

## 2025-03-21 ENCOUNTER — APPOINTMENT (OUTPATIENT)
Dept: CARDIOLOGY | Facility: MEDICAL CENTER | Age: 80
End: 2025-03-21
Attending: INTERNAL MEDICINE
Payer: MEDICARE

## 2025-03-21 NOTE — DISCHARGE SUMMARY
Death Summary    Cause of Death  Cardiorespiratory failure due to aspiration/comfort medications (narcotics, benzodiazepines) due to agiatated encephalopathy due to hepatic encephalopathy due to end stage liver disease/colon cancer and advanced dementia.    Comorbid Conditions at the Time of Death  Principal Problem:    Comfort measures only status (POA: Unknown)  Active Problems:    Cirrhosis of liver with ascites (HCC)- ? DUE TO OLD HEP B, CHEMO RX,  OR THERMO RAD OF MAGNANT LESION (POA: Yes)    Dyslipidemia (POA: Yes)    Paroxysmal atrial fibrillation (HCC) (POA: Yes)    Vascular dementia without behavioral disturbance, psychotic disturbance, mood disturbance, or anxiety (HCC) (POA: Yes)    Hepatic encephalopathy (HCC) (POA: Yes)        History of Presenting Illness and Hospital Course  He has a history of colon cancer with mets to the liver (s/p resection 2010 and treated with oxaliplatin and Xeloda as well as open repair of ablation in ), cirrhosis (history of varices, nonocclusive portal vein thrombosis, recurrent ascites on diuretics), A-fib on anticoagulation, dementia. Having worsening hepatic encephalopathy and following with GI at [facility] and he was started on lactulose 2025 but has only been having 1-2 bowel movements a day, lactulose dose was recently increased without change in his bowel movements. He was started on rifaximin but unable to afford it and insurance is not covering it. He received more lactulose and had a paracentesis however his mentation worsened and he became more somnolent, unable to participate in conversation. On 3/17 Dr. Ba, wife decided he be placed in COMFORT CARE status. Hospice/[facility] consulted. [facility] unable to take over care because of lack of personnel but they recommended placing patient on morphine and Versed drip for agitation. [facility] hospice team felt patient is imminent. I titrated his morphine up as he was having agonal breathing. He  peacefully on the date below.      Death Date: 03/20/25   Death Time: 0937         Pronounced By (RN1): Donita Castro  Pronounced By (RN2): Demetria Locke

## 2025-05-12 ENCOUNTER — APPOINTMENT (OUTPATIENT)
Dept: NEUROLOGY | Facility: MEDICAL CENTER | Age: 80
End: 2025-05-12
Attending: SPECIALIST
Payer: MEDICARE

## (undated) DEVICE — SUTURE 0 VICRYL PLUS CT-1 - 8 X 18 INCH (12/BX)

## (undated) DEVICE — GOWN WARMING STANDARD FLEX - (30/CA)

## (undated) DEVICE — SUTURE 4-0 MONOCRYL PLUS PS-2 - 27 INCH (36/BX)

## (undated) DEVICE — CLOSURE SKIN STRIP 1/2 X 4 IN - (STERI STRIP) (50/BX 4BX/CA)

## (undated) DEVICE — SENSOR OXIMETER ADULT SPO2 RD SET (20EA/BX)

## (undated) DEVICE — SUCTION INSTRUMENT YANKAUER BULBOUS TIP W/O VENT (50EA/CA)

## (undated) DEVICE — ELECTRODE DUAL RETURN W/ CORD - (50/PK)

## (undated) DEVICE — ELECTRODE 850 FOAM ADHESIVE - HYDROGEL RADIOTRNSPRNT (50/PK)

## (undated) DEVICE — SUTURE 3-0 VICRYL PLUS SH - 8X 18 INCH (12/BX)

## (undated) DEVICE — RESERVOIR SUCTION 100 CC - SILICONE (20EA/CA)

## (undated) DEVICE — KIT ROOM DECONTAMINATION

## (undated) DEVICE — HEADREST PRONEVIEW LARGE - (10/CA)

## (undated) DEVICE — TOOL DISSECT MATCH HEAD

## (undated) DEVICE — BINDER ABDOMINAL 30X45X12IN - FITS 30-45IN WAIST 12IN HIGH

## (undated) DEVICE — CANISTER SUCTION 3000ML MECHANICAL FILTER AUTO SHUTOFF MEDI-VAC NONSTERILE LF DISP  (40EA/CA)

## (undated) DEVICE — ARMREST CRADLE FOAM - (2PR/PK 12PR/CA)

## (undated) DEVICE — GLOVE BIOGEL PI INDICATOR SZ 8.0 SURGICAL PF LF -(50/BX 4BX/CA)

## (undated) DEVICE — DRAPE LAPAROTOMY T SHEET - (12EA/CA)

## (undated) DEVICE — PACK NEURO - (2EA/CA)

## (undated) DEVICE — SPONGE GAUZESTER 4 X 4 4PLY - (128PK/CA)

## (undated) DEVICE — LACTATED RINGERS INJ 1000 ML - (14EA/CA 60CA/PF)

## (undated) DEVICE — PACK MINOR BASIN - (2EA/CA)

## (undated) DEVICE — BLADE SURGICAL CLIPPER - (50EA/CA)

## (undated) DEVICE — KIT SURGIFLO W/OUT THROMBIN - (6EA/CA)

## (undated) DEVICE — SYRINGE SAFETY 10 ML 18 GA X 1 1/2 BLUNT LL (100/BX 4BX/CA)

## (undated) DEVICE — SET EXTENSION WITH 2 PORTS (48EA/CA) ***PART #2C8610 IS A SUBSTITUTE*****

## (undated) DEVICE — SENSOR SPO2 NEO LNCS ADHESIVE (20/BX) SEE USER NOTES

## (undated) DEVICE — GLOVE SZ 6.5 BIOGEL PI MICRO - PF LF (50PR/BX)

## (undated) DEVICE — TUBING C&T SET FLYING LEADS DRAIN TUBING (10EA/BX)

## (undated) DEVICE — SUTURE 2-0 VICRYL PLUS CT-1 - 8 X 18 INCH(12/BX)

## (undated) DEVICE — TOWELS CLOTH SURGICAL - (4/PK 20PK/CA)

## (undated) DEVICE — CHLORAPREP 26 ML APPLICATOR - ORANGE TINT(25/CA)

## (undated) DEVICE — PROTECTOR ULNA NERVE - (36PR/CA)

## (undated) DEVICE — PAD LAP STERILE 18 X 18 - (5/PK 40PK/CA)

## (undated) DEVICE — DRAPE LARGE 3 QUARTER - (20/CA)

## (undated) DEVICE — MIDAS LUBRICATOR DIFFUSER PACK (4EA/CA)

## (undated) DEVICE — LEAD SET 6 DISP. EKG NIHON KOHDEN

## (undated) DEVICE — GLOVE BIOGEL SZ 7 SURGICAL PF LTX - (50PR/BX 4BX/CA)

## (undated) DEVICE — SODIUM CHL IRRIGATION 0.9% 1000ML (12EA/CA)

## (undated) DEVICE — DRAIN J-P FLAT 7MM X 20CM - (10/CA)

## (undated) DEVICE — GLOVE BIOGEL INDICATOR SZ 8 SURGICAL PF LTX - (50/BX 4BX/CA)

## (undated) DEVICE — SET LEADWIRE 5 LEAD BEDSIDE DISPOSABLE ECG (1SET OF 5/EA)

## (undated) DEVICE — LIGHT SOURCE MIS 12FT

## (undated) DEVICE — PACK JACKSON TABLE KIT W/OUT - HR (6EA/CA)

## (undated) DEVICE — GLOVE SZ 7.5 BIOGEL PI MICRO - PF LF (50PR/BX)

## (undated) DEVICE — NEPTUNE 4 PORT MANIFOLD - (20/PK)

## (undated) DEVICE — GLOVE, BIOGEL ECLIPSE, SZ 7.0, PF LTX (50/BX)

## (undated) DEVICE — GLOVE BIOGEL PI INDICATOR SZ 6.5 SURGICAL PF LF - (50/BX 4BX/CA)

## (undated) DEVICE — STERI STRIP COMPOUND BENZOIN - TINCTURE 0.6ML WITH APPLICATOR (40EA/BX)

## (undated) DEVICE — KIT ANESTHESIA W/CIRCUIT & 3/LT BAG W/FILTER (20EA/CA)

## (undated) DEVICE — GLOVE BIOGEL SZ 8 SURGICAL PF LTX - (50PR/BX 4BX/CA)

## (undated) DEVICE — GLOVE BIOGEL INDICATOR SZ 7.5 SURGICAL PF LTX - (50PR/BX 4BX/CA)

## (undated) DEVICE — COVER LIGHT HANDLE ALC PLUS DISP (18EA/BX)

## (undated) DEVICE — SUTURE 2-0 PDS II CT-2 - (36/BX)

## (undated) DEVICE — DRAPE MICROSCOPE X-LONG (10EA/CA)

## (undated) DEVICE — SUTURE 2-0 VICRYL PLUS CT-1 36 (36PK/BX)"

## (undated) DEVICE — TUBE E-T HI-LO CUFF 7.5MM (10EA/PK)

## (undated) DEVICE — TUBING CLEARLINK DUO-VENT - C-FLO (48EA/CA)

## (undated) DEVICE — GLOVE BIOGEL PI ORTHO SZ 8 PF LF (40PR/BX)

## (undated) DEVICE — SUTURE GENERAL

## (undated) DEVICE — SLEEVE, VASO, THIGH, MED

## (undated) DEVICE — PAD BABY LAP 4X18 W/O - RINGS PREWASHED 5/PK 40PK/CS

## (undated) DEVICE — LACTATED RINGERS INJ. 500 ML - (24EA/CA)